# Patient Record
Sex: MALE | Race: OTHER | HISPANIC OR LATINO
[De-identification: names, ages, dates, MRNs, and addresses within clinical notes are randomized per-mention and may not be internally consistent; named-entity substitution may affect disease eponyms.]

---

## 2017-01-26 ENCOUNTER — APPOINTMENT (OUTPATIENT)
Dept: PODIATRY | Facility: CLINIC | Age: 42
End: 2017-01-26

## 2017-01-26 ENCOUNTER — APPOINTMENT (OUTPATIENT)
Dept: ENDOCRINOLOGY | Facility: CLINIC | Age: 42
End: 2017-01-26

## 2017-02-07 ENCOUNTER — APPOINTMENT (OUTPATIENT)
Dept: INTERNAL MEDICINE | Facility: CLINIC | Age: 42
End: 2017-02-07

## 2017-02-07 VITALS
BODY MASS INDEX: 25.76 KG/M2 | SYSTOLIC BLOOD PRESSURE: 143 MMHG | DIASTOLIC BLOOD PRESSURE: 85 MMHG | WEIGHT: 170 LBS | HEART RATE: 90 BPM | HEIGHT: 68 IN

## 2017-02-13 ENCOUNTER — CLINICAL ADVICE (OUTPATIENT)
Age: 42
End: 2017-02-13

## 2017-02-13 LAB
ALBUMIN SERPL-MCNC: 4.2 G/DL
ALBUMIN/GLOB SERPL: 1.35
ALP SERPL-CCNC: 120 IU/L
ALT SERPL-CCNC: 29 IU/L
ANION GAP SERPL CALC-SCNC: 10 MEQ/L
AST SERPL-CCNC: 19 IU/L
BASOPHILS # BLD: 0.02 TH/MM3
BASOPHILS NFR BLD: 0.3 %
BILIRUB SERPL-MCNC: 0.6 MG/DL
BUN SERPL-MCNC: 11 MG/DL
BUN/CREAT SERPL: 13.6 %
CALCIUM SERPL-MCNC: 9.2 MG/DL
CHLORIDE SERPL-SCNC: 98 MEQ/L
CHOLEST SERPL-MCNC: 177 MG/DL
CO2 SERPL-SCNC: 30 MEQ/L
CREAT SERPL-MCNC: 0.81 MG/DL
CREAT UR-MCNC: 110 MG/DL
EOSINOPHIL # BLD: 0.28 TH/MM3
EOSINOPHIL NFR BLD: 4.3 %
ERYTHROCYTE [DISTWIDTH] IN BLOOD BY AUTOMATED COUNT: 12.5 %
ESTIMATED AVERGAGE GLUCOSE (NORTH): 255 MG/DL
GFR SERPL CREATININE-BSD FRML MDRD: 104
GLUCOSE SERPL-MCNC: 239 MG/DL
GRANULOCYTES # BLD: 3.82 TH/MM3
GRANULOCYTES NFR BLD: 58.1 %
HBA1C MFR BLD: 10.5 %
HCT VFR BLD AUTO: 47.8 %
HDLC SERPL-MCNC: 39 MG/DL
HDLC SERPL: 4.54
HGB BLD-MCNC: 15.7 G/DL
IMM GRANULOCYTES # BLD: 0.01 TH/MM3
IMM GRANULOCYTES NFR BLD: 0.2 %
LDLC SERPL DIRECT ASSAY-MCNC: 73 MG/DL
LYMPHOCYTES # BLD: 1.94 TH/MM3
LYMPHOCYTES NFR BLD: 29.5 %
MCH RBC QN AUTO: 31.1 PG
MCHC RBC AUTO-ENTMCNC: 32.8 G/DL
MCV RBC AUTO: 94.7 FL
MICROALBUMIN UR-MCNC: 33.5 MG/DL
MICROALBUMIN/CREAT UR-RTO: 304 UG/MG
MONOCYTES # BLD: 0.5 TH/MM3
MONOCYTES NFR BLD: 7.6 %
PLATELET # BLD: 203 TH/MM3
PMV BLD AUTO: 11.5 FL
POTASSIUM SERPL-SCNC: 3.9 MMOL/L
PROT SERPL-MCNC: 7.3 G/DL
RBC # BLD AUTO: 5.05 MIL/MM3
SODIUM SERPL-SCNC: 138 MEQ/L
TRIGL SERPL-MCNC: 314 MG/DL
VLDLC SERPL-MCNC: 62 MG/DL
WBC # BLD: 6.57 TH/MM3

## 2017-02-24 ENCOUNTER — APPOINTMENT (OUTPATIENT)
Dept: ENDOCRINOLOGY | Facility: CLINIC | Age: 42
End: 2017-02-24

## 2017-02-27 ENCOUNTER — APPOINTMENT (OUTPATIENT)
Dept: CARDIOLOGY | Facility: CLINIC | Age: 42
End: 2017-02-27

## 2017-03-17 ENCOUNTER — RX RENEWAL (OUTPATIENT)
Age: 42
End: 2017-03-17

## 2017-03-24 ENCOUNTER — APPOINTMENT (OUTPATIENT)
Dept: NUTRITION | Facility: CLINIC | Age: 42
End: 2017-03-24

## 2017-03-24 ENCOUNTER — APPOINTMENT (OUTPATIENT)
Dept: ENDOCRINOLOGY | Facility: CLINIC | Age: 42
End: 2017-03-24

## 2017-06-06 ENCOUNTER — APPOINTMENT (OUTPATIENT)
Dept: INTERNAL MEDICINE | Facility: CLINIC | Age: 42
End: 2017-06-06

## 2017-06-06 ENCOUNTER — OUTPATIENT (OUTPATIENT)
Dept: OUTPATIENT SERVICES | Facility: HOSPITAL | Age: 42
LOS: 1 days | Discharge: HOME | End: 2017-06-06

## 2017-06-06 VITALS
WEIGHT: 172 LBS | HEIGHT: 68 IN | SYSTOLIC BLOOD PRESSURE: 130 MMHG | HEART RATE: 94 BPM | BODY MASS INDEX: 26.07 KG/M2 | DIASTOLIC BLOOD PRESSURE: 73 MMHG

## 2017-06-06 DIAGNOSIS — R06.02 SHORTNESS OF BREATH: ICD-10-CM

## 2017-06-06 DIAGNOSIS — E11.621 TYPE 2 DIABETES MELLITUS WITH FOOT ULCER: ICD-10-CM

## 2017-06-06 DIAGNOSIS — R06.09 OTHER FORMS OF DYSPNEA: ICD-10-CM

## 2017-06-12 ENCOUNTER — APPOINTMENT (OUTPATIENT)
Dept: PODIATRY | Facility: CLINIC | Age: 42
End: 2017-06-12

## 2017-06-19 ENCOUNTER — APPOINTMENT (OUTPATIENT)
Dept: PODIATRY | Facility: CLINIC | Age: 42
End: 2017-06-19

## 2017-06-19 ENCOUNTER — RESULT REVIEW (OUTPATIENT)
Age: 42
End: 2017-06-19

## 2017-06-19 ENCOUNTER — OUTPATIENT (OUTPATIENT)
Dept: OUTPATIENT SERVICES | Facility: HOSPITAL | Age: 42
LOS: 1 days | Discharge: HOME | End: 2017-06-19

## 2017-06-19 VITALS
DIASTOLIC BLOOD PRESSURE: 80 MMHG | WEIGHT: 170 LBS | HEIGHT: 67 IN | SYSTOLIC BLOOD PRESSURE: 127 MMHG | HEART RATE: 80 BPM | BODY MASS INDEX: 26.68 KG/M2

## 2017-06-19 DIAGNOSIS — E11.621 TYPE 2 DIABETES MELLITUS WITH FOOT ULCER: ICD-10-CM

## 2017-06-20 LAB
CHOLEST SERPL-MCNC: 198 MG/DL
ESTIMATED AVERGAGE GLUCOSE (NORTH): 278 MG/DL
HBA1C MFR BLD: 11.3 %
HDLC SERPL-MCNC: 41 MG/DL
HDLC SERPL: 4.83
LDLC SERPL DIRECT ASSAY-MCNC: 73 MG/DL
PROLACTIN SERPL-MCNC: 14.6 NG/ML
TRIGL SERPL-MCNC: 484 MG/DL
TSH SERPL DL<=0.005 MIU/L-ACNC: 1.15 UIU/ML
VLDLC SERPL-MCNC: 96 MG/DL

## 2017-06-21 LAB — HIV1+2 AB SPEC QL IA.RAPID: NONREACTIVE

## 2017-06-22 LAB — N GONORRHOEA RRNA SPEC QL NAA+PROBE: NOT DETECTED

## 2017-06-23 ENCOUNTER — OTHER (OUTPATIENT)
Age: 42
End: 2017-06-23

## 2017-06-26 LAB
TESTOSTERONE SERUM FREE (NORTH): 79.6 PG/ML
TESTOSTERONE SERUM TOTAL (NORTH): 744 NG/DL

## 2017-06-28 DIAGNOSIS — M79.671 PAIN IN RIGHT FOOT: ICD-10-CM

## 2017-06-28 DIAGNOSIS — E11.40 TYPE 2 DIABETES MELLITUS WITH DIABETIC NEUROPATHY, UNSPECIFIED: ICD-10-CM

## 2017-06-28 DIAGNOSIS — L97.519 NON-PRESSURE CHRONIC ULCER OF OTHER PART OF RIGHT FOOT WITH UNSPECIFIED SEVERITY: ICD-10-CM

## 2017-07-03 ENCOUNTER — OUTPATIENT (OUTPATIENT)
Dept: OUTPATIENT SERVICES | Facility: HOSPITAL | Age: 42
LOS: 1 days | Discharge: HOME | End: 2017-07-03

## 2017-07-03 ENCOUNTER — APPOINTMENT (OUTPATIENT)
Dept: PODIATRY | Facility: CLINIC | Age: 42
End: 2017-07-03

## 2017-07-03 VITALS
HEIGHT: 67 IN | WEIGHT: 165 LBS | BODY MASS INDEX: 25.9 KG/M2 | DIASTOLIC BLOOD PRESSURE: 75 MMHG | SYSTOLIC BLOOD PRESSURE: 125 MMHG | HEART RATE: 83 BPM

## 2017-07-03 DIAGNOSIS — E11.621 TYPE 2 DIABETES MELLITUS WITH FOOT ULCER: ICD-10-CM

## 2017-07-14 DIAGNOSIS — E11.65 TYPE 2 DIABETES MELLITUS WITH HYPERGLYCEMIA: ICD-10-CM

## 2017-07-14 DIAGNOSIS — E78.1 PURE HYPERGLYCERIDEMIA: ICD-10-CM

## 2017-07-17 ENCOUNTER — OTHER (OUTPATIENT)
Age: 42
End: 2017-07-17

## 2017-07-17 ENCOUNTER — APPOINTMENT (OUTPATIENT)
Dept: PODIATRY | Facility: CLINIC | Age: 42
End: 2017-07-17

## 2017-07-17 ENCOUNTER — OUTPATIENT (OUTPATIENT)
Dept: OUTPATIENT SERVICES | Facility: HOSPITAL | Age: 42
LOS: 1 days | Discharge: HOME | End: 2017-07-17

## 2017-07-17 DIAGNOSIS — E11.621 TYPE 2 DIABETES MELLITUS WITH FOOT ULCER: ICD-10-CM

## 2017-07-18 DIAGNOSIS — M79.671 PAIN IN RIGHT FOOT: ICD-10-CM

## 2017-07-18 DIAGNOSIS — E11.40 TYPE 2 DIABETES MELLITUS WITH DIABETIC NEUROPATHY, UNSPECIFIED: ICD-10-CM

## 2017-07-18 DIAGNOSIS — L97.519 NON-PRESSURE CHRONIC ULCER OF OTHER PART OF RIGHT FOOT WITH UNSPECIFIED SEVERITY: ICD-10-CM

## 2017-07-24 ENCOUNTER — OUTPATIENT (OUTPATIENT)
Dept: OUTPATIENT SERVICES | Facility: HOSPITAL | Age: 42
LOS: 1 days | Discharge: HOME | End: 2017-07-24

## 2017-07-24 ENCOUNTER — OTHER (OUTPATIENT)
Age: 42
End: 2017-07-24

## 2017-07-24 ENCOUNTER — APPOINTMENT (OUTPATIENT)
Dept: PODIATRY | Facility: CLINIC | Age: 42
End: 2017-07-24

## 2017-07-24 VITALS
BODY MASS INDEX: 26.68 KG/M2 | HEIGHT: 67 IN | HEART RATE: 87 BPM | SYSTOLIC BLOOD PRESSURE: 133 MMHG | DIASTOLIC BLOOD PRESSURE: 85 MMHG | WEIGHT: 170 LBS

## 2017-07-24 DIAGNOSIS — L97.519 NON-PRESSURE CHRONIC ULCER OF OTHER PART OF RIGHT FOOT WITH UNSPECIFIED SEVERITY: ICD-10-CM

## 2017-07-24 DIAGNOSIS — E11.621 TYPE 2 DIABETES MELLITUS WITH FOOT ULCER: ICD-10-CM

## 2017-07-24 DIAGNOSIS — E11.40 TYPE 2 DIABETES MELLITUS WITH DIABETIC NEUROPATHY, UNSPECIFIED: ICD-10-CM

## 2017-07-24 DIAGNOSIS — M79.671 PAIN IN RIGHT FOOT: ICD-10-CM

## 2017-07-25 DIAGNOSIS — E11.9 TYPE 2 DIABETES MELLITUS WITHOUT COMPLICATIONS: ICD-10-CM

## 2017-07-27 ENCOUNTER — OUTPATIENT (OUTPATIENT)
Dept: OUTPATIENT SERVICES | Facility: HOSPITAL | Age: 42
LOS: 1 days | Discharge: HOME | End: 2017-07-27

## 2017-07-27 ENCOUNTER — APPOINTMENT (OUTPATIENT)
Dept: UROLOGY | Facility: CLINIC | Age: 42
End: 2017-07-27
Payer: MEDICAID

## 2017-07-27 VITALS — SYSTOLIC BLOOD PRESSURE: 130 MMHG | HEART RATE: 79 BPM | DIASTOLIC BLOOD PRESSURE: 80 MMHG | OXYGEN SATURATION: 99 %

## 2017-07-27 DIAGNOSIS — E11.621 TYPE 2 DIABETES MELLITUS WITH FOOT ULCER: ICD-10-CM

## 2017-07-27 DIAGNOSIS — Z78.9 OTHER SPECIFIED HEALTH STATUS: ICD-10-CM

## 2017-07-27 PROCEDURE — 99214 OFFICE O/P EST MOD 30 MIN: CPT

## 2017-07-28 DIAGNOSIS — N48.1 BALANITIS: ICD-10-CM

## 2017-07-28 DIAGNOSIS — E11.40 TYPE 2 DIABETES MELLITUS WITH DIABETIC NEUROPATHY, UNSPECIFIED: ICD-10-CM

## 2017-07-28 DIAGNOSIS — N47.1 PHIMOSIS: ICD-10-CM

## 2017-07-31 ENCOUNTER — APPOINTMENT (OUTPATIENT)
Dept: PODIATRY | Facility: CLINIC | Age: 42
End: 2017-07-31

## 2017-07-31 ENCOUNTER — OUTPATIENT (OUTPATIENT)
Dept: OUTPATIENT SERVICES | Facility: HOSPITAL | Age: 42
LOS: 1 days | Discharge: HOME | End: 2017-07-31

## 2017-07-31 DIAGNOSIS — E11.40 TYPE 2 DIABETES MELLITUS WITH DIABETIC NEUROPATHY, UNSPECIFIED: ICD-10-CM

## 2017-07-31 DIAGNOSIS — M79.671 PAIN IN RIGHT FOOT: ICD-10-CM

## 2017-07-31 DIAGNOSIS — E11.621 TYPE 2 DIABETES MELLITUS WITH FOOT ULCER: ICD-10-CM

## 2017-07-31 DIAGNOSIS — L97.519 NON-PRESSURE CHRONIC ULCER OF OTHER PART OF RIGHT FOOT WITH UNSPECIFIED SEVERITY: ICD-10-CM

## 2017-07-31 RX ORDER — INSULIN GLARGINE 100 [IU]/ML
100 INJECTION, SOLUTION SUBCUTANEOUS
Qty: 1 | Refills: 0 | Status: DISCONTINUED | COMMUNITY
Start: 2017-03-17 | End: 2017-07-31

## 2017-08-04 DIAGNOSIS — M79.671 PAIN IN RIGHT FOOT: ICD-10-CM

## 2017-08-04 DIAGNOSIS — L97.519 NON-PRESSURE CHRONIC ULCER OF OTHER PART OF RIGHT FOOT WITH UNSPECIFIED SEVERITY: ICD-10-CM

## 2017-08-04 DIAGNOSIS — E11.40 TYPE 2 DIABETES MELLITUS WITH DIABETIC NEUROPATHY, UNSPECIFIED: ICD-10-CM

## 2017-08-07 ENCOUNTER — APPOINTMENT (OUTPATIENT)
Dept: PODIATRY | Facility: CLINIC | Age: 42
End: 2017-08-07

## 2017-08-09 ENCOUNTER — APPOINTMENT (OUTPATIENT)
Dept: UROLOGY | Facility: CLINIC | Age: 42
End: 2017-08-09
Payer: MEDICAID

## 2017-08-09 VITALS — DIASTOLIC BLOOD PRESSURE: 87 MMHG | HEART RATE: 82 BPM | SYSTOLIC BLOOD PRESSURE: 137 MMHG

## 2017-08-09 PROCEDURE — 99213 OFFICE O/P EST LOW 20 MIN: CPT

## 2017-08-09 RX ORDER — AZITHROMYCIN 500 MG/1
500 TABLET, FILM COATED ORAL ONCE
Qty: 1 | Refills: 0 | Status: COMPLETED | COMMUNITY
Start: 2017-06-06 | End: 2017-08-09

## 2017-08-14 ENCOUNTER — APPOINTMENT (OUTPATIENT)
Dept: PODIATRY | Facility: CLINIC | Age: 42
End: 2017-08-14

## 2017-08-14 ENCOUNTER — OUTPATIENT (OUTPATIENT)
Dept: OUTPATIENT SERVICES | Facility: HOSPITAL | Age: 42
LOS: 1 days | Discharge: HOME | End: 2017-08-14

## 2017-08-14 DIAGNOSIS — E11.621 TYPE 2 DIABETES MELLITUS WITH FOOT ULCER: ICD-10-CM

## 2017-08-22 ENCOUNTER — OUTPATIENT (OUTPATIENT)
Dept: OUTPATIENT SERVICES | Facility: HOSPITAL | Age: 42
LOS: 1 days | Discharge: HOME | End: 2017-08-22

## 2017-08-22 ENCOUNTER — APPOINTMENT (OUTPATIENT)
Dept: PODIATRY | Facility: CLINIC | Age: 42
End: 2017-08-22

## 2017-08-22 DIAGNOSIS — E11.621 TYPE 2 DIABETES MELLITUS WITH FOOT ULCER: ICD-10-CM

## 2017-08-22 DIAGNOSIS — M79.671 PAIN IN RIGHT FOOT: ICD-10-CM

## 2017-08-22 DIAGNOSIS — E11.40 TYPE 2 DIABETES MELLITUS WITH DIABETIC NEUROPATHY, UNSPECIFIED: ICD-10-CM

## 2017-08-22 DIAGNOSIS — Z01.818 ENCOUNTER FOR OTHER PREPROCEDURAL EXAMINATION: ICD-10-CM

## 2017-08-22 DIAGNOSIS — N47.1 PHIMOSIS: ICD-10-CM

## 2017-08-22 DIAGNOSIS — L97.519 NON-PRESSURE CHRONIC ULCER OF OTHER PART OF RIGHT FOOT WITH UNSPECIFIED SEVERITY: ICD-10-CM

## 2017-08-28 ENCOUNTER — OUTPATIENT (OUTPATIENT)
Dept: OUTPATIENT SERVICES | Facility: HOSPITAL | Age: 42
LOS: 1 days | Discharge: HOME | End: 2017-08-28

## 2017-08-28 ENCOUNTER — APPOINTMENT (OUTPATIENT)
Dept: PODIATRY | Facility: CLINIC | Age: 42
End: 2017-08-28

## 2017-08-28 ENCOUNTER — APPOINTMENT (OUTPATIENT)
Dept: INTERNAL MEDICINE | Facility: CLINIC | Age: 42
End: 2017-08-28

## 2017-08-28 VITALS
HEART RATE: 71 BPM | HEIGHT: 67 IN | BODY MASS INDEX: 27 KG/M2 | SYSTOLIC BLOOD PRESSURE: 141 MMHG | WEIGHT: 172 LBS | DIASTOLIC BLOOD PRESSURE: 75 MMHG

## 2017-08-28 DIAGNOSIS — E11.621 TYPE 2 DIABETES MELLITUS WITH FOOT ULCER: ICD-10-CM

## 2017-08-28 DIAGNOSIS — N48.5 ULCER OF PENIS: ICD-10-CM

## 2017-08-28 DIAGNOSIS — Z87.19 PERSONAL HISTORY OF OTHER DISEASES OF THE DIGESTIVE SYSTEM: ICD-10-CM

## 2017-08-29 LAB
ALBUMIN SERPL-MCNC: 4.1 G/DL
ALBUMIN/GLOB SERPL: 1.14
ALP SERPL-CCNC: 113 IU/L
ALT SERPL-CCNC: 21 IU/L
ANION GAP SERPL CALC-SCNC: 7 MEQ/L
AST SERPL-CCNC: 21 IU/L
BASOPHILS # BLD: 0.02 TH/MM3
BASOPHILS NFR BLD: 0.3 %
BILIRUB SERPL-MCNC: 0.6 MG/DL
BUN SERPL-MCNC: 10 MG/DL
BUN/CREAT SERPL: 13.3 %
CALCIUM SERPL-MCNC: 9.2 MG/DL
CHLORIDE SERPL-SCNC: 100 MEQ/L
CO2 SERPL-SCNC: 30 MEQ/L
CREAT SERPL-MCNC: 0.75 MG/DL
DIFFERENTIAL METHOD BLD: NORMAL
EOSINOPHIL # BLD: 0.19 TH/MM3
EOSINOPHIL NFR BLD: 2.4 %
ERYTHROCYTE [DISTWIDTH] IN BLOOD BY AUTOMATED COUNT: 12.3 %
GFR SERPL CREATININE-BSD FRML MDRD: 113
GLUCOSE SERPL-MCNC: 155 MG/DL
GRANULOCYTES # BLD: 5.14 TH/MM3
GRANULOCYTES NFR BLD: 64.6 %
HCT VFR BLD AUTO: 46.8 %
HGB BLD-MCNC: 15.3 G/DL
IMM GRANULOCYTES # BLD: 0.03 TH/MM3
IMM GRANULOCYTES NFR BLD: 0.4 %
INR PPP: 0.9
LYMPHOCYTES # BLD: 1.88 TH/MM3
LYMPHOCYTES NFR BLD: 23.6 %
MCH RBC QN AUTO: 31.2 PG
MCHC RBC AUTO-ENTMCNC: 32.7 G/DL
MCV RBC AUTO: 95.5 FL
MONOCYTES # BLD: 0.69 TH/MM3
MONOCYTES NFR BLD: 8.7 %
PLATELET # BLD: 176 TH/MM3
PMV BLD AUTO: 11.7 FL
POTASSIUM SERPL-SCNC: 4.2 MMOL/L
PROT SERPL-MCNC: 7.7 G/DL
PROTHROMBIN TIME: 9.8 SEC
RBC # BLD AUTO: 4.9 MIL/MM3
SODIUM SERPL-SCNC: 137 MEQ/L
WBC # BLD: 7.95 TH/MM3

## 2017-08-30 ENCOUNTER — APPOINTMENT (OUTPATIENT)
Dept: UROLOGY | Facility: CLINIC | Age: 42
End: 2017-08-30

## 2017-09-01 ENCOUNTER — APPOINTMENT (OUTPATIENT)
Dept: GASTROENTEROLOGY | Facility: CLINIC | Age: 42
End: 2017-09-01

## 2017-09-06 ENCOUNTER — APPOINTMENT (OUTPATIENT)
Dept: UROLOGY | Facility: CLINIC | Age: 42
End: 2017-09-06

## 2017-09-06 ENCOUNTER — APPOINTMENT (OUTPATIENT)
Dept: ENDOCRINOLOGY | Facility: CLINIC | Age: 42
End: 2017-09-06

## 2017-09-06 ENCOUNTER — OUTPATIENT (OUTPATIENT)
Dept: OUTPATIENT SERVICES | Facility: HOSPITAL | Age: 42
LOS: 1 days | Discharge: HOME | End: 2017-09-06

## 2017-09-06 ENCOUNTER — APPOINTMENT (OUTPATIENT)
Dept: INTERNAL MEDICINE | Facility: CLINIC | Age: 42
End: 2017-09-06

## 2017-09-06 VITALS
WEIGHT: 169 LBS | DIASTOLIC BLOOD PRESSURE: 82 MMHG | SYSTOLIC BLOOD PRESSURE: 123 MMHG | BODY MASS INDEX: 26.47 KG/M2 | HEART RATE: 76 BPM

## 2017-09-06 VITALS
HEIGHT: 67 IN | BODY MASS INDEX: 26.37 KG/M2 | WEIGHT: 168 LBS | SYSTOLIC BLOOD PRESSURE: 154 MMHG | HEART RATE: 76 BPM | DIASTOLIC BLOOD PRESSURE: 93 MMHG

## 2017-09-06 DIAGNOSIS — R80.9 PROTEINURIA, UNSPECIFIED: ICD-10-CM

## 2017-09-06 DIAGNOSIS — E11.8 TYPE 2 DIABETES MELLITUS WITH UNSPECIFIED COMPLICATIONS: ICD-10-CM

## 2017-09-06 DIAGNOSIS — K05.10 CHRONIC GINGIVITIS, PLAQUE INDUCED: ICD-10-CM

## 2017-09-06 DIAGNOSIS — Z01.818 ENCOUNTER FOR OTHER PREPROCEDURAL EXAMINATION: ICD-10-CM

## 2017-09-06 DIAGNOSIS — F33.3 MAJOR DEPRESSIVE DISORDER, RECURRENT, SEVERE WITH PSYCHOTIC SYMPTOMS: ICD-10-CM

## 2017-09-06 DIAGNOSIS — R22.0 LOCALIZED SWELLING, MASS AND LUMP, HEAD: ICD-10-CM

## 2017-09-06 DIAGNOSIS — Z86.69 PERSONAL HISTORY OF OTHER DISEASES OF THE NERVOUS SYSTEM AND SENSE ORGANS: ICD-10-CM

## 2017-09-06 DIAGNOSIS — E11.621 TYPE 2 DIABETES MELLITUS WITH FOOT ULCER: ICD-10-CM

## 2017-09-06 DIAGNOSIS — F41.0 PANIC DISORDER [EPISODIC PAROXYSMAL ANXIETY]: ICD-10-CM

## 2017-09-06 DIAGNOSIS — F43.12 POST-TRAUMATIC STRESS DISORDER, CHRONIC: ICD-10-CM

## 2017-09-06 RX ORDER — AMOXICILLIN AND CLAVULANATE POTASSIUM 500; 125 MG/1; MG/1
500-125 TABLET, FILM COATED ORAL
Qty: 10 | Refills: 0 | Status: DISCONTINUED | COMMUNITY
Start: 2017-08-28 | End: 2017-09-06

## 2017-09-06 RX ORDER — INSULIN GLARGINE 100 [IU]/ML
100 INJECTION, SOLUTION SUBCUTANEOUS DAILY
Qty: 2 | Refills: 0 | Status: DISCONTINUED | COMMUNITY
Start: 2017-07-31 | End: 2017-09-06

## 2017-09-07 ENCOUNTER — APPOINTMENT (OUTPATIENT)
Dept: PODIATRY | Facility: CLINIC | Age: 42
End: 2017-09-07

## 2017-09-07 DIAGNOSIS — F41.9 ANXIETY DISORDER, UNSPECIFIED: ICD-10-CM

## 2017-09-07 DIAGNOSIS — E11.65 TYPE 2 DIABETES MELLITUS WITH HYPERGLYCEMIA: ICD-10-CM

## 2017-09-07 DIAGNOSIS — Z01.818 ENCOUNTER FOR OTHER PREPROCEDURAL EXAMINATION: ICD-10-CM

## 2017-09-08 DIAGNOSIS — E11.40 TYPE 2 DIABETES MELLITUS WITH DIABETIC NEUROPATHY, UNSPECIFIED: ICD-10-CM

## 2017-09-08 DIAGNOSIS — M79.671 PAIN IN RIGHT FOOT: ICD-10-CM

## 2017-09-11 ENCOUNTER — OUTPATIENT (OUTPATIENT)
Dept: OUTPATIENT SERVICES | Facility: HOSPITAL | Age: 42
LOS: 1 days | Discharge: HOME | End: 2017-09-11

## 2017-09-11 DIAGNOSIS — F33.3 MAJOR DEPRESSIVE DISORDER, RECURRENT, SEVERE WITH PSYCHOTIC SYMPTOMS: ICD-10-CM

## 2017-09-11 DIAGNOSIS — F43.12 POST-TRAUMATIC STRESS DISORDER, CHRONIC: ICD-10-CM

## 2017-09-11 DIAGNOSIS — F41.0 PANIC DISORDER [EPISODIC PAROXYSMAL ANXIETY]: ICD-10-CM

## 2017-09-12 ENCOUNTER — APPOINTMENT (OUTPATIENT)
Dept: UROLOGY | Facility: HOSPITAL | Age: 42
End: 2017-09-12

## 2017-09-13 ENCOUNTER — OTHER (OUTPATIENT)
Age: 42
End: 2017-09-13

## 2017-09-15 RX ORDER — BLOOD SUGAR DIAGNOSTIC
STRIP MISCELLANEOUS
Qty: 100 | Refills: 0 | Status: DISCONTINUED | COMMUNITY
Start: 2017-07-25 | End: 2017-09-15

## 2017-09-19 ENCOUNTER — APPOINTMENT (OUTPATIENT)
Dept: UROLOGY | Facility: HOSPITAL | Age: 42
End: 2017-09-19

## 2017-09-19 ENCOUNTER — APPOINTMENT (OUTPATIENT)
Dept: INTERNAL MEDICINE | Facility: CLINIC | Age: 42
End: 2017-09-19

## 2017-09-25 ENCOUNTER — INPATIENT (INPATIENT)
Facility: HOSPITAL | Age: 42
LOS: 2 days | Discharge: HOME | End: 2017-09-28
Admitting: INTERNAL MEDICINE

## 2017-09-25 DIAGNOSIS — E11.621 TYPE 2 DIABETES MELLITUS WITH FOOT ULCER: ICD-10-CM

## 2017-09-27 ENCOUNTER — OTHER (OUTPATIENT)
Age: 42
End: 2017-09-27

## 2017-09-28 ENCOUNTER — APPOINTMENT (OUTPATIENT)
Dept: PODIATRY | Facility: CLINIC | Age: 42
End: 2017-09-28

## 2017-10-01 ENCOUNTER — OUTPATIENT (OUTPATIENT)
Dept: OUTPATIENT SERVICES | Facility: HOSPITAL | Age: 42
LOS: 1 days | End: 2017-10-01
Payer: MEDICAID

## 2017-10-02 ENCOUNTER — APPOINTMENT (OUTPATIENT)
Dept: PODIATRY | Facility: CLINIC | Age: 42
End: 2017-10-02

## 2017-10-02 ENCOUNTER — OUTPATIENT (OUTPATIENT)
Dept: OUTPATIENT SERVICES | Facility: HOSPITAL | Age: 42
LOS: 1 days | Discharge: HOME | End: 2017-10-02

## 2017-10-02 DIAGNOSIS — E78.5 HYPERLIPIDEMIA, UNSPECIFIED: ICD-10-CM

## 2017-10-02 DIAGNOSIS — E11.65 TYPE 2 DIABETES MELLITUS WITH HYPERGLYCEMIA: ICD-10-CM

## 2017-10-02 DIAGNOSIS — K21.9 GASTRO-ESOPHAGEAL REFLUX DISEASE WITHOUT ESOPHAGITIS: ICD-10-CM

## 2017-10-02 DIAGNOSIS — I10 ESSENTIAL (PRIMARY) HYPERTENSION: ICD-10-CM

## 2017-10-02 DIAGNOSIS — Z79.4 LONG TERM (CURRENT) USE OF INSULIN: ICD-10-CM

## 2017-10-02 DIAGNOSIS — L97.511 NON-PRESSURE CHRONIC ULCER OF OTHER PART OF RIGHT FOOT LIMITED TO BREAKDOWN OF SKIN: ICD-10-CM

## 2017-10-02 DIAGNOSIS — E11.621 TYPE 2 DIABETES MELLITUS WITH FOOT ULCER: ICD-10-CM

## 2017-10-02 DIAGNOSIS — F32.9 MAJOR DEPRESSIVE DISORDER, SINGLE EPISODE, UNSPECIFIED: ICD-10-CM

## 2017-10-02 DIAGNOSIS — F17.210 NICOTINE DEPENDENCE, CIGARETTES, UNCOMPLICATED: ICD-10-CM

## 2017-10-03 ENCOUNTER — OUTPATIENT (OUTPATIENT)
Dept: OUTPATIENT SERVICES | Facility: HOSPITAL | Age: 42
LOS: 1 days | Discharge: HOME | End: 2017-10-03

## 2017-10-03 ENCOUNTER — EMERGENCY (EMERGENCY)
Facility: HOSPITAL | Age: 42
LOS: 0 days | Discharge: HOME | End: 2017-10-03
Admitting: INTERNAL MEDICINE

## 2017-10-03 DIAGNOSIS — E11.621 TYPE 2 DIABETES MELLITUS WITH FOOT ULCER: ICD-10-CM

## 2017-10-03 DIAGNOSIS — E11.9 TYPE 2 DIABETES MELLITUS WITHOUT COMPLICATIONS: ICD-10-CM

## 2017-10-03 DIAGNOSIS — Z79.899 OTHER LONG TERM (CURRENT) DRUG THERAPY: ICD-10-CM

## 2017-10-03 DIAGNOSIS — F43.12 POST-TRAUMATIC STRESS DISORDER, CHRONIC: ICD-10-CM

## 2017-10-03 DIAGNOSIS — F33.3 MAJOR DEPRESSIVE DISORDER, RECURRENT, SEVERE WITH PSYCHOTIC SYMPTOMS: ICD-10-CM

## 2017-10-03 DIAGNOSIS — R11.10 VOMITING, UNSPECIFIED: ICD-10-CM

## 2017-10-03 DIAGNOSIS — Z79.4 LONG TERM (CURRENT) USE OF INSULIN: ICD-10-CM

## 2017-10-03 DIAGNOSIS — K52.9 NONINFECTIVE GASTROENTERITIS AND COLITIS, UNSPECIFIED: ICD-10-CM

## 2017-10-03 DIAGNOSIS — F41.0 PANIC DISORDER [EPISODIC PAROXYSMAL ANXIETY]: ICD-10-CM

## 2017-10-03 DIAGNOSIS — Z92.89 PERSONAL HISTORY OF OTHER MEDICAL TREATMENT: ICD-10-CM

## 2017-10-03 DIAGNOSIS — Z87.891 PERSONAL HISTORY OF NICOTINE DEPENDENCE: ICD-10-CM

## 2017-10-10 ENCOUNTER — OUTPATIENT (OUTPATIENT)
Dept: OUTPATIENT SERVICES | Facility: HOSPITAL | Age: 42
LOS: 1 days | Discharge: HOME | End: 2017-10-10

## 2017-10-10 ENCOUNTER — APPOINTMENT (OUTPATIENT)
Dept: PODIATRY | Facility: CLINIC | Age: 42
End: 2017-10-10

## 2017-10-10 DIAGNOSIS — E11.621 TYPE 2 DIABETES MELLITUS WITH FOOT ULCER: ICD-10-CM

## 2017-10-10 RX ORDER — DULOXETINE HYDROCHLORIDE 30 MG/1
30 CAPSULE, DELAYED RELEASE PELLETS ORAL
Qty: 7 | Refills: 0 | Status: DISCONTINUED | COMMUNITY
Start: 2017-09-06 | End: 2017-10-10

## 2017-10-12 ENCOUNTER — APPOINTMENT (OUTPATIENT)
Dept: PLASTIC SURGERY | Facility: CLINIC | Age: 42
End: 2017-10-12
Payer: MEDICAID

## 2017-10-12 VITALS — WEIGHT: 169 LBS | BODY MASS INDEX: 26.53 KG/M2 | HEIGHT: 67 IN

## 2017-10-12 PROCEDURE — 99203 OFFICE O/P NEW LOW 30 MIN: CPT | Mod: 25

## 2017-10-12 PROCEDURE — 20550 NJX 1 TENDON SHEATH/LIGAMENT: CPT

## 2017-10-19 ENCOUNTER — APPOINTMENT (OUTPATIENT)
Dept: PODIATRY | Facility: CLINIC | Age: 42
End: 2017-10-19

## 2017-10-19 ENCOUNTER — OUTPATIENT (OUTPATIENT)
Dept: OUTPATIENT SERVICES | Facility: HOSPITAL | Age: 42
LOS: 1 days | Discharge: HOME | End: 2017-10-19

## 2017-10-19 DIAGNOSIS — E11.621 TYPE 2 DIABETES MELLITUS WITH FOOT ULCER: ICD-10-CM

## 2017-10-23 ENCOUNTER — APPOINTMENT (OUTPATIENT)
Dept: INTERNAL MEDICINE | Facility: CLINIC | Age: 42
End: 2017-10-23

## 2017-10-26 ENCOUNTER — OUTPATIENT (OUTPATIENT)
Dept: OUTPATIENT SERVICES | Facility: HOSPITAL | Age: 42
LOS: 1 days | Discharge: HOME | End: 2017-10-26

## 2017-10-26 ENCOUNTER — APPOINTMENT (OUTPATIENT)
Dept: PODIATRY | Facility: CLINIC | Age: 42
End: 2017-10-26

## 2017-10-26 DIAGNOSIS — E11.621 TYPE 2 DIABETES MELLITUS WITH FOOT ULCER: ICD-10-CM

## 2017-10-30 DIAGNOSIS — R69 ILLNESS, UNSPECIFIED: ICD-10-CM

## 2017-10-31 DIAGNOSIS — M79.671 PAIN IN RIGHT FOOT: ICD-10-CM

## 2017-10-31 DIAGNOSIS — L97.519 NON-PRESSURE CHRONIC ULCER OF OTHER PART OF RIGHT FOOT WITH UNSPECIFIED SEVERITY: ICD-10-CM

## 2017-10-31 DIAGNOSIS — E11.40 TYPE 2 DIABETES MELLITUS WITH DIABETIC NEUROPATHY, UNSPECIFIED: ICD-10-CM

## 2017-11-08 ENCOUNTER — OUTPATIENT (OUTPATIENT)
Dept: OUTPATIENT SERVICES | Facility: HOSPITAL | Age: 42
LOS: 1 days | Discharge: HOME | End: 2017-11-08

## 2017-11-08 DIAGNOSIS — E11.621 TYPE 2 DIABETES MELLITUS WITH FOOT ULCER: ICD-10-CM

## 2017-11-09 ENCOUNTER — APPOINTMENT (OUTPATIENT)
Dept: PODIATRY | Facility: CLINIC | Age: 42
End: 2017-11-09

## 2017-11-09 ENCOUNTER — OUTPATIENT (OUTPATIENT)
Dept: OUTPATIENT SERVICES | Facility: HOSPITAL | Age: 42
LOS: 1 days | Discharge: HOME | End: 2017-11-09

## 2017-11-09 DIAGNOSIS — F33.3 MAJOR DEPRESSIVE DISORDER, RECURRENT, SEVERE WITH PSYCHOTIC SYMPTOMS: ICD-10-CM

## 2017-11-09 DIAGNOSIS — E11.621 TYPE 2 DIABETES MELLITUS WITH FOOT ULCER: ICD-10-CM

## 2017-11-09 DIAGNOSIS — F43.12 POST-TRAUMATIC STRESS DISORDER, CHRONIC: ICD-10-CM

## 2017-11-09 DIAGNOSIS — F41.0 PANIC DISORDER [EPISODIC PAROXYSMAL ANXIETY]: ICD-10-CM

## 2017-11-14 ENCOUNTER — OUTPATIENT (OUTPATIENT)
Dept: OUTPATIENT SERVICES | Facility: HOSPITAL | Age: 42
LOS: 1 days | Discharge: HOME | End: 2017-11-14

## 2017-11-14 ENCOUNTER — APPOINTMENT (OUTPATIENT)
Dept: PODIATRY | Facility: CLINIC | Age: 42
End: 2017-11-14

## 2017-11-14 DIAGNOSIS — E11.621 TYPE 2 DIABETES MELLITUS WITH FOOT ULCER: ICD-10-CM

## 2017-11-15 ENCOUNTER — APPOINTMENT (OUTPATIENT)
Dept: PLASTIC SURGERY | Facility: CLINIC | Age: 42
End: 2017-11-15

## 2017-11-21 ENCOUNTER — APPOINTMENT (OUTPATIENT)
Dept: PODIATRY | Facility: CLINIC | Age: 42
End: 2017-11-21

## 2017-11-21 ENCOUNTER — OUTPATIENT (OUTPATIENT)
Dept: OUTPATIENT SERVICES | Facility: HOSPITAL | Age: 42
LOS: 1 days | Discharge: HOME | End: 2017-11-21

## 2017-11-21 DIAGNOSIS — E11.621 TYPE 2 DIABETES MELLITUS WITH FOOT ULCER: ICD-10-CM

## 2017-11-28 ENCOUNTER — OUTPATIENT (OUTPATIENT)
Dept: OUTPATIENT SERVICES | Facility: HOSPITAL | Age: 42
LOS: 1 days | Discharge: HOME | End: 2017-11-28

## 2017-11-28 ENCOUNTER — APPOINTMENT (OUTPATIENT)
Dept: PODIATRY | Facility: CLINIC | Age: 42
End: 2017-11-28

## 2017-11-28 DIAGNOSIS — L97.519 NON-PRESSURE CHRONIC ULCER OF OTHER PART OF RIGHT FOOT WITH UNSPECIFIED SEVERITY: ICD-10-CM

## 2017-11-28 DIAGNOSIS — E11.621 TYPE 2 DIABETES MELLITUS WITH FOOT ULCER: ICD-10-CM

## 2017-11-28 DIAGNOSIS — F43.12 POST-TRAUMATIC STRESS DISORDER, CHRONIC: ICD-10-CM

## 2017-11-28 DIAGNOSIS — E10.65 TYPE 1 DIABETES MELLITUS WITH HYPERGLYCEMIA: ICD-10-CM

## 2017-11-28 DIAGNOSIS — F41.0 PANIC DISORDER [EPISODIC PAROXYSMAL ANXIETY]: ICD-10-CM

## 2017-11-28 DIAGNOSIS — E11.40 TYPE 2 DIABETES MELLITUS WITH DIABETIC NEUROPATHY, UNSPECIFIED: ICD-10-CM

## 2017-11-28 DIAGNOSIS — F33.3 MAJOR DEPRESSIVE DISORDER, RECURRENT, SEVERE WITH PSYCHOTIC SYMPTOMS: ICD-10-CM

## 2017-11-30 ENCOUNTER — OUTPATIENT (OUTPATIENT)
Dept: OUTPATIENT SERVICES | Facility: HOSPITAL | Age: 42
LOS: 1 days | Discharge: HOME | End: 2017-11-30

## 2017-11-30 ENCOUNTER — APPOINTMENT (OUTPATIENT)
Dept: ENDOCRINOLOGY | Facility: CLINIC | Age: 42
End: 2017-11-30

## 2017-11-30 VITALS
SYSTOLIC BLOOD PRESSURE: 141 MMHG | HEART RATE: 82 BPM | HEIGHT: 67 IN | DIASTOLIC BLOOD PRESSURE: 85 MMHG | BODY MASS INDEX: 26.37 KG/M2 | WEIGHT: 168 LBS

## 2017-11-30 DIAGNOSIS — K59.04 CHRONIC IDIOPATHIC CONSTIPATION: ICD-10-CM

## 2017-11-30 DIAGNOSIS — F17.200 NICOTINE DEPENDENCE, UNSPECIFIED, UNCOMPLICATED: ICD-10-CM

## 2017-11-30 DIAGNOSIS — E11.621 TYPE 2 DIABETES MELLITUS WITH FOOT ULCER: ICD-10-CM

## 2017-12-01 PROBLEM — K59.04 CHRONIC IDIOPATHIC CONSTIPATION: Status: ACTIVE | Noted: 2017-12-01

## 2017-12-01 RX ORDER — BLOOD SUGAR DIAGNOSTIC
STRIP MISCELLANEOUS
Qty: 100 | Refills: 5 | Status: DISCONTINUED | COMMUNITY
Start: 2017-07-25 | End: 2017-12-01

## 2017-12-05 ENCOUNTER — OUTPATIENT (OUTPATIENT)
Dept: OUTPATIENT SERVICES | Facility: HOSPITAL | Age: 42
LOS: 1 days | Discharge: HOME | End: 2017-12-05

## 2017-12-05 ENCOUNTER — APPOINTMENT (OUTPATIENT)
Dept: PODIATRY | Facility: CLINIC | Age: 42
End: 2017-12-05

## 2017-12-05 DIAGNOSIS — E78.00 PURE HYPERCHOLESTEROLEMIA, UNSPECIFIED: ICD-10-CM

## 2017-12-05 DIAGNOSIS — E10.65 TYPE 1 DIABETES MELLITUS WITH HYPERGLYCEMIA: ICD-10-CM

## 2017-12-05 DIAGNOSIS — E06.3 AUTOIMMUNE THYROIDITIS: ICD-10-CM

## 2017-12-05 DIAGNOSIS — E11.621 TYPE 2 DIABETES MELLITUS WITH FOOT ULCER: ICD-10-CM

## 2017-12-12 ENCOUNTER — APPOINTMENT (OUTPATIENT)
Dept: INTERNAL MEDICINE | Facility: CLINIC | Age: 42
End: 2017-12-12

## 2017-12-14 ENCOUNTER — APPOINTMENT (OUTPATIENT)
Dept: PODIATRY | Facility: CLINIC | Age: 42
End: 2017-12-14

## 2017-12-21 ENCOUNTER — OUTPATIENT (OUTPATIENT)
Dept: OUTPATIENT SERVICES | Facility: HOSPITAL | Age: 42
LOS: 1 days | Discharge: HOME | End: 2017-12-21

## 2017-12-21 ENCOUNTER — APPOINTMENT (OUTPATIENT)
Dept: PODIATRY | Facility: CLINIC | Age: 42
End: 2017-12-21

## 2017-12-21 DIAGNOSIS — F41.0 PANIC DISORDER [EPISODIC PAROXYSMAL ANXIETY]: ICD-10-CM

## 2017-12-21 DIAGNOSIS — L97.519 NON-PRESSURE CHRONIC ULCER OF OTHER PART OF RIGHT FOOT WITH UNSPECIFIED SEVERITY: ICD-10-CM

## 2017-12-21 DIAGNOSIS — F33.3 MAJOR DEPRESSIVE DISORDER, RECURRENT, SEVERE WITH PSYCHOTIC SYMPTOMS: ICD-10-CM

## 2017-12-21 DIAGNOSIS — E11.621 TYPE 2 DIABETES MELLITUS WITH FOOT ULCER: ICD-10-CM

## 2017-12-21 DIAGNOSIS — M79.671 PAIN IN RIGHT FOOT: ICD-10-CM

## 2017-12-21 DIAGNOSIS — F43.12 POST-TRAUMATIC STRESS DISORDER, CHRONIC: ICD-10-CM

## 2017-12-21 RX ORDER — DULOXETINE HYDROCHLORIDE 30 MG/1
30 CAPSULE, DELAYED RELEASE PELLETS ORAL
Qty: 30 | Refills: 0 | Status: DISCONTINUED | COMMUNITY
Start: 2017-11-09 | End: 2017-12-21

## 2017-12-22 ENCOUNTER — EMERGENCY (EMERGENCY)
Facility: HOSPITAL | Age: 42
LOS: 0 days | Discharge: HOME | End: 2017-12-22
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
## 2017-12-22 DIAGNOSIS — S01.512A LACERATION WITHOUT FOREIGN BODY OF ORAL CAVITY, INITIAL ENCOUNTER: ICD-10-CM

## 2017-12-22 DIAGNOSIS — I10 ESSENTIAL (PRIMARY) HYPERTENSION: ICD-10-CM

## 2017-12-22 DIAGNOSIS — E11.9 TYPE 2 DIABETES MELLITUS WITHOUT COMPLICATIONS: ICD-10-CM

## 2017-12-22 DIAGNOSIS — Z79.4 LONG TERM (CURRENT) USE OF INSULIN: ICD-10-CM

## 2017-12-22 DIAGNOSIS — S16.9XXA UNSPECIFIED INJURY OF MUSCLE, FASCIA AND TENDON AT NECK LEVEL, INITIAL ENCOUNTER: ICD-10-CM

## 2017-12-22 DIAGNOSIS — E11.621 TYPE 2 DIABETES MELLITUS WITH FOOT ULCER: ICD-10-CM
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
## 2017-12-22 DIAGNOSIS — Z79.899 OTHER LONG TERM (CURRENT) DRUG THERAPY: ICD-10-CM

## 2017-12-22 DIAGNOSIS — Z87.891 PERSONAL HISTORY OF NICOTINE DEPENDENCE: ICD-10-CM

## 2017-12-27 ENCOUNTER — APPOINTMENT (OUTPATIENT)
Dept: PODIATRY | Facility: CLINIC | Age: 42
End: 2017-12-27

## 2018-01-02 ENCOUNTER — APPOINTMENT (OUTPATIENT)
Dept: PODIATRY | Facility: CLINIC | Age: 43
End: 2018-01-02

## 2018-01-02 RX ORDER — INSULIN GLARGINE 300 U/ML
300 INJECTION, SOLUTION SUBCUTANEOUS
Qty: 1 | Refills: 5 | Status: DISCONTINUED | COMMUNITY
Start: 2017-09-06 | End: 2018-01-02

## 2018-01-03 ENCOUNTER — OUTPATIENT (OUTPATIENT)
Dept: OUTPATIENT SERVICES | Facility: HOSPITAL | Age: 43
LOS: 1 days | Discharge: HOME | End: 2018-01-03

## 2018-01-03 ENCOUNTER — APPOINTMENT (OUTPATIENT)
Dept: ENDOCRINOLOGY | Facility: CLINIC | Age: 43
End: 2018-01-03

## 2018-01-03 VITALS
HEART RATE: 80 BPM | RESPIRATION RATE: 18 BRPM | BODY MASS INDEX: 26.37 KG/M2 | SYSTOLIC BLOOD PRESSURE: 147 MMHG | HEIGHT: 67 IN | WEIGHT: 168 LBS | DIASTOLIC BLOOD PRESSURE: 95 MMHG

## 2018-01-03 VITALS — SYSTOLIC BLOOD PRESSURE: 150 MMHG | DIASTOLIC BLOOD PRESSURE: 80 MMHG

## 2018-01-03 DIAGNOSIS — E11.42 TYPE 2 DIABETES MELLITUS WITH DIABETIC POLYNEUROPATHY: ICD-10-CM

## 2018-01-03 DIAGNOSIS — Z79.4 PERSONAL HISTORY OF OTHER ENDOCRINE, NUTRITIONAL AND METABOLIC DISEASE: ICD-10-CM

## 2018-01-03 DIAGNOSIS — Z86.39 PERSONAL HISTORY OF OTHER ENDOCRINE, NUTRITIONAL AND METABOLIC DISEASE: ICD-10-CM

## 2018-01-03 DIAGNOSIS — E11.621 TYPE 2 DIABETES MELLITUS WITH FOOT ULCER: ICD-10-CM

## 2018-01-03 DIAGNOSIS — E55.9 VITAMIN D DEFICIENCY, UNSPECIFIED: ICD-10-CM

## 2018-01-03 DIAGNOSIS — E10.65 TYPE 1 DIABETES MELLITUS WITH HYPERGLYCEMIA: ICD-10-CM

## 2018-01-03 RX ORDER — LOSARTAN POTASSIUM 100 MG/1
100 TABLET, FILM COATED ORAL DAILY
Qty: 30 | Refills: 5 | Status: DISCONTINUED | COMMUNITY
Start: 2017-09-06 | End: 2018-01-03

## 2018-01-06 ENCOUNTER — OUTPATIENT (OUTPATIENT)
Dept: OUTPATIENT SERVICES | Facility: HOSPITAL | Age: 43
LOS: 1 days | Discharge: HOME | End: 2018-01-06

## 2018-01-06 DIAGNOSIS — E11.621 TYPE 2 DIABETES MELLITUS WITH FOOT ULCER: ICD-10-CM

## 2018-01-06 DIAGNOSIS — M79.671 PAIN IN RIGHT FOOT: ICD-10-CM

## 2018-01-18 ENCOUNTER — APPOINTMENT (OUTPATIENT)
Dept: PODIATRY | Facility: CLINIC | Age: 43
End: 2018-01-18

## 2018-01-19 ENCOUNTER — APPOINTMENT (OUTPATIENT)
Dept: INTERNAL MEDICINE | Facility: CLINIC | Age: 43
End: 2018-01-19

## 2018-01-25 ENCOUNTER — OUTPATIENT (OUTPATIENT)
Dept: OUTPATIENT SERVICES | Facility: HOSPITAL | Age: 43
LOS: 1 days | Discharge: HOME | End: 2018-01-25

## 2018-01-25 DIAGNOSIS — F41.0 PANIC DISORDER [EPISODIC PAROXYSMAL ANXIETY]: ICD-10-CM

## 2018-01-25 DIAGNOSIS — F43.12 POST-TRAUMATIC STRESS DISORDER, CHRONIC: ICD-10-CM

## 2018-01-25 DIAGNOSIS — F33.3 MAJOR DEPRESSIVE DISORDER, RECURRENT, SEVERE WITH PSYCHOTIC SYMPTOMS: ICD-10-CM

## 2018-03-15 ENCOUNTER — APPOINTMENT (OUTPATIENT)
Dept: PODIATRY | Facility: CLINIC | Age: 43
End: 2018-03-15

## 2018-03-26 ENCOUNTER — EMERGENCY (EMERGENCY)
Facility: HOSPITAL | Age: 43
LOS: 0 days | Discharge: HOME | End: 2018-03-27
Attending: EMERGENCY MEDICINE

## 2018-03-26 VITALS
TEMPERATURE: 97 F | DIASTOLIC BLOOD PRESSURE: 70 MMHG | HEART RATE: 89 BPM | RESPIRATION RATE: 18 BRPM | SYSTOLIC BLOOD PRESSURE: 140 MMHG | OXYGEN SATURATION: 100 %

## 2018-03-26 DIAGNOSIS — Z98.890 OTHER SPECIFIED POSTPROCEDURAL STATES: ICD-10-CM

## 2018-03-26 DIAGNOSIS — Z87.891 PERSONAL HISTORY OF NICOTINE DEPENDENCE: ICD-10-CM

## 2018-03-26 DIAGNOSIS — R11.2 NAUSEA WITH VOMITING, UNSPECIFIED: ICD-10-CM

## 2018-03-26 DIAGNOSIS — E11.9 TYPE 2 DIABETES MELLITUS WITHOUT COMPLICATIONS: ICD-10-CM

## 2018-03-26 DIAGNOSIS — R19.7 DIARRHEA, UNSPECIFIED: ICD-10-CM

## 2018-03-26 DIAGNOSIS — R10.32 LEFT LOWER QUADRANT PAIN: ICD-10-CM

## 2018-03-26 LAB
ALBUMIN SERPL ELPH-MCNC: 3.9 G/DL — SIGNIFICANT CHANGE UP (ref 3.5–5.2)
ALP SERPL-CCNC: 143 U/L — HIGH (ref 30–115)
ALT FLD-CCNC: 17 U/L — SIGNIFICANT CHANGE UP (ref 0–41)
ANION GAP SERPL CALC-SCNC: 10 MMOL/L — SIGNIFICANT CHANGE UP (ref 7–14)
APPEARANCE UR: CLEAR — SIGNIFICANT CHANGE UP
AST SERPL-CCNC: 12 U/L — SIGNIFICANT CHANGE UP (ref 0–41)
BASE EXCESS BLDV CALC-SCNC: 2.9 MMOL/L — HIGH (ref -2–2)
BASOPHILS # BLD AUTO: 0.03 K/UL — SIGNIFICANT CHANGE UP (ref 0–0.2)
BASOPHILS NFR BLD AUTO: 0.4 % — SIGNIFICANT CHANGE UP (ref 0–1)
BILIRUB DIRECT SERPL-MCNC: <0.2 MG/DL — SIGNIFICANT CHANGE UP (ref 0–0.2)
BILIRUB INDIRECT FLD-MCNC: >0 MG/DL — LOW (ref 0.2–1.2)
BILIRUB SERPL-MCNC: 0.2 MG/DL — SIGNIFICANT CHANGE UP (ref 0.2–1.2)
BILIRUB UR-MCNC: NEGATIVE — SIGNIFICANT CHANGE UP
BUN SERPL-MCNC: 14 MG/DL — SIGNIFICANT CHANGE UP (ref 10–20)
CALCIUM SERPL-MCNC: 8.6 MG/DL — SIGNIFICANT CHANGE UP (ref 8.5–10.1)
CHLORIDE SERPL-SCNC: 99 MMOL/L — SIGNIFICANT CHANGE UP (ref 98–110)
CO2 SERPL-SCNC: 28 MMOL/L — SIGNIFICANT CHANGE UP (ref 17–32)
COLOR SPEC: YELLOW — SIGNIFICANT CHANGE UP
CREAT SERPL-MCNC: 0.9 MG/DL — SIGNIFICANT CHANGE UP (ref 0.7–1.5)
DIFF PNL FLD: (no result)
EOSINOPHIL # BLD AUTO: 0.21 K/UL — SIGNIFICANT CHANGE UP (ref 0–0.7)
EOSINOPHIL NFR BLD AUTO: 3 % — SIGNIFICANT CHANGE UP (ref 0–8)
GLUCOSE SERPL-MCNC: 246 MG/DL — HIGH (ref 70–99)
GLUCOSE UR QL: >=1000
HCO3 BLDV-SCNC: 30 MMOL/L — HIGH (ref 22–29)
HCT VFR BLD CALC: 40.6 % — LOW (ref 42–52)
HGB BLD-MCNC: 14.1 G/DL — SIGNIFICANT CHANGE UP (ref 14–18)
IMM GRANULOCYTES NFR BLD AUTO: 0.1 % — SIGNIFICANT CHANGE UP (ref 0.1–0.3)
KETONES UR-MCNC: NEGATIVE — SIGNIFICANT CHANGE UP
LACTATE BLDV-MCNC: 1.2 MMOL/L — SIGNIFICANT CHANGE UP (ref 0.5–1.6)
LACTATE SERPL-SCNC: 1 MMOL/L — SIGNIFICANT CHANGE UP (ref 0.5–2.2)
LEUKOCYTE ESTERASE UR-ACNC: NEGATIVE — SIGNIFICANT CHANGE UP
LIDOCAIN IGE QN: 45 U/L — SIGNIFICANT CHANGE UP (ref 7–60)
LYMPHOCYTES # BLD AUTO: 1.98 K/UL — SIGNIFICANT CHANGE UP (ref 1.2–3.4)
LYMPHOCYTES # BLD AUTO: 28.5 % — SIGNIFICANT CHANGE UP (ref 20.5–51.1)
MCHC RBC-ENTMCNC: 31.5 PG — HIGH (ref 27–31)
MCHC RBC-ENTMCNC: 34.7 G/DL — SIGNIFICANT CHANGE UP (ref 32–37)
MCV RBC AUTO: 90.6 FL — SIGNIFICANT CHANGE UP (ref 80–94)
MONOCYTES # BLD AUTO: 0.6 K/UL — SIGNIFICANT CHANGE UP (ref 0.1–0.6)
MONOCYTES NFR BLD AUTO: 8.6 % — SIGNIFICANT CHANGE UP (ref 1.7–9.3)
NEUTROPHILS # BLD AUTO: 4.12 K/UL — SIGNIFICANT CHANGE UP (ref 1.4–6.5)
NEUTROPHILS NFR BLD AUTO: 59.4 % — SIGNIFICANT CHANGE UP (ref 42.2–75.2)
NITRITE UR-MCNC: NEGATIVE — SIGNIFICANT CHANGE UP
NRBC # BLD: 0 /100 WBCS — SIGNIFICANT CHANGE UP (ref 0–0)
PCO2 BLDV: 50 MMHG — SIGNIFICANT CHANGE UP (ref 41–51)
PH BLDV: 7.38 — SIGNIFICANT CHANGE UP (ref 7.26–7.43)
PH UR: 6 — SIGNIFICANT CHANGE UP (ref 5–8)
PLATELET # BLD AUTO: 186 K/UL — SIGNIFICANT CHANGE UP (ref 130–400)
PO2 BLDV: 33 MMHG — SIGNIFICANT CHANGE UP (ref 20–40)
POTASSIUM SERPL-MCNC: 4.1 MMOL/L — SIGNIFICANT CHANGE UP (ref 3.5–5)
POTASSIUM SERPL-SCNC: 4.1 MMOL/L — SIGNIFICANT CHANGE UP (ref 3.5–5)
PROT SERPL-MCNC: 6.9 G/DL — SIGNIFICANT CHANGE UP (ref 6–8)
PROT UR-MCNC: 100
RBC # BLD: 4.48 M/UL — LOW (ref 4.7–6.1)
RBC # FLD: 11.9 % — SIGNIFICANT CHANGE UP (ref 11.5–14.5)
RBC CASTS # UR COMP ASSIST: (no result) /HPF
SAO2 % BLDV: 65 % — SIGNIFICANT CHANGE UP
SODIUM SERPL-SCNC: 137 MMOL/L — SIGNIFICANT CHANGE UP (ref 135–146)
SP GR SPEC: >=1.03 — SIGNIFICANT CHANGE UP (ref 1.01–1.03)
UROBILINOGEN FLD QL: 0.2 — SIGNIFICANT CHANGE UP (ref 0.2–0.2)
WBC # BLD: 6.95 K/UL — SIGNIFICANT CHANGE UP (ref 4.8–10.8)
WBC # FLD AUTO: 6.95 K/UL — SIGNIFICANT CHANGE UP (ref 4.8–10.8)
WBC UR QL: SIGNIFICANT CHANGE UP /HPF

## 2018-03-26 RX ORDER — SODIUM CHLORIDE 9 MG/ML
3 INJECTION INTRAMUSCULAR; INTRAVENOUS; SUBCUTANEOUS ONCE
Qty: 0 | Refills: 0 | Status: COMPLETED | OUTPATIENT
Start: 2018-03-26 | End: 2018-03-26

## 2018-03-26 RX ORDER — DIATRIZOATE MEGLUMINE 180 MG/ML
30 INJECTION, SOLUTION INTRAVESICAL ONCE
Qty: 0 | Refills: 0 | Status: COMPLETED | OUTPATIENT
Start: 2018-03-26 | End: 2018-03-26

## 2018-03-26 RX ORDER — DIATRIZOATE MEGLUMINE 180 MG/ML
20 INJECTION, SOLUTION INTRAVESICAL ONCE
Qty: 0 | Refills: 0 | Status: COMPLETED | OUTPATIENT
Start: 2018-03-26 | End: 2018-03-26

## 2018-03-26 RX ORDER — SODIUM CHLORIDE 9 MG/ML
1000 INJECTION INTRAMUSCULAR; INTRAVENOUS; SUBCUTANEOUS ONCE
Qty: 0 | Refills: 0 | Status: COMPLETED | OUTPATIENT
Start: 2018-03-26 | End: 2018-03-26

## 2018-03-26 RX ADMIN — SODIUM CHLORIDE 3 MILLILITER(S): 9 INJECTION INTRAMUSCULAR; INTRAVENOUS; SUBCUTANEOUS at 18:12

## 2018-03-26 RX ADMIN — DIATRIZOATE MEGLUMINE 30 MILLILITER(S): 180 INJECTION, SOLUTION INTRAVESICAL at 18:45

## 2018-03-26 RX ADMIN — SODIUM CHLORIDE 1000 MILLILITER(S): 9 INJECTION INTRAMUSCULAR; INTRAVENOUS; SUBCUTANEOUS at 18:12

## 2018-03-26 NOTE — ED PROVIDER NOTE - NS ED ROS FT
Cardiac:  No chest pain, SOB or edema. No chest pain with exertion.  Respiratory:  No cough or respiratory distress. No hemoptysis. No history of asthma or RAD.  GI:  + nausea, + vomiting, + abd pain + diarrhea   MS:  No myalgia, muscle weakness, joint pain or back pain.  Neuro:  No headache or weakness.  No LOC.  Skin:  No skin rash.   Endocrine: + diabetes.  Except as documented in the HPI,  all other systems are negative.

## 2018-03-26 NOTE — ED ADULT NURSE NOTE - OBJECTIVE STATEMENT
patient reports to the ED with a complaint of LLQ pain and nausea,vomting, diarrhea x2 days. pain is non radiating. denies any fever, chest pain, urinary symptoms.

## 2018-03-26 NOTE — ED PROVIDER NOTE - PROGRESS NOTE DETAILS
Pt s/o to Dr. Ureña to follow up CT A/P, reassess and dispo. Case endorsed to me by Dr. Bowers -- patient with hx of DM, colon resection 2/2 diverticular disease here for non bloody vomiting and diarrhea (D>V). Has soft but diffusely tendern adbomen, well hydrated, unremarkable labs.     Was pending CT at time of sign out. CT is unremarkable, no obstruction, no colitis. Patient is PO tolerant, continuing to have diarrhea. Sx likely viral, no dehydration, no signs of colitis. Will dc home with PO hydration and BRAT diet instructions. Advised on return precuations.

## 2018-03-26 NOTE — ED PROVIDER NOTE - OBJECTIVE STATEMENT
Pt is a 43y/o male with a pmhx of diverticulitis s/p partial colectomy 10+ years ago, DM presents today c/o n/v/d (D > V) and LLQ abd pain x 2 days. Pt sts both vomit and diarrhea have been non-bloody. Pt denies fever, chills, CP, SOB, Dysuria, hematuria, testicular pain.

## 2018-03-26 NOTE — ED PROVIDER NOTE - ATTENDING CONTRIBUTION TO CARE
41 yo male with PMH IDDM, diverticulitis with hx colon resection presents c/o N/V/D and abdominal pain x 2 days. Pt reports multiple episodes watery stools and NBNB vomiting. Denies any fevers or chills.  No urinary complaints or flank pain.  No trauma. VS noted, CV: RRR, no murmurs, LUNG: CTAB/L, abd soft, ND, + diffusely tender, no rebound or guarding, + increased BS, no CVA tenderness, EXT: distal pulses intact, NEURO: no focal deficits noted, A/P: Abdominal pain -labs, UA, IVF, pain control PRN, CT A/P, reassess

## 2018-03-27 VITALS
DIASTOLIC BLOOD PRESSURE: 76 MMHG | OXYGEN SATURATION: 100 % | SYSTOLIC BLOOD PRESSURE: 132 MMHG | TEMPERATURE: 97 F | HEART RATE: 75 BPM | RESPIRATION RATE: 16 BRPM

## 2018-03-27 LAB — B-OH-BUTYR SERPL-SCNC: 0.1 MMOL/L — SIGNIFICANT CHANGE UP

## 2018-03-29 ENCOUNTER — OUTPATIENT (OUTPATIENT)
Dept: OUTPATIENT SERVICES | Facility: HOSPITAL | Age: 43
LOS: 1 days | Discharge: HOME | End: 2018-03-29

## 2018-03-29 ENCOUNTER — APPOINTMENT (OUTPATIENT)
Dept: INTERNAL MEDICINE | Facility: CLINIC | Age: 43
End: 2018-03-29

## 2018-03-29 DIAGNOSIS — F33.3 MAJOR DEPRESSIVE DISORDER, RECURRENT, SEVERE WITH PSYCHOTIC SYMPTOMS: ICD-10-CM

## 2018-03-29 DIAGNOSIS — F41.0 PANIC DISORDER [EPISODIC PAROXYSMAL ANXIETY]: ICD-10-CM

## 2018-03-29 DIAGNOSIS — F43.12 POST-TRAUMATIC STRESS DISORDER, CHRONIC: ICD-10-CM

## 2018-04-11 ENCOUNTER — APPOINTMENT (OUTPATIENT)
Dept: INTERNAL MEDICINE | Facility: CLINIC | Age: 43
End: 2018-04-11

## 2018-04-11 ENCOUNTER — OUTPATIENT (OUTPATIENT)
Dept: OUTPATIENT SERVICES | Facility: HOSPITAL | Age: 43
LOS: 1 days | Discharge: HOME | End: 2018-04-11

## 2018-04-11 VITALS
DIASTOLIC BLOOD PRESSURE: 83 MMHG | BODY MASS INDEX: 26.21 KG/M2 | SYSTOLIC BLOOD PRESSURE: 128 MMHG | HEART RATE: 92 BPM | WEIGHT: 167 LBS | HEIGHT: 67 IN

## 2018-04-11 DIAGNOSIS — F32.9 MAJOR DEPRESSIVE DISORDER, SINGLE EPISODE, UNSPECIFIED: ICD-10-CM

## 2018-04-11 DIAGNOSIS — I10 ESSENTIAL (PRIMARY) HYPERTENSION: ICD-10-CM

## 2018-04-11 DIAGNOSIS — N48.89 OTHER SPECIFIED DISORDERS OF PENIS: ICD-10-CM

## 2018-04-11 DIAGNOSIS — M79.1 MYALGIA: ICD-10-CM

## 2018-04-11 DIAGNOSIS — K21.9 GASTRO-ESOPHAGEAL REFLUX DISEASE W/OUT ESOPHAGITIS: ICD-10-CM

## 2018-04-11 RX ORDER — AMOXICILLIN AND CLAVULANATE POTASSIUM 875; 125 MG/1; MG/1
875-125 TABLET, COATED ORAL
Qty: 20 | Refills: 0 | Status: DISCONTINUED | COMMUNITY
Start: 2017-12-22

## 2018-04-11 RX ORDER — OXYCODONE AND ACETAMINOPHEN 5; 325 MG/1; MG/1
5-325 TABLET ORAL
Qty: 5 | Refills: 0 | Status: DISCONTINUED | COMMUNITY
Start: 2017-12-22

## 2018-04-12 DIAGNOSIS — Z23 ENCOUNTER FOR IMMUNIZATION: ICD-10-CM

## 2018-04-12 DIAGNOSIS — E10.65 TYPE 1 DIABETES MELLITUS WITH HYPERGLYCEMIA: ICD-10-CM

## 2018-04-12 DIAGNOSIS — M79.1 MYALGIA: ICD-10-CM

## 2018-04-12 DIAGNOSIS — F41.9 ANXIETY DISORDER, UNSPECIFIED: ICD-10-CM

## 2018-04-12 DIAGNOSIS — R07.89 OTHER CHEST PAIN: ICD-10-CM

## 2018-04-12 DIAGNOSIS — E13.621 OTHER SPECIFIED DIABETES MELLITUS WITH FOOT ULCER: ICD-10-CM

## 2018-04-18 ENCOUNTER — OUTPATIENT (OUTPATIENT)
Dept: OUTPATIENT SERVICES | Facility: HOSPITAL | Age: 43
LOS: 1 days | Discharge: HOME | End: 2018-04-18

## 2018-04-18 ENCOUNTER — APPOINTMENT (OUTPATIENT)
Dept: ENDOCRINOLOGY | Facility: CLINIC | Age: 43
End: 2018-04-18

## 2018-04-18 ENCOUNTER — APPOINTMENT (OUTPATIENT)
Dept: PODIATRY | Facility: CLINIC | Age: 43
End: 2018-04-18
Payer: MEDICAID

## 2018-04-18 VITALS
DIASTOLIC BLOOD PRESSURE: 80 MMHG | HEIGHT: 67 IN | WEIGHT: 167 LBS | HEART RATE: 90 BPM | BODY MASS INDEX: 26.21 KG/M2 | SYSTOLIC BLOOD PRESSURE: 130 MMHG

## 2018-04-18 DIAGNOSIS — E10.42 TYPE 1 DIABETES MELLITUS WITH DIABETIC POLYNEUROPATHY: ICD-10-CM

## 2018-04-18 DIAGNOSIS — E10.65 TYPE 1 DIABETES MELLITUS WITH HYPERGLYCEMIA: ICD-10-CM

## 2018-04-18 DIAGNOSIS — S92.512A DISPLACED FRACTURE OF PROXIMAL PHALANX OF LEFT LESSER TOE(S), INITIAL ENCOUNTER FOR CLOSED FRACTURE: ICD-10-CM

## 2018-04-18 DIAGNOSIS — R60.9 EDEMA, UNSPECIFIED: ICD-10-CM

## 2018-04-18 DIAGNOSIS — S92.515A NONDISPLACED FRACTURE OF PROXIMAL PHALANX OF LEFT LESSER TOE(S), INITIAL ENCOUNTER FOR CLOSED FRACTURE: ICD-10-CM

## 2018-04-18 PROCEDURE — 99213 OFFICE O/P EST LOW 20 MIN: CPT

## 2018-04-18 RX ORDER — BLOOD-GLUCOSE METER
W/DEVICE KIT MISCELLANEOUS
Qty: 1 | Refills: 0 | Status: DISCONTINUED | COMMUNITY
Start: 2017-07-05 | End: 2018-04-18

## 2018-04-19 PROBLEM — S92.515A CLOSED NONDISPLACED FRACTURE OF PROXIMAL PHALANX OF LESSER TOE OF LEFT FOOT, INITIAL ENCOUNTER: Status: ACTIVE | Noted: 2018-04-19

## 2018-04-19 PROBLEM — S92.512A CLOSED DISPLACED FRACTURE OF PROXIMAL PHALANX OF LESSER TOE OF LEFT FOOT, INITIAL ENCOUNTER: Status: ACTIVE | Noted: 2018-04-19

## 2018-04-26 DIAGNOSIS — S92.515A NONDISPLACED FRACTURE OF PROXIMAL PHALANX OF LEFT LESSER TOE(S), INITIAL ENCOUNTER FOR CLOSED FRACTURE: ICD-10-CM

## 2018-04-26 DIAGNOSIS — R80.9 PROTEINURIA, UNSPECIFIED: ICD-10-CM

## 2018-04-26 DIAGNOSIS — R60.9 EDEMA, UNSPECIFIED: ICD-10-CM

## 2018-04-26 DIAGNOSIS — M87.00 IDIOPATHIC ASEPTIC NECROSIS OF UNSPECIFIED BONE: ICD-10-CM

## 2018-05-01 PROCEDURE — G9001: CPT

## 2018-05-01 PROCEDURE — G9005: CPT

## 2018-05-14 ENCOUNTER — OUTPATIENT (OUTPATIENT)
Dept: OUTPATIENT SERVICES | Facility: HOSPITAL | Age: 43
LOS: 1 days | Discharge: HOME | End: 2018-05-14

## 2018-05-14 ENCOUNTER — OTHER (OUTPATIENT)
Age: 43
End: 2018-05-14

## 2018-05-14 DIAGNOSIS — F41.0 PANIC DISORDER [EPISODIC PAROXYSMAL ANXIETY]: ICD-10-CM

## 2018-05-14 DIAGNOSIS — F43.12 POST-TRAUMATIC STRESS DISORDER, CHRONIC: ICD-10-CM

## 2018-05-14 DIAGNOSIS — F33.3 MAJOR DEPRESSIVE DISORDER, RECURRENT, SEVERE WITH PSYCHOTIC SYMPTOMS: ICD-10-CM

## 2018-05-15 DIAGNOSIS — F33.3 MAJOR DEPRESSIVE DISORDER, RECURRENT, SEVERE WITH PSYCHOTIC SYMPTOMS: ICD-10-CM

## 2018-05-15 DIAGNOSIS — F41.0 PANIC DISORDER [EPISODIC PAROXYSMAL ANXIETY]: ICD-10-CM

## 2018-05-15 DIAGNOSIS — F43.12 POST-TRAUMATIC STRESS DISORDER, CHRONIC: ICD-10-CM

## 2018-05-15 LAB
24R-OH-CALCIDIOL SERPL-MCNC: 33.8 PG/ML
25(OH)D3 SERPL-MCNC: 14 NG/ML
ALBUMIN SERPL ELPH-MCNC: 4.7 G/DL
ALP BLD-CCNC: 155 U/L
ALT SERPL-CCNC: 19 U/L
ANION GAP SERPL CALC-SCNC: 13 MMOL/L
AST SERPL-CCNC: 14 U/L
BASOPHILS # BLD AUTO: 0.03 K/UL
BASOPHILS NFR BLD AUTO: 0.5 %
BILIRUB SERPL-MCNC: 0.6 MG/DL
BUN SERPL-MCNC: 18 MG/DL
CALCIUM SERPL-MCNC: 9.9 MG/DL
CHLORIDE SERPL-SCNC: 95 MMOL/L
CHOLEST SERPL-MCNC: 228 MG/DL
CHOLEST/HDLC SERPL: 4.6 RATIO
CO2 SERPL-SCNC: 27 MMOL/L
CREAT SERPL-MCNC: 1.1 MG/DL
EOSINOPHIL # BLD AUTO: 0.24 K/UL
EOSINOPHIL NFR BLD AUTO: 4.1 %
GLUCOSE SERPL-MCNC: 395 MG/DL
HBA1C MFR BLD HPLC: 11.6 %
HCT VFR BLD CALC: 44.6 %
HDLC SERPL-MCNC: 50 MG/DL
HGB BLD-MCNC: 14.8 G/DL
IMM GRANULOCYTES NFR BLD AUTO: 0.2 %
LDLC SERPL CALC-MCNC: 129 MG/DL
LYMPHOCYTES # BLD AUTO: 1.63 K/UL
LYMPHOCYTES NFR BLD AUTO: 28 %
MAN DIFF?: NORMAL
MCHC RBC-ENTMCNC: 30.9 PG
MCHC RBC-ENTMCNC: 33.2 G/DL
MCV RBC AUTO: 93.1 FL
MONOCYTES # BLD AUTO: 0.49 K/UL
MONOCYTES NFR BLD AUTO: 8.4 %
NEUTROPHILS # BLD AUTO: 3.42 K/UL
NEUTROPHILS NFR BLD AUTO: 58.8 %
PLATELET # BLD AUTO: 188 K/UL
POTASSIUM SERPL-SCNC: 5.3 MMOL/L
PROT SERPL-MCNC: 7.5 G/DL
RBC # BLD: 4.79 M/UL
RBC # FLD: 12.1 %
SODIUM SERPL-SCNC: 135 MMOL/L
TRIGL SERPL-MCNC: 368 MG/DL
WBC # FLD AUTO: 5.82 K/UL

## 2018-05-18 ENCOUNTER — CHART COPY (OUTPATIENT)
Age: 43
End: 2018-05-18

## 2018-07-01 ENCOUNTER — OUTPATIENT (OUTPATIENT)
Dept: OUTPATIENT SERVICES | Facility: HOSPITAL | Age: 43
LOS: 1 days | End: 2018-07-01
Payer: MEDICAID

## 2018-07-13 ENCOUNTER — APPOINTMENT (OUTPATIENT)
Dept: PULMONOLOGY | Facility: CLINIC | Age: 43
End: 2018-07-13

## 2018-07-18 ENCOUNTER — APPOINTMENT (OUTPATIENT)
Dept: ENDOCRINOLOGY | Facility: CLINIC | Age: 43
End: 2018-07-18

## 2018-07-19 DIAGNOSIS — Z71.89 OTHER SPECIFIED COUNSELING: ICD-10-CM

## 2018-07-24 ENCOUNTER — APPOINTMENT (OUTPATIENT)
Dept: INTERNAL MEDICINE | Facility: CLINIC | Age: 43
End: 2018-07-24

## 2018-08-23 ENCOUNTER — APPOINTMENT (OUTPATIENT)
Dept: PODIATRY | Facility: CLINIC | Age: 43
End: 2018-08-23
Payer: MEDICAID

## 2018-08-23 ENCOUNTER — OUTPATIENT (OUTPATIENT)
Dept: OUTPATIENT SERVICES | Facility: HOSPITAL | Age: 43
LOS: 1 days | Discharge: HOME | End: 2018-08-23

## 2018-08-23 ENCOUNTER — APPOINTMENT (OUTPATIENT)
Dept: ENDOCRINOLOGY | Facility: CLINIC | Age: 43
End: 2018-08-23
Payer: MEDICAID

## 2018-08-23 VITALS
WEIGHT: 166 LBS | DIASTOLIC BLOOD PRESSURE: 85 MMHG | HEART RATE: 88 BPM | BODY MASS INDEX: 26.06 KG/M2 | HEIGHT: 67 IN | SYSTOLIC BLOOD PRESSURE: 127 MMHG

## 2018-08-23 PROCEDURE — 99213 OFFICE O/P EST LOW 20 MIN: CPT

## 2018-08-27 ENCOUNTER — APPOINTMENT (OUTPATIENT)
Dept: UROLOGY | Facility: CLINIC | Age: 43
End: 2018-08-27
Payer: MEDICAID

## 2018-08-27 VITALS
HEIGHT: 68 IN | DIASTOLIC BLOOD PRESSURE: 80 MMHG | BODY MASS INDEX: 25.01 KG/M2 | SYSTOLIC BLOOD PRESSURE: 129 MMHG | WEIGHT: 165 LBS | HEART RATE: 83 BPM

## 2018-08-27 DIAGNOSIS — M79.671 PAIN IN RIGHT FOOT: ICD-10-CM

## 2018-08-27 DIAGNOSIS — R37 SEXUAL DYSFUNCTION, UNSPECIFIED: ICD-10-CM

## 2018-08-27 PROCEDURE — 99214 OFFICE O/P EST MOD 30 MIN: CPT

## 2018-08-27 NOTE — PHYSICAL EXAM
[General Appearance - Well Developed] : well developed [General Appearance - Well Nourished] : well nourished [Normal Appearance] : normal appearance [Well Groomed] : well groomed [General Appearance - In No Acute Distress] : no acute distress [Abdomen Soft] : soft [Abdomen Tenderness] : non-tender [] : no respiratory distress [Respiration, Rhythm And Depth] : normal respiratory rhythm and effort [Exaggerated Use Of Accessory Muscles For Inspiration] : no accessory muscle use [Oriented To Time, Place, And Person] : oriented to person, place, and time [Affect] : the affect was normal [Mood] : the mood was normal [Not Anxious] : not anxious [FreeTextEntry1] : slow gait [No Focal Deficits] : no focal deficits

## 2018-08-27 NOTE — LETTER HEADER
[FreeTextEntry3] : Janet Garcia M.D.\par Director of Urology\par [facility]\par [address] [place] [address], [address]\[place] [address]

## 2018-08-27 NOTE — LETTER BODY
[Dear  ___] : Dear  [unfilled], [Courtesy Letter:] : I had the pleasure of seeing your patient, [unfilled], in my office today. [Please see my note below.] : Please see my note below. [Sincerely,] : Sincerely, [FreeTextEntry2] : Anatoliy Arias MD\par [address] [address]\par

## 2018-08-27 NOTE — REVIEW OF SYSTEMS
[see HPI] : see HPI [Arthralgias] : arthralgias [Joint Pain] : joint pain [Anxiety] : anxiety [Depression] : depression [Erectile Dysfunction] : erectile dysfunction [Negative] : Heme/Lymph [FreeTextEntry1] : Clint has diffuse multisystem issues including side effects of his diabetes, the slowly healing foot ulcer, depression, as well as his urologic issues.

## 2018-08-27 NOTE — ADDENDUM
[FreeTextEntry1] : JANET GARCIA M.D.\par [address] [place] [address], [address]\par [place], [place] [address]\par [phone]\par FAX#: [phone]\par \par \par 2018\par \par \par Dear BRINDA Yuan, 	: 1975;\par \par You have been diagnosed as having an inflammation of the head of the penis, which is called a balanitis. This is more likely to happen on diabetics especially when their sugars are not well controlled. Depending on the severity of your condition we will need to get rid of the inflammation and then consider a circumcision or a dorsal slit. I order to get rid of the inflammation I would like t you to perform the following regimen.\par One) Carefully pull back the foreskin and wash the head with a special soap called Betadine. That is an over the counter item and is available in pharmacies.\par Two) let the Betadine sit on the penis a minute or two and then wash it off with tap water.\par Three) carefully dry the penis and then \par Four) apply a thin layer of Clotrimazole.\par \par Follow this regimen three times per day until you next see me.  Remember; do not pull the foreskin so far back that you cannot pull it forward. If that happens then you must contact me and or get to an emergency room as soon as possible. If the foreskin is to tight to pull back down over the head, it could cause a swelling of the head of the penis that could be sever enough to cause the blood supply to the head of the penis to stop. That could potentially lead to the severe complications up to and including the loss of the head of the penis.\par \par \par Sincerely,\par \par Janet Garcia M.D. \par \par Janet Garcia MD\par Director of the Division of Urology, in the Department of Surgery\par [facility], [place], [place]\par

## 2018-08-27 NOTE — ASSESSMENT
[FreeTextEntry1] : He still has severe fissuring and balanitis. I can get the foreskin back but with difficulty and I had a make sure it was pulled forward as it does not go down by itself. I reviewed with him the need to keep this clean and to end up with a circumcision he will have to have it heal and even then because of his chronic ulceration it may be a slow process to get it healed. In the past his insurance allowed the Betadine but only on a one-time basis. He will have to pay for it himself or possibly go by the clinics and see if they can supply it we do not carry in here. I will reuse you the balanitis care instructions, he will use Lotrimin as I don’t think they will cover econazole, he will come back in two weeks. If it looks clean enough we will schedule a circumcision.\par \par As far as his erectile dysfunction there is nothing we can do on that until and if we get his penis to a healthier state and then it will depend on his medical clearance.\par

## 2018-08-27 NOTE — HISTORY OF PRESENT ILLNESS
[FreeTextEntry1] : Clint had been seen in August 2017 is a follow-up from the clinics, that point I found he had severe phimosis with fissuring and balanitis. We had put him on balanitis care with topical antifungals and he was supposed to come back. He did not. He tells me is because he had a foot ulcer and as I explained to him that makes no sense as he can handle a foot ulcer one day and a penis ulcer the next. Regardless his ulcer is better he’s decided he’d like to take care of his penis so we came in. [Erectile Dysfunction] : Erectile Dysfunction [None] : None [de-identified] : penis balanitis

## 2018-09-05 ENCOUNTER — APPOINTMENT (OUTPATIENT)
Dept: ENDOCRINOLOGY | Facility: CLINIC | Age: 43
End: 2018-09-05

## 2018-09-24 ENCOUNTER — APPOINTMENT (OUTPATIENT)
Dept: VASCULAR SURGERY | Facility: CLINIC | Age: 43
End: 2018-09-24

## 2018-10-25 ENCOUNTER — APPOINTMENT (OUTPATIENT)
Dept: PODIATRY | Facility: CLINIC | Age: 43
End: 2018-10-25

## 2018-11-01 ENCOUNTER — APPOINTMENT (OUTPATIENT)
Dept: UROLOGY | Facility: CLINIC | Age: 43
End: 2018-11-01
Payer: MEDICAID

## 2018-11-01 VITALS
BODY MASS INDEX: 25.01 KG/M2 | DIASTOLIC BLOOD PRESSURE: 78 MMHG | HEART RATE: 84 BPM | WEIGHT: 165 LBS | HEIGHT: 68 IN | SYSTOLIC BLOOD PRESSURE: 126 MMHG

## 2018-11-01 DIAGNOSIS — R23.4 CHANGES IN SKIN TEXTURE: ICD-10-CM

## 2018-11-01 PROCEDURE — G9005: CPT

## 2018-11-01 PROCEDURE — 99214 OFFICE O/P EST MOD 30 MIN: CPT

## 2018-11-01 NOTE — HISTORY OF PRESENT ILLNESS
[FreeTextEntry1] : Clint was last seen in August. He’s been rather meticulous with his catheter care and says it feels a lot better though it’s still slightly tight he can get the foreskin back it is no longer having the severe pain. He like to know what we can do about the foreskin now and once that’s done what we can do about his erections is currently he has desire but he has Apsley no function [Currently Experiencing ___] :  [unfilled] [Erectile Dysfunction] : no Erectile Dysfunction [None] : None

## 2018-11-01 NOTE — PHYSICAL EXAM
[General Appearance - Well Developed] : well developed [General Appearance - Well Nourished] : well nourished [Normal Appearance] : normal appearance [Well Groomed] : well groomed [General Appearance - In No Acute Distress] : no acute distress [Abdomen Soft] : soft [Abdomen Tenderness] : non-tender [Costovertebral Angle Tenderness] : no ~M costovertebral angle tenderness [Urethral Meatus] : meatus normal [Penis Abnormality] : normal uncircumcised penis [Testes Tenderness] : no tenderness of the testes [FreeTextEntry1] : Phimosis, the balanitis for the most part is resolved except for a small area in the distal dorsal surface of the glans [] : no respiratory distress [Respiration, Rhythm And Depth] : normal respiratory rhythm and effort [Exaggerated Use Of Accessory Muscles For Inspiration] : no accessory muscle use [Oriented To Time, Place, And Person] : oriented to person, place, and time [Affect] : the affect was normal [Mood] : the mood was normal [Not Anxious] : not anxious [Normal Station and Gait] : the gait and station were normal for the patient's age

## 2018-11-01 NOTE — LETTER BODY
[Dear  ___] : Dear  [unfilled], [Courtesy Letter:] : I had the pleasure of seeing your patient, [unfilled], in my office today. [Please see my note below.] : Please see my note below. [Sincerely,] : Sincerely, [FreeTextEntry2] : Anatoliy Arias MD\par [address] [address]\par

## 2018-11-01 NOTE — ADDENDUM
[FreeTextEntry1] : \par JANET GARCIA M.D.\par [address] [place] [address], [address]\par [place], [place] [address]\par [phone]\par FAX#: [phone]\par  \par 2018\par \par Dear MOYERBRINDA SMITH 		ROLAN  1975\par \par You have been scheduled for a circumcision.\par \par A pre operative instruction sheet is enclosed.  After the operation you can expect mild to moderate discomfort.  Most patients can return to work within a few days.  There will be a dressing on the penis. TAKE IT OFF if you have trouble voiding or in the AM whichever comes first.  Side effects that can occur commonly include:\par 	BLEEDING:  There may be some oozing from the incision.  If so please compress the site firmly yet gently with a gauze.  This will stop the bleeding.\par \par 	SWELLING:  A small amount of swelling is expected this will be self-limited.  \par \par 	SKIN COLOR CHANGES: The area around the incision and or the base of the penis or scrotum may turn black and blue.  This will go away with time.\par \par 	PAIN:  Usually mild to moderate for first 24 hours.  Initially ice, and then after 24 hours, warmth such as a heating pad, applied to the incision site, may decrease the discomfort.\par \par Please make an appointment to see me about 2 - 3weeks after the surgery.  At that time I will examine you for or any signs or symptoms of infection, bleeding, etc.\par \par If there are any questions you have please feel free to call me.  If they are urgent you can reach me after business hours via my answering service by calling the [place] office number [phone].  Otherwise, please call the office during regular business hours.   If I am not available, I will get back to you as soon as possible.  \par \par Remember the only silly questions are the ones that are not asked.  Thank you again for the confidence of allowing me to take care of you.\par \par Sincerely yours,\par \par Janet Garcia M.D.\par \par Janet Garcia M.D.\par [facility]\par

## 2018-11-01 NOTE — LETTER HEADER
[FreeTextEntry3] : Janet Garcia M.D.\par Director of Urology\par [facility]\par [address] [place] [address], [address]\[place] [address]

## 2018-11-01 NOTE — ASSESSMENT
[FreeTextEntry1] : Physical exam shows the balanitis to be essentially gone. As a small area of slight residual erythema on the glans dorsally towards the meatus and there’s still a fine monarch ring at the tip of the foreskin but I think it’s healed enough that we can do a circumcision. He is aware that because of his chronic inflammation underlying diabetes healing will be slow and there may be some issues but I’ve never had one that didn’t finally healed though it can take time. I’m going to order hormone levels so that by the time the penis heels we can start looking into why he’s not having good erections and when he goes for pre op medical clearance to last them to let us know his [place] criteria classification i.e. he may be healthy enough to have a necessary surgical procedure but is his heart healthy enough that he can start having sexual activity.

## 2018-11-05 ENCOUNTER — OTHER (OUTPATIENT)
Age: 43
End: 2018-11-05

## 2018-12-26 ENCOUNTER — EMERGENCY (EMERGENCY)
Facility: HOSPITAL | Age: 43
LOS: 0 days | Discharge: HOME | End: 2018-12-26
Admitting: PHYSICIAN ASSISTANT

## 2018-12-26 VITALS
RESPIRATION RATE: 18 BRPM | HEART RATE: 77 BPM | OXYGEN SATURATION: 98 % | DIASTOLIC BLOOD PRESSURE: 79 MMHG | SYSTOLIC BLOOD PRESSURE: 161 MMHG | TEMPERATURE: 98 F

## 2018-12-26 VITALS
RESPIRATION RATE: 18 BRPM | OXYGEN SATURATION: 98 % | DIASTOLIC BLOOD PRESSURE: 88 MMHG | SYSTOLIC BLOOD PRESSURE: 178 MMHG | HEART RATE: 89 BPM | TEMPERATURE: 98 F
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
## 2018-12-26 DIAGNOSIS — Z79.899 OTHER LONG TERM (CURRENT) DRUG THERAPY: ICD-10-CM
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
## 2018-12-26 DIAGNOSIS — M54.6 PAIN IN THORACIC SPINE: ICD-10-CM

## 2018-12-26 DIAGNOSIS — M54.5 LOW BACK PAIN: ICD-10-CM

## 2018-12-26 DIAGNOSIS — M54.9 DORSALGIA, UNSPECIFIED: ICD-10-CM
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
## 2018-12-26 DIAGNOSIS — E11.9 TYPE 2 DIABETES MELLITUS WITHOUT COMPLICATIONS: ICD-10-CM

## 2018-12-26 RX ORDER — METHOCARBAMOL 500 MG/1
1000 TABLET, FILM COATED ORAL ONCE
Qty: 0 | Refills: 0 | Status: COMPLETED | OUTPATIENT
Start: 2018-12-26 | End: 2018-12-26

## 2018-12-26 RX ORDER — METHOCARBAMOL 500 MG/1
2 TABLET, FILM COATED ORAL
Qty: 80 | Refills: 0 | OUTPATIENT
Start: 2018-12-26 | End: 2019-01-04

## 2018-12-26 RX ADMIN — METHOCARBAMOL 1000 MILLIGRAM(S): 500 TABLET, FILM COATED ORAL at 17:32

## 2018-12-26 NOTE — ED PROVIDER NOTE - PHYSICAL EXAMINATION
CONST: Well appearing in NAD  NECK: Non-tender, no meningeal signs. normal ROM. supple   CARD: Normal S1 S2; Normal rate and rhythm  RESP: Equal BS B/L, No wheezes, rhonchi or rales. No distress  GI: Soft, non-tender, non-distended. no cva tenderness. normal BS  MS: + right lumbar and thoracic paraspinal tenderness, Normal ROM in all extremities. No midline spinal tenderness. pulses 2 +. no calf tenderness or swelling  SKIN: Warm, dry, no acute rashes. Good turgor  NEURO: A&Ox3, No focal deficits. Strength 5/5 with no sensory deficits. Steady gait.

## 2018-12-26 NOTE — ED PROVIDER NOTE - NS ED ROS FT
Review of Systems:  	•	CONSTITUTIONAL - no fever, no diaphoresis, no chills  	•	SKIN - no rash  	•	EYES - no eye pain, no blurry vision  	•	ENT - no change in hearing, no sore throat, no ear pain or tinnitus  	•	RESPIRATORY - no shortness of breath, no cough  	•	CARDIAC - no chest pain, no palpitations  	•	GI - no abd pain, no nausea, no vomiting, no diarrhea, no constipation  	•	GENITO-URINARY - no discharge, no dysuria; no hematuria, no increased urinary frequency  	•	MUSCULOSKELETAL -+ right lower back pain

## 2018-12-26 NOTE — ED ADULT NURSE NOTE - OBJECTIVE STATEMENT
patient reports to the ED with a complaint of lower back pain s/p slip and fall. denies hitting head or difficulty ambulating

## 2018-12-26 NOTE — ED ADULT NURSE NOTE - CHIEF COMPLAINT QUOTE
s/p fall. no LOC. c/o back pain Initiate Treatment: Targadox 50 mg 1 po bid with food and a tall glass of water / epiduo forte Plan: Take Targadox with tall glass of water / apply Moisturizer after every topical use Detail Level: Zone

## 2018-12-26 NOTE — ED PROVIDER NOTE - OBJECTIVE STATEMENT
43 year old male with no pmhx presents s/p fall. Pt admits slipped and fell on water, landed on back, complaining of right lower back pain. no head trauma, LOC, paresthesias or weakness.

## 2018-12-31 ENCOUNTER — OUTPATIENT (OUTPATIENT)
Dept: OUTPATIENT SERVICES | Facility: HOSPITAL | Age: 43
LOS: 1 days | Discharge: HOME | End: 2018-12-31

## 2018-12-31 VITALS
WEIGHT: 164.91 LBS | HEART RATE: 85 BPM | HEIGHT: 68 IN | TEMPERATURE: 97 F | RESPIRATION RATE: 18 BRPM | DIASTOLIC BLOOD PRESSURE: 89 MMHG | OXYGEN SATURATION: 98 % | SYSTOLIC BLOOD PRESSURE: 160 MMHG

## 2018-12-31 DIAGNOSIS — N47.1 PHIMOSIS: ICD-10-CM

## 2018-12-31 DIAGNOSIS — Z01.818 ENCOUNTER FOR OTHER PREPROCEDURAL EXAMINATION: ICD-10-CM

## 2018-12-31 DIAGNOSIS — Z98.890 OTHER SPECIFIED POSTPROCEDURAL STATES: Chronic | ICD-10-CM

## 2018-12-31 LAB
ALBUMIN SERPL ELPH-MCNC: 4.1 G/DL — SIGNIFICANT CHANGE UP (ref 3.5–5.2)
ALP SERPL-CCNC: 164 U/L — HIGH (ref 30–115)
ALT FLD-CCNC: 16 U/L — SIGNIFICANT CHANGE UP (ref 0–41)
ANION GAP SERPL CALC-SCNC: 13 MMOL/L — SIGNIFICANT CHANGE UP (ref 7–14)
APPEARANCE UR: CLEAR — SIGNIFICANT CHANGE UP
APTT BLD: 35.1 SEC — SIGNIFICANT CHANGE UP (ref 27–39.2)
AST SERPL-CCNC: 13 U/L — SIGNIFICANT CHANGE UP (ref 0–41)
BASOPHILS # BLD AUTO: 0.02 K/UL — SIGNIFICANT CHANGE UP (ref 0–0.2)
BASOPHILS NFR BLD AUTO: 0.3 % — SIGNIFICANT CHANGE UP (ref 0–1)
BILIRUB SERPL-MCNC: 0.3 MG/DL — SIGNIFICANT CHANGE UP (ref 0.2–1.2)
BILIRUB UR-MCNC: NEGATIVE — SIGNIFICANT CHANGE UP
BUN SERPL-MCNC: 15 MG/DL — SIGNIFICANT CHANGE UP (ref 10–20)
CALCIUM SERPL-MCNC: 9.2 MG/DL — SIGNIFICANT CHANGE UP (ref 8.5–10.1)
CHLORIDE SERPL-SCNC: 92 MMOL/L — LOW (ref 98–110)
CO2 SERPL-SCNC: 29 MMOL/L — SIGNIFICANT CHANGE UP (ref 17–32)
COLOR SPEC: YELLOW — SIGNIFICANT CHANGE UP
CREAT SERPL-MCNC: 0.9 MG/DL — SIGNIFICANT CHANGE UP (ref 0.7–1.5)
DIFF PNL FLD: ABNORMAL
EOSINOPHIL # BLD AUTO: 0.31 K/UL — SIGNIFICANT CHANGE UP (ref 0–0.7)
EOSINOPHIL NFR BLD AUTO: 5.2 % — SIGNIFICANT CHANGE UP (ref 0–8)
EPI CELLS # UR: ABNORMAL /HPF
ESTIMATED AVERAGE GLUCOSE: 335 MG/DL — HIGH (ref 68–114)
GLUCOSE SERPL-MCNC: 430 MG/DL — HIGH (ref 70–99)
GLUCOSE UR QL: >=1000
HBA1C BLD-MCNC: 13.3 % — HIGH (ref 4–5.6)
HCT VFR BLD CALC: 41.8 % — LOW (ref 42–52)
HGB BLD-MCNC: 14.7 G/DL — SIGNIFICANT CHANGE UP (ref 14–18)
IMM GRANULOCYTES NFR BLD AUTO: 0.3 % — SIGNIFICANT CHANGE UP (ref 0.1–0.3)
INR BLD: 0.82 RATIO — SIGNIFICANT CHANGE UP (ref 0.65–1.3)
KETONES UR-MCNC: NEGATIVE — SIGNIFICANT CHANGE UP
LEUKOCYTE ESTERASE UR-ACNC: NEGATIVE — SIGNIFICANT CHANGE UP
LYMPHOCYTES # BLD AUTO: 2.14 K/UL — SIGNIFICANT CHANGE UP (ref 1.2–3.4)
LYMPHOCYTES # BLD AUTO: 36.2 % — SIGNIFICANT CHANGE UP (ref 20.5–51.1)
MCHC RBC-ENTMCNC: 31.5 PG — HIGH (ref 27–31)
MCHC RBC-ENTMCNC: 35.2 G/DL — SIGNIFICANT CHANGE UP (ref 32–37)
MCV RBC AUTO: 89.5 FL — SIGNIFICANT CHANGE UP (ref 80–94)
MONOCYTES # BLD AUTO: 0.52 K/UL — SIGNIFICANT CHANGE UP (ref 0.1–0.6)
MONOCYTES NFR BLD AUTO: 8.8 % — SIGNIFICANT CHANGE UP (ref 1.7–9.3)
NEUTROPHILS # BLD AUTO: 2.9 K/UL — SIGNIFICANT CHANGE UP (ref 1.4–6.5)
NEUTROPHILS NFR BLD AUTO: 49.2 % — SIGNIFICANT CHANGE UP (ref 42.2–75.2)
NITRITE UR-MCNC: NEGATIVE — SIGNIFICANT CHANGE UP
NRBC # BLD: 0 /100 WBCS — SIGNIFICANT CHANGE UP (ref 0–0)
PH UR: 6 — SIGNIFICANT CHANGE UP (ref 5–8)
PLATELET # BLD AUTO: 190 K/UL — SIGNIFICANT CHANGE UP (ref 130–400)
POTASSIUM SERPL-MCNC: 4.5 MMOL/L — SIGNIFICANT CHANGE UP (ref 3.5–5)
POTASSIUM SERPL-SCNC: 4.5 MMOL/L — SIGNIFICANT CHANGE UP (ref 3.5–5)
PROT SERPL-MCNC: 7.3 G/DL — SIGNIFICANT CHANGE UP (ref 6–8)
PROT UR-MCNC: 30
PROTHROM AB SERPL-ACNC: 9.4 SEC — LOW (ref 9.95–12.87)
RBC # BLD: 4.67 M/UL — LOW (ref 4.7–6.1)
RBC # FLD: 11.2 % — LOW (ref 11.5–14.5)
RBC CASTS # UR COMP ASSIST: SIGNIFICANT CHANGE UP /HPF
SODIUM SERPL-SCNC: 134 MMOL/L — LOW (ref 135–146)
SP GR SPEC: >=1.03 — SIGNIFICANT CHANGE UP (ref 1.01–1.03)
UROBILINOGEN FLD QL: 0.2 — SIGNIFICANT CHANGE UP (ref 0.2–0.2)
WBC # BLD: 5.91 K/UL — SIGNIFICANT CHANGE UP (ref 4.8–10.8)
WBC # FLD AUTO: 5.91 K/UL — SIGNIFICANT CHANGE UP (ref 4.8–10.8)
WBC UR QL: SIGNIFICANT CHANGE UP /HPF

## 2019-01-01 LAB
CULTURE RESULTS: NO GROWTH — SIGNIFICANT CHANGE UP
SPECIMEN SOURCE: SIGNIFICANT CHANGE UP

## 2019-01-04 ENCOUNTER — APPOINTMENT (OUTPATIENT)
Dept: INTERNAL MEDICINE | Facility: CLINIC | Age: 44
End: 2019-01-04

## 2019-01-07 NOTE — PROGRESS NOTE ADULT - SUBJECTIVE AND OBJECTIVE BOX
PAST documents reviewed - MED. DIR. PAST - ANESTHESIA - as of this review it is noted that the patient required a pre op evaluation / risk assessment / " clearance " from  . however the patient was a " no show ' for his appointment . This case cannot proceed as the patient , by his lab work , is uncontrolled DM -> concerns for post op infection / poor wound healing . ** NB ; Glucose = 430 ; HbA1c = 13.3 w/ average daily glucose calculated @ 335 ** . Charmaine #[id number] called and advised that the surgery needs to be postponed / rescheduled in order his medications be adjusted for adequate Glucose control for the period of time necessary in order to avoid the operative complications as above noted .

## 2019-01-10 PROBLEM — J45.909 UNSPECIFIED ASTHMA, UNCOMPLICATED: Chronic | Status: ACTIVE | Noted: 2018-12-31

## 2019-01-22 ENCOUNTER — APPOINTMENT (OUTPATIENT)
Dept: INTERNAL MEDICINE | Facility: CLINIC | Age: 44
End: 2019-01-22

## 2019-01-24 ENCOUNTER — APPOINTMENT (OUTPATIENT)
Dept: PODIATRY | Facility: CLINIC | Age: 44
End: 2019-01-24

## 2019-01-24 ENCOUNTER — APPOINTMENT (OUTPATIENT)
Dept: ENDOCRINOLOGY | Facility: CLINIC | Age: 44
End: 2019-01-24

## 2019-01-28 ENCOUNTER — APPOINTMENT (OUTPATIENT)
Dept: UROLOGY | Facility: CLINIC | Age: 44
End: 2019-01-28

## 2019-01-31 ENCOUNTER — APPOINTMENT (OUTPATIENT)
Dept: PODIATRY | Facility: CLINIC | Age: 44
End: 2019-01-31

## 2019-02-06 ENCOUNTER — OUTPATIENT (OUTPATIENT)
Dept: OUTPATIENT SERVICES | Facility: HOSPITAL | Age: 44
LOS: 1 days | Discharge: HOME | End: 2019-02-06

## 2019-02-06 ENCOUNTER — APPOINTMENT (OUTPATIENT)
Dept: PODIATRY | Facility: CLINIC | Age: 44
End: 2019-02-06
Payer: MEDICAID

## 2019-02-06 VITALS
WEIGHT: 164 LBS | HEIGHT: 68 IN | SYSTOLIC BLOOD PRESSURE: 127 MMHG | HEART RATE: 90 BPM | BODY MASS INDEX: 24.86 KG/M2 | DIASTOLIC BLOOD PRESSURE: 75 MMHG

## 2019-02-06 DIAGNOSIS — Z98.890 OTHER SPECIFIED POSTPROCEDURAL STATES: Chronic | ICD-10-CM

## 2019-02-06 PROCEDURE — 97763 ORTHC/PROSTC MGMT SBSQ ENC: CPT

## 2019-02-06 PROCEDURE — 99213 OFFICE O/P EST LOW 20 MIN: CPT | Mod: 25

## 2019-02-07 NOTE — ASSESSMENT
[FreeTextEntry1] : -Diabetic neurovascular foot assessment  performed. \par -Discussed with patient diabetic foot hygiene.Patient instructed to regularly check the bottom of the feet\par -Patient given a diabetic foot education sheet\par -Ulcer  debridement of fibrotic/devitalized tissue down to dermal layer. Performed under sterile conditions with curette and scalpel.\par -Dressed with Silvadene and DSD\par -Rx silvadene \par -Return 2 weeks to see Luke for modification of shoe wear. \par

## 2019-02-07 NOTE — PHYSICAL EXAM
[General Appearance - Alert] : alert [General Appearance - In No Acute Distress] : in no acute distress [Vibration Dec.] : diminished vibratory sensation at the level of the toes [Diminished Throughout Right Foot] : diminished tactile sensation with monofilament testing throughout right foot [Diminished Throughout Left Foot] : diminished tactile sensation with monofilament testing throughout left foot [Oriented To Time, Place, And Person] : oriented to person, place, and time [FreeTextEntry1] : superficial ulceration on the lateral aspect of the right 5th toe  measuring ~0.5cm x .5 cm and stage 1, pre-ulcerative lesion submetatarsal head 1. Non-infected. No erythema. no drainage.

## 2019-02-07 NOTE — HISTORY OF PRESENT ILLNESS
[FreeTextEntry1] : 42 y/o with PMH of DM, HTN, DLD, anxiety,  male returns for a diabetic foot evaluation. .Pt also has a h/o of right submetatarsal 1 ulceration . Patient relates cramping sensation in b/l LE and tinging/burning in his feet. Patients last A1c was 11.6%. Patient relates two week history of ulcerative lesions.

## 2019-02-07 NOTE — REVIEW OF SYSTEMS
[Skin Lesions] : skin lesion [Negative] : Constitutional [FreeTextEntry9] : cramping sensation in b/l LE that’s worse at nighttime [de-identified] : tingling/burning sensation in his feet

## 2019-02-14 ENCOUNTER — APPOINTMENT (OUTPATIENT)
Dept: ENDOCRINOLOGY | Facility: CLINIC | Age: 44
End: 2019-02-14

## 2019-02-26 ENCOUNTER — APPOINTMENT (OUTPATIENT)
Dept: UROLOGY | Facility: HOSPITAL | Age: 44
End: 2019-02-26

## 2019-02-28 ENCOUNTER — APPOINTMENT (OUTPATIENT)
Dept: PODIATRY | Facility: CLINIC | Age: 44
End: 2019-02-28
Payer: MEDICAID

## 2019-02-28 ENCOUNTER — OUTPATIENT (OUTPATIENT)
Dept: OUTPATIENT SERVICES | Facility: HOSPITAL | Age: 44
LOS: 1 days | Discharge: HOME | End: 2019-02-28

## 2019-02-28 DIAGNOSIS — Z98.890 OTHER SPECIFIED POSTPROCEDURAL STATES: Chronic | ICD-10-CM

## 2019-02-28 PROCEDURE — 29581 APPL MULTLAYER CMPRN SYS LEG: CPT

## 2019-02-28 PROCEDURE — 29580 STRAPPING UNNA BOOT: CPT | Mod: 59

## 2019-02-28 NOTE — ASSESSMENT
[FreeTextEntry1] : pt states had fever earlier in week. right foot ulcerations. 1st met wound debrided of callous. wound at widest is 2 cm dm,forms a 'cone; leading to 1st interdigital space. wound clean,granular,no sign of active infection. right 5th digit, with 1 cm dm wound, not probing to bone but still only covered with thin tissue. wounds cleaned and dressed with silvadene,sterile 2x2,omnifix,betadine soaked 2x2s between toes.felt reliefs applied to off load 1st met,held in place with unna boot, coban, tubigrip. given darco shoe to wear. went over signs of precautions, need to see medical help immediately if occurs. pt to return here in 1 week, sooner if has a problem

## 2019-03-09 ENCOUNTER — APPOINTMENT (OUTPATIENT)
Dept: INTERNAL MEDICINE | Facility: CLINIC | Age: 44
End: 2019-03-09

## 2019-03-14 ENCOUNTER — APPOINTMENT (OUTPATIENT)
Dept: ENDOCRINOLOGY | Facility: CLINIC | Age: 44
End: 2019-03-14
Payer: MEDICAID

## 2019-03-14 ENCOUNTER — APPOINTMENT (OUTPATIENT)
Dept: PODIATRY | Facility: CLINIC | Age: 44
End: 2019-03-14
Payer: MEDICAID

## 2019-03-14 ENCOUNTER — OUTPATIENT (OUTPATIENT)
Dept: OUTPATIENT SERVICES | Facility: HOSPITAL | Age: 44
LOS: 1 days | Discharge: HOME | End: 2019-03-14

## 2019-03-14 VITALS
DIASTOLIC BLOOD PRESSURE: 86 MMHG | HEART RATE: 74 BPM | BODY MASS INDEX: 24.1 KG/M2 | HEIGHT: 68 IN | WEIGHT: 159 LBS | SYSTOLIC BLOOD PRESSURE: 125 MMHG

## 2019-03-14 DIAGNOSIS — Z98.890 OTHER SPECIFIED POSTPROCEDURAL STATES: Chronic | ICD-10-CM

## 2019-03-14 LAB
ALBUMIN SERPL ELPH-MCNC: 4.2 G/DL
ALP BLD-CCNC: 177 U/L
ALT SERPL-CCNC: 24 U/L
ANION GAP SERPL CALC-SCNC: 12 MMOL/L
AST SERPL-CCNC: 19 U/L
BASOPHILS # BLD AUTO: 0.03 K/UL
BASOPHILS NFR BLD AUTO: 0.5 %
BILIRUB SERPL-MCNC: 0.3 MG/DL
BUN SERPL-MCNC: 17 MG/DL
CALCIUM SERPL-MCNC: 9 MG/DL
CHLORIDE SERPL-SCNC: 97 MMOL/L
CHOLEST SERPL-MCNC: 188 MG/DL
CHOLEST/HDLC SERPL: 3.8 RATIO
CO2 SERPL-SCNC: 26 MMOL/L
CREAT SERPL-MCNC: 1 MG/DL
EOSINOPHIL # BLD AUTO: 0.12 K/UL
EOSINOPHIL NFR BLD AUTO: 2.1 %
ESTIMATED AVERAGE GLUCOSE: 289 MG/DL
GLUCOSE SERPL-MCNC: 377 MG/DL
HBA1C MFR BLD HPLC: 11.7 %
HCT VFR BLD CALC: 40.3 %
HDLC SERPL-MCNC: 49 MG/DL
HGB BLD-MCNC: 13.7 G/DL
IMM GRANULOCYTES NFR BLD AUTO: 0.2 %
LDLC SERPL CALC-MCNC: 127 MG/DL
LYMPHOCYTES # BLD AUTO: 1.98 K/UL
LYMPHOCYTES NFR BLD AUTO: 34.6 %
MAN DIFF?: NORMAL
MCHC RBC-ENTMCNC: 30.5 PG
MCHC RBC-ENTMCNC: 34 G/DL
MCV RBC AUTO: 89.8 FL
MONOCYTES # BLD AUTO: 0.41 K/UL
MONOCYTES NFR BLD AUTO: 7.2 %
NEUTROPHILS # BLD AUTO: 3.17 K/UL
NEUTROPHILS NFR BLD AUTO: 55.4 %
PLATELET # BLD AUTO: 226 K/UL
POTASSIUM SERPL-SCNC: 4.3 MMOL/L
PROT SERPL-MCNC: 7.1 G/DL
RBC # BLD: 4.49 M/UL
RBC # FLD: 11.5 %
SODIUM SERPL-SCNC: 135 MMOL/L
TRIGL SERPL-MCNC: 139 MG/DL
WBC # FLD AUTO: 5.72 K/UL

## 2019-03-14 PROCEDURE — 29540 STRAPPING ANKLE &/FOOT: CPT

## 2019-03-14 PROCEDURE — 99212 OFFICE O/P EST SF 10 MIN: CPT | Mod: 25

## 2019-03-14 RX ORDER — INSULIN LISPRO 100 [IU]/ML
100 INJECTION, SOLUTION INTRAVENOUS; SUBCUTANEOUS 3 TIMES DAILY
Qty: 1 | Refills: 5 | Status: DISCONTINUED | COMMUNITY
Start: 2017-02-07 | End: 2019-03-14

## 2019-03-14 NOTE — PHYSICAL EXAM
[Alert] : alert [No Acute Distress] : no acute distress [Well Nourished] : well nourished [Well Developed] : well developed [Healthy Appearance] : healthy appearance [No Respiratory Distress] : no respiratory distress [Normal Rate and Effort] : normal respiratory rhythm and effort [No Accessory Muscle Use] : no accessory muscle use [Clear to Auscultation] : lungs were clear to auscultation bilaterally [Normal Rate] : heart rate was normal  [Normal S1, S2] : normal S1 and S2 [Regular Rhythm] : with a regular rhythm [No Rubs] : no pericardial rub [Oriented x3] : oriented to person, place, and time [Normal Insight/Judgement] : insight and judgment were intact

## 2019-03-15 LAB
CREAT SPEC-SCNC: 49 MG/DL
MICROALBUMIN 24H UR DL<=1MG/L-MCNC: 55.5 MG/DL
MICROALBUMIN/CREAT 24H UR-RTO: 1142 MG/G
TSH SERPL-ACNC: 0.96 UIU/ML

## 2019-03-20 DIAGNOSIS — E10.3599 TYPE 1 DIABETES MELLITUS WITH PROLIFERATIVE DIABETIC RETINOPATHY WITHOUT MACULAR EDEMA, UNSPECIFIED EYE: ICD-10-CM

## 2019-03-20 DIAGNOSIS — E10.65 TYPE 1 DIABETES MELLITUS WITH HYPERGLYCEMIA: ICD-10-CM

## 2019-03-20 DIAGNOSIS — E10.42 TYPE 1 DIABETES MELLITUS WITH DIABETIC POLYNEUROPATHY: ICD-10-CM

## 2019-03-20 NOTE — PHYSICAL EXAM
[Vibration Dec.] : diminished vibratory sensation at the level of the toes [Diminished Throughout Right Foot] : diminished tactile sensation with monofilament testing throughout right foot [Diminished Throughout Left Foot] : diminished tactile sensation with monofilament testing throughout left foot [FreeTextEntry1] : right 5th digit, 1st met ulcer [Oriented To Time, Place, And Person] : oriented to person, place, and time

## 2019-03-20 NOTE — ASSESSMENT
[FreeTextEntry1] : sharp debridement of callous to dimitri wounds. 5th digit granular no signs of infection. 1st met plantar wound much improved. wound narrowing, granular base. wounds cleaned and dressed. 5th digit with silvadene, 2x2,omnifix. 1st met with amadeo, 2x2,omnifix. felt reliefs to off load 1st met, unna boot, coban tubigrip to hold in place. pt given script by podiatry for brace to further off load, protect right foot. pt to return next week.

## 2019-03-20 NOTE — END OF VISIT
[FreeTextEntry3] : Pt evaluated. No signs of infection. Requires off-loading. Patient given a prescription for an aircast cam walker. Off-loading. New A1c 11.7% 3/14. padding/strapping placed.

## 2019-03-21 ENCOUNTER — APPOINTMENT (OUTPATIENT)
Dept: PODIATRY | Facility: CLINIC | Age: 44
End: 2019-03-21

## 2019-04-01 ENCOUNTER — OUTPATIENT (OUTPATIENT)
Dept: OUTPATIENT SERVICES | Facility: HOSPITAL | Age: 44
LOS: 1 days | Discharge: HOME | End: 2019-04-01

## 2019-04-01 ENCOUNTER — APPOINTMENT (OUTPATIENT)
Dept: PODIATRY | Facility: CLINIC | Age: 44
End: 2019-04-01
Payer: MEDICAID

## 2019-04-01 ENCOUNTER — OTHER (OUTPATIENT)
Age: 44
End: 2019-04-01

## 2019-04-01 ENCOUNTER — OUTPATIENT (OUTPATIENT)
Dept: OUTPATIENT SERVICES | Facility: HOSPITAL | Age: 44
LOS: 1 days | End: 2019-04-01
Payer: MEDICAID

## 2019-04-01 DIAGNOSIS — R80.9 TYPE 1 DIABETES MELLITUS WITH OTHER DIABETIC KIDNEY COMPLICATION: ICD-10-CM

## 2019-04-01 DIAGNOSIS — E10.29 TYPE 1 DIABETES MELLITUS WITH OTHER DIABETIC KIDNEY COMPLICATION: ICD-10-CM

## 2019-04-01 DIAGNOSIS — Z98.890 OTHER SPECIFIED POSTPROCEDURAL STATES: Chronic | ICD-10-CM

## 2019-04-01 PROCEDURE — G9001: CPT

## 2019-04-01 PROCEDURE — 11042 DBRDMT SUBQ TIS 1ST 20SQCM/<: CPT | Mod: 59

## 2019-04-01 PROCEDURE — 29581 APPL MULTLAYER CMPRN SYS LEG: CPT | Mod: 59

## 2019-04-02 ENCOUNTER — APPOINTMENT (OUTPATIENT)
Dept: INTERNAL MEDICINE | Facility: CLINIC | Age: 44
End: 2019-04-02

## 2019-04-04 NOTE — ASSESSMENT
[FreeTextEntry1] : debridement of callous/eschar to wound site.  Wound debrided to level of subcutaneous tissue,\par wound granular, new skin present,keya. cleaned and dressed with medi honey, 2x2,omnifix, felt reliefs to off load, unna boot multilayered dressing placed. pt progressing. return next week.

## 2019-04-11 ENCOUNTER — APPOINTMENT (OUTPATIENT)
Dept: PODIATRY | Facility: CLINIC | Age: 44
End: 2019-04-11

## 2019-04-18 ENCOUNTER — EMERGENCY (EMERGENCY)
Facility: HOSPITAL | Age: 44
LOS: 0 days | Discharge: HOME | End: 2019-04-18
Attending: EMERGENCY MEDICINE | Admitting: EMERGENCY MEDICINE
Payer: MEDICAID

## 2019-04-18 ENCOUNTER — EMERGENCY (EMERGENCY)
Facility: HOSPITAL | Age: 44
LOS: 0 days | Discharge: HOME | End: 2019-04-19
Attending: EMERGENCY MEDICINE | Admitting: EMERGENCY MEDICINE
Payer: MEDICAID

## 2019-04-18 VITALS
SYSTOLIC BLOOD PRESSURE: 157 MMHG | OXYGEN SATURATION: 99 % | WEIGHT: 167.99 LBS | HEART RATE: 90 BPM | HEIGHT: 68 IN | DIASTOLIC BLOOD PRESSURE: 80 MMHG | TEMPERATURE: 97 F | RESPIRATION RATE: 18 BRPM

## 2019-04-18 VITALS
DIASTOLIC BLOOD PRESSURE: 79 MMHG | TEMPERATURE: 97 F | RESPIRATION RATE: 18 BRPM | OXYGEN SATURATION: 99 % | HEART RATE: 81 BPM | SYSTOLIC BLOOD PRESSURE: 132 MMHG

## 2019-04-18 DIAGNOSIS — Z98.890 OTHER SPECIFIED POSTPROCEDURAL STATES: ICD-10-CM

## 2019-04-18 DIAGNOSIS — K52.9 NONINFECTIVE GASTROENTERITIS AND COLITIS, UNSPECIFIED: ICD-10-CM

## 2019-04-18 DIAGNOSIS — Z79.51 LONG TERM (CURRENT) USE OF INHALED STEROIDS: ICD-10-CM

## 2019-04-18 DIAGNOSIS — Z90.49 ACQUIRED ABSENCE OF OTHER SPECIFIED PARTS OF DIGESTIVE TRACT: ICD-10-CM

## 2019-04-18 DIAGNOSIS — R10.9 UNSPECIFIED ABDOMINAL PAIN: ICD-10-CM

## 2019-04-18 DIAGNOSIS — Z79.2 LONG TERM (CURRENT) USE OF ANTIBIOTICS: ICD-10-CM

## 2019-04-18 DIAGNOSIS — Z98.890 OTHER SPECIFIED POSTPROCEDURAL STATES: Chronic | ICD-10-CM

## 2019-04-18 DIAGNOSIS — Z87.19 PERSONAL HISTORY OF OTHER DISEASES OF THE DIGESTIVE SYSTEM: ICD-10-CM

## 2019-04-18 DIAGNOSIS — I10 ESSENTIAL (PRIMARY) HYPERTENSION: ICD-10-CM

## 2019-04-18 DIAGNOSIS — Z79.4 LONG TERM (CURRENT) USE OF INSULIN: ICD-10-CM

## 2019-04-18 DIAGNOSIS — E11.9 TYPE 2 DIABETES MELLITUS WITHOUT COMPLICATIONS: ICD-10-CM

## 2019-04-18 DIAGNOSIS — F17.200 NICOTINE DEPENDENCE, UNSPECIFIED, UNCOMPLICATED: ICD-10-CM

## 2019-04-18 DIAGNOSIS — Z79.899 OTHER LONG TERM (CURRENT) DRUG THERAPY: ICD-10-CM

## 2019-04-18 DIAGNOSIS — J45.909 UNSPECIFIED ASTHMA, UNCOMPLICATED: ICD-10-CM

## 2019-04-18 LAB
ALBUMIN SERPL ELPH-MCNC: 4.3 G/DL — SIGNIFICANT CHANGE UP (ref 3.5–5.2)
ALP SERPL-CCNC: 152 U/L — HIGH (ref 30–115)
ALT FLD-CCNC: 16 U/L — SIGNIFICANT CHANGE UP (ref 0–41)
ANION GAP SERPL CALC-SCNC: 11 MMOL/L — SIGNIFICANT CHANGE UP (ref 7–14)
APPEARANCE UR: CLEAR — SIGNIFICANT CHANGE UP
AST SERPL-CCNC: 14 U/L — SIGNIFICANT CHANGE UP (ref 0–41)
BASOPHILS # BLD AUTO: 0.02 K/UL — SIGNIFICANT CHANGE UP (ref 0–0.2)
BASOPHILS NFR BLD AUTO: 0.3 % — SIGNIFICANT CHANGE UP (ref 0–1)
BILIRUB SERPL-MCNC: 0.4 MG/DL — SIGNIFICANT CHANGE UP (ref 0.2–1.2)
BILIRUB UR-MCNC: NEGATIVE — SIGNIFICANT CHANGE UP
BUN SERPL-MCNC: 15 MG/DL — SIGNIFICANT CHANGE UP (ref 10–20)
CALCIUM SERPL-MCNC: 9 MG/DL — SIGNIFICANT CHANGE UP (ref 8.5–10.1)
CHLORIDE SERPL-SCNC: 97 MMOL/L — LOW (ref 98–110)
CO2 SERPL-SCNC: 26 MMOL/L — SIGNIFICANT CHANGE UP (ref 17–32)
COLOR SPEC: YELLOW — SIGNIFICANT CHANGE UP
CREAT SERPL-MCNC: 0.9 MG/DL — SIGNIFICANT CHANGE UP (ref 0.7–1.5)
DIFF PNL FLD: ABNORMAL
EOSINOPHIL # BLD AUTO: 0.2 K/UL — SIGNIFICANT CHANGE UP (ref 0–0.7)
EOSINOPHIL NFR BLD AUTO: 3.1 % — SIGNIFICANT CHANGE UP (ref 0–8)
GLUCOSE SERPL-MCNC: 380 MG/DL — HIGH (ref 70–99)
GLUCOSE UR QL: >=1000 MG/DL
HCT VFR BLD CALC: 41.3 % — LOW (ref 42–52)
HGB BLD-MCNC: 14 G/DL — SIGNIFICANT CHANGE UP (ref 14–18)
IMM GRANULOCYTES NFR BLD AUTO: 0.3 % — SIGNIFICANT CHANGE UP (ref 0.1–0.3)
KETONES UR-MCNC: NEGATIVE — SIGNIFICANT CHANGE UP
LACTATE SERPL-SCNC: 1 MMOL/L — SIGNIFICANT CHANGE UP (ref 0.5–2.2)
LEUKOCYTE ESTERASE UR-ACNC: NEGATIVE — SIGNIFICANT CHANGE UP
LIDOCAIN IGE QN: 17 U/L — SIGNIFICANT CHANGE UP (ref 7–60)
LYMPHOCYTES # BLD AUTO: 1.71 K/UL — SIGNIFICANT CHANGE UP (ref 1.2–3.4)
LYMPHOCYTES # BLD AUTO: 26.1 % — SIGNIFICANT CHANGE UP (ref 20.5–51.1)
MCHC RBC-ENTMCNC: 30.4 PG — SIGNIFICANT CHANGE UP (ref 27–31)
MCHC RBC-ENTMCNC: 33.9 G/DL — SIGNIFICANT CHANGE UP (ref 32–37)
MCV RBC AUTO: 89.8 FL — SIGNIFICANT CHANGE UP (ref 80–94)
MONOCYTES # BLD AUTO: 0.5 K/UL — SIGNIFICANT CHANGE UP (ref 0.1–0.6)
MONOCYTES NFR BLD AUTO: 7.6 % — SIGNIFICANT CHANGE UP (ref 1.7–9.3)
NEUTROPHILS # BLD AUTO: 4.09 K/UL — SIGNIFICANT CHANGE UP (ref 1.4–6.5)
NEUTROPHILS NFR BLD AUTO: 62.6 % — SIGNIFICANT CHANGE UP (ref 42.2–75.2)
NITRITE UR-MCNC: NEGATIVE — SIGNIFICANT CHANGE UP
NRBC # BLD: 0 /100 WBCS — SIGNIFICANT CHANGE UP (ref 0–0)
PH UR: 6 — SIGNIFICANT CHANGE UP (ref 5–8)
PLATELET # BLD AUTO: 184 K/UL — SIGNIFICANT CHANGE UP (ref 130–400)
POTASSIUM SERPL-MCNC: 4.4 MMOL/L — SIGNIFICANT CHANGE UP (ref 3.5–5)
POTASSIUM SERPL-SCNC: 4.4 MMOL/L — SIGNIFICANT CHANGE UP (ref 3.5–5)
PROT SERPL-MCNC: 7.2 G/DL — SIGNIFICANT CHANGE UP (ref 6–8)
PROT UR-MCNC: 100 MG/DL
RBC # BLD: 4.6 M/UL — LOW (ref 4.7–6.1)
RBC # FLD: 11.9 % — SIGNIFICANT CHANGE UP (ref 11.5–14.5)
RBC CASTS # UR COMP ASSIST: ABNORMAL /HPF
SODIUM SERPL-SCNC: 134 MMOL/L — LOW (ref 135–146)
SP GR SPEC: 1.02 — SIGNIFICANT CHANGE UP (ref 1.01–1.03)
UROBILINOGEN FLD QL: 0.2 MG/DL — SIGNIFICANT CHANGE UP (ref 0.2–0.2)
WBC # BLD: 6.54 K/UL — SIGNIFICANT CHANGE UP (ref 4.8–10.8)
WBC # FLD AUTO: 6.54 K/UL — SIGNIFICANT CHANGE UP (ref 4.8–10.8)

## 2019-04-18 PROCEDURE — 99283 EMERGENCY DEPT VISIT LOW MDM: CPT | Mod: 25

## 2019-04-18 PROCEDURE — 74177 CT ABD & PELVIS W/CONTRAST: CPT | Mod: 26

## 2019-04-18 PROCEDURE — 93010 ELECTROCARDIOGRAM REPORT: CPT

## 2019-04-18 PROCEDURE — 99285 EMERGENCY DEPT VISIT HI MDM: CPT

## 2019-04-18 RX ORDER — IOHEXOL 300 MG/ML
30 INJECTION, SOLUTION INTRAVENOUS ONCE
Qty: 0 | Refills: 0 | Status: COMPLETED | OUTPATIENT
Start: 2019-04-18 | End: 2019-04-18

## 2019-04-18 RX ORDER — MORPHINE SULFATE 50 MG/1
4 CAPSULE, EXTENDED RELEASE ORAL ONCE
Qty: 0 | Refills: 0 | Status: DISCONTINUED | OUTPATIENT
Start: 2019-04-18 | End: 2019-04-18

## 2019-04-18 RX ORDER — METRONIDAZOLE 500 MG
1 TABLET ORAL
Qty: 21 | Refills: 0
Start: 2019-04-18 | End: 2019-04-24

## 2019-04-18 RX ORDER — MOXIFLOXACIN HYDROCHLORIDE TABLETS, 400 MG 400 MG/1
1 TABLET, FILM COATED ORAL
Qty: 14 | Refills: 0
Start: 2019-04-18 | End: 2019-04-24

## 2019-04-18 RX ORDER — CIPROFLOXACIN LACTATE 400MG/40ML
400 VIAL (ML) INTRAVENOUS ONCE
Qty: 0 | Refills: 0 | Status: COMPLETED | OUTPATIENT
Start: 2019-04-18 | End: 2019-04-18

## 2019-04-18 RX ORDER — METRONIDAZOLE 500 MG
500 TABLET ORAL ONCE
Qty: 0 | Refills: 0 | Status: COMPLETED | OUTPATIENT
Start: 2019-04-18 | End: 2019-04-18

## 2019-04-18 RX ADMIN — Medication 100 MILLIGRAM(S): at 20:31

## 2019-04-18 RX ADMIN — Medication 200 MILLIGRAM(S): at 20:31

## 2019-04-18 RX ADMIN — MORPHINE SULFATE 4 MILLIGRAM(S): 50 CAPSULE, EXTENDED RELEASE ORAL at 16:50

## 2019-04-18 RX ADMIN — IOHEXOL 30 MILLILITER(S): 300 INJECTION, SOLUTION INTRAVENOUS at 16:51

## 2019-04-18 NOTE — ED PROVIDER NOTE - CLINICAL SUMMARY MEDICAL DECISION MAKING FREE TEXT BOX
I personally evaluated the patient. I reviewed the Resident’s or Physician Assistant’s note (as assigned above), and agree with the findings and plan except as documented in my note. Patient feels better, repeat abdominal exam much improved, patient would like to try OP antibiotics, given detailed return precautions to return if worsens. I have fully discussed the medical management and delivery of care with the patient. I have discussed any available labs, imaging and treatment options with the patient. Patient confirms understanding and has been given detailed return precautions. Patient instructed to return to the ED should symptoms persist or worsen. Patient has demonstrated capacity and has verbalized understanding. Patient is well appearing upon discharge.

## 2019-04-18 NOTE — ED PROVIDER NOTE - PROGRESS NOTE DETAILS
Attending Note: I personally evaluated the patient. I reviewed the Physician Assistant’s note (as assigned above), and agree with the findings and plan except as documented in my note.   44 y/o M PMHx diabetes and HTN with colon resection in past to treat diverticulitis presents with abdominal pain starting x3 days ago and multiple episodes of diarrhea with mild BRBPR yesterday and today. Pt also c/o (+) nausea. No vomiting. No fevers. PE: Pt is well-appearing, NAD, WDWN, patient sitting up in chair, providing appropriate history. NCAT. PERRL 3mm b/l, no nystagmus, EOMI. HEENT normal, no pooling of secretions. +Full passive ROM in neck. S1S2, no MRG. CTABL, no WRC. Abdomen mild tenderness to mid epigastrium. No leg edema, +symmetric pulses b/l. CNII-XII grossly intact. No rash. Plan: CT, labs, and reassess.

## 2019-04-18 NOTE — ED PROVIDER NOTE - NS ED ROS FT
Review of Systems:  	•	CONSTITUTIONAL - no fever, no diaphoresis, no chills  	•	SKIN - no rash  	•	HEMATOLOGIC - no bleeding, no bruising  	•	EYES - no eye pain, no blurry vision  	•	ENT - no congestion  	•	RESPIRATORY - no shortness of breath, no cough  	•	CARDIAC - no chest pain, no palpitations  	•	GI - +abd pain, +nausea, no vomiting, +diarrhea, no constipation  	•	GENITO-URINARY - no dysuria; no hematuria, no increased urinary frequency  	•	MUSCULOSKELETAL - no joint paint, no swelling, no redness  	•	NEUROLOGIC - no weakness, no headache, no paresthesias, no LOC  	•	PSYCH - no anxiety, no depression  	All other ROS are negative except as documented in HPI.

## 2019-04-18 NOTE — ED PROVIDER NOTE - CARE PROVIDER_API CALL
Brandon Ulrich)  Gastroenterology; Internal Medicine  [address] [place] [address]  Phone: [phone]  Fax: [phone]  Follow Up Time: 1-3 Days

## 2019-04-18 NOTE — ED PROVIDER NOTE - PHYSICAL EXAMINATION
VITAL SIGNS: I have reviewed nursing notes and confirm.  CONSTITUTIONAL: Well-developed; well-nourished; in no acute distress.  SKIN: Skin exam is warm and dry, no acute rash.  HEAD: Normocephalic; atraumatic.  EYES: PERRL, EOM intact; conjunctiva and sclera clear.  ENT: No nasal discharge; airway clear.   NECK: Supple; non tender.  CARD: S1, S2 normal; no murmurs, gallops, or rubs. Regular rate and rhythm.  RESP: Clear to auscultation bilaterally. No wheezes, rales or rhonchi.  ABD: Normal bowel sounds; soft; non-distended; +LLQ tenderness. No rebound tenderness or guarding.   EXT: Normal ROM. No edema.  LYMPH: No acute cervical adenopathy.  NEURO: Alert, oriented. Grossly unremarkable. No focal deficits.  PSYCH: Cooperative, appropriate.

## 2019-04-18 NOTE — ED ADULT NURSE REASSESSMENT NOTE - NS ED NURSE REASSESS COMMENT FT1
pt unable to be found; numbers in chart called 2 numbers no longer in service; pt sister called phone number in chart and left message for pt. MD and charge RN aware

## 2019-04-18 NOTE — ED PROVIDER NOTE - OBJECTIVE STATEMENT
44 yo M with pmhx of HTN, diverticulitis s/p colectomy presenting with constant, crampy, non-radiating, lower abdominal pain associated with nausea and multiple episodes of diarrhea with mild BRBPR both yesterday and today. Symptoms are moderate. No alleviating or aggravating factors. No cp, sob, fever, chills, vomiting, back pain, urinary symptoms, headache, dizziness,  paresthesias, or weakness.

## 2019-04-19 DIAGNOSIS — Z71.89 OTHER SPECIFIED COUNSELING: ICD-10-CM

## 2019-04-19 LAB
CULTURE RESULTS: NO GROWTH — SIGNIFICANT CHANGE UP
SPECIMEN SOURCE: SIGNIFICANT CHANGE UP

## 2019-04-19 RX ORDER — OXYCODONE AND ACETAMINOPHEN 5; 325 MG/1; MG/1
1 TABLET ORAL ONCE
Qty: 0 | Refills: 0 | Status: DISCONTINUED | OUTPATIENT
Start: 2019-04-19 | End: 2019-04-19

## 2019-04-19 RX ORDER — METRONIDAZOLE 500 MG
500 TABLET ORAL ONCE
Qty: 0 | Refills: 0 | Status: COMPLETED | OUTPATIENT
Start: 2019-04-19 | End: 2019-04-19

## 2019-04-19 RX ORDER — MELOXICAM 15 MG/1
1 TABLET ORAL
Qty: 10 | Refills: 0
Start: 2019-04-19 | End: 2019-04-28

## 2019-04-19 RX ORDER — KETOROLAC TROMETHAMINE 30 MG/ML
30 SYRINGE (ML) INJECTION ONCE
Qty: 0 | Refills: 0 | Status: DISCONTINUED | OUTPATIENT
Start: 2019-04-19 | End: 2019-04-19

## 2019-04-19 RX ADMIN — Medication 30 MILLIGRAM(S): at 02:27

## 2019-04-19 RX ADMIN — Medication 500 MILLIGRAM(S): at 04:18

## 2019-04-19 RX ADMIN — OXYCODONE AND ACETAMINOPHEN 1 TABLET(S): 5; 325 TABLET ORAL at 02:27

## 2019-04-19 NOTE — ED PROVIDER NOTE - NS ED ROS FT
Constitutional:  No fever, chills, lethargy, or abnormal weight loss  Eyes:  No eye pain or visual changes  ENMT: No nasal discharge, no toothache, no sore throat. No neck pain or stiffness  Cardiac:  No chest pain or palpitations  Respiratory:  No cough or respiratory distress.   GI:  + nausea, +diarrhea. + abdominal pain.  :  No dysuria, frequency or burning.  MS:  No back or joint pain.  Neuro:  No headache. No numbness, weakness, or tingling.   Skin:  No skin rash  Except as documented in the HPI,  all other systems are negative

## 2019-04-19 NOTE — ED PROVIDER NOTE - ATTENDING CONTRIBUTION TO CARE
42 y/o M PMH HTN DM, Complicated Diverticulitis with prior colon resection who presents with abdominal pain starting x3 days ago and multiple episodes of diarrhea with mild BRBPR yesterday and today. Patient was seen and evaluated in the ED hours ago and had blood work and CT scanning done.  The results of this were reviewed and CT scan shows possible mild Colitis.  Patient started on antibiotics.  Just after signing out, he wanted something a little stronger for pain.    VS reviewed, patient well appearing. Abdomen has mild tenderness to mid epigastrium with no guarding or rebound. Patient given Percocet with improvement. My assessment is similar to previous providers as patient is a good candidate for outpatient management. He is aware he may have to return for admission and IV antibiotics for worsening condition or the return precautions we discussed.    Full DC instructions discussed and patient knows when to seek immediate medical attention.  Patient has proper follow up.  All results discussed and patient aware they may require further work up.  Proper follow up ensured. Limitations of ED work up discussed.  Medications administered and prescribed/OTC home meds discussed.  All questions and concerns from patient or family addressed. Understanding of instructions verbalized.

## 2019-04-19 NOTE — ED PROVIDER NOTE - NSFOLLOWUPINSTRUCTIONS_ED_ALL_ED_FT
Please follow up with your primary care doctor in 1-2 days.     Colitis  ImageColitis is inflammation of the colon. Colitis may last a short time (acute) or it may last a long time (chronic).    What are the causes?  This condition may be caused by:    Viruses.  Bacteria.  Reactions to medicine.  Certain autoimmune diseases, such as Crohn disease or ulcerative colitis.    What are the signs or symptoms?  Symptoms of this condition include:    Diarrhea.  Passing bloody or tarry stool.  Pain.  Fever.  Vomiting.  Tiredness (fatigue).  Weight loss.  Bloating.  Sudden increase in abdominal pain.  Having fewer bowel movements than usual.    How is this diagnosed?  This condition is diagnosed with a stool test or a blood test. You may also have other tests, including X-rays, a CT scan, or a colonoscopy.    How is this treated?  Treatment may include:    Resting the bowel. This involves not eating or drinking for a period of time.  Fluids that are given through an IV tube.  Medicine for pain and diarrhea.  Antibiotic medicines.  Cortisone medicines.  Surgery.    Follow these instructions at home:  Eating and drinking     Follow instructions from your health care provider about eating or drinking restrictions.  Drink enough fluid to keep your urine clear or pale yellow.  Work with a dietitian to determine which foods cause your condition to flare up.  Avoid foods that cause flare-ups.  Eat a well-balanced diet.  Medicines     Take over-the-counter and prescription medicines only as told by your health care provider.  If you were prescribed an antibiotic medicine, take it as told by your health care provider. Do not stop taking the antibiotic even if you start to feel better.  General instructions     Keep all follow-up visits as told by your health care provider. This is important.  Contact a health care provider if:  Your symptoms do not go away.  You develop new symptoms.  Get help right away if:  You have a fever that does not go away with treatment.  You develop chills.  You have extreme weakness, fainting, or dehydration.  You have repeated vomiting.  You develop severe pain in your abdomen.  You pass bloody or tarry stool.  This information is not intended to replace advice given to you by your health care provider. Make sure you discuss any questions you have with your health care provider.

## 2019-04-19 NOTE — ED PROVIDER NOTE - CLINICAL SUMMARY MEDICAL DECISION MAKING FREE TEXT BOX
Patient was seen and evaluated in the ED hours ago and had blood work and CT scanning done.  The results of this were reviewed and CT scan shows possible mild Colitis.  Patient started on antibiotics.  Just after signing out, he wanted something a little stronger for pain.    VS reviewed, patient well appearing. Abdomen has mild tenderness to mid epigastrium with no guarding or rebound. Patient given Percocet with improvement. My assessment is similar to previous providers as patient is a good candidate for outpatient management. He is aware he may have to return for admission and IV antibiotics for worsening condition or the return precautions we discussed.    Full DC instructions discussed and patient knows when to seek immediate medical attention.  Patient has proper follow up.  All results discussed and patient aware they may require further work up.  Proper follow up ensured. Limitations of ED work up discussed.  Medications administered and prescribed/OTC home meds discussed.  All questions and concerns from patient or family addressed. Understanding of instructions verbalized.

## 2019-04-19 NOTE — ED PROVIDER NOTE - OBJECTIVE STATEMENT
42 yo M PMH HTN, diverticulitis s/p colectomy presenting with constant, crampy, non-radiating, lower abdominal pain associated with nausea and multiple episodes of diarrhea with mild BRBPR both yesterday and today. Symptoms are moderate. No alleviating or aggravating factors.  Patient came to the ER today and had labs and a CT scan done that were concerning for Colitis.  He was given antibiotics.  When patient was going to leave, he realized his pain was not as controlled as desired and re-registered. No cp, sob, fever, chills, vomiting, back pain, urinary symptoms, headache, dizziness,  paresthesias, or weakness.

## 2019-04-19 NOTE — ED PROVIDER NOTE - PHYSICAL EXAMINATION
VITAL SIGNS: I have reviewed nursing notes and confirm.  CONSTITUTIONAL: well-appearing, non-toxic, NAD  SKIN: Warm dry, normal skin turgor  HEAD: NCAT  EYES: EOMI, PERRLA, no scleral icterus  ENT: Moist mucous membranes, normal pharynx with no erythema or exudates  NECK: Supple; non tender. Full ROM. No cervical LAD  CARD: RRR, no murmurs, rubs or gallops  RESP: clear to ausculation b/l.  No rales, rhonchi, or wheezing.  ABD: soft, + BS, mild tenderness in LLQ, non-distended, no rebound or guarding. No CVA tenderness  EXT: Full ROM, no bony tenderness, no pedal edema, no calf tenderness  NEURO: normal motor. normal sensory. CN II-XII intact. Cerebellar testing normal. Normal gait.  PSYCH: Cooperative, appropriate.

## 2019-04-19 NOTE — ED ADULT NURSE NOTE - CHPI ED NUR SYMPTOMS NEG
no abdominal distension/no fever/no dysuria/no hematuria/no nausea/no vomiting/no blood in stool/no burning urination/no chills/no diarrhea

## 2019-05-02 ENCOUNTER — APPOINTMENT (OUTPATIENT)
Dept: PODIATRY | Facility: CLINIC | Age: 44
End: 2019-05-02
Payer: MEDICAID

## 2019-05-02 ENCOUNTER — OUTPATIENT (OUTPATIENT)
Dept: OUTPATIENT SERVICES | Facility: HOSPITAL | Age: 44
LOS: 1 days | Discharge: HOME | End: 2019-05-02

## 2019-05-02 VITALS
HEIGHT: 68 IN | HEART RATE: 77 BPM | BODY MASS INDEX: 23.95 KG/M2 | SYSTOLIC BLOOD PRESSURE: 136 MMHG | WEIGHT: 158 LBS | DIASTOLIC BLOOD PRESSURE: 88 MMHG

## 2019-05-02 DIAGNOSIS — Z98.890 OTHER SPECIFIED POSTPROCEDURAL STATES: Chronic | ICD-10-CM

## 2019-05-02 PROCEDURE — 99212 OFFICE O/P EST SF 10 MIN: CPT

## 2019-05-03 NOTE — PHYSICAL EXAM
[Full Pulse] : the pedal pulses are present [Vibration Dec.] : diminished vibratory sensation at the level of the toes [Diminished Throughout Right Foot] : diminished tactile sensation with monofilament testing throughout right foot [Diminished Throughout Left Foot] : diminished tactile sensation with monofilament testing throughout left foot [Oriented To Time, Place, And Person] : oriented to person, place, and time [FreeTextEntry1] : right 5th digit, 1st met ulcer has healed

## 2019-05-03 NOTE — END OF VISIT
[] : Resident [FreeTextEntry3] : Pt has missed his last appointments and has had the same off loading dressing for 4 weeks. The submet 1 ulcer has healed. Pt can return to regular shoes. Pt given a presciption for diabetic shoes with off loading inserts. pt was also to see Luke for fabrication of off-loading insert

## 2019-05-20 ENCOUNTER — OUTPATIENT (OUTPATIENT)
Dept: OUTPATIENT SERVICES | Facility: HOSPITAL | Age: 44
LOS: 1 days | Discharge: HOME | End: 2019-05-20

## 2019-05-20 ENCOUNTER — APPOINTMENT (OUTPATIENT)
Dept: PODIATRY | Facility: CLINIC | Age: 44
End: 2019-05-20
Payer: MEDICAID

## 2019-05-20 DIAGNOSIS — Z98.890 OTHER SPECIFIED POSTPROCEDURAL STATES: Chronic | ICD-10-CM

## 2019-05-20 PROCEDURE — 29580 STRAPPING UNNA BOOT: CPT | Mod: RT

## 2019-05-30 ENCOUNTER — APPOINTMENT (OUTPATIENT)
Dept: PODIATRY | Facility: CLINIC | Age: 44
End: 2019-05-30
Payer: MEDICAID

## 2019-05-30 ENCOUNTER — OUTPATIENT (OUTPATIENT)
Dept: OUTPATIENT SERVICES | Facility: HOSPITAL | Age: 44
LOS: 1 days | Discharge: HOME | End: 2019-05-30

## 2019-05-30 DIAGNOSIS — Z98.890 OTHER SPECIFIED POSTPROCEDURAL STATES: Chronic | ICD-10-CM

## 2019-05-30 PROCEDURE — 29540 STRAPPING ANKLE &/FOOT: CPT | Mod: RT

## 2019-05-31 NOTE — ASSESSMENT
[FreeTextEntry1] : pt returns with new breakdown to right 1st met. wound granular, approximately 3 x 2 cm,granular. no signs of active infection. wound cleaned and dressed with silvadene, 2x2,omnifix.felt reliefs to off load. held in place with coban and unna boot. given darco shoe. pt to return next week.

## 2019-06-01 ENCOUNTER — OUTPATIENT (OUTPATIENT)
Dept: OUTPATIENT SERVICES | Facility: HOSPITAL | Age: 44
LOS: 1 days | End: 2019-06-01

## 2019-06-01 DIAGNOSIS — Z98.890 OTHER SPECIFIED POSTPROCEDURAL STATES: Chronic | ICD-10-CM

## 2019-06-04 ENCOUNTER — APPOINTMENT (OUTPATIENT)
Dept: INTERNAL MEDICINE | Facility: CLINIC | Age: 44
End: 2019-06-04
Payer: MEDICAID

## 2019-06-04 ENCOUNTER — OUTPATIENT (OUTPATIENT)
Dept: OUTPATIENT SERVICES | Facility: HOSPITAL | Age: 44
LOS: 1 days | Discharge: HOME | End: 2019-06-04

## 2019-06-04 VITALS
BODY MASS INDEX: 25.16 KG/M2 | WEIGHT: 166 LBS | HEIGHT: 68 IN | HEART RATE: 88 BPM | SYSTOLIC BLOOD PRESSURE: 171 MMHG | DIASTOLIC BLOOD PRESSURE: 102 MMHG

## 2019-06-04 DIAGNOSIS — M87.00 IDIOPATHIC ASEPTIC NECROSIS OF UNSPECIFIED BONE: ICD-10-CM

## 2019-06-04 DIAGNOSIS — Z98.890 OTHER SPECIFIED POSTPROCEDURAL STATES: Chronic | ICD-10-CM

## 2019-06-04 DIAGNOSIS — R10.13 EPIGASTRIC PAIN: ICD-10-CM

## 2019-06-04 DIAGNOSIS — F41.9 ANXIETY DISORDER, UNSPECIFIED: ICD-10-CM

## 2019-06-04 PROCEDURE — 99213 OFFICE O/P EST LOW 20 MIN: CPT | Mod: GC

## 2019-06-04 RX ORDER — CLOTRIMAZOLE 10 MG/G
1 CREAM TOPICAL
Qty: 1 | Refills: 2 | Status: DISCONTINUED | COMMUNITY
Start: 2018-08-27 | End: 2019-06-04

## 2019-06-04 RX ORDER — INSULIN LISPRO 100 U/ML
100 INJECTION, SOLUTION INTRAVENOUS; SUBCUTANEOUS DAILY
Qty: 2 | Refills: 5 | Status: DISCONTINUED | COMMUNITY
Start: 2019-05-02 | End: 2019-06-04

## 2019-06-04 RX ORDER — LACTULOSE 10 G/15ML
10 SOLUTION ORAL TWICE DAILY
Qty: 1000 | Refills: 0 | Status: DISCONTINUED | COMMUNITY
Start: 2017-12-01 | End: 2019-06-04

## 2019-06-06 NOTE — PHYSICAL EXAM
[Full Pulse] : the pedal pulses are present [Vibration Dec.] : diminished vibratory sensation at the level of the toes [Diminished Throughout Right Foot] : diminished tactile sensation with monofilament testing throughout right foot [Oriented To Time, Place, And Person] : oriented to person, place, and time [Diminished Throughout Left Foot] : diminished tactile sensation with monofilament testing throughout left foot [FreeTextEntry1] : right 5th digit, 1st met ulcer has healed

## 2019-06-06 NOTE — END OF VISIT
[FreeTextEntry3] : I agree with the above H&P,  assessment and plan. I have reviewed patients history\par

## 2019-06-06 NOTE — ASSESSMENT
[FreeTextEntry1] : mild debridement of callous to wound sites. wounds improved, increased granulation narrowing. no signs of infection. wound to lateral 5th digit also keya, doing well. wounds dressed with amadeo, 2x2,omnifix. of loading with felt, ppt. held in place with coban, unna boot, tubigrip. pt will return next week.

## 2019-06-06 NOTE — HISTORY OF PRESENT ILLNESS
[FreeTextEntry1] : 43 y/o with PMH of DM, HTN, DLD, anxiety, male returns for wound care of right submetatarsal 1 ulceration. Patients last A1c was 11.6%. \par

## 2019-06-07 ENCOUNTER — OTHER (OUTPATIENT)
Age: 44
End: 2019-06-07

## 2019-06-10 ENCOUNTER — OUTPATIENT (OUTPATIENT)
Dept: OUTPATIENT SERVICES | Facility: HOSPITAL | Age: 44
LOS: 1 days | Discharge: HOME | End: 2019-06-10

## 2019-06-10 ENCOUNTER — APPOINTMENT (OUTPATIENT)
Dept: PODIATRY | Facility: CLINIC | Age: 44
End: 2019-06-10
Payer: MEDICAID

## 2019-06-10 DIAGNOSIS — Z98.890 OTHER SPECIFIED POSTPROCEDURAL STATES: Chronic | ICD-10-CM

## 2019-06-10 DIAGNOSIS — Z87.2 PERSONAL HISTORY OF DISEASES OF THE SKIN AND SUBCUTANEOUS TISSUE: ICD-10-CM

## 2019-06-10 PROCEDURE — 97760 ORTHOTIC MGMT&TRAING 1ST ENC: CPT

## 2019-06-11 ENCOUNTER — APPOINTMENT (OUTPATIENT)
Dept: PODIATRY | Facility: CLINIC | Age: 44
End: 2019-06-11
Payer: MEDICAID

## 2019-06-11 ENCOUNTER — OUTPATIENT (OUTPATIENT)
Dept: OUTPATIENT SERVICES | Facility: HOSPITAL | Age: 44
LOS: 1 days | Discharge: HOME | End: 2019-06-11

## 2019-06-11 ENCOUNTER — EMERGENCY (EMERGENCY)
Facility: HOSPITAL | Age: 44
LOS: 0 days | Discharge: HOME | End: 2019-06-11
Attending: EMERGENCY MEDICINE | Admitting: EMERGENCY MEDICINE
Payer: MEDICAID

## 2019-06-11 VITALS
SYSTOLIC BLOOD PRESSURE: 166 MMHG | WEIGHT: 164.91 LBS | HEIGHT: 68 IN | OXYGEN SATURATION: 100 % | RESPIRATION RATE: 18 BRPM | TEMPERATURE: 98 F | HEART RATE: 85 BPM | DIASTOLIC BLOOD PRESSURE: 62 MMHG

## 2019-06-11 DIAGNOSIS — J45.909 UNSPECIFIED ASTHMA, UNCOMPLICATED: ICD-10-CM

## 2019-06-11 DIAGNOSIS — Z98.890 OTHER SPECIFIED POSTPROCEDURAL STATES: Chronic | ICD-10-CM

## 2019-06-11 DIAGNOSIS — E11.621 TYPE 2 DIABETES MELLITUS WITH FOOT ULCER: ICD-10-CM

## 2019-06-11 DIAGNOSIS — Z79.899 OTHER LONG TERM (CURRENT) DRUG THERAPY: ICD-10-CM

## 2019-06-11 DIAGNOSIS — Z79.51 LONG TERM (CURRENT) USE OF INHALED STEROIDS: ICD-10-CM

## 2019-06-11 DIAGNOSIS — L97.519 NON-PRESSURE CHRONIC ULCER OF OTHER PART OF RIGHT FOOT WITH UNSPECIFIED SEVERITY: ICD-10-CM

## 2019-06-11 DIAGNOSIS — R07.9 CHEST PAIN, UNSPECIFIED: ICD-10-CM

## 2019-06-11 DIAGNOSIS — R07.89 OTHER CHEST PAIN: ICD-10-CM

## 2019-06-11 PROBLEM — Z87.2 HISTORY OF FOOT ULCER: Status: ACTIVE | Noted: 2019-06-11

## 2019-06-11 PROBLEM — F41.9 ANXIETY: Status: ACTIVE | Noted: 2017-02-07

## 2019-06-11 PROBLEM — M87.00 AVN (AVASCULAR NECROSIS OF BONE): Status: ACTIVE | Noted: 2018-04-19

## 2019-06-11 LAB
ALBUMIN SERPL ELPH-MCNC: 4 G/DL — SIGNIFICANT CHANGE UP (ref 3.5–5.2)
ALP SERPL-CCNC: 181 U/L — HIGH (ref 30–115)
ALT FLD-CCNC: 23 U/L — SIGNIFICANT CHANGE UP (ref 0–41)
ANION GAP SERPL CALC-SCNC: 12 MMOL/L — SIGNIFICANT CHANGE UP (ref 7–14)
APPEARANCE UR: CLEAR — SIGNIFICANT CHANGE UP
AST SERPL-CCNC: 15 U/L — SIGNIFICANT CHANGE UP (ref 0–41)
BILIRUB SERPL-MCNC: 0.3 MG/DL — SIGNIFICANT CHANGE UP (ref 0.2–1.2)
BILIRUB UR-MCNC: NEGATIVE — SIGNIFICANT CHANGE UP
BUN SERPL-MCNC: 18 MG/DL — SIGNIFICANT CHANGE UP (ref 10–20)
CALCIUM SERPL-MCNC: 9.1 MG/DL — SIGNIFICANT CHANGE UP (ref 8.5–10.1)
CHLORIDE SERPL-SCNC: 93 MMOL/L — LOW (ref 98–110)
CO2 SERPL-SCNC: 28 MMOL/L — SIGNIFICANT CHANGE UP (ref 17–32)
COLOR SPEC: YELLOW — SIGNIFICANT CHANGE UP
CREAT SERPL-MCNC: 1 MG/DL — SIGNIFICANT CHANGE UP (ref 0.7–1.5)
DIFF PNL FLD: ABNORMAL
GLUCOSE SERPL-MCNC: 568 MG/DL — CRITICAL HIGH (ref 70–99)
GLUCOSE UR QL: >=1000
HCT VFR BLD CALC: 41.2 % — LOW (ref 42–52)
HGB BLD-MCNC: 14 G/DL — SIGNIFICANT CHANGE UP (ref 14–18)
KETONES UR-MCNC: NEGATIVE — SIGNIFICANT CHANGE UP
LEUKOCYTE ESTERASE UR-ACNC: NEGATIVE — SIGNIFICANT CHANGE UP
MCHC RBC-ENTMCNC: 30.8 PG — SIGNIFICANT CHANGE UP (ref 27–31)
MCHC RBC-ENTMCNC: 34 G/DL — SIGNIFICANT CHANGE UP (ref 32–37)
MCV RBC AUTO: 90.5 FL — SIGNIFICANT CHANGE UP (ref 80–94)
NITRITE UR-MCNC: NEGATIVE — SIGNIFICANT CHANGE UP
NRBC # BLD: 0 /100 WBCS — SIGNIFICANT CHANGE UP (ref 0–0)
PH UR: 7 — SIGNIFICANT CHANGE UP (ref 5–8)
PLATELET # BLD AUTO: 167 K/UL — SIGNIFICANT CHANGE UP (ref 130–400)
POTASSIUM SERPL-MCNC: 4.8 MMOL/L — SIGNIFICANT CHANGE UP (ref 3.5–5)
POTASSIUM SERPL-SCNC: 4.8 MMOL/L — SIGNIFICANT CHANGE UP (ref 3.5–5)
PROT SERPL-MCNC: 7.1 G/DL — SIGNIFICANT CHANGE UP (ref 6–8)
PROT UR-MCNC: 100
RBC # BLD: 4.55 M/UL — LOW (ref 4.7–6.1)
RBC # FLD: 12 % — SIGNIFICANT CHANGE UP (ref 11.5–14.5)
RBC CASTS # UR COMP ASSIST: ABNORMAL /HPF
SODIUM SERPL-SCNC: 133 MMOL/L — LOW (ref 135–146)
SP GR SPEC: >=1.03 — SIGNIFICANT CHANGE UP (ref 1.01–1.03)
TROPONIN T SERPL-MCNC: <0.01 NG/ML — SIGNIFICANT CHANGE UP
UROBILINOGEN FLD QL: 0.2 — SIGNIFICANT CHANGE UP (ref 0.2–0.2)
WBC # BLD: 5.69 K/UL — SIGNIFICANT CHANGE UP (ref 4.8–10.8)
WBC # FLD AUTO: 5.69 K/UL — SIGNIFICANT CHANGE UP (ref 4.8–10.8)
WBC UR QL: SIGNIFICANT CHANGE UP /HPF

## 2019-06-11 PROCEDURE — 99285 EMERGENCY DEPT VISIT HI MDM: CPT | Mod: 25

## 2019-06-11 PROCEDURE — 71046 X-RAY EXAM CHEST 2 VIEWS: CPT | Mod: 26

## 2019-06-11 PROCEDURE — 93010 ELECTROCARDIOGRAM REPORT: CPT

## 2019-06-11 PROCEDURE — 29580 STRAPPING UNNA BOOT: CPT | Mod: 50

## 2019-06-11 RX ORDER — ASPIRIN/CALCIUM CARB/MAGNESIUM 324 MG
325 TABLET ORAL ONCE
Refills: 0 | Status: COMPLETED | OUTPATIENT
Start: 2019-06-11 | End: 2019-06-11

## 2019-06-11 RX ORDER — KETOROLAC TROMETHAMINE 30 MG/ML
15 SYRINGE (ML) INJECTION ONCE
Refills: 0 | Status: DISCONTINUED | OUTPATIENT
Start: 2019-06-11 | End: 2019-06-11

## 2019-06-11 RX ADMIN — Medication 325 MILLIGRAM(S): at 08:23

## 2019-06-11 RX ADMIN — Medication 15 MILLIGRAM(S): at 09:19

## 2019-06-11 NOTE — ED PROVIDER NOTE - PHYSICAL EXAMINATION
Well appearing NAD non toxic. NCAT EOMI conjunctiva nml. No nasal discharge. MMM. Neck supple, non tender, full ROM. RRR no MRG +S1S2. CTA b/l. Abd s NT ND +BS. Ext WWP x4, moving all extremities, no edema. 2+ equal pulses throughout. Cooperative, appropriate. ttp to right medial scapula border.

## 2019-06-11 NOTE — ED PROVIDER NOTE - NS ED ROS FT
Constitutional: See HPI.  Eyes: No visual changes, eye pain or discharge. No Photophobia  ENMT: No hearing changes, pain, discharge or infections. No neck pain or stiffness. No limited ROM  Cardiac: + Chest pain. No SOB or edema.   Respiratory: No cough or respiratory distress. No hemoptysis. No history of asthma or RAD.  GI: No nausea, vomiting, diarrhea or abdominal pain.  : No dysuria, frequency or burning. No Discharge  MS: No myalgia, muscle weakness, joint pain or back pain.  Neuro: No headache or weakness. No LOC.  Skin: No skin rash.  Except as documented in the HPI, all other systems are negative.

## 2019-06-11 NOTE — ED PROVIDER NOTE - ATTENDING CONTRIBUTION TO CARE
45 y/o male with hx of DM, diverticulitis S/P partial colectomy, c/o moderate chest pain, abrupt onset 2 days ago while walking. Described as tightness to chest and back. No other radiation. Chest tightness lasted 15 seconds then subsided. Back pain continues. Worse with changes in position. No SOB, no dizziness, no diaphoresis, no vomiting. No recent travel/immobilization/surgery. No calf pain or swelling. No tearing sensation.   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 Nuclear stress test 3/2017 --> no ischemia, EF 59%.  O/E: O/E: Constitutional: Non-toxic in appearance. Eyes: No pallor, no jaundice. Neck: Neck supple, no meningeal signs. Respiratory: Lungs CTA and equal b/l. Paraspinal tenderness to palpation. Cardio: S1 S2 regular, no murmur. Equal pulses in all extremities. ABD: ABD soft, no tenderness. Extremities: No pedal edema, no calf tenderness. Skin: DFU right foot. Neuro: No neurologic deficits.   EKG: sinus @ 75, LVH, unchanged from 12/2018.  Imp: Ddx includes cardiac ischemia, PE. No sign of aortic dissection or PTX.   Low clinical suspicion for PE. PERC negative.  Will R/O MI. No obstructive CAD but had myocardial bridging.  Likely musculoskeletal in nature.  A/P: Will give ASA, NSAIDs. Check CXR, labs, cardiac enzymes. Will re-assess. 43 y/o male with hx of DM, diverticulitis S/P partial colectomy, c/o moderate chest pain, abrupt onset 2 days ago while walking. Described as tightness to chest and back. No other radiation. Chest tightness lasted 15 seconds then subsided. Back pain continues. Worse with changes in position. No SOB, no dizziness, no diaphoresis, no vomiting. No recent travel/immobilization/surgery. No calf pain or swelling. No tearing sensation.   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 Nuclear stress test 3/2017 --> no ischemia, EF 59%.  O/E: O/E: Constitutional: Non-toxic in appearance. Eyes: No pallor, no jaundice. Neck: Neck supple, no meningeal signs. Respiratory: Lungs CTA and equal b/l. Paraspinal tenderness to palpation. Cardio: S1 S2 regular, no murmur. Equal pulses in all extremities. ABD: ABD soft, no tenderness. Extremities: No pedal edema, no calf tenderness. Skin: DFU right foot. Neuro: No neurologic deficits.   EKG: sinus @ 75, LVH, unchanged from 12/2018.  Imp: Ddx includes cardiac ischemia, PE. No sign of aortic dissection or PTX.   Low clinical suspicion for PE. PERC negative.  Will R/O MI. No obstructive CAD but had myocardial bridging. Heart score = 2.  Likely musculoskeletal in nature.  A/P: Will give ASA, NSAIDs. Check CXR, labs, cardiac enzymes. Will re-assess.

## 2019-06-11 NOTE — ASSESSMENT
[FreeTextEntry1] : 42 y.o male patient with PMH of DM, HTN, DLD, anxiety, asthma, h/o diverticulitis s/p colectomy came here for follow up.\par \par #)DM uncontrolled\par -HBA1c is 11.7 in march 2019\par -will repeat HbA1c in month followup with podiatry and opthal\par -c/w insulin and gabapentin follow up with endo Dr. Vargas\par \par #)Epigastric pain likely PUD vs gastritis vs GERD\par -will check H. pylori not on omeprazole for longer time explained to give the stool test before starting omeprazole\par -No alarm symptoms except weight loss\par \par #)Left lower quadrant abd pain likely due to colitis/h/o diverticulitis s/p colectomy\par -needs colonoscopy 4-6 weeks from april\par -GI referral\par \par # HTN uncontrolled manual BP is 150/100\par - c/w irbesartan gave him BP machine advised to check BP at home\par \par #)DLD\par - Continue Atorvastatin\par -Last LDL was 127 in 3/2019\par \par #)HCM\par Offered pneumonia vaccination he deferred for next visit\par Follow up in 3 month with HbA1c, lipid profile

## 2019-06-11 NOTE — ED ADULT NURSE NOTE - NSIMPLEMENTINTERV_GEN_ALL_ED
Implemented All Universal Safety Interventions:  [place] to call system. Call bell, personal items and telephone within reach. Instruct patient to call for assistance. Room bathroom lighting operational. Non-slip footwear when patient is off stretcher. Physically safe environment: no spills, clutter or unnecessary equipment. Stretcher in lowest position, wheels locked, appropriate side rails in place.

## 2019-06-11 NOTE — PHYSICAL EXAM
[Full Pulse] : the pedal pulses are present [Vibration Dec.] : diminished vibratory sensation at the level of the toes [Diminished Throughout Right Foot] : diminished tactile sensation with monofilament testing throughout right foot [Diminished Throughout Left Foot] : diminished tactile sensation with monofilament testing throughout left foot [Oriented To Time, Place, And Person] : oriented to person, place, and time [FreeTextEntry1] : right 5th digit, 1st met ulcer pre-ulcerative lesions

## 2019-06-11 NOTE — PHYSICAL EXAM
[Normal Sclera/Conjunctiva] : normal sclera/conjunctiva [No Acute Distress] : no acute distress [Normal Outer Ear/Nose] : the outer ears and nose were normal in appearance [Supple] : supple [No Respiratory Distress] : no respiratory distress  [No Accessory Muscle Use] : no accessory muscle use [Clear to Auscultation] : lungs were clear to auscultation bilaterally [Regular Rhythm] : with a regular rhythm [Normal S1, S2] : normal S1 and S2 [Soft] : abdomen soft [No Edema] : there was no peripheral edema [Normal Bowel Sounds] : normal bowel sounds [No CVA Tenderness] : no CVA  tenderness [No Focal Deficits] : no focal deficits [Normal Affect] : the affect was normal [FreeTextEntry1] : tendernes sin left lower quadrant and epigastrium

## 2019-06-11 NOTE — ED PROVIDER NOTE - CLINICAL SUMMARY MEDICAL DECISION MAKING FREE TEXT BOX
Tn negative 2 days after chest pain. CXR with no acute abnormality. Pain relieved with Toradol in ED. Will refer to medicine/cardiology clinic for further evaluation. Can be discharged. Will prescribe ibuprofen.

## 2019-06-11 NOTE — ASSESSMENT
[FreeTextEntry1] : debridement of callosities to right foot. besides pinpoint bleeds which were treated with silver nitrate pt is essentially healed. 1st met dressed with xeroform  as was lateral arch. pt put into unna boot to control edema. orthotic fabricated to off load pre ulcerative site. pt will return in 2 weeks, sooner if problem arises.

## 2019-06-11 NOTE — HISTORY OF PRESENT ILLNESS
[FreeTextEntry1] : 44 year old male came here for follow up. [de-identified] : 42 y.o male patient with PMH of DM, HTN, DLD, anxiety, asthma, h/o diverticulitis s/p colectomy came here for follow up. He was recently went to ED for left lower quadrant pain Ct scan showed mild colitis discharged from ED with antibiotics and pain improved at that time. But currently he still c/o intermittent left lower quadrant pain, moderate 4-5/10 in intensity no radiation associated with loose stools and also epigastric and mid umbilical pain burning in nature sometimes more after food some times not, not radiating, waking him from sleep at night denies any hematemesis, melena, hematochezia, family h/o malignancy but weight loss of 20 pounds in a year.

## 2019-06-11 NOTE — ED PROVIDER NOTE - OBJECTIVE STATEMENT
42 yo M PMH HTN, diverticulitis s/p colectomy, DM HgbA1c 11% as per patient, smoker, HLD non-compliant with medications with right diabetic foot ulcer presenting to ED for chest pain. Patient states right sided chest pain that started when he was grocery shopping, sharp, radiates to back, comes on intermittently for seconds at a time and remits for past 2 days, worse laying down. Patient admits to intermittent sob, no orthopnea or PND, no leg swelling, no nausea/vomiting/diarrhea. no abdominal pain. Hx of nuclear stress test 3/17 EF 59%, previous CCTA with myocardial bridging of LAD.

## 2019-06-11 NOTE — ED PROVIDER NOTE - NSFOLLOWUPCLINICS_GEN_ALL_ED_FT
[facility]  Cardiology  [address] [place] [address]  Phone: [phone]  Fax:   Follow Up Time: 1-3 Days    [facility]  Medicine  [address], [place]   Phone: [phone]  Fax:   Follow Up Time: 1-3 Days

## 2019-06-11 NOTE — ASSESSMENT
[FreeTextEntry1] : pt returns today. unna boot got wet, slipped down. pt put back into new unna boot. will return here in 1.5 weeks.

## 2019-06-11 NOTE — HISTORY OF PRESENT ILLNESS
[FreeTextEntry1] : 43 y/o with PMH of DM, HTN, DLD, anxiety, male returns for wound care of right submetatarsal 1 ulceration. Patients last A1c was 11.6%

## 2019-06-12 DIAGNOSIS — Z71.89 OTHER SPECIFIED COUNSELING: ICD-10-CM

## 2019-06-24 ENCOUNTER — INPATIENT (INPATIENT)
Facility: HOSPITAL | Age: 44
LOS: 0 days | Discharge: HOME | End: 2019-06-25
Attending: INTERNAL MEDICINE | Admitting: INTERNAL MEDICINE
Payer: MEDICAID

## 2019-06-24 ENCOUNTER — APPOINTMENT (OUTPATIENT)
Dept: PODIATRY | Facility: CLINIC | Age: 44
End: 2019-06-24

## 2019-06-24 VITALS
HEART RATE: 101 BPM | DIASTOLIC BLOOD PRESSURE: 74 MMHG | OXYGEN SATURATION: 99 % | SYSTOLIC BLOOD PRESSURE: 154 MMHG | TEMPERATURE: 98 F | RESPIRATION RATE: 18 BRPM

## 2019-06-24 DIAGNOSIS — Z98.890 OTHER SPECIFIED POSTPROCEDURAL STATES: Chronic | ICD-10-CM

## 2019-06-24 LAB
ANION GAP SERPL CALC-SCNC: 13 MMOL/L — SIGNIFICANT CHANGE UP (ref 7–14)
BASOPHILS # BLD AUTO: 0.04 K/UL — SIGNIFICANT CHANGE UP (ref 0–0.2)
BASOPHILS NFR BLD AUTO: 0.4 % — SIGNIFICANT CHANGE UP (ref 0–1)
BUN SERPL-MCNC: 18 MG/DL — SIGNIFICANT CHANGE UP (ref 10–20)
CALCIUM SERPL-MCNC: 9.3 MG/DL — SIGNIFICANT CHANGE UP (ref 8.5–10.1)
CHLORIDE SERPL-SCNC: 93 MMOL/L — LOW (ref 98–110)
CO2 SERPL-SCNC: 27 MMOL/L — SIGNIFICANT CHANGE UP (ref 17–32)
CREAT SERPL-MCNC: 1.1 MG/DL — SIGNIFICANT CHANGE UP (ref 0.7–1.5)
EOSINOPHIL # BLD AUTO: 0.09 K/UL — SIGNIFICANT CHANGE UP (ref 0–0.7)
EOSINOPHIL NFR BLD AUTO: 1 % — SIGNIFICANT CHANGE UP (ref 0–8)
ERYTHROCYTE [SEDIMENTATION RATE] IN BLOOD: 47 MM/HR — HIGH (ref 0–10)
GLUCOSE BLDC GLUCOMTR-MCNC: 263 MG/DL — HIGH (ref 70–99)
GLUCOSE SERPL-MCNC: 415 MG/DL — HIGH (ref 70–99)
HCT VFR BLD CALC: 42.4 % — SIGNIFICANT CHANGE UP (ref 42–52)
HGB BLD-MCNC: 14.4 G/DL — SIGNIFICANT CHANGE UP (ref 14–18)
IMM GRANULOCYTES NFR BLD AUTO: 0.4 % — HIGH (ref 0.1–0.3)
LYMPHOCYTES # BLD AUTO: 1.84 K/UL — SIGNIFICANT CHANGE UP (ref 1.2–3.4)
LYMPHOCYTES # BLD AUTO: 20.1 % — LOW (ref 20.5–51.1)
MCHC RBC-ENTMCNC: 30.8 PG — SIGNIFICANT CHANGE UP (ref 27–31)
MCHC RBC-ENTMCNC: 34 G/DL — SIGNIFICANT CHANGE UP (ref 32–37)
MCV RBC AUTO: 90.8 FL — SIGNIFICANT CHANGE UP (ref 80–94)
MONOCYTES # BLD AUTO: 0.84 K/UL — HIGH (ref 0.1–0.6)
MONOCYTES NFR BLD AUTO: 9.2 % — SIGNIFICANT CHANGE UP (ref 1.7–9.3)
NEUTROPHILS # BLD AUTO: 6.29 K/UL — SIGNIFICANT CHANGE UP (ref 1.4–6.5)
NEUTROPHILS NFR BLD AUTO: 68.9 % — SIGNIFICANT CHANGE UP (ref 42.2–75.2)
NRBC # BLD: 0 /100 WBCS — SIGNIFICANT CHANGE UP (ref 0–0)
PLATELET # BLD AUTO: 184 K/UL — SIGNIFICANT CHANGE UP (ref 130–400)
POTASSIUM SERPL-MCNC: 4.4 MMOL/L — SIGNIFICANT CHANGE UP (ref 3.5–5)
POTASSIUM SERPL-SCNC: 4.4 MMOL/L — SIGNIFICANT CHANGE UP (ref 3.5–5)
RBC # BLD: 4.67 M/UL — LOW (ref 4.7–6.1)
RBC # FLD: 12.2 % — SIGNIFICANT CHANGE UP (ref 11.5–14.5)
SODIUM SERPL-SCNC: 133 MMOL/L — LOW (ref 135–146)
WBC # BLD: 9.14 K/UL — SIGNIFICANT CHANGE UP (ref 4.8–10.8)
WBC # FLD AUTO: 9.14 K/UL — SIGNIFICANT CHANGE UP (ref 4.8–10.8)

## 2019-06-24 PROCEDURE — 99285 EMERGENCY DEPT VISIT HI MDM: CPT

## 2019-06-24 PROCEDURE — 73620 X-RAY EXAM OF FOOT: CPT | Mod: 26,RT

## 2019-06-24 RX ORDER — METHOCARBAMOL 500 MG/1
500 TABLET, FILM COATED ORAL DAILY
Refills: 0 | Status: DISCONTINUED | OUTPATIENT
Start: 2019-06-24 | End: 2019-06-25

## 2019-06-24 RX ORDER — PANTOPRAZOLE SODIUM 20 MG/1
40 TABLET, DELAYED RELEASE ORAL
Refills: 0 | Status: DISCONTINUED | OUTPATIENT
Start: 2019-06-24 | End: 2019-06-25

## 2019-06-24 RX ORDER — AMPICILLIN SODIUM AND SULBACTAM SODIUM 250; 125 MG/ML; MG/ML
3 INJECTION, POWDER, FOR SUSPENSION INTRAMUSCULAR; INTRAVENOUS EVERY 6 HOURS
Refills: 0 | Status: DISCONTINUED | OUTPATIENT
Start: 2019-06-24 | End: 2019-06-25

## 2019-06-24 RX ORDER — AMPICILLIN SODIUM AND SULBACTAM SODIUM 250; 125 MG/ML; MG/ML
3 INJECTION, POWDER, FOR SUSPENSION INTRAMUSCULAR; INTRAVENOUS ONCE
Refills: 0 | Status: COMPLETED | OUTPATIENT
Start: 2019-06-24 | End: 2019-06-24

## 2019-06-24 RX ORDER — DEXTROSE 50 % IN WATER 50 %
25 SYRINGE (ML) INTRAVENOUS ONCE
Refills: 0 | Status: DISCONTINUED | OUTPATIENT
Start: 2019-06-24 | End: 2019-06-25

## 2019-06-24 RX ORDER — INSULIN LISPRO 100/ML
8 VIAL (ML) SUBCUTANEOUS ONCE
Refills: 0 | Status: COMPLETED | OUTPATIENT
Start: 2019-06-24 | End: 2019-06-24

## 2019-06-24 RX ORDER — CHLORHEXIDINE GLUCONATE 213 G/1000ML
1 SOLUTION TOPICAL
Refills: 0 | Status: DISCONTINUED | OUTPATIENT
Start: 2019-06-24 | End: 2019-06-25

## 2019-06-24 RX ORDER — GLUCAGON INJECTION, SOLUTION 0.5 MG/.1ML
1 INJECTION, SOLUTION SUBCUTANEOUS ONCE
Refills: 0 | Status: DISCONTINUED | OUTPATIENT
Start: 2019-06-24 | End: 2019-06-25

## 2019-06-24 RX ORDER — GABAPENTIN 400 MG/1
300 CAPSULE ORAL THREE TIMES A DAY
Refills: 0 | Status: DISCONTINUED | OUTPATIENT
Start: 2019-06-24 | End: 2019-06-25

## 2019-06-24 RX ORDER — INSULIN LISPRO 100/ML
VIAL (ML) SUBCUTANEOUS
Refills: 0 | Status: DISCONTINUED | OUTPATIENT
Start: 2019-06-24 | End: 2019-06-25

## 2019-06-24 RX ORDER — SODIUM CHLORIDE 9 MG/ML
1000 INJECTION INTRAMUSCULAR; INTRAVENOUS; SUBCUTANEOUS ONCE
Refills: 0 | Status: COMPLETED | OUTPATIENT
Start: 2019-06-24 | End: 2019-06-24

## 2019-06-24 RX ORDER — INSULIN LISPRO 100/ML
15 VIAL (ML) SUBCUTANEOUS
Refills: 0 | Status: DISCONTINUED | OUTPATIENT
Start: 2019-06-24 | End: 2019-06-25

## 2019-06-24 RX ORDER — LOSARTAN POTASSIUM 100 MG/1
100 TABLET, FILM COATED ORAL DAILY
Refills: 0 | Status: DISCONTINUED | OUTPATIENT
Start: 2019-06-24 | End: 2019-06-25

## 2019-06-24 RX ORDER — SODIUM CHLORIDE 9 MG/ML
1000 INJECTION, SOLUTION INTRAVENOUS
Refills: 0 | Status: DISCONTINUED | OUTPATIENT
Start: 2019-06-24 | End: 2019-06-25

## 2019-06-24 RX ORDER — ALBUTEROL 90 UG/1
2 AEROSOL, METERED ORAL EVERY 8 HOURS
Refills: 0 | Status: DISCONTINUED | OUTPATIENT
Start: 2019-06-24 | End: 2019-06-25

## 2019-06-24 RX ORDER — HEPARIN SODIUM 5000 [USP'U]/ML
5000 INJECTION INTRAVENOUS; SUBCUTANEOUS EVERY 8 HOURS
Refills: 0 | Status: DISCONTINUED | OUTPATIENT
Start: 2019-06-24 | End: 2019-06-25

## 2019-06-24 RX ORDER — DEXTROSE 50 % IN WATER 50 %
15 SYRINGE (ML) INTRAVENOUS ONCE
Refills: 0 | Status: DISCONTINUED | OUTPATIENT
Start: 2019-06-24 | End: 2019-06-25

## 2019-06-24 RX ORDER — INSULIN GLARGINE 100 [IU]/ML
45 INJECTION, SOLUTION SUBCUTANEOUS AT BEDTIME
Refills: 0 | Status: DISCONTINUED | OUTPATIENT
Start: 2019-06-24 | End: 2019-06-25

## 2019-06-24 RX ORDER — DEXTROSE 50 % IN WATER 50 %
12.5 SYRINGE (ML) INTRAVENOUS ONCE
Refills: 0 | Status: DISCONTINUED | OUTPATIENT
Start: 2019-06-24 | End: 2019-06-25

## 2019-06-24 RX ADMIN — Medication 8 UNIT(S): at 23:40

## 2019-06-24 RX ADMIN — SODIUM CHLORIDE 1000 MILLILITER(S): 9 INJECTION INTRAMUSCULAR; INTRAVENOUS; SUBCUTANEOUS at 18:43

## 2019-06-24 RX ADMIN — AMPICILLIN SODIUM AND SULBACTAM SODIUM 200 GRAM(S): 250; 125 INJECTION, POWDER, FOR SUSPENSION INTRAMUSCULAR; INTRAVENOUS at 18:03

## 2019-06-24 RX ADMIN — AMPICILLIN SODIUM AND SULBACTAM SODIUM 3 GRAM(S): 250; 125 INJECTION, POWDER, FOR SUSPENSION INTRAMUSCULAR; INTRAVENOUS at 18:30

## 2019-06-24 RX ADMIN — INSULIN GLARGINE 45 UNIT(S): 100 INJECTION, SOLUTION SUBCUTANEOUS at 23:40

## 2019-06-24 NOTE — H&P ADULT - HISTORY OF PRESENT ILLNESS
45y/o M w/ PMhx of type 1 diabetes on insulin, htn, dyslipidemia presented with right foot ulcer associated with fever and chills for the last 2 days. Pt had trauma to his right foot about 2 years ago and developed right foot ulcer. Initially he was seen by podiatry and was given wound care with armando ibanez, per pt his original wound healed but he developed pressure ulcer from fiction on lateral aspect or right foot. He kept following with podiatry clinic for this ulcer and it never healed completely. He went to [address] [place] [address] and underwent offloading with orthotic placement last week. His orthotic got dislodged and he developed pressure/friction ulcer on lateral aspect of right foot. He had worsening pain associated with redness, swelling and warmth of his right foot. He also felt subjective fevers with chills. He is also complaining of burning pain in his b/l LE associated with pins and needle sensations.  He also has sharp shooting lower back pain radiating to his left LE.   He is also complaining of sob with exertion but denied any hx of chest pain. He also has difficulty sleeping and wakes up during the night to catch his breath. He snores at night and has daytime somnolence, he was referred for sleep studies but he never scheduled appointment for that. He does not remember his exact insulin dosage but remembers that his sugars levels are high. He gets thirsty mostly at the night time and has increased urinary frequency. 43y/o M w/ PMhx of type 1 diabetes on insulin, htn, Asthma, anxiety, dyslipidemia presented with right foot ulcer associated with fever and chills for the last 2 days. Pt had trauma to his right foot about 2 years ago and developed right foot ulcer. Initially he was seen by podiatry and was given wound care with armando ibanez, per pt his original wound healed but he developed pressure ulcer from fiction on lateral aspect or right foot. He kept following with podiatry clinic for this ulcer and it never healed completely. He went to [address] [place] [address] and underwent offloading with orthotic placement last week. His orthotic got dislodged and he developed pressure/friction ulcer on lateral aspect of right foot. He had worsening pain associated with redness, swelling and warmth of his right foot. He also felt subjective fevers with chills. He is also complaining of burning pain in his b/l LE associated with pins and needle sensations.  He also has sharp shooting lower back pain radiating to his left LE.   He is also complaining of sob with exertion but denied any hx of chest pain. He also has difficulty sleeping and wakes up during the night to catch his breath. He snores at night and has daytime somnolence, he was referred for sleep studies but he never scheduled appointment for that. He does not remember his exact insulin dosage but remembers that his sugars levels are high. He gets thirsty mostly at the night time and has increased urinary frequency.     He was evaluated by podiatry in the ED who recommended ID consult and he was started on I/V Unasyn. Blood cultures were sent from the ED.

## 2019-06-24 NOTE — H&P ADULT - ATTENDING COMMENTS
45y/o M w/ PMhx of type 1 diabetes on insulin, htn, Asthma, anxiety, dyslipidemia presented with right foot ulcer associated with fever and chills for the last 2 days.     Rt DFU improving on Unasyn since admission.   Pt is now s/p Podiatry and ID evaluation as well.    At this time it's safe to d/c pt home with Podiatry and ID recommendations.  DFU mild and can be treated as an outpt with PMD and Podiatry follow up in the office.  Augmentin 875mg po BID x 5days    D/C Home today  Med rec and d/c summary reviewed as well

## 2019-06-24 NOTE — H&P ADULT - NSICDXPASTMEDICALHX_GEN_ALL_CORE_FT
PAST MEDICAL HISTORY:  Asthma     Diabetes     Diverticulitis surgery 12-13 yrs ago    Dyslipidemia     High cholesterol     Hypertension

## 2019-06-24 NOTE — ED PROVIDER NOTE - NS ED ROS FT
Constitutional: (-) fever  Eyes/ENT: (-) blurry vision, (-) epistaxis  Cardiovascular: (-) chest pain, (-) syncope  Respiratory: (-) cough, (-) shortness of breath  Gastrointestinal: (-) vomiting, (-) diarrhea  Musculoskeletal: (-) neck pain, (-) back pain, (-) joint pain  Integumentary: +dfu, (-) edema  Neurological: (-) headache, (-) altered mental status  Psychiatric: (-) hallucinations  Allergic/Immunologic: (-) pruritus

## 2019-06-24 NOTE — ED ADULT NURSE NOTE - OBJECTIVE STATEMENT
Patient c/o right DFU opened and increased pain x 2 days. Denies chills/f/n/v/d. +CMS in right foot + cap refill.

## 2019-06-24 NOTE — ED PROVIDER NOTE - OBJECTIVE STATEMENT
45y/o M w/ hx of diabetes, htn, cholesterol has had r. foot ulcer since feb that has been worsening following up with podiatry here.  pt complaining of chills and feeling feverish.  no cough, congestion, runny nose, no urinary symptoms. no vomiting or diarreha.

## 2019-06-24 NOTE — ED PROVIDER NOTE - PHYSICAL EXAMINATION
VITAL SIGNS: I have reviewed nursing notes and confirm.  CONSTITUTIONAL: Well-developed; well-nourished; in no acute distress. pt comfortable.  SKIN: erythematous warm r. foot. pulses intact.  4x4 superficial ulcer to dorsum of r. foot 1x1 smaller 2x ulcers to base lateral base of foot.   HEAD: Normocephalic; atraumatic.  EYES:  EOM intact; conjunctiva and sclera clear.  ENT: No nasal discharge; airway clear. moist oral mucosa; uvula at midline. no pharyngeal erythema, edema exudate or vesicles.  TMs with good light reflex b/l.    NECK: Supple; non tender.  CARD: S1, S2 normal; no murmurs, gallops, or rubs. Regular rate and rhythm. posterior tibial and radial pulses 2+  RESP: No wheezes, rales or rhonchi. cta b/l. no use of accessory muscles. no retractions  ABD: Normal bowel sounds; soft; non-distended; non-tender;   EXT: Normal ROM. No  cyanosis or edema.  BACK: No cva tenderness  LYMPH: No acute cervical adenopathy.  NEURO: Alert, oriented, grossly unremarkable.    PSYCH: Cooperative, appropriate.

## 2019-06-24 NOTE — ED ADULT NURSE NOTE - NSIMPLEMENTINTERV_GEN_ALL_ED
Implemented All Fall with Harm Risk Interventions:  [place] to call system. Call bell, personal items and telephone within reach. Instruct patient to call for assistance. Room bathroom lighting operational. Non-slip footwear when patient is off stretcher. Physically safe environment: no spills, clutter or unnecessary equipment. Stretcher in lowest position, wheels locked, appropriate side rails in place. Provide visual cue, wrist band, yellow gown, etc. Monitor gait and stability. Monitor for mental status changes and reorient to person, place, and time. Review medications for side effects contributing to fall risk. Reinforce activity limits and safety measures with patient and family. Provide visual clues: red socks.

## 2019-06-24 NOTE — H&P ADULT - ASSESSMENT
43y/o M w/ PMhx of type 1 diabetes on insulin, htn, Asthma, anxiety, dyslipidemia presented with right foot ulcer associated with fever and chills for the last 2 days.    # superficial ulcer on lateral aspect of right foot most likely due to fiction/pressure from dislodged orthotic  - admit to medicine, r/o cellulitis  - no elevated WBCs or documented fever in ED, pt was tachycardic with ESR of 47 mm/1st hour  - c/w Unasyn I/V 3gm q 6 for now, f/u ID recs, f/u blood cultures  - xray right foot looks negative, f/u official read, podiatry following  - wound care as per podiatry  - strict control of blood sugar levels    # Poorly controlled T1DM with neuropathy  - pt on Insulin and tresiba at home, element of non compliance  - fingerstick of 415 in ED with no anion gap, no change in mental status  - start Lantus 45 units and Lispro 15 units pre-meal and c/w correctional sliding scale  - c/w gabapentin 300 mg TID    # HTN  - pt was on irbesartan 300mg at home, start losartan 100 mg q24    # Asthma - stable  - c/w Ventolin prn    # DLD  - c/w lipitor 20 mg qhs    # DVT PPx: lovenox 40mg s/c  # Diet: carb consistent/DASH  # GI ppx: protonx 40mg q 24  # Dispo: from home 43y/o M w/ PMhx of type 1 diabetes on insulin, htn, Asthma, anxiety, dyslipidemia presented with right foot ulcer associated with fever and chills for the last 2 days.    # superficial ulcer on lateral aspect of right foot most likely due to fiction/pressure from dislodged orthotic  - admit to medicine, r/o cellulitis  - no elevated WBCs or documented fever in ED, pt was tachycardic with ESR of 47 mm/1st hour  - c/w Unasyn I/V 3gm q 6 for now, f/u ID recs, f/u blood cultures  - xray right foot looks negative, f/u official read, podiatry following  - wound care as per podiatry  - strict control of blood sugar levels    # Poorly controlled T1DM with neuropathy  - last A1c 11.3  - pt on Insulin and tresiba at home, element of non compliance  - fingerstick of 415 in ED with no anion gap, no change in mental status  - start Lantus 45 units and Lispro 15 units pre-meal and c/w correctional sliding scale  - c/w gabapentin 300 mg TID    # HTN  - pt was on irbesartan 300mg at home, start losartan 100 mg q24    # Asthma - stable  - c/w Ventolin prn    # DLD  - c/w lipitor 20 mg qhs    # DVT PPx: Hep s/c  # Diet: carb consistent/DASH  # GI ppx: protonx 40mg q 24  # Dispo: from home

## 2019-06-24 NOTE — H&P ADULT - NSHPLABSRESULTS_GEN_ALL_CORE
Complete Blood Count + Automated Diff (06.24.19 @ 17:40)    WBC Count: 9.14 K/uL    RBC Count: 4.67 M/uL    Hemoglobin: 14.4 g/dL    Hematocrit: 42.4 %    Mean Cell Volume: 90.8 fL    Mean Cell Hemoglobin: 30.8 pg    Mean Cell Hemoglobin Conc: 34.0 g/dL    Red Cell Distrib Width: 12.2 %    Platelet Count - Automated: 184 K/uL    Auto Neutrophil #: 6.29 K/uL    Auto Lymphocyte #: 1.84 K/uL    Auto Monocyte #: 0.84 K/uL    Auto Eosinophil #: 0.09 K/uL    Auto Basophil #: 0.04 K/uL    Auto Neutrophil %: 68.9: Differential percentages must be correlated with absolute numbers for  clinical significance. %    Auto Lymphocyte %: 20.1 %    Auto Monocyte %: 9.2 %    Auto Eosinophil %: 1.0 %    Auto Basophil %: 0.4 %    Auto Immature Granulocyte %: 0.4 %    Nucleated RBC: 0 /100 WBCs      Comprehensive Metabolic Panel (06.11.19 @ 07:35)    Sodium, Serum: 133 mmol/L    Potassium, Serum: 4.8 mmol/L    Chloride, Serum: 93 mmol/L    Carbon Dioxide, Serum: 28 mmol/L    Anion Gap, Serum: 12 mmol/L    Blood Urea Nitrogen, Serum: 18 mg/dL    Creatinine, Serum: 1.0 mg/dL    Glucose, Serum: 568: Critical value:  TYPE:(C=Critical, N=Notification, A=Abnormal) C  TESTS: _GLUC  DATE/TIME CALLED: _06/11/19 08:42  CALLED TO: _DR BATISTA  READ BACK (2 Patient Identifiers)(Y/N): _Y  READ BACK VALUES (Y/N): _Y  CALLED BY: _CPE mg/dL    Calcium, Total Serum: 9.1 mg/dL    Protein Total, Serum: 7.1 g/dL    Albumin, Serum: 4.0 g/dL    Bilirubin Total, Serum: 0.3 mg/dL    Alkaline Phosphatase, Serum: 181 U/L    Aspartate Aminotransferase (AST/SGOT): 15 U/L    Alanine Aminotransferase (ALT/SGPT): 23 U/L    eGFR if Non : 90: Interpretative comment        Sedimentation Rate, Erythrocyte (06.24.19 @ 17:40)    Sedimentation Rate, Erythrocyte: 47 mm/Hr      < from: Xray Chest 2 Views PA/Lat (06.11.19 @ 07:37) >    IMPRESSION:    No radiographic evidence of acute cardiopulmonary disease.    < end of copied text >    < from: CT Abdomen and Pelvis w/ Oral Cont and w/ IV Cont (04.18.19 @ 18:48) >      IMPRESSION:        Mild left colonic wall thickening versus underdistention, correlate for   mild colitis.    No further evidence of acute abdominal pathology.    Borderline hepatic steatosis.    < end of copied text >

## 2019-06-24 NOTE — ED PROVIDER NOTE - ATTENDING CONTRIBUTION TO CARE
44 y.o. M, PMH of poorly controlled type 1 diabetes on insulin, HTN, Asthma, anxiety, dyslipidemia comes in c/o right foot ulcer associated with fever and chills for the last 2 days. No recent trauma. On exam, pt in NAD, AAOx3, head NC/AT, CN II-XII intact, lungs CTA B/L, CV S1S2 regular, abdomen soft/NT/ND/(+)BS, ext (+) erythematous, warm right foot with ulcers to dorsum and lateral aspect, pulses intact. Will do labs, podiatry consult and reevaluate.

## 2019-06-24 NOTE — H&P ADULT - NSHPPHYSICALEXAM_GEN_ALL_CORE
PHYSICAL EXAM:  GENERAL: NAD, lying in bed   HEAD:  Atraumatic, Normocephalic  EYES: EOMI, PERRLA, conjunctiva and sclera clear  ENT: Moist mucous membranes  NECK: Supple, No JVD  CHEST/LUNG: Clear to auscultation bilaterally; No rales, rhonchi, wheezing, or rubs. Unlabored respirations  HEART: Regular rate and rhythm; No murmurs, rubs, or gallops  ABDOMEN: Bowel sounds present; Soft, Nontender, Nondistended. No hepatomegaly. previous surgery scar present, epigastric tenderness +.  EXTREMITIES:  2+ Peripheral Pulses, brisk capillary refill. Right foot ulcer, with granulation tissue at base, irregular/macerated borders,  no erythema or tenderness. Loss of proprioception b/l LE and decreased sensations to touch.  NERVOUS SYSTEM:  Alert & Oriented X3, speech clear. No deficits   MSK: FROM all 4 extremities, full and equal strength  SKIN: No rashe

## 2019-06-25 ENCOUNTER — TRANSCRIPTION ENCOUNTER (OUTPATIENT)
Age: 44
End: 2019-06-25

## 2019-06-25 VITALS
HEART RATE: 72 BPM | TEMPERATURE: 96 F | SYSTOLIC BLOOD PRESSURE: 140 MMHG | DIASTOLIC BLOOD PRESSURE: 79 MMHG | RESPIRATION RATE: 18 BRPM

## 2019-06-25 PROBLEM — E78.00 PURE HYPERCHOLESTEROLEMIA, UNSPECIFIED: Chronic | Status: ACTIVE | Noted: 2019-06-11

## 2019-06-25 LAB
ALBUMIN SERPL ELPH-MCNC: 3.8 G/DL — SIGNIFICANT CHANGE UP (ref 3.5–5.2)
ALP SERPL-CCNC: 138 U/L — HIGH (ref 30–115)
ALT FLD-CCNC: 20 U/L — SIGNIFICANT CHANGE UP (ref 0–41)
ANION GAP SERPL CALC-SCNC: 13 MMOL/L — SIGNIFICANT CHANGE UP (ref 7–14)
AST SERPL-CCNC: 14 U/L — SIGNIFICANT CHANGE UP (ref 0–41)
BASOPHILS # BLD AUTO: 0.04 K/UL — SIGNIFICANT CHANGE UP (ref 0–0.2)
BASOPHILS NFR BLD AUTO: 0.4 % — SIGNIFICANT CHANGE UP (ref 0–1)
BILIRUB SERPL-MCNC: 0.3 MG/DL — SIGNIFICANT CHANGE UP (ref 0.2–1.2)
BUN SERPL-MCNC: 12 MG/DL — SIGNIFICANT CHANGE UP (ref 10–20)
CALCIUM SERPL-MCNC: 9 MG/DL — SIGNIFICANT CHANGE UP (ref 8.5–10.1)
CHLORIDE SERPL-SCNC: 104 MMOL/L — SIGNIFICANT CHANGE UP (ref 98–110)
CO2 SERPL-SCNC: 27 MMOL/L — SIGNIFICANT CHANGE UP (ref 17–32)
CREAT SERPL-MCNC: 0.8 MG/DL — SIGNIFICANT CHANGE UP (ref 0.7–1.5)
CRP SERPL-MCNC: 1.57 MG/DL — HIGH (ref 0–0.4)
EOSINOPHIL # BLD AUTO: 0.26 K/UL — SIGNIFICANT CHANGE UP (ref 0–0.7)
EOSINOPHIL NFR BLD AUTO: 2.7 % — SIGNIFICANT CHANGE UP (ref 0–8)
ESTIMATED AVERAGE GLUCOSE: 367 MG/DL — HIGH (ref 68–114)
GLUCOSE BLDC GLUCOMTR-MCNC: 104 MG/DL — HIGH (ref 70–99)
GLUCOSE BLDC GLUCOMTR-MCNC: 119 MG/DL — HIGH (ref 70–99)
GLUCOSE BLDC GLUCOMTR-MCNC: 145 MG/DL — HIGH (ref 70–99)
GLUCOSE SERPL-MCNC: 71 MG/DL — SIGNIFICANT CHANGE UP (ref 70–99)
HBA1C BLD-MCNC: 14.4 % — HIGH (ref 4–5.6)
HCT VFR BLD CALC: 40.7 % — LOW (ref 42–52)
HGB BLD-MCNC: 13.7 G/DL — LOW (ref 14–18)
IMM GRANULOCYTES NFR BLD AUTO: 0.2 % — SIGNIFICANT CHANGE UP (ref 0.1–0.3)
LACTATE SERPL-SCNC: 3.4 MMOL/L — HIGH (ref 0.5–2.2)
LYMPHOCYTES # BLD AUTO: 3.99 K/UL — HIGH (ref 1.2–3.4)
LYMPHOCYTES # BLD AUTO: 41.7 % — SIGNIFICANT CHANGE UP (ref 20.5–51.1)
MAGNESIUM SERPL-MCNC: 2.2 MG/DL — SIGNIFICANT CHANGE UP (ref 1.8–2.4)
MCHC RBC-ENTMCNC: 30.8 PG — SIGNIFICANT CHANGE UP (ref 27–31)
MCHC RBC-ENTMCNC: 33.7 G/DL — SIGNIFICANT CHANGE UP (ref 32–37)
MCV RBC AUTO: 91.5 FL — SIGNIFICANT CHANGE UP (ref 80–94)
MONOCYTES # BLD AUTO: 1.06 K/UL — HIGH (ref 0.1–0.6)
MONOCYTES NFR BLD AUTO: 11.1 % — HIGH (ref 1.7–9.3)
NEUTROPHILS # BLD AUTO: 4.2 K/UL — SIGNIFICANT CHANGE UP (ref 1.4–6.5)
NEUTROPHILS NFR BLD AUTO: 43.9 % — SIGNIFICANT CHANGE UP (ref 42.2–75.2)
NRBC # BLD: 0 /100 WBCS — SIGNIFICANT CHANGE UP (ref 0–0)
PHOSPHATE SERPL-MCNC: 3 MG/DL — SIGNIFICANT CHANGE UP (ref 2.1–4.9)
PLATELET # BLD AUTO: 188 K/UL — SIGNIFICANT CHANGE UP (ref 130–400)
POTASSIUM SERPL-MCNC: 3.1 MMOL/L — LOW (ref 3.5–5)
POTASSIUM SERPL-SCNC: 3.1 MMOL/L — LOW (ref 3.5–5)
PROT SERPL-MCNC: 6.9 G/DL — SIGNIFICANT CHANGE UP (ref 6–8)
RBC # BLD: 4.45 M/UL — LOW (ref 4.7–6.1)
RBC # FLD: 12 % — SIGNIFICANT CHANGE UP (ref 11.5–14.5)
SODIUM SERPL-SCNC: 144 MMOL/L — SIGNIFICANT CHANGE UP (ref 135–146)
WBC # BLD: 9.57 K/UL — SIGNIFICANT CHANGE UP (ref 4.8–10.8)
WBC # FLD AUTO: 9.57 K/UL — SIGNIFICANT CHANGE UP (ref 4.8–10.8)

## 2019-06-25 PROCEDURE — 93010 ELECTROCARDIOGRAM REPORT: CPT

## 2019-06-25 PROCEDURE — 99236 HOSP IP/OBS SAME DATE HI 85: CPT

## 2019-06-25 RX ORDER — PIPERACILLIN AND TAZOBACTAM 4; .5 G/20ML; G/20ML
3.38 INJECTION, POWDER, LYOPHILIZED, FOR SOLUTION INTRAVENOUS EVERY 8 HOURS
Refills: 0 | Status: DISCONTINUED | OUTPATIENT
Start: 2019-06-25 | End: 2019-06-25

## 2019-06-25 RX ORDER — POTASSIUM CHLORIDE 20 MEQ
40 PACKET (EA) ORAL EVERY 4 HOURS
Refills: 0 | Status: COMPLETED | OUTPATIENT
Start: 2019-06-25 | End: 2019-06-25

## 2019-06-25 RX ADMIN — Medication 40 MILLIEQUIVALENT(S): at 10:34

## 2019-06-25 RX ADMIN — Medication 15 UNIT(S): at 08:36

## 2019-06-25 RX ADMIN — LOSARTAN POTASSIUM 100 MILLIGRAM(S): 100 TABLET, FILM COATED ORAL at 06:00

## 2019-06-25 RX ADMIN — AMPICILLIN SODIUM AND SULBACTAM SODIUM 200 GRAM(S): 250; 125 INJECTION, POWDER, FOR SUSPENSION INTRAMUSCULAR; INTRAVENOUS at 06:00

## 2019-06-25 RX ADMIN — GABAPENTIN 300 MILLIGRAM(S): 400 CAPSULE ORAL at 05:59

## 2019-06-25 RX ADMIN — PANTOPRAZOLE SODIUM 40 MILLIGRAM(S): 20 TABLET, DELAYED RELEASE ORAL at 08:37

## 2019-06-25 RX ADMIN — HEPARIN SODIUM 5000 UNIT(S): 5000 INJECTION INTRAVENOUS; SUBCUTANEOUS at 13:09

## 2019-06-25 RX ADMIN — PIPERACILLIN AND TAZOBACTAM 33.33 GRAM(S): 4; .5 INJECTION, POWDER, LYOPHILIZED, FOR SOLUTION INTRAVENOUS at 13:08

## 2019-06-25 RX ADMIN — Medication 40 MILLIEQUIVALENT(S): at 08:36

## 2019-06-25 RX ADMIN — GABAPENTIN 300 MILLIGRAM(S): 400 CAPSULE ORAL at 13:09

## 2019-06-25 RX ADMIN — METHOCARBAMOL 500 MILLIGRAM(S): 500 TABLET, FILM COATED ORAL at 12:29

## 2019-06-25 RX ADMIN — HEPARIN SODIUM 5000 UNIT(S): 5000 INJECTION INTRAVENOUS; SUBCUTANEOUS at 06:00

## 2019-06-25 NOTE — DISCHARGE NOTE PROVIDER - CARE PROVIDER_API CALL
Estiven Castillo (DPLOUIE)  Podiatric Medicine  [address] [facility] [place] [address]  Phone: [phone]  Fax: [phone]  Follow Up Time: Estiven Castillo (DPM)  Podiatric Medicine  [address] [facility] [place] [address]  Phone: [phone]  Fax: [phone]  Follow Up Time:     Wenceslao Mazariegos (DO)  Medicine  Physicians  [address] [place] [address]  Phone: [phone]  Fax: [phone]  Follow Up Time:

## 2019-06-25 NOTE — PROGRESS NOTE ADULT - SUBJECTIVE AND OBJECTIVE BOX
Hospital Day:  1d    Subjective:    Patient is a 44y old male who presents with a chief complaint of right foot ulcer associated with fever and chills for the last 2 days (25 Jun 2019 12:32), admitted for a diagnosis of diabetic foot ulcer. Patient had no acute events overnight. This morning he had one episode of dizziness, shaking and sweating. He checked his fingerstick which was 70. He was given juices by the nurse and his glucose improved to 145. He also complains of bilateral lower extremity pain. He denies fever, headache, chills, chest pain, SOB, N/V, diarrhea or constipation.    Past Medical Hx:   Hypertension  Dyslipidemia  High cholesterol  Diverticulitis  Asthma  Diabetes    Past Sx:  History of surgery    Allergies:  No Known Allergies    Current Meds:   Standng Meds:  chlorhexidine 4% Liquid 1 Application(s) Topical <User Schedule>  dextrose 5%. 1000 milliLiter(s) (50 mL/Hr) IV Continuous <Continuous>  dextrose 50% Injectable 12.5 Gram(s) IV Push once  dextrose 50% Injectable 25 Gram(s) IV Push once  gabapentin 300 milliGRAM(s) Oral three times a day  heparin  Injectable 5000 Unit(s) SubCutaneous every 8 hours  insulin glargine Injectable (LANTUS) 45 Unit(s) SubCutaneous at bedtime  insulin lispro (HumaLOG) corrective regimen sliding scale   SubCutaneous three times a day before meals  insulin lispro Injectable (HumaLOG) 15 Unit(s) SubCutaneous three times a day before meals  losartan 100 milliGRAM(s) Oral daily  methocarbamol 500 milliGRAM(s) Oral daily  pantoprazole    Tablet 40 milliGRAM(s) Oral before breakfast  piperacillin/tazobactam IVPB.. 3.375 Gram(s) IV Intermittent every 8 hours    PRN Meds:  ALBUTerol    90 MICROgram(s) HFA Inhaler 2 Puff(s) Inhalation every 8 hours PRN Shortness of Breath and/or Wheezing  dextrose 40% Gel 15 Gram(s) Oral once PRN Blood Glucose LESS THAN 70 milliGRAM(s)/deciliter  glucagon  Injectable 1 milliGRAM(s) IntraMuscular once PRN Glucose LESS THAN 70 milligrams/deciliter    HOME MEDICATIONS:  albuterol 90 mcg/inh inhalation aerosol with adapter:   atorvastatin 20 mg oral tablet: 1 tab(s) orally once a day  gabapentin 300 mg oral tablet: 1 tab(s) orally 3 times a day  irbesartan 300 mg oral tablet: 1 tab(s) orally once a day  Lantus Solostar Pen 100 units/mL subcutaneous solution: 45  subcutaneous once a day (at bedtime)  methocarbamol 500 mg oral tablet: 1 tab(s) orally 3 times a day  Omega-3 oral capsule: 1 cap(s) orally once a day  Protonix 40 mg oral delayed release tablet: 1 tab(s) orally once a day  silver sulfADIAZINE 1% topical cream: Apply topically to affected area 2 times a day  tiZANidine 2 mg oral tablet: 2 tab(s) orally every 8 hours  Vitamin D3 2000 intl units oral capsule: 1 cap(s) orally once a day      Vital Signs:   T(F): 95.9 (06-25-19 @ 08:00), Max: 98.5 (06-24-19 @ 16:11)  HR: 72 (06-25-19 @ 08:00) (72 - 101)  BP: 140/79 (06-25-19 @ 08:00) (140/79 - 172/88)  RR: 18 (06-25-19 @ 08:00) (16 - 18)  SpO2: 99% (06-24-19 @ 22:05) (99% - 99%)      06-25-19 @ 07:01  -  06-25-19 @ 16:03  --------------------------------------------------------  IN: 0 mL / OUT: 400 mL / NET: -400 mL    Physical Exam:   GENERAL: NAD  HEENT: NCAT  CHEST/LUNG: CTAB  HEART: Regular rate and rhythm; s1 s2 appreciated, No murmurs, rubs, or gallops  ABDOMEN: Soft, Nontender, Nondistended; Bowel sounds present  EXTREMITIES: No LE edema b/l  NERVOUS SYSTEM:  Alert & Oriented X3      Labs:                         13.7   9.57  )-----------( 188      ( 25 Jun 2019 06:08 )             40.7     Neutophil% 43.9, Lymphocyte% 41.7, Monocyte% 11.1, Bands% 0.2 06-25-19 @ 06:08    25 Jun 2019 06:08    144    |  104    |  12     ----------------------------<  71     3.1     |  27     |  0.8      Ca    9.0        25 Jun 2019 06:08  Phos  3.0       25 Jun 2019 06:08  Mg     2.2       25 Jun 2019 06:08    TPro  6.9    /  Alb  3.8    /  TBili  0.3    /  DBili  x      /  AST  14     /  ALT  20     /  AlkPhos  138    25 Jun 2019 06:08    Hemoglobin A1C, Whole Blood: 14.4 % (06-25-19 @ 06:08)    Radiology: Hospital Day:  1d    Subjective:    Patient is a 44y old male who presents with a chief complaint of right foot ulcer associated with fever and chills for the last 2 days (25 Jun 2019 12:32), admitted for a diagnosis of diabetic foot ulcer. Patient had no acute events overnight. This morning he had one episode of dizziness, shaking and sweating. He checked his fingerstick which was 70. He was given juices by the nurse and his glucose improved to 145. He also complains of bilateral lower extremity pain. He denies fever, headache, chills, chest pain, SOB, N/V, diarrhea or constipation.    Past Medical Hx:   Hypertension  Dyslipidemia  High cholesterol  Diverticulitis  Asthma  Diabetes    Past Sx:  History of surgery    Allergies:  No Known Allergies    Current Meds:   Standng Meds:  chlorhexidine 4% Liquid 1 Application(s) Topical <User Schedule>  dextrose 5%. 1000 milliLiter(s) (50 mL/Hr) IV Continuous <Continuous>  dextrose 50% Injectable 12.5 Gram(s) IV Push once  dextrose 50% Injectable 25 Gram(s) IV Push once  gabapentin 300 milliGRAM(s) Oral three times a day  heparin  Injectable 5000 Unit(s) SubCutaneous every 8 hours  insulin glargine Injectable (LANTUS) 45 Unit(s) SubCutaneous at bedtime  insulin lispro (HumaLOG) corrective regimen sliding scale   SubCutaneous three times a day before meals  insulin lispro Injectable (HumaLOG) 15 Unit(s) SubCutaneous three times a day before meals  losartan 100 milliGRAM(s) Oral daily  methocarbamol 500 milliGRAM(s) Oral daily  pantoprazole    Tablet 40 milliGRAM(s) Oral before breakfast  piperacillin/tazobactam IVPB.. 3.375 Gram(s) IV Intermittent every 8 hours    PRN Meds:  ALBUTerol    90 MICROgram(s) HFA Inhaler 2 Puff(s) Inhalation every 8 hours PRN Shortness of Breath and/or Wheezing  dextrose 40% Gel 15 Gram(s) Oral once PRN Blood Glucose LESS THAN 70 milliGRAM(s)/deciliter  glucagon  Injectable 1 milliGRAM(s) IntraMuscular once PRN Glucose LESS THAN 70 milligrams/deciliter    HOME MEDICATIONS:  albuterol 90 mcg/inh inhalation aerosol with adapter:   atorvastatin 20 mg oral tablet: 1 tab(s) orally once a day  gabapentin 300 mg oral tablet: 1 tab(s) orally 3 times a day  irbesartan 300 mg oral tablet: 1 tab(s) orally once a day  Lantus Solostar Pen 100 units/mL subcutaneous solution: 45  subcutaneous once a day (at bedtime)  methocarbamol 500 mg oral tablet: 1 tab(s) orally 3 times a day  Omega-3 oral capsule: 1 cap(s) orally once a day  Protonix 40 mg oral delayed release tablet: 1 tab(s) orally once a day  silver sulfADIAZINE 1% topical cream: Apply topically to affected area 2 times a day  tiZANidine 2 mg oral tablet: 2 tab(s) orally every 8 hours  Vitamin D3 2000 intl units oral capsule: 1 cap(s) orally once a day      Vital Signs:   T(F): 95.9 (06-25-19 @ 08:00), Max: 98.5 (06-24-19 @ 16:11)  HR: 72 (06-25-19 @ 08:00) (72 - 101)  BP: 140/79 (06-25-19 @ 08:00) (140/79 - 172/88)  RR: 18 (06-25-19 @ 08:00) (16 - 18)  SpO2: 99% (06-24-19 @ 22:05) (99% - 99%)      06-25-19 @ 07:01  -  06-25-19 @ 16:03  --------------------------------------------------------  IN: 0 mL / OUT: 400 mL / NET: -400 mL    Physical Exam:   GENERAL: NAD  HEENT: NCAT  CHEST/LUNG: CTAB  HEART: Regular rate and rhythm; s1 s2 appreciated  ABDOMEN: Soft, Nontender, Nondistended; Bowel sounds present  EXTREMITIES: No LE edema b/l. DP pulses 2++ b/l. Ulcer located on lateral aspect of right foot, non erythematous, no discharge.   NERVOUS SYSTEM:  Alert & Oriented X3      Labs:                         13.7   9.57  )-----------( 188      ( 25 Jun 2019 06:08 )             40.7     Neutophil% 43.9, Lymphocyte% 41.7, Monocyte% 11.1, Bands% 0.2 06-25-19 @ 06:08    25 Jun 2019 06:08    144    |  104    |  12     ----------------------------<  71     3.1     |  27     |  0.8      Ca    9.0        25 Jun 2019 06:08  Phos  3.0       25 Jun 2019 06:08  Mg     2.2       25 Jun 2019 06:08    TPro  6.9    /  Alb  3.8    /  TBili  0.3    /  DBili  x      /  AST  14     /  ALT  20     /  AlkPhos  138    25 Jun 2019 06:08    Hemoglobin A1C, Whole Blood: 14.4 % (06-25-19 @ 06:08)    Radiology:

## 2019-06-25 NOTE — DISCHARGE NOTE PROVIDER - PROVIDER TOKENS
PROVIDER:[TOKEN:[[id number]]] PROVIDER:[TOKEN:[[id number]]],PROVIDER:[TOKEN:[[id number]]]

## 2019-06-25 NOTE — PROGRESS NOTE ADULT - SUBJECTIVE AND OBJECTIVE BOX
PROGRESS NOTE   Pt seen @ bedside during PM rounds. Dressing +Clean, +Dry, + Intact      Vital Signs Last 24 Hrs  T(C): 35.5 (25 Jun 2019 08:00), Max: 36.9 (24 Jun 2019 16:11)  T(F): 95.9 (25 Jun 2019 08:00), Max: 98.5 (24 Jun 2019 16:11)  HR: 72 (25 Jun 2019 08:00) (72 - 101)  BP: 140/79 (25 Jun 2019 08:00) (140/79 - 172/88)  BP(mean): --  RR: 18 (25 Jun 2019 08:00) (16 - 18)  SpO2: 99% (24 Jun 2019 22:05) (99% - 99%)                          13.7   9.57  )-----------( 188      ( 25 Jun 2019 06:08 )             40.7               06-25    144  |  104  |  12  ----------------------------<  71  3.1<L>   |  27  |  0.8    Ca    9.0      25 Jun 2019 06:08  Phos  3.0     06-25  Mg     2.2     06-25    TPro  6.9  /  Alb  3.8  /  TBili  0.3  /  DBili  x   /  AST  14  /  ALT  20  /  AlkPhos  138<H>  06-25      PHYSICAL EXAM  Physical Exam - Lower Extremity Focused:   Derm:   medial dorsal midfoot limited ot the level of sub cutaneous tissue R foot   - Wound Edges + Irregular + Macerated,  - Wound base Granular/Fibrotic, wound size (5.6 cm X 4.7 cm X 0.1cm), - fluctuance, - probe to bone/deep tissue, - Tunneling,  - Malodor  - Isatu-wound skin + Erythema, + Warmth  + Edema,   - No Drainage  lateral 5th met base/midfoot limited ot the level of sub cutaneous tissue with partial skin still intact and covering the wound base R foot   - Wound Edges + Irregular + Macerated,   - Wound base Granular/Fibrotic\ , wound size (7.2 cm X 5.6 cm X 0.1cm), - fluctuance, - probe to bone/deep tissue, - Tunneling,  - Malodor  - Isatu-wound skin + Erythema, + Warmth  + Edema  - no Drainage  Ulceration Right Sub met 1  - Wound Edges  + hyperkeratotic border +  - Wound base Granular , wound size (0.3 cm X 0.3 cm X 0.1cm), - fluctuance, - probe to bone/deep tissue, - Tunneling,  - Malodor  - Isatu-wound skin + Erythema, + Warmth  + Edema,  - No Drainage   Vascular:   DP and PT pulses 2/4 R. Cap re-fill time < then 3sec to the digits R.  R foot warm to touch.   Neuro:  - Protective sensation diminished.  MSK:   Manual Muscle strength 5/5 in all muscle compartments R     < from: Xray Foot AP + Lateral, Right (06.24.19 @ 17:55) >  EXAM:  XR FOOT 2 VIEWS RT            PROCEDURE DATE:  06/24/2019            INTERPRETATION:  Clinical History / Reason for exam: Diabetic foot ulcer.    Comparison: 9/26/2017.  Procedure: 2 views of the right foot.    Findings:  Soft tissue ulceration on the plantar left foot without subcutaneous gas.   Chronic deformities of the second, third and fourth metatarsal heads. No   ankle joint effusion. No acute fracture or dislocation.  Impression:  Soft tissue ulceration without subcutaneous gas.  No acute fractures or dislocations.    ELLIOT LANDAU M.D., ATTENDING RADIOLOGIST  This document has been electronically signed. Jun 25 2019 10:07AM        < end of copied text >    Assessment:   Superficial Ulcers secondary to friction/Blisters R foot, Cellulites   DFU Sub met 1, Stable, Healing  Plan:  Patient evaluated @ bedside  Wounds flush with Saline  Applied Betadine with dry sterile dressing  Continue Wound Care Orders  X-rays Reviewed as above  F/u with ID recommendations  No surgical intervention per Podiatry  Local wound care and F/u with Podiatry as o/p Dr. Castillo   Discussed care plan  with  Attending  Please Re-call as needed

## 2019-06-25 NOTE — DISCHARGE NOTE PROVIDER - HOSPITAL COURSE
43y/o M w/ PMhx of poorly controlled type 1 diabetes on insulin, htn, Asthma, anxiety, dyslipidemia presented with right foot ulcer associated with fever and chills for the last 2 days and admitted for diabetic foot ulcer. He was given one dose IV Unasyn which improved his symptoms.    He was seen by podiatry who recommends outpatient follow up with  Dr. Castillo. ID recommended switching to a 5 day course of oral Augmentin 875 BID. At the time of discharge patient is in stable medical condition. 45y/o M w/ PMhx of poorly controlled type 1 diabetes on insulin, htn, Asthma, anxiety, dyslipidemia presented with right foot ulcer associated with fever and chills for the last 2 days and admitted for diabetic foot ulcer. He was given one dose IV Unasyn which improved his symptoms. For control of Diabetes patient was given 45 units Lantus with 15 units of Lispro TID before meals. He was seen by podiatry who recommends outpatient follow up with  Dr. Castillo. ID recommended switching to a 5 day course of oral Augmentin 875 BID. At the time of discharge patient is in stable medical condition. 43y/o M w/ PMhx of poorly controlled type 1 diabetes on insulin, htn, Asthma, anxiety, dyslipidemia presented with right foot ulcer associated with fever and chills for the last 2 days and admitted for diabetic foot ulcer. He was given one dose IV Unasyn which improved his symptoms. For control of Diabetes patient was given 45 units Lantus with 15 units of Lispro TID before meals. He was seen by podiatry who recommends outpatient follow up with  Dr. Castillo. ID recommended switching to a 5 day course of oral Augmentin 875 BID. repleted low potassium. At the time of discharge patient is in stable medical condition.

## 2019-06-25 NOTE — DISCHARGE NOTE NURSING/CASE MANAGEMENT/SOCIAL WORK - NSDCDPATPORTLINK_GEN_ALL_CORE
You can access the [facility] Patient Portal, offered by [facility], by registering with the following website: [email or web address]

## 2019-06-25 NOTE — DISCHARGE NOTE PROVIDER - NSDCCPCAREPLAN_GEN_ALL_CORE_FT
PRINCIPAL DISCHARGE DIAGNOSIS  Diagnosis: Cellulitis  Assessment and Plan of Treatment:       SECONDARY DISCHARGE DIAGNOSES  Diagnosis: Diabetic foot ulcer  Assessment and Plan of Treatment: PRINCIPAL DISCHARGE DIAGNOSIS  Diagnosis: Cellulitis  Assessment and Plan of Treatment:       SECONDARY DISCHARGE DIAGNOSES  Diagnosis: Diabetic foot ulcer  Assessment and Plan of Treatment: - Follow up with Dr. Castillo in 1 to 2 weeks.   - Continue with Betadine dry dressing for 1 week. PRINCIPAL DISCHARGE DIAGNOSIS  Diagnosis: DM foot ulcer  Assessment and Plan of Treatment: - Follow up with Podiatrist Dr. Hayes in 1 week   - Continue with Betadine dry dressing for 1 week  -Please take carb consistent diet and compliant with medications and follow up with your endocrinologist in 1-2weeks      SECONDARY DISCHARGE DIAGNOSES  Diagnosis: Hypokalemia  Assessment and Plan of Treatment: Follow with PCP in 3 to 5 days to repeat labwork

## 2019-06-25 NOTE — DISCHARGE NOTE PROVIDER - CARE PROVIDERS DIRECT ADDRESSES
,[email or web address]

## 2019-06-25 NOTE — CONSULT NOTE ADULT - ASSESSMENT
ASSESSMENT  44y M DM1, HTN, HLD, asthma admitted with R foot CELLULITIS    PROBLEMS  #R foot ulcer with cellulitis, improved    Sepsis ruled out on admission     Lactic acidosis  #Hyponatremia, resolved    RECOMMENDATIONS  - Unasyn IV, no current e/o of cellulitis on my exam, ulcers appear clean. Per patient, erythema has resolved. As improved on unasyn, would plan to D/C on po augmentin 875mg BID to complete total 5 days    Spectra [id number]

## 2019-06-25 NOTE — DISCHARGE NOTE NURSING/CASE MANAGEMENT/SOCIAL WORK - NSDCPEWEB_GEN_ALL_CORE
[place] website --- [email or web address] website --- [email or web address]

## 2019-06-25 NOTE — CONSULT NOTE ADULT - SUBJECTIVE AND OBJECTIVE BOX
BRINDA MOYER  44y, Male  Allergy: No Known Allergies      CHIEF COMPLAINT: right foot DFU (24 Jun 2019 20:59)      HPI:  43y/o M w/ PMhx of type 1 diabetes on insulin, htn, Asthma, anxiety, dyslipidemia presented with right foot ulcer associated with fever and chills for the last 2 days. Pt had trauma to his right foot about 2 years ago and developed right foot ulcer. Initially he was seen by podiatry and was given wound care with armando ibanez, per pt his original wound healed but he developed pressure ulcer from fiction on lateral aspect or right foot. He kept following with podiatry clinic for this ulcer and it never healed completely. He went to [address] [place] [address] and underwent offloading with orthotic placement last week. His orthotic got dislodged and he developed pressure/friction ulcer on lateral aspect of right foot. He had worsening pain associated with redness, swelling and warmth of his right foot. He also felt subjective fevers with chills. He is also complaining of burning pain in his b/l LE associated with pins and needle sensations.  He also has sharp shooting lower back pain radiating to his left LE.   He is also complaining of sob with exertion but denied any hx of chest pain. He also has difficulty sleeping and wakes up during the night to catch his breath. He snores at night and has daytime somnolence, he was referred for sleep studies but he never scheduled appointment for that. He does not remember his exact insulin dosage but remembers that his sugars levels are high. He gets thirsty mostly at the night time and has increased urinary frequency.     He was evaluated by podiatry in the ED who recommended ID consult and he was started on I/V Unasyn. Blood cultures were sent from the ED. (24 Jun 2019 20:59)    FAMILY HISTORY:    PAST MEDICAL & SURGICAL HISTORY:  Hypertension  Dyslipidemia  High cholesterol  Diverticulitis: surgery 12-13 yrs ago  Asthma  Diabetes  History of surgery: colon resection      SOCIAL HISTORY    Social History (marital status, living situation, occupation, tobacco use, alcohol and drug use, and sexual history): active smoker, drinks alcohols mostly on weekends, denied hx of recreational drug use.    ROS  General: as noted above   HEENT: Denies headache, rhinorrhea, sore throat, eye pain  CV: Denies CP, palpitations  PULM: Denies SOB, cough  GI: Denies abdominal pain, diarrhea  : Denies dysuria, hematuria  MSK: Denies arthralgias  SKIN: as noted above    NEURO: Denies paresthesias, weakness  PSYCH: Denies depression    VITALS:  T(F): 95.9, Max: 98.5 (06-24-19 @ 16:11)  HR: 72  BP: 140/79  RR: 18Vital Signs Last 24 Hrs  T(C): 35.5 (25 Jun 2019 08:00), Max: 36.9 (24 Jun 2019 16:11)  T(F): 95.9 (25 Jun 2019 08:00), Max: 98.5 (24 Jun 2019 16:11)  HR: 72 (25 Jun 2019 08:00) (72 - 101)  BP: 140/79 (25 Jun 2019 08:00) (140/79 - 172/88)  BP(mean): --  RR: 18 (25 Jun 2019 08:00) (16 - 18)  SpO2: 99% (24 Jun 2019 22:05) (99% - 99%)    PHYSICAL EXAM:  Gen: NAD, resting in bed  HEENT: Normocephalic, atraumatic  Neck: supple, no lymphadenopathy  CV: Regular rate & regular rhythm  Lungs: decreased BS at bases, no fremitus  Abdomen: Soft, BS present  Ext: Warm, well perfused, R foot with lateral superficial ulcer, no purulence, plantar lateral old ulcer, dorsal medial ulcer no purulence   Neuro: non focal, awake  Skin: no rash, no erythema  Lines: no phlebitis    TESTS & MEASUREMENTS:                        13.7   9.57  )-----------( 188      ( 25 Jun 2019 06:08 )             40.7     06-25    144  |  104  |  12  ----------------------------<  71  3.1<L>   |  27  |  0.8    Ca    9.0      25 Jun 2019 06:08  Phos  3.0     06-25  Mg     2.2     06-25    TPro  6.9  /  Alb  3.8  /  TBili  0.3  /  DBili  x   /  AST  14  /  ALT  20  /  AlkPhos  138<H>  06-25    eGFR if Non : 107 mL/min/1.73M2 (06-25-19 @ 06:08)  eGFR if : 124 mL/min/1.73M2 (06-25-19 @ 06:08)  eGFR if Non African American: 80 mL/min/1.73M2 (06-24-19 @ 17:40)  eGFR if : 93 mL/min/1.73M2 (06-24-19 @ 17:40)    LIVER FUNCTIONS - ( 25 Jun 2019 06:08 )  Alb: 3.8 g/dL / Pro: 6.9 g/dL / ALK PHOS: 138 U/L / ALT: 20 U/L / AST: 14 U/L / GGT: x                 Lactate, Blood: 3.4 mmol/L (06-25-19 @ 06:08)      INFECTIOUS DISEASES TESTING      RADIOLOGY & ADDITIONAL TESTS:  I have personally reviewed the last Chest xray  CXR      CT      CARDIOLOGY TESTING  12 Lead ECG:   Ventricular Rate 76 BPM    Atrial Rate 76 BPM    P-R Interval 150 ms    QRS Duration 88 ms    Q-T Interval 406 ms    QTC Calculation(Bezet) 456 ms    P Axis 22 degrees    R Axis 80 degrees    T Axis 70 degrees    Diagnosis Line Normal sinus rhythm  Normal ECG    Confirmed by Nasima Seay MD ([id number]) on 6/25/2019 5:52:20 AM (06-25-19 @ 00:16)      MEDICATIONS  chlorhexidine 4% Liquid 1  dextrose 5%. 1000  dextrose 50% Injectable 12.5  dextrose 50% Injectable 25  gabapentin 300  heparin  Injectable 5000  insulin glargine Injectable (LANTUS) 45  insulin lispro (HumaLOG) corrective regimen sliding scale   insulin lispro Injectable (HumaLOG) 15  losartan 100  methocarbamol 500  pantoprazole    Tablet 40  piperacillin/tazobactam IVPB.. 3.375      ANTIBIOTICS:  piperacillin/tazobactam IVPB.. 3.375 Gram(s) IV Intermittent every 8 hours      No Known Allergies
PODIATRY CONSULT   BRINDA MOYER is a 44y  Male who presents with a chief complaint of pain, swelling, redness, ulcer of the R foot  HPI:  45 y/o M w PMHx of DM present to ED with pain, swelling, redness, ulcer of the R foot. Pt follows up at [facility] care center at [address] [place] [address] for DFU care. Last week received off loading orthotics. Pt noticed that his orthotic has shifted underneath his foot and had created blisters 2/2 friction which ruptured and created superficial ulcers. Pt states that since last Thursday his foot has been swollen, warm, red, and painful. Today pt came to ED because he could not tolerated hte symptoms. pt states that he was having chills over the weekend.  Pt takes Motrin for pain.    High cholesterol  Diverticulitis  Asthma  Diabetes      PMH: High cholesterol  Diverticulitis  Asthma  Diabetes    PSH: History of surgery    Medication ampicillin/sulbactam  IVPB 3 Gram(s) IV Intermittent Once    Allergy: No Known Allergies        Labs:                    ROS:  [X]A ten point review of system was otherwise negative except as noted    Physical Exam - Lower Extremity Focused:   Derm:   medial dorsal midfoot limited ot the level of sub cutaneous tissue R foot   - Wound Edges + Irregular + Macerated,  - Wound base Granular/Fibrotic, wound size (5.6 cm X 4.7 cm X 0.1cm), - fluctuance, - probe to bone/deep tissue, - Tunneling,  - Malodor  - Isatu-wound skin + Erythema, + Warmth  + Edema,   - No Drainage  lateral 5th met base/midfoot limited ot the level of sub cutaneous tissue with partial skin still intact and covering the wound base R foot   - Wound Edges + Irregular + Macerated,   - Wound base Granular/Fibrotic\ , wound size (7.2 cm X 5.6 cm X 0.1cm), - fluctuance, - probe to bone/deep tissue, - Tunneling,  - Malodor  - Isatu-wound skin + Erythema, + Warmth  + Edema  - no Drainage  Ulceration Right Sub met 1  - Wound Edges  + hyperkeratotic border +  - Wound base Granular , wound size (0.3 cm X 0.3 cm X 0.1cm), - fluctuance, - probe to bone/deep tissue, - Tunneling,  - Malodor  - Isatu-wound skin + Erythema, + Warmth  + Edema,  - No Drainage   Vascular:   DP and PT pulses 2/4 R. Cap re-fill time < then 3sec to the digits R.  R foot warm to touch.   Neuro:  - Protective sensation diminished.  MSK:   Manual Muscle strength 5/5 in all muscle compartments R         Assessment:   Superficial Ulcers secondary to friction/Blisters R foot, Cellulites   DFU Sub met 1, Stable, Healing  Plan:  Chart reviewed and Patient evaluated  Discussed diagnosis and treatment with patient  Wounds flush with peroxide   Applied Betadine with dry sterile dressing  Wound Care Orders: Wash with water and soap and apply Silvadene/DSD/Klerix BID   X-rays ordered  Will follow labs  Recommend ID consult  Continue Abx as per ID  Will discuss care plan  with  Attending

## 2019-06-25 NOTE — PROGRESS NOTE ADULT - ASSESSMENT
Assessment and Plan:     DVT ppx:    GI ppx:     Code Status:     DISPO: Assessment and Plan:     43y/o M w/ PMhx of type 1 diabetes on insulin, htn, Asthma, anxiety, dyslipidemia presented with right foot ulcer associated with fever and chills for the last 2 days admitted for treatement to right foot diabetic ulcer.    #Diabetic foot ulcer  - ID recs appreciated. Will d/c IV Unasyn and switch to PO Augmentin 875 BID for 5 day course  - per podiatry, patient can follow up OP with Dr. Castillo   - xray right foot shows subcortical sclerosis in 1st distal metatarsal, possible soft tissue ulceration at 5th metatarsal head  - wound care as per podiatry  - strict control of blood sugar levels  - patient to be discharged today    # Poorly controlled T1DM with neuropathy  - last A1c 11.3  - pt on Insulin and tresiba at home, element of non compliance  - fingerstick of 415 in ED with no anion gap, no change in mental status  - c/w Lantus 45 units and Lispro 15 units pre-meal and c/w correctional sliding scale  - c/w gabapentin 300 mg TID    # HTN  - pt was on irbesartan 300mg at home    # Asthma - stable  - c/w Ventolin prn    # DLD  - c/w lipitor 20 mg qhs    # DVT PPx: Hep s/c  # Diet: carb consistent/DASH  # GI ppx: protonx 40mg q 24  # Dispo: dischare today to home Assessment and Plan:     45y/o M w/ PMhx of type 1 diabetes on insulin, htn, Asthma, anxiety, dyslipidemia presented with right foot ulcer associated with fever and chills for the last 2 days admitted for treatement to right foot diabetic ulcer.    #Diabetic foot ulcer  - ID recs appreciated. Will d/c IV Unasyn and switch to PO Augmentin 875 BID for 5 day course  - per podiatry, patient can follow up OP with Dr. Castillo   - wound care as per podiatry  - strict control of blood sugar levels  - patient to be discharged today    # Poorly controlled T1DM with neuropathy  - last A1c 11.3  - pt on Insulin and tresiba at home, element of non compliance  - fingerstick of 415 in ED with no anion gap, no change in mental status  - c/w Lantus 45 units and Lispro 15 units pre-meal and c/w correctional sliding scale  - c/w gabapentin 300 mg TID    # HTN  - pt was on irbesartan 300mg at home    # Asthma - stable  - c/w Ventolin prn    # DLD  - c/w lipitor 20 mg qhs    # DVT PPx: Hep s/c  # Diet: carb consistent/DASH  # GI ppx: protonx 40mg q 24  # Dispo: dischare today to home Assessment and Plan:     43y/o M w/ PMhx of type 1 diabetes on insulin, htn, Asthma, anxiety, dyslipidemia presented with right foot ulcer associated with fever and chills for the last 2 days admitted for treatement to right foot diabetic ulcer.    #Diabetic foot ulcer  - ID recs appreciated. Will d/c IV Unasyn and switch to PO Augmentin 875 BID for 5 day course  - per podiatry, patient can follow up OP with Dr. Castillo   - wound care as per podiatry  - strict control of blood sugar levels  - patient to be discharged today    # Poorly controlled T1DM with neuropathy  - last A1c 11.3  - pt on Insulin and tresiba at home, element of non compliance  - fingerstick of 415 in ED with no anion gap, no change in mental status  - c/w Lantus 45 units and Lispro 15 units pre-meal and c/w correctional sliding scale  - c/w gabapentin 300 mg TID    Hypokalemia  - AM K: 3.1 however patient is clinically stable and asymptomatic  - will have patient follow up with PCP for repeat labs in 3-5 days    # Asthma - stable  - c/w Ventolin prn    # DLD  - c/w lipitor 20 mg qhs    # DVT PPx: Hep s/c  # Diet: carb consistent/DASH  # GI ppx: protonx 40mg q 24  # Dispo: dischare today to home

## 2019-06-25 NOTE — DISCHARGE NOTE NURSING/CASE MANAGEMENT/SOCIAL WORK - NSDCPEEMAIL_GEN_ALL_CORE
[facility] for [facility] email [email or web address]

## 2019-06-26 ENCOUNTER — OUTPATIENT (OUTPATIENT)
Dept: OUTPATIENT SERVICES | Facility: HOSPITAL | Age: 44
LOS: 1 days | Discharge: HOME | End: 2019-06-26

## 2019-06-26 ENCOUNTER — APPOINTMENT (OUTPATIENT)
Dept: PODIATRY | Facility: CLINIC | Age: 44
End: 2019-06-26
Payer: MEDICAID

## 2019-06-26 VITALS
WEIGHT: 160 LBS | BODY MASS INDEX: 31.41 KG/M2 | HEART RATE: 97 BPM | DIASTOLIC BLOOD PRESSURE: 92 MMHG | HEIGHT: 60 IN | SYSTOLIC BLOOD PRESSURE: 161 MMHG

## 2019-06-26 DIAGNOSIS — L97.519 NON-PRESSURE CHRONIC ULCER OF OTHER PART OF RIGHT FOOT WITH UNSPECIFIED SEVERITY: ICD-10-CM

## 2019-06-26 DIAGNOSIS — Z98.890 OTHER SPECIFIED POSTPROCEDURAL STATES: Chronic | ICD-10-CM

## 2019-06-26 PROBLEM — I10 ESSENTIAL (PRIMARY) HYPERTENSION: Chronic | Status: ACTIVE | Noted: 2019-06-24

## 2019-06-26 PROBLEM — E78.5 HYPERLIPIDEMIA, UNSPECIFIED: Chronic | Status: ACTIVE | Noted: 2019-06-24

## 2019-06-26 PROCEDURE — 99213 OFFICE O/P EST LOW 20 MIN: CPT

## 2019-06-26 NOTE — END OF VISIT
[FreeTextEntry3] : pt recently admitted for diabetic foot ulcer. Seen at [facility]. d/c w/ p.o augmentin

## 2019-06-26 NOTE — PHYSICAL EXAM
[Full Pulse] : the pedal pulses are present [Vibration Dec.] : diminished vibratory sensation at the level of the toes [Diminished Throughout Right Foot] : diminished tactile sensation with monofilament testing throughout right foot [Diminished Throughout Left Foot] : diminished tactile sensation with monofilament testing throughout left foot [Oriented To Time, Place, And Person] : oriented to person, place, and time [FreeTextEntry1] : right 5th digit, 1st met ulcer pre-ulcerative lesions, superficial ulcer granular wound base dorsal medial measures about 4 x 4 x 0.1 cm and dorsal medial 4 x 3 x 0.1 cm, mild edema noted. no drainge.

## 2019-06-26 NOTE — HISTORY OF PRESENT ILLNESS
[FreeTextEntry1] : 45 y/o with PMH of DM, HTN, DLD, anxiety, male returns for wound care of right submetatarsal 1 ulceration. pt was seen at the [facility] for DFU right foot 6/24/19 and d/c'd yesterday. pt denies any n/v/f/c/sob at this time.\par Patients last A1c was 11.7% (3/14/19)

## 2019-06-26 NOTE — ASSESSMENT
[FreeTextEntry1] : pt evaluted and treated\par pre ulcerative lesion right sumbet 1 debrided using #15 blade level of dermis and pinpoint bleed cauterized using nitrate stick. \par thickend elongated toenail trimmed right 1-5 digits using nail nipper\par superfiical ulcers right dorsal medial and lateral washed with NS and applied SSD/dsd/kerlix/ace\par cont DARCO\par cont LWC\par dispensed dsd/kerlix/tape to patient\par rx SSD\par rx dressing supplies\par pt to make an appt with mabel for offloading inserts\par f/u in 1 week

## 2019-06-30 LAB
CULTURE RESULTS: SIGNIFICANT CHANGE UP
SPECIMEN SOURCE: SIGNIFICANT CHANGE UP

## 2019-07-01 DIAGNOSIS — F17.200 NICOTINE DEPENDENCE, UNSPECIFIED, UNCOMPLICATED: ICD-10-CM

## 2019-07-01 DIAGNOSIS — E78.5 HYPERLIPIDEMIA, UNSPECIFIED: ICD-10-CM

## 2019-07-01 DIAGNOSIS — Z79.4 LONG TERM (CURRENT) USE OF INSULIN: ICD-10-CM

## 2019-07-01 DIAGNOSIS — I10 ESSENTIAL (PRIMARY) HYPERTENSION: ICD-10-CM

## 2019-07-01 DIAGNOSIS — E87.1 HYPO-OSMOLALITY AND HYPONATREMIA: ICD-10-CM

## 2019-07-01 DIAGNOSIS — E11.621 TYPE 2 DIABETES MELLITUS WITH FOOT ULCER: ICD-10-CM

## 2019-07-01 DIAGNOSIS — E10.40 TYPE 1 DIABETES MELLITUS WITH DIABETIC NEUROPATHY, UNSPECIFIED: ICD-10-CM

## 2019-07-01 DIAGNOSIS — L03.115 CELLULITIS OF RIGHT LOWER LIMB: ICD-10-CM
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
## 2019-07-01 DIAGNOSIS — E10.621 TYPE 1 DIABETES MELLITUS WITH FOOT ULCER: ICD-10-CM

## 2019-07-01 DIAGNOSIS — S90.821A BLISTER (NONTHERMAL), RIGHT FOOT, INITIAL ENCOUNTER: ICD-10-CM

## 2019-07-01 DIAGNOSIS — Z90.49 ACQUIRED ABSENCE OF OTHER SPECIFIED PARTS OF DIGESTIVE TRACT: ICD-10-CM

## 2019-07-01 DIAGNOSIS — E87.2 ACIDOSIS: ICD-10-CM

## 2019-07-01 DIAGNOSIS — J45.909 UNSPECIFIED ASTHMA, UNCOMPLICATED: ICD-10-CM

## 2019-07-01 DIAGNOSIS — L97.519 NON-PRESSURE CHRONIC ULCER OF OTHER PART OF RIGHT FOOT WITH UNSPECIFIED SEVERITY: ICD-10-CM

## 2019-07-01 DIAGNOSIS — E87.6 HYPOKALEMIA: ICD-10-CM
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
## 2019-07-01 DIAGNOSIS — Z91.14 PATIENT'S OTHER NONCOMPLIANCE WITH MEDICATION REGIMEN: ICD-10-CM
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
## 2019-07-03 ENCOUNTER — APPOINTMENT (OUTPATIENT)
Dept: ENDOCRINOLOGY | Facility: CLINIC | Age: 44
End: 2019-07-03

## 2019-07-16 ENCOUNTER — APPOINTMENT (OUTPATIENT)
Dept: PODIATRY | Facility: CLINIC | Age: 44
End: 2019-07-16

## 2019-07-18 ENCOUNTER — RX RENEWAL (OUTPATIENT)
Age: 44
End: 2019-07-18

## 2019-07-22 ENCOUNTER — APPOINTMENT (OUTPATIENT)
Dept: CARDIOLOGY | Facility: CLINIC | Age: 44
End: 2019-07-22

## 2019-08-09 ENCOUNTER — APPOINTMENT (OUTPATIENT)
Dept: GASTROENTEROLOGY | Facility: CLINIC | Age: 44
End: 2019-08-09

## 2019-08-12 ENCOUNTER — APPOINTMENT (OUTPATIENT)
Dept: PODIATRY | Facility: CLINIC | Age: 44
End: 2019-08-12

## 2019-08-20 ENCOUNTER — APPOINTMENT (OUTPATIENT)
Dept: INTERNAL MEDICINE | Facility: CLINIC | Age: 44
End: 2019-08-20

## 2019-09-01 NOTE — REASON FOR VISIT
[Follow-Up: _____] : a [unfilled] follow-up visit [FreeTextEntry1] : type 1 diabetes complicated by severe diabetes complications.

## 2019-09-01 NOTE — ASSESSMENT
[Carbohydrate Consistent Diet] : carbohydrate consistent diet [Hypoglycemia Management] : hypoglycemia management [Glucagon Use] : glucagon use [Diabetes Foot Care] : diabetes foot care [Long Term Vascular Complications] : long term vascular complications of diabetes [Importance of Diet and Exercise] : importance of diet and exercise to improve glycemic control, achieve weight loss and improve cardiovascular health [Action and use of Insulin] : action and use of short and long-acting insulin [Self Monitoring of Blood Glucose] : self monitoring of blood glucose [FreeTextEntry1] : The patient is a 44 yo M w/ a PMH of DM, retinopathy, peripheral neuropathy, HTN, hyperlipidemia presenting for routine f/u.\par \par # DM\par HbA1c 11.6 <-- 11.3%\par C/w Tresiba.\par Start Chantix.\par Monitor FS qAC, HS.\par F/u HbA1c, lipid panel, TSH, CBC, CMP, microalb/Cr.\par \par # HTN\par C/w irbesartan.\par Monitor vitals.\par patient has terrible diabetes control, he does not unsderstand that he has to take rapid acting insulin beforre all 3 meals, and that he needs a minimum of 3 glucose measurements a day. he badly needs diabetes education.

## 2019-09-01 NOTE — HISTORY OF PRESENT ILLNESS
[FreeTextEntry1] : The patient is a 42 yo M w/ a PMH of DM, DLD, retinopathy, peripheral neuropathy, presenting for routine f/u. He reports lack of exercise but eats more steamed and baked foods lately. He is compliant w/ his medications and checks his glucose about 4 times /day. He follows up with an ophthalmologist and podiatrist.he is on 4 insulin injections daily for type 1 diabetes.

## 2019-09-01 NOTE — REVIEW OF SYSTEMS
[Negative] : Gastrointestinal [Pain/Numbness of Digits] : pain/numbness of digits [Poor Balance] : poor balance [Difficulty Walking] : difficulty walking [de-identified] : severe peripheral neuropathic symptoms.

## 2019-09-03 ENCOUNTER — APPOINTMENT (OUTPATIENT)
Dept: INTERNAL MEDICINE | Facility: CLINIC | Age: 44
End: 2019-09-03

## 2019-12-01 ENCOUNTER — OUTPATIENT (OUTPATIENT)
Dept: OUTPATIENT SERVICES | Facility: HOSPITAL | Age: 44
LOS: 1 days | End: 2019-12-01
Payer: MEDICAID

## 2019-12-01 DIAGNOSIS — Z98.890 OTHER SPECIFIED POSTPROCEDURAL STATES: Chronic | ICD-10-CM

## 2019-12-02 ENCOUNTER — EMERGENCY (EMERGENCY)
Facility: HOSPITAL | Age: 44
LOS: 0 days | Discharge: HOME | End: 2019-12-03
Attending: EMERGENCY MEDICINE | Admitting: EMERGENCY MEDICINE
Payer: MEDICAID

## 2019-12-02 VITALS
DIASTOLIC BLOOD PRESSURE: 72 MMHG | HEART RATE: 90 BPM | OXYGEN SATURATION: 100 % | WEIGHT: 164.91 LBS | SYSTOLIC BLOOD PRESSURE: 151 MMHG | RESPIRATION RATE: 18 BRPM | TEMPERATURE: 99 F

## 2019-12-02 DIAGNOSIS — F17.210 NICOTINE DEPENDENCE, CIGARETTES, UNCOMPLICATED: ICD-10-CM

## 2019-12-02 DIAGNOSIS — R07.9 CHEST PAIN, UNSPECIFIED: ICD-10-CM

## 2019-12-02 DIAGNOSIS — I10 ESSENTIAL (PRIMARY) HYPERTENSION: ICD-10-CM

## 2019-12-02 DIAGNOSIS — Z98.890 OTHER SPECIFIED POSTPROCEDURAL STATES: Chronic | ICD-10-CM

## 2019-12-02 DIAGNOSIS — R42 DIZZINESS AND GIDDINESS: ICD-10-CM

## 2019-12-02 DIAGNOSIS — J45.909 UNSPECIFIED ASTHMA, UNCOMPLICATED: ICD-10-CM

## 2019-12-02 DIAGNOSIS — R07.89 OTHER CHEST PAIN: ICD-10-CM

## 2019-12-02 DIAGNOSIS — R55 SYNCOPE AND COLLAPSE: ICD-10-CM

## 2019-12-02 DIAGNOSIS — F41.9 ANXIETY DISORDER, UNSPECIFIED: ICD-10-CM

## 2019-12-02 DIAGNOSIS — E78.5 HYPERLIPIDEMIA, UNSPECIFIED: ICD-10-CM

## 2019-12-02 DIAGNOSIS — E87.6 HYPOKALEMIA: ICD-10-CM

## 2019-12-02 DIAGNOSIS — E11.9 TYPE 2 DIABETES MELLITUS WITHOUT COMPLICATIONS: ICD-10-CM

## 2019-12-02 LAB
ALBUMIN SERPL ELPH-MCNC: 4 G/DL — SIGNIFICANT CHANGE UP (ref 3.5–5.2)
ALP SERPL-CCNC: 130 U/L — HIGH (ref 30–115)
ALT FLD-CCNC: 18 U/L — SIGNIFICANT CHANGE UP (ref 0–41)
ANION GAP SERPL CALC-SCNC: 12 MMOL/L — SIGNIFICANT CHANGE UP (ref 7–14)
APTT BLD: 31.2 SEC — SIGNIFICANT CHANGE UP (ref 27–39.2)
AST SERPL-CCNC: 13 U/L — SIGNIFICANT CHANGE UP (ref 0–41)
BASOPHILS # BLD AUTO: 0.04 K/UL — SIGNIFICANT CHANGE UP (ref 0–0.2)
BASOPHILS NFR BLD AUTO: 0.5 % — SIGNIFICANT CHANGE UP (ref 0–1)
BILIRUB SERPL-MCNC: 0.2 MG/DL — SIGNIFICANT CHANGE UP (ref 0.2–1.2)
BUN SERPL-MCNC: 25 MG/DL — HIGH (ref 10–20)
CALCIUM SERPL-MCNC: 9.2 MG/DL — SIGNIFICANT CHANGE UP (ref 8.5–10.1)
CHLORIDE SERPL-SCNC: 95 MMOL/L — LOW (ref 98–110)
CHOLEST SERPL-MCNC: 210 MG/DL — HIGH (ref 100–200)
CO2 SERPL-SCNC: 26 MMOL/L — SIGNIFICANT CHANGE UP (ref 17–32)
CREAT SERPL-MCNC: 1.2 MG/DL — SIGNIFICANT CHANGE UP (ref 0.7–1.5)
EOSINOPHIL # BLD AUTO: 0.14 K/UL — SIGNIFICANT CHANGE UP (ref 0–0.7)
EOSINOPHIL NFR BLD AUTO: 1.9 % — SIGNIFICANT CHANGE UP (ref 0–8)
GLUCOSE SERPL-MCNC: 365 MG/DL — HIGH (ref 70–99)
HCT VFR BLD CALC: 39.1 % — LOW (ref 42–52)
HDLC SERPL-MCNC: 40 MG/DL — SIGNIFICANT CHANGE UP
HGB BLD-MCNC: 13.5 G/DL — LOW (ref 14–18)
IMM GRANULOCYTES NFR BLD AUTO: 0.4 % — HIGH (ref 0.1–0.3)
INR BLD: 0.9 RATIO — SIGNIFICANT CHANGE UP (ref 0.65–1.3)
LIPID PNL WITH DIRECT LDL SERPL: 113 MG/DL — SIGNIFICANT CHANGE UP (ref 4–129)
LYMPHOCYTES # BLD AUTO: 2.29 K/UL — SIGNIFICANT CHANGE UP (ref 1.2–3.4)
LYMPHOCYTES # BLD AUTO: 31.5 % — SIGNIFICANT CHANGE UP (ref 20.5–51.1)
MAGNESIUM SERPL-MCNC: 2 MG/DL — SIGNIFICANT CHANGE UP (ref 1.8–2.4)
MCHC RBC-ENTMCNC: 31.5 PG — HIGH (ref 27–31)
MCHC RBC-ENTMCNC: 34.5 G/DL — SIGNIFICANT CHANGE UP (ref 32–37)
MCV RBC AUTO: 91.4 FL — SIGNIFICANT CHANGE UP (ref 80–94)
MONOCYTES # BLD AUTO: 0.54 K/UL — SIGNIFICANT CHANGE UP (ref 0.1–0.6)
MONOCYTES NFR BLD AUTO: 7.4 % — SIGNIFICANT CHANGE UP (ref 1.7–9.3)
NEUTROPHILS # BLD AUTO: 4.24 K/UL — SIGNIFICANT CHANGE UP (ref 1.4–6.5)
NEUTROPHILS NFR BLD AUTO: 58.3 % — SIGNIFICANT CHANGE UP (ref 42.2–75.2)
NRBC # BLD: 0 /100 WBCS — SIGNIFICANT CHANGE UP (ref 0–0)
PLATELET # BLD AUTO: 173 K/UL — SIGNIFICANT CHANGE UP (ref 130–400)
POTASSIUM SERPL-MCNC: 4.3 MMOL/L — SIGNIFICANT CHANGE UP (ref 3.5–5)
POTASSIUM SERPL-SCNC: 4.3 MMOL/L — SIGNIFICANT CHANGE UP (ref 3.5–5)
PROT SERPL-MCNC: 6.8 G/DL — SIGNIFICANT CHANGE UP (ref 6–8)
PROTHROM AB SERPL-ACNC: 10.4 SEC — SIGNIFICANT CHANGE UP (ref 9.95–12.87)
RBC # BLD: 4.28 M/UL — LOW (ref 4.7–6.1)
RBC # FLD: 11.9 % — SIGNIFICANT CHANGE UP (ref 11.5–14.5)
SODIUM SERPL-SCNC: 133 MMOL/L — LOW (ref 135–146)
TOTAL CHOLESTEROL/HDL RATIO MEASUREMENT: 5.2 RATIO — SIGNIFICANT CHANGE UP (ref 4–5.5)
TRIGL SERPL-MCNC: 399 MG/DL — HIGH (ref 10–149)
TROPONIN T SERPL-MCNC: <0.01 NG/ML — SIGNIFICANT CHANGE UP
TROPONIN T SERPL-MCNC: <0.01 NG/ML — SIGNIFICANT CHANGE UP
WBC # BLD: 7.28 K/UL — SIGNIFICANT CHANGE UP (ref 4.8–10.8)
WBC # FLD AUTO: 7.28 K/UL — SIGNIFICANT CHANGE UP (ref 4.8–10.8)

## 2019-12-02 PROCEDURE — 99220: CPT

## 2019-12-02 PROCEDURE — 71046 X-RAY EXAM CHEST 2 VIEWS: CPT | Mod: 26

## 2019-12-02 PROCEDURE — 93010 ELECTROCARDIOGRAM REPORT: CPT | Mod: 76,77

## 2019-12-02 PROCEDURE — 93010 ELECTROCARDIOGRAM REPORT: CPT | Mod: 76

## 2019-12-02 RX ORDER — ASPIRIN/CALCIUM CARB/MAGNESIUM 324 MG
325 TABLET ORAL ONCE
Refills: 0 | Status: COMPLETED | OUTPATIENT
Start: 2019-12-02 | End: 2019-12-02

## 2019-12-02 RX ORDER — INSULIN HUMAN 100 [IU]/ML
6 INJECTION, SOLUTION SUBCUTANEOUS ONCE
Refills: 0 | Status: COMPLETED | OUTPATIENT
Start: 2019-12-02 | End: 2019-12-02

## 2019-12-02 RX ORDER — SODIUM CHLORIDE 9 MG/ML
1000 INJECTION INTRAMUSCULAR; INTRAVENOUS; SUBCUTANEOUS ONCE
Refills: 0 | Status: COMPLETED | OUTPATIENT
Start: 2019-12-02 | End: 2019-12-02

## 2019-12-02 RX ORDER — ADENOSINE 3 MG/ML
60 INJECTION INTRAVENOUS ONCE
Refills: 0 | Status: DISCONTINUED | OUTPATIENT
Start: 2019-12-02 | End: 2019-12-03

## 2019-12-02 RX ADMIN — INSULIN HUMAN 6 UNIT(S): 100 INJECTION, SOLUTION SUBCUTANEOUS at 16:03

## 2019-12-02 RX ADMIN — SODIUM CHLORIDE 1000 MILLILITER(S): 9 INJECTION INTRAMUSCULAR; INTRAVENOUS; SUBCUTANEOUS at 14:07

## 2019-12-02 RX ADMIN — SODIUM CHLORIDE 1000 MILLILITER(S): 9 INJECTION INTRAMUSCULAR; INTRAVENOUS; SUBCUTANEOUS at 17:04

## 2019-12-02 RX ADMIN — Medication 325 MILLIGRAM(S): at 15:52

## 2019-12-02 NOTE — ED CDU PROVIDER INITIAL DAY NOTE - MEDICAL DECISION MAKING DETAILS
Patient presents to the ED with chest pain. labs, ekg, cxr done. Placed in obs for cardiac evaluation.

## 2019-12-02 NOTE — ED PROVIDER NOTE - OBJECTIVE STATEMENT
44 y.o male w/ hx of Dm, hypokalemia, HTN, HLD, asthma, anxiety presents to the ED for evaluation of chest pain.  This morning while walking developed midsternal chest pressure, intermittent, worse w/ exertion, mild severity, no radiation of pain, nonpleuritic.  Also states he felt near syncopal when going from sitting to standing position. Previous smoker.  NO fhx of cardiac disease.  8/31/16 CT coronaries showed luminal narrowing mid LCx and pharm NM 3/2/17 was WNL.  Per negative.  Denies shortness of breath, headache, neck pain, fever, chills, abd pain, back pain, calf pain, edema of lower extremities, orthopnea.

## 2019-12-02 NOTE — ED ADULT NURSE NOTE - OBJECTIVE STATEMENT
Pt presents to ED with chest pain. As per pt pain is midsternal, has felt it before but today he reports "it felt different" pt also reports some dizziness associated with the chest pain.

## 2019-12-02 NOTE — ED ADULT NURSE REASSESSMENT NOTE - NS ED NURSE REASSESS COMMENT FT1
Patient sleeping in bed, no acute distress observed, heart monitor maintained, will continue to monitor.

## 2019-12-02 NOTE — ED CDU PROVIDER INITIAL DAY NOTE - NS ED ROS FT
Review of Systems:  	•	CONSTITUTIONAL - no fever, no diaphoresis, no chills  	•	SKIN - no rash  	•	HEMATOLOGIC - no bleeding, no bruising  	•	EYES - no eye pain, no blurry vision  	•	ENT - no congestion  	•	RESPIRATORY - no shortness of breath, no cough  	•	CARDIAC - + chest pain, no palpitations  	•	GI - no abd pain, no nausea, no vomiting, no diarrhea, no constipation  	•	GENITO-URINARY - no dysuria; no hematuria, no increased urinary frequency  	•	MUSCULOSKELETAL - no joint paint, no swelling, no redness  	•	NEUROLOGIC - +dizziness, no weakness, no headache, no paresthesias, no LOC  	•	PSYCH - no anxiety, no depression  	All other ROS are negative except as documented in HPI.

## 2019-12-02 NOTE — ED ADULT NURSE REASSESSMENT NOTE - NS ED NURSE REASSESS COMMENT FT1
Patient received from previous RN, patient alert and oriented x4, no acute distress observed, vital signs stable, heart monitor maintained, will continue to monitor

## 2019-12-02 NOTE — ED PROVIDER NOTE - PMH
Asthma    Diabetes    Diverticulitis  surgery 12-13 yrs ago  Dyslipidemia    High cholesterol    Hypertension

## 2019-12-02 NOTE — ED PROVIDER NOTE - NS ED ROS FT
Constitutional: See HPI.  Eyes: No visual changes, eye pain or discharge.  ENMT: No hearing changes, pain, discharge or infections. No neck pain or stiffness. No limited ROM  Cardiac: + chest pain, + near syncope. No SOB or edema  Respiratory: No cough or respiratory distress.   GI: No nausea, vomiting, diarrhea or abdominal pain.  : No dysuria, frequency or burning. No Discharge  MS: No myalgia, muscle weakness, joint pain or back pain.  Neuro: No headache or weakness. No LOC.  Skin: No skin rash.  Except as documented in the HPI, all other systems are negative.

## 2019-12-02 NOTE — ED CDU PROVIDER INITIAL DAY NOTE - ATTENDING CONTRIBUTION TO CARE
43 yo M pmh of DM, HTN, HLD, asthma presents with chest pain. States that he has been having midsternal chest pressure, radiating up, 7/10, intermittent. Worse with exertional. no sob. no palpitations. no fevers or recent illness. no leg swelling or recent travel. + smoker. no n/v/d, no abdominal pain. Had a CCTA 2016 that showed narrowing of med LCx. Also had pharm NM 2017 that was normal. Does not follow with a cardiologist.     CONSTITUTIONAL: Well-developed; well-nourished; in no acute distress.   SKIN: warm, dry  HEAD: Normocephalic; atraumatic.  EYES: PERRL, EOMI, no conjunctival erythema  ENT: No nasal discharge; airway clear.  NECK: Supple; non tender.  CARD: S1, S2 normal;  Regular rate and rhythm.   RESP: No wheezes, rales or rhonchi.  ABD: soft non tender, non distended, no rebound or guarding  EXT: Normal ROM.  no pedal edema, no calf tenderness.   LYMPH: No acute cervical adenopathy.  NEURO: Alert, oriented, grossly unremarkable.   PSYCH: Cooperative, appropriate.

## 2019-12-02 NOTE — ED PROVIDER NOTE - ATTENDING CONTRIBUTION TO CARE
I personally evaluated the patient. I reviewed the Resident’s or Physician Assistant’s note (as assigned above), and agree with the findings and plan except as documented in my note.    44 y.o male w/ hx of Dm, hypokalemia, HTN, HLD, asthma, anxiety presents to the ED for evaluation of midsternal chest pain since this am.  revious smoker.    8/31/16 CT coronaries showed luminal narrowing mid LCx . Denies shortness of breath, headache, neck pain, fever, chills, abd pain, back pain, calf pain, edema of lower extremities.    CONSTITUTIONAL: Well-developed; well-nourished; in no acute distress. Sitting up and providing appropriate history and physical examination  SKIN: skin exam is warm and dry, no acute rash.  HEAD: Normocephalic; atraumatic.  EYES: PERRL, 3 mm bilateral, no nystagmus, EOM intact; conjunctiva and sclera clear.  ENT: No nasal discharge; airway clear.  NECK: Supple; non tender.+ full passive ROM in all directions. No JVD  CARD: S1, S2 normal; no murmurs, gallops, or rubs. Regular rate and rhythm. + Symmetric Strong Pulses  RESP: No wheezes, rales or rhonchi. Good air movement bilaterally  ABD: soft; non-distended; non-tender. No Rebound, No Gaurding, No signs of peritnitis, No CVA tenderness  EXT: Normal ROM. No clubbing, cyanosis or edema. Dp and Pt Pulses intact. Cap refill less than 3 seconds      Plan- ECG, cardiac enzymes, CXR, reassess

## 2019-12-02 NOTE — ED CDU PROVIDER INITIAL DAY NOTE - OBJECTIVE STATEMENT
45 yo M with pmhx of DM, hypokalemia, HTN, HLD, asthma, anxiety presents to the ED for evaluation of intermittent, non-radiating mid-sternal chest pressure chest pain that started last night but worsened this morning. Pain is worse w/ exertion, mild severity, nonpleuritic. Reports associated dizziness. Reports being a some day smoker. No family history of cardiac disease.  8/31/16 CT coronaries showed luminal narrowing mid LCx and pharm NM 3/2/17 was WNL. Denies shortness of breath, headache, neck pain, fever, chills, abd pain, back pain, calf pain, edema of lower extremities, orthopnea.

## 2019-12-02 NOTE — ED PROVIDER NOTE - PROGRESS NOTE DETAILS
reproducible lightheaded sensation going from laying to standing position, no nystagmus, no room spinning sensation.

## 2019-12-02 NOTE — ED PROVIDER NOTE - PHYSICAL EXAMINATION
CONST: Well appearing in NAD  EYES: PERRL, EOMI, Sclera and conjunctiva clear.  CARD: Normal S1 S2; Normal rate and rhythm  RESP: Equal BS B/L, No wheezes, rhonchi or rales. No distress  GI: Soft, non-tender, non-distended.  MS: Normal ROM in all extremities. No edema of lower extremities, no calf pain, radial pulses 2+ bilaterally  SKIN: Warm, dry, no acute rashes. Good turgor  NEURO: A&Ox3, CN II-XII intact, no focal deficits, no facial asymmetry, no pronator drift, normal finger to nose, no nystagmus, gross sensation intact, UE/LE strength 5/5, stable gait

## 2019-12-03 VITALS
RESPIRATION RATE: 16 BRPM | TEMPERATURE: 97 F | DIASTOLIC BLOOD PRESSURE: 88 MMHG | OXYGEN SATURATION: 100 % | HEART RATE: 87 BPM | SYSTOLIC BLOOD PRESSURE: 152 MMHG

## 2019-12-03 DIAGNOSIS — Z71.89 OTHER SPECIFIED COUNSELING: ICD-10-CM

## 2019-12-03 PROCEDURE — 78452 HT MUSCLE IMAGE SPECT MULT: CPT | Mod: 26

## 2019-12-03 PROCEDURE — 93016 CV STRESS TEST SUPVJ ONLY: CPT

## 2019-12-03 PROCEDURE — 93018 CV STRESS TEST I&R ONLY: CPT

## 2019-12-03 PROCEDURE — 99217: CPT

## 2019-12-03 NOTE — ED CDU PROVIDER DISPOSITION NOTE - CARE PROVIDER_API CALL
Miguel Ángel Araiza)  Cardiovascular Disease; Interventional Cardiology  [address] [place] [address]  Phone: [phone]  Fax: [phone]  Follow Up Time:

## 2019-12-03 NOTE — ED CDU PROVIDER DISPOSITION NOTE - NSFOLLOWUPINSTRUCTIONS_ED_ALL_ED_FT
Follow up cardiologist/ pmd.      Chest Pain    Chest pain can be caused by many different conditions which may or may not be dangerous. Causes include heartburn, lung infections, heart attack, blood clot in lungs, skin infections, strain or damage to muscle, cartilage, or bones, etc. In addition to a history and physical examination, an electrocardiogram (ECG) or other lab tests may have been performed to determine the cause of your chest pain. Follow up with your primary care provider or with a cardiologist as instructed.     SEEK IMMEDIATE MEDICAL CARE IF YOU HAVE ANY OF THE FOLLOWING SYMPTOMS: worsening chest pain, coughing up blood, unexplained back/neck/jaw pain, severe abdominal pain, dizziness or lightheadedness, fainting, shortness of breath, sweaty or clammy skin, vomiting, or racing heart beat. These symptoms may represent a serious problem that is an emergency. Do not wait to see if the symptoms will go away. Get medical help right away. Call 911 and do not drive yourself to the hospital.

## 2019-12-03 NOTE — ED CDU PROVIDER SUBSEQUENT DAY NOTE - PROGRESS NOTE DETAILS
pt seen bedside, NAD, no complaints overnight, asymptomatic. Negative cardiac enzymes x2 and nl ekg. pt scheduled to go for Pharm Nuc. Will continue to monitor patient.

## 2019-12-03 NOTE — ED CDU PROVIDER SUBSEQUENT DAY NOTE - HISTORY
45 y/o M with PM HTN, HLD, asthma, DM, smoker, presents with substernal chest pressure, worse on exertion. no Fhx. 8/31/16 CT coronaries showed luminal narrowing mid LCx and pharm NM 3/2/17 was WNL. resting comfortably.

## 2019-12-03 NOTE — ED CDU PROVIDER DISPOSITION NOTE - CLINICAL COURSE
Patient presents with chest pain. labs, ekg, cxr done. Placed in obs for cardiac work up. Patient had nuclear stress test which was normal. Discharged with pmd and cardiology follow up. Return precautions discussed.

## 2019-12-03 NOTE — ED CDU PROVIDER DISPOSITION NOTE - PATIENT PORTAL LINK FT
You can access the FollowMyHealth Patient Portal offered by [facility] by registering at the following website: [email or web address]. By joining [employer]’s FollowMyHealth portal, you will also be able to view your health information using other applications (apps) compatible with our system.

## 2019-12-03 NOTE — SBIRT NOTE ADULT - NSSBIRTALCPOSREINDET_GEN_A_CORE
Provided SBIRT services: Full Screen Negative. Positive reinforcement provided given patient currently within healthy guidelines.

## 2019-12-03 NOTE — ED CDU PROVIDER DISPOSITION NOTE - ATTENDING CONTRIBUTION TO CARE
43 yo M pmh of DM, HTN, HLD, asthma presents with chest pain. States that he has been having midsternal chest pressure, radiating up, 7/10, intermittent. Worse with exertional. no sob. no palpitations. no fevers or recent illness. no leg swelling or recent travel. + smoker. no n/v/d, no abdominal pain. Had a CCTA 2016 that showed narrowing of med LCx. Also had pharm NM 2017 that was normal. Does not follow with a cardiologist.     CONSTITUTIONAL: Well-developed; well-nourished; in no acute distress.   SKIN: warm, dry  HEAD: Normocephalic; atraumatic.  EYES: PERRL, EOMI, no conjunctival erythema  ENT: No nasal discharge; airway clear.  NECK: Supple; non tender.  CARD: S1, S2 normal;  Regular rate and rhythm.   RESP: No wheezes, rales or rhonchi.  ABD: soft non tender, non distended, no rebound or guarding  EXT: Normal ROM.  no pedal edema, no calf tenderness.   LYMPH: No acute cervical adenopathy.  NEURO: Alert, oriented, grossly unremarkable.

## 2019-12-03 NOTE — ED CDU PROVIDER DISPOSITION NOTE - PRINCIPAL DIAGNOSIS
1. EKG today looks good with good intervals on the flecainide and metoprolol.    2. BP high initially but improved when I rechecked.    3. No changes today.     4. See Dr. Aguayo in ~ 2-3 months with EKG but CALL if any issues prior! [phone] ([facility])  
Chest pain

## 2019-12-03 NOTE — ED CDU PROVIDER SUBSEQUENT DAY NOTE - ATTENDING CONTRIBUTION TO CARE
43 yo M pmh of DM, HTN, HLD, asthma presents with chest pain. States that he has been having midsternal chest pressure, radiating up, 7/10, intermittent. Worse with exertional. no sob. no palpitations. no fevers or recent illness. no leg swelling or recent travel. + smoker. no n/v/d, no abdominal pain. Had a CCTA 2016 that showed narrowing of med LCx. Also had pharm NM 2017 that was normal. Does not follow with a cardiologist. No events overnight, patient feeling better, chest pain improved.     CONSTITUTIONAL: Well-developed; well-nourished; in no acute distress.   SKIN: warm, dry  HEAD: Normocephalic; atraumatic.  EYES: PERRL, EOMI, no conjunctival erythema  ENT: No nasal discharge; airway clear.  NECK: Supple; non tender.  CARD: S1, S2 normal;  Regular rate and rhythm.   RESP: No wheezes, rales or rhonchi.  ABD: soft non tender, non distended, no rebound or guarding  EXT: Normal ROM.  no pedal edema, no calf tenderness.   LYMPH: No acute cervical adenopathy.  NEURO: Alert, oriented, grossly unremarkable.

## 2019-12-03 NOTE — ED CDU PROVIDER SUBSEQUENT DAY NOTE - MEDICAL DECISION MAKING DETAILS
Patient presents with chest pain. labs, ekg, cxr done. Placed in obs for cardiac evaluation. Pending nuclear stress test.

## 2019-12-09 DIAGNOSIS — Z76.89 PERSONS ENCOUNTERING HEALTH SERVICES IN OTHER SPECIFIED CIRCUMSTANCES: ICD-10-CM

## 2019-12-11 ENCOUNTER — APPOINTMENT (OUTPATIENT)
Dept: ENDOCRINOLOGY | Facility: CLINIC | Age: 44
End: 2019-12-11

## 2020-01-07 ENCOUNTER — APPOINTMENT (OUTPATIENT)
Dept: INTERNAL MEDICINE | Facility: CLINIC | Age: 45
End: 2020-01-07

## 2020-02-19 ENCOUNTER — APPOINTMENT (OUTPATIENT)
Dept: ENDOCRINOLOGY | Facility: CLINIC | Age: 45
End: 2020-02-19

## 2020-02-20 ENCOUNTER — EMERGENCY (EMERGENCY)
Facility: HOSPITAL | Age: 45
LOS: 0 days | Discharge: HOME | End: 2020-02-20
Attending: EMERGENCY MEDICINE | Admitting: EMERGENCY MEDICINE
Payer: MEDICAID

## 2020-02-20 VITALS
DIASTOLIC BLOOD PRESSURE: 80 MMHG | HEART RATE: 80 BPM | TEMPERATURE: 98 F | SYSTOLIC BLOOD PRESSURE: 145 MMHG | RESPIRATION RATE: 16 BRPM | OXYGEN SATURATION: 100 %

## 2020-02-20 VITALS
RESPIRATION RATE: 17 BRPM | TEMPERATURE: 98 F | OXYGEN SATURATION: 100 % | WEIGHT: 169.98 LBS | HEART RATE: 86 BPM | DIASTOLIC BLOOD PRESSURE: 84 MMHG | HEIGHT: 68 IN | SYSTOLIC BLOOD PRESSURE: 162 MMHG

## 2020-02-20 DIAGNOSIS — R11.2 NAUSEA WITH VOMITING, UNSPECIFIED: ICD-10-CM

## 2020-02-20 DIAGNOSIS — E11.9 TYPE 2 DIABETES MELLITUS WITHOUT COMPLICATIONS: ICD-10-CM

## 2020-02-20 DIAGNOSIS — R19.7 DIARRHEA, UNSPECIFIED: ICD-10-CM

## 2020-02-20 DIAGNOSIS — F17.200 NICOTINE DEPENDENCE, UNSPECIFIED, UNCOMPLICATED: ICD-10-CM

## 2020-02-20 DIAGNOSIS — Z98.890 OTHER SPECIFIED POSTPROCEDURAL STATES: Chronic | ICD-10-CM

## 2020-02-20 DIAGNOSIS — E78.5 HYPERLIPIDEMIA, UNSPECIFIED: ICD-10-CM

## 2020-02-20 DIAGNOSIS — Z98.890 OTHER SPECIFIED POSTPROCEDURAL STATES: ICD-10-CM

## 2020-02-20 DIAGNOSIS — I10 ESSENTIAL (PRIMARY) HYPERTENSION: ICD-10-CM

## 2020-02-20 LAB
ALBUMIN SERPL ELPH-MCNC: 4 G/DL — SIGNIFICANT CHANGE UP (ref 3.5–5.2)
ALP SERPL-CCNC: 149 U/L — HIGH (ref 30–115)
ALT FLD-CCNC: 21 U/L — SIGNIFICANT CHANGE UP (ref 0–41)
ANION GAP SERPL CALC-SCNC: 10 MMOL/L — SIGNIFICANT CHANGE UP (ref 7–14)
APPEARANCE UR: CLEAR — SIGNIFICANT CHANGE UP
AST SERPL-CCNC: 17 U/L — SIGNIFICANT CHANGE UP (ref 0–41)
BACTERIA # UR AUTO: NEGATIVE — SIGNIFICANT CHANGE UP
BASOPHILS # BLD AUTO: 0.04 K/UL — SIGNIFICANT CHANGE UP (ref 0–0.2)
BASOPHILS NFR BLD AUTO: 0.4 % — SIGNIFICANT CHANGE UP (ref 0–1)
BILIRUB SERPL-MCNC: 0.3 MG/DL — SIGNIFICANT CHANGE UP (ref 0.2–1.2)
BILIRUB UR-MCNC: NEGATIVE — SIGNIFICANT CHANGE UP
BUN SERPL-MCNC: 18 MG/DL — SIGNIFICANT CHANGE UP (ref 10–20)
CALCIUM SERPL-MCNC: 9 MG/DL — SIGNIFICANT CHANGE UP (ref 8.5–10.1)
CHLORIDE SERPL-SCNC: 100 MMOL/L — SIGNIFICANT CHANGE UP (ref 98–110)
CO2 SERPL-SCNC: 28 MMOL/L — SIGNIFICANT CHANGE UP (ref 17–32)
COLOR SPEC: SIGNIFICANT CHANGE UP
CREAT SERPL-MCNC: 1 MG/DL — SIGNIFICANT CHANGE UP (ref 0.7–1.5)
DIFF PNL FLD: ABNORMAL
EOSINOPHIL # BLD AUTO: 0.2 K/UL — SIGNIFICANT CHANGE UP (ref 0–0.7)
EOSINOPHIL NFR BLD AUTO: 2.1 % — SIGNIFICANT CHANGE UP (ref 0–8)
EPI CELLS # UR: 0 /HPF — SIGNIFICANT CHANGE UP (ref 0–5)
GLUCOSE SERPL-MCNC: 330 MG/DL — HIGH (ref 70–99)
GLUCOSE UR QL: ABNORMAL
HCT VFR BLD CALC: 38.6 % — LOW (ref 42–52)
HGB BLD-MCNC: 13.6 G/DL — LOW (ref 14–18)
HYALINE CASTS # UR AUTO: 0 /LPF — SIGNIFICANT CHANGE UP (ref 0–7)
IMM GRANULOCYTES NFR BLD AUTO: 0.3 % — SIGNIFICANT CHANGE UP (ref 0.1–0.3)
KETONES UR-MCNC: NEGATIVE — SIGNIFICANT CHANGE UP
LEUKOCYTE ESTERASE UR-ACNC: NEGATIVE — SIGNIFICANT CHANGE UP
LIDOCAIN IGE QN: 20 U/L — SIGNIFICANT CHANGE UP (ref 7–60)
LYMPHOCYTES # BLD AUTO: 1.93 K/UL — SIGNIFICANT CHANGE UP (ref 1.2–3.4)
LYMPHOCYTES # BLD AUTO: 20.7 % — SIGNIFICANT CHANGE UP (ref 20.5–51.1)
MAGNESIUM SERPL-MCNC: 1.8 MG/DL — SIGNIFICANT CHANGE UP (ref 1.8–2.4)
MCHC RBC-ENTMCNC: 32 PG — HIGH (ref 27–31)
MCHC RBC-ENTMCNC: 35.2 G/DL — SIGNIFICANT CHANGE UP (ref 32–37)
MCV RBC AUTO: 90.8 FL — SIGNIFICANT CHANGE UP (ref 80–94)
MONOCYTES # BLD AUTO: 0.67 K/UL — HIGH (ref 0.1–0.6)
MONOCYTES NFR BLD AUTO: 7.2 % — SIGNIFICANT CHANGE UP (ref 1.7–9.3)
NEUTROPHILS # BLD AUTO: 6.44 K/UL — SIGNIFICANT CHANGE UP (ref 1.4–6.5)
NEUTROPHILS NFR BLD AUTO: 69.3 % — SIGNIFICANT CHANGE UP (ref 42.2–75.2)
NITRITE UR-MCNC: NEGATIVE — SIGNIFICANT CHANGE UP
NRBC # BLD: 0 /100 WBCS — SIGNIFICANT CHANGE UP (ref 0–0)
PH UR: 6.5 — SIGNIFICANT CHANGE UP (ref 5–8)
PHOSPHATE SERPL-MCNC: 3.2 MG/DL — SIGNIFICANT CHANGE UP (ref 2.1–4.9)
PLATELET # BLD AUTO: 182 K/UL — SIGNIFICANT CHANGE UP (ref 130–400)
POTASSIUM SERPL-MCNC: 4.1 MMOL/L — SIGNIFICANT CHANGE UP (ref 3.5–5)
POTASSIUM SERPL-SCNC: 4.1 MMOL/L — SIGNIFICANT CHANGE UP (ref 3.5–5)
PROT SERPL-MCNC: 6.9 G/DL — SIGNIFICANT CHANGE UP (ref 6–8)
PROT UR-MCNC: ABNORMAL
RBC # BLD: 4.25 M/UL — LOW (ref 4.7–6.1)
RBC # FLD: 11.6 % — SIGNIFICANT CHANGE UP (ref 11.5–14.5)
RBC CASTS # UR COMP ASSIST: 14 /HPF — HIGH (ref 0–4)
SODIUM SERPL-SCNC: 138 MMOL/L — SIGNIFICANT CHANGE UP (ref 135–146)
SP GR SPEC: 1.02 — SIGNIFICANT CHANGE UP (ref 1.01–1.02)
UROBILINOGEN FLD QL: SIGNIFICANT CHANGE UP
WBC # BLD: 9.31 K/UL — SIGNIFICANT CHANGE UP (ref 4.8–10.8)
WBC # FLD AUTO: 9.31 K/UL — SIGNIFICANT CHANGE UP (ref 4.8–10.8)
WBC UR QL: 1 /HPF — SIGNIFICANT CHANGE UP (ref 0–5)

## 2020-02-20 PROCEDURE — 99285 EMERGENCY DEPT VISIT HI MDM: CPT

## 2020-02-20 PROCEDURE — 71046 X-RAY EXAM CHEST 2 VIEWS: CPT | Mod: 26

## 2020-02-20 PROCEDURE — 74176 CT ABD & PELVIS W/O CONTRAST: CPT | Mod: 26

## 2020-02-20 RX ORDER — ACETAMINOPHEN 500 MG
975 TABLET ORAL ONCE
Refills: 0 | Status: COMPLETED | OUTPATIENT
Start: 2020-02-20 | End: 2020-02-20

## 2020-02-20 RX ORDER — FAMOTIDINE 10 MG/ML
20 INJECTION INTRAVENOUS ONCE
Refills: 0 | Status: COMPLETED | OUTPATIENT
Start: 2020-02-20 | End: 2020-02-20

## 2020-02-20 RX ORDER — SODIUM CHLORIDE 9 MG/ML
1000 INJECTION INTRAMUSCULAR; INTRAVENOUS; SUBCUTANEOUS ONCE
Refills: 0 | Status: DISCONTINUED | OUTPATIENT
Start: 2020-02-20 | End: 2020-02-20

## 2020-02-20 RX ORDER — SODIUM CHLORIDE 9 MG/ML
1000 INJECTION INTRAMUSCULAR; INTRAVENOUS; SUBCUTANEOUS ONCE
Refills: 0 | Status: COMPLETED | OUTPATIENT
Start: 2020-02-20 | End: 2020-02-20

## 2020-02-20 RX ORDER — ONDANSETRON 8 MG/1
4 TABLET, FILM COATED ORAL ONCE
Refills: 0 | Status: DISCONTINUED | OUTPATIENT
Start: 2020-02-20 | End: 2020-02-20

## 2020-02-20 RX ORDER — ONDANSETRON 8 MG/1
4 TABLET, FILM COATED ORAL ONCE
Refills: 0 | Status: COMPLETED | OUTPATIENT
Start: 2020-02-20 | End: 2020-02-20

## 2020-02-20 RX ADMIN — FAMOTIDINE 104 MILLIGRAM(S): 10 INJECTION INTRAVENOUS at 10:27

## 2020-02-20 RX ADMIN — SODIUM CHLORIDE 2000 MILLILITER(S): 9 INJECTION INTRAMUSCULAR; INTRAVENOUS; SUBCUTANEOUS at 10:27

## 2020-02-20 RX ADMIN — ONDANSETRON 4 MILLIGRAM(S): 8 TABLET, FILM COATED ORAL at 10:27

## 2020-02-20 RX ADMIN — Medication 975 MILLIGRAM(S): at 14:05

## 2020-02-20 NOTE — ED PROVIDER NOTE - CLINICAL SUMMARY MEDICAL DECISION MAKING FREE TEXT BOX
45 yo M presents to ED for diarrhea. Patient has cramping abdominal pain. Labs were unremarkable. Patient had blood in his urine but no signs of infection. CT was normal. Patient's abdomen remains SNTND making acute abdominal pathology such as appendicitis unlikely.   DC home with GI gu.

## 2020-02-20 NOTE — ED PROVIDER NOTE - NSFOLLOWUPINSTRUCTIONS_ED_ALL_ED_FT
Acute Diarrhea    WHAT YOU NEED TO KNOW:    Acute diarrhea starts quickly and lasts a short time, usually 1 to 3 days. It can last up to 2 weeks. You may not be able to control your diarrhea. Acute diarrhea usually stops on its own.     DISCHARGE INSTRUCTIONS:    Return to the emergency department if:     You feel confused.       Your heartbeat is faster than usual.       Your eyes look deeply sunken, or you have no tears when you cry.       You urinate less than usual, or your urine is dark yellow.       You have blood or mucus in your bowel movements.      You have severe abdominal pain.       You are unable to drink any liquids.     Contact your healthcare provider if:     Your symptoms do not get better with treatment.       You have a fever higher than 101.3°F (38.5°C).       You have trouble eating and drinking because you are vomiting.       Your diarrhea does not get better in 7 days.       You have questions or concerns about your condition or care.     Follow up with your healthcare provider as directed: Write down your questions so you remember to ask them during your visits.     Medicines:    Diarrhea medicine is an over-the-counter medicine that helps slow or stop your diarrhea. Do not take this medicine unless your healthcare provider says it is okay.       Antibiotics may be given to help treat an infection caused by bacteria.       Antiparasitics may be given to treat an infection caused by parasites.       Take your medicine as directed. Contact your healthcare provider if you think your medicine is not helping or if you have side effects. Tell him of her if you are allergic to any medicine. Keep a list of the medicines, vitamins, and herbs you take. Include the amounts, and when and why you take them. Bring the list or the pill bottles to follow-up visits. Carry your medicine list with you in case of an emergency.    Self-care:     Drink liquids as directed. Liquids will help prevent dehydration caused by diarrhea. Ask your healthcare provider how much liquid to drink each day and which liquids are best for you. You may need to drink an oral rehydration solution (ORS). An ORS has the right amounts of water, salts, and sugar you need to replace body fluids. You can buy an ORS at most grocery stores and pharmacies.       Eat foods that are easy to digest. Examples include rice, lentils, cereal, bananas, potatoes, and bread. It also includes some fruits (bananas, melon), well-cooked vegetables, and lean meats. Do not eat foods high in fiber, fat, and sugar. Do not drink alcohol until your diarrhea is gone.     Prevent acute diarrhea:     Wash your hands often. Use soap and water. Wash your hands before you eat or prepare food. Also wash your hands after you use the bathroom. Use an alcohol-based hand gel when soap and water are not available. Handwashing           Keep bathroom surfaces clean. This helps prevent the spread of germs that cause acute diarrhea.       Wash fruits and vegetables well before you eat them. This can help remove germs that cause diarrhea. If possible, remove the skin from fruits and vegetables, or cook them well before you eat them.       Cook meat and poultry as directed. Meat includes beef and pork. Poultry includes chicken, turkey, and duck.  Cook ground meat to 160°F.       Cook ground poultry, whole poultry, or cuts of poultry to at least 165°F. Remove the poultry from heat. Let it stand for 3 minutes before you eat it.       Cook whole cuts of meat other than poultry to at least 145°F. Remove the meat from heat. Let it stand for 3 minutes before you eat it.       Wash dishes that have touched raw meat or poultry with hot water and soap. This includes cutting boards, utensils, dishes, and serving containers.       Place raw or cooked meat or poultry in the refrigerator as soon as possible. Bacteria can grow in meat or poultry that is left at room temperature too long.       Do not eat raw or undercooked oysters, clams, or mussels. These foods may be contaminated and cause infection.       Drink only filtered or treated water when you travel. Do not put ice in your drinks. Drink bottled water whenever possible.          © Copyright "[employer]" 2019 All illustrations and images included in CareNotes are the copyrighted property of [employer] Inc[employer] or [employer].

## 2020-02-20 NOTE — ED PROVIDER NOTE - PATIENT PORTAL LINK FT
You can access the FollowMyHealth Patient Portal offered by [facility] by registering at the following website: [email or web address]. By joining [employer]’s FollowMyHealth portal, you will also be able to view your health information using other applications (apps) compatible with our system.

## 2020-02-20 NOTE — ED PROVIDER NOTE - NSFOLLOWUPCLINICS_GEN_ALL_ED_FT
[facility]  Urology  .  [place]   Phone: [phone]  Fax:   Follow Up Time: 7-10 Days

## 2020-02-20 NOTE — ED PROVIDER NOTE - NS ED ROS FT
Review of Systems   Constitutional:  No Weight Change, No Fever, No Chills, No weakness    ENT/Mouth:  No Nasal Congestion, No Hoarseness, No sore throat, No Rhinorrhea, No Swallowing Difficulty  Eyes:  No Eye Pain, No Swelling, No Redness, No Discharge, No Vision Changes  Cardiovascular:  No Chest Pain, No palpitations, No Dyspnea on Exertion, No Orthopnea,  Respiratory:  No SOB, No Cough, No Wheezing  Gastrointestinal:  No Nausea, No Vomiting, + Diarrhea, No Constipation, + abdominal pain, No melena or bright red blood per rectum  Genitourinary:  No Dysuria, No Urinary Frequency, No Hematuria, No Urinary Incontinence,  Musculoskeletal:  No Arthralgias, No Myalgias, No Joint Swelling, No Joint Stiffness,  Skin:  No rashes

## 2020-02-20 NOTE — ED PROVIDER NOTE - CARE PROVIDER_API CALL
Rohit Ortega)  Gastroenterology; Internal Medicine  [address] [place] [address]  Phone: [phone]  Fax: [phone]  Follow Up Time: Routine

## 2020-02-20 NOTE — ED ADULT NURSE NOTE - NSIMPLEMENTINTERV_GEN_ALL_ED
Implemented All Universal Safety Interventions:  [place] to call system. Call bell, personal items and telephone within reach. Instruct patient to call for assistance. Room bathroom lighting operational. Non-slip footwear when patient is off stretcher. Physically safe environment: no spills, clutter or unnecessary equipment. Stretcher in lowest position, wheels locked, appropriate side rails in place.

## 2020-02-20 NOTE — ED PROVIDER NOTE - OBJECTIVE STATEMENT
43 yo M presents to ED for diarrhea and cramping abdominal pain since yesterday. Patient has had non-bloody diarrhea. Associated with nausea but no vomiting. No surgery or recent travel. No fever or chills. No history of abdominal surgery.

## 2020-02-20 NOTE — ED PROVIDER NOTE - PHYSICAL EXAMINATION
Const: Well nourished, well developed, appears stated age  Eyes: PERRL, no conjunctival injection  HENT:  Neck supple without meningismus   CV: RRR, Warm, well-perfused extremities  RESP: CTA B/L, no tachypnea   GI: soft, non-tender, non-distended, No RLQ or RUQ tenderness. negative Rovsing's, negative ponce   MSK: No gross deformities appreciated  Skin: Warm, dry. No rashes  Neuro: Alert, CNs II-XII grossly intact. Sensation and motor function of extremities grossly intact.  Psych: Appropriate mood and affect.

## 2020-02-20 NOTE — ED ADULT NURSE NOTE - CHIEF COMPLAINT QUOTE
"dereck been nauseous and all night I've just been going to the bathroom (diarrhea)" associated with abdominal pain. Pt also c/o pressure and burning on urination.

## 2020-02-20 NOTE — ED ADULT NURSE NOTE - OBJECTIVE STATEMENT
pt c/o generalized abdominal pain/nausea x a few days. pt states he has had multiple episodes of nausea NBNB diarrhea. pt also states he had dysuria. denies fever/chills.

## 2020-02-21 ENCOUNTER — OUTPATIENT (OUTPATIENT)
Dept: OUTPATIENT SERVICES | Facility: HOSPITAL | Age: 45
LOS: 1 days | Discharge: HOME | End: 2020-02-21

## 2020-02-21 ENCOUNTER — APPOINTMENT (OUTPATIENT)
Dept: GASTROENTEROLOGY | Facility: CLINIC | Age: 45
End: 2020-02-21
Payer: MEDICAID

## 2020-02-21 VITALS
DIASTOLIC BLOOD PRESSURE: 89 MMHG | SYSTOLIC BLOOD PRESSURE: 147 MMHG | BODY MASS INDEX: 31.22 KG/M2 | HEIGHT: 60 IN | WEIGHT: 159 LBS

## 2020-02-21 DIAGNOSIS — Z98.890 OTHER SPECIFIED POSTPROCEDURAL STATES: Chronic | ICD-10-CM

## 2020-02-21 PROCEDURE — 99203 OFFICE O/P NEW LOW 30 MIN: CPT | Mod: GC

## 2020-02-21 NOTE — PHYSICAL EXAM
[General Appearance - Well Nourished] : well nourished [General Appearance - Alert] : alert [General Appearance - In No Acute Distress] : in no acute distress [Extraocular Movements] : extraocular movements were intact [Sclera] : the sclera and conjunctiva were normal [Outer Ear] : the ears and nose were normal in appearance [Hearing Threshold Finger Rub Not [place]] : hearing was normal [Neck Cervical Mass (___cm)] : no neck mass was observed [Neck Appearance] : the appearance of the neck was normal [Bowel Sounds] : normal bowel sounds [] : no respiratory distress [Exaggerated Use Of Accessory Muscles For Inspiration] : no accessory muscle use [Abdomen Soft] : soft [No CVA Tenderness] : no ~M costovertebral angle tenderness [Abdomen Tenderness] : non-tender [Skin Color & Pigmentation] : normal skin color and pigmentation [Oriented To Time, Place, And Person] : oriented to person, place, and time [Abnormal Walk] : normal gait

## 2020-02-24 NOTE — HISTORY OF PRESENT ILLNESS
[de-identified] : A 44 y old man with pmhx of uncontrolled DM, HTN, DLD, Hx of diverticulitis SP surgery with colon resection 13 y ago presented for abd pain and diarrhea. Pt has intermittent LLQ pain, crampy, non radiating , wakes him up at night, severe, associated with multiple episodes of diarrhea , non bloody. Pt had a similar episode in nov 2019 and last tuesday. Pt denies any vomiting, rectal bleed, family hx of colon cancer, IBD. Pt never had EGD or colonoscopy \par

## 2020-02-24 NOTE — ASSESSMENT
[FreeTextEntry1] : A 44 y old man with pmhx of uncontrolled DM, HTN, DLD, Hx of diverticulitis SP surgery with colon resection 13 y ago   presented for abd pain. Pt has intermittent LLQ pain, crampy, non radiating , wakes him up at night, severe, associated with multiple episodes of diarrhea , non bloody. Pt had a similar episode in nov 2019 and last tuesday. Pt denies any vomiting, rectal bleed, family hx of colon cancer, IBD. Pt never had EGD or colonoscopy \par Pt also mentions constipation sometimes and improvement after defecation \par \par # LLQ pain - most likely 2/2 constipation, however given hx of colon surgery cannot rule out mild stricturing/inflammation/ulceration at anastomosis; no current e/o diverticulitis\par Normal Wbc \par Had normal CT in ED on 2/20/2020 \par \par Rec: \par EGD and colonoscopy \par Lactose free diet \par Miralax for constipation \par

## 2020-02-24 NOTE — END OF VISIT
[] : Fellow [FreeTextEntry3] : 43 y/o M who was recently in the ER for LLQ pain and diarrhea. Diarrhea currently improved, but pt reports LLQ for years. Had diverticulitis in the past, had some surgery done, unclear what. Reports LLQ worsens with constipation. Will give laxatives for constipation. CF to r/o anastomotic causes of LLQ pain.

## 2020-03-04 ENCOUNTER — APPOINTMENT (OUTPATIENT)
Dept: ENDOCRINOLOGY | Facility: CLINIC | Age: 45
End: 2020-03-04

## 2020-03-04 ENCOUNTER — OUTPATIENT (OUTPATIENT)
Dept: OUTPATIENT SERVICES | Facility: HOSPITAL | Age: 45
LOS: 1 days | Discharge: HOME | End: 2020-03-04

## 2020-03-04 VITALS — BODY MASS INDEX: 24.33 KG/M2 | HEIGHT: 68 IN

## 2020-03-04 VITALS
BODY MASS INDEX: 31.41 KG/M2 | HEART RATE: 80 BPM | SYSTOLIC BLOOD PRESSURE: 144 MMHG | DIASTOLIC BLOOD PRESSURE: 93 MMHG | WEIGHT: 160 LBS | HEIGHT: 60 IN

## 2020-03-04 DIAGNOSIS — Z98.890 OTHER SPECIFIED POSTPROCEDURAL STATES: Chronic | ICD-10-CM

## 2020-03-04 NOTE — HISTORY OF PRESENT ILLNESS
[FreeTextEntry1] : Patient is a 43 yo M with DMI, poorly controlled, here for f/u. \par Last A1C 11.7. Doesn't check his FSG at home, doesn't use insulin regularly.

## 2020-03-11 DIAGNOSIS — E11.9 TYPE 2 DIABETES MELLITUS WITHOUT COMPLICATIONS: ICD-10-CM

## 2020-03-12 ENCOUNTER — APPOINTMENT (OUTPATIENT)
Dept: PODIATRY | Facility: CLINIC | Age: 45
End: 2020-03-12

## 2020-05-21 ENCOUNTER — OUTPATIENT (OUTPATIENT)
Dept: OUTPATIENT SERVICES | Facility: HOSPITAL | Age: 45
LOS: 1 days | Discharge: HOME | End: 2020-05-21

## 2020-05-21 ENCOUNTER — APPOINTMENT (OUTPATIENT)
Dept: UROLOGY | Facility: CLINIC | Age: 45
End: 2020-05-21
Payer: MEDICAID

## 2020-05-21 DIAGNOSIS — R31.29 OTHER MICROSCOPIC HEMATURIA: ICD-10-CM

## 2020-05-21 DIAGNOSIS — Z98.890 OTHER SPECIFIED POSTPROCEDURAL STATES: Chronic | ICD-10-CM

## 2020-05-21 PROCEDURE — 99214 OFFICE O/P EST MOD 30 MIN: CPT

## 2020-05-21 NOTE — HISTORY OF PRESENT ILLNESS
[FreeTextEntry1] : TELEMEDICINE - NEW PATIENT APPOINTMENT \par \par The patient-doctor relationship has been established in a face to face fashion via real time video/audio HIPAA compliant communication using telemedicine software-- patient opted for telephone visit. The patient's identity has been confirmed. The patient was previously emailed a copy of the telemedicine consent. They have had a chance to review and has now given verbal consent and has requested care to be assessed and treated through telemedicine and understands there maybe limitations in this process and they may need further follow up care in the office and or hospital settings.\par \par The patient denies fevers, chills, nausea and or vomiting and no unexplained weight loss.\par \par All pertinent parts of the patient PFSH (past medical, family and social histories), laboratory, radiological studies and physician notes were reviewed prior to starting the face to face portion of the telemedicine visit. Questionnaire results were discussed with patient.\par \par ================================================================================= \par \par This is a 45 year male who was seen in the ER for abdominal pain in FEb\par was told that he had microhematuria\par asymptomatic - no gross blood in the urine\par no pain with urination\par on review of previous labs -- had + microhematuria twice last year\par \par CT done -- images visualized by me - non-contrast -- IMPRESSION:\par No acute intra-abdominal or pelvic pathology \par

## 2020-05-21 NOTE — ASSESSMENT
[FreeTextEntry1] : \par This is a 45 year male who was seen in the ER for abdominal pain in FEb\par was told that he had microhematuria\par asymptomatic - no gross blood in the urine\par no pain with urination\par on review of previous labs -- had + microhematuria twice last year\par \par CT done -- images visualized by me - non-contrast -- IMPRESSION:\par No acute intra-abdominal or pelvic pathology \par

## 2020-05-21 NOTE — ASSESSMENT
[FreeTextEntry1] : \par This is a 45 year male who was seen in the ER for abdominal pain in FEb\par was told that he had microhematuria\par asymptomatic - no gross blood in the urine\par no pain with urination\par on review of previous labs -- had + microhematuria twice last year\par \par CT done -- images visualized by me - non-contrast -- IMPRESSION:\par No acute intra-abdominal or pelvic pathology \par  Statement Selected

## 2020-05-21 NOTE — PHYSICAL EXAM
[] : no respiratory distress [Affect] : the affect was normal [Oriented To Time, Place, And Person] : oriented to person, place, and time [Respiration, Rhythm And Depth] : normal respiratory rhythm and effort [Not Anxious] : not anxious

## 2020-06-28 ENCOUNTER — EMERGENCY (EMERGENCY)
Facility: HOSPITAL | Age: 45
LOS: 0 days | Discharge: HOME | End: 2020-06-28
Attending: EMERGENCY MEDICINE | Admitting: EMERGENCY MEDICINE
Payer: MEDICAID

## 2020-06-28 VITALS
RESPIRATION RATE: 16 BRPM | TEMPERATURE: 98 F | DIASTOLIC BLOOD PRESSURE: 97 MMHG | HEART RATE: 89 BPM | SYSTOLIC BLOOD PRESSURE: 201 MMHG | OXYGEN SATURATION: 100 %

## 2020-06-28 VITALS
OXYGEN SATURATION: 100 % | TEMPERATURE: 96 F | SYSTOLIC BLOOD PRESSURE: 158 MMHG | DIASTOLIC BLOOD PRESSURE: 88 MMHG | HEART RATE: 83 BPM

## 2020-06-28 DIAGNOSIS — H66.91 OTITIS MEDIA, UNSPECIFIED, RIGHT EAR: ICD-10-CM

## 2020-06-28 DIAGNOSIS — R10.30 LOWER ABDOMINAL PAIN, UNSPECIFIED: ICD-10-CM

## 2020-06-28 DIAGNOSIS — E78.00 PURE HYPERCHOLESTEROLEMIA, UNSPECIFIED: ICD-10-CM

## 2020-06-28 DIAGNOSIS — Z98.890 OTHER SPECIFIED POSTPROCEDURAL STATES: Chronic | ICD-10-CM

## 2020-06-28 DIAGNOSIS — R42 DIZZINESS AND GIDDINESS: ICD-10-CM

## 2020-06-28 DIAGNOSIS — E11.9 TYPE 2 DIABETES MELLITUS WITHOUT COMPLICATIONS: ICD-10-CM

## 2020-06-28 DIAGNOSIS — R26.0 ATAXIC GAIT: ICD-10-CM

## 2020-06-28 DIAGNOSIS — E78.5 HYPERLIPIDEMIA, UNSPECIFIED: ICD-10-CM

## 2020-06-28 DIAGNOSIS — I10 ESSENTIAL (PRIMARY) HYPERTENSION: ICD-10-CM

## 2020-06-28 DIAGNOSIS — Z98.890 OTHER SPECIFIED POSTPROCEDURAL STATES: ICD-10-CM

## 2020-06-28 DIAGNOSIS — J45.909 UNSPECIFIED ASTHMA, UNCOMPLICATED: ICD-10-CM

## 2020-06-28 DIAGNOSIS — F17.200 NICOTINE DEPENDENCE, UNSPECIFIED, UNCOMPLICATED: ICD-10-CM

## 2020-06-28 LAB
ALBUMIN SERPL ELPH-MCNC: 4.1 G/DL — SIGNIFICANT CHANGE UP (ref 3.5–5.2)
ALP SERPL-CCNC: 131 U/L — HIGH (ref 30–115)
ALT FLD-CCNC: 15 U/L — SIGNIFICANT CHANGE UP (ref 0–41)
ANION GAP SERPL CALC-SCNC: 9 MMOL/L — SIGNIFICANT CHANGE UP (ref 7–14)
APPEARANCE UR: CLEAR — SIGNIFICANT CHANGE UP
AST SERPL-CCNC: 16 U/L — SIGNIFICANT CHANGE UP (ref 0–41)
BACTERIA # UR AUTO: NEGATIVE — SIGNIFICANT CHANGE UP
BASE EXCESS BLDV CALC-SCNC: 2.4 MMOL/L — HIGH (ref -2–2)
BASOPHILS # BLD AUTO: 0.03 K/UL — SIGNIFICANT CHANGE UP (ref 0–0.2)
BASOPHILS NFR BLD AUTO: 0.4 % — SIGNIFICANT CHANGE UP (ref 0–1)
BILIRUB SERPL-MCNC: 0.3 MG/DL — SIGNIFICANT CHANGE UP (ref 0.2–1.2)
BILIRUB UR-MCNC: NEGATIVE — SIGNIFICANT CHANGE UP
BUN SERPL-MCNC: 25 MG/DL — HIGH (ref 10–20)
CA-I SERPL-SCNC: 1.27 MMOL/L — SIGNIFICANT CHANGE UP (ref 1.12–1.3)
CALCIUM SERPL-MCNC: 9.6 MG/DL — SIGNIFICANT CHANGE UP (ref 8.5–10.1)
CHLORIDE SERPL-SCNC: 96 MMOL/L — LOW (ref 98–110)
CO2 SERPL-SCNC: 28 MMOL/L — SIGNIFICANT CHANGE UP (ref 17–32)
COLOR SPEC: SIGNIFICANT CHANGE UP
CREAT SERPL-MCNC: 1.2 MG/DL — SIGNIFICANT CHANGE UP (ref 0.7–1.5)
DIFF PNL FLD: ABNORMAL
EOSINOPHIL # BLD AUTO: 0.15 K/UL — SIGNIFICANT CHANGE UP (ref 0–0.7)
EOSINOPHIL NFR BLD AUTO: 2.1 % — SIGNIFICANT CHANGE UP (ref 0–8)
EPI CELLS # UR: 0 /HPF — SIGNIFICANT CHANGE UP (ref 0–5)
GAS PNL BLDV: 137 MMOL/L — SIGNIFICANT CHANGE UP (ref 136–145)
GAS PNL BLDV: SIGNIFICANT CHANGE UP
GLUCOSE SERPL-MCNC: 402 MG/DL — HIGH (ref 70–99)
GLUCOSE UR QL: ABNORMAL
HCO3 BLDV-SCNC: 30 MMOL/L — HIGH (ref 22–29)
HCT VFR BLD CALC: 37.4 % — LOW (ref 42–52)
HCT VFR BLDA CALC: 41.6 % — SIGNIFICANT CHANGE UP (ref 34–44)
HGB BLD CALC-MCNC: 13.6 G/DL — LOW (ref 14–18)
HGB BLD-MCNC: 12.8 G/DL — LOW (ref 14–18)
HYALINE CASTS # UR AUTO: 0 /LPF — SIGNIFICANT CHANGE UP (ref 0–7)
IMM GRANULOCYTES NFR BLD AUTO: 0.3 % — SIGNIFICANT CHANGE UP (ref 0.1–0.3)
KETONES UR-MCNC: NEGATIVE — SIGNIFICANT CHANGE UP
LACTATE BLDV-MCNC: 0.8 MMOL/L — SIGNIFICANT CHANGE UP (ref 0.5–1.6)
LACTATE SERPL-SCNC: 0.9 MMOL/L — SIGNIFICANT CHANGE UP (ref 0.7–2)
LEUKOCYTE ESTERASE UR-ACNC: NEGATIVE — SIGNIFICANT CHANGE UP
LIDOCAIN IGE QN: 19 U/L — SIGNIFICANT CHANGE UP (ref 7–60)
LYMPHOCYTES # BLD AUTO: 1.7 K/UL — SIGNIFICANT CHANGE UP (ref 1.2–3.4)
LYMPHOCYTES # BLD AUTO: 24.1 % — SIGNIFICANT CHANGE UP (ref 20.5–51.1)
MAGNESIUM SERPL-MCNC: 2.1 MG/DL — SIGNIFICANT CHANGE UP (ref 1.8–2.4)
MCHC RBC-ENTMCNC: 30.9 PG — SIGNIFICANT CHANGE UP (ref 27–31)
MCHC RBC-ENTMCNC: 34.2 G/DL — SIGNIFICANT CHANGE UP (ref 32–37)
MCV RBC AUTO: 90.3 FL — SIGNIFICANT CHANGE UP (ref 80–94)
MONOCYTES # BLD AUTO: 0.44 K/UL — SIGNIFICANT CHANGE UP (ref 0.1–0.6)
MONOCYTES NFR BLD AUTO: 6.3 % — SIGNIFICANT CHANGE UP (ref 1.7–9.3)
NEUTROPHILS # BLD AUTO: 4.7 K/UL — SIGNIFICANT CHANGE UP (ref 1.4–6.5)
NEUTROPHILS NFR BLD AUTO: 66.8 % — SIGNIFICANT CHANGE UP (ref 42.2–75.2)
NITRITE UR-MCNC: NEGATIVE — SIGNIFICANT CHANGE UP
NRBC # BLD: 0 /100 WBCS — SIGNIFICANT CHANGE UP (ref 0–0)
PCO2 BLDV: 55 MMHG — HIGH (ref 41–51)
PH BLDV: 7.34 — SIGNIFICANT CHANGE UP (ref 7.26–7.43)
PH UR: 6 — SIGNIFICANT CHANGE UP (ref 5–8)
PLATELET # BLD AUTO: 196 K/UL — SIGNIFICANT CHANGE UP (ref 130–400)
PO2 BLDV: 34 MMHG — SIGNIFICANT CHANGE UP (ref 20–40)
POTASSIUM BLDV-SCNC: 4.6 MMOL/L — SIGNIFICANT CHANGE UP (ref 3.3–5.6)
POTASSIUM SERPL-MCNC: 4.7 MMOL/L — SIGNIFICANT CHANGE UP (ref 3.5–5)
POTASSIUM SERPL-SCNC: 4.7 MMOL/L — SIGNIFICANT CHANGE UP (ref 3.5–5)
PROT SERPL-MCNC: 7.2 G/DL — SIGNIFICANT CHANGE UP (ref 6–8)
PROT UR-MCNC: ABNORMAL
RBC # BLD: 4.14 M/UL — LOW (ref 4.7–6.1)
RBC # FLD: 11.8 % — SIGNIFICANT CHANGE UP (ref 11.5–14.5)
RBC CASTS # UR COMP ASSIST: 5 /HPF — HIGH (ref 0–4)
SAO2 % BLDV: 62 % — SIGNIFICANT CHANGE UP
SODIUM SERPL-SCNC: 133 MMOL/L — LOW (ref 135–146)
SP GR SPEC: 1.02 — SIGNIFICANT CHANGE UP (ref 1.01–1.02)
TROPONIN T SERPL-MCNC: 0.01 NG/ML — SIGNIFICANT CHANGE UP
UROBILINOGEN FLD QL: SIGNIFICANT CHANGE UP
WBC # BLD: 7.04 K/UL — SIGNIFICANT CHANGE UP (ref 4.8–10.8)
WBC # FLD AUTO: 7.04 K/UL — SIGNIFICANT CHANGE UP (ref 4.8–10.8)
WBC UR QL: 1 /HPF — SIGNIFICANT CHANGE UP (ref 0–5)

## 2020-06-28 PROCEDURE — 70450 CT HEAD/BRAIN W/O DYE: CPT | Mod: 26,59

## 2020-06-28 PROCEDURE — 70498 CT ANGIOGRAPHY NECK: CPT | Mod: 26

## 2020-06-28 PROCEDURE — 71046 X-RAY EXAM CHEST 2 VIEWS: CPT | Mod: 26

## 2020-06-28 PROCEDURE — 99285 EMERGENCY DEPT VISIT HI MDM: CPT

## 2020-06-28 PROCEDURE — 70496 CT ANGIOGRAPHY HEAD: CPT | Mod: 26

## 2020-06-28 PROCEDURE — 93010 ELECTROCARDIOGRAM REPORT: CPT

## 2020-06-28 RX ORDER — MECLIZINE HCL 12.5 MG
25 TABLET ORAL ONCE
Refills: 0 | Status: COMPLETED | OUTPATIENT
Start: 2020-06-28 | End: 2020-06-28

## 2020-06-28 RX ORDER — MECLIZINE HCL 12.5 MG
1 TABLET ORAL
Qty: 14 | Refills: 0
Start: 2020-06-28 | End: 2020-07-04

## 2020-06-28 RX ADMIN — Medication 25 MILLIGRAM(S): at 12:26

## 2020-06-28 NOTE — ED PROVIDER NOTE - CLINICAL SUMMARY MEDICAL DECISION MAKING FREE TEXT BOX
Patient presented with vertigo-like symptoms x 4 days. (+) significantly ataxic on exam but otherwise neurovascularly intact. Obtained labs which were grossly unremarkable including no significant leukocytosis, anemia, signs of dehydration/BILLY, transaminitis or significant electrolyte abnormalities. CT head negative for hemorrhage, evidence of CVA or mass. CTA head/neck performed and negative for vertebral artery pathology or any other vascular pathologies. Patient tx in ED with significant improvement of his symptoms. Able to ambulate with ataxia resolved after tx, tolerates PO. Will discharge home with outpatient follow up. Patient agreeable with plan. Agrees to return to ED for any new or worsening symptoms.

## 2020-06-28 NOTE — ED PROVIDER NOTE - SECONDARY DIAGNOSIS.
PATIENT NAME: FRED ART    MEDICAL RECORD NUMBER: [id number]  ACCOUNT NUMBER: [id number]  ADMIT DATE: 05/30/2019  DISCHARGE DATE: 05/30/2019  ATTENDING PHYSICIAN: DAYANNA KHAN MD    ROOM #:    SERVICE: [facility]                                CARDIAC CATHETERIZATION    DATE OF PROCEDURE:  05/30/2019    PERIPHERAL ANGIOGRAM AND INTERVENTION REPORT.    PRIMARY CARE PHYSICIAN:  Timothy Villegas M.D.    INDICATIONS:  Claudication and PAD.    PROCEDURES PERFORMED:    1. Moderate sedation.  2. Selective left common femoral artery angiography via access sheath.  3. Selective right superficial femoral artery angio (distal) with nonselective  of the right pop.   4. Selective right peroneal angio via balloon catheter.  5. Selective right anterior tibial angio via balloon catheter.  6. AngioSculpt PTA of right peroneal.  7. PTA of right anterior tibial.    DESCRIPTION OF PROCEDURE:  On the morning of 05/30/2019, the patient was  brought to the catheterization laboratory at [facility] in  a fasting state.  All risks and benefits were reviewed in detail with the  patient.  Informed consent was placed in the chart.  Utilizing modified  Seldinger technique, a 5-Faroese sheath was inserted into the left common  femoral.  Angiography was done of the common femoral angiography site.  Moderate sedation utilizing Versed, fentanyl, and a small amount of Benadryl  was commenced with continuous cardiac and oxygen saturation monitoring starting  at 9:21 a.m. and ending at 11:01 a.m.  At the conclusion of the case, the  patient was transferred to the holding area without immediate complications.  A  5-Faroese IM catheter was used to engage the right common iliac and then a  Glidewire Advantage was advanced distally.  A 70 cm 6-Faroese Ronan guiding  sheath was  then advanced to the distal SFA.  Angiography was done of the distal  SFA to guide intervention of the peroneal and anterior tibial.  See details of  intervention below.  During intervention, balloon catheters were utilized to  selectively do angio in the peroneal on the right and the anterior tibial on  the right and to guide intervention.  The balloons were advanced over the wires  to selectively engage.  At the conclusion of the case, the Ronan guiding sheath  was exchanged over the wire and the arteriotomy site was closed successfully  using a Perclose closure device.     FINDINGS OF ANGIOGRAM:  Access site angiography:  The access site via the  5-Spanish sheath placed in the left common femoral was confirmed to be well  placed in the common femoral, which was a good size and no significant common  femoral disease was seen.     Right distal SFA:  There was calcific disease in the distal right SFA on the  order of 10% to 20% in the level of the adductor canal, the popliteal was  patent and of large caliber and had no angiographically significant disease.     The right posterior tibial originated from the popliteal very high and then was  occluded midcalf.  The popliteal continued down to the large anterior tibial,  which had a previously placed drug-eluting stent proximally.  The anterior  tibial had a 90% stenosis immediately distal to the drug-eluting stent and then  a more distal 90% stenosis at the level of the distal calf.  The peroneal was  smaller and had good proximal flow and 2 sequential 90% lesions distally.     PERIPHERAL INTERVENTION:  The patient received unfractionated heparin and ACTs  were kept at greater than 250 seconds.  Via the Ronan sheath, a [place]lam wire  was advanced initially distal in the peroneal and a 2.0 x 20 mm AngioSculpt  balloon catheter was then advanced to the lesion zone and a total of 3  inflations were done in the peroneal to 15 atmospheres in the proximal lesion  for 1  minute and then to 12 atmospheres for 2 minutes in the distal lesion and  then 14 atmospheres for 90 seconds again on the more proximal lesion.  Finally,  the final inflation done to 14 atmospheres was done for 2 minutes in the more  proximal lesion.  The balloon was then pulled back from the lesion zone, the  wire was removed from the lumen, and angiography was done via the balloon  catheter both before and after inflations and demonstrated reduction in luminal  stenosis from 90% to less than 10%.     Attention was then turned to the anterior tibial and a 0.018 Quick-Cross  catheter with an initially steel core wire was advanced in the lesion zone,  however, was going underneath the stent strut in the drug-eluting stent in the  proximal AT.  Subsequently, a 4 Grand tip Command wire was then advanced  through the Quick-Cross catheter that had been placed proximally in the  anterior tibial.  The wire was advanced distally and a 4.0 x 60 [place] balloon  catheter was advanced to the 90% proximal lesion immediately distal to the  drug-eluting stent and after inflation was done to 10 atmospheres for 3  minutes, the balloon was deflated and then advanced into the midportion of the  AT.  The wire was removed and angiography was again done to localize the 90%  distal AT lesion.  Once that had been completed, the wire was then placed back  through the hub after the administration of 150 mcg intra-arterial  nitroglycerin and after the wire had been replaced, the balloon was then  advanced to the distal lesion zone and an inflation was done to 8 atmospheres  for 3 minutes.  The balloon was then pulled back.  The wire was removed.  The  patient again received 150 mcg of intra-arterial nitroglycerin and angiography  was done via the balloon catheter showing complete resolution of the distal 90%  stenosis in the anterior tibial with no residual stenosis and NICK grade 3  flow.  The balloon was then pulled further back and 2 more  DSA angiograms were  done in the proximal anterior tibial selectively via the balloon that  demonstrated complete patency with no residual stenosis in either of the distal  90% lesions, no evidence of dissection.  The balloon was then removed over the  wire.  The guiding sheath was then exchanged over the wire and a Perclose  closure device was utilized to close the access site successfully.  Hemostasis  was achieved in the lab.     CONCLUSIONS:    1. High-grade right peroneal lesion with successful PTA using AngioSculpt  balloon catheter.   2. Two high-grade lesions in dominant right anterior tibial, one immediately  distal to previous drug-eluting stent and one further distal at the level of  the distal calf, both with successful percutaneous transluminal angioplasty  with no residual stenosis, NICK grade 3 flow.  No dissection and no  complications.     RECOMMENDATIONS:  Would continue with dual anti-platelet therapy as well as  medical risk factor reduction.  Outpatient followup after discharge.         _________________________________  Jerome De Luna M.D. CARDIOLOGY      CC:          SELVIN Dhillon M.D. Douglas Tomasian, M.D. DT/NILS  DD: 05/30/2019  DT: 05/31/2019  TD: 11:44  TT: 10:40  [id number]         Otitis media

## 2020-06-28 NOTE — ED PROVIDER NOTE - NS ED ROS FT
CONST: No fever, chills or bodyaches  EYES: No pain, redness, drainage or visual changes.  ENT: (+) right ear pain. No ear or nasal discharge or congestion. No sore throat  CARD: No chest pain, palpitations  RESP: No SOB, cough, hemoptysis. No hx of asthma or COPD  GI: (+) lower abdominal tightness. No N/V/D  : No urinary symptoms  MS: No joint pain, back pain or extremity pain/injury  SKIN: No rashes  NEURO: (+) dizziness. No headache, paresthesias or LOC

## 2020-06-28 NOTE — ED PROVIDER NOTE - OBJECTIVE STATEMENT
44 y/o male with a PMH of HTN, DM, hypercholesterolemia, asthma, and current cigarette smoker presents to the ED for evaluation of room spinning dizziness x 4 days, and right ear pain x 2 days. Pt states the dizziness makes him feel that he will fall over and must hold onto his surroundings to prevent him from falling. pt was seen two years ago by cardiologist and stress test was performed. pt admits to mild tightness in the lower abdomen. pt denies nausea, vomiting, visual changes, recent head trauma, chest pain, sob, lower extremity swelling or pain, n/v/d/c, recent surgery, recent hospitalizations, use of alcohol or illicit drugs, numbness, tingling, urinary or bowel retention or incontinence, neck pain, or back pain.

## 2020-06-28 NOTE — ED PROVIDER NOTE - PHYSICAL EXAMINATION
Physical Exam    Vital Signs: I have reviewed the initial vital signs.  Constitutional: well-nourished, appears stated age, no acute distress  Eyes: Conjunctiva pink, Sclera clear, PERRLA, EOMI without pain.   ENT: (+) right ear canal swelling and erythema with erythema of the right tm without bulging. post auricular tenderness without mastoid erythema or tenderness. left tm without erythema or edema. No  Oropharynx is clear with lesions. uvula midline. no tonsillar erythema, edema, or exudates. no stridor. no pta.   Cardiovascular: S1 and S2, regular rate, regular rhythm, well-perfused extremities, radial pulses equal and 2+ b/l.   Respiratory: unlabored respiratory effort, clear to auscultation bilaterally no wheezing, rales and rhonchi. pt is speaking full sentences. no accessory muscle use.   Gastrointestinal: soft, mild lower abdominal tenderness, nondistended abdomen, reducible hernia to the right of the umbilicus. no pulsatile mass, normal bowl sounds, no rebound, no guarding, negative psoas, negative obturator, negative murphys.   Musculoskeletal: supple neck, no lower extremity edema, no calf tenderness, no midline tenderness, no palpable spinal step offs  Integumentary: warm, dry, no rash. (+) vertical scar on abdomen from prior colon resection.   Neurologic: awake, alert, cranial nerves II-XII grossly intact, extremities’ motor and sensory functions grossly intact. finger to nose intact. negative pronator drift. 4/5 strength in the right upper extremity. 5/5 strength in left upper nad b/l lower extremities. (+) romberg. steady gait.

## 2020-06-28 NOTE — ED PROVIDER NOTE - ATTENDING CONTRIBUTION TO CARE
45 year old male, pmhx as documented above, presenting with vertigo x 4 days associated with right ear pain that has been worsening. Patient states the symptoms have been worsening since onset, and are not worsened by head movements and are without palliative factors. Denies having this before. Otherwise denies fevers, chest pain, dyspnea, numbness/weakness, N/V/D or any other complaints.    Vital Signs: I have reviewed the initial vital signs.  Constitutional: NAD, well-nourished, appears stated age, no acute distress.  HEENT: Airway patent, moist MM, no erythema/swelling/deformity of oral structures. EOMI, PERRLA.  CV: regular rate, regular rhythm, well-perfused extremities, 2+ b/l DP and radial pulses equal.  Lungs: BCTA, no increased WOB.  ABD: NTND, no guarding or rebound, no pulsatile mass, no hernias.   MSK: Neck supple, nontender, nl ROM, no stepoff. Chest nontender. Back nontender in TLS spine or to b/l bony structures or flanks. Ext nontender, nl rom, no deformity.   INTEG: Skin warm, dry, no rash.  NEURO: A&Ox3, normal strength, nl sensation throughout, normal speech. (+) ataxic gait  PSYCH: Calm, cooperative, normal affect and interaction.    Will obtain labs, CT head, consider CTA head/neck given significant ataxia with ambulation, symptomatic control, re-eval.

## 2020-06-28 NOTE — ED PROVIDER NOTE - PATIENT PORTAL LINK FT
You can access the FollowMyHealth Patient Portal offered by [facility] by registering at the following website: [email or web address]. By joining [employer]’s FollowMyHealth portal, you will also be able to view your health information using other applications (apps) compatible with our system.

## 2020-06-28 NOTE — ED PROVIDER NOTE - PROGRESS NOTE DETAILS
discussed radiology and lab results with pt. pt admits dizziness has resolved during ed visit. advised pt of return precaution such as worsening dizziness, fall, visual changes, numbness, tingling, inability to walk, urinary or bowel retention of incontinence, nausea, vomiting, chest pain, sob, syncope. advised to f/u with ent, and neurology. rx abx and meclizine. agreeable to dc.

## 2020-06-28 NOTE — ED PROVIDER NOTE - NSFOLLOWUPINSTRUCTIONS_ED_ALL_ED_FT
Dizziness    WHAT YOU NEED TO KNOW:    Dizziness is a feeling of being off balance or unsteady. Common causes of dizziness are an inner ear fluid imbalance or a lack of oxygen in your blood. Dizziness may be acute (lasts 3 days or less) or chronic (lasts longer than 3 days). You may have dizzy spells that last from seconds to a few hours.     DISCHARGE INSTRUCTIONS:    Return to the emergency department if:     You have a headache and a stiff neck.      You have shaking chills and a fever.       You vomit over and over with no relief.       Your vomit or bowel movements are red or black.       You have pain in your chest, back, or abdomen.       You have numbness, especially in your face, arms, or legs.       You have trouble moving your arms or legs.       You are confused.     Contact your healthcare provider if:     You have a fever.       Your symptoms do not get better with treatment.       You have questions or concerns about your condition or care.     Manage your symptoms:     Do not drive or operate heavy machinery when you are dizzy.       Get up slowly from sitting or lying down.       Drink plenty of liquids. Liquids help prevent dehydration. Ask how much liquid to drink each day and which liquids are best for you.    Follow up with your healthcare provider as directed: Write down your questions so you remember to ask them during your visits.        © Copyright [employer] 2019 All illustrations and images included in CareNotes are the copyrighted property of [employer].D.A.M., Inc. or [employer].      Otitis Media, Adult     Otitis media occurs when there is inflammation and fluid in the middle ear. Your middle ear is a part of the ear that contains bones for hearing as well as air that helps send sounds to your brain.  What are the causes?  This condition is caused by a blockage in the eustachian tube. This tube drains fluid from the ear to the back of the nose (nasopharynx). A blockage in this tube can be caused by an object or by swelling (edema) in the tube. Problems that can cause a blockage include:  A cold or other upper respiratory infection.Allergies.An irritant, such as tobacco smoke.Enlarged adenoids. The adenoids are areas of soft tissue located high in the back of the throat, behind the nose and the roof of the mouth.A mass in the nasopharynx.Damage to the ear caused by pressure changes (barotrauma).What are the signs or symptoms?  Symptoms of this condition include:  Ear pain.A fever.Decreased hearing.A headache.Tiredness (lethargy).Fluid leaking from the ear.Ringing in the ear.How is this diagnosed?  This condition is diagnosed with a physical exam. During the exam your health care provider will use an instrument called an otoscope to look into your ear and check for redness, swelling, and fluid. He or she will also ask about your symptoms.  Your health care provider may also order tests, such as:  A test to check the movement of the eardrum (pneumatic otoscopy). This test is done by squeezing a small amount of air into the ear.A test that changes air pressure in the middle ear to check how well the eardrum moves and whether the eustachian tube is working (tympanogram).How is this treated?  This condition usually goes away on its own within 3–5 days. But if the condition is caused by a bacteria infection and does not go away own its own, or keeps coming back, your health care provider may:  Prescribe antibiotic medicines to treat the infection.Prescribe or recommend medicines to control pain.Follow these instructions at home:  Take over-the-counter and prescription medicines only as told by your health care provider.If you were prescribed an antibiotic medicine, take it as told by your health care provider. Do not stop taking the antibiotic even if you start to feel better.Keep all follow-up visits as told by your health care provider. This is important.Contact a health care provider if:  You have bleeding from your nose.There is a lump on your neck.You are not getting better in 5 days.You feel worse instead of better.Get help right away if:  You have severe pain that is not controlled with medicine.You have swelling, redness, or pain around your ear.You have stiffness in your neck.A part of your face is paralyzed.The bone behind your ear (mastoid) is tender when you touch it.You develop a severe headache.Summary  Otitis media is redness, soreness, and swelling of the middle ear.This condition usually goes away on its own within 3–5 days.If the problem does not go away in 3–5 days, your health care provider may prescribe or recommend medicines to treat your symptoms.If you were prescribed an antibiotic medicine, take it as told by your health care provider.This information is not intended to replace advice given to you by your health care provider. Make sure you discuss any questions you have with your health care provider.    Document Released: 09/22/2005 Document Revised: 12/08/2017 Document Reviewed: 12/08/2017  Elsevier Interactive Patient Education © 2019 [employer]

## 2020-06-28 NOTE — ED PROVIDER NOTE - NSFOLLOWUPCLINICS_GEN_ALL_ED_FT
[facility]  Neurology  [address], [address]  [place] [address]  Phone: [phone]  Fax:   Follow Up Time: 1-3 Days    [facility]  ENT  [address], [address]nd [place] [address]  Phone: [phone]  Fax:   Follow Up Time: 1-3 Days

## 2020-07-07 ENCOUNTER — APPOINTMENT (OUTPATIENT)
Dept: INTERNAL MEDICINE | Facility: CLINIC | Age: 45
End: 2020-07-07

## 2020-09-02 ENCOUNTER — APPOINTMENT (OUTPATIENT)
Dept: ENDOCRINOLOGY | Facility: CLINIC | Age: 45
End: 2020-09-02

## 2020-09-15 ENCOUNTER — OUTPATIENT (OUTPATIENT)
Dept: OUTPATIENT SERVICES | Facility: HOSPITAL | Age: 45
LOS: 1 days | Discharge: HOME | End: 2020-09-15

## 2020-09-15 ENCOUNTER — LABORATORY RESULT (OUTPATIENT)
Age: 45
End: 2020-09-15

## 2020-09-15 DIAGNOSIS — Z98.890 OTHER SPECIFIED POSTPROCEDURAL STATES: Chronic | ICD-10-CM

## 2020-09-16 DIAGNOSIS — Z11.59 ENCOUNTER FOR SCREENING FOR OTHER VIRAL DISEASES: ICD-10-CM

## 2020-09-17 ENCOUNTER — TRANSCRIPTION ENCOUNTER (OUTPATIENT)
Age: 45
End: 2020-09-17

## 2020-09-17 ENCOUNTER — OUTPATIENT (OUTPATIENT)
Dept: OUTPATIENT SERVICES | Facility: HOSPITAL | Age: 45
LOS: 1 days | Discharge: HOME | End: 2020-09-17
Payer: MEDICAID

## 2020-09-17 ENCOUNTER — RESULT REVIEW (OUTPATIENT)
Age: 45
End: 2020-09-17

## 2020-09-17 VITALS
SYSTOLIC BLOOD PRESSURE: 173 MMHG | TEMPERATURE: 99 F | DIASTOLIC BLOOD PRESSURE: 106 MMHG | RESPIRATION RATE: 18 BRPM | HEIGHT: 68 IN | WEIGHT: 169.98 LBS | HEART RATE: 80 BPM

## 2020-09-17 VITALS
SYSTOLIC BLOOD PRESSURE: 144 MMHG | DIASTOLIC BLOOD PRESSURE: 80 MMHG | HEART RATE: 86 BPM | TEMPERATURE: 98 F | OXYGEN SATURATION: 100 % | RESPIRATION RATE: 19 BRPM

## 2020-09-17 DIAGNOSIS — R10.32 LEFT LOWER QUADRANT PAIN: ICD-10-CM

## 2020-09-17 DIAGNOSIS — K20.9 ESOPHAGITIS, UNSPECIFIED: ICD-10-CM

## 2020-09-17 DIAGNOSIS — Z98.890 OTHER SPECIFIED POSTPROCEDURAL STATES: Chronic | ICD-10-CM

## 2020-09-17 LAB — GLUCOSE BLDC GLUCOMTR-MCNC: 219 MG/DL — HIGH (ref 70–99)

## 2020-09-17 PROCEDURE — 45378 DIAGNOSTIC COLONOSCOPY: CPT | Mod: XS

## 2020-09-17 PROCEDURE — 88312 SPECIAL STAINS GROUP 1: CPT | Mod: 26

## 2020-09-17 PROCEDURE — 43239 EGD BIOPSY SINGLE/MULTIPLE: CPT

## 2020-09-17 PROCEDURE — 88305 TISSUE EXAM BY PATHOLOGIST: CPT | Mod: 26

## 2020-09-17 RX ORDER — ENOXAPARIN SODIUM 100 MG/ML
45 INJECTION SUBCUTANEOUS
Qty: 0 | Refills: 0 | DISCHARGE

## 2020-09-17 RX ORDER — TIZANIDINE 4 MG/1
2 TABLET ORAL
Qty: 0 | Refills: 0 | DISCHARGE

## 2020-09-17 RX ORDER — METHOCARBAMOL 500 MG/1
1 TABLET, FILM COATED ORAL
Qty: 0 | Refills: 0 | DISCHARGE

## 2020-09-17 RX ORDER — OMEGA-3 ACID ETHYL ESTERS 1 G
1 CAPSULE ORAL
Qty: 0 | Refills: 0 | DISCHARGE

## 2020-09-17 RX ORDER — IRBESARTAN 75 MG/1
1 TABLET ORAL
Qty: 0 | Refills: 0 | DISCHARGE

## 2020-09-17 RX ORDER — PANTOPRAZOLE SODIUM 20 MG/1
1 TABLET, DELAYED RELEASE ORAL
Qty: 0 | Refills: 0 | DISCHARGE

## 2020-09-17 NOTE — H&P PST ADULT - ASSESSMENT
46 YO M with PMH listed below comes here for EGd and colonoscopy (abdominal pain, bloating)    Rec:  EGd and colonoscopy

## 2020-09-19 LAB — SURGICAL PATHOLOGY STUDY: SIGNIFICANT CHANGE UP

## 2020-09-21 DIAGNOSIS — E78.5 HYPERLIPIDEMIA, UNSPECIFIED: ICD-10-CM

## 2020-09-21 DIAGNOSIS — K29.80 DUODENITIS WITHOUT BLEEDING: ICD-10-CM

## 2020-09-21 DIAGNOSIS — J45.909 UNSPECIFIED ASTHMA, UNCOMPLICATED: ICD-10-CM

## 2020-09-21 DIAGNOSIS — R10.9 UNSPECIFIED ABDOMINAL PAIN: ICD-10-CM

## 2020-09-21 DIAGNOSIS — F17.210 NICOTINE DEPENDENCE, CIGARETTES, UNCOMPLICATED: ICD-10-CM

## 2020-09-21 DIAGNOSIS — K64.8 OTHER HEMORRHOIDS: ICD-10-CM

## 2020-09-21 DIAGNOSIS — K29.50 UNSPECIFIED CHRONIC GASTRITIS WITHOUT BLEEDING: ICD-10-CM

## 2020-09-21 DIAGNOSIS — E11.9 TYPE 2 DIABETES MELLITUS WITHOUT COMPLICATIONS: ICD-10-CM

## 2020-09-21 DIAGNOSIS — I10 ESSENTIAL (PRIMARY) HYPERTENSION: ICD-10-CM

## 2020-09-21 DIAGNOSIS — Z79.4 LONG TERM (CURRENT) USE OF INSULIN: ICD-10-CM

## 2020-09-21 DIAGNOSIS — Z98.0 INTESTINAL BYPASS AND ANASTOMOSIS STATUS: ICD-10-CM

## 2020-09-21 DIAGNOSIS — K20.9 ESOPHAGITIS, UNSPECIFIED: ICD-10-CM

## 2020-10-12 ENCOUNTER — APPOINTMENT (OUTPATIENT)
Dept: GASTROENTEROLOGY | Facility: CLINIC | Age: 45
End: 2020-10-12
Payer: MEDICAID

## 2020-10-12 DIAGNOSIS — R10.32 LEFT LOWER QUADRANT PAIN: ICD-10-CM

## 2020-10-12 DIAGNOSIS — G89.29 LEFT LOWER QUADRANT PAIN: ICD-10-CM

## 2020-10-12 DIAGNOSIS — K29.70 GASTRITIS, UNSPECIFIED, W/OUT BLEEDING: ICD-10-CM

## 2020-10-12 DIAGNOSIS — B96.81 GASTRITIS, UNSPECIFIED, W/OUT BLEEDING: ICD-10-CM

## 2020-10-12 DIAGNOSIS — Z12.11 ENCOUNTER FOR SCREENING FOR MALIGNANT NEOPLASM OF COLON: ICD-10-CM

## 2020-10-12 PROCEDURE — 99213 OFFICE O/P EST LOW 20 MIN: CPT | Mod: 95

## 2020-10-12 NOTE — ASSESSMENT
[FreeTextEntry1] : H.pylori - given pt was only taking 2/4 meds and noncompliant, will restart treatment for 2 weeks\par - spoke to pharmacy - pt will get PPI BID and bismuth OTC\par - extended his metronidazole and tetracycline by 5 days \par - RTC in 3 weeks (after treatment), will schedule test for eradication then\par - counseled on the importance of medication compliance to decrease resistance\par \par Colon cancer screening\par - last colonoscopy was inadequate to identify small polyps, but was not done for screening\par - CF at age 48 for screening; consider two day prep \par \par Abd pain - may be 2/2 H.pylori\par - finish treatment and reassess

## 2020-10-12 NOTE — HISTORY OF PRESENT ILLNESS
[Home] : at home, [unfilled] , at the time of the visit. [Other Location: e.g. Home (Enter Location, City,State)___] : at [unfilled] [Verbal consent obtained from patient] : the patient, [unfilled] [FreeTextEntry4] : Suresh Gonzales MA [de-identified] : 45 M with hx of uncontrolled DM, HTN, HL, hx of diverticulitis s/p partial colectomy 13 years ago who presents for follow up. \par \par Pt reports for the past year he's been having episodes of severe abd pain, acid reflux, waking him up at night time. He had an EGD and colonoscopy done 9/17. EGD showed esophagitis, stomach erosions, duodenitis. Path showed H.pylori. Had one salmon colored island, c/w reflux related change. Colonoscopy with fair prep, healthy and open surgical anastomosis. \par \par I spoke to him on the phone to start him on quadruple therapy. He reported he only got the flagyl and tetracycline, has been taking them on and off for the past 5 days. I spoke to his pharmacy - the other medicines were not covered and pt was not informed.

## 2020-11-09 ENCOUNTER — APPOINTMENT (OUTPATIENT)
Dept: GASTROENTEROLOGY | Facility: CLINIC | Age: 45
End: 2020-11-09

## 2020-12-10 ENCOUNTER — APPOINTMENT (OUTPATIENT)
Dept: ENDOCRINOLOGY | Facility: CLINIC | Age: 45
End: 2020-12-10

## 2021-03-15 ENCOUNTER — APPOINTMENT (OUTPATIENT)
Dept: UROLOGY | Facility: CLINIC | Age: 46
End: 2021-03-15
Payer: MEDICAID

## 2021-03-15 PROCEDURE — 99214 OFFICE O/P EST MOD 30 MIN: CPT

## 2021-03-15 PROCEDURE — 99072 ADDL SUPL MATRL&STAF TM PHE: CPT

## 2021-03-15 NOTE — ASSESSMENT
[FreeTextEntry1] : This is a 45 year male who has diabetes, HTN, cholesterol who was seen in the ER for abdominal pain in FEb\par was told that he had microhematuria\par asymptomatic - no gross blood in the urine\par no pain with urination\par on review of previous labs -- had + microhematuria twice last year\par \par CT done -- images visualized by me - non-contrast -- IMPRESSION:\par No acute intra-abdominal or pelvic pathology \par \par \par will need CT urogram\par with bmp\par \par now with ED -- started about 5 years ago\par has never tried any medications\par 70% of the time couldn’t penetrate\par although desire remains high

## 2021-03-26 ENCOUNTER — RESULT REVIEW (OUTPATIENT)
Age: 46
End: 2021-03-26

## 2021-03-26 ENCOUNTER — OUTPATIENT (OUTPATIENT)
Dept: OUTPATIENT SERVICES | Facility: HOSPITAL | Age: 46
LOS: 1 days | Discharge: HOME | End: 2021-03-26
Payer: MEDICAID

## 2021-03-26 DIAGNOSIS — R31.29 OTHER MICROSCOPIC HEMATURIA: ICD-10-CM

## 2021-03-26 DIAGNOSIS — Z98.890 OTHER SPECIFIED POSTPROCEDURAL STATES: Chronic | ICD-10-CM

## 2021-03-26 PROCEDURE — 74178 CT ABD&PLV WO CNTR FLWD CNTR: CPT | Mod: 26

## 2021-05-03 LAB
ANION GAP SERPL CALC-SCNC: 11 MMOL/L
BUN SERPL-MCNC: 23 MG/DL
CALCIUM SERPL-MCNC: 9.9 MG/DL
CHLORIDE SERPL-SCNC: 99 MMOL/L
CO2 SERPL-SCNC: 28 MMOL/L
CREAT SERPL-MCNC: 1.5 MG/DL
GLUCOSE SERPL-MCNC: 201 MG/DL
POTASSIUM SERPL-SCNC: 5.6 MMOL/L
SODIUM SERPL-SCNC: 138 MMOL/L

## 2021-05-05 ENCOUNTER — APPOINTMENT (OUTPATIENT)
Dept: UROLOGY | Facility: CLINIC | Age: 46
End: 2021-05-05
Payer: MEDICAID

## 2021-05-05 PROCEDURE — 52000 CYSTOURETHROSCOPY: CPT

## 2021-05-05 PROCEDURE — 99213 OFFICE O/P EST LOW 20 MIN: CPT | Mod: 25

## 2021-05-05 PROCEDURE — 99072 ADDL SUPL MATRL&STAF TM PHE: CPT

## 2021-05-05 NOTE — ASSESSMENT
[FreeTextEntry1] : This is a 45 year male who has diabetes, HTN, cholesterol who was seen in the ER for abdominal pain in FEb\par was told that he had microhematuria\par asymptomatic - no gross blood in the urine\par no pain with urination\par on review of previous labs -- had + microhematuria twice last year\par \par CT done -- images visualized by me - non-contrast -- IMPRESSION:\par No acute intra-abdominal or pelvic pathology \par \par now with ED -- started about 5 years ago -- was started on sildenafl -- 50mg didn’t work very well \par has never tried any medications\par 70% of the time couldn’t penetrate\par although desire remains high. \par \par  March 2021\par Basic Metabolic Panel; - cr 1.5\par CT Urogram -- normal exam.  independent interpretation-- images visualized by me - agree with radiologist interpretation \par \par cysto done today:  normal no lesions stone or signs of bleeding\par

## 2021-05-24 ENCOUNTER — INPATIENT (INPATIENT)
Facility: HOSPITAL | Age: 46
LOS: 1 days | Discharge: HOME | End: 2021-05-26
Attending: INTERNAL MEDICINE | Admitting: INTERNAL MEDICINE
Payer: MEDICAID

## 2021-05-24 VITALS
OXYGEN SATURATION: 98 % | HEART RATE: 100 BPM | HEIGHT: 68 IN | WEIGHT: 169.98 LBS | TEMPERATURE: 98 F | SYSTOLIC BLOOD PRESSURE: 213 MMHG | RESPIRATION RATE: 18 BRPM | DIASTOLIC BLOOD PRESSURE: 108 MMHG

## 2021-05-24 DIAGNOSIS — Z98.890 OTHER SPECIFIED POSTPROCEDURAL STATES: Chronic | ICD-10-CM

## 2021-05-24 LAB
ALBUMIN SERPL ELPH-MCNC: 3.8 G/DL — SIGNIFICANT CHANGE UP (ref 3.5–5.2)
ALP SERPL-CCNC: 120 U/L — HIGH (ref 30–115)
ALT FLD-CCNC: 20 U/L — SIGNIFICANT CHANGE UP (ref 0–41)
ANION GAP SERPL CALC-SCNC: 6 MMOL/L — LOW (ref 7–14)
APTT BLD: 36.9 SEC — SIGNIFICANT CHANGE UP (ref 27–39.2)
AST SERPL-CCNC: 20 U/L — SIGNIFICANT CHANGE UP (ref 0–41)
BASOPHILS # BLD AUTO: 0.04 K/UL — SIGNIFICANT CHANGE UP (ref 0–0.2)
BASOPHILS NFR BLD AUTO: 0.5 % — SIGNIFICANT CHANGE UP (ref 0–1)
BILIRUB SERPL-MCNC: 0.3 MG/DL — SIGNIFICANT CHANGE UP (ref 0.2–1.2)
BUN SERPL-MCNC: 23 MG/DL — HIGH (ref 10–20)
CALCIUM SERPL-MCNC: 8.8 MG/DL — SIGNIFICANT CHANGE UP (ref 8.5–10.1)
CHLORIDE SERPL-SCNC: 101 MMOL/L — SIGNIFICANT CHANGE UP (ref 98–110)
CK MB CFR SERPL CALC: 9.1 NG/ML — HIGH (ref 0.6–6.3)
CK SERPL-CCNC: 325 U/L — HIGH (ref 0–225)
CO2 SERPL-SCNC: 30 MMOL/L — SIGNIFICANT CHANGE UP (ref 17–32)
CREAT SERPL-MCNC: 1.2 MG/DL — SIGNIFICANT CHANGE UP (ref 0.7–1.5)
D DIMER BLD IA.RAPID-MCNC: 87 NG/ML DDU — SIGNIFICANT CHANGE UP (ref 0–230)
EOSINOPHIL # BLD AUTO: 0.31 K/UL — SIGNIFICANT CHANGE UP (ref 0–0.7)
EOSINOPHIL NFR BLD AUTO: 4 % — SIGNIFICANT CHANGE UP (ref 0–8)
ERYTHROCYTE [SEDIMENTATION RATE] IN BLOOD: 49 MM/HR — HIGH (ref 0–10)
GLUCOSE BLDC GLUCOMTR-MCNC: 133 MG/DL — HIGH (ref 70–99)
GLUCOSE SERPL-MCNC: 191 MG/DL — HIGH (ref 70–99)
HCT VFR BLD CALC: 39.8 % — LOW (ref 42–52)
HGB BLD-MCNC: 13.3 G/DL — LOW (ref 14–18)
IMM GRANULOCYTES NFR BLD AUTO: 0.3 % — SIGNIFICANT CHANGE UP (ref 0.1–0.3)
INR BLD: 0.93 RATIO — SIGNIFICANT CHANGE UP (ref 0.65–1.3)
LYMPHOCYTES # BLD AUTO: 2.09 K/UL — SIGNIFICANT CHANGE UP (ref 1.2–3.4)
LYMPHOCYTES # BLD AUTO: 27 % — SIGNIFICANT CHANGE UP (ref 20.5–51.1)
MCHC RBC-ENTMCNC: 30.5 PG — SIGNIFICANT CHANGE UP (ref 27–31)
MCHC RBC-ENTMCNC: 33.4 G/DL — SIGNIFICANT CHANGE UP (ref 32–37)
MCV RBC AUTO: 91.3 FL — SIGNIFICANT CHANGE UP (ref 80–94)
MONOCYTES # BLD AUTO: 0.6 K/UL — SIGNIFICANT CHANGE UP (ref 0.1–0.6)
MONOCYTES NFR BLD AUTO: 7.8 % — SIGNIFICANT CHANGE UP (ref 1.7–9.3)
NEUTROPHILS # BLD AUTO: 4.67 K/UL — SIGNIFICANT CHANGE UP (ref 1.4–6.5)
NEUTROPHILS NFR BLD AUTO: 60.4 % — SIGNIFICANT CHANGE UP (ref 42.2–75.2)
NRBC # BLD: 0 /100 WBCS — SIGNIFICANT CHANGE UP (ref 0–0)
NT-PROBNP SERPL-SCNC: 195 PG/ML — SIGNIFICANT CHANGE UP (ref 0–300)
PLATELET # BLD AUTO: 186 K/UL — SIGNIFICANT CHANGE UP (ref 130–400)
POTASSIUM SERPL-MCNC: 4.3 MMOL/L — SIGNIFICANT CHANGE UP (ref 3.5–5)
POTASSIUM SERPL-SCNC: 4.3 MMOL/L — SIGNIFICANT CHANGE UP (ref 3.5–5)
PROT SERPL-MCNC: 6.3 G/DL — SIGNIFICANT CHANGE UP (ref 6–8)
PROTHROM AB SERPL-ACNC: 10.7 SEC — SIGNIFICANT CHANGE UP (ref 9.95–12.87)
RBC # BLD: 4.36 M/UL — LOW (ref 4.7–6.1)
RBC # FLD: 12 % — SIGNIFICANT CHANGE UP (ref 11.5–14.5)
SARS-COV-2 RNA SPEC QL NAA+PROBE: SIGNIFICANT CHANGE UP
SODIUM SERPL-SCNC: 137 MMOL/L — SIGNIFICANT CHANGE UP (ref 135–146)
TROPONIN T SERPL-MCNC: 0.04 NG/ML — CRITICAL HIGH
TROPONIN T SERPL-MCNC: 0.04 NG/ML — CRITICAL HIGH
WBC # BLD: 7.73 K/UL — SIGNIFICANT CHANGE UP (ref 4.8–10.8)
WBC # FLD AUTO: 7.73 K/UL — SIGNIFICANT CHANGE UP (ref 4.8–10.8)

## 2021-05-24 PROCEDURE — 93010 ELECTROCARDIOGRAM REPORT: CPT | Mod: 77

## 2021-05-24 PROCEDURE — 93010 ELECTROCARDIOGRAM REPORT: CPT

## 2021-05-24 PROCEDURE — 93970 EXTREMITY STUDY: CPT | Mod: 26

## 2021-05-24 PROCEDURE — 71046 X-RAY EXAM CHEST 2 VIEWS: CPT | Mod: 26

## 2021-05-24 PROCEDURE — 99285 EMERGENCY DEPT VISIT HI MDM: CPT

## 2021-05-24 RX ORDER — DEXTROSE 50 % IN WATER 50 %
12.5 SYRINGE (ML) INTRAVENOUS ONCE
Refills: 0 | Status: DISCONTINUED | OUTPATIENT
Start: 2021-05-24 | End: 2021-05-26

## 2021-05-24 RX ORDER — DEXTROSE 50 % IN WATER 50 %
15 SYRINGE (ML) INTRAVENOUS ONCE
Refills: 0 | Status: DISCONTINUED | OUTPATIENT
Start: 2021-05-24 | End: 2021-05-26

## 2021-05-24 RX ORDER — PANTOPRAZOLE SODIUM 20 MG/1
40 TABLET, DELAYED RELEASE ORAL ONCE
Refills: 0 | Status: COMPLETED | OUTPATIENT
Start: 2021-05-24 | End: 2021-05-24

## 2021-05-24 RX ORDER — GLUCAGON INJECTION, SOLUTION 0.5 MG/.1ML
1 INJECTION, SOLUTION SUBCUTANEOUS ONCE
Refills: 0 | Status: DISCONTINUED | OUTPATIENT
Start: 2021-05-24 | End: 2021-05-26

## 2021-05-24 RX ORDER — NICOTINE POLACRILEX 2 MG
1 GUM BUCCAL DAILY
Refills: 0 | Status: DISCONTINUED | OUTPATIENT
Start: 2021-05-24 | End: 2021-05-26

## 2021-05-24 RX ORDER — ALBUTEROL 90 UG/1
2 AEROSOL, METERED ORAL EVERY 6 HOURS
Refills: 0 | Status: DISCONTINUED | OUTPATIENT
Start: 2021-05-24 | End: 2021-05-26

## 2021-05-24 RX ORDER — PANTOPRAZOLE SODIUM 20 MG/1
40 TABLET, DELAYED RELEASE ORAL
Refills: 0 | Status: DISCONTINUED | OUTPATIENT
Start: 2021-05-24 | End: 2021-05-26

## 2021-05-24 RX ORDER — SODIUM CHLORIDE 9 MG/ML
1000 INJECTION, SOLUTION INTRAVENOUS
Refills: 0 | Status: DISCONTINUED | OUTPATIENT
Start: 2021-05-24 | End: 2021-05-26

## 2021-05-24 RX ORDER — CHOLECALCIFEROL (VITAMIN D3) 125 MCG
1 CAPSULE ORAL
Qty: 0 | Refills: 0 | DISCHARGE

## 2021-05-24 RX ORDER — INSULIN LISPRO 100/ML
15 VIAL (ML) SUBCUTANEOUS
Qty: 0 | Refills: 0 | DISCHARGE

## 2021-05-24 RX ORDER — INSULIN LISPRO 100/ML
VIAL (ML) SUBCUTANEOUS
Refills: 0 | Status: DISCONTINUED | OUTPATIENT
Start: 2021-05-24 | End: 2021-05-26

## 2021-05-24 RX ORDER — CHLORHEXIDINE GLUCONATE 213 G/1000ML
1 SOLUTION TOPICAL
Refills: 0 | Status: DISCONTINUED | OUTPATIENT
Start: 2021-05-24 | End: 2021-05-26

## 2021-05-24 RX ORDER — INSULIN GLARGINE 100 [IU]/ML
45 INJECTION, SOLUTION SUBCUTANEOUS AT BEDTIME
Refills: 0 | Status: DISCONTINUED | OUTPATIENT
Start: 2021-05-24 | End: 2021-05-26

## 2021-05-24 RX ORDER — DEXTROSE 50 % IN WATER 50 %
25 SYRINGE (ML) INTRAVENOUS ONCE
Refills: 0 | Status: DISCONTINUED | OUTPATIENT
Start: 2021-05-24 | End: 2021-05-26

## 2021-05-24 RX ORDER — INSULIN LISPRO 100/ML
10 VIAL (ML) SUBCUTANEOUS
Refills: 0 | Status: DISCONTINUED | OUTPATIENT
Start: 2021-05-24 | End: 2021-05-26

## 2021-05-24 RX ORDER — ASPIRIN/CALCIUM CARB/MAGNESIUM 324 MG
325 TABLET ORAL ONCE
Refills: 0 | Status: COMPLETED | OUTPATIENT
Start: 2021-05-24 | End: 2021-05-24

## 2021-05-24 RX ORDER — AMLODIPINE BESYLATE 2.5 MG/1
0 TABLET ORAL
Qty: 0 | Refills: 0 | DISCHARGE

## 2021-05-24 RX ORDER — ENOXAPARIN SODIUM 100 MG/ML
40 INJECTION SUBCUTANEOUS DAILY
Refills: 0 | Status: DISCONTINUED | OUTPATIENT
Start: 2021-05-24 | End: 2021-05-26

## 2021-05-24 RX ORDER — ATORVASTATIN CALCIUM 80 MG/1
20 TABLET, FILM COATED ORAL AT BEDTIME
Refills: 0 | Status: DISCONTINUED | OUTPATIENT
Start: 2021-05-24 | End: 2021-05-26

## 2021-05-24 RX ORDER — DULAGLUTIDE 4.5 MG/.5ML
0 INJECTION, SOLUTION SUBCUTANEOUS
Qty: 0 | Refills: 0 | DISCHARGE

## 2021-05-24 RX ORDER — MECLIZINE HCL 12.5 MG
25 TABLET ORAL
Refills: 0 | Status: DISCONTINUED | OUTPATIENT
Start: 2021-05-24 | End: 2021-05-26

## 2021-05-24 RX ORDER — GABAPENTIN 400 MG/1
300 CAPSULE ORAL THREE TIMES A DAY
Refills: 0 | Status: DISCONTINUED | OUTPATIENT
Start: 2021-05-24 | End: 2021-05-26

## 2021-05-24 RX ORDER — LOSARTAN POTASSIUM 100 MG/1
100 TABLET, FILM COATED ORAL DAILY
Refills: 0 | Status: DISCONTINUED | OUTPATIENT
Start: 2021-05-24 | End: 2021-05-26

## 2021-05-24 RX ADMIN — GABAPENTIN 300 MILLIGRAM(S): 400 CAPSULE ORAL at 21:50

## 2021-05-24 RX ADMIN — PANTOPRAZOLE SODIUM 40 MILLIGRAM(S): 20 TABLET, DELAYED RELEASE ORAL at 21:50

## 2021-05-24 RX ADMIN — ATORVASTATIN CALCIUM 20 MILLIGRAM(S): 80 TABLET, FILM COATED ORAL at 21:50

## 2021-05-24 RX ADMIN — Medication 325 MILLIGRAM(S): at 17:00

## 2021-05-24 NOTE — CONSULT NOTE ADULT - SUBJECTIVE AND OBJECTIVE BOX
Outpt cardiologist:    HPI:  46 year old M with PMH of type 1 diabetes on insulin, htn, Asthma, anxiety, HTN, DLD, H pylori gastritis and duodenitis,  (24 May 2021 17:16)      ---  cardio fellow additional notes:    ·	Bl le swelling x 3d  -> now resolved  ·	Orthopnea / worsening smith   ·	Intermittent cp: >1 year, usually on exertion.  However over past few days,  noticed at rest as well. Lasting ~30s-1-2min, pressure like.   Worse witih deep inspiration,  worse with laying down, midly reproducible on palpation.   ·	Last covid vax shot few weeks ago.     PAST MEDICAL & SURGICAL HISTORY  Diabetes    Asthma    Diverticulitis  surgery 12-13 yrs ago    High cholesterol    Dyslipidemia    Hypertension    History of surgery  colon resection        FAMILY HISTORY:  FAMILY HISTORY:  Family history of hypertension (Father)        SOCIAL HISTORY:  Social History:      ALLERGIES:  No Known Allergies      MEDICATIONS:  aspirin 325 milliGRAM(s) Oral Once    PRN:      HOME MEDICATIONS:  Home Medications:  albuterol 90 mcg/inh inhalation aerosol with adapter:  (17 Sep 2020 11:)  amLODIPine:  (17 Sep 2020 11:)  atorvastatin 20 mg oral tablet: 1 tab(s) orally once a day (17 Sep 2020 11:)  gabapentin 300 mg oral tablet: 1 tab(s) orally 3 times a day (17 Sep 2020 11:)  HumaLOG:  (17 Sep 2020 11:)  insulin lispro 100 units/mL injectable solution: 15 unit(s) injectable 3 times a day (17 Sep 2020 11:)  omeprazole:  (17 Sep 2020 11:)  Trulicity Pen 1.5 mg/0.5 mL subcutaneous solution:  (17 Sep 2020 11:)  Vitamin D3 2000 intl units oral capsule: 1 cap(s) orally once a day (17 Sep 2020 11:02)      VITALS:   T(F): 97.6 (-24 @ 15:51), Max: 97.6 (05-24 @ 15:51)  HR: 82 (-24 @ 15:51) (82 - 100)  BP: 173/89 (05-24 @ 15:51) (173/89 - 213/108)  BP(mean): --  RR: 18 ( @ 15:51) (18 - 18)  SpO2: 100% (- @ 15:51) (98% - 100%)    I&O's Summary      REVIEW OF SYSTEMS:  CONSTITUTIONAL: No weakness, fevers or chills  HEENT: No visual changes, neck/ear pain  RESPIRATORY: +smith / sob  CARDIOVASCULAR: See HPI + chest pressure, intermittent.    GASTROINTESTINAL: No abdominal pain. No nausea, vomiting, diarrhea   GENITOURINARY: No dysuria, frequency or hematuria  NEUROLOGICAL: No new focal deficits  SKIN: No new rashes    PHYSICAL EXAM:  General: Not in distress.  Non-toxic appearing.   HEENT: EOMI  Cardio: regular, S1, S2, no murmur  Pulm: B/L BS.  No wheezing / crackles / rales  Abdomen: Soft, non-tender, non-distended. Normoactive bowel sounds  Extremities: No significant edema b/l le (however pt has pictures of more swollen b/l le on phone from few days ago).   Neuro: A&O x3. No focal deficits    LABS:                        13.3   7.73  )-----------( 186      ( 24 May 2021 15:06 )             39.8         137  |  101  |  23<H>  ----------------------------<  191<H>  4.3   |  30  |  1.2    Ca    8.8      24 May 2021 15:06    TPro  6.3  /  Alb  3.8  /  TBili  0.3  /  DBili  x   /  AST  20  /  ALT  20  /  AlkPhos  120<H>      PT/INR - ( 24 May 2021 15:06 )   PT: 10.70 sec;   INR: 0.93 ratio         PTT - ( 24 May 2021 15:06 )  PTT:36.9 sec  Troponin T, Serum: 0.04 ng/mL *HH* (21 @ 15:06)    CARDIAC MARKERS ( 24 May 2021 15:06 )  x     / 0.04 ng/mL / x     / x     / x            Troponin trend:    Serum Pro-Brain Natriuretic Peptide: 195 pg/mL (21 @ 15:06)          RADIOLOGY:  -CXR:  -TTE:  -CCTA:  16:     LM normal  LAD: mid LAD bridge.  LCX:  mild narrowing mid Lcx (noncalcified plaque).   RCA nml    -STRESS TEST:< from: NM Nuclear Stress Pharmacologic Multiple (19 @ 10:57) >  1. NORMAL ADENOSINE / REST MYOCARDIAL PERFUSION SPECT TOMOGRAPHY, WITH NO   EVIDENCE FOR ISCHEMIA DURING ADENOSINE INFUSION.   2. NORMAL RESTING LEFT VENTRICULAR WALL MOTION AND WALL THICKENING.  3. LEFT VENTRICULAR EJECTION FRACTION OF  65 % WHICH IS WITHIN RANGE OF   NORMAL.     < end of copied text >    -CATHETERIZATION:    -OTHER:  EC Lead ECG:   Ventricular Rate 80 BPM    Atrial Rate 80 BPM    P-R Interval 156 ms    QRS Duration 84 ms    Q-T Interval 382 ms    QTC Calculation(Bazett) 440 ms    P Axis 7 degrees    R Axis 71 degrees    T Axis 86 degrees    Diagnosis Line Normal sinus rhythm  Possible Left atrial enlargement  Nonspecific ST abnormality  Abnormal ECG    Confirmed by Nasima Seay MD ([id number]) on 2021 2:45:06 PM ( @ 14:11)      TELEMETRY EVENTS:   HPI:  46 year old M with PMH of type 1 diabetes on insulin, htn, Asthma, anxiety, HTN, DLD, H pylori gastritis and duodenitis,  (24 May 2021 17:16)      ---  cardio fellow additional notes:    ·	Bl le swelling x 3d  -> now resolved  ·	Orthopnea / worsening smith   ·	Intermittent cp: >1 year, usually on exertion.  However over past few days,  noticed at rest as well. Lasting ~30s-1-2min, pressure like.   Worse witih deep inspiration,  worse with laying down, midly reproducible on palpation.   ·	Last covid vax shot few weeks ago.       PAST MEDICAL & SURGICAL HISTORY  Diabetes    Asthma    Diverticulitis  surgery 12-13 yrs ago    High cholesterol    Dyslipidemia    Hypertension    History of surgery  colon resection      FAMILY HISTORY:  Family history of hypertension (Father)    No Known Allergies      MEDICATIONS  (STANDING):  atorvastatin 20 milliGRAM(s) Oral at bedtime  chlorhexidine 4% Liquid 1 Application(s) Topical <User Schedule>  dextrose 40% Gel 15 Gram(s) Oral once  dextrose 5%. 1000 milliLiter(s) (50 mL/Hr) IV Continuous <Continuous>  dextrose 5%. 1000 milliLiter(s) (100 mL/Hr) IV Continuous <Continuous>  dextrose 50% Injectable 25 Gram(s) IV Push once  dextrose 50% Injectable 12.5 Gram(s) IV Push once  dextrose 50% Injectable 25 Gram(s) IV Push once  enoxaparin Injectable 40 milliGRAM(s) SubCutaneous daily  gabapentin 300 milliGRAM(s) Oral three times a day  glucagon  Injectable 1 milliGRAM(s) IntraMuscular once  insulin glargine Injectable (LANTUS) 45 Unit(s) SubCutaneous at bedtime  insulin lispro (ADMELOG) corrective regimen sliding scale   SubCutaneous three times a day before meals  insulin lispro Injectable (ADMELOG) 10 Unit(s) SubCutaneous three times a day before meals  losartan 100 milliGRAM(s) Oral daily  nicotine -   7 mG/24Hr(s) Patch 1 Patch Transdermal daily  pantoprazole    Tablet 40 milliGRAM(s) Oral before breakfast      Home Medications:  albuterol 90 mcg/inh inhalation aerosol with adapter:  (17 Sep 2020 11:02)  amLODIPine:  (17 Sep 2020 11:02)  atorvastatin 20 mg oral tablet: 1 tab(s) orally once a day (17 Sep 2020 11:02)  gabapentin 300 mg oral tablet: 1 tab(s) orally 3 times a day (17 Sep 2020 11:02)  HumaLOG:  (17 Sep 2020 11:02)  insulin lispro 100 units/mL injectable solution: 15 unit(s) injectable 3 times a day (17 Sep 2020 11:02)  omeprazole:  (17 Sep 2020 11:02)  Trulicity Pen 1.5 mg/0.5 mL subcutaneous solution:  (17 Sep 2020 11:02)  Vitamin D3 2000 intl units oral capsule: 1 cap(s) orally once a day (17 Sep 2020 11:02)      REVIEW OF SYSTEMS:  CONSTITUTIONAL: No fever, weight loss, fatigue  NECK: No pain or stiffness  RESPIRATORY: See HPI  CARDIOVASCULAR: See HPI  GASTROINTESTINAL: No abdominal/epigastric pain, nausea, vomiting, hematemesis, diarrhea, constipation, melena or hematochezia  GENITOURINARY: No dysuria, frequency, hematuria, incontinence  NEUROLOGICAL: No headaches, memory loss, loss of strength, numbness, tremors  SKIN: No itching, burning, rashes, lesions   ENDOCRINE: No heat/cold intolerance or hair loss  MUSCULOSKELETAL: No joint pain or swelling  HEME/LYMPH: No easy bruising or bleeding gums    VITALS:   T(F): 97.6 (05-24 @ 15:51), Max: 97.6 (05-24 @ 15:51)  HR: 82 (05-24 @ 15:51) (82 - 100)  BP: 173/89 (05-24 @ 15:51) (173/89 - 213/108)  BP(mean): --  RR: 18 (05-24 @ 15:51) (18 - 18)  SpO2: 100% (05-24 @ 15:51) (98% - 100%)    PHYSICAL EXAM:  General: NAD, AAOx3  HEENT: NCAT, EOMI  Neck: supple, no JVD  CV: RRR, S1S2 nl, no murmurs, no edema  Respiratory: CTA bilaterally, no wheeze, rales or rhonchi	  Abdomen: soft, NT/ND, +BS  Extremities: warm, ROM nl, 2+ PP bilaterally  Neuro: Nonfocal      LABS:                        13.3   7.73  )-----------( 186      ( 24 May 2021 15:06 )             39.8     05-24    137  |  101  |  23<H>  ----------------------------<  191<H>  4.3   |  30  |  1.2    Ca    8.8      24 May 2021 15:06    TPro  6.3  /  Alb  3.8  /  TBili  0.3  /  DBili  x   /  AST  20  /  ALT  20  /  AlkPhos  120<H>  05-24    PT/INR - ( 24 May 2021 15:06 )   PT: 10.70 sec;   INR: 0.93 ratio    PTT - ( 24 May 2021 15:06 )  PTT:36.9 sec    Troponin T, Serum: 0.04 ng/mL *HH* (05-24-21 @ 15:06)    Serum Pro-Brain Natriuretic Peptide: 195 pg/mL (05-24-21 @ 15:06)        -CCTA:  8/'16:     LM normal  LAD: mid LAD bridge.  LCX:  mild narrowing mid Lcx (noncalcified plaque).   RCA nml    -STRESS TEST:< from: NM Nuclear Stress Pharmacologic Multiple (12.03.19 @ 10:57) >  1. NORMAL ADENOSINE / REST MYOCARDIAL PERFUSION SPECT TOMOGRAPHY, WITH NO   EVIDENCE FOR ISCHEMIA DURING ADENOSINE INFUSION.   2. NORMAL RESTING LEFT VENTRICULAR WALL MOTION AND WALL THICKENING.  3. LEFT VENTRICULAR EJECTION FRACTION OF  65 % WHICH IS WITHIN RANGE OF   NORMAL.     < end of copied text >

## 2021-05-24 NOTE — ED ADULT NURSE NOTE - OBJECTIVE STATEMENT
Patient presents to ED with complaints of bilateral lower extremity swelling and SOB for last 3 days.  Patient reports orthopnea and dypsnea on exertion as well as cough. Current smoker. No fever/chills, abd pain, nausea/vomiting.

## 2021-05-24 NOTE — H&P ADULT - HISTORY OF PRESENT ILLNESS
46 year old M with PMH of type 1 diabetes on insulin, htn, Asthma, anxiety, HTN, DLD, H pylori gastritis and duodenitis,  46 year old M with PMH of type 1 diabetes on insulin, htn, Asthma, anxiety, HTN, DLD, H pylori gastritis and duodenitis (never treated), active smoker presented with dyspnea worsening on exertion for 1 week. HPI goes back 5 days ago where he noticed B/L LE edema worsening to the point where he couldnt walk because he was feeling heavy and tight. He then noticed shortness of breath worsening with exertion , sometimes at rest along with orthopnea, chest discomfort / epigastric burning pain. He also mentions he has epigastric pain for few months which worsens with food intake.  He denies any palpitations, lightheadedness nausea, vomiting, has regular BMs.    Of note, he mentions he usually has some chest discomfort since years for which he had pharmacological stress test 4-5 years ago and it was normal. He reports this SUNSHINE was something new which prompted him to ER.    ER Course:  Vital Signs Last 24 Hrs  T(F): 97.6 (24 May 2021 15:51), Max: 97.6 (24 May 2021 15:51)  HR: 82 (24 May 2021 15:51) (82 - 100)  BP: 173/89 (24 May 2021 15:51) (173/89 - 213/108)  RR: 18 (24 May 2021 15:51) (18 - 18)  SpO2: 100% (24 May 2021 15:51) (98% - 100%)    Labs:  - trop: 0.04, Hb: 13    EKG: T wave inversion in AVL    He was given 325mg of ASA in ER, cardio was consulted to r/o ACS   46 year old M with PMH of type 1 diabetes on insulin, htn, Asthma, anxiety, HTN, DLD, H pylori gastritis and duodenitis (never treated), active smoker presented with dyspnea worsening on exertion for 1 week. HPI goes back 5 days ago where he noticed B/L LE edema worsening to the point where he couldnt walk because he was feeling heavy and tight. He then noticed shortness of breath worsening with exertion , sometimes at rest along with orthopnea, chest discomfort / epigastric burning pain. He tried to use inhalor , it didnt help. He also mentions he has epigastric pain for few months which worsens with food intake.  He denies any palpitations, lightheadedness nausea, vomiting, has regular BMs.    Of note, he mentions he usually has some chest discomfort since years for which he had pharmacological stress test 4-5 years ago and it was normal. He reports this SUNSHINE was something new which prompted him to ER.    ER Course:  Vital Signs Last 24 Hrs  T(F): 97.6 (24 May 2021 15:51), Max: 97.6 (24 May 2021 15:51)  HR: 82 (24 May 2021 15:51) (82 - 100)  BP: 173/89 (24 May 2021 15:51) (173/89 - 213/108)  RR: 18 (24 May 2021 15:51) (18 - 18)  SpO2: 100% (24 May 2021 15:51) (98% - 100%)    Labs:  - trop: 0.04, Hb: 13    EKG: T wave inversion in AVL    He was given 325mg of ASA in ER, cardio was consulted to r/o ACS

## 2021-05-24 NOTE — H&P ADULT - ASSESSMENT
46 year old M with PMH of type 1 diabetes on insulin, htn, Asthma, anxiety, HTN, DLD, H pylori gastritis and duodenitis, presented with SUNSHINE admitted for ACS workup.    #SUNSHINE r/o ACS  - ddx: unstable angina / r/o acs  - EKG: T wave inversions with AVL leads  - trop on admission : 0.04  - serial ekg / ce x 3, if trop trending up , needs therauptic AC and cardio eval STAT  - check tte  - cardiology eval    # Poorly controlled T1DM with neuropathy  - last A1c 11.3  - pt on Insulin and tresiba at home, element of non compliance  - fingerstick of 415 in ED with no anion gap, no change in mental status  - start Lantus 45 units and Lispro 15 units pre-meal and c/w correctional sliding scale  - c/w gabapentin 300 mg TID    # HTN  - pt was on irbesartan 300mg at home, start losartan 100 mg q24    # Asthma - stable  - c/w Ventolin prn    # DLD  - c/w lipitor 20 mg qhs    # DVT PPx: Hep s/c  # Diet: carb consistent/DASH  # GI ppx: protonx 40mg q 24  # Dispo: from home   46 year old M with PMH of type 1 diabetes on insulin, htn, Asthma, anxiety, HTN, DLD, H pylori gastritis and duodenitis (never treated), active smoker presented with dyspnea worsening on exertion for 1 week,  admitted for ACS workup.    #SUNSHINE with epigastric pain r/o ACS  - ddx: unstable angina / r/o acs vs underlying H pylori + gastritis and duodenitis  - EKG: T wave inversions with AVL leads  - trop on admission : 0.04  - serial ekg / ce x 3, if trop trending up , needs therauptic AC and cardio eval STAT, if trop normal/downtrending may need exercise stress test  - check tte  - cardiology eval  - start protonix, patient needs H pylori treatment   -  check A1c, lipid profile    #Active smoker  - smoking cessation advised  - patient currently quitting but smokes 1 pack /day  - nicotine patch ordered (pt agreeable)    # Poorly controlled T1DM with neuropathy  - last A1c 14.4 (2019)  - pt on Insulin and tresiba at home  - start Lantus 45 units and Lispro 10 units pre-meal and c/w correctional sliding scale  - c/w gabapentin 300 mg TID  - monitor FS,  check A1c, lipid profile    # HTN  - pt was on irbesartan 300mg at home, start losartan 100 mg q24    # Asthma - stable  - c/w Ventolin prn    # DLD  - c/w lipitor 20 mg qhs    # DVT PPx: lovenox  # Diet: carb consistent/DASH  # GI ppx: protonx 40mg q 24  # Dispo: from home   46 year old M with PMH of type 1 diabetes on insulin, CAD, htn, Asthma, anxiety, HTN, DLD, H pylori gastritis and duodenitis (never treated), active smoker presented with dyspnea worsening on exertion for 1 week,  admitted for ACS workup.    #SUNSHINE with epigastric pain r/o ACS  #CAD (mild Lx disease on CCTA 2016)  - ddx: unstable angina / r/o acs vs underlying H pylori + gastritis and duodenitis  - EKG: T wave inversions with AVL leads  - trop on admission : 0.04  - serial ekg / ce x 3, if trop trending up , needs therauptic AC and cardio eval STAT, if trop normal/downtrending may need exercise stress test  - check tte  - cardiology eval  - fu ddimer, VA doppler LE  - fu  esr / crp  - start protonix, patient needs H pylori treatment   -  check A1c, lipid profile    #Active smoker  - smoking cessation advised  - patient currently quitting but smokes 1 pack /day  - nicotine patch ordered (pt agreeable)    # Poorly controlled T1DM with neuropathy  - last A1c 14.4 (2019)  - pt on Insulin and tresiba at home  - start Lantus 45 units and Lispro 10 units pre-meal and c/w correctional sliding scale  - c/w gabapentin 300 mg TID  - monitor FS,  check A1c, lipid profile    # HTN  - pt was on irbesartan 300mg at home, start losartan 100 mg q24    # Asthma - stable  - c/w Ventolin prn    # DLD  - c/w lipitor 20 mg qhs    # DVT PPx: lovenox  # Diet: carb consistent/DASH  # GI ppx: protonx 40mg q 24  # Dispo: from home

## 2021-05-24 NOTE — H&P ADULT - NSHPPHYSICALEXAM_GEN_ALL_CORE
PHYSICAL EXAM:  GENERAL: NAD, AAO x 4, 46y M  HEAD:  Atraumatic, Normocephalic  EYES: EOMI, conjunctiva and sclera white  NECK: Supple, No JVD  CHEST/LUNG: Clear to auscultation bilaterally; No wheeze; No crackles; No accessory muscles used  HEART: Regular rate and rhythm; No murmurs;   ABDOMEN: Soft, Nontender, Nondistended; Bowel sounds present; No guarding, No organomegaly  EXTREMITIES:  2+ Peripheral Pulses, No cyanosis or edema  NEUROLOGY: non-focal PHYSICAL EXAM:  GENERAL: NAD, AAO x 4, 46y M  HEAD:  Atraumatic, Normocephalic  EYES: EOMI, conjunctiva and sclera white  NECK: Supple, No JVD  CHEST/LUNG: Clear to auscultation bilaterally; No wheeze; No crackles; No accessory muscles used  HEART: Regular rate and rhythm; No murmurs;   ABDOMEN: epigastric tenderness + Soft, Nondistended; Bowel sounds present; No guarding, No organomegaly  EXTREMITIES:  2+ Peripheral Pulses, No cyanosis or edema  NEUROLOGY: non-focal

## 2021-05-24 NOTE — H&P ADULT - NSHPREVIEWOFSYSTEMS_GEN_ALL_CORE
CONSTITUTIONAL: No weakness, fevers or chills  EYES/ENT: No visual changes;  No vertigo or throat pain   NECK: No pain or stiffness  RESPIRATORY: No cough, wheezing, hemoptysis; No shortness of breath  CARDIOVASCULAR: No chest pain or palpitations  GASTROINTESTINAL: No abdominal or epigastric pain. No nausea, vomiting, or hematemesis; No diarrhea or constipation. No melena or hematochezia.  GENITOURINARY: No dysuria, frequency or hematuria  NEUROLOGICAL: No numbness or weakness  SKIN: No itching, rashes Anxious/Appropriate

## 2021-05-24 NOTE — ED PROVIDER NOTE - PHYSICAL EXAMINATION
CONSTITUTIONAL: Well-appearing; well-nourished; in no apparent distress.   EYES: PERRL; EOM intact.   ENT: normal nose; no rhinorrhea; normal pharynx with no tonsillar hypertrophy.   NECK: Supple; non-tender; no cervical lymphadenopathy.  CARDIOVASCULAR: Normal S1, S2; no murmurs, rubs, or gallops. Equal radial pulses  RESPIRATORY: Normal chest excursion with respiration; breath sounds clear and equal bilaterally; no wheezes, rhonchi, or rales.  GI/: Normal bowel sounds; non-distended; non-tender; no palpable organomegaly.   MS: No evidence of trauma or deformity. Normal ROM in all four extremities; non-tender to palpation; distal pulses are normal.   Extrem: no peripheral edema. NO calf ttp  SKIN: Normal for age and race; warm; dry; good turgor; no apparent lesions or exudate.   NEURO/PSYCH: A & O x 4; grossly unremarkable. mood and manner are appropriate. Grooming and personal hygiene are appropriate.

## 2021-05-24 NOTE — H&P ADULT - NSHPLABSRESULTS_GEN_ALL_CORE
13.3   7.73  )-----------( 186      ( 24 May 2021 15:06 )             39.8       05-24    137  |  101  |  23<H>  ----------------------------<  191<H>  4.3   |  30  |  1.2    Ca    8.8      24 May 2021 15:06    TPro  6.3  /  Alb  3.8  /  TBili  0.3  /  DBili  x   /  AST  20  /  ALT  20  /  AlkPhos  120<H>  05-24          PT/INR - ( 24 May 2021 15:06 )   PT: 10.70 sec;   INR: 0.93 ratio         PTT - ( 24 May 2021 15:06 )  PTT:36.9 sec  CARDIAC MARKERS ( 24 May 2021 15:06 )  x     / 0.04 ng/mL / x     / x     / x          POCT Blood Glucose.: 175 mg/dL (24 May 2021 15:15)    Colonoscopy (09.17.20 @ 10:30)     Internal hemorrhoids.    Surgical anastomosis noted to be healthy at 35cm from anal verge.     EGD (09.17.20 @ 10:30)     Grade 1 esophagitis in the gastroesophageal junction compatible with  nonspecific erosive esophagitis.    GE junction noted at 37 cm and salmon colored island patch was noted at 32 cm  s/p cold forcep biopsy>.showed reflux related changes   Erosions in the stomach compatible with erosive gastritis. (Biopsy: H pylori positive).    Erythema in the duodenum compatible with duodenitis. (Biopsy: H pylori positive).

## 2021-05-24 NOTE — H&P ADULT - NSHPSOCIALHISTORY_GEN_ALL_CORE
Marital Status:  (   )    ( x  ) Single    (   )    (  )   Lives with: ( x ) alone  (  ) children   (  ) spouse   (  ) parents  (  ) other  Recent Travel: No recent travel    Substance Use (street drugs): ( x ) never used  (  ) other:  Tobacco Usage:  (   ) never smoked   (   ) former smoker   ( x  ) current smoker  (   25  ) pack year  Alcohol Usage: occasional  Baseline mobility: fully functional

## 2021-05-24 NOTE — H&P ADULT - ATTENDING COMMENTS
45 YO M with a PMH of DM1, HTN, asthma, anxiety, DLD, and H. pylori gastritis/duodenitis who presents to the hospital with a c/o progressively worsening SUNSHINE for the past x 1 week. Associated with B/L LE swelling (resolved prior to arrival in the ED) and epigastric CP. + orthopnea, - non-productive cough. Sleeps on 4 pillows. Takes medications daily. Denies any CP, palpitations, N/V/D, ABD pain, dysuria, or rashes.     In the ED, Chest X-ray shows no acute process (pending official read). The cardiac enzymes were elevated (.04) and EKG showed early repol with std I and AVL similar to 6/’20 and 9/’10. ASA given in the ED.     Physical exam shows pt in NAD. VSS, afebrile, not hypoxic on RA. A&Ox3. Non-focal neuro exam. Muscle strength/sensation intact. CTA B/L. RRR, no M/G/R. ABD is soft and non-tender, normoactive BSs. B/L LEs with no swelling No rashes. Labs and radiology as above.    Chest pain, atypical, rule out ACS. Telem admit. Serial cardiac enzymes and EKGs. PRN pain management. ASA given in ED. Echo. Cardio is following. A1c, TSH, and Lipid panel. Restart home meds.     Diabetes mellitus with hyperglycemia. A1c. FSs. Insulin PRN.     Tobacco abuse with counseling. Pt extensively counseled on the benefits of smoking cessation and alternative therapies. Counseled for 15-20 minutes. Pt would like to think about their options prior to making a decision.    HX of HTN, asthma, anxiety, DLD, and H. pylori gastritis/duodenitis. Restart home meds, except as stated above. DVT PPX. Inform PCP of pt's admission to hospital. My note supersedes the residents note.

## 2021-05-24 NOTE — CONSULT NOTE ADULT - ASSESSMENT
IMPRESSION  Atypical chest pain (pleuritic, positional)  Elevated troponin level x 1 (.04)  HTN, DL, DM, asthma, current smoker  ----  Ekg:	early repol with std I and AVL similar to 6/’20 and 9/’10  Echo:	1/’11:  ef 55-65. G1dd.    Stress:	12/’19:  adenosine stress: nL  	8/’16:  CCTA with mid LAD bridge + mid LCX mild narrowing dt noncalcified plaque.   ----  Labs:	trop .04 ?   	Bnp 195      PLAN  sp asa load in ed  trend ce  (repeat again 8pm,  11:30pm) >> if uptrending >> would also load plavix / hep gtt acs protocol  check tte  check a1c, lipid panel  check ddimer  check v duplex ble.   check esr / crp  continue statin, losartan   if ce stable / unchanged >> exercise stress for atypical pains with mult cardiac risk factors.   if ce uptrending >> cardiac cath for nstemi IMPRESSION  Atypical chest pain (pleuritic, positional)  Elevated troponin level x 1 (.04)  CAD (based on CCTA 2016 with mild lcx disease)  HTN, DL, DM, asthma, current smoker  ----  Ekg:	early repol with std I and AVL similar to 6/’20 and 9/’10  Echo:	1/’11:  ef 55-65. G1dd.    Stress:	12/’19:  adenosine stress: nL  	8/’16:  CCTA with mid LAD bridge + mid LCX mild narrowing dt noncalcified plaque.   ----  Labs:	trop .04 ?   	Bnp 195      PLAN  sp asa load in ed  trend ce  (repeat again 8pm,  11:30pm) >> if uptrending >> would also load plavix / hep gtt acs protocol  check tte  check a1c, lipid panel  check ddimer  check v duplex ble.   check esr / crp  continue statin, losartan   if ce stable / unchanged >> exercise stress for atypical pains with mult cardiac risk factors.   if ce uptrending >> cardiac cath for nstemi IMPRESSION  Atypical chest pain (pleuritic, positional)  Elevated troponin level x 1 (.04)  CAD (based on CCTA 2016 with mild lcx disease)  HTN, DL, DM, asthma, current smoker  ----  Ekg:	early repol with std I and AVL similar to 6/’20 and 9/’10  Echo:	1/’11:  ef 55-65. G1dd.    Stress:	12/’19:  adenosine stress: nL  	8/’16:  CCTA with mid LAD bridge + mid LCX mild narrowing dt noncalcified plaque.   ----  Labs:	trop .04 ?   	Bnp 195      PLAN  sp asa load in ed  trend ce  (repeat again 8pm,  11:30pm) >> if uptrending >> would also load plavix / hep gtt acs protocol  check tte  check a1c, lipid panel  check ddimer  check v duplex ble.   check esr / crp  continue statin, losartan

## 2021-05-24 NOTE — ED PROVIDER NOTE - OBJECTIVE STATEMENT
46 year old M with hx of HTN, DM, DLD, asthma c/o b/l LE swelling and sob.  Sts 3 days ago noticed b/l LE swelling. Pt has been elevating legs and swelling has since resolved. Also notes orthopnea, sts has been having to sleeping sitting up on couch and has had SUNSHINE. + cough. Pt also notes intermittent cp x past 6 months. + current smoker. No fever/chills, abd pain, nausea/vomiting, hx of DVT/PE, recent travel/sx/immobilizations, cardiac hx, significant family hx, sick contacts.

## 2021-05-24 NOTE — ED PROVIDER NOTE - ATTENDING CONTRIBUTION TO CARE
46 year old M with hx of HTN, DM, DLD, asthma c/o b/l LE swelling and sob.  sx have been going on for ~3 days. leg swelling has improved w/ elevation.  + orthopnea and decrease in exertional tolerance.  pt endorsess 6 months of intermittent chest pain. no ap, n,v,fevers, chills. no hx vte. + smoker. no known cardiac hx    vss  gen- NAD, aaox3  card-rrr  lungs-ctab, no wheezing or rhonchi  abd-sntnd, no guarding or rebound  neuro- full str/sensation, cn ii-xii grossly intact, normal coordination   LE- trace ankle edema b/l, no calf ttp or erythema    c/f acs, heart failure, less likely anemia/lyte abn  ekg borderline w/ <1mm SCOTTIE III  basic labs, ekg, trop, bnp, cxr  cards prn

## 2021-05-24 NOTE — ED PROVIDER NOTE - NS ED ROS FT
Constitutional: no fever, chills, no recent weight loss, change in appetite or malaise  Eyes: no redness/discharge/pain/vision changes  ENT: no rhinorrhea/ear pain/sore throat  Cardiac: + cp. No palpitations  Respiratory: + cough and smith. No respiratory distress  GI: No nausea, vomiting, diarrhea or abdominal pain.  : No dysuria, frequency, urgency or hematuria  MS: no pain to back or extremities, no loss of ROM, no weakness  Neuro: No headache or weakness. No LOC.  Skin: No skin rash.  Endocrine: No history of thyroid disease or diabetes.  Except as documented in the HPI, all other systems are negative.

## 2021-05-24 NOTE — CONSULT NOTE ADULT - ATTENDING COMMENTS
Chest pain with mixed features  Multiple cardiac risk factors    Adenosine NST (12/2019):  No evidence of stress induced ischemia  CCTA (8/2016):  LCx <50% (NCP), CACS 0    COVID negative on 5/24    Troponin  0.04 (flat)  Cr  1.2    Exercise NST to r/o ischemia  Echo Progressive exertional dyspnea for one year likely anginal equivalent  Chest pain with mixed features  No CHF    Risk Factors:  HTN, DM, HLD, current smoker    CXR:  Clear lungs  Adenosine NST (12/2019):  No evidence of stress induced ischemia  CCTA (8/2016):  LCx <50% (NCP), CACS 0    COVID negative on 5/24    Troponin  0.04 (flat)  CK MB  9.1  Cr  1.2  ESR  49    Cardiac cath today to r/o obstructive CAD - Dr. Seay  Keep NPO after breakfast  Aspirin / statin    Plan discussed with patient and housestaff.

## 2021-05-25 LAB
A1C WITH ESTIMATED AVERAGE GLUCOSE RESULT: 8.4 % — HIGH (ref 4–5.6)
ALBUMIN SERPL ELPH-MCNC: 3.7 G/DL — SIGNIFICANT CHANGE UP (ref 3.5–5.2)
ALP SERPL-CCNC: 120 U/L — HIGH (ref 30–115)
ALT FLD-CCNC: 21 U/L — SIGNIFICANT CHANGE UP (ref 0–41)
ANION GAP SERPL CALC-SCNC: 6 MMOL/L — LOW (ref 7–14)
AST SERPL-CCNC: 21 U/L — SIGNIFICANT CHANGE UP (ref 0–41)
BASOPHILS # BLD AUTO: 0.04 K/UL — SIGNIFICANT CHANGE UP (ref 0–0.2)
BASOPHILS NFR BLD AUTO: 0.7 % — SIGNIFICANT CHANGE UP (ref 0–1)
BILIRUB SERPL-MCNC: 0.3 MG/DL — SIGNIFICANT CHANGE UP (ref 0.2–1.2)
BUN SERPL-MCNC: 19 MG/DL — SIGNIFICANT CHANGE UP (ref 10–20)
CALCIUM SERPL-MCNC: 8.9 MG/DL — SIGNIFICANT CHANGE UP (ref 8.5–10.1)
CHLORIDE SERPL-SCNC: 102 MMOL/L — SIGNIFICANT CHANGE UP (ref 98–110)
CHOLEST SERPL-MCNC: 257 MG/DL — HIGH
CK MB CFR SERPL CALC: 8.5 NG/ML — HIGH (ref 0.6–6.3)
CK SERPL-CCNC: 305 U/L — HIGH (ref 0–225)
CO2 SERPL-SCNC: 31 MMOL/L — SIGNIFICANT CHANGE UP (ref 17–32)
COVID-19 SPIKE DOMAIN AB INTERP: POSITIVE
COVID-19 SPIKE DOMAIN ANTIBODY RESULT: >250 U/ML — HIGH
CREAT SERPL-MCNC: 1.2 MG/DL — SIGNIFICANT CHANGE UP (ref 0.7–1.5)
CRP SERPL-MCNC: <3 MG/L — SIGNIFICANT CHANGE UP
EOSINOPHIL # BLD AUTO: 0.34 K/UL — SIGNIFICANT CHANGE UP (ref 0–0.7)
EOSINOPHIL NFR BLD AUTO: 5.7 % — SIGNIFICANT CHANGE UP (ref 0–8)
ESTIMATED AVERAGE GLUCOSE: 194 MG/DL — HIGH (ref 68–114)
GLUCOSE BLDC GLUCOMTR-MCNC: 100 MG/DL — HIGH (ref 70–99)
GLUCOSE BLDC GLUCOMTR-MCNC: 172 MG/DL — HIGH (ref 70–99)
GLUCOSE BLDC GLUCOMTR-MCNC: 268 MG/DL — HIGH (ref 70–99)
GLUCOSE BLDC GLUCOMTR-MCNC: 79 MG/DL — SIGNIFICANT CHANGE UP (ref 70–99)
GLUCOSE BLDC GLUCOMTR-MCNC: 93 MG/DL — SIGNIFICANT CHANGE UP (ref 70–99)
GLUCOSE SERPL-MCNC: 167 MG/DL — HIGH (ref 70–99)
HCT VFR BLD CALC: 40.5 % — LOW (ref 42–52)
HDLC SERPL-MCNC: 43 MG/DL — SIGNIFICANT CHANGE UP
HGB BLD-MCNC: 13.3 G/DL — LOW (ref 14–18)
IMM GRANULOCYTES NFR BLD AUTO: 0.2 % — SIGNIFICANT CHANGE UP (ref 0.1–0.3)
LIPID PNL WITH DIRECT LDL SERPL: 144 MG/DL — HIGH
LYMPHOCYTES # BLD AUTO: 1.67 K/UL — SIGNIFICANT CHANGE UP (ref 1.2–3.4)
LYMPHOCYTES # BLD AUTO: 28.2 % — SIGNIFICANT CHANGE UP (ref 20.5–51.1)
MCHC RBC-ENTMCNC: 30.4 PG — SIGNIFICANT CHANGE UP (ref 27–31)
MCHC RBC-ENTMCNC: 32.8 G/DL — SIGNIFICANT CHANGE UP (ref 32–37)
MCV RBC AUTO: 92.7 FL — SIGNIFICANT CHANGE UP (ref 80–94)
MONOCYTES # BLD AUTO: 0.45 K/UL — SIGNIFICANT CHANGE UP (ref 0.1–0.6)
MONOCYTES NFR BLD AUTO: 7.6 % — SIGNIFICANT CHANGE UP (ref 1.7–9.3)
NEUTROPHILS # BLD AUTO: 3.41 K/UL — SIGNIFICANT CHANGE UP (ref 1.4–6.5)
NEUTROPHILS NFR BLD AUTO: 57.6 % — SIGNIFICANT CHANGE UP (ref 42.2–75.2)
NON HDL CHOLESTEROL: 214 MG/DL — HIGH
NRBC # BLD: 0 /100 WBCS — SIGNIFICANT CHANGE UP (ref 0–0)
PLATELET # BLD AUTO: 190 K/UL — SIGNIFICANT CHANGE UP (ref 130–400)
POTASSIUM SERPL-MCNC: 4.5 MMOL/L — SIGNIFICANT CHANGE UP (ref 3.5–5)
POTASSIUM SERPL-SCNC: 4.5 MMOL/L — SIGNIFICANT CHANGE UP (ref 3.5–5)
PROT SERPL-MCNC: 6.3 G/DL — SIGNIFICANT CHANGE UP (ref 6–8)
RBC # BLD: 4.37 M/UL — LOW (ref 4.7–6.1)
RBC # FLD: 11.9 % — SIGNIFICANT CHANGE UP (ref 11.5–14.5)
SARS-COV-2 IGG+IGM SERPL QL IA: >250 U/ML — HIGH
SARS-COV-2 IGG+IGM SERPL QL IA: POSITIVE
SODIUM SERPL-SCNC: 139 MMOL/L — SIGNIFICANT CHANGE UP (ref 135–146)
TRIGL SERPL-MCNC: 378 MG/DL — HIGH
TROPONIN T SERPL-MCNC: 0.04 NG/ML — CRITICAL HIGH
WBC # BLD: 5.92 K/UL — SIGNIFICANT CHANGE UP (ref 4.8–10.8)
WBC # FLD AUTO: 5.92 K/UL — SIGNIFICANT CHANGE UP (ref 4.8–10.8)

## 2021-05-25 PROCEDURE — 99406 BEHAV CHNG SMOKING 3-10 MIN: CPT

## 2021-05-25 PROCEDURE — 93458 L HRT ARTERY/VENTRICLE ANGIO: CPT | Mod: 26

## 2021-05-25 PROCEDURE — 99223 1ST HOSP IP/OBS HIGH 75: CPT

## 2021-05-25 PROCEDURE — 99223 1ST HOSP IP/OBS HIGH 75: CPT | Mod: 25

## 2021-05-25 RX ORDER — METOPROLOL TARTRATE 50 MG
12.5 TABLET ORAL
Refills: 0 | Status: DISCONTINUED | OUTPATIENT
Start: 2021-05-25 | End: 2021-05-26

## 2021-05-25 RX ORDER — ASPIRIN/CALCIUM CARB/MAGNESIUM 324 MG
81 TABLET ORAL DAILY
Refills: 0 | Status: DISCONTINUED | OUTPATIENT
Start: 2021-05-25 | End: 2021-05-26

## 2021-05-25 RX ORDER — SODIUM CHLORIDE 9 MG/ML
1000 INJECTION, SOLUTION INTRAVENOUS
Refills: 0 | Status: DISCONTINUED | OUTPATIENT
Start: 2021-05-25 | End: 2021-05-26

## 2021-05-25 RX ADMIN — Medication 2: at 08:28

## 2021-05-25 RX ADMIN — Medication 10 UNIT(S): at 08:28

## 2021-05-25 RX ADMIN — ATORVASTATIN CALCIUM 20 MILLIGRAM(S): 80 TABLET, FILM COATED ORAL at 21:57

## 2021-05-25 RX ADMIN — GABAPENTIN 300 MILLIGRAM(S): 400 CAPSULE ORAL at 14:52

## 2021-05-25 RX ADMIN — LOSARTAN POTASSIUM 100 MILLIGRAM(S): 100 TABLET, FILM COATED ORAL at 05:35

## 2021-05-25 RX ADMIN — SODIUM CHLORIDE 75 MILLILITER(S): 9 INJECTION, SOLUTION INTRAVENOUS at 12:12

## 2021-05-25 RX ADMIN — GABAPENTIN 300 MILLIGRAM(S): 400 CAPSULE ORAL at 05:35

## 2021-05-25 RX ADMIN — INSULIN GLARGINE 45 UNIT(S): 100 INJECTION, SOLUTION SUBCUTANEOUS at 21:57

## 2021-05-25 RX ADMIN — GABAPENTIN 300 MILLIGRAM(S): 400 CAPSULE ORAL at 21:57

## 2021-05-25 RX ADMIN — PANTOPRAZOLE SODIUM 40 MILLIGRAM(S): 20 TABLET, DELAYED RELEASE ORAL at 05:39

## 2021-05-25 NOTE — MEDICAL STUDENT PROGRESS NOTE(EDUCATION) - NS MD HP STUD ASPLAN PLAN FT
46 year old M with PMH of type 1 diabetes on insulin, CAD, htn, Asthma, anxiety, HTN, DLD, H pylori gastritis and duodenitis (never treated), active smoker presented with dyspnea worsening on exertion for 1 week,  admitted for ACS workup.    #SUNSHINE with epigastric pain r/o ACS  #CAD (mild Lx disease on CCTA 2016)  - ddx: unstable angina / r/o acs vs underlying H pylori + gastritis and duodenitis  - EKG: T wave inversions with AVL leads  - trop 0.04 x3  - CKMB 9.1 -> 8.5  -  -> 305  - will discuss with Cardiology need for cath vs. exercise stress test  - check tte  - Ddimer 87  - ESR 49  - VA doppler LE negative for DVT or superficial thrombophlebitis  - start protonix, patient needs H pylori treatment   - check A1c, lipid profile    #Active smoker  - smoking cessation advised  - hx of heavy smoking but currently quitting (smokes 1 pack every 3 weeks)  - nicotine patch ordered    # Poorly controlled T1DM with neuropathy  - last A1c 14.4 (2019)  - pt on Insulin and tresiba at home  - start Lantus 45 units and Lispro 10 units pre-meal and c/w correctional sliding scale  - c/w gabapentin 300 mg TID  - monitor FS, check A1c, lipid profile    # HTN  - pt was on irbesartan 300mg at home, start losartan 100 mg q24    # Asthma - stable  - c/w Ventolin prn    # DLD  - c/w lipitor 20 mg qhs    # DVT PPx: lovenox  # Diet: carb consistent/DASH  # GI ppx: protonx 40mg q 24  # Dispo: from home   46 year old M with PMH of type 1 diabetes on insulin, CAD, htn, Asthma, anxiety, HTN, DLD, H pylori gastritis and duodenitis (never treated), active smoker presented with dyspnea worsening on exertion for 1 week,  admitted for ACS workup.    #SUNSHINE with epigastric pain r/o ACS  #CAD (mild Lx disease on CCTA 2016)  - ddx: unstable angina / r/o acs vs underlying H pylori + gastritis and duodenitis  - EKG: T wave inversions with AVL leads (present on EKG from 3/2021)  - trop 0.04 x3  - CKMB 9.1 -> 8.5  -  -> 305  - A1c 8.4  - lipid profile , , , cholesterol 257  - Cardiology following, plan for cath today, NPO and start IVF  - check tte  - Ddimer 87  - ESR 49  - VA doppler LE negative for DVT or superficial thrombophlebitis  - start protonix, patient needs H pylori treatment     #Active smoker  - smoking cessation advised  - hx of heavy smoking but currently quitting (smokes 1 pack every 3 weeks)  - nicotine patch ordered    # Poorly controlled T1DM with neuropathy  - last A1c 14.4 (2019)  - pt on Insulin and tresiba at home  - start Lantus 45 units, Lispro 10 units and correctional sliding scale  - c/w gabapentin 300 mg TID  - monitor FS    # HTN  - pt was on irbesartan 300mg at home, start losartan 100 mg q24    # Asthma - stable  - c/w Ventolin prn    # DLD  - c/w lipitor 20 mg qhs    # DVT PPx: lovenox  # Diet: carb consistent/DASH  # GI ppx: protonx 40mg q 24  # Dispo: from home

## 2021-05-25 NOTE — MEDICAL STUDENT PROGRESS NOTE(EDUCATION) - SUBJECTIVE AND OBJECTIVE BOX
SUBJECTIVE:    Patient is a 46y old Male who presents with a chief complaint of SUNSHINE (24 May 2021 17:56)    Currently admitted to medicine with the primary diagnosis of NSTEMI (non-ST elevated myocardial infarction)    Today is hospital day 1d. This morning he is resting comfortably in bed and reports no new issues or overnight events. Reports his LE edema has resolved but still has SOB and orthopnea. Denies chest pain, palpitation, nausea or vomiting.    PAST MEDICAL & SURGICAL HISTORY  Diabetes    Asthma    Diverticulitis  surgery 12-13 yrs ago    High cholesterol    Dyslipidemia    Hypertension    History of surgery  colon resection      SOCIAL HISTORY:  Negative for smoking/alcohol/drug use.     ALLERGIES:  No Known Allergies    MEDICATIONS:  STANDING MEDICATIONS  atorvastatin 20 milliGRAM(s) Oral at bedtime  chlorhexidine 4% Liquid 1 Application(s) Topical <User Schedule>  dextrose 40% Gel 15 Gram(s) Oral once  dextrose 5%. 1000 milliLiter(s) IV Continuous <Continuous>  dextrose 5%. 1000 milliLiter(s) IV Continuous <Continuous>  dextrose 50% Injectable 25 Gram(s) IV Push once  dextrose 50% Injectable 12.5 Gram(s) IV Push once  dextrose 50% Injectable 25 Gram(s) IV Push once  enoxaparin Injectable 40 milliGRAM(s) SubCutaneous daily  gabapentin 300 milliGRAM(s) Oral three times a day  glucagon  Injectable 1 milliGRAM(s) IntraMuscular once  insulin glargine Injectable (LANTUS) 45 Unit(s) SubCutaneous at bedtime  insulin lispro (ADMELOG) corrective regimen sliding scale   SubCutaneous three times a day before meals  insulin lispro Injectable (ADMELOG) 10 Unit(s) SubCutaneous three times a day before meals  losartan 100 milliGRAM(s) Oral daily  nicotine -   7 mG/24Hr(s) Patch 1 Patch Transdermal daily  pantoprazole    Tablet 40 milliGRAM(s) Oral before breakfast    PRN MEDICATIONS  ALBUTerol    90 MICROgram(s) HFA Inhaler 2 Puff(s) Inhalation every 6 hours PRN  meclizine 25 milliGRAM(s) Oral two times a day PRN    VITALS:   T(F): 96.2  HR: 72  BP: 152/78  RR: 17  SpO2: 99%    LABS:                        13.3   5.92  )-----------( 190      ( 25 May 2021 07:37 )             40.5     05-25    139  |  102  |  19  ----------------------------<  167<H>  4.5   |  31  |  1.2    Ca    8.9      25 May 2021 07:37    TPro  6.3  /  Alb  3.7  /  TBili  0.3  /  DBili  x   /  AST  21  /  ALT  21  /  AlkPhos  120<H>  05-25    PT/INR - ( 24 May 2021 15:06 )   PT: 10.70 sec;   INR: 0.93 ratio         PTT - ( 24 May 2021 15:06 )  PTT:36.9 sec      Creatine Kinase, Serum: 305 U/L *H* (05-25-21 @ 00:10)  Troponin T, Serum: 0.04 ng/mL *HH* (05-25-21 @ 00:10)  Creatine Kinase, Serum: 325 U/L *H* (05-24-21 @ 21:33)  Troponin T, Serum: 0.04 ng/mL *HH* (05-24-21 @ 21:33)  Sedimentation Rate, Erythrocyte: 49 mm/Hr *H* (05-24-21 @ 21:33)  Troponin T, Serum: 0.04 ng/mL *HH* (05-24-21 @ 15:06)      CARDIAC MARKERS ( 25 May 2021 00:10 )  x     / 0.04 ng/mL / 305 U/L / x     / 8.5 ng/mL  CARDIAC MARKERS ( 24 May 2021 21:33 )  x     / 0.04 ng/mL / 325 U/L / x     / 9.1 ng/mL  CARDIAC MARKERS ( 24 May 2021 15:06 )  x     / 0.04 ng/mL / x     / x     / x          RADIOLOGY:    EXAM:  DUPLEX SCAN EXT VEINS LOWER BI     (05/24/2021)  ******PRELIMINARY REPORT******    Impression:  No evidence of deep venous thrombosis or superficial thrombophlebitis in the bilateral lower extremities.      STRESS TEST:< from: NM Nuclear Stress Pharmacologic Multiple (12.03.19 @ 10:57) >  1. NORMAL ADENOSINE / REST MYOCARDIAL PERFUSION SPECT TOMOGRAPHY, WITH NO   EVIDENCE FOR ISCHEMIA DURING ADENOSINE INFUSION.   2. NORMAL RESTING LEFT VENTRICULAR WALL MOTION AND WALL THICKENING.  3. LEFT VENTRICULAR EJECTION FRACTION OF  65 % WHICH IS WITHIN RANGE OF   NORMAL.     < end of copied text >      PHYSICAL EXAM:  GEN: No acute distress  LUNGS: Clear to auscultation bilaterally   HEART: regular, S1, S2, no murmur  ABD: Soft, non-tender, non-distended.  EXT: No significant edema b/l le  NEURO: AAOX3

## 2021-05-25 NOTE — CHART NOTE - NSCHARTNOTEFT_GEN_A_CORE
Patient seen by Nocturnist today, will discuss with Cardiology need for cath vs. exercise stress test, continue telemetry, discussed with Resident Physician Patient seen with Cardiologist, Dr. PEREZ Seay, present by bedside, will proceed with cardiac cath today, keep NPO. Discussed case with Resident Physician.

## 2021-05-25 NOTE — CHART NOTE - NSCHARTNOTEFT_GEN_A_CORE
PRE-OP DIAGNOSIS: suspected CAD     PROCEDURE: [facility] with coronary angiography    Physician: Dr Seay   Assistant: Lino    ANESTHESIA TYPE:  [  ]General Anesthesia  [  ] Sedation  [  x] Local/Regional    ESTIMATED BLOOD LOSS:    10   mL    CONDITION  [  ] Critical  [  ] Serious  [  ]Fair  [  x]Good      SPECIMENS REMOVED (IF APPLICABLE): N/A      IV CONTRAST:     50        mL      IMPLANTS (IF APPLICABLE)      FINDINGS    Left Heart Catheterization:  LVEF%: 60  LVEDP: mild elevation         LEFT HEART CATHETERIZATION                                    Left main normal     LAD:     Ostial 30-40%,    Prox: mild disease, mid moderate disease distal mild disease                Diag: patent     Left Circumflex: Prox minor irregularities Distal 50% lesion   OM:  normal small     Right Coronary Artery: medium size minor irregularities  RPDA small patent     Ramus Intermed: severe disease small    DOMINANCE: Right    ACCESS: right radial   CLOSURE: D stat     INTERVENTION  none       POST-OP DIAGNOSIS  nonobstructive CAD         PLAN OF CARE    [x ] Return to In-patient bed  ASA B-blocker & Statin therapy  IV hydration       Results of procedure/ plan of care discussed with patient/  in detail.

## 2021-05-26 ENCOUNTER — TRANSCRIPTION ENCOUNTER (OUTPATIENT)
Age: 46
End: 2021-05-26

## 2021-05-26 VITALS
HEART RATE: 82 BPM | RESPIRATION RATE: 18 BRPM | DIASTOLIC BLOOD PRESSURE: 83 MMHG | SYSTOLIC BLOOD PRESSURE: 138 MMHG | TEMPERATURE: 97 F

## 2021-05-26 LAB
ANION GAP SERPL CALC-SCNC: 10 MMOL/L — SIGNIFICANT CHANGE UP (ref 7–14)
BASOPHILS # BLD AUTO: 0.04 K/UL — SIGNIFICANT CHANGE UP (ref 0–0.2)
BASOPHILS NFR BLD AUTO: 0.5 % — SIGNIFICANT CHANGE UP (ref 0–1)
BUN SERPL-MCNC: 21 MG/DL — HIGH (ref 10–20)
CALCIUM SERPL-MCNC: 8.8 MG/DL — SIGNIFICANT CHANGE UP (ref 8.5–10.1)
CHLORIDE SERPL-SCNC: 103 MMOL/L — SIGNIFICANT CHANGE UP (ref 98–110)
CO2 SERPL-SCNC: 27 MMOL/L — SIGNIFICANT CHANGE UP (ref 17–32)
CREAT SERPL-MCNC: 1.3 MG/DL — SIGNIFICANT CHANGE UP (ref 0.7–1.5)
EOSINOPHIL # BLD AUTO: 0.34 K/UL — SIGNIFICANT CHANGE UP (ref 0–0.7)
EOSINOPHIL NFR BLD AUTO: 4.3 % — SIGNIFICANT CHANGE UP (ref 0–8)
GLUCOSE BLDC GLUCOMTR-MCNC: 152 MG/DL — HIGH (ref 70–99)
GLUCOSE BLDC GLUCOMTR-MCNC: 192 MG/DL — HIGH (ref 70–99)
GLUCOSE BLDC GLUCOMTR-MCNC: 215 MG/DL — HIGH (ref 70–99)
GLUCOSE BLDC GLUCOMTR-MCNC: 232 MG/DL — HIGH (ref 70–99)
GLUCOSE BLDC GLUCOMTR-MCNC: 54 MG/DL — CRITICAL LOW (ref 70–99)
GLUCOSE BLDC GLUCOMTR-MCNC: 91 MG/DL — SIGNIFICANT CHANGE UP (ref 70–99)
GLUCOSE SERPL-MCNC: 68 MG/DL — LOW (ref 70–99)
HCT VFR BLD CALC: 40.3 % — LOW (ref 42–52)
HGB BLD-MCNC: 13.5 G/DL — LOW (ref 14–18)
IMM GRANULOCYTES NFR BLD AUTO: 0.3 % — SIGNIFICANT CHANGE UP (ref 0.1–0.3)
LYMPHOCYTES # BLD AUTO: 2.44 K/UL — SIGNIFICANT CHANGE UP (ref 1.2–3.4)
LYMPHOCYTES # BLD AUTO: 30.5 % — SIGNIFICANT CHANGE UP (ref 20.5–51.1)
MAGNESIUM SERPL-MCNC: 2 MG/DL — SIGNIFICANT CHANGE UP (ref 1.8–2.4)
MCHC RBC-ENTMCNC: 30.9 PG — SIGNIFICANT CHANGE UP (ref 27–31)
MCHC RBC-ENTMCNC: 33.5 G/DL — SIGNIFICANT CHANGE UP (ref 32–37)
MCV RBC AUTO: 92.2 FL — SIGNIFICANT CHANGE UP (ref 80–94)
MONOCYTES # BLD AUTO: 0.61 K/UL — HIGH (ref 0.1–0.6)
MONOCYTES NFR BLD AUTO: 7.6 % — SIGNIFICANT CHANGE UP (ref 1.7–9.3)
NEUTROPHILS # BLD AUTO: 4.54 K/UL — SIGNIFICANT CHANGE UP (ref 1.4–6.5)
NEUTROPHILS NFR BLD AUTO: 56.8 % — SIGNIFICANT CHANGE UP (ref 42.2–75.2)
NRBC # BLD: 0 /100 WBCS — SIGNIFICANT CHANGE UP (ref 0–0)
PLATELET # BLD AUTO: 188 K/UL — SIGNIFICANT CHANGE UP (ref 130–400)
POTASSIUM SERPL-MCNC: 4.1 MMOL/L — SIGNIFICANT CHANGE UP (ref 3.5–5)
POTASSIUM SERPL-SCNC: 4.1 MMOL/L — SIGNIFICANT CHANGE UP (ref 3.5–5)
RBC # BLD: 4.37 M/UL — LOW (ref 4.7–6.1)
RBC # FLD: 12.1 % — SIGNIFICANT CHANGE UP (ref 11.5–14.5)
SODIUM SERPL-SCNC: 140 MMOL/L — SIGNIFICANT CHANGE UP (ref 135–146)
WBC # BLD: 7.99 K/UL — SIGNIFICANT CHANGE UP (ref 4.8–10.8)
WBC # FLD AUTO: 7.99 K/UL — SIGNIFICANT CHANGE UP (ref 4.8–10.8)

## 2021-05-26 PROCEDURE — 99233 SBSQ HOSP IP/OBS HIGH 50: CPT

## 2021-05-26 PROCEDURE — 93306 TTE W/DOPPLER COMPLETE: CPT | Mod: 26

## 2021-05-26 RX ORDER — DEXTROSE 50 % IN WATER 50 %
50 SYRINGE (ML) INTRAVENOUS ONCE
Refills: 0 | Status: COMPLETED | OUTPATIENT
Start: 2021-05-26 | End: 2021-05-26

## 2021-05-26 RX ORDER — ASPIRIN/CALCIUM CARB/MAGNESIUM 324 MG
1 TABLET ORAL
Qty: 0 | Refills: 0 | DISCHARGE
Start: 2021-05-26

## 2021-05-26 RX ORDER — MECLIZINE HCL 12.5 MG
1 TABLET ORAL
Qty: 60 | Refills: 3
Start: 2021-05-26 | End: 2021-09-22

## 2021-05-26 RX ORDER — NIFEDIPINE 30 MG
1 TABLET, EXTENDED RELEASE 24 HR ORAL
Qty: 30 | Refills: 3
Start: 2021-05-26 | End: 2021-09-22

## 2021-05-26 RX ORDER — MECLIZINE HCL 12.5 MG
25 TABLET ORAL ONCE
Refills: 0 | Status: DISCONTINUED | OUTPATIENT
Start: 2021-05-26 | End: 2021-05-26

## 2021-05-26 RX ORDER — METOPROLOL TARTRATE 50 MG
12.5 TABLET ORAL
Qty: 0 | Refills: 0 | DISCHARGE
Start: 2021-05-26

## 2021-05-26 RX ORDER — MECLIZINE HCL 12.5 MG
12.5 TABLET ORAL ONCE
Refills: 0 | Status: DISCONTINUED | OUTPATIENT
Start: 2021-05-26 | End: 2021-05-26

## 2021-05-26 RX ORDER — MECLIZINE HCL 12.5 MG
25 TABLET ORAL ONCE
Refills: 0 | Status: COMPLETED | OUTPATIENT
Start: 2021-05-26 | End: 2021-05-26

## 2021-05-26 RX ORDER — NICOTINE POLACRILEX 2 MG
1 GUM BUCCAL
Qty: 30 | Refills: 3
Start: 2021-05-26 | End: 2021-09-22

## 2021-05-26 RX ORDER — INSULIN LISPRO 100/ML
6 VIAL (ML) SUBCUTANEOUS
Refills: 0 | Status: DISCONTINUED | OUTPATIENT
Start: 2021-05-26 | End: 2021-05-26

## 2021-05-26 RX ADMIN — Medication 25 MILLIGRAM(S): at 06:07

## 2021-05-26 RX ADMIN — Medication 6 UNIT(S): at 17:33

## 2021-05-26 RX ADMIN — ENOXAPARIN SODIUM 40 MILLIGRAM(S): 100 INJECTION SUBCUTANEOUS at 11:24

## 2021-05-26 RX ADMIN — GABAPENTIN 300 MILLIGRAM(S): 400 CAPSULE ORAL at 13:09

## 2021-05-26 RX ADMIN — PANTOPRAZOLE SODIUM 40 MILLIGRAM(S): 20 TABLET, DELAYED RELEASE ORAL at 05:31

## 2021-05-26 RX ADMIN — Medication 12.5 MILLIGRAM(S): at 17:33

## 2021-05-26 RX ADMIN — LOSARTAN POTASSIUM 100 MILLIGRAM(S): 100 TABLET, FILM COATED ORAL at 05:31

## 2021-05-26 RX ADMIN — Medication 12.5 MILLIGRAM(S): at 05:31

## 2021-05-26 RX ADMIN — GABAPENTIN 300 MILLIGRAM(S): 400 CAPSULE ORAL at 05:31

## 2021-05-26 RX ADMIN — Medication 81 MILLIGRAM(S): at 11:24

## 2021-05-26 RX ADMIN — Medication 25 MILLIGRAM(S): at 19:11

## 2021-05-26 RX ADMIN — Medication 1 PATCH: at 18:47

## 2021-05-26 RX ADMIN — Medication 1 PATCH: at 11:23

## 2021-05-26 NOTE — DISCHARGE NOTE NURSING/CASE MANAGEMENT/SOCIAL WORK - PATIENT PORTAL LINK FT
You can access the FollowMyHealth Patient Portal offered by [facility] by registering at the following website: [email or web address]. By joining [facility]’s FollowMyHealth portal, you will also be able to view your health information using other applications (apps) compatible with our system.

## 2021-05-26 NOTE — CHART NOTE - NSCHARTNOTEFT_GEN_A_CORE
<<<RESIDENT DISCHARGE NOTE>>>     BRINDA MOYER  MRN-[id number]    VITAL SIGNS:  T(F): 97.2 (05-26-21 @ 13:06), Max: 97.2 (05-26-21 @ 13:06)  HR: 82 (05-26-21 @ 13:06)  BP: 138/83 (05-26-21 @ 13:06)  SpO2: 98% (05-26-21 @ 06:40)      PHYSICAL EXAMINATION:  GEN: NAD, Resting comfortably in bed  PULM: Clear to auscultation bilaterally, No wheezes  CVS: Regular rate and rhythm, S1-S2, no murmurs  ABD: Soft, non-tender, non-distended, no guarding  EXT: No edema  NEURO: AAOx3, no focal deficits    TEST RESULTS:                        13.5   7.99  )-----------( 188      ( 26 May 2021 06:10 )             40.3       05-26    140  |  103  |  21<H>  ----------------------------<  68<L>  4.1   |  27  |  1.3    Ca    8.8      26 May 2021 06:10  Mg     2.0     05-26    TPro  6.3  /  Alb  3.7  /  TBili  0.3  /  DBili  x   /  AST  21  /  ALT  21  /  AlkPhos  120<H>  05-25      Patient seen and evaluated at bedside. No overnight acute events. Patient is ready for discharge.    FINAL DISCHARGE INTERVIEW:  Resident(s) Present: Prem Meyer MD    DISPOSITION:   [ x ] Home,    [  ] Home with Visiting Nursing Services,   [    ]  SNF/ NH,    [   ] Acute Rehab (4A),   [   ] Other (Specify:_________)

## 2021-05-26 NOTE — DISCHARGE NOTE PROVIDER - PROVIDER TOKENS
PROVIDER:[TOKEN:[[id number]],FOLLOWUP:[2 weeks]],PROVIDER:[TOKEN:[[id number]],FOLLOWUP:[1 week]]

## 2021-05-26 NOTE — PROGRESS NOTE ADULT - ASSESSMENT
46 year old M with PMH of type diabetes on insulin, htn, Asthma, anxiety, HTN, DLD, H pylori gastritis and duodenitis (never treated), active smoker presented with dyspnea worsening on exertion for 1 week.     NSTEMI   Non obstructive CAD  DM-2 / HTN / DL  Anxiety  Asthma           PLAN:    ·	 46 year old M with PMH of type diabetes on insulin, htn, Asthma, anxiety, HTN, DLD, H pylori gastritis and duodenitis (never treated), active smoker presented with dyspnea worsening on exertion for 1 week.     NSTEMI type 2  Hypertensive urgency on admission  Non obstructive CAD  DM-2 / HTN / DL  Anxiety  Asthma           PLAN:    ·	Pt had BP of 213/108 on admission. Still running high BP. Add Amlodipine 60 mg po daily  ·	S/P cath. Non obst CAD  ·	Cont ASA 81 mg po daily. Increase Lipitor to 40 mg po qhs. Cont Metoprolol  ·	Cont Irbesartan on D/C  ·	Will cont his other home meds on D/C  ·	Check ECHO before discharge.   ·	D/C planning.     * Plan of care D/W the pt. Will review med rec with the intern. Time spent 38 minutes.

## 2021-05-26 NOTE — PROGRESS NOTE ADULT - SUBJECTIVE AND OBJECTIVE BOX
MOYERBRINDA SMITH  46y Male    CHIEF COMPLAINT:    Patient is a 46y old  Male who presents with a chief complaint of SUNSHINE (24 May 2021 17:56)      INTERVAL HPI/OVERNIGHT EVENTS:    Patient seen and examined.    ROS: All other systems are negative.    Vital Signs:    T(F): 96.1 (21 @ 05:41), Max: 97 (21 @ 14:30)  HR: 73 (21 @ 05:41) (70 - 88)  BP: 153/79 (21 @ 05:41) (134/67 - 157/93)  RR: 18 (21 @ 05:41) (17 - 18)  SpO2: 98% (21 @ 06:40) (98% - 98%)  I&O's Summary    25 May 2021 07:01  -  26 May 2021 07:00  --------------------------------------------------------  IN: 450 mL / OUT: 600 mL / NET: -150 mL      Daily     Daily Weight in k.6 (26 May 2021 05:41)  CAPILLARY BLOOD GLUCOSE      POCT Blood Glucose.: 152 mg/dL (26 May 2021 06:30)  POCT Blood Glucose.: 54 mg/dL (26 May 2021 05:40)  POCT Blood Glucose.: 91 mg/dL (26 May 2021 02:45)  POCT Blood Glucose.: 268 mg/dL (25 May 2021 21:50)  POCT Blood Glucose.: 100 mg/dL (25 May 2021 17:10)  POCT Blood Glucose.: 93 mg/dL (25 May 2021 11:51)  POCT Blood Glucose.: 79 mg/dL (25 May 2021 11:23)  POCT Blood Glucose.: 172 mg/dL (25 May 2021 08:13)      PHYSICAL EXAM:    GENERAL:  NAD  SKIN: No rashes or lesions  HENT: Atraumatic. Normocephalic. PERRL. Moist membranes.  NECK: Supple, No JVD. No lymphadenopathy.  PULMONARY: CTA B/L. No wheezing. No rales  CVS: Normal S1, S2. Rate and Rhythm are regular. No murmurs.  ABDOMEN/GI: Soft, Nontender, Nondistended; BS present  EXTREMITIES: Peripheral pulses intact. No edema B/L LE.  NEUROLOGIC:  No motor or sensory deficit.  PSYCH: Alert & oriented x 3    Consultant(s) Notes Reviewed:  [x ] YES  [ ] NO  Care Discussed with Consultants/Other Providers [ x] YES  [ ] NO    EKG reviewed  Telemetry reviewed    LABS:                        13.3   5.92  )-----------( 190      ( 25 May 2021 07:37 )             40.5         139  |  102  |  19  ----------------------------<  167<H>  4.5   |  31  |  1.2    Ca    8.9      25 May 2021 07:37    TPro  6.3  /  Alb  3.7  /  TBili  0.3  /  DBili  x   /  AST  21  /  ALT  21  /  AlkPhos  120<H>      PT/INR - ( 24 May 2021 15:06 )   PT: 10.70 sec;   INR: 0.93 ratio         PTT - ( 24 May 2021 15:06 )  PTT:36.9 sec  Serum Pro-Brain Natriuretic Peptide: 195 pg/mL (21 @ 15:06)    Trop 0.04, CKMB 8.5, , 21 @ 00:10  Trop 0.04, CKMB 9.1, , 21 @ 21:33  Trop 0.04, CKMB --, CK --, 21 @ 15:06        RADIOLOGY & ADDITIONAL TESTS:      Imaging or report Personally Reviewed:  [ ] YES  [ ] NO    Medications:  Standing  aspirin enteric coated 81 milliGRAM(s) Oral daily  atorvastatin 20 milliGRAM(s) Oral at bedtime  chlorhexidine 4% Liquid 1 Application(s) Topical <User Schedule>  dextrose 40% Gel 15 Gram(s) Oral once  dextrose 5% + sodium chloride 0.45%. 1000 milliLiter(s) IV Continuous <Continuous>  dextrose 5%. 1000 milliLiter(s) IV Continuous <Continuous>  dextrose 5%. 1000 milliLiter(s) IV Continuous <Continuous>  dextrose 50% Injectable 25 Gram(s) IV Push once  dextrose 50% Injectable 12.5 Gram(s) IV Push once  dextrose 50% Injectable 25 Gram(s) IV Push once  enoxaparin Injectable 40 milliGRAM(s) SubCutaneous daily  gabapentin 300 milliGRAM(s) Oral three times a day  glucagon  Injectable 1 milliGRAM(s) IntraMuscular once  losartan 100 milliGRAM(s) Oral daily  metoprolol tartrate 12.5 milliGRAM(s) Oral two times a day  nicotine -   7 mG/24Hr(s) Patch 1 Patch Transdermal daily  pantoprazole    Tablet 40 milliGRAM(s) Oral before breakfast    PRN Meds  ALBUTerol    90 MICROgram(s) HFA Inhaler 2 Puff(s) Inhalation every 6 hours PRN  meclizine 25 milliGRAM(s) Oral two times a day PRN      Case discussed with resident    Care discussed with pt/family           COMFORT BRINDA  46y Male    CHIEF COMPLAINT:    Patient is a 46y old  Male who presents with a chief complaint of SUNSHINE (24 May 2021 17:56)      INTERVAL HPI/OVERNIGHT EVENTS:    Patient seen and examined. Complains that he is feeling on & off dizziness. No cp. No sob. No palpitations. BP runs high    ROS: All other systems are negative.    Vital Signs:    T(F): 96.1 (21 @ 05:41), Max: 97 (21 @ 14:30)  HR: 73 (21 @ 05:41) (70 - 88)  BP: 153/79 (21 @ 05:41) (134/67 - 157/93)  RR: 18 (21 @ 05:41) (17 - 18)  SpO2: 98% (21 @ 06:40) (98% - 98%)  I&O's Summary    25 May 2021 07:01  -  26 May 2021 07:00  --------------------------------------------------------  IN: 450 mL / OUT: 600 mL / NET: -150 mL      Daily     Daily Weight in k.6 (26 May 2021 05:41)  CAPILLARY BLOOD GLUCOSE      POCT Blood Glucose.: 152 mg/dL (26 May 2021 06:30)  POCT Blood Glucose.: 54 mg/dL (26 May 2021 05:40)  POCT Blood Glucose.: 91 mg/dL (26 May 2021 02:45)  POCT Blood Glucose.: 268 mg/dL (25 May 2021 21:50)  POCT Blood Glucose.: 100 mg/dL (25 May 2021 17:10)  POCT Blood Glucose.: 93 mg/dL (25 May 2021 11:51)  POCT Blood Glucose.: 79 mg/dL (25 May 2021 11:23)  POCT Blood Glucose.: 172 mg/dL (25 May 2021 08:13)      PHYSICAL EXAM:    GENERAL:  NAD  SKIN: No rashes or lesions  HENT: Atraumatic. Normocephalic. PERRL. Moist membranes.  NECK: Supple, No JVD. No lymphadenopathy.  PULMONARY: CTA B/L. No wheezing. No rales  CVS: Normal S1, S2. Rate and Rhythm are regular. No murmurs.  ABDOMEN/GI: Soft, Nontender, Nondistended; BS present  EXTREMITIES: Peripheral pulses intact. No edema B/L LE.  NEUROLOGIC:  No motor or sensory deficit.  PSYCH: Alert & oriented x 3    Consultant(s) Notes Reviewed:  [x ] YES  [ ] NO  Care Discussed with Consultants/Other Providers [ x] YES  [ ] NO    EKG reviewed  Telemetry reviewed    LABS:                        13.3   5.92  )-----------( 190      ( 25 May 2021 07:37 )             40.5         139  |  102  |  19  ----------------------------<  167<H>  4.5   |  31  |  1.2    Ca    8.9      25 May 2021 07:37    TPro  6.3  /  Alb  3.7  /  TBili  0.3  /  DBili  x   /  AST  21  /  ALT  21  /  AlkPhos  120<H>      PT/INR - ( 24 May 2021 15:06 )   PT: 10.70 sec;   INR: 0.93 ratio         PTT - ( 24 May 2021 15:06 )  PTT:36.9 sec  Serum Pro-Brain Natriuretic Peptide: 195 pg/mL (21 @ 15:06)    Trop 0.04, CKMB 8.5, , 21 @ 00:10  Trop 0.04, CKMB 9.1, , 21 @ 21:33  Trop 0.04, CKMB --, CK --, 21 @ 15:06        RADIOLOGY & ADDITIONAL TESTS:      Imaging or report Personally Reviewed:  [ ] YES  [ ] NO    Medications:  Standing  aspirin enteric coated 81 milliGRAM(s) Oral daily  atorvastatin 20 milliGRAM(s) Oral at bedtime  chlorhexidine 4% Liquid 1 Application(s) Topical <User Schedule>  dextrose 40% Gel 15 Gram(s) Oral once  dextrose 5% + sodium chloride 0.45%. 1000 milliLiter(s) IV Continuous <Continuous>  dextrose 5%. 1000 milliLiter(s) IV Continuous <Continuous>  dextrose 5%. 1000 milliLiter(s) IV Continuous <Continuous>  dextrose 50% Injectable 25 Gram(s) IV Push once  dextrose 50% Injectable 12.5 Gram(s) IV Push once  dextrose 50% Injectable 25 Gram(s) IV Push once  enoxaparin Injectable 40 milliGRAM(s) SubCutaneous daily  gabapentin 300 milliGRAM(s) Oral three times a day  glucagon  Injectable 1 milliGRAM(s) IntraMuscular once  losartan 100 milliGRAM(s) Oral daily  metoprolol tartrate 12.5 milliGRAM(s) Oral two times a day  nicotine -   7 mG/24Hr(s) Patch 1 Patch Transdermal daily  pantoprazole    Tablet 40 milliGRAM(s) Oral before breakfast    PRN Meds  ALBUTerol    90 MICROgram(s) HFA Inhaler 2 Puff(s) Inhalation every 6 hours PRN  meclizine 25 milliGRAM(s) Oral two times a day PRN      Case discussed with resident    Care discussed with pt/family

## 2021-05-26 NOTE — DISCHARGE NOTE PROVIDER - CARE PROVIDER_API CALL
Nasima Seay)  Cardiovascular Disease; Internal Medicine  [address] [place] [address]. [address]  [place] [address]  Phone: [phone]  Fax: [phone]  Follow Up Time: 2 weeks    MICHAEL LÓPEZ  [id number]  [address] [facility] [address]  [place] [address]  Phone: ([phone]  Fax: ([phone]  Follow Up Time: 1 week

## 2021-05-26 NOTE — DISCHARGE NOTE PROVIDER - HOSPITAL COURSE
46 year old M with PMH of type 1 diabetes on insulin, htn, Asthma, anxiety, HTN, DLD, H pylori gastritis and duodenitis (never treated), active smoker presented with dyspnea worsening on exertion for 1 week. HPI goes back 5 days ago where he noticed B/L LE edema worsening to the point where he couldnt walk because he was feeling heavy and tight. He then noticed shortness of breath worsening with exertion , sometimes at rest along with orthopnea, chest discomfort / epigastric burning pain. He tried to use inhalor , it didnt help. He also mentions he has epigastric pain for few months which worsens with food intake.  He denies any palpitations, lightheadedness nausea, vomiting, has regular BMs.    Of note, he mentions he usually has some chest discomfort since years for which he had pharmacological stress test 4-5 years ago and it was normal. He reports this SUNSHINE was something new which prompted him to ER.    Patient had elevated trops and underwent cath that showed non-obstructive CAD. ECHO showed. Patient stable for discharge. 46 year old M with PMH of type 1 diabetes on insulin, htn, Asthma, anxiety, HTN, DLD, H pylori gastritis and duodenitis (never treated), active smoker presented with dyspnea worsening on exertion for 1 week. HPI goes back 5 days ago where he noticed B/L LE edema worsening to the point where he couldnt walk because he was feeling heavy and tight. He then noticed shortness of breath worsening with exertion , sometimes at rest along with orthopnea, chest discomfort / epigastric burning pain. He tried to use inhalor , it didnt help. He also mentions he has epigastric pain for few months which worsens with food intake.  He denies any palpitations, lightheadedness nausea, vomiting, has regular BMs.    Of note, he mentions he usually has some chest discomfort since years for which he had pharmacological stress test 4-5 years ago and it was normal. He reports this SUNSHINE was something new which prompted him to ER.    Patient had elevated trops and underwent cath that showed non-obstructive CAD. ECHO showed EF >60% and G1DD. Patient stable for discharge.

## 2021-05-26 NOTE — DISCHARGE NOTE PROVIDER - NSDCMRMEDTOKEN_GEN_ALL_CORE_FT
albuterol 90 mcg/inh inhalation aerosol with adapter:   atorvastatin 20 mg oral tablet: 1 tab(s) orally once a day  gabapentin 300 mg oral tablet: 1 tab(s) orally 3 times a day  HumaLOG:   irbesartan 300 mg oral tablet: 1 tab(s) orally once a day  meclizine 25 mg oral tablet: 1 tab(s) orally 2 times a day   omeprazole:   Tresiba 100 units/mL subcutaneous solution: 45 unit(s) subcutaneous once a day   albuterol 90 mcg/inh inhalation aerosol with adapter:   aspirin 81 mg oral delayed release tablet: 1 tab(s) orally once a day  atorvastatin 20 mg oral tablet: 1 tab(s) orally once a day  gabapentin 300 mg oral tablet: 1 tab(s) orally 3 times a day  HumaLOG:   irbesartan 300 mg oral tablet: 1 tab(s) orally once a day  metoprolol: 12.5 milligram(s) orally 2 times a day  nicotine 21 mg-14 mg-7 mg transdermal film, extended release: 1 each transdermal once a day  NIFEdipine (Eqv-Procardia XL) 60 mg oral tablet, extended release: 1 tab(s) orally once a day   omeprazole:   Tresiba 100 units/mL subcutaneous solution: 45 unit(s) subcutaneous once a day

## 2021-05-26 NOTE — DISCHARGE NOTE PROVIDER - NSDCCPCAREPLAN_GEN_ALL_CORE_FT
PRINCIPAL DISCHARGE DIAGNOSIS  Diagnosis: CAD (coronary artery disease)  Assessment and Plan of Treatment:        PRINCIPAL DISCHARGE DIAGNOSIS  Diagnosis: CAD (coronary artery disease)  Assessment and Plan of Treatment: Your symptoms were likely due to an elevated blood pressure. Our workup revealed no signs of a heart attack, your cath showed no blockages. Your ECHO showed that the pumping function of your heart is normal. Continue to take all your heart medications regularly and try to cut down on the smoking. Follow up with your cardiologist and primary care doctor to see how you are doing.

## 2021-05-26 NOTE — DISCHARGE NOTE PROVIDER - CARE PROVIDERS DIRECT ADDRESSES
[email or web address]

## 2021-05-26 NOTE — CHART NOTE - NSCHARTNOTEFT_GEN_A_CORE
1. Room Spinning Sensation: Vertigo VS Hypoglycemia VS Orthostatic Hypotension    - Mr Ojeda is a 46 year old M with PMH of type 1 diabetes on insulin, CAD, htn, Asthma, anxiety, HTN, DLD, H pylori gastritis and duodenitis (never treated), active smoker who presented with dyspnea worsening on exertion for 1 week,  admitted for ACS workup.  - Patient is status post cardiac cath on 05/25 revealing non obstructive CAD    - At around 05:40 AM, patient was complaining of some light headedness  - On bedside evaluation, patient reported history of vertigo but denied being on medications for that  - He reported room spinning sensation for few minutes on awakening in absence of chest pain, palpitations, nausea, vomiting, or ear symptoms  - Vital Signs /79 mmHg and HR 53 bpm  - STAT POCT checked: 54 at 05:40 AM       - Plan  --> For hypoglycemia: will administer D50 50mL x1 bolus now and continue D5 1/2 NS at 75mL/hour. Will also provide patient with juice. Will discontinue Lantus 45 units, Lispro 10u TID, and sliding Scale B. Will monitor POCT closely  --> For history of vertigo: will administer a dose of meclizine 25mg (on 25mg twice daily as needed)  --> To rule out orthostatic hypotension: will check orthostatic vital signs  --> Will monitor closely

## 2021-06-03 DIAGNOSIS — I16.0 HYPERTENSIVE URGENCY: ICD-10-CM

## 2021-06-03 DIAGNOSIS — F17.210 NICOTINE DEPENDENCE, CIGARETTES, UNCOMPLICATED: ICD-10-CM

## 2021-06-03 DIAGNOSIS — J45.909 UNSPECIFIED ASTHMA, UNCOMPLICATED: ICD-10-CM

## 2021-06-03 DIAGNOSIS — Z79.899 OTHER LONG TERM (CURRENT) DRUG THERAPY: ICD-10-CM

## 2021-06-03 DIAGNOSIS — E78.5 HYPERLIPIDEMIA, UNSPECIFIED: ICD-10-CM

## 2021-06-03 DIAGNOSIS — I10 ESSENTIAL (PRIMARY) HYPERTENSION: ICD-10-CM

## 2021-06-03 DIAGNOSIS — E10.649 TYPE 1 DIABETES MELLITUS WITH HYPOGLYCEMIA WITHOUT COMA: ICD-10-CM

## 2021-06-03 DIAGNOSIS — F41.9 ANXIETY DISORDER, UNSPECIFIED: ICD-10-CM

## 2021-06-03 DIAGNOSIS — R06.02 SHORTNESS OF BREATH: ICD-10-CM

## 2021-06-03 DIAGNOSIS — E10.42 TYPE 1 DIABETES MELLITUS WITH DIABETIC POLYNEUROPATHY: ICD-10-CM

## 2021-06-03 DIAGNOSIS — Z79.4 LONG TERM (CURRENT) USE OF INSULIN: ICD-10-CM

## 2021-06-03 DIAGNOSIS — I25.10 ATHEROSCLEROTIC HEART DISEASE OF NATIVE CORONARY ARTERY WITHOUT ANGINA PECTORIS: ICD-10-CM

## 2021-06-03 DIAGNOSIS — I21.A1 MYOCARDIAL INFARCTION TYPE 2: ICD-10-CM

## 2021-07-09 ENCOUNTER — APPOINTMENT (OUTPATIENT)
Dept: UROLOGY | Facility: CLINIC | Age: 46
End: 2021-07-09
Payer: MEDICAID

## 2021-07-09 DIAGNOSIS — R31.29 OTHER MICROSCOPIC HEMATURIA: ICD-10-CM

## 2021-07-09 PROCEDURE — 99214 OFFICE O/P EST MOD 30 MIN: CPT

## 2021-07-09 NOTE — PHYSICAL EXAM
[Well Groomed] : well groomed [General Appearance - In No Acute Distress] : no acute distress [] : no respiratory distress [Oriented To Time, Place, And Person] : oriented to person, place, and time [Normal Station and Gait] : the gait and station were normal for the patient's age [No Focal Deficits] : no focal deficits

## 2021-07-25 PROBLEM — R31.29 MICROHEMATURIA: Status: ACTIVE | Noted: 2020-05-21

## 2021-07-25 NOTE — REVIEW OF SYSTEMS
[see HPI] : see HPI [Fever] : no fever [Chest Pain] : no chest pain [Chills] : no chills [Shortness Of Breath] : no shortness of breath [Abdominal Pain] : no abdominal pain [Vomiting] : no vomiting

## 2021-07-25 NOTE — HISTORY OF PRESENT ILLNESS
[FreeTextEntry1] : This is a 46 year old male with history of Diabetes, Hypertension, and Hyperlipidemia. Patient has severe ED started on Sildenafil 100 mg last visit. Patient has had no response to medication. \par \par Previous history include microscopic hematuria:\par Cysto 05/2021- Normal no lesions, stones, or signs of bleeding. \par CT done- No acute intra abdominal or pelvic pathology \par CT Urogram -- normal exam. independent interpretation-- images visualized by me - agree with radiologist interpretation \par Basic Metabolic Panel; - cr 1.5\par \par Patient presents to office today to discuss IPP placement. Patient does not want to try Injections due to discomfort with needles. \par \par Prolong discussion with patient regarding IPP. Patient made aware of what to expect with surgery, and patient was able to visualize implant via model. \par All questions and concerns answered. \par Patient will continue to do research in order to make a fully informed decision. \par \par \par Patient follows Endocrinologist for Diabetes and follows PCP. \par

## 2021-07-25 NOTE — ASSESSMENT
[FreeTextEntry1] : This is a 46 year old male with history of Diabetes, Hypertension, and Hyperlipidemia. Patient has severe ED started on Sildenafil 100 mg last visit. Patient has had no response to medication. \par \par Previous history include microscopic hematuria:\par Cysto 05/2021- Normal no lesions, stones, or signs of bleeding. \par CT done- No acute intra abdominal or pelvic pathology \par CT Urogram -- normal exam. independent interpretation-- images visualized by me - agree with radiologist interpretation \par Basic Metabolic Panel; - cr 1.5\par \par Patient presents to office today to discuss IPP placement. Patient does not want to try Injections due to discomfort with needles. \par \par Prolong discussion with patient regarding IPP. Patient made aware of what to expect with surgery, and patient was able to visualize implant via model. \par All questions and concerns answered. \par Patient will continue to do research in order to make a fully informed decision. \par \par \par Patient follows Endocrinologist for Diabetes and follows PCP.

## 2021-10-01 ENCOUNTER — APPOINTMENT (OUTPATIENT)
Dept: UROLOGY | Facility: CLINIC | Age: 46
End: 2021-10-01
Payer: MEDICAID

## 2021-10-01 PROCEDURE — 99214 OFFICE O/P EST MOD 30 MIN: CPT

## 2021-10-01 PROCEDURE — G9005: CPT

## 2021-10-01 NOTE — HISTORY OF PRESENT ILLNESS
[FreeTextEntry1] : This is a 46 year old male with history of Diabetes, Hypertension, and Hyperlipidemia. Patient has severe ED started on Sildenafil 100 mg last visit. Patient has had no response to medication. \par \par most recent A1c = 7 per patient \par \par Previous history include microscopic hematuria:\par Cysto 05/2021- Normal no lesions, stones, or signs of bleeding. \par CT done- No acute intra abdominal or pelvic pathology \par CT Urogram -- normal exam. independent interpretation-- images visualized by me - agree with radiologist interpretation \par Basic Metabolic Panel; - cr 1.5\par \par Patient presents to office today to discuss IPP placement. Patient does not want to try Injections due to discomfort with needles. \par \par Prolong discussion with patient regarding IPP. Patient made aware of what to expect with surgery, and patient was able to visualize implant via model. \par All questions and concerns answered. \par Patient will continue to do research in order to make a fully informed decision. \par \par no penile curvature\par \par \par \par UA in May of 2022 to reassess hematuria. \par \par

## 2021-10-01 NOTE — ASSESSMENT
[FreeTextEntry1] : This is a 46 year old male with history of Diabetes, Hypertension, and Hyperlipidemia. Patient has severe ED started on Sildenafil 100 mg last visit. Patient has had no response to medication. \par \par most recent A1c = 7 per patient \par \par Previous history include microscopic hematuria:\par Cysto 05/2021- Normal no lesions, stones, or signs of bleeding. \par CT done- No acute intra abdominal or pelvic pathology \par CT Urogram -- normal exam. independent interpretation-- images visualized by me - agree with radiologist interpretation \par Basic Metabolic Panel; - cr 1.5\par \par Patient presents to office today to discuss IPP placement. Patient does not want to try Injections due to discomfort with needles. \par \par Prolong discussion with patient regarding IPP. Patient made aware of what to expect with surgery, and patient was able to visualize implant via model. \par All questions and concerns answered. \par Patient will continue to do research in order to make a fully informed decision. \par \par no penile curvature\par \par \par \par UA in May of 2022 to reassess hematuria. \par

## 2022-02-02 ENCOUNTER — APPOINTMENT (OUTPATIENT)
Dept: UROLOGY | Facility: CLINIC | Age: 47
End: 2022-02-02
Payer: MEDICAID

## 2022-02-02 PROCEDURE — 99214 OFFICE O/P EST MOD 30 MIN: CPT

## 2022-02-02 NOTE — HISTORY OF PRESENT ILLNESS
[FreeTextEntry1] : This is a 46 year old male with history of Diabetes, Hypertension, and Hyperlipidemia. Patient has severe ED started on Sildenafil 100 mg last visit. Patient has had no response to medication. \par \par most recent A1c = 7 per patient \par \par Previous history include microscopic hematuria:\par Cysto 05/2021- Normal no lesions, stones, or signs of bleeding. \par CT done- No acute intra abdominal or pelvic pathology \par CT Urogram -- normal exam. independent interpretation-- images visualized by me - agree with radiologist interpretation \par Basic Metabolic Panel; - cr 1.5\par \par Patient presents to office today to discuss IPP placement. Patient does not want to try Injections due to discomfort with needles. \par \par Prolong discussion with patient regarding IPP. Patient made aware of what to expect with surgery, and patient was able to visualize implant via model. \par All questions and concerns answered. \par Patient will continue to do research in order to make a fully informed decision. \par \par no penile curvature\par mild penile rash \par \par UA in May of 2022 to reassess hematuria. \par  \par \par Plan\par UA in may 2022

## 2022-02-16 ENCOUNTER — RX RENEWAL (OUTPATIENT)
Age: 47
End: 2022-02-16

## 2022-03-22 ENCOUNTER — OUTPATIENT (OUTPATIENT)
Dept: OUTPATIENT SERVICES | Facility: HOSPITAL | Age: 47
LOS: 1 days | Discharge: HOME | End: 2022-03-22
Payer: MEDICARE

## 2022-03-22 VITALS
SYSTOLIC BLOOD PRESSURE: 184 MMHG | OXYGEN SATURATION: 100 % | RESPIRATION RATE: 14 BRPM | WEIGHT: 186.07 LBS | DIASTOLIC BLOOD PRESSURE: 83 MMHG | TEMPERATURE: 99 F | HEIGHT: 68 IN | HEART RATE: 86 BPM

## 2022-03-22 DIAGNOSIS — N48.1 BALANITIS: ICD-10-CM

## 2022-03-22 DIAGNOSIS — N52.01 ERECTILE DYSFUNCTION DUE TO ARTERIAL INSUFFICIENCY: ICD-10-CM

## 2022-03-22 DIAGNOSIS — Z01.818 ENCOUNTER FOR OTHER PREPROCEDURAL EXAMINATION: ICD-10-CM

## 2022-03-22 DIAGNOSIS — Z98.890 OTHER SPECIFIED POSTPROCEDURAL STATES: Chronic | ICD-10-CM

## 2022-03-22 LAB
A1C WITH ESTIMATED AVERAGE GLUCOSE RESULT: 11.2 % — HIGH (ref 4–5.6)
ALBUMIN SERPL ELPH-MCNC: 2.9 G/DL — LOW (ref 3.5–5.2)
ALP SERPL-CCNC: 163 U/L — HIGH (ref 30–115)
ALT FLD-CCNC: 19 U/L — SIGNIFICANT CHANGE UP (ref 0–41)
ANION GAP SERPL CALC-SCNC: 9 MMOL/L — SIGNIFICANT CHANGE UP (ref 7–14)
APPEARANCE UR: CLEAR — SIGNIFICANT CHANGE UP
APTT BLD: 34.4 SEC — SIGNIFICANT CHANGE UP (ref 27–39.2)
AST SERPL-CCNC: 28 U/L — SIGNIFICANT CHANGE UP (ref 0–41)
BACTERIA # UR AUTO: NEGATIVE — SIGNIFICANT CHANGE UP
BASOPHILS # BLD AUTO: 0.04 K/UL — SIGNIFICANT CHANGE UP (ref 0–0.2)
BASOPHILS NFR BLD AUTO: 0.5 % — SIGNIFICANT CHANGE UP (ref 0–1)
BILIRUB SERPL-MCNC: <0.2 MG/DL — SIGNIFICANT CHANGE UP (ref 0.2–1.2)
BILIRUB UR-MCNC: NEGATIVE — SIGNIFICANT CHANGE UP
BUN SERPL-MCNC: 27 MG/DL — HIGH (ref 10–20)
CALCIUM SERPL-MCNC: 8 MG/DL — LOW (ref 8.5–10.1)
CHLORIDE SERPL-SCNC: 101 MMOL/L — SIGNIFICANT CHANGE UP (ref 98–110)
CO2 SERPL-SCNC: 28 MMOL/L — SIGNIFICANT CHANGE UP (ref 17–32)
COLOR SPEC: SIGNIFICANT CHANGE UP
CREAT SERPL-MCNC: 1.7 MG/DL — HIGH (ref 0.7–1.5)
DIFF PNL FLD: ABNORMAL
EGFR: 49 ML/MIN/1.73M2 — LOW
EOSINOPHIL # BLD AUTO: 0.23 K/UL — SIGNIFICANT CHANGE UP (ref 0–0.7)
EOSINOPHIL NFR BLD AUTO: 2.8 % — SIGNIFICANT CHANGE UP (ref 0–8)
EPI CELLS # UR: 2 /HPF — SIGNIFICANT CHANGE UP (ref 0–5)
ESTIMATED AVERAGE GLUCOSE: 275 MG/DL — HIGH (ref 68–114)
GLUCOSE SERPL-MCNC: 280 MG/DL — HIGH (ref 70–99)
GLUCOSE UR QL: ABNORMAL
HCT VFR BLD CALC: 33.5 % — LOW (ref 42–52)
HGB BLD-MCNC: 11.2 G/DL — LOW (ref 14–18)
HYALINE CASTS # UR AUTO: 1 /LPF — SIGNIFICANT CHANGE UP (ref 0–7)
IMM GRANULOCYTES NFR BLD AUTO: 0.4 % — HIGH (ref 0.1–0.3)
INR BLD: 0.85 RATIO — SIGNIFICANT CHANGE UP (ref 0.65–1.3)
KETONES UR-MCNC: NEGATIVE — SIGNIFICANT CHANGE UP
LEUKOCYTE ESTERASE UR-ACNC: NEGATIVE — SIGNIFICANT CHANGE UP
LYMPHOCYTES # BLD AUTO: 2.29 K/UL — SIGNIFICANT CHANGE UP (ref 1.2–3.4)
LYMPHOCYTES # BLD AUTO: 27.9 % — SIGNIFICANT CHANGE UP (ref 20.5–51.1)
MCHC RBC-ENTMCNC: 30.4 PG — SIGNIFICANT CHANGE UP (ref 27–31)
MCHC RBC-ENTMCNC: 33.4 G/DL — SIGNIFICANT CHANGE UP (ref 32–37)
MCV RBC AUTO: 91 FL — SIGNIFICANT CHANGE UP (ref 80–94)
MONOCYTES # BLD AUTO: 0.64 K/UL — HIGH (ref 0.1–0.6)
MONOCYTES NFR BLD AUTO: 7.8 % — SIGNIFICANT CHANGE UP (ref 1.7–9.3)
NEUTROPHILS # BLD AUTO: 4.98 K/UL — SIGNIFICANT CHANGE UP (ref 1.4–6.5)
NEUTROPHILS NFR BLD AUTO: 60.6 % — SIGNIFICANT CHANGE UP (ref 42.2–75.2)
NITRITE UR-MCNC: NEGATIVE — SIGNIFICANT CHANGE UP
NRBC # BLD: 0 /100 WBCS — SIGNIFICANT CHANGE UP (ref 0–0)
PH UR: 6.5 — SIGNIFICANT CHANGE UP (ref 5–8)
PLATELET # BLD AUTO: 195 K/UL — SIGNIFICANT CHANGE UP (ref 130–400)
POTASSIUM SERPL-MCNC: 3.8 MMOL/L — SIGNIFICANT CHANGE UP (ref 3.5–5)
POTASSIUM SERPL-SCNC: 3.8 MMOL/L — SIGNIFICANT CHANGE UP (ref 3.5–5)
PROT SERPL-MCNC: 5.4 G/DL — LOW (ref 6–8)
PROT UR-MCNC: ABNORMAL
PROTHROM AB SERPL-ACNC: 9.8 SEC — LOW (ref 9.95–12.87)
RBC # BLD: 3.68 M/UL — LOW (ref 4.7–6.1)
RBC # FLD: 12.2 % — SIGNIFICANT CHANGE UP (ref 11.5–14.5)
RBC CASTS # UR COMP ASSIST: 14 /HPF — HIGH (ref 0–4)
SODIUM SERPL-SCNC: 138 MMOL/L — SIGNIFICANT CHANGE UP (ref 135–146)
SP GR SPEC: 1.01 — SIGNIFICANT CHANGE UP (ref 1.01–1.03)
UROBILINOGEN FLD QL: SIGNIFICANT CHANGE UP
WBC # BLD: 8.21 K/UL — SIGNIFICANT CHANGE UP (ref 4.8–10.8)
WBC # FLD AUTO: 8.21 K/UL — SIGNIFICANT CHANGE UP (ref 4.8–10.8)
WBC UR QL: 2 /HPF — SIGNIFICANT CHANGE UP (ref 0–5)

## 2022-03-22 PROCEDURE — 93010 ELECTROCARDIOGRAM REPORT: CPT

## 2022-03-22 RX ORDER — OMEPRAZOLE 10 MG/1
0 CAPSULE, DELAYED RELEASE ORAL
Qty: 0 | Refills: 0 | DISCHARGE

## 2022-03-22 RX ORDER — INSULIN DEGLUDEC 100 U/ML
45 INJECTION, SOLUTION SUBCUTANEOUS
Qty: 0 | Refills: 0 | DISCHARGE

## 2022-03-22 RX ORDER — INSULIN LISPRO 100/ML
0 VIAL (ML) SUBCUTANEOUS
Qty: 0 | Refills: 0 | DISCHARGE

## 2022-03-22 RX ORDER — DULOXETINE HYDROCHLORIDE 30 MG/1
1 CAPSULE, DELAYED RELEASE ORAL
Qty: 0 | Refills: 0 | DISCHARGE

## 2022-03-22 NOTE — H&P PST ADULT - NSICDXPASTMEDICALHX_GEN_ALL_CORE_FT
PAST MEDICAL HISTORY:  Asthma     Broken toe left pinky toe    Diabetes     Diabetic ulcer of right foot     Diverticulitis surgery 16yrs ago    Dyslipidemia     H/O vertigo     High cholesterol     Hypertension

## 2022-03-22 NOTE — H&P PST ADULT - REASON FOR ADMISSION
Case Type: OP Block TimeSuite: OR NorthProceduralist: Maurizio Ewing  Confirmed Surgery DateTime: 03- - 0:00PAST DateTime: 03- - 18:00Procedure: IMPLANTATION OF INFLATABLE PENILE PROSTHESIS, CIRCUMCISION (AMS)  Laterality: N/ALength of Procedure: 2 MinutesAnesthesia Type: General

## 2022-03-22 NOTE — H&P PST ADULT - NEUROLOGICAL
Alert & oriented; no sensory, motor or coordination deficits, normal reflexes Drysol Counseling:  I discussed with the patient the risks of drysol/aluminum chloride including but not limited to skin rash, itching, irritation, burning.

## 2022-03-22 NOTE — H&P PST ADULT - HISTORY OF PRESENT ILLNESS
46y Male presents today for presurgical testing for IMPLANTATION OF INFLATABLE PENILE PROSTHESIS, CIRCUMCISION (AMS). Patient reports erectile dysfunction, along with inability to ejaculate with msturbation for well over 6 years. He states that during that time he has been unable to maintain a relationship with a woman which he states is emotionally damaging. He also reports noting irritation of the foreskin x2 episodes occurring over the course of the past 3-4 years at which time he is unable to pull the foreskin back for proper hygiene. He states that this is a another contributing factor to him not having sexual relations with a woman. Patient endorses history of diabetes with blood sugars ranging in the  range for which he follows up with his PCP. He offers no other complaints at this time.   Patient denies any CP, palpitations, SOB, cough, or dysuria. No recent URI or UTI.  Stated exercise tolerance is FOS .5  AMBER screen reviewed    Patient denies any recent personal exposure to COVID19. Denies any sick contacts. Patient denies travel within the past 30 days. Patient was instructed to quarantine until after procedure.    Anesthesia Alert  NO--Difficult Airway  NO--History of neck surgery or radiation  NO--Limited ROM of neck  NO--History of Malignant hyperthermia  NO--Personal or family history of Pseudocholinesterase deficiency.  NO--Prior Anesthesia Complication  NO--Latex Allergy  NO--Loose teeth  NO--History of Rheumatoid Arthritis  NO--AMBER  NO--Bleeding risk  NO--Other_____    Patient states that this is their complete medical history and list of medications.  Patient verbalized understanding of instructions given during this visit and was given the opportunity to ask questions and have them answered. They were instructed to follow up with their surgeon/surgeon's office with any questions regarding their procedure.

## 2022-03-24 LAB
CULTURE RESULTS: SIGNIFICANT CHANGE UP
SPECIMEN SOURCE: SIGNIFICANT CHANGE UP

## 2022-03-29 ENCOUNTER — APPOINTMENT (OUTPATIENT)
Dept: UROLOGY | Facility: HOSPITAL | Age: 47
End: 2022-03-29

## 2022-03-31 ENCOUNTER — EMERGENCY (EMERGENCY)
Facility: HOSPITAL | Age: 47
LOS: 1 days | Discharge: HOME | End: 2022-03-31
Attending: STUDENT IN AN ORGANIZED HEALTH CARE EDUCATION/TRAINING PROGRAM
Payer: MEDICARE

## 2022-03-31 VITALS
OXYGEN SATURATION: 97 % | HEART RATE: 70 BPM | SYSTOLIC BLOOD PRESSURE: 205 MMHG | DIASTOLIC BLOOD PRESSURE: 110 MMHG | TEMPERATURE: 98 F | RESPIRATION RATE: 17 BRPM | WEIGHT: 188.5 LBS | HEIGHT: 70 IN

## 2022-03-31 VITALS
TEMPERATURE: 98 F | OXYGEN SATURATION: 98 % | HEART RATE: 67 BPM | SYSTOLIC BLOOD PRESSURE: 133 MMHG | DIASTOLIC BLOOD PRESSURE: 83 MMHG | RESPIRATION RATE: 17 BRPM

## 2022-03-31 DIAGNOSIS — Z98.890 OTHER SPECIFIED POSTPROCEDURAL STATES: Chronic | ICD-10-CM

## 2022-03-31 LAB
ALBUMIN SERPL ELPH-MCNC: 2.3 G/DL — LOW (ref 3.5–5)
ALP SERPL-CCNC: 140 U/L — HIGH (ref 40–120)
ALT FLD-CCNC: 30 U/L DA — SIGNIFICANT CHANGE UP (ref 10–60)
ANION GAP SERPL CALC-SCNC: 6 MMOL/L — SIGNIFICANT CHANGE UP (ref 5–17)
APTT BLD: 31.3 SEC — SIGNIFICANT CHANGE UP (ref 27.5–35.5)
AST SERPL-CCNC: 22 U/L — SIGNIFICANT CHANGE UP (ref 10–40)
BASOPHILS # BLD AUTO: 0.02 K/UL — SIGNIFICANT CHANGE UP (ref 0–0.2)
BASOPHILS NFR BLD AUTO: 0.2 % — SIGNIFICANT CHANGE UP (ref 0–2)
BILIRUB SERPL-MCNC: 0.2 MG/DL — SIGNIFICANT CHANGE UP (ref 0.2–1.2)
BUN SERPL-MCNC: 34 MG/DL — HIGH (ref 7–18)
CALCIUM SERPL-MCNC: 8.3 MG/DL — LOW (ref 8.4–10.5)
CHLORIDE SERPL-SCNC: 106 MMOL/L — SIGNIFICANT CHANGE UP (ref 96–108)
CO2 SERPL-SCNC: 27 MMOL/L — SIGNIFICANT CHANGE UP (ref 22–31)
CREAT SERPL-MCNC: 2.03 MG/DL — HIGH (ref 0.5–1.3)
EGFR: 40 ML/MIN/1.73M2 — LOW
EOSINOPHIL # BLD AUTO: 0.28 K/UL — SIGNIFICANT CHANGE UP (ref 0–0.5)
EOSINOPHIL NFR BLD AUTO: 2.3 % — SIGNIFICANT CHANGE UP (ref 0–6)
GLUCOSE SERPL-MCNC: 237 MG/DL — HIGH (ref 70–99)
HCT VFR BLD CALC: 35.5 % — LOW (ref 39–50)
HGB BLD-MCNC: 12.2 G/DL — LOW (ref 13–17)
IMM GRANULOCYTES NFR BLD AUTO: 0.6 % — SIGNIFICANT CHANGE UP (ref 0–1.5)
INR BLD: 0.88 RATIO — SIGNIFICANT CHANGE UP (ref 0.88–1.16)
LYMPHOCYTES # BLD AUTO: 17.6 % — SIGNIFICANT CHANGE UP (ref 13–44)
LYMPHOCYTES # BLD AUTO: 2.17 K/UL — SIGNIFICANT CHANGE UP (ref 1–3.3)
MCHC RBC-ENTMCNC: 31 PG — SIGNIFICANT CHANGE UP (ref 27–34)
MCHC RBC-ENTMCNC: 34.4 GM/DL — SIGNIFICANT CHANGE UP (ref 32–36)
MCV RBC AUTO: 90.1 FL — SIGNIFICANT CHANGE UP (ref 80–100)
MONOCYTES # BLD AUTO: 0.94 K/UL — HIGH (ref 0–0.9)
MONOCYTES NFR BLD AUTO: 7.6 % — SIGNIFICANT CHANGE UP (ref 2–14)
NEUTROPHILS # BLD AUTO: 8.87 K/UL — HIGH (ref 1.8–7.4)
NEUTROPHILS NFR BLD AUTO: 71.7 % — SIGNIFICANT CHANGE UP (ref 43–77)
NRBC # BLD: 0 /100 WBCS — SIGNIFICANT CHANGE UP (ref 0–0)
PLATELET # BLD AUTO: 203 K/UL — SIGNIFICANT CHANGE UP (ref 150–400)
POTASSIUM SERPL-MCNC: 3.7 MMOL/L — SIGNIFICANT CHANGE UP (ref 3.5–5.3)
POTASSIUM SERPL-SCNC: 3.7 MMOL/L — SIGNIFICANT CHANGE UP (ref 3.5–5.3)
PROT SERPL-MCNC: 6.3 G/DL — SIGNIFICANT CHANGE UP (ref 6–8.3)
PROTHROM AB SERPL-ACNC: 10.5 SEC — SIGNIFICANT CHANGE UP (ref 10.5–13.4)
RBC # BLD: 3.94 M/UL — LOW (ref 4.2–5.8)
RBC # FLD: 12 % — SIGNIFICANT CHANGE UP (ref 10.3–14.5)
SODIUM SERPL-SCNC: 139 MMOL/L — SIGNIFICANT CHANGE UP (ref 135–145)
TROPONIN I, HIGH SENSITIVITY RESULT: 18.2 NG/L — SIGNIFICANT CHANGE UP
WBC # BLD: 12.35 K/UL — HIGH (ref 3.8–10.5)
WBC # FLD AUTO: 12.35 K/UL — HIGH (ref 3.8–10.5)

## 2022-03-31 PROCEDURE — 93005 ELECTROCARDIOGRAM TRACING: CPT

## 2022-03-31 PROCEDURE — 70496 CT ANGIOGRAPHY HEAD: CPT | Mod: MA

## 2022-03-31 PROCEDURE — 96374 THER/PROPH/DIAG INJ IV PUSH: CPT | Mod: XU

## 2022-03-31 PROCEDURE — 99284 EMERGENCY DEPT VISIT MOD MDM: CPT

## 2022-03-31 PROCEDURE — 70450 CT HEAD/BRAIN W/O DYE: CPT | Mod: MA

## 2022-03-31 PROCEDURE — 93010 ELECTROCARDIOGRAM REPORT: CPT

## 2022-03-31 PROCEDURE — 85730 THROMBOPLASTIN TIME PARTIAL: CPT

## 2022-03-31 PROCEDURE — 99285 EMERGENCY DEPT VISIT HI MDM: CPT | Mod: 25

## 2022-03-31 PROCEDURE — 96375 TX/PRO/DX INJ NEW DRUG ADDON: CPT | Mod: XU

## 2022-03-31 PROCEDURE — 70496 CT ANGIOGRAPHY HEAD: CPT | Mod: 26,MA

## 2022-03-31 PROCEDURE — 0042T: CPT

## 2022-03-31 PROCEDURE — 36415 COLL VENOUS BLD VENIPUNCTURE: CPT

## 2022-03-31 PROCEDURE — 70498 CT ANGIOGRAPHY NECK: CPT | Mod: MA

## 2022-03-31 PROCEDURE — 85025 COMPLETE CBC W/AUTO DIFF WBC: CPT

## 2022-03-31 PROCEDURE — 82962 GLUCOSE BLOOD TEST: CPT

## 2022-03-31 PROCEDURE — 80053 COMPREHEN METABOLIC PANEL: CPT

## 2022-03-31 PROCEDURE — 70498 CT ANGIOGRAPHY NECK: CPT | Mod: 26,MA

## 2022-03-31 PROCEDURE — 85610 PROTHROMBIN TIME: CPT

## 2022-03-31 PROCEDURE — 84484 ASSAY OF TROPONIN QUANT: CPT

## 2022-03-31 PROCEDURE — 96365 THER/PROPH/DIAG IV INF INIT: CPT | Mod: XU

## 2022-03-31 RX ORDER — MECLIZINE HCL 12.5 MG
1 TABLET ORAL
Qty: 20 | Refills: 0
Start: 2022-03-31

## 2022-03-31 RX ORDER — METOCLOPRAMIDE HCL 10 MG
10 TABLET ORAL ONCE
Refills: 0 | Status: COMPLETED | OUTPATIENT
Start: 2022-03-31 | End: 2022-03-31

## 2022-03-31 RX ORDER — DIPHENHYDRAMINE HCL 50 MG
25 CAPSULE ORAL ONCE
Refills: 0 | Status: DISCONTINUED | OUTPATIENT
Start: 2022-03-31 | End: 2022-03-31

## 2022-03-31 RX ORDER — DIPHENHYDRAMINE HCL 50 MG
25 CAPSULE ORAL ONCE
Refills: 0 | Status: COMPLETED | OUTPATIENT
Start: 2022-03-31 | End: 2022-03-31

## 2022-03-31 RX ADMIN — Medication 104 MILLIGRAM(S): at 12:31

## 2022-03-31 RX ADMIN — Medication 25 MILLIGRAM(S): at 12:32

## 2022-03-31 RX ADMIN — Medication 10 MILLIGRAM(S): at 13:01

## 2022-03-31 NOTE — ED ADULT TRIAGE NOTE - CHIEF COMPLAINT QUOTE
Sudden onset dizziness and vomiting while at work 30 min ago  ,reports h/o vertigo however (this feels more stronger ".

## 2022-03-31 NOTE — ED PROVIDER NOTE - CRANIAL NERVE AND PUPILLARY EXAM
cranial nerves 2-12 intact/cough reflex intact/corneal reflex intact/central and peripheral vision intact/extra-ocular movements intact/gag reflex intact/tongue is midline

## 2022-03-31 NOTE — ED PROVIDER NOTE - PROGRESS NOTE DETAILS
Patient ct head negative, symptoms resolved. ambulaatory. neuro remains intact. patient state he will f.u pmd ocncerning kidney function and instructed to f.u neuro, patient clinically stable. low suspicion for posterior stroke, stroke code cancelled given no indication for tpa, did not speak with telestroke as I am able to clinically r.o need for tpa

## 2022-03-31 NOTE — ED PROVIDER NOTE - OBJECTIVE STATEMENT
45 y/o male, history of vertigo, hypertension, diabetes, presents w/ dizziness approximately 30 prior to arrival w/ room spinning sensation. Denies any focal weakness or numbness. Patient denies any other symptoms. No known drug allergies.

## 2022-03-31 NOTE — ED ADULT NURSE NOTE - OBJECTIVE STATEMENT
Patient presents to ED with c/o acute dizziness. Patient reports hx vertigo, today had episode "he blacked out but held onto wall" denies fall. Patient is a&ox4, denies pain

## 2022-03-31 NOTE — ED PROVIDER NOTE - PATIENT PORTAL LINK FT
You can access the FollowMyHealth Patient Portal offered by [facility] by registering at the following website: [email or web address]. By joining [employer]’s FollowMyHealth portal, you will also be able to view your health information using other applications (apps) compatible with our system.

## 2022-03-31 NOTE — ED ADULT NURSE NOTE - EXTENSIONS OF SELF_ADULT
None hx of depression, impulsivity, prior psychiatric hospitalizations, suicidal attempts, limited support network, not engaged in tx/therapy, substance misuse. Chronic suicidal thoughts and self harming behaviors. Acute risk is moderate, but with chronic elevated risk due to hx of significant mental health issues.

## 2022-03-31 NOTE — ED ADULT NURSE NOTE - NSIMPLEMENTINTERV_GEN_ALL_ED
Implemented All Fall Risk Interventions:  [place] to call system. Call bell, personal items and telephone within reach. Instruct patient to call for assistance. Room bathroom lighting operational. Non-slip footwear when patient is off stretcher. Physically safe environment: no spills, clutter or unnecessary equipment. Stretcher in lowest position, wheels locked, appropriate side rails in place. Provide visual cue, wrist band, yellow gown, etc. Monitor gait and stability. Monitor for mental status changes and reorient to person, place, and time. Review medications for side effects contributing to fall risk. Reinforce activity limits and safety measures with patient and family.

## 2022-03-31 NOTE — ED PROVIDER NOTE - CLINICAL SUMMARY MEDICAL DECISION MAKING FREE TEXT BOX
Patient presenting with vertigo, onset 30 minute pta. neuro intact, history of vertigo but given time of onset, will activate stroke code and reassess

## 2022-04-01 PROBLEM — E11.621 TYPE 2 DIABETES MELLITUS WITH FOOT ULCER: Chronic | Status: ACTIVE | Noted: 2022-03-22

## 2022-04-01 PROBLEM — S92.919A UNSPECIFIED FRACTURE OF UNSPECIFIED TOE(S), INITIAL ENCOUNTER FOR CLOSED FRACTURE: Chronic | Status: ACTIVE | Noted: 2022-03-22

## 2022-04-01 PROBLEM — Z87.898 PERSONAL HISTORY OF OTHER SPECIFIED CONDITIONS: Chronic | Status: ACTIVE | Noted: 2022-03-22

## 2022-04-01 PROBLEM — K57.92 DIVERTICULITIS OF INTESTINE, PART UNSPECIFIED, WITHOUT PERFORATION OR ABSCESS WITHOUT BLEEDING: Chronic | Status: ACTIVE | Noted: 2018-12-31

## 2022-04-11 PROBLEM — R37 SEXUAL DYSFUNCTION: Status: ACTIVE | Noted: 2017-06-06

## 2022-04-26 NOTE — ED ADULT NURSE NOTE - CAS EDP DISCH TYPE
04/26/2022  Aki Cherry is a 47 y.o., male.      Pre-op Assessment    I have reviewed the NPO Status.   I have reviewed the Medications.     Review of Systems  Anesthesia Hx:  No problems with previous Anesthesia    Cardiovascular:   Exercise tolerance: good    Pulmonary:  Pulmonary Normal    Hepatic/GI:   Bowel Prep. Liver Disease,    Neurological:  Neurology Normal    Endocrine:  Endocrine Normal        Physical Exam  General: Well nourished        Anesthesia Plan  Type of Anesthesia, risks & benefits discussed:    Anesthesia Type: Gen Natural Airway  Intra-op Monitoring Plan: Standard ASA Monitors  Induction:  IV  Informed Consent: Informed consent signed with the Patient and all parties understand the risks and agree with anesthesia plan.  All questions answered.   ASA Score: 2    Ready For Surgery From Anesthesia Perspective.     .       Home

## 2022-05-15 ENCOUNTER — INPATIENT (INPATIENT)
Facility: HOSPITAL | Age: 47
LOS: 15 days | Discharge: HOME | End: 2022-05-31
Attending: INTERNAL MEDICINE | Admitting: INTERNAL MEDICINE
Payer: MEDICARE

## 2022-05-15 VITALS
RESPIRATION RATE: 18 BRPM | DIASTOLIC BLOOD PRESSURE: 68 MMHG | HEART RATE: 103 BPM | OXYGEN SATURATION: 100 % | HEIGHT: 68 IN | SYSTOLIC BLOOD PRESSURE: 117 MMHG | TEMPERATURE: 98 F | WEIGHT: 179.9 LBS

## 2022-05-15 DIAGNOSIS — Z98.890 OTHER SPECIFIED POSTPROCEDURAL STATES: Chronic | ICD-10-CM

## 2022-05-15 PROBLEM — I10 ESSENTIAL (PRIMARY) HYPERTENSION: Chronic | Status: ACTIVE | Noted: 2022-03-31

## 2022-05-15 PROBLEM — R42 DIZZINESS AND GIDDINESS: Chronic | Status: ACTIVE | Noted: 2022-03-31

## 2022-05-15 PROBLEM — E11.9 TYPE 2 DIABETES MELLITUS WITHOUT COMPLICATIONS: Chronic | Status: ACTIVE | Noted: 2022-03-31

## 2022-05-15 LAB
ALBUMIN SERPL ELPH-MCNC: 3.2 G/DL — LOW (ref 3.5–5.2)
ALP SERPL-CCNC: 171 U/L — HIGH (ref 30–115)
ALT FLD-CCNC: 31 U/L — SIGNIFICANT CHANGE UP (ref 0–41)
AMYLASE P1 CFR SERPL: 39 U/L — SIGNIFICANT CHANGE UP (ref 25–115)
ANION GAP SERPL CALC-SCNC: 12 MMOL/L — SIGNIFICANT CHANGE UP (ref 7–14)
AST SERPL-CCNC: 25 U/L — SIGNIFICANT CHANGE UP (ref 0–41)
B-OH-BUTYR SERPL-SCNC: 0.4 MMOL/L — SIGNIFICANT CHANGE UP
BASOPHILS # BLD AUTO: 0.04 K/UL — SIGNIFICANT CHANGE UP (ref 0–0.2)
BASOPHILS NFR BLD AUTO: 0.6 % — SIGNIFICANT CHANGE UP (ref 0–1)
BILIRUB SERPL-MCNC: <0.2 MG/DL — SIGNIFICANT CHANGE UP (ref 0.2–1.2)
BLD GP AB SCN SERPL QL: SIGNIFICANT CHANGE UP
BUN SERPL-MCNC: 42 MG/DL — HIGH (ref 10–20)
CALCIUM SERPL-MCNC: 8.5 MG/DL — SIGNIFICANT CHANGE UP (ref 8.5–10.1)
CHLORIDE SERPL-SCNC: 96 MMOL/L — LOW (ref 98–110)
CHOLEST SERPL-MCNC: 276 MG/DL — HIGH
CHOLEST SERPL-MCNC: 276 MG/DL — HIGH
CO2 SERPL-SCNC: 23 MMOL/L — SIGNIFICANT CHANGE UP (ref 17–32)
CREAT SERPL-MCNC: 2.4 MG/DL — HIGH (ref 0.7–1.5)
EGFR: 32 ML/MIN/1.73M2 — LOW
EOSINOPHIL # BLD AUTO: 0.17 K/UL — SIGNIFICANT CHANGE UP (ref 0–0.7)
EOSINOPHIL NFR BLD AUTO: 2.5 % — SIGNIFICANT CHANGE UP (ref 0–8)
GLUCOSE BLDC GLUCOMTR-MCNC: 277 MG/DL — HIGH (ref 70–99)
GLUCOSE BLDC GLUCOMTR-MCNC: 282 MG/DL — HIGH (ref 70–99)
GLUCOSE BLDC GLUCOMTR-MCNC: 286 MG/DL — HIGH (ref 70–99)
GLUCOSE BLDC GLUCOMTR-MCNC: 291 MG/DL — HIGH (ref 70–99)
GLUCOSE BLDC GLUCOMTR-MCNC: 303 MG/DL — HIGH (ref 70–99)
GLUCOSE BLDC GLUCOMTR-MCNC: 305 MG/DL — HIGH (ref 70–99)
GLUCOSE SERPL-MCNC: 445 MG/DL — HIGH (ref 70–99)
HCT VFR BLD CALC: 39.1 % — LOW (ref 42–52)
HDLC SERPL-MCNC: 44 MG/DL — SIGNIFICANT CHANGE UP
HDLC SERPL-MCNC: 47 MG/DL — SIGNIFICANT CHANGE UP
HGB BLD-MCNC: 13.6 G/DL — LOW (ref 14–18)
IMM GRANULOCYTES NFR BLD AUTO: 0.7 % — HIGH (ref 0.1–0.3)
INR BLD: 0.89 RATIO — SIGNIFICANT CHANGE UP (ref 0.65–1.3)
LACTATE SERPL-SCNC: 0.7 MMOL/L — SIGNIFICANT CHANGE UP (ref 0.7–2)
LDLC SERPL DIRECT ASSAY-MCNC: 158 MG/DL — SIGNIFICANT CHANGE UP
LDLC SERPL DIRECT ASSAY-MCNC: 159 MG/DL — SIGNIFICANT CHANGE UP
LIDOCAIN IGE QN: 18 U/L — SIGNIFICANT CHANGE UP (ref 7–60)
LIPID PNL WITH DIRECT LDL SERPL: ABNORMAL
LIPID PNL WITH DIRECT LDL SERPL: ABNORMAL
LYMPHOCYTES # BLD AUTO: 1.56 K/UL — SIGNIFICANT CHANGE UP (ref 1.2–3.4)
LYMPHOCYTES # BLD AUTO: 23.1 % — SIGNIFICANT CHANGE UP (ref 20.5–51.1)
MCHC RBC-ENTMCNC: 30.8 PG — SIGNIFICANT CHANGE UP (ref 27–31)
MCHC RBC-ENTMCNC: 34.8 G/DL — SIGNIFICANT CHANGE UP (ref 32–37)
MCV RBC AUTO: 88.5 FL — SIGNIFICANT CHANGE UP (ref 80–94)
MONOCYTES # BLD AUTO: 0.62 K/UL — HIGH (ref 0.1–0.6)
MONOCYTES NFR BLD AUTO: 9.2 % — SIGNIFICANT CHANGE UP (ref 1.7–9.3)
NEUTROPHILS # BLD AUTO: 4.32 K/UL — SIGNIFICANT CHANGE UP (ref 1.4–6.5)
NEUTROPHILS NFR BLD AUTO: 63.9 % — SIGNIFICANT CHANGE UP (ref 42.2–75.2)
NON HDL CHOLESTEROL: 229 MG/DL — HIGH
NON HDL CHOLESTEROL: 232 MG/DL — HIGH
NRBC # BLD: 0 /100 WBCS — SIGNIFICANT CHANGE UP (ref 0–0)
PLATELET # BLD AUTO: 224 K/UL — SIGNIFICANT CHANGE UP (ref 130–400)
POTASSIUM SERPL-MCNC: 4.5 MMOL/L — SIGNIFICANT CHANGE UP (ref 3.5–5)
POTASSIUM SERPL-SCNC: 4.5 MMOL/L — SIGNIFICANT CHANGE UP (ref 3.5–5)
PROT SERPL-MCNC: 5.7 G/DL — LOW (ref 6–8)
PROTHROM AB SERPL-ACNC: 10.2 SEC — SIGNIFICANT CHANGE UP (ref 9.95–12.87)
RBC # BLD: 4.42 M/UL — LOW (ref 4.7–6.1)
RBC # FLD: 11.9 % — SIGNIFICANT CHANGE UP (ref 11.5–14.5)
SARS-COV-2 RNA SPEC QL NAA+PROBE: SIGNIFICANT CHANGE UP
SODIUM SERPL-SCNC: 131 MMOL/L — LOW (ref 135–146)
TRIGL SERPL-MCNC: 420 MG/DL — HIGH
TRIGL SERPL-MCNC: 432 MG/DL — HIGH
TROPONIN T SERPL-MCNC: 0.06 NG/ML — CRITICAL HIGH
TROPONIN T SERPL-MCNC: 0.08 NG/ML — CRITICAL HIGH
TROPONIN T SERPL-MCNC: 0.11 NG/ML — CRITICAL HIGH
WBC # BLD: 6.76 K/UL — SIGNIFICANT CHANGE UP (ref 4.8–10.8)
WBC # FLD AUTO: 6.76 K/UL — SIGNIFICANT CHANGE UP (ref 4.8–10.8)

## 2022-05-15 PROCEDURE — 74176 CT ABD & PELVIS W/O CONTRAST: CPT | Mod: 26

## 2022-05-15 PROCEDURE — 71045 X-RAY EXAM CHEST 1 VIEW: CPT | Mod: 26

## 2022-05-15 PROCEDURE — 99285 EMERGENCY DEPT VISIT HI MDM: CPT

## 2022-05-15 PROCEDURE — 93010 ELECTROCARDIOGRAM REPORT: CPT

## 2022-05-15 PROCEDURE — 76705 ECHO EXAM OF ABDOMEN: CPT | Mod: 26

## 2022-05-15 PROCEDURE — 99223 1ST HOSP IP/OBS HIGH 75: CPT

## 2022-05-15 RX ORDER — SODIUM CHLORIDE 9 MG/ML
1000 INJECTION, SOLUTION INTRAVENOUS
Refills: 0 | Status: DISCONTINUED | OUTPATIENT
Start: 2022-05-15 | End: 2022-05-31

## 2022-05-15 RX ORDER — DEXTROSE 50 % IN WATER 50 %
15 SYRINGE (ML) INTRAVENOUS ONCE
Refills: 0 | Status: DISCONTINUED | OUTPATIENT
Start: 2022-05-15 | End: 2022-05-31

## 2022-05-15 RX ORDER — SODIUM CHLORIDE 9 MG/ML
1000 INJECTION, SOLUTION INTRAVENOUS
Refills: 0 | Status: DISCONTINUED | OUTPATIENT
Start: 2022-05-15 | End: 2022-05-20

## 2022-05-15 RX ORDER — FAMOTIDINE 10 MG/ML
20 INJECTION INTRAVENOUS ONCE
Refills: 0 | Status: COMPLETED | OUTPATIENT
Start: 2022-05-15 | End: 2022-05-15

## 2022-05-15 RX ORDER — ONDANSETRON 8 MG/1
4 TABLET, FILM COATED ORAL ONCE
Refills: 0 | Status: COMPLETED | OUTPATIENT
Start: 2022-05-15 | End: 2022-05-15

## 2022-05-15 RX ORDER — INSULIN HUMAN 100 [IU]/ML
10 INJECTION, SOLUTION SUBCUTANEOUS ONCE
Refills: 0 | Status: COMPLETED | OUTPATIENT
Start: 2022-05-15 | End: 2022-05-15

## 2022-05-15 RX ORDER — ALBUTEROL 90 UG/1
2 AEROSOL, METERED ORAL EVERY 6 HOURS
Refills: 0 | Status: DISCONTINUED | OUTPATIENT
Start: 2022-05-15 | End: 2022-05-31

## 2022-05-15 RX ORDER — METOPROLOL TARTRATE 50 MG
1 TABLET ORAL
Qty: 0 | Refills: 0 | DISCHARGE

## 2022-05-15 RX ORDER — INSULIN LISPRO 100/ML
3 VIAL (ML) SUBCUTANEOUS
Refills: 0 | Status: DISCONTINUED | OUTPATIENT
Start: 2022-05-15 | End: 2022-05-24

## 2022-05-15 RX ORDER — NIFEDIPINE 30 MG
30 TABLET, EXTENDED RELEASE 24 HR ORAL EVERY 24 HOURS
Refills: 0 | Status: DISCONTINUED | OUTPATIENT
Start: 2022-05-15 | End: 2022-05-16

## 2022-05-15 RX ORDER — IRBESARTAN 75 MG/1
1 TABLET ORAL
Qty: 0 | Refills: 0 | DISCHARGE

## 2022-05-15 RX ORDER — GABAPENTIN 400 MG/1
1 CAPSULE ORAL
Qty: 0 | Refills: 0 | DISCHARGE

## 2022-05-15 RX ORDER — SODIUM CHLORIDE 9 MG/ML
1500 INJECTION, SOLUTION INTRAVENOUS ONCE
Refills: 0 | Status: COMPLETED | OUTPATIENT
Start: 2022-05-15 | End: 2022-05-15

## 2022-05-15 RX ORDER — ERGOCALCIFEROL 1.25 MG/1
1 CAPSULE ORAL
Qty: 0 | Refills: 0 | DISCHARGE

## 2022-05-15 RX ORDER — MORPHINE SULFATE 50 MG/1
6 CAPSULE, EXTENDED RELEASE ORAL ONCE
Refills: 0 | Status: DISCONTINUED | OUTPATIENT
Start: 2022-05-15 | End: 2022-05-15

## 2022-05-15 RX ORDER — MECLIZINE HCL 12.5 MG
25 TABLET ORAL THREE TIMES A DAY
Refills: 0 | Status: DISCONTINUED | OUTPATIENT
Start: 2022-05-15 | End: 2022-05-31

## 2022-05-15 RX ORDER — DEXTROSE 50 % IN WATER 50 %
12.5 SYRINGE (ML) INTRAVENOUS ONCE
Refills: 0 | Status: DISCONTINUED | OUTPATIENT
Start: 2022-05-15 | End: 2022-05-31

## 2022-05-15 RX ORDER — INSULIN LISPRO 100/ML
VIAL (ML) SUBCUTANEOUS
Refills: 0 | Status: DISCONTINUED | OUTPATIENT
Start: 2022-05-15 | End: 2022-05-31

## 2022-05-15 RX ORDER — ATORVASTATIN CALCIUM 80 MG/1
1 TABLET, FILM COATED ORAL
Qty: 0 | Refills: 0 | DISCHARGE

## 2022-05-15 RX ORDER — ALBUTEROL 90 UG/1
0 AEROSOL, METERED ORAL
Qty: 0 | Refills: 0 | DISCHARGE

## 2022-05-15 RX ORDER — DEXTROSE 50 % IN WATER 50 %
25 SYRINGE (ML) INTRAVENOUS ONCE
Refills: 0 | Status: DISCONTINUED | OUTPATIENT
Start: 2022-05-15 | End: 2022-05-31

## 2022-05-15 RX ORDER — GLUCAGON INJECTION, SOLUTION 0.5 MG/.1ML
1 INJECTION, SOLUTION SUBCUTANEOUS ONCE
Refills: 0 | Status: DISCONTINUED | OUTPATIENT
Start: 2022-05-15 | End: 2022-05-31

## 2022-05-15 RX ORDER — PANTOPRAZOLE SODIUM 20 MG/1
40 TABLET, DELAYED RELEASE ORAL
Refills: 0 | Status: DISCONTINUED | OUTPATIENT
Start: 2022-05-15 | End: 2022-05-31

## 2022-05-15 RX ORDER — METOPROLOL TARTRATE 50 MG
100 TABLET ORAL DAILY
Refills: 0 | Status: DISCONTINUED | OUTPATIENT
Start: 2022-05-15 | End: 2022-05-31

## 2022-05-15 RX ORDER — INSULIN GLARGINE 100 [IU]/ML
30 INJECTION, SOLUTION SUBCUTANEOUS AT BEDTIME
Refills: 0 | Status: DISCONTINUED | OUTPATIENT
Start: 2022-05-15 | End: 2022-05-16

## 2022-05-15 RX ORDER — FENOFIBRATE,MICRONIZED 130 MG
145 CAPSULE ORAL DAILY
Refills: 0 | Status: DISCONTINUED | OUTPATIENT
Start: 2022-05-15 | End: 2022-05-31

## 2022-05-15 RX ORDER — MORPHINE SULFATE 50 MG/1
2 CAPSULE, EXTENDED RELEASE ORAL EVERY 6 HOURS
Refills: 0 | Status: DISCONTINUED | OUTPATIENT
Start: 2022-05-15 | End: 2022-05-22

## 2022-05-15 RX ORDER — DIPHENHYDRAMINE HYDROCHLORIDE AND LIDOCAINE HYDROCHLORIDE AND ALUMINUM HYDROXIDE AND MAGNESIUM HYDRO
30 KIT ONCE
Refills: 0 | Status: COMPLETED | OUTPATIENT
Start: 2022-05-15 | End: 2022-05-15

## 2022-05-15 RX ADMIN — Medication 30 MILLILITER(S): at 23:09

## 2022-05-15 RX ADMIN — Medication 3: at 17:14

## 2022-05-15 RX ADMIN — Medication 145 MILLIGRAM(S): at 12:06

## 2022-05-15 RX ADMIN — Medication 3 UNIT(S): at 12:05

## 2022-05-15 RX ADMIN — SODIUM CHLORIDE 1500 MILLILITER(S): 9 INJECTION, SOLUTION INTRAVENOUS at 04:51

## 2022-05-15 RX ADMIN — Medication 1 TABLET(S): at 21:36

## 2022-05-15 RX ADMIN — Medication 4: at 12:05

## 2022-05-15 RX ADMIN — Medication 1 TABLET(S): at 13:01

## 2022-05-15 RX ADMIN — Medication 30 MILLIGRAM(S): at 09:36

## 2022-05-15 RX ADMIN — SODIUM CHLORIDE 250 MILLILITER(S): 9 INJECTION, SOLUTION INTRAVENOUS at 21:36

## 2022-05-15 RX ADMIN — Medication 3 UNIT(S): at 17:13

## 2022-05-15 RX ADMIN — DIPHENHYDRAMINE HYDROCHLORIDE AND LIDOCAINE HYDROCHLORIDE AND ALUMINUM HYDROXIDE AND MAGNESIUM HYDRO 30 MILLILITER(S): KIT at 04:50

## 2022-05-15 RX ADMIN — FAMOTIDINE 20 MILLIGRAM(S): 10 INJECTION INTRAVENOUS at 04:50

## 2022-05-15 RX ADMIN — INSULIN GLARGINE 30 UNIT(S): 100 INJECTION, SOLUTION SUBCUTANEOUS at 21:37

## 2022-05-15 RX ADMIN — INSULIN HUMAN 10 UNIT(S): 100 INJECTION, SOLUTION SUBCUTANEOUS at 06:06

## 2022-05-15 RX ADMIN — MORPHINE SULFATE 2 MILLIGRAM(S): 50 CAPSULE, EXTENDED RELEASE ORAL at 20:10

## 2022-05-15 RX ADMIN — MORPHINE SULFATE 2 MILLIGRAM(S): 50 CAPSULE, EXTENDED RELEASE ORAL at 14:01

## 2022-05-15 RX ADMIN — ONDANSETRON 4 MILLIGRAM(S): 8 TABLET, FILM COATED ORAL at 04:51

## 2022-05-15 RX ADMIN — SODIUM CHLORIDE 250 MILLILITER(S): 9 INJECTION, SOLUTION INTRAVENOUS at 14:01

## 2022-05-15 RX ADMIN — Medication 100 MILLIGRAM(S): at 09:36

## 2022-05-15 RX ADMIN — PANTOPRAZOLE SODIUM 40 MILLIGRAM(S): 20 TABLET, DELAYED RELEASE ORAL at 09:37

## 2022-05-15 RX ADMIN — PANTOPRAZOLE SODIUM 40 MILLIGRAM(S): 20 TABLET, DELAYED RELEASE ORAL at 17:12

## 2022-05-15 RX ADMIN — MORPHINE SULFATE 6 MILLIGRAM(S): 50 CAPSULE, EXTENDED RELEASE ORAL at 05:58

## 2022-05-15 NOTE — CONSULT NOTE ADULT - SUBJECTIVE AND OBJECTIVE BOX
Patient is a 47y old  Male who presents with a chief complaint of epigastric pain (15 May 2022 11:16)  Patient presents with dental chief complaint '' I was supposed to get teeth extracted on left side but did not get a chance to get the extractions.''    HPI:  47 year old male with hx of HTN, AMBER, DM, vertigo, diverticulosis s/p colon resection presents from home with 4 days of epigastric pain.  Patient states it's a burning pain that has recently been going to his right upper back.  It's worse with food and he has not been able to tolerate anything by mouth for the last 2 days because of nausea.  Pain is not worse with exertion.  He endorses mild shortness of breath when climbing stairs which has been present for a while.  He denies any bloody stool, no hematemesis or vomiting.  Of note he has been on ibuprofen for the last week because for a dental infection.  He's supposed to get some teeth taken out but had to come in to the hospital.  Also he was told by his PMD to see a nephrologist and he hasn't yet.  He had a negative stress 3 years ago.    Patient stated he saw outpatient dentist 2 weeks ago. Dentist recommended extraction of teeth # 16 and 17 and prescribed antibiotics and pain medication. Patient did not follow up with the recommended extractions. Patient did not take prescribed antibiotic medication on time and stated he still has 4 tablets left.     In the ED he received 1.5L LR.  Lipase was negative    Triage vitals: /68    RR 18  Temp 98  SpO2 100% room air (15 May 2022 09:04)      PAST MEDICAL & SURGICAL HISTORY:  Diabetes      Asthma      Diverticulitis  surgery 16yrs ago      High cholesterol      Dyslipidemia      Hypertension      H/O vertigo      Diabetic ulcer of right foot      Broken toe  left pinky toe      Vertigo      HTN (hypertension)      Diabetes      History of surgery  colon resection      No significant past surgical history        ( -  ) heart valve replacement  ( -  ) joint replacement    MEDICATIONS  (STANDING):  amoxicillin  500 milliGRAM(s)/clavulanate 1 Tablet(s) Oral three times a day  dextrose 5%. 1000 milliLiter(s) (50 mL/Hr) IV Continuous <Continuous>  dextrose 5%. 1000 milliLiter(s) (100 mL/Hr) IV Continuous <Continuous>  dextrose 50% Injectable 25 Gram(s) IV Push once  dextrose 50% Injectable 12.5 Gram(s) IV Push once  dextrose 50% Injectable 25 Gram(s) IV Push once  fenofibrate Tablet 145 milliGRAM(s) Oral daily  glucagon  Injectable 1 milliGRAM(s) IntraMuscular once  insulin glargine Injectable (LANTUS) 30 Unit(s) SubCutaneous at bedtime  insulin lispro (ADMELOG) corrective regimen sliding scale   SubCutaneous three times a day before meals  insulin lispro Injectable (ADMELOG) 3 Unit(s) SubCutaneous three times a day before meals  metoprolol succinate  milliGRAM(s) Oral daily  NIFEdipine XL 30 milliGRAM(s) Oral every 24 hours  pantoprazole    Tablet 40 milliGRAM(s) Oral two times a day    MEDICATIONS  (PRN):  ALBUTerol    90 MICROgram(s) HFA Inhaler 2 Puff(s) Inhalation every 6 hours PRN Shortness of Breath and/or Wheezing  aluminum hydroxide/magnesium hydroxide/simethicone Suspension 30 milliLiter(s) Oral every 4 hours PRN Dyspepsia  dextrose Oral Gel 15 Gram(s) Oral once PRN Blood Glucose LESS THAN 70 milliGRAM(s)/deciliter  meclizine 25 milliGRAM(s) Oral three times a day PRN vertigo      Allergies    No Known Allergies    Intolerances        FAMILY HISTORY:  Family history of hypertension (Father)        Vital Signs Last 24 Hrs  T(C): 36.2 (15 May 2022 08:30), Max: 36.7 (15 May 2022 03:34)  T(F): 97.2 (15 May 2022 08:30), Max: 98.1 (15 May 2022 07:26)  HR: 97 (15 May 2022 08:30) (97 - 103)  BP: 176/96 (15 May 2022 08:30) (117/68 - 192/110)  BP(mean): --  RR: 18 (15 May 2022 08:30) (17 - 18)  SpO2: 98% (15 May 2022 08:30) (98% - 100%)    LABS:                        13.6   6.76  )-----------( 224      ( 15 May 2022 04:38 )             39.1     05-15    131<L>  |  96<L>  |  42<H>  ----------------------------<  445<H>  4.5   |  23  |  2.4<H>    Ca    8.5      15 May 2022 04:38    TPro  5.7<L>  /  Alb  3.2<L>  /  TBili  <0.2  /  DBili  x   /  AST  25  /  ALT  31  /  AlkPhos  171<H>  05-15    WBC Count: 6.76 K/uL [4.80 - 10.80] (05-15 @ 04:38)  Platelet Count - Automated: 224 K/uL [130 - 400] (05-15 @ 04:38)          Last Dental Visit: <<2 weeks ago at outpatient dentist  >>    EOE:  TMJ ( -  ) clicks                     ( -  ) pops                     ( -  ) crepitus             Mandible <<FROM>>             Facial bones and MOM <<grossly intact>>             ( -  ) trismus             ( -  ) lymphadenopathy             ( -  ) swelling             ( -  ) asymmetry             ( -  ) palpation             ( -  ) dyspnea             ( -  ) dysphagia             (  - ) loss of consciousness    IOE:  <<permanent>> dentition                        hard/soft palate:  ( -  ) palatal torus, <<No pathology noted>>            tongue/FOM <<No pathology noted>>            labial/buccal mucosa <<No pathology noted>>           ( +  ) percussion: # 14           (  + ) palpation: #14,15,16           ( -  ) swelling            ( -  ) abscess           ( -  ) sinus tract    *DENTAL RADIOGRAPHS: none available    *ASSESSMENT: No sensitivity to percussion or palpation on mandibular left dentition. Sensitivity to percussion noted on tooth #14, sensitivity to palpation noted on teeth #14,15,16. No sensitivity to percussion on teeth #15 and 16. No intraoral or extraoral swelling noted. Potential  cheek biting on left side due to wisdom teeth. Sensitivity to palpation on left buccal mucosa.     *PLAN: Patient is admitted overnight due to medical status. Patient to be seen tomorrow in dental clinic for xrays and definitive treatment.     RECOMMENDATIONS:  1) <<Patient to be seen tomorrow in [facility] dental clinic for radiographs and definitive treatment >>  2) Antibiotics and pain medications as per medical team  3) If any difficulty swallowing/breathing, fever occur, return to ER.     Magy Phipps DMD, Pager #[phone]

## 2022-05-15 NOTE — ED PROVIDER NOTE - ATTENDING APP SHARED VISIT CONTRIBUTION OF CARE
48 yo M pmh htn, dm, vertigo, diverticulitis s/p colon resection pw abd pain. RUQ abd pain x3 days. Associated with nausea and nbnb vomiting. Burning sensation, relieved with tums, radiating up towards chest. No f/c, no urinary sx, no palpitations, no sob, no back pain, no extremity pain, no dizziness, no LH.     CONSTITUTIONAL: Well-developed; well-nourished; in no acute distress. Sitting up and providing appropriate history and physical examination  SKIN: skin exam is warm and dry, no acute rash.  HEAD: Normocephalic; atraumatic.  EYES: PERRL, 3 mm bilateral, no nystagmus, EOM intact; conjunctiva and sclera clear.  ENT: No nasal discharge; airway clear.  NECK: Supple; non tender. + full passive ROM in all directions. No JVD  CARD: S1, S2 normal; no murmurs, gallops, or rubs. Regular rate and rhythm. + Symmetric Strong Pulses  RESP: No wheezes, rales or rhonchi. Good air movement bilaterally  ABD: +ttp over RUQ and epigastrium. soft; non-distended. No Rebound, No Guarding, No signs of peritonitis, No CVA tenderness. No pulsatile abdominal mass. + Strong and Symmetric Pulses  EXT: Normal ROM. No clubbing, cyanosis or edema. Dp and Pt Pulses intact. Cap refill less than 3 seconds  NEURO: CN 2-12 intact, normal finger to nose, normal romberg, stable gait, no sensory or motor deficits, Alert, oriented, grossly unremarkable. No Focal deficits. GCS 15. NIH 0  PSYCH: Cooperative, appropriate.

## 2022-05-15 NOTE — ED PROVIDER NOTE - NSICDXPASTMEDICALHX_GEN_ALL_CORE_FT
PAST MEDICAL HISTORY:  Asthma     Broken toe left pinky toe    Diabetes     Diabetes     Diabetic ulcer of right foot     Diverticulitis surgery 16yrs ago    Dyslipidemia     H/O vertigo     High cholesterol     HTN (hypertension)     Hypertension     Vertigo

## 2022-05-15 NOTE — H&P ADULT - NSHPPHYSICALEXAM_GEN_ALL_CORE
General: WN/WD NAD  Neurology: A&Ox3, nonfocal, VERDIN x 4  Head:  Normocephalic, atraumatic  ENT:  Mucosa moist, no ulcerations  Neck:  Supple, no sinuses or palpable masses  Lymphatic:  No palpable cervical, supraclavicular, axillary or inguinal adenopathy  Respiratory: CTA B/L  CV: RRR, S1S2, no murmur  Abdominal: Soft, epigastric and RUQ tenderness, ND no palpable mass  MSK: No edema  Incisions: intact, no erythema or drainage

## 2022-05-15 NOTE — CONSULT NOTE ADULT - ASSESSMENT
Epigastric pain 2/2 suspected pancreatitis  HTN  CAD  CKD III    -IV hydration  -EKG nonichemic  -TTE  -repeat EKG if recurrent chest pain  -GI eval  -c/w metoprolol, nifedipine Epigastric pain 2/2 suspected pancreatitis  HTN  CAD  CKD III    -IV hydration  -EKG nonichemic  -TTE  -repeat EKG if recurrent chest pain  -GI eval  -c/w metoprolol, nifedipine  -ASA Epigastric pain 2/2 suspected pancreatitis  HTN  CAD  CKD III    -IV hydration  -repeat EKG if recurrent chest pain  -GI eval  -c/w metoprolol, nifedipine  -ASA, statin.

## 2022-05-15 NOTE — ED PROVIDER NOTE - OBJECTIVE STATEMENT
47 yold male to ED Pmhx Htn, Hld, Dm, vertigo, diverticulosis s/p colon resection c/o mid epigastric and RUQ abdominal pain started 3 days ago with nausea, vomiting; pain described as burning relieved mild with tums; pt admits to excessive cofee/caffiene - pt with hx of gerd on pantoprazole; pain exacerbated by food; denies alcohol use; pt last cardiac workup 3 yrs ago stress test 12/19 neg;

## 2022-05-15 NOTE — H&P ADULT - HISTORY OF PRESENT ILLNESS
47 year old male with hx of HTN, AMBER, DM, vertigo, diverticulosis s/p colon resection presents from home with 4 days of epigastric pain.  Patient states it's a burning pain that has recently been going to his right upper back.  It's worse with food and he has not been able to tolerate anything by mouth for the last 2 days because of nausea.  Pain is not worse with exertion.  He endorses mild shortness of breath when climbing stairs which has been present for a while.  He denies any bloody stool, no hematemesis or vomiting.  Of note he has been on ibuprofen for the last week because of a dental infection.  He's supposed to get some teeth taken out but had to come in to the hospital.  Also he was told by his PMD to see a nephrologist and he hasn't yet.  He had a negative stress 3 years ago.    In the ED he received 1.5L LR.  Lipase was negative    Triage vitals: /68    RR 18  Temp 98  SpO2 100% room air

## 2022-05-15 NOTE — CONSULT NOTE ADULT - ASSESSMENT
47 year old male with hx of HTN, AMBER, DM, vertigo, diverticulosis s/p colon resection presents from home with 4 days of epigastric pain.    #)Epigastric pain DD: mild pancreatitis (Given pain and CT positive for mild inflammation) vs r/o PUD vs gatsritis  -Lipase 18, calcium 8.5  -Hemodynamically stable  -Hb stable  -US CBD 5mm no stones  -CT abdomen Mild ill-defined appearance of the pancreas. Questionable mild interstitial pancreatitis.    Recs:   Treat like pancreatitis for now given pain and CT positive   c/w IVF   Pain control   Check lipid profile  PPI BID   Cardiology eval for chest pain bryan elevated trops  If epigastric pain persist after fluid resuscitation for pancreatitis will consider for EGD after cardiology work up and clearance

## 2022-05-15 NOTE — H&P ADULT - ATTENDING COMMENTS
48 YO M with a PMH of HTN, AMBER, DM2, vertigo, CKD3, and diverticulosis s/p colon resection who presents to the hospital with a c/o CP for the past x 4 days. Described as burning, epigastric, radiating to back, and waxes/wanes. + worse with food. Associated with - SOB, + nausea, - palpitations, and - diaphoresis. Did not take SLN and/or ASA prior to arrival. Denies any fevers/chills, cough, ABD pain, LE swelling, dysuria, headaches, or rashes.     Of note, pt recently started using Ibuprofen for the past x 1 week.     In the ED, cardiac enzymes were negative and an EKG showed no ischemic changes. Famotidine/Maalox given in the ED.     Family hx of early heart disease (-)     Physical exam shows pt in NAD, resting comfortably. VSS, afebrile, not hypoxic on RA. A&Ox3. Neuro exam intact. CTA B/L with no W/C/R. RRR, no M/G/R. ABD is soft and non-tender to palpation, normoactive BSs. LEs without swelling, pulses palpated bilaterally. No rashes. Labs and imaging as above resident note.     Epigastric pain + Nausea, atypical, suspect NSAID-induced gastritis vs pancreatitis, rule out ACS. Admit to tele. Cardio consult. Serial cardiac enzymes and EKGs. A1c, Lipids, and TSH. Echo. PRN pain meds. PPI. Restart home meds.   -Hold NSAIDs    BILLY on CKD3, likely mild AIN from recent NSAID use. Send UA, urine lytes, urine pr:cr ratio, urine eosinophils, and trend BMP. IVFs (LR). Monitor urinary out-put. Hold nephrotoxic agents.     Hypoalbuminemia, from poor oral intake. Nutrition eval.     Troponin elevation, suspected cause is CKD3. Doubt ACS. No CP or EKG changes. Serial cardiac enzymes and EKGs.     HX of HTN, AMBER, DM2, vertigo, and diverticulosis s/p colon resection. Restart home meds, except as stated above. DVT PPX. Inform PCP of pt's admission to hospital. My note supersedes the residents note.     Date seen by Attendin/15/22

## 2022-05-15 NOTE — ED ADULT NURSE NOTE - NS PRO AD NO ADVANCE DIRECTIVE
Since her symptoms are longstanding, does she want to do something about it now.    May consider diuretic therapy if she wants to try but have baseline NT proBNP & BMP before starting.   No

## 2022-05-15 NOTE — ED PROVIDER NOTE - NS ED ROS FT
No pertinent past medical history Constitutional: No fever, chills.  Eyes: No visual changes.  ENT: No hearing changes.  Neck: No neck pain or stiffness.  Cardiovascular: No chest pain, palpitations, edema.  Pulmonary: No SOB, cough. No hemoptysis.  Abdominal:  see hpi  : No dysuria, frequency.  Neuro: No headache, syncope, dizziness.  MS: No back pain. No calf pain/swelling.  Psych: No suicidal ideations.

## 2022-05-15 NOTE — CONSULT NOTE ADULT - SUBJECTIVE AND OBJECTIVE BOX
Cardiologist: none    HPI:  47 year old male with hx of HTN, AMBER, DM, vertigo, diverticulosis s/p colon resection presents from home with 4 days of epigastric pain.  Patient states it's a burning pain that has recently been going to his right upper back.  It's worse with food and he has not been able to tolerate anything by mouth for the last 2 days because of nausea.  Pain is not worse with exertion.  He endorses mild shortness of breath when climbing stairs which has been present for a while.  He denies any bloody stool, no hematemesis or vomiting.  Of note he has been on ibuprofen for the last week because of a dental infection.  He's supposed to get some teeth taken out but had to come in to the hospital.  Also he was told by his PMD to see a nephrologist and he hasn't yet.  He had a negative stress 3 years ago.    In the ED he received 1.5L LR.  Lipase was negative    Triage vitals: /68    RR 18  Temp 98  SpO2 100% room air (15 May 2022 09:04)      PAST MEDICAL & SURGICAL HISTORY  Diabetes    Asthma    Diverticulitis  surgery 16yrs ago    High cholesterol    Dyslipidemia    Hypertension    H/O vertigo    Diabetic ulcer of right foot    Broken toe  left pinky toe    Vertigo    HTN (hypertension)    Diabetes    History of surgery  colon resection    No significant past surgical history        FAMILY HISTORY:  FAMILY HISTORY:  Family history of hypertension (Father)      [ ] no pertinent family history of premature cardiovascular disease in first degree relatives.  Mother:   Father:   Siblings:     SOCIAL HISTORY:  []smoker  []Alcohol  []Drug    ALLERGIES:  No Known Allergies      MEDICATIONS:  MEDICATIONS  (STANDING):  amoxicillin  500 milliGRAM(s)/clavulanate 1 Tablet(s) Oral three times a day  dextrose 5%. 1000 milliLiter(s) (50 mL/Hr) IV Continuous <Continuous>  dextrose 5%. 1000 milliLiter(s) (100 mL/Hr) IV Continuous <Continuous>  dextrose 50% Injectable 25 Gram(s) IV Push once  dextrose 50% Injectable 12.5 Gram(s) IV Push once  dextrose 50% Injectable 25 Gram(s) IV Push once  fenofibrate Tablet 145 milliGRAM(s) Oral daily  glucagon  Injectable 1 milliGRAM(s) IntraMuscular once  insulin glargine Injectable (LANTUS) 30 Unit(s) SubCutaneous at bedtime  insulin lispro (ADMELOG) corrective regimen sliding scale   SubCutaneous three times a day before meals  insulin lispro Injectable (ADMELOG) 3 Unit(s) SubCutaneous three times a day before meals  lactated ringers. 1000 milliLiter(s) (250 mL/Hr) IV Continuous <Continuous>  metoprolol succinate  milliGRAM(s) Oral daily  NIFEdipine XL 30 milliGRAM(s) Oral every 24 hours  pantoprazole    Tablet 40 milliGRAM(s) Oral two times a day    MEDICATIONS  (PRN):  ALBUTerol    90 MICROgram(s) HFA Inhaler 2 Puff(s) Inhalation every 6 hours PRN Shortness of Breath and/or Wheezing  aluminum hydroxide/magnesium hydroxide/simethicone Suspension 30 milliLiter(s) Oral every 4 hours PRN Dyspepsia  dextrose Oral Gel 15 Gram(s) Oral once PRN Blood Glucose LESS THAN 70 milliGRAM(s)/deciliter  meclizine 25 milliGRAM(s) Oral three times a day PRN vertigo  morphine  - Injectable 2 milliGRAM(s) IV Push every 6 hours PRN Severe Pain (7 - 10)      HOME MEDICATIONS:  Home Medications:  Albuterol (Eqv-ProAir HFA) 90 mcg/inh inhalation aerosol: 2 puff(s) inhaled every 6 hours (15 May 2022 08:54)  amoxicillin-clavulanate 500 mg-125 mg oral tablet: 1 tab(s) orally every 8 hours (15 May 2022 08:54)  fenofibrate 145 mg oral tablet: 1 tab(s) orally once a day (15 May 2022 08:54)  HumaLOG 100 units/mL subcutaneous solution: 5 unit(s) subcutaneous 3 times a day (before meals)  sliding scale (15 May 2022 08:54)  hydroCHLOROthiazide: 37.5 milligram(s) orally once a day (15 May 2022 08:54)  ibuprofen 600 mg oral tablet: 1 tab(s) orally every 6 hours (15 May 2022 08:54)  Protonix 40 mg oral delayed release tablet: 1 tab(s) orally once a day (15 May 2022 08:54)  spironolactone 25 mg oral tablet: 1 tab(s) orally once a day (15 May 2022 08:54)  Toprol- mg oral tablet, extended release: 1 tab(s) orally once a day (15 May 2022 08:54)  Tresiba 100 units/mL subcutaneous solution: 40 unit(s) subcutaneous once a day (15 May 2022 08:54)      VITALS:   T(F): 98 (05-15 @ 15:20), Max: 98.1 (05-15 @ 07:26)  HR: 73 (05-15 @ 15:20) (73 - 103)  BP: 156/83 (05-15 @ 15:20) (117/68 - 192/110)  BP(mean): --  RR: 18 (05-15 @ 15:20) (17 - 18)  SpO2: 97% (05-15 @ 15:20) (97% - 100%)      REVIEW OF SYSTEMS:    Negative except as mentioned in HPI    PHYSICAL EXAM:  NEURO: patient is awake , alert and oriented  GEN: Not in acute distress  NECK: no thyroid enlargement, no JVD  LUNGS: Clear to auscultation bilaterally   CARDIOVASCULAR: S1/S2 present, RRR , no murmurs or rubs, no carotid bruits,  + PP bilaterally  ABD: Soft, non-tender, non-distended, +BS  EXT: No PATI  SKIN: Intact    LABS:                        13.6   6.76  )-----------( 224      ( 15 May 2022 04:38 )             39.1     05-15    131<L>  |  96<L>  |  42<H>  ----------------------------<  445<H>  4.5   |  23  |  2.4<H>    Ca    8.5      15 May 2022 04:38    TPro  5.7<L>  /  Alb  3.2<L>  /  TBili  <0.2  /  DBili  x   /  AST  25  /  ALT  31  /  AlkPhos  171<H>  05-15    PT/INR - ( 15 May 2022 11:00 )   PT: 10.20 sec;   INR: 0.89 ratio           Troponin T, Serum: 0.06 ng/mL *HH* (05-15-22 @ 16:49)  Troponin T, Serum: 0.08 ng/mL *HH* (05-15-22 @ 11:00)  Lactate, Blood: 0.7 mmol/L (05-15-22 @ 04:38)  Troponin T, Serum: 0.11 ng/mL *HH* (05-15-22 @ 04:38)    CARDIAC MARKERS ( 15 May 2022 16:49 )  x     / 0.06 ng/mL / x     / x     / x      CARDIAC MARKERS ( 15 May 2022 11:00 )  x     / 0.08 ng/mL / x     / x     / x      CARDIAC MARKERS ( 15 May 2022 04:38 )  x     / 0.11 ng/mL / x     / x     / x        05-15 Chol 276<H> LDL -- HDL 44 Trig 432<H>, 05-15 Chol 276<H> LDL -- HDL 47 Trig 420<H>    RADIOLOGY:  -CXR:  -TTE:  < from: TTE Echo Complete w/o Contrast w/ Doppler (05.26.21 @ 07:28) >  Summary:   1. LV Ejection Fraction by Lakhani's Method with a biplane EF of 64 %.   2. Moderate concentric left ventricular hypertrophy.   3. Spectral Doppler shows impaired relaxation pattern of left ventricular myocardial filling (Grade I diastolic dysfunction).   4. Mildly enlarged left atrium.   5. Normal right atrial size.   6. Mild mitral valve regurgitation.   7. Mild tricuspid regurgitation.    < end of copied text >    -CCTA:  -STRESS TEST:  < from: NM Nuclear Stress Pharmacologic Multiple (12.03.19 @ 10:57) >  Impression:   1. NORMAL ADENOSINE / REST MYOCARDIAL PERFUSION SPECT TOMOGRAPHY, WITH NO   EVIDENCE FOR ISCHEMIA DURING ADENOSINE INFUSION.   2. NORMAL RESTING LEFT VENTRICULAR WALL MOTION AND WALL THICKENING.  3. LEFT VENTRICULAR EJECTION FRACTION OF  65 % WHICH IS WITHIN RANGE OF   NORMAL.     < end of copied text >    -CATHETERIZATION:  < from: Cardiac Cath Lab - Adult (05.25.21 @ 17:21) >  CORONARY CIRCULATION: The coronary circulation is right dominant. Left    main: Normal. Ostial LAD: There was a 30- 40 % stenosis. Proximal LAD:    Angiography showed mild atherosclerosis Mid LAD: Angiography showed    moderate atherosclerosis Distal LAD: Angiography showed moderate    atherosclerosis Proximal circumflex: Angiography showed minor luminal    irregularities with no flow limiting lesions. Distal circumflex: There was    a 50 % stenosis at a site with no prior intervention. There was TIGIST grade    3 flow through the vessel (brisk flow). 1st obtuse marginal: Angiography    showed minor luminal irregularities with no flow limiting lesions. RCA:    Angiography showed minor luminal irregularities with no flow limiting    lesions. Right PDA: Angiography showed mild atherosclerosis with no flow    limiting lesions. 1st posterolateral segment: Angiography showed mild    atherosclerosis with no flow limiting lesions.    < end of copied text >      ECG:    TELEMETRY EVENTS:

## 2022-05-15 NOTE — ED ADULT NURSE NOTE - NSIMPLEMENTINTERV_GEN_ALL_ED
Implemented All Fall with Harm Risk Interventions:  [place] to call system. Call bell, personal items and telephone within reach. Instruct patient to call for assistance. Room bathroom lighting operational. Non-slip footwear when patient is off stretcher. Physically safe environment: no spills, clutter or unnecessary equipment. Stretcher in lowest position, wheels locked, appropriate side rails in place. Provide visual cue, wrist band, yellow gown, etc. Monitor gait and stability. Monitor for mental status changes and reorient to person, place, and time. Review medications for side effects contributing to fall risk. Reinforce activity limits and safety measures with patient and family. Provide visual clues: red socks.

## 2022-05-15 NOTE — CONSULT NOTE ADULT - SUBJECTIVE AND OBJECTIVE BOX
Gastroenterology Consultation:    Patient is a 47y old  Male who presents with a chief complaint of epigastric pain (15 May 2022 09:04)      Admitted on: 05-15-22  HPI:  47 year old male with hx of HTN, AMBER, DM, vertigo, diverticulosis s/p colon resection presents from home with 4 days of epigastric pain.  Patient states it's a burning pain that has recently been going to his right upper back.  It's worse with food and he has not been able to tolerate anything by mouth for the last 2 days because of nausea.  Pain is not worse with exertion.  He endorses mild shortness of breath when climbing stairs which has been present for a while.  He denies any bloody stool, no hematemesis or vomiting.  Of note he has been on ibuprofen for the last week because of a dental infection.  He's supposed to get some teeth taken out but had to come in to the hospital.  Also he was told by his PMD to see a nephrologist and he hasn't yet.  He had a negative stress 3 years ago.    In the ED he received 1.5L LR.  Lipase was negative    Triage vitals: /68    RR 18  Temp 98  SpO2 100% room air (15 May 2022 09:04)      Prior EGD:  Prior Colonoscopy:      PAST MEDICAL & SURGICAL HISTORY:  Diabetes      Asthma      Diverticulitis  surgery 16yrs ago      High cholesterol      Dyslipidemia      Hypertension      H/O vertigo      Diabetic ulcer of right foot      Broken toe  left pinky toe      Vertigo      HTN (hypertension)      Diabetes      History of surgery  colon resection      No significant past surgical history          FAMILY HISTORY:  Family history of hypertension (Father)        Social History:  Tobacco:  Alcohol:  Drugs:    Home Medications:  Albuterol (Eqv-ProAir HFA) 90 mcg/inh inhalation aerosol: 2 puff(s) inhaled every 6 hours (15 May 2022 08:54)  amoxicillin-clavulanate 500 mg-125 mg oral tablet: 1 tab(s) orally every 8 hours (15 May 2022 08:54)  fenofibrate 145 mg oral tablet: 1 tab(s) orally once a day (15 May 2022 08:54)  HumaLOG 100 units/mL subcutaneous solution: 5 unit(s) subcutaneous 3 times a day (before meals)  sliding scale (15 May 2022 08:54)  hydroCHLOROthiazide: 37.5 milligram(s) orally once a day (15 May 2022 08:54)  ibuprofen 600 mg oral tablet: 1 tab(s) orally every 6 hours (15 May 2022 08:54)  Protonix 40 mg oral delayed release tablet: 1 tab(s) orally once a day (15 May 2022 08:54)  spironolactone 25 mg oral tablet: 1 tab(s) orally once a day (15 May 2022 08:54)  Toprol- mg oral tablet, extended release: 1 tab(s) orally once a day (15 May 2022 08:54)  Tresiba 100 units/mL subcutaneous solution: 40 unit(s) subcutaneous once a day (15 May 2022 08:54)    MEDICATIONS  (STANDING):  amoxicillin  500 milliGRAM(s)/clavulanate 1 Tablet(s) Oral three times a day  dextrose 5%. 1000 milliLiter(s) (50 mL/Hr) IV Continuous <Continuous>  dextrose 5%. 1000 milliLiter(s) (100 mL/Hr) IV Continuous <Continuous>  dextrose 50% Injectable 25 Gram(s) IV Push once  dextrose 50% Injectable 12.5 Gram(s) IV Push once  dextrose 50% Injectable 25 Gram(s) IV Push once  fenofibrate Tablet 145 milliGRAM(s) Oral daily  glucagon  Injectable 1 milliGRAM(s) IntraMuscular once  insulin glargine Injectable (LANTUS) 30 Unit(s) SubCutaneous at bedtime  insulin lispro (ADMELOG) corrective regimen sliding scale   SubCutaneous three times a day before meals  insulin lispro Injectable (ADMELOG) 3 Unit(s) SubCutaneous three times a day before meals  metoprolol succinate  milliGRAM(s) Oral daily  NIFEdipine XL 30 milliGRAM(s) Oral every 24 hours  pantoprazole    Tablet 40 milliGRAM(s) Oral two times a day    MEDICATIONS  (PRN):  ALBUTerol    90 MICROgram(s) HFA Inhaler 2 Puff(s) Inhalation every 6 hours PRN Shortness of Breath and/or Wheezing  aluminum hydroxide/magnesium hydroxide/simethicone Suspension 30 milliLiter(s) Oral every 4 hours PRN Dyspepsia  dextrose Oral Gel 15 Gram(s) Oral once PRN Blood Glucose LESS THAN 70 milliGRAM(s)/deciliter  meclizine 25 milliGRAM(s) Oral three times a day PRN vertigo      Allergies  No Known Allergies      Review of Systems:   Constitutional:  No Fever, No Chills  ENT/Mouth:  No Hearing Changes,  No Difficulty Swallowing  Eyes:  No Eye Pain, No Vision Changes  Cardiovascular:  No Chest Pain, No Palpitations  Respiratory:  No Cough, No Dyspnea  Gastrointestinal:  As described in HPI  Musculoskeletal:  No Joint Swelling, No Back Pain  Skin:  No Skin Lesions, No Jaundice  Neuro:  No Syncope, No Dizziness  Heme/Lymph:  No Bruising, No Bleeding.          Physical Examination:  T(C): 36.2 (05-15-22 @ 08:30), Max: 36.7 (05-15-22 @ 03:34)  HR: 97 (05-15-22 @ 08:30) (97 - 103)  BP: 176/96 (05-15-22 @ 08:30) (117/68 - 192/110)  RR: 18 (05-15-22 @ 08:30) (17 - 18)  SpO2: 98% (05-15-22 @ 08:30) (98% - 100%)  Height (cm): 172.7 (05-15-22 @ 03:34)  Weight (kg): 81.6 (05-15-22 @ 03:34)      Constitutional: No acute distress.  Eyes:. Conjunctivae are clear, Sclera is non-icteric.  Ears Nose and Throat: The external ears are normal appearing,  Oral mucosa is pink and moist.  Respiratory:  No signs of respiratory distress. Lung sounds are clear bilaterally.  Cardiovascular:  S1 S2, Regular rate and rhythm.  GI: Abdomen is soft, symmetric, and non-tender without distention. There are no visible lesions or scars. Bowel sounds are present and normoactive in all four quadrants. No masses, hepatomegaly, or splenomegaly are noted.   Neuro: No Tremor, No involuntary movements  Skin: No rashes, No Jaundice.          Data:                        13.6   6.76  )-----------( 224      ( 15 May 2022 04:38 )             39.1     Hgb Trend:  13.6  05-15-22 @ 04:38      05-15    131<L>  |  96<L>  |  42<H>  ----------------------------<  445<H>  4.5   |  23  |  2.4<H>    Ca    8.5      15 May 2022 04:38    TPro  5.7<L>  /  Alb  3.2<L>  /  TBili  <0.2  /  DBili  x   /  AST  25  /  ALT  31  /  AlkPhos  171<H>  05-15    Liver panel trend:  TBili <0.2   /   AST 25   /   ALT 31   /   AlkP 171   /   Tptn 5.7   /   Alb 3.2    /   DBili --      05-15              Radiology:    US Abdomen Upper Quadrant Right:   ACC: [id number] EXAM:  US ABDOMEN RT UPR QUADRANT                          PROCEDURE DATE:  05/15/2022          INTERPRETATION:  CLINICAL INFORMATION: Right upper quadrant pain    COMPARISON: CT abdomen pelvis 3/26/2021.    TECHNIQUE: Sonography of the right upper quadrant.    FINDINGS:  Liver: Within normal limits.  Bile ducts: Normal caliber. Common bile duct measures 5 mm.  Gallbladder: Adenomyomatosis. No cholelithiasis, gallbladder wall   thickening or pericholecystic fluid. Negative sonographic Mccracken's sign.  Pancreas: Visualized portions are within normal limits.  Right kidney: 11.8 cm. No hydronephrosis.  Ascites: None.  IVC: Visualized portions are within normal limits.    IMPRESSION:    No sonographic evidence of acute cholecystitis.    Adenomyomatosis.    --- End of Report ---          MAIKOL SOLORIO MD; Resident Radiologist  This document has been electronically signed.  HITESH GOTTI MD; Attending Radiologist  This document has been electronically signed. May 15 2022  6:51AM (05-15-22 @ 06:28)     Gastroenterology Consultation:    Patient is a 47y old  Male who presents with a chief complaint of epigastric pain (15 May 2022 09:04)      Admitted on: 05-15-22  HPI:  47 year old male with hx of HTN, AMBER, DM, vertigo, diverticulosis s/p colon resection presents from home with 4 days of epigastric pain.  Patient states it's a burning pain that has recently been going to his right upper back.  It's worse with food and he has not been able to tolerate anything by mouth for the last 2 days because of nausea.  Pain is not worse with exertion.  He endorses mild shortness of breath when climbing stairs which has been present for a while.  He denies any bloody stool, no hematemesis or vomiting.  Of note he has been on ibuprofen for the last week because of a dental infection.  He's supposed to get some teeth taken out but had to come in to the hospital.  Also he was told by his PMD to see a nephrologist and he hasn't yet.  He had a negative stress 3 years ago.    In the ED he received 1.5L LR.  Lipase was negative    Triage vitals: /68    RR 18  Temp 98  SpO2 100% room air (15 May 2022 09:04)      Prior EGD: < from: EGD-Colonoscopy (09.17.20 @ 10:30) >  < from: EGD-Colonoscopy (09.17.20 @ 10:30) >  EGD Impressions:    Grade 1 esophagitis in the gastroesophageal junction compatible with  nonspecific erosive esophagitis.    GE junction noted at 37 cm and salmon colored island patch was noted at 32 cm  s/p cold forcep biopsy.    Erosions in the stomach compatible with erosive gastritis. (Biopsy).    Erythema in the duodenum compatible with duodenitis. (Biopsy).     < end of copied text >    Prior Colonoscopy:  < from: EGD-Colonoscopy (09.17.20 @ 10:30) >  Colonoscopy Impressions:    Internal hemorrhoids.    Surgical anastomosis noted to be healthy at 35cm from anal verge    < end of copied text >      PAST MEDICAL & SURGICAL HISTORY:  Diabetes      Asthma      Diverticulitis  surgery 16yrs ago      High cholesterol      Dyslipidemia      Hypertension      H/O vertigo      Diabetic ulcer of right foot      Broken toe  left pinky toe      Vertigo      HTN (hypertension)      Diabetes      History of surgery  colon resection      No significant past surgical history          FAMILY HISTORY:  Family history of hypertension (Father)        Social History:  Tobacco: Y  Alcohol; N  Drugs: N    Home Medications:  Albuterol (Eqv-ProAir HFA) 90 mcg/inh inhalation aerosol: 2 puff(s) inhaled every 6 hours (15 May 2022 08:54)  amoxicillin-clavulanate 500 mg-125 mg oral tablet: 1 tab(s) orally every 8 hours (15 May 2022 08:54)  fenofibrate 145 mg oral tablet: 1 tab(s) orally once a day (15 May 2022 08:54)  HumaLOG 100 units/mL subcutaneous solution: 5 unit(s) subcutaneous 3 times a day (before meals)  sliding scale (15 May 2022 08:54)  hydroCHLOROthiazide: 37.5 milligram(s) orally once a day (15 May 2022 08:54)  ibuprofen 600 mg oral tablet: 1 tab(s) orally every 6 hours (15 May 2022 08:54)  Protonix 40 mg oral delayed release tablet: 1 tab(s) orally once a day (15 May 2022 08:54)  spironolactone 25 mg oral tablet: 1 tab(s) orally once a day (15 May 2022 08:54)  Toprol- mg oral tablet, extended release: 1 tab(s) orally once a day (15 May 2022 08:54)  Tresiba 100 units/mL subcutaneous solution: 40 unit(s) subcutaneous once a day (15 May 2022 08:54)    MEDICATIONS  (STANDING):  amoxicillin  500 milliGRAM(s)/clavulanate 1 Tablet(s) Oral three times a day  dextrose 5%. 1000 milliLiter(s) (50 mL/Hr) IV Continuous <Continuous>  dextrose 5%. 1000 milliLiter(s) (100 mL/Hr) IV Continuous <Continuous>  dextrose 50% Injectable 25 Gram(s) IV Push once  dextrose 50% Injectable 12.5 Gram(s) IV Push once  dextrose 50% Injectable 25 Gram(s) IV Push once  fenofibrate Tablet 145 milliGRAM(s) Oral daily  glucagon  Injectable 1 milliGRAM(s) IntraMuscular once  insulin glargine Injectable (LANTUS) 30 Unit(s) SubCutaneous at bedtime  insulin lispro (ADMELOG) corrective regimen sliding scale   SubCutaneous three times a day before meals  insulin lispro Injectable (ADMELOG) 3 Unit(s) SubCutaneous three times a day before meals  metoprolol succinate  milliGRAM(s) Oral daily  NIFEdipine XL 30 milliGRAM(s) Oral every 24 hours  pantoprazole    Tablet 40 milliGRAM(s) Oral two times a day    MEDICATIONS  (PRN):  ALBUTerol    90 MICROgram(s) HFA Inhaler 2 Puff(s) Inhalation every 6 hours PRN Shortness of Breath and/or Wheezing  aluminum hydroxide/magnesium hydroxide/simethicone Suspension 30 milliLiter(s) Oral every 4 hours PRN Dyspepsia  dextrose Oral Gel 15 Gram(s) Oral once PRN Blood Glucose LESS THAN 70 milliGRAM(s)/deciliter  meclizine 25 milliGRAM(s) Oral three times a day PRN vertigo      Allergies  No Known Allergies      Review of Systems:   Constitutional:  No Fever, No Chills  ENT/Mouth:  No Hearing Changes,  No Difficulty Swallowing  Eyes:  No Eye Pain, No Vision Changes  Cardiovascular:  No Chest Pain, No Palpitations  Respiratory:  No Cough, No Dyspnea  Gastrointestinal:  As described in HPI  Musculoskeletal:  No Joint Swelling, No Back Pain  Skin:  No Skin Lesions, No Jaundice  Neuro:  No Syncope, No Dizziness  Heme/Lymph:  No Bruising, No Bleeding.          Physical Examination:  T(C): 36.2 (05-15-22 @ 08:30), Max: 36.7 (05-15-22 @ 03:34)  HR: 97 (05-15-22 @ 08:30) (97 - 103)  BP: 176/96 (05-15-22 @ 08:30) (117/68 - 192/110)  RR: 18 (05-15-22 @ 08:30) (17 - 18)  SpO2: 98% (05-15-22 @ 08:30) (98% - 100%)  Height (cm): 172.7 (05-15-22 @ 03:34)  Weight (kg): 81.6 (05-15-22 @ 03:34)      Constitutional: No acute distress.  Eyes:. Conjunctivae are clear, Sclera is non-icteric.  Ears Nose and Throat: The external ears are normal appearing,  Oral mucosa is pink and moist.  Respiratory:  No signs of respiratory distress. Lung sounds are clear bilaterally.  Cardiovascular:  S1 S2, Regular rate and rhythm.  GI: Abdomen is soft, symmetric, and non-tender without distention.   Neuro: No Tremor, No involuntary movements  Skin: No rashes, No Jaundice.          Data:                        13.6   6.76  )-----------( 224      ( 15 May 2022 04:38 )             39.1     Hgb Trend:  13.6  05-15-22 @ 04:38      05-15    131<L>  |  96<L>  |  42<H>  ----------------------------<  445<H>  4.5   |  23  |  2.4<H>    Ca    8.5      15 May 2022 04:38    TPro  5.7<L>  /  Alb  3.2<L>  /  TBili  <0.2  /  DBili  x   /  AST  25  /  ALT  31  /  AlkPhos  171<H>  05-15    Liver panel trend:  TBili <0.2   /   AST 25   /   ALT 31   /   AlkP 171   /   Tptn 5.7   /   Alb 3.2    /   DBili --      05-15              Radiology:    US Abdomen Upper Quadrant Right:   ACC: [id number] EXAM:  US ABDOMEN RT UPR QUADRANT                          PROCEDURE DATE:  05/15/2022          INTERPRETATION:  CLINICAL INFORMATION: Right upper quadrant pain    COMPARISON: CT abdomen pelvis 3/26/2021.    TECHNIQUE: Sonography of the right upper quadrant.    FINDINGS:  Liver: Within normal limits.  Bile ducts: Normal caliber. Common bile duct measures 5 mm.  Gallbladder: Adenomyomatosis. No cholelithiasis, gallbladder wall   thickening or pericholecystic fluid. Negative sonographic Mccracken's sign.  Pancreas: Visualized portions are within normal limits.  Right kidney: 11.8 cm. No hydronephrosis.  Ascites: None.  IVC: Visualized portions are within normal limits.    IMPRESSION:    No sonographic evidence of acute cholecystitis.    Adenomyomatosis.    --- End of Report ---          MAIKOL SOLORIO MD; Resident Radiologist  This document has been electronically signed.  HITESH GOTTI MD; Attending Radiologist  This document has been electronically signed. May 15 2022  6:51AM (05-15-22 @ 06:28)    < from: CT Abdomen and Pelvis No Cont (05.15.22 @ 11:27) >  FINDINGS:    LOWER CHEST: Unremarkable.    HEPATOBILIARY: Mor stone seen within the gallbladder. The liver   appears to be enlarged..    SPLEEN: Unremarkable.    PANCREAS: There is a mild ill-defined edema involving the pancreatic   parenchyma. Questionable interstitial pancreatitis. Correlate with   amylase and lipase levels. No discrete collection identified..    ADRENAL GLANDS: Unremarkable.    KIDNEYS: No hydronephrosis or renal stone..    ABDOMINOPELVIC NODES: Unremarkable.    PELVIC ORGANS: Calcifications are seenof the prostate gland..    PERITONEUM/MESENTERY/BOWEL: Moderate fecal retention. No bowel   obstruction..    BONES/SOFT TISSUES: Mild degenerative changes..    OTHER:      IMPRESSION: Mild ill-defined appearance of the pancreas. Questionable   mild interstitial pancreatitis. Correlate with amylase and lipase levels.   No discrete fluid collection.    Cholelithiasis.    Fecal retention.    < end of copied text >

## 2022-05-15 NOTE — H&P ADULT - NSHPSOCIALHISTORY_GEN_ALL_CORE
current smoker 6 cigarettes a week, no alcohol use, no illicit drug abuse Advancement-Rotation Flap Text: The defect edges were debeveled with a #15c scalpel blade.  Given the location of the defect, shape of the defect and the proximity to free margins an advancement-rotation flap was deemed most appropriate.  Using a sterile surgical marker, an appropriate flap was drawn incorporating the defect and placing the expected incisions within the relaxed skin tension lines where possible. The area thus outlined was incised deep to adipose tissue with a #15c scalpel blade.  The skin margins were undermined to an appropriate distance in all directions utilizing iris scissors.

## 2022-05-15 NOTE — H&P ADULT - NSHPLABSRESULTS_GEN_ALL_CORE
LABS/RADIOLOGY RESULTS:                          13.6   6.76  )-----------( 224      ( 15 May 2022 04:38 )             39.1   05-15    131<L>  |  96<L>  |  42<H>  ----------------------------<  445<H>  4.5   |  23  |  2.4<H>    Ca    8.5      15 May 2022 04:38    TPro  5.7<L>  /  Alb  3.2<L>  /  TBili  <0.2  /  DBili  x   /  AST  25  /  ALT  31  /  AlkPhos  171<H>  05-15  Blood Cultures    No sonographic evidence of acute cholecystitis.    Adenomyomatosis.

## 2022-05-15 NOTE — ED PROVIDER NOTE - PHYSICAL EXAMINATION
Constitutional: Well developed, well nourished. NAD  Head: Normocephalic, atraumatic.  Eyes: PERRL, EOMI.  ENT: No nasal discharge. Mucous membranes dry.  Neck: Supple. Painless ROM.  Cardiovascular: Regular rate and rhythm   Pulmonary:   Lungs clear to auscultation bilaterally.    Abdominal: Soft moderate tenderness noted epigastric and RUQ area; no rebound,guarding or flank pain  Extremities. Pelvis stable. No lower extremity edema, symmetric calves.  Skin: No rashes, cyanosis.  Neuro: AAOx3. No focal neurological deficits.  Psych: Normal mood. Normal affect.

## 2022-05-15 NOTE — ED PROVIDER NOTE - CARE PLAN
Principal Discharge DX:	Chest pain  Secondary Diagnosis:	Elevated troponin  Secondary Diagnosis:	Chronic kidney disease, unspecified CKD stage  Secondary Diagnosis:	Hyperglycemia   1

## 2022-05-15 NOTE — H&P ADULT - ASSESSMENT
47 year old male with hx of HTN, AMBER, DM, vertigo, diverticulosis s/p colon resection presents from home with 4 days of epigastric pain 47 year old male with hx of HTN, AMBER, DM, vertigo, diverticulosis s/p colon resection presents from home with 4 days of epigastric pain    # Epigastric pain radiating to the back likely secondary to PUD/GERD r/o ACS and pancreatitis  - patient started on ibuprofen about a week ago for dental infection, pain started about 3-4 days ago  - pain is burning with occasional radiation to the back, worse with food also accompanied with nausea, denies hematemesis or bloody stools  - lipase 18, however with pain profile will get CT abdomen/pelvis  - doubt ACS however patient has risk factors, uncontrolled DM and smoker, has troponinemia but more likely from kidney disease, trop 0.11 f/u repeat, EKG is NSR with no T wave inversions  - f/u GI, f/u cardio  - patient amenable to clear liquids, will monitor  - starting protonix 40 bid and maalox    # HTN  - was on HCTZ 37.5 / spironolactone 25 / toprol 100  - will hold HCTZ and spironolactone, creatinine is 2.4, patient was told to see a nephrologist by PMD however hasn't yet  - started nifedipine 30 XL qd    # Worsening CKD  - patient hasn't followed up with nephro yet  - f/u UA, urine prot/creat ratio, kidney bladder US, spep, upep, niranjan  - f/u nephro    # Dental infection  - willing to have teeth pulled out here, f/u dental  - c/w last 3 doses of amoxicillin    # AMBER  - continue with home cpap    # DM2  - 30/3/3/3 for now, on clear liquid diet    # Vertigo  - c/w meclizine    PLAN: f/u CT abdomen, f/u GI on possible EGD, f/u trop at 11 am    # DVT PPX: ambulatory, will avoid chemical ppx for now in case of unmasking bleed  # GI PPX: protonix 40 bid  # Diet: clear liquid  # Med rec completed at bedside with patient  FULL CODE

## 2022-05-15 NOTE — H&P ADULT - NSHPSOURCEINFORD_GEN_ALL_CORE
Addressed at office visit this morning.   Jacqueline Monaco RN     
To provider:  This message is generated from a lab result you had sent to the patient.  Please see below.Thank you. Molly Dorantes R.N.    Per lab note dated 5/15/2020 from  Dr. Kellee Downing is as follows:   Viewed by Mavis Krishnamurthy on 5/15/2020  1:08 PM   Written by Kellee Downing MD on 5/15/2020 12:57 PM   Mavis,   Your urine culture was negative for bacteria.  If you are feeling better, please complete the antibiotics; if you have had no change please let me know and we will try the over-active bladder medication if you would like.   Kellee Downing MD     
Chart(s)/Patient

## 2022-05-15 NOTE — ED PROVIDER NOTE - NSICDXPASTSURGICALHX_GEN_ALL_CORE_FT
PAST SURGICAL HISTORY:  History of surgery colon resection    No significant past surgical history

## 2022-05-15 NOTE — H&P ADULT - NSHPREVIEWOFSYSTEMS_GEN_ALL_CORE
REVIEW OF SYSTEMS:    CONSTITUTIONAL: No weakness, fevers or chills  EYES/ENT: No visual changes;  No vertigo or throat pain, mild facial pain  NECK: No pain or stiffness  RESPIRATORY: No cough, wheezing, hemoptysis; No shortness of breath  CARDIOVASCULAR: No chest pain or palpitations  GASTROINTESTINAL: Epigastric pain and tenderness. Mild nausea, no vomiting or hematemesis; No diarrhea or constipation. No melena or hematochezia.  GENITOURINARY: No dysuria, frequency or hematuria  NEUROLOGICAL: No numbness or weakness  SKIN: No itching, rashes

## 2022-05-15 NOTE — ED ADULT NURSE REASSESSMENT NOTE - NS ED NURSE REASSESS COMMENT FT1
Pt reassessed, A/Ox3, ambulating independently, no signs of distress, denies pain at this time, cardiac monitoring applied, pending MAR.

## 2022-05-15 NOTE — ED PROVIDER NOTE - CLINICAL SUMMARY MEDICAL DECISION MAKING FREE TEXT BOX
I personally evaluated the patient. I reviewed the Resident’s or Physician Assistant’s note (as assigned above), and agree with the findings and plan except as documented in my note. Patient evaluated for epigastric pain. Labs, ekg, cxr performed in the ED. Pt noted to have elevated troponin of 0.11, no prior troponin noted in the medical record. Patient admitted to telemetry for further evaluation and treatment.

## 2022-05-16 LAB
A1C WITH ESTIMATED AVERAGE GLUCOSE RESULT: 10.9 % — HIGH (ref 4–5.6)
ALBUMIN SERPL ELPH-MCNC: 3.3 G/DL — LOW (ref 3.5–5.2)
ALP SERPL-CCNC: 147 U/L — HIGH (ref 30–115)
ALT FLD-CCNC: 31 U/L — SIGNIFICANT CHANGE UP (ref 0–41)
ANION GAP SERPL CALC-SCNC: 9 MMOL/L — SIGNIFICANT CHANGE UP (ref 7–14)
AST SERPL-CCNC: 23 U/L — SIGNIFICANT CHANGE UP (ref 0–41)
BASOPHILS # BLD AUTO: 0.02 K/UL — SIGNIFICANT CHANGE UP (ref 0–0.2)
BASOPHILS NFR BLD AUTO: 0.3 % — SIGNIFICANT CHANGE UP (ref 0–1)
BILIRUB SERPL-MCNC: 0.3 MG/DL — SIGNIFICANT CHANGE UP (ref 0.2–1.2)
BUN SERPL-MCNC: 27 MG/DL — HIGH (ref 10–20)
CALCIUM SERPL-MCNC: 9.1 MG/DL — SIGNIFICANT CHANGE UP (ref 8.5–10.1)
CHLORIDE SERPL-SCNC: 99 MMOL/L — SIGNIFICANT CHANGE UP (ref 98–110)
CO2 SERPL-SCNC: 26 MMOL/L — SIGNIFICANT CHANGE UP (ref 17–32)
CREAT SERPL-MCNC: 1.7 MG/DL — HIGH (ref 0.7–1.5)
EGFR: 49 ML/MIN/1.73M2 — LOW
EOSINOPHIL # BLD AUTO: 0.12 K/UL — SIGNIFICANT CHANGE UP (ref 0–0.7)
EOSINOPHIL NFR BLD AUTO: 1.7 % — SIGNIFICANT CHANGE UP (ref 0–8)
ESTIMATED AVERAGE GLUCOSE: 266 MG/DL — HIGH (ref 68–114)
GLUCOSE BLDC GLUCOMTR-MCNC: 210 MG/DL — HIGH (ref 70–99)
GLUCOSE BLDC GLUCOMTR-MCNC: 235 MG/DL — HIGH (ref 70–99)
GLUCOSE BLDC GLUCOMTR-MCNC: 248 MG/DL — HIGH (ref 70–99)
GLUCOSE BLDC GLUCOMTR-MCNC: 249 MG/DL — HIGH (ref 70–99)
GLUCOSE BLDC GLUCOMTR-MCNC: 254 MG/DL — HIGH (ref 70–99)
GLUCOSE SERPL-MCNC: 306 MG/DL — HIGH (ref 70–99)
HCT VFR BLD CALC: 38.5 % — LOW (ref 42–52)
HGB BLD-MCNC: 13.1 G/DL — LOW (ref 14–18)
IMM GRANULOCYTES NFR BLD AUTO: 0.3 % — SIGNIFICANT CHANGE UP (ref 0.1–0.3)
INR BLD: 0.88 RATIO — SIGNIFICANT CHANGE UP (ref 0.65–1.3)
LYMPHOCYTES # BLD AUTO: 1.35 K/UL — SIGNIFICANT CHANGE UP (ref 1.2–3.4)
LYMPHOCYTES # BLD AUTO: 19.5 % — LOW (ref 20.5–51.1)
MAGNESIUM SERPL-MCNC: 1.9 MG/DL — SIGNIFICANT CHANGE UP (ref 1.8–2.4)
MCHC RBC-ENTMCNC: 30.3 PG — SIGNIFICANT CHANGE UP (ref 27–31)
MCHC RBC-ENTMCNC: 34 G/DL — SIGNIFICANT CHANGE UP (ref 32–37)
MCV RBC AUTO: 89.1 FL — SIGNIFICANT CHANGE UP (ref 80–94)
MONOCYTES # BLD AUTO: 0.6 K/UL — SIGNIFICANT CHANGE UP (ref 0.1–0.6)
MONOCYTES NFR BLD AUTO: 8.7 % — SIGNIFICANT CHANGE UP (ref 1.7–9.3)
NEUTROPHILS # BLD AUTO: 4.8 K/UL — SIGNIFICANT CHANGE UP (ref 1.4–6.5)
NEUTROPHILS NFR BLD AUTO: 69.5 % — SIGNIFICANT CHANGE UP (ref 42.2–75.2)
NRBC # BLD: 0 /100 WBCS — SIGNIFICANT CHANGE UP (ref 0–0)
PHOSPHATE SERPL-MCNC: 3.3 MG/DL — SIGNIFICANT CHANGE UP (ref 2.1–4.9)
PLATELET # BLD AUTO: 220 K/UL — SIGNIFICANT CHANGE UP (ref 130–400)
POTASSIUM SERPL-MCNC: 4.8 MMOL/L — SIGNIFICANT CHANGE UP (ref 3.5–5)
POTASSIUM SERPL-SCNC: 4.8 MMOL/L — SIGNIFICANT CHANGE UP (ref 3.5–5)
PROT SERPL-MCNC: 5.7 G/DL — LOW (ref 6–8)
PROTHROM AB SERPL-ACNC: 10.1 SEC — SIGNIFICANT CHANGE UP (ref 9.95–12.87)
RBC # BLD: 4.32 M/UL — LOW (ref 4.7–6.1)
RBC # FLD: 11.9 % — SIGNIFICANT CHANGE UP (ref 11.5–14.5)
SODIUM SERPL-SCNC: 134 MMOL/L — LOW (ref 135–146)
WBC # BLD: 6.91 K/UL — SIGNIFICANT CHANGE UP (ref 4.8–10.8)
WBC # FLD AUTO: 6.91 K/UL — SIGNIFICANT CHANGE UP (ref 4.8–10.8)

## 2022-05-16 PROCEDURE — 99223 1ST HOSP IP/OBS HIGH 75: CPT

## 2022-05-16 PROCEDURE — 74018 RADEX ABDOMEN 1 VIEW: CPT | Mod: 26

## 2022-05-16 PROCEDURE — 99233 SBSQ HOSP IP/OBS HIGH 50: CPT

## 2022-05-16 PROCEDURE — 99221 1ST HOSP IP/OBS SF/LOW 40: CPT

## 2022-05-16 PROCEDURE — 93010 ELECTROCARDIOGRAM REPORT: CPT

## 2022-05-16 RX ORDER — HEPARIN SODIUM 5000 [USP'U]/ML
5000 INJECTION INTRAVENOUS; SUBCUTANEOUS EVERY 8 HOURS
Refills: 0 | Status: DISCONTINUED | OUTPATIENT
Start: 2022-05-16 | End: 2022-05-31

## 2022-05-16 RX ORDER — NIFEDIPINE 30 MG
30 TABLET, EXTENDED RELEASE 24 HR ORAL ONCE
Refills: 0 | Status: COMPLETED | OUTPATIENT
Start: 2022-05-16 | End: 2022-05-16

## 2022-05-16 RX ORDER — NIFEDIPINE 30 MG
60 TABLET, EXTENDED RELEASE 24 HR ORAL DAILY
Refills: 0 | Status: DISCONTINUED | OUTPATIENT
Start: 2022-05-17 | End: 2022-05-31

## 2022-05-16 RX ORDER — MORPHINE SULFATE 50 MG/1
2 CAPSULE, EXTENDED RELEASE ORAL ONCE
Refills: 0 | Status: DISCONTINUED | OUTPATIENT
Start: 2022-05-16 | End: 2022-05-16

## 2022-05-16 RX ORDER — INSULIN GLARGINE 100 [IU]/ML
34 INJECTION, SOLUTION SUBCUTANEOUS AT BEDTIME
Refills: 0 | Status: DISCONTINUED | OUTPATIENT
Start: 2022-05-16 | End: 2022-05-24

## 2022-05-16 RX ORDER — ONDANSETRON 8 MG/1
4 TABLET, FILM COATED ORAL EVERY 4 HOURS
Refills: 0 | Status: DISCONTINUED | OUTPATIENT
Start: 2022-05-16 | End: 2022-05-31

## 2022-05-16 RX ADMIN — Medication 2: at 17:04

## 2022-05-16 RX ADMIN — SODIUM CHLORIDE 250 MILLILITER(S): 9 INJECTION, SOLUTION INTRAVENOUS at 17:04

## 2022-05-16 RX ADMIN — HEPARIN SODIUM 5000 UNIT(S): 5000 INJECTION INTRAVENOUS; SUBCUTANEOUS at 15:48

## 2022-05-16 RX ADMIN — MORPHINE SULFATE 2 MILLIGRAM(S): 50 CAPSULE, EXTENDED RELEASE ORAL at 06:37

## 2022-05-16 RX ADMIN — ONDANSETRON 4 MILLIGRAM(S): 8 TABLET, FILM COATED ORAL at 13:33

## 2022-05-16 RX ADMIN — Medication 30 MILLIGRAM(S): at 12:33

## 2022-05-16 RX ADMIN — MORPHINE SULFATE 2 MILLIGRAM(S): 50 CAPSULE, EXTENDED RELEASE ORAL at 20:35

## 2022-05-16 RX ADMIN — Medication 1 TABLET(S): at 05:34

## 2022-05-16 RX ADMIN — PANTOPRAZOLE SODIUM 40 MILLIGRAM(S): 20 TABLET, DELAYED RELEASE ORAL at 17:03

## 2022-05-16 RX ADMIN — MORPHINE SULFATE 2 MILLIGRAM(S): 50 CAPSULE, EXTENDED RELEASE ORAL at 08:43

## 2022-05-16 RX ADMIN — PANTOPRAZOLE SODIUM 40 MILLIGRAM(S): 20 TABLET, DELAYED RELEASE ORAL at 05:35

## 2022-05-16 RX ADMIN — Medication 100 MILLIGRAM(S): at 05:34

## 2022-05-16 RX ADMIN — ONDANSETRON 4 MILLIGRAM(S): 8 TABLET, FILM COATED ORAL at 08:44

## 2022-05-16 RX ADMIN — INSULIN GLARGINE 34 UNIT(S): 100 INJECTION, SOLUTION SUBCUTANEOUS at 21:55

## 2022-05-16 RX ADMIN — MORPHINE SULFATE 2 MILLIGRAM(S): 50 CAPSULE, EXTENDED RELEASE ORAL at 02:59

## 2022-05-16 RX ADMIN — MORPHINE SULFATE 2 MILLIGRAM(S): 50 CAPSULE, EXTENDED RELEASE ORAL at 11:35

## 2022-05-16 RX ADMIN — Medication 30 MILLIGRAM(S): at 17:03

## 2022-05-16 RX ADMIN — HEPARIN SODIUM 5000 UNIT(S): 5000 INJECTION INTRAVENOUS; SUBCUTANEOUS at 21:55

## 2022-05-16 RX ADMIN — Medication 145 MILLIGRAM(S): at 12:34

## 2022-05-16 RX ADMIN — MORPHINE SULFATE 2 MILLIGRAM(S): 50 CAPSULE, EXTENDED RELEASE ORAL at 20:16

## 2022-05-16 RX ADMIN — MORPHINE SULFATE 2 MILLIGRAM(S): 50 CAPSULE, EXTENDED RELEASE ORAL at 06:53

## 2022-05-16 NOTE — PROGRESS NOTE ADULT - ASSESSMENT
47 year old male with hx of HTN, AMBER, DM, vertigo, diverticulosis s/p colon resection presents from home with 4 days of epigastric pain.    #)Epigastric pain DD: mild pancreatitis (Given pain and CT positive for mild inflammation) vs r/o PUD vs gatsritis  Etiology for pancreatitis: Denies any alcohol since last 3 months, no GS in US,  less likely to cause given levels less than 1000  -Lipase 18, calcium 8.5  -Hemodynamically stable  -Hb stable  -US CBD 5mm no stones  -CT abdomen Mild ill-defined appearance of the pancreas. Questionable mild interstitial pancreatitis.    Recs:   Treat like pancreatitis for now given pain and CT positive   c/w IVF LR @250/hour  Pain control   PPI BID   get Cardiology risk stratification if EGD is needed if still has pain after treating pancreatitis       47 year old male with hx of HTN, AMBER, DM, vertigo, diverticulosis s/p colon resection presents from home with 4 days of epigastric pain.    #)Epigastric pain DD: mild pancreatitis (Given pain and CT positive for mild inflammation) vs r/o PUD vs gatsritis  Etiology for pancreatitis: Denies any alcohol since last 3 months, no GS in US,  less likely to cause given levels less than 1000  -Lipase 18, calcium 8.5  -Hemodynamically stable  -Hb stable  -US CBD 5mm no stones  -CT abdomen Mild ill-defined appearance of the pancreas. Questionable mild interstitial pancreatitis. gall stones  -Case discussed with radiology no evidence of gall stones     Recs:   Treat like pancreatitis for now given pain and CT positive   c/w IVF LR @250/hour  Monitor BUN/Creatinine   Monitor hematocrit  Pain control   PPI BID   r/o secondary gain

## 2022-05-16 NOTE — PROGRESS NOTE ADULT - ASSESSMENT
47 year old male with hx of HTN, AMBER, DM, vertigo, diverticulosis s/p colon resection presents from home with 4 days of epigastric pain    # Epigastric pain radiating to the back likely secondary to PUD/GERD r/o ACS and pancreatitis  - patient started on ibuprofen about a week ago for dental infection, pain started about 3-4 days ago  - pain is burning with occasional radiation to the back, worse with food also accompanied with nausea, denies hematemesis or bloody stools  - lipase 18,   -US CBD 5mm no stones  -CT abdomen Mild ill-defined appearance of the pancreas. Questionable mild interstitial pancreatitis.  - doubt ACS however patient has risk factors, uncontrolled DM and smoker, has troponinemia but more likely from kidney disease, trop 0.11 f/u repeat, EKG is NSR with no T wave inversions  - starting protonix 40 bid and maalox  - GI consulted: c/w with pain control,  protonix bid  - given persistent epigastric pain persist after fluid resuscitation for pancreatitis consider for EGD  - cardiology consulted: reports pain most likely 2/2 to underlying pancreatitis:  f/u TTE  -repeat EKG if recurrent chest pain  - f/u cardiology and GI recommendations  - c/w with aggressive hydration, pain control, protonix, zofran, NPO        # HTN  - was on HCTZ 37.5 / spironolactone 25 / toprol 100  - will hold HCTZ and spironolactone, creatinine is 2.4, patient was told to see a nephrologist by PMD however hasn't yet  - started nifedipine 30 XL qd    # Worsening CKD  - patient hasn't followed up with nephro yet  - f/u UA, urine prot/creat ratio, spep, upep, niranjan  - f/u nephro    # Dental infection  - willing to have teeth pulled out here,  - c/w last 3 doses of amoxicillin  - Dental consulted pending eval today for radiographs and definitive treatment      # AMBER  - continue with home cpap    # DM2  - monitor BS    # Vertigo  - c/w meclizine        # DVT PPX: ambulatory, will avoid chemical ppx for now in case of unmasking bleed  # GI PPX: protonix 40 bid  # Diet: NPO  FULL CODE

## 2022-05-16 NOTE — CONSULT NOTE ADULT - SUBJECTIVE AND OBJECTIVE BOX
HPI:  47 year old male with hx of HTN, AMBER, DM, vertigo, diverticulosis s/p colon resection presents from home with 4 days of epigastric pain.  Patient states it's a burning pain that has recently been going to his right upper back.  It's worse with food and he has not been able to tolerate anything by mouth for the last 2 days because of nausea.  Pain is not worse with exertion.  He endorses mild shortness of breath when climbing stairs which has been present for a while.  He denies any bloody stool, no hematemesis or vomiting.  Of note he has been on ibuprofen for the last week because of a dental infection.  He's supposed to get some teeth taken out but had to come in to the hospital.  Also he was told by his PMD to see a nephrologist and he hasn't yet.  He had a negative stress 3 years ago.    In the ED he received 1.5L LR.  Lipase was negative    Triage vitals: /68    RR 18  Temp 98  SpO2 100% room air (15 May 2022 09:04)      PAST MEDICAL & SURGICAL HISTORY  Diabetes    Asthma    Diverticulitis  surgery 16yrs ago    High cholesterol    Dyslipidemia    Hypertension    H/O vertigo    Diabetic ulcer of right foot    Broken toe  left pinky toe    Vertigo    HTN (hypertension)    Diabetes    History of surgery  colon resection    No significant past surgical history        FAMILY HISTORY:  FAMILY HISTORY:  Family history of hypertension (Father)        SOCIAL HISTORY:  []smoker  []Alcohol  []Drug    ALLERGIES:  No Known Allergies      MEDICATIONS:  MEDICATIONS  (STANDING):  dextrose 5%. 1000 milliLiter(s) (50 mL/Hr) IV Continuous <Continuous>  dextrose 5%. 1000 milliLiter(s) (100 mL/Hr) IV Continuous <Continuous>  dextrose 50% Injectable 25 Gram(s) IV Push once  dextrose 50% Injectable 12.5 Gram(s) IV Push once  dextrose 50% Injectable 25 Gram(s) IV Push once  fenofibrate Tablet 145 milliGRAM(s) Oral daily  glucagon  Injectable 1 milliGRAM(s) IntraMuscular once  heparin   Injectable 5000 Unit(s) SubCutaneous every 8 hours  insulin glargine Injectable (LANTUS) 34 Unit(s) SubCutaneous at bedtime  insulin lispro (ADMELOG) corrective regimen sliding scale   SubCutaneous three times a day before meals  insulin lispro Injectable (ADMELOG) 3 Unit(s) SubCutaneous three times a day before meals  lactated ringers. 1000 milliLiter(s) (250 mL/Hr) IV Continuous <Continuous>  metoprolol succinate  milliGRAM(s) Oral daily  NIFEdipine XL 30 milliGRAM(s) Oral every 24 hours  pantoprazole    Tablet 40 milliGRAM(s) Oral two times a day    MEDICATIONS  (PRN):  ALBUTerol    90 MICROgram(s) HFA Inhaler 2 Puff(s) Inhalation every 6 hours PRN Shortness of Breath and/or Wheezing  aluminum hydroxide/magnesium hydroxide/simethicone Suspension 30 milliLiter(s) Oral every 4 hours PRN Dyspepsia  dextrose Oral Gel 15 Gram(s) Oral once PRN Blood Glucose LESS THAN 70 milliGRAM(s)/deciliter  meclizine 25 milliGRAM(s) Oral three times a day PRN vertigo  morphine  - Injectable 2 milliGRAM(s) IV Push every 6 hours PRN Severe Pain (7 - 10)  ondansetron Injectable 4 milliGRAM(s) IV Push every 4 hours PRN Nausea and/or Vomiting      HOME MEDICATIONS:  Home Medications:  Albuterol (Eqv-ProAir HFA) 90 mcg/inh inhalation aerosol: 2 puff(s) inhaled every 6 hours (15 May 2022 08:54)  amoxicillin-clavulanate 500 mg-125 mg oral tablet: 1 tab(s) orally every 8 hours (15 May 2022 08:54)  fenofibrate 145 mg oral tablet: 1 tab(s) orally once a day (15 May 2022 08:54)  HumaLOG 100 units/mL subcutaneous solution: 5 unit(s) subcutaneous 3 times a day (before meals)  sliding scale (15 May 2022 08:54)  hydroCHLOROthiazide: 37.5 milligram(s) orally once a day (15 May 2022 08:54)  ibuprofen 600 mg oral tablet: 1 tab(s) orally every 6 hours (15 May 2022 08:54)  Protonix 40 mg oral delayed release tablet: 1 tab(s) orally once a day (15 May 2022 08:54)  spironolactone 25 mg oral tablet: 1 tab(s) orally once a day (15 May 2022 08:54)  Toprol- mg oral tablet, extended release: 1 tab(s) orally once a day (15 May 2022 08:54)  Tresiba 100 units/mL subcutaneous solution: 40 unit(s) subcutaneous once a day (15 May 2022 08:54)      VITALS:   T(F): 98.2 (05-16 @ 07:25), Max: 98.2 (05-15 @ 23:57)  HR: 92 (05-16 @ 07:25) (73 - 103)  BP: 177/85 (05-16 @ 07:25) (117/68 - 192/110)  BP(mean): --  RR: 18 (05-16 @ 07:25) (17 - 18)  SpO2: 100% (05-16 @ 07:25) (97% - 100%)    I&O's Summary      REVIEW OF SYSTEMS:  CONSTITUTIONAL: No weakness, fevers or chills  EYES: No visual changes  ENT: No vertigo or throat pain   NECK: No pain or stiffness  RESPIRATORY: No cough, wheezing, hemoptysis; No shortness of breath  CARDIOVASCULAR: No chest pain or palpitations  GASTROINTESTINAL: complains  abdominal or epigastric pain.   GENITOURINARY: No dysuria, frequency or hematuria  NEUROLOGICAL: No numbness or weakness  SKIN: No itching, no rashes  MSK: no    PHYSICAL EXAM:  NEURO: patient is awake , alert and oriented  GEN: Not in acute distress  NECK: no thyroid enlargement, no JVD  LUNGS: Clear to auscultation bilaterally   CARDIOVASCULAR: S1/S2 present, RRR , no murmurs or rubs, no carotid bruits,  + PP bilaterally  ABD: Soft, Mild tenderness, non-distended, +BS,   EXT: No PATI, Mild  skin  Darkening   SKIN: Intact    LABS:                        13.1   6.91  )-----------( 220      ( 16 May 2022 08:30 )             38.5     05-16    134<L>  |  99  |  27<H>  ----------------------------<  306<H>  4.8   |  26  |  1.7<H>    Ca    9.1      16 May 2022 08:30  Phos  3.3     05-16  Mg     1.9     05-16    TPro  5.7<L>  /  Alb  3.3<L>  /  TBili  0.3  /  DBili  x   /  AST  23  /  ALT  31  /  AlkPhos  147<H>  05-16    PT/INR - ( 16 May 2022 08:30 )   PT: 10.10 sec;   INR: 0.88 ratio           Troponin T, Serum: 0.06 ng/mL *HH* (05-15-22 @ 16:49)    CARDIAC MARKERS ( 15 May 2022 16:49 )  x     / 0.06 ng/mL / x     / x     / x      CARDIAC MARKERS ( 15 May 2022 11:00 )  x     / 0.08 ng/mL / x     / x     / x      CARDIAC MARKERS ( 15 May 2022 04:38 )  x     / 0.11 ng/mL / x     / x     / x            Troponin trend:      05-15 Chol 276<H> LDL -- HDL 44 Trig 432<H>, 05-15 Chol 276<H> LDL -- HDL 47 Trig 420<H>      RADIOLOGY:  < from: CT Abdomen and Pelvis No Cont (05.15.22 @ 11:27) >  HEPATOBILIARY: Mor stone seen within the gallbladder. The liver   appears to be enlarged..    SPLEEN: Unremarkable.    PANCREAS: There is a mild ill-defined edema involving the pancreatic   parenchyma. Questionable interstitial pancreatitis. Correlate with   amylase and lipase levels. No discrete collection identified..    ADRENAL GLANDS: Unremarkable.    KIDNEYS: No hydronephrosis or renal stone..    ABDOMINOPELVIC NODES: Unremarkable.    PELVIC ORGANS: Calcifications are seenof the prostate gland..    PERITONEUM/MESENTERY/BOWEL: Moderate fecal retention. No bowel   obstruction..    BONES/SOFT TISSUES: Mild degenerative changes..    OTHER:      IMPRESSION: Mild ill-defined appearance of the pancreas. Questionable   mild interstitial pancreatitis. Correlate with amylase and lipase levels.   No discrete fluid collection.    Cholelithiasis.    Fecal retention.    < end of copied text >

## 2022-05-16 NOTE — PROGRESS NOTE ADULT - SUBJECTIVE AND OBJECTIVE BOX
Gastroenterology progress note:     Patient is a 47y old  Male who presents with a chief complaint of epigastric pain (16 May 2022 08:48)       Admitted on: 05-15-22    We are following the patient for epigastric pain     Interval History:  Still c/o epigastric abdominal pain and chest pain 9/10 in intesity  Denies any nausea, vomiting   passing gas       PAST MEDICAL & SURGICAL HISTORY:  Diabetes      Asthma      Diverticulitis  surgery 16yrs ago      High cholesterol      Dyslipidemia      Hypertension      H/O vertigo      Diabetic ulcer of right foot      Broken toe  left pinky toe      Vertigo      HTN (hypertension)      Diabetes      History of surgery  colon resection      No significant past surgical history          MEDICATIONS  (STANDING):  dextrose 5%. 1000 milliLiter(s) (50 mL/Hr) IV Continuous <Continuous>  dextrose 5%. 1000 milliLiter(s) (100 mL/Hr) IV Continuous <Continuous>  dextrose 50% Injectable 25 Gram(s) IV Push once  dextrose 50% Injectable 12.5 Gram(s) IV Push once  dextrose 50% Injectable 25 Gram(s) IV Push once  fenofibrate Tablet 145 milliGRAM(s) Oral daily  glucagon  Injectable 1 milliGRAM(s) IntraMuscular once  insulin glargine Injectable (LANTUS) 30 Unit(s) SubCutaneous at bedtime  insulin lispro (ADMELOG) corrective regimen sliding scale   SubCutaneous three times a day before meals  insulin lispro Injectable (ADMELOG) 3 Unit(s) SubCutaneous three times a day before meals  lactated ringers. 1000 milliLiter(s) (250 mL/Hr) IV Continuous <Continuous>  metoprolol succinate  milliGRAM(s) Oral daily  NIFEdipine XL 30 milliGRAM(s) Oral every 24 hours  pantoprazole    Tablet 40 milliGRAM(s) Oral two times a day    MEDICATIONS  (PRN):  ALBUTerol    90 MICROgram(s) HFA Inhaler 2 Puff(s) Inhalation every 6 hours PRN Shortness of Breath and/or Wheezing  aluminum hydroxide/magnesium hydroxide/simethicone Suspension 30 milliLiter(s) Oral every 4 hours PRN Dyspepsia  dextrose Oral Gel 15 Gram(s) Oral once PRN Blood Glucose LESS THAN 70 milliGRAM(s)/deciliter  meclizine 25 milliGRAM(s) Oral three times a day PRN vertigo  morphine  - Injectable 2 milliGRAM(s) IV Push every 6 hours PRN Severe Pain (7 - 10)  ondansetron Injectable 4 milliGRAM(s) IV Push every 4 hours PRN Nausea and/or Vomiting      Allergies  No Known Allergies      Review of Systems:   Cardiovascular:  No Chest Pain, No Palpitations  Respiratory:  No Cough, No Dyspnea  Gastrointestinal:  As described in HPI    Physical Examination:  T(C): 36.8 (05-16-22 @ 07:25), Max: 36.8 (05-15-22 @ 23:57)  HR: 92 (05-16-22 @ 07:25) (73 - 99)  BP: 177/85 (05-16-22 @ 07:25) (127/72 - 185/94)  RR: 18 (05-16-22 @ 07:25) (18 - 18)  SpO2: 100% (05-16-22 @ 07:25) (97% - 100%)      Constitutional: No acute distress.  Respiratory:  No signs of respiratory distress. Lung sounds are clear bilaterally.  Cardiovascular:  S1 S2, Regular rate and rhythm.  Abdominal: Abdomen is soft, symmetric, and non-tender without distention. Skin: No rashes, No Jaundice.        Data:                        13.6   6.76  )-----------( 224      ( 15 May 2022 04:38 )             39.1     Hgb trend:  13.6  05-15-22 @ 04:38      05-15    131<L>  |  96<L>  |  42<H>  ----------------------------<  445<H>  4.5   |  23  |  2.4<H>    Ca    8.5      15 May 2022 04:38    TPro  5.7<L>  /  Alb  3.2<L>  /  TBili  <0.2  /  DBili  x   /  AST  25  /  ALT  31  /  AlkPhos  171<H>  05-15    Liver panel trend:  TBili <0.2   /   AST 25   /   ALT 31   /   AlkP 171   /   Tptn 5.7   /   Alb 3.2    /   DBili --      05-15      PT/INR - ( 15 May 2022 11:00 )   PT: 10.20 sec;   INR: 0.89 ratio                Radiology:  CT Abdomen and Pelvis No Cont:   ACC: [id number] EXAM:  CT ABDOMEN AND PELVIS                          PROCEDURE DATE:  05/15/2022          INTERPRETATION:  CLINICAL STATEMENT: Epigastric pain    TECHNIQUE: Contiguous axial CT images were obtained from the lower chest   to the pubicsymphysis .  Oral contrast none.  Reformatted images in the   coronal and sagittal planes were acquired.    COMPARISON CT: CAT scan of the abdomen and pelvis March 26, 2021    OTHER STUDIES USED FOR CORRELATION: None.      FINDINGS:    LOWER CHEST: Unremarkable.    HEPATOBILIARY: Mor stone seen within the gallbladder. The liver   appears to be enlarged..    SPLEEN: Unremarkable.    PANCREAS: There is a mild ill-defined edema involving the pancreatic   parenchyma. Questionable interstitial pancreatitis. Correlate with   amylase and lipase levels. No discrete collection identified..    ADRENAL GLANDS: Unremarkable.    KIDNEYS: No hydronephrosis or renal stone..    ABDOMINOPELVIC NODES: Unremarkable.    PELVIC ORGANS: Calcifications are seenof the prostate gland..    PERITONEUM/MESENTERY/BOWEL: Moderate fecal retention. No bowel   obstruction..    BONES/SOFT TISSUES: Mild degenerative changes..    OTHER:      IMPRESSION: Mild ill-defined appearance of the pancreas. Questionable   mild interstitial pancreatitis. Correlate with amylase and lipase levels.   No discrete fluid collection.    Cholelithiasis.    Fecal retention.    --- End of Report ---            JETT MAO MD; Attending Radiologist  This document has been electronically signed. May 15 2022 11:48AM (05-15-22 @ 11:27)    US Abdomen Upper Quadrant Right:   ACC: [id number] EXAM:  US ABDOMEN RT UPR QUADRANT                          PROCEDURE DATE:  05/15/2022          INTERPRETATION:  CLINICAL INFORMATION: Right upper quadrant pain    COMPARISON: CT abdomen pelvis 3/26/2021.    TECHNIQUE: Sonography of the right upper quadrant.    FINDINGS:  Liver: Within normal limits.  Bile ducts: Normal caliber. Common bile duct measures 5 mm.  Gallbladder: Adenomyomatosis. No cholelithiasis, gallbladder wall   thickening or pericholecystic fluid. Negative sonographic Mccracken's sign.  Pancreas: Visualized portions are within normal limits.  Right kidney: 11.8 cm. No hydronephrosis.  Ascites: None.  IVC: Visualized portions are within normal limits.    IMPRESSION:    No sonographic evidence of acute cholecystitis.    Adenomyomatosis.    --- End of Report ---          MAIKOL SOLORIO MD; Resident Radiologist  This document has been electronically signed.  HITESH GOTTI MD; Attending Radiologist  This document has been electronically signed. May 15 2022  6:51AM (05-15-22 @ 06:28)     Gastroenterology progress note:     Patient is a 47y old  Male who presents with a chief complaint of epigastric pain (16 May 2022 08:48)       Admitted on: 05-15-22    We are following the patient for epigastric pain     Interval History:  Still c/o epigastric abdominal pain and chest pain 9/10 in intesity  Denies any nausea, vomiting   passing gas       PAST MEDICAL & SURGICAL HISTORY:  Diabetes      Asthma      Diverticulitis  surgery 16yrs ago      High cholesterol      Dyslipidemia      Hypertension      H/O vertigo      Diabetic ulcer of right foot      Broken toe  left pinky toe      Vertigo      HTN (hypertension)      Diabetes      History of surgery  colon resection      No significant past surgical history          MEDICATIONS  (STANDING):  dextrose 5%. 1000 milliLiter(s) (50 mL/Hr) IV Continuous <Continuous>  dextrose 5%. 1000 milliLiter(s) (100 mL/Hr) IV Continuous <Continuous>  dextrose 50% Injectable 25 Gram(s) IV Push once  dextrose 50% Injectable 12.5 Gram(s) IV Push once  dextrose 50% Injectable 25 Gram(s) IV Push once  fenofibrate Tablet 145 milliGRAM(s) Oral daily  glucagon  Injectable 1 milliGRAM(s) IntraMuscular once  insulin glargine Injectable (LANTUS) 30 Unit(s) SubCutaneous at bedtime  insulin lispro (ADMELOG) corrective regimen sliding scale   SubCutaneous three times a day before meals  insulin lispro Injectable (ADMELOG) 3 Unit(s) SubCutaneous three times a day before meals  lactated ringers. 1000 milliLiter(s) (250 mL/Hr) IV Continuous <Continuous>  metoprolol succinate  milliGRAM(s) Oral daily  NIFEdipine XL 30 milliGRAM(s) Oral every 24 hours  pantoprazole    Tablet 40 milliGRAM(s) Oral two times a day    MEDICATIONS  (PRN):  ALBUTerol    90 MICROgram(s) HFA Inhaler 2 Puff(s) Inhalation every 6 hours PRN Shortness of Breath and/or Wheezing  aluminum hydroxide/magnesium hydroxide/simethicone Suspension 30 milliLiter(s) Oral every 4 hours PRN Dyspepsia  dextrose Oral Gel 15 Gram(s) Oral once PRN Blood Glucose LESS THAN 70 milliGRAM(s)/deciliter  meclizine 25 milliGRAM(s) Oral three times a day PRN vertigo  morphine  - Injectable 2 milliGRAM(s) IV Push every 6 hours PRN Severe Pain (7 - 10)  ondansetron Injectable 4 milliGRAM(s) IV Push every 4 hours PRN Nausea and/or Vomiting      Allergies  No Known Allergies      Review of Systems:   REVIEW OF SYSTEMS:    CONSTITUTIONAL: No weakness, fevers or chills  EYES/ENT: No visual changes;  No vertigo or throat pain   NECK: No pain or stiffness  RESPIRATORY: No cough, wheezing, hemoptysis; No shortness of breath  CARDIOVASCULAR: chest pain  GASTROINTESTINAL: As mentioned above   GENITOURINARY: No dysuria, frequency or hematuria  NEUROLOGICAL: No numbness or weakness  SKIN: No itching, rashes    Physical Examination:  T(C): 36.8 (05-16-22 @ 07:25), Max: 36.8 (05-15-22 @ 23:57)  HR: 92 (05-16-22 @ 07:25) (73 - 99)  BP: 177/85 (05-16-22 @ 07:25) (127/72 - 185/94)  RR: 18 (05-16-22 @ 07:25) (18 - 18)  SpO2: 100% (05-16-22 @ 07:25) (97% - 100%)      Constitutional: No acute distress.  Respiratory:  No signs of respiratory distress. Lung sounds are clear bilaterally.  Cardiovascular:  S1 S2, Regular rate and rhythm.  Abdominal: Abdomen is soft, symmetric, and non-tender without distention. Skin: No rashes, No Jaundice.        Data:                        13.6   6.76  )-----------( 224      ( 15 May 2022 04:38 )             39.1     Hgb trend:  13.6  05-15-22 @ 04:38      05-15    131<L>  |  96<L>  |  42<H>  ----------------------------<  445<H>  4.5   |  23  |  2.4<H>    Ca    8.5      15 May 2022 04:38    TPro  5.7<L>  /  Alb  3.2<L>  /  TBili  <0.2  /  DBili  x   /  AST  25  /  ALT  31  /  AlkPhos  171<H>  05-15    Liver panel trend:  TBili <0.2   /   AST 25   /   ALT 31   /   AlkP 171   /   Tptn 5.7   /   Alb 3.2    /   DBili --      05-15      PT/INR - ( 15 May 2022 11:00 )   PT: 10.20 sec;   INR: 0.89 ratio                Radiology:  CT Abdomen and Pelvis No Cont:   ACC: [id number] EXAM:  CT ABDOMEN AND PELVIS                          PROCEDURE DATE:  05/15/2022          INTERPRETATION:  CLINICAL STATEMENT: Epigastric pain    TECHNIQUE: Contiguous axial CT images were obtained from the lower chest   to the pubicsymphysis .  Oral contrast none.  Reformatted images in the   coronal and sagittal planes were acquired.    COMPARISON CT: CAT scan of the abdomen and pelvis March 26, 2021    OTHER STUDIES USED FOR CORRELATION: None.      FINDINGS:    LOWER CHEST: Unremarkable.    HEPATOBILIARY: Mor stone seen within the gallbladder. The liver   appears to be enlarged..    SPLEEN: Unremarkable.    PANCREAS: There is a mild ill-defined edema involving the pancreatic   parenchyma. Questionable interstitial pancreatitis. Correlate with   amylase and lipase levels. No discrete collection identified..    ADRENAL GLANDS: Unremarkable.    KIDNEYS: No hydronephrosis or renal stone..    ABDOMINOPELVIC NODES: Unremarkable.    PELVIC ORGANS: Calcifications are seenof the prostate gland..    PERITONEUM/MESENTERY/BOWEL: Moderate fecal retention. No bowel   obstruction..    BONES/SOFT TISSUES: Mild degenerative changes..    OTHER:      IMPRESSION: Mild ill-defined appearance of the pancreas. Questionable   mild interstitial pancreatitis. Correlate with amylase and lipase levels.   No discrete fluid collection.    Cholelithiasis.    Fecal retention.    --- End of Report ---            JETT MAO MD; Attending Radiologist  This document has been electronically signed. May 15 2022 11:48AM (05-15-22 @ 11:27)    US Abdomen Upper Quadrant Right:   ACC: [id number] EXAM:  US ABDOMEN RT UPR QUADRANT                          PROCEDURE DATE:  05/15/2022          INTERPRETATION:  CLINICAL INFORMATION: Right upper quadrant pain    COMPARISON: CT abdomen pelvis 3/26/2021.    TECHNIQUE: Sonography of the right upper quadrant.    FINDINGS:  Liver: Within normal limits.  Bile ducts: Normal caliber. Common bile duct measures 5 mm.  Gallbladder: Adenomyomatosis. No cholelithiasis, gallbladder wall   thickening or pericholecystic fluid. Negative sonographic Mccracken's sign.  Pancreas: Visualized portions are within normal limits.  Right kidney: 11.8 cm. No hydronephrosis.  Ascites: None.  IVC: Visualized portions are within normal limits.    IMPRESSION:    No sonographic evidence of acute cholecystitis.    Adenomyomatosis.    --- End of Report ---          MAIKOL SOLORIO MD; Resident Radiologist  This document has been electronically signed.  HITESH GOTTI MD; Attending Radiologist  This document has been electronically signed. May 15 2022  6:51AM (05-15-22 @ 06:28)

## 2022-05-16 NOTE — CONSULT NOTE ADULT - SUBJECTIVE AND OBJECTIVE BOX
NEPHROLOGY CONSULTATION NOTE    BRINDA MOYER  47y  Male  MRN-[id number]    CC:   Patient is a 47y old  Male who presents with a chief complaint of epigastric pain (16 May 2022 14:20)      HPI:  47 year old male with hx of HTN, AMBER, DM, vertigo, diverticulosis s/p colon resection presents from home with 4 days of epigastric pain.  Patient states it's a burning pain that has recently been going to his right upper back.  It's worse with food and he has not been able to tolerate anything by mouth for the last 2 days because of nausea.  Pain is not worse with exertion.  He endorses mild shortness of breath when climbing stairs which has been present for a while.  He denies any bloody stool, no hematemesis or vomiting.  Of note he has been on ibuprofen for the last week because of a dental infection.  He's supposed to get some teeth taken out but had to come in to the hospital.  Also he was told by his PMD to see a nephrologist and he hasn't yet.  He had a negative stress 3 years ago.    In the ED he received 1.5L LR.  Lipase was negative    Triage vitals: /68    RR 18  Temp 98  SpO2 100% room air (15 May 2022 09:04)      PAST MEDICAL & SURGICAL HISTORY:  Diabetes      Asthma      Diverticulitis  surgery 16yrs ago      High cholesterol      Dyslipidemia      Hypertension      H/O vertigo      Diabetic ulcer of right foot      Broken toe  left pinky toe      Vertigo      HTN (hypertension)      Diabetes      History of surgery  colon resection      No significant past surgical history        Allergies:  No Known Allergies    Home Medications Reviewed  Hospital Medications:   MEDICATIONS  (STANDING):  dextrose 5%. 1000 milliLiter(s) (50 mL/Hr) IV Continuous <Continuous>  dextrose 5%. 1000 milliLiter(s) (100 mL/Hr) IV Continuous <Continuous>  dextrose 50% Injectable 25 Gram(s) IV Push once  dextrose 50% Injectable 12.5 Gram(s) IV Push once  dextrose 50% Injectable 25 Gram(s) IV Push once  fenofibrate Tablet 145 milliGRAM(s) Oral daily  glucagon  Injectable 1 milliGRAM(s) IntraMuscular once  heparin   Injectable 5000 Unit(s) SubCutaneous every 8 hours  insulin glargine Injectable (LANTUS) 34 Unit(s) SubCutaneous at bedtime  insulin lispro (ADMELOG) corrective regimen sliding scale   SubCutaneous three times a day before meals  insulin lispro Injectable (ADMELOG) 3 Unit(s) SubCutaneous three times a day before meals  lactated ringers. 1000 milliLiter(s) (250 mL/Hr) IV Continuous <Continuous>  metoprolol succinate  milliGRAM(s) Oral daily  NIFEdipine XL 30 milliGRAM(s) Oral every 24 hours  pantoprazole    Tablet 40 milliGRAM(s) Oral two times a day    MEDICATIONS  (PRN):  ALBUTerol    90 MICROgram(s) HFA Inhaler 2 Puff(s) Inhalation every 6 hours PRN Shortness of Breath and/or Wheezing  aluminum hydroxide/magnesium hydroxide/simethicone Suspension 30 milliLiter(s) Oral every 4 hours PRN Dyspepsia  dextrose Oral Gel 15 Gram(s) Oral once PRN Blood Glucose LESS THAN 70 milliGRAM(s)/deciliter  meclizine 25 milliGRAM(s) Oral three times a day PRN vertigo  morphine  - Injectable 2 milliGRAM(s) IV Push every 6 hours PRN Severe Pain (7 - 10)  ondansetron Injectable 4 milliGRAM(s) IV Push every 4 hours PRN Nausea and/or Vomiting    Home Medications:  Albuterol (Eqv-ProAir HFA) 90 mcg/inh inhalation aerosol: 2 puff(s) inhaled every 6 hours (15 May 2022 08:54)  amoxicillin-clavulanate 500 mg-125 mg oral tablet: 1 tab(s) orally every 8 hours (15 May 2022 08:54)  fenofibrate 145 mg oral tablet: 1 tab(s) orally once a day (15 May 2022 08:54)  HumaLOG 100 units/mL subcutaneous solution: 5 unit(s) subcutaneous 3 times a day (before meals)  sliding scale (15 May 2022 08:54)  hydroCHLOROthiazide: 37.5 milligram(s) orally once a day (15 May 2022 08:54)  ibuprofen 600 mg oral tablet: 1 tab(s) orally every 6 hours (15 May 2022 08:54)  Protonix 40 mg oral delayed release tablet: 1 tab(s) orally once a day (15 May 2022 08:54)  spironolactone 25 mg oral tablet: 1 tab(s) orally once a day (15 May 2022 08:54)  Toprol- mg oral tablet, extended release: 1 tab(s) orally once a day (15 May 2022 08:54)  Tresiba 100 units/mL subcutaneous solution: 40 unit(s) subcutaneous once a day (15 May 2022 08:54)      SOCIAL HISTORY:  Social History:  current smoker 6 cigarettes a week, no alcohol use, no illicit drug abuse (15 May 2022 09:04)      FAMILY HISTORY:  Family history of hypertension (Father)        REVIEW OF SYSTEMS:  All other review of systems is negative unless indicated above.    VITALS:  T(F): 98.5 (05-16-22 @ 15:20), Max: 98.5 (05-16-22 @ 15:20)  HR: 71 (05-16-22 @ 15:20)  BP: 192/93 (05-16-22 @ 15:20)  RR: 18 (05-16-22 @ 15:20)  SpO2: 100% (05-16-22 @ 15:20)      I&O's Detail        I&O's Summary      PHYSICAL EXAM:  Gen: NAD  resp: CTAB  card: S1/S2  abd: soft  ext: no edema  vascular access:     LABS:  Daily     Daily     Lactate, Blood: 0.7 mmol/L (05-15-22 @ 04:38)    05-16    134<L>  |  99  |  27<H>  ----------------------------<  306<H>  4.8   |  26  |  1.7<H>    Ca    9.1      16 May 2022 08:30  Phos  3.3     05-16  Mg     1.9     05-16    TPro  5.7<L>  /  Alb  3.3<L>  /  TBili  0.3  /  DBili      /  AST  23  /  ALT  31  /  AlkPhos  147<H>  05-16    Creatinine Trend:   Creatinine, Serum: 1.7 mg/dL (05-16-22 @ 08:30)  Creatinine, Serum: 2.4 mg/dL (05-15-22 @ 04:38)  Creatinine, Serum: 2.03 mg/dL (03-31-22 @ 11:49)  Creatinine, Serum: 1.7 mg/dL (03-22-22 @ 18:56)  Creatinine, Serum: 1.3 mg/dL (05-26-21 @ 06:10)                            13.1   6.91  )-----------( 220      ( 16 May 2022 08:30 )             38.5     Mean Cell Volume: 89.1 fL (05-16-22 @ 08:30)    Urine Studies:              RADIOLOGY & ADDITIONAL STUDIES:        CT Abdomen and Pelvis No Cont:   ACC: [id number] EXAM:  CT ABDOMEN AND PELVIS                          PROCEDURE DATE:  05/15/2022          INTERPRETATION:  CLINICAL STATEMENT: Epigastric pain    TECHNIQUE: Contiguous axial CT images were obtained from the lower chest   to the pubicsymphysis .  Oral contrast none.  Reformatted images in the   coronal and sagittal planes were acquired.    COMPARISON CT: CAT scan of the abdomen and pelvis March 26, 2021    OTHER STUDIES USED FOR CORRELATION: None.      FINDINGS:    LOWER CHEST: Unremarkable.    HEPATOBILIARY: Mor stone seen within the gallbladder. The liver   appears to be enlarged..    SPLEEN: Unremarkable.    PANCREAS: There is a mild ill-defined edema involving the pancreatic   parenchyma. Questionable interstitial pancreatitis. Correlate with   amylase and lipase levels. No discrete collection identified..    ADRENAL GLANDS: Unremarkable.    KIDNEYS: No hydronephrosis or renal stone..    ABDOMINOPELVIC NODES: Unremarkable.    PELVIC ORGANS: Calcifications are seenof the prostate gland..    PERITONEUM/MESENTERY/BOWEL: Moderate fecal retention. No bowel   obstruction..    BONES/SOFT TISSUES: Mild degenerative changes..    OTHER:      IMPRESSION: Mild ill-defined appearance of the pancreas. Questionable   mild interstitial pancreatitis. Correlate with amylase and lipase levels.   No discrete fluid collection.    Cholelithiasis.    Fecal retention.    --- End of Report ---            JETT MAO MD; Attending Radiologist  This document has been electronically signed. May 15 2022 11:48AM (05-15-22 @ 11:27)                     NEPHROLOGY CONSULTATION NOTE    BRINDA MOYER  47y  Male  MRN-[id number]    CC:   Patient is a 47y old  Male who presents with a chief complaint of epigastric pain (16 May 2022 14:20)      HPI:  47 year old male with hx of HTN, AMBER, DM, vertigo, diverticulosis s/p colon resection presents from home with 4 days of epigastric pain.  Patient states it's a burning pain that has recently been going to his right upper back.  It's worse with food and he has not been able to tolerate anything by mouth for the last 2 days because of nausea.  Pain is not worse with exertion.  He endorses mild shortness of breath when climbing stairs which has been present for a while.  He denies any bloody stool, no hematemesis or vomiting.  Of note he has been on ibuprofen for the last week because of a dental infection.  He's supposed to get some teeth taken out but had to come in to the hospital.  Also he was told by his PMD to see a nephrologist and he hasn't yet.  He had a negative stress 3 years ago.    In the ED he received 1.5L LR.  Lipase was negative    Triage vitals: /68    RR 18  Temp 98  SpO2 100% room air (15 May 2022 09:04)      PAST MEDICAL & SURGICAL HISTORY:  Diabetes      Asthma      Diverticulitis  surgery 16yrs ago      High cholesterol      Dyslipidemia      Hypertension      H/O vertigo      Diabetic ulcer of right foot      Broken toe  left pinky toe      Vertigo      HTN (hypertension)      Diabetes      History of surgery  colon resection      No significant past surgical history        Allergies:  No Known Allergies    Home Medications Reviewed  Hospital Medications:   MEDICATIONS  (STANDING):  dextrose 5%. 1000 milliLiter(s) (50 mL/Hr) IV Continuous <Continuous>  dextrose 5%. 1000 milliLiter(s) (100 mL/Hr) IV Continuous <Continuous>  dextrose 50% Injectable 25 Gram(s) IV Push once  dextrose 50% Injectable 12.5 Gram(s) IV Push once  dextrose 50% Injectable 25 Gram(s) IV Push once  fenofibrate Tablet 145 milliGRAM(s) Oral daily  glucagon  Injectable 1 milliGRAM(s) IntraMuscular once  heparin   Injectable 5000 Unit(s) SubCutaneous every 8 hours  insulin glargine Injectable (LANTUS) 34 Unit(s) SubCutaneous at bedtime  insulin lispro (ADMELOG) corrective regimen sliding scale   SubCutaneous three times a day before meals  insulin lispro Injectable (ADMELOG) 3 Unit(s) SubCutaneous three times a day before meals  lactated ringers. 1000 milliLiter(s) (250 mL/Hr) IV Continuous <Continuous>  metoprolol succinate  milliGRAM(s) Oral daily  NIFEdipine XL 30 milliGRAM(s) Oral every 24 hours  pantoprazole    Tablet 40 milliGRAM(s) Oral two times a day    MEDICATIONS  (PRN):  ALBUTerol    90 MICROgram(s) HFA Inhaler 2 Puff(s) Inhalation every 6 hours PRN Shortness of Breath and/or Wheezing  aluminum hydroxide/magnesium hydroxide/simethicone Suspension 30 milliLiter(s) Oral every 4 hours PRN Dyspepsia  dextrose Oral Gel 15 Gram(s) Oral once PRN Blood Glucose LESS THAN 70 milliGRAM(s)/deciliter  meclizine 25 milliGRAM(s) Oral three times a day PRN vertigo  morphine  - Injectable 2 milliGRAM(s) IV Push every 6 hours PRN Severe Pain (7 - 10)  ondansetron Injectable 4 milliGRAM(s) IV Push every 4 hours PRN Nausea and/or Vomiting    Home Medications:  Albuterol (Eqv-ProAir HFA) 90 mcg/inh inhalation aerosol: 2 puff(s) inhaled every 6 hours (15 May 2022 08:54)  amoxicillin-clavulanate 500 mg-125 mg oral tablet: 1 tab(s) orally every 8 hours (15 May 2022 08:54)  fenofibrate 145 mg oral tablet: 1 tab(s) orally once a day (15 May 2022 08:54)  HumaLOG 100 units/mL subcutaneous solution: 5 unit(s) subcutaneous 3 times a day (before meals)  sliding scale (15 May 2022 08:54)  hydroCHLOROthiazide: 37.5 milligram(s) orally once a day (15 May 2022 08:54)  ibuprofen 600 mg oral tablet: 1 tab(s) orally every 6 hours (15 May 2022 08:54)  Protonix 40 mg oral delayed release tablet: 1 tab(s) orally once a day (15 May 2022 08:54)  spironolactone 25 mg oral tablet: 1 tab(s) orally once a day (15 May 2022 08:54)  Toprol- mg oral tablet, extended release: 1 tab(s) orally once a day (15 May 2022 08:54)  Tresiba 100 units/mL subcutaneous solution: 40 unit(s) subcutaneous once a day (15 May 2022 08:54)      SOCIAL HISTORY:  Social History:  current smoker 6 cigarettes a week, no alcohol use, no illicit drug abuse (15 May 2022 09:04)      FAMILY HISTORY:  Family history of hypertension (Father)        REVIEW OF SYSTEMS:  +epigastric pain  All other review of systems is negative unless indicated above.    VITALS:  T(F): 98.5 (05-16-22 @ 15:20), Max: 98.5 (05-16-22 @ 15:20)  HR: 71 (05-16-22 @ 15:20)  BP: 192/93 (05-16-22 @ 15:20)  RR: 18 (05-16-22 @ 15:20)  SpO2: 100% (05-16-22 @ 15:20)      I&O's Detail        I&O's Summary      PHYSICAL EXAM:  Gen: NAD  resp: CTAB  card: S1/S2  abd: soft  ext: no edema    LABS:    Lactate, Blood: 0.7 mmol/L (05-15-22 @ 04:38)    05-16    134<L>  |  99  |  27<H>  ----------------------------<  306<H>  4.8   |  26  |  1.7<H>    Ca    9.1      16 May 2022 08:30  Phos  3.3     05-16  Mg     1.9     05-16    TPro  5.7<L>  /  Alb  3.3<L>  /  TBili  0.3  /  DBili      /  AST  23  /  ALT  31  /  AlkPhos  147<H>  05-16    Creatinine Trend:   Creatinine, Serum: 1.7 mg/dL (05-16-22 @ 08:30)  Creatinine, Serum: 2.4 mg/dL (05-15-22 @ 04:38)  Creatinine, Serum: 2.03 mg/dL (03-31-22 @ 11:49)  Creatinine, Serum: 1.7 mg/dL (03-22-22 @ 18:56)  Creatinine, Serum: 1.3 mg/dL (05-26-21 @ 06:10)                            13.1   6.91  )-----------( 220      ( 16 May 2022 08:30 )             38.5     Mean Cell Volume: 89.1 fL (05-16-22 @ 08:30)    Urine Studies:              RADIOLOGY & ADDITIONAL STUDIES:        CT Abdomen and Pelvis No Cont:   ACC: [id number] EXAM:  CT ABDOMEN AND PELVIS                          PROCEDURE DATE:  05/15/2022          INTERPRETATION:  CLINICAL STATEMENT: Epigastric pain    TECHNIQUE: Contiguous axial CT images were obtained from the lower chest   to the pubicsymphysis .  Oral contrast none.  Reformatted images in the   coronal and sagittal planes were acquired.    COMPARISON CT: CAT scan of the abdomen and pelvis March 26, 2021    OTHER STUDIES USED FOR CORRELATION: None.      FINDINGS:    LOWER CHEST: Unremarkable.    HEPATOBILIARY: Mor stone seen within the gallbladder. The liver   appears to be enlarged..    SPLEEN: Unremarkable.    PANCREAS: There is a mild ill-defined edema involving the pancreatic   parenchyma. Questionable interstitial pancreatitis. Correlate with   amylase and lipase levels. No discrete collection identified..    ADRENAL GLANDS: Unremarkable.    KIDNEYS: No hydronephrosis or renal stone..    ABDOMINOPELVIC NODES: Unremarkable.    PELVIC ORGANS: Calcifications are seenof the prostate gland..    PERITONEUM/MESENTERY/BOWEL: Moderate fecal retention. No bowel   obstruction..    BONES/SOFT TISSUES: Mild degenerative changes..    OTHER:      IMPRESSION: Mild ill-defined appearance of the pancreas. Questionable   mild interstitial pancreatitis. Correlate with amylase and lipase levels.   No discrete fluid collection.    Cholelithiasis.    Fecal retention.    --- End of Report ---            JETT MAO MD; Attending Radiologist  This document has been electronically signed. May 15 2022 11:48AM (05-15-22 @ 11:27)

## 2022-05-16 NOTE — CONSULT NOTE ADULT - ASSESSMENT
BILLY on CKD 3 / pre-cecy/NSAIDs  Mild hyponatremia improving (d/t thiazide, poor po intake)  HTN  DM2    - non oliguric  - creat down-trending    Recommendations:   - hypertensive;  would hold further iv hydration  - document strict i/o  - obtain UA, urine prot/creat ratio  - CTAP reviewed, no hydronephrosis   - avoid nephrotoxins/ NSAIDs  - cont nifedipine / will probably need ACE-i or ARB d/t proteinuria  - check serum flc ratio   - further serologic w/u can be done outpatient as creatinine is down-trending  - establish care with outpatient nephrologist;  if none closer, pt can schedule appointment in [facility]

## 2022-05-16 NOTE — CONSULT NOTE ADULT - ATTENDING COMMENTS
EGD, cannot r/o mild resolving pancreatitis, need Triglyceride level and f/u in the future to r/o hypertriglyceridemia induced pancreatitis
Patient seen, case d/w cardiology fellow.  Patient with h/o HTN, CKD, non-obstructive CAD 1 year ago.   Epigastric pain with evidence of pancreatitis on CT.  No additional cardiac evaluation at this time.
patient claims he has recently stopped abusing alcohol, not only does this cause hypertrigliceridemia, but also uncontrolled diabetes, and insulin resistance. prognosis is poor, has not been seen in clinic for 2 years, but lives in [place], and really needs doctors close to where he lives, as he does not drive. add atorvastatin 40 mg. daily, add pioglitazone 30 mg. daily, continue tresiba as basal insulin, and admelog solostar as prandial insulin, continue fenofibrate 145 mg. daily. 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

## 2022-05-16 NOTE — CONSULT NOTE ADULT - ASSESSMENT
47 year old male with hx of HTN, AMBER, DM, vertigo, diverticulosis s/p colon resection presents from home with 4 days of epigastric pain. Endocrinology  Consulted for  Hyperiodemia >400.     Home Med:   Fenofibrate 145 mg oral tablet: 1 tab(s) orally once a day  HumaLOG 100 units/mL subcutaneous solution: 5 unit(s) subcutaneous 3 times a day (before meals)  Tresiba 100 units/mL subcutaneous solution: 40 unit(s) subcutaneous once a day (15 May 2022 08:54)    Patient  Admits to medication Non Compliance     IMPRESSION   HO Hyperlipidemia      Calculated   LDL Direct 159 <- 158   Trig 432<- 420<- 378  HO DM   A1c 10.9 <- 11.2   47 year old male with hx of HTN, AMBER, DM, vertigo, diverticulosis s/p colon resection presents from home with 4 days of epigastric pain. Endocrinology  Consulted for  Hyperlipidemia >400.     Home Med:   Fenofibrate 145 mg oral tablet: 1 tab(s) orally once a day  HumaLOG 100 units/mL subcutaneous solution: 5 unit(s) subcutaneous 3 times a day (before meals)  Tresiba 100 units/mL subcutaneous solution: 40 unit(s) subcutaneous once a day (15 May 2022 08:54)    Patient  Admits to medication Non Compliance     IMPRESSION   HO Hyperlipidemia      Calculated <- 144  LDL Direct 159 <- 158   Trig 432<- 420<- 378  HO DM   A1c 10.9 <- 11.2   47 year old male with hx of HTN, AMBER, DM, vertigo, diverticulosis s/p colon resection presents from home with 4 days of epigastric pain. Endocrinology  Consulted for  Hyperlipidemia >400.     Home Med:   Fenofibrate 145 mg oral tablet: 1 tab(s) orally once a day  HumaLOG 100 units/mL subcutaneous solution: 5 unit(s) subcutaneous 3 times a day (before meals)  Tresiba 100 units/mL subcutaneous solution: 40 unit(s) subcutaneous once a day (15 May 2022 08:54)    Patient  Admits to medication Non Compliance     IMPRESSION   HO Hyperlipidemia      Calculated <- 144  LDL Direct 159 <- 158   Trig 432<- 420<- 378  HO DM   A1c 10.9 <- 11.2      Plan  Continue Fenofibrate 145 mg oral tablet: 1 tab(s) orally once a day  Add  atorvastatin  40mg  Daily

## 2022-05-16 NOTE — PROGRESS NOTE ADULT - SUBJECTIVE AND OBJECTIVE BOX
----------Daily Progress Note----------    HISTORY OF PRESENT ILLNESS:  Patient is a 47y old Male who presents with a chief complaint of epigastric pain (15 May 2022 17:50)    Currently admitted to medicine with the primary diagnosis of Chest pain       Today is hospital day 1d.     INTERVAL HOSPITAL COURSE / OVERNIGHT EVENTS:    Patient was examined and seen at bedside. This morning patient still endoring 8/10 episgastric pain and nausea; reports was told he was going for EGD today so is NPO    Review of Systems: Otherwise unremarkable     <<<<<PAST MEDICAL & SURGICAL HISTORY>>>>>  Diabetes    Asthma    Diverticulitis  surgery 16yrs ago    High cholesterol    Dyslipidemia    Hypertension    H/O vertigo    Diabetic ulcer of right foot    Broken toe  left pinky toe    Vertigo    HTN (hypertension)    Diabetes    History of surgery  colon resection    No significant past surgical history      ALLERGIES  No Known Allergies      Home Medications:  Albuterol (Eqv-ProAir HFA) 90 mcg/inh inhalation aerosol: 2 puff(s) inhaled every 6 hours (15 May 2022 08:54)  amoxicillin-clavulanate 500 mg-125 mg oral tablet: 1 tab(s) orally every 8 hours (15 May 2022 08:54)  fenofibrate 145 mg oral tablet: 1 tab(s) orally once a day (15 May 2022 08:54)  HumaLOG 100 units/mL subcutaneous solution: 5 unit(s) subcutaneous 3 times a day (before meals)  sliding scale (15 May 2022 08:54)  hydroCHLOROthiazide: 37.5 milligram(s) orally once a day (15 May 2022 08:54)  ibuprofen 600 mg oral tablet: 1 tab(s) orally every 6 hours (15 May 2022 08:54)  Protonix 40 mg oral delayed release tablet: 1 tab(s) orally once a day (15 May 2022 08:54)  spironolactone 25 mg oral tablet: 1 tab(s) orally once a day (15 May 2022 08:54)  Toprol- mg oral tablet, extended release: 1 tab(s) orally once a day (15 May 2022 08:54)  Tresiba 100 units/mL subcutaneous solution: 40 unit(s) subcutaneous once a day (15 May 2022 08:54)        MEDICATIONS  STANDING MEDICATIONS  dextrose 5%. 1000 milliLiter(s) IV Continuous <Continuous>  dextrose 5%. 1000 milliLiter(s) IV Continuous <Continuous>  dextrose 50% Injectable 25 Gram(s) IV Push once  dextrose 50% Injectable 12.5 Gram(s) IV Push once  dextrose 50% Injectable 25 Gram(s) IV Push once  fenofibrate Tablet 145 milliGRAM(s) Oral daily  glucagon  Injectable 1 milliGRAM(s) IntraMuscular once  insulin glargine Injectable (LANTUS) 30 Unit(s) SubCutaneous at bedtime  insulin lispro (ADMELOG) corrective regimen sliding scale   SubCutaneous three times a day before meals  insulin lispro Injectable (ADMELOG) 3 Unit(s) SubCutaneous three times a day before meals  lactated ringers. 1000 milliLiter(s) IV Continuous <Continuous>  metoprolol succinate  milliGRAM(s) Oral daily  NIFEdipine XL 30 milliGRAM(s) Oral every 24 hours  pantoprazole    Tablet 40 milliGRAM(s) Oral two times a day    PRN MEDICATIONS  ALBUTerol    90 MICROgram(s) HFA Inhaler 2 Puff(s) Inhalation every 6 hours PRN  aluminum hydroxide/magnesium hydroxide/simethicone Suspension 30 milliLiter(s) Oral every 4 hours PRN  dextrose Oral Gel 15 Gram(s) Oral once PRN  meclizine 25 milliGRAM(s) Oral three times a day PRN  morphine  - Injectable 2 milliGRAM(s) IV Push every 6 hours PRN  ondansetron Injectable 4 milliGRAM(s) IV Push every 4 hours PRN    VITALS:  T(F): 98.2  HR: 92  BP: 177/85  RR: 18  SpO2: 100%    <<<<<LABS>>>>>                        13.6   6.76  )-----------( 224      ( 15 May 2022 04:38 )             39.1     05-15    131<L>  |  96<L>  |  42<H>  ----------------------------<  445<H>  4.5   |  23  |  2.4<H>    Ca    8.5      15 May 2022 04:38    TPro  5.7<L>  /  Alb  3.2<L>  /  TBili  <0.2  /  DBili  x   /  AST  25  /  ALT  31  /  AlkPhos  171<H>  05-15    PT/INR - ( 15 May 2022 11:00 )   PT: 10.20 sec;   INR: 0.89 ratio               Troponin T, Serum: 0.06 ng/mL *HH* (05-15-22 @ 16:49)  Troponin T, Serum: 0.08 ng/mL *HH* (05-15-22 @ 11:00)    [id number]  CARDIAC MARKERS ( 15 May 2022 16:49 )  x     / 0.06 ng/mL / x     / x     / x      CARDIAC MARKERS ( 15 May 2022 11:00 )  x     / 0.08 ng/mL / x     / x     / x      CARDIAC MARKERS ( 15 May 2022 04:38 )  x     / 0.11 ng/mL / x     / x     / x            <<<<<RADIOLOGY>>>>>    <<<<<PHYSICAL EXAM>>>>>  GENERAL: Well developed, well nourished and in no acute distress  HEENT: Normocephalic, atraumatic  PULMONARY: Clear to auscultation bilaterally. No rales, rhonchi, or wheezing.  CARDIOVASCULAR: Regular rate and rhythm, S1-S2, no murmurs  GASTROINTESTINAL: Soft, +epigastric tenderness, non-distended, no guarding.  SKIN/EXTREMITIES: No clubbing or edema  NEUROLOGIC/MUSCULOSKELETAL: AOx4, grossly moving all extremities, no focal deficits.      -----------------------------------------------------------------------------------------------------------------------------------------------------------------------------------------------

## 2022-05-17 LAB
ALBUMIN SERPL ELPH-MCNC: 3 G/DL — LOW (ref 3.5–5.2)
ALP SERPL-CCNC: 131 U/L — HIGH (ref 30–115)
ALT FLD-CCNC: 24 U/L — SIGNIFICANT CHANGE UP (ref 0–41)
ANION GAP SERPL CALC-SCNC: 9 MMOL/L — SIGNIFICANT CHANGE UP (ref 7–14)
APPEARANCE UR: CLEAR — SIGNIFICANT CHANGE UP
AST SERPL-CCNC: 19 U/L — SIGNIFICANT CHANGE UP (ref 0–41)
BACTERIA # UR AUTO: NEGATIVE — SIGNIFICANT CHANGE UP
BASOPHILS # BLD AUTO: 0.04 K/UL — SIGNIFICANT CHANGE UP (ref 0–0.2)
BASOPHILS NFR BLD AUTO: 0.6 % — SIGNIFICANT CHANGE UP (ref 0–1)
BILIRUB SERPL-MCNC: 0.2 MG/DL — SIGNIFICANT CHANGE UP (ref 0.2–1.2)
BILIRUB UR-MCNC: NEGATIVE — SIGNIFICANT CHANGE UP
BUN SERPL-MCNC: 21 MG/DL — HIGH (ref 10–20)
CALCIUM SERPL-MCNC: 9 MG/DL — SIGNIFICANT CHANGE UP (ref 8.5–10.1)
CHLORIDE SERPL-SCNC: 100 MMOL/L — SIGNIFICANT CHANGE UP (ref 98–110)
CO2 SERPL-SCNC: 28 MMOL/L — SIGNIFICANT CHANGE UP (ref 17–32)
COLOR SPEC: SIGNIFICANT CHANGE UP
CREAT ?TM UR-MCNC: 50 MG/DL — SIGNIFICANT CHANGE UP
CREAT SERPL-MCNC: 1.6 MG/DL — HIGH (ref 0.7–1.5)
DIFF PNL FLD: ABNORMAL
EGFR: 52 ML/MIN/1.73M2 — LOW
EOSINOPHIL # BLD AUTO: 0.09 K/UL — SIGNIFICANT CHANGE UP (ref 0–0.7)
EOSINOPHIL NFR BLD AUTO: 1.4 % — SIGNIFICANT CHANGE UP (ref 0–8)
EPI CELLS # UR: 1 /HPF — SIGNIFICANT CHANGE UP (ref 0–5)
GLUCOSE BLDC GLUCOMTR-MCNC: 133 MG/DL — HIGH (ref 70–99)
GLUCOSE BLDC GLUCOMTR-MCNC: 201 MG/DL — HIGH (ref 70–99)
GLUCOSE BLDC GLUCOMTR-MCNC: 231 MG/DL — HIGH (ref 70–99)
GLUCOSE BLDC GLUCOMTR-MCNC: 93 MG/DL — SIGNIFICANT CHANGE UP (ref 70–99)
GLUCOSE SERPL-MCNC: 141 MG/DL — HIGH (ref 70–99)
GLUCOSE UR QL: ABNORMAL
HCT VFR BLD CALC: 38 % — LOW (ref 42–52)
HGB BLD-MCNC: 13.2 G/DL — LOW (ref 14–18)
HYALINE CASTS # UR AUTO: 2 /LPF — SIGNIFICANT CHANGE UP (ref 0–7)
IMM GRANULOCYTES NFR BLD AUTO: 0.3 % — SIGNIFICANT CHANGE UP (ref 0.1–0.3)
KETONES UR-MCNC: SIGNIFICANT CHANGE UP
LEUKOCYTE ESTERASE UR-ACNC: NEGATIVE — SIGNIFICANT CHANGE UP
LYMPHOCYTES # BLD AUTO: 1.58 K/UL — SIGNIFICANT CHANGE UP (ref 1.2–3.4)
LYMPHOCYTES # BLD AUTO: 24.6 % — SIGNIFICANT CHANGE UP (ref 20.5–51.1)
MAGNESIUM SERPL-MCNC: 1.8 MG/DL — SIGNIFICANT CHANGE UP (ref 1.8–2.4)
MCHC RBC-ENTMCNC: 31.1 PG — HIGH (ref 27–31)
MCHC RBC-ENTMCNC: 34.7 G/DL — SIGNIFICANT CHANGE UP (ref 32–37)
MCV RBC AUTO: 89.6 FL — SIGNIFICANT CHANGE UP (ref 80–94)
MONOCYTES # BLD AUTO: 0.53 K/UL — SIGNIFICANT CHANGE UP (ref 0.1–0.6)
MONOCYTES NFR BLD AUTO: 8.2 % — SIGNIFICANT CHANGE UP (ref 1.7–9.3)
NEUTROPHILS # BLD AUTO: 4.17 K/UL — SIGNIFICANT CHANGE UP (ref 1.4–6.5)
NEUTROPHILS NFR BLD AUTO: 64.9 % — SIGNIFICANT CHANGE UP (ref 42.2–75.2)
NITRITE UR-MCNC: NEGATIVE — SIGNIFICANT CHANGE UP
NRBC # BLD: 0 /100 WBCS — SIGNIFICANT CHANGE UP (ref 0–0)
PH UR: 7 — SIGNIFICANT CHANGE UP (ref 5–8)
PLATELET # BLD AUTO: 225 K/UL — SIGNIFICANT CHANGE UP (ref 130–400)
POTASSIUM SERPL-MCNC: 4.6 MMOL/L — SIGNIFICANT CHANGE UP (ref 3.5–5)
POTASSIUM SERPL-SCNC: 4.6 MMOL/L — SIGNIFICANT CHANGE UP (ref 3.5–5)
PROT ?TM UR-MCNC: >564 MG/DLG/24H — SIGNIFICANT CHANGE UP
PROT SERPL-MCNC: 5.6 G/DL — LOW (ref 6–8)
PROT UR-MCNC: ABNORMAL
PROT/CREAT UR-RTO: >11.3 RATIO — HIGH (ref 0–0.2)
RBC # BLD: 4.24 M/UL — LOW (ref 4.7–6.1)
RBC # FLD: 12.2 % — SIGNIFICANT CHANGE UP (ref 11.5–14.5)
RBC CASTS # UR COMP ASSIST: 13 /HPF — HIGH (ref 0–4)
SODIUM SERPL-SCNC: 137 MMOL/L — SIGNIFICANT CHANGE UP (ref 135–146)
SP GR SPEC: 1.01 — SIGNIFICANT CHANGE UP (ref 1.01–1.03)
UROBILINOGEN FLD QL: SIGNIFICANT CHANGE UP
WBC # BLD: 6.43 K/UL — SIGNIFICANT CHANGE UP (ref 4.8–10.8)
WBC # FLD AUTO: 6.43 K/UL — SIGNIFICANT CHANGE UP (ref 4.8–10.8)
WBC UR QL: 1 /HPF — SIGNIFICANT CHANGE UP (ref 0–5)

## 2022-05-17 PROCEDURE — 99233 SBSQ HOSP IP/OBS HIGH 50: CPT

## 2022-05-17 PROCEDURE — 93306 TTE W/DOPPLER COMPLETE: CPT | Mod: 26

## 2022-05-17 PROCEDURE — 93010 ELECTROCARDIOGRAM REPORT: CPT

## 2022-05-17 RX ORDER — MORPHINE SULFATE 50 MG/1
2 CAPSULE, EXTENDED RELEASE ORAL ONCE
Refills: 0 | Status: DISCONTINUED | OUTPATIENT
Start: 2022-05-17 | End: 2022-05-17

## 2022-05-17 RX ORDER — ATORVASTATIN CALCIUM 80 MG/1
40 TABLET, FILM COATED ORAL AT BEDTIME
Refills: 0 | Status: DISCONTINUED | OUTPATIENT
Start: 2022-05-17 | End: 2022-05-31

## 2022-05-17 RX ADMIN — MORPHINE SULFATE 2 MILLIGRAM(S): 50 CAPSULE, EXTENDED RELEASE ORAL at 21:59

## 2022-05-17 RX ADMIN — Medication 2: at 13:29

## 2022-05-17 RX ADMIN — PANTOPRAZOLE SODIUM 40 MILLIGRAM(S): 20 TABLET, DELAYED RELEASE ORAL at 06:28

## 2022-05-17 RX ADMIN — PANTOPRAZOLE SODIUM 40 MILLIGRAM(S): 20 TABLET, DELAYED RELEASE ORAL at 17:38

## 2022-05-17 RX ADMIN — MORPHINE SULFATE 2 MILLIGRAM(S): 50 CAPSULE, EXTENDED RELEASE ORAL at 11:24

## 2022-05-17 RX ADMIN — MORPHINE SULFATE 2 MILLIGRAM(S): 50 CAPSULE, EXTENDED RELEASE ORAL at 21:14

## 2022-05-17 RX ADMIN — Medication 3 UNIT(S): at 13:28

## 2022-05-17 RX ADMIN — HEPARIN SODIUM 5000 UNIT(S): 5000 INJECTION INTRAVENOUS; SUBCUTANEOUS at 13:30

## 2022-05-17 RX ADMIN — HEPARIN SODIUM 5000 UNIT(S): 5000 INJECTION INTRAVENOUS; SUBCUTANEOUS at 06:34

## 2022-05-17 RX ADMIN — ATORVASTATIN CALCIUM 40 MILLIGRAM(S): 80 TABLET, FILM COATED ORAL at 21:44

## 2022-05-17 RX ADMIN — MORPHINE SULFATE 2 MILLIGRAM(S): 50 CAPSULE, EXTENDED RELEASE ORAL at 04:20

## 2022-05-17 RX ADMIN — INSULIN GLARGINE 34 UNIT(S): 100 INJECTION, SOLUTION SUBCUTANEOUS at 21:44

## 2022-05-17 RX ADMIN — MORPHINE SULFATE 2 MILLIGRAM(S): 50 CAPSULE, EXTENDED RELEASE ORAL at 00:20

## 2022-05-17 RX ADMIN — HEPARIN SODIUM 5000 UNIT(S): 5000 INJECTION INTRAVENOUS; SUBCUTANEOUS at 21:44

## 2022-05-17 RX ADMIN — Medication 100 MILLIGRAM(S): at 06:28

## 2022-05-17 RX ADMIN — Medication 60 MILLIGRAM(S): at 06:29

## 2022-05-17 RX ADMIN — MORPHINE SULFATE 2 MILLIGRAM(S): 50 CAPSULE, EXTENDED RELEASE ORAL at 00:40

## 2022-05-17 RX ADMIN — Medication 145 MILLIGRAM(S): at 13:30

## 2022-05-17 NOTE — PROGRESS NOTE ADULT - ASSESSMENT
BILLY on CKD 3 / pre-cecy/NSAIDs  Mild hyponatremia improving (d/t thiazide, poor po intake)  HTN  DM2    - non oliguric  - creat down-trending    Recommendations:   - hypertensive;  better on nifedipine metoprolol may benefit from ACE inh or ARB  in the future   - document strict i/o  - obtain  urine prot/creat ratio  - CTAP reviewed, no hydronephrosis   - avoid nephrotoxins/ NSAIDs  - check serum flc ratio   - further serologic w/u can be done outpatient as creatinine is down-trending  - establish care with outpatient nephrologist;  if none closer, pt can schedule appointment in [facility]    will sign off recall PRN / call using TEAMS or on [phone]

## 2022-05-17 NOTE — PROGRESS NOTE ADULT - SUBJECTIVE AND OBJECTIVE BOX
Nephrology progress note    THIS IS AN INCOMPLETE NOTE . FULL NOTE TO FOLLOW SHORTLY    Patient is seen and examined, events over the last 24 h noted .    Allergies:  No Known Allergies    Hospital Medications:   MEDICATIONS  (STANDING):  atorvastatin 40 milliGRAM(s) Oral at bedtime  dextrose 5%. 1000 milliLiter(s) (50 mL/Hr) IV Continuous <Continuous>  dextrose 5%. 1000 milliLiter(s) (100 mL/Hr) IV Continuous <Continuous>  dextrose 50% Injectable 25 Gram(s) IV Push once  dextrose 50% Injectable 12.5 Gram(s) IV Push once  dextrose 50% Injectable 25 Gram(s) IV Push once  fenofibrate Tablet 145 milliGRAM(s) Oral daily  glucagon  Injectable 1 milliGRAM(s) IntraMuscular once  heparin   Injectable 5000 Unit(s) SubCutaneous every 8 hours  insulin glargine Injectable (LANTUS) 34 Unit(s) SubCutaneous at bedtime  insulin lispro (ADMELOG) corrective regimen sliding scale   SubCutaneous three times a day before meals  insulin lispro Injectable (ADMELOG) 3 Unit(s) SubCutaneous three times a day before meals  lactated ringers. 1000 milliLiter(s) (250 mL/Hr) IV Continuous <Continuous>  metoprolol succinate  milliGRAM(s) Oral daily  NIFEdipine XL 60 milliGRAM(s) Oral daily  pantoprazole    Tablet 40 milliGRAM(s) Oral two times a day        VITALS:  T(F): 98.4 (22 @ 07:43), Max: 98.5 (22 @ 15:20)  HR: 82 (22 @ 07:43)  BP: 140/67 (22 @ 07:43)  RR: 18 (22 @ 07:43)  SpO2: 100% (22 @ 07:43)  Wt(kg): --        PHYSICAL EXAM:  Constitutional: NAD  HEENT: anicteric sclera, oropharynx clear, MMM  Neck: No JVD  Respiratory: CTAB, no wheezes, rales or rhonchi  Cardiovascular: S1, S2, RRR  Gastrointestinal: BS+, soft, NT/ND  Extremities: No cyanosis or clubbing. No peripheral edema  :  No faith.   Skin: No rashes    LABS:      137  |  100  |  21<H>  ----------------------------<  141<H>  4.6   |  28  |  1.6<H>  Creatinine Trend: 1.6<--, 1.7<--, 2.4<--  Ca    9.0      17 May 2022 08:25  Phos  3.3     05-16  Mg     1.8         TPro  5.6<L>  /  Alb  3.0<L>  /  TBili  0.2  /  DBili      /  AST  19  /  ALT  24  /  AlkPhos  131<H>                            13.2   6.43  )-----------( 225      ( 17 May 2022 08:25 )             38.0       Urine Studies:  Urinalysis Basic - ( 17 May 2022 00:15 )    Color: Light Yellow / Appearance: Clear / S.011 / pH:   Gluc:  / Ketone: Trace  / Bili: Negative / Urobili: <2 mg/dL   Blood:  / Protein: >600 mg/dL / Nitrite: Negative   Leuk Esterase: Negative / RBC: 13 /HPF / WBC 1 /HPF   Sq Epi:  / Non Sq Epi: 1 /HPF / Bacteria: Negative          HbA1c 14.4      [19 @ 06:08]  Lipid: chol 276, , HDL 44, LDL --      [05-15-22 @ 16:49]          RADIOLOGY & ADDITIONAL STUDIES:   Nephrology progress note  Patient is seen and examined, events over the last 24 h noted .  Lying in bed comfortable     Allergies:  No Known Allergies    Hospital Medications:   MEDICATIONS  (STANDING):  atorvastatin 40 milliGRAM(s) Oral at bedtime  fenofibrate Tablet 145 milliGRAM(s) Oral daily  glucagon  Injectable 1 milliGRAM(s) IntraMuscular once  heparin   Injectable 5000 Unit(s) SubCutaneous every 8 hours  insulin glargine Injectable (LANTUS) 34 Unit(s) SubCutaneous at bedtime  insulin lispro (ADMELOG) corrective regimen sliding scale   SubCutaneous three times a day before meals  insulin lispro Injectable (ADMELOG) 3 Unit(s) SubCutaneous three times a day before meals  lactated ringers. 1000 milliLiter(s) (250 mL/Hr) IV Continuous <Continuous>  metoprolol succinate  milliGRAM(s) Oral daily  NIFEdipine XL 60 milliGRAM(s) Oral daily  pantoprazole    Tablet 40 milliGRAM(s) Oral two times a day        VITALS:  T(F): 98.4 (22 @ 07:43), Max: 98.5 (22 @ 15:20)  HR: 82 (22 @ 07:43)  BP: 140/67 (22 @ 07:43)  RR: 18 (22 @ 07:43)  SpO2: 100% (22 @ 07:43)          PHYSICAL EXAM:  Constitutional: NAD  Neck: No JVD  Respiratory: CTAB,  Cardiovascular: S1, S2, RRR  Gastrointestinal: BS+, soft, NT/ND  Extremities: No cyanosis or clubbing. No peripheral edema  :  No faith.   Skin: No rashes    LABS:      137  |  100  |  21<H>  ----------------------------<  141<H>  4.6   |  28  |  1.6<H>    Creatinine Trend: 1.6<--, 1.7<--, 2.4<--    Ca    9.0      17 May 2022 08:25  Phos  3.3       Mg     1.8         TPro  5.6<L>  /  Alb  3.0<L>  /  TBili  0.2  /  DBili      /  AST  19  /  ALT  24  /  AlkPhos  131<H>                            13.2   6.43  )-----------( 225      ( 17 May 2022 08:25 )             38.0       Urine Studies:  Urinalysis Basic - ( 17 May 2022 00:15 )    Color: Light Yellow / Appearance: Clear / S.011 / pH:   Gluc:  / Ketone: Trace  / Bili: Negative / Urobili: <2 mg/dL   Blood:  / Protein: >600 mg/dL / Nitrite: Negative   Leuk Esterase: Negative / RBC: 13 /HPF / WBC 1 /HPF   Sq Epi:  / Non Sq Epi: 1 /HPF / Bacteria: Negative          HbA1c 14.4      [19 @ 06:08]  Lipid: chol 276, , HDL 44, LDL --      [05-15-22 @ 16:49]          RADIOLOGY & ADDITIONAL STUDIES:

## 2022-05-17 NOTE — PROGRESS NOTE ADULT - ASSESSMENT
47 year old male with hx of HTN, AMBER, DM, vertigo, diverticulosis s/p colon resection presents from home with 4 days of epigastric pain    # Epigastric pain radiating to the back likely secondary to PUD/GERD r/o ACS and pancreatitis  - patient started on ibuprofen about a week ago for dental infection, pain started about 3-4 days ago  - pain is burning with occasional radiation to the back, worse with food also accompanied with nausea, denies hematemesis or bloody stools  - lipase 18,   -US CBD 5mm no stones  -CT abdomen Mild ill-defined appearance of the pancreas. Questionable mild interstitial pancreatitis.  - doubt ACS however patient has risk factors, uncontrolled DM and smoker, has troponinemia but more likely from kidney disease, trop 0.11 f/u repeat, EKG is NSR with no T wave inversions  - starting protonix 40 bid and maalox  - GI consulted: c/w with pain control,  protonix bid  - given persistent epigastric pain persist after fluid resuscitation for pancreatitis consider for EGD  - cardiology consulted: reports pain most likely 2/2 to underlying pancreatitis:  f/u TTE  -repeat EKG if recurrent chest pain  - f/u cardiology and GI recommendations  - c/w with aggressive hydration, pain control, protonix, zofran,  clear liquid diet       # HTN  - was on HCTZ 37.5 / spironolactone 25 / toprol 100  - will hold HCTZ and spironolactone, creatinine is 2.4, patient was told to see a nephrologist by PMD however hasn't yet  - started nifedipine 30 XL qd    # BILLY on CKD (improving)  - current creat 1.6 improving from 2.4  - patient hasn't followed up with nephro yet  - f/u UA, urine prot/creat ratio, spep, upep, niranjan  - f/u nephro recommendations     # Dental infection  - willing to have teeth pulled out here; s/p amoxicilling  - Dental consulted pending eval for radiographs and definitive treatment    # AMBER  - continue with home cpap    # DM2  - monitor BS    # Vertigo  - c/w meclizine    # DVT PPX: ambulatory, will avoid chemical ppx for now in case of unmasking bleed  # GI PPX: protonix 40 bid  # Diet: clear liquid  FULL CODE   47 year old male with hx of HTN, AMBER, DM, vertigo, diverticulosis s/p colon resection presents from home with 4 days of epigastric pain    # Epigastric pain radiating to the back likely secondary to PUD/GERD r/o ACS and pancreatitis  #lipid profile not extremely elevated, no gallstone, hx of reduced etoh intake since past 3 month  - patient started on ibuprofen about a week ago for dental infection, pain started about 3-4 days ago  - pain is burning with occasional radiation to the back, worse with food also accompanied with nausea, denies hematemesis or bloody stools  - lipase 18  -US CBD 5mm no stones  -CT abdomen Mild ill-defined appearance of the pancreas. Questionable mild interstitial pancreatitis.  - doubt ACS however patient has risk factors, uncontrolled DM and smoker, has troponinemia but more likely from kidney disease, trop 0.11 f/u repeat, EKG is NSR with no T wave inversions  - starting protonix 40 bid and maalox  - GI consulted: c/w with pain control,  protonix bid  - given persistent epigastric pain persist after fluid resuscitation for pancreatitis consider for EGD  - cardiology consulted: reports pain most likely 2/2 to underlying pancreatitis:  f/u TTE  -repeat EKG if recurrent chest pain  - f/u cardiology and GI recommendations  - c/w with aggressive hydration, pain control, protonix, zofran,  clear liquid diet       # HTN  - was on HCTZ 37.5 / spironolactone 25 / toprol 100  - will hold HCTZ and spironolactone, creatinine is 2.4, patient was told to see a nephrologist by PMD however hasn't yet  - started nifedipine 30 XL qd    # BILLY on CKD (improving)  - current creat 1.6 improving from 2.4  - patient hasn't followed up with nephro yet  - f/u UA, urine prot/creat ratio, spep, upep, niranjan  - f/u nephro recommendations     # Dental infection  - willing to have teeth pulled out here; s/p amoxicilling  - Dental consulted pending eval for radiographs and definitive treatment    # AMBER  - continue with home cpap    # DM2  - monitor BS    # Vertigo  - c/w meclizine    # DVT PPX: ambulatory, will avoid chemical ppx for now in case of unmasking bleed  # GI PPX: protonix 40 bid  # Diet: clear liquid  FULL CODE

## 2022-05-17 NOTE — PROGRESS NOTE ADULT - SUBJECTIVE AND OBJECTIVE BOX
----------Daily Progress Note----------    HISTORY OF PRESENT ILLNESS:  Patient is a 47y old Male who presents with a chief complaint of epigastric pain (17 May 2022 11:54)    Currently admitted to medicine with the primary diagnosis of Chest pain       Today is hospital day 2d.     INTERVAL HOSPITAL COURSE / OVERNIGHT EVENTS:    Patient was examined and seen at bedside. This morning he is resting comfortably in bed, reports was only able to tolerated clear diet. Still having diffuse epigastric pain radiating to back worse on movement. Reports was a heavy drinker but reduced etoh intake about 3 months ago.     Review of Systems: Otherwise unremarkable     <<<<<PAST MEDICAL & SURGICAL HISTORY>>>>>  Diabetes    Asthma    Diverticulitis  surgery 16yrs ago    High cholesterol    Dyslipidemia    Hypertension    H/O vertigo    Diabetic ulcer of right foot    Broken toe  left pinky toe    Vertigo    HTN (hypertension)    Diabetes    History of surgery  colon resection    No significant past surgical history      ALLERGIES  No Known Allergies      Home Medications:  Albuterol (Eqv-ProAir HFA) 90 mcg/inh inhalation aerosol: 2 puff(s) inhaled every 6 hours (15 May 2022 08:54)  amoxicillin-clavulanate 500 mg-125 mg oral tablet: 1 tab(s) orally every 8 hours (15 May 2022 08:54)  fenofibrate 145 mg oral tablet: 1 tab(s) orally once a day (15 May 2022 08:54)  HumaLOG 100 units/mL subcutaneous solution: 5 unit(s) subcutaneous 3 times a day (before meals)  sliding scale (15 May 2022 08:54)  hydroCHLOROthiazide: 37.5 milligram(s) orally once a day (15 May 2022 08:54)  ibuprofen 600 mg oral tablet: 1 tab(s) orally every 6 hours (15 May 2022 08:54)  Protonix 40 mg oral delayed release tablet: 1 tab(s) orally once a day (15 May 2022 08:54)  spironolactone 25 mg oral tablet: 1 tab(s) orally once a day (15 May 2022 08:54)  Toprol- mg oral tablet, extended release: 1 tab(s) orally once a day (15 May 2022 08:54)  Tresiba 100 units/mL subcutaneous solution: 40 unit(s) subcutaneous once a day (15 May 2022 08:54)        MEDICATIONS  STANDING MEDICATIONS  atorvastatin 40 milliGRAM(s) Oral at bedtime  dextrose 5%. 1000 milliLiter(s) IV Continuous <Continuous>  dextrose 5%. 1000 milliLiter(s) IV Continuous <Continuous>  dextrose 50% Injectable 25 Gram(s) IV Push once  dextrose 50% Injectable 12.5 Gram(s) IV Push once  dextrose 50% Injectable 25 Gram(s) IV Push once  fenofibrate Tablet 145 milliGRAM(s) Oral daily  glucagon  Injectable 1 milliGRAM(s) IntraMuscular once  heparin   Injectable 5000 Unit(s) SubCutaneous every 8 hours  insulin glargine Injectable (LANTUS) 34 Unit(s) SubCutaneous at bedtime  insulin lispro (ADMELOG) corrective regimen sliding scale   SubCutaneous three times a day before meals  insulin lispro Injectable (ADMELOG) 3 Unit(s) SubCutaneous three times a day before meals  lactated ringers. 1000 milliLiter(s) IV Continuous <Continuous>  metoprolol succinate  milliGRAM(s) Oral daily  NIFEdipine XL 60 milliGRAM(s) Oral daily  pantoprazole    Tablet 40 milliGRAM(s) Oral two times a day    PRN MEDICATIONS  ALBUTerol    90 MICROgram(s) HFA Inhaler 2 Puff(s) Inhalation every 6 hours PRN  aluminum hydroxide/magnesium hydroxide/simethicone Suspension 30 milliLiter(s) Oral every 4 hours PRN  dextrose Oral Gel 15 Gram(s) Oral once PRN  meclizine 25 milliGRAM(s) Oral three times a day PRN  morphine  - Injectable 2 milliGRAM(s) IV Push every 6 hours PRN  ondansetron Injectable 4 milliGRAM(s) IV Push every 4 hours PRN    VITALS:  T(F): 98.4  HR: 82  BP: 140/67  RR: 18  SpO2: 100%    <<<<<LABS>>>>>                        13.2   6.43  )-----------( 225      ( 17 May 2022 08:25 )             38.0     05-17    137  |  100  |  21<H>  ----------------------------<  141<H>  4.6   |  28  |  1.6<H>    Ca    9.0      17 May 2022 08:25  Phos  3.3     05-16  Mg     1.8     05-17    TPro  5.6<L>  /  Alb  3.0<L>  /  TBili  0.2  /  DBili  x   /  AST  19  /  ALT  24  /  AlkPhos  131<H>      PT/INR - ( 16 May 2022 08:30 )   PT: 10.10 sec;   INR: 0.88 ratio           Urinalysis Basic - ( 17 May 2022 00:15 )    Color: Light Yellow / Appearance: Clear / S.011 / pH: x  Gluc: x / Ketone: Trace  / Bili: Negative / Urobili: <2 mg/dL   Blood: x / Protein: >600 mg/dL / Nitrite: Negative   Leuk Esterase: Negative / RBC: 13 /HPF / WBC 1 /HPF   Sq Epi: x / Non Sq Epi: 1 /HPF / Bacteria: Negative          [id number]  CARDIAC MARKERS ( 15 May 2022 16:49 )  x     / 0.06 ng/mL / x     / x     / x            <<<<<RADIOLOGY>>>>>    <<<<<PHYSICAL EXAM>>>>>  GENERAL: Well developed, well nourished and in no acute distress. Resting comfortably in bed.  HEENT: Normocephalic, atraumatic,  PULMONARY: Clear to auscultation bilaterally. No rales, rhonchi, or wheezing.  CARDIOVASCULAR: Regular rate and rhythm, S1-S2, no murmurs  GASTROINTESTINAL: Soft, + epigastric tenderness, non-distended, no guarding.  SKIN/EXTREMITIES: No clubbing or edema  NEUROLOGIC/MUSCULOSKELETAL: AOx4, grossly moving all extremities      -----------------------------------------------------------------------------------------------------------------------------------------------------------------------------------------------

## 2022-05-17 NOTE — PROGRESS NOTE ADULT - SUBJECTIVE AND OBJECTIVE BOX
Gastroenterology progress note:     Patient is a 47y old  Male who presents with a chief complaint of epigastric pain (17 May 2022 12:49)       Admitted on: 05-15-22    We are following the patient for abdominal pain     Interval History:  abdominal pain is improving 7/10 in intensity intermittent pain today compared to constant pain yesterday   Passing gas        PAST MEDICAL & SURGICAL HISTORY:  Diabetes      Asthma      Diverticulitis  surgery 16yrs ago      High cholesterol      Dyslipidemia      Hypertension      H/O vertigo      Diabetic ulcer of right foot      Broken toe  left pinky toe      Vertigo      HTN (hypertension)      Diabetes      History of surgery  colon resection      No significant past surgical history          MEDICATIONS  (STANDING):  atorvastatin 40 milliGRAM(s) Oral at bedtime  dextrose 5%. 1000 milliLiter(s) (50 mL/Hr) IV Continuous <Continuous>  dextrose 5%. 1000 milliLiter(s) (100 mL/Hr) IV Continuous <Continuous>  dextrose 50% Injectable 25 Gram(s) IV Push once  dextrose 50% Injectable 12.5 Gram(s) IV Push once  dextrose 50% Injectable 25 Gram(s) IV Push once  fenofibrate Tablet 145 milliGRAM(s) Oral daily  glucagon  Injectable 1 milliGRAM(s) IntraMuscular once  heparin   Injectable 5000 Unit(s) SubCutaneous every 8 hours  insulin glargine Injectable (LANTUS) 34 Unit(s) SubCutaneous at bedtime  insulin lispro (ADMELOG) corrective regimen sliding scale   SubCutaneous three times a day before meals  insulin lispro Injectable (ADMELOG) 3 Unit(s) SubCutaneous three times a day before meals  lactated ringers. 1000 milliLiter(s) (150 mL/Hr) IV Continuous <Continuous>  metoprolol succinate  milliGRAM(s) Oral daily  NIFEdipine XL 60 milliGRAM(s) Oral daily  pantoprazole    Tablet 40 milliGRAM(s) Oral two times a day    MEDICATIONS  (PRN):  ALBUTerol    90 MICROgram(s) HFA Inhaler 2 Puff(s) Inhalation every 6 hours PRN Shortness of Breath and/or Wheezing  aluminum hydroxide/magnesium hydroxide/simethicone Suspension 30 milliLiter(s) Oral every 4 hours PRN Dyspepsia  dextrose Oral Gel 15 Gram(s) Oral once PRN Blood Glucose LESS THAN 70 milliGRAM(s)/deciliter  meclizine 25 milliGRAM(s) Oral three times a day PRN vertigo  morphine  - Injectable 2 milliGRAM(s) IV Push every 6 hours PRN Severe Pain (7 - 10)  ondansetron Injectable 4 milliGRAM(s) IV Push every 4 hours PRN Nausea and/or Vomiting      Allergies  No Known Allergies      Review of Systems:   REVIEW OF SYSTEMS:    CONSTITUTIONAL: No weakness, fevers or chills  EYES/ENT: No visual changes;  No vertigo or throat pain   NECK: No pain or stiffness  RESPIRATORY: No cough, wheezing, hemoptysis; No shortness of breath  CARDIOVASCULAR: No chest pain or palpitations  GASTROINTESTINAL: As mentioned above   NEUROLOGICAL: No numbness or weakness  SKIN: No itching, rashes      Physical Examination:  T(C): 36.8 (05-17-22 @ 15:22), Max: 36.9 (05-17-22 @ 07:43)  HR: 71 (05-17-22 @ 15:22) (70 - 91)  BP: 137/65 (05-17-22 @ 15:22) (137/65 - 169/89)  RR: 18 (05-17-22 @ 15:22) (18 - 18)  SpO2: 98% (05-17-22 @ 15:22) (98% - 100%)      Constitutional: No acute distress.  Respiratory:  No signs of respiratory distress. Lung sounds are clear bilaterally.  Cardiovascular:  S1 S2, Regular rate and rhythm.  Abdominal: Abdomen is soft, symmetric, and non-tender without distention.   Skin: No rashes, No Jaundice.        Data:                        13.2   6.43  )-----------( 225      ( 17 May 2022 08:25 )             38.0     Hgb trend:  13.2  05-17-22 @ 08:25  13.1  05-16-22 @ 08:30  13.6  05-15-22 @ 04:38      05-17    137  |  100  |  21<H>  ----------------------------<  141<H>  4.6   |  28  |  1.6<H>    Ca    9.0      17 May 2022 08:25  Phos  3.3     05-16  Mg     1.8     05-17    TPro  5.6<L>  /  Alb  3.0<L>  /  TBili  0.2  /  DBili  x   /  AST  19  /  ALT  24  /  AlkPhos  131<H>  05-17    Liver panel trend:  TBili 0.2   /   AST 19   /   ALT 24   /   AlkP 131   /   Tptn 5.6   /   Alb 3.0    /   DBili --      05-17  TBili 0.3   /   AST 23   /   ALT 31   /   AlkP 147   /   Tptn 5.7   /   Alb 3.3    /   DBili --      05-16  TBili <0.2   /   AST 25   /   ALT 31   /   AlkP 171   /   Tptn 5.7   /   Alb 3.2    /   DBili --      05-15      PT/INR - ( 16 May 2022 08:30 )   PT: 10.10 sec;   INR: 0.88 ratio                Radiology:

## 2022-05-17 NOTE — PROGRESS NOTE ADULT - ASSESSMENT
47 year old male with hx of HTN, AMBER, DM, vertigo, diverticulosis s/p colon resection presents from home with 4 days of epigastric pain.    #)Epigastric pain DD: mild pancreatitis (Given pain and CT positive for mild inflammation) vs r/o PUD vs gatsritis- improving   Etiology for pancreatitis: Denies any alcohol since last 3 months, no GS in [place],  less likely to cause given levels less than 1000  -Lipase 18, calcium 8.5  -Hemodynamically stable  -Hb stable  -US CBD 5mm no stones  -CT abdomen Mild ill-defined appearance of the pancreas. Questionable mild interstitial pancreatitis. gall stones  -Case discussed with radiology no evidence of gall stones   -Improving abdominal pain     Recs:    liquid diet today   c/w IVF LR @250/hour  Monitor BUN/Creatinine   Monitor hematocrit  Pain control   PPI BID   r/o secondary gain

## 2022-05-17 NOTE — PATIENT PROFILE ADULT - FALL HARM RISK - UNIVERSAL INTERVENTIONS
Bed in lowest position, wheels locked, appropriate side rails in place/Call bell, personal items and telephone in reach/Instruct patient to call for assistance before getting out of bed or chair/Non-slip footwear when patient is out of bed/[place] to call system/Physically safe environment - no spills, clutter or unnecessary equipment/Purposeful Proactive Rounding/Room/bathroom lighting operational, light cord in reach

## 2022-05-18 LAB
ALBUMIN SERPL ELPH-MCNC: 2.9 G/DL — LOW (ref 3.5–5.2)
ALP SERPL-CCNC: 123 U/L — HIGH (ref 30–115)
ALT FLD-CCNC: 21 U/L — SIGNIFICANT CHANGE UP (ref 0–41)
ANION GAP SERPL CALC-SCNC: 11 MMOL/L — SIGNIFICANT CHANGE UP (ref 7–14)
AST SERPL-CCNC: 20 U/L — SIGNIFICANT CHANGE UP (ref 0–41)
BASOPHILS # BLD AUTO: 0.04 K/UL — SIGNIFICANT CHANGE UP (ref 0–0.2)
BASOPHILS NFR BLD AUTO: 0.7 % — SIGNIFICANT CHANGE UP (ref 0–1)
BILIRUB SERPL-MCNC: 0.3 MG/DL — SIGNIFICANT CHANGE UP (ref 0.2–1.2)
BUN SERPL-MCNC: 14 MG/DL — SIGNIFICANT CHANGE UP (ref 10–20)
CALCIUM SERPL-MCNC: 8.6 MG/DL — SIGNIFICANT CHANGE UP (ref 8.5–10.1)
CHLORIDE SERPL-SCNC: 98 MMOL/L — SIGNIFICANT CHANGE UP (ref 98–110)
CO2 SERPL-SCNC: 27 MMOL/L — SIGNIFICANT CHANGE UP (ref 17–32)
CREAT SERPL-MCNC: 1.6 MG/DL — HIGH (ref 0.7–1.5)
EGFR: 52 ML/MIN/1.73M2 — LOW
EOSINOPHIL # BLD AUTO: 0.14 K/UL — SIGNIFICANT CHANGE UP (ref 0–0.7)
EOSINOPHIL NFR BLD AUTO: 2.3 % — SIGNIFICANT CHANGE UP (ref 0–8)
GLUCOSE BLDC GLUCOMTR-MCNC: 107 MG/DL — HIGH (ref 70–99)
GLUCOSE BLDC GLUCOMTR-MCNC: 150 MG/DL — HIGH (ref 70–99)
GLUCOSE BLDC GLUCOMTR-MCNC: 153 MG/DL — HIGH (ref 70–99)
GLUCOSE BLDC GLUCOMTR-MCNC: 162 MG/DL — HIGH (ref 70–99)
GLUCOSE SERPL-MCNC: 170 MG/DL — HIGH (ref 70–99)
HCT VFR BLD CALC: 38.1 % — LOW (ref 42–52)
HGB BLD-MCNC: 12.7 G/DL — LOW (ref 14–18)
IMM GRANULOCYTES NFR BLD AUTO: 0.2 % — SIGNIFICANT CHANGE UP (ref 0.1–0.3)
LYMPHOCYTES # BLD AUTO: 2.01 K/UL — SIGNIFICANT CHANGE UP (ref 1.2–3.4)
LYMPHOCYTES # BLD AUTO: 33.1 % — SIGNIFICANT CHANGE UP (ref 20.5–51.1)
MAGNESIUM SERPL-MCNC: 1.7 MG/DL — LOW (ref 1.8–2.4)
MCHC RBC-ENTMCNC: 30.2 PG — SIGNIFICANT CHANGE UP (ref 27–31)
MCHC RBC-ENTMCNC: 33.3 G/DL — SIGNIFICANT CHANGE UP (ref 32–37)
MCV RBC AUTO: 90.5 FL — SIGNIFICANT CHANGE UP (ref 80–94)
MONOCYTES # BLD AUTO: 0.54 K/UL — SIGNIFICANT CHANGE UP (ref 0.1–0.6)
MONOCYTES NFR BLD AUTO: 8.9 % — SIGNIFICANT CHANGE UP (ref 1.7–9.3)
NEUTROPHILS # BLD AUTO: 3.34 K/UL — SIGNIFICANT CHANGE UP (ref 1.4–6.5)
NEUTROPHILS NFR BLD AUTO: 54.8 % — SIGNIFICANT CHANGE UP (ref 42.2–75.2)
NRBC # BLD: 0 /100 WBCS — SIGNIFICANT CHANGE UP (ref 0–0)
PLATELET # BLD AUTO: 232 K/UL — SIGNIFICANT CHANGE UP (ref 130–400)
POTASSIUM SERPL-MCNC: 3.9 MMOL/L — SIGNIFICANT CHANGE UP (ref 3.5–5)
POTASSIUM SERPL-SCNC: 3.9 MMOL/L — SIGNIFICANT CHANGE UP (ref 3.5–5)
PROT SERPL-MCNC: 5.3 G/DL — LOW (ref 6–8)
RBC # BLD: 4.21 M/UL — LOW (ref 4.7–6.1)
RBC # FLD: 12 % — SIGNIFICANT CHANGE UP (ref 11.5–14.5)
SODIUM SERPL-SCNC: 136 MMOL/L — SIGNIFICANT CHANGE UP (ref 135–146)
WBC # BLD: 6.08 K/UL — SIGNIFICANT CHANGE UP (ref 4.8–10.8)
WBC # FLD AUTO: 6.08 K/UL — SIGNIFICANT CHANGE UP (ref 4.8–10.8)

## 2022-05-18 PROCEDURE — 93010 ELECTROCARDIOGRAM REPORT: CPT | Mod: 76,77

## 2022-05-18 PROCEDURE — 99233 SBSQ HOSP IP/OBS HIGH 50: CPT

## 2022-05-18 PROCEDURE — 93010 ELECTROCARDIOGRAM REPORT: CPT

## 2022-05-18 RX ORDER — POLYETHYLENE GLYCOL 3350 17 G/17G
17 POWDER, FOR SOLUTION ORAL
Refills: 0 | Status: DISCONTINUED | OUTPATIENT
Start: 2022-05-18 | End: 2022-05-31

## 2022-05-18 RX ORDER — MORPHINE SULFATE 50 MG/1
2 CAPSULE, EXTENDED RELEASE ORAL ONCE
Refills: 0 | Status: DISCONTINUED | OUTPATIENT
Start: 2022-05-18 | End: 2022-05-18

## 2022-05-18 RX ORDER — SENNA PLUS 8.6 MG/1
2 TABLET ORAL AT BEDTIME
Refills: 0 | Status: DISCONTINUED | OUTPATIENT
Start: 2022-05-18 | End: 2022-05-31

## 2022-05-18 RX ADMIN — MORPHINE SULFATE 2 MILLIGRAM(S): 50 CAPSULE, EXTENDED RELEASE ORAL at 08:03

## 2022-05-18 RX ADMIN — HEPARIN SODIUM 5000 UNIT(S): 5000 INJECTION INTRAVENOUS; SUBCUTANEOUS at 05:46

## 2022-05-18 RX ADMIN — HEPARIN SODIUM 5000 UNIT(S): 5000 INJECTION INTRAVENOUS; SUBCUTANEOUS at 13:37

## 2022-05-18 RX ADMIN — PANTOPRAZOLE SODIUM 40 MILLIGRAM(S): 20 TABLET, DELAYED RELEASE ORAL at 17:48

## 2022-05-18 RX ADMIN — Medication 3 UNIT(S): at 08:35

## 2022-05-18 RX ADMIN — SENNA PLUS 2 TABLET(S): 8.6 TABLET ORAL at 12:19

## 2022-05-18 RX ADMIN — MORPHINE SULFATE 2 MILLIGRAM(S): 50 CAPSULE, EXTENDED RELEASE ORAL at 02:00

## 2022-05-18 RX ADMIN — Medication 3 UNIT(S): at 12:16

## 2022-05-18 RX ADMIN — ATORVASTATIN CALCIUM 40 MILLIGRAM(S): 80 TABLET, FILM COATED ORAL at 21:55

## 2022-05-18 RX ADMIN — MORPHINE SULFATE 2 MILLIGRAM(S): 50 CAPSULE, EXTENDED RELEASE ORAL at 06:01

## 2022-05-18 RX ADMIN — HEPARIN SODIUM 5000 UNIT(S): 5000 INJECTION INTRAVENOUS; SUBCUTANEOUS at 21:56

## 2022-05-18 RX ADMIN — Medication 100 MILLIGRAM(S): at 05:45

## 2022-05-18 RX ADMIN — Medication 60 MILLIGRAM(S): at 05:45

## 2022-05-18 RX ADMIN — SODIUM CHLORIDE 150 MILLILITER(S): 9 INJECTION, SOLUTION INTRAVENOUS at 07:10

## 2022-05-18 RX ADMIN — MORPHINE SULFATE 2 MILLIGRAM(S): 50 CAPSULE, EXTENDED RELEASE ORAL at 17:48

## 2022-05-18 RX ADMIN — Medication 145 MILLIGRAM(S): at 13:36

## 2022-05-18 RX ADMIN — SENNA PLUS 2 TABLET(S): 8.6 TABLET ORAL at 21:56

## 2022-05-18 RX ADMIN — Medication 1: at 08:36

## 2022-05-18 RX ADMIN — INSULIN GLARGINE 34 UNIT(S): 100 INJECTION, SOLUTION SUBCUTANEOUS at 21:55

## 2022-05-18 NOTE — PROGRESS NOTE ADULT - SUBJECTIVE AND OBJECTIVE BOX
----------Daily Progress Note----------    HISTORY OF PRESENT ILLNESS:  Patient is a 47y old Male who presents with a chief complaint of epigastric pain (17 May 2022 17:00)    Currently admitted to medicine with the primary diagnosis of Chest pain       Today is hospital day 3d.     INTERVAL HOSPITAL COURSE / OVERNIGHT EVENTS:    Patient was examined and seen at bedside. Patient at bedside complaining of chest pain/epigastric pain radiating to back 8/10. EKG NSR, patient received morphine at bedside; report was able to to tolerate soft diet but having severe pain today AM. GI saw patient at bedside plan for EGD tomorrow.    Review of Systems: Otherwise unremarkable     <<<<<PAST MEDICAL & SURGICAL HISTORY>>>>>  Diabetes    Asthma    Diverticulitis  surgery 16yrs ago    High cholesterol    Dyslipidemia    Hypertension    H/O vertigo    Diabetic ulcer of right foot    Broken toe  left pinky toe    Vertigo    HTN (hypertension)    Diabetes    History of surgery  colon resection    No significant past surgical history      ALLERGIES  No Known Allergies      Home Medications:  Albuterol (Eqv-ProAir HFA) 90 mcg/inh inhalation aerosol: 2 puff(s) inhaled every 6 hours (15 May 2022 08:54)  amoxicillin-clavulanate 500 mg-125 mg oral tablet: 1 tab(s) orally every 8 hours (15 May 2022 08:54)  fenofibrate 145 mg oral tablet: 1 tab(s) orally once a day (15 May 2022 08:54)  HumaLOG 100 units/mL subcutaneous solution: 5 unit(s) subcutaneous 3 times a day (before meals)  sliding scale (15 May 2022 08:54)  hydroCHLOROthiazide: 37.5 milligram(s) orally once a day (15 May 2022 08:54)  ibuprofen 600 mg oral tablet: 1 tab(s) orally every 6 hours (15 May 2022 08:54)  Protonix 40 mg oral delayed release tablet: 1 tab(s) orally once a day (15 May 2022 08:54)  spironolactone 25 mg oral tablet: 1 tab(s) orally once a day (15 May 2022 08:54)  Toprol- mg oral tablet, extended release: 1 tab(s) orally once a day (15 May 2022 08:54)  Tresiba 100 units/mL subcutaneous solution: 40 unit(s) subcutaneous once a day (15 May 2022 08:54)        MEDICATIONS  STANDING MEDICATIONS  atorvastatin 40 milliGRAM(s) Oral at bedtime  dextrose 5%. 1000 milliLiter(s) IV Continuous <Continuous>  dextrose 5%. 1000 milliLiter(s) IV Continuous <Continuous>  dextrose 50% Injectable 25 Gram(s) IV Push once  dextrose 50% Injectable 12.5 Gram(s) IV Push once  dextrose 50% Injectable 25 Gram(s) IV Push once  fenofibrate Tablet 145 milliGRAM(s) Oral daily  glucagon  Injectable 1 milliGRAM(s) IntraMuscular once  heparin   Injectable 5000 Unit(s) SubCutaneous every 8 hours  insulin glargine Injectable (LANTUS) 34 Unit(s) SubCutaneous at bedtime  insulin lispro (ADMELOG) corrective regimen sliding scale   SubCutaneous three times a day before meals  insulin lispro Injectable (ADMELOG) 3 Unit(s) SubCutaneous three times a day before meals  lactated ringers. 1000 milliLiter(s) IV Continuous <Continuous>  metoprolol succinate  milliGRAM(s) Oral daily  NIFEdipine XL 60 milliGRAM(s) Oral daily  pantoprazole    Tablet 40 milliGRAM(s) Oral two times a day  senna 2 Tablet(s) Oral at bedtime    PRN MEDICATIONS  ALBUTerol    90 MICROgram(s) HFA Inhaler 2 Puff(s) Inhalation every 6 hours PRN  aluminum hydroxide/magnesium hydroxide/simethicone Suspension 30 milliLiter(s) Oral every 4 hours PRN  dextrose Oral Gel 15 Gram(s) Oral once PRN  meclizine 25 milliGRAM(s) Oral three times a day PRN  morphine  - Injectable 2 milliGRAM(s) IV Push every 6 hours PRN  ondansetron Injectable 4 milliGRAM(s) IV Push every 4 hours PRN  polyethylene glycol 3350 17 Gram(s) Oral two times a day PRN    VITALS:  T(F): 98.8  HR: 78  BP: 196/96  RR: 18  SpO2: 100%    <<<<<LABS>>>>>                        13.2   6.43  )-----------( 225      ( 17 May 2022 08:25 )             38.0     05-17    137  |  100  |  21<H>  ----------------------------<  141<H>  4.6   |  28  |  1.6<H>    Ca    9.0      17 May 2022 08:25  Phos  3.3     05-16  Mg     1.8     05-17    TPro  5.6<L>  /  Alb  3.0<L>  /  TBili  0.2  /  DBili  x   /  AST  19  /  ALT  24  /  AlkPhos  131<H>  05-17    PT/INR - ( 16 May 2022 08:30 )   PT: 10.10 sec;   INR: 0.88 ratio           Urinalysis Basic - ( 17 May 2022 00:15 )    Color: Light Yellow / Appearance: Clear / S.011 / pH: x  Gluc: x / Ketone: Trace  / Bili: Negative / Urobili: <2 mg/dL   Blood: x / Protein: >600 mg/dL / Nitrite: Negative   Leuk Esterase: Negative / RBC: 13 /HPF / WBC 1 /HPF   Sq Epi: x / Non Sq Epi: 1 /HPF / Bacteria: Negative          [id number]        <<<<<RADIOLOGY>>>>>    <<<<<PHYSICAL EXAM>>>>>  GENERAL: Well developed, well nourished  HEENT: Normocephalic, atraumati  PULMONARY: Clear to auscultation bilaterally. No rales, rhonchi, or wheezing.  CARDIOVASCULAR: Regular rate and rhythm, S1-S2, no murmurs  GASTROINTESTINAL: Soft, +epigastric tenderness, non-distended, no guarding.  NEUROLOGIC/MUSCULOSKELETAL: AOx4, grossly moving all extremities, no focal deficits.      -----------------------------------------------------------------------------------------------------------------------------------------------------------------------------------------------

## 2022-05-18 NOTE — PROGRESS NOTE ADULT - ASSESSMENT
47 year old male with hx of HTN, AMBER, DM, vertigo, diverticulosis s/p colon resection presents from home with 4 days of epigastric pain.    #)Epigastric pain DD: mild pancreatitis (Given pain and CT positive for mild inflammation) vs r/o PUD vs gatsritis- improving   Etiology for pancreatitis: Denies any alcohol since last 3 months, no GS in US,  less likely to cause given levels less than 1000  -Lipase 18, calcium 8.5  -Hemodynamically stable  -Hb stable  -US CBD 5mm no stones  -CT abdomen Mild ill-defined appearance of the pancreas. Questionable mild interstitial pancreatitis. gall stones  -Case discussed with radiology no evidence of gall stones   -Still c/p severe epigastric pain radiating to the back     Recs:    liquid diet today   c/w IVF LR @250/hour  Monitor BUN/Creatinine   Monitor hematocrit  Pain control   PPI BID   will discuss with attending about possible EGD tomorrow  47 year old male with hx of HTN, AMBER, DM, vertigo, diverticulosis s/p colon resection presents from home with 4 days of epigastric pain.    #)Epigastric pain DD: mild pancreatitis (Given pain and CT positive for mild inflammation) vs r/o PUD vs gatsritis- improving   Etiology for pancreatitis: Denies any alcohol since last 3 months, no GS in US,  less likely to cause given levels less than 1000  -Lipase 18, calcium 8.5  -Hemodynamically stable  -Hb stable  -US CBD 5mm no stones  -CT abdomen Mild ill-defined appearance of the pancreas. Questionable mild interstitial pancreatitis. gall stones  -Case discussed with radiology no evidence of gall stones   -Still c/p severe epigastric pain radiating to the back     Recs:    liquid diet today   c/w IVF LR @250/hour  Monitor BUN/Creatinine   Monitor hematocrit  Pain control   PPI BID   NPO after midnight for EGD tomorrow

## 2022-05-18 NOTE — PROGRESS NOTE ADULT - ASSESSMENT
47 year old male with hx of HTN, AMBER, DM, vertigo, diverticulosis s/p colon resection presents from home with 4 days of epigastric pain    # Epigastric pain radiating to the back likely secondary to PUD/GERD r/o ACS and pancreatitis  #lipid profile not extremely elevated, no gallstone, hx of reduced etoh intake since past 3 month  - patient started on ibuprofen about a week ago for dental infection, pain started about 3-4 days ago  - pain is burning with occasional radiation to the back, worse with food also accompanied with nausea, denies hematemesis or bloody stools  - lipase 18  -US CBD 5mm no stones  -CT abdomen Mild ill-defined appearance of the pancreas. Questionable mild interstitial pancreatitis.  - doubt ACS however patient has risk factors, uncontrolled DM and smoker, has troponinemia but more likely from kidney disease, trop 0.11 f/u repeat, EKG is NSR with no T wave inversions  - starting protonix 40 bid and maalox  - GI consulted: c/w with pain control,  protonix bid  - cardiology consulted: reports pain most likely 2/2 to underlying pancreatitis:  - TTE EF 62%, G1DD  -repeat EKG if recurrent chest pain  - f/u cardiology and GI recommendations  - c/w with hydration, pain control, protonix, zofran  - NPO after midnight for EGD tomorrow 5/19 given persistent epigastric pain persist after fluid resuscitation for pancreatitis consider for EGD      # HTN  - was on HCTZ 37.5 / spironolactone 25 / toprol 100  - will hold HCTZ and spironolactone, creatinine is 2.4, patient was told to see a nephrologist by PMD however hasn't yet  - c/w nifedipine 60 XL qd and metoprolol 100 mg daily    # BILLY on CKD (improving)  - current creat 1.6 improving from 2.4  - patient hasn't followed up with nephro yet  - f/u UA, urine prot/creat ratio, spep, upep, niranjan  - f/u nephro recommendations     # Dental infection  - willing to have teeth pulled out here; s/p amoxicilling  - Dental consulted pending eval for radiographs and definitive treatment    # AMBER  - continue with home cpap    # DM2  - monitor BS    # Vertigo  - c/w meclizine    # DVT PPX: ambulatory, will avoid chemical ppx for now in case of unmasking bleed  # GI PPX: protonix 40 bid  # Diet: NPO after midnight   FULL CODE

## 2022-05-18 NOTE — PROGRESS NOTE ADULT - SUBJECTIVE AND OBJECTIVE BOX
Gastroenterology progress note:     Patient is a 47y old  Male who presents with a chief complaint of epigastric pain (18 May 2022 08:15)       Admitted on: 05-15-22    We are following the patient for abdominal pain      Interval History:  Still c/o epigastric pain radiating to the chest   tolerating clear liquids        PAST MEDICAL & SURGICAL HISTORY:  Diabetes      Asthma      Diverticulitis  surgery 16yrs ago      High cholesterol      Dyslipidemia      Hypertension      H/O vertigo      Diabetic ulcer of right foot      Broken toe  left pinky toe      Vertigo      HTN (hypertension)      Diabetes      History of surgery  colon resection      No significant past surgical history          MEDICATIONS  (STANDING):  atorvastatin 40 milliGRAM(s) Oral at bedtime  dextrose 5%. 1000 milliLiter(s) (50 mL/Hr) IV Continuous <Continuous>  dextrose 5%. 1000 milliLiter(s) (100 mL/Hr) IV Continuous <Continuous>  dextrose 50% Injectable 25 Gram(s) IV Push once  dextrose 50% Injectable 12.5 Gram(s) IV Push once  dextrose 50% Injectable 25 Gram(s) IV Push once  fenofibrate Tablet 145 milliGRAM(s) Oral daily  glucagon  Injectable 1 milliGRAM(s) IntraMuscular once  heparin   Injectable 5000 Unit(s) SubCutaneous every 8 hours  insulin glargine Injectable (LANTUS) 34 Unit(s) SubCutaneous at bedtime  insulin lispro (ADMELOG) corrective regimen sliding scale   SubCutaneous three times a day before meals  insulin lispro Injectable (ADMELOG) 3 Unit(s) SubCutaneous three times a day before meals  lactated ringers. 1000 milliLiter(s) (150 mL/Hr) IV Continuous <Continuous>  metoprolol succinate  milliGRAM(s) Oral daily  NIFEdipine XL 60 milliGRAM(s) Oral daily  pantoprazole    Tablet 40 milliGRAM(s) Oral two times a day  senna 2 Tablet(s) Oral at bedtime    MEDICATIONS  (PRN):  ALBUTerol    90 MICROgram(s) HFA Inhaler 2 Puff(s) Inhalation every 6 hours PRN Shortness of Breath and/or Wheezing  aluminum hydroxide/magnesium hydroxide/simethicone Suspension 30 milliLiter(s) Oral every 4 hours PRN Dyspepsia  dextrose Oral Gel 15 Gram(s) Oral once PRN Blood Glucose LESS THAN 70 milliGRAM(s)/deciliter  meclizine 25 milliGRAM(s) Oral three times a day PRN vertigo  morphine  - Injectable 2 milliGRAM(s) IV Push every 6 hours PRN Severe Pain (7 - 10)  ondansetron Injectable 4 milliGRAM(s) IV Push every 4 hours PRN Nausea and/or Vomiting  polyethylene glycol 3350 17 Gram(s) Oral two times a day PRN Constipation      Allergies  No Known Allergies      Review of Systems:   Cardiovascular:  No Chest Pain, No Palpitations  Respiratory:  No Cough, No Dyspnea  Gastrointestinal:  As described in HPI    Physical Examination:  T(C): 36.8 (05-18-22 @ 07:45), Max: 37.1 (05-18-22 @ 05:35)  HR: 86 (05-18-22 @ 07:45) (71 - 86)  BP: 160/94 (05-18-22 @ 07:45) (137/65 - 196/96)  RR: 18 (05-18-22 @ 07:45) (18 - 18)  SpO2: 100% (05-18-22 @ 07:45) (98% - 100%)      Constitutional: No acute distress.  Respiratory:  No signs of respiratory distress. Lung sounds are clear bilaterally.  Cardiovascular:  S1 S2, Regular rate and rhythm.  Abdominal: Abdomen is soft, symmetric, and non-tender without distention. There are no visible lesions or scars. Bowel sounds are present and normoactive in all four quadrants. No masses, hepatomegaly, or splenomegaly are noted.   Skin: No rashes, No Jaundice.        Data:                        12.7   6.08  )-----------( 232      ( 18 May 2022 08:16 )             38.1     Hgb trend:  12.7  05-18-22 @ 08:16  13.2  05-17-22 @ 08:25  13.1  05-16-22 @ 08:30      05-17    137  |  100  |  21<H>  ----------------------------<  141<H>  4.6   |  28  |  1.6<H>    Ca    9.0      17 May 2022 08:25  Mg     1.8     05-17    TPro  5.6<L>  /  Alb  3.0<L>  /  TBili  0.2  /  DBili  x   /  AST  19  /  ALT  24  /  AlkPhos  131<H>  05-17    Liver panel trend:  TBili 0.2   /   AST 19   /   ALT 24   /   AlkP 131   /   Tptn 5.6   /   Alb 3.0    /   DBili --      05-17  TBili 0.3   /   AST 23   /   ALT 31   /   AlkP 147   /   Tptn 5.7   /   Alb 3.3    /   DBili --      05-16  TBili <0.2   /   AST 25   /   ALT 31   /   AlkP 171   /   Tptn 5.7   /   Alb 3.2    /   DBili --      05-15             Radiology:

## 2022-05-19 ENCOUNTER — TRANSCRIPTION ENCOUNTER (OUTPATIENT)
Age: 47
End: 2022-05-19

## 2022-05-19 ENCOUNTER — RESULT REVIEW (OUTPATIENT)
Age: 47
End: 2022-05-19

## 2022-05-19 LAB
ALBUMIN SERPL ELPH-MCNC: 2.7 G/DL — LOW (ref 3.5–5.2)
ALP SERPL-CCNC: 128 U/L — HIGH (ref 30–115)
ALT FLD-CCNC: 17 U/L — SIGNIFICANT CHANGE UP (ref 0–41)
ANION GAP SERPL CALC-SCNC: 8 MMOL/L — SIGNIFICANT CHANGE UP (ref 7–14)
AST SERPL-CCNC: 17 U/L — SIGNIFICANT CHANGE UP (ref 0–41)
BASOPHILS # BLD AUTO: 0.04 K/UL — SIGNIFICANT CHANGE UP (ref 0–0.2)
BASOPHILS NFR BLD AUTO: 0.5 % — SIGNIFICANT CHANGE UP (ref 0–1)
BILIRUB SERPL-MCNC: 0.3 MG/DL — SIGNIFICANT CHANGE UP (ref 0.2–1.2)
BLD GP AB SCN SERPL QL: SIGNIFICANT CHANGE UP
BUN SERPL-MCNC: 12 MG/DL — SIGNIFICANT CHANGE UP (ref 10–20)
CALCIUM SERPL-MCNC: 8.6 MG/DL — SIGNIFICANT CHANGE UP (ref 8.5–10.1)
CHLORIDE SERPL-SCNC: 100 MMOL/L — SIGNIFICANT CHANGE UP (ref 98–110)
CO2 SERPL-SCNC: 29 MMOL/L — SIGNIFICANT CHANGE UP (ref 17–32)
CREAT SERPL-MCNC: 1.6 MG/DL — HIGH (ref 0.7–1.5)
EGFR: 52 ML/MIN/1.73M2 — LOW
EOSINOPHIL # BLD AUTO: 0.16 K/UL — SIGNIFICANT CHANGE UP (ref 0–0.7)
EOSINOPHIL NFR BLD AUTO: 2.2 % — SIGNIFICANT CHANGE UP (ref 0–8)
GLUCOSE BLDC GLUCOMTR-MCNC: 140 MG/DL — HIGH (ref 70–99)
GLUCOSE BLDC GLUCOMTR-MCNC: 150 MG/DL — HIGH (ref 70–99)
GLUCOSE BLDC GLUCOMTR-MCNC: 160 MG/DL — HIGH (ref 70–99)
GLUCOSE BLDC GLUCOMTR-MCNC: 193 MG/DL — HIGH (ref 70–99)
GLUCOSE BLDC GLUCOMTR-MCNC: 238 MG/DL — HIGH (ref 70–99)
GLUCOSE SERPL-MCNC: 163 MG/DL — HIGH (ref 70–99)
HCT VFR BLD CALC: 38.9 % — LOW (ref 42–52)
HGB BLD-MCNC: 13.7 G/DL — LOW (ref 14–18)
IMM GRANULOCYTES NFR BLD AUTO: 0.4 % — HIGH (ref 0.1–0.3)
KAPPA LC SER QL IFE: 3.98 MG/DL — HIGH (ref 0.33–1.94)
KAPPA/LAMBDA FREE LIGHT CHAIN RATIO, SERUM: 1.45 RATIO — SIGNIFICANT CHANGE UP (ref 0.26–1.65)
LAMBDA LC SER QL IFE: 2.74 MG/DL — HIGH (ref 0.57–2.63)
LYMPHOCYTES # BLD AUTO: 1.73 K/UL — SIGNIFICANT CHANGE UP (ref 1.2–3.4)
LYMPHOCYTES # BLD AUTO: 23.6 % — SIGNIFICANT CHANGE UP (ref 20.5–51.1)
MAGNESIUM SERPL-MCNC: 1.7 MG/DL — LOW (ref 1.8–2.4)
MCHC RBC-ENTMCNC: 31.3 PG — HIGH (ref 27–31)
MCHC RBC-ENTMCNC: 35.2 G/DL — SIGNIFICANT CHANGE UP (ref 32–37)
MCV RBC AUTO: 88.8 FL — SIGNIFICANT CHANGE UP (ref 80–94)
MONOCYTES # BLD AUTO: 0.8 K/UL — HIGH (ref 0.1–0.6)
MONOCYTES NFR BLD AUTO: 10.9 % — HIGH (ref 1.7–9.3)
NEUTROPHILS # BLD AUTO: 4.56 K/UL — SIGNIFICANT CHANGE UP (ref 1.4–6.5)
NEUTROPHILS NFR BLD AUTO: 62.4 % — SIGNIFICANT CHANGE UP (ref 42.2–75.2)
NRBC # BLD: 0 /100 WBCS — SIGNIFICANT CHANGE UP (ref 0–0)
PLATELET # BLD AUTO: 225 K/UL — SIGNIFICANT CHANGE UP (ref 130–400)
POTASSIUM SERPL-MCNC: 4.4 MMOL/L — SIGNIFICANT CHANGE UP (ref 3.5–5)
POTASSIUM SERPL-SCNC: 4.4 MMOL/L — SIGNIFICANT CHANGE UP (ref 3.5–5)
PROT SERPL-MCNC: 5.2 G/DL — LOW (ref 6–8)
RBC # BLD: 4.38 M/UL — LOW (ref 4.7–6.1)
RBC # FLD: 11.9 % — SIGNIFICANT CHANGE UP (ref 11.5–14.5)
SODIUM SERPL-SCNC: 137 MMOL/L — SIGNIFICANT CHANGE UP (ref 135–146)
WBC # BLD: 7.32 K/UL — SIGNIFICANT CHANGE UP (ref 4.8–10.8)
WBC # FLD AUTO: 7.32 K/UL — SIGNIFICANT CHANGE UP (ref 4.8–10.8)

## 2022-05-19 PROCEDURE — 88312 SPECIAL STAINS GROUP 1: CPT | Mod: 26

## 2022-05-19 PROCEDURE — 99233 SBSQ HOSP IP/OBS HIGH 50: CPT

## 2022-05-19 PROCEDURE — 88305 TISSUE EXAM BY PATHOLOGIST: CPT | Mod: 26

## 2022-05-19 PROCEDURE — 43239 EGD BIOPSY SINGLE/MULTIPLE: CPT

## 2022-05-19 RX ORDER — MAGNESIUM SULFATE 500 MG/ML
2 VIAL (ML) INJECTION ONCE
Refills: 0 | Status: COMPLETED | OUTPATIENT
Start: 2022-05-19 | End: 2022-05-19

## 2022-05-19 RX ORDER — NIFEDIPINE 30 MG
30 TABLET, EXTENDED RELEASE 24 HR ORAL ONCE
Refills: 0 | Status: COMPLETED | OUTPATIENT
Start: 2022-05-19 | End: 2022-05-19

## 2022-05-19 RX ORDER — HYDRALAZINE HCL 50 MG
10 TABLET ORAL ONCE
Refills: 0 | Status: DISCONTINUED | OUTPATIENT
Start: 2022-05-19 | End: 2022-05-22

## 2022-05-19 RX ADMIN — HEPARIN SODIUM 5000 UNIT(S): 5000 INJECTION INTRAVENOUS; SUBCUTANEOUS at 14:06

## 2022-05-19 RX ADMIN — MORPHINE SULFATE 2 MILLIGRAM(S): 50 CAPSULE, EXTENDED RELEASE ORAL at 01:57

## 2022-05-19 RX ADMIN — HEPARIN SODIUM 5000 UNIT(S): 5000 INJECTION INTRAVENOUS; SUBCUTANEOUS at 21:51

## 2022-05-19 RX ADMIN — Medication 100 MILLIGRAM(S): at 05:57

## 2022-05-19 RX ADMIN — SODIUM CHLORIDE 150 MILLILITER(S): 9 INJECTION, SOLUTION INTRAVENOUS at 01:29

## 2022-05-19 RX ADMIN — INSULIN GLARGINE 34 UNIT(S): 100 INJECTION, SOLUTION SUBCUTANEOUS at 22:03

## 2022-05-19 RX ADMIN — Medication 25 GRAM(S): at 14:17

## 2022-05-19 RX ADMIN — SENNA PLUS 2 TABLET(S): 8.6 TABLET ORAL at 21:51

## 2022-05-19 RX ADMIN — MORPHINE SULFATE 2 MILLIGRAM(S): 50 CAPSULE, EXTENDED RELEASE ORAL at 15:24

## 2022-05-19 RX ADMIN — ATORVASTATIN CALCIUM 40 MILLIGRAM(S): 80 TABLET, FILM COATED ORAL at 21:51

## 2022-05-19 RX ADMIN — MORPHINE SULFATE 2 MILLIGRAM(S): 50 CAPSULE, EXTENDED RELEASE ORAL at 08:21

## 2022-05-19 RX ADMIN — PANTOPRAZOLE SODIUM 40 MILLIGRAM(S): 20 TABLET, DELAYED RELEASE ORAL at 17:10

## 2022-05-19 RX ADMIN — MORPHINE SULFATE 2 MILLIGRAM(S): 50 CAPSULE, EXTENDED RELEASE ORAL at 07:51

## 2022-05-19 RX ADMIN — PANTOPRAZOLE SODIUM 40 MILLIGRAM(S): 20 TABLET, DELAYED RELEASE ORAL at 05:56

## 2022-05-19 RX ADMIN — Medication 1: at 17:10

## 2022-05-19 RX ADMIN — Medication 60 MILLIGRAM(S): at 05:56

## 2022-05-19 RX ADMIN — Medication 30 MILLIGRAM(S): at 09:20

## 2022-05-19 RX ADMIN — MORPHINE SULFATE 2 MILLIGRAM(S): 50 CAPSULE, EXTENDED RELEASE ORAL at 21:52

## 2022-05-19 RX ADMIN — MORPHINE SULFATE 2 MILLIGRAM(S): 50 CAPSULE, EXTENDED RELEASE ORAL at 14:54

## 2022-05-19 RX ADMIN — Medication 145 MILLIGRAM(S): at 14:06

## 2022-05-19 RX ADMIN — MORPHINE SULFATE 2 MILLIGRAM(S): 50 CAPSULE, EXTENDED RELEASE ORAL at 01:27

## 2022-05-19 RX ADMIN — Medication 3 UNIT(S): at 17:10

## 2022-05-19 RX ADMIN — SODIUM CHLORIDE 150 MILLILITER(S): 9 INJECTION, SOLUTION INTRAVENOUS at 09:20

## 2022-05-19 NOTE — PROGRESS NOTE ADULT - ASSESSMENT
46 yo M PMHx HTN, AMBER, DM II, vertigo, diverticulosis s/p colon resection presented for evaluation of epigastric pain    Epigastric pain  - unclear etiology  - treated for pancreatitis (mild interstitial edema on CT scan, epigastric pain but lipase 18)  - has cholelithiasis but no dilated CBD  - EGD today showed:              Irregularity in the Z-line and gastroesophageal junction.  	Erythema in the stomach compatible with non-erosive gastritis.  	Normal mucosa in the whole examined duodenum.  - case d/w cardiology--unlikely cardiac in nature  - Gi recommending no furhter work up inpatient but recommends gastric empyting study as outpt  - pt still unsure if he can tolerate diet. Advance diet and if pt can tolerate diet can likely be discharged in AM  - pt reports he was hospitalized at [facility] in [place]. Medical team attempted to obtain records but have not received call back yet.  - pt started on Advil for dental infection, could be GI upset from NSAIDs    Troponemia  T wave inversions in AvL and I  - discussed with cardiology--not concerned for ACS  - dc telemetry  - elevated troponin could be realted to Kasey, pt has chronically elevated troponin to 0.04    HTN  - HCTZ, spironolactone held due to KASEY  - BP uncontrolled  - nifedipine increased today  -   - was on HCTZ 37.5 / spironolactone 25 / toprol 100  - will hold HCTZ and spironolactone, creatinine is 2.4, patient was told to see a nephrologist by PMD however hasn't yet  - c/w nifedipine 60 XL qd and metoprolol 100 mg daily    KASEY vs CKD   - pt appears to be at baseline  - can restart HCTZ, spironolactone    Dental infection  - dental follow up pending    AMBER  - continue with home cpap    DM II  - FS controlled    Vertigo  - c/w meclizine    # DVT PPX: ambulatory, will avoid chemical ppx for now in case of unmasking bleed  # GI PPX: protonix 40 bid  # Diet: NPO after midnight   FULL CODE    Progress Note Handoff:  Pending (specify):  tolerating diet, dental follow up  Family discussion: discussed pending EGD  Disposition: Home_x__/SNF___/Other________/Unknown at this time________

## 2022-05-19 NOTE — PROGRESS NOTE ADULT - SUBJECTIVE AND OBJECTIVE BOX
CHIEF COMPLAINT:    Patient is a 47y old  Male who presents with a chief complaint of epigastric pain     INTERVAL HPI/OVERNIGHT EVENTS:    Patient seen and examined at bedside. No acute overnight events occurred.    ROS: Reports continued epigastric pain. All other systems are negative.    Medications:  Standing  atorvastatin 40 milliGRAM(s) Oral at bedtime  dextrose 5%. 1000 milliLiter(s) IV Continuous <Continuous>  dextrose 5%. 1000 milliLiter(s) IV Continuous <Continuous>  dextrose 50% Injectable 25 Gram(s) IV Push once  dextrose 50% Injectable 12.5 Gram(s) IV Push once  dextrose 50% Injectable 25 Gram(s) IV Push once  fenofibrate Tablet 145 milliGRAM(s) Oral daily  glucagon  Injectable 1 milliGRAM(s) IntraMuscular once  heparin   Injectable 5000 Unit(s) SubCutaneous every 8 hours  insulin glargine Injectable (LANTUS) 34 Unit(s) SubCutaneous at bedtime  insulin lispro (ADMELOG) corrective regimen sliding scale   SubCutaneous three times a day before meals  insulin lispro Injectable (ADMELOG) 3 Unit(s) SubCutaneous three times a day before meals  lactated ringers. 1000 milliLiter(s) IV Continuous <Continuous>  metoprolol succinate  milliGRAM(s) Oral daily  NIFEdipine XL 60 milliGRAM(s) Oral daily  pantoprazole    Tablet 40 milliGRAM(s) Oral two times a day  senna 2 Tablet(s) Oral at bedtime    PRN Meds  ALBUTerol    90 MICROgram(s) HFA Inhaler 2 Puff(s) Inhalation every 6 hours PRN  aluminum hydroxide/magnesium hydroxide/simethicone Suspension 30 milliLiter(s) Oral every 4 hours PRN  dextrose Oral Gel 15 Gram(s) Oral once PRN  meclizine 25 milliGRAM(s) Oral three times a day PRN  morphine  - Injectable 2 milliGRAM(s) IV Push every 6 hours PRN  ondansetron Injectable 4 milliGRAM(s) IV Push every 4 hours PRN  polyethylene glycol 3350 17 Gram(s) Oral two times a day PRN        Vital Signs:    T(F): 97.4 (22 @ 14:07), Max: 98.6 (22 @ 20:32)  HR: 69 (22 @ 14:07) (62 - 75)  BP: 176/91 (22 @ 14:07) (87/55 - 191/92)  RR: 18 (22 @ 14:07) ( - )  SpO2: 99% (22 @ 13:23) (96% - 100%)  I&O's Summary    18 May 2022 07:01  -  19 May 2022 07:00  --------------------------------------------------------  IN: 2040 mL / OUT: 500 mL / NET: 1540 mL    19 May 2022 07:01  -  19 May 2022 15:12  --------------------------------------------------------  IN: 0 mL / OUT: 375 mL / NET: -375 mL      Daily Height in cm: 172.72 (19 May 2022 11:58)    Daily Weight in k.3 (19 May 2022 05:14)  CAPILLARY BLOOD GLUCOSE      POCT Blood Glucose.: 150 mg/dL (19 May 2022 14:02)  POCT Blood Glucose.: 140 mg/dL (19 May 2022 11:26)  POCT Blood Glucose.: 160 mg/dL (19 May 2022 07:55)  POCT Blood Glucose.: 162 mg/dL (18 May 2022 21:12)  POCT Blood Glucose.: 150 mg/dL (18 May 2022 16:38)      PHYSICAL EXAM:  GENERAL:  NAD  SKIN: No rashes or lesions  HEENT: Atraumatic. Normocephalic. Anicteric  NECK:  No JVD.   PULMONARY: Clear to ausculation bilaterally. No wheezing. No rales  CVS: Normal S1, S2. Regular rate and rhythm. No murmurs.  ABDOMEN/GI: Soft, + epigastric tender, Nondistended; Bowel sounds are present  EXTREMITIES:  No edema B/L LE.  NEUROLOGIC:  No motor deficit.  PSYCH: Alert & oriented x 3, normal affect      LABS:                        13.7   7.32  )-----------( 225      ( 19 May 2022 07:39 )             38.9         137  |  100  |  12  ----------------------------<  163<H>  4.4   |  29  |  1.6<H>    Ca    8.6      19 May 2022 07:39  Mg     1.7         TPro  5.2<L>  /  Alb  2.7<L>  /  TBili  0.3  /  DBili  x   /  AST  17  /  ALT  17  /  AlkPhos  128<H>        RADIOLOGY & ADDITIONAL TESTS:  Imaging or report Personally Reviewed:  [ ] YES  [ ] NO -->no new images    Telemetry reviewed independently - NSR, no acute events  EKG reviewed independently -->no new EKGs    Consultant(s) Notes Reviewed:  [ ] YES  [ ] NO  Care Discussed with Consultants/Other Providers [ ] YES  [ ] NO    Case discussed with resident  Care discussed with pt

## 2022-05-19 NOTE — PROGRESS NOTE ADULT - SUBJECTIVE AND OBJECTIVE BOX
BRINDA MOYER 47y Male  MRN#: [id number]   CODE STATUS: Full code    Hospital Day: 4d    Pt is currently admitted with the primary diagnosis of epigastric pain    SUBJECTIVE    Subjective complaints   Patient still feels burning radiating to the back  Present Today:   - Scott:  No [x  ], Yes [   ] : Indication:     - Type of IV Access:       .. CVC/Piccline:  No [ x ], Yes [   ] : Indication:       .. Midline: No [  x], Yes [   ] : Indication:                                             ----------------------------------------------------------  OBJECTIVE  PAST MEDICAL & SURGICAL HISTORY  Diabetes    Asthma    Diverticulitis  surgery 16yrs ago    High cholesterol    Dyslipidemia    Hypertension    H/O vertigo    Diabetic ulcer of right foot    Broken toe  left pinky toe    Vertigo    HTN (hypertension)    Diabetes    History of surgery  colon resection    No significant past surgical history                                              -----------------------------------------------------------  ALLERGIES:  No Known Allergies                                            ------------------------------------------------------------    HOME MEDICATIONS  Home Medications:  Albuterol (Eqv-ProAir HFA) 90 mcg/inh inhalation aerosol: 2 puff(s) inhaled every 6 hours (15 May 2022 08:54)  amoxicillin-clavulanate 500 mg-125 mg oral tablet: 1 tab(s) orally every 8 hours (15 May 2022 08:54)  fenofibrate 145 mg oral tablet: 1 tab(s) orally once a day (15 May 2022 08:54)  HumaLOG 100 units/mL subcutaneous solution: 5 unit(s) subcutaneous 3 times a day (before meals)  sliding scale (15 May 2022 08:54)  hydroCHLOROthiazide: 37.5 milligram(s) orally once a day (15 May 2022 08:54)  ibuprofen 600 mg oral tablet: 1 tab(s) orally every 6 hours (15 May 2022 08:54)  Protonix 40 mg oral delayed release tablet: 1 tab(s) orally once a day (15 May 2022 08:54)  spironolactone 25 mg oral tablet: 1 tab(s) orally once a day (15 May 2022 08:54)  Toprol- mg oral tablet, extended release: 1 tab(s) orally once a day (15 May 2022 08:54)  Tresiba 100 units/mL subcutaneous solution: 40 unit(s) subcutaneous once a day (15 May 2022 08:54)                           MEDICATIONS:  STANDING MEDICATIONS  atorvastatin 40 milliGRAM(s) Oral at bedtime  dextrose 5%. 1000 milliLiter(s) IV Continuous <Continuous>  dextrose 5%. 1000 milliLiter(s) IV Continuous <Continuous>  dextrose 50% Injectable 25 Gram(s) IV Push once  dextrose 50% Injectable 12.5 Gram(s) IV Push once  dextrose 50% Injectable 25 Gram(s) IV Push once  fenofibrate Tablet 145 milliGRAM(s) Oral daily  glucagon  Injectable 1 milliGRAM(s) IntraMuscular once  heparin   Injectable 5000 Unit(s) SubCutaneous every 8 hours  insulin glargine Injectable (LANTUS) 34 Unit(s) SubCutaneous at bedtime  insulin lispro (ADMELOG) corrective regimen sliding scale   SubCutaneous three times a day before meals  insulin lispro Injectable (ADMELOG) 3 Unit(s) SubCutaneous three times a day before meals  lactated ringers. 1000 milliLiter(s) IV Continuous <Continuous>  metoprolol succinate  milliGRAM(s) Oral daily  NIFEdipine XL 60 milliGRAM(s) Oral daily  pantoprazole    Tablet 40 milliGRAM(s) Oral two times a day  senna 2 Tablet(s) Oral at bedtime    PRN MEDICATIONS  ALBUTerol    90 MICROgram(s) HFA Inhaler 2 Puff(s) Inhalation every 6 hours PRN  aluminum hydroxide/magnesium hydroxide/simethicone Suspension 30 milliLiter(s) Oral every 4 hours PRN  dextrose Oral Gel 15 Gram(s) Oral once PRN  meclizine 25 milliGRAM(s) Oral three times a day PRN  morphine  - Injectable 2 milliGRAM(s) IV Push every 6 hours PRN  ondansetron Injectable 4 milliGRAM(s) IV Push every 4 hours PRN  polyethylene glycol 3350 17 Gram(s) Oral two times a day PRN                                            ------------------------------------------------------------  VITAL SIGNS: Last 24 Hours  T(C): 36.3 (19 May 2022 14:07), Max: 37 (18 May 2022 20:32)  T(F): 97.4 (19 May 2022 14:07), Max: 98.6 (18 May 2022 20:32)  HR: 69 (19 May 2022 14:07) (62 - 75)  BP: 176/91 (19 May 2022 14:07) (87/55 - 191/92)  BP(mean): --  RR: 18 (19 May 2022 14:07) (14 - 22)  SpO2: 99% (19 May 2022 13:23) (96% - 100%)      05-18-22 @ 07:01  -  05-19-22 @ 07:00  --------------------------------------------------------  IN: 2040 mL / OUT: 500 mL / NET: 1540 mL    05-19-22 @ 07:01  -  05-19-22 @ 15:40  --------------------------------------------------------  IN: 0 mL / OUT: 375 mL / NET: -375 mL                                             --------------------------------------------------------------  LABS:                        13.7   7.32  )-----------( 225      ( 19 May 2022 07:39 )             38.9     05-19    137  |  100  |  12  ----------------------------<  163<H>  4.4   |  29  |  1.6<H>    Ca    8.6      19 May 2022 07:39  Mg     1.7     05-19    TPro  5.2<L>  /  Alb  2.7<L>  /  TBili  0.3  /  DBili  x   /  AST  17  /  ALT  17  /  AlkPhos  128<H>  05-19                                                              -------------------------------------------------------------  RADIOLOGY:                                            --------------------------------------------------------------    PHYSICAL EXAM:  General: alert oriented  HEENT: non remarkable  LUNGS: clear air sounds  HEART: normal s1 s2  ABDOMEN: soft  EXT: no edema                                           --------------------------------------------------------------    ASSESSMENT & PLAN    47 year old male with hx of HTN, AMBER, DM, vertigo, diverticulosis s/p colon resection presents from home with 4 days of epigastric pain    # Epigastric pain radiating to the back likely secondary to PUD/GERD r/o ACS and pancreatitis  #lipid profile not extremely elevated, no gallstone, hx of reduced etoh intake since past 3 month  - patient started on ibuprofen about a week ago for dental infection, pain started about 3-4 days ago  - pain is burning with occasional radiation to the back, worse with food also accompanied with nausea, denies hematemesis or bloody stools  - lipase 18  -US CBD 5mm no stones  -CT abdomen Mild ill-defined appearance of the pancreas. Questionable mild interstitial pancreatitis.  - doubt ACS however patient has risk factors, uncontrolled DM and smoker, has troponinemia but more likely from kidney disease, trop 0.11, 0.06   - started protonix 40 bid and maalox  - GI consulted: c/w with pain control,  protonix bid  - EGD done on 5/19: non erosive gastritis  - cardiology consulted: reports pain most likely 2/2 to underlying pancreatitis:  - TTE EF 62%, G1DD  - c/w with hydration, pain control, protonix, zofran    # HTN  - was on HCTZ 37.5 / spironolactone 25 / toprol 100  - will hold HCTZ and spironolactone, creatinine is 2.4, patient was told to see a nephrologist by PMD however hasn't yet  - c/w nifedipine 60 XL qd and metoprolol 100 mg daily    # BILLY on CKD (improving)  - current creat 1.6 improving from 2.4  - patient hasn't followed up with nephro yet  - f/u UA, urine prot/creat ratio, spep, upep, niranjan    # Dental infection  - willing to have teeth pulled out here; s/p amoxicilling  - Dental consulted pending eval for radiographs and definitive treatment    # AMBER  - continue with home cpap    # DM2  - monitor BS    # Vertigo  - c/w meclizine    # DVT PPX: ambulatory, will avoid chemical ppx for now in case of unmasking bleed  # GI PPX: protonix 40 bid  # Diet: clears and advance as tolerated  FULL CODE

## 2022-05-20 LAB
ALBUMIN SERPL ELPH-MCNC: 2.8 G/DL — LOW (ref 3.5–5.2)
ALP SERPL-CCNC: 120 U/L — HIGH (ref 30–115)
ALT FLD-CCNC: 15 U/L — SIGNIFICANT CHANGE UP (ref 0–41)
ANION GAP SERPL CALC-SCNC: 10 MMOL/L — SIGNIFICANT CHANGE UP (ref 7–14)
AST SERPL-CCNC: 16 U/L — SIGNIFICANT CHANGE UP (ref 0–41)
BILIRUB SERPL-MCNC: 0.2 MG/DL — SIGNIFICANT CHANGE UP (ref 0.2–1.2)
BUN SERPL-MCNC: 14 MG/DL — SIGNIFICANT CHANGE UP (ref 10–20)
CALCIUM SERPL-MCNC: 8.7 MG/DL — SIGNIFICANT CHANGE UP (ref 8.5–10.1)
CHLORIDE SERPL-SCNC: 97 MMOL/L — LOW (ref 98–110)
CO2 SERPL-SCNC: 28 MMOL/L — SIGNIFICANT CHANGE UP (ref 17–32)
CREAT SERPL-MCNC: 1.8 MG/DL — HIGH (ref 0.7–1.5)
EGFR: 46 ML/MIN/1.73M2 — LOW
GLUCOSE BLDC GLUCOMTR-MCNC: 133 MG/DL — HIGH (ref 70–99)
GLUCOSE BLDC GLUCOMTR-MCNC: 210 MG/DL — HIGH (ref 70–99)
GLUCOSE BLDC GLUCOMTR-MCNC: 300 MG/DL — HIGH (ref 70–99)
GLUCOSE BLDC GLUCOMTR-MCNC: 66 MG/DL — LOW (ref 70–99)
GLUCOSE SERPL-MCNC: 74 MG/DL — SIGNIFICANT CHANGE UP (ref 70–99)
HCT VFR BLD CALC: 39.1 % — LOW (ref 42–52)
HGB BLD-MCNC: 13.3 G/DL — LOW (ref 14–18)
MCHC RBC-ENTMCNC: 30.3 PG — SIGNIFICANT CHANGE UP (ref 27–31)
MCHC RBC-ENTMCNC: 34 G/DL — SIGNIFICANT CHANGE UP (ref 32–37)
MCV RBC AUTO: 89.1 FL — SIGNIFICANT CHANGE UP (ref 80–94)
NRBC # BLD: 0 /100 WBCS — SIGNIFICANT CHANGE UP (ref 0–0)
PLATELET # BLD AUTO: 227 K/UL — SIGNIFICANT CHANGE UP (ref 130–400)
POTASSIUM SERPL-MCNC: 4.4 MMOL/L — SIGNIFICANT CHANGE UP (ref 3.5–5)
POTASSIUM SERPL-SCNC: 4.4 MMOL/L — SIGNIFICANT CHANGE UP (ref 3.5–5)
PROT SERPL-MCNC: 5.1 G/DL — LOW (ref 6–8)
RBC # BLD: 4.39 M/UL — LOW (ref 4.7–6.1)
RBC # FLD: 11.8 % — SIGNIFICANT CHANGE UP (ref 11.5–14.5)
SODIUM SERPL-SCNC: 135 MMOL/L — SIGNIFICANT CHANGE UP (ref 135–146)
WBC # BLD: 6.41 K/UL — SIGNIFICANT CHANGE UP (ref 4.8–10.8)
WBC # FLD AUTO: 6.41 K/UL — SIGNIFICANT CHANGE UP (ref 4.8–10.8)

## 2022-05-20 PROCEDURE — 99233 SBSQ HOSP IP/OBS HIGH 50: CPT

## 2022-05-20 RX ORDER — INSULIN GLARGINE 100 [IU]/ML
20 INJECTION, SOLUTION SUBCUTANEOUS ONCE
Refills: 0 | Status: COMPLETED | OUTPATIENT
Start: 2022-05-20 | End: 2022-05-20

## 2022-05-20 RX ORDER — ACETAMINOPHEN 500 MG
650 TABLET ORAL ONCE
Refills: 0 | Status: COMPLETED | OUTPATIENT
Start: 2022-05-20 | End: 2022-05-20

## 2022-05-20 RX ORDER — GLUCAGON INJECTION, SOLUTION 0.5 MG/.1ML
1 INJECTION, SOLUTION SUBCUTANEOUS ONCE
Refills: 0 | Status: DISCONTINUED | OUTPATIENT
Start: 2022-05-20 | End: 2022-05-31

## 2022-05-20 RX ORDER — OXYCODONE HYDROCHLORIDE 5 MG/1
5 TABLET ORAL ONCE
Refills: 0 | Status: DISCONTINUED | OUTPATIENT
Start: 2022-05-20 | End: 2022-05-22

## 2022-05-20 RX ORDER — SUCRALFATE 1 G
1 TABLET ORAL
Refills: 0 | Status: DISCONTINUED | OUTPATIENT
Start: 2022-05-20 | End: 2022-05-31

## 2022-05-20 RX ORDER — SODIUM CHLORIDE 9 MG/ML
1000 INJECTION, SOLUTION INTRAVENOUS
Refills: 0 | Status: DISCONTINUED | OUTPATIENT
Start: 2022-05-20 | End: 2022-05-31

## 2022-05-20 RX ADMIN — Medication 145 MILLIGRAM(S): at 15:19

## 2022-05-20 RX ADMIN — Medication 650 MILLIGRAM(S): at 06:44

## 2022-05-20 RX ADMIN — MORPHINE SULFATE 2 MILLIGRAM(S): 50 CAPSULE, EXTENDED RELEASE ORAL at 15:18

## 2022-05-20 RX ADMIN — MORPHINE SULFATE 2 MILLIGRAM(S): 50 CAPSULE, EXTENDED RELEASE ORAL at 21:22

## 2022-05-20 RX ADMIN — Medication 1 GRAM(S): at 23:43

## 2022-05-20 RX ADMIN — PANTOPRAZOLE SODIUM 40 MILLIGRAM(S): 20 TABLET, DELAYED RELEASE ORAL at 18:37

## 2022-05-20 RX ADMIN — Medication 650 MILLIGRAM(S): at 05:42

## 2022-05-20 RX ADMIN — HEPARIN SODIUM 5000 UNIT(S): 5000 INJECTION INTRAVENOUS; SUBCUTANEOUS at 15:22

## 2022-05-20 RX ADMIN — HEPARIN SODIUM 5000 UNIT(S): 5000 INJECTION INTRAVENOUS; SUBCUTANEOUS at 21:27

## 2022-05-20 RX ADMIN — MORPHINE SULFATE 2 MILLIGRAM(S): 50 CAPSULE, EXTENDED RELEASE ORAL at 15:30

## 2022-05-20 RX ADMIN — SENNA PLUS 2 TABLET(S): 8.6 TABLET ORAL at 21:22

## 2022-05-20 RX ADMIN — HEPARIN SODIUM 5000 UNIT(S): 5000 INJECTION INTRAVENOUS; SUBCUTANEOUS at 05:43

## 2022-05-20 RX ADMIN — ATORVASTATIN CALCIUM 40 MILLIGRAM(S): 80 TABLET, FILM COATED ORAL at 21:22

## 2022-05-20 RX ADMIN — Medication 60 MILLIGRAM(S): at 05:42

## 2022-05-20 RX ADMIN — Medication 100 MILLIGRAM(S): at 05:42

## 2022-05-20 RX ADMIN — INSULIN GLARGINE 20 UNIT(S): 100 INJECTION, SOLUTION SUBCUTANEOUS at 23:02

## 2022-05-20 RX ADMIN — PANTOPRAZOLE SODIUM 40 MILLIGRAM(S): 20 TABLET, DELAYED RELEASE ORAL at 05:42

## 2022-05-20 RX ADMIN — MORPHINE SULFATE 2 MILLIGRAM(S): 50 CAPSULE, EXTENDED RELEASE ORAL at 22:16

## 2022-05-20 NOTE — DIETITIAN INITIAL EVALUATION ADULT - OTHER INFO
Pt has a good appetite; consuming >75% of meals provided. Pt recently upgraded from a clear liquid diet to DASH/TLC this morning 5/20. Pt was tolerating a clear liquid diet well; no nausea or vomiting reported.    Weight hx: UBW 84 KG (185#) about a month ago. Current dosing weight is 81.6 KG; 2.85% unintentional wt loss in a month. No weight loss reported. Pt has a good appetite; consuming >75% of meals provided. Pt recently upgraded from a clear liquid diet to DASH/TLC this morning 5/20. Pt was tolerating a clear liquid diet well; no nausea or vomiting reported. Will carefully monitor tolerance to upgraded diet order.     Weight hx: UBW 84 KG (185#) about a month ago. Current dosing weight is 81.6 KG; 2.85% unintentional wt loss in a month. No weight loss reported.

## 2022-05-20 NOTE — DIETITIAN INITIAL EVALUATION ADULT - FLUID ACCUMULATION
NFPE: no signs or symptoms of muscle mass or body fat loss at this time.   EDEMA: no edema noted per flowsheet.

## 2022-05-20 NOTE — DIETITIAN INITIAL EVALUATION ADULT - ORAL NUTRITION SUPPLEMENTS
Jeffrey Shake 1X/DAILY to optimize kcal/protein intake -- will provide 220 kcal/day total, 10g pro/day total.

## 2022-05-20 NOTE — DIETITIAN INITIAL EVALUATION ADULT - PERTINENT LABORATORY DATA
05-20    135  |  97<L>  |  14  ----------------------------<  74  4.4   |  28  |  1.8<H>    Ca    8.7      20 May 2022 07:15  Mg     1.7     05-19    TPro  5.1<L>  /  Alb  2.8<L>  /  TBili  0.2  /  DBili  x   /  AST  16  /  ALT  15  /  AlkPhos  120<H>  05-20  POCT Blood Glucose.: 66 mg/dL (05-20-22 @ 11:08)  A1C with Estimated Average Glucose Result: 10.9 % (05-15-22 @ 11:00)  A1C with Estimated Average Glucose Result: 11.2 % (03-22-22 @ 18:56)  A1C with Estimated Average Glucose Result: 8.4 % (05-25-21 @ 07:37)

## 2022-05-20 NOTE — DIETITIAN INITIAL EVALUATION ADULT - NAME AND PHONE
Preethi [id number]    Nutrition Intervention: meals, snacks, medical food supplement; Nutrition Monitoring: Diet order, PO intake, weights, labs, NFPF, body composition, BM and tolerance to medical food supplements.

## 2022-05-20 NOTE — DIETITIAN INITIAL EVALUATION ADULT - ORAL INTAKE PTA/DIET HISTORY
Pt had a good appetite at home; consumed 4 frequent meals daily. Reports taking VIT D once per week. Pt is allergic to watermelon and peaches; experiences an itchy throat after consuming. Pt does not have any restrictive cultural/Rastafari food preferences.  Pt had a good appetite at home; consumed 4 small frequent meals daily. Reports taking VIT D once per week. Pt is allergic to watermelon and peaches; experiences an itchy throat after consuming. Pt does not have any restrictive cultural/Evangelical food preferences.

## 2022-05-20 NOTE — PROGRESS NOTE ADULT - SUBJECTIVE AND OBJECTIVE BOX
BRINDA MOYER 47y Male  MRN#: [id number]   CODE STATUS:Full    Hospital Day: 5d    Pt is currently admitted with the primary diagnosis of abdominal pain    SUBJECTIVE  Hospital Course  47 year old male with hx of HTN, AMBER, DM, vertigo, diverticulosis s/p colon resection presents from home with 4 days of epigastric pain.  Patient states it's a burning pain that has recently been going to his right upper back.  It's worse with food and he has not been able to tolerate anything by mouth for the last 2 days because of nausea.  Pain is not worse with exertion.  He endorses mild shortness of breath when climbing stairs which has been present for a while.  He denies any bloody stool, no hematemesis or vomiting.  Of note he has been on ibuprofen for the last week because of a dental infection.  He's supposed to get some teeth taken out but had to come in to the hospital.  Also he was told by his PMD to see a nephrologist and he hasn't yet.  He had a negative stress 3 years ago.  In the ED he received 1.5L LR.  Lipase was negative  Triage vitals: /68    RR 18  Temp 98  SpO2 100% room air    Overnight events   None    Subjective complaints   Patient endorses epigastric pain, bowel movement yesterday constipated. Tolerating clears, willing to advance diet    Present Today:   - Chavez:  No [  ], Yes [   ] : Indication:     - Type of IV Access:       .. CVC/Piccline:  No [  ], Yes [   ] : Indication:       .. Midline: No [  ], Yes [   ] : Indication:                               OBJECTIVE  PAST MEDICAL & SURGICAL HISTORY  Diabetes    Asthma    Diverticulitis  surgery 16yrs ago    High cholesterol    Dyslipidemia    Hypertension    H/O vertigo    Diabetic ulcer of right foot    Broken toe  left pinky toe    Vertigo    HTN (hypertension)    Diabetes    History of surgery  colon resection    No significant past surgical history                                             ALLERGIES:  No Known Allergies                                        HOME MEDICATIONS  Home Medications:  Albuterol (Eqv-ProAir HFA) 90 mcg/inh inhalation aerosol: 2 puff(s) inhaled every 6 hours (15 May 2022 08:54)  amoxicillin-clavulanate 500 mg-125 mg oral tablet: 1 tab(s) orally every 8 hours (15 May 2022 08:54)  fenofibrate 145 mg oral tablet: 1 tab(s) orally once a day (15 May 2022 08:54)  HumaLOG 100 units/mL subcutaneous solution: 5 unit(s) subcutaneous 3 times a day (before meals)  sliding scale (15 May 2022 08:54)  hydroCHLOROthiazide: 37.5 milligram(s) orally once a day (15 May 2022 08:54)  ibuprofen 600 mg oral tablet: 1 tab(s) orally every 6 hours (15 May 2022 08:54)  Protonix 40 mg oral delayed release tablet: 1 tab(s) orally once a day (15 May 2022 08:54)  spironolactone 25 mg oral tablet: 1 tab(s) orally once a day (15 May 2022 08:54)  Toprol- mg oral tablet, extended release: 1 tab(s) orally once a day (15 May 2022 08:54)  Tresiba 100 units/mL subcutaneous solution: 40 unit(s) subcutaneous once a day (15 May 2022 08:54)                           MEDICATIONS:  STANDING MEDICATIONS  atorvastatin 40 milliGRAM(s) Oral at bedtime  dextrose 5%. 1000 milliLiter(s) IV Continuous <Continuous>  dextrose 5%. 1000 milliLiter(s) IV Continuous <Continuous>  dextrose 50% Injectable 25 Gram(s) IV Push once  dextrose 50% Injectable 12.5 Gram(s) IV Push once  dextrose 50% Injectable 25 Gram(s) IV Push once  fenofibrate Tablet 145 milliGRAM(s) Oral daily  glucagon  Injectable 1 milliGRAM(s) IntraMuscular once  heparin   Injectable 5000 Unit(s) SubCutaneous every 8 hours  hydrALAZINE Injectable 10 milliGRAM(s) IV Push once  insulin glargine Injectable (LANTUS) 34 Unit(s) SubCutaneous at bedtime  insulin lispro (ADMELOG) corrective regimen sliding scale   SubCutaneous three times a day before meals  insulin lispro Injectable (ADMELOG) 3 Unit(s) SubCutaneous three times a day before meals  metoprolol succinate  milliGRAM(s) Oral daily  NIFEdipine XL 60 milliGRAM(s) Oral daily  pantoprazole    Tablet 40 milliGRAM(s) Oral two times a day  senna 2 Tablet(s) Oral at bedtime  sucralfate 1 Gram(s) Oral four times a day    PRN MEDICATIONS  ALBUTerol    90 MICROgram(s) HFA Inhaler 2 Puff(s) Inhalation every 6 hours PRN  aluminum hydroxide/magnesium hydroxide/simethicone Suspension 30 milliLiter(s) Oral every 4 hours PRN  dextrose Oral Gel 15 Gram(s) Oral once PRN  meclizine 25 milliGRAM(s) Oral three times a day PRN  morphine  - Injectable 2 milliGRAM(s) IV Push every 6 hours PRN  ondansetron Injectable 4 milliGRAM(s) IV Push every 4 hours PRN  polyethylene glycol 3350 17 Gram(s) Oral two times a day PRN                                            VITAL SIGNS: Last 24 Hours  T(C): 36.4 (20 May 2022 05:43), Max: 36.4 (20 May 2022 05:43)  T(F): 97.6 (20 May 2022 05:43), Max: 97.6 (20 May 2022 05:43)  HR: 75 (20 May 2022 05:43) (69 - 75)  BP: 147/73 (20 May 2022 05:43) (119/66 - 176/91)  BP(mean): 85 (19 May 2022 22:28) (85 - 85)  RR: 18 (20 May 2022 05:43) (18 - 18)  SpO2: --      05-19-22 @ 07:01  -  05-20-22 @ 07:00  --------------------------------------------------------  IN: 1618 mL / OUT: 625 mL / NET: 993 mL                                               LABS:                        13.3   6.41  )-----------( 227      ( 20 May 2022 07:15 )             39.1     05-20    135  |  97<L>  |  14  ----------------------------<  74  4.4   |  28  |  1.8<H>    Ca    8.7      20 May 2022 07:15  Mg     1.7     05-19    TPro  5.1<L>  /  Alb  2.8<L>  /  TBili  0.2  /  DBili  x   /  AST  16  /  ALT  15  /  AlkPhos  120<H>  05-20                                                  -------------------------------------------------------------  RADIOLOGY:                                            --------------------------------------------------------------    PHYSICAL EXAM:  General: Laying in bed, uncomfortable when he moves  HEENT: Atraumatic, normocephalic  LUNGS: CTAB, unlabored respirations  HEART: S1S2+, RRR  ABDOMEN: BS+, soft, tenderness in epigastric region, nondistended. Scar down midline  EXT: no LE edema  SKIN: no rashes

## 2022-05-20 NOTE — DIETITIAN INITIAL EVALUATION ADULT - PERTINENT MEDS FT
MEDICATIONS  (STANDING):  atorvastatin 40 milliGRAM(s) Oral at bedtime  dextrose 5%. 1000 milliLiter(s) (50 mL/Hr) IV Continuous <Continuous>  dextrose 5%. 1000 milliLiter(s) (100 mL/Hr) IV Continuous <Continuous>  dextrose 50% Injectable 25 Gram(s) IV Push once  dextrose 50% Injectable 12.5 Gram(s) IV Push once  dextrose 50% Injectable 25 Gram(s) IV Push once  fenofibrate Tablet 145 milliGRAM(s) Oral daily  glucagon  Injectable 1 milliGRAM(s) IntraMuscular once  heparin   Injectable 5000 Unit(s) SubCutaneous every 8 hours  hydrALAZINE Injectable 10 milliGRAM(s) IV Push once  insulin glargine Injectable (LANTUS) 34 Unit(s) SubCutaneous at bedtime  insulin lispro (ADMELOG) corrective regimen sliding scale   SubCutaneous three times a day before meals  insulin lispro Injectable (ADMELOG) 3 Unit(s) SubCutaneous three times a day before meals  metoprolol succinate  milliGRAM(s) Oral daily  NIFEdipine XL 60 milliGRAM(s) Oral daily  pantoprazole    Tablet 40 milliGRAM(s) Oral two times a day  senna 2 Tablet(s) Oral at bedtime  sucralfate 1 Gram(s) Oral four times a day    MEDICATIONS  (PRN):  ALBUTerol    90 MICROgram(s) HFA Inhaler 2 Puff(s) Inhalation every 6 hours PRN Shortness of Breath and/or Wheezing  aluminum hydroxide/magnesium hydroxide/simethicone Suspension 30 milliLiter(s) Oral every 4 hours PRN Dyspepsia  dextrose Oral Gel 15 Gram(s) Oral once PRN Blood Glucose LESS THAN 70 milliGRAM(s)/deciliter  meclizine 25 milliGRAM(s) Oral three times a day PRN vertigo  morphine  - Injectable 2 milliGRAM(s) IV Push every 6 hours PRN Severe Pain (7 - 10)  ondansetron Injectable 4 milliGRAM(s) IV Push every 4 hours PRN Nausea and/or Vomiting  polyethylene glycol 3350 17 Gram(s) Oral two times a day PRN Constipation

## 2022-05-20 NOTE — PROGRESS NOTE ADULT - ASSESSMENT
47 year old male with hx of HTN, AMBER, DM, vertigo, diverticulosis s/p colon resection presents from home with 4 days of epigastric pain    # Epigastric pain radiating to the back likely secondary to PUD/GERD r/o ACS and pancreatitis  #lipid profile not extremely elevated, no gallstone, hx of reduced etoh intake since past 3 month  - patient started on ibuprofen about a week ago for dental infection, pain started about 3-4 days ago  - pain is burning with occasional radiation to the back, worse with food also accompanied with nausea, denies hematemesis or bloody stools  - lipase 18  -US CBD 5mm no stones  -CT abdomen Mild ill-defined appearance of the pancreas. Questionable mild interstitial pancreatitis.  - doubt ACS however patient has risk factors, uncontrolled DM and smoker, has troponinemia but more likely from kidney disease, trop 0.11, 0.06   - started protonix 40 bid and maalox  - GI consulted: c/w with pain control,  protonix bid  - EGD done on 5/19: non erosive gastritis, will need outpt gastric emptying study  - cardiology consulted: reports pain most likely 2/2 to underlying pancreatitis:  - TTE EF 62%, G1DD  - c/w with pain control, protonix, zofran  - advancing diet to regular, see if tolerates  - carafate 1g qid started    # HTN  - was on HCTZ 37.5 / spironolactone 25 / toprol 100  - will hold HCTZ and spironolactone, creatinine is 2.4, patient was told to see a nephrologist by PMD however hasn't yet  - c/w nifedipine 60 XL qd and metoprolol 100 mg daily    # BILLY on CKD (improving)  - current creat 1.6 improving from 2.4  - patient hasn't followed up with nephro yet  - f/u UA, urine prot/creat ratio, spep, upep, niranjan    # Dental infection  - willing to have teeth pulled out here; s/p amoxicillin  - Dental consulted pending eval for radiographs and definitive treatment    # AMBER  - continue with home cpap    # DM2  - monitor BS    # Vertigo  - c/w meclizine    # DVT PPX: ambulatory, will avoid chemical ppx for now in case of unmasking bleed  # GI PPX: protonix 40 bid  # Diet: advance as tolerated  FULL CODE    Pending: Clinical improvement, tolerating diet, pain control

## 2022-05-20 NOTE — PROGRESS NOTE ADULT - ATTENDING COMMENTS
47 year old male with hx of HTN, AMBER, DM, vertigo, diverticulosis s/p colon resection presents from home with 4 days of epigastric pain    # Epigastric pain radiating to the back likely secondary to PUD/GERD r/o ACS and pancreatitis  #lipid profile not extremely elevated, no gallstone, hx of reduced etoh intake since past 3 month  - patient started on ibuprofen about a week ago for dental infection, pain started about 3-4 days ago  - pain is burning with occasional radiation to the back, worse with food also accompanied with nausea, denies hematemesis or bloody stools  - lipase 18  -US CBD 5mm no stones  -CT abdomen Mild ill-defined appearance of the pancreas. Questionable mild interstitial pancreatitis.  - doubt ACS however patient has risk factors, uncontrolled DM and smoker, has troponinemia but more likely from kidney disease, trop 0.11, 0.06   - started protonix 40 bid and maalox  - GI consulted: c/w with pain control,  protonix bid  - EGD done on 5/19: non erosive gastritis, will need outpt gastric emptying study  - cardiology consulted: reports pain most likely 2/2 to underlying pancreatitis:  - TTE EF 62%, G1DD  - c/w with pain control, protonix, zofran  - advancing diet to regular, see if tolerates  - carafate 1g qid started    # HTN  - was on HCTZ 37.5 / spironolactone 25 / toprol 100  - will hold HCTZ and spironolactone, creatinine is 2.4, patient was told to see a nephrologist by PMD however hasn't yet  - c/w nifedipine 60 XL qd and metoprolol 100 mg daily    # BILLY on CKD (improving)  - current creat 1.6 improving from 2.4  - patient hasn't followed up with nephro yet  - f/u UA, urine prot/creat ratio, spep, upep, niranjan    # Dental infection  - willing to have teeth pulled out here; s/p amoxicillin  - Dental consulted pending eval for radiographs and definitive treatment    # AMBER  - continue with home cpap    # DM2  - monitor BS    # Vertigo  - c/w meclizine    # DVT PPX: ambulatory, will avoid chemical ppx for now in case of unmasking bleed  # GI PPX: protonix 40 bid  # Diet: advance as tolerated  FULL CODE    Pending: Clinical improvement, tolerating diet, pain control 47 year old male with hx of HTN, AMBER, DM, vertigo, diverticulosis s/p colon resection presents from home with 4 days of epigastric pain    # Epigastric pain radiating to the back likely secondary to PUD/GERD r/o ACS and pancreatitis  #lipid profile not extremely elevated, no gallstone, hx of reduced etoh intake since past 3 month  - patient started on ibuprofen about a week ago for dental infection, pain started about 3-4 days ago  - pain is burning with occasional radiation to the back, worse with food also accompanied with nausea, denies hematemesis or bloody stools  -US CBD 5mm no stones  -CT abdomen Mild ill-defined appearance of the pancreas. Questionable mild interstitial pancreatitis. but lipase negative(5/15)  - doubt ACS however patient has risk factors, uncontrolled DM and smoker, has troponinemia but more likely from kidney disease, trop 0.11, 0.06   - started protonix 40 bid and maalox  - GI consulted: c/w with pain control,  protonix bid  - EGD done on 5/19: non erosive gastritis, will need outpt gastric emptying study  - cardiology consulted: reports pain most likely 2/2 to underlying pancreatitis:  - TTE EF 62%, G1DD  - c/w with pain control, protonix, zofran  - advancing diet to regular, see if tolerates  - carafate 1g qid started 5/20 with good affect on epigastric pain, however patient notes the pain radiating to flank is much worse  - will attempt a one time dose of oxycodone 5mg tonight with a repeat lipase level again tomorrow, given the CT scan findings  - patient had empty tray of dinner infront of him but complained he "couldn't eat" but had tears in his eyes. unsure how to gauge legitmacy of complaints    # HTN  - was on HCTZ 37.5 / spironolactone 25 / toprol 100  - will hold HCTZ and spironolactone, creatinine is 2.4, patient was told to see a nephrologist by PMD however hasn't yet  - c/w nifedipine 60 XL qd and metoprolol 100 mg daily    # BILLY on CKD (improving)  - current creat 1.6 improving from 2.4  - patient hasn't followed up with nephro yet  - f/u UA, urine prot/creat ratio, spep, upep, niranjan    # Dental infection  - willing to have teeth pulled out here; s/p amoxicillin  - Dental consulted pending eval for radiographs and definitive treatment    # AMBER  - continue with home cpap    # DM2  - monitor BS    # Vertigo  - c/w meclizine    # DVT PPX: ambulatory, will avoid chemical ppx for now in case of unmasking bleed  # GI PPX: protonix 40 bid  # Diet: advance as tolerated  FULL CODE    Pending: Clinical improvement, tolerating diet, pain control  #Progress Note Handoff  Pending (specify):  monitoring pain tonight  Family discussion: updated at bedside  Disposition: anticipate for home tomorrow with outpatient follow up

## 2022-05-20 NOTE — DIETITIAN INITIAL EVALUATION ADULT - ADD RECOMMEND
1. Recommend to add Glucerna Shake 1X/DAILY -- will provide 220 kcal/day total, 10g pro/day total.  2. Continue with current diet order.  3. Encourage PO intake and assist during meals as needed.  4. Pt is at high risk; will f/u in 4 days.

## 2022-05-20 NOTE — PROGRESS NOTE ADULT - TIME BILLING
#Epigastric pain, radiating to back  ct abd with possible interstitial pancreatitis; lipase low  denies alcohol, triglycerides in 400s  lr 250 cc/hr  npo  pain control  f/u gi for egd  #BILLY vs. CKD III  scr trending up since 3/2022  suspect component pre-renal  ct abd without hydro  lr as above  trend  #Elevated cardiac enzymes  ekg unrevealing  no cp  trend  f/u cards  tte    #Progress Note Handoff:  Pending (specify):  Consults_________, Tests________, Test Results_______, Other____f/u gi for egd_____  Family discussion: d/w pt at bedside re: treatment plan, primary dx  Disposition: Home___/SNF___/Other________/Unknown at this time____x____
Coordination of care
I edited the note
#Epigastric pain, radiating to back  ct abd with possible interstitial pancreatitis; lipase low  h/o alcohol abuse; sober for one year, triglycerides in 400s  lr decrease to 150 cc/hr  npo  pain control  f/u gi for egd  #BILLY vs. CKD III  scr trending up since 3/2022  suspect component pre-renal  ct abd without hydro  lr as above  trend  appreciate renal  #Elevated cardiac enzymes  ekg unrevealing  no cp  trend  f/u cards  tte with preserved ef, grade I diastolic dysfunction    #Progress Note Handoff:  Pending (specify):  Consults_________, Tests________, Test Results_______, Other____f/u gi for egd_____  Family discussion: d/w pt at bedside re: treatment plan, primary dx  Disposition: Home___/SNF___/Other________/Unknown at this time____x____
charting, resident teaching rounds, and interdisciplinary planning
Coordination of care

## 2022-05-20 NOTE — DIETITIAN INITIAL EVALUATION ADULT - PERSON TAUGHT/METHOD
Discussed importance of PO intake, medical food supplements, and DASH/TLC diet./verbal instruction/patient instructed

## 2022-05-21 LAB
ALBUMIN SERPL ELPH-MCNC: 2.7 G/DL — LOW (ref 3.5–5.2)
ALP SERPL-CCNC: 130 U/L — HIGH (ref 30–115)
ALT FLD-CCNC: 15 U/L — SIGNIFICANT CHANGE UP (ref 0–41)
ANION GAP SERPL CALC-SCNC: 6 MMOL/L — LOW (ref 7–14)
AST SERPL-CCNC: 17 U/L — SIGNIFICANT CHANGE UP (ref 0–41)
BASOPHILS # BLD AUTO: 0.03 K/UL — SIGNIFICANT CHANGE UP (ref 0–0.2)
BASOPHILS NFR BLD AUTO: 0.4 % — SIGNIFICANT CHANGE UP (ref 0–1)
BILIRUB SERPL-MCNC: <0.2 MG/DL — SIGNIFICANT CHANGE UP (ref 0.2–1.2)
BUN SERPL-MCNC: 11 MG/DL — SIGNIFICANT CHANGE UP (ref 10–20)
CALCIUM SERPL-MCNC: 8.2 MG/DL — LOW (ref 8.5–10.1)
CHLORIDE SERPL-SCNC: 99 MMOL/L — SIGNIFICANT CHANGE UP (ref 98–110)
CO2 SERPL-SCNC: 28 MMOL/L — SIGNIFICANT CHANGE UP (ref 17–32)
CREAT SERPL-MCNC: 1.7 MG/DL — HIGH (ref 0.7–1.5)
EGFR: 49 ML/MIN/1.73M2 — LOW
EOSINOPHIL # BLD AUTO: 0.18 K/UL — SIGNIFICANT CHANGE UP (ref 0–0.7)
EOSINOPHIL NFR BLD AUTO: 2.2 % — SIGNIFICANT CHANGE UP (ref 0–8)
GLUCOSE BLDC GLUCOMTR-MCNC: 127 MG/DL — HIGH (ref 70–99)
GLUCOSE BLDC GLUCOMTR-MCNC: 131 MG/DL — HIGH (ref 70–99)
GLUCOSE BLDC GLUCOMTR-MCNC: 203 MG/DL — HIGH (ref 70–99)
GLUCOSE BLDC GLUCOMTR-MCNC: 203 MG/DL — HIGH (ref 70–99)
GLUCOSE BLDC GLUCOMTR-MCNC: 212 MG/DL — HIGH (ref 70–99)
GLUCOSE BLDC GLUCOMTR-MCNC: 227 MG/DL — HIGH (ref 70–99)
GLUCOSE BLDC GLUCOMTR-MCNC: 231 MG/DL — HIGH (ref 70–99)
GLUCOSE SERPL-MCNC: 119 MG/DL — HIGH (ref 70–99)
HCT VFR BLD CALC: 37.9 % — LOW (ref 42–52)
HGB BLD-MCNC: 13 G/DL — LOW (ref 14–18)
IMM GRANULOCYTES NFR BLD AUTO: 0.4 % — HIGH (ref 0.1–0.3)
LIDOCAIN IGE QN: 16 U/L — SIGNIFICANT CHANGE UP (ref 7–60)
LYMPHOCYTES # BLD AUTO: 1.71 K/UL — SIGNIFICANT CHANGE UP (ref 1.2–3.4)
LYMPHOCYTES # BLD AUTO: 20.5 % — SIGNIFICANT CHANGE UP (ref 20.5–51.1)
MAGNESIUM SERPL-MCNC: 1.8 MG/DL — SIGNIFICANT CHANGE UP (ref 1.8–2.4)
MCHC RBC-ENTMCNC: 30.8 PG — SIGNIFICANT CHANGE UP (ref 27–31)
MCHC RBC-ENTMCNC: 34.3 G/DL — SIGNIFICANT CHANGE UP (ref 32–37)
MCV RBC AUTO: 89.8 FL — SIGNIFICANT CHANGE UP (ref 80–94)
MONOCYTES # BLD AUTO: 0.89 K/UL — HIGH (ref 0.1–0.6)
MONOCYTES NFR BLD AUTO: 10.7 % — HIGH (ref 1.7–9.3)
NEUTROPHILS # BLD AUTO: 5.51 K/UL — SIGNIFICANT CHANGE UP (ref 1.4–6.5)
NEUTROPHILS NFR BLD AUTO: 65.8 % — SIGNIFICANT CHANGE UP (ref 42.2–75.2)
NRBC # BLD: 0 /100 WBCS — SIGNIFICANT CHANGE UP (ref 0–0)
PLATELET # BLD AUTO: 215 K/UL — SIGNIFICANT CHANGE UP (ref 130–400)
POTASSIUM SERPL-MCNC: 4.2 MMOL/L — SIGNIFICANT CHANGE UP (ref 3.5–5)
POTASSIUM SERPL-SCNC: 4.2 MMOL/L — SIGNIFICANT CHANGE UP (ref 3.5–5)
PROT SERPL-MCNC: 5.2 G/DL — LOW (ref 6–8)
RBC # BLD: 4.22 M/UL — LOW (ref 4.7–6.1)
RBC # FLD: 11.9 % — SIGNIFICANT CHANGE UP (ref 11.5–14.5)
SODIUM SERPL-SCNC: 133 MMOL/L — LOW (ref 135–146)
WBC # BLD: 8.35 K/UL — SIGNIFICANT CHANGE UP (ref 4.8–10.8)
WBC # FLD AUTO: 8.35 K/UL — SIGNIFICANT CHANGE UP (ref 4.8–10.8)

## 2022-05-21 PROCEDURE — 99233 SBSQ HOSP IP/OBS HIGH 50: CPT

## 2022-05-21 RX ORDER — HYDROMORPHONE HYDROCHLORIDE 2 MG/ML
1 INJECTION INTRAMUSCULAR; INTRAVENOUS; SUBCUTANEOUS ONCE
Refills: 0 | Status: DISCONTINUED | OUTPATIENT
Start: 2022-05-21 | End: 2022-05-21

## 2022-05-21 RX ORDER — HYDROCHLOROTHIAZIDE 25 MG
25 TABLET ORAL DAILY
Refills: 0 | Status: DISCONTINUED | OUTPATIENT
Start: 2022-05-21 | End: 2022-05-22

## 2022-05-21 RX ORDER — VALACYCLOVIR 500 MG/1
1000 TABLET, FILM COATED ORAL THREE TIMES A DAY
Refills: 0 | Status: COMPLETED | OUTPATIENT
Start: 2022-05-21 | End: 2022-05-28

## 2022-05-21 RX ORDER — GABAPENTIN 400 MG/1
300 CAPSULE ORAL
Refills: 0 | Status: DISCONTINUED | OUTPATIENT
Start: 2022-05-21 | End: 2022-05-30

## 2022-05-21 RX ORDER — SPIRONOLACTONE 25 MG/1
25 TABLET, FILM COATED ORAL DAILY
Refills: 0 | Status: DISCONTINUED | OUTPATIENT
Start: 2022-05-21 | End: 2022-05-22

## 2022-05-21 RX ADMIN — Medication 145 MILLIGRAM(S): at 14:08

## 2022-05-21 RX ADMIN — HEPARIN SODIUM 5000 UNIT(S): 5000 INJECTION INTRAVENOUS; SUBCUTANEOUS at 06:19

## 2022-05-21 RX ADMIN — Medication 1 GRAM(S): at 06:20

## 2022-05-21 RX ADMIN — HEPARIN SODIUM 5000 UNIT(S): 5000 INJECTION INTRAVENOUS; SUBCUTANEOUS at 21:49

## 2022-05-21 RX ADMIN — VALACYCLOVIR 1000 MILLIGRAM(S): 500 TABLET, FILM COATED ORAL at 14:07

## 2022-05-21 RX ADMIN — HYDROMORPHONE HYDROCHLORIDE 1 MILLIGRAM(S): 2 INJECTION INTRAMUSCULAR; INTRAVENOUS; SUBCUTANEOUS at 04:19

## 2022-05-21 RX ADMIN — Medication 1 GRAM(S): at 12:00

## 2022-05-21 RX ADMIN — HYDROMORPHONE HYDROCHLORIDE 1 MILLIGRAM(S): 2 INJECTION INTRAMUSCULAR; INTRAVENOUS; SUBCUTANEOUS at 04:45

## 2022-05-21 RX ADMIN — MORPHINE SULFATE 2 MILLIGRAM(S): 50 CAPSULE, EXTENDED RELEASE ORAL at 11:59

## 2022-05-21 RX ADMIN — PANTOPRAZOLE SODIUM 40 MILLIGRAM(S): 20 TABLET, DELAYED RELEASE ORAL at 17:39

## 2022-05-21 RX ADMIN — MORPHINE SULFATE 2 MILLIGRAM(S): 50 CAPSULE, EXTENDED RELEASE ORAL at 13:00

## 2022-05-21 RX ADMIN — MORPHINE SULFATE 2 MILLIGRAM(S): 50 CAPSULE, EXTENDED RELEASE ORAL at 23:35

## 2022-05-21 RX ADMIN — INSULIN GLARGINE 34 UNIT(S): 100 INJECTION, SOLUTION SUBCUTANEOUS at 21:51

## 2022-05-21 RX ADMIN — Medication 3 UNIT(S): at 17:36

## 2022-05-21 RX ADMIN — Medication 2: at 08:21

## 2022-05-21 RX ADMIN — SENNA PLUS 2 TABLET(S): 8.6 TABLET ORAL at 21:49

## 2022-05-21 RX ADMIN — Medication 1 GRAM(S): at 17:37

## 2022-05-21 RX ADMIN — ATORVASTATIN CALCIUM 40 MILLIGRAM(S): 80 TABLET, FILM COATED ORAL at 21:48

## 2022-05-21 RX ADMIN — Medication 100 MILLIGRAM(S): at 06:19

## 2022-05-21 RX ADMIN — Medication 3 UNIT(S): at 11:59

## 2022-05-21 RX ADMIN — Medication 2: at 17:35

## 2022-05-21 RX ADMIN — VALACYCLOVIR 1000 MILLIGRAM(S): 500 TABLET, FILM COATED ORAL at 22:30

## 2022-05-21 RX ADMIN — MORPHINE SULFATE 2 MILLIGRAM(S): 50 CAPSULE, EXTENDED RELEASE ORAL at 17:38

## 2022-05-21 RX ADMIN — PANTOPRAZOLE SODIUM 40 MILLIGRAM(S): 20 TABLET, DELAYED RELEASE ORAL at 06:18

## 2022-05-21 RX ADMIN — Medication 60 MILLIGRAM(S): at 06:19

## 2022-05-21 RX ADMIN — Medication 3 UNIT(S): at 08:21

## 2022-05-21 RX ADMIN — GABAPENTIN 300 MILLIGRAM(S): 400 CAPSULE ORAL at 21:15

## 2022-05-21 RX ADMIN — MORPHINE SULFATE 2 MILLIGRAM(S): 50 CAPSULE, EXTENDED RELEASE ORAL at 18:00

## 2022-05-21 RX ADMIN — HEPARIN SODIUM 5000 UNIT(S): 5000 INJECTION INTRAVENOUS; SUBCUTANEOUS at 12:00

## 2022-05-21 NOTE — PROGRESS NOTE ADULT - ASSESSMENT
47 year old male with hx of HTN, AMBER, DM, vertigo, diverticulosis s/p colon resection presents from home with 4 days of epigastric pain    # Epigastric pain radiating to the back likely secondary to PUD/GERD r/o ACS and pancreatitis  #lipid profile not extremely elevated, no gallstone, hx of reduced etoh intake since past 3 month  - patient started on ibuprofen about a week ago for dental infection, pain started about 3-4 days ago  - pain is burning with occasional radiation to the back, worse with food also accompanied with nausea, denies hematemesis or bloody stools  - lipase 18  -US CBD 5mm no stones  -CT abdomen Mild ill-defined appearance of the pancreas. Questionable mild interstitial pancreatitis.  - doubt ACS however patient has risk factors, uncontrolled DM and smoker, has troponinemia but more likely from kidney disease, trop 0.11, 0.06   - started protonix 40 bid and maalox  - GI consulted: c/w with pain control,  protonix bid  - EGD done on 5/19: non erosive gastritis, will need outpt gastric emptying study  - cardiology consulted: reports pain most likely 2/2 to underlying pancreatitis:  - TTE EF 62%, G1DD  - c/w with pain control, protonix, zofran  - advancing diet to regular, see if tolerates  - carafate 1g qid started    #Shingles  - rash developing on R upper abdomen  - Valacyclovir 100mg TID for 7 days, started 5/21  - gabapentin 300mg BID    # HTN  - was on HCTZ 37.5 / spironolactone 25 / toprol 100  - will hold HCTZ and spironolactone, creatinine is 2.4, patient was told to see a nephrologist by PMD however hasn't yet  - c/w nifedipine 60 XL qd and metoprolol 100 mg daily    # BILLY on CKD (improving)  - current creat 1.6 improving from 2.4  - patient hasn't followed up with nephro yet  - f/u UA, urine prot/creat ratio, spep, upep, niranjan    # Dental infection  - willing to have teeth pulled out here; s/p amoxicillin  - Dental consulted pending eval for radiographs and definitive treatment    # AMBER  - continue with home cpap    # DM2  - monitor BS    # Vertigo  - c/w meclizine    # DVT PPX: ambulatory, will avoid chemical ppx for now in case of unmasking bleed  # GI PPX: protonix 40 bid  # Diet: advance as tolerated  FULL CODE    Pending: Clinical improvement, tolerating diet, pain control

## 2022-05-21 NOTE — PROGRESS NOTE ADULT - SUBJECTIVE AND OBJECTIVE BOX
BRINDA MOYER 47y Male  MRN#: [id number]   CODE STATUS:FULL    Hospital Day: 6d    Pt is currently admitted with the primary diagnosis of epigastric pain    SUBJECTIVE  Hospital Course  47 year old male with hx of HTN, AMBER, DM, vertigo, diverticulosis s/p colon resection presents from home with 4 days of epigastric pain.  Patient states it's a burning pain that has recently been going to his right upper back.  It's worse with food and he has not been able to tolerate anything by mouth for the last 2 days because of nausea.  Pain is not worse with exertion.  He endorses mild shortness of breath when climbing stairs which has been present for a while.  He denies any bloody stool, no hematemesis or vomiting.  Of note he has been on ibuprofen for the last week because of a dental infection.  He's supposed to get some teeth taken out but had to come in to the hospital.  Also he was told by his PMD to see a nephrologist and he hasn't yet.  He had a negative stress 3 years ago.  In the ED he received 1.5L LR.  Lipase was negative  Triage vitals: /68    RR 18  Temp 98  SpO2 100% room air    Overnight events   None    Subjective complaints   Patient endorses epigastric pain, eating about 50% of food.     Present Today:   - Chavez:  No [  ], Yes [   ] : Indication:     - Type of IV Access:       .. CVC/Piccline:  No [  ], Yes [   ] : Indication:       .. Midline: No [  ], Yes [   ] : Indication:                               OBJECTIVE  PAST MEDICAL & SURGICAL HISTORY  Diabetes    Asthma    Diverticulitis  surgery 16yrs ago    High cholesterol    Dyslipidemia    Hypertension    H/O vertigo    Diabetic ulcer of right foot    Broken toe  left pinky toe    Vertigo    HTN (hypertension)    Diabetes    History of surgery  colon resection    No significant past surgical history                                             ALLERGIES:  No Known Allergies                                        HOME MEDICATIONS  Home Medications:  Albuterol (Eqv-ProAir HFA) 90 mcg/inh inhalation aerosol: 2 puff(s) inhaled every 6 hours (15 May 2022 08:54)  amoxicillin-clavulanate 500 mg-125 mg oral tablet: 1 tab(s) orally every 8 hours (15 May 2022 08:54)  fenofibrate 145 mg oral tablet: 1 tab(s) orally once a day (15 May 2022 08:54)  HumaLOG 100 units/mL subcutaneous solution: 5 unit(s) subcutaneous 3 times a day (before meals)  sliding scale (15 May 2022 08:54)  hydroCHLOROthiazide: 37.5 milligram(s) orally once a day (15 May 2022 08:54)  ibuprofen 600 mg oral tablet: 1 tab(s) orally every 6 hours (15 May 2022 08:54)  Protonix 40 mg oral delayed release tablet: 1 tab(s) orally once a day (15 May 2022 08:54)  spironolactone 25 mg oral tablet: 1 tab(s) orally once a day (15 May 2022 08:54)  Toprol- mg oral tablet, extended release: 1 tab(s) orally once a day (15 May 2022 08:54)  Tresiba 100 units/mL subcutaneous solution: 40 unit(s) subcutaneous once a day (15 May 2022 08:54)                           MEDICATIONS:  STANDING MEDICATIONS  atorvastatin 40 milliGRAM(s) Oral at bedtime  dextrose 5%. 1000 milliLiter(s) IV Continuous <Continuous>  dextrose 5%. 1000 milliLiter(s) IV Continuous <Continuous>  dextrose 5%. 1000 milliLiter(s) IV Continuous <Continuous>  dextrose 50% Injectable 25 Gram(s) IV Push once  dextrose 50% Injectable 12.5 Gram(s) IV Push once  dextrose 50% Injectable 25 Gram(s) IV Push once  fenofibrate Tablet 145 milliGRAM(s) Oral daily  glucagon  Injectable 1 milliGRAM(s) IntraMuscular once  glucagon  Injectable 1 milliGRAM(s) IntraMuscular once  heparin   Injectable 5000 Unit(s) SubCutaneous every 8 hours  hydrALAZINE Injectable 10 milliGRAM(s) IV Push once  insulin glargine Injectable (LANTUS) 34 Unit(s) SubCutaneous at bedtime  insulin lispro (ADMELOG) corrective regimen sliding scale   SubCutaneous three times a day before meals  insulin lispro Injectable (ADMELOG) 3 Unit(s) SubCutaneous three times a day before meals  metoprolol succinate  milliGRAM(s) Oral daily  NIFEdipine XL 60 milliGRAM(s) Oral daily  oxyCODONE    IR 5 milliGRAM(s) Oral once  pantoprazole    Tablet 40 milliGRAM(s) Oral two times a day  senna 2 Tablet(s) Oral at bedtime  sucralfate 1 Gram(s) Oral four times a day    PRN MEDICATIONS  ALBUTerol    90 MICROgram(s) HFA Inhaler 2 Puff(s) Inhalation every 6 hours PRN  aluminum hydroxide/magnesium hydroxide/simethicone Suspension 30 milliLiter(s) Oral every 4 hours PRN  dextrose Oral Gel 15 Gram(s) Oral once PRN  meclizine 25 milliGRAM(s) Oral three times a day PRN  morphine  - Injectable 2 milliGRAM(s) IV Push every 6 hours PRN  ondansetron Injectable 4 milliGRAM(s) IV Push every 4 hours PRN  polyethylene glycol 3350 17 Gram(s) Oral two times a day PRN                                            VITAL SIGNS: Last 24 Hours  T(C): 36.1 (21 May 2022 06:04), Max: 37.1 (20 May 2022 22:51)  T(F): 97 (21 May 2022 06:04), Max: 98.7 (20 May 2022 22:51)  HR: 73 (21 May 2022 06:04) (66 - 74)  BP: 176/82 (21 May 2022 06:04) (127/37 - 176/82)  BP(mean): --  RR: 20 (21 May 2022 06:04) (18 - 20)  SpO2: --                                               LABS:                        13.3   6.41  )-----------( 227      ( 20 May 2022 07:15 )             39.1     05-20    135  |  97<L>  |  14  ----------------------------<  74  4.4   |  28  |  1.8<H>    Ca    8.7      20 May 2022 07:15    TPro  5.1<L>  /  Alb  2.8<L>  /  TBili  0.2  /  DBili  x   /  AST  16  /  ALT  15  /  AlkPhos  120<H>  05-20                                                              -------------------------------------------------------------  RADIOLOGY:                                            --------------------------------------------------------------    PHYSICAL EXAM:  General: Laying in bed, mild distress  HEENT: atraumatic, normocephalic  LUNGS: CTAB, unlabored respirations  HEART: S1S2+, RRR  ABDOMEN: rash on R upper abd, epigastric TTP, BS+  EXT: no LE edema  NEURO: AAOx3  SKIN: rash on R upper abdomen

## 2022-05-21 NOTE — PROGRESS NOTE ADULT - ASSESSMENT
46 yo M PMHx HTN, AMBER, DM II, vertigo, diverticulosis s/p colon resection presented for evaluation of epigastric pain. Today pt developed herpes zoster.    Herpes zoster  - contact precautions  - valacytlovir  - gabapentin  - could explain pt's pain    Epigastric pain  - unclear etiology  - treated for pancreatitis (mild interstitial edema on CT scan, epigastric pain but lipase 18)  - has cholelithiasis but no dilated CBD  - EGD today showed:              Irregularity in the Z-line and gastroesophageal junction.  	Erythema in the stomach compatible with non-erosive gastritis.  	Normal mucosa in the whole examined duodenum.  - case d/w cardiology--unlikely cardiac in nature  - Gi recommending no further work up inpatient but recommends gastric emptying study as outpt  - pt still unsure if he can tolerate diet. Advance diet and if pt can tolerate diet can likely be discharged in AM  - pt reports he was hospitalized at [facility] in [place]. Medical team attempted to obtain records but have not received call back yet.  - pt started on Advil for dental infection, could be GI upset from NSAIDs    Troponemia  T wave inversions in AvL and I  - discussed with cardiology--not concerned for ACS  - dc telemetry  - elevated troponin could be realted to Kasey, pt has chronically elevated troponin to 0.04    HTN  - HCTZ, spironolactone held due to KASEY  - BP uncontrolled  - c/w nifedipine, lipitor, hydralzaine  - was on HCTZ 37.5 / spironolactone 25 / toprol 100  - c/w nifedipine 60 XL qd and metoprolol 100 mg daily    KASEY vs CKD   - pt appears to be at baseline  - can restart HCTZ, spironolactone    Dental infection  - dental follow up pending    AMBER  - continue with home cpap    DM II  - FS controlled    Vertigo  - c/w meclizine    # DVT PPX: ambulatory, will avoid chemical ppx for now in case of unmasking bleed  # GI PPX: protonix 40 bid  # Diet: NPO after midnight   FULL CODE    Progress Note Handoff:  Pending (specify): monitor zoster, dc in AM  Family discussion: discussed zoster  Disposition: Home_x__/SNF___/Other________/Unknown at this time________

## 2022-05-21 NOTE — PROGRESS NOTE ADULT - SUBJECTIVE AND OBJECTIVE BOX
CHIEF COMPLAINT:    Patient is a 47y old  Male who presents with a chief complaint of epigastric pain     INTERVAL HPI/OVERNIGHT EVENTS:    Patient seen and examined at bedside. No acute overnight events occurred.    ROS: Denies SOB, chest pain. All other systems are negative.    Medications:  Standing  atorvastatin 40 milliGRAM(s) Oral at bedtime  dextrose 5%. 1000 milliLiter(s) IV Continuous <Continuous>  dextrose 5%. 1000 milliLiter(s) IV Continuous <Continuous>  dextrose 5%. 1000 milliLiter(s) IV Continuous <Continuous>  dextrose 50% Injectable 25 Gram(s) IV Push once  dextrose 50% Injectable 12.5 Gram(s) IV Push once  dextrose 50% Injectable 25 Gram(s) IV Push once  fenofibrate Tablet 145 milliGRAM(s) Oral daily  gabapentin 300 milliGRAM(s) Oral two times a day  glucagon  Injectable 1 milliGRAM(s) IntraMuscular once  glucagon  Injectable 1 milliGRAM(s) IntraMuscular once  heparin   Injectable 5000 Unit(s) SubCutaneous every 8 hours  hydrALAZINE Injectable 10 milliGRAM(s) IV Push once  insulin glargine Injectable (LANTUS) 34 Unit(s) SubCutaneous at bedtime  insulin lispro (ADMELOG) corrective regimen sliding scale   SubCutaneous three times a day before meals  insulin lispro Injectable (ADMELOG) 3 Unit(s) SubCutaneous three times a day before meals  metoprolol succinate  milliGRAM(s) Oral daily  NIFEdipine XL 60 milliGRAM(s) Oral daily  oxyCODONE    IR 5 milliGRAM(s) Oral once  pantoprazole    Tablet 40 milliGRAM(s) Oral two times a day  senna 2 Tablet(s) Oral at bedtime  sucralfate 1 Gram(s) Oral four times a day  valACYclovir 1000 milliGRAM(s) Oral three times a day    PRN Meds  ALBUTerol    90 MICROgram(s) HFA Inhaler 2 Puff(s) Inhalation every 6 hours PRN  aluminum hydroxide/magnesium hydroxide/simethicone Suspension 30 milliLiter(s) Oral every 4 hours PRN  dextrose Oral Gel 15 Gram(s) Oral once PRN  meclizine 25 milliGRAM(s) Oral three times a day PRN  morphine  - Injectable 2 milliGRAM(s) IV Push every 6 hours PRN  ondansetron Injectable 4 milliGRAM(s) IV Push every 4 hours PRN  polyethylene glycol 3350 17 Gram(s) Oral two times a day PRN        Vital Signs:    T(F): 98.1 (05-21-22 @ 13:48), Max: 98.7 (05-20-22 @ 22:51)  HR: 74 (05-21-22 @ 13:48) (73 - 86)  BP: 168/69 (05-21-22 @ 13:48) (127/37 - 176/82)  RR: 18 (05-21-22 @ 13:48) (18 - 20)  SpO2: 100% (05-21-22 @ 12:30) (100% - 100%)  I&O's Summary    Daily     Daily   CAPILLARY BLOOD GLUCOSE      POCT Blood Glucose.: 131 mg/dL (21 May 2022 12:39)  POCT Blood Glucose.: 127 mg/dL (21 May 2022 11:16)  POCT Blood Glucose.: 212 mg/dL (21 May 2022 07:44)  POCT Blood Glucose.: 231 mg/dL (21 May 2022 05:11)  POCT Blood Glucose.: 227 mg/dL (21 May 2022 02:35)  POCT Blood Glucose.: 300 mg/dL (20 May 2022 21:34)  POCT Blood Glucose.: 210 mg/dL (20 May 2022 16:14)      PHYSICAL EXAM:  GENERAL:  NAD  SKIN: blistering rash consistent with shigles right lower chest  HEENT: Atraumatic. Normocephalic. Anicteric  NECK:  No JVD.   PULMONARY: Clear to ausculation bilaterally. No wheezing. No rales  CVS: Normal S1, S2. Regular rate and rhythm. No murmurs.  ABDOMEN/GI: Soft, Nontender, Nondistended; Bowel sounds are present  EXTREMITIES:  No edema B/L LE.  NEUROLOGIC:  No motor deficit.  PSYCH: Alert & oriented x 3, normal affect      LABS:                        13.0   8.35  )-----------( 215      ( 21 May 2022 12:05 )             37.9     05-21    133<L>  |  99  |  11  ----------------------------<  119<H>  4.2   |  28  |  1.7<H>    Ca    8.2<L>      21 May 2022 12:05  Mg     1.8     05-21    TPro  5.2<L>  /  Alb  2.7<L>  /  TBili  <0.2  /  DBili  x   /  AST  17  /  ALT  15  /  AlkPhos  130<H>  05-21              RADIOLOGY & ADDITIONAL TESTS:  Imaging or report Personally Reviewed:  [ ] YES  [ ] NO -->no new images    Consultant(s) Notes Reviewed:  [ ] YES  [ ] NO  Care Discussed with Consultants/Other Providers [ ] YES  [ ] NO    Case discussed with resident  Care discussed with pt

## 2022-05-22 ENCOUNTER — TRANSCRIPTION ENCOUNTER (OUTPATIENT)
Age: 47
End: 2022-05-22

## 2022-05-22 LAB
ALBUMIN SERPL ELPH-MCNC: 2.7 G/DL — LOW (ref 3.5–5.2)
ALP SERPL-CCNC: 122 U/L — HIGH (ref 30–115)
ALT FLD-CCNC: 15 U/L — SIGNIFICANT CHANGE UP (ref 0–41)
AMYLASE P1 CFR SERPL: 28 U/L — SIGNIFICANT CHANGE UP (ref 25–115)
ANION GAP SERPL CALC-SCNC: 11 MMOL/L — SIGNIFICANT CHANGE UP (ref 7–14)
AST SERPL-CCNC: 20 U/L — SIGNIFICANT CHANGE UP (ref 0–41)
BASOPHILS # BLD AUTO: 0.03 K/UL — SIGNIFICANT CHANGE UP (ref 0–0.2)
BASOPHILS NFR BLD AUTO: 0.4 % — SIGNIFICANT CHANGE UP (ref 0–1)
BILIRUB SERPL-MCNC: <0.2 MG/DL — SIGNIFICANT CHANGE UP (ref 0.2–1.2)
BUN SERPL-MCNC: 11 MG/DL — SIGNIFICANT CHANGE UP (ref 10–20)
CALCIUM SERPL-MCNC: 8.3 MG/DL — LOW (ref 8.5–10.1)
CHLORIDE SERPL-SCNC: 98 MMOL/L — SIGNIFICANT CHANGE UP (ref 98–110)
CO2 SERPL-SCNC: 25 MMOL/L — SIGNIFICANT CHANGE UP (ref 17–32)
CREAT SERPL-MCNC: 1.8 MG/DL — HIGH (ref 0.7–1.5)
EGFR: 46 ML/MIN/1.73M2 — LOW
EOSINOPHIL # BLD AUTO: 0.15 K/UL — SIGNIFICANT CHANGE UP (ref 0–0.7)
EOSINOPHIL NFR BLD AUTO: 2.1 % — SIGNIFICANT CHANGE UP (ref 0–8)
GLUCOSE BLDC GLUCOMTR-MCNC: 118 MG/DL — HIGH (ref 70–99)
GLUCOSE BLDC GLUCOMTR-MCNC: 186 MG/DL — HIGH (ref 70–99)
GLUCOSE BLDC GLUCOMTR-MCNC: 262 MG/DL — HIGH (ref 70–99)
GLUCOSE BLDC GLUCOMTR-MCNC: 307 MG/DL — HIGH (ref 70–99)
GLUCOSE SERPL-MCNC: 211 MG/DL — HIGH (ref 70–99)
HCT VFR BLD CALC: 35.7 % — LOW (ref 42–52)
HGB BLD-MCNC: 12.3 G/DL — LOW (ref 14–18)
IMM GRANULOCYTES NFR BLD AUTO: 0.4 % — HIGH (ref 0.1–0.3)
LYMPHOCYTES # BLD AUTO: 1.74 K/UL — SIGNIFICANT CHANGE UP (ref 1.2–3.4)
LYMPHOCYTES # BLD AUTO: 24 % — SIGNIFICANT CHANGE UP (ref 20.5–51.1)
MAGNESIUM SERPL-MCNC: 1.7 MG/DL — LOW (ref 1.8–2.4)
MCHC RBC-ENTMCNC: 31.1 PG — HIGH (ref 27–31)
MCHC RBC-ENTMCNC: 34.5 G/DL — SIGNIFICANT CHANGE UP (ref 32–37)
MCV RBC AUTO: 90.4 FL — SIGNIFICANT CHANGE UP (ref 80–94)
MONOCYTES # BLD AUTO: 0.89 K/UL — HIGH (ref 0.1–0.6)
MONOCYTES NFR BLD AUTO: 12.3 % — HIGH (ref 1.7–9.3)
NEUTROPHILS # BLD AUTO: 4.41 K/UL — SIGNIFICANT CHANGE UP (ref 1.4–6.5)
NEUTROPHILS NFR BLD AUTO: 60.8 % — SIGNIFICANT CHANGE UP (ref 42.2–75.2)
NRBC # BLD: 0 /100 WBCS — SIGNIFICANT CHANGE UP (ref 0–0)
PLATELET # BLD AUTO: 192 K/UL — SIGNIFICANT CHANGE UP (ref 130–400)
POTASSIUM SERPL-MCNC: 4.2 MMOL/L — SIGNIFICANT CHANGE UP (ref 3.5–5)
POTASSIUM SERPL-SCNC: 4.2 MMOL/L — SIGNIFICANT CHANGE UP (ref 3.5–5)
PROT SERPL-MCNC: 5 G/DL — LOW (ref 6–8)
RBC # BLD: 3.95 M/UL — LOW (ref 4.7–6.1)
RBC # FLD: 12.1 % — SIGNIFICANT CHANGE UP (ref 11.5–14.5)
SODIUM SERPL-SCNC: 134 MMOL/L — LOW (ref 135–146)
SURGICAL PATHOLOGY STUDY: SIGNIFICANT CHANGE UP
WBC # BLD: 7.25 K/UL — SIGNIFICANT CHANGE UP (ref 4.8–10.8)
WBC # FLD AUTO: 7.25 K/UL — SIGNIFICANT CHANGE UP (ref 4.8–10.8)

## 2022-05-22 PROCEDURE — 99233 SBSQ HOSP IP/OBS HIGH 50: CPT

## 2022-05-22 RX ORDER — VALACYCLOVIR 500 MG/1
1 TABLET, FILM COATED ORAL
Qty: 18 | Refills: 0
Start: 2022-05-22 | End: 2022-05-27

## 2022-05-22 RX ORDER — IBUPROFEN 200 MG
1 TABLET ORAL
Qty: 0 | Refills: 0 | DISCHARGE

## 2022-05-22 RX ORDER — SODIUM CHLORIDE 9 MG/ML
1000 INJECTION, SOLUTION INTRAVENOUS
Refills: 0 | Status: DISCONTINUED | OUTPATIENT
Start: 2022-05-22 | End: 2022-05-24

## 2022-05-22 RX ORDER — AMITRIPTYLINE HCL 25 MG
1 TABLET ORAL
Qty: 30 | Refills: 0
Start: 2022-05-22 | End: 2022-06-20

## 2022-05-22 RX ADMIN — Medication 3 UNIT(S): at 08:05

## 2022-05-22 RX ADMIN — Medication 1: at 08:05

## 2022-05-22 RX ADMIN — Medication 1 GRAM(S): at 05:57

## 2022-05-22 RX ADMIN — Medication 3 UNIT(S): at 17:08

## 2022-05-22 RX ADMIN — Medication 1 GRAM(S): at 00:43

## 2022-05-22 RX ADMIN — HEPARIN SODIUM 5000 UNIT(S): 5000 INJECTION INTRAVENOUS; SUBCUTANEOUS at 13:44

## 2022-05-22 RX ADMIN — GABAPENTIN 300 MILLIGRAM(S): 400 CAPSULE ORAL at 17:10

## 2022-05-22 RX ADMIN — Medication 3: at 17:09

## 2022-05-22 RX ADMIN — SENNA PLUS 2 TABLET(S): 8.6 TABLET ORAL at 21:46

## 2022-05-22 RX ADMIN — Medication 145 MILLIGRAM(S): at 11:59

## 2022-05-22 RX ADMIN — SODIUM CHLORIDE 100 MILLILITER(S): 9 INJECTION, SOLUTION INTRAVENOUS at 19:09

## 2022-05-22 RX ADMIN — Medication 60 MILLIGRAM(S): at 05:29

## 2022-05-22 RX ADMIN — Medication 3 UNIT(S): at 11:58

## 2022-05-22 RX ADMIN — Medication 1 GRAM(S): at 11:59

## 2022-05-22 RX ADMIN — MORPHINE SULFATE 2 MILLIGRAM(S): 50 CAPSULE, EXTENDED RELEASE ORAL at 00:46

## 2022-05-22 RX ADMIN — HEPARIN SODIUM 5000 UNIT(S): 5000 INJECTION INTRAVENOUS; SUBCUTANEOUS at 21:56

## 2022-05-22 RX ADMIN — Medication 25 MILLIGRAM(S): at 05:31

## 2022-05-22 RX ADMIN — Medication 100 MILLIGRAM(S): at 05:30

## 2022-05-22 RX ADMIN — ATORVASTATIN CALCIUM 40 MILLIGRAM(S): 80 TABLET, FILM COATED ORAL at 21:46

## 2022-05-22 RX ADMIN — VALACYCLOVIR 1000 MILLIGRAM(S): 500 TABLET, FILM COATED ORAL at 13:41

## 2022-05-22 RX ADMIN — PANTOPRAZOLE SODIUM 40 MILLIGRAM(S): 20 TABLET, DELAYED RELEASE ORAL at 17:11

## 2022-05-22 RX ADMIN — VALACYCLOVIR 1000 MILLIGRAM(S): 500 TABLET, FILM COATED ORAL at 05:29

## 2022-05-22 RX ADMIN — GABAPENTIN 300 MILLIGRAM(S): 400 CAPSULE ORAL at 05:33

## 2022-05-22 RX ADMIN — VALACYCLOVIR 1000 MILLIGRAM(S): 500 TABLET, FILM COATED ORAL at 21:50

## 2022-05-22 RX ADMIN — Medication 1 GRAM(S): at 17:10

## 2022-05-22 RX ADMIN — PANTOPRAZOLE SODIUM 40 MILLIGRAM(S): 20 TABLET, DELAYED RELEASE ORAL at 05:30

## 2022-05-22 RX ADMIN — INSULIN GLARGINE 34 UNIT(S): 100 INJECTION, SOLUTION SUBCUTANEOUS at 21:49

## 2022-05-22 RX ADMIN — SPIRONOLACTONE 25 MILLIGRAM(S): 25 TABLET, FILM COATED ORAL at 05:30

## 2022-05-22 RX ADMIN — HEPARIN SODIUM 5000 UNIT(S): 5000 INJECTION INTRAVENOUS; SUBCUTANEOUS at 05:31

## 2022-05-22 NOTE — DISCHARGE NOTE PROVIDER - CARE PROVIDER_API CALL
Ilya Last)  Internal Medicine; Nephrology  [address] [place] [address]  Phone: [phone]  Fax: [phone]  Follow Up Time:     PMD,   Primary care doctor  Phone: (   )    -  Fax: (   )    -  Follow Up Time: 1 week   Ilya Last)  Internal Medicine; Nephrology  [address] [place] [address]  Phone: [phone]  Fax: [phone]  Follow Up Time:     Presley Bowens)  Internal Medicine  [address] [facility], [address] [place] [address]  Phone: [phone]  Fax: [phone]  Follow Up Time:     Emmy Cesar  INTERNAL MEDICINE  [address] [place] [address]  Phone: [phone]  Fax: [phone]  Follow Up Time:    Ilya Last)  Internal Medicine; Nephrology  [address] [place] [address]  Phone: [phone]  Fax: [phone]  Follow Up Time: 1 week    Presley Bowens)  Internal Medicine  [address] [facility], [address] [place] [address]  Phone: [phone]  Fax: [phone]  Follow Up Time: 1 month    Emmy Cesar  INTERNAL MEDICINE  [address] [place] [address]  Phone: [phone]  Fax: [phone]  Follow Up Time: 1 month

## 2022-05-22 NOTE — DISCHARGE NOTE PROVIDER - NSDCMRMEDTOKEN_GEN_ALL_CORE_FT
Albuterol (Eqv-ProAir HFA) 90 mcg/inh inhalation aerosol: 2 puff(s) inhaled every 6 hours  amitriptyline 10 mg oral tablet: 1 tab(s) orally once a day (at bedtime)   fenofibrate 145 mg oral tablet: 1 tab(s) orally once a day  HumaLOG 100 units/mL subcutaneous solution: 5 unit(s) subcutaneous 3 times a day (before meals)  sliding scale  hydroCHLOROthiazide: 37.5 milligram(s) orally once a day  meclizine 25 mg oral tablet: 1 tab(s) orally every 8 hours, As Needed   Protonix 40 mg oral delayed release tablet: 1 tab(s) orally once a day  spironolactone 25 mg oral tablet: 1 tab(s) orally once a day  Toprol- mg oral tablet, extended release: 1 tab(s) orally once a day  Tresiba 100 units/mL subcutaneous solution: 40 unit(s) subcutaneous once a day  valACYclovir 1 g oral tablet: 1 tab(s) orally 3 times a day    Albuterol (Eqv-ProAir HFA) 90 mcg/inh inhalation aerosol: 2 puff(s) inhaled every 6 hours  amitriptyline 10 mg oral tablet: 1 tab(s) orally once a day (at bedtime)   fenofibrate 145 mg oral tablet: 1 tab(s) orally once a day  HumaLOG 100 units/mL subcutaneous solution: 5 unit(s) subcutaneous 3 times a day (before meals)  sliding scale  meclizine 25 mg oral tablet: 1 tab(s) orally every 8 hours, As Needed   Protonix 40 mg oral delayed release tablet: 1 tab(s) orally once a day  Toprol- mg oral tablet, extended release: 1 tab(s) orally once a day  traMADol 50 mg oral tablet: 0.5 tab(s) orally every 4 hours, As needed, Severe Pain (7 - 10)  Tresiba 100 units/mL subcutaneous solution: 40 unit(s) subcutaneous once a day  valACYclovir 1 g oral tablet: 1 tab(s) orally 3 times a day    Albuterol (Eqv-ProAir HFA) 90 mcg/inh inhalation aerosol: 2 puff(s) inhaled every 6 hours  amitriptyline 10 mg oral tablet: 1 tab(s) orally once a day (at bedtime)   fenofibrate 145 mg oral tablet: 1 tab(s) orally once a day  HumaLOG 100 units/mL subcutaneous solution: 5 unit(s) subcutaneous 3 times a day (before meals)  sliding scale  meclizine 25 mg oral tablet: 1 tab(s) orally every 8 hours, As Needed   midodrine 2.5 mg oral tablet: 1 tab(s) orally 3 times a day. Hold if Systolic BP &gt;140 or Diastolic BP &gt; 100   Protonix 40 mg oral delayed release tablet: 1 tab(s) orally once a day  Toprol- mg oral tablet, extended release: 1 tab(s) orally once a day  traMADol 50 mg oral tablet: 0.5 tab(s) orally every 4 hours, As needed, Severe Pain (7 - 10)  Tresiba 100 units/mL subcutaneous solution: 40 unit(s) subcutaneous once a day   Albuterol (Eqv-ProAir HFA) 90 mcg/inh inhalation aerosol: 2 puff(s) inhaled every 6 hours  fenofibrate 145 mg oral tablet: 1 tab(s) orally once a day  gabapentin 600 mg oral tablet: 1 tab(s) orally 2 times a day  HumaLOG 100 units/mL subcutaneous solution: 5 unit(s) subcutaneous 3 times a day (before meals)  sliding scale  meclizine 25 mg oral tablet: 1 tab(s) orally every 8 hours, As Needed   midodrine 5 mg oral tablet: 1 tab(s) orally 3 times a day  NIFEdipine 60 mg oral tablet, extended release: 1 tab(s) orally once a day  Protonix 40 mg oral delayed release tablet: 1 tab(s) orally once a day  Toprol- mg oral tablet, extended release: 1 tab(s) orally once a day  traMADol 50 mg oral tablet: 0.5 tab(s) orally every 4 hours, As needed, Severe Pain (7 - 10)  Tresiba 100 units/mL subcutaneous solution: 40 unit(s) subcutaneous once a day   Albuterol (Eqv-ProAir HFA) 90 mcg/inh inhalation aerosol: 2 puff(s) inhaled every 6 hours  fenofibrate 145 mg oral tablet: 1 tab(s) orally once a day  gabapentin 600 mg oral tablet: 1 tab(s) orally 2 times a day  HumaLOG 100 units/mL subcutaneous solution: 5 unit(s) subcutaneous 3 times a day (before meals)  sliding scale  meclizine 25 mg oral tablet: 1 tab(s) orally every 8 hours, As Needed   midodrine 5 mg oral tablet: 1 tab(s) orally 3 times a day  Hold if SBP &gt;140, DBP &gt;100  NIFEdipine 60 mg oral tablet, extended release: 1 tab(s) orally once a day  Protonix 40 mg oral delayed release tablet: 1 tab(s) orally once a day  Toprol- mg oral tablet, extended release: 1 tab(s) orally once a day  traMADol 50 mg oral tablet: 0.5 tab(s) orally every 4 hours, As needed, Severe Pain (7 - 10)  Tresiba 100 units/mL subcutaneous solution: 40 unit(s) subcutaneous once a day   Albuterol (Eqv-ProAir HFA) 90 mcg/inh inhalation aerosol: 2 puff(s) inhaled every 6 hours  atorvastatin 40 mg oral tablet: 1 tab(s) orally once a day (at bedtime)  fenofibrate 145 mg oral tablet: 1 tab(s) orally once a day  gabapentin 600 mg oral tablet: 1 tab(s) orally 2 times a day  HumaLOG 100 units/mL subcutaneous solution: 5 unit(s) subcutaneous 3 times a day (before meals)  sliding scale  meclizine 25 mg oral tablet: 1 tab(s) orally every 8 hours, As Needed   midodrine 5 mg oral tablet: 1 tab(s) orally 3 times a day  Hold if SBP &gt;140, DBP &gt;100  NIFEdipine 60 mg oral tablet, extended release: 1 tab(s) orally once a day  Protonix 40 mg oral delayed release tablet: 1 tab(s) orally once a day  Toprol- mg oral tablet, extended release: 1 tab(s) orally once a day  traMADol 50 mg oral tablet: 0.5 tab(s) orally every 4 hours, As needed, Severe Pain (7 - 10)  Tresiba 100 units/mL subcutaneous solution: 40 unit(s) subcutaneous once a day

## 2022-05-22 NOTE — DISCHARGE NOTE NURSING/CASE MANAGEMENT/SOCIAL WORK - NSDCPEFALRISK_GEN_ALL_CORE
For information on Fall & Injury Prevention, visit: [email or web address] OR  [email or web address] OR  https://www.cdc.gov/steadi/patient.html

## 2022-05-22 NOTE — DISCHARGE NOTE PROVIDER - NSDCCPCAREPLAN_GEN_ALL_CORE_FT
PRINCIPAL DISCHARGE DIAGNOSIS  Diagnosis: Chest pain  Assessment and Plan of Treatment: This is likely due to herpes zoster also known as shingles. Please take Valacyclovir (Valtrex) 3 times daily for 6 more days. You were also prescribed amytriptiline which has been shown to reduce pain and prevent pain after rash resolves. This medication can be increased up to 150 mg at bedtime. If the initial dose of 10 mg nightly doesn't help with pain please call your PMD to increase dose. This rash is contagious until the blisters have scabbed over so please isolate.   You additionally underwent EGD which showed gastritis. Avoid taking NSAIDs (Advil, Aleve, Motrin etc) as this can worsen it.      SECONDARY DISCHARGE DIAGNOSES  Diagnosis: Chronic kidney disease, unspecified CKD stage  Assessment and Plan of Treatment: Please follow with a nephrologist to prevent worsening of your kidneys.    Diagnosis: Elevated troponin  Assessment and Plan of Treatment:     Diagnosis: Hyperglycemia  Assessment and Plan of Treatment:      PRINCIPAL DISCHARGE DIAGNOSIS  Diagnosis: Chest pain  Assessment and Plan of Treatment: Your chest pain is likely multifactorial - shingles +/- diabetes; you were seen by cardiology and it was deemed unlikely cardiac in nature. During hospitalization, your pain was treated and you were monitored closely.      SECONDARY DISCHARGE DIAGNOSES  Diagnosis: Diabetes mellitus  Assessment and Plan of Treatment: You were found to have a hemoglobin A1c (blood test measures your average blood sugar levels over the past 3 months) of 10.9, which is very high, meaning your diabetes is uncontrolled. Uncontrolled diabetes can lead to complications, one of which is gastroparesis, which is is a motility disorder characterized by delayed gastric emptying, which can be contributing to your presenting pain. Please follow up with your primary care doctor as well as an endocrinologist after discharge further further evaluation and better control of your diabetes to prevent complications going forward.    Diagnosis: Orthostatic hypotension  Assessment and Plan of Treatment: Orthostatic hypotension is a drop in blood pressure that occurs when moving from laying to sitting and standing. This drop in blood pressure is causing you to have symptoms such as lightheadedness, unsteadiness, dizziness, blurry vision, weakness etc. You were evaluated by Physical Therapy and after close discussion you will be going to a rehab facility to continue therapy in conjuction w blood pressure monitoring.    Diagnosis: Shingles  Assessment and Plan of Treatment: You were found to have shingles which is a viral infection of nerves associated with rash and pains (could be another contributing factor to your presenting pains). You were seen by infectious diease team and completed a 7 day course of antiviral, valacyclovir.    Diagnosis: CKD (chronic kidney disease), stage II  Assessment and Plan of Treatment: Please follow up with kidney doctor (nephrologist) for further evaluation and monitoring of your kidney function.     PRINCIPAL DISCHARGE DIAGNOSIS  Diagnosis: Chest pain  Assessment and Plan of Treatment: Your chest pain is likely multifactorial - shingles +/- diabetes; you were seen by cardiology and it was deemed unlikely cardiac in nature. During hospitalization, your pain was treated and you were monitored closely.      SECONDARY DISCHARGE DIAGNOSES  Diagnosis: Diabetes mellitus  Assessment and Plan of Treatment: You were found to have a hemoglobin A1c (blood test measures your average blood sugar levels over the past 3 months) of 10.9, which is very high, meaning your diabetes is uncontrolled. Uncontrolled diabetes can lead to complications, one of which is gastroparesis, which is is a motility disorder characterized by delayed gastric emptying, which can be contributing to your presenting pain. Please follow up with your primary care doctor as well as an endocrinologist after discharge further further evaluation and better control of your diabetes to prevent complications going forward.    Diagnosis: Orthostatic hypotension  Assessment and Plan of Treatment: Orthostatic hypotension is a drop in blood pressure that occurs when moving from laying to sitting and standing. This drop in blood pressure is causing you to have symptoms such as lightheadedness, unsteadiness, dizziness, blurry vision, weakness etc. You were evaluated by Physical Therapy and after close discussion you will be going to a rehab facility to continue therapy in conjuction w blood pressure monitoring.    Diagnosis: Shingles  Assessment and Plan of Treatment: You were found to have shingles which is a viral infection of nerves associated with rash and pains (could be another contributing factor to your presenting pains). You were seen by infectious diease team and completed a 7 day course of antiviral, valacyclovir.    Diagnosis: CKD (chronic kidney disease), stage II  Assessment and Plan of Treatment: Please follow up with kidney doctor (nephrologist) for further evaluation and monitoring of your kidney function.    Diagnosis: Hypertension  Assessment and Plan of Treatment: Your bp medication regimen was adjusted during hospitalization - diuretics (hydrochlorothiazide and spironolactone) were stopped due to their affects on kidney function and you were started on Nifedipine instead. Please follow up with your primary care doctor for further evaluation of bp and kidney function, along with medication reconcilliation after discharge.

## 2022-05-22 NOTE — DISCHARGE NOTE PROVIDER - CARE PROVIDERS DIRECT ADDRESSES
[email or web address]

## 2022-05-22 NOTE — DISCHARGE NOTE NURSING/CASE MANAGEMENT/SOCIAL WORK - PATIENT PORTAL LINK FT
You can access the FollowMyHealth Patient Portal offered by [facility] by registering at the following website: [email or web address]. By joining [employer]’s FollowMyHealth portal, you will also be able to view your health information using other applications (apps) compatible with our system.

## 2022-05-22 NOTE — DISCHARGE NOTE PROVIDER - EXTENDED VTE YES NO FOR MLM ASPIRIN
Plan  AzaTHIOprine 50 MG Oral Tablet (Imuran); TAKE 1 TABLET DAILY  CBC WITH AUTOMATED DIFFERENTIAL; Status:Active; Requested for:30Oct2017;   COMP METABOLIC PNL; Status:Active; Requested for:30Oct2017;     Message   Recorded as Task   Date: 10/23/2017 11:33 AM, Created By: Brianna Cisneros   Task Name: ,Provider Clinical Communication   Assigned To: Shannon Becker   Regarding Patient: CONNOR WICK, Status: In Progress   Comment:    Brianna Cisneros - 23 Oct 2017 11:33 AM     TASK CREATED  Pt phoned stating that all of her symptoms have returned.   1. Double Vision  2. Aphasia  3. Limb weakness  4. Slurred speech  5. Dysphagia  She is taking the Mestinon and Prednisone as prescribed, we sent a referral to [facility] on 10/9/2017, she has not yet heard form them to schedule.     Recommendations?   SORAYA ALEGRE - 23 Oct 2017 3:38 PM     TASK REPLIED TO: Previously Assigned To SORAYA ALEGRE  I spoke to patient and I reviewed her symptoms including double vision, dysarthria, some dysphagia, and limb weakness.  No expressive or receptive aphasia reported.  #1.  I want to start Imuran 50 mg daily  #2.  Lab monitoring needed including baseline CBC and CMP weekly X 4, then every 2 weeks for 4 weeks, and then every month.   #3.  Side effects of Imuran were reviewed including leukopenia, thrombocytopenia, myelosuppression, infection, PML, vomiting, nausea, elevation of liver enzymes, and malignancy.  #4.  Increase prednisone to 40 mg daily.  #5.  She needs to return for sooner follow-up.   #6.  Continue with Mestinon as it is.   Brianna Cisneros - 23 Oct 2017 3:52 PM     TASK EDITED  Phoned patient, she will have the labs drawn at [facility] tomorrow am.  Side effects of the medication were discussed again in detail.   Patient was given the number for [facility] so that she may inquire about her referral.   Prednisone script for 20 mg tablets (2 daily) was faxed to her pharmacy.   Brianna Cisneros - 23 Oct 2017 3:52 PM      TASK IN PROGRESS   Brianna Cisneros - 23 Oct 2017 3:52 PM     TASK EDITED   Brianna Cisneros - 25 Oct 2017 3:16 PM     TASK EDITED  Can you please check [facility] for results of CBC and CMP? Thank you!   Britney Jones - 25 Oct 2017 4:02 PM     TASK EDITED  Patient states she will be going in the morning to [facility] to have the labs drawn   Brianna Cisneros - 25 Oct 2017 4:13 PM     TASK EDITED   Brianna Cisneros - 27 Oct 2017 11:03 AM     TASK IN PROGRESS   Brianna Cisneros - 27 Oct 2017 4:24 PM     TASK REASSIGNED: Previously Assigned To Brianna Cisneros  can you check for her labs on Monday?   Shannon Becker - 27 Oct 2017 4:37 PM     TASK EDITED  LM for pt to call back. Hodan reviewed lab results in chart.   Shannon Becker - 30 Oct 2017 2:30 PM     TASK EDITED  Spoke w/ pt to notify of n/o's. New script sent to pharmacy. SE discussed. Lab orders faxed to [facility]. Pt stated she is schd with neuromuscular specialist at [facility] on 11/08/17.     Signatures   Electronically signed by : Shannon Becekr CMA; Oct 30 2017  2:34PM CST     ,

## 2022-05-22 NOTE — PROGRESS NOTE ADULT - SUBJECTIVE AND OBJECTIVE BOX
CHIEF COMPLAINT:    Patient is a 47y old  Male who presents with a chief complaint of epigastric pain (22 May 2022 10:56)      INTERVAL HPI/OVERNIGHT EVENTS:    Patient seen and examined at bedside. No acute overnight events occurred.    ROS: Reports dizziness espsecially when standingAll other systems are negative.    Medications:  Standing  atorvastatin 40 milliGRAM(s) Oral at bedtime  dextrose 5%. 1000 milliLiter(s) IV Continuous <Continuous>  dextrose 5%. 1000 milliLiter(s) IV Continuous <Continuous>  dextrose 5%. 1000 milliLiter(s) IV Continuous <Continuous>  dextrose 50% Injectable 25 Gram(s) IV Push once  dextrose 50% Injectable 12.5 Gram(s) IV Push once  dextrose 50% Injectable 25 Gram(s) IV Push once  fenofibrate Tablet 145 milliGRAM(s) Oral daily  gabapentin 300 milliGRAM(s) Oral two times a day  glucagon  Injectable 1 milliGRAM(s) IntraMuscular once  glucagon  Injectable 1 milliGRAM(s) IntraMuscular once  heparin   Injectable 5000 Unit(s) SubCutaneous every 8 hours  hydrALAZINE Injectable 10 milliGRAM(s) IV Push once  hydrochlorothiazide 25 milliGRAM(s) Oral daily  insulin glargine Injectable (LANTUS) 34 Unit(s) SubCutaneous at bedtime  insulin lispro (ADMELOG) corrective regimen sliding scale   SubCutaneous three times a day before meals  insulin lispro Injectable (ADMELOG) 3 Unit(s) SubCutaneous three times a day before meals  lactated ringers. 1000 milliLiter(s) IV Continuous <Continuous>  metoprolol succinate  milliGRAM(s) Oral daily  NIFEdipine XL 60 milliGRAM(s) Oral daily  oxyCODONE    IR 5 milliGRAM(s) Oral once  pantoprazole    Tablet 40 milliGRAM(s) Oral two times a day  senna 2 Tablet(s) Oral at bedtime  spironolactone 25 milliGRAM(s) Oral daily  sucralfate 1 Gram(s) Oral four times a day  valACYclovir 1000 milliGRAM(s) Oral three times a day    PRN Meds  ALBUTerol    90 MICROgram(s) HFA Inhaler 2 Puff(s) Inhalation every 6 hours PRN  aluminum hydroxide/magnesium hydroxide/simethicone Suspension 30 milliLiter(s) Oral every 4 hours PRN  dextrose Oral Gel 15 Gram(s) Oral once PRN  meclizine 25 milliGRAM(s) Oral three times a day PRN  morphine  - Injectable 2 milliGRAM(s) IV Push every 6 hours PRN  ondansetron Injectable 4 milliGRAM(s) IV Push every 4 hours PRN  polyethylene glycol 3350 17 Gram(s) Oral two times a day PRN        Vital Signs:    T(F): 98.2 (05-22-22 @ 05:24), Max: 98.2 (05-22-22 @ 05:24)  HR: 80 (05-22-22 @ 05:24) (74 - 80)  BP: 142/66 (05-22-22 @ 07:18) (126/59 - 174/91)  RR: 20 (05-22-22 @ 05:24) (18 - 20)    POCT Blood Glucose.: 118 mg/dL (22 May 2022 11:21)  POCT Blood Glucose.: 186 mg/dL (22 May 2022 07:38)  POCT Blood Glucose.: 203 mg/dL (21 May 2022 21:43)  POCT Blood Glucose.: 203 mg/dL (21 May 2022 16:39)      PHYSICAL EXAM:  GENERAL:  NAD  SKIN: + herpes zoster right lower pectoral area, possible on back too  HEENT: Atraumatic. Normocephalic. Anicteric  NECK:  No JVD.   PULMONARY: Clear to ausculation bilaterally. No wheezing. No rales  CVS: Normal S1, S2. Regular rate and rhythm. No murmurs.  ABDOMEN/GI: Soft, Nontender, Nondistended; Bowel sounds are present  EXTREMITIES:  No edema B/L LE.  NEUROLOGIC:  No motor deficit.  PSYCH: Alert & oriented x 3, normal affect      LABS:                        12.3   7.25  )-----------( 192      ( 22 May 2022 09:03 )             35.7     05-22    134<L>  |  98  |  11  ----------------------------<  211<H>  4.2   |  25  |  1.8<H>    Ca    8.3<L>      22 May 2022 09:03  Mg     1.7     05-22    TPro  5.0<L>  /  Alb  2.7<L>  /  TBili  <0.2  /  DBili  x   /  AST  20  /  ALT  15  /  AlkPhos  122<H>  05-22              RADIOLOGY & ADDITIONAL TESTS:  Imaging or report Personally Reviewed:  [ ] YES  [ ] NO -->no new images      Consultant(s) Notes Reviewed:  [ ] YES  [ ] NO  Care Discussed with Consultants/Other Providers [ ] YES  [ ] NO    Case discussed with resident  Care discussed with pt

## 2022-05-22 NOTE — DISCHARGE NOTE PROVIDER - PROVIDER TOKENS
PROVIDER:[TOKEN:[[id number]]],FREE:[LAST:[PMD],PHONE:[(   )    -],FAX:[(   )    -],ADDRESS:[Primary care doctor],FOLLOWUP:[1 week]] PROVIDER:[TOKEN:[[id number]]],PROVIDER:[TOKEN:[[id number]]],PROVIDER:[TOKEN:[[id number]]] PROVIDER:[TOKEN:[[id number]],FOLLOWUP:[1 week]],PROVIDER:[TOKEN:[[id number]],FOLLOWUP:[1 month]],PROVIDER:[TOKEN:[[id number]],FOLLOWUP:[1 month]]

## 2022-05-22 NOTE — DISCHARGE NOTE PROVIDER - NSDCPNSUBOBJ_GEN_ALL_CORE
Pt seen and examined at bedside. Reports pain is improving. Pt feels ready to go home. Councilled and follow up with his PMD for HIV testing and for work up for immunocomprised status.

## 2022-05-22 NOTE — PROGRESS NOTE ADULT - ASSESSMENT
46 yo M PMHx HTN, AMBER, DM II, vertigo, diverticulosis s/p colon resection presented for evaluation of epigastric pain. Course complicated by herpes zoster and now symptomatic orthostatics.    Herpes zoster  - contact precautions  - valacyclovir  - Amitriptyline for herpetic neuralgia     Dizziness  Orthostatic hypotension  - pt was discharged today but felt very dizzy when standing. Orthostatics positive  - pt to stay overnight for IVF and reassess in AM  - hold hctz, spironolactone in setting of orthostatics      Epigastric pain  - unclear etiology--could be herpetic neuralgia  - treated for pancreatitis (mild interstitial edema on CT scan, epigastric pain but lipase 18)  - has cholelithiasis but no dilated CBD  - EGD today showed:              Irregularity in the Z-line and gastroesophageal junction.  	Erythema in the stomach compatible with non-erosive gastritis.  	Normal mucosa in the whole examined duodenum.  - case d/w cardiology--unlikely cardiac in nature  - Gi recommending no further work up inpatient but recommends gastric emptying study as outpt  - pt still unsure if he can tolerate diet. Advance diet and if pt can tolerate diet can likely be discharged in AM  - pt reports he was hospitalized at [facility] in [place]. Medical team attempted to obtain records but have not received call back yet.  - pt started on Advil for dental infection, could be GI upset from NSAIDs    Troponemia  T wave inversions in AvL and I  - discussed with cardiology--not concerned for ACS  - elevated troponin could be realted to Kasey, pt has chronically elevated troponin to 0.04    HTN  - HCTZ, spironolactone held due to KASEY  - BP uncontrolled  - c/w nifedipine, lipitor, hydralzaine  - was on HCTZ 37.5 / spironolactone 25 / toprol 100  - hold hctz, spironolactone in setting of orthostatics  - c/w nifedipine 60 XL qd and metoprolol 100 mg daily    KASEY vs CKD   - pt appears to be at baseline  - can restart HCTZ, spironolactone    Dental infection  - dental follow up pending    AMBER  - continue with home cpap    DM II  - FS controlled    Vertigo  - c/w meclizine    # DVT PPX: ambulatory, will avoid chemical ppx for now in case of unmasking bleed  # GI PPX: protonix 40 bid  # Diet: NPO after midnight   FULL CODE    Progress Note Handoff:  Pending (specify): monitor raffi nye in AM  Family discussion: discussed zoster  Disposition: Home_x__/SNF___/Other________/Unknown at this time________

## 2022-05-22 NOTE — DISCHARGE NOTE PROVIDER - ATTENDING DISCHARGE PHYSICAL EXAMINATION:
PHYSICAL EXAM:  GENERAL: NAD, speaks in full sentences, no signs of respiratory distress  HEAD:  Atraumatic, Normocephalic  EYES: EOMI, PERRLA, conjunctiva and sclera clear  NECK: Supple, No JVD  CHEST/LUNG: Clear to auscultation bilaterally; No wheeze; No crackles; No accessory muscles used  HEART: Regular rate and rhythm; No murmurs;   ABDOMEN: Soft, Nontender, Nondistended; Bowel sounds present; No guarding  EXTREMITIES:  2+ Peripheral Pulses, No cyanosis or edema  PSYCH: AAOx3  NEUROLOGY: non-focal  SKIN: No rashes or lesions   GENERAL: NAD, speaks in full sentences, no signs of respiratory distress  HEAD:  Atraumatic, Normocephalic  EYES: EOMI, PERRLA, conjunctiva and sclera clear  NECK: Supple, No JVD  CHEST/LUNG: Clear to auscultation bilaterally; No wheeze; No crackles; No accessory muscles used  HEART: Regular rate and rhythm; No murmurs;   ABDOMEN: Soft, Nontender, Nondistended; Bowel sounds present; No guarding  EXTREMITIES:  2+ Peripheral Pulses, No cyanosis or edema  PSYCH: AAOx3  NEUROLOGY: non-focal  SKIN: crusted over lesions on R upper abdomen and R flank

## 2022-05-23 LAB
ALBUMIN SERPL ELPH-MCNC: 2.7 G/DL — LOW (ref 3.5–5.2)
ALP SERPL-CCNC: 149 U/L — HIGH (ref 30–115)
ALT FLD-CCNC: 18 U/L — SIGNIFICANT CHANGE UP (ref 0–41)
ANION GAP SERPL CALC-SCNC: 9 MMOL/L — SIGNIFICANT CHANGE UP (ref 7–14)
AST SERPL-CCNC: 26 U/L — SIGNIFICANT CHANGE UP (ref 0–41)
BASOPHILS # BLD AUTO: 0.03 K/UL — SIGNIFICANT CHANGE UP (ref 0–0.2)
BASOPHILS NFR BLD AUTO: 0.5 % — SIGNIFICANT CHANGE UP (ref 0–1)
BILIRUB SERPL-MCNC: <0.2 MG/DL — SIGNIFICANT CHANGE UP (ref 0.2–1.2)
BUN SERPL-MCNC: 16 MG/DL — SIGNIFICANT CHANGE UP (ref 10–20)
CALCIUM SERPL-MCNC: 8.5 MG/DL — SIGNIFICANT CHANGE UP (ref 8.5–10.1)
CHLORIDE SERPL-SCNC: 99 MMOL/L — SIGNIFICANT CHANGE UP (ref 98–110)
CO2 SERPL-SCNC: 26 MMOL/L — SIGNIFICANT CHANGE UP (ref 17–32)
CREAT SERPL-MCNC: 1.9 MG/DL — HIGH (ref 0.7–1.5)
EGFR: 43 ML/MIN/1.73M2 — LOW
EOSINOPHIL # BLD AUTO: 0.14 K/UL — SIGNIFICANT CHANGE UP (ref 0–0.7)
EOSINOPHIL NFR BLD AUTO: 2.2 % — SIGNIFICANT CHANGE UP (ref 0–8)
GLUCOSE BLDC GLUCOMTR-MCNC: 221 MG/DL — HIGH (ref 70–99)
GLUCOSE BLDC GLUCOMTR-MCNC: 250 MG/DL — HIGH (ref 70–99)
GLUCOSE BLDC GLUCOMTR-MCNC: 344 MG/DL — HIGH (ref 70–99)
GLUCOSE BLDC GLUCOMTR-MCNC: 346 MG/DL — HIGH (ref 70–99)
GLUCOSE SERPL-MCNC: 339 MG/DL — HIGH (ref 70–99)
HCT VFR BLD CALC: 35.9 % — LOW (ref 42–52)
HGB BLD-MCNC: 12.5 G/DL — LOW (ref 14–18)
IMM GRANULOCYTES NFR BLD AUTO: 0.5 % — HIGH (ref 0.1–0.3)
LYMPHOCYTES # BLD AUTO: 1.79 K/UL — SIGNIFICANT CHANGE UP (ref 1.2–3.4)
LYMPHOCYTES # BLD AUTO: 27.9 % — SIGNIFICANT CHANGE UP (ref 20.5–51.1)
MAGNESIUM SERPL-MCNC: 1.6 MG/DL — LOW (ref 1.8–2.4)
MCHC RBC-ENTMCNC: 31.1 PG — HIGH (ref 27–31)
MCHC RBC-ENTMCNC: 34.8 G/DL — SIGNIFICANT CHANGE UP (ref 32–37)
MCV RBC AUTO: 89.3 FL — SIGNIFICANT CHANGE UP (ref 80–94)
MONOCYTES # BLD AUTO: 0.7 K/UL — HIGH (ref 0.1–0.6)
MONOCYTES NFR BLD AUTO: 10.9 % — HIGH (ref 1.7–9.3)
NEUTROPHILS # BLD AUTO: 3.73 K/UL — SIGNIFICANT CHANGE UP (ref 1.4–6.5)
NEUTROPHILS NFR BLD AUTO: 58 % — SIGNIFICANT CHANGE UP (ref 42.2–75.2)
NRBC # BLD: 0 /100 WBCS — SIGNIFICANT CHANGE UP (ref 0–0)
PLATELET # BLD AUTO: 198 K/UL — SIGNIFICANT CHANGE UP (ref 130–400)
POTASSIUM SERPL-MCNC: 4.1 MMOL/L — SIGNIFICANT CHANGE UP (ref 3.5–5)
POTASSIUM SERPL-SCNC: 4.1 MMOL/L — SIGNIFICANT CHANGE UP (ref 3.5–5)
PROT SERPL-MCNC: 5 G/DL — LOW (ref 6–8)
RBC # BLD: 4.02 M/UL — LOW (ref 4.7–6.1)
RBC # FLD: 12 % — SIGNIFICANT CHANGE UP (ref 11.5–14.5)
SODIUM SERPL-SCNC: 134 MMOL/L — LOW (ref 135–146)
WBC # BLD: 6.42 K/UL — SIGNIFICANT CHANGE UP (ref 4.8–10.8)
WBC # FLD AUTO: 6.42 K/UL — SIGNIFICANT CHANGE UP (ref 4.8–10.8)

## 2022-05-23 PROCEDURE — 99233 SBSQ HOSP IP/OBS HIGH 50: CPT

## 2022-05-23 RX ORDER — MAGNESIUM SULFATE 500 MG/ML
2 VIAL (ML) INJECTION ONCE
Refills: 0 | Status: COMPLETED | OUTPATIENT
Start: 2022-05-23 | End: 2022-05-23

## 2022-05-23 RX ORDER — ACETAMINOPHEN 500 MG
650 TABLET ORAL EVERY 6 HOURS
Refills: 0 | Status: DISCONTINUED | OUTPATIENT
Start: 2022-05-23 | End: 2022-05-31

## 2022-05-23 RX ORDER — INSULIN LISPRO 100/ML
5 VIAL (ML) SUBCUTANEOUS ONCE
Refills: 0 | Status: COMPLETED | OUTPATIENT
Start: 2022-05-23 | End: 2022-05-23

## 2022-05-23 RX ORDER — TRAMADOL HYDROCHLORIDE 50 MG/1
25 TABLET ORAL EVERY 6 HOURS
Refills: 0 | Status: DISCONTINUED | OUTPATIENT
Start: 2022-05-23 | End: 2022-05-24

## 2022-05-23 RX ADMIN — GABAPENTIN 300 MILLIGRAM(S): 400 CAPSULE ORAL at 17:38

## 2022-05-23 RX ADMIN — Medication 3 UNIT(S): at 17:12

## 2022-05-23 RX ADMIN — Medication 2: at 17:11

## 2022-05-23 RX ADMIN — INSULIN GLARGINE 34 UNIT(S): 100 INJECTION, SOLUTION SUBCUTANEOUS at 22:15

## 2022-05-23 RX ADMIN — VALACYCLOVIR 1000 MILLIGRAM(S): 500 TABLET, FILM COATED ORAL at 22:18

## 2022-05-23 RX ADMIN — TRAMADOL HYDROCHLORIDE 25 MILLIGRAM(S): 50 TABLET ORAL at 17:38

## 2022-05-23 RX ADMIN — Medication 3 UNIT(S): at 12:10

## 2022-05-23 RX ADMIN — Medication 5 UNIT(S): at 22:54

## 2022-05-23 RX ADMIN — Medication 1 GRAM(S): at 12:20

## 2022-05-23 RX ADMIN — SENNA PLUS 2 TABLET(S): 8.6 TABLET ORAL at 22:14

## 2022-05-23 RX ADMIN — Medication 25 GRAM(S): at 22:16

## 2022-05-23 RX ADMIN — VALACYCLOVIR 1000 MILLIGRAM(S): 500 TABLET, FILM COATED ORAL at 05:39

## 2022-05-23 RX ADMIN — Medication 145 MILLIGRAM(S): at 12:20

## 2022-05-23 RX ADMIN — VALACYCLOVIR 1000 MILLIGRAM(S): 500 TABLET, FILM COATED ORAL at 17:26

## 2022-05-23 RX ADMIN — Medication 1 GRAM(S): at 00:06

## 2022-05-23 RX ADMIN — PANTOPRAZOLE SODIUM 40 MILLIGRAM(S): 20 TABLET, DELAYED RELEASE ORAL at 17:37

## 2022-05-23 RX ADMIN — Medication 60 MILLIGRAM(S): at 05:36

## 2022-05-23 RX ADMIN — Medication 1 GRAM(S): at 17:38

## 2022-05-23 RX ADMIN — ATORVASTATIN CALCIUM 40 MILLIGRAM(S): 80 TABLET, FILM COATED ORAL at 22:14

## 2022-05-23 RX ADMIN — SODIUM CHLORIDE 100 MILLILITER(S): 9 INJECTION, SOLUTION INTRAVENOUS at 03:17

## 2022-05-23 RX ADMIN — HEPARIN SODIUM 5000 UNIT(S): 5000 INJECTION INTRAVENOUS; SUBCUTANEOUS at 14:00

## 2022-05-23 RX ADMIN — Medication 3 UNIT(S): at 08:26

## 2022-05-23 RX ADMIN — HEPARIN SODIUM 5000 UNIT(S): 5000 INJECTION INTRAVENOUS; SUBCUTANEOUS at 05:23

## 2022-05-23 RX ADMIN — GABAPENTIN 300 MILLIGRAM(S): 400 CAPSULE ORAL at 05:21

## 2022-05-23 RX ADMIN — HEPARIN SODIUM 5000 UNIT(S): 5000 INJECTION INTRAVENOUS; SUBCUTANEOUS at 22:14

## 2022-05-23 RX ADMIN — Medication 2: at 12:10

## 2022-05-23 RX ADMIN — PANTOPRAZOLE SODIUM 40 MILLIGRAM(S): 20 TABLET, DELAYED RELEASE ORAL at 05:22

## 2022-05-23 RX ADMIN — Medication 1 GRAM(S): at 05:23

## 2022-05-23 RX ADMIN — Medication 4: at 08:29

## 2022-05-23 RX ADMIN — Medication 100 MILLIGRAM(S): at 05:22

## 2022-05-23 NOTE — PROGRESS NOTE ADULT - SUBJECTIVE AND OBJECTIVE BOX
BRINDA MOYER 47y Male  MRN#: [id number]   CODE STATUS:FULL    Hospital Day: 8d    Pt is currently admitted with the primary diagnosis of epigastric pain    SUBJECTIVE  Hospital Course  47 year old male with hx of HTN, AMBER, DM, vertigo, diverticulosis s/p colon resection presents from home with 4 days of epigastric pain.  Patient states it's a burning pain that has recently been going to his right upper back.  It's worse with food and he has not been able to tolerate anything by mouth for the last 2 days because of nausea.  Pain is not worse with exertion.  He endorses mild shortness of breath when climbing stairs which has been present for a while.  He denies any bloody stool, no hematemesis or vomiting.  Of note he has been on ibuprofen for the last week because of a dental infection.  He's supposed to get some teeth taken out but had to come in to the hospital.  Also he was told by his PMD to see a nephrologist and he hasn't yet.  He had a negative stress 3 years ago.  In the ED he received 1.5L LR.  Lipase was negative  Triage vitals: /68    RR 18  Temp 98  SpO2 100% room air    Subjective complaints   Patient with no new issues or overnight events. Patient complaining of pain today.                             OBJECTIVE  PAST MEDICAL & SURGICAL HISTORY  Diabetes    Asthma    Diverticulitis  surgery 16yrs ago    High cholesterol    Dyslipidemia    Hypertension    H/O vertigo    Diabetic ulcer of right foot    Broken toe  left pinky toe    Vertigo    HTN (hypertension)    Diabetes    History of surgery  colon resection    No significant past surgical history                                             ALLERGIES:  No Known Allergies                                        HOME MEDICATIONS  Home Medications:  Albuterol (Eqv-ProAir HFA) 90 mcg/inh inhalation aerosol: 2 puff(s) inhaled every 6 hours (15 May 2022 08:54)  amoxicillin-clavulanate 500 mg-125 mg oral tablet: 1 tab(s) orally every 8 hours (15 May 2022 08:54)  fenofibrate 145 mg oral tablet: 1 tab(s) orally once a day (15 May 2022 08:54)  HumaLOG 100 units/mL subcutaneous solution: 5 unit(s) subcutaneous 3 times a day (before meals)  sliding scale (15 May 2022 08:54)  hydroCHLOROthiazide: 37.5 milligram(s) orally once a day (15 May 2022 08:54)  ibuprofen 600 mg oral tablet: 1 tab(s) orally every 6 hours (15 May 2022 08:54)  Protonix 40 mg oral delayed release tablet: 1 tab(s) orally once a day (15 May 2022 08:54)  spironolactone 25 mg oral tablet: 1 tab(s) orally once a day (15 May 2022 08:54)  Toprol- mg oral tablet, extended release: 1 tab(s) orally once a day (15 May 2022 08:54)  Tresiba 100 units/mL subcutaneous solution: 40 unit(s) subcutaneous once a day (15 May 2022 08:54)                           MEDICATIONS:  STANDING MEDICATIONS  atorvastatin 40 milliGRAM(s) Oral at bedtime  dextrose 5%. 1000 milliLiter(s) IV Continuous <Continuous>  dextrose 5%. 1000 milliLiter(s) IV Continuous <Continuous>  dextrose 5%. 1000 milliLiter(s) IV Continuous <Continuous>  dextrose 50% Injectable 25 Gram(s) IV Push once  dextrose 50% Injectable 12.5 Gram(s) IV Push once  dextrose 50% Injectable 25 Gram(s) IV Push once  fenofibrate Tablet 145 milliGRAM(s) Oral daily  glucagon  Injectable 1 milliGRAM(s) IntraMuscular once  glucagon  Injectable 1 milliGRAM(s) IntraMuscular once  heparin   Injectable 5000 Unit(s) SubCutaneous every 8 hours  hydrALAZINE Injectable 10 milliGRAM(s) IV Push once  insulin glargine Injectable (LANTUS) 34 Unit(s) SubCutaneous at bedtime  insulin lispro (ADMELOG) corrective regimen sliding scale   SubCutaneous three times a day before meals  insulin lispro Injectable (ADMELOG) 3 Unit(s) SubCutaneous three times a day before meals  metoprolol succinate  milliGRAM(s) Oral daily  NIFEdipine XL 60 milliGRAM(s) Oral daily  oxyCODONE    IR 5 milliGRAM(s) Oral once  pantoprazole    Tablet 40 milliGRAM(s) Oral two times a day  senna 2 Tablet(s) Oral at bedtime  sucralfate 1 Gram(s) Oral four times a day    PRN MEDICATIONS  ALBUTerol    90 MICROgram(s) HFA Inhaler 2 Puff(s) Inhalation every 6 hours PRN  aluminum hydroxide/magnesium hydroxide/simethicone Suspension 30 milliLiter(s) Oral every 4 hours PRN  dextrose Oral Gel 15 Gram(s) Oral once PRN  meclizine 25 milliGRAM(s) Oral three times a day PRN  morphine  - Injectable 2 milliGRAM(s) IV Push every 6 hours PRN  ondansetron Injectable 4 milliGRAM(s) IV Push every 4 hours PRN  polyethylene glycol 3350 17 Gram(s) Oral two times a day PRN                                            VITAL SIGNS: Last 24 Hours  T(C): 36.8 (23 May 2022 05:17), Max: 36.8 (23 May 2022 05:17)  T(F): 98.3 (23 May 2022 05:17), Max: 98.3 (23 May 2022 05:17)  HR: 101 (23 May 2022 05:17) (83 - 101)  BP: 184/88 (23 May 2022 05:17) (127/76 - 184/88)  RR: 18 (22 May 2022 21:19) (18 - 18)                                           LABS:               12.5   6.42  )-----------( 198      ( 23 May 2022 07:14 )             35.9     134<L>  |  99  |  16  ----------------------------<  339<H>  4.1   |  26  |  1.9<H>    Ca    8.5      23 May 2022 07:14  Mg     1.6     05-23    TPro  5.0<L>  /  Alb  2.7<L>  /  TBili  <0.2  /  DBili  x   /  AST  26  /  ALT  18  /  AlkPhos  149<H>  05-23                                            -------------------------------------------------------------  RADIOLOGY:                                            --------------------------------------------------------------    PHYSICAL EXAM:  General: Laying in bed, mild distress  HEENT: atraumatic, normocephalic  LUNGS: CTAB, unlabored respirations  HEART: S1S2+, RRR  ABDOMEN: rash on R upper abd, epigastric TTP, BS+  EXT: no LE edema  NEURO: AAOx3  SKIN: rash on R upper abdomen   BRINDA MOYER 47y Male  MRN#: [id number]   CODE STATUS:FULL    Hospital Day: 8d    Pt is currently admitted with the primary diagnosis of epigastric pain    SUBJECTIVE  Hospital Course  47 year old male with hx of HTN, AMBER, DM, vertigo, diverticulosis s/p colon resection presents from home with 4 days of epigastric pain.  Patient states it's a burning pain that has recently been going to his right upper back.  It's worse with food and he has not been able to tolerate anything by mouth for the last 2 days because of nausea.  Pain is not worse with exertion.  He endorses mild shortness of breath when climbing stairs which has been present for a while.  He denies any bloody stool, no hematemesis or vomiting.  Of note he has been on ibuprofen for the last week because of a dental infection.  He's supposed to get some teeth taken out but had to come in to the hospital.  Also he was told by his PMD to see a nephrologist and he hasn't yet.  He had a negative stress 3 years ago.  In the ED he received 1.5L LR.  Lipase was negative  Triage vitals: /68    RR 18  Temp 98  SpO2 100% room air    Subjective complaints   Patient with no new issues or overnight events. Patient complaining of pain today.                             OBJECTIVE  PAST MEDICAL & SURGICAL HISTORY  Diabetes    Asthma    Diverticulitis  surgery 16yrs ago    High cholesterol    Dyslipidemia    Hypertension    H/O vertigo    Diabetic ulcer of right foot    Broken toe  left pinky toe    Vertigo    HTN (hypertension)    Diabetes    History of surgery  colon resection    No significant past surgical history                                             ALLERGIES:  No Known Allergies                                        HOME MEDICATIONS  Home Medications:  Albuterol (Eqv-ProAir HFA) 90 mcg/inh inhalation aerosol: 2 puff(s) inhaled every 6 hours (15 May 2022 08:54)  amoxicillin-clavulanate 500 mg-125 mg oral tablet: 1 tab(s) orally every 8 hours (15 May 2022 08:54)  fenofibrate 145 mg oral tablet: 1 tab(s) orally once a day (15 May 2022 08:54)  HumaLOG 100 units/mL subcutaneous solution: 5 unit(s) subcutaneous 3 times a day (before meals)  sliding scale (15 May 2022 08:54)  hydroCHLOROthiazide: 37.5 milligram(s) orally once a day (15 May 2022 08:54)  ibuprofen 600 mg oral tablet: 1 tab(s) orally every 6 hours (15 May 2022 08:54)  Protonix 40 mg oral delayed release tablet: 1 tab(s) orally once a day (15 May 2022 08:54)  spironolactone 25 mg oral tablet: 1 tab(s) orally once a day (15 May 2022 08:54)  Toprol- mg oral tablet, extended release: 1 tab(s) orally once a day (15 May 2022 08:54)  Tresiba 100 units/mL subcutaneous solution: 40 unit(s) subcutaneous once a day (15 May 2022 08:54)                           MEDICATIONS:  STANDING MEDICATIONS  atorvastatin 40 milliGRAM(s) Oral at bedtime  dextrose 5%. 1000 milliLiter(s) IV Continuous <Continuous>  dextrose 5%. 1000 milliLiter(s) IV Continuous <Continuous>  dextrose 5%. 1000 milliLiter(s) IV Continuous <Continuous>  dextrose 50% Injectable 25 Gram(s) IV Push once  dextrose 50% Injectable 12.5 Gram(s) IV Push once  dextrose 50% Injectable 25 Gram(s) IV Push once  fenofibrate Tablet 145 milliGRAM(s) Oral daily  glucagon  Injectable 1 milliGRAM(s) IntraMuscular once  glucagon  Injectable 1 milliGRAM(s) IntraMuscular once  heparin   Injectable 5000 Unit(s) SubCutaneous every 8 hours  hydrALAZINE Injectable 10 milliGRAM(s) IV Push once  insulin glargine Injectable (LANTUS) 34 Unit(s) SubCutaneous at bedtime  insulin lispro (ADMELOG) corrective regimen sliding scale   SubCutaneous three times a day before meals  insulin lispro Injectable (ADMELOG) 3 Unit(s) SubCutaneous three times a day before meals  metoprolol succinate  milliGRAM(s) Oral daily  NIFEdipine XL 60 milliGRAM(s) Oral daily  oxyCODONE    IR 5 milliGRAM(s) Oral once  pantoprazole    Tablet 40 milliGRAM(s) Oral two times a day  senna 2 Tablet(s) Oral at bedtime  sucralfate 1 Gram(s) Oral four times a day    PRN MEDICATIONS  ALBUTerol    90 MICROgram(s) HFA Inhaler 2 Puff(s) Inhalation every 6 hours PRN  aluminum hydroxide/magnesium hydroxide/simethicone Suspension 30 milliLiter(s) Oral every 4 hours PRN  dextrose Oral Gel 15 Gram(s) Oral once PRN  meclizine 25 milliGRAM(s) Oral three times a day PRN  morphine  - Injectable 2 milliGRAM(s) IV Push every 6 hours PRN  ondansetron Injectable 4 milliGRAM(s) IV Push every 4 hours PRN  polyethylene glycol 3350 17 Gram(s) Oral two times a day PRN                                            VITAL SIGNS: Last 24 Hours  T(C): 36.8 (23 May 2022 05:17), Max: 36.8 (23 May 2022 05:17)  T(F): 98.3 (23 May 2022 05:17), Max: 98.3 (23 May 2022 05:17)  HR: 101 (23 May 2022 05:17) (83 - 101)  BP: 184/88 (23 May 2022 05:17) (127/76 - 184/88)  RR: 18 (22 May 2022 21:19) (18 - 18)                                           LABS:               12.5   6.42  )-----------( 198      ( 23 May 2022 07:14 )             35.9     134<L>  |  99  |  16  ----------------------------<  339<H>  4.1   |  26  |  1.9<H>    Ca    8.5      23 May 2022 07:14  Mg     1.6     05-23    TPro  5.0<L>  /  Alb  2.7<L>  /  TBili  <0.2  /  DBili  x   /  AST  26  /  ALT  18  /  AlkPhos  149<H>  05-23                                            -------------------------------------------------------------  RADIOLOGY:                                            --------------------------------------------------------------    PHYSICAL EXAM:  General: Laying in bed, mild distress  HEENT: atraumatic, normocephalic  LUNGS: CTAB, unlabored respirations  HEART: S1S2+, RRR  ABDOMEN: rash on R upper abd and R flank, healed and crusted,  epigastric TTP, BS+  EXT: no LE edema  NEURO: AAOx3

## 2022-05-23 NOTE — PROGRESS NOTE ADULT - ASSESSMENT
47 year old male with hx of HTN, AMBER, DM, vertigo, diverticulosis s/p colon resection presents from home with 4 days of epigastric pain    #Epigastric pain likely 2/2 gerd vs herpetic neuralgia  - patient started on ibuprofen about a week ago for dental infection, pain started about 3-4 prior to admission  - pain is burning with occasional radiation to the back, worse with food also accompanied with nausea, denies hematemesis or bloody stools  - lipase 18; US CBD 5mm no stones; CT abdomen mild ill-defined appearance of the pancreas w questionable mild interstitial pancreatitis   - GI consulted: EGD done on 5/19: non erosive gastritis > will need outpt gastric emptying study  - case d/w cardiology- unlikely cardiac in nature  - cont ppi bid, sucralfate, maalox; pain control prn    #Shingles  - cont Valacyclovir 100mg TID for 7 day (day 1 5/21)  - cont gabapentin 300mg BID    #HTN  - was on HCTZ 37.5 / spironolactone 25 / toprol 100  - will hold HCTZ and spironolactone given BILLY; started nifedipine long w bb    #BILLY  - Cr 2.4 on admission - today 1.9 (b/l unknown though appears to be ~1.5)  - patient was told to see a nephrologist by PMD however hasn't yet  - f/u nephro; avoid nephrotoxic agents; monitor Cr    #Dental infection   - outpatient f/u    #AMBER on home cpap  - continue with home cpap    #DM2 (A1c 10.9)  - cont basal bolus insulin - monitor FS  - outpatient endocrine f/u    #HLD   - cont statin and fibrate    #Asthma - no wheeze on exam  - cont inhalers and nebs prn    #Orthostatic Hypotension in setting of Vertigo history  - cont IVF; hold diuretics; repeat orthostatics  - c/w meclizine    DVT ppx: heparin subq  GI ppx: ppi  Activity: iat  Diet: advance as tolerated  FULL CODE

## 2022-05-23 NOTE — PROGRESS NOTE ADULT - ATTENDING COMMENTS
47 year old male with hx of HTN, AMBER, DM, vertigo, diverticulosis s/p colon resection presents from home with 4 days of epigastric pain    #Epigastric pain likely 2/2 gerd vs herpetic neuralgia  - patient started on ibuprofen about a week ago for dental infection, pain started about 3-4 prior to admission  - pain is burning with occasional radiation to the back, worse with food also accompanied with nausea, denies hematemesis or bloody stools  - lipase 18; US CBD 5mm no stones; CT abdomen mild ill-defined appearance of the pancreas w questionable mild interstitial pancreatitis   - GI consulted: EGD done on 5/19: non erosive gastritis > will need outpt gastric emptying study  - cont ppi bid, sucralfate, maalox; pain control prn    #Shingles  - cont Valacyclovir 100mg TID for 7 day (day 1 5/21)  - cont gabapentin 300mg BID  - start acetaminophen and tramadol     #HTN  - was on HCTZ 37.5 / spironolactone 25 / toprol 100  - will hold HCTZ and spironolactone given BILLY; started nifedipine along w bb    #BILLY  - Cr 2.4 on admission - today 1.9 (b/l unknown though appears to be ~1.5)  - patient was told to see a nephrologist by PMD however hasn't yet  - f/u nephro; avoid nephrotoxic agents; monitor Cr    #Dental infection   - outpatient f/u    #AMBER on home cpap  - continue with home cpap    #DM2 (A1c 10.9)  - cont basal bolus insulin - monitor FS  - outpatient endocrine f/u    #HLD   - cont statin and fibrate    #Asthma - no wheeze on exam  - cont inhalers and nebs prn    #Orthostatic Hypotension   - cont IVF; hold diuretics; repeat orthostatics  - c/w meclizine    DVT ppx: heparin subq    #Progress Note Handoff:  Pending (specify):  improvement in BILLY, pain   Family discussion: d/w pt   Disposition: Home___/SNF___/Other________/Unknown at this time____x____    Total time spent to complete patient's bedside assessment, review medical chart, discuss medical plan of care with covering medical team was more than 35 minutes  with >50% of time spent face to face with patient, discussion with patient/family and/or coordination of care      Maritza Emmanuel, DO

## 2022-05-24 LAB
ALBUMIN SERPL ELPH-MCNC: 2.7 G/DL — LOW (ref 3.5–5.2)
ALP SERPL-CCNC: 124 U/L — HIGH (ref 30–115)
ALT FLD-CCNC: 19 U/L — SIGNIFICANT CHANGE UP (ref 0–41)
ANION GAP SERPL CALC-SCNC: 10 MMOL/L — SIGNIFICANT CHANGE UP (ref 7–14)
AST SERPL-CCNC: 25 U/L — SIGNIFICANT CHANGE UP (ref 0–41)
BASOPHILS # BLD AUTO: 0.04 K/UL — SIGNIFICANT CHANGE UP (ref 0–0.2)
BASOPHILS NFR BLD AUTO: 0.5 % — SIGNIFICANT CHANGE UP (ref 0–1)
BILIRUB SERPL-MCNC: <0.2 MG/DL — SIGNIFICANT CHANGE UP (ref 0.2–1.2)
BUN SERPL-MCNC: 17 MG/DL — SIGNIFICANT CHANGE UP (ref 10–20)
CALCIUM SERPL-MCNC: 8.5 MG/DL — SIGNIFICANT CHANGE UP (ref 8.5–10.1)
CHLORIDE SERPL-SCNC: 99 MMOL/L — SIGNIFICANT CHANGE UP (ref 98–110)
CO2 SERPL-SCNC: 27 MMOL/L — SIGNIFICANT CHANGE UP (ref 17–32)
CREAT SERPL-MCNC: 1.8 MG/DL — HIGH (ref 0.7–1.5)
EGFR: 46 ML/MIN/1.73M2 — LOW
EOSINOPHIL # BLD AUTO: 0.25 K/UL — SIGNIFICANT CHANGE UP (ref 0–0.7)
EOSINOPHIL NFR BLD AUTO: 3.1 % — SIGNIFICANT CHANGE UP (ref 0–8)
GLUCOSE BLDC GLUCOMTR-MCNC: 139 MG/DL — HIGH (ref 70–99)
GLUCOSE BLDC GLUCOMTR-MCNC: 178 MG/DL — HIGH (ref 70–99)
GLUCOSE BLDC GLUCOMTR-MCNC: 243 MG/DL — HIGH (ref 70–99)
GLUCOSE BLDC GLUCOMTR-MCNC: 253 MG/DL — HIGH (ref 70–99)
GLUCOSE SERPL-MCNC: 218 MG/DL — HIGH (ref 70–99)
HCT VFR BLD CALC: 37 % — LOW (ref 42–52)
HGB BLD-MCNC: 12.6 G/DL — LOW (ref 14–18)
IMM GRANULOCYTES NFR BLD AUTO: 0.5 % — HIGH (ref 0.1–0.3)
LYMPHOCYTES # BLD AUTO: 2.14 K/UL — SIGNIFICANT CHANGE UP (ref 1.2–3.4)
LYMPHOCYTES # BLD AUTO: 26.8 % — SIGNIFICANT CHANGE UP (ref 20.5–51.1)
MAGNESIUM SERPL-MCNC: 1.8 MG/DL — SIGNIFICANT CHANGE UP (ref 1.8–2.4)
MCHC RBC-ENTMCNC: 30.6 PG — SIGNIFICANT CHANGE UP (ref 27–31)
MCHC RBC-ENTMCNC: 34.1 G/DL — SIGNIFICANT CHANGE UP (ref 32–37)
MCV RBC AUTO: 89.8 FL — SIGNIFICANT CHANGE UP (ref 80–94)
MONOCYTES # BLD AUTO: 0.73 K/UL — HIGH (ref 0.1–0.6)
MONOCYTES NFR BLD AUTO: 9.1 % — SIGNIFICANT CHANGE UP (ref 1.7–9.3)
NEUTROPHILS # BLD AUTO: 4.79 K/UL — SIGNIFICANT CHANGE UP (ref 1.4–6.5)
NEUTROPHILS NFR BLD AUTO: 60 % — SIGNIFICANT CHANGE UP (ref 42.2–75.2)
NRBC # BLD: 0 /100 WBCS — SIGNIFICANT CHANGE UP (ref 0–0)
PHOSPHATE SERPL-MCNC: 3.6 MG/DL — SIGNIFICANT CHANGE UP (ref 2.1–4.9)
PLATELET # BLD AUTO: 197 K/UL — SIGNIFICANT CHANGE UP (ref 130–400)
POTASSIUM SERPL-MCNC: 4.1 MMOL/L — SIGNIFICANT CHANGE UP (ref 3.5–5)
POTASSIUM SERPL-SCNC: 4.1 MMOL/L — SIGNIFICANT CHANGE UP (ref 3.5–5)
PROT SERPL-MCNC: 5.2 G/DL — LOW (ref 6–8)
RBC # BLD: 4.12 M/UL — LOW (ref 4.7–6.1)
RBC # FLD: 11.9 % — SIGNIFICANT CHANGE UP (ref 11.5–14.5)
SODIUM SERPL-SCNC: 136 MMOL/L — SIGNIFICANT CHANGE UP (ref 135–146)
WBC # BLD: 7.99 K/UL — SIGNIFICANT CHANGE UP (ref 4.8–10.8)
WBC # FLD AUTO: 7.99 K/UL — SIGNIFICANT CHANGE UP (ref 4.8–10.8)

## 2022-05-24 PROCEDURE — 99233 SBSQ HOSP IP/OBS HIGH 50: CPT

## 2022-05-24 RX ORDER — TRAMADOL HYDROCHLORIDE 50 MG/1
25 TABLET ORAL EVERY 4 HOURS
Refills: 0 | Status: DISCONTINUED | OUTPATIENT
Start: 2022-05-24 | End: 2022-05-31

## 2022-05-24 RX ORDER — INSULIN GLARGINE 100 [IU]/ML
45 INJECTION, SOLUTION SUBCUTANEOUS AT BEDTIME
Refills: 0 | Status: DISCONTINUED | OUTPATIENT
Start: 2022-05-24 | End: 2022-05-31

## 2022-05-24 RX ORDER — SODIUM CHLORIDE 9 MG/ML
1000 INJECTION, SOLUTION INTRAVENOUS
Refills: 0 | Status: DISCONTINUED | OUTPATIENT
Start: 2022-05-24 | End: 2022-05-25

## 2022-05-24 RX ORDER — INSULIN LISPRO 100/ML
6 VIAL (ML) SUBCUTANEOUS
Refills: 0 | Status: DISCONTINUED | OUTPATIENT
Start: 2022-05-24 | End: 2022-05-31

## 2022-05-24 RX ADMIN — GABAPENTIN 300 MILLIGRAM(S): 400 CAPSULE ORAL at 21:57

## 2022-05-24 RX ADMIN — VALACYCLOVIR 1000 MILLIGRAM(S): 500 TABLET, FILM COATED ORAL at 07:10

## 2022-05-24 RX ADMIN — Medication 3 UNIT(S): at 07:45

## 2022-05-24 RX ADMIN — Medication 6 UNIT(S): at 16:34

## 2022-05-24 RX ADMIN — Medication 1 GRAM(S): at 00:14

## 2022-05-24 RX ADMIN — INSULIN GLARGINE 45 UNIT(S): 100 INJECTION, SOLUTION SUBCUTANEOUS at 21:56

## 2022-05-24 RX ADMIN — HEPARIN SODIUM 5000 UNIT(S): 5000 INJECTION INTRAVENOUS; SUBCUTANEOUS at 21:55

## 2022-05-24 RX ADMIN — Medication 60 MILLIGRAM(S): at 05:27

## 2022-05-24 RX ADMIN — Medication 145 MILLIGRAM(S): at 12:15

## 2022-05-24 RX ADMIN — ATORVASTATIN CALCIUM 40 MILLIGRAM(S): 80 TABLET, FILM COATED ORAL at 21:56

## 2022-05-24 RX ADMIN — Medication 1 GRAM(S): at 12:15

## 2022-05-24 RX ADMIN — GABAPENTIN 300 MILLIGRAM(S): 400 CAPSULE ORAL at 05:27

## 2022-05-24 RX ADMIN — VALACYCLOVIR 1000 MILLIGRAM(S): 500 TABLET, FILM COATED ORAL at 13:23

## 2022-05-24 RX ADMIN — Medication 1 GRAM(S): at 23:14

## 2022-05-24 RX ADMIN — Medication 1: at 12:14

## 2022-05-24 RX ADMIN — TRAMADOL HYDROCHLORIDE 25 MILLIGRAM(S): 50 TABLET ORAL at 22:02

## 2022-05-24 RX ADMIN — Medication 1 GRAM(S): at 17:34

## 2022-05-24 RX ADMIN — HEPARIN SODIUM 5000 UNIT(S): 5000 INJECTION INTRAVENOUS; SUBCUTANEOUS at 13:23

## 2022-05-24 RX ADMIN — Medication 3: at 07:45

## 2022-05-24 RX ADMIN — HEPARIN SODIUM 5000 UNIT(S): 5000 INJECTION INTRAVENOUS; SUBCUTANEOUS at 05:28

## 2022-05-24 RX ADMIN — TRAMADOL HYDROCHLORIDE 25 MILLIGRAM(S): 50 TABLET ORAL at 08:14

## 2022-05-24 RX ADMIN — SODIUM CHLORIDE 100 MILLILITER(S): 9 INJECTION, SOLUTION INTRAVENOUS at 22:09

## 2022-05-24 RX ADMIN — SODIUM CHLORIDE 100 MILLILITER(S): 9 INJECTION, SOLUTION INTRAVENOUS at 03:01

## 2022-05-24 RX ADMIN — Medication 1 GRAM(S): at 05:28

## 2022-05-24 RX ADMIN — Medication 650 MILLIGRAM(S): at 12:16

## 2022-05-24 RX ADMIN — PANTOPRAZOLE SODIUM 40 MILLIGRAM(S): 20 TABLET, DELAYED RELEASE ORAL at 05:27

## 2022-05-24 RX ADMIN — TRAMADOL HYDROCHLORIDE 25 MILLIGRAM(S): 50 TABLET ORAL at 22:32

## 2022-05-24 RX ADMIN — Medication 6 UNIT(S): at 12:14

## 2022-05-24 RX ADMIN — Medication 100 MILLIGRAM(S): at 05:27

## 2022-05-24 RX ADMIN — VALACYCLOVIR 1000 MILLIGRAM(S): 500 TABLET, FILM COATED ORAL at 21:59

## 2022-05-24 RX ADMIN — PANTOPRAZOLE SODIUM 40 MILLIGRAM(S): 20 TABLET, DELAYED RELEASE ORAL at 17:34

## 2022-05-24 RX ADMIN — SODIUM CHLORIDE 100 MILLILITER(S): 9 INJECTION, SOLUTION INTRAVENOUS at 15:27

## 2022-05-24 NOTE — PROGRESS NOTE ADULT - SUBJECTIVE AND OBJECTIVE BOX
BRINDA MOYER 47y Male  MRN#: [id number]   CODE STATUS:FULL    Hospital Day: 9d    Pt is currently admitted with the primary diagnosis of epigastric pain    SUBJECTIVE  Hospital Course  47 year old male with hx of HTN, AMBER, DM, vertigo, diverticulosis s/p colon resection presents from home with 4 days of epigastric pain.  Patient states it's a burning pain that has recently been going to his right upper back.  It's worse with food and he has not been able to tolerate anything by mouth for the last 2 days because of nausea.  Pain is not worse with exertion.  He endorses mild shortness of breath when climbing stairs which has been present for a while.  He denies any bloody stool, no hematemesis or vomiting.  Of note he has been on ibuprofen for the last week because of a dental infection.  He's supposed to get some teeth taken out but had to come in to the hospital.  Also he was told by his PMD to see a nephrologist and he hasn't yet.  He had a negative stress 3 years ago.  In the ED he received 1.5L LR.  Lipase was negative  Triage vitals: /68    RR 18  Temp 98  SpO2 100% room air    Subjective complaints   Patient with no new issues or overnight events. Patient still complaining of pain.                            OBJECTIVE  PAST MEDICAL & SURGICAL HISTORY  Diabetes    Asthma    Diverticulitis  surgery 16yrs ago    High cholesterol    Dyslipidemia    Hypertension    H/O vertigo    Diabetic ulcer of right foot    Broken toe  left pinky toe    Vertigo    HTN (hypertension)    Diabetes    History of surgery  colon resection    No significant past surgical history                                             ALLERGIES:  No Known Allergies                                        HOME MEDICATIONS  Home Medications:  Albuterol (Eqv-ProAir HFA) 90 mcg/inh inhalation aerosol: 2 puff(s) inhaled every 6 hours (15 May 2022 08:54)  amoxicillin-clavulanate 500 mg-125 mg oral tablet: 1 tab(s) orally every 8 hours (15 May 2022 08:54)  fenofibrate 145 mg oral tablet: 1 tab(s) orally once a day (15 May 2022 08:54)  HumaLOG 100 units/mL subcutaneous solution: 5 unit(s) subcutaneous 3 times a day (before meals)  sliding scale (15 May 2022 08:54)  hydroCHLOROthiazide: 37.5 milligram(s) orally once a day (15 May 2022 08:54)  ibuprofen 600 mg oral tablet: 1 tab(s) orally every 6 hours (15 May 2022 08:54)  Protonix 40 mg oral delayed release tablet: 1 tab(s) orally once a day (15 May 2022 08:54)  spironolactone 25 mg oral tablet: 1 tab(s) orally once a day (15 May 2022 08:54)  Toprol- mg oral tablet, extended release: 1 tab(s) orally once a day (15 May 2022 08:54)  Tresiba 100 units/mL subcutaneous solution: 40 unit(s) subcutaneous once a day (15 May 2022 08:54)                           MEDICATIONS:  STANDING MEDICATIONS  atorvastatin 40 milliGRAM(s) Oral at bedtime  dextrose 5%. 1000 milliLiter(s) IV Continuous <Continuous>  dextrose 5%. 1000 milliLiter(s) IV Continuous <Continuous>  dextrose 5%. 1000 milliLiter(s) IV Continuous <Continuous>  dextrose 50% Injectable 25 Gram(s) IV Push once  dextrose 50% Injectable 12.5 Gram(s) IV Push once  dextrose 50% Injectable 25 Gram(s) IV Push once  fenofibrate Tablet 145 milliGRAM(s) Oral daily  glucagon  Injectable 1 milliGRAM(s) IntraMuscular once  glucagon  Injectable 1 milliGRAM(s) IntraMuscular once  heparin   Injectable 5000 Unit(s) SubCutaneous every 8 hours  hydrALAZINE Injectable 10 milliGRAM(s) IV Push once  insulin glargine Injectable (LANTUS) 34 Unit(s) SubCutaneous at bedtime  insulin lispro (ADMELOG) corrective regimen sliding scale   SubCutaneous three times a day before meals  insulin lispro Injectable (ADMELOG) 3 Unit(s) SubCutaneous three times a day before meals  metoprolol succinate  milliGRAM(s) Oral daily  NIFEdipine XL 60 milliGRAM(s) Oral daily  oxyCODONE    IR 5 milliGRAM(s) Oral once  pantoprazole    Tablet 40 milliGRAM(s) Oral two times a day  senna 2 Tablet(s) Oral at bedtime  sucralfate 1 Gram(s) Oral four times a day    PRN MEDICATIONS  ALBUTerol    90 MICROgram(s) HFA Inhaler 2 Puff(s) Inhalation every 6 hours PRN  aluminum hydroxide/magnesium hydroxide/simethicone Suspension 30 milliLiter(s) Oral every 4 hours PRN  dextrose Oral Gel 15 Gram(s) Oral once PRN  meclizine 25 milliGRAM(s) Oral three times a day PRN  morphine  - Injectable 2 milliGRAM(s) IV Push every 6 hours PRN  ondansetron Injectable 4 milliGRAM(s) IV Push every 4 hours PRN  polyethylene glycol 3350 17 Gram(s) Oral two times a day PRN                                            VITAL SIGNS: Last 24 Hours  T(C): 36.3 (24 May 2022 13:59), Max: 36.3 (24 May 2022 13:59)  T(F): 97.4 (24 May 2022 13:59), Max: 97.4 (24 May 2022 13:59)  HR: 74 (24 May 2022 13:59) (74 - 84)  BP: 127/61 (24 May 2022 13:59) (127/61 - 167/90)  BP(mean): 122 (24 May 2022 05:15) (122 - 122)  RR: 18 (24 May 2022 13:59) (18 - 18)                                           LABS:               12.6   7.99  )-----------( 197      ( 24 May 2022 06:38 )             37.0     136  |  99  |  17  ----------------------------<  218<H>  4.1   |  27  |  1.8<H>    Ca    8.5      24 May 2022 06:38  Phos  3.6     05-24  Mg     1.8     05-24    TPro  5.2<L>  /  Alb  2.7<L>  /  TBili  <0.2  /  DBili  x   /  AST  25  /  ALT  19  /  AlkPhos  124<H>  05-24                                            -------------------------------------------------------------  RADIOLOGY:                                            --------------------------------------------------------------    PHYSICAL EXAM:  General: Laying in bed, uncomfortable appearing in mild distress  HEENT: atraumatic, normocephalic  LUNGS: CTAB, unlabored respirations  HEART: S1S2+, RRR  ABDOMEN: rash on R upper abd and R flank, healed and crusted,  epigastric TTP, BS+  EXT: no LE edema  NEURO: AAOx3     BRINDA MOYER 47y Male  MRN#: [id number]   CODE STATUS:FULL    Hospital Day: 9d    Pt is currently admitted with the primary diagnosis of epigastric pain    SUBJECTIVE  Hospital Course  47 year old male with hx of HTN, AMBER, DM, vertigo, diverticulosis s/p colon resection presents from home with 4 days of epigastric pain.  Patient states it's a burning pain that has recently been going to his right upper back.  It's worse with food and he has not been able to tolerate anything by mouth for the last 2 days because of nausea.  Pain is not worse with exertion.  He endorses mild shortness of breath when climbing stairs which has been present for a while.  He denies any bloody stool, no hematemesis or vomiting.  Of note he has been on ibuprofen for the last week because of a dental infection.  He's supposed to get some teeth taken out but had to come in to the hospital.  Also he was told by his PMD to see a nephrologist and he hasn't yet.  He had a negative stress 3 years ago.  In the ED he received 1.5L LR.  Lipase was negative  Triage vitals: /68    RR 18  Temp 98  SpO2 100% room air    Subjective complaints   Patient with no new issues or overnight events. Patient still complaining of pain.                            OBJECTIVE  PAST MEDICAL & SURGICAL HISTORY  Diabetes    Asthma    Diverticulitis  surgery 16yrs ago    High cholesterol    Dyslipidemia    Hypertension    H/O vertigo    Diabetic ulcer of right foot    Broken toe  left pinky toe    Vertigo    HTN (hypertension)    Diabetes    History of surgery  colon resection    No significant past surgical history                                             ALLERGIES:  No Known Allergies                                        HOME MEDICATIONS  Home Medications:  Albuterol (Eqv-ProAir HFA) 90 mcg/inh inhalation aerosol: 2 puff(s) inhaled every 6 hours (15 May 2022 08:54)  amoxicillin-clavulanate 500 mg-125 mg oral tablet: 1 tab(s) orally every 8 hours (15 May 2022 08:54)  fenofibrate 145 mg oral tablet: 1 tab(s) orally once a day (15 May 2022 08:54)  HumaLOG 100 units/mL subcutaneous solution: 5 unit(s) subcutaneous 3 times a day (before meals)  sliding scale (15 May 2022 08:54)  hydroCHLOROthiazide: 37.5 milligram(s) orally once a day (15 May 2022 08:54)  ibuprofen 600 mg oral tablet: 1 tab(s) orally every 6 hours (15 May 2022 08:54)  Protonix 40 mg oral delayed release tablet: 1 tab(s) orally once a day (15 May 2022 08:54)  spironolactone 25 mg oral tablet: 1 tab(s) orally once a day (15 May 2022 08:54)  Toprol- mg oral tablet, extended release: 1 tab(s) orally once a day (15 May 2022 08:54)  Tresiba 100 units/mL subcutaneous solution: 40 unit(s) subcutaneous once a day (15 May 2022 08:54)                           MEDICATIONS:  STANDING MEDICATIONS  atorvastatin 40 milliGRAM(s) Oral at bedtime  dextrose 5%. 1000 milliLiter(s) IV Continuous <Continuous>  dextrose 5%. 1000 milliLiter(s) IV Continuous <Continuous>  dextrose 5%. 1000 milliLiter(s) IV Continuous <Continuous>  dextrose 50% Injectable 25 Gram(s) IV Push once  dextrose 50% Injectable 12.5 Gram(s) IV Push once  dextrose 50% Injectable 25 Gram(s) IV Push once  fenofibrate Tablet 145 milliGRAM(s) Oral daily  glucagon  Injectable 1 milliGRAM(s) IntraMuscular once  glucagon  Injectable 1 milliGRAM(s) IntraMuscular once  heparin   Injectable 5000 Unit(s) SubCutaneous every 8 hours  hydrALAZINE Injectable 10 milliGRAM(s) IV Push once  insulin glargine Injectable (LANTUS) 34 Unit(s) SubCutaneous at bedtime  insulin lispro (ADMELOG) corrective regimen sliding scale   SubCutaneous three times a day before meals  insulin lispro Injectable (ADMELOG) 3 Unit(s) SubCutaneous three times a day before meals  metoprolol succinate  milliGRAM(s) Oral daily  NIFEdipine XL 60 milliGRAM(s) Oral daily  oxyCODONE    IR 5 milliGRAM(s) Oral once  pantoprazole    Tablet 40 milliGRAM(s) Oral two times a day  senna 2 Tablet(s) Oral at bedtime  sucralfate 1 Gram(s) Oral four times a day    PRN MEDICATIONS  ALBUTerol    90 MICROgram(s) HFA Inhaler 2 Puff(s) Inhalation every 6 hours PRN  aluminum hydroxide/magnesium hydroxide/simethicone Suspension 30 milliLiter(s) Oral every 4 hours PRN  dextrose Oral Gel 15 Gram(s) Oral once PRN  meclizine 25 milliGRAM(s) Oral three times a day PRN  morphine  - Injectable 2 milliGRAM(s) IV Push every 6 hours PRN  ondansetron Injectable 4 milliGRAM(s) IV Push every 4 hours PRN  polyethylene glycol 3350 17 Gram(s) Oral two times a day PRN                                            VITAL SIGNS: Last 24 Hours  T(C): 36.3 (24 May 2022 13:59), Max: 36.3 (24 May 2022 13:59)  T(F): 97.4 (24 May 2022 13:59), Max: 97.4 (24 May 2022 13:59)  HR: 74 (24 May 2022 13:59) (74 - 84)  BP: 127/61 (24 May 2022 13:59) (127/61 - 167/90)  BP(mean): 122 (24 May 2022 05:15) (122 - 122)  RR: 18 (24 May 2022 13:59) (18 - 18)                                           LABS:               12.6   7.99  )-----------( 197      ( 24 May 2022 06:38 )             37.0     136  |  99  |  17  ----------------------------<  218<H>  4.1   |  27  |  1.8<H>    Ca    8.5      24 May 2022 06:38  Phos  3.6     05-24  Mg     1.8     05-24    TPro  5.2<L>  /  Alb  2.7<L>  /  TBili  <0.2  /  DBili  x   /  AST  25  /  ALT  19  /  AlkPhos  124<H>  05-24                                            -------------------------------------------------------------  RADIOLOGY:                                            --------------------------------------------------------------    PHYSICAL EXAM:  General: Laying in bed, uncomfortable appearing in mild distress  HEENT: atraumatic, normocephalic  LUNGS: CTAB, unlabored respirations  HEART: S1S2+, RRR no MRG   ABDOMEN: rash on R upper abd and R flank, healed and crusted,  epigastric TTP, BS+  EXT: no LE edema  NEURO: AAOx3

## 2022-05-24 NOTE — PROGRESS NOTE ADULT - ATTENDING COMMENTS
47 year old male with hx of HTN, AMBER, DM, vertigo, diverticulosis s/p colon resection presents from home with 4 days of epigastric pain    #Epigastric pain likely 2/2 gerd vs herpetic neuralgia vs gastroparesis   - patient started on ibuprofen about a week ago for dental infection, pain started about 3-4 prior to admission  - pain is burning with occasional radiation to the back, worse with food also accompanied with nausea, denies hematemesis or bloody stools  - lipase 18; US CBD 5mm no stones; CT abdomen mild ill-defined appearance of the pancreas w questionable mild interstitial pancreatitis   - GI consulted: EGD done on 5/19: non erosive gastritis > will need outpt gastric emptying study  - cont ppi bid, sucralfate, maalox; pain control prn    #Shingles  - cont Valacyclovir 100mg TID for 7 day (day 1 5/21)  - cont gabapentin 300mg BID  - start acetaminophen and tramadol     #Orthostatic Hypotension   - still persistently orthostatic, etiologies include dehydration 2/2 persistent hyperglycemia vs autonomic dysfunction   - will cont IVF, control BG, trial compression stockings   - avoid abdominal binder for now given herpetic lesions, may cause discomfort   - orthostatics daily      #HTN  - was on HCTZ 37.5 / spironolactone 25 / toprol 100  - will hold HCTZ and spironolactone given BILLY/ orthostasis  - started nifedipine     #BILLY  - Cr 2.4 on admission - today 1.8 (b/l unknown )  - patient was told to see a nephrologist by PMD however hasn't yet  - f/u nephro; avoid nephrotoxic agents; monitor Cr    #Dental infection   - outpatient f/u    #AMBER on home cpap  - continue with home cpap    #DM2 (A1c 10.9)  - increase basal bolus to home dose   - outpatient endocrine f/u and nutrition follow up     #HLD   - cont statin and fibrate    #Asthma - no wheeze on exam  - cont inhalers and nebs prn      DVT ppx: heparin subq    #Progress Note Handoff:  Pending (specify): improvement in orthostasis, BG control   Family discussion: d/w pt   Disposition: Home___/SNF___/Other________/Unknown at this time____x____    Total time spent to complete patient's bedside assessment, review medical chart, discuss medical plan of care with covering medical team was more than 35 minutes  with >50% of time spent face to face with patient, discussion with patient/family and/or coordination of care      Maritza Emmanuel DO .

## 2022-05-24 NOTE — PROGRESS NOTE ADULT - ASSESSMENT
47 year old male with hx of HTN, AMBER, DM, vertigo, diverticulosis s/p colon resection presents from home with 4 days of epigastric pain    #Epigastric pain likely 2/2 diabetic gastroparesis +/- gerd +/- herpetic neuralgia   - patient started on ibuprofen about a week ago for dental infection, pain started about 3-4 prior to admission  - pain is burning with occasional radiation to the back, worse with food also accompanied with nausea, denies hematemesis or bloody stools  - patient w uncontrolled DM (A1c ~11)   - lipase 18; US CBD 5mm no stones; CT abdomen mild ill-defined appearance of the pancreas w questionable mild interstitial pancreatitis   - GI consulted: EGD done on 5/19: non erosive gastritis > f/u outpatient for pathology results and for gastric emptying study  - case d/w cardiology - unlikely cardiac in nature  - cont ppi bid, sucralfate, maalox; pain control prn  - avoid nsaids and smoking    #Herpes zoster  - cont Valacyclovir 100mg TID for 7 day (day 1 5/21)  - cont gabapentin 300mg BID    #H/o HTN w orthostatic hypotension during hospitalization  - on HCTZ 37.5, spironolactone 25,  toprol 100 at home  - cont nifedipine, toprol; holding HCTZ and spironolactone given BILLY    #BILLY  - Cr 2.4 on admission - today 1.8 (b/l unknown though appears to be ~1.5)  - patient was told to see a nephrologist by PMD however hasn't yet  - cont IVF; monitor Cr; avoid nephrotoxic agents  - Nephro following    #Uncontrolled DM2 (A1c 10.9) - w possible gastroparesis  - cont basal bolus insulin (takes lantus 45, lispro 6 at home, per patient)  - monitor FS  - outpatient endocrine f/u    #HLD   - cont statin and fibrate    #Dental infection   - outpatient f/u    #AMBER on home cpap  - continue with home cpap    #Asthma - no wheeze on exam  - cont inhalers and nebs prn    #H/o Vertigo  - c/w meclizine  DVT ppx: heparin subq  GI ppx: ppi  Activity: iat  Diet: advance as tolerated  FULL CODE   47 year old male with hx of HTN, AMBER, DM, vertigo, diverticulosis s/p colon resection presents from home with 4 days of epigastric pain    #Epigastric pain likely 2/2 diabetic gastroparesis +/- gerd +/- herpetic neuralgia   - patient started on ibuprofen about a week ago for dental infection, pain started about 3-4 prior to admission  - pain is burning with occasional radiation to the back, worse with food also accompanied with nausea, denies hematemesis or bloody stools  - patient w uncontrolled DM (A1c ~11)   - lipase 18; US CBD 5mm no stones; CT abdomen mild ill-defined appearance of the pancreas w questionable mild interstitial pancreatitis   - GI consulted: EGD done on 5/19: non erosive gastritis > f/u outpatient for pathology results and for gastric emptying study  - case d/w cardiology - unlikely cardiac in nature  - cont ppi bid, sucralfate, maalox; pain control prn  - avoid nsaids and smoking    #Herpes zoster  - cont Valacyclovir 100mg TID for 7 day (day 1 5/21)  - cont gabapentin 300mg BID    #H/o HTN w orthostatic hypotension during hospitalization  - on HCTZ 37.5, spironolactone 25, toprol 100 at home  - cont nifedipine, toprol; holding diuretics given BILLY  - cont compression stocking; no abd binder for now given shingles distribution)    #BILLY  - Cr 2.4 on admission - today 1.8 (b/l unknown though appears to be ~1.5)  - patient was told to see a nephrologist by PMD however hasn't yet  - cont IVF; monitor Cr; avoid nephrotoxic agents  - Nephro following    #Uncontrolled DM2 (A1c 10.9) - w possible gastroparesis  - cont basal bolus insulin (takes lantus 45, lispro 6 at home, per patient)  - monitor FS  - outpatient endocrine f/u    #HLD   - cont statin and fibrate    #Dental infection   - outpatient f/u    #AMBER on home cpap  - continue with home cpap    #Asthma - no wheeze on exam  - cont inhalers and nebs prn    #H/o Vertigo  - c/w meclizine  DVT ppx: heparin subq  GI ppx: ppi  Activity: iat  Diet: advance as tolerated  FULL CODE

## 2022-05-25 LAB
% ALBUMIN: 51.1 % — SIGNIFICANT CHANGE UP
% ALPHA 1: 4.8 % — SIGNIFICANT CHANGE UP
% ALPHA 2: 15.4 % — SIGNIFICANT CHANGE UP
% BETA: 14 % — SIGNIFICANT CHANGE UP
% GAMMA: 14.7 % — SIGNIFICANT CHANGE UP
ALBUMIN SERPL ELPH-MCNC: 2.6 G/DL — LOW (ref 3.5–5.2)
ALBUMIN SERPL ELPH-MCNC: 5.2 G/DL — SIGNIFICANT CHANGE UP (ref 3.6–5.5)
ALBUMIN/GLOB SERPL ELPH: 1.1 RATIO — SIGNIFICANT CHANGE UP
ALP SERPL-CCNC: 114 U/L — SIGNIFICANT CHANGE UP (ref 30–115)
ALPHA1 GLOB SERPL ELPH-MCNC: 0.5 G/DL — HIGH (ref 0.1–0.4)
ALPHA2 GLOB SERPL ELPH-MCNC: 1.6 G/DL — HIGH (ref 0.5–1)
ALT FLD-CCNC: 22 U/L — SIGNIFICANT CHANGE UP (ref 0–41)
ANION GAP SERPL CALC-SCNC: 9 MMOL/L — SIGNIFICANT CHANGE UP (ref 7–14)
AST SERPL-CCNC: 28 U/L — SIGNIFICANT CHANGE UP (ref 0–41)
B-GLOBULIN SERPL ELPH-MCNC: 1.4 G/DL — HIGH (ref 0.5–1)
BASOPHILS # BLD AUTO: 0.03 K/UL — SIGNIFICANT CHANGE UP (ref 0–0.2)
BASOPHILS NFR BLD AUTO: 0.4 % — SIGNIFICANT CHANGE UP (ref 0–1)
BILIRUB SERPL-MCNC: <0.2 MG/DL — SIGNIFICANT CHANGE UP (ref 0.2–1.2)
BUN SERPL-MCNC: 15 MG/DL — SIGNIFICANT CHANGE UP (ref 10–20)
CALCIUM SERPL-MCNC: 8.5 MG/DL — SIGNIFICANT CHANGE UP (ref 8.5–10.1)
CHLORIDE SERPL-SCNC: 102 MMOL/L — SIGNIFICANT CHANGE UP (ref 98–110)
CO2 SERPL-SCNC: 26 MMOL/L — SIGNIFICANT CHANGE UP (ref 17–32)
CREAT SERPL-MCNC: 1.7 MG/DL — HIGH (ref 0.7–1.5)
EGFR: 49 ML/MIN/1.73M2 — LOW
EOSINOPHIL # BLD AUTO: 0.27 K/UL — SIGNIFICANT CHANGE UP (ref 0–0.7)
EOSINOPHIL NFR BLD AUTO: 3.5 % — SIGNIFICANT CHANGE UP (ref 0–8)
GAMMA GLOBULIN: 1.5 G/DL — SIGNIFICANT CHANGE UP (ref 0.6–1.6)
GLUCOSE BLDC GLUCOMTR-MCNC: 111 MG/DL — HIGH (ref 70–99)
GLUCOSE BLDC GLUCOMTR-MCNC: 160 MG/DL — HIGH (ref 70–99)
GLUCOSE BLDC GLUCOMTR-MCNC: 222 MG/DL — HIGH (ref 70–99)
GLUCOSE BLDC GLUCOMTR-MCNC: 235 MG/DL — HIGH (ref 70–99)
GLUCOSE BLDC GLUCOMTR-MCNC: 71 MG/DL — SIGNIFICANT CHANGE UP (ref 70–99)
GLUCOSE SERPL-MCNC: 83 MG/DL — SIGNIFICANT CHANGE UP (ref 70–99)
HCT VFR BLD CALC: 36.3 % — LOW (ref 42–52)
HGB BLD-MCNC: 12.2 G/DL — LOW (ref 14–18)
IMM GRANULOCYTES NFR BLD AUTO: 0.4 % — HIGH (ref 0.1–0.3)
INTERPRETATION SERPL IFE-IMP: SIGNIFICANT CHANGE UP
LYMPHOCYTES # BLD AUTO: 2.59 K/UL — SIGNIFICANT CHANGE UP (ref 1.2–3.4)
LYMPHOCYTES # BLD AUTO: 33.5 % — SIGNIFICANT CHANGE UP (ref 20.5–51.1)
MAGNESIUM SERPL-MCNC: 1.6 MG/DL — LOW (ref 1.8–2.4)
MCHC RBC-ENTMCNC: 30.3 PG — SIGNIFICANT CHANGE UP (ref 27–31)
MCHC RBC-ENTMCNC: 33.6 G/DL — SIGNIFICANT CHANGE UP (ref 32–37)
MCV RBC AUTO: 90.3 FL — SIGNIFICANT CHANGE UP (ref 80–94)
MONOCYTES # BLD AUTO: 0.64 K/UL — HIGH (ref 0.1–0.6)
MONOCYTES NFR BLD AUTO: 8.3 % — SIGNIFICANT CHANGE UP (ref 1.7–9.3)
NEUTROPHILS # BLD AUTO: 4.16 K/UL — SIGNIFICANT CHANGE UP (ref 1.4–6.5)
NEUTROPHILS NFR BLD AUTO: 53.9 % — SIGNIFICANT CHANGE UP (ref 42.2–75.2)
NRBC # BLD: 0 /100 WBCS — SIGNIFICANT CHANGE UP (ref 0–0)
PLATELET # BLD AUTO: 220 K/UL — SIGNIFICANT CHANGE UP (ref 130–400)
POTASSIUM SERPL-MCNC: 3.9 MMOL/L — SIGNIFICANT CHANGE UP (ref 3.5–5)
POTASSIUM SERPL-SCNC: 3.9 MMOL/L — SIGNIFICANT CHANGE UP (ref 3.5–5)
PROT PATTERN SERPL ELPH-IMP: SIGNIFICANT CHANGE UP
PROT SERPL-MCNC: 10.1 G/DL — HIGH (ref 6–8.3)
PROT SERPL-MCNC: 10.1 G/DL — HIGH (ref 6–8.3)
PROT SERPL-MCNC: 5.1 G/DL — LOW (ref 6–8)
RBC # BLD: 4.02 M/UL — LOW (ref 4.7–6.1)
RBC # FLD: 11.9 % — SIGNIFICANT CHANGE UP (ref 11.5–14.5)
SODIUM SERPL-SCNC: 137 MMOL/L — SIGNIFICANT CHANGE UP (ref 135–146)
WBC # BLD: 7.72 K/UL — SIGNIFICANT CHANGE UP (ref 4.8–10.8)
WBC # FLD AUTO: 7.72 K/UL — SIGNIFICANT CHANGE UP (ref 4.8–10.8)

## 2022-05-25 PROCEDURE — 99232 SBSQ HOSP IP/OBS MODERATE 35: CPT

## 2022-05-25 RX ORDER — MAGNESIUM SULFATE 500 MG/ML
2 VIAL (ML) INJECTION ONCE
Refills: 0 | Status: COMPLETED | OUTPATIENT
Start: 2022-05-25 | End: 2022-05-25

## 2022-05-25 RX ORDER — TRAMADOL HYDROCHLORIDE 50 MG/1
50 TABLET ORAL AT BEDTIME
Refills: 0 | Status: DISCONTINUED | OUTPATIENT
Start: 2022-05-25 | End: 2022-05-25

## 2022-05-25 RX ADMIN — VALACYCLOVIR 1000 MILLIGRAM(S): 500 TABLET, FILM COATED ORAL at 14:55

## 2022-05-25 RX ADMIN — Medication 1 GRAM(S): at 05:12

## 2022-05-25 RX ADMIN — SENNA PLUS 2 TABLET(S): 8.6 TABLET ORAL at 21:27

## 2022-05-25 RX ADMIN — Medication 25 GRAM(S): at 12:36

## 2022-05-25 RX ADMIN — Medication 100 MILLIGRAM(S): at 05:13

## 2022-05-25 RX ADMIN — ATORVASTATIN CALCIUM 40 MILLIGRAM(S): 80 TABLET, FILM COATED ORAL at 21:27

## 2022-05-25 RX ADMIN — HEPARIN SODIUM 5000 UNIT(S): 5000 INJECTION INTRAVENOUS; SUBCUTANEOUS at 21:27

## 2022-05-25 RX ADMIN — TRAMADOL HYDROCHLORIDE 50 MILLIGRAM(S): 50 TABLET ORAL at 21:20

## 2022-05-25 RX ADMIN — Medication 2: at 17:01

## 2022-05-25 RX ADMIN — TRAMADOL HYDROCHLORIDE 25 MILLIGRAM(S): 50 TABLET ORAL at 10:16

## 2022-05-25 RX ADMIN — Medication 6 UNIT(S): at 17:01

## 2022-05-25 RX ADMIN — VALACYCLOVIR 1000 MILLIGRAM(S): 500 TABLET, FILM COATED ORAL at 05:14

## 2022-05-25 RX ADMIN — TRAMADOL HYDROCHLORIDE 25 MILLIGRAM(S): 50 TABLET ORAL at 02:02

## 2022-05-25 RX ADMIN — INSULIN GLARGINE 45 UNIT(S): 100 INJECTION, SOLUTION SUBCUTANEOUS at 21:21

## 2022-05-25 RX ADMIN — GABAPENTIN 300 MILLIGRAM(S): 400 CAPSULE ORAL at 05:12

## 2022-05-25 RX ADMIN — HEPARIN SODIUM 5000 UNIT(S): 5000 INJECTION INTRAVENOUS; SUBCUTANEOUS at 14:47

## 2022-05-25 RX ADMIN — Medication 1 GRAM(S): at 17:03

## 2022-05-25 RX ADMIN — Medication 145 MILLIGRAM(S): at 12:07

## 2022-05-25 RX ADMIN — HEPARIN SODIUM 5000 UNIT(S): 5000 INJECTION INTRAVENOUS; SUBCUTANEOUS at 05:12

## 2022-05-25 RX ADMIN — TRAMADOL HYDROCHLORIDE 25 MILLIGRAM(S): 50 TABLET ORAL at 11:40

## 2022-05-25 RX ADMIN — Medication 6 UNIT(S): at 12:08

## 2022-05-25 RX ADMIN — TRAMADOL HYDROCHLORIDE 25 MILLIGRAM(S): 50 TABLET ORAL at 02:32

## 2022-05-25 RX ADMIN — Medication 2: at 12:07

## 2022-05-25 RX ADMIN — Medication 60 MILLIGRAM(S): at 05:13

## 2022-05-25 RX ADMIN — PANTOPRAZOLE SODIUM 40 MILLIGRAM(S): 20 TABLET, DELAYED RELEASE ORAL at 05:13

## 2022-05-25 RX ADMIN — GABAPENTIN 300 MILLIGRAM(S): 400 CAPSULE ORAL at 17:02

## 2022-05-25 RX ADMIN — TRAMADOL HYDROCHLORIDE 50 MILLIGRAM(S): 50 TABLET ORAL at 22:00

## 2022-05-25 RX ADMIN — Medication 1 GRAM(S): at 12:07

## 2022-05-25 RX ADMIN — VALACYCLOVIR 1000 MILLIGRAM(S): 500 TABLET, FILM COATED ORAL at 21:27

## 2022-05-25 RX ADMIN — PANTOPRAZOLE SODIUM 40 MILLIGRAM(S): 20 TABLET, DELAYED RELEASE ORAL at 17:03

## 2022-05-25 NOTE — PHYSICAL THERAPY INITIAL EVALUATION ADULT - GENERAL OBSERVATIONS, REHAB EVAL
Complained of dizziness throughout session. Orthostatics taken. See flowsheet - CPOE: Vital Signs Adult. PT IE 10-10:30am. Patient left in supine in NAD. +call bell, +bed alarm.

## 2022-05-25 NOTE — PROGRESS NOTE ADULT - ASSESSMENT
47 year old male with hx of HTN, AMBER, DM, vertigo, diverticulosis s/p colon resection presents from home with 4 days of epigastric pain    #Epigastric pain likely 2/2 diabetic gastroparesis +/- gerd +/- herpetic neuralgia   - patient started on ibuprofen about a week ago for dental infection, pain started about 3-4 prior to admission  - pain is burning with occasional radiation to the back, worse with food also accompanied with nausea, denies hematemesis or bloody stools  - patient w uncontrolled DM (A1c ~11)   - lipase 18; US CBD 5mm no stones; CT abdomen mild ill-defined appearance of the pancreas w questionable mild interstitial pancreatitis   - GI consulted: EGD done on 5/19: non erosive gastritis > f/u outpatient for pathology results and for gastric emptying study  - case d/w cardiology - unlikely cardiac in nature  - cont ppi bid, sucralfate, maalox; pain control prn  - avoid nsaids and smoking    #Herpes zoster w herpetic neuralgia  - cont Valacyclovir 100mg TID for 7 day (day 1 5/21)  - cont gabapentin and tramadol    #H/o HTN w orthostatic hypotension during hospitalization  - on HCTZ 37.5, spironolactone 25, toprol 100 at home  - cont nifedipine, toprol; holding diuretics given BILLY and orthostasis  - cont compression stocking; no abd binder for now given shingles distribution)    #BILLY on CKD2  - Cr 2.4 on admission - today 1.7 (b/l unknown though appears to be ~1.5)  - patient was told to see a nephrologist by PMD however hasn't yet  - monitor Cr; avoid nephrotoxic agents  - Nephro following    #Uncontrolled DM2 (A1c 10.9) - w possible gastroparesis  - cont basal bolus insulin (takes lantus 45, lispro 6 at home, per patient)  - monitor FS  - outpatient endocrine f/u    #HLD   - cont statin and fibrate    #Dental infection   - outpatient f/u    #AMBER on home cpap  - continue with home cpap    #Asthma - no wheeze on exam  - cont inhalers and nebs prn    #H/o Vertigo  - c/w meclizine    DVT ppx: heparin subq  GI ppx: ppi  Activity: iat  Diet: advance as tolerated  FULL CODE

## 2022-05-25 NOTE — PROGRESS NOTE ADULT - SUBJECTIVE AND OBJECTIVE BOX
BRINDA MOYER 47y Male  MRN#: [id number]   CODE STATUS:FULL    Hospital Day: 10d    Pt is currently admitted with the primary diagnosis of epigastric pain    SUBJECTIVE  Hospital Course  47 year old male with hx of HTN, AMBER, DM, vertigo, diverticulosis s/p colon resection presents from home with 4 days of epigastric pain.  Patient states it's a burning pain that has recently been going to his right upper back.  It's worse with food and he has not been able to tolerate anything by mouth for the last 2 days because of nausea.  Pain is not worse with exertion.  He endorses mild shortness of breath when climbing stairs which has been present for a while.  He denies any bloody stool, no hematemesis or vomiting.  Of note he has been on ibuprofen for the last week because of a dental infection.  He's supposed to get some teeth taken out but had to come in to the hospital.  Also he was told by his PMD to see a nephrologist and he hasn't yet.  He had a negative stress 3 years ago.  In the ED he received 1.5L LR.  Lipase was negative  Triage vitals: /68    RR 18  Temp 98  SpO2 100% room air    Subjective complaints   Patient still complaining of pain overnight. Feeling better overall.                            OBJECTIVE  PAST MEDICAL & SURGICAL HISTORY  Diabetes    Asthma    Diverticulitis  surgery 16yrs ago    High cholesterol    Dyslipidemia    Hypertension    H/O vertigo    Diabetic ulcer of right foot    Broken toe  left pinky toe    Vertigo    HTN (hypertension)    Diabetes    History of surgery  colon resection    No significant past surgical history                                             ALLERGIES:  No Known Allergies                                        HOME MEDICATIONS  Home Medications:  Albuterol (Eqv-ProAir HFA) 90 mcg/inh inhalation aerosol: 2 puff(s) inhaled every 6 hours (15 May 2022 08:54)  amoxicillin-clavulanate 500 mg-125 mg oral tablet: 1 tab(s) orally every 8 hours (15 May 2022 08:54)  fenofibrate 145 mg oral tablet: 1 tab(s) orally once a day (15 May 2022 08:54)  HumaLOG 100 units/mL subcutaneous solution: 5 unit(s) subcutaneous 3 times a day (before meals)  sliding scale (15 May 2022 08:54)  hydroCHLOROthiazide: 37.5 milligram(s) orally once a day (15 May 2022 08:54)  ibuprofen 600 mg oral tablet: 1 tab(s) orally every 6 hours (15 May 2022 08:54)  Protonix 40 mg oral delayed release tablet: 1 tab(s) orally once a day (15 May 2022 08:54)  spironolactone 25 mg oral tablet: 1 tab(s) orally once a day (15 May 2022 08:54)  Toprol- mg oral tablet, extended release: 1 tab(s) orally once a day (15 May 2022 08:54)  Tresiba 100 units/mL subcutaneous solution: 40 unit(s) subcutaneous once a day (15 May 2022 08:54)                           MEDICATIONS:  STANDING MEDICATIONS  atorvastatin 40 milliGRAM(s) Oral at bedtime  dextrose 5%. 1000 milliLiter(s) IV Continuous <Continuous>  dextrose 5%. 1000 milliLiter(s) IV Continuous <Continuous>  dextrose 5%. 1000 milliLiter(s) IV Continuous <Continuous>  dextrose 50% Injectable 25 Gram(s) IV Push once  dextrose 50% Injectable 12.5 Gram(s) IV Push once  dextrose 50% Injectable 25 Gram(s) IV Push once  fenofibrate Tablet 145 milliGRAM(s) Oral daily  glucagon  Injectable 1 milliGRAM(s) IntraMuscular once  glucagon  Injectable 1 milliGRAM(s) IntraMuscular once  heparin   Injectable 5000 Unit(s) SubCutaneous every 8 hours  hydrALAZINE Injectable 10 milliGRAM(s) IV Push once  insulin glargine Injectable (LANTUS) 34 Unit(s) SubCutaneous at bedtime  insulin lispro (ADMELOG) corrective regimen sliding scale   SubCutaneous three times a day before meals  insulin lispro Injectable (ADMELOG) 3 Unit(s) SubCutaneous three times a day before meals  metoprolol succinate  milliGRAM(s) Oral daily  NIFEdipine XL 60 milliGRAM(s) Oral daily  oxyCODONE    IR 5 milliGRAM(s) Oral once  pantoprazole    Tablet 40 milliGRAM(s) Oral two times a day  senna 2 Tablet(s) Oral at bedtime  sucralfate 1 Gram(s) Oral four times a day    PRN MEDICATIONS  ALBUTerol    90 MICROgram(s) HFA Inhaler 2 Puff(s) Inhalation every 6 hours PRN  aluminum hydroxide/magnesium hydroxide/simethicone Suspension 30 milliLiter(s) Oral every 4 hours PRN  dextrose Oral Gel 15 Gram(s) Oral once PRN  meclizine 25 milliGRAM(s) Oral three times a day PRN  morphine  - Injectable 2 milliGRAM(s) IV Push every 6 hours PRN  ondansetron Injectable 4 milliGRAM(s) IV Push every 4 hours PRN  polyethylene glycol 3350 17 Gram(s) Oral two times a day PRN                                            VITAL SIGNS: Last 24 Hours  T(C): 36.3 (24 May 2022 13:59), Max: 36.3 (24 May 2022 13:59)  T(F): 97.4 (24 May 2022 13:59), Max: 97.4 (24 May 2022 13:59)  HR: 74 (24 May 2022 13:59) (74 - 84)  BP: 127/61 (24 May 2022 13:59) (127/61 - 167/90)  BP(mean): 122 (24 May 2022 05:15) (122 - 122)  RR: 18 (24 May 2022 13:59) (18 - 18)                                           LABS:               12.6   7.99  )-----------( 197      ( 24 May 2022 06:38 )             37.0     136  |  99  |  17  ----------------------------<  218<H>  4.1   |  27  |  1.8<H>    Ca    8.5      24 May 2022 06:38  Phos  3.6     05-24  Mg     1.8     05-24    TPro  5.2<L>  /  Alb  2.7<L>  /  TBili  <0.2  /  DBili  x   /  AST  25  /  ALT  19  /  AlkPhos  124<H>  05-24                                            -------------------------------------------------------------  RADIOLOGY:                                            --------------------------------------------------------------    PHYSICAL EXAM:  General: Laying in bed, uncomfortable appearing in mild distress  HEENT: atraumatic, normocephalic  LUNGS: CTAB, unlabored respirations  HEART: S1S2+, RRR no MRG   ABDOMEN: rash on R upper abd and R flank, healed and crusted,  epigastric TTP, BS+  EXT: no LE edema  NEURO: AAOx3

## 2022-05-25 NOTE — PROGRESS NOTE ADULT - ATTENDING COMMENTS
47 year old male with hx of HTN, AMBER, DM, vertigo, diverticulosis s/p colon resection presents from home with 4 days of epigastric pain    #Epigastric pain likely 2/2 gerd vs herpetic neuralgia vs gastroparesis   - patient started on ibuprofen about a week ago for dental infection, pain started about 3-4 prior to admission  - pain is burning with occasional radiation to the back, worse with food also accompanied with nausea, denies hematemesis or bloody stools  - lipase 18; US CBD 5mm no stones; CT abdomen mild ill-defined appearance of the pancreas w questionable mild interstitial pancreatitis   - GI consulted: EGD done on 5/19: non erosive gastritis > will need outpt gastric emptying study  - cont ppi bid, sucralfate, maalox; pain control prn    #Shingles  - cont Valacyclovir 100mg TID for 7 day (day 1 5/21)  - cont gabapentin 300mg BID  - start acetaminophen and tramadol     #Orthostatic Hypotension   - still persistently orthostatic, etiologies include dehydration 2/2 persistent hyperglycemia vs autonomic dysfunction   - will cont IVF, control BG, trial compression stockings   - avoid abdominal binder for now given herpetic lesions, may cause discomfort   - orthostatics daily    - if still no improvement tomorrow, advise medication initiation for orthostasis     #HTN  - was on HCTZ 37.5 / spironolactone 25 / toprol 100  - will hold HCTZ and spironolactone given BILLY/ orthostasis  - started nifedipine     #BILLY  - Cr 2.4 on admission - today 1.7 (b/l unknown )  - patient was told to see a nephrologist by PMD however hasn't yet  - f/u nephro; avoid nephrotoxic agents; monitor Cr    #Dental infection   - outpatient f/u    #AMBER on home cpap  - continue with home cpap    #DM2 (A1c 10.9)  - increase basal bolus to home dose   - outpatient endocrine f/u and nutrition follow up     #HLD   - cont statin and fibrate    #Asthma -  - cont inhalers and nebs prn      DVT ppx: heparin subq    #Progress Note Handoff:  Pending (specify): improvement in orthostasis, anticipate dc tomorrow , pt is considering STR placement- CM to follow up   Family discussion: d/w pt   Disposition: Home___/SNF___/Other________/Unknown at this time____x____    Total time spent to complete patient's bedside assessment, review medical chart, discuss medical plan of care with covering medical team was more than 25 minutes  with >50% of time spent face to face with patient, discussion with patient/family and/or coordination of care      Maritza Emmanuel DO .

## 2022-05-26 LAB
ALBUMIN SERPL ELPH-MCNC: 2.9 G/DL — LOW (ref 3.5–5.2)
ALP SERPL-CCNC: 122 U/L — HIGH (ref 30–115)
ALT FLD-CCNC: 28 U/L — SIGNIFICANT CHANGE UP (ref 0–41)
ANION GAP SERPL CALC-SCNC: 10 MMOL/L — SIGNIFICANT CHANGE UP (ref 7–14)
AST SERPL-CCNC: 30 U/L — SIGNIFICANT CHANGE UP (ref 0–41)
BASOPHILS # BLD AUTO: 0.04 K/UL — SIGNIFICANT CHANGE UP (ref 0–0.2)
BASOPHILS NFR BLD AUTO: 0.6 % — SIGNIFICANT CHANGE UP (ref 0–1)
BILIRUB SERPL-MCNC: <0.2 MG/DL — SIGNIFICANT CHANGE UP (ref 0.2–1.2)
BUN SERPL-MCNC: 17 MG/DL — SIGNIFICANT CHANGE UP (ref 10–20)
CALCIUM SERPL-MCNC: 8.8 MG/DL — SIGNIFICANT CHANGE UP (ref 8.5–10.1)
CHLORIDE SERPL-SCNC: 102 MMOL/L — SIGNIFICANT CHANGE UP (ref 98–110)
CO2 SERPL-SCNC: 27 MMOL/L — SIGNIFICANT CHANGE UP (ref 17–32)
CREAT SERPL-MCNC: 1.9 MG/DL — HIGH (ref 0.7–1.5)
EGFR: 43 ML/MIN/1.73M2 — LOW
EOSINOPHIL # BLD AUTO: 0.26 K/UL — SIGNIFICANT CHANGE UP (ref 0–0.7)
EOSINOPHIL NFR BLD AUTO: 3.9 % — SIGNIFICANT CHANGE UP (ref 0–8)
GLUCOSE BLDC GLUCOMTR-MCNC: 108 MG/DL — HIGH (ref 70–99)
GLUCOSE BLDC GLUCOMTR-MCNC: 128 MG/DL — HIGH (ref 70–99)
GLUCOSE BLDC GLUCOMTR-MCNC: 166 MG/DL — HIGH (ref 70–99)
GLUCOSE BLDC GLUCOMTR-MCNC: 208 MG/DL — HIGH (ref 70–99)
GLUCOSE SERPL-MCNC: 115 MG/DL — HIGH (ref 70–99)
HCT VFR BLD CALC: 38.5 % — LOW (ref 42–52)
HGB BLD-MCNC: 13.1 G/DL — LOW (ref 14–18)
IMM GRANULOCYTES NFR BLD AUTO: 0.5 % — HIGH (ref 0.1–0.3)
LYMPHOCYTES # BLD AUTO: 2.26 K/UL — SIGNIFICANT CHANGE UP (ref 1.2–3.4)
LYMPHOCYTES # BLD AUTO: 34 % — SIGNIFICANT CHANGE UP (ref 20.5–51.1)
MAGNESIUM SERPL-MCNC: 1.9 MG/DL — SIGNIFICANT CHANGE UP (ref 1.8–2.4)
MCHC RBC-ENTMCNC: 30.4 PG — SIGNIFICANT CHANGE UP (ref 27–31)
MCHC RBC-ENTMCNC: 34 G/DL — SIGNIFICANT CHANGE UP (ref 32–37)
MCV RBC AUTO: 89.3 FL — SIGNIFICANT CHANGE UP (ref 80–94)
MONOCYTES # BLD AUTO: 0.5 K/UL — SIGNIFICANT CHANGE UP (ref 0.1–0.6)
MONOCYTES NFR BLD AUTO: 7.5 % — SIGNIFICANT CHANGE UP (ref 1.7–9.3)
NEUTROPHILS # BLD AUTO: 3.55 K/UL — SIGNIFICANT CHANGE UP (ref 1.4–6.5)
NEUTROPHILS NFR BLD AUTO: 53.5 % — SIGNIFICANT CHANGE UP (ref 42.2–75.2)
NRBC # BLD: 0 /100 WBCS — SIGNIFICANT CHANGE UP (ref 0–0)
PLATELET # BLD AUTO: 226 K/UL — SIGNIFICANT CHANGE UP (ref 130–400)
POTASSIUM SERPL-MCNC: 3.9 MMOL/L — SIGNIFICANT CHANGE UP (ref 3.5–5)
POTASSIUM SERPL-SCNC: 3.9 MMOL/L — SIGNIFICANT CHANGE UP (ref 3.5–5)
PROT SERPL-MCNC: 5.5 G/DL — LOW (ref 6–8)
RBC # BLD: 4.31 M/UL — LOW (ref 4.7–6.1)
RBC # FLD: 12 % — SIGNIFICANT CHANGE UP (ref 11.5–14.5)
SODIUM SERPL-SCNC: 139 MMOL/L — SIGNIFICANT CHANGE UP (ref 135–146)
WBC # BLD: 6.64 K/UL — SIGNIFICANT CHANGE UP (ref 4.8–10.8)
WBC # FLD AUTO: 6.64 K/UL — SIGNIFICANT CHANGE UP (ref 4.8–10.8)

## 2022-05-26 PROCEDURE — 99232 SBSQ HOSP IP/OBS MODERATE 35: CPT

## 2022-05-26 RX ORDER — GABAPENTIN 400 MG/1
1 CAPSULE ORAL
Qty: 60 | Refills: 0
Start: 2022-05-26 | End: 2022-06-24

## 2022-05-26 RX ORDER — TRAMADOL HYDROCHLORIDE 50 MG/1
0.5 TABLET ORAL
Qty: 0 | Refills: 0 | DISCHARGE
Start: 2022-05-26

## 2022-05-26 RX ORDER — SPIRONOLACTONE 25 MG/1
1 TABLET, FILM COATED ORAL
Qty: 0 | Refills: 0 | DISCHARGE

## 2022-05-26 RX ORDER — HYDROCHLOROTHIAZIDE 25 MG
37.5 TABLET ORAL
Qty: 0 | Refills: 0 | DISCHARGE

## 2022-05-26 RX ORDER — NIFEDIPINE 30 MG
1 TABLET, EXTENDED RELEASE 24 HR ORAL
Qty: 30 | Refills: 0
Start: 2022-05-26 | End: 2022-06-24

## 2022-05-26 RX ORDER — VALACYCLOVIR 500 MG/1
1 TABLET, FILM COATED ORAL
Qty: 3 | Refills: 0
Start: 2022-05-26 | End: 2022-05-26

## 2022-05-26 RX ADMIN — ATORVASTATIN CALCIUM 40 MILLIGRAM(S): 80 TABLET, FILM COATED ORAL at 21:29

## 2022-05-26 RX ADMIN — Medication 1 GRAM(S): at 17:03

## 2022-05-26 RX ADMIN — GABAPENTIN 300 MILLIGRAM(S): 400 CAPSULE ORAL at 17:03

## 2022-05-26 RX ADMIN — Medication 1: at 17:01

## 2022-05-26 RX ADMIN — TRAMADOL HYDROCHLORIDE 25 MILLIGRAM(S): 50 TABLET ORAL at 09:27

## 2022-05-26 RX ADMIN — GABAPENTIN 300 MILLIGRAM(S): 400 CAPSULE ORAL at 06:30

## 2022-05-26 RX ADMIN — Medication 6 UNIT(S): at 08:17

## 2022-05-26 RX ADMIN — VALACYCLOVIR 1000 MILLIGRAM(S): 500 TABLET, FILM COATED ORAL at 06:31

## 2022-05-26 RX ADMIN — INSULIN GLARGINE 45 UNIT(S): 100 INJECTION, SOLUTION SUBCUTANEOUS at 21:30

## 2022-05-26 RX ADMIN — Medication 1 GRAM(S): at 06:31

## 2022-05-26 RX ADMIN — Medication 1 GRAM(S): at 12:28

## 2022-05-26 RX ADMIN — Medication 145 MILLIGRAM(S): at 12:28

## 2022-05-26 RX ADMIN — Medication 100 MILLIGRAM(S): at 06:30

## 2022-05-26 RX ADMIN — HEPARIN SODIUM 5000 UNIT(S): 5000 INJECTION INTRAVENOUS; SUBCUTANEOUS at 21:29

## 2022-05-26 RX ADMIN — VALACYCLOVIR 1000 MILLIGRAM(S): 500 TABLET, FILM COATED ORAL at 21:30

## 2022-05-26 RX ADMIN — Medication 6 UNIT(S): at 12:15

## 2022-05-26 RX ADMIN — PANTOPRAZOLE SODIUM 40 MILLIGRAM(S): 20 TABLET, DELAYED RELEASE ORAL at 06:31

## 2022-05-26 RX ADMIN — SENNA PLUS 2 TABLET(S): 8.6 TABLET ORAL at 21:30

## 2022-05-26 RX ADMIN — Medication 6 UNIT(S): at 17:02

## 2022-05-26 RX ADMIN — HEPARIN SODIUM 5000 UNIT(S): 5000 INJECTION INTRAVENOUS; SUBCUTANEOUS at 06:30

## 2022-05-26 RX ADMIN — PANTOPRAZOLE SODIUM 40 MILLIGRAM(S): 20 TABLET, DELAYED RELEASE ORAL at 17:03

## 2022-05-26 RX ADMIN — Medication 1 GRAM(S): at 01:00

## 2022-05-26 RX ADMIN — TRAMADOL HYDROCHLORIDE 25 MILLIGRAM(S): 50 TABLET ORAL at 10:53

## 2022-05-26 RX ADMIN — Medication 60 MILLIGRAM(S): at 06:30

## 2022-05-26 NOTE — PROGRESS NOTE ADULT - ASSESSMENT
47 year old male with hx of HTN, AMBER, DM, vertigo, diverticulosis s/p colon resection presents from home with 4 days of epigastric pain    #H/o HTN w symptomatic orthostatic hypotension during hospitalization  - on HCTZ 37.5, spironolactone 25, toprol 100 at home  - cont nifedipine, toprol; holding diuretics given BILLY and orthostasis  - cont compression stocking; no abd binder for now given shingles distribution)    #Epigastric pain likely 2/2 diabetic gastroparesis +/- gerd +/- herpetic neuralgia   - patient started on ibuprofen about a week ago for dental infection, pain started about 3-4 prior to admission  - pain is burning with occasional radiation to the back, worse with food also accompanied with nausea, denies hematemesis or bloody stools  - patient w uncontrolled DM (A1c ~11)   - lipase 18; US CBD 5mm no stones; CT abdomen mild ill-defined appearance of the pancreas w questionable mild interstitial pancreatitis   - GI consulted: EGD done on 5/19: non erosive gastritis > f/u outpatient for pathology results and for gastric emptying study  - case d/w cardiology - unlikely cardiac in nature  - cont ppi bid, sucralfate, maalox; pain control prn  - avoid nsaids and smoking    #Herpes zoster w herpetic neuralgia  - cont Valacyclovir 100mg TID for 7 day (day 1 5/21)  - cont gabapentin and tramadol    #BILLY on CKD2  - Cr 2.4 on admission - today 1.7 (b/l unknown though appears to be ~1.5)  - patient was told to see a nephrologist by PMD however hasn't yet  - monitor Cr; avoid nephrotoxic agents  - Nephro following    #Uncontrolled DM2 (A1c 10.9) - w possible gastroparesis  - cont basal bolus insulin (takes lantus 45, lispro 6 at home, per patient)  - monitor FS  - outpatient endocrine f/u    #HLD   - cont statin and fibrate    #Dental infection   - outpatient f/u    #AMBER on home cpap  - continue with home cpap    #Asthma - no wheeze on exam  - cont inhalers and nebs prn    #H/o Vertigo  - c/w meclizine    DVT ppx: heparin subq  GI ppx: ppi  Activity: iat  Diet: advance as tolerated  FULL CODE    pending: orthostasis control, pain control 47 year old male with hx of HTN, AMBER, DM, vertigo, diverticulosis s/p colon resection presents from home with 4 days of epigastric pain    #H/o HTN w symptomatic orthostatic hypotension during hospitalization  - on HCTZ 37.5, spironolactone 25, toprol 100 at home  - cont nifedipine, toprol; holding diuretics given BILLY and orthostasis  - cont compression stocking; no abd binder for now given shingles distribution)    #Epigastric pain likely 2/2 diabetic gastroparesis +/- gerd +/- herpetic neuralgia   - patient started on ibuprofen about a week ago for dental infection, pain started about 3-4 prior to admission  - pain is burning with occasional radiation to the back, worse with food also accompanied with nausea, denies hematemesis or bloody stools  - patient w uncontrolled DM (A1c ~11)   - lipase 18; US CBD 5mm no stones; CT abdomen mild ill-defined appearance of the pancreas w questionable mild interstitial pancreatitis   - GI consulted: EGD done on 5/19: non erosive gastritis > f/u outpatient for pathology results and for gastric emptying study  - case d/w cardiology - unlikely cardiac in nature  - cont ppi bid, sucralfate, maalox; pain control prn  - avoid nsaids and smoking    #Herpes zoster w herpetic neuralgia  - cont Valacyclovir 100mg TID for 7 day (day 1 5/21)  - cont gabapentin and tramadol    #BILLY on CKD2 - Cr at new baseline?  - Cr 2.4 on admission - today 1.9 (Cr was 1.2 in 5/2021, 1.7 in 03/2022, per HIE)  - patient was told to see a nephrologist by PMD however hasn't yet  - monitor Cr; avoid nephrotoxic agents  - Nephro following    #Uncontrolled DM2 (A1c 10.9) - w possible gastroparesis  - cont basal bolus insulin (takes lantus 45, lispro 6 at home, per patient)  - monitor FS  - outpatient endocrine f/u    #HLD   - cont statin and fibrate    #Dental infection   - outpatient f/u    #AMBER on home cpap  - continue with home cpap    #Asthma - no wheeze on exam  - cont inhalers and nebs prn    #H/o Vertigo  - c/w meclizine    DVT ppx: heparin subq  GI ppx: ppi  Activity: iat  Diet: advance as tolerated  FULL CODE    pending: orthostasis control, pain control

## 2022-05-26 NOTE — PROGRESS NOTE ADULT - SUBJECTIVE AND OBJECTIVE BOX
BRINDA MOYER 47y Male  MRN#: [id number]   CODE STATUS:FULL    Hospital Day: 11d    Pt is currently admitted with the primary diagnosis of epigastric pain    SUBJECTIVE  Hospital Course  47 year old male with hx of HTN, AMBER, DM, vertigo, diverticulosis s/p colon resection presents from home with 4 days of epigastric pain.  Patient states it's a burning pain that has recently been going to his right upper back.  It's worse with food and he has not been able to tolerate anything by mouth for the last 2 days because of nausea.  Pain is not worse with exertion.  He endorses mild shortness of breath when climbing stairs which has been present for a while.  He denies any bloody stool, no hematemesis or vomiting.  Of note he has been on ibuprofen for the last week because of a dental infection.  He's supposed to get some teeth taken out but had to come in to the hospital.  Also he was told by his PMD to see a nephrologist and he hasn't yet.  He had a negative stress 3 years ago.  In the ED he received 1.5L LR.  Lipase was negative  Triage vitals: /68    RR 18  Temp 98  SpO2 100% room air    Subjective complaints   Patient no new issues or overnight events.                            OBJECTIVE  PAST MEDICAL & SURGICAL HISTORY  Diabetes    Asthma    Diverticulitis  surgery 16yrs ago    High cholesterol    Dyslipidemia    Hypertension    H/O vertigo    Diabetic ulcer of right foot    Broken toe  left pinky toe    Vertigo    HTN (hypertension)    Diabetes    History of surgery  colon resection    No significant past surgical history                                             ALLERGIES:  No Known Allergies                                        HOME MEDICATIONS  Home Medications:  Albuterol (Eqv-ProAir HFA) 90 mcg/inh inhalation aerosol: 2 puff(s) inhaled every 6 hours (15 May 2022 08:54)  amoxicillin-clavulanate 500 mg-125 mg oral tablet: 1 tab(s) orally every 8 hours (15 May 2022 08:54)  fenofibrate 145 mg oral tablet: 1 tab(s) orally once a day (15 May 2022 08:54)  HumaLOG 100 units/mL subcutaneous solution: 5 unit(s) subcutaneous 3 times a day (before meals)  sliding scale (15 May 2022 08:54)  hydroCHLOROthiazide: 37.5 milligram(s) orally once a day (15 May 2022 08:54)  ibuprofen 600 mg oral tablet: 1 tab(s) orally every 6 hours (15 May 2022 08:54)  Protonix 40 mg oral delayed release tablet: 1 tab(s) orally once a day (15 May 2022 08:54)  spironolactone 25 mg oral tablet: 1 tab(s) orally once a day (15 May 2022 08:54)  Toprol- mg oral tablet, extended release: 1 tab(s) orally once a day (15 May 2022 08:54)  Tresiba 100 units/mL subcutaneous solution: 40 unit(s) subcutaneous once a day (15 May 2022 08:54)                           MEDICATIONS:  STANDING MEDICATIONS  atorvastatin 40 milliGRAM(s) Oral at bedtime  dextrose 5%. 1000 milliLiter(s) IV Continuous <Continuous>  dextrose 5%. 1000 milliLiter(s) IV Continuous <Continuous>  dextrose 5%. 1000 milliLiter(s) IV Continuous <Continuous>  dextrose 50% Injectable 25 Gram(s) IV Push once  dextrose 50% Injectable 12.5 Gram(s) IV Push once  dextrose 50% Injectable 25 Gram(s) IV Push once  fenofibrate Tablet 145 milliGRAM(s) Oral daily  glucagon  Injectable 1 milliGRAM(s) IntraMuscular once  glucagon  Injectable 1 milliGRAM(s) IntraMuscular once  heparin   Injectable 5000 Unit(s) SubCutaneous every 8 hours  hydrALAZINE Injectable 10 milliGRAM(s) IV Push once  insulin glargine Injectable (LANTUS) 34 Unit(s) SubCutaneous at bedtime  insulin lispro (ADMELOG) corrective regimen sliding scale   SubCutaneous three times a day before meals  insulin lispro Injectable (ADMELOG) 3 Unit(s) SubCutaneous three times a day before meals  metoprolol succinate  milliGRAM(s) Oral daily  NIFEdipine XL 60 milliGRAM(s) Oral daily  oxyCODONE    IR 5 milliGRAM(s) Oral once  pantoprazole    Tablet 40 milliGRAM(s) Oral two times a day  senna 2 Tablet(s) Oral at bedtime  sucralfate 1 Gram(s) Oral four times a day    PRN MEDICATIONS  ALBUTerol    90 MICROgram(s) HFA Inhaler 2 Puff(s) Inhalation every 6 hours PRN  aluminum hydroxide/magnesium hydroxide/simethicone Suspension 30 milliLiter(s) Oral every 4 hours PRN  dextrose Oral Gel 15 Gram(s) Oral once PRN  meclizine 25 milliGRAM(s) Oral three times a day PRN  morphine  - Injectable 2 milliGRAM(s) IV Push every 6 hours PRN  ondansetron Injectable 4 milliGRAM(s) IV Push every 4 hours PRN  polyethylene glycol 3350 17 Gram(s) Oral two times a day PRN                                            VITAL SIGNS: Last 24 Hours  T(C): 36.8 (26 May 2022 05:40), Max: 36.8 (26 May 2022 05:40)  T(F): 98.2 (26 May 2022 05:40), Max: 98.2 (26 May 2022 05:40)  HR: 72 (26 May 2022 05:40) (69 - 72)  BP: 141/73 (26 May 2022 05:40) (141/73 - 148/92)  RR: 18 (26 May 2022 05:40) (18 - 18)                                           LABS:               13.1   6.64  )-----------( 226      ( 26 May 2022 07:45 )             38.5     139  |  102  |  17  ----------------------------<  115<H>  3.9   |  27  |  1.9<H>    Ca    8.8      26 May 2022 07:45  Mg     1.9     05-26    TPro  5.5<L>  /  Alb  2.9<L>  /  TBili  <0.2  /  DBili  x   /  AST  30  /  ALT  28  /  AlkPhos  122<H>  05-26                                            -------------------------------------------------------------  RADIOLOGY:                                          --------------------------------------------------------------    PHYSICAL EXAM:  General: Laying in bed, uncomfortable appearing in mild distress  HEENT: atraumatic, normocephalic  LUNGS: CTAB, unlabored respirations  HEART: S1S2+, RRR no MRG   ABDOMEN: rash on R upper abd and R flank, healed and crusted,  epigastric TTP, BS+  EXT: no LE edema  NEURO: AAOx3

## 2022-05-26 NOTE — PROGRESS NOTE ADULT - ATTENDING COMMENTS
***My note supersedes any discrepancies that may be above in the resident's note***    47 year old male with hx of HTN, AMBER, DM, vertigo, diverticulosis s/p colon resection presents from home with 4 days of epigastric pain    #H/o HTN w symptomatic orthostatic hypotension during hospitalization  - on HCTZ 37.5, spironolactone 25, toprol 100 at home  - cont nifedipine, toprol; holding diuretics given BILLY and orthostasis  - cont compression stocking; no abd binder for now given shingles distribution)    #Epigastric pain likely 2/2 diabetic gastroparesis +/- gerd +/- herpetic neuralgia   - patient started on ibuprofen about a week ago for dental infection, pain started about 3-4 prior to admission  - pain is burning with occasional radiation to the back, worse with food also accompanied with nausea, denies hematemesis or bloody stools  - patient w uncontrolled DM (A1c ~11)   - lipase 18; US CBD 5mm no stones; CT abdomen mild ill-defined appearance of the pancreas w questionable mild interstitial pancreatitis   - GI consulted: EGD done on 5/19: non erosive gastritis > f/u outpatient for pathology results and for gastric emptying study  - case d/w cardiology - unlikely cardiac in nature  - cont ppi bid, sucralfate, maalox; pain control prn  - avoid nsaids and smoking    #Herpes zoster w herpetic neuralgia  - cont Valacyclovir 100mg TID for 7 day (day 1 5/21)  - cont gabapentin and tramadol    #BILLY on CKD2 - Cr at new baseline?  - Cr 2.4 on admission - today 1.9 (Cr was 1.2 in 5/2021, 1.7 in 03/2022, per HIE)  - patient was told to see a nephrologist by PMD however hasn't yet  - monitor Cr; avoid nephrotoxic agents  - Nephro following    #Uncontrolled DM2 (A1c 10.9) - w possible gastroparesis  - cont basal bolus insulin (takes lantus 45, lispro 6 at home, per patient)  - monitor FS  - outpatient endocrine f/u    #HLD   - cont statin and fibrate    #Dental infection   - outpatient f/u    #AMBER on home cpap  - continue with home cpap    #Asthma - no wheeze on exam  - cont inhalers and nebs prn    #H/o Vertigo  - c/w meclizine    DVT ppx: heparin subq  GI ppx: ppi  Activity: iat  Diet: advance as tolerated  FULL CODE    #Progress Note Handoff  Pending (specify):  placement  Family discussion: updated and in agreement of plan  Disposition: pt and he is agreeable with [facility].   sent request for auth.

## 2022-05-26 NOTE — CHART NOTE - NSCHARTNOTEFT_GEN_A_CORE
PACU ANESTHESIA ADMISSION NOTE      Procedure: EGD  Post op diagnosis:  epigastric pain    __x__  Patent Airway    ____  Full return of protective reflexes    ____  Full recovery from anesthesia / back to baseline     Vitals:   see anesthesia record      Mental Status:  __x__ Awake   _____ Alert   _____ Drowsy   _____ Sedated    Nausea/Vomiting:  __x__ NO  ______Yes,   See Post - Op Orders          Pain Scale (0-10):  _____    Treatment: ____ None    ___x_ See Post - Op/PCA Orders    Post - Operative Fluids:   ____ Oral   __x__ See Post - Op Orders    Plan: Discharge:   _x___Home       _____Floor     _____Critical Care    _____  Other:_________________    Comments:  Uneventful intraoperative course. No anesthesia issues or complications noted. Patient stable upon arrival to PACU. Report given to RN. Discharge when criteria met.
Registered Dietitian Follow-Up     Patient Profile Reviewed                           Yes [x]   No []     Nutrition History Previously Obtained        Yes [x]  No []       Pertinent Subjective Information:   Pt has a good appetite; consuming 100% of meals provided. Denies nausea or vomiting.      Pertinent Medical Information:  # Epigastric pain likely 2/2 diabetic gastroparesis +/- gerd +/- herpetic neuralgia   # Herpes zoster  # H/o HTN w orthostatic hypotension during hospitalization  # BILLY  # Uncontrolled DM2 (A1c 10.9) - w possible gastroparesis  - on basal bolus insulin  # HLD     Diet order:  Diet, DASH/TLC:   Sodium & Cholesterol Restricted  Supplement Feeding Modality:  Oral  Glucerna Shake Cans or Servings Per Day:  1       Frequency:  Daily (05-20-22 @ 13:38) [Active]    Anthropometrics:  - Ht: 172.7 cm  - Wt: 81.6 KG  - BMI: 27.4  - IBW: 67.27 KG     Pertinent Lab Data:  05/24 @ 06:38 - RBC 4.12; H/H 12.6/37.0; Cr 1.8; ; Alk Phos 124; eGFR 46    CAPILLARY BLOOD GLUCOSE:  POCT Blood Glucose.: 139 mg/dL (24 May 2022 16:23)  POCT Blood Glucose.: 178 mg/dL (24 May 2022 11:50)  POCT Blood Glucose.: 253 mg/dL (24 May 2022 07:40)  POCT Blood Glucose.: 346 mg/dL (23 May 2022 21:23)     Pertinent Meds:  MEDICATIONS  (STANDING):  atorvastatin 40 milliGRAM(s) Oral at bedtime  dextrose 5%. 1000 milliLiter(s) (100 mL/Hr) IV Continuous <Continuous>  dextrose 5%. 1000 milliLiter(s) (100 mL/Hr) IV Continuous <Continuous>  dextrose 5%. 1000 milliLiter(s) (50 mL/Hr) IV Continuous <Continuous>  dextrose 50% Injectable 25 Gram(s) IV Push once  dextrose 50% Injectable 12.5 Gram(s) IV Push once  dextrose 50% Injectable 25 Gram(s) IV Push once  fenofibrate Tablet 145 milliGRAM(s) Oral daily  gabapentin 300 milliGRAM(s) Oral two times a day  glucagon  Injectable 1 milliGRAM(s) IntraMuscular once  glucagon  Injectable 1 milliGRAM(s) IntraMuscular once  heparin   Injectable 5000 Unit(s) SubCutaneous every 8 hours  insulin glargine Injectable (LANTUS) 45 Unit(s) SubCutaneous at bedtime  insulin lispro (ADMELOG) corrective regimen sliding scale   SubCutaneous three times a day before meals  insulin lispro Injectable (ADMELOG) 6 Unit(s) SubCutaneous three times a day before meals  lactated ringers. 1000 milliLiter(s) (100 mL/Hr) IV Continuous <Continuous>  metoprolol succinate  milliGRAM(s) Oral daily  NIFEdipine XL 60 milliGRAM(s) Oral daily  pantoprazole    Tablet 40 milliGRAM(s) Oral two times a day  senna 2 Tablet(s) Oral at bedtime  sucralfate 1 Gram(s) Oral four times a day  valACYclovir 1000 milliGRAM(s) Oral three times a day    MEDICATIONS  (PRN):  acetaminophen     Tablet .. 650 milliGRAM(s) Oral every 6 hours PRN Temp greater or equal to 38C (100.4F), Mild Pain (1 - 3), Moderate Pain (4 - 6)  ALBUTerol    90 MICROgram(s) HFA Inhaler 2 Puff(s) Inhalation every 6 hours PRN Shortness of Breath and/or Wheezing  aluminum hydroxide/magnesium hydroxide/simethicone Suspension 30 milliLiter(s) Oral every 4 hours PRN Dyspepsia  dextrose Oral Gel 15 Gram(s) Oral once PRN Blood Glucose LESS THAN 70 milliGRAM(s)/deciliter  meclizine 25 milliGRAM(s) Oral three times a day PRN vertigo  ondansetron Injectable 4 milliGRAM(s) IV Push every 4 hours PRN Nausea and/or Vomiting  polyethylene glycol 3350 17 Gram(s) Oral two times a day PRN Constipation  traMADol 25 milliGRAM(s) Oral every 4 hours PRN Severe Pain (7 - 10)     Physical Findings:  - Appearance: No edema documented per flowsheet  - GI function: WDL; last BM not documented per flowsheet  - Tubes: No feeding tubes  - Oral/Mouth cavity: No chewing or swallowing difficulties  - Skin: WDL; Intact, no pressure injuries  - Cognitive: Alert     Nutrition Requirements  Weight Used: Using ABW 81.6 KG -- with consideration for age, overweight BMI    Estimated Energy Needs    Continue [x]  Adjust []  ENERGY: 8236-9946 kcal/day (MSJ 1-1.2 AF)     Estimated Protein Needs    Continue [x]  Adjust []  PROTEIN: 82-90 g/day (1-1.1g/kg)     Estimated Fluid Needs        Continue [x]  Adjust []  FLUID: 1021-9039 mL/day (30-35 mL/day)     Nutrient Intake: Consuming 100% of meals; meeting 100% of estimated energy needs.     [x] Previous Nutrition Diagnosis: Increased Nutrient Needs            [x] Ongoing - improving          [] Resolved    [] No active nutrition diagnosis identified at this time     Nutrition Intervention: meals and medical food supplements     Goal/Expected Outcome: Continue to meet >75% of estimated energy needs.     Indicator/Monitoring: Diet order, PO intake, weights, labs, NFPF, body composition, BM and tolerance to medical food supplements.    Recommendations:  1. Recommend to add consistent carbohydrate (no snacks) to current diet order to maintain BG levels.  2. Continue with Glucerna Shake 1X/DAILY.  3. Encourage PO intake and assist during meals as needed.  4. Pt is at low nutrition risk; will f/u in 7-10 days.
Right side flank and back rash completely crusted;   Isolation precautions dc'd;
EGD findings:   Irregularity in the Z-line and gastroesophageal junction.  	Erythema in the stomach compatible with non-erosive gastritis.  	Normal mucosa in the whole examined duodenum.  Plan:	  Regular diet  Await pathology results  Avoid NSAID, smoking  Gastric emptying study as an OP  Follow up with GI as an OP

## 2022-05-27 LAB
ALBUMIN SERPL ELPH-MCNC: 3.1 G/DL — LOW (ref 3.5–5.2)
ALP SERPL-CCNC: 116 U/L — HIGH (ref 30–115)
ALT FLD-CCNC: 29 U/L — SIGNIFICANT CHANGE UP (ref 0–41)
ANION GAP SERPL CALC-SCNC: 10 MMOL/L — SIGNIFICANT CHANGE UP (ref 7–14)
AST SERPL-CCNC: 29 U/L — SIGNIFICANT CHANGE UP (ref 0–41)
BASOPHILS # BLD AUTO: 0.03 K/UL — SIGNIFICANT CHANGE UP (ref 0–0.2)
BASOPHILS NFR BLD AUTO: 0.4 % — SIGNIFICANT CHANGE UP (ref 0–1)
BILIRUB SERPL-MCNC: <0.2 MG/DL — SIGNIFICANT CHANGE UP (ref 0.2–1.2)
BUN SERPL-MCNC: 21 MG/DL — HIGH (ref 10–20)
CALCIUM SERPL-MCNC: 8.8 MG/DL — SIGNIFICANT CHANGE UP (ref 8.5–10.1)
CHLORIDE SERPL-SCNC: 103 MMOL/L — SIGNIFICANT CHANGE UP (ref 98–110)
CO2 SERPL-SCNC: 27 MMOL/L — SIGNIFICANT CHANGE UP (ref 17–32)
CREAT SERPL-MCNC: 2.2 MG/DL — HIGH (ref 0.7–1.5)
EGFR: 36 ML/MIN/1.73M2 — LOW
EOSINOPHIL # BLD AUTO: 0.28 K/UL — SIGNIFICANT CHANGE UP (ref 0–0.7)
EOSINOPHIL NFR BLD AUTO: 3.5 % — SIGNIFICANT CHANGE UP (ref 0–8)
GLUCOSE BLDC GLUCOMTR-MCNC: 118 MG/DL — HIGH (ref 70–99)
GLUCOSE BLDC GLUCOMTR-MCNC: 288 MG/DL — HIGH (ref 70–99)
GLUCOSE BLDC GLUCOMTR-MCNC: 301 MG/DL — HIGH (ref 70–99)
GLUCOSE BLDC GLUCOMTR-MCNC: 303 MG/DL — HIGH (ref 70–99)
GLUCOSE BLDC GLUCOMTR-MCNC: 76 MG/DL — SIGNIFICANT CHANGE UP (ref 70–99)
GLUCOSE SERPL-MCNC: 61 MG/DL — LOW (ref 70–99)
HCT VFR BLD CALC: 39.4 % — LOW (ref 42–52)
HGB BLD-MCNC: 13.3 G/DL — LOW (ref 14–18)
IMM GRANULOCYTES NFR BLD AUTO: 0.2 % — SIGNIFICANT CHANGE UP (ref 0.1–0.3)
LYMPHOCYTES # BLD AUTO: 3.05 K/UL — SIGNIFICANT CHANGE UP (ref 1.2–3.4)
LYMPHOCYTES # BLD AUTO: 37.7 % — SIGNIFICANT CHANGE UP (ref 20.5–51.1)
MAGNESIUM SERPL-MCNC: 1.9 MG/DL — SIGNIFICANT CHANGE UP (ref 1.8–2.4)
MCHC RBC-ENTMCNC: 30.2 PG — SIGNIFICANT CHANGE UP (ref 27–31)
MCHC RBC-ENTMCNC: 33.8 G/DL — SIGNIFICANT CHANGE UP (ref 32–37)
MCV RBC AUTO: 89.5 FL — SIGNIFICANT CHANGE UP (ref 80–94)
MONOCYTES # BLD AUTO: 0.71 K/UL — HIGH (ref 0.1–0.6)
MONOCYTES NFR BLD AUTO: 8.8 % — SIGNIFICANT CHANGE UP (ref 1.7–9.3)
NEUTROPHILS # BLD AUTO: 4 K/UL — SIGNIFICANT CHANGE UP (ref 1.4–6.5)
NEUTROPHILS NFR BLD AUTO: 49.4 % — SIGNIFICANT CHANGE UP (ref 42.2–75.2)
NRBC # BLD: 0 /100 WBCS — SIGNIFICANT CHANGE UP (ref 0–0)
PLATELET # BLD AUTO: 257 K/UL — SIGNIFICANT CHANGE UP (ref 130–400)
POTASSIUM SERPL-MCNC: 3.8 MMOL/L — SIGNIFICANT CHANGE UP (ref 3.5–5)
POTASSIUM SERPL-SCNC: 3.8 MMOL/L — SIGNIFICANT CHANGE UP (ref 3.5–5)
PROT SERPL-MCNC: 5.4 G/DL — LOW (ref 6–8)
RBC # BLD: 4.4 M/UL — LOW (ref 4.7–6.1)
RBC # FLD: 12 % — SIGNIFICANT CHANGE UP (ref 11.5–14.5)
SARS-COV-2 RNA SPEC QL NAA+PROBE: SIGNIFICANT CHANGE UP
SODIUM SERPL-SCNC: 140 MMOL/L — SIGNIFICANT CHANGE UP (ref 135–146)
WBC # BLD: 8.09 K/UL — SIGNIFICANT CHANGE UP (ref 4.8–10.8)
WBC # FLD AUTO: 8.09 K/UL — SIGNIFICANT CHANGE UP (ref 4.8–10.8)

## 2022-05-27 PROCEDURE — 99232 SBSQ HOSP IP/OBS MODERATE 35: CPT

## 2022-05-27 RX ORDER — AMLODIPINE BESYLATE 2.5 MG/1
5 TABLET ORAL ONCE
Refills: 0 | Status: COMPLETED | OUTPATIENT
Start: 2022-05-27 | End: 2022-05-27

## 2022-05-27 RX ORDER — INSULIN LISPRO 100/ML
3 VIAL (ML) SUBCUTANEOUS ONCE
Refills: 0 | Status: COMPLETED | OUTPATIENT
Start: 2022-05-27 | End: 2022-05-27

## 2022-05-27 RX ORDER — MIDODRINE HYDROCHLORIDE 2.5 MG/1
2.5 TABLET ORAL THREE TIMES A DAY
Refills: 0 | Status: DISCONTINUED | OUTPATIENT
Start: 2022-05-27 | End: 2022-05-28

## 2022-05-27 RX ADMIN — VALACYCLOVIR 1000 MILLIGRAM(S): 500 TABLET, FILM COATED ORAL at 06:13

## 2022-05-27 RX ADMIN — Medication 60 MILLIGRAM(S): at 06:13

## 2022-05-27 RX ADMIN — ATORVASTATIN CALCIUM 40 MILLIGRAM(S): 80 TABLET, FILM COATED ORAL at 21:41

## 2022-05-27 RX ADMIN — Medication 145 MILLIGRAM(S): at 11:28

## 2022-05-27 RX ADMIN — GABAPENTIN 300 MILLIGRAM(S): 400 CAPSULE ORAL at 17:19

## 2022-05-27 RX ADMIN — SENNA PLUS 2 TABLET(S): 8.6 TABLET ORAL at 21:41

## 2022-05-27 RX ADMIN — Medication 1 GRAM(S): at 06:13

## 2022-05-27 RX ADMIN — Medication 3 UNIT(S): at 21:42

## 2022-05-27 RX ADMIN — VALACYCLOVIR 1000 MILLIGRAM(S): 500 TABLET, FILM COATED ORAL at 22:32

## 2022-05-27 RX ADMIN — TRAMADOL HYDROCHLORIDE 25 MILLIGRAM(S): 50 TABLET ORAL at 10:45

## 2022-05-27 RX ADMIN — INSULIN GLARGINE 45 UNIT(S): 100 INJECTION, SOLUTION SUBCUTANEOUS at 21:42

## 2022-05-27 RX ADMIN — MIDODRINE HYDROCHLORIDE 2.5 MILLIGRAM(S): 2.5 TABLET ORAL at 17:23

## 2022-05-27 RX ADMIN — VALACYCLOVIR 1000 MILLIGRAM(S): 500 TABLET, FILM COATED ORAL at 14:00

## 2022-05-27 RX ADMIN — Medication 6 UNIT(S): at 11:27

## 2022-05-27 RX ADMIN — GABAPENTIN 300 MILLIGRAM(S): 400 CAPSULE ORAL at 06:12

## 2022-05-27 RX ADMIN — Medication 6 UNIT(S): at 17:18

## 2022-05-27 RX ADMIN — TRAMADOL HYDROCHLORIDE 25 MILLIGRAM(S): 50 TABLET ORAL at 10:05

## 2022-05-27 RX ADMIN — HEPARIN SODIUM 5000 UNIT(S): 5000 INJECTION INTRAVENOUS; SUBCUTANEOUS at 21:41

## 2022-05-27 RX ADMIN — Medication 4: at 11:27

## 2022-05-27 RX ADMIN — Medication 1 GRAM(S): at 17:17

## 2022-05-27 RX ADMIN — HEPARIN SODIUM 5000 UNIT(S): 5000 INJECTION INTRAVENOUS; SUBCUTANEOUS at 14:00

## 2022-05-27 RX ADMIN — PANTOPRAZOLE SODIUM 40 MILLIGRAM(S): 20 TABLET, DELAYED RELEASE ORAL at 17:18

## 2022-05-27 RX ADMIN — AMLODIPINE BESYLATE 5 MILLIGRAM(S): 2.5 TABLET ORAL at 22:30

## 2022-05-27 RX ADMIN — HEPARIN SODIUM 5000 UNIT(S): 5000 INJECTION INTRAVENOUS; SUBCUTANEOUS at 06:13

## 2022-05-27 RX ADMIN — Medication 1 GRAM(S): at 11:28

## 2022-05-27 RX ADMIN — PANTOPRAZOLE SODIUM 40 MILLIGRAM(S): 20 TABLET, DELAYED RELEASE ORAL at 06:13

## 2022-05-27 RX ADMIN — Medication 100 MILLIGRAM(S): at 06:13

## 2022-05-27 NOTE — PROGRESS NOTE ADULT - SUBJECTIVE AND OBJECTIVE BOX
BRINDA MOYER 47y Male  MRN#: [id number]   CODE STATUS:FULL    Hospital Day: 12d    Pt is currently admitted with the primary diagnosis of epigastric pain    SUBJECTIVE  Hospital Course  47 year old male with hx of HTN, AMBER, DM, vertigo, diverticulosis s/p colon resection presents from home with 4 days of epigastric pain.  Patient states it's a burning pain that has recently been going to his right upper back.  It's worse with food and he has not been able to tolerate anything by mouth for the last 2 days because of nausea.  Pain is not worse with exertion.  He endorses mild shortness of breath when climbing stairs which has been present for a while.  He denies any bloody stool, no hematemesis or vomiting.  Of note he has been on ibuprofen for the last week because of a dental infection.  He's supposed to get some teeth taken out but had to come in to the hospital.  Also he was told by his PMD to see a nephrologist and he hasn't yet.  He had a negative stress 3 years ago.  In the ED he received 1.5L LR.  Lipase was negative  Triage vitals: /68    RR 18  Temp 98  SpO2 100% room air    Subjective complaints   Patient no new issues or overnight events.                            OBJECTIVE  PAST MEDICAL & SURGICAL HISTORY  Diabetes    Asthma    Diverticulitis  surgery 16yrs ago    High cholesterol    Dyslipidemia    Hypertension    H/O vertigo    Diabetic ulcer of right foot    Broken toe  left pinky toe    Vertigo    HTN (hypertension)    Diabetes    History of surgery  colon resection    No significant past surgical history                                             ALLERGIES:  No Known Allergies                                        HOME MEDICATIONS  Home Medications:  Albuterol (Eqv-ProAir HFA) 90 mcg/inh inhalation aerosol: 2 puff(s) inhaled every 6 hours (15 May 2022 08:54)  amoxicillin-clavulanate 500 mg-125 mg oral tablet: 1 tab(s) orally every 8 hours (15 May 2022 08:54)  fenofibrate 145 mg oral tablet: 1 tab(s) orally once a day (15 May 2022 08:54)  HumaLOG 100 units/mL subcutaneous solution: 5 unit(s) subcutaneous 3 times a day (before meals)  sliding scale (15 May 2022 08:54)  hydroCHLOROthiazide: 37.5 milligram(s) orally once a day (15 May 2022 08:54)  ibuprofen 600 mg oral tablet: 1 tab(s) orally every 6 hours (15 May 2022 08:54)  Protonix 40 mg oral delayed release tablet: 1 tab(s) orally once a day (15 May 2022 08:54)  spironolactone 25 mg oral tablet: 1 tab(s) orally once a day (15 May 2022 08:54)  Toprol- mg oral tablet, extended release: 1 tab(s) orally once a day (15 May 2022 08:54)  Tresiba 100 units/mL subcutaneous solution: 40 unit(s) subcutaneous once a day (15 May 2022 08:54)                           MEDICATIONS:  STANDING MEDICATIONS  atorvastatin 40 milliGRAM(s) Oral at bedtime  dextrose 5%. 1000 milliLiter(s) IV Continuous <Continuous>  dextrose 5%. 1000 milliLiter(s) IV Continuous <Continuous>  dextrose 5%. 1000 milliLiter(s) IV Continuous <Continuous>  dextrose 50% Injectable 25 Gram(s) IV Push once  dextrose 50% Injectable 12.5 Gram(s) IV Push once  dextrose 50% Injectable 25 Gram(s) IV Push once  fenofibrate Tablet 145 milliGRAM(s) Oral daily  glucagon  Injectable 1 milliGRAM(s) IntraMuscular once  glucagon  Injectable 1 milliGRAM(s) IntraMuscular once  heparin   Injectable 5000 Unit(s) SubCutaneous every 8 hours  hydrALAZINE Injectable 10 milliGRAM(s) IV Push once  insulin glargine Injectable (LANTUS) 34 Unit(s) SubCutaneous at bedtime  insulin lispro (ADMELOG) corrective regimen sliding scale   SubCutaneous three times a day before meals  insulin lispro Injectable (ADMELOG) 3 Unit(s) SubCutaneous three times a day before meals  metoprolol succinate  milliGRAM(s) Oral daily  NIFEdipine XL 60 milliGRAM(s) Oral daily  oxyCODONE    IR 5 milliGRAM(s) Oral once  pantoprazole    Tablet 40 milliGRAM(s) Oral two times a day  senna 2 Tablet(s) Oral at bedtime  sucralfate 1 Gram(s) Oral four times a day    PRN MEDICATIONS  ALBUTerol    90 MICROgram(s) HFA Inhaler 2 Puff(s) Inhalation every 6 hours PRN  aluminum hydroxide/magnesium hydroxide/simethicone Suspension 30 milliLiter(s) Oral every 4 hours PRN  dextrose Oral Gel 15 Gram(s) Oral once PRN  meclizine 25 milliGRAM(s) Oral three times a day PRN  morphine  - Injectable 2 milliGRAM(s) IV Push every 6 hours PRN  ondansetron Injectable 4 milliGRAM(s) IV Push every 4 hours PRN  polyethylene glycol 3350 17 Gram(s) Oral two times a day PRN                                            VITAL SIGNS: Last 24 Hours  T(C): 36 (27 May 2022 05:25), Max: 36.6 (26 May 2022 20:50)  T(F): 96.8 (27 May 2022 05:25), Max: 97.8 (26 May 2022 20:50)  HR: 70 (27 May 2022 05:25) (70 - 78)  BP: 126/63 (27 May 2022 05:25) (121/73 - 155/81)  RR: 18 (27 May 2022 05:25) (18 - 18)                                           LABS:               13.3   8.09  )-----------( 257      ( 27 May 2022 07:14 )             39.4       140  |  103  |  21<H>  ----------------------------<  61<L>  3.8   |  27  |  2.2<H>    Ca    8.8      27 May 2022 07:14  Mg     1.9     05-27    TPro  5.4<L>  /  Alb  3.1<L>  /  TBili  <0.2  /  DBili  x   /  AST  29  /  ALT  29  /  AlkPhos  116<H>  05-27                                              -------------------------------------------------------------  RADIOLOGY:                                          --------------------------------------------------------------    PHYSICAL EXAM:  General: Laying in bed, uncomfortable appearing in mild distress  HEENT: atraumatic, normocephalic  LUNGS: CTAB, unlabored respirations  HEART: S1S2+, RRR no MRG   ABDOMEN: rash on R upper abd and R flank, healed and crusted,  epigastric TTP, BS+  EXT: no LE edema  NEURO: AAOx3

## 2022-05-27 NOTE — PROGRESS NOTE ADULT - ASSESSMENT
47 year old male with hx of HTN, AMBER, DM, vertigo, diverticulosis s/p colon resection presents from home with 4 days of epigastric pain    #H/o HTN w Symptomatic Orthostatic Hypotension during hospitalization - persistent  - on HCTZ 37.5, spironolactone 25, toprol 100 at home  - cont nifedipine, toprol; holding diuretics given BILLY and orthostasis  - cont compression stocking; no abd binder for now given shingles distribution    #Epigastric pain likely 2/2 gastritis +/- herpetic neuralgia +/- diabetic gastroparesis - improving  - patient started on ibuprofen about a week ago for dental infection, pain started about 3-4 prior to admission  - pain is burning with occasional radiation to the back, worse with food also accompanied with nausea, denies hematemesis or bloody stools  - patient w uncontrolled DM (A1c ~11)   - case d/w cardiology - unlikely cardiac in nature  - lipase 18; US CBD 5mm no stones; CT abdomen mild ill-defined appearance of the pancreas w questionable mild interstitial pancreatitis   - GI consulted - s/p EGD (5/19) showing non erosive gastritis > pathology w nonspecific chronic inflammation and some intestinal stomach metaplasia - no dysplasia or h.pylori  f/u outpatient for pathology results and for gastric emptying study  - cont ppi bid, sucralfate, maalox; pain control prn  - avoid nsaids and smoking    #Herpes zoster w herpetic neuralgia  - cont Valacyclovir 100mg TID for 7 day (day 1 5/21) - last day today  - cont gabapentin and tramadol    #BILLY on CKD2 - Cr at new baseline?  - Cr 2.4 on admission - today 1.9 (Cr was 1.2 in 5/2021, 1.7 in 03/2022, per HIE)  - patient was told to see a nephrologist by PMD however hasn't yet  - monitor Cr; avoid nephrotoxic agents  - Nephro following    #Uncontrolled DM2 (A1c 10.9) - w possible gastroparesis  - cont basal bolus insulin (takes lantus 45, lispro 6 at home, per patient)  - monitor FS  - outpatient endocrine f/u    #HLD   - cont statin and fibrate    #Dental infection   - outpatient f/u    #AMBER on home cpap  - continue with home cpap    #Asthma - no wheeze on exam  - cont inhalers and nebs prn    #H/o Vertigo on meclizine  - more likely undiagnosed orthostatic hypotension than vertigo - prob does not need meclizine    DVT ppx: heparin subq  GI ppx: ppi  Activity: iat  Diet: advance as tolerated  FULL CODE    pending: orthostasis control, auth for snf 47 year old male with hx of HTN, AMBER, DM, vertigo, diverticulosis s/p colon resection presents from home with 4 days of epigastric pain    #H/o HTN w Symptomatic Orthostatic Hypotension during hospitalization - persistent  - on HCTZ 37.5, spironolactone 25, toprol 100 at home  - cont nifedipine, toprol; holding diuretics given BILLY and orthostasis  - cont compression stocking; no abd binder for now given shingles distribution    #Epigastric pain likely 2/2 gastritis +/- herpetic neuralgia +/- diabetic gastroparesis - improving  - patient started on ibuprofen about a week ago for dental infection, pain started about 3-4 prior to admission  - pain is burning with occasional radiation to the back, worse with food also accompanied with nausea, denies hematemesis or bloody stools  - patient w uncontrolled DM (A1c ~11)   - case d/w cardiology - unlikely cardiac in nature  - lipase 18; US CBD 5mm no stones; CT abdomen mild ill-defined appearance of the pancreas w questionable mild interstitial pancreatitis   - GI consulted - s/p EGD (5/19) showing non erosive gastritis > pathology w nonspecific chronic inflammation and some intestinal stomach metaplasia - no dysplasia or h.pylori  f/u outpatient for pathology results and for gastric emptying study  - cont ppi bid, sucralfate, maalox; pain control prn  - avoid nsaids and smoking    #Herpes zoster w herpetic neuralgia  - cont Valacyclovir 100mg TID for 7 day (day 1 5/21) - last day today  - cont gabapentin and tramadol    #BILLY on CKD2 - Cr at new baseline?  - Cr 2.4 on admission - today 2.2 (Cr was 1.2 in 5/2021, 1.7 in 03/2022, per HIE)  - patient was told to see a nephrologist by PMD however hasn't yet  - monitor Cr; avoid nephrotoxic agents  - Nephro following    #Uncontrolled DM2 (A1c 10.9) - w possible gastroparesis  - cont basal bolus insulin (takes lantus 45, lispro 6 at home, per patient)  - monitor FS  - outpatient endocrine f/u    #HLD   - cont statin and fibrate    #Dental infection   - outpatient f/u    #AMBER on home cpap  - continue with home cpap    #Asthma - no wheeze on exam  - cont inhalers and nebs prn    #H/o Vertigo on meclizine  - more likely undiagnosed orthostatic hypotension than vertigo - prob does not need meclizine    DVT ppx: heparin subq  GI ppx: ppi  Activity: iat  Diet: advance as tolerated  FULL CODE    pending: orthostasis control, auth for snf

## 2022-05-27 NOTE — PROGRESS NOTE ADULT - ATTENDING COMMENTS
**My note supersedes any discrepancies that may be above in the resident's note***    47 year old male with hx of HTN, AMBER, DM, vertigo, diverticulosis s/p colon resection presents from home with 4 days of epigastric pain    #H/o HTN w symptomatic orthostatic hypotension during hospitalization  - on HCTZ 37.5, spironolactone 25, toprol 100 at home  - cont nifedipine, toprol; holding diuretics given BILLY and orthostasis  - cont compression stocking; no abd binder for now given shingles distribution)  - if persistent and symptomatic can be given a fluid bolus and reassess   will add midodrine 2.5 mg Q8. reasses in AM.     #Epigastric pain likely 2/2 diabetic gastroparesis +/- gerd +/- herpetic neuralgia   - patient started on ibuprofen about a week ago for dental infection, pain started about 3-4 prior to admission  - pain is burning with occasional radiation to the back, worse with food also accompanied with nausea, denies hematemesis or bloody stools  - patient w uncontrolled DM (A1c ~11)   - lipase 18; US CBD 5mm no stones; CT abdomen mild ill-defined appearance of the pancreas w questionable mild interstitial pancreatitis   - GI consulted: EGD done on 5/19: non erosive gastritis > f/u outpatient for pathology results and for gastric emptying study  - case d/w cardiology - unlikely cardiac in nature  - cont ppi bid, sucralfate, maalox; pain control prn  - avoid nsaids and smoking    #Herpes zoster w herpetic neuralgia  - cont Valacyclovir 100mg TID for 7 day (day 1 5/21)  - cont gabapentin and tramadol    #BILLY on CKD2 - Cr at new baseline?  - Cr 2.4 on admission - today 1.9 (Cr was 1.2 in 5/2021, 1.7 in 03/2022, per HIE)  - patient was told to see a nephrologist by PMD however hasn't yet  - monitor Cr; avoid nephrotoxic agents  - nephrology signed off but reconsult if serum creatinine is trending upward.     #Uncontrolled DM2 (A1c 10.9) - w possible gastroparesis  - cont basal bolus insulin (takes lantus 45, lispro 6 at home, per patient)  - monitor FS  - outpatient endocrine f/u    #HLD   - cont statin and fibrate    #Dental infection   - outpatient f/u    #AMBER on home cpap  - continue with home cpap    #Asthma - no wheeze on exam  - cont inhalers and nebs prn    #H/o Vertigo  - c/w meclizine    DVT ppx: heparin subq  GI ppx: ppi  Activity: iat  Diet: advance as tolerated  FULL CODE    #Progress Note Handoff  Pending (specify):  placement  Family discussion: updated and in agreement of plan  Disposition: pt and he is agreeable with [facility].   sent request for auth. pending . midodrine started for orthostattic hypotension

## 2022-05-28 LAB
ALBUMIN SERPL ELPH-MCNC: 2.9 G/DL — LOW (ref 3.5–5.2)
ALP SERPL-CCNC: 119 U/L — HIGH (ref 30–115)
ALT FLD-CCNC: 26 U/L — SIGNIFICANT CHANGE UP (ref 0–41)
ANION GAP SERPL CALC-SCNC: 12 MMOL/L — SIGNIFICANT CHANGE UP (ref 7–14)
AST SERPL-CCNC: 25 U/L — SIGNIFICANT CHANGE UP (ref 0–41)
BASOPHILS # BLD AUTO: 0.04 K/UL — SIGNIFICANT CHANGE UP (ref 0–0.2)
BASOPHILS NFR BLD AUTO: 0.5 % — SIGNIFICANT CHANGE UP (ref 0–1)
BILIRUB SERPL-MCNC: 0.2 MG/DL — SIGNIFICANT CHANGE UP (ref 0.2–1.2)
BUN SERPL-MCNC: 24 MG/DL — HIGH (ref 10–20)
CALCIUM SERPL-MCNC: 8.5 MG/DL — SIGNIFICANT CHANGE UP (ref 8.5–10.1)
CHLORIDE SERPL-SCNC: 101 MMOL/L — SIGNIFICANT CHANGE UP (ref 98–110)
CO2 SERPL-SCNC: 25 MMOL/L — SIGNIFICANT CHANGE UP (ref 17–32)
CREAT SERPL-MCNC: 1.9 MG/DL — HIGH (ref 0.7–1.5)
EGFR: 43 ML/MIN/1.73M2 — LOW
EOSINOPHIL # BLD AUTO: 0.24 K/UL — SIGNIFICANT CHANGE UP (ref 0–0.7)
EOSINOPHIL NFR BLD AUTO: 3.3 % — SIGNIFICANT CHANGE UP (ref 0–8)
GLUCOSE BLDC GLUCOMTR-MCNC: 125 MG/DL — HIGH (ref 70–99)
GLUCOSE BLDC GLUCOMTR-MCNC: 146 MG/DL — HIGH (ref 70–99)
GLUCOSE BLDC GLUCOMTR-MCNC: 201 MG/DL — HIGH (ref 70–99)
GLUCOSE BLDC GLUCOMTR-MCNC: 279 MG/DL — HIGH (ref 70–99)
GLUCOSE BLDC GLUCOMTR-MCNC: 309 MG/DL — HIGH (ref 70–99)
GLUCOSE BLDC GLUCOMTR-MCNC: 310 MG/DL — HIGH (ref 70–99)
GLUCOSE SERPL-MCNC: 167 MG/DL — HIGH (ref 70–99)
HCT VFR BLD CALC: 36.9 % — LOW (ref 42–52)
HGB BLD-MCNC: 12.9 G/DL — LOW (ref 14–18)
IMM GRANULOCYTES NFR BLD AUTO: 0.4 % — HIGH (ref 0.1–0.3)
LYMPHOCYTES # BLD AUTO: 2.46 K/UL — SIGNIFICANT CHANGE UP (ref 1.2–3.4)
LYMPHOCYTES # BLD AUTO: 33.6 % — SIGNIFICANT CHANGE UP (ref 20.5–51.1)
MAGNESIUM SERPL-MCNC: 1.8 MG/DL — SIGNIFICANT CHANGE UP (ref 1.8–2.4)
MCHC RBC-ENTMCNC: 31.2 PG — HIGH (ref 27–31)
MCHC RBC-ENTMCNC: 35 G/DL — SIGNIFICANT CHANGE UP (ref 32–37)
MCV RBC AUTO: 89.3 FL — SIGNIFICANT CHANGE UP (ref 80–94)
MONOCYTES # BLD AUTO: 0.76 K/UL — HIGH (ref 0.1–0.6)
MONOCYTES NFR BLD AUTO: 10.4 % — HIGH (ref 1.7–9.3)
NEUTROPHILS # BLD AUTO: 3.79 K/UL — SIGNIFICANT CHANGE UP (ref 1.4–6.5)
NEUTROPHILS NFR BLD AUTO: 51.8 % — SIGNIFICANT CHANGE UP (ref 42.2–75.2)
NRBC # BLD: 0 /100 WBCS — SIGNIFICANT CHANGE UP (ref 0–0)
PLATELET # BLD AUTO: 217 K/UL — SIGNIFICANT CHANGE UP (ref 130–400)
POTASSIUM SERPL-MCNC: 4.2 MMOL/L — SIGNIFICANT CHANGE UP (ref 3.5–5)
POTASSIUM SERPL-SCNC: 4.2 MMOL/L — SIGNIFICANT CHANGE UP (ref 3.5–5)
PROT SERPL-MCNC: 5.4 G/DL — LOW (ref 6–8)
RBC # BLD: 4.13 M/UL — LOW (ref 4.7–6.1)
RBC # FLD: 12.2 % — SIGNIFICANT CHANGE UP (ref 11.5–14.5)
SODIUM SERPL-SCNC: 138 MMOL/L — SIGNIFICANT CHANGE UP (ref 135–146)
WBC # BLD: 7.32 K/UL — SIGNIFICANT CHANGE UP (ref 4.8–10.8)
WBC # FLD AUTO: 7.32 K/UL — SIGNIFICANT CHANGE UP (ref 4.8–10.8)

## 2022-05-28 PROCEDURE — 99233 SBSQ HOSP IP/OBS HIGH 50: CPT

## 2022-05-28 RX ORDER — MIDODRINE HYDROCHLORIDE 2.5 MG/1
5 TABLET ORAL THREE TIMES A DAY
Refills: 0 | Status: DISCONTINUED | OUTPATIENT
Start: 2022-05-28 | End: 2022-05-31

## 2022-05-28 RX ORDER — MIDODRINE HYDROCHLORIDE 2.5 MG/1
1 TABLET ORAL
Qty: 90 | Refills: 0
Start: 2022-05-28 | End: 2022-06-26

## 2022-05-28 RX ORDER — INSULIN DEGLUDEC 100 U/ML
40 INJECTION, SOLUTION SUBCUTANEOUS
Qty: 0 | Refills: 0 | DISCHARGE

## 2022-05-28 RX ADMIN — INSULIN GLARGINE 45 UNIT(S): 100 INJECTION, SOLUTION SUBCUTANEOUS at 21:14

## 2022-05-28 RX ADMIN — HEPARIN SODIUM 5000 UNIT(S): 5000 INJECTION INTRAVENOUS; SUBCUTANEOUS at 05:49

## 2022-05-28 RX ADMIN — PANTOPRAZOLE SODIUM 40 MILLIGRAM(S): 20 TABLET, DELAYED RELEASE ORAL at 17:25

## 2022-05-28 RX ADMIN — Medication 60 MILLIGRAM(S): at 06:06

## 2022-05-28 RX ADMIN — Medication 1 GRAM(S): at 12:30

## 2022-05-28 RX ADMIN — Medication 1 GRAM(S): at 05:48

## 2022-05-28 RX ADMIN — Medication 650 MILLIGRAM(S): at 00:29

## 2022-05-28 RX ADMIN — VALACYCLOVIR 1000 MILLIGRAM(S): 500 TABLET, FILM COATED ORAL at 05:53

## 2022-05-28 RX ADMIN — Medication 145 MILLIGRAM(S): at 12:30

## 2022-05-28 RX ADMIN — Medication 6 UNIT(S): at 12:26

## 2022-05-28 RX ADMIN — Medication 4: at 12:26

## 2022-05-28 RX ADMIN — Medication 650 MILLIGRAM(S): at 01:30

## 2022-05-28 RX ADMIN — MIDODRINE HYDROCHLORIDE 2.5 MILLIGRAM(S): 2.5 TABLET ORAL at 06:07

## 2022-05-28 RX ADMIN — Medication 6 UNIT(S): at 17:25

## 2022-05-28 RX ADMIN — Medication 1 GRAM(S): at 23:06

## 2022-05-28 RX ADMIN — HEPARIN SODIUM 5000 UNIT(S): 5000 INJECTION INTRAVENOUS; SUBCUTANEOUS at 14:32

## 2022-05-28 RX ADMIN — Medication 1 GRAM(S): at 00:15

## 2022-05-28 RX ADMIN — SENNA PLUS 2 TABLET(S): 8.6 TABLET ORAL at 21:15

## 2022-05-28 RX ADMIN — GABAPENTIN 300 MILLIGRAM(S): 400 CAPSULE ORAL at 17:25

## 2022-05-28 RX ADMIN — GABAPENTIN 300 MILLIGRAM(S): 400 CAPSULE ORAL at 05:48

## 2022-05-28 RX ADMIN — PANTOPRAZOLE SODIUM 40 MILLIGRAM(S): 20 TABLET, DELAYED RELEASE ORAL at 05:48

## 2022-05-28 RX ADMIN — MIDODRINE HYDROCHLORIDE 5 MILLIGRAM(S): 2.5 TABLET ORAL at 17:25

## 2022-05-28 RX ADMIN — MIDODRINE HYDROCHLORIDE 2.5 MILLIGRAM(S): 2.5 TABLET ORAL at 12:30

## 2022-05-28 RX ADMIN — TRAMADOL HYDROCHLORIDE 25 MILLIGRAM(S): 50 TABLET ORAL at 22:57

## 2022-05-28 RX ADMIN — Medication 100 MILLIGRAM(S): at 06:06

## 2022-05-28 RX ADMIN — Medication 2: at 17:26

## 2022-05-28 RX ADMIN — Medication 1 GRAM(S): at 17:25

## 2022-05-28 RX ADMIN — HEPARIN SODIUM 5000 UNIT(S): 5000 INJECTION INTRAVENOUS; SUBCUTANEOUS at 21:15

## 2022-05-28 RX ADMIN — ATORVASTATIN CALCIUM 40 MILLIGRAM(S): 80 TABLET, FILM COATED ORAL at 21:14

## 2022-05-28 NOTE — PROGRESS NOTE ADULT - ASSESSMENT
47 year old male with hx of HTN, AMBER, DM, vertigo, diverticulosis s/p colon resection presents from home with 4 days of epigastric pain    #H/o HTN w symptomatic orthostatic hypotension during hospitalization  - on HCTZ 37.5, spironolactone 25, toprol 100 at home  - cont nifedipine, toprol; holding diuretics given BILLY and orthostasis  - cont compression stocking; will start abdominal binder since he completed course for shingles   - added midodrin 2.5 q8 yesterday but still orthostatic, will increase to 5 q8 with hold parameters. Monitor for supine hypertension     #Epigastric pain likely 2/2 diabetic gastroparesis +/- gerd +/- herpetic neuralgia   - patient started on ibuprofen about a week ago for dental infection, pain started about 3-4 prior to admission  - pain is burning with occasional radiation to the back, worse with food also accompanied with nausea, denies hematemesis or bloody stools  - patient w uncontrolled DM (A1c ~11)   - lipase 18; US CBD 5mm no stones; CT abdomen mild ill-defined appearance of the pancreas w questionable mild interstitial pancreatitis   - GI consulted: EGD done on 5/19: non erosive gastritis > f/u outpatient for pathology results and for gastric emptying study  - case d/w cardiology - unlikely cardiac in nature  - cont ppi bid, sucralfate, maalox; pain control prn  - avoid nsaids and smoking    #Herpes zoster w herpetic neuralgia  - s/p 7d course of Valacyclovir   - cont gabapentin and tramadol    #BILLY on CKD2 - Cr at new baseline?  - Cr 2.4 on admission - today 1.9 (Cr was 1.2 in 5/2021, 1.7 in 03/2022, per HIE)  - patient was told to see a nephrologist by PMD however hasn't yet  - monitor Cr; avoid nephrotoxic agents  - nephrology signed off but reconsult if serum creatinine is trending upward.     #Uncontrolled DM2 (A1c 10.9) - w possible gastroparesis  - cont basal bolus insulin (takes lantus 45, lispro 6 at home, per patient)  - monitor FS  - outpatient endocrine f/u    #HLD   - cont statin and fibrate    #Dental infection   - outpatient f/u    #AMBER on home cpap  - continue with home cpap    #Asthma - no wheeze on exam  - cont inhalers and nebs prn    #H/o Vertigo  - c/w meclizine    DVT ppx: heparin subq    FULL CODE    #Progress Note Handoff  Pending (specify):  improvement in orthostasis and then dc to rehab   Family discussion: updated and in agreement of plan  Disposition: pt and he is agreeable with [facility].         Total time spent to complete patient's bedside assessment, review medical chart, discuss medical plan of care with covering medical team was more than 35 minutes  with >50% of time spent face to face with patient, discussion with patient/family and/or coordination of care      Maritza Emmanuel DO

## 2022-05-28 NOTE — PROGRESS NOTE ADULT - SUBJECTIVE AND OBJECTIVE BOX
BRINDA MOYER  47y, Male  Allergy: No Known Allergies    Hospital Day: 13d    Patient seen and examined earlier today. Still persistently orthostatic and symptomatic    PMH/PSH:  PAST MEDICAL & SURGICAL HISTORY:  Diabetes      Asthma      Diverticulitis  surgery 16yrs ago      High cholesterol      Dyslipidemia      Hypertension      H/O vertigo      Diabetic ulcer of right foot      Broken toe  left pinky toe      Vertigo      HTN (hypertension)      Diabetes      History of surgery  colon resection      No significant past surgical history          LAST 24-Hr EVENTS:    VITALS:  T(F): 98.1 (05-28-22 @ 12:43), Max: 98.2 (05-28-22 @ 05:30)  HR: 69 (05-28-22 @ 12:43)  BP: 136/63 (05-28-22 @ 12:43) (136/63 - 181/96)  RR: 19 (05-28-22 @ 12:43)  SpO2: --        TESTS & MEASUREMENTS:  Weight (Kg):       05-26-22 @ 07:01  -  05-27-22 @ 07:00  --------------------------------------------------------  IN: 0 mL / OUT: 1100 mL / NET: -1100 mL                            12.9   7.32  )-----------( 217      ( 28 May 2022 07:28 )             36.9       05-28    138  |  101  |  24<H>  ----------------------------<  167<H>  4.2   |  25  |  1.9<H>    Ca    8.5      28 May 2022 07:28  Mg     1.8     05-28    TPro  5.4<L>  /  Alb  2.9<L>  /  TBili  0.2  /  DBili  x   /  AST  25  /  ALT  26  /  AlkPhos  119<H>  05-28    LIVER FUNCTIONS - ( 28 May 2022 07:28 )  Alb: 2.9 g/dL / Pro: 5.4 g/dL / ALK PHOS: 119 U/L / ALT: 26 U/L / AST: 25 U/L / GGT: x                           COVID-19 PCR: NotDetec (05-26-22 @ 21:31)  COVID-19 PCR: NotDete (05-24-22 @ 06:16)      RADIOLOGY, ECG, & ADDITIONAL TESTS:      RECENT DIAGNOSTIC ORDERS:  Diet, DASH/TLC:   Sodium & Cholesterol Restricted  Consistent Carbohydrate No Snacks  Supplement Feeding Modality:  Oral  Glucerna Shake Cans or Servings Per Day:  1       Frequency:  Daily (05-28-22 @ 13:27)      MEDICATIONS:  MEDICATIONS  (STANDING):  atorvastatin 40 milliGRAM(s) Oral at bedtime  dextrose 5%. 1000 milliLiter(s) (100 mL/Hr) IV Continuous <Continuous>  dextrose 5%. 1000 milliLiter(s) (50 mL/Hr) IV Continuous <Continuous>  dextrose 5%. 1000 milliLiter(s) (100 mL/Hr) IV Continuous <Continuous>  dextrose 50% Injectable 25 Gram(s) IV Push once  dextrose 50% Injectable 12.5 Gram(s) IV Push once  dextrose 50% Injectable 25 Gram(s) IV Push once  fenofibrate Tablet 145 milliGRAM(s) Oral daily  gabapentin 300 milliGRAM(s) Oral two times a day  glucagon  Injectable 1 milliGRAM(s) IntraMuscular once  glucagon  Injectable 1 milliGRAM(s) IntraMuscular once  heparin   Injectable 5000 Unit(s) SubCutaneous every 8 hours  insulin glargine Injectable (LANTUS) 45 Unit(s) SubCutaneous at bedtime  insulin lispro (ADMELOG) corrective regimen sliding scale   SubCutaneous three times a day before meals  insulin lispro Injectable (ADMELOG) 6 Unit(s) SubCutaneous three times a day before meals  metoprolol succinate  milliGRAM(s) Oral daily  midodrine. 2.5 milliGRAM(s) Oral three times a day  NIFEdipine XL 60 milliGRAM(s) Oral daily  pantoprazole    Tablet 40 milliGRAM(s) Oral two times a day  senna 2 Tablet(s) Oral at bedtime  sucralfate 1 Gram(s) Oral four times a day    MEDICATIONS  (PRN):  acetaminophen     Tablet .. 650 milliGRAM(s) Oral every 6 hours PRN Temp greater or equal to 38C (100.4F), Mild Pain (1 - 3), Moderate Pain (4 - 6)  ALBUTerol    90 MICROgram(s) HFA Inhaler 2 Puff(s) Inhalation every 6 hours PRN Shortness of Breath and/or Wheezing  aluminum hydroxide/magnesium hydroxide/simethicone Suspension 30 milliLiter(s) Oral every 4 hours PRN Dyspepsia  dextrose Oral Gel 15 Gram(s) Oral once PRN Blood Glucose LESS THAN 70 milliGRAM(s)/deciliter  meclizine 25 milliGRAM(s) Oral three times a day PRN vertigo  ondansetron Injectable 4 milliGRAM(s) IV Push every 4 hours PRN Nausea and/or Vomiting  polyethylene glycol 3350 17 Gram(s) Oral two times a day PRN Constipation  traMADol 25 milliGRAM(s) Oral every 4 hours PRN Severe Pain (7 - 10)      HOME MEDICATIONS:  Albuterol (Eqv-ProAir HFA) 90 mcg/inh inhalation aerosol (05-15)  fenofibrate 145 mg oral tablet (05-15)  HumaLOG 100 units/mL subcutaneous solution (05-15)  Protonix 40 mg oral delayed release tablet (05-15)  Toprol- mg oral tablet, extended release (05-15)  traMADol 50 mg oral tablet (05-26)  Tresiba 100 units/mL subcutaneous solution (05-28)      PHYSICAL EXAM:  General: Laying in bed, comfortable   HEENT: atraumatic, normocephalic  LUNGS: CTAB, unlabored respirations  HEART: S1S2+, RRR no MRG   ABDOMEN: rash on R upper abd and R flank, healed and crusted,  epigastric TTP, BS+  EXT: no LE edema  NEURO: AAOx3

## 2022-05-29 LAB
ALBUMIN SERPL ELPH-MCNC: 3.3 G/DL — LOW (ref 3.5–5.2)
ALP SERPL-CCNC: 119 U/L — HIGH (ref 30–115)
ALT FLD-CCNC: 27 U/L — SIGNIFICANT CHANGE UP (ref 0–41)
ANION GAP SERPL CALC-SCNC: 10 MMOL/L — SIGNIFICANT CHANGE UP (ref 7–14)
AST SERPL-CCNC: 23 U/L — SIGNIFICANT CHANGE UP (ref 0–41)
BASOPHILS # BLD AUTO: 0.03 K/UL — SIGNIFICANT CHANGE UP (ref 0–0.2)
BASOPHILS NFR BLD AUTO: 0.4 % — SIGNIFICANT CHANGE UP (ref 0–1)
BILIRUB SERPL-MCNC: <0.2 MG/DL — SIGNIFICANT CHANGE UP (ref 0.2–1.2)
BUN SERPL-MCNC: 27 MG/DL — HIGH (ref 10–20)
CALCIUM SERPL-MCNC: 9 MG/DL — SIGNIFICANT CHANGE UP (ref 8.5–10.1)
CHLORIDE SERPL-SCNC: 102 MMOL/L — SIGNIFICANT CHANGE UP (ref 98–110)
CO2 SERPL-SCNC: 24 MMOL/L — SIGNIFICANT CHANGE UP (ref 17–32)
CREAT SERPL-MCNC: 2.4 MG/DL — HIGH (ref 0.7–1.5)
EGFR: 32 ML/MIN/1.73M2 — LOW
EOSINOPHIL # BLD AUTO: 0.23 K/UL — SIGNIFICANT CHANGE UP (ref 0–0.7)
EOSINOPHIL NFR BLD AUTO: 2.8 % — SIGNIFICANT CHANGE UP (ref 0–8)
GLUCOSE BLDC GLUCOMTR-MCNC: 130 MG/DL — HIGH (ref 70–99)
GLUCOSE BLDC GLUCOMTR-MCNC: 135 MG/DL — HIGH (ref 70–99)
GLUCOSE BLDC GLUCOMTR-MCNC: 246 MG/DL — HIGH (ref 70–99)
GLUCOSE BLDC GLUCOMTR-MCNC: 271 MG/DL — HIGH (ref 70–99)
GLUCOSE SERPL-MCNC: 217 MG/DL — HIGH (ref 70–99)
HCT VFR BLD CALC: 38.7 % — LOW (ref 42–52)
HGB BLD-MCNC: 12.9 G/DL — LOW (ref 14–18)
IMM GRANULOCYTES NFR BLD AUTO: 0.5 % — HIGH (ref 0.1–0.3)
LYMPHOCYTES # BLD AUTO: 2.21 K/UL — SIGNIFICANT CHANGE UP (ref 1.2–3.4)
LYMPHOCYTES # BLD AUTO: 26.7 % — SIGNIFICANT CHANGE UP (ref 20.5–51.1)
MAGNESIUM SERPL-MCNC: 2 MG/DL — SIGNIFICANT CHANGE UP (ref 1.8–2.4)
MCHC RBC-ENTMCNC: 30.9 PG — SIGNIFICANT CHANGE UP (ref 27–31)
MCHC RBC-ENTMCNC: 33.3 G/DL — SIGNIFICANT CHANGE UP (ref 32–37)
MCV RBC AUTO: 92.6 FL — SIGNIFICANT CHANGE UP (ref 80–94)
MONOCYTES # BLD AUTO: 0.54 K/UL — SIGNIFICANT CHANGE UP (ref 0.1–0.6)
MONOCYTES NFR BLD AUTO: 6.5 % — SIGNIFICANT CHANGE UP (ref 1.7–9.3)
NEUTROPHILS # BLD AUTO: 5.23 K/UL — SIGNIFICANT CHANGE UP (ref 1.4–6.5)
NEUTROPHILS NFR BLD AUTO: 63.1 % — SIGNIFICANT CHANGE UP (ref 42.2–75.2)
NRBC # BLD: 0 /100 WBCS — SIGNIFICANT CHANGE UP (ref 0–0)
PLATELET # BLD AUTO: 271 K/UL — SIGNIFICANT CHANGE UP (ref 130–400)
POTASSIUM SERPL-MCNC: 4.3 MMOL/L — SIGNIFICANT CHANGE UP (ref 3.5–5)
POTASSIUM SERPL-SCNC: 4.3 MMOL/L — SIGNIFICANT CHANGE UP (ref 3.5–5)
PROT SERPL-MCNC: 6 G/DL — SIGNIFICANT CHANGE UP (ref 6–8)
RBC # BLD: 4.18 M/UL — LOW (ref 4.7–6.1)
RBC # FLD: 12.2 % — SIGNIFICANT CHANGE UP (ref 11.5–14.5)
SODIUM SERPL-SCNC: 136 MMOL/L — SIGNIFICANT CHANGE UP (ref 135–146)
WBC # BLD: 8.28 K/UL — SIGNIFICANT CHANGE UP (ref 4.8–10.8)
WBC # FLD AUTO: 8.28 K/UL — SIGNIFICANT CHANGE UP (ref 4.8–10.8)

## 2022-05-29 PROCEDURE — 99232 SBSQ HOSP IP/OBS MODERATE 35: CPT

## 2022-05-29 RX ADMIN — HEPARIN SODIUM 5000 UNIT(S): 5000 INJECTION INTRAVENOUS; SUBCUTANEOUS at 16:02

## 2022-05-29 RX ADMIN — GABAPENTIN 300 MILLIGRAM(S): 400 CAPSULE ORAL at 05:28

## 2022-05-29 RX ADMIN — Medication 6 UNIT(S): at 11:40

## 2022-05-29 RX ADMIN — Medication 3: at 07:43

## 2022-05-29 RX ADMIN — Medication 1 GRAM(S): at 17:06

## 2022-05-29 RX ADMIN — ATORVASTATIN CALCIUM 40 MILLIGRAM(S): 80 TABLET, FILM COATED ORAL at 21:54

## 2022-05-29 RX ADMIN — TRAMADOL HYDROCHLORIDE 25 MILLIGRAM(S): 50 TABLET ORAL at 05:56

## 2022-05-29 RX ADMIN — HEPARIN SODIUM 5000 UNIT(S): 5000 INJECTION INTRAVENOUS; SUBCUTANEOUS at 05:30

## 2022-05-29 RX ADMIN — Medication 1 GRAM(S): at 12:08

## 2022-05-29 RX ADMIN — MIDODRINE HYDROCHLORIDE 5 MILLIGRAM(S): 2.5 TABLET ORAL at 17:06

## 2022-05-29 RX ADMIN — Medication 100 MILLIGRAM(S): at 05:28

## 2022-05-29 RX ADMIN — Medication 6 UNIT(S): at 16:38

## 2022-05-29 RX ADMIN — PANTOPRAZOLE SODIUM 40 MILLIGRAM(S): 20 TABLET, DELAYED RELEASE ORAL at 17:06

## 2022-05-29 RX ADMIN — INSULIN GLARGINE 45 UNIT(S): 100 INJECTION, SOLUTION SUBCUTANEOUS at 21:55

## 2022-05-29 RX ADMIN — TRAMADOL HYDROCHLORIDE 25 MILLIGRAM(S): 50 TABLET ORAL at 19:32

## 2022-05-29 RX ADMIN — HEPARIN SODIUM 5000 UNIT(S): 5000 INJECTION INTRAVENOUS; SUBCUTANEOUS at 21:54

## 2022-05-29 RX ADMIN — GABAPENTIN 300 MILLIGRAM(S): 400 CAPSULE ORAL at 17:05

## 2022-05-29 RX ADMIN — SENNA PLUS 2 TABLET(S): 8.6 TABLET ORAL at 21:54

## 2022-05-29 RX ADMIN — Medication 1 GRAM(S): at 05:28

## 2022-05-29 RX ADMIN — Medication 145 MILLIGRAM(S): at 12:08

## 2022-05-29 RX ADMIN — Medication 6 UNIT(S): at 07:44

## 2022-05-29 RX ADMIN — PANTOPRAZOLE SODIUM 40 MILLIGRAM(S): 20 TABLET, DELAYED RELEASE ORAL at 05:28

## 2022-05-29 RX ADMIN — Medication 60 MILLIGRAM(S): at 05:28

## 2022-05-29 NOTE — PROGRESS NOTE ADULT - ATTENDING SUPERVISION STATEMENT
Resident
Fellow
Fellow
Resident
Resident
Fellow

## 2022-05-29 NOTE — PROGRESS NOTE ADULT - SUBJECTIVE AND OBJECTIVE BOX
BRINDA MOYER 47y Male  MRN#: [id number]   CODE STATUS:FULL    Hospital Day: 14d    Pt is currently admitted with the primary diagnosis of epigastric pain    SUBJECTIVE  Hospital Course  47 year old male with hx of HTN, AMBER, DM, vertigo, diverticulosis s/p colon resection presents from home with 4 days of epigastric pain.  Patient states it's a burning pain that has recently been going to his right upper back.  It's worse with food and he has not been able to tolerate anything by mouth for the last 2 days because of nausea.  Pain is not worse with exertion.  He endorses mild shortness of breath when climbing stairs which has been present for a while.  He denies any bloody stool, no hematemesis or vomiting.  Of note he has been on ibuprofen for the last week because of a dental infection.  He's supposed to get some teeth taken out but had to come in to the hospital.  Also he was told by his PMD to see a nephrologist and he hasn't yet.  He had a negative stress 3 years ago.  In the ED he received 1.5L LR.  Lipase was negative  Triage vitals: /68    RR 18  Temp 98  SpO2 100% room air    Subjective complaints   Patient no new issues or overnight events.                            OBJECTIVE  PAST MEDICAL & SURGICAL HISTORY  Diabetes    Asthma    Diverticulitis  surgery 16yrs ago    High cholesterol    Dyslipidemia    Hypertension    H/O vertigo    Diabetic ulcer of right foot    Broken toe  left pinky toe    Vertigo    HTN (hypertension)    Diabetes    History of surgery  colon resection    No significant past surgical history                                             ALLERGIES:  No Known Allergies                                        HOME MEDICATIONS  Home Medications:  Albuterol (Eqv-ProAir HFA) 90 mcg/inh inhalation aerosol: 2 puff(s) inhaled every 6 hours (15 May 2022 08:54)  amoxicillin-clavulanate 500 mg-125 mg oral tablet: 1 tab(s) orally every 8 hours (15 May 2022 08:54)  fenofibrate 145 mg oral tablet: 1 tab(s) orally once a day (15 May 2022 08:54)  HumaLOG 100 units/mL subcutaneous solution: 5 unit(s) subcutaneous 3 times a day (before meals)  sliding scale (15 May 2022 08:54)  hydroCHLOROthiazide: 37.5 milligram(s) orally once a day (15 May 2022 08:54)  ibuprofen 600 mg oral tablet: 1 tab(s) orally every 6 hours (15 May 2022 08:54)  Protonix 40 mg oral delayed release tablet: 1 tab(s) orally once a day (15 May 2022 08:54)  spironolactone 25 mg oral tablet: 1 tab(s) orally once a day (15 May 2022 08:54)  Toprol- mg oral tablet, extended release: 1 tab(s) orally once a day (15 May 2022 08:54)  Tresiba 100 units/mL subcutaneous solution: 40 unit(s) subcutaneous once a day (15 May 2022 08:54)                           MEDICATIONS:  STANDING MEDICATIONS  atorvastatin 40 milliGRAM(s) Oral at bedtime  dextrose 5%. 1000 milliLiter(s) IV Continuous <Continuous>  dextrose 5%. 1000 milliLiter(s) IV Continuous <Continuous>  dextrose 5%. 1000 milliLiter(s) IV Continuous <Continuous>  dextrose 50% Injectable 25 Gram(s) IV Push once  dextrose 50% Injectable 12.5 Gram(s) IV Push once  dextrose 50% Injectable 25 Gram(s) IV Push once  fenofibrate Tablet 145 milliGRAM(s) Oral daily  glucagon  Injectable 1 milliGRAM(s) IntraMuscular once  glucagon  Injectable 1 milliGRAM(s) IntraMuscular once  heparin   Injectable 5000 Unit(s) SubCutaneous every 8 hours  hydrALAZINE Injectable 10 milliGRAM(s) IV Push once  insulin glargine Injectable (LANTUS) 34 Unit(s) SubCutaneous at bedtime  insulin lispro (ADMELOG) corrective regimen sliding scale   SubCutaneous three times a day before meals  insulin lispro Injectable (ADMELOG) 3 Unit(s) SubCutaneous three times a day before meals  metoprolol succinate  milliGRAM(s) Oral daily  NIFEdipine XL 60 milliGRAM(s) Oral daily  oxyCODONE    IR 5 milliGRAM(s) Oral once  pantoprazole    Tablet 40 milliGRAM(s) Oral two times a day  senna 2 Tablet(s) Oral at bedtime  sucralfate 1 Gram(s) Oral four times a day    PRN MEDICATIONS  ALBUTerol    90 MICROgram(s) HFA Inhaler 2 Puff(s) Inhalation every 6 hours PRN  aluminum hydroxide/magnesium hydroxide/simethicone Suspension 30 milliLiter(s) Oral every 4 hours PRN  dextrose Oral Gel 15 Gram(s) Oral once PRN  meclizine 25 milliGRAM(s) Oral three times a day PRN  morphine  - Injectable 2 milliGRAM(s) IV Push every 6 hours PRN  ondansetron Injectable 4 milliGRAM(s) IV Push every 4 hours PRN  polyethylene glycol 3350 17 Gram(s) Oral two times a day PRN                                            VITAL SIGNS: Last 24 Hours  T(C): 36.6 (29 May 2022 04:35), Max: 36.7 (28 May 2022 12:43)  T(F): 97.9 (29 May 2022 04:35), Max: 98.1 (28 May 2022 12:43)  HR: 71 (29 May 2022 04:35) (67 - 71)  BP: 147/89 (29 May 2022 04:35) (123/79 - 147/89)  RR: 19 (29 May 2022 04:35) (19 - 19)    LABS:               12.9   7.32  )-----------( 217      ( 28 May 2022 07:28 )             36.9     138  |  101  |  24<H>  ----------------------------<  167<H>  4.2   |  25  |  1.9<H>    Ca    8.5      28 May 2022 07:28  Mg     1.8     05-28    TPro  5.4<L>  /  Alb  2.9<L>  /  TBili  0.2  /  DBili  x   /  AST  25  /  ALT  26  /  AlkPhos  119<H>  05-28                                            -------------------------------------------------------------  RADIOLOGY:                                          --------------------------------------------------------------    PHYSICAL EXAM:  General: Laying in bed, uncomfortable appearing in mild distress  HEENT: atraumatic, normocephalic  LUNGS: CTAB, unlabored respirations  HEART: S1S2+, RRR no MRG   ABDOMEN: rash on R upper abd and R flank, healed and crusted,  epigastric TTP, BS+  EXT: no LE edema  NEURO: AAOx3

## 2022-05-29 NOTE — PROGRESS NOTE ADULT - ATTENDING COMMENTS
47 year old male with hx of HTN, AMBER, DM, vertigo, diverticulosis s/p colon resection presents from home with 4 days of epigastric pain    #H/o HTN w symptomatic orthostatic hypotension during hospitalization  - on HCTZ 37.5, spironolactone 25, toprol 100 at home  - cont nifedipine, toprol; holding diuretics given BILLY and orthostasis  - cont compression stocking; will start abdominal binder since he completed course for shingles   - continue midodrine 5 q8 with hold parameters. Monitor for supine hypertension   - daily orthostatics- pt still orthostatic today but asymptomatic which is an improvement     #Epigastric pain likely 2/2 diabetic gastroparesis +/- gerd +/- herpetic neuralgia   - patient started on ibuprofen about a week ago for dental infection, pain started about 3-4 prior to admission  - pain is burning with occasional radiation to the back, worse with food also accompanied with nausea, denies hematemesis or bloody stools  - patient w uncontrolled DM (A1c ~11)   - lipase 18; US CBD 5mm no stones; CT abdomen mild ill-defined appearance of the pancreas w questionable mild interstitial pancreatitis   - GI consulted: EGD done on 5/19: non erosive gastritis > f/u outpatient for pathology results and for gastric emptying study  - case d/w cardiology - unlikely cardiac in nature  - cont ppi bid, sucralfate, maalox; pain control prn  - avoid nsaids and smoking    #Herpes zoster w herpetic neuralgia  - s/p 7d course of Valacyclovir   - cont gabapentin and tramadol    #BILLY on CKD2 - Cr at new baseline?  - Cr 2.4 on admission - today 1.9 (Cr was 1.2 in 5/2021, 1.7 in 03/2022, per HIE)  - patient was told to see a nephrologist by PMD however hasn't yet  - monitor Cr; avoid nephrotoxic agents  - nephrology signed off but reconsult if serum creatinine is trending upward.     #Uncontrolled DM2 (A1c 10.9) - w possible gastroparesis  - cont basal bolus insulin (takes lantus 45, lispro 6 at home, per patient)  - monitor FS  - outpatient endocrine f/u    #HLD   - cont statin and fibrate    #Dental infection   - outpatient f/u    #AMBER on home cpap  - continue with home cpap    #Asthma - no wheeze on exam  - cont inhalers and nebs prn    #H/o Vertigo  - c/w meclizine    DVT ppx: heparin subq    FULL CODE    #Progress Note Handoff  Pending (specify): pending rehab placement   Family discussion: updated pt and in agreement of plan      Total time spent to complete patient's bedside assessment, review medical chart, discuss medical plan of care with covering medical team was more than 25 minutes  with >50% of time spent face to face with patient, discussion with patient/family and/or coordination of care      Maritza Emmanuel DO

## 2022-05-29 NOTE — PROGRESS NOTE ADULT - ASSESSMENT
47 year old male with hx of HTN, AMBER, DM, vertigo, diverticulosis s/p colon resection presents from home with 4 days of epigastric pain    #H/o HTN w symptomatic orthostatic hypotension during hospitalization  - on HCTZ 37.5, spironolactone 25, toprol 100 at home  - cont nifedipine, toprol; holding diuretics given BILLY and orthostasis  - cont compression stocking; will start abdominal binder since he completed course for shingles   - added midodrine 2.5 q8 but still orthostatic, will increase to 5 q8 with hold parameters (Monitor for supine hypertension)    #Epigastric pain likely 2/2 gastritis +/- herpetic neuralgia +/- diabetic gastroparesis - improving  - patient started on ibuprofen about a week ago for dental infection, pain started about 3-4 prior to admission  - pain is burning with occasional radiation to the back, worse with food also accompanied with nausea, denies hematemesis or bloody stools  - patient w uncontrolled DM (A1c ~11)   - case d/w cardiology - unlikely cardiac in nature  - lipase 18; US CBD 5mm no stones; CT abdomen mild ill-defined appearance of the pancreas w questionable mild interstitial pancreatitis   - GI consulted - s/p EGD (5/19) showing non erosive gastritis > pathology w nonspecific chronic inflammation and some intestinal stomach metaplasia - no dysplasia or h.pylori  f/u outpatient for pathology results and for gastric emptying study  - cont ppi bid, sucralfate, maalox; pain control prn  - avoid nsaids and smoking    #Herpes zoster w herpetic neuralgia  - s/p 7 day course of Valacyclovir  - cont gabapentin and tramadol    #BILLY on CKD2 - Cr at new baseline?  - Cr 2.4 on admission - today 2.2 (Cr was 1.2 in 5/2021, 1.7 in 03/2022, per HIE)  - patient was told to see a nephrologist by PMD however hasn't yet  - monitor Cr; avoid nephrotoxic agents  - Nephro following    #Uncontrolled DM2 (A1c 10.9) - w possible gastroparesis  - cont basal bolus insulin (takes lantus 45, lispro 6 at home, per patient)  - monitor FS  - outpatient endocrine f/u    #HLD   - cont statin and fibrate    #Dental infection   - outpatient f/u    #AMBER on home cpap  - continue with home cpap    #Asthma - no wheeze on exam  - cont inhalers and nebs prn    #H/o Vertigo on meclizine  - more likely undiagnosed orthostatic hypotension than vertigo - prob does not need meclizine    DVT ppx: heparin subq  GI ppx: ppi  Activity: iat  Diet: advance as tolerated  FULL CODE    pending: orthostasis control, auth for snf

## 2022-05-30 LAB
ALBUMIN SERPL ELPH-MCNC: 3.5 G/DL — SIGNIFICANT CHANGE UP (ref 3.5–5.2)
ALP SERPL-CCNC: 126 U/L — HIGH (ref 30–115)
ALT FLD-CCNC: 23 U/L — SIGNIFICANT CHANGE UP (ref 0–41)
ANION GAP SERPL CALC-SCNC: 8 MMOL/L — SIGNIFICANT CHANGE UP (ref 7–14)
AST SERPL-CCNC: 18 U/L — SIGNIFICANT CHANGE UP (ref 0–41)
BASOPHILS # BLD AUTO: 0.04 K/UL — SIGNIFICANT CHANGE UP (ref 0–0.2)
BASOPHILS NFR BLD AUTO: 0.6 % — SIGNIFICANT CHANGE UP (ref 0–1)
BILIRUB SERPL-MCNC: <0.2 MG/DL — SIGNIFICANT CHANGE UP (ref 0.2–1.2)
BUN SERPL-MCNC: 31 MG/DL — HIGH (ref 10–20)
CALCIUM SERPL-MCNC: 9.2 MG/DL — SIGNIFICANT CHANGE UP (ref 8.5–10.1)
CHLORIDE SERPL-SCNC: 105 MMOL/L — SIGNIFICANT CHANGE UP (ref 98–110)
CO2 SERPL-SCNC: 27 MMOL/L — SIGNIFICANT CHANGE UP (ref 17–32)
CREAT SERPL-MCNC: 2.6 MG/DL — HIGH (ref 0.7–1.5)
EGFR: 29 ML/MIN/1.73M2 — LOW
EOSINOPHIL # BLD AUTO: 0.31 K/UL — SIGNIFICANT CHANGE UP (ref 0–0.7)
EOSINOPHIL NFR BLD AUTO: 4.4 % — SIGNIFICANT CHANGE UP (ref 0–8)
GLUCOSE BLDC GLUCOMTR-MCNC: 120 MG/DL — HIGH (ref 70–99)
GLUCOSE BLDC GLUCOMTR-MCNC: 142 MG/DL — HIGH (ref 70–99)
GLUCOSE BLDC GLUCOMTR-MCNC: 148 MG/DL — HIGH (ref 70–99)
GLUCOSE BLDC GLUCOMTR-MCNC: 155 MG/DL — HIGH (ref 70–99)
GLUCOSE SERPL-MCNC: 128 MG/DL — HIGH (ref 70–99)
HCT VFR BLD CALC: 39.1 % — LOW (ref 42–52)
HGB BLD-MCNC: 13.3 G/DL — LOW (ref 14–18)
IMM GRANULOCYTES NFR BLD AUTO: 0.7 % — HIGH (ref 0.1–0.3)
LYMPHOCYTES # BLD AUTO: 2.16 K/UL — SIGNIFICANT CHANGE UP (ref 1.2–3.4)
LYMPHOCYTES # BLD AUTO: 30.6 % — SIGNIFICANT CHANGE UP (ref 20.5–51.1)
MAGNESIUM SERPL-MCNC: 2.2 MG/DL — SIGNIFICANT CHANGE UP (ref 1.8–2.4)
MCHC RBC-ENTMCNC: 31.3 PG — HIGH (ref 27–31)
MCHC RBC-ENTMCNC: 34 G/DL — SIGNIFICANT CHANGE UP (ref 32–37)
MCV RBC AUTO: 92 FL — SIGNIFICANT CHANGE UP (ref 80–94)
MONOCYTES # BLD AUTO: 0.64 K/UL — HIGH (ref 0.1–0.6)
MONOCYTES NFR BLD AUTO: 9.1 % — SIGNIFICANT CHANGE UP (ref 1.7–9.3)
NEUTROPHILS # BLD AUTO: 3.86 K/UL — SIGNIFICANT CHANGE UP (ref 1.4–6.5)
NEUTROPHILS NFR BLD AUTO: 54.6 % — SIGNIFICANT CHANGE UP (ref 42.2–75.2)
NRBC # BLD: 0 /100 WBCS — SIGNIFICANT CHANGE UP (ref 0–0)
PLATELET # BLD AUTO: 265 K/UL — SIGNIFICANT CHANGE UP (ref 130–400)
POTASSIUM SERPL-MCNC: 4.7 MMOL/L — SIGNIFICANT CHANGE UP (ref 3.5–5)
POTASSIUM SERPL-SCNC: 4.7 MMOL/L — SIGNIFICANT CHANGE UP (ref 3.5–5)
PROT SERPL-MCNC: 6.1 G/DL — SIGNIFICANT CHANGE UP (ref 6–8)
RBC # BLD: 4.25 M/UL — LOW (ref 4.7–6.1)
RBC # FLD: 12.5 % — SIGNIFICANT CHANGE UP (ref 11.5–14.5)
SODIUM SERPL-SCNC: 140 MMOL/L — SIGNIFICANT CHANGE UP (ref 135–146)
WBC # BLD: 7.06 K/UL — SIGNIFICANT CHANGE UP (ref 4.8–10.8)
WBC # FLD AUTO: 7.06 K/UL — SIGNIFICANT CHANGE UP (ref 4.8–10.8)

## 2022-05-30 PROCEDURE — 99232 SBSQ HOSP IP/OBS MODERATE 35: CPT

## 2022-05-30 RX ORDER — GABAPENTIN 400 MG/1
600 CAPSULE ORAL
Refills: 0 | Status: DISCONTINUED | OUTPATIENT
Start: 2022-05-30 | End: 2022-05-31

## 2022-05-30 RX ADMIN — PANTOPRAZOLE SODIUM 40 MILLIGRAM(S): 20 TABLET, DELAYED RELEASE ORAL at 05:32

## 2022-05-30 RX ADMIN — Medication 1: at 08:36

## 2022-05-30 RX ADMIN — HEPARIN SODIUM 5000 UNIT(S): 5000 INJECTION INTRAVENOUS; SUBCUTANEOUS at 21:16

## 2022-05-30 RX ADMIN — Medication 145 MILLIGRAM(S): at 12:02

## 2022-05-30 RX ADMIN — GABAPENTIN 600 MILLIGRAM(S): 400 CAPSULE ORAL at 17:31

## 2022-05-30 RX ADMIN — Medication 1 GRAM(S): at 23:10

## 2022-05-30 RX ADMIN — Medication 60 MILLIGRAM(S): at 05:32

## 2022-05-30 RX ADMIN — Medication 1 GRAM(S): at 12:03

## 2022-05-30 RX ADMIN — TRAMADOL HYDROCHLORIDE 25 MILLIGRAM(S): 50 TABLET ORAL at 12:02

## 2022-05-30 RX ADMIN — Medication 1 GRAM(S): at 17:06

## 2022-05-30 RX ADMIN — Medication 6 UNIT(S): at 17:01

## 2022-05-30 RX ADMIN — TRAMADOL HYDROCHLORIDE 25 MILLIGRAM(S): 50 TABLET ORAL at 03:30

## 2022-05-30 RX ADMIN — Medication 1 GRAM(S): at 05:32

## 2022-05-30 RX ADMIN — HEPARIN SODIUM 5000 UNIT(S): 5000 INJECTION INTRAVENOUS; SUBCUTANEOUS at 13:27

## 2022-05-30 RX ADMIN — HEPARIN SODIUM 5000 UNIT(S): 5000 INJECTION INTRAVENOUS; SUBCUTANEOUS at 05:38

## 2022-05-30 RX ADMIN — Medication 6 UNIT(S): at 12:01

## 2022-05-30 RX ADMIN — TRAMADOL HYDROCHLORIDE 25 MILLIGRAM(S): 50 TABLET ORAL at 21:15

## 2022-05-30 RX ADMIN — MIDODRINE HYDROCHLORIDE 5 MILLIGRAM(S): 2.5 TABLET ORAL at 12:02

## 2022-05-30 RX ADMIN — TRAMADOL HYDROCHLORIDE 25 MILLIGRAM(S): 50 TABLET ORAL at 12:46

## 2022-05-30 RX ADMIN — Medication 100 MILLIGRAM(S): at 05:37

## 2022-05-30 RX ADMIN — MIDODRINE HYDROCHLORIDE 5 MILLIGRAM(S): 2.5 TABLET ORAL at 17:02

## 2022-05-30 RX ADMIN — ATORVASTATIN CALCIUM 40 MILLIGRAM(S): 80 TABLET, FILM COATED ORAL at 21:17

## 2022-05-30 RX ADMIN — MIDODRINE HYDROCHLORIDE 5 MILLIGRAM(S): 2.5 TABLET ORAL at 05:33

## 2022-05-30 RX ADMIN — TRAMADOL HYDROCHLORIDE 25 MILLIGRAM(S): 50 TABLET ORAL at 22:45

## 2022-05-30 RX ADMIN — PANTOPRAZOLE SODIUM 40 MILLIGRAM(S): 20 TABLET, DELAYED RELEASE ORAL at 17:02

## 2022-05-30 RX ADMIN — Medication 6 UNIT(S): at 08:38

## 2022-05-30 RX ADMIN — SENNA PLUS 2 TABLET(S): 8.6 TABLET ORAL at 21:16

## 2022-05-30 NOTE — PROGRESS NOTE ADULT - ASSESSMENT
47 year old male with hx of HTN, AMBER, DM, vertigo, diverticulosis s/p colon resection presents from home with 4 days of epigastric pain    #H/o HTN w symptomatic orthostatic hypotension during hospitalization  - on HCTZ 37.5, spironolactone 25, toprol 100 at home  - cont nifedipine, toprol; holding diuretics given BILLY and orthostasis  - cont compression stocking; will start abdominal binder since he completed course for shingles   - continue midodrine 5 q8 with hold parameters. Monitor for supine hypertension   - daily orthostatics- pt still orthostatic but asymptomatic and able to ambulate around the room while holding onto the wall which is an improvement     #Epigastric pain likely 2/2 diabetic gastroparesis +/- gerd +/- herpetic neuralgia   - patient started on ibuprofen about a week ago for dental infection, pain started about 3-4 prior to admission  - pain is burning with occasional radiation to the back, worse with food also accompanied with nausea, denies hematemesis or bloody stools  - patient w uncontrolled DM (A1c ~11)   - lipase 18; US CBD 5mm no stones; CT abdomen mild ill-defined appearance of the pancreas w questionable mild interstitial pancreatitis   - GI consulted: EGD done on 5/19: non erosive gastritis > f/u outpatient for pathology results and for gastric emptying study  - case d/w cardiology - unlikely cardiac in nature  - cont ppi bid, sucralfate, maalox; pain control prn  - avoid nsaids and smoking    #Herpes zoster w herpetic neuralgia  - s/p 7d course of Valacyclovir   - increase gabapentin, continue tramadol    #BILLY on CKD2   - Cr 2.4 on admission  (Cr was 1.2 in 5/2021, 1.7 in 03/2022, per HIE)  - patient was told to see a nephrologist by PMD however hasn't yet  - monitor Cr; avoid nephrotoxic agents  - nephrology signed off but advise reconsult as creatinine is now uptrending     #Uncontrolled DM2 (A1c 10.9) - w possible gastroparesis  - cont basal bolus insulin (takes lantus 45, lispro 6 at home, per patient)  - monitor FS  - outpatient endocrine f/u    #HLD   - cont statin and fibrate    #Dental infection   - outpatient f/u    #AMBER on home cpap  - continue with home cpap    #Asthma - no wheeze on exam  - cont inhalers and nebs prn    #H/o Vertigo  - c/w meclizine    DVT ppx: heparin subq    FULL CODE    #Progress Note Handoff  Pending (specify): pending rehab placement , appreciate nephro re-eval   Family discussion: updated pt and in agreement of plan      Total time spent to complete patient's bedside assessment, review medical chart, discuss medical plan of care with covering medical team was more than 25 minutes  with >50% of time spent face to face with patient, discussion with patient/family and/or coordination of care      Maritza Emmanuel DO .

## 2022-05-30 NOTE — PROGRESS NOTE ADULT - SUBJECTIVE AND OBJECTIVE BOX
MOYERBRINDA SMITH  47y, Male  Allergy: No Known Allergies    Hospital Day: 15d    Patient seen and examined earlier today. Continues to endorse some epigastric pain, but advised that between the gastroparesis and post herpetic neuralgia this is not unexpected.     PMH/PSH:  PAST MEDICAL & SURGICAL HISTORY:  Diabetes      Asthma      Diverticulitis  surgery 16yrs ago      High cholesterol      Dyslipidemia      Hypertension      H/O vertigo      Diabetic ulcer of right foot      Broken toe  left pinky toe      Vertigo      HTN (hypertension)      Diabetes      History of surgery  colon resection      No significant past surgical history          LAST 24-Hr EVENTS:    VITALS:  T(F): 98.3 (05-30-22 @ 05:22), Max: 98.3 (05-30-22 @ 05:22)  HR: 69 (05-30-22 @ 11:47)  BP: 135/81 (05-30-22 @ 11:47) (127/67 - 135/81)  RR: 18 (05-30-22 @ 05:22)  SpO2: 98% (05-29-22 @ 20:28)        TESTS & MEASUREMENTS:  Weight (Kg):                             13.3   7.06  )-----------( 265      ( 30 May 2022 07:44 )             39.1       05-30    140  |  105  |  31<H>  ----------------------------<  128<H>  4.7   |  27  |  2.6<H>    Ca    9.2      30 May 2022 07:44  Mg     2.2     05-30    TPro  6.1  /  Alb  3.5  /  TBili  <0.2  /  DBili  x   /  AST  18  /  ALT  23  /  AlkPhos  126<H>  05-30    LIVER FUNCTIONS - ( 30 May 2022 07:44 )  Alb: 3.5 g/dL / Pro: 6.1 g/dL / ALK PHOS: 126 U/L / ALT: 23 U/L / AST: 18 U/L / GGT: x                           COVID-19 PCR: NotDetec (05-26-22 @ 21:31)  COVID-19 PCR: NotDetec (05-24-22 @ 06:16)      RADIOLOGY, ECG, & ADDITIONAL TESTS:      RECENT DIAGNOSTIC ORDERS:      MEDICATIONS:  MEDICATIONS  (STANDING):  atorvastatin 40 milliGRAM(s) Oral at bedtime  dextrose 5%. 1000 milliLiter(s) (100 mL/Hr) IV Continuous <Continuous>  dextrose 5%. 1000 milliLiter(s) (100 mL/Hr) IV Continuous <Continuous>  dextrose 5%. 1000 milliLiter(s) (50 mL/Hr) IV Continuous <Continuous>  dextrose 50% Injectable 25 Gram(s) IV Push once  dextrose 50% Injectable 12.5 Gram(s) IV Push once  dextrose 50% Injectable 25 Gram(s) IV Push once  fenofibrate Tablet 145 milliGRAM(s) Oral daily  gabapentin 300 milliGRAM(s) Oral two times a day  glucagon  Injectable 1 milliGRAM(s) IntraMuscular once  glucagon  Injectable 1 milliGRAM(s) IntraMuscular once  heparin   Injectable 5000 Unit(s) SubCutaneous every 8 hours  insulin glargine Injectable (LANTUS) 45 Unit(s) SubCutaneous at bedtime  insulin lispro (ADMELOG) corrective regimen sliding scale   SubCutaneous three times a day before meals  insulin lispro Injectable (ADMELOG) 6 Unit(s) SubCutaneous three times a day before meals  metoprolol succinate  milliGRAM(s) Oral daily  midodrine. 5 milliGRAM(s) Oral three times a day  NIFEdipine XL 60 milliGRAM(s) Oral daily  pantoprazole    Tablet 40 milliGRAM(s) Oral two times a day  senna 2 Tablet(s) Oral at bedtime  sucralfate 1 Gram(s) Oral four times a day    MEDICATIONS  (PRN):  acetaminophen     Tablet .. 650 milliGRAM(s) Oral every 6 hours PRN Temp greater or equal to 38C (100.4F), Mild Pain (1 - 3), Moderate Pain (4 - 6)  ALBUTerol    90 MICROgram(s) HFA Inhaler 2 Puff(s) Inhalation every 6 hours PRN Shortness of Breath and/or Wheezing  aluminum hydroxide/magnesium hydroxide/simethicone Suspension 30 milliLiter(s) Oral every 4 hours PRN Dyspepsia  dextrose Oral Gel 15 Gram(s) Oral once PRN Blood Glucose LESS THAN 70 milliGRAM(s)/deciliter  meclizine 25 milliGRAM(s) Oral three times a day PRN vertigo  ondansetron Injectable 4 milliGRAM(s) IV Push every 4 hours PRN Nausea and/or Vomiting  polyethylene glycol 3350 17 Gram(s) Oral two times a day PRN Constipation  traMADol 25 milliGRAM(s) Oral every 4 hours PRN Severe Pain (7 - 10)      HOME MEDICATIONS:  Albuterol (Eqv-ProAir HFA) 90 mcg/inh inhalation aerosol (05-15)  fenofibrate 145 mg oral tablet (05-15)  HumaLOG 100 units/mL subcutaneous solution (05-15)  Protonix 40 mg oral delayed release tablet (05-15)  Toprol- mg oral tablet, extended release (05-15)  traMADol 50 mg oral tablet (05-26)  Tresiba 100 units/mL subcutaneous solution (05-28)      PHYSICAL EXAM:  General: Laying in bed, comfortable appearing   HEENT: atraumatic, normocephalic  LUNGS: CTAB, unlabored respirations  HEART: S1S2+, RRR no MRG   ABDOMEN: R upper abdominal rash almost completely resolved, mild epigastric discomfort   EXT: no LE edema  NEURO: AAOx3

## 2022-05-31 VITALS — SYSTOLIC BLOOD PRESSURE: 150 MMHG | HEART RATE: 72 BPM | DIASTOLIC BLOOD PRESSURE: 77 MMHG

## 2022-05-31 LAB
ALBUMIN SERPL ELPH-MCNC: 2.9 G/DL — LOW (ref 3.5–5.2)
ALP SERPL-CCNC: 101 U/L — SIGNIFICANT CHANGE UP (ref 30–115)
ALT FLD-CCNC: 18 U/L — SIGNIFICANT CHANGE UP (ref 0–41)
ANION GAP SERPL CALC-SCNC: 9 MMOL/L — SIGNIFICANT CHANGE UP (ref 7–14)
AST SERPL-CCNC: 14 U/L — SIGNIFICANT CHANGE UP (ref 0–41)
BASOPHILS # BLD AUTO: 0.05 K/UL — SIGNIFICANT CHANGE UP (ref 0–0.2)
BASOPHILS NFR BLD AUTO: 0.7 % — SIGNIFICANT CHANGE UP (ref 0–1)
BILIRUB SERPL-MCNC: <0.2 MG/DL — SIGNIFICANT CHANGE UP (ref 0.2–1.2)
BUN SERPL-MCNC: 26 MG/DL — HIGH (ref 10–20)
CALCIUM SERPL-MCNC: 8.8 MG/DL — SIGNIFICANT CHANGE UP (ref 8.5–10.1)
CHLORIDE SERPL-SCNC: 106 MMOL/L — SIGNIFICANT CHANGE UP (ref 98–110)
CO2 SERPL-SCNC: 24 MMOL/L — SIGNIFICANT CHANGE UP (ref 17–32)
CREAT SERPL-MCNC: 2 MG/DL — HIGH (ref 0.7–1.5)
EGFR: 40 ML/MIN/1.73M2 — LOW
EOSINOPHIL # BLD AUTO: 0.24 K/UL — SIGNIFICANT CHANGE UP (ref 0–0.7)
EOSINOPHIL NFR BLD AUTO: 3.6 % — SIGNIFICANT CHANGE UP (ref 0–8)
GLUCOSE BLDC GLUCOMTR-MCNC: 190 MG/DL — HIGH (ref 70–99)
GLUCOSE BLDC GLUCOMTR-MCNC: 267 MG/DL — HIGH (ref 70–99)
GLUCOSE SERPL-MCNC: 257 MG/DL — HIGH (ref 70–99)
HCT VFR BLD CALC: 34.6 % — LOW (ref 42–52)
HGB BLD-MCNC: 11.7 G/DL — LOW (ref 14–18)
IMM GRANULOCYTES NFR BLD AUTO: 0.4 % — HIGH (ref 0.1–0.3)
LYMPHOCYTES # BLD AUTO: 2.15 K/UL — SIGNIFICANT CHANGE UP (ref 1.2–3.4)
LYMPHOCYTES # BLD AUTO: 32.2 % — SIGNIFICANT CHANGE UP (ref 20.5–51.1)
MAGNESIUM SERPL-MCNC: 2 MG/DL — SIGNIFICANT CHANGE UP (ref 1.8–2.4)
MCHC RBC-ENTMCNC: 31 PG — SIGNIFICANT CHANGE UP (ref 27–31)
MCHC RBC-ENTMCNC: 33.8 G/DL — SIGNIFICANT CHANGE UP (ref 32–37)
MCV RBC AUTO: 91.8 FL — SIGNIFICANT CHANGE UP (ref 80–94)
MONOCYTES # BLD AUTO: 0.68 K/UL — HIGH (ref 0.1–0.6)
MONOCYTES NFR BLD AUTO: 10.2 % — HIGH (ref 1.7–9.3)
NEUTROPHILS # BLD AUTO: 3.52 K/UL — SIGNIFICANT CHANGE UP (ref 1.4–6.5)
NEUTROPHILS NFR BLD AUTO: 52.9 % — SIGNIFICANT CHANGE UP (ref 42.2–75.2)
NRBC # BLD: 0 /100 WBCS — SIGNIFICANT CHANGE UP (ref 0–0)
PLATELET # BLD AUTO: 228 K/UL — SIGNIFICANT CHANGE UP (ref 130–400)
POTASSIUM SERPL-MCNC: 4.7 MMOL/L — SIGNIFICANT CHANGE UP (ref 3.5–5)
POTASSIUM SERPL-SCNC: 4.7 MMOL/L — SIGNIFICANT CHANGE UP (ref 3.5–5)
PROT SERPL-MCNC: 5.4 G/DL — LOW (ref 6–8)
RBC # BLD: 3.77 M/UL — LOW (ref 4.7–6.1)
RBC # FLD: 12.7 % — SIGNIFICANT CHANGE UP (ref 11.5–14.5)
SODIUM SERPL-SCNC: 139 MMOL/L — SIGNIFICANT CHANGE UP (ref 135–146)
WBC # BLD: 6.67 K/UL — SIGNIFICANT CHANGE UP (ref 4.8–10.8)
WBC # FLD AUTO: 6.67 K/UL — SIGNIFICANT CHANGE UP (ref 4.8–10.8)

## 2022-05-31 PROCEDURE — 99239 HOSP IP/OBS DSCHRG MGMT >30: CPT

## 2022-05-31 RX ORDER — NIFEDIPINE 30 MG
1 TABLET, EXTENDED RELEASE 24 HR ORAL
Qty: 0 | Refills: 0 | DISCHARGE
Start: 2022-05-31

## 2022-05-31 RX ORDER — ATORVASTATIN CALCIUM 80 MG/1
1 TABLET, FILM COATED ORAL
Qty: 0 | Refills: 0 | DISCHARGE
Start: 2022-05-31

## 2022-05-31 RX ORDER — MIDODRINE HYDROCHLORIDE 2.5 MG/1
1 TABLET ORAL
Qty: 0 | Refills: 0 | DISCHARGE
Start: 2022-05-31

## 2022-05-31 RX ORDER — GABAPENTIN 400 MG/1
1 CAPSULE ORAL
Qty: 0 | Refills: 0 | DISCHARGE
Start: 2022-05-31

## 2022-05-31 RX ADMIN — Medication 100 MILLIGRAM(S): at 05:19

## 2022-05-31 RX ADMIN — Medication 6 UNIT(S): at 08:48

## 2022-05-31 RX ADMIN — TRAMADOL HYDROCHLORIDE 25 MILLIGRAM(S): 50 TABLET ORAL at 05:16

## 2022-05-31 RX ADMIN — MIDODRINE HYDROCHLORIDE 5 MILLIGRAM(S): 2.5 TABLET ORAL at 05:19

## 2022-05-31 RX ADMIN — Medication 6 UNIT(S): at 11:08

## 2022-05-31 RX ADMIN — Medication 145 MILLIGRAM(S): at 11:08

## 2022-05-31 RX ADMIN — Medication 60 MILLIGRAM(S): at 05:19

## 2022-05-31 RX ADMIN — Medication 1: at 11:07

## 2022-05-31 RX ADMIN — GABAPENTIN 600 MILLIGRAM(S): 400 CAPSULE ORAL at 05:19

## 2022-05-31 RX ADMIN — PANTOPRAZOLE SODIUM 40 MILLIGRAM(S): 20 TABLET, DELAYED RELEASE ORAL at 05:19

## 2022-05-31 RX ADMIN — Medication 3: at 08:48

## 2022-05-31 RX ADMIN — Medication 1 GRAM(S): at 05:20

## 2022-05-31 RX ADMIN — Medication 1 GRAM(S): at 11:09

## 2022-05-31 NOTE — PROGRESS NOTE ADULT - REASON FOR ADMISSION
epigastric pain

## 2022-05-31 NOTE — PROGRESS NOTE ADULT - PROVIDER SPECIALTY LIST ADULT
Gastroenterology
Internal Medicine
Nephrology
Internal Medicine
Internal Medicine
Gastroenterology
Internal Medicine
Nephrology
Gastroenterology
Internal Medicine

## 2022-05-31 NOTE — PROGRESS NOTE ADULT - ASSESSMENT
BILLY on CKD 3 / pre-cecy/NSAIDs  Mild hyponatremia improving (d/t thiazide, poor po intake)  HTN  DM2    - non oliguric  - creat down-trending    Recommendations:   - hypertensive;  better on nifedipine metoprolol may benefit from ACE inh or ARB  in the future   - document strict i/o  - obtain  urine prot/creat ratio  - CTAP reviewed, no hydronephrosis   - avoid nephrotoxins/ NSAIDs  - check serum flc ratio   - further serologic w/u can be done outpatient as creatinine is down-trending  - establish care with outpatient nephrologist;  if none closer, pt can schedule appointment in [facility]    Cr stable  ok for d/c

## 2022-05-31 NOTE — PROGRESS NOTE ADULT - SUBJECTIVE AND OBJECTIVE BOX
Nephrology progress note    Patient was seen and examined, events over the last 24 h noted .    Recalled to eval for "appreciate re-eval as creatinine is increasing"    Last seen by Nephrology 5/17    Allergies:  No Known Allergies    Hospital Medications:   MEDICATIONS  (STANDING):  atorvastatin 40 milliGRAM(s) Oral at bedtime  dextrose 5%. 1000 milliLiter(s) (100 mL/Hr) IV Continuous <Continuous>  dextrose 5%. 1000 milliLiter(s) (100 mL/Hr) IV Continuous <Continuous>  dextrose 5%. 1000 milliLiter(s) (50 mL/Hr) IV Continuous <Continuous>  dextrose 50% Injectable 25 Gram(s) IV Push once  dextrose 50% Injectable 12.5 Gram(s) IV Push once  dextrose 50% Injectable 25 Gram(s) IV Push once  fenofibrate Tablet 145 milliGRAM(s) Oral daily  gabapentin 600 milliGRAM(s) Oral two times a day  glucagon  Injectable 1 milliGRAM(s) IntraMuscular once  glucagon  Injectable 1 milliGRAM(s) IntraMuscular once  heparin   Injectable 5000 Unit(s) SubCutaneous every 8 hours  insulin glargine Injectable (LANTUS) 45 Unit(s) SubCutaneous at bedtime  insulin lispro (ADMELOG) corrective regimen sliding scale   SubCutaneous three times a day before meals  insulin lispro Injectable (ADMELOG) 6 Unit(s) SubCutaneous three times a day before meals  metoprolol succinate  milliGRAM(s) Oral daily  midodrine. 5 milliGRAM(s) Oral three times a day  NIFEdipine XL 60 milliGRAM(s) Oral daily  pantoprazole    Tablet 40 milliGRAM(s) Oral two times a day  senna 2 Tablet(s) Oral at bedtime  sucralfate 1 Gram(s) Oral four times a day        VITALS:  T(F): 97.6 (05-31-22 @ 05:26), Max: 98.8 (05-31-22 @ 05:06)  HR: 72 (05-31-22 @ 11:17)  BP: 150/77 (05-31-22 @ 11:17)  RR: 20 (05-31-22 @ 05:15)  SpO2: 99% (05-30-22 @ 21:00)  Wt(kg): --    05-30 @ 07:01  -  05-31 @ 07:00  --------------------------------------------------------  IN: 360 mL / OUT: 0 mL / NET: 360 mL          PHYSICAL EXAM:  Constitutional: NAD  HEENT: anicteric sclera, oropharynx clear, MMM  Neck: No JVD  Respiratory: CTAB, no wheezes, rales or rhonchi  Cardiovascular: S1, S2, RRR  Gastrointestinal: BS+, soft, NT/ND  Extremities: No cyanosis or clubbing. No peripheral edema  :  No faith.   Skin: No rashes    LABS:  05-31    139  |  106  |  26<H>  ----------------------------<  257<H>  4.7   |  24  |  2.0<H>    Ca    8.8      31 May 2022 06:46  Mg     2.0     05-31    TPro  5.4<L>  /  Alb  2.9<L>  /  TBili  <0.2  /  DBili      /  AST  14  /  ALT  18  /  AlkPhos  101  05-31                          11.7   6.67  )-----------( 228      ( 31 May 2022 06:46 )             34.6       Urine Studies:      RADIOLOGY & ADDITIONAL STUDIES:

## 2022-06-03 DIAGNOSIS — B02.29 OTHER POSTHERPETIC NERVOUS SYSTEM INVOLVEMENT: ICD-10-CM

## 2022-06-03 DIAGNOSIS — K21.9 GASTRO-ESOPHAGEAL REFLUX DISEASE WITHOUT ESOPHAGITIS: ICD-10-CM

## 2022-06-03 DIAGNOSIS — Z20.822 CONTACT WITH AND (SUSPECTED) EXPOSURE TO COVID-19: ICD-10-CM

## 2022-06-03 DIAGNOSIS — K29.70 GASTRITIS, UNSPECIFIED, WITHOUT BLEEDING: ICD-10-CM

## 2022-06-03 DIAGNOSIS — K04.7 PERIAPICAL ABSCESS WITHOUT SINUS: ICD-10-CM

## 2022-06-03 DIAGNOSIS — Z79.899 OTHER LONG TERM (CURRENT) DRUG THERAPY: ICD-10-CM

## 2022-06-03 DIAGNOSIS — G47.33 OBSTRUCTIVE SLEEP APNEA (ADULT) (PEDIATRIC): ICD-10-CM

## 2022-06-03 DIAGNOSIS — F17.210 NICOTINE DEPENDENCE, CIGARETTES, UNCOMPLICATED: ICD-10-CM

## 2022-06-03 DIAGNOSIS — J45.909 UNSPECIFIED ASTHMA, UNCOMPLICATED: ICD-10-CM

## 2022-06-03 DIAGNOSIS — Z79.4 LONG TERM (CURRENT) USE OF INSULIN: ICD-10-CM

## 2022-06-03 DIAGNOSIS — L97.519 NON-PRESSURE CHRONIC ULCER OF OTHER PART OF RIGHT FOOT WITH UNSPECIFIED SEVERITY: ICD-10-CM

## 2022-06-03 DIAGNOSIS — I95.1 ORTHOSTATIC HYPOTENSION: ICD-10-CM

## 2022-06-03 DIAGNOSIS — E11.621 TYPE 2 DIABETES MELLITUS WITH FOOT ULCER: ICD-10-CM

## 2022-06-03 DIAGNOSIS — K57.90 DIVERTICULOSIS OF INTESTINE, PART UNSPECIFIED, WITHOUT PERFORATION OR ABSCESS WITHOUT BLEEDING: ICD-10-CM

## 2022-06-03 DIAGNOSIS — N18.2 CHRONIC KIDNEY DISEASE, STAGE 2 (MILD): ICD-10-CM

## 2022-06-03 DIAGNOSIS — E78.00 PURE HYPERCHOLESTEROLEMIA, UNSPECIFIED: ICD-10-CM

## 2022-06-03 DIAGNOSIS — E11.22 TYPE 2 DIABETES MELLITUS WITH DIABETIC CHRONIC KIDNEY DISEASE: ICD-10-CM

## 2022-06-03 DIAGNOSIS — E11.43 TYPE 2 DIABETES MELLITUS WITH DIABETIC AUTONOMIC (POLY)NEUROPATHY: ICD-10-CM

## 2022-06-03 DIAGNOSIS — K31.84 GASTROPARESIS: ICD-10-CM

## 2022-06-03 DIAGNOSIS — E11.65 TYPE 2 DIABETES MELLITUS WITH HYPERGLYCEMIA: ICD-10-CM

## 2022-06-03 DIAGNOSIS — N17.9 ACUTE KIDNEY FAILURE, UNSPECIFIED: ICD-10-CM

## 2022-06-03 DIAGNOSIS — E87.1 HYPO-OSMOLALITY AND HYPONATREMIA: ICD-10-CM

## 2022-06-03 DIAGNOSIS — Z90.49 ACQUIRED ABSENCE OF OTHER SPECIFIED PARTS OF DIGESTIVE TRACT: ICD-10-CM

## 2022-06-03 DIAGNOSIS — R42 DIZZINESS AND GIDDINESS: ICD-10-CM

## 2022-06-03 DIAGNOSIS — I12.9 HYPERTENSIVE CHRONIC KIDNEY DISEASE WITH STAGE 1 THROUGH STAGE 4 CHRONIC KIDNEY DISEASE, OR UNSPECIFIED CHRONIC KIDNEY DISEASE: ICD-10-CM

## 2022-06-17 ENCOUNTER — APPOINTMENT (OUTPATIENT)
Dept: UROLOGY | Facility: CLINIC | Age: 47
End: 2022-06-17

## 2022-06-17 DIAGNOSIS — N48.1 BALANITIS: ICD-10-CM

## 2022-06-17 PROCEDURE — 99213 OFFICE O/P EST LOW 20 MIN: CPT

## 2022-06-30 PROBLEM — N48.1 BALANITIS: Status: ACTIVE | Noted: 2017-07-27

## 2022-06-30 NOTE — ASSESSMENT
[FreeTextEntry1] : \par This is a 47 year old male with history of Diabetes, Hypertension, and Hyperlipidemia. Patient has severe ED started on Sildenafil 100 mg last visit. Patient has had no response to medication. \par \par most recent A1c = 7 per patient \par \par Previous history include microscopic hematuria:\par Cysto 05/2021- Normal no lesions, stones, or signs of bleeding. \par CT done- No acute intra abdominal or pelvic pathology \par CT Urogram -- normal exam. independent interpretation-- images visualized by me - agree with radiologist interpretation \par Basic Metabolic Panel; - cr 1.5\par \par Patient presents to office today to discuss IPP placement. Patient does not want to try Injections due to discomfort with needles. \par \par Prolong discussion with patient regarding IPP. Patient made aware of what to expect with surgery, and patient was able to visualize implant via model. \par All questions and concerns answered. \par Patient will continue to do research in order to make a fully informed decision. \par \par no penile curvature\par mild penile rash \par \par UA in May of 2022 to reassess hematuria. \par  \par steroid cream did not resolve phimosis/balanitis

## 2022-07-12 ENCOUNTER — EMERGENCY (EMERGENCY)
Facility: HOSPITAL | Age: 47
LOS: 0 days | Discharge: HOME | End: 2022-07-12
Attending: EMERGENCY MEDICINE | Admitting: EMERGENCY MEDICINE

## 2022-07-12 VITALS
WEIGHT: 175.05 LBS | RESPIRATION RATE: 16 BRPM | SYSTOLIC BLOOD PRESSURE: 163 MMHG | HEIGHT: 68 IN | DIASTOLIC BLOOD PRESSURE: 80 MMHG | HEART RATE: 101 BPM | OXYGEN SATURATION: 100 % | TEMPERATURE: 99 F

## 2022-07-12 DIAGNOSIS — J45.909 UNSPECIFIED ASTHMA, UNCOMPLICATED: ICD-10-CM

## 2022-07-12 DIAGNOSIS — Z79.4 LONG TERM (CURRENT) USE OF INSULIN: ICD-10-CM

## 2022-07-12 DIAGNOSIS — Z98.890 OTHER SPECIFIED POSTPROCEDURAL STATES: Chronic | ICD-10-CM

## 2022-07-12 DIAGNOSIS — R10.13 EPIGASTRIC PAIN: ICD-10-CM

## 2022-07-12 DIAGNOSIS — E11.9 TYPE 2 DIABETES MELLITUS WITHOUT COMPLICATIONS: ICD-10-CM

## 2022-07-12 DIAGNOSIS — Z90.49 ACQUIRED ABSENCE OF OTHER SPECIFIED PARTS OF DIGESTIVE TRACT: ICD-10-CM

## 2022-07-12 DIAGNOSIS — F17.200 NICOTINE DEPENDENCE, UNSPECIFIED, UNCOMPLICATED: ICD-10-CM

## 2022-07-12 DIAGNOSIS — Z87.19 PERSONAL HISTORY OF OTHER DISEASES OF THE DIGESTIVE SYSTEM: ICD-10-CM

## 2022-07-12 DIAGNOSIS — G47.33 OBSTRUCTIVE SLEEP APNEA (ADULT) (PEDIATRIC): ICD-10-CM

## 2022-07-12 DIAGNOSIS — I10 ESSENTIAL (PRIMARY) HYPERTENSION: ICD-10-CM

## 2022-07-12 DIAGNOSIS — E78.5 HYPERLIPIDEMIA, UNSPECIFIED: ICD-10-CM

## 2022-07-12 LAB
ALBUMIN SERPL ELPH-MCNC: 4 G/DL — SIGNIFICANT CHANGE UP (ref 3.5–5.2)
ALP SERPL-CCNC: 131 U/L — HIGH (ref 30–115)
ALT FLD-CCNC: 19 U/L — SIGNIFICANT CHANGE UP (ref 0–41)
ANION GAP SERPL CALC-SCNC: 11 MMOL/L — SIGNIFICANT CHANGE UP (ref 7–14)
AST SERPL-CCNC: 20 U/L — SIGNIFICANT CHANGE UP (ref 0–41)
BASOPHILS # BLD AUTO: 0.03 K/UL — SIGNIFICANT CHANGE UP (ref 0–0.2)
BASOPHILS NFR BLD AUTO: 0.5 % — SIGNIFICANT CHANGE UP (ref 0–1)
BILIRUB SERPL-MCNC: 0.2 MG/DL — SIGNIFICANT CHANGE UP (ref 0.2–1.2)
BUN SERPL-MCNC: 45 MG/DL — HIGH (ref 10–20)
CALCIUM SERPL-MCNC: 9.3 MG/DL — SIGNIFICANT CHANGE UP (ref 8.5–10.1)
CHLORIDE SERPL-SCNC: 99 MMOL/L — SIGNIFICANT CHANGE UP (ref 98–110)
CO2 SERPL-SCNC: 25 MMOL/L — SIGNIFICANT CHANGE UP (ref 17–32)
CREAT SERPL-MCNC: 2.4 MG/DL — HIGH (ref 0.7–1.5)
EGFR: 32 ML/MIN/1.73M2 — LOW
EOSINOPHIL # BLD AUTO: 0.17 K/UL — SIGNIFICANT CHANGE UP (ref 0–0.7)
EOSINOPHIL NFR BLD AUTO: 2.8 % — SIGNIFICANT CHANGE UP (ref 0–8)
GLUCOSE SERPL-MCNC: 278 MG/DL — HIGH (ref 70–99)
HCT VFR BLD CALC: 36.9 % — LOW (ref 42–52)
HGB BLD-MCNC: 12.9 G/DL — LOW (ref 14–18)
IMM GRANULOCYTES NFR BLD AUTO: 0.3 % — SIGNIFICANT CHANGE UP (ref 0.1–0.3)
LIDOCAIN IGE QN: 23 U/L — SIGNIFICANT CHANGE UP (ref 7–60)
LYMPHOCYTES # BLD AUTO: 1.78 K/UL — SIGNIFICANT CHANGE UP (ref 1.2–3.4)
LYMPHOCYTES # BLD AUTO: 29.7 % — SIGNIFICANT CHANGE UP (ref 20.5–51.1)
MCHC RBC-ENTMCNC: 31.5 PG — HIGH (ref 27–31)
MCHC RBC-ENTMCNC: 35 G/DL — SIGNIFICANT CHANGE UP (ref 32–37)
MCV RBC AUTO: 90 FL — SIGNIFICANT CHANGE UP (ref 80–94)
MONOCYTES # BLD AUTO: 0.41 K/UL — SIGNIFICANT CHANGE UP (ref 0.1–0.6)
MONOCYTES NFR BLD AUTO: 6.8 % — SIGNIFICANT CHANGE UP (ref 1.7–9.3)
NEUTROPHILS # BLD AUTO: 3.58 K/UL — SIGNIFICANT CHANGE UP (ref 1.4–6.5)
NEUTROPHILS NFR BLD AUTO: 59.9 % — SIGNIFICANT CHANGE UP (ref 42.2–75.2)
NRBC # BLD: 0 /100 WBCS — SIGNIFICANT CHANGE UP (ref 0–0)
PLATELET # BLD AUTO: 172 K/UL — SIGNIFICANT CHANGE UP (ref 130–400)
POTASSIUM SERPL-MCNC: 5.4 MMOL/L — HIGH (ref 3.5–5)
POTASSIUM SERPL-SCNC: 5.4 MMOL/L — HIGH (ref 3.5–5)
PROT SERPL-MCNC: 6.8 G/DL — SIGNIFICANT CHANGE UP (ref 6–8)
RBC # BLD: 4.1 M/UL — LOW (ref 4.7–6.1)
RBC # FLD: 12.8 % — SIGNIFICANT CHANGE UP (ref 11.5–14.5)
SODIUM SERPL-SCNC: 135 MMOL/L — SIGNIFICANT CHANGE UP (ref 135–146)
WBC # BLD: 5.99 K/UL — SIGNIFICANT CHANGE UP (ref 4.8–10.8)
WBC # FLD AUTO: 5.99 K/UL — SIGNIFICANT CHANGE UP (ref 4.8–10.8)

## 2022-07-12 PROCEDURE — 93010 ELECTROCARDIOGRAM REPORT: CPT

## 2022-07-12 PROCEDURE — 99285 EMERGENCY DEPT VISIT HI MDM: CPT

## 2022-07-12 RX ORDER — SODIUM CHLORIDE 9 MG/ML
1000 INJECTION, SOLUTION INTRAVENOUS ONCE
Refills: 0 | Status: COMPLETED | OUTPATIENT
Start: 2022-07-12 | End: 2022-07-12

## 2022-07-12 RX ORDER — PANTOPRAZOLE SODIUM 20 MG/1
40 TABLET, DELAYED RELEASE ORAL ONCE
Refills: 0 | Status: COMPLETED | OUTPATIENT
Start: 2022-07-12 | End: 2022-07-12

## 2022-07-12 RX ORDER — PANTOPRAZOLE SODIUM 20 MG/1
1 TABLET, DELAYED RELEASE ORAL
Qty: 7 | Refills: 0
Start: 2022-07-12 | End: 2022-07-18

## 2022-07-12 RX ORDER — SUCRALFATE 1 G
1 TABLET ORAL
Qty: 7 | Refills: 0
Start: 2022-07-12 | End: 2022-07-18

## 2022-07-12 RX ADMIN — Medication 30 MILLILITER(S): at 17:48

## 2022-07-12 RX ADMIN — PANTOPRAZOLE SODIUM 40 MILLIGRAM(S): 20 TABLET, DELAYED RELEASE ORAL at 17:48

## 2022-07-12 RX ADMIN — SODIUM CHLORIDE 1000 MILLILITER(S): 9 INJECTION, SOLUTION INTRAVENOUS at 17:48

## 2022-07-12 NOTE — ED PROVIDER NOTE - NS ED ROS FT
Review of Systems    Constitutional: (-) fever  Cardiovascular: (-) chest pain, (-) syncope  Respiratory: (-) cough, (-) shortness of breath  Gastrointestinal: (-) vomiting, (-) diarrhea, (+) epigastric abdominal pain  Musculoskeletal: (-) neck pain, (-) back pain, (-) joint pain  Integumentary: (-) rash, (-) edema  Neurological: (-) headache, (-) altered mental status    Except as documented in the HPI, all other systems are negative.

## 2022-07-12 NOTE — ED PROVIDER NOTE - PROGRESS NOTE DETAILS
MM: Patient feeling better after medications and hydration. Able to tolerate PO. will Dc. Patient is well appearing, NAD, afebrile, hemodynamically stable. Any available tests and studies were discussed with patient_. Discharged with instructions in further symptomatic care, return precautions, and need for PMD f/u.

## 2022-07-12 NOTE — ED PROVIDER NOTE - CARE PROVIDER_API CALL
Rohit Ortega)  Gastroenterology; Internal Medicine  [address] [place] [address]  Phone: [phone]  Fax: [phone]  Follow Up Time:

## 2022-07-12 NOTE — ED PROVIDER NOTE - PATIENT PORTAL LINK FT
You can access the FollowMyHealth Patient Portal offered by [facility] by registering at the following website: [email or web address]. By joining [employer]’s FollowMyHealth portal, you will also be able to view your health information using other applications (apps) compatible with our system.

## 2022-07-12 NOTE — ED PROVIDER NOTE - OBJECTIVE STATEMENT
47 year old male with hx of HTN, AMBER, DM, vertigo, diverticulosis s/p colon resection presents from home with 2 days of epigastric pain. Patient states his symptoms are similar to his previous episode, but has not been taking any medications for it since he didn't remember what they were called. He has not seen a gastroenterologist since his admission 2 months ago since he was unable to get an appointment.

## 2022-07-12 NOTE — ED PROVIDER NOTE - NSFOLLOWUPINSTRUCTIONS_ED_ALL_ED_FT
Be sure to take precautions to limit/avoid symptoms. This includes eating small, frequent meals instead of 3 large meals per day. This also includes limiting consumption of: spicy foods, alcohol, fatty foods, chocolate, coffee, peppermint, tomatoes/tomato products. Avoid laying down for at least 3 hours after a meal. Also, you may benefit from using medications to control the acid in your stomach called proton pump inhibitors or H2 blockers, some examples of these that can be purchased over the counter include Pantoprazole, Omeprazole, Ranitidine.        Overview  Gastroparesis is a chronic disorder which means delayed stomach emptying without a blockage. In healthy people, when the stomach is functioning normally, contractions of the stomach help to crush ingested food and then propel the pulverized food into the small intestine where further digestion and absorption of nutrients occurs.    Symptoms  Symptoms include fullness after meals, pain, nausea vomiting, weight loss, belching and bloating. Certain foods like fatty foods, or carbonation may cause symptoms. The feeling of fullness after starting a meal is very common. Weight loss may also occur.    Causes  Diabetes is one of the more common causes. Gastroparesis may also occur after stomach surgery. Other causes include bacterial and viral infections. Narcotics, antidepressants and other medications which delay stomach emptying may also cause gastroparesis. There are a group of patients with gastroparesis where there is no obvious cause.    Table 1  Medications associated with impaired gastric emptying  Narcotic   Tricyclic antidepressants  Calcium channel blockers  Clonidine  Dopamine agonists  Lithium  Nicotine  Progesterone    Diagnosis      Gastric emptying study is the most commonly used test. It is a test using a tiny amount of radioactive material mixed with food. With imaging equipment, it measures the rate of emptying of the stomach after taking a meal. A delay in gastric emptying indicates gastroparesis.    13 C spirulina Gastric Emptying Breath Test is a non radioactive test to evaluate for gastroparesis. It is a test also using labeled food where the patient provides breath samples for analysis.  Wireless capsule system called (StyleSeek®) Is a capsule that contains a small electronic device. This device records information as it travels to your stomach and intestine. The information is transmitted to a  worn on the waist. The capsule is passed in your stool.    Treatment  Importance of Nutrition as Treatment in Gastroparesis  The initial treatment in patients with gastroparesis is to create a diet that will improve the symptoms. Your physician may recommend eating frequent small meals and to avoid fatty, spicy, acidy and high fiber foods. Your physician may also recommend soups or more liquid containing meals..    In addition, we always want to make sure our patients are well hydrated.    Those patients with diabetes should have good control of their sugars.    Medicines that delay stomach emptying should be avoided if possible.    Metoclopramide  Is an important medicine to treat gastroparesis. There are risks in using this medication that you need to discuss with your physician. She will help you weigh the pros and cons and help you make the best decision for your situation.    Erythromycin You may know erythromycin as an antibiotic. Erythromycin also causes stomach contractions. Your physician may consider this option if you fail to respond to metoclopramide or you wish to try something else.    Erythromycin also has side effects. It does not work after 4 weeks. Your doctor will also help you weigh the pros and cons of using this medication.    Anti Emetics (Medications to control nausea and vomiting) Your physician may also consider the use of medications like prochlorperazine (Compro), diphenhydramine (Several brand names including Benadryl), ondansetron (Zofran). There are risks and benefits to these medications as well.

## 2022-07-12 NOTE — ED PROVIDER NOTE - CLINICAL SUMMARY MEDICAL DECISION MAKING FREE TEXT BOX
Workup reassuring; pt feeling much better after GI cocktail. Referral coordinator to help with arranging GI followup. Return precautions discussed, pt to take PPI and return to the ED for persistent or worsening sx. He is comfortable with discharge.

## 2022-07-12 NOTE — ED PROVIDER NOTE - NSFOLLOWUPCLINICS_GEN_ALL_ED_FT
[facility]  [facility]  [address] [place] [id number]  Phone: [phone]  Fax:

## 2022-07-12 NOTE — ED PROVIDER NOTE - PHYSICAL EXAMINATION
Vital Signs: I have reviewed the initial vital signs.  Constitutional: well-nourished, appears stated age, no acute distress.  HEENT: Airway patent, moist MM, no erythema/swelling/deformity of oral structures. EOMI,   CV: regular rate, regular rhythm, well-perfused extremities,  Lungs: Clear to ascultation bilaterally, no increased work of breathing.  ABD: Non-tender, Non-distended,   MSK: Neck supple, nontender, normal range of motion, able to ambulate without assistance  INTEG: Skin warm, dry, no rash.  NEURO: A&Ox3, moving all extremities, normal speech  PSYCH: Calm, cooperative, normal affect and interaction.

## 2022-07-12 NOTE — ED PROVIDER NOTE - ATTENDING CONTRIBUTION TO CARE
47-year-old man, history of hypertension, diabetes, AMBER, diverticulosis status post colon resection presents with 2 days of epigastric pain, burning in nature.  Has had similar symptoms previously, but is not taking medications currently.  Has not been able to get an appointment with GI.  On exam he is nontoxic-appearing, moist mucous membranes, lungs clear, CV S1-S2, abdomen soft, mild epigastric tenderness but no peritoneal signs, no Mccracken sign.  Suspect GERD/gastritis, doubt SBO as patient is having bowel movements.  Will try symptomatic management, hydration, reassess.

## 2022-07-19 ENCOUNTER — APPOINTMENT (OUTPATIENT)
Dept: UROLOGY | Facility: CLINIC | Age: 47
End: 2022-07-19

## 2022-07-22 NOTE — ED PROVIDER NOTE - CCCP TRG CHIEF CMPLNT
Bed: 34  Expected date:   Expected time:   Means of arrival:   Comments:  [place] FALL    abdominal pain

## 2022-10-04 ENCOUNTER — INPATIENT (INPATIENT)
Facility: HOSPITAL | Age: 47
LOS: 15 days | Discharge: HOME HEALTH | DRG: 281 | End: 2022-10-20
Attending: INTERNAL MEDICINE | Admitting: INTERNAL MEDICINE
Payer: MEDICARE

## 2022-10-04 VITALS — WEIGHT: 177.91 LBS | HEIGHT: 67 IN

## 2022-10-04 DIAGNOSIS — N17.9 ACUTE KIDNEY FAILURE, UNSPECIFIED: ICD-10-CM

## 2022-10-04 DIAGNOSIS — Z98.890 OTHER SPECIFIED POSTPROCEDURAL STATES: Chronic | ICD-10-CM

## 2022-10-04 DIAGNOSIS — I21.4 NON-ST ELEVATION (NSTEMI) MYOCARDIAL INFARCTION: ICD-10-CM

## 2022-10-04 LAB
ALBUMIN SERPL ELPH-MCNC: 3.2 G/DL — LOW (ref 3.3–5)
ALP SERPL-CCNC: 109 U/L — SIGNIFICANT CHANGE UP (ref 40–120)
ALT FLD-CCNC: 13 U/L — SIGNIFICANT CHANGE UP (ref 10–45)
ANION GAP SERPL CALC-SCNC: 12 MMOL/L — SIGNIFICANT CHANGE UP (ref 5–17)
APTT BLD: 33 SEC — SIGNIFICANT CHANGE UP (ref 27.5–35.5)
AST SERPL-CCNC: 17 U/L — SIGNIFICANT CHANGE UP (ref 10–40)
BILIRUB SERPL-MCNC: 0.1 MG/DL — LOW (ref 0.2–1.2)
BUN SERPL-MCNC: 49 MG/DL — HIGH (ref 7–23)
CALCIUM SERPL-MCNC: 8.7 MG/DL — SIGNIFICANT CHANGE UP (ref 8.4–10.5)
CHLORIDE SERPL-SCNC: 101 MMOL/L — SIGNIFICANT CHANGE UP (ref 96–108)
CK MB BLD-MCNC: 3.9 % — HIGH (ref 0–3.5)
CK MB CFR SERPL CALC: 10.9 NG/ML — HIGH (ref 0–6.7)
CK SERPL-CCNC: 278 U/L — HIGH (ref 30–200)
CO2 SERPL-SCNC: 22 MMOL/L — SIGNIFICANT CHANGE UP (ref 22–31)
CREAT SERPL-MCNC: 2.73 MG/DL — HIGH (ref 0.5–1.3)
EGFR: 28 ML/MIN/1.73M2 — LOW
GLUCOSE BLDC GLUCOMTR-MCNC: 150 MG/DL — HIGH (ref 70–99)
GLUCOSE BLDC GLUCOMTR-MCNC: 292 MG/DL — HIGH (ref 70–99)
GLUCOSE BLDC GLUCOMTR-MCNC: 299 MG/DL — HIGH (ref 70–99)
GLUCOSE SERPL-MCNC: 146 MG/DL — HIGH (ref 70–99)
HCT VFR BLD CALC: 33.8 % — LOW (ref 39–50)
HGB BLD-MCNC: 11.3 G/DL — LOW (ref 13–17)
INR BLD: 0.91 RATIO — SIGNIFICANT CHANGE UP (ref 0.88–1.16)
MCHC RBC-ENTMCNC: 30.8 PG — SIGNIFICANT CHANGE UP (ref 27–34)
MCHC RBC-ENTMCNC: 33.4 GM/DL — SIGNIFICANT CHANGE UP (ref 32–36)
MCV RBC AUTO: 92.1 FL — SIGNIFICANT CHANGE UP (ref 80–100)
NRBC # BLD: 0 /100 WBCS — SIGNIFICANT CHANGE UP (ref 0–0)
PLATELET # BLD AUTO: 202 K/UL — SIGNIFICANT CHANGE UP (ref 150–400)
POTASSIUM SERPL-MCNC: 4.5 MMOL/L — SIGNIFICANT CHANGE UP (ref 3.5–5.3)
POTASSIUM SERPL-SCNC: 4.5 MMOL/L — SIGNIFICANT CHANGE UP (ref 3.5–5.3)
PROT SERPL-MCNC: 6.3 G/DL — SIGNIFICANT CHANGE UP (ref 6–8.3)
PROTHROM AB SERPL-ACNC: 10.4 SEC — LOW (ref 10.5–13.4)
RBC # BLD: 3.67 M/UL — LOW (ref 4.2–5.8)
RBC # FLD: 12.2 % — SIGNIFICANT CHANGE UP (ref 10.3–14.5)
SARS-COV-2 RNA SPEC QL NAA+PROBE: SIGNIFICANT CHANGE UP
SODIUM SERPL-SCNC: 135 MMOL/L — SIGNIFICANT CHANGE UP (ref 135–145)
TROPONIN T, HIGH SENSITIVITY RESULT: 150 NG/L — HIGH (ref 0–51)
WBC # BLD: 7.34 K/UL — SIGNIFICANT CHANGE UP (ref 3.8–10.5)
WBC # FLD AUTO: 7.34 K/UL — SIGNIFICANT CHANGE UP (ref 3.8–10.5)

## 2022-10-04 PROCEDURE — 99222 1ST HOSP IP/OBS MODERATE 55: CPT

## 2022-10-04 PROCEDURE — 99223 1ST HOSP IP/OBS HIGH 75: CPT | Mod: GC

## 2022-10-04 PROCEDURE — 93010 ELECTROCARDIOGRAM REPORT: CPT

## 2022-10-04 PROCEDURE — 70496 CT ANGIOGRAPHY HEAD: CPT | Mod: 26

## 2022-10-04 PROCEDURE — 71045 X-RAY EXAM CHEST 1 VIEW: CPT | Mod: 26

## 2022-10-04 PROCEDURE — 99233 SBSQ HOSP IP/OBS HIGH 50: CPT | Mod: GC

## 2022-10-04 PROCEDURE — 70498 CT ANGIOGRAPHY NECK: CPT | Mod: 26

## 2022-10-04 RX ORDER — PANTOPRAZOLE SODIUM 20 MG/1
1 TABLET, DELAYED RELEASE ORAL
Qty: 0 | Refills: 0 | DISCHARGE

## 2022-10-04 RX ORDER — DEXTROSE 50 % IN WATER 50 %
25 SYRINGE (ML) INTRAVENOUS ONCE
Refills: 0 | Status: DISCONTINUED | OUTPATIENT
Start: 2022-10-04 | End: 2022-10-20

## 2022-10-04 RX ORDER — HEPARIN SODIUM 5000 [USP'U]/ML
5000 INJECTION INTRAVENOUS; SUBCUTANEOUS EVERY 6 HOURS
Refills: 0 | Status: DISCONTINUED | OUTPATIENT
Start: 2022-10-04 | End: 2022-10-06

## 2022-10-04 RX ORDER — INFLUENZA VIRUS VACCINE 15; 15; 15; 15 UG/.5ML; UG/.5ML; UG/.5ML; UG/.5ML
0.5 SUSPENSION INTRAMUSCULAR ONCE
Refills: 0 | Status: DISCONTINUED | OUTPATIENT
Start: 2022-10-04 | End: 2022-10-20

## 2022-10-04 RX ORDER — AMITRIPTYLINE HCL 25 MG
10 TABLET ORAL AT BEDTIME
Refills: 0 | Status: DISCONTINUED | OUTPATIENT
Start: 2022-10-04 | End: 2022-10-20

## 2022-10-04 RX ORDER — ASPIRIN/CALCIUM CARB/MAGNESIUM 324 MG
324 TABLET ORAL ONCE
Refills: 0 | Status: COMPLETED | OUTPATIENT
Start: 2022-10-04 | End: 2022-10-04

## 2022-10-04 RX ORDER — ALBUTEROL 90 UG/1
2 AEROSOL, METERED ORAL EVERY 6 HOURS
Refills: 0 | Status: DISCONTINUED | OUTPATIENT
Start: 2022-10-04 | End: 2022-10-20

## 2022-10-04 RX ORDER — AMLODIPINE BESYLATE 2.5 MG/1
10 TABLET ORAL DAILY
Refills: 0 | Status: DISCONTINUED | OUTPATIENT
Start: 2022-10-04 | End: 2022-10-20

## 2022-10-04 RX ORDER — HEPARIN SODIUM 5000 [USP'U]/ML
INJECTION INTRAVENOUS; SUBCUTANEOUS
Qty: 25000 | Refills: 0 | Status: DISCONTINUED | OUTPATIENT
Start: 2022-10-04 | End: 2022-10-06

## 2022-10-04 RX ORDER — INSULIN LISPRO 100/ML
VIAL (ML) SUBCUTANEOUS
Refills: 0 | Status: DISCONTINUED | OUTPATIENT
Start: 2022-10-04 | End: 2022-10-20

## 2022-10-04 RX ORDER — FENOFIBRATE,MICRONIZED 130 MG
145 CAPSULE ORAL DAILY
Refills: 0 | Status: DISCONTINUED | OUTPATIENT
Start: 2022-10-04 | End: 2022-10-20

## 2022-10-04 RX ORDER — DEXTROSE 50 % IN WATER 50 %
15 SYRINGE (ML) INTRAVENOUS ONCE
Refills: 0 | Status: DISCONTINUED | OUTPATIENT
Start: 2022-10-04 | End: 2022-10-20

## 2022-10-04 RX ORDER — HEPARIN SODIUM 5000 [USP'U]/ML
4000 INJECTION INTRAVENOUS; SUBCUTANEOUS ONCE
Refills: 0 | Status: DISCONTINUED | OUTPATIENT
Start: 2022-10-04 | End: 2022-10-04

## 2022-10-04 RX ORDER — INSULIN DEGLUDEC 100 U/ML
40 INJECTION, SOLUTION SUBCUTANEOUS
Qty: 0 | Refills: 0 | DISCHARGE

## 2022-10-04 RX ORDER — INSULIN LISPRO 100/ML
4 VIAL (ML) SUBCUTANEOUS
Refills: 0 | Status: DISCONTINUED | OUTPATIENT
Start: 2022-10-04 | End: 2022-10-14

## 2022-10-04 RX ORDER — SODIUM CHLORIDE 9 MG/ML
1000 INJECTION INTRAMUSCULAR; INTRAVENOUS; SUBCUTANEOUS
Refills: 0 | Status: DISCONTINUED | OUTPATIENT
Start: 2022-10-04 | End: 2022-10-05

## 2022-10-04 RX ORDER — METOPROLOL TARTRATE 50 MG
1 TABLET ORAL
Qty: 0 | Refills: 0 | DISCHARGE

## 2022-10-04 RX ORDER — INSULIN LISPRO 100/ML
VIAL (ML) SUBCUTANEOUS AT BEDTIME
Refills: 0 | Status: DISCONTINUED | OUTPATIENT
Start: 2022-10-04 | End: 2022-10-20

## 2022-10-04 RX ORDER — SODIUM CHLORIDE 9 MG/ML
1000 INJECTION, SOLUTION INTRAVENOUS
Refills: 0 | Status: DISCONTINUED | OUTPATIENT
Start: 2022-10-04 | End: 2022-10-20

## 2022-10-04 RX ORDER — INSULIN GLARGINE 100 [IU]/ML
36 INJECTION, SOLUTION SUBCUTANEOUS AT BEDTIME
Refills: 0 | Status: DISCONTINUED | OUTPATIENT
Start: 2022-10-04 | End: 2022-10-13

## 2022-10-04 RX ORDER — DEXTROSE 50 % IN WATER 50 %
12.5 SYRINGE (ML) INTRAVENOUS ONCE
Refills: 0 | Status: DISCONTINUED | OUTPATIENT
Start: 2022-10-04 | End: 2022-10-20

## 2022-10-04 RX ORDER — ASPIRIN/CALCIUM CARB/MAGNESIUM 324 MG
81 TABLET ORAL DAILY
Refills: 0 | Status: DISCONTINUED | OUTPATIENT
Start: 2022-10-05 | End: 2022-10-18

## 2022-10-04 RX ORDER — TICAGRELOR 90 MG/1
180 TABLET ORAL ONCE
Refills: 0 | Status: COMPLETED | OUTPATIENT
Start: 2022-10-04 | End: 2022-10-04

## 2022-10-04 RX ORDER — HEPARIN SODIUM 5000 [USP'U]/ML
INJECTION INTRAVENOUS; SUBCUTANEOUS
Qty: 25000 | Refills: 0 | Status: DISCONTINUED | OUTPATIENT
Start: 2022-10-04 | End: 2022-10-04

## 2022-10-04 RX ORDER — TICAGRELOR 90 MG/1
90 TABLET ORAL EVERY 12 HOURS
Refills: 0 | Status: DISCONTINUED | OUTPATIENT
Start: 2022-10-05 | End: 2022-10-17

## 2022-10-04 RX ORDER — GLUCAGON INJECTION, SOLUTION 0.5 MG/.1ML
1 INJECTION, SOLUTION SUBCUTANEOUS ONCE
Refills: 0 | Status: DISCONTINUED | OUTPATIENT
Start: 2022-10-04 | End: 2022-10-20

## 2022-10-04 RX ORDER — CARVEDILOL PHOSPHATE 80 MG/1
25 CAPSULE, EXTENDED RELEASE ORAL EVERY 12 HOURS
Refills: 0 | Status: DISCONTINUED | OUTPATIENT
Start: 2022-10-04 | End: 2022-10-20

## 2022-10-04 RX ORDER — HEPARIN SODIUM 5000 [USP'U]/ML
4000 INJECTION INTRAVENOUS; SUBCUTANEOUS EVERY 6 HOURS
Refills: 0 | Status: DISCONTINUED | OUTPATIENT
Start: 2022-10-04 | End: 2022-10-04

## 2022-10-04 RX ORDER — PANTOPRAZOLE SODIUM 20 MG/1
40 TABLET, DELAYED RELEASE ORAL
Refills: 0 | Status: DISCONTINUED | OUTPATIENT
Start: 2022-10-04 | End: 2022-10-20

## 2022-10-04 RX ORDER — BUDESONIDE AND FORMOTEROL FUMARATE DIHYDRATE 160; 4.5 UG/1; UG/1
2 AEROSOL RESPIRATORY (INHALATION)
Refills: 0 | Status: DISCONTINUED | OUTPATIENT
Start: 2022-10-04 | End: 2022-10-20

## 2022-10-04 RX ORDER — ATORVASTATIN CALCIUM 80 MG/1
40 TABLET, FILM COATED ORAL AT BEDTIME
Refills: 0 | Status: DISCONTINUED | OUTPATIENT
Start: 2022-10-04 | End: 2022-10-20

## 2022-10-04 RX ORDER — METOPROLOL TARTRATE 50 MG
50 TABLET ORAL DAILY
Refills: 0 | Status: DISCONTINUED | OUTPATIENT
Start: 2022-10-04 | End: 2022-10-04

## 2022-10-04 RX ADMIN — TICAGRELOR 180 MILLIGRAM(S): 90 TABLET ORAL at 18:50

## 2022-10-04 RX ADMIN — HEPARIN SODIUM 1000 UNIT(S)/HR: 5000 INJECTION INTRAVENOUS; SUBCUTANEOUS at 18:58

## 2022-10-04 RX ADMIN — Medication 10 MILLIGRAM(S): at 22:21

## 2022-10-04 RX ADMIN — SODIUM CHLORIDE 50 MILLILITER(S): 9 INJECTION INTRAMUSCULAR; INTRAVENOUS; SUBCUTANEOUS at 19:07

## 2022-10-04 RX ADMIN — SODIUM CHLORIDE 100 MILLILITER(S): 9 INJECTION INTRAMUSCULAR; INTRAVENOUS; SUBCUTANEOUS at 18:56

## 2022-10-04 RX ADMIN — Medication 324 MILLIGRAM(S): at 18:50

## 2022-10-04 RX ADMIN — SODIUM CHLORIDE 80 MILLILITER(S): 9 INJECTION INTRAMUSCULAR; INTRAVENOUS; SUBCUTANEOUS at 22:37

## 2022-10-04 RX ADMIN — Medication 1: at 22:21

## 2022-10-04 RX ADMIN — ATORVASTATIN CALCIUM 40 MILLIGRAM(S): 80 TABLET, FILM COATED ORAL at 22:21

## 2022-10-04 RX ADMIN — INSULIN GLARGINE 36 UNIT(S): 100 INJECTION, SOLUTION SUBCUTANEOUS at 22:22

## 2022-10-04 NOTE — CHART NOTE - NSCHARTNOTEFT_GEN_A_CORE
HPI:  Pt is a 47y M with hx of tobacco and ETOH use, IDDM, CKD, HTN, AMBER transferred from [facility] for catheterization. Pt initially presented to [facility] for diplopia, headache, and chest tightness slightly alleviated by rest. BPs were 260/133 @ [facility], managed by IV hydralazine. Neurology and ophthalmology consulted for concerns of pt diplopia and headache with elevated BP. Possible lacunar infarct affecting L parasagittal region noted on MRI though unlikely cause of pt symptoms per neuro evaluation at the time; ophtho did not recommend any acute intervention. Echo performed at [facility] with EF of 55-60%. No current SOB, chest pain, palpitations, orthopnea.     PHYSICAL EXAM:   GENERAL: Alert. Not confused. No acute distress. Not thin. Not cachectic. Not obese.  HEAD:  Atraumatic. Normocephalic.  EYES: EOMI. PERRLA. Normal conjunctiva/sclera.  ENT: Neck supple. No JVD. Moist oral mucosa. Not edentulous. No thrush.  LYMPH: Normal supraclavicular/cervical lymph nodes.   CARDIAC: Not tachy, Not vidya. Regular rhythm. Not irregularly irregular. S1. S2. No murmur. No rub. No distant heart sounds.  LUNG/CHEST: CTAB. BS equal bilaterally. No wheezes. No rales. No rhonchi.  ABDOMEN: Soft. No tenderness. No distension. No fluid wave. Normal bowel sounds.  BACK: No midline/vertebral tenderness. No flank tenderness.  VASCULAR: +2 b/l radial or ulnar pulses. Palpable DP pulses.  EXTREMITIES:  No clubbing. No cyanosis. No edema. Moving all 4.  NEUROLOGY: (+) Decreased sensation affecting pedal aspect of lower extremities, mild tremor at rest and with cerebellar testing. A&Ox3. Cranial nerves intact. Normal speech. Sensation intact.  PSYCH: Normal behavior. Normal affect.  SKIN: No jaundice. No erythema. No rash/lesion.  ICU Vital Signs Last 24 Hrs  T(C): 36.4 (04 Oct 2022 15:36), Max: 36.4 (04 Oct 2022 15:36)  T(F): 97.5 (04 Oct 2022 15:36), Max: 97.5 (04 Oct 2022 15:36)  HR: 82 (04 Oct 2022 15:36) (82 - 82)  BP: 153/79 (04 Oct 2022 15:36) (153/79 - 153/79)  BP(mean): 94 (04 Oct 2022 15:36) (94 - 94)  ABP: --  ABP(mean): --  RR: 18 (04 Oct 2022 15:36) (18 - 18)  SpO2: 99% (04 Oct 2022 15:36) (99% - 99%)    O2 Parameters below as of 04 Oct 2022 15:36  Patient On (Oxygen Delivery Method): room air    #NSTEMI  -GDMT w/ coreg, atorvastatin (holding ACE/ARB for BILLY)  -tentatively scheduled for catheterization tomorrow  -aspirin + brilinta load  -heparin gtt  -trend troponin q6h  -EKG and troponins for chest pain    #BILLY  -hold ACE/ARB  -standing IVF, NS @ 100ccs/hr  -nephro consulted    #Parasagittal lacunar infarct  -no lateralizing findings or focal deficits, decreased sensation likely 2/2 diabetic neuropathy  -aspirin load  -neuro consulted    #Diplopia  -cleared by ophtho, will f/u outpatient    #Type 2 diabetes mellitus  -moderate insulin sliding scale HPI:  Pt is a 47y M with hx of tobacco and ETOH use, IDDM, CKD, HTN, AMBER transferred from [facility] for catheterization. Pt initially presented to [facility] for diplopia, headache, and chest tightness slightly alleviated by rest. BPs were 260/133 @ [facility], managed by IV hydralazine. Neurology and ophthalmology consulted for concerns of pt diplopia and headache with elevated BP. Possible lacunar infarct affecting L parasagittal region noted on MRI though unlikely cause of pt symptoms per neuro evaluation at the time; ophtho did not recommend any acute intervention. Echo performed at [facility] with EF of 55-60%. No current SOB, chest pain, palpitations, orthopnea.     PHYSICAL EXAM:   GENERAL: Alert. Not confused. No acute distress. Not thin. Not cachectic. Not obese.  HEAD:  Atraumatic. Normocephalic.  EYES: EOMI. PERRLA. Normal conjunctiva/sclera.  ENT: Neck supple. No JVD. Moist oral mucosa. Not edentulous. No thrush.  LYMPH: Normal supraclavicular/cervical lymph nodes.   CARDIAC: Not tachy, Not vidya. Regular rhythm. Not irregularly irregular. S1. S2. No murmur. No rub. No distant heart sounds.  LUNG/CHEST: CTAB. BS equal bilaterally. No wheezes. No rales. No rhonchi.  ABDOMEN: Soft. No tenderness. No distension. No fluid wave. Normal bowel sounds.  BACK: No midline/vertebral tenderness. No flank tenderness.  VASCULAR: +2 b/l radial or ulnar pulses. Palpable DP pulses.  EXTREMITIES:  No clubbing. No cyanosis. No edema. Moving all 4.  NEUROLOGY: (+) Decreased sensation affecting pedal aspect of lower extremities, mild tremor at rest and with cerebellar testing. A&Ox3. Cranial nerves intact. Normal speech. Sensation intact.  PSYCH: Normal behavior. Normal affect.  SKIN: No jaundice. No erythema. No rash/lesion.  ICU Vital Signs Last 24 Hrs  T(C): 36.4 (04 Oct 2022 15:36), Max: 36.4 (04 Oct 2022 15:36)  T(F): 97.5 (04 Oct 2022 15:36), Max: 97.5 (04 Oct 2022 15:36)  HR: 82 (04 Oct 2022 15:36) (82 - 82)  BP: 153/79 (04 Oct 2022 15:36) (153/79 - 153/79)  BP(mean): 94 (04 Oct 2022 15:36) (94 - 94)  ABP: --  ABP(mean): --  RR: 18 (04 Oct 2022 15:36) (18 - 18)  SpO2: 99% (04 Oct 2022 15:36) (99% - 99%)    O2 Parameters below as of 04 Oct 2022 15:36  Patient On (Oxygen Delivery Method): room air    #NSTEMI  -GDMT w/ coreg, atorvastatin (holding ACE/ARB for BILLY)  -tentatively scheduled for catheterization tomorrow  -aspirin + brilinta load  -heparin gtt  -trend troponin q6h  -EKG and troponins for chest pain    #BILLY  -hold ACE/ARB  -standing IVF, NS @ 100ccs/hr  -nephro consulted    #Parasagittal lacunar infarct  -no lateralizing findings or focal deficits, decreased sensation likely 2/2 diabetic neuropathy  -aspirin load  -neuro consulted    #Diplopia  -cleared by ophlisa, will f/u outpatient    #Type 2 diabetes mellitus  -on 40u long-acting, 5u short-acting pre-meal; lantus recently decreased for pt hypoglycemia  -36u lantus, 4u pre-meal short acting HPI:  Pt is a 47y M with hx of tobacco and ETOH use, IDDM, CKD, HTN, AMBER transferred from [facility] for catheterization. Pt initially presented to [facility] for diplopia, headache, and chest tightness slightly alleviated by rest. BPs were 260/133 @ [facility], managed by IV hydralazine. Neurology and ophthalmology consulted for concerns of pt diplopia and headache with elevated BP. Possible lacunar infarct affecting L parasagittal region noted on MRI though unlikely cause of pt symptoms per neuro evaluation at the time; ophtho did not recommend any acute intervention. Echo performed at [facility] with EF of 55-60%. No current SOB, chest pain, palpitations, orthopnea.     PHYSICAL EXAM:   GENERAL: Alert. Not confused. No acute distress. Not thin. Not cachectic. Not obese.  HEAD:  Atraumatic. Normocephalic.  EYES: EOMI. PERRLA. Normal conjunctiva/sclera.  ENT: Neck supple. No JVD. Moist oral mucosa. Not edentulous. No thrush.  LYMPH: Normal supraclavicular/cervical lymph nodes.   CARDIAC: Not tachy, Not vidya. Regular rhythm. Not irregularly irregular. S1. S2. No murmur. No rub. No distant heart sounds.  LUNG/CHEST: CTAB. BS equal bilaterally. No wheezes. No rales. No rhonchi.  ABDOMEN: Soft. No tenderness. No distension. No fluid wave. Normal bowel sounds.  BACK: No midline/vertebral tenderness. No flank tenderness.  VASCULAR: +2 b/l radial or ulnar pulses. Palpable DP pulses.  EXTREMITIES:  No clubbing. No cyanosis. No edema. Moving all 4.  NEUROLOGY: (+) Decreased sensation affecting pedal aspect of lower extremities, mild tremor at rest and with cerebellar testing. A&Ox3. Cranial nerves intact. Normal speech. Sensation intact.  PSYCH: Normal behavior. Normal affect.  SKIN: No jaundice. No erythema. No rash/lesion.  ICU Vital Signs Last 24 Hrs  T(C): 36.4 (04 Oct 2022 15:36), Max: 36.4 (04 Oct 2022 15:36)  T(F): 97.5 (04 Oct 2022 15:36), Max: 97.5 (04 Oct 2022 15:36)  HR: 82 (04 Oct 2022 15:36) (82 - 82)  BP: 153/79 (04 Oct 2022 15:36) (153/79 - 153/79)  BP(mean): 94 (04 Oct 2022 15:36) (94 - 94)  ABP: --  ABP(mean): --  RR: 18 (04 Oct 2022 15:36) (18 - 18)  SpO2: 99% (04 Oct 2022 15:36) (99% - 99%)    O2 Parameters below as of 04 Oct 2022 15:36  Patient On (Oxygen Delivery Method): room air    #NSTEMI  -GDMT w/ coreg, atorvastatin (holding ACE/ARB for BILLY)  -tentatively scheduled for catheterization tomorrow pending nephro/neuro clearance  -aspirin + brilinta load  -heparin gtt  -trend troponin q6h  -EKG and troponins for chest pain    #BILLY  -hold ACE/ARB  -standing IVF, NS @ 100ccs/hr  -nephro consulted    #Parasagittal lacunar infarct  -no lateralizing findings or focal deficits, decreased sensation likely 2/2 diabetic neuropathy  -aspirin load  -neuro consulted, recs appreciated  -CTA head/neck    #Diplopia  -cleared by ophtho, will f/u outpatient    #Type 2 diabetes mellitus  -on 40u long-acting, 5u short-acting pre-meal; lantus recently decreased for pt hypoglycemia  -36u lantus, 4u pre-meal short acting HPI:  Pt is a 47y M with hx of tobacco and ETOH use, IDDM, CKD, HTN, AMBER transferred from [facility] for catheterization. Pt initially presented to [facility] for diplopia, headache, and chest tightness slightly alleviated by rest. BPs were 260/133 @ [facility], managed by IV hydralazine. Neurology and ophthalmology consulted for concerns of pt diplopia and headache with elevated BP. Possible lacunar infarct affecting L parasagittal region noted on MRI though unlikely cause of pt symptoms per neuro evaluation at the time; ophtho did not recommend any acute intervention. Echo performed at [facility] with EF of 55-60%. No current SOB, chest pain, palpitations, orthopnea.     PHYSICAL EXAM:   GENERAL: Alert. Not confused. No acute distress. Not thin. Not cachectic. Not obese.  HEAD:  Atraumatic. Normocephalic.  EYES: EOMI. PERRLA. Normal conjunctiva/sclera.  ENT: Neck supple. No JVD. Moist oral mucosa. Not edentulous. No thrush.  LYMPH: Normal supraclavicular/cervical lymph nodes.   CARDIAC: Not tachy, Not vidya. Regular rhythm. Not irregularly irregular. S1. S2. No murmur. No rub. No distant heart sounds.  LUNG/CHEST: CTAB. BS equal bilaterally. No wheezes. No rales. No rhonchi.  ABDOMEN: Soft. No tenderness. No distension. No fluid wave. Normal bowel sounds.  BACK: No midline/vertebral tenderness. No flank tenderness.  VASCULAR: +2 b/l radial or ulnar pulses. Palpable DP pulses.  EXTREMITIES:  No clubbing. No cyanosis. No edema. Moving all 4.  NEUROLOGY: (+) Decreased sensation affecting pedal aspect of lower extremities, mild tremor at rest and with cerebellar testing. A&Ox3. Cranial nerves intact. Normal speech. Sensation intact.  PSYCH: Normal behavior. Normal affect.  SKIN: No jaundice. No erythema. No rash/lesion.  ICU Vital Signs Last 24 Hrs  T(C): 36.4 (04 Oct 2022 15:36), Max: 36.4 (04 Oct 2022 15:36)  T(F): 97.5 (04 Oct 2022 15:36), Max: 97.5 (04 Oct 2022 15:36)  HR: 82 (04 Oct 2022 15:36) (82 - 82)  BP: 153/79 (04 Oct 2022 15:36) (153/79 - 153/79)  BP(mean): 94 (04 Oct 2022 15:36) (94 - 94)  ABP: --  ABP(mean): --  RR: 18 (04 Oct 2022 15:36) (18 - 18)  SpO2: 99% (04 Oct 2022 15:36) (99% - 99%)    O2 Parameters below as of 04 Oct 2022 15:36  Patient On (Oxygen Delivery Method): room air    #NSTEMI  -GDMT w/ coreg, atorvastatin (holding ACE/ARB for BILLY)  -tentatively scheduled for catheterization tomorrow pending nephro/neuro clearance  -aspirin + brilinta load  -heparin gtt  -trend troponin q6h  -EKG and troponins for chest pain    #BILLY  -hold ACE/ARB  -standing IVF, NS @ 100ccs/hr  -nephro consulted    #Parasagittal lacunar infarct  -no lateralizing findings or focal deficits, decreased sensation likely 2/2 diabetic neuropathy  -aspirin load  -neuro consulted, recs appreciated  -CTA head/neck    #Diplopia  -cleared by ophtho, will f/u outpatient    #Type 2 diabetes mellitus  -on 40u long-acting, 5u short-acting pre-meal; lantus recently decreased for pt hypoglycemia  -36u lantus, 4u pre-meal short acting    Attending Attestation:  Briefly, this is a 48 y/o male with DM II, CKDIV, HTN who presented to OSH complaining of diplopia found to be in hypertensive emergency with /133. Hospital course was c/b possible acute parasagittal infarct seen on MRI imaging and NSTEMI for which pt was transferred for cath. Currently chest pain free. Labs reviewed, Cr currently 2.7, troponin 150. Will need DAPT and heparin ggt. Will consult neuro to ensure pt is cleared for DAPT and full dose AC given possible stroke and the risk of hemorrhagic conversion. Nephro consult for optimization prior to cath. Trend troponins

## 2022-10-04 NOTE — CONSULT NOTE ADULT - PROBLEM SELECTOR RECOMMENDATION 9
Patient with baseline creatinine ~1.7 in May, increased to 2-2.4 in July. Now patient presented to [facility] for LHC, creatinine 2.7. Patietn likely has baseline CKD from long standing DM (has been on insulin for ?10 years).  Though normal LVEF of 55%, patient is feeling SOB with laying flat.     Please obtain UA, urine Na, K, Cl, Urea, Creat. Discussed with patient the risks and beneftis of LHC and contrast required, patient understands the risks of CKD progression but knows the cardiac procedure is necessary.  Kaushik Score 10 points with 14% of post- PCI BRENDEN. Would recommend 80 cc of IVF 3 hours pre and post cath. Avoid nephrotoxic agents. Dose all meds appropriate for GFR.      Preliminary note Patient with baseline creatinine ~1.7 in May, increased to 2-2.4 in July. Now patient presented to [facility] for LHC, creatinine 2.7. Patient likely has baseline CKD from long standing DM (has been on insulin for ?10 years).  Though normal LVEF of 55%, patient is feeling SOB with laying flat.     Please obtain UA, urine Na, K, Cl, Urea, Creat. Discussed with patient the risks and beneftis of LHC and contrast required, patient understands the risks of CKD progression but knows the cardiac procedure is necessary.  Kaushik Score 10 points with 14% of post- PCI BRENDEN. Would recommend 80 cc of IVF 3 hours pre and post cath. Will have to assess his creatinine in morning before cath. Avoid nephrotoxic agents. Dose all meds appropriate for GFR.      Preliminary note Patient with creatinine ~1.7 in May, increased to 2-2.4 in July. Now patient presented to [facility] for LHC, creatinine 2.7. Patient likely has baseline CKD from long standing DM (has been on insulin for ?10 years).  Though normal LVEF of 55%, patient is feeling SOB with laying flat.     Please obtain a CXR to f/o fluid overload given patient's inability to lay flat (though clear on exam). Please obtain UA, urine Na, K, Cl, Urea, Creat. Uprot/creat. Discussed with patient the risks and beneftis of LHC and contrast required, patient understands the risks of CKD progression but knows the cardiac procedure is necessary.  Kaushik Score 10 points with 14% of post- PCI BRENDEN. Would recommend 80 cc of IVF 6 hours pre and post cath. Will have to assess his creatinine in morning before cath. Avoid nephrotoxic agents. Dose all meds appropriate for GFR.    If you have any questions, please feel free to contact me  Segundo Forde  Nephrology Fellow  [phone]; Prefer [employer] TEAMS  (After 5pm or on weekends please page the on-call fellow).    Patient was discussed with Dr. Silva who agrees with my A/P. Addendum to follow

## 2022-10-04 NOTE — H&P CARDIOLOGY - HISTORY OF PRESENT ILLNESS
46 y/o male with PMHx of T2DM on insulin,  46 y/o male with PMHx of T2DM on insulin, HTN 46 y/o male, current cigarette smoker, with PMHx of Vertigo, Diverticulitis s/p LAR, ETOH abuse now sober x 5 years, DM (patient states has been on Insulin x 10 years was never on oral agents), CKD IV, HTN, AMBER, who presented to [facility] on 10/2 c/o dizziness, b/l diplopia, headache and chest tightness.  In ED @ OSH /133, managed with hydralazine IV 46 y/o male, current cigarette smoker, with PMHx of Vertigo, Diverticulitis s/p LAR, ETOH abuse now sober x 5 years, DM (patient states has been on Insulin x 10 years was never on oral agents), CKD IV - baseline creatinine 2.4, HTN, AMBER, who presented to [facility] on 10/2 c/o dizziness, b/l diplopia, headache and chest tightness.  In ED @ OSH /133, managed with hydralazine IV 46 y/o male, current cigarette smoker, with PMHx of Vertigo, Diverticulitis s/p LAR, ETOH abuse now sober x 5 years, DM (patient states has been on Insulin x 10 years was never on oral agents), CKD IV - baseline creatinine 2.4, HTN, AMBER, who presented to [facility] on 10/2 c/o dizziness, b/l diplopia, headache and chest tightness.  In ED @ OSH /133, managed with hydralazine IV, Cardiac biomarkers elevated with trop I peak 0.632, EKG with ST-T abnormality no acute ST segment changes, Cardiology consulted.  CT head without acute pathology, MRI with acute left parasagital infarct, Neurology following, impression was symptoms do not correlate with MRI findings.  Opthamology consulted, recommended f/u as o/p.  Patient is now transferred to [facility] in setting of NSTEMI for LHC.   46 y/o male, current cigarette smoker, with PMHx of Vertigo, Diverticulitis s/p LAR, ETOH abuse now sober x 5 years, DM (patient states has been on Insulin x 10 years was never on oral agents), CKD IV - baseline creatinine 2.4, HTN, AMBER, who presented to [facility] on 10/2 c/o dizziness, b/l diplopia, headache and chest tightness.  In ED @ OSH /133, managed with hydralazine IV, Cardiac biomarkers elevated with trop I peak 0.632, EKG with ST-T abnormality no acute ST segment changes, TTE demonstrating normal LV systolic fxn (EF 55-60), no significant valvular disease/WMA Cardiology following.  CT head without acute pathology, MRI with left parasagital infarct, Neurology following, impression was patients symptoms do not correlate with MRI findings.  Opthamology consulted, recommended f/u as o/p.  Patient is now transferred to [facility] in setting of NSTEMI for LHC.  LHC deferred for Neuro consult and medical optimization

## 2022-10-04 NOTE — CHART NOTE - NSCHARTNOTEFT_GEN_A_CORE
Hydrating pt with fluid prior to contrast study to reduce risk of contrast-induced nephropathy. Pt has baseline SCr of 2.4, presented on admission with SCr 2.7. Study necessary prior to clearance for cardiac catheterization.

## 2022-10-04 NOTE — CONSULT NOTE ADULT - ASSESSMENT
47 M w/ PMHx of EtOH abuse now sober X5 years, DM, CKD IV (baseline creatinine 2.0-2.4 in July), HTN who presented to OSH with dizziness, diplopia, CP. Trop elevated with EKG changes. Patient transferred to [facility] for NSTEMI requiring LHC. Creatinine elevated to 2.7 on today.

## 2022-10-04 NOTE — CONSULT NOTE ADULT - SUBJECTIVE AND OBJECTIVE BOX
Patient seen and evaluated at bedside    Reason for consult: NSTEMI    Updates: Neuro evaluated the patient. Clearance for cath pending CTA H/N to rule out vertebrobasilar stenosis. No contraindication for DAPT/Heparin given chronic lunar infarct.     HPI:  46 y/o male, current cigarette smoker, with PMHx of Vertigo, Diverticulitis s/p LAR, ETOH abuse now sober x 5 years, DM (patient states has been on Insulin x 10 years was never on oral agents), CKD IV - baseline creatinine 2.4, HTN, AMBER, who presented to [facility] on 10/2 c/o dizziness, b/l diplopia, headache and chest tightness.  In ED @ OSH /133, managed with hydralazine IV, Cardiac biomarkers elevated with trop I peak 0.632, EKG with ST-T abnormality no acute ST segment changes, TTE demonstrating normal LV systolic fxn (EF 55-60), no significant valvular disease/[facility] following.  CT head without acute pathology, MRI with left parasagital infarct, Neurology following, impression was patients symptoms do not correlate with MRI findings.  Opthamology consulted, recommended f/u as o/p.  Patient is now transferred to [facility] in setting of NSTEMI for LHC.  LHC deferred for Neuro consult and medical optimization   (04 Oct 2022 15:33)      PMHx:   Diabetes    Asthma    Diverticulitis    High cholesterol    Dyslipidemia    Hypertension    H/O vertigo    Diabetic ulcer of right foot    Broken toe    Vertigo    HTN (hypertension)    Diabetes        PSHx:   History of surgery    No significant past surgical history        Allergies:  No Known Allergies      Home Meds:    Current Medications:   ALBUTerol    90 MICROgram(s) HFA Inhaler 2 Puff(s) Inhalation every 6 hours PRN  amitriptyline 10 milliGRAM(s) Oral at bedtime  amLODIPine   Tablet 10 milliGRAM(s) Oral daily  atorvastatin 40 milliGRAM(s) Oral at bedtime  budesonide  80 MICROgram(s)/formoterol 4.5 MICROgram(s) Inhaler 2 Puff(s) Inhalation two times a day  carvedilol 25 milliGRAM(s) Oral every 12 hours  dextrose 5%. 1000 milliLiter(s) IV Continuous <Continuous>  dextrose 5%. 1000 milliLiter(s) IV Continuous <Continuous>  dextrose 50% Injectable 25 Gram(s) IV Push once  dextrose 50% Injectable 12.5 Gram(s) IV Push once  dextrose 50% Injectable 25 Gram(s) IV Push once  dextrose Oral Gel 15 Gram(s) Oral once PRN  fenofibrate Tablet 145 milliGRAM(s) Oral daily  glucagon  Injectable 1 milliGRAM(s) IntraMuscular once  heparin   Injectable 5000 Unit(s) IV Push every 6 hours PRN  heparin  Infusion.  Unit(s)/Hr IV Continuous <Continuous>  influenza   Vaccine 0.5 milliLiter(s) IntraMuscular once  insulin glargine Injectable (LANTUS) 36 Unit(s) SubCutaneous at bedtime  insulin lispro (ADMELOG) corrective regimen sliding scale   SubCutaneous three times a day before meals  insulin lispro (ADMELOG) corrective regimen sliding scale   SubCutaneous at bedtime  insulin lispro Injectable (ADMELOG) 4 Unit(s) SubCutaneous three times a day before meals  pantoprazole    Tablet 40 milliGRAM(s) Oral before breakfast  sodium chloride 0.9%. 1000 milliLiter(s) IV Continuous <Continuous>      FAMILY HISTORY:  Family history of hypertension (Father)        Social History:  Smoking History:  Alcohol Use:  Drug Use:    Review of Systems:  REVIEW OF SYSTEMS:  CONSTITUTIONAL: No weakness, fevers or chills  EYES/ENT: No visual changes;  No dysphagia  NECK: No pain or stiffness  RESPIRATORY: No cough, wheezing, hemoptysis; No shortness of breath  CARDIOVASCULAR: No chest pain or palpitations; No lower extremity edema  GASTROINTESTINAL: No abdominal or epigastric pain. No nausea, vomiting  BACK: No back pain  GENITOURINARY: No dysuria, frequency or hematuria  NEUROLOGICAL: No numbness or weakness  SKIN: No itching, burning, rashes, or lesions   All other review of systems is negative unless indicated above.      Physical Exam:  T(F): 97.5 (10-04), Max: 97.5 (10-04)  HR: 82 (10-04) (82 - 82)  BP: 153/79 (10-04) (153/79 - 153/79)  RR: 18 (10-04)  SpO2: 99% (10-04)  GENERAL: No acute distress, well-developed  HEAD:  Atraumatic, Normocephalic  ENT: EOMI, PERRLA, conjunctiva and sclera clear, Neck supple, No JVD, moist mucosa  CHEST/LUNG: Clear to auscultation bilaterally; No wheeze, equal breath sounds bilaterally   BACK: No spinal tenderness  HEART: Regular rate and rhythm; No murmurs, rubs, or gallops  ABDOMEN: Soft, Nontender, Nondistended; Bowel sounds present  EXTREMITIES:  No clubbing, cyanosis, or edema  PSYCH: Nl behavior, nl affect  NEUROLOGY: AAOx3, non-focal, cranial nerves intact  SKIN: Normal color, No rashes or lesions      CXR: Personally reviewed    Labs: Personally reviewed                        11.3   7.34  )-----------( 202      ( 04 Oct 2022 16:14 )             33.8     10-04    135  |  101  |  49<H>  ----------------------------<  146<H>  4.5   |  22  |  2.73<H>    Ca    8.7      04 Oct 2022 16:14    TPro  6.3  /  Alb  3.2<L>  /  TBili  0.1<L>  /  DBili  x   /  AST  17  /  ALT  13  /  AlkPhos  109  10-04    PT/INR - ( 04 Oct 2022 17:55 )   PT: 10.4 sec;   INR: 0.91 ratio         PTT - ( 04 Oct 2022 17:55 )  PTT:33.0 sec         Reason for consult: N-STEMI, HTN    HPI:  46 y/o M, current cigarette smoker, with PMHx of vertigo, ETOH abuse now sober x 5 years, DM (patient states has been on Insulin x 10 years was never on oral agents), CKD IV - baseline creatinine 2.4, HTN and AMBER.    Presented to [facility] on 10/2 c/o dizziness, b/l diplopia, headache and chest tightness.  In ED there, BP reported to be 260/133, managed with hydralazine IV.  Cardiac biomarkers were elevated with trop I peak 0.632.  EKG reported to show NS ST-T abnormality but no acute ST segment changes.  TTE demonstrated normal LV systolic fxn (EF 55-60%), no significant valvular disease or wall motion abnormality.  CT head without acute pathology but MRI reported to show L parasagittal infarct (?lacunar); neurology was following and impression was that patient's symptoms did  not correlate with MRI findings.  Opthamology consulted, recommended f/u as o/p.      Patient is now transferred to [facility] in setting of NSTEMI for ?LHC.  LHC deferred for further neuro consult and medical optimization.        PMHx:   Diabetes  Asthma  Diverticulitis s/p LAR  Dyslipidemia  Hypertension  H/O vertigo  Diabetic ulcer of right foot  Vertigo      Allergies: No Known Allergies      Current Medications:   ALBUTerol    90 MICROgram(s) HFA Inhaler 2 Puff(s) Inhalation every 6 hours PRN  amitriptyline 10 milliGRAM(s) Oral at bedtime  amLODIPine   Tablet 10 milliGRAM(s) Oral daily  atorvastatin 40 milliGRAM(s) Oral at bedtime  budesonide  80 MICROgram(s)/formoterol 4.5 MICROgram(s) Inhaler 2 Puff(s) Inhalation two times a day  carvedilol 25 milliGRAM(s) Oral every 12 hours  dextrose 5%. 1000 milliLiter(s) IV Continuous <Continuous>  dextrose 5%. 1000 milliLiter(s) IV Continuous <Continuous>  dextrose 50% Injectable 25 Gram(s) IV Push once  dextrose 50% Injectable 12.5 Gram(s) IV Push once  dextrose 50% Injectable 25 Gram(s) IV Push once  dextrose Oral Gel 15 Gram(s) Oral once PRN  fenofibrate Tablet 145 milliGRAM(s) Oral daily  glucagon  Injectable 1 milliGRAM(s) IntraMuscular once  heparin   Injectable 5000 Unit(s) IV Push every 6 hours PRN  heparin  Infusion.  Unit(s)/Hr IV Continuous <Continuous>  influenza   Vaccine 0.5 milliLiter(s) IntraMuscular once  insulin glargine Injectable (LANTUS) 36 Unit(s) SubCutaneous at bedtime  insulin lispro (ADMELOG) corrective regimen sliding scale   SubCutaneous three times a day before meals  insulin lispro (ADMELOG) corrective regimen sliding scale   SubCutaneous at bedtime  insulin lispro Injectable (ADMELOG) 4 Unit(s) SubCutaneous three times a day before meals  pantoprazole    Tablet 40 milliGRAM(s) Oral before breakfast  sodium chloride 0.9%. 1000 milliLiter(s) IV Continuous <Continuous>      FAMILY HISTORY:  Family history of hypertension (Father)    Social History:  Smoking History:  Yes  Alcohol Use:  former  Drug Use:    no      REVIEW OF SYSTEMS:  CONSTITUTIONAL: No weakness, fevers or chills  EYES/ENT: No visual changes;  No dysphagia  NECK: No pain or stiffness  RESPIRATORY: No cough, wheezing, hemoptysis; No shortness of breath  CARDIOVASCULAR: No chest pain or palpitations; No lower extremity edema  GASTROINTESTINAL: No abdominal or epigastric pain. No nausea, vomiting  BACK: No back pain  GENITOURINARY: No dysuria, frequency or hematuria  NEUROLOGICAL: No numbness or weakness  SKIN: No itching, burning, rashes, or lesions   All other review of systems is negative unless indicated above.      Physical Exam:  T(F): 97.5 (10-04), Max: 97.5 (10-04)  HR: 82 (10-04) (82 - 82)  BP: 153/79 (10-04) (153/79 - 153/79)  RR: 18 (10-04)  SpO2: 99% (10-04)    GENERAL: No acute distress, well-developed  HEAD:  Atraumatic, Normocephalic  ENT: EOMI, PERRLA, conjunctiva and sclera clear, Neck supple, No JVD, moist mucosa  CHEST/LUNG: Clear to auscultation bilaterally; No wheeze, equal breath sounds bilaterally   BACK: No spinal tenderness  HEART: Regular rate and rhythm; No murmurs, rubs, or gallops  ABDOMEN: Soft, Nontender, Nondistended; Bowel sounds present  EXTREMITIES:  No clubbing, cyanosis, or edema  PSYCH: Nl behavior, nl affect  NEUROLOGY: AAOx3, non-focal, cranial nerves intact  SKIN: Normal color, No rashes or lesions      12 Lead ECG (10.04.22 @ 15:33)   NSR at 80 bpm   P-R Interval 162 ms, QRS Duration 86 ms, Q-T Interval 394 ms   Axis 81 degrees   NON-SPECIFIC ST ABNORMALITY  ABNORMAL ECG; NO PREVIOUS ECGS AVAILABLE  Confirmed by BIPIN JALLOH MD ([id number]) on 10/5/2022 6:21:08 AM      Labs:                      11.3   7.34  )-----------( 202      ( 04 Oct 2022 16:14 )             33.8     10-04  135  |  101  |  49<H>  ----------------------------<  146<H>  4.5   |  22  |  2.73<H>    Ca    8.7      04 Oct 2022 16:14    TPro  6.3  /  Alb  3.2<L>  /  TBili  0.1<L>  /  DBili  x   /  AST  17  /  ALT  13  /  AlkPhos  109  10-04    PT/INR - ( 04 Oct 2022 17:55 )   PT: 10.4 sec;   INR: 0.91 ratio    PTT - ( 04 Oct 2022 17:55 )  PTT:33.0 sec

## 2022-10-04 NOTE — PATIENT PROFILE ADULT - FALL HARM RISK - HARM RISK INTERVENTIONS

## 2022-10-04 NOTE — CONSULT NOTE ADULT - SUBJECTIVE AND OBJECTIVE BOX
[facility] DIVISION OF KIDNEY DISEASES AND HYPERTENSION -- [phone]  -- INITIAL CONSULT NOTE  --------------------------------------------------------------------------------  HPI:  47 M w/ PMHx of EtOH abuse now sober X5 years, DM, CKD IV (baseline creatinine 2.0-2.4 in July), HTN who presented to OSH with dizziness, diplopia, CP. Trop elevated with EKG changes. Patient transferred to [facility] for NSTEMI requiring LHC. Creatinine elevated to 2.7 on today.      Nephrology consulted for pre-cath hydration. Patient seen at bedside, is sitting up eating dinner comfortably. Says he recently came to know about his kidney disease. Never followed up with outside nephrologist. Was seen by a nephrologist at [facility]. Patient denies N/V, abdominal pain, diarrhea, dysuria hematuria or LE swelling at this time. Does endorse SOB when laying flat.      PAST HISTORY  --------------------------------------------------------------------------------  PAST MEDICAL & SURGICAL HISTORY:  Diabetes      Asthma      Diverticulitis  surgery 16yrs ago      High cholesterol      Dyslipidemia      Hypertension      H/O vertigo      Diabetic ulcer of right foot      Broken toe  left pinky toe      Vertigo      HTN (hypertension)      Diabetes      History of surgery  colon resection        FAMILY HISTORY:  Family history of hypertension (Father)      PAST SOCIAL HISTORY:    ALLERGIES & MEDICATIONS  --------------------------------------------------------------------------------  Allergies    No Known Allergies    Intolerances      Standing Inpatient Medications  amitriptyline 10 milliGRAM(s) Oral at bedtime  amLODIPine   Tablet 10 milliGRAM(s) Oral daily  aspirin  chewable 324 milliGRAM(s) Oral once  atorvastatin 40 milliGRAM(s) Oral at bedtime  budesonide  80 MICROgram(s)/formoterol 4.5 MICROgram(s) Inhaler 2 Puff(s) Inhalation two times a day  carvedilol 25 milliGRAM(s) Oral every 12 hours  dextrose 5%. 1000 milliLiter(s) IV Continuous <Continuous>  dextrose 5%. 1000 milliLiter(s) IV Continuous <Continuous>  dextrose 50% Injectable 25 Gram(s) IV Push once  dextrose 50% Injectable 12.5 Gram(s) IV Push once  dextrose 50% Injectable 25 Gram(s) IV Push once  fenofibrate Tablet 145 milliGRAM(s) Oral daily  glucagon  Injectable 1 milliGRAM(s) IntraMuscular once  heparin  Infusion.  Unit(s)/Hr IV Continuous <Continuous>  influenza   Vaccine 0.5 milliLiter(s) IntraMuscular once  insulin glargine Injectable (LANTUS) 36 Unit(s) SubCutaneous at bedtime  insulin lispro (ADMELOG) corrective regimen sliding scale   SubCutaneous three times a day before meals  insulin lispro (ADMELOG) corrective regimen sliding scale   SubCutaneous at bedtime  insulin lispro Injectable (ADMELOG) 4 Unit(s) SubCutaneous three times a day before meals  pantoprazole    Tablet 40 milliGRAM(s) Oral before breakfast  sodium chloride 0.9%. 1000 milliLiter(s) IV Continuous <Continuous>  ticagrelor 180 milliGRAM(s) Oral once    PRN Inpatient Medications  ALBUTerol    90 MICROgram(s) HFA Inhaler 2 Puff(s) Inhalation every 6 hours PRN  dextrose Oral Gel 15 Gram(s) Oral once PRN  heparin   Injectable 5000 Unit(s) IV Push every 6 hours PRN      REVIEW OF SYSTEMS  --------------------------------------------------------------------------------  Gen: No fevers/chills  Skin: No rashes  Head/Eyes/Ears: Normal hearing,   Respiratory: No dyspnea, cough  CV: No chest pain  GI: No abdominal pain, diarrhea  : No dysuria, hematuria  MSK: No  edema  Heme: No easy bruising or bleeding  Psych: No significant depression    All other systems were reviewed and are negative, except as noted.    VITALS/PHYSICAL EXAM  --------------------------------------------------------------------------------  T(C): 36.4 (10-04-22 @ 15:36), Max: 36.4 (10-04-22 @ 15:36)  HR: 82 (10-04-22 @ 15:36) (82 - 82)  BP: 153/79 (10-04-22 @ 15:36) (153/79 - 153/79)  RR: 18 (10-04-22 @ 15:36) (18 - 18)  SpO2: 99% (10-04-22 @ 15:36) (99% - 99%)  Wt(kg): --  Height (cm): 170.2 (10-04-22 @ 17:10)  Weight (kg): 80.7 (10-04-22 @ 17:10)  BMI (kg/m2): 27.9 (10-04-22 @ 17:10)  BSA (m2): 1.92 (10-04-22 @ 17:10)      Physical Exam:  	Gen: NAD  	HEENT: MMM  	Pulm: CTA B/L  	CV: S1S2  	Abd: Soft, +BS   	Ext: No LE edema B/L  	Neuro: Awake  	Skin: Warm and dry  	Vascular access: None    LABS/STUDIES  --------------------------------------------------------------------------------              11.3   7.34  >-----------<  202      [10-04-22 @ 16:14]              33.8     135  |  101  |  49  ----------------------------<  146      [10-04-22 @ 16:14]  4.5   |  22  |  2.73        Ca     8.7     [10-04-22 @ 16:14]    TPro  6.3  /  Alb  3.2  /  TBili  0.1  /  DBili  x   /  AST  17  /  ALT  13  /  AlkPhos  109  [10-04-22 @ 16:14]    PT/INR: PT 10.4 , INR 0.91       [10-04-22 @ 17:55]  PTT: 33.0       [10-04-22 @ 17:55]          [10-04-22 @ 16:14]    Creatinine Trend:  SCr 2.73 [10-04 @ 16:14]    Urinalysis - [05-17-22 @ 00:15]      Color Light Yellow / Appearance Clear / SG 1.011 / pH 7.0      Gluc >= 1000 mg/dL / Ketone Trace  / Bili Negative / Urobili <2 mg/dL       Blood Moderate / Protein >600 mg/dL / Leuk Est Negative / Nitrite Negative      RBC 13 / WBC 1 / Hyaline 2 / Gran  / Sq Epi  / Non Sq Epi 1 / Bacteria Negative      HbA1c 14.4      [06-25-19 @ 06:08]  Lipid: chol 276, , HDL 44, LDL --      [05-15-22 @ 16:49]      Free Light Chains: kappa 3.98, lambda 2.74, ratio = 1.45      [05-18 @ 08:16]  Immunofixation Serum:   No Monoclonal Band Identified    Reference Range: None Detected      [05-16-22 @ 08:30]  SPEP Interpretation: Pattern Consistent With Acute Inflammation Or Stress      [05-16-22 @ 08:30]  Immunofixation Urine:   Reference Range: None Detected      [05-17-22 @ 00:15]  UPEP Interpretation: Severe Selective (Glomerular) Proteinuria      [05-17-22 @ 00:15]   [facility] DIVISION OF KIDNEY DISEASES AND HYPERTENSION -- [phone]  -- INITIAL CONSULT NOTE  --------------------------------------------------------------------------------  HPI:  47 M w/ PMHx of EtOH abuse now sober X5 years, DM, CKD IV (baseline creatinine 2.0-2.4 in July), HTN who presented to OSH with dizziness, diplopia, CP. Trop elevated with EKG changes. Patient transferred to [facility] for NSTEMI requiring LHC. Creatinine elevated to 2.7 on today.      Nephrology consulted for pre-cath hydration. Patient seen at bedside, is sitting up eating dinner comfortably. Says he recently came to know about his kidney disease. Never followed up with outside nephrologist. Was seen by a nephrologist at [facility] around May. Patient denies N/V, abdominal pain, diarrhea, dysuria hematuria or LE swelling at this time. Does endorse SOB when laying flat.      PAST HISTORY  --------------------------------------------------------------------------------  PAST MEDICAL & SURGICAL HISTORY:  Diabetes      Asthma      Diverticulitis  surgery 16yrs ago      High cholesterol      Dyslipidemia      Hypertension      H/O vertigo      Diabetic ulcer of right foot      Broken toe  left pinky toe      Vertigo      HTN (hypertension)      Diabetes      History of surgery  colon resection        FAMILY HISTORY:  Family history of hypertension (Father)      PAST SOCIAL HISTORY:    ALLERGIES & MEDICATIONS  --------------------------------------------------------------------------------  Allergies    No Known Allergies    Intolerances      Standing Inpatient Medications  amitriptyline 10 milliGRAM(s) Oral at bedtime  amLODIPine   Tablet 10 milliGRAM(s) Oral daily  aspirin  chewable 324 milliGRAM(s) Oral once  atorvastatin 40 milliGRAM(s) Oral at bedtime  budesonide  80 MICROgram(s)/formoterol 4.5 MICROgram(s) Inhaler 2 Puff(s) Inhalation two times a day  carvedilol 25 milliGRAM(s) Oral every 12 hours  dextrose 5%. 1000 milliLiter(s) IV Continuous <Continuous>  dextrose 5%. 1000 milliLiter(s) IV Continuous <Continuous>  dextrose 50% Injectable 25 Gram(s) IV Push once  dextrose 50% Injectable 12.5 Gram(s) IV Push once  dextrose 50% Injectable 25 Gram(s) IV Push once  fenofibrate Tablet 145 milliGRAM(s) Oral daily  glucagon  Injectable 1 milliGRAM(s) IntraMuscular once  heparin  Infusion.  Unit(s)/Hr IV Continuous <Continuous>  influenza   Vaccine 0.5 milliLiter(s) IntraMuscular once  insulin glargine Injectable (LANTUS) 36 Unit(s) SubCutaneous at bedtime  insulin lispro (ADMELOG) corrective regimen sliding scale   SubCutaneous three times a day before meals  insulin lispro (ADMELOG) corrective regimen sliding scale   SubCutaneous at bedtime  insulin lispro Injectable (ADMELOG) 4 Unit(s) SubCutaneous three times a day before meals  pantoprazole    Tablet 40 milliGRAM(s) Oral before breakfast  sodium chloride 0.9%. 1000 milliLiter(s) IV Continuous <Continuous>  ticagrelor 180 milliGRAM(s) Oral once    PRN Inpatient Medications  ALBUTerol    90 MICROgram(s) HFA Inhaler 2 Puff(s) Inhalation every 6 hours PRN  dextrose Oral Gel 15 Gram(s) Oral once PRN  heparin   Injectable 5000 Unit(s) IV Push every 6 hours PRN      REVIEW OF SYSTEMS  --------------------------------------------------------------------------------  Gen: No fevers/chills  Skin: No rashes  Head/Eyes/Ears: Normal hearing,   Respiratory: No dyspnea, cough  CV: No chest pain  GI: No abdominal pain, diarrhea  : No dysuria, hematuria  MSK: No  edema  Heme: No easy bruising or bleeding  Psych: No significant depression    All other systems were reviewed and are negative, except as noted.    VITALS/PHYSICAL EXAM  --------------------------------------------------------------------------------  T(C): 36.4 (10-04-22 @ 15:36), Max: 36.4 (10-04-22 @ 15:36)  HR: 82 (10-04-22 @ 15:36) (82 - 82)  BP: 153/79 (10-04-22 @ 15:36) (153/79 - 153/79)  RR: 18 (10-04-22 @ 15:36) (18 - 18)  SpO2: 99% (10-04-22 @ 15:36) (99% - 99%)  Wt(kg): --  Height (cm): 170.2 (10-04-22 @ 17:10)  Weight (kg): 80.7 (10-04-22 @ 17:10)  BMI (kg/m2): 27.9 (10-04-22 @ 17:10)  BSA (m2): 1.92 (10-04-22 @ 17:10)      Physical Exam:  	Gen: NAD  	HEENT: MMM  	Pulm: CTA B/L  	CV: S1S2  	Abd: Soft, +BS   	Ext: No LE edema B/L  	Neuro: Awake  	Skin: Warm and dry  	Vascular access: None    LABS/STUDIES  --------------------------------------------------------------------------------              11.3   7.34  >-----------<  202      [10-04-22 @ 16:14]              33.8     135  |  101  |  49  ----------------------------<  146      [10-04-22 @ 16:14]  4.5   |  22  |  2.73        Ca     8.7     [10-04-22 @ 16:14]    TPro  6.3  /  Alb  3.2  /  TBili  0.1  /  DBili  x   /  AST  17  /  ALT  13  /  AlkPhos  109  [10-04-22 @ 16:14]    PT/INR: PT 10.4 , INR 0.91       [10-04-22 @ 17:55]  PTT: 33.0       [10-04-22 @ 17:55]          [10-04-22 @ 16:14]    Creatinine Trend:  SCr 2.73 [10-04 @ 16:14]    Urinalysis - [05-17-22 @ 00:15]      Color Light Yellow / Appearance Clear / SG 1.011 / pH 7.0      Gluc >= 1000 mg/dL / Ketone Trace  / Bili Negative / Urobili <2 mg/dL       Blood Moderate / Protein >600 mg/dL / Leuk Est Negative / Nitrite Negative      RBC 13 / WBC 1 / Hyaline 2 / Gran  / Sq Epi  / Non Sq Epi 1 / Bacteria Negative      HbA1c 14.4      [06-25-19 @ 06:08]  Lipid: chol 276, , HDL 44, LDL --      [05-15-22 @ 16:49]      Free Light Chains: kappa 3.98, lambda 2.74, ratio = 1.45      [05-18 @ 08:16]  Immunofixation Serum:   No Monoclonal Band Identified    Reference Range: None Detected      [05-16-22 @ 08:30]  SPEP Interpretation: Pattern Consistent With Acute Inflammation Or Stress      [05-16-22 @ 08:30]  Immunofixation Urine:   Reference Range: None Detected      [05-17-22 @ 00:15]  UPEP Interpretation: Severe Selective (Glomerular) Proteinuria      [05-17-22 @ 00:15]

## 2022-10-04 NOTE — CONSULT NOTE ADULT - ASSESSMENT
Mr. Ojeda is a 47yoM presenting as a transfer for NSTEMI iso hypertensive emergency.     #NSTEMI  Currently CP free without concerning ischemic changes of ECG.   Awaiting final neuro recs prior to LHC,   - ASA load then 81mg daily  - ticagrelor load then 90mg BID daily.   - Heparin gtt.   - Cont Coreg and atorvastatin   - ECG for CP events  - Tele  - Strict lytes  - Active covid test     48 y/o M, current cigarette smoker, with PMHx of vertigo, ETOH abuse now sober x 5 years, DM (patient states has been on Insulin x 10 years was never on oral agents), CKD IV - baseline creatinine 2.4, HTN and AMBER.  Presented to [facility] on 10/2 c/o dizziness, b/l diplopia, headache and chest tightness.  In ED there, BP reported to be 260/133, managed with hydralazine IV.  Cardiac biomarkers were elevated with trop I peak 0.632.  EKG showed  no acute ST segment changes.  TTE demonstrated normal LV systolic fxn (EF 55-60%), no significant valvular disease or wall motion abnormality.  CT head without acute pathology but MRI reported to show L parasagittal infarct (?lacunar); neurology was following and impression was that patient's symptoms did  not correlate with MRI findings.    Patient is now transferred to [facility] in setting of NSTEMI for ?LHC.  LHC deferred for further neuro consult and medical optimization.      REC:  1.  Hypertensive emergency -  - BP improving.  Continue amlodipine and Coreg.      2.  N-STEMI - no acute EKG changes.  No CP at this time.  - await neuro evaluation before proceeding with further cardiac w/u.  - ASA load then 81mg daily  - ticagrelor load then 90mg BID daily.   - Heparin gtt.   - Cont Coreg and atorvastatin   - repeat ECG if further CP  - Tele  - Strict lytes    3.  Tobacco use  - nicotine patch  - discussed need to d/c tobacco use    4.  CKD      Gilbert Hall M.D.  Cardiology fellow    Plan discussed with cardiology fellow.  Patient seen and examined.  Hx., exam and labs as above.  I agree with the assessment and recommendations, which I have reviewed and edited where appropriate.  Wenceslao Rosenberg M.D.  Cardiology Attending, Consult Service    For Cardiology consults and questions, all Cardiology service information can be found 24/7 on [email or web address] - use password: [email or web address] to log in.

## 2022-10-04 NOTE — CHART NOTE - NSCHARTNOTEFT_GEN_A_CORE
Neurology Clearance for Cath lab: PENDING       47M with HTN, DM, HF EF =45% originally presented to [facility] for vertigo and double vision. CT head noncon negative. MRI showed a left parasagittal paretial chronic lacunar infarct. Neurology consulted at [facility] and confirmed lacunar infarct was chronic and does not clinically correlate with vertigo and double vision symptoms. Case discussed with Stroke Fellow, Dr. Jeanne Hughes, for neurology clearance. Neuro recs CTA H/N to rule out vertebrobasilar stenosis that could be the cause of patient's symptoms. If no stenosis on CTA h/n, cleared for Cath lab. No contraindication for heparin or dual anti-platelet therapy due to chronic lacunar infarct regardless of CTA H/N findings.

## 2022-10-05 DIAGNOSIS — Z29.9 ENCOUNTER FOR PROPHYLACTIC MEASURES, UNSPECIFIED: ICD-10-CM

## 2022-10-05 DIAGNOSIS — I21.4 NON-ST ELEVATION (NSTEMI) MYOCARDIAL INFARCTION: ICD-10-CM

## 2022-10-05 DIAGNOSIS — E11.9 TYPE 2 DIABETES MELLITUS WITHOUT COMPLICATIONS: ICD-10-CM

## 2022-10-05 DIAGNOSIS — I63.81 OTHER CEREBRAL INFARCTION DUE TO OCCLUSION OR STENOSIS OF SMALL ARTERY: ICD-10-CM

## 2022-10-05 LAB
A1C WITH ESTIMATED AVERAGE GLUCOSE RESULT: 9.9 % — HIGH (ref 4–5.6)
ANION GAP SERPL CALC-SCNC: 10 MMOL/L — SIGNIFICANT CHANGE UP (ref 5–17)
APPEARANCE UR: CLEAR — SIGNIFICANT CHANGE UP
APTT BLD: 51.4 SEC — HIGH (ref 27.5–35.5)
APTT BLD: 63.4 SEC — HIGH (ref 27.5–35.5)
APTT BLD: 63.9 SEC — HIGH (ref 27.5–35.5)
BACTERIA # UR AUTO: NEGATIVE — SIGNIFICANT CHANGE UP
BILIRUB UR-MCNC: NEGATIVE — SIGNIFICANT CHANGE UP
BUN SERPL-MCNC: 48 MG/DL — HIGH (ref 7–23)
CALCIUM SERPL-MCNC: 8.4 MG/DL — SIGNIFICANT CHANGE UP (ref 8.4–10.5)
CHLORIDE SERPL-SCNC: 101 MMOL/L — SIGNIFICANT CHANGE UP (ref 96–108)
CK MB BLD-MCNC: 3.9 % — HIGH (ref 0–3.5)
CK MB CFR SERPL CALC: 10.2 NG/ML — HIGH (ref 0–6.7)
CK SERPL-CCNC: 261 U/L — HIGH (ref 30–200)
CO2 SERPL-SCNC: 22 MMOL/L — SIGNIFICANT CHANGE UP (ref 22–31)
COLOR SPEC: COLORLESS — SIGNIFICANT CHANGE UP
CREAT ?TM UR-MCNC: 41 MG/DL — SIGNIFICANT CHANGE UP
CREAT SERPL-MCNC: 2.5 MG/DL — HIGH (ref 0.5–1.3)
DIFF PNL FLD: ABNORMAL
EGFR: 31 ML/MIN/1.73M2 — LOW
EPI CELLS # UR: 0 /HPF — SIGNIFICANT CHANGE UP
ESTIMATED AVERAGE GLUCOSE: 237 MG/DL — HIGH (ref 68–114)
GLUCOSE BLDC GLUCOMTR-MCNC: 140 MG/DL — HIGH (ref 70–99)
GLUCOSE BLDC GLUCOMTR-MCNC: 184 MG/DL — HIGH (ref 70–99)
GLUCOSE BLDC GLUCOMTR-MCNC: 245 MG/DL — HIGH (ref 70–99)
GLUCOSE BLDC GLUCOMTR-MCNC: 263 MG/DL — HIGH (ref 70–99)
GLUCOSE SERPL-MCNC: 322 MG/DL — HIGH (ref 70–99)
GLUCOSE UR QL: ABNORMAL
HCT VFR BLD CALC: 34.2 % — LOW (ref 39–50)
HGB BLD-MCNC: 11.4 G/DL — LOW (ref 13–17)
KETONES UR-MCNC: NEGATIVE — SIGNIFICANT CHANGE UP
LEUKOCYTE ESTERASE UR-ACNC: NEGATIVE — SIGNIFICANT CHANGE UP
MAGNESIUM SERPL-MCNC: 2.1 MG/DL — SIGNIFICANT CHANGE UP (ref 1.6–2.6)
MCHC RBC-ENTMCNC: 31.1 PG — SIGNIFICANT CHANGE UP (ref 27–34)
MCHC RBC-ENTMCNC: 33.3 GM/DL — SIGNIFICANT CHANGE UP (ref 32–36)
MCV RBC AUTO: 93.4 FL — SIGNIFICANT CHANGE UP (ref 80–100)
NITRITE UR-MCNC: NEGATIVE — SIGNIFICANT CHANGE UP
NRBC # BLD: 0 /100 WBCS — SIGNIFICANT CHANGE UP (ref 0–0)
PH UR: 6 — SIGNIFICANT CHANGE UP (ref 5–8)
PHOSPHATE SERPL-MCNC: 3.5 MG/DL — SIGNIFICANT CHANGE UP (ref 2.5–4.5)
PLATELET # BLD AUTO: 172 K/UL — SIGNIFICANT CHANGE UP (ref 150–400)
POTASSIUM SERPL-MCNC: 4.5 MMOL/L — SIGNIFICANT CHANGE UP (ref 3.5–5.3)
POTASSIUM SERPL-SCNC: 4.5 MMOL/L — SIGNIFICANT CHANGE UP (ref 3.5–5.3)
POTASSIUM UR-SCNC: 13 MMOL/L — SIGNIFICANT CHANGE UP
PROT ?TM UR-MCNC: 140 MG/DL — HIGH (ref 0–12)
PROT UR-MCNC: ABNORMAL
PROT/CREAT UR-RTO: 3.4 RATIO — HIGH (ref 0–0.2)
RBC # BLD: 3.66 M/UL — LOW (ref 4.2–5.8)
RBC # FLD: 12.1 % — SIGNIFICANT CHANGE UP (ref 10.3–14.5)
RBC CASTS # UR COMP ASSIST: 2 /HPF — SIGNIFICANT CHANGE UP (ref 0–4)
SODIUM SERPL-SCNC: 133 MMOL/L — LOW (ref 135–145)
SODIUM UR-SCNC: 38 MMOL/L — SIGNIFICANT CHANGE UP
SP GR SPEC: 1.01 — LOW (ref 1.01–1.02)
TROPONIN T, HIGH SENSITIVITY RESULT: 148 NG/L — HIGH (ref 0–51)
UROBILINOGEN FLD QL: NEGATIVE — SIGNIFICANT CHANGE UP
UUN UR-MCNC: 346 MG/DL — SIGNIFICANT CHANGE UP
WBC # BLD: 5.46 K/UL — SIGNIFICANT CHANGE UP (ref 3.8–10.5)
WBC # FLD AUTO: 5.46 K/UL — SIGNIFICANT CHANGE UP (ref 3.8–10.5)
WBC UR QL: 1 /HPF — SIGNIFICANT CHANGE UP (ref 0–5)

## 2022-10-05 PROCEDURE — 99233 SBSQ HOSP IP/OBS HIGH 50: CPT | Mod: GC

## 2022-10-05 PROCEDURE — 99223 1ST HOSP IP/OBS HIGH 75: CPT

## 2022-10-05 RX ORDER — ACETAMINOPHEN 500 MG
650 TABLET ORAL ONCE
Refills: 0 | Status: COMPLETED | OUTPATIENT
Start: 2022-10-05 | End: 2022-10-05

## 2022-10-05 RX ORDER — SODIUM CHLORIDE 9 MG/ML
1000 INJECTION INTRAMUSCULAR; INTRAVENOUS; SUBCUTANEOUS
Refills: 0 | Status: DISCONTINUED | OUTPATIENT
Start: 2022-10-05 | End: 2022-10-07

## 2022-10-05 RX ORDER — SALIVA SUBSTITUTE COMB NO.11 351 MG
15 POWDER IN PACKET (EA) MUCOUS MEMBRANE THREE TIMES A DAY
Refills: 0 | Status: DISCONTINUED | OUTPATIENT
Start: 2022-10-05 | End: 2022-10-20

## 2022-10-05 RX ADMIN — Medication 4 UNIT(S): at 07:43

## 2022-10-05 RX ADMIN — HEPARIN SODIUM 1150 UNIT(S)/HR: 5000 INJECTION INTRAVENOUS; SUBCUTANEOUS at 14:41

## 2022-10-05 RX ADMIN — BUDESONIDE AND FORMOTEROL FUMARATE DIHYDRATE 2 PUFF(S): 160; 4.5 AEROSOL RESPIRATORY (INHALATION) at 04:48

## 2022-10-05 RX ADMIN — Medication 4 UNIT(S): at 16:30

## 2022-10-05 RX ADMIN — SODIUM CHLORIDE 50 MILLILITER(S): 9 INJECTION INTRAMUSCULAR; INTRAVENOUS; SUBCUTANEOUS at 15:44

## 2022-10-05 RX ADMIN — AMLODIPINE BESYLATE 10 MILLIGRAM(S): 2.5 TABLET ORAL at 04:48

## 2022-10-05 RX ADMIN — Medication 2: at 12:12

## 2022-10-05 RX ADMIN — Medication 81 MILLIGRAM(S): at 04:48

## 2022-10-05 RX ADMIN — CARVEDILOL PHOSPHATE 25 MILLIGRAM(S): 80 CAPSULE, EXTENDED RELEASE ORAL at 04:48

## 2022-10-05 RX ADMIN — Medication 145 MILLIGRAM(S): at 12:12

## 2022-10-05 RX ADMIN — BUDESONIDE AND FORMOTEROL FUMARATE DIHYDRATE 2 PUFF(S): 160; 4.5 AEROSOL RESPIRATORY (INHALATION) at 16:35

## 2022-10-05 RX ADMIN — TICAGRELOR 90 MILLIGRAM(S): 90 TABLET ORAL at 16:33

## 2022-10-05 RX ADMIN — CARVEDILOL PHOSPHATE 25 MILLIGRAM(S): 80 CAPSULE, EXTENDED RELEASE ORAL at 16:33

## 2022-10-05 RX ADMIN — TICAGRELOR 90 MILLIGRAM(S): 90 TABLET ORAL at 04:48

## 2022-10-05 RX ADMIN — INSULIN GLARGINE 36 UNIT(S): 100 INJECTION, SOLUTION SUBCUTANEOUS at 21:33

## 2022-10-05 RX ADMIN — Medication 10 MILLIGRAM(S): at 21:33

## 2022-10-05 RX ADMIN — HEPARIN SODIUM 1150 UNIT(S)/HR: 5000 INJECTION INTRAVENOUS; SUBCUTANEOUS at 01:39

## 2022-10-05 RX ADMIN — Medication 650 MILLIGRAM(S): at 07:50

## 2022-10-05 RX ADMIN — HEPARIN SODIUM 1150 UNIT(S)/HR: 5000 INJECTION INTRAVENOUS; SUBCUTANEOUS at 08:10

## 2022-10-05 RX ADMIN — Medication 650 MILLIGRAM(S): at 08:30

## 2022-10-05 RX ADMIN — Medication 4 UNIT(S): at 12:12

## 2022-10-05 RX ADMIN — PANTOPRAZOLE SODIUM 40 MILLIGRAM(S): 20 TABLET, DELAYED RELEASE ORAL at 04:48

## 2022-10-05 RX ADMIN — Medication 1: at 21:33

## 2022-10-05 RX ADMIN — ATORVASTATIN CALCIUM 40 MILLIGRAM(S): 80 TABLET, FILM COATED ORAL at 21:33

## 2022-10-05 RX ADMIN — Medication 1: at 07:43

## 2022-10-05 NOTE — PROGRESS NOTE ADULT - ATTENDING COMMENTS
48 y/o male with DM II, CKDIV, HTN who presented to OSH complaining of diplopia found to be in hypertensive emergency with /133. Hospital course was c/b possible acute parasagittal infarct seen on MRI imaging and NSTEMI for which pt was transferred for cath. Neuro reviewed imaging, cleared for full dose AC and DAPT. Pt currently on Asa/Brilinta as well as Heparin ggt. Full consult pending but want CTA head and neck prior to cath. Troponins peaked. Changed Metoprolol to Corg for better BP control. Nephro consulted for optimization prior to contrast loads. Will need  hydration pre and post.

## 2022-10-05 NOTE — PROGRESS NOTE ADULT - PROBLEM SELECTOR PLAN 2
-hold ACE/ARB  -standing IVF, NS @ 100ccs/hr  -nephro consulted Improving after hydration with IVF  -hold ACE/ARB  -nephro consulted

## 2022-10-05 NOTE — PROGRESS NOTE ADULT - ASSESSMENT
48 y/o male, current cigarette smoker, with PMHx of Vertigo, Diverticulitis s/p LAR, ETOH abuse now sober x 5 years, DM (patient states has been on Insulin x 10 years was never on oral agents), CKD IV - baseline creatinine 2.4, HTN, AMBER, who presented to [facility] on 10/2 c/o dizziness, b/l diplopia, headache and chest tightness, admitted for c/f NSTEMI +/- possible stroke.

## 2022-10-05 NOTE — PROGRESS NOTE ADULT - PROBLEM SELECTOR PLAN 3
Parasagittal infarct noted on outside facility records, no focal findings on exam  -no lateralizing findings or focal deficits, decreased sensation likely 2/2 diabetic neuropathy  -aspirin load  -neuro consulted, recs appreciated  -CTA head/neck Parasagittal infarct noted on outside facility records, no focal findings on exam  -no lateralizing findings or focal deficits, decreased sensation likely 2/2 diabetic neuropathy  -aspirin load  -neuro consulted, recs appreciated      -CTA head/neck negative for acute stroke

## 2022-10-05 NOTE — PROGRESS NOTE ADULT - PROBLEM SELECTOR PLAN 4
-on 40u long-acting, 5u short-acting pre-meal; lantus recently decreased for pt hypoglycemia  -36u lantus, 4u pre-meal short acting.

## 2022-10-05 NOTE — PROGRESS NOTE ADULT - SUBJECTIVE AND OBJECTIVE BOX
Medications:  ALBUTerol    90 MICROgram(s) HFA Inhaler 2 Puff(s) Inhalation every 6 hours PRN  amitriptyline 10 milliGRAM(s) Oral at bedtime  amLODIPine   Tablet 10 milliGRAM(s) Oral daily  aspirin enteric coated 81 milliGRAM(s) Oral daily  atorvastatin 40 milliGRAM(s) Oral at bedtime  Biotene Dry Mouth Oral Rinse 15 milliLiter(s) Swish and Spit three times a day PRN  budesonide  80 MICROgram(s)/formoterol 4.5 MICROgram(s) Inhaler 2 Puff(s) Inhalation two times a day  carvedilol 25 milliGRAM(s) Oral every 12 hours  dextrose 5%. 1000 milliLiter(s) IV Continuous <Continuous>  dextrose 5%. 1000 milliLiter(s) IV Continuous <Continuous>  dextrose 50% Injectable 25 Gram(s) IV Push once  dextrose 50% Injectable 12.5 Gram(s) IV Push once  dextrose 50% Injectable 25 Gram(s) IV Push once  dextrose Oral Gel 15 Gram(s) Oral once PRN  fenofibrate Tablet 145 milliGRAM(s) Oral daily  glucagon  Injectable 1 milliGRAM(s) IntraMuscular once  heparin   Injectable 5000 Unit(s) IV Push every 6 hours PRN  heparin  Infusion.  Unit(s)/Hr IV Continuous <Continuous>  influenza   Vaccine 0.5 milliLiter(s) IntraMuscular once  insulin glargine Injectable (LANTUS) 36 Unit(s) SubCutaneous at bedtime  insulin lispro (ADMELOG) corrective regimen sliding scale   SubCutaneous three times a day before meals  insulin lispro (ADMELOG) corrective regimen sliding scale   SubCutaneous at bedtime  insulin lispro Injectable (ADMELOG) 4 Unit(s) SubCutaneous three times a day before meals  pantoprazole    Tablet 40 milliGRAM(s) Oral before breakfast  ticagrelor 90 milliGRAM(s) Oral every 12 hours    PMH/PSH/FH/SH:  Unchanged    ROS:  General: no fatigue/malaise, weight loss/gain.  Skin: no rashes.  Eyes: no blurred vision, no loss of vision. 	  ENT: no sore throat, rhinorrhea, sinus congestion.  Respiratory: no SOB, cough or wheeze.  Cardiovascular: no chest pain, dyspnea, palpitations, orthopnea or paroxysmal nocturnal dyspnea.   Gastrointestinal:  no N/V/D, no melena/hematemesis/hematochezia.  Genitourinary: no dysuria/hesitancy or hematuria.  Musculoskeletal: no myalgias or arthralgias.  Neurological: no changes in vision or hearing, no lightheadedness/dizziness, no syncope/near syncope	  Psychiatric: no unusual stress/anxiety.   Hematology/Lymphatics: no unusual bleeding, bruising and no lymphadenopathy.  All others negative except as stated above and in HPI.         Vitals:  T(C): 36.7 (10-05-22 @ 09:55), Max: 36.7 (10-05-22 @ 09:55)  HR: 67 (10-05-22 @ 09:55) (67 - 82)  BP: 102/63 (10-05-22 @ 09:55) (102/63 - 169/84)  BP(mean): 73 (10-05-22 @ 09:55) (73 - 105)  RR: 16 (10-05-22 @ 09:55) (16 - 18)  SpO2: 100% (10-05-22 @ 09:55) (99% - 100%)  Daily Height in cm: 170.18 (04 Oct 2022 15:33)    Daily Weight in k.7 (04 Oct 2022 15:33)      GENERAL: No acute distress, well-developed  HEAD:  Atraumatic, Normocephalic  ENT: EOMI, PERRLA, conjunctiva and sclera clear, Neck supple, No JVD, moist mucosa  CHEST/LUNG: Clear to auscultation bilaterally; No wheeze, equal breath sounds bilaterally   BACK: No spinal tenderness  HEART: Regular rate and rhythm; No murmurs, rubs, or gallops  ABDOMEN: Soft, Nontender, Nondistended; Bowel sounds present  EXTREMITIES:  No clubbing, cyanosis, or edema  PSYCH: Nl behavior, nl affect  NEUROLOGY: AAOx3, non-focal, cranial nerves intact  SKIN: Normal color, No rashes or lesions      TELE:   NSR at 71 bpm.      I&O's Summary  04 Oct 2022 07:01  -  05 Oct 2022 07:00  --------------------------------------------------------  IN: 0 mL / OUT: 1700 mL / NET: -1700 mL    05 Oct 2022 07:01  -  05 Oct 2022 12:15  --------------------------------------------------------  IN: 360 mL / OUT: 300 mL / NET: 60 mL      LABS:                      11.4   5.46  )-----------( 172      ( 05 Oct 2022 00:49 )             34.2     10-05  133<L>  |  101  |  48<H>  ----------------------------<  322<H>  4.5   |  22  |  2.50<H>    Ca    8.4      05 Oct 2022 00:49  Phos  3.5     10-05  Mg     2.1     10-05    TPro  6.3  /  Alb  3.2<L>  /  TBili  0.1<L>  /  DBili  x   /  AST  17  /  ALT  13  /  AlkPhos  109  10-04    PT/INR - ( 04 Oct 2022 17:55 )   PT: 10.4 sec;   INR: 0.91 ratio    PTT - ( 05 Oct 2022 07:35 )  PTT:63.4 sec    CARDIAC MARKERS ( 05 Oct 2022 00:49 )  x     / x     / 261 U/L / x     / 10.2 ng/mL  CARDIAC MARKERS ( 04 Oct 2022 16:14 )  x     / x     / 278 U/L / x     / 10.9 ng/mL    Trop T  150 -> 148 Alert, no CP or dyspnea at present; no palps.  Still with diplopia, but not dizziness/vertigo at this time.       Medications:  ALBUTerol    90 MICROgram(s) HFA Inhaler 2 Puff(s) Inhalation every 6 hours PRN  amitriptyline 10 milliGRAM(s) Oral at bedtime  amLODIPine   Tablet 10 milliGRAM(s) Oral daily  aspirin enteric coated 81 milliGRAM(s) Oral daily  atorvastatin 40 milliGRAM(s) Oral at bedtime  Biotene Dry Mouth Oral Rinse 15 milliLiter(s) Swish and Spit three times a day PRN  budesonide  80 MICROgram(s)/formoterol 4.5 MICROgram(s) Inhaler 2 Puff(s) Inhalation two times a day  carvedilol 25 milliGRAM(s) Oral every 12 hours  dextrose 5%. 1000 milliLiter(s) IV Continuous <Continuous>  dextrose 5%. 1000 milliLiter(s) IV Continuous <Continuous>  dextrose 50% Injectable 25 Gram(s) IV Push once  dextrose 50% Injectable 12.5 Gram(s) IV Push once  dextrose 50% Injectable 25 Gram(s) IV Push once  dextrose Oral Gel 15 Gram(s) Oral once PRN  fenofibrate Tablet 145 milliGRAM(s) Oral daily  glucagon  Injectable 1 milliGRAM(s) IntraMuscular once  heparin   Injectable 5000 Unit(s) IV Push every 6 hours PRN  heparin  Infusion.  Unit(s)/Hr IV Continuous <Continuous>  influenza   Vaccine 0.5 milliLiter(s) IntraMuscular once  insulin glargine Injectable (LANTUS) 36 Unit(s) SubCutaneous at bedtime  insulin lispro (ADMELOG) corrective regimen sliding scale   SubCutaneous three times a day before meals  insulin lispro (ADMELOG) corrective regimen sliding scale   SubCutaneous at bedtime  insulin lispro Injectable (ADMELOG) 4 Unit(s) SubCutaneous three times a day before meals  pantoprazole    Tablet 40 milliGRAM(s) Oral before breakfast  ticagrelor 90 milliGRAM(s) Oral every 12 hours    PMH/PSH/FH/SH:  Unchanged    ROS:  General: no fatigue/malaise, weight loss/gain.  Skin: no rashes.  Eyes: no blurred vision, no loss of vision. 	  ENT: no sore throat, rhinorrhea, sinus congestion.  Respiratory: no SOB, cough or wheeze.  Cardiovascular: no chest pain, dyspnea, palpitations, orthopnea or paroxysmal nocturnal dyspnea.   Gastrointestinal:  no N/V/D, no melena/hematemesis/hematochezia.  Genitourinary: no dysuria/hesitancy or hematuria.  Musculoskeletal: no myalgias or arthralgias.  Neurological: no changes in vision or hearing, no lightheadedness/dizziness, no syncope/near syncope	  Psychiatric: no unusual stress/anxiety.   Hematology/Lymphatics: no unusual bleeding, bruising and no lymphadenopathy.  All others negative except as stated above and in HPI.         Vitals:  T(C): 36.7 (10-05-22 @ 09:55), Max: 36.7 (10-05-22 @ 09:55)  HR: 67 (10-05-22 @ 09:55) (67 - 82)  BP: 102/63 (10-05-22 @ 09:55) (102/63 - 169/84)  BP(mean): 73 (10-05-22 @ 09:55) (73 - 105)  RR: 16 (10-05-22 @ 09:55) (16 - 18)  SpO2: 100% (10-05-22 @ 09:55) (99% - 100%)  Daily Height in cm: 170.18 (04 Oct 2022 15:33)    Daily Weight in k.7 (04 Oct 2022 15:33)      GENERAL: No acute distress, well-developed  HEAD:  Atraumatic, Normocephalic  ENT: EOMI, PERRLA, conjunctiva and sclera clear, Neck supple, No JVD, moist mucosa  CHEST/LUNG: Clear to auscultation bilaterally; No wheeze, equal breath sounds bilaterally   BACK: No spinal tenderness  HEART: Regular rate and rhythm; No murmurs, rubs, or gallops  ABDOMEN: Soft, Nontender, Nondistended; Bowel sounds present  EXTREMITIES:  No clubbing, cyanosis, or edema  PSYCH: Nl behavior, nl affect  NEUROLOGY: AAOx3, non-focal, cranial nerves intact  SKIN: Normal color, No rashes or lesions      TELE:   NSR at 71 bpm.      I&O's Summary  04 Oct 2022 07:01  -  05 Oct 2022 07:00  --------------------------------------------------------  IN: 0 mL / OUT: 1700 mL / NET: -1700 mL    05 Oct 2022 07:01  -  05 Oct 2022 12:15  --------------------------------------------------------  IN: 360 mL / OUT: 300 mL / NET: 60 mL      LABS:                      11.4   5.46  )-----------( 172      ( 05 Oct 2022 00:49 )             34.2     10-05  133<L>  |  101  |  48<H>  ----------------------------<  322<H>  4.5   |  22  |  2.50<H>    Ca    8.4      05 Oct 2022 00:49  Phos  3.5     10-05  Mg     2.1     10-05    TPro  6.3  /  Alb  3.2<L>  /  TBili  0.1<L>  /  DBili  x   /  AST  17  /  ALT  13  /  AlkPhos  109  10-04    PT/INR - ( 04 Oct 2022 17:55 )   PT: 10.4 sec;   INR: 0.91 ratio    PTT - ( 05 Oct 2022 07:35 )  PTT:63.4 sec    CARDIAC MARKERS ( 05 Oct 2022 00:49 )  x     / x     / 261 U/L / x     / 10.2 ng/mL  CARDIAC MARKERS ( 04 Oct 2022 16:14 )  x     / x     / 278 U/L / x     / 10.9 ng/mL    Trop T  150 -> 148

## 2022-10-05 NOTE — PROGRESS NOTE ADULT - ASSESSMENT
46 y/o M, current cigarette smoker, with PMHx of vertigo, ETOH abuse now sober x 5 years, DM (patient states has been on Insulin x 10 years was never on oral agents), CKD IV - baseline creatinine 2.4, HTN and AMBER.  Presented to [facility] on 10/2 c/o dizziness, b/l diplopia, headache and chest tightness.  In ED there, BP reported to be 260/133, managed with hydralazine IV.  Cardiac biomarkers were elevated with trop I peak 0.632.  EKG showed  no acute ST segment changes.  TTE demonstrated normal LV systolic fxn (EF 55-60%), no significant valvular disease or wall motion abnormality.  CT head without acute pathology but MRI reported to show L parasagittal infarct (?lacunar); neurology was following and impression was that patient's symptoms did  not correlate with MRI findings.    Patient is now transferred to [facility] in setting of NSTEMI for ?LHC.  LHC deferred for further neuro consult and medical optimization.      REC:  1.  Hypertensive emergency -  - BP improving.  Continue amlodipine and Coreg.      2.  N-STEMI - no acute EKG changes.  No CP at this time.  - await neuro evaluation before proceeding with further cardiac w/u.  - ASA load then 81mg daily  - ticagrelor load then 90mg BID daily.   - Heparin gtt.   - Cont Coreg and atorvastatin   - repeat ECG if further CP  - Tele  - Strict lytes    3.  Tobacco use  - nicotine patch  - discussed need to d/c tobacco use    4.  CKD   46 y/o M, current cigarette smoker, with PMHx of vertigo, ETOH abuse now sober x 5 years, DM (patient states has been on Insulin x 10 years was never on oral agents), CKD IV - baseline creatinine 2.4, HTN and AMBER.  Presented to [facility] on 10/2 c/o dizziness, b/l diplopia, headache and chest tightness.  In ED there, BP reported to be 260/133, managed with hydralazine IV.  Cardiac biomarkers were elevated with trop I peak 0.632.  EKG showed  no acute ST segment changes.  TTE demonstrated normal LV systolic fxn (EF 55-60%), no significant valvular disease or wall motion abnormality.  CT head without acute pathology but MRI reported to show L parasagittal infarct (?lacunar); neurology was following and impression was that patient's symptoms did  not correlate with MRI findings.    Patient is now transferred to [facility] in setting of NSTEMI for ?LHC.  LHC deferred for further neuro consult and medical optimization.      REC:  1.  Presenting sxs. may have predominantly been due to hypertensive emergency - BP improved.    - Continue amlodipine and Coreg.      2.  ?N-STEMI - presenting sxs. of CP and dyspnea were impressive but are now improved with BP control.  No acute EKG changes noted and troponin curve appears flat; elevated trop in part due to CKD.  No CP at this time.  - cardiac cath is not urgent  - await further neuro evaluation before proceeding with further cardiac w/u; CTA of head and neck is planned.  - ASA 81mg daily  - ticagrelor 90mg BID daily.   - continue IV heparin  - Cont atorvastatin  - repeat ECG if further episodes of CP  - continue tele  - monitor  lytes and renal function    3.  CKD  - appreciate input  - do not plan on [facility] today.    Discussed with Dr. Marrufo.      Plan discussed with cardiology fellow.  Patient seen and examined.  Hx., exam and labs as above.  I agree with the assessment and recommendations, which I have reviewed and edited where appropriate.  Wenceslao Rosenberg M.D.  Cardiology Attending, Consult Service    For Cardiology consults and questions, all Cardiology service information can be found 24/7 on [email or web address] - use password: [email or web address] to log in.

## 2022-10-05 NOTE — PROGRESS NOTE ADULT - PROBLEM SELECTOR PLAN 1
Transfer from [facility] for cardiac cath  -GDMT w/ coreg, atorvastatin (holding ACE/ARB for BILLY)  -tentatively scheduled for catheterization today pending nephro/neuro clearance  -aspirin + brilinta  -heparin gtt  -trend troponin q6h  -EKG and troponins for chest pain Transfer from [facility] for cardiac cath  -GDMT w/ coreg, atorvastatin (holding ACE/ARB for BILLY)  -tentatively scheduled for catheterization today pending nephro/neuro clearance  -aspirin + brilinta  -heparin gtt  -trend troponin q6h  -EKG and troponins for chest pain  -per cards, pt may benefit from coronary CT, will clear with nephro

## 2022-10-05 NOTE — PROGRESS NOTE ADULT - SUBJECTIVE AND OBJECTIVE BOX
Kayley Woods MD  PGY 1 Department of Internal Medicine        Patient is a 47y old  Male who presents with a chief complaint of NSTEMI (04 Oct 2022 21:26)      SUBJECTIVE / OVERNIGHT EVENTS: Pt seen and examined. No acute overnight events. Denies fevers, chills, CP, SOB, Abdominal pain, N/V, Constipation, Diarrhea        MEDICATIONS  (STANDING):  acetaminophen     Tablet .. 650 milliGRAM(s) Oral once  amitriptyline 10 milliGRAM(s) Oral at bedtime  amLODIPine   Tablet 10 milliGRAM(s) Oral daily  aspirin enteric coated 81 milliGRAM(s) Oral daily  atorvastatin 40 milliGRAM(s) Oral at bedtime  budesonide  80 MICROgram(s)/formoterol 4.5 MICROgram(s) Inhaler 2 Puff(s) Inhalation two times a day  carvedilol 25 milliGRAM(s) Oral every 12 hours  dextrose 5%. 1000 milliLiter(s) (100 mL/Hr) IV Continuous <Continuous>  dextrose 5%. 1000 milliLiter(s) (50 mL/Hr) IV Continuous <Continuous>  dextrose 50% Injectable 25 Gram(s) IV Push once  dextrose 50% Injectable 12.5 Gram(s) IV Push once  dextrose 50% Injectable 25 Gram(s) IV Push once  fenofibrate Tablet 145 milliGRAM(s) Oral daily  glucagon  Injectable 1 milliGRAM(s) IntraMuscular once  heparin  Infusion.  Unit(s)/Hr (10 mL/Hr) IV Continuous <Continuous>  influenza   Vaccine 0.5 milliLiter(s) IntraMuscular once  insulin glargine Injectable (LANTUS) 36 Unit(s) SubCutaneous at bedtime  insulin lispro (ADMELOG) corrective regimen sliding scale   SubCutaneous three times a day before meals  insulin lispro (ADMELOG) corrective regimen sliding scale   SubCutaneous at bedtime  insulin lispro Injectable (ADMELOG) 4 Unit(s) SubCutaneous three times a day before meals  pantoprazole    Tablet 40 milliGRAM(s) Oral before breakfast  sodium chloride 0.9%. 1000 milliLiter(s) (80 mL/Hr) IV Continuous <Continuous>  ticagrelor 90 milliGRAM(s) Oral every 12 hours    MEDICATIONS  (PRN):  ALBUTerol    90 MICROgram(s) HFA Inhaler 2 Puff(s) Inhalation every 6 hours PRN Shortness of Breath and/or Wheezing  dextrose Oral Gel 15 Gram(s) Oral once PRN Blood Glucose LESS THAN 70 milliGRAM(s)/deciliter  heparin   Injectable 5000 Unit(s) IV Push every 6 hours PRN For aPTT less than 40      I&O's Summary    04 Oct 2022 07:01  -  05 Oct 2022 07:00  --------------------------------------------------------  IN: 0 mL / OUT: 1700 mL / NET: -1700 mL        Vital Signs Last 24 Hrs  T(C): 36.4 (05 Oct 2022 04:24), Max: 36.4 (04 Oct 2022 15:36)  T(F): 97.5 (05 Oct 2022 04:24), Max: 97.5 (04 Oct 2022 15:36)  HR: 72 (05 Oct 2022 04:24) (72 - 82)  BP: 169/84 (05 Oct 2022 04:24) (153/79 - 169/84)  BP(mean): 105 (05 Oct 2022 04:24) (94 - 105)  RR: 18 (05 Oct 2022 04:24) (18 - 18)  SpO2: 100% (05 Oct 2022 04:24) (99% - 100%)    Parameters below as of 05 Oct 2022 04:24  Patient On (Oxygen Delivery Method): room air        CAPILLARY BLOOD GLUCOSE      POCT Blood Glucose.: 184 mg/dL (05 Oct 2022 07:16)  POCT Blood Glucose.: 299 mg/dL (04 Oct 2022 22:18)  POCT Blood Glucose.: 292 mg/dL (04 Oct 2022 21:10)  POCT Blood Glucose.: 150 mg/dL (04 Oct 2022 15:35)      PHYSICAL EXAM:  GENERAL: NAD,   HEAD:  Atraumatic, Normocephalic  EYES: EOMI, PERRL, conjunctiva and sclera clear  NECK: No JVD  CHEST/LUNG: Clear to auscultation bilaterally; No wheeze  HEART: Regular rate and rhythm; No murmurs, rubs, or gallops  ABDOMEN: Soft, Nontender, Nondistended; Bowel sounds present  EXTREMITIES:  2+ Peripheral Pulses, No clubbing, cyanosis, or edema  PSYCH: AAOx3  NEUROLOGY: non-focal  SKIN: No rashes or lesions       LABS:                        11.4   5.46  )-----------( 172      ( 05 Oct 2022 00:49 )             34.2     Auto Eosinophil # x     / Auto Eosinophil % x     / Auto Neutrophil # x     / Auto Neutrophil % x     / BANDS % x                            11.3   7.34  )-----------( 202      ( 04 Oct 2022 16:14 )             33.8     Auto Eosinophil # x     / Auto Eosinophil % x     / Auto Neutrophil # x     / Auto Neutrophil % x     / BANDS % x        10-05    133<L>  |  101  |  48<H>  ----------------------------<  322<H>  4.5   |  22  |  2.50<H>  10-04    135  |  101  |  49<H>  ----------------------------<  146<H>  4.5   |  22  |  2.73<H>    Ca    8.4      05 Oct 2022 00:49  Mg     2.1     10-05  Phos  3.5     10-05  TPro  6.3  /  Alb  3.2<L>  /  TBili  0.1<L>  /  DBili  x   /  AST  17  /  ALT  13  /  AlkPhos  109  10-04    PT/INR - ( 04 Oct 2022 17:55 )   PT: 10.4 sec;   INR: 0.91 ratio         PTT - ( 05 Oct 2022 00:49 )  PTT:51.4 sec  CARDIAC MARKERS ( 05 Oct 2022 00:49 )  x     / x     / 261 U/L / x     / 10.2 ng/mL  CARDIAC MARKERS ( 04 Oct 2022 16:14 )  x     / x     / 278 U/L / x     / 10.9 ng/mL      Urinalysis Basic - ( 05 Oct 2022 00:49 )    Color: Colorless / Appearance: Clear / S.008 / pH: x  Gluc: x / Ketone: Negative  / Bili: Negative / Urobili: Negative   Blood: x / Protein: 100 mg/dL / Nitrite: Negative   Leuk Esterase: Negative / RBC: 2 /hpf / WBC 1 /HPF   Sq Epi: x / Non Sq Epi: 0 /hpf / Bacteria: Negative            RADIOLOGY & ADDITIONAL TESTS:    Imaging Personally Reviewed:    Consultant(s) Notes Reviewed:      Care Discussed with Consultants/Other Providers:   Kayley Woods MD  PGY 1 Department of Internal Medicine        Patient is a 47y old  Male who presents with a chief complaint of NSTEMI (04 Oct 2022 21:26)      SUBJECTIVE / OVERNIGHT EVENTS: Pt seen and examined. No acute overnight events. Denies fevers, chills, CP, SOB, Abdominal pain, N/V, Constipation, Diarrhea        MEDICATIONS  (STANDING):  acetaminophen     Tablet .. 650 milliGRAM(s) Oral once  amitriptyline 10 milliGRAM(s) Oral at bedtime  amLODIPine   Tablet 10 milliGRAM(s) Oral daily  aspirin enteric coated 81 milliGRAM(s) Oral daily  atorvastatin 40 milliGRAM(s) Oral at bedtime  budesonide  80 MICROgram(s)/formoterol 4.5 MICROgram(s) Inhaler 2 Puff(s) Inhalation two times a day  carvedilol 25 milliGRAM(s) Oral every 12 hours  dextrose 5%. 1000 milliLiter(s) (100 mL/Hr) IV Continuous <Continuous>  dextrose 5%. 1000 milliLiter(s) (50 mL/Hr) IV Continuous <Continuous>  dextrose 50% Injectable 25 Gram(s) IV Push once  dextrose 50% Injectable 12.5 Gram(s) IV Push once  dextrose 50% Injectable 25 Gram(s) IV Push once  fenofibrate Tablet 145 milliGRAM(s) Oral daily  glucagon  Injectable 1 milliGRAM(s) IntraMuscular once  heparin  Infusion.  Unit(s)/Hr (10 mL/Hr) IV Continuous <Continuous>  influenza   Vaccine 0.5 milliLiter(s) IntraMuscular once  insulin glargine Injectable (LANTUS) 36 Unit(s) SubCutaneous at bedtime  insulin lispro (ADMELOG) corrective regimen sliding scale   SubCutaneous three times a day before meals  insulin lispro (ADMELOG) corrective regimen sliding scale   SubCutaneous at bedtime  insulin lispro Injectable (ADMELOG) 4 Unit(s) SubCutaneous three times a day before meals  pantoprazole    Tablet 40 milliGRAM(s) Oral before breakfast  sodium chloride 0.9%. 1000 milliLiter(s) (80 mL/Hr) IV Continuous <Continuous>  ticagrelor 90 milliGRAM(s) Oral every 12 hours    MEDICATIONS  (PRN):  ALBUTerol    90 MICROgram(s) HFA Inhaler 2 Puff(s) Inhalation every 6 hours PRN Shortness of Breath and/or Wheezing  dextrose Oral Gel 15 Gram(s) Oral once PRN Blood Glucose LESS THAN 70 milliGRAM(s)/deciliter  heparin   Injectable 5000 Unit(s) IV Push every 6 hours PRN For aPTT less than 40      I&O's Summary    04 Oct 2022 07:01  -  05 Oct 2022 07:00  --------------------------------------------------------  IN: 0 mL / OUT: 1700 mL / NET: -1700 mL        Vital Signs Last 24 Hrs  T(C): 36.4 (05 Oct 2022 04:24), Max: 36.4 (04 Oct 2022 15:36)  T(F): 97.5 (05 Oct 2022 04:24), Max: 97.5 (04 Oct 2022 15:36)  HR: 72 (05 Oct 2022 04:24) (72 - 82)  BP: 169/84 (05 Oct 2022 04:24) (153/79 - 169/84)  BP(mean): 105 (05 Oct 2022 04:24) (94 - 105)  RR: 18 (05 Oct 2022 04:24) (18 - 18)  SpO2: 100% (05 Oct 2022 04:24) (99% - 100%)    Parameters below as of 05 Oct 2022 04:24  Patient On (Oxygen Delivery Method): room air        CAPILLARY BLOOD GLUCOSE      POCT Blood Glucose.: 184 mg/dL (05 Oct 2022 07:16)  POCT Blood Glucose.: 299 mg/dL (04 Oct 2022 22:18)  POCT Blood Glucose.: 292 mg/dL (04 Oct 2022 21:10)  POCT Blood Glucose.: 150 mg/dL (04 Oct 2022 15:35)      PHYSICAL EXAM:  GENERAL: NAD,   HEAD:  Atraumatic, Normocephalic  EYES: EOMI, PERRL, conjunctiva and sclera clear  NECK: No JVD  CHEST/LUNG: Clear to auscultation bilaterally; No wheeze  HEART: Regular rate and rhythm; No murmurs, rubs, or gallops  ABDOMEN: Soft, Nontender, Nondistended; Bowel sounds present  EXTREMITIES:  2+ Peripheral Pulses, No clubbing, cyanosis, or edema  PSYCH: AAOx3  NEUROLOGY: (+) Decreased sensation affecting pedal aspect of lower extremities, mild tremor at rest and with cerebellar testing. A&Ox3. Cranial nerves intact. Normal speech. Sensation intact.  SKIN: No rashes or lesions       LABS:                        11.4   5.46  )-----------( 172      ( 05 Oct 2022 00:49 )             34.2     Auto Eosinophil # x     / Auto Eosinophil % x     / Auto Neutrophil # x     / Auto Neutrophil % x     / BANDS % x                            11.3   7.34  )-----------( 202      ( 04 Oct 2022 16:14 )             33.8     Auto Eosinophil # x     / Auto Eosinophil % x     / Auto Neutrophil # x     / Auto Neutrophil % x     / BANDS % x        10-05    133<L>  |  101  |  48<H>  ----------------------------<  322<H>  4.5   |  22  |  2.50<H>  10-    135  |  101  |  49<H>  ----------------------------<  146<H>  4.5   |  22  |  2.73<H>    Ca    8.4      05 Oct 2022 00:49  Mg     2.1     10-  Phos  3.5     10-  TPro  6.3  /  Alb  3.2<L>  /  TBili  0.1<L>  /  DBili  x   /  AST  17  /  ALT  13  /  AlkPhos  109  10-04    PT/INR - ( 04 Oct 2022 17:55 )   PT: 10.4 sec;   INR: 0.91 ratio         PTT - ( 05 Oct 2022 00:49 )  PTT:51.4 sec  CARDIAC MARKERS ( 05 Oct 2022 00:49 )  x     / x     / 261 U/L / x     / 10.2 ng/mL  CARDIAC MARKERS ( 04 Oct 2022 16:14 )  x     / x     / 278 U/L / x     / 10.9 ng/mL      Urinalysis Basic - ( 05 Oct 2022 00:49 )    Color: Colorless / Appearance: Clear / S.008 / pH: x  Gluc: x / Ketone: Negative  / Bili: Negative / Urobili: Negative   Blood: x / Protein: 100 mg/dL / Nitrite: Negative   Leuk Esterase: Negative / RBC: 2 /hpf / WBC 1 /HPF   Sq Epi: x / Non Sq Epi: 0 /hpf / Bacteria: Negative

## 2022-10-06 LAB
ANION GAP SERPL CALC-SCNC: 10 MMOL/L — SIGNIFICANT CHANGE UP (ref 5–17)
APTT BLD: 56.7 SEC — HIGH (ref 27.5–35.5)
BUN SERPL-MCNC: 41 MG/DL — HIGH (ref 7–23)
CALCIUM SERPL-MCNC: 8.5 MG/DL — SIGNIFICANT CHANGE UP (ref 8.4–10.5)
CHLORIDE SERPL-SCNC: 106 MMOL/L — SIGNIFICANT CHANGE UP (ref 96–108)
CO2 SERPL-SCNC: 21 MMOL/L — LOW (ref 22–31)
CREAT SERPL-MCNC: 2.2 MG/DL — HIGH (ref 0.5–1.3)
EGFR: 36 ML/MIN/1.73M2 — LOW
GLUCOSE BLDC GLUCOMTR-MCNC: 117 MG/DL — HIGH (ref 70–99)
GLUCOSE BLDC GLUCOMTR-MCNC: 125 MG/DL — HIGH (ref 70–99)
GLUCOSE BLDC GLUCOMTR-MCNC: 126 MG/DL — HIGH (ref 70–99)
GLUCOSE BLDC GLUCOMTR-MCNC: 136 MG/DL — HIGH (ref 70–99)
GLUCOSE SERPL-MCNC: 212 MG/DL — HIGH (ref 70–99)
HCT VFR BLD CALC: 32.8 % — LOW (ref 39–50)
HGB BLD-MCNC: 11.1 G/DL — LOW (ref 13–17)
MAGNESIUM SERPL-MCNC: 2 MG/DL — SIGNIFICANT CHANGE UP (ref 1.6–2.6)
MCHC RBC-ENTMCNC: 30.2 PG — SIGNIFICANT CHANGE UP (ref 27–34)
MCHC RBC-ENTMCNC: 33.8 GM/DL — SIGNIFICANT CHANGE UP (ref 32–36)
MCV RBC AUTO: 89.4 FL — SIGNIFICANT CHANGE UP (ref 80–100)
NRBC # BLD: 0 /100 WBCS — SIGNIFICANT CHANGE UP (ref 0–0)
PHOSPHATE SERPL-MCNC: 3.1 MG/DL — SIGNIFICANT CHANGE UP (ref 2.5–4.5)
PLATELET # BLD AUTO: 184 K/UL — SIGNIFICANT CHANGE UP (ref 150–400)
POTASSIUM SERPL-MCNC: 4.2 MMOL/L — SIGNIFICANT CHANGE UP (ref 3.5–5.3)
POTASSIUM SERPL-SCNC: 4.2 MMOL/L — SIGNIFICANT CHANGE UP (ref 3.5–5.3)
RBC # BLD: 3.67 M/UL — LOW (ref 4.2–5.8)
RBC # FLD: 11.9 % — SIGNIFICANT CHANGE UP (ref 10.3–14.5)
SODIUM SERPL-SCNC: 137 MMOL/L — SIGNIFICANT CHANGE UP (ref 135–145)
WBC # BLD: 6.36 K/UL — SIGNIFICANT CHANGE UP (ref 3.8–10.5)
WBC # FLD AUTO: 6.36 K/UL — SIGNIFICANT CHANGE UP (ref 3.8–10.5)

## 2022-10-06 PROCEDURE — 75574 CT ANGIO HRT W/3D IMAGE: CPT | Mod: 26

## 2022-10-06 PROCEDURE — 99233 SBSQ HOSP IP/OBS HIGH 50: CPT | Mod: GC

## 2022-10-06 RX ORDER — METOPROLOL TARTRATE 50 MG
5 TABLET ORAL EVERY 6 HOURS
Refills: 0 | Status: DISCONTINUED | OUTPATIENT
Start: 2022-10-06 | End: 2022-10-06

## 2022-10-06 RX ADMIN — HEPARIN SODIUM 1150 UNIT(S)/HR: 5000 INJECTION INTRAVENOUS; SUBCUTANEOUS at 05:50

## 2022-10-06 RX ADMIN — Medication 145 MILLIGRAM(S): at 11:44

## 2022-10-06 RX ADMIN — CARVEDILOL PHOSPHATE 25 MILLIGRAM(S): 80 CAPSULE, EXTENDED RELEASE ORAL at 05:17

## 2022-10-06 RX ADMIN — INSULIN GLARGINE 36 UNIT(S): 100 INJECTION, SOLUTION SUBCUTANEOUS at 21:21

## 2022-10-06 RX ADMIN — Medication 4 UNIT(S): at 12:37

## 2022-10-06 RX ADMIN — CARVEDILOL PHOSPHATE 25 MILLIGRAM(S): 80 CAPSULE, EXTENDED RELEASE ORAL at 17:45

## 2022-10-06 RX ADMIN — BUDESONIDE AND FORMOTEROL FUMARATE DIHYDRATE 2 PUFF(S): 160; 4.5 AEROSOL RESPIRATORY (INHALATION) at 05:17

## 2022-10-06 RX ADMIN — AMLODIPINE BESYLATE 10 MILLIGRAM(S): 2.5 TABLET ORAL at 05:17

## 2022-10-06 RX ADMIN — PANTOPRAZOLE SODIUM 40 MILLIGRAM(S): 20 TABLET, DELAYED RELEASE ORAL at 05:17

## 2022-10-06 RX ADMIN — BUDESONIDE AND FORMOTEROL FUMARATE DIHYDRATE 2 PUFF(S): 160; 4.5 AEROSOL RESPIRATORY (INHALATION) at 17:46

## 2022-10-06 RX ADMIN — TICAGRELOR 90 MILLIGRAM(S): 90 TABLET ORAL at 05:17

## 2022-10-06 RX ADMIN — Medication 81 MILLIGRAM(S): at 05:17

## 2022-10-06 RX ADMIN — ATORVASTATIN CALCIUM 40 MILLIGRAM(S): 80 TABLET, FILM COATED ORAL at 21:21

## 2022-10-06 RX ADMIN — TICAGRELOR 90 MILLIGRAM(S): 90 TABLET ORAL at 17:46

## 2022-10-06 RX ADMIN — Medication 4 UNIT(S): at 17:04

## 2022-10-06 RX ADMIN — Medication 10 MILLIGRAM(S): at 21:21

## 2022-10-06 RX ADMIN — Medication 4 UNIT(S): at 07:30

## 2022-10-06 NOTE — PROGRESS NOTE ADULT - ATTENDING COMMENTS
46 y/o male with DM II, CKDIV, HTN who presented to OSH complaining of diplopia found to be in hypertensive emergency with /133. Hospital course was c/b possible acute parasagittal infarct seen on MRI imaging and NSTEMI for which pt was transferred for cath. Neuro reviewed imaging, cleared for full dose AC and DAPT. Pt currently on Asa/Brilinta as well as Heparin ggt. CTA head and neck negative for stenosis. Troponins peaked. Changed Metoprolol to Coreg for better BP control. Plan for CT coronaries. Will add additional Metoprolol to slow heart rate for CT coronaries. Nephro consulted for optimization prior to contrast loads. Will need hydration pre and post.

## 2022-10-06 NOTE — PROGRESS NOTE ADULT - PROBLEM SELECTOR PLAN 3
Parasagittal infarct noted on outside facility records, no focal findings on exam  -no lateralizing findings or focal deficits, decreased sensation likely 2/2 diabetic neuropathy  -aspirin load  -neuro consulted, recs appreciated      -CTA head/neck negative for acute stroke Parasagittal infarct noted on outside facility records, no focal findings on exam  -no lateralizing findings or focal deficits, decreased sensation likely 2/2 diabetic neuropathy  -aspirin load  -neuro consulted, recs appreciated      -CTA head/neck negative for acute stroke      -cleared for full AC

## 2022-10-06 NOTE — PROGRESS NOTE ADULT - PROBLEM SELECTOR PLAN 1
Transfer from [facility] for cardiac cath  -GDMT w/ coreg, atorvastatin (holding ACE/ARB for BILLY)  -tentatively scheduled for catheterization today pending nephro/neuro clearance  -aspirin + brilinta  -heparin gtt  -trend troponin q6h  -EKG and troponins for chest pain  -per cards, pt may benefit from coronary CT, will clear with nephro Transfer from [facility] for cardiac cath  -GDMT w/ coreg, atorvastatin (holding ACE/ARB for BILLY)  -tentatively scheduled for catheterization today pending nephro/neuro clearance  -aspirin + brilinta  -heparin gtt  -trend troponin q6h  -EKG and troponins for chest pain  -coronary CT pending, will add lopressor to meet target HR <60

## 2022-10-06 NOTE — PROGRESS NOTE ADULT - ASSESSMENT
48 y/o male, current cigarette smoker, with PMHx of Vertigo, Diverticulitis s/p LAR, ETOH abuse now sober x 5 years, DM (patient states has been on Insulin x 10 years was never on oral agents), CKD IV - baseline creatinine 2.4, HTN, AMBER, who presented to [facility] on 10/2 c/o dizziness, b/l diplopia, headache and chest tightness, admitted for c/f NSTEMI +/- possible stroke.

## 2022-10-06 NOTE — PROGRESS NOTE ADULT - ASSESSMENT
46 y/o M, current cigarette smoker, with PMHx of vertigo, ETOH abuse now sober x 5 years, DM (patient states has been on Insulin x 10 years was never on oral agents), CKD IV - baseline creatinine 2.4, HTN and AMBER.  Presented to [facility] on 10/2 c/o dizziness, b/l diplopia, headache and chest tightness.  In ED there, BP reported to be 260/133, managed with hydralazine IV.  Cardiac biomarkers were elevated with trop I peak 0.632.  EKG showed  no acute ST segment changes.  TTE demonstrated normal LV systolic fxn (EF 55-60%), no significant valvular disease or wall motion abnormality.  CT head without acute pathology but MRI reported to show L parasagittal infarct (?lacunar); neurology was following and impression was that patient's symptoms did  not correlate with MRI findings.    Patient is now transferred to [facility] in setting of NSTEMI for ?LHC.  LHC deferred for further neuro consult and medical optimization.      REC:  1.  Presenting sxs. may have predominantly been due to hypertensive emergency - BP improved.    - Continue amlodipine and Coreg.      2.  ?N-STEMI - presenting sxs. of CP and dyspnea were impressive but are now improved with BP control.  No acute EKG changes noted and troponin curve appears flat; elevated trop in part due to CKD.  No CP at this time.  - cardiac cath is not urgent  - await further neuro evaluation before proceeding with further cardiac w/u; CTA of head and neck is planned.  - ASA 81mg daily  - ticagrelor 90mg BID daily.   - continue IV heparin  - Cont atorvastatin  - repeat ECG if further episodes of CP  - continue tele  - monitor  lytes and renal function    3.  CKD  - appreciate input

## 2022-10-06 NOTE — PROGRESS NOTE ADULT - PROBLEM SELECTOR PLAN 2
Improving after hydration with IVF  -hold ACE/ARB  -nephro consulted Improving after hydration with IVF  -hold ACE/ARB  -nephro consulted, recs appreciated     -hydration pre and post contrast studies

## 2022-10-06 NOTE — PROGRESS NOTE ADULT - SUBJECTIVE AND OBJECTIVE BOX
Patient seen and examined at bedside.    Overnight Events: No acute events overnight. Denies chest pain or SOB      REVIEW OF SYSTEMS:  Constitutional:     [ x] negative [ ] fevers [ ] chills [ ] weight loss [ ] weight gain  HEENT:                  [ x] negative [ ] dry eyes [ ] eye irritation [ ] postnasal drip [ ] nasal congestion  CV:                         [x ] negative  [ ] chest pain [ ] orthopnea [ ] palpitations [ ] murmur  Resp:                     [ x] negative [ ] cough [ ] shortness of breath [ ] dyspnea [ ] wheezing [ ] sputum [ ]hemoptysis  GI:                          [ x] negative [ ] nausea [ ] vomiting [ ] diarrhea [ ] constipation [ ] abd pain [ ] dysphagia   :                        [ x] negative [ ] dysuria [ ] nocturia [ ] hematuria [ ] increased urinary frequency  Musculoskeletal: [ x] negative [ ] back pain [ ] myalgias [ ] arthralgias [ ] fracture  Skin:                       [ x] negative [ ] rash [ ] itch  Neurological:        [ x] negative [ ] headache [ ] dizziness [ ] syncope [ ] weakness [ ] numbness  Psychiatric:           [ x] negative [ ] anxiety [ ] depression  Endocrine:            [ x] negative [ ] diabetes [ ] thyroid problem  Heme/Lymph:      [ x] negative [ ] anemia [ ] bleeding problem  Allergic/Immune: [x ] negative [ ] itchy eyes [ ] nasal discharge [ ] hives [ ] angioedema    [x ] All other systems negative  [ ] Unable to assess ROS due to    Current Meds:  ALBUTerol    90 MICROgram(s) HFA Inhaler 2 Puff(s) Inhalation every 6 hours PRN  amitriptyline 10 milliGRAM(s) Oral at bedtime  amLODIPine   Tablet 10 milliGRAM(s) Oral daily  aspirin enteric coated 81 milliGRAM(s) Oral daily  atorvastatin 40 milliGRAM(s) Oral at bedtime  Biotene Dry Mouth Oral Rinse 15 milliLiter(s) Swish and Spit three times a day PRN  budesonide  80 MICROgram(s)/formoterol 4.5 MICROgram(s) Inhaler 2 Puff(s) Inhalation two times a day  carvedilol 25 milliGRAM(s) Oral every 12 hours  dextrose 5%. 1000 milliLiter(s) IV Continuous <Continuous>  dextrose 5%. 1000 milliLiter(s) IV Continuous <Continuous>  dextrose 50% Injectable 25 Gram(s) IV Push once  dextrose 50% Injectable 12.5 Gram(s) IV Push once  dextrose 50% Injectable 25 Gram(s) IV Push once  dextrose Oral Gel 15 Gram(s) Oral once PRN  fenofibrate Tablet 145 milliGRAM(s) Oral daily  glucagon  Injectable 1 milliGRAM(s) IntraMuscular once  heparin   Injectable 5000 Unit(s) IV Push every 6 hours PRN  heparin  Infusion.  Unit(s)/Hr IV Continuous <Continuous>  influenza   Vaccine 0.5 milliLiter(s) IntraMuscular once  insulin glargine Injectable (LANTUS) 36 Unit(s) SubCutaneous at bedtime  insulin lispro (ADMELOG) corrective regimen sliding scale   SubCutaneous three times a day before meals  insulin lispro (ADMELOG) corrective regimen sliding scale   SubCutaneous at bedtime  insulin lispro Injectable (ADMELOG) 4 Unit(s) SubCutaneous three times a day before meals  pantoprazole    Tablet 40 milliGRAM(s) Oral before breakfast  sodium chloride 0.9%. 1000 milliLiter(s) IV Continuous <Continuous>  ticagrelor 90 milliGRAM(s) Oral every 12 hours      PAST MEDICAL & SURGICAL HISTORY:  Diabetes      Asthma      Diverticulitis  surgery 16yrs ago      High cholesterol      Dyslipidemia      Hypertension      H/O vertigo      Diabetic ulcer of right foot      Broken toe  left pinky toe      Vertigo      HTN (hypertension)      Diabetes      History of surgery  colon resection          Vitals:  T(F): 97.9 (10-06), Max: 98 (10-05)  HR: 71 (10-06) (67 - 81)  BP: 155/77 (10-06) (102/63 - 155/77)  RR: 18 (10-06)  SpO2: 98% (10-06)  I&O's Summary    04 Oct 2022 07:01  -  05 Oct 2022 07:00  --------------------------------------------------------  IN: 0 mL / OUT: 1700 mL / NET: -1700 mL    05 Oct 2022 07:01  -  06 Oct 2022 05:39  --------------------------------------------------------  IN: 913.5 mL / OUT: 2575 mL / NET: -1661.5 mL        Physical Exam:  Appearance: No acute distress; well appearing  Eyes: PERRL, EOMI, pink conjunctiva  HENT: Normal oral mucosa  Cardiovascular: RRR, S1, S2, no murmurs, rubs, or gallops; no edema; no JVD  Respiratory: Clear to auscultation bilaterally  Gastrointestinal: soft, non-tender, non-distended with normal bowel sounds  Musculoskeletal: No clubbing; no joint deformity   Neurologic: Non-focal  Lymphatic: No lymphadenopathy  Psychiatry: AAOx3, mood & affect appropriate  Skin: No rashes, ecchymoses, or cyanosis                          11.1   6.36  )-----------( 184      ( 06 Oct 2022 04:38 )             32.8     10-06    137  |  106  |  41<H>  ----------------------------<  212<H>  4.2   |  21<L>  |  2.20<H>    Ca    8.5      06 Oct 2022 04:38  Phos  3.1     10-06  Mg     2.0     10-06    TPro  6.3  /  Alb  3.2<L>  /  TBili  0.1<L>  /  DBili  x   /  AST  17  /  ALT  13  /  AlkPhos  109  10-04    PT/INR - ( 04 Oct 2022 17:55 )   PT: 10.4 sec;   INR: 0.91 ratio         PTT - ( 06 Oct 2022 04:42 )  PTT:56.7 sec  CARDIAC MARKERS ( 05 Oct 2022 00:49 )  x     / x     / 261 U/L / x     / 10.2 ng/mL  CARDIAC MARKERS ( 04 Oct 2022 16:14 )  x     / x     / 278 U/L / x     / 10.9 ng/mL

## 2022-10-06 NOTE — PROGRESS NOTE ADULT - SUBJECTIVE AND OBJECTIVE BOX
Kayley Woods MD  PGY 1 Department of Internal Medicine        Patient is a 47y old  Male who presents with a chief complaint of NSTEMI (05 Oct 2022 07:20)      SUBJECTIVE / OVERNIGHT EVENTS: Pt seen and examined. No acute overnight events. Denies fevers, chills, CP, SOB, Abdominal pain, N/V, Constipation, Diarrhea        MEDICATIONS  (STANDING):  amitriptyline 10 milliGRAM(s) Oral at bedtime  amLODIPine   Tablet 10 milliGRAM(s) Oral daily  aspirin enteric coated 81 milliGRAM(s) Oral daily  atorvastatin 40 milliGRAM(s) Oral at bedtime  budesonide  80 MICROgram(s)/formoterol 4.5 MICROgram(s) Inhaler 2 Puff(s) Inhalation two times a day  carvedilol 25 milliGRAM(s) Oral every 12 hours  dextrose 5%. 1000 milliLiter(s) (100 mL/Hr) IV Continuous <Continuous>  dextrose 5%. 1000 milliLiter(s) (50 mL/Hr) IV Continuous <Continuous>  dextrose 50% Injectable 25 Gram(s) IV Push once  dextrose 50% Injectable 12.5 Gram(s) IV Push once  dextrose 50% Injectable 25 Gram(s) IV Push once  fenofibrate Tablet 145 milliGRAM(s) Oral daily  glucagon  Injectable 1 milliGRAM(s) IntraMuscular once  heparin  Infusion.  Unit(s)/Hr (10 mL/Hr) IV Continuous <Continuous>  influenza   Vaccine 0.5 milliLiter(s) IntraMuscular once  insulin glargine Injectable (LANTUS) 36 Unit(s) SubCutaneous at bedtime  insulin lispro (ADMELOG) corrective regimen sliding scale   SubCutaneous three times a day before meals  insulin lispro (ADMELOG) corrective regimen sliding scale   SubCutaneous at bedtime  insulin lispro Injectable (ADMELOG) 4 Unit(s) SubCutaneous three times a day before meals  pantoprazole    Tablet 40 milliGRAM(s) Oral before breakfast  sodium chloride 0.9%. 1000 milliLiter(s) (50 mL/Hr) IV Continuous <Continuous>  ticagrelor 90 milliGRAM(s) Oral every 12 hours    MEDICATIONS  (PRN):  ALBUTerol    90 MICROgram(s) HFA Inhaler 2 Puff(s) Inhalation every 6 hours PRN Shortness of Breath and/or Wheezing  Biotene Dry Mouth Oral Rinse 15 milliLiter(s) Swish and Spit three times a day PRN Mouth Care  dextrose Oral Gel 15 Gram(s) Oral once PRN Blood Glucose LESS THAN 70 milliGRAM(s)/deciliter  heparin   Injectable 5000 Unit(s) IV Push every 6 hours PRN For aPTT less than 40      I&O's Summary    05 Oct 2022 07:01  -  06 Oct 2022 07:00  --------------------------------------------------------  IN: 948 mL / OUT: 2575 mL / NET: -1627 mL        Vital Signs Last 24 Hrs  T(C): 36.6 (06 Oct 2022 04:00), Max: 36.7 (05 Oct 2022 09:55)  T(F): 97.9 (06 Oct 2022 04:00), Max: 98 (05 Oct 2022 09:55)  HR: 71 (06 Oct 2022 04:00) (67 - 81)  BP: 155/77 (06 Oct 2022 04:00) (102/63 - 155/77)  BP(mean): 94 (06 Oct 2022 04:00) (73 - 95)  RR: 18 (06 Oct 2022 04:00) (16 - 18)  SpO2: 98% (06 Oct 2022 04:00) (98% - 100%)    Parameters below as of 06 Oct 2022 04:00  Patient On (Oxygen Delivery Method): room air        CAPILLARY BLOOD GLUCOSE      POCT Blood Glucose.: 125 mg/dL (06 Oct 2022 07:18)  POCT Blood Glucose.: 263 mg/dL (05 Oct 2022 21:07)  POCT Blood Glucose.: 140 mg/dL (05 Oct 2022 16:14)  POCT Blood Glucose.: 245 mg/dL (05 Oct 2022 11:22)      PHYSICAL EXAM:  GENERAL: NAD,   HEAD:  Atraumatic, Normocephalic  EYES: EOMI, PERRL, conjunctiva and sclera clear  NECK: No JVD  CHEST/LUNG: Clear to auscultation bilaterally; No wheeze  HEART: Regular rate and rhythm; No murmurs, rubs, or gallops  ABDOMEN: Soft, Nontender, Nondistended; Bowel sounds present  EXTREMITIES:  2+ Peripheral Pulses, No clubbing, cyanosis, or edema  PSYCH: AAOx3  NEUROLOGY: non-focal  SKIN: No rashes or lesions       LABS:                        11.1   6.36  )-----------( 184      ( 06 Oct 2022 04:38 )             32.8     Auto Eosinophil # x     / Auto Eosinophil % x     / Auto Neutrophil # x     / Auto Neutrophil % x     / BANDS % x                            11.4   5.46  )-----------( 172      ( 05 Oct 2022 00:49 )             34.2     Auto Eosinophil # x     / Auto Eosinophil % x     / Auto Neutrophil # x     / Auto Neutrophil % x     / BANDS % x                            11.3   7.34  )-----------( 202      ( 04 Oct 2022 16:14 )             33.8     Auto Eosinophil # x     / Auto Eosinophil % x     / Auto Neutrophil # x     / Auto Neutrophil % x     / BANDS % x        10    137  |  106  |  41<H>  ----------------------------<  212<H>  4.2   |  21<L>  |  2.20<H>  10    133<L>  |  101  |  48<H>  ----------------------------<  322<H>  4.5   |  22  |  2.50<H>  1004    135  |  101  |  49<H>  ----------------------------<  146<H>  4.5   |  22  |  2.73<H>    Ca    8.5      06 Oct 2022 04:38  Mg     2.0     10  Phos  3.1     10-06  TPro  6.3  /  Alb  3.2<L>  /  TBili  0.1<L>  /  DBili  x   /  AST  17  /  ALT  13  /  AlkPhos  109  10    PT/INR - ( 04 Oct 2022 17:55 )   PT: 10.4 sec;   INR: 0.91 ratio         PTT - ( 06 Oct 2022 04:42 )  PTT:56.7 sec  CARDIAC MARKERS ( 05 Oct 2022 00:49 )  x     / x     / 261 U/L / x     / 10.2 ng/mL  CARDIAC MARKERS ( 04 Oct 2022 16:14 )  x     / x     / 278 U/L / x     / 10.9 ng/mL      Urinalysis Basic - ( 05 Oct 2022 00:49 )    Color: Colorless / Appearance: Clear / S.008 / pH: x  Gluc: x / Ketone: Negative  / Bili: Negative / Urobili: Negative   Blood: x / Protein: 100 mg/dL / Nitrite: Negative   Leuk Esterase: Negative / RBC: 2 /hpf / WBC 1 /HPF   Sq Epi: x / Non Sq Epi: 0 /hpf / Bacteria: Negative            RADIOLOGY & ADDITIONAL TESTS:    Imaging Personally Reviewed:    Consultant(s) Notes Reviewed:      Care Discussed with Consultants/Other Providers:   Kayley Woods MD  PGY 1 Department of Internal Medicine        Patient is a 47y old  Male who presents with a chief complaint of NSTEMI (05 Oct 2022 07:20)      SUBJECTIVE / OVERNIGHT EVENTS: Pt seen and examined. No acute overnight events. Pt continues with intermittent dizziness, diplopia, unsteady balance. Denies fevers, chills, CP, SOB, Abdominal pain, N/V, Constipation, Diarrhea        MEDICATIONS  (STANDING):  amitriptyline 10 milliGRAM(s) Oral at bedtime  amLODIPine   Tablet 10 milliGRAM(s) Oral daily  aspirin enteric coated 81 milliGRAM(s) Oral daily  atorvastatin 40 milliGRAM(s) Oral at bedtime  budesonide  80 MICROgram(s)/formoterol 4.5 MICROgram(s) Inhaler 2 Puff(s) Inhalation two times a day  carvedilol 25 milliGRAM(s) Oral every 12 hours  dextrose 5%. 1000 milliLiter(s) (100 mL/Hr) IV Continuous <Continuous>  dextrose 5%. 1000 milliLiter(s) (50 mL/Hr) IV Continuous <Continuous>  dextrose 50% Injectable 25 Gram(s) IV Push once  dextrose 50% Injectable 12.5 Gram(s) IV Push once  dextrose 50% Injectable 25 Gram(s) IV Push once  fenofibrate Tablet 145 milliGRAM(s) Oral daily  glucagon  Injectable 1 milliGRAM(s) IntraMuscular once  heparin  Infusion.  Unit(s)/Hr (10 mL/Hr) IV Continuous <Continuous>  influenza   Vaccine 0.5 milliLiter(s) IntraMuscular once  insulin glargine Injectable (LANTUS) 36 Unit(s) SubCutaneous at bedtime  insulin lispro (ADMELOG) corrective regimen sliding scale   SubCutaneous three times a day before meals  insulin lispro (ADMELOG) corrective regimen sliding scale   SubCutaneous at bedtime  insulin lispro Injectable (ADMELOG) 4 Unit(s) SubCutaneous three times a day before meals  pantoprazole    Tablet 40 milliGRAM(s) Oral before breakfast  sodium chloride 0.9%. 1000 milliLiter(s) (50 mL/Hr) IV Continuous <Continuous>  ticagrelor 90 milliGRAM(s) Oral every 12 hours    MEDICATIONS  (PRN):  ALBUTerol    90 MICROgram(s) HFA Inhaler 2 Puff(s) Inhalation every 6 hours PRN Shortness of Breath and/or Wheezing  Biotene Dry Mouth Oral Rinse 15 milliLiter(s) Swish and Spit three times a day PRN Mouth Care  dextrose Oral Gel 15 Gram(s) Oral once PRN Blood Glucose LESS THAN 70 milliGRAM(s)/deciliter  heparin   Injectable 5000 Unit(s) IV Push every 6 hours PRN For aPTT less than 40      I&O's Summary    05 Oct 2022 07:01  -  06 Oct 2022 07:00  --------------------------------------------------------  IN: 948 mL / OUT: 2575 mL / NET: -1627 mL        Vital Signs Last 24 Hrs  T(C): 36.6 (06 Oct 2022 04:00), Max: 36.7 (05 Oct 2022 09:55)  T(F): 97.9 (06 Oct 2022 04:00), Max: 98 (05 Oct 2022 09:55)  HR: 71 (06 Oct 2022 04:00) (67 - 81)  BP: 155/77 (06 Oct 2022 04:00) (102/63 - 155/77)  BP(mean): 94 (06 Oct 2022 04:00) (73 - 95)  RR: 18 (06 Oct 2022 04:00) (16 - 18)  SpO2: 98% (06 Oct 2022 04:00) (98% - 100%)    Parameters below as of 06 Oct 2022 04:00  Patient On (Oxygen Delivery Method): room air        CAPILLARY BLOOD GLUCOSE      POCT Blood Glucose.: 125 mg/dL (06 Oct 2022 07:18)  POCT Blood Glucose.: 263 mg/dL (05 Oct 2022 21:07)  POCT Blood Glucose.: 140 mg/dL (05 Oct 2022 16:14)  POCT Blood Glucose.: 245 mg/dL (05 Oct 2022 11:22)      PHYSICAL EXAM:  GENERAL: NAD,   HEAD:  Atraumatic, Normocephalic  EYES: EOMI, PERRL, conjunctiva and sclera clear  NECK: No JVD  CHEST/LUNG: Clear to auscultation bilaterally; No wheeze  HEART: Regular rate and rhythm; No murmurs, rubs, or gallops  ABDOMEN: Soft, Nontender, Nondistended; Bowel sounds present  EXTREMITIES:  2+ Peripheral Pulses, No clubbing, cyanosis, or edema  PSYCH: AAOx3  NEUROLOGY: (+) Decreased sensation affecting pedal aspect of lower extremities, mild tremor at rest and with cerebellar testing. A&Ox3. Cranial nerves intact. Normal speech. Sensation intact.  SKIN: No rashes or lesions       LABS:                        11.1   6.36  )-----------( 184      ( 06 Oct 2022 04:38 )             32.8     Auto Eosinophil # x     / Auto Eosinophil % x     / Auto Neutrophil # x     / Auto Neutrophil % x     / BANDS % x                            11.4   5.46  )-----------( 172      ( 05 Oct 2022 00:49 )             34.2     Auto Eosinophil # x     / Auto Eosinophil % x     / Auto Neutrophil # x     / Auto Neutrophil % x     / BANDS % x                            11.3   7.34  )-----------( 202      ( 04 Oct 2022 16:14 )             33.8     Auto Eosinophil # x     / Auto Eosinophil % x     / Auto Neutrophil # x     / Auto Neutrophil % x     / BANDS % x        10-06    137  |  106  |  41<H>  ----------------------------<  212<H>  4.2   |  21<L>  |  2.20<H>  10-05    133<L>  |  101  |  48<H>  ----------------------------<  322<H>  4.5   |  22  |  2.50<H>  10-04    135  |  101  |  49<H>  ----------------------------<  146<H>  4.5   |  22  |  2.73<H>    Ca    8.5      06 Oct 2022 04:38  Mg     2.0     10-06  Phos  3.1     10-06  TPro  6.3  /  Alb  3.2<L>  /  TBili  0.1<L>  /  DBili  x   /  AST  17  /  ALT  13  /  AlkPhos  109  10-04    PT/INR - ( 04 Oct 2022 17:55 )   PT: 10.4 sec;   INR: 0.91 ratio         PTT - ( 06 Oct 2022 04:42 )  PTT:56.7 sec  CARDIAC MARKERS ( 05 Oct 2022 00:49 )  x     / x     / 261 U/L / x     / 10.2 ng/mL  CARDIAC MARKERS ( 04 Oct 2022 16:14 )  x     / x     / 278 U/L / x     / 10.9 ng/mL      Urinalysis Basic - ( 05 Oct 2022 00:49 )    Color: Colorless / Appearance: Clear / S.008 / pH: x  Gluc: x / Ketone: Negative  / Bili: Negative / Urobili: Negative   Blood: x / Protein: 100 mg/dL / Nitrite: Negative   Leuk Esterase: Negative / RBC: 2 /hpf / WBC 1 /HPF   Sq Epi: x / Non Sq Epi: 0 /hpf / Bacteria: Negative            RADIOLOGY & ADDITIONAL TESTS:    Imaging Personally Reviewed:    Consultant(s) Notes Reviewed:      Care Discussed with Consultants/Other Providers:

## 2022-10-07 LAB
ANION GAP SERPL CALC-SCNC: 10 MMOL/L — SIGNIFICANT CHANGE UP (ref 5–17)
APTT BLD: 33 SEC — SIGNIFICANT CHANGE UP (ref 27.5–35.5)
BUN SERPL-MCNC: 43 MG/DL — HIGH (ref 7–23)
CALCIUM SERPL-MCNC: 8.9 MG/DL — SIGNIFICANT CHANGE UP (ref 8.4–10.5)
CHLORIDE SERPL-SCNC: 107 MMOL/L — SIGNIFICANT CHANGE UP (ref 96–108)
CO2 SERPL-SCNC: 23 MMOL/L — SIGNIFICANT CHANGE UP (ref 22–31)
CREAT SERPL-MCNC: 2.94 MG/DL — HIGH (ref 0.5–1.3)
EGFR: 25 ML/MIN/1.73M2 — LOW
GLUCOSE BLDC GLUCOMTR-MCNC: 154 MG/DL — HIGH (ref 70–99)
GLUCOSE BLDC GLUCOMTR-MCNC: 155 MG/DL — HIGH (ref 70–99)
GLUCOSE BLDC GLUCOMTR-MCNC: 170 MG/DL — HIGH (ref 70–99)
GLUCOSE BLDC GLUCOMTR-MCNC: 190 MG/DL — HIGH (ref 70–99)
GLUCOSE SERPL-MCNC: 102 MG/DL — HIGH (ref 70–99)
HCT VFR BLD CALC: 34.4 % — LOW (ref 39–50)
HGB BLD-MCNC: 11.2 G/DL — LOW (ref 13–17)
MAGNESIUM SERPL-MCNC: 2.1 MG/DL — SIGNIFICANT CHANGE UP (ref 1.6–2.6)
MCHC RBC-ENTMCNC: 30 PG — SIGNIFICANT CHANGE UP (ref 27–34)
MCHC RBC-ENTMCNC: 32.6 GM/DL — SIGNIFICANT CHANGE UP (ref 32–36)
MCV RBC AUTO: 92.2 FL — SIGNIFICANT CHANGE UP (ref 80–100)
NRBC # BLD: 0 /100 WBCS — SIGNIFICANT CHANGE UP (ref 0–0)
PHOSPHATE SERPL-MCNC: 4.2 MG/DL — SIGNIFICANT CHANGE UP (ref 2.5–4.5)
PLATELET # BLD AUTO: 186 K/UL — SIGNIFICANT CHANGE UP (ref 150–400)
POTASSIUM SERPL-MCNC: 4.5 MMOL/L — SIGNIFICANT CHANGE UP (ref 3.5–5.3)
POTASSIUM SERPL-SCNC: 4.5 MMOL/L — SIGNIFICANT CHANGE UP (ref 3.5–5.3)
RBC # BLD: 3.73 M/UL — LOW (ref 4.2–5.8)
RBC # FLD: 12.4 % — SIGNIFICANT CHANGE UP (ref 10.3–14.5)
SODIUM SERPL-SCNC: 140 MMOL/L — SIGNIFICANT CHANGE UP (ref 135–145)
WBC # BLD: 6.79 K/UL — SIGNIFICANT CHANGE UP (ref 3.8–10.5)
WBC # FLD AUTO: 6.79 K/UL — SIGNIFICANT CHANGE UP (ref 3.8–10.5)

## 2022-10-07 PROCEDURE — 99233 SBSQ HOSP IP/OBS HIGH 50: CPT

## 2022-10-07 PROCEDURE — 99232 SBSQ HOSP IP/OBS MODERATE 35: CPT | Mod: GC

## 2022-10-07 PROCEDURE — ZZZZZ: CPT

## 2022-10-07 PROCEDURE — 99233 SBSQ HOSP IP/OBS HIGH 50: CPT | Mod: GC

## 2022-10-07 RX ORDER — HEPARIN SODIUM 5000 [USP'U]/ML
5000 INJECTION INTRAVENOUS; SUBCUTANEOUS EVERY 8 HOURS
Refills: 0 | Status: DISCONTINUED | OUTPATIENT
Start: 2022-10-07 | End: 2022-10-13

## 2022-10-07 RX ORDER — CHLORHEXIDINE GLUCONATE 213 G/1000ML
1 SOLUTION TOPICAL
Refills: 0 | Status: DISCONTINUED | OUTPATIENT
Start: 2022-10-07 | End: 2022-10-20

## 2022-10-07 RX ORDER — SODIUM CHLORIDE 9 MG/ML
1000 INJECTION INTRAMUSCULAR; INTRAVENOUS; SUBCUTANEOUS
Refills: 0 | Status: DISCONTINUED | OUTPATIENT
Start: 2022-10-07 | End: 2022-10-08

## 2022-10-07 RX ORDER — SODIUM CHLORIDE 9 MG/ML
1000 INJECTION INTRAMUSCULAR; INTRAVENOUS; SUBCUTANEOUS
Refills: 0 | Status: DISCONTINUED | OUTPATIENT
Start: 2022-10-07 | End: 2022-10-07

## 2022-10-07 RX ADMIN — Medication 145 MILLIGRAM(S): at 15:50

## 2022-10-07 RX ADMIN — Medication 4 UNIT(S): at 17:01

## 2022-10-07 RX ADMIN — SODIUM CHLORIDE 80 MILLILITER(S): 9 INJECTION INTRAMUSCULAR; INTRAVENOUS; SUBCUTANEOUS at 12:34

## 2022-10-07 RX ADMIN — CHLORHEXIDINE GLUCONATE 1 APPLICATION(S): 213 SOLUTION TOPICAL at 15:52

## 2022-10-07 RX ADMIN — INSULIN GLARGINE 36 UNIT(S): 100 INJECTION, SOLUTION SUBCUTANEOUS at 21:16

## 2022-10-07 RX ADMIN — Medication 1: at 17:01

## 2022-10-07 RX ADMIN — HEPARIN SODIUM 5000 UNIT(S): 5000 INJECTION INTRAVENOUS; SUBCUTANEOUS at 15:50

## 2022-10-07 RX ADMIN — Medication 4 UNIT(S): at 07:59

## 2022-10-07 RX ADMIN — HEPARIN SODIUM 5000 UNIT(S): 5000 INJECTION INTRAVENOUS; SUBCUTANEOUS at 21:16

## 2022-10-07 RX ADMIN — SODIUM CHLORIDE 80 MILLILITER(S): 9 INJECTION INTRAMUSCULAR; INTRAVENOUS; SUBCUTANEOUS at 10:35

## 2022-10-07 RX ADMIN — Medication 10 MILLIGRAM(S): at 21:17

## 2022-10-07 RX ADMIN — TICAGRELOR 90 MILLIGRAM(S): 90 TABLET ORAL at 05:13

## 2022-10-07 RX ADMIN — CARVEDILOL PHOSPHATE 25 MILLIGRAM(S): 80 CAPSULE, EXTENDED RELEASE ORAL at 05:13

## 2022-10-07 RX ADMIN — AMLODIPINE BESYLATE 10 MILLIGRAM(S): 2.5 TABLET ORAL at 05:13

## 2022-10-07 RX ADMIN — PANTOPRAZOLE SODIUM 40 MILLIGRAM(S): 20 TABLET, DELAYED RELEASE ORAL at 05:13

## 2022-10-07 RX ADMIN — TICAGRELOR 90 MILLIGRAM(S): 90 TABLET ORAL at 17:02

## 2022-10-07 RX ADMIN — Medication 4 UNIT(S): at 12:08

## 2022-10-07 RX ADMIN — CARVEDILOL PHOSPHATE 25 MILLIGRAM(S): 80 CAPSULE, EXTENDED RELEASE ORAL at 17:02

## 2022-10-07 RX ADMIN — ATORVASTATIN CALCIUM 40 MILLIGRAM(S): 80 TABLET, FILM COATED ORAL at 21:17

## 2022-10-07 RX ADMIN — Medication 1: at 07:59

## 2022-10-07 RX ADMIN — Medication 1: at 12:08

## 2022-10-07 RX ADMIN — Medication 81 MILLIGRAM(S): at 12:09

## 2022-10-07 NOTE — PROGRESS NOTE ADULT - PROBLEM SELECTOR PLAN 1
Transfer from [facility] for cardiac cath  -GDMT w/ coreg, atorvastatin (holding ACE/ARB for BILLY)  -tentatively scheduled for catheterization today pending nephro/neuro clearance  -aspirin + brilinta  -heparin gtt  -trend troponin q6h  -EKG and troponins for chest pain  -coronary CT pending, will add lopressor to meet target HR <60 Transfer from [facility] for cardiac cath  -GDMT w/ coreg, atorvastatin (holding ACE/ARB for BILLY)  -aspirin + brilinta  -heparin gtt  -trend troponin q6h  -EKG and troponins for chest pain  -coronary CT completed, moderate stenosis of prox RCA and LAD  -cards following, will f/u recs

## 2022-10-07 NOTE — PROGRESS NOTE ADULT - ATTENDING COMMENTS
46 y/o male with DM II, CKDIV, HTN who presented to OSH complaining of diplopia found to be in hypertensive emergency with /133. Hospital course was c/b possible acute parasagittal infarct seen on MRI imaging and NSTEMI for which pt was transferred for cath. Neuro reviewed imaging, cleared for full dose AC and DAPT. Pt currently on Asa/Brilinta, Heparin ggt discontinued yesterday. CTA head and neck negative for stenosis. Troponins peaked. Changed Metoprolol to Coreg for better BP control. CT coronaries reviewed, pLAD and RCA with moderate luminal narrowing lesions. f/u Cards rec's. Nephro consulted for optimization prior to contrast loads. Cr increased, start IVF NS at 80cc/hr today

## 2022-10-07 NOTE — PROGRESS NOTE ADULT - PROBLEM SELECTOR PLAN 3
Parasagittal infarct noted on outside facility records, no focal findings on exam  -no lateralizing findings or focal deficits, decreased sensation likely 2/2 diabetic neuropathy  -aspirin load  -neuro consulted, recs appreciated      -CTA head/neck negative for acute stroke      -cleared for full AC

## 2022-10-07 NOTE — PROGRESS NOTE ADULT - PROBLEM SELECTOR PLAN 2
Improving after hydration with IVF  -hold ACE/ARB  -nephro consulted, recs appreciated     -hydration pre and post contrast studies Worsening following coronary CT  -hold ACE/ARB  -80cc/hr LR for 12 hrs  -nephro consulted, recs appreciated     -hydration pre and post contrast studies

## 2022-10-07 NOTE — PROGRESS NOTE ADULT - ASSESSMENT
48 y/o man with multiple cardiovascular risk factors, current cigarette smoker, PMHx of vertigo, ETOH abuse now sober x 5 years, DM, CKD IV - baseline creatinine 2.4, HTN presented with dizziness, diplopia, chest tightness with elevated cardiac enzymes.    --In ED at OSH BP reportedly about 260/133, managed with hydralazine IV.  --Cardardiac biomarkers were elevated trop 150--> 148 with elevated CK and MB as well.  --Please check Transthoracic echocardiogram here.  --CT heart revealed moderate proximal LAD, proximal RCA disease; prior angiography at OSH in 2021 revealed moderate disease per report as well.    --Given elevated biomarkers and chest pain (and patient endorsing progressive dyspnea on exertion) would likely opt for diagnostic coronary angiography (with possible FFR/IFR) to evaluate the hemodynamic significance of the observed CAD.  --However, Cr at this time appears prohibitive--optimize Cr if to consider angiography.  Patient is currently chest pain free and feels well.  --Nephrology optimization of Renal status recommended.  --In addition, recommend Neurology evaluation; patient still complains of diplopia and appears to have lateral nystagmus on physical exam--Neuro input recommended.  --Head CT here revealed no acute pathology.  --For now, continue medical management of CAD and HTN.  --Check TTE.  --Monitor closely on telemetry.  --Will follow.

## 2022-10-07 NOTE — PROGRESS NOTE ADULT - ASSESSMENT
47 M w/ PMHx of EtOH abuse now sober X5 years, DM, CKD IV (baseline creatinine 2.0-2.4 in July), HTN who presented to OSH with dizziness, diplopia, CP. Trop elevated with EKG changes. Patient transferred to [facility] for NSTEMI requiring LHC. Creatinine elevated to 2.7  which improved with IVF

## 2022-10-07 NOTE — PROGRESS NOTE ADULT - ATTENDING COMMENTS
Pt. with BILLY on CKD in the setting of use of IV contrast during CT coronaries performed yesterday. Scr elevated today, pt. non oliguric. Consider fluid trial. Would recommend to hold off on LHC at this time, if not emergent.   FOllow up on CKD work up.   Monitor BMP. Strict I/Os. Avoid nephrotoxins.

## 2022-10-07 NOTE — PROGRESS NOTE ADULT - SUBJECTIVE AND OBJECTIVE BOX
CC:    Interval History:     MEDICATIONS:  ALBUTerol    90 MICROgram(s) HFA Inhaler 2 Puff(s) Inhalation every 6 hours PRN  amitriptyline 10 milliGRAM(s) Oral at bedtime  amLODIPine   Tablet 10 milliGRAM(s) Oral daily  aspirin enteric coated 81 milliGRAM(s) Oral daily  atorvastatin 40 milliGRAM(s) Oral at bedtime  Biotene Dry Mouth Oral Rinse 15 milliLiter(s) Swish and Spit three times a day PRN  budesonide  80 MICROgram(s)/formoterol 4.5 MICROgram(s) Inhaler 2 Puff(s) Inhalation two times a day  carvedilol 25 milliGRAM(s) Oral every 12 hours  chlorhexidine 2% Cloths 1 Application(s) Topical <User Schedule>  dextrose 5%. 1000 milliLiter(s) IV Continuous <Continuous>  dextrose 5%. 1000 milliLiter(s) IV Continuous <Continuous>  dextrose 50% Injectable 25 Gram(s) IV Push once  dextrose 50% Injectable 12.5 Gram(s) IV Push once  dextrose 50% Injectable 25 Gram(s) IV Push once  dextrose Oral Gel 15 Gram(s) Oral once PRN  fenofibrate Tablet 145 milliGRAM(s) Oral daily  glucagon  Injectable 1 milliGRAM(s) IntraMuscular once  heparin   Injectable 5000 Unit(s) SubCutaneous every 8 hours  influenza   Vaccine 0.5 milliLiter(s) IntraMuscular once  insulin glargine Injectable (LANTUS) 36 Unit(s) SubCutaneous at bedtime  insulin lispro (ADMELOG) corrective regimen sliding scale   SubCutaneous three times a day before meals  insulin lispro (ADMELOG) corrective regimen sliding scale   SubCutaneous at bedtime  insulin lispro Injectable (ADMELOG) 4 Unit(s) SubCutaneous three times a day before meals  pantoprazole    Tablet 40 milliGRAM(s) Oral before breakfast  sodium chloride 0.9%. 1000 milliLiter(s) IV Continuous <Continuous>  ticagrelor 90 milliGRAM(s) Oral every 12 hours      LABS:  10-07    140  |  107  |  43<H>  ----------------------------<  102<H>  4.5   |  23  |  2.94<H>    Ca    8.9      07 Oct 2022 06:00  Phos  4.2     10-07  Mg     2.1     10-07                            11.2   6.79  )-----------( 186      ( 07 Oct 2022 05:58 )             34.4     PTT - ( 07 Oct 2022 05:58 )  PTT:33.0 sec          VITAL SIGNS:   T(C): 36.9 (10-07-22 @ 11:53), Max: 36.9 (10-07-22 @ 11:53)  HR: 70 (10-07-22 @ 11:53) (65 - 75)  BP: 150/80 (10-07-22 @ 11:53) (131/76 - 154/78)  RR: 18 (10-07-22 @ 11:53) (18 - 18)  SpO2: 99% (10-07-22 @ 11:53) (97% - 100%)  Daily     Daily   I&O's Summary    06 Oct 2022 07:01  -  07 Oct 2022 07:00  --------------------------------------------------------  IN: 297.5 mL / OUT: 1850 mL / NET: -1552.5 mL    TELE: Sinus rhythm no significant ectopy.    Physical Exam  Gen: Awake, NAD  HEENT: NC/AT; lateral nystagmus noted right eye   Chest: Clear to auscultation  CV: Regular, S1S2  Abd: Soft, NT, ND  Ext: No LE edema.      CT Heart with Coronaries (10/6/2022):    IMPRESSION:  1.  Predominantly noncalcified plaque in the proximal RCA causes moderate   luminal narrowing. Calcified and noncalcified plaque in the proximal LAD   causes moderate luminal narrowing. Calcified plaque in the distal LAD   causes mild luminal narrowing. Noncalcified plaque in the mid/distal   circumflex causes minimal luminal narrowing.    2.  The calculated Agatston score is 136.8.    TTE (5/2021):  Left Ventricle: Left ventricular wall thickness is mildly increased.   Global LV systolic function was normal. Spectral Doppler shows impaired   relaxation pattern of left ventricular myocardial filling (Grade I   diastolic dysfunction). Normal LV filling pressures.  Right Ventricle: Normal right ventricular size and function.  Left Atrium: Normal left atrial size. LA volume Index is 25.7 ml/m² ml/m2.  Right Atrium: Normal right atrial size.  Pericardium: There is no evidence of pericardial effusion.  Mitral Valve: The mitral valve is normal in structure. No evidence of   mitral valve stenosis. No evidence of mitral valve regurgitation is seen.  Tricuspid Valve: The tricuspid valve is normal in structure. No tricuspid   regurgitation is visualized. Adequate TR velocity was not obtained to   accurately assess RVSP.  Aortic Valve: The aortic valve is trileaflet. No evidence of aortic   stenosis. Peak transaortic gradient equals 6.8 mmHg, mean transaortic   gradient equals 3.5 mmHg, the calculated aortic valve area equals 3.73   cm² by the continuity equation consistent with normally opening aortic   valve. No evidence of aortic valve regurgitation is seen.  Pulmonic Valve: The pulmonic valve is normal. No indication of pulmonic   valve regurgitation.  Aorta: There is dilatation of the aortic root. Aortic root measured at   3.8 cm.  Venous: The inferior vena cava was normal sized, with respiratory size   variation greater than 50%.    Cath 5/2021 ([facility]):  CORONARY CIRCULATION: The coronary circulation is right dominant. Left  main: Normal. Ostial LAD: There was a 30- 40 % stenosis. Proximal LAD:  Angiography showed mild atherosclerosis Mid LAD: Angiography showed  moderate atherosclerosis Distal LAD: Angiography showed moderate  atherosclerosis Proximal circumflex: Angiography showed minor luminal  irregularities with no flow limiting lesions. Distal circumflex: There was  a 50 % stenosis at a site with no prior intervention. There was TIGIST grade  3 flow through the vessel (brisk flow). 1st obtuse marginal: Angiography  showed minor luminal irregularities with no flow limiting lesions. RCA:  Angiography showed minor luminal irregularities with no flow limiting  lesions. Right PDA: Angiography showed mild atherosclerosis with no flow  limiting lesions. 1st posterolateral segment: Angiography showed mild  atherosclerosis with no flow limiting lesions.    CT Brain (10/5/2022):  IMPRESSION:  CT brain: Stable chronic small infarcts. No acute intracranial   hemorrhage, mass effect, midline shift.  CTA neck and brain: No vessel occlusion or significant stenosis.   CC: Dizziness, diplopia, chest pain.    Interval History: No chest pain but still with diplopia.    MEDICATIONS:  ALBUTerol    90 MICROgram(s) HFA Inhaler 2 Puff(s) Inhalation every 6 hours PRN  amitriptyline 10 milliGRAM(s) Oral at bedtime  amLODIPine   Tablet 10 milliGRAM(s) Oral daily  aspirin enteric coated 81 milliGRAM(s) Oral daily  atorvastatin 40 milliGRAM(s) Oral at bedtime  Biotene Dry Mouth Oral Rinse 15 milliLiter(s) Swish and Spit three times a day PRN  budesonide  80 MICROgram(s)/formoterol 4.5 MICROgram(s) Inhaler 2 Puff(s) Inhalation two times a day  carvedilol 25 milliGRAM(s) Oral every 12 hours  chlorhexidine 2% Cloths 1 Application(s) Topical <User Schedule>  dextrose 5%. 1000 milliLiter(s) IV Continuous <Continuous>  dextrose 5%. 1000 milliLiter(s) IV Continuous <Continuous>  dextrose 50% Injectable 25 Gram(s) IV Push once  dextrose 50% Injectable 12.5 Gram(s) IV Push once  dextrose 50% Injectable 25 Gram(s) IV Push once  dextrose Oral Gel 15 Gram(s) Oral once PRN  fenofibrate Tablet 145 milliGRAM(s) Oral daily  glucagon  Injectable 1 milliGRAM(s) IntraMuscular once  heparin   Injectable 5000 Unit(s) SubCutaneous every 8 hours  influenza   Vaccine 0.5 milliLiter(s) IntraMuscular once  insulin glargine Injectable (LANTUS) 36 Unit(s) SubCutaneous at bedtime  insulin lispro (ADMELOG) corrective regimen sliding scale   SubCutaneous three times a day before meals  insulin lispro (ADMELOG) corrective regimen sliding scale   SubCutaneous at bedtime  insulin lispro Injectable (ADMELOG) 4 Unit(s) SubCutaneous three times a day before meals  pantoprazole    Tablet 40 milliGRAM(s) Oral before breakfast  sodium chloride 0.9%. 1000 milliLiter(s) IV Continuous <Continuous>  ticagrelor 90 milliGRAM(s) Oral every 12 hours      LABS:  10-07    140  |  107  |  43<H>  ----------------------------<  102<H>  4.5   |  23  |  2.94<H>    Ca    8.9      07 Oct 2022 06:00  Phos  4.2     10-07  Mg     2.1     10-07                            11.2   6.79  )-----------( 186      ( 07 Oct 2022 05:58 )             34.4     PTT - ( 07 Oct 2022 05:58 )  PTT:33.0 sec          VITAL SIGNS:   T(C): 36.9 (10-07-22 @ 11:53), Max: 36.9 (10-07-22 @ 11:53)  HR: 70 (10-07-22 @ 11:53) (65 - 75)  BP: 150/80 (10-07-22 @ 11:53) (131/76 - 154/78)  RR: 18 (10-07-22 @ 11:53) (18 - 18)  SpO2: 99% (10-07-22 @ 11:53) (97% - 100%)  Daily     Daily   I&O's Summary    06 Oct 2022 07:01  -  07 Oct 2022 07:00  --------------------------------------------------------  IN: 297.5 mL / OUT: 1850 mL / NET: -1552.5 mL    TELE: Sinus rhythm no significant ectopy.    Physical Exam  Gen: Awake, NAD  HEENT: NC/AT; lateral nystagmus noted right eye   Chest: Clear to auscultation  CV: Regular, S1S2  Abd: Soft, NT, ND  Ext: No LE edema.      CT Heart with Coronaries (10/6/2022):    IMPRESSION:  1.  Predominantly noncalcified plaque in the proximal RCA causes moderate   luminal narrowing. Calcified and noncalcified plaque in the proximal LAD   causes moderate luminal narrowing. Calcified plaque in the distal LAD   causes mild luminal narrowing. Noncalcified plaque in the mid/distal   circumflex causes minimal luminal narrowing.    2.  The calculated Agatston score is 136.8.    TTE (5/2021):  Left Ventricle: Left ventricular wall thickness is mildly increased.   Global LV systolic function was normal. Spectral Doppler shows impaired   relaxation pattern of left ventricular myocardial filling (Grade I   diastolic dysfunction). Normal LV filling pressures.  Right Ventricle: Normal right ventricular size and function.  Left Atrium: Normal left atrial size. LA volume Index is 25.7 ml/m² ml/m2.  Right Atrium: Normal right atrial size.  Pericardium: There is no evidence of pericardial effusion.  Mitral Valve: The mitral valve is normal in structure. No evidence of   mitral valve stenosis. No evidence of mitral valve regurgitation is seen.  Tricuspid Valve: The tricuspid valve is normal in structure. No tricuspid   regurgitation is visualized. Adequate TR velocity was not obtained to   accurately assess RVSP.  Aortic Valve: The aortic valve is trileaflet. No evidence of aortic   stenosis. Peak transaortic gradient equals 6.8 mmHg, mean transaortic   gradient equals 3.5 mmHg, the calculated aortic valve area equals 3.73   cm² by the continuity equation consistent with normally opening aortic   valve. No evidence of aortic valve regurgitation is seen.  Pulmonic Valve: The pulmonic valve is normal. No indication of pulmonic   valve regurgitation.  Aorta: There is dilatation of the aortic root. Aortic root measured at   3.8 cm.  Venous: The inferior vena cava was normal sized, with respiratory size   variation greater than 50%.    Cath 5/2021 ([facility]):  CORONARY CIRCULATION: The coronary circulation is right dominant. Left  main: Normal. Ostial LAD: There was a 30- 40 % stenosis. Proximal LAD:  Angiography showed mild atherosclerosis Mid LAD: Angiography showed  moderate atherosclerosis Distal LAD: Angiography showed moderate  atherosclerosis Proximal circumflex: Angiography showed minor luminal  irregularities with no flow limiting lesions. Distal circumflex: There was  a 50 % stenosis at a site with no prior intervention. There was TIGIST grade  3 flow through the vessel (brisk flow). 1st obtuse marginal: Angiography  showed minor luminal irregularities with no flow limiting lesions. RCA:  Angiography showed minor luminal irregularities with no flow limiting  lesions. Right PDA: Angiography showed mild atherosclerosis with no flow  limiting lesions. 1st posterolateral segment: Angiography showed mild  atherosclerosis with no flow limiting lesions.    CT Brain (10/5/2022):  IMPRESSION:  CT brain: Stable chronic small infarcts. No acute intracranial   hemorrhage, mass effect, midline shift.  CTA neck and brain: No vessel occlusion or significant stenosis.

## 2022-10-07 NOTE — PROGRESS NOTE ADULT - SUBJECTIVE AND OBJECTIVE BOX
Kayley Woods MD  PGY 1 Department of Internal Medicine        Patient is a 47y old  Male who presents with a chief complaint of NSTEMI (06 Oct 2022 07:28)      SUBJECTIVE / OVERNIGHT EVENTS: Pt seen and examined. No acute overnight events. Denies fevers, chills, CP, SOB, Abdominal pain, N/V, Constipation, Diarrhea        MEDICATIONS  (STANDING):  amitriptyline 10 milliGRAM(s) Oral at bedtime  amLODIPine   Tablet 10 milliGRAM(s) Oral daily  aspirin enteric coated 81 milliGRAM(s) Oral daily  atorvastatin 40 milliGRAM(s) Oral at bedtime  budesonide  80 MICROgram(s)/formoterol 4.5 MICROgram(s) Inhaler 2 Puff(s) Inhalation two times a day  carvedilol 25 milliGRAM(s) Oral every 12 hours  dextrose 5%. 1000 milliLiter(s) (100 mL/Hr) IV Continuous <Continuous>  dextrose 5%. 1000 milliLiter(s) (50 mL/Hr) IV Continuous <Continuous>  dextrose 50% Injectable 25 Gram(s) IV Push once  dextrose 50% Injectable 12.5 Gram(s) IV Push once  dextrose 50% Injectable 25 Gram(s) IV Push once  fenofibrate Tablet 145 milliGRAM(s) Oral daily  glucagon  Injectable 1 milliGRAM(s) IntraMuscular once  influenza   Vaccine 0.5 milliLiter(s) IntraMuscular once  insulin glargine Injectable (LANTUS) 36 Unit(s) SubCutaneous at bedtime  insulin lispro (ADMELOG) corrective regimen sliding scale   SubCutaneous three times a day before meals  insulin lispro (ADMELOG) corrective regimen sliding scale   SubCutaneous at bedtime  insulin lispro Injectable (ADMELOG) 4 Unit(s) SubCutaneous three times a day before meals  pantoprazole    Tablet 40 milliGRAM(s) Oral before breakfast  sodium chloride 0.9%. 1000 milliLiter(s) (50 mL/Hr) IV Continuous <Continuous>  ticagrelor 90 milliGRAM(s) Oral every 12 hours    MEDICATIONS  (PRN):  ALBUTerol    90 MICROgram(s) HFA Inhaler 2 Puff(s) Inhalation every 6 hours PRN Shortness of Breath and/or Wheezing  Biotene Dry Mouth Oral Rinse 15 milliLiter(s) Swish and Spit three times a day PRN Mouth Care  dextrose Oral Gel 15 Gram(s) Oral once PRN Blood Glucose LESS THAN 70 milliGRAM(s)/deciliter      I&O's Summary    06 Oct 2022 07:01  -  07 Oct 2022 07:00  --------------------------------------------------------  IN: 297.5 mL / OUT: 1850 mL / NET: -1552.5 mL        Vital Signs Last 24 Hrs  T(C): 36.8 (07 Oct 2022 04:18), Max: 36.8 (07 Oct 2022 04:18)  T(F): 98.2 (07 Oct 2022 04:18), Max: 98.2 (07 Oct 2022 04:18)  HR: 73 (07 Oct 2022 05:15) (65 - 75)  BP: 154/78 (07 Oct 2022 05:15) (104/60 - 154/78)  BP(mean): 70 (06 Oct 2022 09:29) (70 - 70)  RR: 18 (07 Oct 2022 04:18) (18 - 19)  SpO2: 99% (07 Oct 2022 04:18) (97% - 100%)    Parameters below as of 07 Oct 2022 04:18  Patient On (Oxygen Delivery Method): room air        CAPILLARY BLOOD GLUCOSE      POCT Blood Glucose.: 136 mg/dL (06 Oct 2022 20:37)  POCT Blood Glucose.: 117 mg/dL (06 Oct 2022 16:17)  POCT Blood Glucose.: 126 mg/dL (06 Oct 2022 11:45)  POCT Blood Glucose.: 125 mg/dL (06 Oct 2022 07:18)      PHYSICAL EXAM:  GENERAL: NAD,   HEAD:  Atraumatic, Normocephalic  EYES: EOMI, PERRL, conjunctiva and sclera clear  NECK: No JVD  CHEST/LUNG: Clear to auscultation bilaterally; No wheeze  HEART: Regular rate and rhythm; No murmurs, rubs, or gallops  ABDOMEN: Soft, Nontender, Nondistended; Bowel sounds present  EXTREMITIES:  2+ Peripheral Pulses, No clubbing, cyanosis, or edema  PSYCH: AAOx3  NEUROLOGY: non-focal  SKIN: No rashes or lesions       LABS:                        11.2   6.79  )-----------( 186      ( 07 Oct 2022 05:58 )             34.4     Auto Eosinophil # x     / Auto Eosinophil % x     / Auto Neutrophil # x     / Auto Neutrophil % x     / BANDS % x                            11.1   6.36  )-----------( 184      ( 06 Oct 2022 04:38 )             32.8     Auto Eosinophil # x     / Auto Eosinophil % x     / Auto Neutrophil # x     / Auto Neutrophil % x     / BANDS % x        10-07    140  |  107  |  43<H>  ----------------------------<  102<H>  4.5   |  23  |  2.94<H>  10-06    137  |  106  |  41<H>  ----------------------------<  212<H>  4.2   |  21<L>  |  2.20<H>    Ca    8.9      07 Oct 2022 06:00  Mg     2.1     10-07  Phos  4.2     10-07    PTT - ( 07 Oct 2022 05:58 )  PTT:33.0 sec              RADIOLOGY & ADDITIONAL TESTS:    Imaging Personally Reviewed:    Consultant(s) Notes Reviewed:      Care Discussed with Consultants/Other Providers:   Kayley Woods MD  PGY 1 Department of Internal Medicine        Patient is a 47y old  Male who presents with a chief complaint of NSTEMI (06 Oct 2022 07:28)      SUBJECTIVE / OVERNIGHT EVENTS: Pt seen and examined. No acute overnight events. Continues to have diplopia, dizziness. Endorses dyspnea on exertion when going fm bed to bathroom. Denies fevers, chills, HA, CP, Abdominal pain, N/V, Constipation, Diarrhea        MEDICATIONS  (STANDING):  amitriptyline 10 milliGRAM(s) Oral at bedtime  amLODIPine   Tablet 10 milliGRAM(s) Oral daily  aspirin enteric coated 81 milliGRAM(s) Oral daily  atorvastatin 40 milliGRAM(s) Oral at bedtime  budesonide  80 MICROgram(s)/formoterol 4.5 MICROgram(s) Inhaler 2 Puff(s) Inhalation two times a day  carvedilol 25 milliGRAM(s) Oral every 12 hours  dextrose 5%. 1000 milliLiter(s) (100 mL/Hr) IV Continuous <Continuous>  dextrose 5%. 1000 milliLiter(s) (50 mL/Hr) IV Continuous <Continuous>  dextrose 50% Injectable 25 Gram(s) IV Push once  dextrose 50% Injectable 12.5 Gram(s) IV Push once  dextrose 50% Injectable 25 Gram(s) IV Push once  fenofibrate Tablet 145 milliGRAM(s) Oral daily  glucagon  Injectable 1 milliGRAM(s) IntraMuscular once  influenza   Vaccine 0.5 milliLiter(s) IntraMuscular once  insulin glargine Injectable (LANTUS) 36 Unit(s) SubCutaneous at bedtime  insulin lispro (ADMELOG) corrective regimen sliding scale   SubCutaneous three times a day before meals  insulin lispro (ADMELOG) corrective regimen sliding scale   SubCutaneous at bedtime  insulin lispro Injectable (ADMELOG) 4 Unit(s) SubCutaneous three times a day before meals  pantoprazole    Tablet 40 milliGRAM(s) Oral before breakfast  sodium chloride 0.9%. 1000 milliLiter(s) (50 mL/Hr) IV Continuous <Continuous>  ticagrelor 90 milliGRAM(s) Oral every 12 hours    MEDICATIONS  (PRN):  ALBUTerol    90 MICROgram(s) HFA Inhaler 2 Puff(s) Inhalation every 6 hours PRN Shortness of Breath and/or Wheezing  Biotene Dry Mouth Oral Rinse 15 milliLiter(s) Swish and Spit three times a day PRN Mouth Care  dextrose Oral Gel 15 Gram(s) Oral once PRN Blood Glucose LESS THAN 70 milliGRAM(s)/deciliter      I&O's Summary    06 Oct 2022 07:01  -  07 Oct 2022 07:00  --------------------------------------------------------  IN: 297.5 mL / OUT: 1850 mL / NET: -1552.5 mL        Vital Signs Last 24 Hrs  T(C): 36.8 (07 Oct 2022 04:18), Max: 36.8 (07 Oct 2022 04:18)  T(F): 98.2 (07 Oct 2022 04:18), Max: 98.2 (07 Oct 2022 04:18)  HR: 73 (07 Oct 2022 05:15) (65 - 75)  BP: 154/78 (07 Oct 2022 05:15) (104/60 - 154/78)  BP(mean): 70 (06 Oct 2022 09:29) (70 - 70)  RR: 18 (07 Oct 2022 04:18) (18 - 19)  SpO2: 99% (07 Oct 2022 04:18) (97% - 100%)    Parameters below as of 07 Oct 2022 04:18  Patient On (Oxygen Delivery Method): room air        CAPILLARY BLOOD GLUCOSE      POCT Blood Glucose.: 136 mg/dL (06 Oct 2022 20:37)  POCT Blood Glucose.: 117 mg/dL (06 Oct 2022 16:17)  POCT Blood Glucose.: 126 mg/dL (06 Oct 2022 11:45)  POCT Blood Glucose.: 125 mg/dL (06 Oct 2022 07:18)      PHYSICAL EXAM:  GENERAL: NAD,   HEAD:  Atraumatic, Normocephalic  EYES: EOMI, PERRL, conjunctiva and sclera clear  NECK: No JVD  CHEST/LUNG: Clear to auscultation bilaterally; No wheeze  HEART: Regular rate and rhythm; No murmurs, rubs, or gallops  ABDOMEN: Soft, Nontender, Nondistended; Bowel sounds present  EXTREMITIES:  2+ Peripheral Pulses, No clubbing, cyanosis, or edema  PSYCH: AAOx3  NEUROLOGY: (+) Decreased sensation affecting pedal aspect of lower extremities, mild tremor at rest and with cerebellar testing. Unable to abduct R eye upon cranial nerve testing. A&Ox3. Cranial nerves otherwise intact. Normal speech. Sensation intact.  SKIN: No rashes or lesions       LABS:                        11.2   6.79  )-----------( 186      ( 07 Oct 2022 05:58 )             34.4     Auto Eosinophil # x     / Auto Eosinophil % x     / Auto Neutrophil # x     / Auto Neutrophil % x     / BANDS % x                            11.1   6.36  )-----------( 184      ( 06 Oct 2022 04:38 )             32.8     Auto Eosinophil # x     / Auto Eosinophil % x     / Auto Neutrophil # x     / Auto Neutrophil % x     / BANDS % x        10-07    140  |  107  |  43<H>  ----------------------------<  102<H>  4.5   |  23  |  2.94<H>  10-06    137  |  106  |  41<H>  ----------------------------<  212<H>  4.2   |  21<L>  |  2.20<H>    Ca    8.9      07 Oct 2022 06:00  Mg     2.1     10-07  Phos  4.2     10-07    PTT - ( 07 Oct 2022 05:58 )  PTT:33.0 sec

## 2022-10-07 NOTE — PROGRESS NOTE ADULT - ASSESSMENT
48 y/o male, current cigarette smoker, with PMHx of Vertigo, Diverticulitis s/p LAR, ETOH abuse now sober x 5 years, DM (patient states has been on Insulin x 10 years was never on oral agents), CKD IV - baseline creatinine 2.4, HTN, AMBER, who presented to [facility] on 10/2 c/o dizziness, b/l diplopia, headache and chest tightness, admitted for c/f NSTEMI +/- possible stroke.

## 2022-10-08 ENCOUNTER — TRANSCRIPTION ENCOUNTER (OUTPATIENT)
Age: 47
End: 2022-10-08

## 2022-10-08 DIAGNOSIS — H53.2 DIPLOPIA: ICD-10-CM

## 2022-10-08 LAB
ALBUMIN SERPL ELPH-MCNC: 3 G/DL — LOW (ref 3.3–5)
ALP SERPL-CCNC: 116 U/L — SIGNIFICANT CHANGE UP (ref 40–120)
ALT FLD-CCNC: 27 U/L — SIGNIFICANT CHANGE UP (ref 10–45)
ANION GAP SERPL CALC-SCNC: 10 MMOL/L — SIGNIFICANT CHANGE UP (ref 5–17)
AST SERPL-CCNC: 26 U/L — SIGNIFICANT CHANGE UP (ref 10–40)
BILIRUB SERPL-MCNC: 0.1 MG/DL — LOW (ref 0.2–1.2)
BUN SERPL-MCNC: 44 MG/DL — HIGH (ref 7–23)
CALCIUM SERPL-MCNC: 8.5 MG/DL — SIGNIFICANT CHANGE UP (ref 8.4–10.5)
CHLORIDE SERPL-SCNC: 108 MMOL/L — SIGNIFICANT CHANGE UP (ref 96–108)
CK MB CFR SERPL CALC: 10.6 NG/ML — HIGH (ref 0–6.7)
CO2 SERPL-SCNC: 22 MMOL/L — SIGNIFICANT CHANGE UP (ref 22–31)
CREAT SERPL-MCNC: 3.04 MG/DL — HIGH (ref 0.5–1.3)
EGFR: 24 ML/MIN/1.73M2 — LOW
GLUCOSE BLDC GLUCOMTR-MCNC: 146 MG/DL — HIGH (ref 70–99)
GLUCOSE BLDC GLUCOMTR-MCNC: 218 MG/DL — HIGH (ref 70–99)
GLUCOSE BLDC GLUCOMTR-MCNC: 97 MG/DL — SIGNIFICANT CHANGE UP (ref 70–99)
GLUCOSE SERPL-MCNC: 120 MG/DL — HIGH (ref 70–99)
HCT VFR BLD CALC: 33.1 % — LOW (ref 39–50)
HGB BLD-MCNC: 10.8 G/DL — LOW (ref 13–17)
MAGNESIUM SERPL-MCNC: 2.1 MG/DL — SIGNIFICANT CHANGE UP (ref 1.6–2.6)
MCHC RBC-ENTMCNC: 30.7 PG — SIGNIFICANT CHANGE UP (ref 27–34)
MCHC RBC-ENTMCNC: 32.6 GM/DL — SIGNIFICANT CHANGE UP (ref 32–36)
MCV RBC AUTO: 94 FL — SIGNIFICANT CHANGE UP (ref 80–100)
MRSA PCR RESULT.: SIGNIFICANT CHANGE UP
NRBC # BLD: 0 /100 WBCS — SIGNIFICANT CHANGE UP (ref 0–0)
PHOSPHATE SERPL-MCNC: 3.6 MG/DL — SIGNIFICANT CHANGE UP (ref 2.5–4.5)
PLATELET # BLD AUTO: 170 K/UL — SIGNIFICANT CHANGE UP (ref 150–400)
POTASSIUM SERPL-MCNC: 4.5 MMOL/L — SIGNIFICANT CHANGE UP (ref 3.5–5.3)
POTASSIUM SERPL-SCNC: 4.5 MMOL/L — SIGNIFICANT CHANGE UP (ref 3.5–5.3)
PROT SERPL-MCNC: 6.1 G/DL — SIGNIFICANT CHANGE UP (ref 6–8.3)
RBC # BLD: 3.52 M/UL — LOW (ref 4.2–5.8)
RBC # FLD: 12.3 % — SIGNIFICANT CHANGE UP (ref 10.3–14.5)
S AUREUS DNA NOSE QL NAA+PROBE: SIGNIFICANT CHANGE UP
SODIUM SERPL-SCNC: 140 MMOL/L — SIGNIFICANT CHANGE UP (ref 135–145)
TROPONIN T, HIGH SENSITIVITY RESULT: 134 NG/L — HIGH (ref 0–51)
WBC # BLD: 6.79 K/UL — SIGNIFICANT CHANGE UP (ref 3.8–10.5)
WBC # FLD AUTO: 6.79 K/UL — SIGNIFICANT CHANGE UP (ref 3.8–10.5)

## 2022-10-08 PROCEDURE — 99232 SBSQ HOSP IP/OBS MODERATE 35: CPT | Mod: GC

## 2022-10-08 PROCEDURE — 93010 ELECTROCARDIOGRAM REPORT: CPT | Mod: 76

## 2022-10-08 PROCEDURE — 99233 SBSQ HOSP IP/OBS HIGH 50: CPT | Mod: GC

## 2022-10-08 PROCEDURE — 76770 US EXAM ABDO BACK WALL COMP: CPT | Mod: 26

## 2022-10-08 PROCEDURE — 93306 TTE W/DOPPLER COMPLETE: CPT | Mod: 26

## 2022-10-08 RX ORDER — SODIUM CHLORIDE 9 MG/ML
1000 INJECTION INTRAMUSCULAR; INTRAVENOUS; SUBCUTANEOUS
Refills: 0 | Status: DISCONTINUED | OUTPATIENT
Start: 2022-10-08 | End: 2022-10-14

## 2022-10-08 RX ADMIN — Medication 4 UNIT(S): at 16:53

## 2022-10-08 RX ADMIN — PANTOPRAZOLE SODIUM 40 MILLIGRAM(S): 20 TABLET, DELAYED RELEASE ORAL at 05:46

## 2022-10-08 RX ADMIN — CHLORHEXIDINE GLUCONATE 1 APPLICATION(S): 213 SOLUTION TOPICAL at 05:51

## 2022-10-08 RX ADMIN — CARVEDILOL PHOSPHATE 25 MILLIGRAM(S): 80 CAPSULE, EXTENDED RELEASE ORAL at 05:46

## 2022-10-08 RX ADMIN — AMLODIPINE BESYLATE 10 MILLIGRAM(S): 2.5 TABLET ORAL at 05:46

## 2022-10-08 RX ADMIN — TICAGRELOR 90 MILLIGRAM(S): 90 TABLET ORAL at 17:22

## 2022-10-08 RX ADMIN — HEPARIN SODIUM 5000 UNIT(S): 5000 INJECTION INTRAVENOUS; SUBCUTANEOUS at 05:46

## 2022-10-08 RX ADMIN — Medication 2: at 11:46

## 2022-10-08 RX ADMIN — CARVEDILOL PHOSPHATE 25 MILLIGRAM(S): 80 CAPSULE, EXTENDED RELEASE ORAL at 17:22

## 2022-10-08 RX ADMIN — INSULIN GLARGINE 36 UNIT(S): 100 INJECTION, SOLUTION SUBCUTANEOUS at 23:03

## 2022-10-08 RX ADMIN — Medication 81 MILLIGRAM(S): at 11:12

## 2022-10-08 RX ADMIN — Medication 10 MILLIGRAM(S): at 23:02

## 2022-10-08 RX ADMIN — SODIUM CHLORIDE 80 MILLILITER(S): 9 INJECTION INTRAMUSCULAR; INTRAVENOUS; SUBCUTANEOUS at 11:10

## 2022-10-08 RX ADMIN — Medication 145 MILLIGRAM(S): at 11:12

## 2022-10-08 RX ADMIN — Medication 4 UNIT(S): at 11:46

## 2022-10-08 RX ADMIN — BUDESONIDE AND FORMOTEROL FUMARATE DIHYDRATE 2 PUFF(S): 160; 4.5 AEROSOL RESPIRATORY (INHALATION) at 17:22

## 2022-10-08 RX ADMIN — TICAGRELOR 90 MILLIGRAM(S): 90 TABLET ORAL at 05:52

## 2022-10-08 RX ADMIN — HEPARIN SODIUM 5000 UNIT(S): 5000 INJECTION INTRAVENOUS; SUBCUTANEOUS at 16:06

## 2022-10-08 RX ADMIN — HEPARIN SODIUM 5000 UNIT(S): 5000 INJECTION INTRAVENOUS; SUBCUTANEOUS at 23:02

## 2022-10-08 RX ADMIN — ATORVASTATIN CALCIUM 40 MILLIGRAM(S): 80 TABLET, FILM COATED ORAL at 23:02

## 2022-10-08 NOTE — PROGRESS NOTE ADULT - PROBLEM SELECTOR PLAN 5
DVT ppx: heparin  Diet: DASH/TLC  Full code -on 40u long-acting, 5u short-acting pre-meal; lantus recently decreased for pt hypoglycemia  -36u lantus, 4u pre-meal short acting.

## 2022-10-08 NOTE — PHYSICAL THERAPY INITIAL EVALUATION ADULT - PLANNED THERAPY INTERVENTIONS, PT EVAL
Understanding Carbuncles    A carbuncle is a painful boil under the skin. It happens when a group of hair follicles become infected. Follicles are the tiny holes from which hair grows out of your skin.  How to say it  Asmita   What causes carbuncles?  A carbuncle is caused by an infection with the bacteria Staphylococcus aureus. They are common on areas of the body where friction and sweat occur. They usually appear on the back of the neck, back, and thighs. This type of infection can also happen when the skin is injured, such as by a cut or bug bite.  The bacteria that causes carbuncles can spread easily from person to person. People at higher risk for boils are those with diabetes or a weak immune system. Drug users who use needles are also more likely to get them.  Symptoms of carbuncles  A carbuncle starts as a small painful bump. But it can grow quickly. It may become:  · Red  · Swollen  · Tender  Carbuncles may ooze pus. They may also cause fever and a general feeling of illness.  Treatment for carbuncles  A carbuncle may heal on its own in a few weeks. But the pus within it needs to come out first. Treatment options include:  · Warm compress. Putting a warm, wet washcloth on the boil will help the pus drain out. You should never try to pop a boil. That can cause the infection to spread.  · Surgical drainage. If the boil doesnt drain on its own, your healthcare provider may need to cut into it.  · Antibiotics. In some cases, oral antibiotics may be prescribed to fight the infection, especially if the carbuncle returns. You will likely have to take the medicine for 5 to 7 days. You may need to take it longer for a severe case.  · Good hygiene. Proper handwashing can prevent boils from spreading and coming back. Also wash things that have been in contact with the carbuncle in hot water. This includes items such as clothing and towels.  Complications of carbuncles  The main complication of a carbuncle  is the spreading of the infection. The bacteria can infect the heart and bone. It can also lead to septic shock, an infection of the entire body.  When to call your healthcare provider  Call your healthcare provider right away if you have any of these:  · Fever of 100.4°F (38°C) or higher, or as directed  · Redness, swelling, or fluid leaking from a carbuncle that gets worse  · Pain that gets worse  · Symptoms that dont get better, or get worse  · New symptoms   Date Last Reviewed: 5/1/2016 © 2000-2017 [employer]. [address] [place] [address]. All rights reserved. This information is not intended as a substitute for professional medical care. Always follow your healthcare professional's instructions.         balance training/bed mobility training/gait training/strengthening/transfer training

## 2022-10-08 NOTE — OCCUPATIONAL THERAPY INITIAL EVALUATION ADULT - PERTINENT HX OF CURRENT PROBLEM, REHAB EVAL
46 y/o male who presented to [facility] on 10/2 c/o dizziness, b/l diplopia, headache and chest tightness.  In ED @ OSH /133, managed with hydralazine IV, Cardiac biomarkers elevated with trop I peak 0.632, EKG with ST-T abnormality no acute ST segment changes, TTE demonstrating normal LV systolic fxn (EF 55-60), no significant valvular disease/[facility] Cardiology following.  CT head without acute pathology, MRI with left parasagital infarct, Neurology following, impression was patients symptoms do not correlate with MRI findings.  Opthamology consulted, recommended f/u as o/p.  Patient is now transferred to [facility] in setting of NSTEMI for LHC. LHC deferred for Neuro consult and medical optimization. CT Angio Heart 10/5- Predominantly noncalcified plaque in the proximal RCA causes moderate luminal narrowing. Calcified and noncalcified plaque in the proximal LAD causes moderate luminal narrowing. Calcified plaque in the distal LAD causes mild luminal narrowing. Noncalcified plaque in the mid/distal circumflex causes minimal luminal narrowing.CT brain 10/4: Stable chronic small infarcts. No acute intracranial hemorrhage, mass effect, midline shift. CTA neck and brain 10/4: No vessel occlusion or significant stenosis.

## 2022-10-08 NOTE — DISCHARGE NOTE PROVIDER - NSDCFUADDAPPT_GEN_ALL_CORE_FT
APPTS ARE READY TO BE MADE: [ ] YES    Best Family or Patient Contact (if needed):    Additional Information about above appointments (if needed):    1: ophthalmology appointment   2:   3:     Other comments or requests:    APPTS ARE READY TO BE MADE: [ ] YES    Best Family or Patient Contact (if needed):    Additional Information about above appointments (if needed):    1: ophthalmology appointment   2:   3:     Other comments or requests:           Interventional radiology Follow up:    - Will perform renal Biopsy on Monday 10/24/22 @ 0800 as outpatient in Interventional Radiology    - JAKE byrne @ [facility] on Saturday 10/22/22 @ 4392   APPTS ARE READY TO BE MADE: [ x] YES    Best Family or Patient Contact (if needed):    Additional Information about above appointments (if needed):    1: ophthalmology appointment   2: nephrology appointment  3:     Other comments or requests:           Interventional radiology Follow up:    - Will perform renal Biopsy on Monday 10/24/22 @ 0800 as outpatient in Interventional Radiology    - JAKE byrne @ [facility] on Saturday 10/22/22 @ 2078

## 2022-10-08 NOTE — OCCUPATIONAL THERAPY INITIAL EVALUATION ADULT - NS ASR FOLLOW COMMAND OT EVAL
100% of the time/able to follow multistep instructions mild stuttering of words, speech production deficits?/100% of the time/able to follow multistep instructions

## 2022-10-08 NOTE — PROGRESS NOTE ADULT - PROBLEM SELECTOR PLAN 1
Patient with creatinine ~1.7 in May, increased to 2-2.4 in July. Now patient presented to [facility] for LHC, creatinine 2.7. Patient likely has baseline CKD from long standing DM (has been on insulin for ?10 years). Patient received neuro imaging with contrast on 10/4 and CT angio cardiac with pre/post hydration. Creat did improve but now rising again.    UA showed 100 protein, trace blood, Uprot/creat 3.4. Pending HBSAg, HCV Ab, SIFE, and PLA2R. Obtain Renal US . Discussed with patient the risks and benefits of LHC and contrast required, patient understands the risks of CKD progression but knows the cardiac procedure is necessary.  Kaushik Score 10 points with 14% of post- PCI BRENDEN. Would recommend IVF 80/hour for 12 hours. Would not recommend doing a LHC today. Avoid nephrotoxic agents. Dose all meds appropriate for GFR.     Monitor strict UOP.     If you have any questions, please feel free to contact me  Russ Croft  Nephrology Fellow  [phone]; Prefer [employer] TEAMS  (After 5pm or on weekends please page the on-call fellow)

## 2022-10-08 NOTE — DISCHARGE NOTE PROVIDER - NSDCFUSCHEDAPPT_GEN_ALL_CORE_FT
Maurizio Ewing  Health system Physician [facility]  UROLOGY [address] [place] [address]  Scheduled Appointment: 10/19/2022     [facility]  [facility]  Communit  Scheduled Appointment: 10/24/2022     [facility]  [facility]  Communit  Scheduled Appointment: 10/24/2022    Doctor, Unknown  [facility]  Scheduled Appointment: 10/24/2022     [facility]  ULTR[facility]  Communit  Scheduled Appointment: 10/24/2022    Doctor, Unknown  [facility]  [facility] PRA-Chucky  Scheduled Appointment: 10/24/2022    Debbie Fofana  [facility]  INTMED [id number] [address]  Scheduled Appointment: 11/07/2022

## 2022-10-08 NOTE — DISCHARGE NOTE PROVIDER - PROVIDER TOKENS
FREE:[LAST:[Ct],FIRST:[Debbie MACIAS],PHONE:[phone]],FAX:[phone]],ADDRESS:[[address] [place] [address]],SCHEDULEDAPPT:[11/07/2022],SCHEDULEDAPPTTIME:[11:45 AM]]

## 2022-10-08 NOTE — PROGRESS NOTE ADULT - ASSESSMENT
46 y/o male, current cigarette smoker, with PMHx of Vertigo, Diverticulitis s/p LAR, ETOH abuse now sober x 5 years, DM (patient states has been on Insulin x 10 years was never on oral agents), CKD IV - baseline creatinine 2.4, HTN, AMBER, who presented to [facility] on 10/2 c/o dizziness, b/l diplopia, headache and chest tightness, admitted for c/f NSTEMI +/- possible stroke.

## 2022-10-08 NOTE — PROGRESS NOTE ADULT - PROBLEM SELECTOR PLAN 2
Worsening following coronary CT  -hold ACE/ARB  -80cc/hr LR for 12 hrs  -nephro consulted, recs appreciated     -hydration pre and post contrast studies Pt had diplopia since 09/30, initially started as decreased visual acuity. Evaluated by opthalmology and neurology at [facility], considered 6th nerve palsy. Also found to have perisagittal infarcts on MRI w/o con @ [facility]  -MRI w/ IV contrast pending, neuro to evaluate

## 2022-10-08 NOTE — PROGRESS NOTE ADULT - ASSESSMENT
47 M w/ PMHx of EtOH abuse now sober X5 years, DM, CKD IV (baseline creatinine 2.0-2.4 in July), HTN who presented to OSH with dizziness, diplopia, CP. Trop elevated with EKG changes. Patient transferred to [facility] for NSTEMI requiring LHC. Creatinine elevated to 2.7  which improved with IVF

## 2022-10-08 NOTE — CHART NOTE - NSCHARTNOTEFT_GEN_A_CORE
Around 10pm patient was complaining of new onset chest tightness and shortness of breath. At that time, patient's vital signs were stable. Patient's BP was 140's/ 80's, HR was 73, and saturating 98%.     Physical Exam:    General: in mild distress  Lungs: clear lung fields bilaterally, no wheezes, rales or rhonchi   Heart: normal s1,s2, no murmurs, rubs or gallops   Extremities: warm, well perfused, no lower extremity edema     At that time, an EKG was performed and reviewed with cardiology fellow and showed no evidence of any acute ST elevation or T wave changes. Trops were 134, with previous one on 8/5 being 148. Given EKG findings and a continued downward trend in troponin, will continue to monitor for now. Around 10pm patient was complaining of new onset chest tightness and shortness of breath. At that time, patient's vital signs were stable. Patient's BP was 140's/ 80's, HR was 73, and saturating 98%.     Physical Exam:    General: in mild distress  Neck: no JVD  Lungs: clear lung fields bilaterally, no wheezes, rales or rhonchi   Heart: normal s1,s2, no murmurs, rubs or gallops   Extremities: warm, well perfused, no lower extremity edema     At that time, an EKG was performed and reviewed with cardiology fellow and showed no evidence of any acute ST elevation or T wave changes. Trops were 134, with previous one on 8/5 being 148. Given EKG findings and a continuing downward trend in troponin, will continue to monitor for now.

## 2022-10-08 NOTE — DISCHARGE NOTE PROVIDER - NSFOLLOWUPCLINICSTOKEN_GEN_ALL_ED_FT
[id number]: || ||00\01||False; 171148: || ||00\01||False;157949: || ||00\01||False; 541750: || ||00\01||False;643645: || ||00\01||False;600279: || ||00\01||False; 213749: || ||00\01||False;792774: || ||00\01||False;636365: || ||00\01||False;490375: || ||00\01||False; 086073: || ||00\01||False;464658: || ||00\01||False;959286: || ||00\01||False;046609: || ||00\01||False; 940079:2 weeks|| ||00\01||False;613110:2 weeks|| ||00\01||False;887744:1 month|| ||00\01||False;387684:1 month|| ||00\01||False;203225:2 weeks|| ||00\01||False;

## 2022-10-08 NOTE — PHYSICAL THERAPY INITIAL EVALUATION ADULT - ADDITIONAL COMMENTS
Pt lives in an apartment alone there are 10 steps to enter. Pt performed ADL/IADLs independently. Ambulates with no assistive device. Owns DME: none

## 2022-10-08 NOTE — PROGRESS NOTE ADULT - PROBLEM SELECTOR PLAN 4
-on 40u long-acting, 5u short-acting pre-meal; lantus recently decreased for pt hypoglycemia  -36u lantus, 4u pre-meal short acting. Parasagittal infarct noted on outside facility records, no focal findings on exam  -no lateralizing findings or focal deficits, decreased sensation likely 2/2 diabetic neuropathy  -aspirin + brilinta  -neuro consulted, recs appreciated      -CTA head/neck negative for acute stroke      -cleared for full AC

## 2022-10-08 NOTE — DISCHARGE NOTE PROVIDER - NSFOLLOWUPCLINICS_GEN_ALL_ED_FT
[facility] Ophthalmology  Ophthalmology  [address] [place] [address], [facility] [address]  [place] [address]  Phone: [phone]  Fax:      [facility] Ophthalmology  Ophthalmology  [address] [place] [address], [facility] [address]  [place] [address]  Phone: [phone]  Fax:     [facility] Specialty Clin[facility]  Neurology  [address] [place] [address]  Phone: [phone]  Fax:      [facility] Ophthalmology  Ophthalmology  [address] [facility], [facility] [address]  [place] [address]  Phone: [phone]  Fax:     [facility] Specialty Clin[facility]  Neurology  [address], [facility] - 3rd [place] [address]  Phone: [phone]  Fax:     [facility]  Nephrology  [address]nd [place] [address]  Phone: [phone]  Fax:      [facility]  Nephrology  [address], [address]nd [place] [address]  Phone: [phone]  Fax:     [facility] Ophthalmology  Ophthalmology  [address] [facility] [address]  [place] [address]  Phone: [phone]  Fax:     [facility]  Neurology  [address], [facility] - 3rd [place] [address]  Phone: [phone]  Fax:     [facility] Pulmonolgy and Sleep Medicine  Pulmonology  [address] [facility], [address]  [place] [address]  Phone: [phone]  Fax:      [facility]  Nephrology  [address], [address]nd [place] [address]  Phone: [phone]  Fax:     [facility] Ophthalmology  Ophthalmology  [address] [facility], [address]  [place] [address]  Phone: [phone]  Fax:     [facility]monolgy [facility] Sleep [facility]  Pulmonology  [address] [facility], [address]  [place] [address]  Phone: [phone]  Fax:     [facility]  [facility]rology  [address] [facility] [address] [place] [address]  Phone: [phone]  Fax:      [facility]  Nephrology  [address], [address]nd [place] [address]  Phone: [phone]  Fax:   Follow Up Time: 2 weeks    [facility] Ophthalmology  Ophthalmology  [address] [facility] [facility] [address]  [place] [address]  Phone: [phone]  Fax:   Follow Up Time: 2 weeks    [facility] Pulmonolgy and Sleep [facility]  Pulmonology  [address] [facility]  [place] [address]  Phone: [phone]  Fax:   Follow Up Time: 1 month    [facility]  Neurology  [address] [facility] - 3rd [place] [address]  Phone: [phone]  Fax:   Follow Up Time: 1 month    [facility]  Cardiology  [address] [place] [address]  Phone: [phone]  Fax:   Follow Up Time: 2 weeks

## 2022-10-08 NOTE — PHYSICAL THERAPY INITIAL EVALUATION ADULT - NSPTDISCHREC_GEN_A_CORE
Detail Level: Zone Detail Level: Generalized Detail Level: Detailed Subacute Rehab to improve functional mobility and independence. If pt goes home, recommend Home PT, caregiver assist with all OOB mobility/ADLs, RW, 3:1 commode, and transport w/c for safe house entry to encourage energy conservation and reduce fall risk./Sub-acute Rehab

## 2022-10-08 NOTE — PROGRESS NOTE ADULT - PROBLEM SELECTOR PLAN 3
Parasagittal infarct noted on outside facility records, no focal findings on exam  -no lateralizing findings or focal deficits, decreased sensation likely 2/2 diabetic neuropathy  -aspirin load  -neuro consulted, recs appreciated      -CTA head/neck negative for acute stroke      -cleared for full AC Worsening following coronary CT  -hold ACE/ARB  -80cc/hr LR for 12 hrs  -nephro consulted, recs appreciated     -hydration pre and post contrast studies

## 2022-10-08 NOTE — PROGRESS NOTE ADULT - SUBJECTIVE AND OBJECTIVE BOX
Kayley Woods MD  PGY 1 Department of Internal Medicine        Patient is a 47y old  Male who presents with a chief complaint of Chest pain, elevated cardiac biomarkers (07 Oct 2022 13:24)      SUBJECTIVE / OVERNIGHT EVENTS: Pt seen and examined. No acute overnight events. Denies fevers, chills, CP, SOB, Abdominal pain, N/V, Constipation, Diarrhea        MEDICATIONS  (STANDING):  amitriptyline 10 milliGRAM(s) Oral at bedtime  amLODIPine   Tablet 10 milliGRAM(s) Oral daily  aspirin enteric coated 81 milliGRAM(s) Oral daily  atorvastatin 40 milliGRAM(s) Oral at bedtime  budesonide  80 MICROgram(s)/formoterol 4.5 MICROgram(s) Inhaler 2 Puff(s) Inhalation two times a day  carvedilol 25 milliGRAM(s) Oral every 12 hours  chlorhexidine 2% Cloths 1 Application(s) Topical <User Schedule>  dextrose 5%. 1000 milliLiter(s) (50 mL/Hr) IV Continuous <Continuous>  dextrose 5%. 1000 milliLiter(s) (100 mL/Hr) IV Continuous <Continuous>  dextrose 50% Injectable 25 Gram(s) IV Push once  dextrose 50% Injectable 12.5 Gram(s) IV Push once  dextrose 50% Injectable 25 Gram(s) IV Push once  fenofibrate Tablet 145 milliGRAM(s) Oral daily  glucagon  Injectable 1 milliGRAM(s) IntraMuscular once  heparin   Injectable 5000 Unit(s) SubCutaneous every 8 hours  influenza   Vaccine 0.5 milliLiter(s) IntraMuscular once  insulin glargine Injectable (LANTUS) 36 Unit(s) SubCutaneous at bedtime  insulin lispro (ADMELOG) corrective regimen sliding scale   SubCutaneous three times a day before meals  insulin lispro (ADMELOG) corrective regimen sliding scale   SubCutaneous at bedtime  insulin lispro Injectable (ADMELOG) 4 Unit(s) SubCutaneous three times a day before meals  pantoprazole    Tablet 40 milliGRAM(s) Oral before breakfast  sodium chloride 0.9%. 1000 milliLiter(s) (80 mL/Hr) IV Continuous <Continuous>  ticagrelor 90 milliGRAM(s) Oral every 12 hours    MEDICATIONS  (PRN):  ALBUTerol    90 MICROgram(s) HFA Inhaler 2 Puff(s) Inhalation every 6 hours PRN Shortness of Breath and/or Wheezing  Biotene Dry Mouth Oral Rinse 15 milliLiter(s) Swish and Spit three times a day PRN Mouth Care  dextrose Oral Gel 15 Gram(s) Oral once PRN Blood Glucose LESS THAN 70 milliGRAM(s)/deciliter      I&O's Summary    07 Oct 2022 07:01  -  08 Oct 2022 07:00  --------------------------------------------------------  IN: 720 mL / OUT: 0 mL / NET: 720 mL        Vital Signs Last 24 Hrs  T(C): 36.6 (08 Oct 2022 04:28), Max: 36.9 (07 Oct 2022 11:53)  T(F): 97.9 (08 Oct 2022 04:28), Max: 98.4 (07 Oct 2022 11:53)  HR: 73 (08 Oct 2022 05:44) (69 - 74)  BP: 149/82 (08 Oct 2022 05:44) (145/83 - 152/84)  BP(mean): --  RR: 18 (08 Oct 2022 04:28) (18 - 18)  SpO2: 99% (08 Oct 2022 04:28) (98% - 99%)    Parameters below as of 08 Oct 2022 04:28  Patient On (Oxygen Delivery Method): room air        CAPILLARY BLOOD GLUCOSE      POCT Blood Glucose.: 170 mg/dL (07 Oct 2022 21:09)  POCT Blood Glucose.: 155 mg/dL (07 Oct 2022 16:19)  POCT Blood Glucose.: 190 mg/dL (07 Oct 2022 11:32)  POCT Blood Glucose.: 154 mg/dL (07 Oct 2022 07:46)      PHYSICAL EXAM:  GENERAL: NAD,   HEAD:  Atraumatic, Normocephalic  EYES: EOMI, PERRL, conjunctiva and sclera clear  NECK: No JVD  CHEST/LUNG: Clear to auscultation bilaterally; No wheeze  HEART: Regular rate and rhythm; No murmurs, rubs, or gallops  ABDOMEN: Soft, Nontender, Nondistended; Bowel sounds present  EXTREMITIES:  2+ Peripheral Pulses, No clubbing, cyanosis, or edema  PSYCH: AAOx3  NEUROLOGY: (+) Decreased sensation affecting pedal aspect of lower extremities, mild tremor at rest and with cerebellar testing. Unable to abduct R eye upon cranial nerve testing. A&Ox3. Cranial nerves otherwise intact. Normal speech. Sensation intact.  SKIN: No rashes or lesions       LABS:                        10.8   6.79  )-----------( 170      ( 08 Oct 2022 06:28 )             33.1     Auto Eosinophil # x     / Auto Eosinophil % x     / Auto Neutrophil # x     / Auto Neutrophil % x     / BANDS % x                            11.2   6.79  )-----------( 186      ( 07 Oct 2022 05:58 )             34.4     Auto Eosinophil # x     / Auto Eosinophil % x     / Auto Neutrophil # x     / Auto Neutrophil % x     / BANDS % x        10-08    140  |  108  |  44<H>  ----------------------------<  120<H>  4.5   |  22  |  3.04<H>  10-07    140  |  107  |  43<H>  ----------------------------<  102<H>  4.5   |  23  |  2.94<H>    Ca    8.5      08 Oct 2022 06:30  Mg     2.1     10-08  Phos  3.6     10-08  TPro  6.1  /  Alb  3.0<L>  /  TBili  0.1<L>  /  DBili  x   /  AST  26  /  ALT  27  /  AlkPhos  116  10-08    PTT - ( 07 Oct 2022 05:58 )  PTT:33.0 sec              RADIOLOGY & ADDITIONAL TESTS:    Imaging Personally Reviewed:    Consultant(s) Notes Reviewed:      Care Discussed with Consultants/Other Providers:   Kayley Woods MD  PGY 1 Department of Internal Medicine        Patient is a 47y old  Male who presents with a chief complaint of Chest pain, elevated cardiac biomarkers (07 Oct 2022 13:24)      SUBJECTIVE / OVERNIGHT EVENTS: Pt seen and examined. Continues to have diplopia, dizziness. SOB improving. Denies fevers, chills, CP, SOB, Abdominal pain, N/V, Constipation, Diarrhea        MEDICATIONS  (STANDING):  amitriptyline 10 milliGRAM(s) Oral at bedtime  amLODIPine   Tablet 10 milliGRAM(s) Oral daily  aspirin enteric coated 81 milliGRAM(s) Oral daily  atorvastatin 40 milliGRAM(s) Oral at bedtime  budesonide  80 MICROgram(s)/formoterol 4.5 MICROgram(s) Inhaler 2 Puff(s) Inhalation two times a day  carvedilol 25 milliGRAM(s) Oral every 12 hours  chlorhexidine 2% Cloths 1 Application(s) Topical <User Schedule>  dextrose 5%. 1000 milliLiter(s) (50 mL/Hr) IV Continuous <Continuous>  dextrose 5%. 1000 milliLiter(s) (100 mL/Hr) IV Continuous <Continuous>  dextrose 50% Injectable 25 Gram(s) IV Push once  dextrose 50% Injectable 12.5 Gram(s) IV Push once  dextrose 50% Injectable 25 Gram(s) IV Push once  fenofibrate Tablet 145 milliGRAM(s) Oral daily  glucagon  Injectable 1 milliGRAM(s) IntraMuscular once  heparin   Injectable 5000 Unit(s) SubCutaneous every 8 hours  influenza   Vaccine 0.5 milliLiter(s) IntraMuscular once  insulin glargine Injectable (LANTUS) 36 Unit(s) SubCutaneous at bedtime  insulin lispro (ADMELOG) corrective regimen sliding scale   SubCutaneous three times a day before meals  insulin lispro (ADMELOG) corrective regimen sliding scale   SubCutaneous at bedtime  insulin lispro Injectable (ADMELOG) 4 Unit(s) SubCutaneous three times a day before meals  pantoprazole    Tablet 40 milliGRAM(s) Oral before breakfast  sodium chloride 0.9%. 1000 milliLiter(s) (80 mL/Hr) IV Continuous <Continuous>  ticagrelor 90 milliGRAM(s) Oral every 12 hours    MEDICATIONS  (PRN):  ALBUTerol    90 MICROgram(s) HFA Inhaler 2 Puff(s) Inhalation every 6 hours PRN Shortness of Breath and/or Wheezing  Biotene Dry Mouth Oral Rinse 15 milliLiter(s) Swish and Spit three times a day PRN Mouth Care  dextrose Oral Gel 15 Gram(s) Oral once PRN Blood Glucose LESS THAN 70 milliGRAM(s)/deciliter      I&O's Summary    07 Oct 2022 07:01  -  08 Oct 2022 07:00  --------------------------------------------------------  IN: 720 mL / OUT: 0 mL / NET: 720 mL        Vital Signs Last 24 Hrs  T(C): 36.6 (08 Oct 2022 04:28), Max: 36.9 (07 Oct 2022 11:53)  T(F): 97.9 (08 Oct 2022 04:28), Max: 98.4 (07 Oct 2022 11:53)  HR: 73 (08 Oct 2022 05:44) (69 - 74)  BP: 149/82 (08 Oct 2022 05:44) (145/83 - 152/84)  BP(mean): --  RR: 18 (08 Oct 2022 04:28) (18 - 18)  SpO2: 99% (08 Oct 2022 04:28) (98% - 99%)    Parameters below as of 08 Oct 2022 04:28  Patient On (Oxygen Delivery Method): room air        CAPILLARY BLOOD GLUCOSE      POCT Blood Glucose.: 170 mg/dL (07 Oct 2022 21:09)  POCT Blood Glucose.: 155 mg/dL (07 Oct 2022 16:19)  POCT Blood Glucose.: 190 mg/dL (07 Oct 2022 11:32)  POCT Blood Glucose.: 154 mg/dL (07 Oct 2022 07:46)      PHYSICAL EXAM:  GENERAL: NAD,   HEAD:  Atraumatic, Normocephalic  EYES: EOMI, PERRL, conjunctiva and sclera clear  NECK: No JVD  CHEST/LUNG: Clear to auscultation bilaterally; No wheeze  HEART: Regular rate and rhythm; No murmurs, rubs, or gallops  ABDOMEN: Soft, Nontender, Nondistended; Bowel sounds present  EXTREMITIES:  2+ Peripheral Pulses, No clubbing, cyanosis, or edema  PSYCH: AAOx3  NEUROLOGY: (+) Decreased sensation affecting pedal aspect of lower extremities, mild tremor at rest and with cerebellar testing. Unable to abduct R eye upon cranial nerve testing. A&Ox3. Cranial nerves otherwise intact. Normal speech. Sensation intact.  SKIN: No rashes or lesions       LABS:                        10.8   6.79  )-----------( 170      ( 08 Oct 2022 06:28 )             33.1     Auto Eosinophil # x     / Auto Eosinophil % x     / Auto Neutrophil # x     / Auto Neutrophil % x     / BANDS % x                            11.2   6.79  )-----------( 186      ( 07 Oct 2022 05:58 )             34.4     Auto Eosinophil # x     / Auto Eosinophil % x     / Auto Neutrophil # x     / Auto Neutrophil % x     / BANDS % x        10-08    140  |  108  |  44<H>  ----------------------------<  120<H>  4.5   |  22  |  3.04<H>  10-07    140  |  107  |  43<H>  ----------------------------<  102<H>  4.5   |  23  |  2.94<H>    Ca    8.5      08 Oct 2022 06:30  Mg     2.1     10-08  Phos  3.6     10-08  TPro  6.1  /  Alb  3.0<L>  /  TBili  0.1<L>  /  DBili  x   /  AST  26  /  ALT  27  /  AlkPhos  116  10-08    PTT - ( 07 Oct 2022 05:58 )  PTT:33.0 sec

## 2022-10-08 NOTE — PROGRESS NOTE ADULT - SUBJECTIVE AND OBJECTIVE BOX
[facility] DIVISION OF KIDNEY DISEASES AND HYPERTENSION -- FOLLOW UP NOTE  --------------------------------------------------------------------------------  Chief Complaint:Non-ST elevation myocardial infarction (NSTEMI)    47 M w/ PMHx of EtOH abuse now sober X5 years, DM, CKD IV (baseline creatinine 2.0-2.4 in July), HTN who presented to OSH with dizziness, diplopia, CP. Trop elevated with EKG changes. Patient transferred to [facility] for NSTEMI requiring LHC. Creatinine elevated to 2.7 on presentation.    Pt. seen this AM. denies any shortness of breath. Endorses some dizziness and light-headeness. UOP unmeasured in last 24 hours. SCr. uptrending.       PAST HISTORY  --------------------------------------------------------------------------------  No significant changes to PMH, PSH, FHx, SHx, unless otherwise noted    ALLERGIES & MEDICATIONS  --------------------------------------------------------------------------------  Allergies    No Known Allergies    Intolerances      Standing Inpatient Medications  amitriptyline 10 milliGRAM(s) Oral at bedtime  amLODIPine   Tablet 10 milliGRAM(s) Oral daily  aspirin enteric coated 81 milliGRAM(s) Oral daily  atorvastatin 40 milliGRAM(s) Oral at bedtime  budesonide  80 MICROgram(s)/formoterol 4.5 MICROgram(s) Inhaler 2 Puff(s) Inhalation two times a day  carvedilol 25 milliGRAM(s) Oral every 12 hours  chlorhexidine 2% Cloths 1 Application(s) Topical <User Schedule>  dextrose 5%. 1000 milliLiter(s) IV Continuous <Continuous>  dextrose 5%. 1000 milliLiter(s) IV Continuous <Continuous>  dextrose 50% Injectable 25 Gram(s) IV Push once  dextrose 50% Injectable 12.5 Gram(s) IV Push once  dextrose 50% Injectable 25 Gram(s) IV Push once  fenofibrate Tablet 145 milliGRAM(s) Oral daily  glucagon  Injectable 1 milliGRAM(s) IntraMuscular once  heparin   Injectable 5000 Unit(s) SubCutaneous every 8 hours  influenza   Vaccine 0.5 milliLiter(s) IntraMuscular once  insulin glargine Injectable (LANTUS) 36 Unit(s) SubCutaneous at bedtime  insulin lispro (ADMELOG) corrective regimen sliding scale   SubCutaneous three times a day before meals  insulin lispro (ADMELOG) corrective regimen sliding scale   SubCutaneous at bedtime  insulin lispro Injectable (ADMELOG) 4 Unit(s) SubCutaneous three times a day before meals  pantoprazole    Tablet 40 milliGRAM(s) Oral before breakfast  sodium chloride 0.9%. 1000 milliLiter(s) IV Continuous <Continuous>  ticagrelor 90 milliGRAM(s) Oral every 12 hours    PRN Inpatient Medications  ALBUTerol    90 MICROgram(s) HFA Inhaler 2 Puff(s) Inhalation every 6 hours PRN  Biotene Dry Mouth Oral Rinse 15 milliLiter(s) Swish and Spit three times a day PRN  dextrose Oral Gel 15 Gram(s) Oral once PRN      REVIEW OF SYSTEMS  --------------------------------------------------------------------------------  As per HPI    VITALS/PHYSICAL EXAM  --------------------------------------------------------------------------------  TPALMER): 36.6 (10-08-22 @ 04:28), Max: 36.9 (10-07-22 @ 11:53)  HR: 73 (10-08-22 @ 05:44) (69 - 74)  BP: 149/82 (10-08-22 @ 05:44) (145/83 - 152/84)  RR: 18 (10-08-22 @ 04:28) (18 - 18)  SpO2: 99% (10-08-22 @ 04:28) (98% - 99%)  Wt(kg): --        10-07-22 @ 07:01  -  10-08-22 @ 07:00  --------------------------------------------------------  IN: 720 mL / OUT: 0 mL / NET: 720 mL        Physical Exam:  	Gen: NAD  	HEENT: MMM  	Pulm: CTA B/L  	CV: S1S2  	Abd: Soft, +BS   	Ext: No LE edema B/L  	Neuro: Awake  	Skin: Warm and dry  	Vascular access: None    LABS/STUDIES  --------------------------------------------------------------------------------              10.8   6.79  >-----------<  170      [10-08-22 @ 06:28]              33.1     140  |  108  |  44  ----------------------------<  120      [10-08-22 @ 06:30]  4.5   |  22  |  3.04        Ca     8.5     [10-08-22 @ 06:30]      Mg     2.1     [10-08-22 @ 06:30]      Phos  3.6     [10-08-22 @ 06:30]    TPro  6.1  /  Alb  3.0  /  TBili  0.1  /  DBili  x   /  AST  26  /  ALT  27  /  AlkPhos  116  [10-08-22 @ 06:30]      PTT: 33.0       [10-07-22 @ 05:58]      Creatinine Trend:  SCr 3.04 [10-08 @ 06:30]  SCr 2.94 [10-07 @ 06:00]  SCr 2.20 [10-06 @ 04:38]  SCr 2.50 [10-05 @ 00:49]  SCr 2.73 [10-04 @ 16:14]    Urinalysis - [10-05-22 @ 00:49]      Color Colorless / Appearance Clear / SG 1.008 / pH 6.0      Gluc 500 mg/dL / Ketone Negative  / Bili Negative / Urobili Negative       Blood Trace / Protein 100 mg/dL / Leuk Est Negative / Nitrite Negative      RBC 2 / WBC 1 / Hyaline  / Gran  / Sq Epi  / Non Sq Epi 0 / Bacteria Negative    Urine Creatinine 41      [10-05-22 @ 00:51]  Urine Protein 140      [10-05-22 @ 00:51]  Urine Sodium 38      [10-05-22 @ 00:51]  Urine Urea Nitrogen 346      [10-05-22 @ 01:12]  Urine Potassium 13      [10-05-22 @ 00:51]    HbA1c 14.4      [06-25-19 @ 06:08]  Lipid: chol 276, , HDL 44, LDL --      [05-15-22 @ 16:49]

## 2022-10-08 NOTE — OCCUPATIONAL THERAPY INITIAL EVALUATION ADULT - TRANSFER TRAINING, PT EVAL
pt will perform toilet transfer with I in 4 weeks using LRAD / pt will perform shower transfer to shower chair with I in 4 weeks

## 2022-10-08 NOTE — PROGRESS NOTE ADULT - PROBLEM SELECTOR PLAN 1
Transfer from [facility] for cardiac cath  -GDMT w/ coreg, atorvastatin (holding ACE/ARB for BILLY)  -aspirin + brilinta  -heparin gtt  -trend troponin q6h  -EKG and troponins for chest pain  -coronary CT completed, moderate stenosis of prox RCA and LAD  -cards following, will f/u recs Transfer from [facility] for cardiac cath  -GDMT w/ coreg, atorvastatin (holding ACE/ARB for BILLY)  -aspirin + brilinta  -heparin gtt d/c  -EKG and troponins for chest pain  -coronary CT completed, moderate stenosis of prox RCA and LAD  -cards following, potential cath following resolution of BILLY

## 2022-10-08 NOTE — DISCHARGE NOTE PROVIDER - NSDCCPCAREPLAN_GEN_ALL_CORE_FT
PRINCIPAL DISCHARGE DIAGNOSIS  Diagnosis: NSTEMI (non-ST elevation myocardial infarction)  Assessment and Plan of Treatment:        PRINCIPAL DISCHARGE DIAGNOSIS  Diagnosis: NSTEMI (non-ST elevation myocardial infarction)  Assessment and Plan of Treatment:       SECONDARY DISCHARGE DIAGNOSES  Diagnosis: Diplopia  Assessment and Plan of Treatment:     Diagnosis: Lacunar stroke  Assessment and Plan of Treatment:      PRINCIPAL DISCHARGE DIAGNOSIS  Diagnosis: NSTEMI (non-ST elevation myocardial infarction)  Assessment and Plan of Treatment: You were diagnosed with an NSTEMI, a type of heart attack, and you were transferred to [facility] for further treatment. A special CT scan of your heart suggested that two key vessels supplying blood to your heart were narrowed, and thus a cardiac catheterization was recommended to address this finding. Our cardiology team performed the catheterization on 10/13/2022 and found coronary artery disease (CAD) that was non-obstructive and ultimately did not require further treatment. Please continue to take your heart medications as prescribed and see your doctors regularly. You should return to the hospital and/or seek immediate medical attention if you develop chest pain, irregular heart beats, severe shortness of breath, or sudden loss of consciousness.      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 In other words, one specific nerve and muscle group is abnormally unable to pull your right eye outwards, causing double vision. Additionally, you were evaluated by our opthalmologists (eye specialists) who diagnosed you with hypertensive and diabetic retinopathy. Based on their assessment, your vision/eye symptoms are most likely due to a combination of your history of diabetes and the extremely high blood pressures you had when you arrived at [facility]. Please follow up with an ophthalmologist/retinal specialist within a few weeks so that further evaluation of your eyes can be completed. Please return to the hospital immediately if you notice severe eye pain, sudden complete loss of vision in any eye, or a sensation of a curtain coming down over your vision.    Diagnosis: Lacunar stroke  Assessment and Plan of Treatment: Because of the symptoms you were having, we obtained an MRI of your brain to evaluate for possible neurological causes. We did not find any evidence of an acute concern (i.e. a stroke or brain bleed that happened within the past few days), however we did find multiple small, chronic infarctions (i.e. small strokes that happened a while ago). These may be contributing to your recent difficulty with speech and memory, though they don't quite explain your changes in vision. Please follow up with a neurologist (brain specialist) within a few weeks for further evaluation of this findings. Please return to the hospital immediately if you or someone else notices signs of an acute stroke, such as sudden loss of consciousness, facial drooping, limb weakness, or slurred speech.    Diagnosis: Severe hypertension  Assessment and Plan of Treatment: At [facility], your blood pressure was measured to be 260/133 an alarmingly high number. We do not know why your blood pressures were so high at the time, but they have been well controlled since you were admitted to the hospital. We recommend that you routinely check your blood pressure at home, at least once every few days, to ensure that it does not worsen. If you notice consistent/repeated measurements that are above 140, please schedule an appointment with your primary care doctor so that adjustments to your medications can be made. If you notice consistent/repeated measurements that are above 180, please seek medical attention immediately as a blood pressure this high may cause damage to your organs, such as your eyes.     PRINCIPAL DISCHARGE DIAGNOSIS  Diagnosis: NSTEMI (non-ST elevation myocardial infarction)  Assessment and Plan of Treatment: You were diagnosed with an NSTEMI, a type of heart attack, and you were transferred to [facility] for further treatment. A special CT scan of your heart suggested that two key vessels supplying blood to your heart were narrowed, and thus a cardiac catheterization was recommended to address this finding. Our cardiology team performed the catheterization on 10/13/2022 and found coronary artery disease (CAD) that was non-obstructive and ultimately did not require further treatment. Please continue to take your heart medications as prescribed and see your doctors regularly. You should return to the hospital and/or seek immediate medical attention if you develop chest pain, irregular heart beats, severe shortness of breath, or sudden loss of consciousness.      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 In other words, one specific nerve and muscle group is unable to pull your right eye outwards, causing double vision. You were evaluated by our opthalmologists (eye specialists) who diagnosed you with hypertensive and diabetic retinopathy. Based on their assessment, your vision/eye symptoms are most likely due to a combination of your history of diabetes and the extremely high blood pressures you had when you arrived at [facility]. Please follow up with an ophthalmologist/retinal specialist within a few weeks so that further evaluation of your eyes can be completed. Please return to the hospital immediately if you notice severe eye pain, sudden complete loss of vision in any eye, or a sensation of a curtain coming down over your vision.    Diagnosis: At risk for headache  Assessment and Plan of Treatment: Your headaches are suspicious for cluster headaches, which can recur seasonally and in multiple days at a time causing pain around one eye. We recommend following up with your primary care doctor for monitoring of these symptoms, as medications can be considered for prevention of these headaches if they occur too frequently.    Diagnosis: Lacunar stroke  Assessment and Plan of Treatment: Because of the symptoms you were having, we obtained an MRI of your brain to evaluate for possible neurological causes. We did not find any evidence of an acute concern (i.e. a stroke or brain bleed that happened within the past few days), however we did find multiple small, chronic infarctions (i.e. small strokes that happened a while ago). These may be contributing to your recent difficulty with speech and memory, though they don't quite explain your changes in vision. Please follow up with a neurologist (brain specialist) within a few weeks for further evaluation. Please return to the hospital immediately if you or someone else notices signs of an acute stroke, such as sudden loss of consciousness, facial drooping, limb weakness, or slurred speech.    Diagnosis: Severe hypertension  Assessment and Plan of Treatment: At [facility], your blood pressure was measured to be 260/133 an alarmingly high number. We do not know why your blood pressures were so high at the time, but they have been well controlled since you were admitted to the hospital. You were evaluated by our nephrologists, the kidney doctors, who are planning to perform a biopsy of your kidneys for further workup. We recommend that you routinely check your blood pressure at home, at least once every few days, to ensure that it does not worsen. If you notice consistent/repeated measurements that are above 140, please schedule an appointment with your primary care doctor so that adjustments to your medications can be made. If you notice consistent/repeated measurements that are above 180, please seek medical attention immediately as a blood pressure this high may cause damage to your organs, such as your eyes.     PRINCIPAL DISCHARGE DIAGNOSIS  Diagnosis: NSTEMI (non-ST elevation myocardial infarction)  Assessment and Plan of Treatment: You were diagnosed with an NSTEMI, a type of heart attack, and you were transferred to [facility] for further treatment. A special CT scan of your heart suggested that two key vessels supplying blood to your heart were narrowed, and thus a cardiac catheterization was recommended to address this finding. Our cardiology team performed the catheterization on 10/13/2022 and found coronary artery disease (CAD) that was non-obstructive and ultimately did not require further treatment. Please continue to take your heart medications as prescribed and set up a follow-up appointment with a cardiologist within a few weeks. You should return to the hospital and/or seek immediate medical attention if you develop chest pain, irregular heart beats, severe shortness of breath, or sudden loss of consciousness.      SECONDARY DISCHARGE DIAGNOSES  Diagnosis: Severe hypertension  Assessment and Plan of Treatment: At [facility], your blood pressure was measured to be 260/133 an alarmingly high number. We do not know why your blood pressures were so high at the time, but they have been well controlled since you were admitted to the hospital. You were evaluated by our nephrologists, the kidney doctors, who are planning to perform a biopsy of your kidneys for further workup. You should follow-up with them after the biopsy for recommendations on next steps. We recommend that you routinely check your blood pressure at home, at least once every few days, to ensure that it does not worsen. If you notice consistent/repeated measurements that are above 140, please schedule an appointment with your primary care doctor so that adjustments to your medications can be made. If you notice consistent/repeated measurements that are above 180, please seek medical attention immediately as a blood pressure this high may cause damage to your organs, such as your eyes.    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 In other words, one specific nerve and muscle group is unable to pull your right eye outwards, causing double vision. You were evaluated by our opthalmologists (eye specialists) who diagnosed you with hypertensive and diabetic retinopathy. Based on their assessment, your vision/eye symptoms are most likely due to a combination of your history of diabetes and the extremely high blood pressures you had when you arrived at [facility]. Please follow up with an ophthalmologist/retinal specialist within a few weeks so that further evaluation of your eyes can be completed. Please return to the hospital immediately if you notice severe eye pain, sudden complete loss of vision in any eye, or a sensation of a curtain coming down over your vision.    Diagnosis: Lacunar stroke  Assessment and Plan of Treatment: Because of the symptoms you were having, we obtained an MRI of your brain to evaluate for possible neurological causes. We did not find any evidence of an acute concern (i.e. a stroke or brain bleed that happened within the past few days), however we did find multiple small, chronic infarctions (i.e. small strokes that happened a while ago). These may be contributing to your recent difficulty with speech and memory, though they don't quite explain your changes in vision. Please follow up with a neurologist (brain specialist) within a few weeks for further evaluation. Please return to the hospital immediately if you or someone else notices signs of an acute stroke, such as sudden loss of consciousness, facial drooping, limb weakness, or slurred speech.    Diagnosis: At risk for headache  Assessment and Plan of Treatment: Your headaches are suspicious for cluster headaches, which can recur seasonally and in multiple days at a time causing pain around one eye. We recommend following up with your primary care doctor for monitoring of these symptoms, as medications can be considered for prevention of these headaches if they occur too frequently. You also noticed symptoms during the night that were suggestive of sleep apnea, which would require further workup outpatient. For this we recommend, following up with a sleep medicine doctor, which you can schedule at your convenience.     PRINCIPAL DISCHARGE DIAGNOSIS  Diagnosis: NSTEMI (non-ST elevation myocardial infarction)  Assessment and Plan of Treatment: You were diagnosed with an NSTEMI, a type of heart attack, and you were transferred to [facility] for further treatment. A special CT scan of your heart suggested that two key vessels supplying blood to your heart were narrowed, and thus a cardiac catheterization was recommended to address this finding. Our cardiology team performed the catheterization on 10/13/2022 and found coronary artery disease (CAD) that was non-obstructive and ultimately did not require further treatment. Please continue to take your heart medications as prescribed and set up a follow-up appointment with a cardiologist within a few weeks. You should return to the hospital and/or seek immediate medical attention if you develop chest pain, irregular heart beats, severe shortness of breath, or sudden loss of consciousness.      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 In other words, one specific nerve and muscle group is unable to pull your right eye outwards, causing double vision. You were evaluated by our opthalmologists (eye specialists) who diagnosed you with hypertensive and diabetic retinopathy. Based on their assessment, your vision/eye symptoms are most likely due to a combination of your history of diabetes and the extremely high blood pressures you had when you arrived at [facility]. Please follow up with an ophthalmologist/retinal specialist within a few weeks so that further evaluation of your eyes can be completed. Please return to the hospital immediately if you notice severe eye pain, sudden complete loss of vision in any eye, or a sensation of a curtain coming down over your vision.    Diagnosis: Lacunar stroke  Assessment and Plan of Treatment: Because of the symptoms you were having, we obtained an MRI of your brain to evaluate for possible neurological causes. We did not find any evidence of an acute concern (i.e. a stroke or brain bleed that happened within the past few days), however we did find multiple small, chronic infarctions (i.e. small strokes that happened a while ago). These may be contributing to your recent difficulty with speech and memory, though they don't quite explain your changes in vision. Please follow up with a neurologist (brain specialist) within a few weeks for further evaluation. Please return to the hospital immediately if you or someone else notices signs of an acute stroke, such as sudden loss of consciousness, facial drooping, limb weakness, or slurred speech.    Diagnosis: Severe hypertension  Assessment and Plan of Treatment: At [facility], your blood pressure was measured to be 260/133 an alarmingly high number. We do not know why your blood pressures were so high at the time, but they have been well controlled since you were admitted to the hospital. You were evaluated by our nephrologists, the kidney doctors, who are planning to perform a biopsy of your kidneys for further workup. You should follow-up with them after the biopsy for recommendations on next steps. We recommend that you routinely check your blood pressure at home, at least once every few days, to ensure that it does not worsen. If you notice consistent/repeated measurements that are above 140, please schedule an appointment with your primary care doctor so that adjustments to your medications can be made. If you notice consistent/repeated measurements that are above 180, please seek medical attention immediately as a blood pressure this high may cause damage to your organs, such as your eyes.    Diagnosis: At risk for headache  Assessment and Plan of Treatment: Your headaches are suspicious for cluster headaches, which can recur seasonally and in multiple days at a time causing pain around one eye. We recommend following up with your primary care doctor for monitoring of these symptoms, as medications can be considered for prevention of these headaches if they occur too frequently. You also noticed symptoms during the night that were suggestive of sleep apnea, which would require further workup outpatient. For this we recommend, following up with a sleep medicine doctor, which you can schedule at your convenience.    Diagnosis: Chronic kidney disease, unspecified CKD stage  Assessment and Plan of Treatment: Your have nephrotic range proteinuria, elevated kidney numbers with high blood pressure. You're are pending renal biopsy on Monday. Please follow up with nephrology and take your BP medications. Please take your lisinopril medication     PRINCIPAL DISCHARGE DIAGNOSIS  Diagnosis: NSTEMI (non-ST elevation myocardial infarction)  Assessment and Plan of Treatment: You were diagnosed with an NSTEMI, a type of heart attack, and you were transferred to [facility] for further treatment. A special CT scan of your heart suggested that two key vessels supplying blood to your heart were narrowed, and thus a cardiac catheterization was recommended to address this finding. Our cardiology team performed the catheterization on 10/13/2022 and found coronary artery disease (CAD) that was non-obstructive and ultimately did not require further treatment. Please continue to take your heart medications as prescribed and set up a follow-up appointment with a cardiologist within a few weeks. You should return to the hospital and/or seek immediate medical attention if you develop chest pain, irregular heart beats, severe shortness of breath, or sudden loss of consciousness.      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 In other words, one specific nerve and muscle group is unable to pull your right eye outwards, causing double vision. You were evaluated by our opthalmologists (eye specialists) who diagnosed you with hypertensive and diabetic retinopathy. Based on their assessment, your vision/eye symptoms are most likely due to a combination of your history of diabetes and the extremely high blood pressures you had when you arrived at [facility]. Please follow up with an ophthalmologist/retinal specialist within a few weeks so that further evaluation of your eyes can be completed. Please return to the hospital immediately if you notice severe eye pain, sudden complete loss of vision in any eye, or a sensation of a curtain coming down over your vision.    Diagnosis: Lacunar stroke  Assessment and Plan of Treatment: Because of the symptoms you were having, we obtained an MRI of your brain to evaluate for possible neurological causes. We did not find any evidence of an acute concern (i.e. a stroke or brain bleed that happened within the past few days), however we did find multiple small, chronic infarctions (i.e. small strokes that happened a while ago). These may be contributing to your recent difficulty with speech and memory, though they don't quite explain your changes in vision. Please follow up with a neurologist (brain specialist) within a few weeks for further evaluation. Please return to the hospital immediately if you or someone else notices signs of an acute stroke, such as sudden loss of consciousness, facial drooping, limb weakness, or slurred speech.    Diagnosis: Severe hypertension  Assessment and Plan of Treatment: At [facility], your blood pressure was measured to be 260/133 an alarmingly high number. We do not know why your blood pressures were so high at the time, but they have been well controlled since you were admitted to the hospital. You were evaluated by our nephrologists, the kidney doctors, who are planning to perform a biopsy of your kidneys for further workup. You should follow-up with them after the biopsy for recommendations on next steps. We recommend that you routinely check your blood pressure at home, at least once every few days, to ensure that it does not worsen. If you notice consistent/repeated measurements that are above 140, please schedule an appointment with your primary care doctor so that adjustments to your medications can be made. If you notice consistent/repeated measurements that are above 180, please seek medical attention immediately as a blood pressure this high may cause damage to your organs, such as your eyes.    Diagnosis: At risk for headache  Assessment and Plan of Treatment: Your headaches are suspicious for cluster headaches, which can recur seasonally and in multiple days at a time causing pain around one eye. We recommend following up with your primary care doctor for monitoring of these symptoms, as medications can be considered for prevention of these headaches if they occur too frequently. You also noticed symptoms during the night that were suggestive of sleep apnea, which would require further workup outpatient. For this we recommend, following up with a sleep medicine doctor, which you can schedule at your convenience.    Diagnosis: Chronic kidney disease, unspecified CKD stage  Assessment and Plan of Treatment: Your have nephrotic range proteinuria, elevated kidney numbers with high blood pressure. You're are pending renal biopsy on Monday. Please follow up with nephrology and take your BP medications. Please take your lisinopril medication and torsemide 20mg daily  Please get a repeat BMP, basic metabolic panel in one week

## 2022-10-08 NOTE — DISCHARGE NOTE PROVIDER - HOSPITAL COURSE
46 y/o male, current cigarette smoker, with PMHx of Vertigo, Diverticulitis s/p LAR, ETOH abuse now sober x 5 years, IDDM, CKD IV, HTN, AMBER, who presented to [facility] on 10/2 c/o dizziness, b/l diplopia, headache and chest tightness.  In ED @ OSH /133, managed with hydralazine IV, Cardiac biomarkers elevated with trop I peak 0.632, EKG with ST-T abnormality no acute ST segment changes, Cardiology consulted.  CT head without acute pathology, MRI with acute left parasagital infarct, Neurology was consulted - impression was symptoms do not correlate with MRI findings. Ophthamology consulted, recommended f/u as o/p. Patient was transferred to [facility] in setting of NSTEMI for [facility].  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 Coronary CT performed @ [facility] showing moderate stenosis of proximal LAD and RCA.    Pt is stable and medically cleared for discharge.    47M w/ PMHx of CKD IV, vertigo, diverticulitis s/p LAR, cigarette use, ETOH abuse now sober x5 years, IDDM, HTN, AMBER, who presented to [facility] on 10/2 c/o dizziness, b/l diplopia, headache and chest tightness found to have /133 and elevated troponins concerning for NSTEMI. EKG w/ ST-T abnormalities without acute ST segment changes. At OSH BPs were stabilized w/ IV hydralazine, CT head showed no acute pathology, MRI brain showed chronic L parasagittal infarcts. Patient was evaluated by neurology and ophthalmology teams and patient's symptoms were clinically consistent with which recommended outpatient followup and patient was transferred to [facility] for a possiblepossible LHC.    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 Coronary CT performed @ [facility] showing moderate stenosis of proximal LAD and RCA.    Pt is stable and medically cleared for discharge.    47M w/ PMHx of CKD IV, vertigo, diverticulitis s/p LAR, cigarette use, ETOH abuse now sober x5 years, IDDM, HTN, AMBER, who presented to [facility] on 10/2 c/o dizziness, b/l diplopia, headache and chest tightness found to have /133 and elevated troponins concerning for NSTEMI. EKG w/ ST-T abnormalities without acute ST segment changes. At OSH BPs stabilized w/ IV hydralazine, CT head without acute pathology, MRI brain showed chronic L parasagittal infarcts. 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 Patient was recommended outpatient followup for neurological/ophthalmological concerns (specifically for OCT nerve/RNFL, Fresnel prisms) and was transferred to [facility] for a possible LHC.    On admission to [facility] coronary CT showed moderate stenosis of the proximal LAD and RCA. Cath initially deferred as patient was pending nephrology clearance for BILLY. TTE largely wnl, bubble study negative. MRI brain w/ contrast showed chronic multifocal lacunar infarcts and chronic microvascular ischemic changes, corroborating MRI read from [facility]. Patient was evaluated by ophthalmology team for new onset R frontal headache and popping sensation, determined to have components hypertensive and diabetic retinopathy for which outpatient followup and possible trial of Fresnel prisms was recommended. Patient was also evaluated by PT/OT who recommended CANDY. Speech pathology eval was notable for cognitive linguistic deficits w/ concern for early onset dementia given a family hx (mother diagnosed @46yo). After improvement of BILLY, cath was completed showing nonobstructive CAD without elevated filling pressures. Patient was monitored for resolution of BILLY and is now hemodynamically stable and medically cleared for discharge to rehab. 47M w/ PMHx of CKD IV, vertigo, diverticulitis s/p LAR, cigarette use, ETOH abuse now sober x5 years, IDDM, HTN, AMBER, who [facility] c/o dizziness, b/l diplopia, headache and chest tightness found to have /133 and elevated troponins concerning for NSTEMI. EKG w/ ST-T abnormalities without acute ST segment changes. At OSH BPs stabilized w/ IV hydralazine, CT head without acute pathology, MRI brain showed chronic L parasagittal infarcts. 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 Patient was recommended outpatient followup for neurological/ophthalmological concerns (specifically for OCT nerve/RNFL, Fresnel prisms) and was transferred to [facility] for a possible LHC.    On admission to [facility] coronary CT showed moderate stenosis of the proximal LAD and RCA. Cath initially deferred as patient was pending nephrology clearance for BILLY. TTE largely wnl, bubble study negative. MRI brain w/ contrast showed chronic multifocal lacunar infarcts and chronic microvascular ischemic changes, corroborating MRI read from [facility]. Patient was evaluated by ophthalmology team for new onset R frontal headache and popping sensation, determined to have components hypertensive and diabetic retinopathy for which outpatient followup and possible trial of Fresnel prisms was recommended. Cluster headaches suspected given pattern of headache (R frontal, several days at a time, lacrimation). Patient was also evaluated by PT/OT who recommended CANDY. Speech pathology eval was notable for cognitive linguistic deficits w/ concern for early onset dementia given a family hx (mother diagnosed @44yo). After improvement of BILLY, cath was completed showing nonobstructive CAD without elevated filling pressures. Patient was monitored for resolution of BILLY and is now hemodynamically stable and medically cleared for discharge to rehab. 47M w/ PMHx of CKD IV, vertigo, diverticulitis s/p LAR, cigarette use, ETOH abuse now sober x5 years, IDDM, HTN, AMBER, who presented to [facility] on 10/2 c/o dizziness, b/l diplopia, headache and chest tightness found to have /133 and elevated troponins concerning for NSTEMI. EKG w/ ST-T abnormalities without acute ST segment changes. At OSH BPs stabilized w/ IV hydralazine, CT head without acute pathology, MRI brain showed chronic L parasagittal infarcts. 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 Patient was recommended outpatient followup for neurological/ophthalmological concerns (specifically for OCT nerve/RNFL, Fresnel prisms) and was transferred to [facility] for a possible LHC.    Hospital Course:  On admission to [facility] coronary CT showed moderate stenosis of the proximal LAD and RCA. Cath initially deferred as patient was pending nephrology clearance for BILLY. TTE largely wnl, bubble study negative. MRI brain w/ contrast showed chronic multifocal lacunar infarcts and chronic microvascular ischemic changes, corroborating MRI read from [facility]. Patient was evaluated by ophthalmology team for new onset R frontal headache and popping sensation, determined to have components of hypertensive and diabetic retinopathy for which outpatient followup and possible trial of Fresnel prisms was recommended. Cluster headaches suspected given pattern of headache (R frontal, several days at a time, lacrimation), prophylactic medication was not started as the diagnosis was not confirmed. Patient was also evaluated by PT/OT who recommended CANDY. Speech pathology eval was notable for cognitive linguistic deficits w/ concern for early onset dementia given a family hx (mother diagnosed @46yo). Patient also endorsed during this admission long-standing apneic symptoms (awakening w/ gasping for sleep) which was not worked up this admission. Lateral rectus palsy was noted to improve slightly towards end of admission w/ ability to abduct to ~10-15 degrees. Patient was monitored for stability of his BILLY and is now hemodynamically stable and medically cleared for discharge to rehab, with plan for outpatient renal biopsy 10/24 and followup w/ nephrology, cardiology, neurology, opthalmology, and sleep medicine for further workup. 47M w/ PMHx of CKD IV, vertigo, diverticulitis s/p LAR, cigarette use, ETOH abuse now sober x5 years, IDDM, HTN, AMBER, who presented to [facility] on 10/2 c/o dizziness, b/l diplopia, headache and chest tightness found to have /133 and elevated troponins concerning for NSTEMI. EKG w/ ST-T abnormalities without acute ST segment changes. At OSH BPs stabilized w/ IV hydralazine, CT head without acute pathology, MRI brain showed chronic L parasagittal infarcts. 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 Patient was recommended outpatient followup for neurological/ophthalmological concerns (specifically for OCT nerve/RNFL, Fresnel prisms) and was transferred to [facility] for a possible LHC.    Hospital Course:  On admission to [facility] coronary CT showed moderate stenosis of the proximal LAD and RCA. Cath initially deferred as patient was pending nephrology clearance for BILLY. TTE largely wnl, bubble study negative. MRI brain w/ contrast showed chronic multifocal lacunar infarcts and chronic microvascular ischemic changes, corroborating MRI read from [facility]. Patient was evaluated by ophthalmology team for new onset R frontal headache and popping sensation, determined to have components of hypertensive and diabetic retinopathy for which outpatient followup and possible trial of Fresnel prisms was recommended. Cluster headaches suspected given pattern of headache (R frontal, several days at a time, lacrimation), prophylactic medication was not started as the diagnosis was not confirmed. Patient was also evaluated by PT/OT who recommended CANDY. Speech pathology eval was notable for cognitive linguistic deficits w/ concern for early onset dementia given a family hx (mother diagnosed @44yo). Patient also endorsed during this admission long-standing apneic symptoms (awakening w/ gasping for sleep) which was not worked up this admission. Lateral rectus palsy was noted to improve slightly towards end of admission w/ ability to abduct to ~10-15 degrees. Patient was monitored for stability of his BILLY and is now hemodynamically stable and medically cleared for discharge to rehab, with plan for outpatient renal biopsy 10/24 and followup w/ nephrology, cardiology, neurology, opthalmology, and sleep medicine for further workup.  Patient was started on low dose Lisinopril for nephrotic range proteinuria 47M w/ PMHx of CKD IV, vertigo, diverticulitis s/p LAR, cigarette use, ETOH abuse now sober x5 years, IDDM, HTN, AMBER, who presented to [facility] on 10/2 c/o dizziness, b/l diplopia, headache and chest tightness found to have /133 and elevated troponins concerning for NSTEMI. EKG w/ ST-T abnormalities without acute ST segment changes. At OSH BPs stabilized w/ IV hydralazine, CT head without acute pathology, MRI brain showed chronic L parasagittal infarcts. 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 Patient was recommended outpatient followup for neurological/ophthalmological concerns (specifically for OCT nerve/RNFL, Fresnel prisms) and was transferred to [facility] for a possible LHC.    Hospital Course:  On admission to [facility] coronary CT showed moderate stenosis of the proximal LAD and RCA. Cath initially deferred as patient was pending nephrology clearance for BILLY. TTE largely wnl, bubble study negative. MRI brain w/ contrast showed chronic multifocal lacunar infarcts and chronic microvascular ischemic changes, corroborating MRI read from [facility]. Patient was monitored for stability of his BILLY and cath was completed showing nonobstructive CAD without elevated filling pressures.    Patient was evaluated by ophthalmology team for new onset R frontal headache and popping sensation, determined to have components of hypertensive and diabetic retinopathy for which outpatient followup and possible trial of Fresnel prisms was recommended. Cluster headaches suspected given pattern of headache (R frontal, several days at a time, lacrimation, yearly recurrence), prophylactic medication was not started as the diagnosis was not confirmed.    Patient was additionally evaluated by PT/OT who recommended CANDY. Speech pathology eval was notable for cognitive linguistic deficits w/ concern for early onset dementia given a family hx (mother diagnosed @46yo). Patient also endorsed during this admission long-standing apneic symptoms (awakening w/ gasping for sleep) which was not worked up this admission.    Lateral rectus palsy was noted to improve slightly towards end of admission w/ ability to abduct to ~10-15 degrees. Low-dose lisinopril was initiated for nephrotic-range proteinuria. Patient is now hemodynamically stable and medically cleared for discharge to rehab, with plan for outpatient renal biopsy 10/24 and recommended follow-up w/ nephrology, cardiology, neurology, opthalmology, and sleep medicine for further workup.

## 2022-10-08 NOTE — DISCHARGE NOTE PROVIDER - DISCHARGE SERVICE FOR PATIENT
on the discharge service for the patient. I have reviewed and made amendments to the documentation where necessary.
Pulses equal bilaterally, no edema present.

## 2022-10-08 NOTE — OCCUPATIONAL THERAPY INITIAL EVALUATION ADULT - RANGE OF MOTION EXAMINATION, UPPER EXTREMITY
bilateral UE Active ROM was WFL  (within functional limits) Cephalexin Pregnancy And Lactation Text: This medication is Pregnancy Category B and considered safe during pregnancy.  It is also excreted in breast milk but can be used safely for shorter doses.

## 2022-10-08 NOTE — OCCUPATIONAL THERAPY INITIAL EVALUATION ADULT - NSOTDISCHREC_GEN_A_CORE
however if pt goes home, recommend HOT, assist for all ADLs/functional mobility, DME-RW, shower chair, GBs/Sub-acute Rehab

## 2022-10-08 NOTE — PROGRESS NOTE ADULT - ATTENDING COMMENTS
48 y/o male with DM II, CKDIV, HTN who presented to OSH complaining of diplopia found to be in hypertensive emergency with /133. Hospital course was c/b possible acute parasagittal infarct seen on MRI imaging and NSTEMI for which pt was transferred for cath. Neuro reviewed imaging, cleared for full dose AC and DAPT. Pt currently on Asa/Brilinta, Heparin ggt discontinued yesterday. CTA head and neck negative for stenosis. Troponins peaked. Changed Metoprolol to Coreg for better BP control. CT coronaries reviewed, pLAD and RCA with moderate luminal narrowing lesions. Cards favoring cath when renal function improves. TTE pending . Nephro consulted for optimization prior to contrast loads. Cr increased, will give IVF NS at 80cc/hr today. Neuro called regarding vertigo and absent right eye abduction, recommend MRI head,

## 2022-10-08 NOTE — PHYSICAL THERAPY INITIAL EVALUATION ADULT - PERTINENT HX OF CURRENT PROBLEM, REHAB EVAL
47 M w/ PMHx of EtOH abuse now sober X5 years, DM, CKD IV (baseline creatinine 2.0-2.4 in July), HTN who presented to OSH with dizziness, diplopia, CP. Trop elevated with EKG changes. Patient transferred to [facility] for NSTEMI requiring LHC. Creatinine elevated to 2.7 on today.  Hosp course: 10/4 CXR clear lungs. CT brain: Stable chronic small infarcts. No acute intracranial hemorrhage, mass effect, midline shift. CTA neck and brain: No vessel occlusion or significant stenosis. 10/6 CTA Predominantly noncalcified plaque in the proximal RCA causes moderate luminal narrowing. Calcified and noncalcified plaque in the proximal LAD causes moderate luminal narrowing. Calcified plaque in the distal LAD causes mild luminal narrowing. Noncalcified plaque in the mid/distal circumflex causes minimal luminal narrowing.

## 2022-10-08 NOTE — PROGRESS NOTE ADULT - ATTENDING COMMENTS
47 M w/ PMHx of EtOH abuse now sober X5 years, DM, CKD IV (baseline creatinine 2.0-2.4 in July), HTN who presented to OSH with dizziness, diplopia, CP. Trop elevated with EKG changes. Patient transferred to [facility] for NSTEMI requiring LHC. Creatinine elevated to 2.7 on presentation  1.  ARF on CKD--w/u in progress.  Concur with volume optimization in preparation for LHC  2.  Hypertension--CCB titration systolic goal <140    discussed with med team  Nilesh Scott MD  contact me on TEAMS

## 2022-10-08 NOTE — OCCUPATIONAL THERAPY INITIAL EVALUATION ADULT - ADDITIONAL COMMENTS
pt reports lives alone in apt, 10 steps to enter, walk-in shower with GBs. prior to admission pt reports friends assisting with IADLs/groceries.

## 2022-10-08 NOTE — DISCHARGE NOTE PROVIDER - NSDCMRMEDTOKEN_GEN_ALL_CORE_FT
Albuterol (Eqv-ProAir HFA) 90 mcg/inh inhalation aerosol: 2 puff(s) inhaled every 6 hours  amitriptyline 10 mg oral tablet: 1 tab(s) orally once a day (at bedtime)  home  atorvastatin 40 mg oral tablet: 1 tab(s) orally once a day (at bedtime)  home  fenofibrate 145 mg oral tablet: 1 tab(s) orally once a day  home  HumaLOG 100 units/mL subcutaneous solution: 5 unit(s) subcutaneous 3 times a day (before meals)  home  hydroCHLOROthiazide 25 mg oral tablet: 1 tab(s) orally once a day  home  meclizine 25 mg oral tablet: 1 tab(s) orally 3 times a day  [facility]   Norvasc 10 mg oral tablet: 1 tab(s) orally once a day  [facility]   Pepcid 20 mg oral tablet: 1 tab(s) orally once a day  home  Symbicort 80 mcg-4.5 mcg/inh inhalation aerosol: 2 puff(s) inhaled 2 times a day  home  Toprol-XL 50 mg oral tablet, extended release: 1 tab(s) orally once a day  home  Tresiba 100 units/mL subcutaneous solution: 45 unit(s) subcutaneous once a day @ [facility]   Albuterol (Eqv-ProAir HFA) 90 mcg/inh inhalation aerosol: 2 puff(s) inhaled every 6 hours  amitriptyline 10 mg oral tablet: 1 tab(s) orally once a day (at bedtime)  home  atorvastatin 40 mg oral tablet: 1 tab(s) orally once a day (at bedtime)  home  carvedilol 25 mg oral tablet: 1 tab(s) orally every 12 hours  fenofibrate 145 mg oral tablet: 1 tab(s) orally once a day  home  hydroCHLOROthiazide 25 mg oral tablet: 1 tab(s) orally once a day  home  lisinopril 2.5 mg oral tablet: 1 tab(s) orally once a day  meclizine 25 mg oral tablet: 1 tab(s) orally 3 times a day  Hospital   Norvasc 10 mg oral tablet: 1 tab(s) orally once a day  Hospital   Pepcid 20 mg oral tablet: 1 tab(s) orally once a day  home  Symbicort 80 mcg-4.5 mcg/inh inhalation aerosol: 2 puff(s) inhaled 2 times a day  home  Tresiba 100 units/mL subcutaneous solution: 45 unit(s) subcutaneous once a day @ [facility]   Albuterol (Eqv-ProAir HFA) 90 mcg/inh inhalation aerosol: 2 puff(s) inhaled every 6 hours  amitriptyline 10 mg oral tablet: 1 tab(s) orally once a day (at bedtime)  home  atorvastatin 40 mg oral tablet: 1 tab(s) orally once a day (at bedtime)  home  carvedilol 25 mg oral tablet: 1 tab(s) orally every 12 hours  fenofibrate 145 mg oral tablet: 1 tab(s) orally once a day  home  insulin glargine 100 units/mL subcutaneous solution: 20 unit(s) subcutaneous once a day (at bedtime)  lisinopril 2.5 mg oral tablet: 1 tab(s) orally once a day  Norvasc 10 mg oral tablet: 1 tab(s) orally once a day  [facility]   Symbicort 80 mcg-4.5 mcg/inh inhalation aerosol: 2 puff(s) inhaled 2 times a day  home   Admelog 100 units/mL injectable solution: 2 unit(s) injectable 3 times a day (before meals)  Albuterol (Eqv-ProAir HFA) 90 mcg/inh inhalation aerosol: 2 puff(s) inhaled every 6 hours  amitriptyline 10 mg oral tablet: 1 tab(s) orally once a day (at bedtime)  home  atorvastatin 40 mg oral tablet: 1 tab(s) orally once a day (at bedtime)  home  carvedilol 25 mg oral tablet: 1 tab(s) orally every 12 hours  fenofibrate 145 mg oral tablet: 1 tab(s) orally once a day  home  insulin glargine 100 units/mL subcutaneous solution: 20 unit(s) subcutaneous once a day (at bedtime)  lisinopril 2.5 mg oral tablet: 1 tab(s) orally once a day  Norvasc 10 mg oral tablet: 1 tab(s) orally once a day  [facility]   Soaanz 20 mg oral tablet: 1 tab(s) orally once a day  Symbicort 80 mcg-4.5 mcg/inh inhalation aerosol: 2 puff(s) inhaled 2 times a day  home

## 2022-10-09 LAB
ALBUMIN SERPL ELPH-MCNC: 3.3 G/DL — SIGNIFICANT CHANGE UP (ref 3.3–5)
ALP SERPL-CCNC: 109 U/L — SIGNIFICANT CHANGE UP (ref 40–120)
ALT FLD-CCNC: 24 U/L — SIGNIFICANT CHANGE UP (ref 10–45)
ANION GAP SERPL CALC-SCNC: 10 MMOL/L — SIGNIFICANT CHANGE UP (ref 5–17)
AST SERPL-CCNC: 20 U/L — SIGNIFICANT CHANGE UP (ref 10–40)
BILIRUB SERPL-MCNC: 0.2 MG/DL — SIGNIFICANT CHANGE UP (ref 0.2–1.2)
BUN SERPL-MCNC: 39 MG/DL — HIGH (ref 7–23)
CALCIUM SERPL-MCNC: 8.7 MG/DL — SIGNIFICANT CHANGE UP (ref 8.4–10.5)
CHLORIDE SERPL-SCNC: 109 MMOL/L — HIGH (ref 96–108)
CO2 SERPL-SCNC: 22 MMOL/L — SIGNIFICANT CHANGE UP (ref 22–31)
CREAT SERPL-MCNC: 2.76 MG/DL — HIGH (ref 0.5–1.3)
EGFR: 27 ML/MIN/1.73M2 — LOW
GLUCOSE BLDC GLUCOMTR-MCNC: 106 MG/DL — HIGH (ref 70–99)
GLUCOSE BLDC GLUCOMTR-MCNC: 210 MG/DL — HIGH (ref 70–99)
GLUCOSE BLDC GLUCOMTR-MCNC: 285 MG/DL — HIGH (ref 70–99)
GLUCOSE BLDC GLUCOMTR-MCNC: 71 MG/DL — SIGNIFICANT CHANGE UP (ref 70–99)
GLUCOSE SERPL-MCNC: 68 MG/DL — LOW (ref 70–99)
HBV SURFACE AG SER-ACNC: SIGNIFICANT CHANGE UP
HCT VFR BLD CALC: 32.6 % — LOW (ref 39–50)
HCV AB S/CO SERPL IA: 0.09 S/CO — SIGNIFICANT CHANGE UP (ref 0–0.99)
HCV AB SERPL-IMP: SIGNIFICANT CHANGE UP
HGB BLD-MCNC: 10.8 G/DL — LOW (ref 13–17)
MAGNESIUM SERPL-MCNC: 2.1 MG/DL — SIGNIFICANT CHANGE UP (ref 1.6–2.6)
MCHC RBC-ENTMCNC: 31 PG — SIGNIFICANT CHANGE UP (ref 27–34)
MCHC RBC-ENTMCNC: 33.1 GM/DL — SIGNIFICANT CHANGE UP (ref 32–36)
MCV RBC AUTO: 93.7 FL — SIGNIFICANT CHANGE UP (ref 80–100)
NRBC # BLD: 0 /100 WBCS — SIGNIFICANT CHANGE UP (ref 0–0)
PHOSPHATE SERPL-MCNC: 3.4 MG/DL — SIGNIFICANT CHANGE UP (ref 2.5–4.5)
PLATELET # BLD AUTO: 174 K/UL — SIGNIFICANT CHANGE UP (ref 150–400)
POTASSIUM SERPL-MCNC: 4.4 MMOL/L — SIGNIFICANT CHANGE UP (ref 3.5–5.3)
POTASSIUM SERPL-SCNC: 4.4 MMOL/L — SIGNIFICANT CHANGE UP (ref 3.5–5.3)
PROT SERPL-MCNC: 6.1 G/DL — SIGNIFICANT CHANGE UP (ref 6–8.3)
RBC # BLD: 3.48 M/UL — LOW (ref 4.2–5.8)
RBC # FLD: 12.3 % — SIGNIFICANT CHANGE UP (ref 10.3–14.5)
SODIUM SERPL-SCNC: 141 MMOL/L — SIGNIFICANT CHANGE UP (ref 135–145)
WBC # BLD: 5.5 K/UL — SIGNIFICANT CHANGE UP (ref 3.8–10.5)
WBC # FLD AUTO: 5.5 K/UL — SIGNIFICANT CHANGE UP (ref 3.8–10.5)

## 2022-10-09 PROCEDURE — 70552 MRI BRAIN STEM W/DYE: CPT | Mod: 26

## 2022-10-09 PROCEDURE — 99232 SBSQ HOSP IP/OBS MODERATE 35: CPT | Mod: GC

## 2022-10-09 PROCEDURE — 99233 SBSQ HOSP IP/OBS HIGH 50: CPT | Mod: GC

## 2022-10-09 RX ORDER — SODIUM CHLORIDE 9 MG/ML
1000 INJECTION INTRAMUSCULAR; INTRAVENOUS; SUBCUTANEOUS
Refills: 0 | Status: DISCONTINUED | OUTPATIENT
Start: 2022-10-09 | End: 2022-10-14

## 2022-10-09 RX ADMIN — TICAGRELOR 90 MILLIGRAM(S): 90 TABLET ORAL at 05:26

## 2022-10-09 RX ADMIN — SODIUM CHLORIDE 80 MILLILITER(S): 9 INJECTION INTRAMUSCULAR; INTRAVENOUS; SUBCUTANEOUS at 12:14

## 2022-10-09 RX ADMIN — AMLODIPINE BESYLATE 10 MILLIGRAM(S): 2.5 TABLET ORAL at 05:26

## 2022-10-09 RX ADMIN — Medication 145 MILLIGRAM(S): at 12:15

## 2022-10-09 RX ADMIN — INSULIN GLARGINE 36 UNIT(S): 100 INJECTION, SOLUTION SUBCUTANEOUS at 21:39

## 2022-10-09 RX ADMIN — Medication 4 UNIT(S): at 12:15

## 2022-10-09 RX ADMIN — Medication 81 MILLIGRAM(S): at 12:15

## 2022-10-09 RX ADMIN — HEPARIN SODIUM 5000 UNIT(S): 5000 INJECTION INTRAVENOUS; SUBCUTANEOUS at 14:54

## 2022-10-09 RX ADMIN — CARVEDILOL PHOSPHATE 25 MILLIGRAM(S): 80 CAPSULE, EXTENDED RELEASE ORAL at 17:12

## 2022-10-09 RX ADMIN — ATORVASTATIN CALCIUM 40 MILLIGRAM(S): 80 TABLET, FILM COATED ORAL at 21:21

## 2022-10-09 RX ADMIN — TICAGRELOR 90 MILLIGRAM(S): 90 TABLET ORAL at 17:12

## 2022-10-09 RX ADMIN — Medication 10 MILLIGRAM(S): at 21:21

## 2022-10-09 RX ADMIN — CARVEDILOL PHOSPHATE 25 MILLIGRAM(S): 80 CAPSULE, EXTENDED RELEASE ORAL at 05:26

## 2022-10-09 RX ADMIN — PANTOPRAZOLE SODIUM 40 MILLIGRAM(S): 20 TABLET, DELAYED RELEASE ORAL at 05:26

## 2022-10-09 RX ADMIN — HEPARIN SODIUM 5000 UNIT(S): 5000 INJECTION INTRAVENOUS; SUBCUTANEOUS at 21:21

## 2022-10-09 RX ADMIN — HEPARIN SODIUM 5000 UNIT(S): 5000 INJECTION INTRAVENOUS; SUBCUTANEOUS at 05:27

## 2022-10-09 RX ADMIN — BUDESONIDE AND FORMOTEROL FUMARATE DIHYDRATE 2 PUFF(S): 160; 4.5 AEROSOL RESPIRATORY (INHALATION) at 05:27

## 2022-10-09 RX ADMIN — Medication 3: at 12:16

## 2022-10-09 NOTE — PROGRESS NOTE ADULT - PROBLEM SELECTOR PLAN 1
Patient with creatinine ~1.7 in May, increased to 2-2.4 in July. Now patient presented to [facility] for LHC, creatinine 2.7. Patient likely has baseline CKD from long standing DM (has been on insulin for ?10 years). Patient received neuro imaging with contrast on 10/4 and CT angio cardiac with pre/post hydration. Creat did improve but peaked at 3.0 on 10.8 now 2.7 today.    UA showed 100 protein, trace blood, Uprot/creat 3.4. Pending HBSAg, HCV Ab, SIFE, and PLA2R. Obtain Renal US . Discussed with patient the risks and benefits of LHC and contrast required, patient understands the risks of CKD progression but knows the cardiac procedure is necessary. Kaushik Score 10 points with 14% of post- PCI BRENDEN. Would recommend IVF 80/hour for 12 hours prior to catherization. Would not recommend doing a LHC today. Avoid nephrotoxic agents. Dose all meds appropriate for GFR.     Monitor strict UOP.     If you have any questions, please feel free to contact me  Russ Croft  Nephrology Fellow  [phone]; Prefer [employer] TEAMS  (After 5pm or on weekends please page the on-call fellow) Patient with creatinine ~1.7 in May, increased to 2-2.4 in July. Now patient presented to [facility] for LHC, creatinine 2.7. Patient likely has baseline CKD from long standing DM (has been on insulin for ?10 years). Patient received neuro imaging with contrast on 10/4 and CT angio cardiac with pre/post hydration. Creat did improve but peaked at 3.0 on 10.8 now 2.7 today.    UA showed 100 protein, trace blood, Uprot/creat 3.4. Pending HBSAg, HCV Ab, SIFE, and PLA2R. Obtain Renal US . Discussed with patient the risks and benefits of LHC and contrast required, patient understands the risks of CKD progression but knows the cardiac procedure is necessary. Kaushik Score 10 points with 14% of post- PCI BRENDEN. Would recommend IVF 80/hour for 12 hours prior to catherization. Renal US showing bilateral echogenicity suggestive of CKD, no hydro, no stones, no cysts, appropriate size.. Would not recommend doing a LHC today. Avoid nephrotoxic agents. Dose all meds appropriate for GFR.     Monitor strict UOP.     If you have any questions, please feel free to contact me  Russ Croft  Nephrology Fellow  [phone]; Prefer [employer] TEAMS  (After 5pm or on weekends please page the on-call fellow)

## 2022-10-09 NOTE — PROGRESS NOTE ADULT - PROBLEM SELECTOR PLAN 2
Pt had diplopia since 09/30, initially started as decreased visual acuity. Evaluated by opthalmology and neurology at [facility], considered 6th nerve palsy. Also found to have perisagittal infarcts on MRI w/o con @ [facility]  -MRI w/ IV contrast pending, neuro to evaluate

## 2022-10-09 NOTE — PROGRESS NOTE ADULT - ATTENDING COMMENTS
46 y/o male with DM II, CKDIV, HTN who presented to OSH complaining of diplopia found to be in hypertensive emergency with /133. Hospital course was c/b possible acute parasagittal infarct seen on MRI imaging and NSTEMI for which pt was transferred for cath. Neuro reviewed imaging, cleared for full dose AC and DAPT. Pt currently on Asa/Brilinta, Heparin ggt discontinued yesterday. CTA head and neck negative for stenosis. Troponins peaked. Changed Metoprolol to Coreg for better BP control. CT coronaries reviewed, pLAD and RCA with moderate luminal narrowing lesions. Cards favoring cath when renal function improves. TTE pending . Nephro consulted for optimization prior to contrast loads - Cr iumrpoving s/p gentle IVF 10/8-10/9. Neuro called regarding vertigo and absent right eye abduction, recommend MRI head - pending read

## 2022-10-09 NOTE — PROGRESS NOTE ADULT - ASSESSMENT
48 y/o male, current cigarette smoker, with PMHx of Vertigo, Diverticulitis s/p LAR, ETOH abuse now sober x 5 years, DM (patient states has been on Insulin x 10 years was never on oral agents), CKD IV - baseline creatinine 2.4, HTN, AMBER, who presented to [facility] on 10/2 c/o dizziness, b/l diplopia, headache and chest tightness, admitted for c/f NSTEMI +/- possible stroke.

## 2022-10-09 NOTE — PROGRESS NOTE ADULT - ATTENDING COMMENTS
47 M w/ PMHx of EtOH abuse now sober X5 years, DM, CKD IV (baseline creatinine 2.0-2.4 in July), HTN who presented to OSH with dizziness, diplopia, CP. Trop elevated with EKG changes. Patient transferred to [facility] for NSTEMI requiring LHC. Creatinine elevated to 2.7 on presentation.  Chest pain overnight resolved.    1.  ARF on CKD--w/u in progress.  Concur with volume optimization in preparation for LHC.  Cr now back in 2s, trend.  Given chest pain symptoms risk benefit contrast favors study in near future  2.  Hypertension--CCB titration systolic goal <140  3.  Nephrotic range proteinuria--guardado in progress and thus far unrevealing.  Check renal venous dopplers.  Sono reveals chronicity.      discussed with med team  Nilesh Scott MD  contact me on TEAMS

## 2022-10-09 NOTE — PROGRESS NOTE ADULT - PROBLEM SELECTOR PLAN 4
Parasagittal infarct noted on outside facility records, no focal findings on exam  -no lateralizing findings or focal deficits, decreased sensation likely 2/2 diabetic neuropathy  -aspirin + brilinta  -neuro consulted, recs appreciated      -CTA head/neck negative for acute stroke      -cleared for full AC

## 2022-10-09 NOTE — PROGRESS NOTE ADULT - ASSESSMENT
47 M w/ PMHx of EtOH abuse now sober X5 years, DM, CKD IV (baseline creatinine 2.0-2.4 in July), HTN who presented to OSH with dizziness, diplopia, CP. Trop elevated with EKG changes. Patient transferred to [facility] for NSTEMI requiring LHC. Creatinine elevated to 2.7  which improved with IVF

## 2022-10-09 NOTE — PROVIDER CONTACT NOTE (OTHER) - ACTION/TREATMENT ORDERED:
Team MD Fermin aware. 12 Lead ECG and labs obtained. Will continue to monitor and maintain pt safety.

## 2022-10-09 NOTE — PROGRESS NOTE ADULT - PROBLEM SELECTOR PLAN 1
Transfer from [facility] for cardiac cath  -GDMT w/ coreg, atorvastatin (holding ACE/ARB for BILLY)  -aspirin + brilinta  -heparin gtt d/c  -EKG and troponins for chest pain  -coronary CT completed, moderate stenosis of prox RCA and LAD  -cards following, potential cath following resolution of BILLY

## 2022-10-09 NOTE — PROGRESS NOTE ADULT - SUBJECTIVE AND OBJECTIVE BOX
Kayley Woods MD  PGY 1 Department of Internal Medicine        Patient is a 47y old  Male who presents with a chief complaint of Transfer for NSTEMI (08 Oct 2022 16:18)      SUBJECTIVE / OVERNIGHT EVENTS: Pt seen and examined. No acute overnight events. Denies fevers, chills, CP, SOB, Abdominal pain, N/V, Constipation, Diarrhea        MEDICATIONS  (STANDING):  amitriptyline 10 milliGRAM(s) Oral at bedtime  amLODIPine   Tablet 10 milliGRAM(s) Oral daily  aspirin enteric coated 81 milliGRAM(s) Oral daily  atorvastatin 40 milliGRAM(s) Oral at bedtime  budesonide  80 MICROgram(s)/formoterol 4.5 MICROgram(s) Inhaler 2 Puff(s) Inhalation two times a day  carvedilol 25 milliGRAM(s) Oral every 12 hours  chlorhexidine 2% Cloths 1 Application(s) Topical <User Schedule>  dextrose 5%. 1000 milliLiter(s) (50 mL/Hr) IV Continuous <Continuous>  dextrose 5%. 1000 milliLiter(s) (100 mL/Hr) IV Continuous <Continuous>  dextrose 50% Injectable 25 Gram(s) IV Push once  dextrose 50% Injectable 12.5 Gram(s) IV Push once  dextrose 50% Injectable 25 Gram(s) IV Push once  fenofibrate Tablet 145 milliGRAM(s) Oral daily  glucagon  Injectable 1 milliGRAM(s) IntraMuscular once  heparin   Injectable 5000 Unit(s) SubCutaneous every 8 hours  influenza   Vaccine 0.5 milliLiter(s) IntraMuscular once  insulin glargine Injectable (LANTUS) 36 Unit(s) SubCutaneous at bedtime  insulin lispro (ADMELOG) corrective regimen sliding scale   SubCutaneous three times a day before meals  insulin lispro (ADMELOG) corrective regimen sliding scale   SubCutaneous at bedtime  insulin lispro Injectable (ADMELOG) 4 Unit(s) SubCutaneous three times a day before meals  pantoprazole    Tablet 40 milliGRAM(s) Oral before breakfast  sodium chloride 0.9%. 1000 milliLiter(s) (80 mL/Hr) IV Continuous <Continuous>  ticagrelor 90 milliGRAM(s) Oral every 12 hours    MEDICATIONS  (PRN):  ALBUTerol    90 MICROgram(s) HFA Inhaler 2 Puff(s) Inhalation every 6 hours PRN Shortness of Breath and/or Wheezing  Biotene Dry Mouth Oral Rinse 15 milliLiter(s) Swish and Spit three times a day PRN Mouth Care  dextrose Oral Gel 15 Gram(s) Oral once PRN Blood Glucose LESS THAN 70 milliGRAM(s)/deciliter      I&O's Summary    08 Oct 2022 07:01  -  09 Oct 2022 07:00  --------------------------------------------------------  IN: 240 mL / OUT: 700 mL / NET: -460 mL        Vital Signs Last 24 Hrs  T(C): 36.7 (09 Oct 2022 04:17), Max: 36.7 (09 Oct 2022 04:17)  T(F): 98.1 (09 Oct 2022 04:17), Max: 98.1 (09 Oct 2022 04:17)  HR: 72 (09 Oct 2022 04:17) (69 - 77)  BP: 161/81 (09 Oct 2022 04:17) (133/76 - 161/81)  BP(mean): --  RR: 18 (09 Oct 2022 04:17) (18 - 18)  SpO2: 100% (09 Oct 2022 04:17) (99% - 100%)    Parameters below as of 09 Oct 2022 04:17  Patient On (Oxygen Delivery Method): room air        CAPILLARY BLOOD GLUCOSE      POCT Blood Glucose.: 146 mg/dL (08 Oct 2022 16:11)  POCT Blood Glucose.: 218 mg/dL (08 Oct 2022 11:40)  POCT Blood Glucose.: 97 mg/dL (08 Oct 2022 08:02)      PHYSICAL EXAM:  GENERAL: NAD,   HEAD:  Atraumatic, Normocephalic  EYES: EOMI, PERRL, conjunctiva and sclera clear  NECK: No JVD  CHEST/LUNG: Clear to auscultation bilaterally; No wheeze  HEART: Regular rate and rhythm; No murmurs, rubs, or gallops  ABDOMEN: Soft, Nontender, Nondistended; Bowel sounds present  EXTREMITIES:  2+ Peripheral Pulses, No clubbing, cyanosis, or edema  PSYCH: AAOx3  NEUROLOGY: non-focal  SKIN: No rashes or lesions       LABS:                        10.8   5.50  )-----------( 174      ( 09 Oct 2022 06:26 )             32.6     Auto Eosinophil # x     / Auto Eosinophil % x     / Auto Neutrophil # x     / Auto Neutrophil % x     / BANDS % x                            10.8   6.79  )-----------( 170      ( 08 Oct 2022 06:28 )             33.1     Auto Eosinophil # x     / Auto Eosinophil % x     / Auto Neutrophil # x     / Auto Neutrophil % x     / BANDS % x        10-09    141  |  109<H>  |  39<H>  ----------------------------<  68<L>  4.4   |  22  |  2.76<H>  10-08    140  |  108  |  44<H>  ----------------------------<  120<H>  4.5   |  22  |  3.04<H>    Ca    8.7      09 Oct 2022 06:26  Mg     2.1     10-09  Phos  3.4     10-09  TPro  6.1  /  Alb  3.3  /  TBili  0.2  /  DBili  x   /  AST  20  /  ALT  24  /  AlkPhos  109  10-09  TPro  6.1  /  Alb  3.0<L>  /  TBili  0.1<L>  /  DBili  x   /  AST  26  /  ALT  27  /  AlkPhos  116  10-08      CARDIAC MARKERS ( 08 Oct 2022 22:34 )  x     / x     / x     / x     / 10.6 ng/mL              RADIOLOGY & ADDITIONAL TESTS:    Imaging Personally Reviewed:    Consultant(s) Notes Reviewed:      Care Discussed with Consultants/Other Providers:   Kayley Woods MD  PGY 1 Department of Internal Medicine        Patient is a 47y old  Male who presents with a chief complaint of Transfer for NSTEMI (08 Oct 2022 16:18)      SUBJECTIVE / OVERNIGHT EVENTS: Pt seen and examined. Had SOB and chest pain o/n for which EKG and cardiac enzymes ordered, no changes consistent with ACS. Denies fevers, chills, CP, SOB, Abdominal pain, N/V, Constipation, Diarrhea at this time.         MEDICATIONS  (STANDING):  amitriptyline 10 milliGRAM(s) Oral at bedtime  amLODIPine   Tablet 10 milliGRAM(s) Oral daily  aspirin enteric coated 81 milliGRAM(s) Oral daily  atorvastatin 40 milliGRAM(s) Oral at bedtime  budesonide  80 MICROgram(s)/formoterol 4.5 MICROgram(s) Inhaler 2 Puff(s) Inhalation two times a day  carvedilol 25 milliGRAM(s) Oral every 12 hours  chlorhexidine 2% Cloths 1 Application(s) Topical <User Schedule>  dextrose 5%. 1000 milliLiter(s) (50 mL/Hr) IV Continuous <Continuous>  dextrose 5%. 1000 milliLiter(s) (100 mL/Hr) IV Continuous <Continuous>  dextrose 50% Injectable 25 Gram(s) IV Push once  dextrose 50% Injectable 12.5 Gram(s) IV Push once  dextrose 50% Injectable 25 Gram(s) IV Push once  fenofibrate Tablet 145 milliGRAM(s) Oral daily  glucagon  Injectable 1 milliGRAM(s) IntraMuscular once  heparin   Injectable 5000 Unit(s) SubCutaneous every 8 hours  influenza   Vaccine 0.5 milliLiter(s) IntraMuscular once  insulin glargine Injectable (LANTUS) 36 Unit(s) SubCutaneous at bedtime  insulin lispro (ADMELOG) corrective regimen sliding scale   SubCutaneous three times a day before meals  insulin lispro (ADMELOG) corrective regimen sliding scale   SubCutaneous at bedtime  insulin lispro Injectable (ADMELOG) 4 Unit(s) SubCutaneous three times a day before meals  pantoprazole    Tablet 40 milliGRAM(s) Oral before breakfast  sodium chloride 0.9%. 1000 milliLiter(s) (80 mL/Hr) IV Continuous <Continuous>  ticagrelor 90 milliGRAM(s) Oral every 12 hours    MEDICATIONS  (PRN):  ALBUTerol    90 MICROgram(s) HFA Inhaler 2 Puff(s) Inhalation every 6 hours PRN Shortness of Breath and/or Wheezing  Biotene Dry Mouth Oral Rinse 15 milliLiter(s) Swish and Spit three times a day PRN Mouth Care  dextrose Oral Gel 15 Gram(s) Oral once PRN Blood Glucose LESS THAN 70 milliGRAM(s)/deciliter      I&O's Summary    08 Oct 2022 07:01  -  09 Oct 2022 07:00  --------------------------------------------------------  IN: 240 mL / OUT: 700 mL / NET: -460 mL        Vital Signs Last 24 Hrs  T(C): 36.7 (09 Oct 2022 04:17), Max: 36.7 (09 Oct 2022 04:17)  T(F): 98.1 (09 Oct 2022 04:17), Max: 98.1 (09 Oct 2022 04:17)  HR: 72 (09 Oct 2022 04:17) (69 - 77)  BP: 161/81 (09 Oct 2022 04:17) (133/76 - 161/81)  BP(mean): --  RR: 18 (09 Oct 2022 04:17) (18 - 18)  SpO2: 100% (09 Oct 2022 04:17) (99% - 100%)    Parameters below as of 09 Oct 2022 04:17  Patient On (Oxygen Delivery Method): room air        CAPILLARY BLOOD GLUCOSE      POCT Blood Glucose.: 146 mg/dL (08 Oct 2022 16:11)  POCT Blood Glucose.: 218 mg/dL (08 Oct 2022 11:40)  POCT Blood Glucose.: 97 mg/dL (08 Oct 2022 08:02)      PHYSICAL EXAM:  GENERAL: NAD,   HEAD:  Atraumatic, Normocephalic  EYES: EOMI, PERRL, conjunctiva and sclera clear  NECK: No JVD  CHEST/LUNG: Clear to auscultation bilaterally; No wheeze  HEART: Regular rate and rhythm; No murmurs, rubs, or gallops  ABDOMEN: Soft, Nontender, Nondistended; Bowel sounds present  EXTREMITIES:  2+ Peripheral Pulses, No clubbing, cyanosis, or edema  PSYCH: AAOx3  NEUROLOGY: non-focal  SKIN: No rashes or lesions       LABS:                        10.8   5.50  )-----------( 174      ( 09 Oct 2022 06:26 )             32.6     Auto Eosinophil # x     / Auto Eosinophil % x     / Auto Neutrophil # x     / Auto Neutrophil % x     / BANDS % x                            10.8   6.79  )-----------( 170      ( 08 Oct 2022 06:28 )             33.1     Auto Eosinophil # x     / Auto Eosinophil % x     / Auto Neutrophil # x     / Auto Neutrophil % x     / BANDS % x        10-09    141  |  109<H>  |  39<H>  ----------------------------<  68<L>  4.4   |  22  |  2.76<H>  10-08    140  |  108  |  44<H>  ----------------------------<  120<H>  4.5   |  22  |  3.04<H>    Ca    8.7      09 Oct 2022 06:26  Mg     2.1     10-09  Phos  3.4     10-09  TPro  6.1  /  Alb  3.3  /  TBili  0.2  /  DBili  x   /  AST  20  /  ALT  24  /  AlkPhos  109  10-09  TPro  6.1  /  Alb  3.0<L>  /  TBili  0.1<L>  /  DBili  x   /  AST  26  /  ALT  27  /  AlkPhos  116  10-08      CARDIAC MARKERS ( 08 Oct 2022 22:34 )  x     / x     / x     / x     / 10.6 ng/mL

## 2022-10-09 NOTE — PROGRESS NOTE ADULT - SUBJECTIVE AND OBJECTIVE BOX
[facility] DIVISION OF KIDNEY DISEASES AND HYPERTENSION -- FOLLOW UP NOTE  --------------------------------------------------------------------------------  Chief Complaint: Non-ST elevation myocardial infarction (NSTEMI)    47 M w/ PMHx of EtOH abuse now sober X5 years, DM, CKD IV (baseline creatinine 2.0-2.4 in July), HTN who presented to OSH with dizziness, diplopia, CP. Trop elevated with EKG changes. Patient transferred to [facility] for NSTEMI requiring LHC. Creatinine elevated to 2.7 on presentation.    Pt. seen this AM. Had chest pain overnight, given tylenol felt better. Pt. state he was feeling anxious and once he calmed down the chest pain went away. No shortness of breath, Good UOP. SCr. elevated but now downtrending.       PAST HISTORY  --------------------------------------------------------------------------------  No significant changes to PMH, PSH, FHx, SHx, unless otherwise noted    ALLERGIES & MEDICATIONS  --------------------------------------------------------------------------------  Allergies    No Known Allergies    Intolerances      Standing Inpatient Medications  amitriptyline 10 milliGRAM(s) Oral at bedtime  amLODIPine   Tablet 10 milliGRAM(s) Oral daily  aspirin enteric coated 81 milliGRAM(s) Oral daily  atorvastatin 40 milliGRAM(s) Oral at bedtime  budesonide  80 MICROgram(s)/formoterol 4.5 MICROgram(s) Inhaler 2 Puff(s) Inhalation two times a day  carvedilol 25 milliGRAM(s) Oral every 12 hours  chlorhexidine 2% Cloths 1 Application(s) Topical <User Schedule>  dextrose 5%. 1000 milliLiter(s) IV Continuous <Continuous>  dextrose 5%. 1000 milliLiter(s) IV Continuous <Continuous>  dextrose 50% Injectable 25 Gram(s) IV Push once  dextrose 50% Injectable 12.5 Gram(s) IV Push once  dextrose 50% Injectable 25 Gram(s) IV Push once  fenofibrate Tablet 145 milliGRAM(s) Oral daily  glucagon  Injectable 1 milliGRAM(s) IntraMuscular once  heparin   Injectable 5000 Unit(s) SubCutaneous every 8 hours  influenza   Vaccine 0.5 milliLiter(s) IntraMuscular once  insulin glargine Injectable (LANTUS) 36 Unit(s) SubCutaneous at bedtime  insulin lispro (ADMELOG) corrective regimen sliding scale   SubCutaneous three times a day before meals  insulin lispro (ADMELOG) corrective regimen sliding scale   SubCutaneous at bedtime  insulin lispro Injectable (ADMELOG) 4 Unit(s) SubCutaneous three times a day before meals  pantoprazole    Tablet 40 milliGRAM(s) Oral before breakfast  sodium chloride 0.9%. 1000 milliLiter(s) IV Continuous <Continuous>  ticagrelor 90 milliGRAM(s) Oral every 12 hours    PRN Inpatient Medications  ALBUTerol    90 MICROgram(s) HFA Inhaler 2 Puff(s) Inhalation every 6 hours PRN  Biotene Dry Mouth Oral Rinse 15 milliLiter(s) Swish and Spit three times a day PRN  dextrose Oral Gel 15 Gram(s) Oral once PRN      REVIEW OF SYSTEMS  --------------------------------------------------------------------------------  As per HPI    VITALS/PHYSICAL EXAM  --------------------------------------------------------------------------------  T(C): 36.7 (10-09-22 @ 04:17), Max: 36.7 (10-09-22 @ 04:17)  HR: 72 (10-09-22 @ 04:17) (69 - 77)  BP: 161/81 (10-09-22 @ 04:17) (133/76 - 161/81)  RR: 18 (10-09-22 @ 04:17) (18 - 18)  SpO2: 100% (10-09-22 @ 04:17) (99% - 100%)  Wt(kg): --        10-08-22 @ 07:01  -  10-09-22 @ 07:00  --------------------------------------------------------  IN: 240 mL / OUT: 700 mL / NET: -460 mL    Physical Exam:  	Gen: NAD  	HEENT: MMM  	Pulm: CTA B/L  	CV: S1S2  	Abd: Soft, +BS   	Ext: No LE edema B/L  	Neuro: Awake  	Skin: Warm and dry  	Vascular access: None      LABS/STUDIES  --------------------------------------------------------------------------------              10.8   5.50  >-----------<  174      [10-09-22 @ 06:26]              32.6     141  |  109  |  39  ----------------------------<  68      [10-09-22 @ 06:26]  4.4   |  22  |  2.76        Ca     8.7     [10-09-22 @ 06:26]      Mg     2.1     [10-09-22 @ 06:26]      Phos  3.4     [10-09-22 @ 06:26]    TPro  6.1  /  Alb  3.3  /  TBili  0.2  /  DBili  x   /  AST  20  /  ALT  24  /  AlkPhos  109  [10-09-22 @ 06:26]      Creatinine Trend:  SCr 2.76 [10-09 @ 06:26]  SCr 3.04 [10-08 @ 06:30]  SCr 2.94 [10-07 @ 06:00]  SCr 2.20 [10-06 @ 04:38]  SCr 2.50 [10-05 @ 00:49]    Urinalysis - [10-05-22 @ 00:49]      Color Colorless / Appearance Clear / SG 1.008 / pH 6.0      Gluc 500 mg/dL / Ketone Negative  / Bili Negative / Urobili Negative       Blood Trace / Protein 100 mg/dL / Leuk Est Negative / Nitrite Negative      RBC 2 / WBC 1 / Hyaline  / Gran  / Sq Epi  / Non Sq Epi 0 / Bacteria Negative    Urine Creatinine 41      [10-05-22 @ 00:51]  Urine Protein 140      [10-05-22 @ 00:51]  Urine Sodium 38      [10-05-22 @ 00:51]  Urine Urea Nitrogen 346      [10-05-22 @ 01:12]  Urine Potassium 13      [10-05-22 @ 00:51]    HbA1c 14.4      [06-25-19 @ 06:08]  Lipid: chol 276, , HDL 44, LDL --      [05-15-22 @ 16:49]

## 2022-10-09 NOTE — PROGRESS NOTE ADULT - PROBLEM SELECTOR PLAN 3
Worsening following coronary CT  -hold ACE/ARB  -80cc/hr LR for 12 hrs  -nephro consulted, recs appreciated     -hydration pre and post contrast studies

## 2022-10-10 LAB
ALBUMIN SERPL ELPH-MCNC: 3.5 G/DL — SIGNIFICANT CHANGE UP (ref 3.3–5)
ALP SERPL-CCNC: 98 U/L — SIGNIFICANT CHANGE UP (ref 40–120)
ALT FLD-CCNC: 19 U/L — SIGNIFICANT CHANGE UP (ref 10–45)
ANION GAP SERPL CALC-SCNC: 9 MMOL/L — SIGNIFICANT CHANGE UP (ref 5–17)
AST SERPL-CCNC: 15 U/L — SIGNIFICANT CHANGE UP (ref 10–40)
BILIRUB SERPL-MCNC: 0.2 MG/DL — SIGNIFICANT CHANGE UP (ref 0.2–1.2)
BUN SERPL-MCNC: 34 MG/DL — HIGH (ref 7–23)
CALCIUM SERPL-MCNC: 8.9 MG/DL — SIGNIFICANT CHANGE UP (ref 8.4–10.5)
CHLORIDE SERPL-SCNC: 109 MMOL/L — HIGH (ref 96–108)
CO2 SERPL-SCNC: 22 MMOL/L — SIGNIFICANT CHANGE UP (ref 22–31)
CREAT SERPL-MCNC: 3.13 MG/DL — HIGH (ref 0.5–1.3)
EGFR: 23 ML/MIN/1.73M2 — LOW
GLUCOSE BLDC GLUCOMTR-MCNC: 102 MG/DL — HIGH (ref 70–99)
GLUCOSE BLDC GLUCOMTR-MCNC: 145 MG/DL — HIGH (ref 70–99)
GLUCOSE BLDC GLUCOMTR-MCNC: 186 MG/DL — HIGH (ref 70–99)
GLUCOSE BLDC GLUCOMTR-MCNC: 203 MG/DL — HIGH (ref 70–99)
GLUCOSE SERPL-MCNC: 180 MG/DL — HIGH (ref 70–99)
HCT VFR BLD CALC: 32.6 % — LOW (ref 39–50)
HGB BLD-MCNC: 10.7 G/DL — LOW (ref 13–17)
MAGNESIUM SERPL-MCNC: 1.9 MG/DL — SIGNIFICANT CHANGE UP (ref 1.6–2.6)
MCHC RBC-ENTMCNC: 30.8 PG — SIGNIFICANT CHANGE UP (ref 27–34)
MCHC RBC-ENTMCNC: 32.8 GM/DL — SIGNIFICANT CHANGE UP (ref 32–36)
MCV RBC AUTO: 93.9 FL — SIGNIFICANT CHANGE UP (ref 80–100)
NRBC # BLD: 0 /100 WBCS — SIGNIFICANT CHANGE UP (ref 0–0)
PHOSPHATE SERPL-MCNC: 3.8 MG/DL — SIGNIFICANT CHANGE UP (ref 2.5–4.5)
PLATELET # BLD AUTO: 180 K/UL — SIGNIFICANT CHANGE UP (ref 150–400)
POTASSIUM SERPL-MCNC: 4.7 MMOL/L — SIGNIFICANT CHANGE UP (ref 3.5–5.3)
POTASSIUM SERPL-SCNC: 4.7 MMOL/L — SIGNIFICANT CHANGE UP (ref 3.5–5.3)
PROT SERPL-MCNC: 6.7 G/DL — SIGNIFICANT CHANGE UP (ref 6–8.3)
RBC # BLD: 3.47 M/UL — LOW (ref 4.2–5.8)
RBC # FLD: 12.3 % — SIGNIFICANT CHANGE UP (ref 10.3–14.5)
SODIUM SERPL-SCNC: 140 MMOL/L — SIGNIFICANT CHANGE UP (ref 135–145)
WBC # BLD: 6.97 K/UL — SIGNIFICANT CHANGE UP (ref 3.8–10.5)
WBC # FLD AUTO: 6.97 K/UL — SIGNIFICANT CHANGE UP (ref 3.8–10.5)

## 2022-10-10 PROCEDURE — 99233 SBSQ HOSP IP/OBS HIGH 50: CPT

## 2022-10-10 PROCEDURE — 99233 SBSQ HOSP IP/OBS HIGH 50: CPT | Mod: GC

## 2022-10-10 RX ADMIN — Medication 2: at 07:53

## 2022-10-10 RX ADMIN — Medication 4 UNIT(S): at 12:07

## 2022-10-10 RX ADMIN — HEPARIN SODIUM 5000 UNIT(S): 5000 INJECTION INTRAVENOUS; SUBCUTANEOUS at 05:26

## 2022-10-10 RX ADMIN — Medication 4 UNIT(S): at 16:31

## 2022-10-10 RX ADMIN — TICAGRELOR 90 MILLIGRAM(S): 90 TABLET ORAL at 17:22

## 2022-10-10 RX ADMIN — BUDESONIDE AND FORMOTEROL FUMARATE DIHYDRATE 2 PUFF(S): 160; 4.5 AEROSOL RESPIRATORY (INHALATION) at 17:21

## 2022-10-10 RX ADMIN — Medication 145 MILLIGRAM(S): at 12:07

## 2022-10-10 RX ADMIN — CARVEDILOL PHOSPHATE 25 MILLIGRAM(S): 80 CAPSULE, EXTENDED RELEASE ORAL at 05:25

## 2022-10-10 RX ADMIN — Medication 10 MILLIGRAM(S): at 21:25

## 2022-10-10 RX ADMIN — HEPARIN SODIUM 5000 UNIT(S): 5000 INJECTION INTRAVENOUS; SUBCUTANEOUS at 21:26

## 2022-10-10 RX ADMIN — INSULIN GLARGINE 36 UNIT(S): 100 INJECTION, SOLUTION SUBCUTANEOUS at 21:25

## 2022-10-10 RX ADMIN — Medication 1: at 12:08

## 2022-10-10 RX ADMIN — Medication 81 MILLIGRAM(S): at 12:06

## 2022-10-10 RX ADMIN — CARVEDILOL PHOSPHATE 25 MILLIGRAM(S): 80 CAPSULE, EXTENDED RELEASE ORAL at 17:22

## 2022-10-10 RX ADMIN — ATORVASTATIN CALCIUM 40 MILLIGRAM(S): 80 TABLET, FILM COATED ORAL at 21:26

## 2022-10-10 RX ADMIN — BUDESONIDE AND FORMOTEROL FUMARATE DIHYDRATE 2 PUFF(S): 160; 4.5 AEROSOL RESPIRATORY (INHALATION) at 05:25

## 2022-10-10 RX ADMIN — PANTOPRAZOLE SODIUM 40 MILLIGRAM(S): 20 TABLET, DELAYED RELEASE ORAL at 05:25

## 2022-10-10 RX ADMIN — TICAGRELOR 90 MILLIGRAM(S): 90 TABLET ORAL at 05:25

## 2022-10-10 RX ADMIN — HEPARIN SODIUM 5000 UNIT(S): 5000 INJECTION INTRAVENOUS; SUBCUTANEOUS at 14:00

## 2022-10-10 RX ADMIN — Medication 4 UNIT(S): at 07:54

## 2022-10-10 RX ADMIN — AMLODIPINE BESYLATE 10 MILLIGRAM(S): 2.5 TABLET ORAL at 05:25

## 2022-10-10 NOTE — PROGRESS NOTE ADULT - ATTENDING COMMENTS
47 M w/ PMHx of EtOH abuse now sober X5 years, DM, CKD IV (baseline creatinine 2.0-2.4 in July), HTN who presented to OSH with dizziness, diplopia, CP. Trop elevated with EKG changes. Patient transferred to [facility] for NSTEMI requiring LHC. Creatinine elevated to 2.7 on presentation.      BILLY on CKD, proteinuria: pt. likely with CKD 2/2 DM, now with hemodynamically mediated BILLY in the setting of NSTEMI and use of IV contrast. Pt. non oliguric, with increase in Scr today despite receiving IVF yesterday. Recommend to hold off on cardiac cath at this time, if no emergent indication. Check urine electrolytes. Monitor BMP. Strict I/Os. Avoid nephrotoxins.

## 2022-10-10 NOTE — PROGRESS NOTE ADULT - ASSESSMENT
46 y/o male, current cigarette smoker, with PMHx of Vertigo, Diverticulitis s/p LAR, ETOH abuse now sober x 5 years, DM (patient states has been on Insulin x 10 years was never on oral agents), CKD IV - baseline creatinine 2.4, HTN, AMBER, who presented to [facility] on 10/2 c/o dizziness, b/l diplopia, headache and chest tightness, admitted for c/f NSTEMI +/- possible stroke.  47M smoker w/ hx of vertigo, diverticulitis s/p LAR, ETOH abuse (sober x5 years), DM (patient states has been on Insulin x10 years was never on oral agents), CKD IV - baseline creatinine 2.4, HTN, AMBER, who presented to [facility] on 10/2 w/ dizziness, b/l diplopia, headache and chest tightness, admitted for c/f NSTEMI +/- possible stroke.

## 2022-10-10 NOTE — PROGRESS NOTE ADULT - SUBJECTIVE AND OBJECTIVE BOX
********CARDIOLOGY PROGRESS NOTE********    HISTORY OF PRESENT ILLNESS:  HPI:  46 y/o male, current cigarette smoker, with PMHx of Vertigo, Diverticulitis s/p LAR, ETOH abuse now sober x 5 years, DM (patient states has been on Insulin x 10 years was never on oral agents), CKD IV - baseline creatinine 2.4, HTN, AMBER, who presented to [facility] on 10/2 c/o dizziness, b/l diplopia, headache and chest tightness.  In ED @ OSH /133, managed with hydralazine IV, Cardiac biomarkers elevated with trop I peak 0.632, EKG with ST-T abnormality no acute ST segment changes, TTE demonstrating normal LV systolic fxn (EF 55-60), no significant valvular disease/[facility] Cardiology following.  CT head without acute pathology, MRI with left parasagital infarct, Neurology following, impression was patients symptoms do not correlate with MRI findings.  Opthamology consulted, recommended f/u as o/p.  Patient is now transferred to [facility] in setting of NSTEMI for LHC.  LHC deferred for Neuro consult and medical optimization   (04 Oct 2022 15:33)          Allergies    No Known Allergies    Intolerances    	    MEDICATIONS:  amLODIPine   Tablet 10 milliGRAM(s) Oral daily  aspirin enteric coated 81 milliGRAM(s) Oral daily  carvedilol 25 milliGRAM(s) Oral every 12 hours  heparin   Injectable 5000 Unit(s) SubCutaneous every 8 hours  ticagrelor 90 milliGRAM(s) Oral every 12 hours      ALBUTerol    90 MICROgram(s) HFA Inhaler 2 Puff(s) Inhalation every 6 hours PRN  budesonide  80 MICROgram(s)/formoterol 4.5 MICROgram(s) Inhaler 2 Puff(s) Inhalation two times a day    amitriptyline 10 milliGRAM(s) Oral at bedtime    pantoprazole    Tablet 40 milliGRAM(s) Oral before breakfast    atorvastatin 40 milliGRAM(s) Oral at bedtime  dextrose 50% Injectable 25 Gram(s) IV Push once  dextrose 50% Injectable 12.5 Gram(s) IV Push once  dextrose 50% Injectable 25 Gram(s) IV Push once  dextrose Oral Gel 15 Gram(s) Oral once PRN  fenofibrate Tablet 145 milliGRAM(s) Oral daily  glucagon  Injectable 1 milliGRAM(s) IntraMuscular once  insulin glargine Injectable (LANTUS) 36 Unit(s) SubCutaneous at bedtime  insulin lispro (ADMELOG) corrective regimen sliding scale   SubCutaneous three times a day before meals  insulin lispro (ADMELOG) corrective regimen sliding scale   SubCutaneous at bedtime  insulin lispro Injectable (ADMELOG) 4 Unit(s) SubCutaneous three times a day before meals    Biotene Dry Mouth Oral Rinse 15 milliLiter(s) Swish and Spit three times a day PRN  chlorhexidine 2% Cloths 1 Application(s) Topical <User Schedule>  dextrose 5%. 1000 milliLiter(s) IV Continuous <Continuous>  dextrose 5%. 1000 milliLiter(s) IV Continuous <Continuous>  influenza   Vaccine 0.5 milliLiter(s) IntraMuscular once  sodium chloride 0.9%. 1000 milliLiter(s) IV Continuous <Continuous>  sodium chloride 0.9%. 1000 milliLiter(s) IV Continuous <Continuous>      PAST MEDICAL & SURGICAL HISTORY:  Diabetes      Asthma      Diverticulitis  surgery 16yrs ago      High cholesterol      Dyslipidemia      Hypertension      H/O vertigo      Diabetic ulcer of right foot      Broken toe  left pinky toe      Vertigo      HTN (hypertension)      Diabetes      History of surgery  colon resection          FAMILY HISTORY:  Family history of hypertension (Father)        SOCIAL HISTORY:  unchanged    REVIEW OF SYSTEMS:  CONSTITUTIONAL: No fever, weight loss, or fatigue  EYES: No eye pain, visual disturbances, or discharge  ENMT:  No difficulty hearing, tinnitus, vertigo; No sinus or throat pain  NECK: No pain or stiffness  RESPIRATORY: No cough, wheezing, chills or hemoptysis; No Shortness of Breath  CARDIOVASCULAR: No chest pain, palpitations, passing out, dizziness, or leg swelling  GASTROINTESTINAL: No abdominal or epigastric pain. No nausea, vomiting, or hematemesis; No diarrhea or constipation. No melena or hematochezia.  GENITOURINARY: No dysuria, frequency, hematuria, or incontinence  NEUROLOGICAL: No headaches, memory loss, loss of strength, numbness, or tremors  SKIN: No itching, burning, rashes, or lesions   LYMPH Nodes: No enlarged glands  ENDOCRINE: No heat or cold intolerance; No hair loss  MUSCULOSKELETAL: No joint pain or swelling; No muscle, back, or extremity pain  PSYCHIATRIC: No depression, anxiety, mood swings, or difficulty sleeping  HEME/LYMPH: No easy bruising, or bleeding gums  ALLERY AND IMMUNOLOGIC: No hives or eczema	    [ ] All others negative	  [ ] Unable to obtain    PHYSICAL EXAM:  T(C): 36.7 (10-10-22 @ 11:58), Max: 36.8 (10-10-22 @ 04:11)  HR: 71 (10-10-22 @ 11:58) (70 - 74)  BP: 136/80 (10-10-22 @ 11:58) (111/65 - 157/88)  RR: 18 (10-10-22 @ 11:58) (18 - 18)  SpO2: 100% (10-10-22 @ 11:58) (99% - 100%)  Wt(kg): --  I&O's Summary    09 Oct 2022 07:01  -  10 Oct 2022 07:00  --------------------------------------------------------  IN: 480 mL / OUT: 0 mL / NET: 480 mL    10 Oct 2022 07:01  -  10 Oct 2022 14:25  --------------------------------------------------------  IN: 480 mL / OUT: 0 mL / NET: 480 mL        Appearance: Normal	  HEENT:   Normal oral mucosa, PERRL, EOMI	  Lymphatic: No lymphadenopathy  Cardiovascular: Normal S1 S2, No JVD, No murmurs, No edema  Respiratory: Lungs clear to auscultation	  Psychiatry: A & O x 3, Mood & affect appropriate  Gastrointestinal:  Soft, Non-tender, + BS	  Skin: No rashes, No ecchymoses, No cyanosis	  Neurologic: Non-focal  Extremities: Normal range of motion, No clubbing, cyanosis or edema  Vascular: Peripheral pulses palpable 2+ bilaterally      LABS:	 	    CBC Full  -  ( 10 Oct 2022 06:29 )  WBC Count : 6.97 K/uL  Hemoglobin : 10.7 g/dL  Hematocrit : 32.6 %  Platelet Count - Automated : 180 K/uL  Mean Cell Volume : 93.9 fl  Mean Cell Hemoglobin : 30.8 pg  Mean Cell Hemoglobin Concentration : 32.8 gm/dL  Auto Neutrophil # : x  Auto Lymphocyte # : x  Auto Monocyte # : x  Auto Eosinophil # : x  Auto Basophil # : x  Auto Neutrophil % : x  Auto Lymphocyte % : x  Auto Monocyte % : x  Auto Eosinophil % : x  Auto Basophil % : x    10-10    140  |  109<H>  |  34<H>  ----------------------------<  180<H>  4.7   |  22  |  3.13<H>  10-09    141  |  109<H>  |  39<H>  ----------------------------<  68<L>  4.4   |  22  |  2.76<H>    Ca    8.9      10 Oct 2022 06:29  Ca    8.7      09 Oct 2022 06:26  Phos  3.8     10-10  Phos  3.4     10-09  Mg     1.9     10-10  Mg     2.1     10-09    TPro  6.7  /  Alb  3.5  /  TBili  0.2  /  DBili  x   /  AST  15  /  ALT  19  /  AlkPhos  98  10-10  TPro  6.1  /  Alb  3.3  /  TBili  0.2  /  DBili  x   /  AST  20  /  ALT  24  /  AlkPhos  109  10-09

## 2022-10-10 NOTE — PROGRESS NOTE ADULT - ASSESSMENT
48 y/o male, current cigarette smoker, with PMHx of Vertigo, Diverticulitis s/p LAR, ETOH abuse now sober x 5 years, DM (patient states has been on Insulin x 10 years was never on oral agents), CKD IV - baseline creatinine 2.4, HTN, AMBER, who presented to [facility] on 10/2 c/o dizziness, b/l diplopia, headache and chest tightness.  -NSTEMI with moderate cad on cardiac CT scan. Cardiac catheterization on hold secondary to TARA on CRI. Appreciate renal follow-up. Hold on catheterization pending renal improvement. Continue medical management for cad.  -Lacunar infarcts on brain MRI. Would ask neurology to evaluate. Check TTE with bubble study to evaluate for PFO.    iMri High MD  Cardiology Attending  [facility]/ [facility]    For day time coverage Mon-Fri see Non-Service Consult Attending on [email or web address], password: [email or web address]; daytime weekends covered by general cardiology consult service attending.)

## 2022-10-10 NOTE — PROGRESS NOTE ADULT - ASSESSMENT
47 M w/ PMHx of EtOH abuse now sober X5 years, DM, CKD IV (baseline creatinine 2.0-2.4 in July), HTN who presented to OSH with dizziness, diplopia, CP. Trop elevated with EKG changes. Patient transferred to [facility] for NSTEMI requiring LHC. Creatinine elevated to 2.7  which improved with IVF

## 2022-10-10 NOTE — PROGRESS NOTE ADULT - PROBLEM SELECTOR PLAN 1
Transfer from [facility] for cardiac cath  -GDMT w/ coreg, atorvastatin (holding ACE/ARB for BILLY)  -aspirin + brilinta  -heparin gtt d/c  -EKG and troponins for chest pain  -coronary CT completed, moderate stenosis of prox RCA and LAD  -cards following, potential cath following resolution of BILLY Transfer from [facility] for cardiac cath  -coronary CT completed, moderate stenosis of prox RCA and LAD  -cards following, potential cath following resolution of BILLY  -GDMT w/ coreg, atorvastatin (holding ACE/ARB for BILLY)  -aspirin + brilinta  -EKG and troponins for chest pain Transfer from [facility] for cardiac cath  -coronary CT completed, moderate stenosis of prox RCA and LAD  -cards following, potential cath following resolution of BILLY  -GDMT w/ coreg, atorvastatin (holding ACE/ARB for BILLY)  -aspirin + brilinta  -EKG and troponins if chest pain Pt had diplopia since 09/30, initially started as decreased visual acuity. Evaluated by opthalmology and neurology at [facility], considered 6th nerve palsy. Also found to have perisagittal infarcts on MRI w/o con @ [facility]  - MRI w/ IV contrast completed showing chronic lacunar infarcts and microvascular changes; discussed w/ neuro consult team, no acute intervention or further workup indicated, will follow-up as outpatient  - Cardiology recommending TTE w/ bubble study to evaluate for PFOs given MRI findings

## 2022-10-10 NOTE — PROGRESS NOTE ADULT - SUBJECTIVE AND OBJECTIVE BOX
[facility] Division of Kidney Diseases & Hypertension  FOLLOW UP NOTE  [phone]--------------------------------------------------------------------------------  Chief Complaint:Non-ST elevation myocardial infarction (NSTEMI)  47 M w/ PMHx of EtOH abuse now sober X5 years, DM, CKD IV (baseline creatinine 2.0-2.4 in July), HTN who presented to OSH with dizziness, diplopia, CP. Trop elevated with EKG changes. Patient transferred to [facility] for NSTEMI requiring LHC. Creatinine elevated to 2.7 on presentation.      24 hour events/subjective:  Patient sitting up eating breakfast without acute complaints. No swelling in LE or SOB. No CP noted      PAST HISTORY  --------------------------------------------------------------------------------  No significant changes to PMH, PSH, FHx, SHx, unless otherwise noted    ALLERGIES & MEDICATIONS  --------------------------------------------------------------------------------  Allergies    No Known Allergies    Intolerances      Standing Inpatient Medications  amitriptyline 10 milliGRAM(s) Oral at bedtime  amLODIPine   Tablet 10 milliGRAM(s) Oral daily  aspirin enteric coated 81 milliGRAM(s) Oral daily  atorvastatin 40 milliGRAM(s) Oral at bedtime  budesonide  80 MICROgram(s)/formoterol 4.5 MICROgram(s) Inhaler 2 Puff(s) Inhalation two times a day  carvedilol 25 milliGRAM(s) Oral every 12 hours  chlorhexidine 2% Cloths 1 Application(s) Topical <User Schedule>  dextrose 5%. 1000 milliLiter(s) IV Continuous <Continuous>  dextrose 5%. 1000 milliLiter(s) IV Continuous <Continuous>  dextrose 50% Injectable 25 Gram(s) IV Push once  dextrose 50% Injectable 12.5 Gram(s) IV Push once  dextrose 50% Injectable 25 Gram(s) IV Push once  fenofibrate Tablet 145 milliGRAM(s) Oral daily  glucagon  Injectable 1 milliGRAM(s) IntraMuscular once  heparin   Injectable 5000 Unit(s) SubCutaneous every 8 hours  influenza   Vaccine 0.5 milliLiter(s) IntraMuscular once  insulin glargine Injectable (LANTUS) 36 Unit(s) SubCutaneous at bedtime  insulin lispro (ADMELOG) corrective regimen sliding scale   SubCutaneous three times a day before meals  insulin lispro (ADMELOG) corrective regimen sliding scale   SubCutaneous at bedtime  insulin lispro Injectable (ADMELOG) 4 Unit(s) SubCutaneous three times a day before meals  pantoprazole    Tablet 40 milliGRAM(s) Oral before breakfast  sodium chloride 0.9%. 1000 milliLiter(s) IV Continuous <Continuous>  sodium chloride 0.9%. 1000 milliLiter(s) IV Continuous <Continuous>  ticagrelor 90 milliGRAM(s) Oral every 12 hours    PRN Inpatient Medications  ALBUTerol    90 MICROgram(s) HFA Inhaler 2 Puff(s) Inhalation every 6 hours PRN  Biotene Dry Mouth Oral Rinse 15 milliLiter(s) Swish and Spit three times a day PRN  dextrose Oral Gel 15 Gram(s) Oral once PRN      REVIEW OF SYSTEMS  --------------------------------------------------------------------------------  Gen: No  fevers/chills  Skin: No rashes  Head/Eyes/Ears/Mouth: No headache; Normal hearing; + still complaining of double vision  Respiratory: No dyspnea, cough, wheezing, hemoptysis  CV: No chest pain, PND, orthopnea  GI: No abdominal pain, diarrhea, constipation, nausea, vomiting  : No increased frequency, dysuria, hematuria, nocturia  MSK: No joint pain/swelling; no back pain; no edema  Neuro: No dizziness/lightheadedness, weakness, seizures, numbness, tingling      All other systems were reviewed and are negative, except as noted.    VITALS/PHYSICAL EXAM  --------------------------------------------------------------------------------  T(C): 36.8 (10-10-22 @ 04:11), Max: 36.8 (10-09-22 @ 11:48)  HR: 73 (10-10-22 @ 04:11) (70 - 75)  BP: 157/88 (10-10-22 @ 04:11) (111/65 - 157/88)  RR: 18 (10-10-22 @ 04:11) (18 - 18)  SpO2: 99% (10-10-22 @ 04:11) (99% - 100%)  Wt(kg): --        10-09-22 @ 07:01  -  10-10-22 @ 07:00  --------------------------------------------------------  IN: 480 mL / OUT: 0 mL / NET: 480 mL      Physical Exam:  	Gen: NAD  	HEENT: MMM  	Pulm: CTA B/L  	CV: S1S2  	Abd: Soft, +BS   	Ext: No LE edema B/L  	Neuro: Awake  	Skin: Warm and dry  	Vascular access: None    LABS/STUDIES  --------------------------------------------------------------------------------              10.7   6.97  >-----------<  180      [10-10-22 @ 06:29]              32.6     140  |  109  |  34  ----------------------------<  180      [10-10-22 @ 06:29]  4.7   |  22  |  3.13        Ca     8.9     [10-10-22 @ 06:29]      Mg     1.9     [10-10-22 @ 06:29]      Phos  3.8     [10-10-22 @ 06:29]    TPro  6.7  /  Alb  3.5  /  TBili  0.2  /  DBili  x   /  AST  15  /  ALT  19  /  AlkPhos  98  [10-10-22 @ 06:29]          Creatinine Trend:  SCr 3.13 [10-10 @ 06:29]  SCr 2.76 [10-09 @ 06:26]  SCr 3.04 [10-08 @ 06:30]  SCr 2.94 [10-07 @ 06:00]  SCr 2.20 [10-06 @ 04:38]    Urinalysis - [10-05-22 @ 00:49]      Color Colorless / Appearance Clear / SG 1.008 / pH 6.0      Gluc 500 mg/dL / Ketone Negative  / Bili Negative / Urobili Negative       Blood Trace / Protein 100 mg/dL / Leuk Est Negative / Nitrite Negative      RBC 2 / WBC 1 / Hyaline  / Gran  / Sq Epi  / Non Sq Epi 0 / Bacteria Negative    Urine Creatinine 41      [10-05-22 @ 00:51]  Urine Protein 140      [10-05-22 @ 00:51]  Urine Sodium 38      [10-05-22 @ 00:51]  Urine Urea Nitrogen 346      [10-05-22 @ 01:12]  Urine Potassium 13      [10-05-22 @ 00:51]      HBsAg Nonreact      [10-09-22 @ 06:26]  HCV 0.09, Nonreact      [10-09-22 @ 06:26]

## 2022-10-10 NOTE — PROGRESS NOTE ADULT - PROBLEM SELECTOR PLAN 4
Parasagittal infarct noted on outside facility records, no focal findings on exam  -no lateralizing findings or focal deficits, decreased sensation likely 2/2 diabetic neuropathy  -aspirin + brilinta  -neuro consulted, recs appreciated      -CTA head/neck negative for acute stroke      -cleared for full AC Parasagittal infarct noted on outside facility records, no focal findings on exam. Corroborated on MRI w/ IV contrast completed 10/9.  - no lateralizing findings or focal deficits, decreased sensation likely 2/2 diabetic neuropathy  - aspirin + brilinta  - neuro consulted, recs appreciated      -CTA head/neck negative for acute stroke      -cleared for full AC

## 2022-10-10 NOTE — PROGRESS NOTE ADULT - PROBLEM SELECTOR PLAN 2
Pt had diplopia since 09/30, initially started as decreased visual acuity. Evaluated by opthalmology and neurology at [facility], considered 6th nerve palsy. Also found to have perisagittal infarcts on MRI w/o con @ [facility]  -MRI w/ IV contrast pending, neuro to evaluate Pt had diplopia since 09/30, initially started as decreased visual acuity. Evaluated by opthalmology and neurology at [facility], considered 6th nerve palsy. Also found to have perisagittal infarcts on MRI w/o con @ [facility]  - MRI w/ IV contrast completed showing chronic changes; discussed w/ neuro consult team, no acute intervention or further workup indicated, will follow-up as outpatient  - Cardiology recommending TTE w/ bubble study to evaluate for PFOs Transfer from [facility] for cardiac cath  - coronary CT completed, moderate stenosis of prox RCA and LAD  - cards following, potential cath following resolution of BILLY  - GDMT w/ coreg, atorvastatin (holding ACE/ARB for BILLY)  - aspirin + brilinta  - EKG and troponins if chest pain Transfer from [facility] for possible cardiac cath  - coronary CT completed, moderate stenosis of prox RCA and LAD  - cards following, potential cath following resolution of BILLY  - nephrology following, recommending holding off on cath unless emergent indication given BILLY  - GDMT w/ coreg, atorvastatin (holding ACE/ARB for BILLY)  - aspirin + brilinta  - EKG and troponins if chest pain

## 2022-10-10 NOTE — PROGRESS NOTE ADULT - PROBLEM SELECTOR PLAN 1
Patient with creatinine ~1.7 in May, increased to 2-2.4 in July. Now patient presented to [facility] for LHC, creatinine 2.7. Patient likely has baseline CKD from long standing DM (has been on insulin for ?10 years). Patient received neuro imaging with contrast on 10/4 and CT angio cardiac with pre/post hydration. Renal US showing bilateral echogenicity suggestive of CKD, no hydro, no stones, no cysts, appropriate size. Creat did improve to 2.2 but increased to 3.13 again    UA showed 100 protein, trace blood, Uprot/creat 3.4. Pending HBSAg, HCV Ab, SIFE, and PLA2R. Discussed with patient the risks and benefits of LHC and contrast required, patient understands the risks of CKD progression but knows the cardiac procedure is necessary. Kaushik Score 10 points with 14% of post- PCI BRENDEN. Would recommend IVF 80/hour for 12 hours prior and post to catherization.  Would not recommend doing a LHC today. Avoid nephrotoxic agents. Dose all meds appropriate for GFR.     Monitor strict UOP.     If you have any questions, please feel free to contact me  Segundo Forde  Nephrology Fellow  [phone]; Prefer [employer] TEAMS  (After 5pm or on weekends please page the on-call fellow).    Patient was discussed with Dr. Silva who agrees with my A/P. Addendum to follow Patient with creatinine ~1.7 in May, increased to 2-2.4 in July. Now patient presented to [facility] for LHC, creatinine 2.7. Patient likely has baseline CKD from long standing DM (has been on insulin for ?10 years). Patient received neuro imaging with contrast on 10/4 and CT angio cardiac with pre/post hydration. Renal US showing bilateral echogenicity suggestive of CKD, no hydro, no stones, no cysts, appropriate size. Creat did improve to 2.2 but increased to 3.13 again without identifiable source of kidney injury in past 48 hours    UA showed 100 protein, trace blood, Uprot/creat 3.4. Hep B and C negative. A1C of 9.9. Pending: Free light chain, immunofixation, PLA2R. Discussed with patient the risks and benefits of LHC and contrast required, patient understands the risks of CKD progression but knows the cardiac procedure is necessary. Kaushik Score 10 points with 14% of post- PCI BRENDEN. Would recommend IVF 80/hour for 12 hours prior and post to catherization.  Would not recommend doing a LHC today. Avoid nephrotoxic agents. Dose all meds appropriate for GFR.     Monitor strict UOP. Patient is euvolemic, would not recommend further fluids or IV diuresis.    If you have any questions, please feel free to contact me  Segundo Forde  Nephrology Fellow  [phone]; Prefer [employer] TEAMS  (After 5pm or on weekends please page the on-call fellow).    Patient was discussed with Dr. Silva who agrees with my A/P. Addendum to follow Patient with creatinine ~1.7 in May, increased to 2-2.4 in July. Now patient presented to [facility] for LHC, creatinine 2.7. Patient likely has baseline CKD from long standing DM (has been on insulin for ?10 years). Patient received neuro imaging with contrast on 10/4 and CT angio cardiac with pre/post hydration. Renal US showing bilateral echogenicity suggestive of CKD, no hydro, no stones, no cysts, appropriate size. Creat did improve to 2.2 but increased to 3.13 again without identifiable source of kidney injury in past 48 hours    UA showed 100 protein, trace blood, Uprot/creat 3.4. Hep B and C negative. A1C of 9.9. Pending: Free light chain, immunofixation, PLA2R. Discussed with patient the risks of use of IV contrast, patient understands the risks of CKD progression but knows the cardiac procedure is necessary. Kaushik Score 10 points with 14% of post- PCI BRENDEN. Would recommend IVF 80/hour for 12 hours prior and post to catherization.  Would not recommend doing a LHC today. Avoid nephrotoxic agents. Dose all meds appropriate for GFR.     Monitor strict UOP. Patient is euvolemic, would not recommend further fluids or IV diuresis.    If you have any questions, please feel free to contact me  Segundo Forde  Nephrology Fellow  [phone]; Prefer [employer] TEAMS  (After 5pm or on weekends please page the on-call fellow).    Patient was discussed with Dr. Silva who agrees with my A/P. Addendum to follow

## 2022-10-10 NOTE — PROGRESS NOTE ADULT - PROBLEM SELECTOR PLAN 3
Worsening following coronary CT  -hold ACE/ARB  -80cc/hr LR for 12 hrs  -nephro consulted, recs appreciated     -hydration pre and post contrast studies Worsening following coronary CT. Cr now 3.13.  -hold ACE/ARB  -80cc/hr LR for 12 hrs  -nephro consulted, recs appreciated:     -hydration pre and post contrast studies

## 2022-10-10 NOTE — PROGRESS NOTE ADULT - ATTENDING COMMENTS
Patient seen and evaluated. No current new complaints, chronic diplopia. Exam consistent with lateral rectus palsy    #diplopia 2/2 lateral rectus palsy  -neuro appreciated, no furhter interveniton. ?lacunar infart, no apparent distribution for lateral rectus palsy.   -given diplopia is affecting balance and function, may ask ophthalmology to see for ?patch vs. alternative intervention to assist in ambulation and balance.   -no further neurologic intervention per neurology     #BILLY on CKD IV  -fluctuating CKD, possible contrast induced nephropathy in addition fo hemodynacmially mediated ATN from fluctuations in bp.  -maintain perfusion, avoid hypotension, avoid further nephrotoxic agents      #NSTEMI  -CT coronaries with prox-lad mod narrowing and RCA mod narrowing.  -cath once creatinine stable.  -continue dapt, beta blocker, statin.     rest as above

## 2022-10-10 NOTE — PROGRESS NOTE ADULT - SUBJECTIVE AND OBJECTIVE BOX
Internal Medicine   Jesus Hagen | PGY-1    OVERNIGHT EVENTS: No acute overnight events.    SUBJECTIVE:       MEDICATIONS  (STANDING):  amitriptyline 10 milliGRAM(s) Oral at bedtime  amLODIPine   Tablet 10 milliGRAM(s) Oral daily  aspirin enteric coated 81 milliGRAM(s) Oral daily  atorvastatin 40 milliGRAM(s) Oral at bedtime  budesonide  80 MICROgram(s)/formoterol 4.5 MICROgram(s) Inhaler 2 Puff(s) Inhalation two times a day  carvedilol 25 milliGRAM(s) Oral every 12 hours  chlorhexidine 2% Cloths 1 Application(s) Topical <User Schedule>  dextrose 5%. 1000 milliLiter(s) (100 mL/Hr) IV Continuous <Continuous>  dextrose 5%. 1000 milliLiter(s) (50 mL/Hr) IV Continuous <Continuous>  dextrose 50% Injectable 25 Gram(s) IV Push once  dextrose 50% Injectable 12.5 Gram(s) IV Push once  dextrose 50% Injectable 25 Gram(s) IV Push once  fenofibrate Tablet 145 milliGRAM(s) Oral daily  glucagon  Injectable 1 milliGRAM(s) IntraMuscular once  heparin   Injectable 5000 Unit(s) SubCutaneous every 8 hours  influenza   Vaccine 0.5 milliLiter(s) IntraMuscular once  insulin glargine Injectable (LANTUS) 36 Unit(s) SubCutaneous at bedtime  insulin lispro (ADMELOG) corrective regimen sliding scale   SubCutaneous three times a day before meals  insulin lispro (ADMELOG) corrective regimen sliding scale   SubCutaneous at bedtime  insulin lispro Injectable (ADMELOG) 4 Unit(s) SubCutaneous three times a day before meals  pantoprazole    Tablet 40 milliGRAM(s) Oral before breakfast  sodium chloride 0.9%. 1000 milliLiter(s) (80 mL/Hr) IV Continuous <Continuous>  sodium chloride 0.9%. 1000 milliLiter(s) (80 mL/Hr) IV Continuous <Continuous>  ticagrelor 90 milliGRAM(s) Oral every 12 hours    MEDICATIONS  (PRN):  ALBUTerol    90 MICROgram(s) HFA Inhaler 2 Puff(s) Inhalation every 6 hours PRN Shortness of Breath and/or Wheezing  Biotene Dry Mouth Oral Rinse 15 milliLiter(s) Swish and Spit three times a day PRN Mouth Care  dextrose Oral Gel 15 Gram(s) Oral once PRN Blood Glucose LESS THAN 70 milliGRAM(s)/deciliter    VITALS:  T(F): 98.2 (10-10-22 @ 04:11), Max: 98.2 (10-09-22 @ 11:48)  HR: 73 (10-10-22 @ 04:11) (70 - 75)  BP: 157/88 (10-10-22 @ 04:11) (111/65 - 157/88)  BP(mean): --  RR: 18 (10-10-22 @ 04:11) (18 - 18)  SpO2: 99% (10-10-22 @ 04:11) (99% - 100%)    PHYSICAL EXAM:   GENERAL: NAD, lying in bed comfortably  HEAD: Atraumatic, normocephalic  EYES: EOMI, conjunctiva and sclera clear, no nystagmus noted  ENT: Moist mucous membranes  CHEST/LUNG: Clear to auscultation bilaterally; no rales, rhonchi, wheezing, or rubs; unlabored respirations  HEART: Regular rate and rhythm; no murmurs, rubs, or gallops, normal S1/S2  ABDOMEN: Normal bowel sounds; soft, nontender, nondistended  EXTREMITIES: No clubbing, cyanosis, or edema  MSK: No gross deformities noted   Neurological: No focal deficits   SKIN: No rashes or lesions  PSYCH: Normal mood, affect     LABS:                      10.7   6.97  )-----------( 180      ( 10 Oct 2022 06:29 )             32.6     10-10  140  |  109<H>  |  34<H>  ----------------------------<  180<H>  4.7   |  22  |  3.13<H>    Ca    8.9      10 Oct 2022 06:29  Phos  3.8     10-10  Mg     1.9     10-10    TPro  6.7  /  Alb  3.5  /  TBili  0.2  /  DBili  x   /  AST  15  /  ALT  19  /  AlkPhos  98  10-10    CARDIAC MARKERS ( 08 Oct 2022 22:34 )  x     / x     / x     / x     / 10.6 ng/mL    Creatinine Trend: 3.13<--, 2.76<--, 3.04<--, 2.94<--, 2.20<--, 2.50<--    I&O's Summary  09 Oct 2022 07:01  -  10 Oct 2022 07:00  --------------------------------------------------------  IN: 480 mL / OUT: 0 mL / NET: 480 mL Internal Medicine   Jesus Hagen | PGY-1    OVERNIGHT EVENTS: No acute overnight events.    SUBJECTIVE: Patient tearful as being in hospital has been overwhelming. Reports continued symptoms of diplopia where       MEDICATIONS  (STANDING):  amitriptyline 10 milliGRAM(s) Oral at bedtime  amLODIPine   Tablet 10 milliGRAM(s) Oral daily  aspirin enteric coated 81 milliGRAM(s) Oral daily  atorvastatin 40 milliGRAM(s) Oral at bedtime  budesonide  80 MICROgram(s)/formoterol 4.5 MICROgram(s) Inhaler 2 Puff(s) Inhalation two times a day  carvedilol 25 milliGRAM(s) Oral every 12 hours  chlorhexidine 2% Cloths 1 Application(s) Topical <User Schedule>  dextrose 5%. 1000 milliLiter(s) (100 mL/Hr) IV Continuous <Continuous>  dextrose 5%. 1000 milliLiter(s) (50 mL/Hr) IV Continuous <Continuous>  dextrose 50% Injectable 25 Gram(s) IV Push once  dextrose 50% Injectable 12.5 Gram(s) IV Push once  dextrose 50% Injectable 25 Gram(s) IV Push once  fenofibrate Tablet 145 milliGRAM(s) Oral daily  glucagon  Injectable 1 milliGRAM(s) IntraMuscular once  heparin   Injectable 5000 Unit(s) SubCutaneous every 8 hours  influenza   Vaccine 0.5 milliLiter(s) IntraMuscular once  insulin glargine Injectable (LANTUS) 36 Unit(s) SubCutaneous at bedtime  insulin lispro (ADMELOG) corrective regimen sliding scale   SubCutaneous three times a day before meals  insulin lispro (ADMELOG) corrective regimen sliding scale   SubCutaneous at bedtime  insulin lispro Injectable (ADMELOG) 4 Unit(s) SubCutaneous three times a day before meals  pantoprazole    Tablet 40 milliGRAM(s) Oral before breakfast  sodium chloride 0.9%. 1000 milliLiter(s) (80 mL/Hr) IV Continuous <Continuous>  sodium chloride 0.9%. 1000 milliLiter(s) (80 mL/Hr) IV Continuous <Continuous>  ticagrelor 90 milliGRAM(s) Oral every 12 hours    MEDICATIONS  (PRN):  ALBUTerol    90 MICROgram(s) HFA Inhaler 2 Puff(s) Inhalation every 6 hours PRN Shortness of Breath and/or Wheezing  Biotene Dry Mouth Oral Rinse 15 milliLiter(s) Swish and Spit three times a day PRN Mouth Care  dextrose Oral Gel 15 Gram(s) Oral once PRN Blood Glucose LESS THAN 70 milliGRAM(s)/deciliter    VITALS:  T(F): 98.2 (10-10-22 @ 04:11), Max: 98.2 (10-09-22 @ 11:48)  HR: 73 (10-10-22 @ 04:11) (70 - 75)  BP: 157/88 (10-10-22 @ 04:11) (111/65 - 157/88)  BP(mean): --  RR: 18 (10-10-22 @ 04:11) (18 - 18)  SpO2: 99% (10-10-22 @ 04:11) (99% - 100%)    PHYSICAL EXAM:   GENERAL: NAD, lying in bed comfortably  HEAD: Atraumatic, normocephalic  EYES: EOMI, conjunctiva and sclera clear, no nystagmus noted  ENT: Moist mucous membranes  CHEST/LUNG: Clear to auscultation bilaterally; no rales, rhonchi, wheezing, or rubs; unlabored respirations  HEART: Regular rate and rhythm; no murmurs, rubs, or gallops, normal S1/S2  ABDOMEN: Normal bowel sounds; soft, nontender, nondistended  EXTREMITIES: No clubbing, cyanosis, or edema  MSK: No gross deformities noted   Neurological: No focal deficits   SKIN: No rashes or lesions  PSYCH: Normal mood, affect     LABS:                      10.7   6.97  )-----------( 180      ( 10 Oct 2022 06:29 )             32.6     10-10  140  |  109<H>  |  34<H>  ----------------------------<  180<H>  4.7   |  22  |  3.13<H>    Ca    8.9      10 Oct 2022 06:29  Phos  3.8     10-10  Mg     1.9     10-10    TPro  6.7  /  Alb  3.5  /  TBili  0.2  /  DBili  x   /  AST  15  /  ALT  19  /  AlkPhos  98  10-10    CARDIAC MARKERS ( 08 Oct 2022 22:34 )  x     / x     / x     / x     / 10.6 ng/mL    Creatinine Trend: 3.13<--, 2.76<--, 3.04<--, 2.94<--, 2.20<--, 2.50<--    I&O's Summary  09 Oct 2022 07:01  -  10 Oct 2022 07:00  --------------------------------------------------------  IN: 480 mL / OUT: 0 mL / NET: 480 mL Internal Medicine   Jesus Hagen | PGY-1    OVERNIGHT EVENTS: No acute overnight events.    SUBJECTIVE: Patient tearful as being in hospital has been overwhelming. Reports continued symptoms of diplopia where vision appears to be shaking. L eye is normal when R is covered, R eye appears blurry with words appear as lines. Endorses headaches and dizziness that are worse with eyes open. Denies new neurological changes. Denies fevers, chest pain, shortness of breath.    MEDICATIONS  (STANDING):  amitriptyline 10 milliGRAM(s) Oral at bedtime  amLODIPine   Tablet 10 milliGRAM(s) Oral daily  aspirin enteric coated 81 milliGRAM(s) Oral daily  atorvastatin 40 milliGRAM(s) Oral at bedtime  budesonide  80 MICROgram(s)/formoterol 4.5 MICROgram(s) Inhaler 2 Puff(s) Inhalation two times a day  carvedilol 25 milliGRAM(s) Oral every 12 hours  chlorhexidine 2% Cloths 1 Application(s) Topical <User Schedule>  dextrose 5%. 1000 milliLiter(s) (100 mL/Hr) IV Continuous <Continuous>  dextrose 5%. 1000 milliLiter(s) (50 mL/Hr) IV Continuous <Continuous>  dextrose 50% Injectable 25 Gram(s) IV Push once  dextrose 50% Injectable 12.5 Gram(s) IV Push once  dextrose 50% Injectable 25 Gram(s) IV Push once  fenofibrate Tablet 145 milliGRAM(s) Oral daily  glucagon  Injectable 1 milliGRAM(s) IntraMuscular once  heparin   Injectable 5000 Unit(s) SubCutaneous every 8 hours  influenza   Vaccine 0.5 milliLiter(s) IntraMuscular once  insulin glargine Injectable (LANTUS) 36 Unit(s) SubCutaneous at bedtime  insulin lispro (ADMELOG) corrective regimen sliding scale   SubCutaneous three times a day before meals  insulin lispro (ADMELOG) corrective regimen sliding scale   SubCutaneous at bedtime  insulin lispro Injectable (ADMELOG) 4 Unit(s) SubCutaneous three times a day before meals  pantoprazole    Tablet 40 milliGRAM(s) Oral before breakfast  sodium chloride 0.9%. 1000 milliLiter(s) (80 mL/Hr) IV Continuous <Continuous>  sodium chloride 0.9%. 1000 milliLiter(s) (80 mL/Hr) IV Continuous <Continuous>  ticagrelor 90 milliGRAM(s) Oral every 12 hours    MEDICATIONS  (PRN):  ALBUTerol    90 MICROgram(s) HFA Inhaler 2 Puff(s) Inhalation every 6 hours PRN Shortness of Breath and/or Wheezing  Biotene Dry Mouth Oral Rinse 15 milliLiter(s) Swish and Spit three times a day PRN Mouth Care  dextrose Oral Gel 15 Gram(s) Oral once PRN Blood Glucose LESS THAN 70 milliGRAM(s)/deciliter    VITALS:  T(F): 98.2 (10-10-22 @ 04:11), Max: 98.2 (10-09-22 @ 11:48)  HR: 73 (10-10-22 @ 04:11) (70 - 75)  BP: 157/88 (10-10-22 @ 04:11) (111/65 - 157/88)  RR: 18 (10-10-22 @ 04:11) (18 - 18)  SpO2: 99% (10-10-22 @ 04:11) (99% - 100%)    PHYSICAL EXAM:   GENERAL: NAD, sitting upright on side of bed, apparent diplopia, conversational  HEAD: Atraumatic, normocephalic  EYES: Conjunctiva and sclera clear, no nystagmus noted, R eye unable to move laterally past midline, pupils reactive  ENT: Moist mucous membranes  CHEST/LUNG: Clear to auscultation bilaterally; no rales, rhonchi, wheezing, or rubs; unlabored respirations  HEART: Regular rate and rhythm; no murmurs, rubs, or gallops, normal S1/S2  ABDOMEN: Normal bowel sounds; soft, nontender, nondistended  EXTREMITIES: No clubbing, cyanosis, or edema  MSK: No gross deformities noted   Neurological: Excepting apparent CN VI palsy and blurry R vision CNs appear intact. Extremities grossly nonfocal  SKIN: No rashes or lesions  PSYCH: Tearful; appropriate mood, affect     LABS:                      10.7   6.97  )-----------( 180      ( 10 Oct 2022 06:29 )             32.6     10-10  140  |  109<H>  |  34<H>  ----------------------------<  180<H>  4.7   |  22  |  3.13<H>    Ca    8.9      10 Oct 2022 06:29  Phos  3.8     10-10  Mg     1.9     10-10    TPro  6.7  /  Alb  3.5  /  TBili  0.2  /  DBili  x   /  AST  15  /  ALT  19  /  AlkPhos  98  10-10    CARDIAC MARKERS ( 08 Oct 2022 22:34 )  x     / x     / x     / x     / 10.6 ng/mL    Creatinine Trend: 3.13<--, 2.76<--, 3.04<--, 2.94<--, 2.20<--, 2.50<--    I&O's Summary  09 Oct 2022 07:01  -  10 Oct 2022 07:00  --------------------------------------------------------  IN: 480 mL / OUT: 0 mL / NET: 480 mL Internal Medicine   Jesus Hagen | PGY-1    OVERNIGHT EVENTS: No acute overnight events.    SUBJECTIVE: Patient tearful as being in hospital has been overwhelming. Reports continued symptoms of diplopia where vision appears to be shaking. L eye is normal when R is covered, R eye appears blurry with words appear as lines. Endorses headaches and dizziness that are worse with eyes open. Denies new neurological changes. Denies fevers, chest pain, shortness of breath.    MEDICATIONS  (STANDING):  amitriptyline 10 milliGRAM(s) Oral at bedtime  amLODIPine   Tablet 10 milliGRAM(s) Oral daily  aspirin enteric coated 81 milliGRAM(s) Oral daily  atorvastatin 40 milliGRAM(s) Oral at bedtime  budesonide  80 MICROgram(s)/formoterol 4.5 MICROgram(s) Inhaler 2 Puff(s) Inhalation two times a day  carvedilol 25 milliGRAM(s) Oral every 12 hours  chlorhexidine 2% Cloths 1 Application(s) Topical <User Schedule>  dextrose 5%. 1000 milliLiter(s) (100 mL/Hr) IV Continuous <Continuous>  dextrose 5%. 1000 milliLiter(s) (50 mL/Hr) IV Continuous <Continuous>  dextrose 50% Injectable 25 Gram(s) IV Push once  dextrose 50% Injectable 12.5 Gram(s) IV Push once  dextrose 50% Injectable 25 Gram(s) IV Push once  fenofibrate Tablet 145 milliGRAM(s) Oral daily  glucagon  Injectable 1 milliGRAM(s) IntraMuscular once  heparin   Injectable 5000 Unit(s) SubCutaneous every 8 hours  influenza   Vaccine 0.5 milliLiter(s) IntraMuscular once  insulin glargine Injectable (LANTUS) 36 Unit(s) SubCutaneous at bedtime  insulin lispro (ADMELOG) corrective regimen sliding scale   SubCutaneous three times a day before meals  insulin lispro (ADMELOG) corrective regimen sliding scale   SubCutaneous at bedtime  insulin lispro Injectable (ADMELOG) 4 Unit(s) SubCutaneous three times a day before meals  pantoprazole    Tablet 40 milliGRAM(s) Oral before breakfast  sodium chloride 0.9%. 1000 milliLiter(s) (80 mL/Hr) IV Continuous <Continuous>  sodium chloride 0.9%. 1000 milliLiter(s) (80 mL/Hr) IV Continuous <Continuous>  ticagrelor 90 milliGRAM(s) Oral every 12 hours    MEDICATIONS  (PRN):  ALBUTerol    90 MICROgram(s) HFA Inhaler 2 Puff(s) Inhalation every 6 hours PRN Shortness of Breath and/or Wheezing  Biotene Dry Mouth Oral Rinse 15 milliLiter(s) Swish and Spit three times a day PRN Mouth Care  dextrose Oral Gel 15 Gram(s) Oral once PRN Blood Glucose LESS THAN 70 milliGRAM(s)/deciliter    VITALS:  T(F): 98.2 (10-10-22 @ 04:11), Max: 98.2 (10-09-22 @ 11:48)  HR: 73 (10-10-22 @ 04:11) (70 - 75)  BP: 157/88 (10-10-22 @ 04:11) (111/65 - 157/88)  RR: 18 (10-10-22 @ 04:11) (18 - 18)  SpO2: 99% (10-10-22 @ 04:11) (99% - 100%)    PHYSICAL EXAM:   GENERAL: NAD, sitting upright on side of bed, apparent diplopia, conversational  HEAD: Atraumatic, normocephalic  EYES: Conjunctiva and sclera clear, no nystagmus noted, R eye unable to move laterally past midline, pupils reactive  ENT: Moist mucous membranes  CHEST/LUNG: Clear to auscultation bilaterally; no rales, rhonchi, wheezing, or rubs; unlabored respirations  HEART: Regular rate and rhythm; no murmurs, rubs, or gallops, normal S1/S2  ABDOMEN: Normal bowel sounds; soft, nontender, nondistended  EXTREMITIES: No clubbing, cyanosis, or edema  MSK: No gross deformities noted   Neurological: Excepting apparent CN VI palsy and blurry R vision CNs appear intact. Extremities grossly nonfocal  SKIN: No rashes or lesions  PSYCH: Tearful; appropriate mood, affect     LABS:                      10.7   6.97  )-----------( 180      ( 10 Oct 2022 06:29 )             32.6     10-10  140  |  109<H>  |  34<H>  ----------------------------<  180<H>  4.7   |  22  |  3.13<H>    Ca    8.9      10 Oct 2022 06:29  Phos  3.8     10-10  Mg     1.9     10-10    TPro  6.7  /  Alb  3.5  /  TBili  0.2  /  DBili  x   /  AST  15  /  ALT  19  /  AlkPhos  98  10-10    CARDIAC MARKERS ( 08 Oct 2022 22:34 )  x     / x     / x     / x     / 10.6 ng/mL    Creatinine Trend: 3.13<--, 2.76<--, 3.04<--, 2.94<--, 2.20<--, 2.50<--    I&O's Summary  09 Oct 2022 07:01  -  10 Oct 2022 07:00  --------------------------------------------------------  IN: 480 mL / OUT: 0 mL / NET: 480 mL    RADIOLOGY:  < from: MR Head w/ IV Cont (10.09.22 @ 17:04) >  IMPRESSION:  There is no mass, intracranial hemorrhage, or acute infarction. Chronic   multifocal lacunar infarcts and chronic microvascular ischemic changes as   detailed above.  < end of copied text >

## 2022-10-11 LAB
ALBUMIN SERPL ELPH-MCNC: 3.2 G/DL — LOW (ref 3.3–5)
ALP SERPL-CCNC: 109 U/L — SIGNIFICANT CHANGE UP (ref 40–120)
ALT FLD-CCNC: 17 U/L — SIGNIFICANT CHANGE UP (ref 10–45)
ANION GAP SERPL CALC-SCNC: 10 MMOL/L — SIGNIFICANT CHANGE UP (ref 5–17)
AST SERPL-CCNC: 16 U/L — SIGNIFICANT CHANGE UP (ref 10–40)
BILIRUB SERPL-MCNC: 0.2 MG/DL — SIGNIFICANT CHANGE UP (ref 0.2–1.2)
BUN SERPL-MCNC: 36 MG/DL — HIGH (ref 7–23)
CALCIUM SERPL-MCNC: 8.7 MG/DL — SIGNIFICANT CHANGE UP (ref 8.4–10.5)
CHLORIDE SERPL-SCNC: 105 MMOL/L — SIGNIFICANT CHANGE UP (ref 96–108)
CHLORIDE UR-SCNC: 40 MMOL/L — SIGNIFICANT CHANGE UP
CO2 SERPL-SCNC: 23 MMOL/L — SIGNIFICANT CHANGE UP (ref 22–31)
CREAT ?TM UR-MCNC: 79 MG/DL — SIGNIFICANT CHANGE UP
CREAT SERPL-MCNC: 3.02 MG/DL — HIGH (ref 0.5–1.3)
EGFR: 24 ML/MIN/1.73M2 — LOW
GLUCOSE BLDC GLUCOMTR-MCNC: 157 MG/DL — HIGH (ref 70–99)
GLUCOSE BLDC GLUCOMTR-MCNC: 161 MG/DL — HIGH (ref 70–99)
GLUCOSE BLDC GLUCOMTR-MCNC: 241 MG/DL — HIGH (ref 70–99)
GLUCOSE BLDC GLUCOMTR-MCNC: 80 MG/DL — SIGNIFICANT CHANGE UP (ref 70–99)
GLUCOSE SERPL-MCNC: 194 MG/DL — HIGH (ref 70–99)
HCT VFR BLD CALC: 32.2 % — LOW (ref 39–50)
HGB BLD-MCNC: 10.6 G/DL — LOW (ref 13–17)
KAPPA LC SER QL IFE: 9.26 MG/DL — HIGH (ref 0.33–1.94)
KAPPA/LAMBDA FREE LIGHT CHAIN RATIO, SERUM: 2 RATIO — HIGH (ref 0.26–1.65)
LAMBDA LC SER QL IFE: 4.63 MG/DL — HIGH (ref 0.57–2.63)
MAGNESIUM SERPL-MCNC: 1.9 MG/DL — SIGNIFICANT CHANGE UP (ref 1.6–2.6)
MCHC RBC-ENTMCNC: 31.4 PG — SIGNIFICANT CHANGE UP (ref 27–34)
MCHC RBC-ENTMCNC: 32.9 GM/DL — SIGNIFICANT CHANGE UP (ref 32–36)
MCV RBC AUTO: 95.3 FL — SIGNIFICANT CHANGE UP (ref 80–100)
NRBC # BLD: 0 /100 WBCS — SIGNIFICANT CHANGE UP (ref 0–0)
PHOSPHATE SERPL-MCNC: 3.1 MG/DL — SIGNIFICANT CHANGE UP (ref 2.5–4.5)
PLATELET # BLD AUTO: 170 K/UL — SIGNIFICANT CHANGE UP (ref 150–400)
POTASSIUM SERPL-MCNC: 4.6 MMOL/L — SIGNIFICANT CHANGE UP (ref 3.5–5.3)
POTASSIUM SERPL-SCNC: 4.6 MMOL/L — SIGNIFICANT CHANGE UP (ref 3.5–5.3)
POTASSIUM UR-SCNC: 36 MMOL/L — SIGNIFICANT CHANGE UP
PROT SERPL-MCNC: 6.3 G/DL — SIGNIFICANT CHANGE UP (ref 6–8.3)
RBC # BLD: 3.38 M/UL — LOW (ref 4.2–5.8)
RBC # FLD: 12.2 % — SIGNIFICANT CHANGE UP (ref 10.3–14.5)
SARS-COV-2 RNA SPEC QL NAA+PROBE: SIGNIFICANT CHANGE UP
SODIUM SERPL-SCNC: 138 MMOL/L — SIGNIFICANT CHANGE UP (ref 135–145)
SODIUM UR-SCNC: 52 MMOL/L — SIGNIFICANT CHANGE UP
WBC # BLD: 5.95 K/UL — SIGNIFICANT CHANGE UP (ref 3.8–10.5)
WBC # FLD AUTO: 5.95 K/UL — SIGNIFICANT CHANGE UP (ref 3.8–10.5)

## 2022-10-11 PROCEDURE — 99233 SBSQ HOSP IP/OBS HIGH 50: CPT | Mod: GC

## 2022-10-11 PROCEDURE — 93308 TTE F-UP OR LMTD: CPT | Mod: 26

## 2022-10-11 PROCEDURE — 99233 SBSQ HOSP IP/OBS HIGH 50: CPT

## 2022-10-11 PROCEDURE — 93321 DOPPLER ECHO F-UP/LMTD STD: CPT | Mod: 26

## 2022-10-11 RX ORDER — ACETAMINOPHEN 500 MG
650 TABLET ORAL EVERY 6 HOURS
Refills: 0 | Status: DISCONTINUED | OUTPATIENT
Start: 2022-10-11 | End: 2022-10-20

## 2022-10-11 RX ADMIN — BUDESONIDE AND FORMOTEROL FUMARATE DIHYDRATE 2 PUFF(S): 160; 4.5 AEROSOL RESPIRATORY (INHALATION) at 17:32

## 2022-10-11 RX ADMIN — Medication 81 MILLIGRAM(S): at 12:20

## 2022-10-11 RX ADMIN — INSULIN GLARGINE 36 UNIT(S): 100 INJECTION, SOLUTION SUBCUTANEOUS at 21:13

## 2022-10-11 RX ADMIN — Medication 2: at 17:06

## 2022-10-11 RX ADMIN — AMLODIPINE BESYLATE 10 MILLIGRAM(S): 2.5 TABLET ORAL at 05:03

## 2022-10-11 RX ADMIN — CARVEDILOL PHOSPHATE 25 MILLIGRAM(S): 80 CAPSULE, EXTENDED RELEASE ORAL at 17:07

## 2022-10-11 RX ADMIN — Medication 4 UNIT(S): at 08:02

## 2022-10-11 RX ADMIN — CHLORHEXIDINE GLUCONATE 1 APPLICATION(S): 213 SOLUTION TOPICAL at 05:05

## 2022-10-11 RX ADMIN — HEPARIN SODIUM 5000 UNIT(S): 5000 INJECTION INTRAVENOUS; SUBCUTANEOUS at 21:13

## 2022-10-11 RX ADMIN — HEPARIN SODIUM 5000 UNIT(S): 5000 INJECTION INTRAVENOUS; SUBCUTANEOUS at 05:03

## 2022-10-11 RX ADMIN — BUDESONIDE AND FORMOTEROL FUMARATE DIHYDRATE 2 PUFF(S): 160; 4.5 AEROSOL RESPIRATORY (INHALATION) at 05:05

## 2022-10-11 RX ADMIN — CARVEDILOL PHOSPHATE 25 MILLIGRAM(S): 80 CAPSULE, EXTENDED RELEASE ORAL at 05:03

## 2022-10-11 RX ADMIN — Medication 10 MILLIGRAM(S): at 21:12

## 2022-10-11 RX ADMIN — ATORVASTATIN CALCIUM 40 MILLIGRAM(S): 80 TABLET, FILM COATED ORAL at 21:12

## 2022-10-11 RX ADMIN — PANTOPRAZOLE SODIUM 40 MILLIGRAM(S): 20 TABLET, DELAYED RELEASE ORAL at 05:03

## 2022-10-11 RX ADMIN — Medication 145 MILLIGRAM(S): at 12:19

## 2022-10-11 RX ADMIN — TICAGRELOR 90 MILLIGRAM(S): 90 TABLET ORAL at 05:03

## 2022-10-11 RX ADMIN — Medication 650 MILLIGRAM(S): at 23:39

## 2022-10-11 RX ADMIN — TICAGRELOR 90 MILLIGRAM(S): 90 TABLET ORAL at 17:07

## 2022-10-11 RX ADMIN — Medication 1: at 08:02

## 2022-10-11 RX ADMIN — Medication 4 UNIT(S): at 17:06

## 2022-10-11 RX ADMIN — HEPARIN SODIUM 5000 UNIT(S): 5000 INJECTION INTRAVENOUS; SUBCUTANEOUS at 13:03

## 2022-10-11 RX ADMIN — Medication 650 MILLIGRAM(S): at 22:39

## 2022-10-11 NOTE — PROGRESS NOTE ADULT - ASSESSMENT
47 M w/ PMHx of EtOH abuse now sober X5 years, DM, CKD IV (baseline creatinine 2.0-2.4 in July), HTN who presented to OSH with dizziness, diplopia, CP. Trop elevated with EKG changes. Patient transferred to [facility] for NSTEMI requiring LHC. Creatinine elevated to 2.7  which improved with IVF

## 2022-10-11 NOTE — PROGRESS NOTE ADULT - PROBLEM SELECTOR PLAN 5
-on 40u long-acting, 5u short-acting pre-meal; lantus recently decreased for pt hypoglycemia  -36u lantus, 4u pre-meal short acting. Home regimen 40U long-acting, 5U short-acting pre-meal; lantus recently decreased for hypoglycemia  - 36U lantus, 4U pre-meal short acting.

## 2022-10-11 NOTE — PROGRESS NOTE ADULT - ATTENDING COMMENTS
47 M w/ PMHx of EtOH abuse now sober X5 years, DM, CKD IV (baseline creatinine 2.0-2.4 in July), HTN who presented to OSH with dizziness, diplopia, CP. Trop elevated with EKG changes. Patient transferred to [facility] for NSTEMI requiring LHC. Creatinine elevated to 2.7 on presentation.  NO Chest pain, + double vision.    1.  ARF on CKD--w/u in progress.  Review of serologies largely -.  Concur with volume optimization in preparation for LHC.  Cr now back in 2s, trend.  Given chest pain symptoms risk benefit contrast favors study in near future  2.  Hypertension--CCB titration systolic goal <140  3.  Nephrotic range proteinuria--guardado in progress and thus far unrevealing.  Check renal venous dopplers.  Sono reveals chronicity. No hydronephrosis.       discussed with med team  Nilesh Scott MD  contact me on TEAMS

## 2022-10-11 NOTE — PROGRESS NOTE ADULT - ASSESSMENT
46 y/o male, current cigarette smoker, with PMHx of Vertigo, Diverticulitis s/p LAR, ETOH abuse now sober x 5 years, DM (patient states has been on Insulin x 10 years was never on oral agents), CKD IV - baseline creatinine 2.4, HTN, AMBER, who presented to [facility] on 10/2 c/o dizziness, b/l diplopia, headache and chest tightness.  -Lacunar infarcts seen on brain MRI. Patient for TTE with bubble study. Would ask neurology to follow-up. Patient likely should also have heart rate monitoring after discharge.  -Creatinine slightly improved. Appreciate renal follow-up. Cardiac catheterization on hold pending renal clearance.  -Continue medical management of cad.    Miri High MD  Cardiology Attending  [facility]    For day time coverage Mon-Fri see Non-Service Consult Attending on [email or web address], password: [email or web address]; daytime weekends covered by general cardiology consult service attending.)

## 2022-10-11 NOTE — PROGRESS NOTE ADULT - PROBLEM SELECTOR PLAN 4
Parasagittal infarct noted on outside facility records, no focal findings on exam. Corroborated on MRI w/ IV contrast completed 10/9.  - no lateralizing findings or focal deficits, decreased sensation likely 2/2 diabetic neuropathy  - aspirin + brilinta  - neuro consulted, recs appreciated      -CTA head/neck negative for acute stroke      -cleared for full AC MRI w/ contrast showing multifocal lacunar infarcts and chronic microvascular ischemic changes, consistent with MRI completed @ [facility]; MRI findings do not correlate well with clinical findings. CTA head/neck negative for acute stroke.  - etiology for ischemic changes unclear, TTE bubble study w/ no evidence of PFO, consider hx of smoking, NSTEMI  - discussed MRI findings w/ neuro consult team, no acute intervention or further workup indicated, patient should follow-up outpatient  - patient cleared for full AC per neuro  - aspirin + brilinta

## 2022-10-11 NOTE — PROGRESS NOTE ADULT - ATTENDING COMMENTS
Patient seen and evaluated. No current new complaints, chronic diplopia. 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 ?lacunar infart, no apparent distribution for lateral rectus palsy.   -given diplopia is affecting balance and function, may ask ophthalmology to see for ?patch vs. alternative intervention to assist in ambulation and balance.   -no further neurologic intervention per neurology     #BILLY on CKD IV  -fluctuating CKD, possible contrast induced nephropathy in addition fo hemodynacmially mediated ATN from fluctuations in bp.  -maintain perfusion, avoid hypotension, avoid further nephrotoxic agents      #NSTEMI  -CT coronaries with prox-lad mod narrowing and RCA mod narrowing.  -cath once creatinine stable.  -continue dapt, beta blocker, statin.     rest as above Patient seen and evaluated. No current new complaints, chronic diplopia. 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 ?lacunar infart, no apparent distribution for lateral rectus palsy.   -given diplopia is affecting balance and function, may ask ophthalmology to see for ?patch vs. alternative intervention to assist in ambulation and balance.   -no further neurologic intervention per neurology     #BILLY on CKD IV  -fluctuating CKD, possible contrast induced nephropathy in addition fo hemodynacmially mediated ATN from fluctuations in bp.  -maintain perfusion, avoid hypotension, avoid further nephrotoxic agents    #HTN  -currently well controlled on current regimen.     #NSTEMI  -CT coronaries with prox-lad mod narrowing and RCA mod narrowing.  -cath once creatinine stable.  -continue dapt, beta blocker, statin.     rest as above

## 2022-10-11 NOTE — PROGRESS NOTE ADULT - PROBLEM SELECTOR PLAN 1
Pt had diplopia since 09/30, initially started as decreased visual acuity. Evaluated by opthalmology and neurology at [facility], considered 6th nerve palsy. Also found to have perisagittal infarcts on MRI w/o con @ [facility]  - MRI w/ IV contrast completed showing chronic lacunar infarcts and microvascular changes; discussed w/ neuro consult team, no acute intervention or further workup indicated, will follow-up as outpatient  - Cardiology recommending TTE w/ bubble study to evaluate for PFOs given MRI findings #Headache/Dizziness  Pt had diplopia since 09/30, initially started as decreased visual acuity. Evaluated by opthalmology and neurology at [facility], symptoms consistent w/ lateral rectus palsy, MRI without clear source of symptoms Consider vasculopathic etiology 2/2 severe HTN and/or DM.  - neurology team @ [facility] recommended considering topiramate/Celexa/verapamil for headache ppx  - patient evaluated by ophthalmology at [facility], recommended outpatient f/u w/ retina specialist Dr. Jose Angel Arvizu for OCT nerve/RNFL for evaluation for diabetic and hypertensive retinopathy; if diplopia/palsy persists should continue outpatient followup for Fresnel prisms; curbsided ophtho team who agree with recommendations  - will trial eye patch for R eye to see if symptoms (headache/dizziness, balance) improve

## 2022-10-11 NOTE — PROGRESS NOTE ADULT - PROBLEM SELECTOR PLAN 3
Worsening following coronary CT. Cr now 3.13.  -hold ACE/ARB  -80cc/hr LR for 12 hrs  -nephro consulted, recs appreciated:     -hydration pre and post contrast studies Worsening following coronary CT. Cr now 3.13->3.02. Kappa/Lambda 9.26/4.63.  -hold ACE/ARB  -nephro following, recommend holding off on cath for now, will f/u recs re light chain

## 2022-10-11 NOTE — PROGRESS NOTE ADULT - PROBLEM SELECTOR PLAN 1
Patient with creatinine ~1.7 in May, increased to 2-2.4 in July. Now patient presented to [facility] for LHC, creatinine 2.7. Patient likely has baseline CKD from long standing DM (has been on insulin for ?10 years). Patient received neuro imaging with contrast on 10/4 and CT angio cardiac with pre/post hydration. Renal US showing bilateral echogenicity suggestive of CKD, no hydro, no stones, no cysts, appropriate size. Creat did improve to 2.2 increased to 3.13 but now downtrending again     UA showed 100 protein, trace blood, Uprot/creat 3.4. Hep B and C negative. A1C of 9.9. Pending: Free light chain, immunofixation, PLA2R. Discussed with patient the risks of use of IV contrast, patient understands the risks of CKD progression but knows the cardiac procedure is necessary. Kaushik Score 10 points with 14% of post- PCI BRENDEN. Would recommend IVF 80/hour for 12 hours prior and post to catherization.  Would not recommend doing a LHC today as creatinine is finally decreasing and patient with trace LE edema. Avoid nephrotoxic agents. Dose all meds appropriate for GFR.     Monitor strict UOP. Patient is euvolemic with mild LE edema, would not recommend further fluids or IV diuresis.    If you have any questions, please feel free to contact me  Segundo Forde  Nephrology Fellow  [phone]; Prefer [employer] TEAMS  (After 5pm or on weekends please page the on-call fellow).    Patient was discussed with Dr. Scott who agrees with my A/P. Addendum to follow

## 2022-10-11 NOTE — PROGRESS NOTE ADULT - SUBJECTIVE AND OBJECTIVE BOX
[facility] Division of Kidney Diseases & Hypertension  FOLLOW UP NOTE  [phone]--------------------------------------------------------------------------------  Chief Complaint:Non-ST elevation myocardial infarction (NSTEMI)        24 hour events/subjective:  Patient is walking around today; still complaining of double vision      PAST HISTORY  --------------------------------------------------------------------------------  No significant changes to PMH, PSH, FHx, SHx, unless otherwise noted    ALLERGIES & MEDICATIONS  --------------------------------------------------------------------------------  Allergies    No Known Allergies    Intolerances      Standing Inpatient Medications  amitriptyline 10 milliGRAM(s) Oral at bedtime  amLODIPine   Tablet 10 milliGRAM(s) Oral daily  aspirin enteric coated 81 milliGRAM(s) Oral daily  atorvastatin 40 milliGRAM(s) Oral at bedtime  budesonide  80 MICROgram(s)/formoterol 4.5 MICROgram(s) Inhaler 2 Puff(s) Inhalation two times a day  carvedilol 25 milliGRAM(s) Oral every 12 hours  chlorhexidine 2% Cloths 1 Application(s) Topical <User Schedule>  dextrose 5%. 1000 milliLiter(s) IV Continuous <Continuous>  dextrose 5%. 1000 milliLiter(s) IV Continuous <Continuous>  dextrose 50% Injectable 25 Gram(s) IV Push once  dextrose 50% Injectable 12.5 Gram(s) IV Push once  dextrose 50% Injectable 25 Gram(s) IV Push once  fenofibrate Tablet 145 milliGRAM(s) Oral daily  glucagon  Injectable 1 milliGRAM(s) IntraMuscular once  heparin   Injectable 5000 Unit(s) SubCutaneous every 8 hours  influenza   Vaccine 0.5 milliLiter(s) IntraMuscular once  insulin glargine Injectable (LANTUS) 36 Unit(s) SubCutaneous at bedtime  insulin lispro (ADMELOG) corrective regimen sliding scale   SubCutaneous three times a day before meals  insulin lispro (ADMELOG) corrective regimen sliding scale   SubCutaneous at bedtime  insulin lispro Injectable (ADMELOG) 4 Unit(s) SubCutaneous three times a day before meals  pantoprazole    Tablet 40 milliGRAM(s) Oral before breakfast  sodium chloride 0.9%. 1000 milliLiter(s) IV Continuous <Continuous>  sodium chloride 0.9%. 1000 milliLiter(s) IV Continuous <Continuous>  ticagrelor 90 milliGRAM(s) Oral every 12 hours    PRN Inpatient Medications  ALBUTerol    90 MICROgram(s) HFA Inhaler 2 Puff(s) Inhalation every 6 hours PRN  Biotene Dry Mouth Oral Rinse 15 milliLiter(s) Swish and Spit three times a day PRN  dextrose Oral Gel 15 Gram(s) Oral once PRN      REVIEW OF SYSTEMS  --------------------------------------------------------------------------------  Gen: No  fevers/chills  Skin: No rashes  Head/Eyes/Ears/Mouth: No headache; Normal hearing; + still complaining of double vision  Respiratory: No dyspnea, cough, wheezing, hemoptysis  CV: No chest pain, PND, orthopnea  GI: No abdominal pain, diarrhea, constipation, nausea, vomiting  : No increased frequency, dysuria, hematuria, nocturia  MSK: No joint pain/swelling; no back pain; no edema  Neuro: No dizziness/lightheadedness, weakness, seizures, numbness, tingling      All other systems were reviewed and are negative, except as noted.    VITALS/PHYSICAL EXAM  --------------------------------------------------------------------------------  T(C): 36.4 (10-11-22 @ 04:58), Max: 36.7 (10-10-22 @ 11:58)  HR: 70 (10-11-22 @ 04:58) (68 - 76)  BP: 145/77 (10-11-22 @ 04:58) (116/75 - 145/77)  RR: 18 (10-11-22 @ 04:58) (18 - 18)  SpO2: 98% (10-11-22 @ 04:58) (98% - 100%)  Wt(kg): --        10-10-22 @ 07:01  -  10-11-22 @ 07:00  --------------------------------------------------------  IN: 900 mL / OUT: 1100 mL / NET: -200 mL      Physical Exam:  	Gen: NAD  	HEENT: MMM  	Pulm: CTA B/L  	CV: S1S2  	Abd: Soft, +BS   	Ext: Trace LE edema B/L  	Neuro: Awake  	Skin: Warm and dry  	Vascular access: None      LABS/STUDIES  --------------------------------------------------------------------------------              10.6   5.95  >-----------<  170      [10-11-22 @ 06:16]              32.2     138  |  105  |  36  ----------------------------<  194      [10-11-22 @ 06:13]  4.6   |  23  |  3.02        Ca     8.7     [10-11-22 @ 06:13]      Mg     1.9     [10-11-22 @ 06:13]      Phos  3.1     [10-11-22 @ 06:13]    TPro  6.3  /  Alb  3.2  /  TBili  0.2  /  DBili  x   /  AST  16  /  ALT  17  /  AlkPhos  109  [10-11-22 @ 06:13]          Creatinine Trend:  SCr 3.02 [10-11 @ 06:13]  SCr 3.13 [10-10 @ 06:29]  SCr 2.76 [10-09 @ 06:26]  SCr 3.04 [10-08 @ 06:30]  SCr 2.94 [10-07 @ 06:00]    Urinalysis - [10-05-22 @ 00:49]      Color Colorless / Appearance Clear / SG 1.008 / pH 6.0      Gluc 500 mg/dL / Ketone Negative  / Bili Negative / Urobili Negative       Blood Trace / Protein 100 mg/dL / Leuk Est Negative / Nitrite Negative      RBC 2 / WBC 1 / Hyaline  / Gran  / Sq Epi  / Non Sq Epi 0 / Bacteria Negative    Urine Creatinine 41      [10-05-22 @ 00:51]  Urine Protein 140      [10-05-22 @ 00:51]  Urine Sodium 38      [10-05-22 @ 00:51]  Urine Urea Nitrogen 346      [10-05-22 @ 01:12]  Urine Potassium 13      [10-05-22 @ 00:51]      HBsAg Nonreact      [10-09-22 @ 06:26]  HCV 0.09, Nonreact      [10-09-22 @ 06:26]

## 2022-10-11 NOTE — PROGRESS NOTE ADULT - SUBJECTIVE AND OBJECTIVE BOX
********CARDIOLOGY PROGRESS NOTE********  HISTORY OF PRESENT ILLNESS:  HPI:  46 y/o male, current cigarette smoker, with PMHx of Vertigo, Diverticulitis s/p LAR, ETOH abuse now sober x 5 years, DM (patient states has been on Insulin x 10 years was never on oral agents), CKD IV - baseline creatinine 2.4, HTN, AMBER, who presented to [facility] on 10/2 c/o dizziness, b/l diplopia, headache and chest tightness.  In ED @ OSH /133, managed with hydralazine IV, Cardiac biomarkers elevated with trop I peak 0.632, EKG with ST-T abnormality no acute ST segment changes, TTE demonstrating normal LV systolic fxn (EF 55-60), no significant valvular disease/[facility] following.  CT head without acute pathology, MRI with left parasagital infarct, Neurology following, impression was patients symptoms do not correlate with MRI findings.  Opthamology consulted, recommended f/u as o/p.  Patient is now transferred to [facility] in setting of NSTEMI for LHC.  LHC deferred for Neuro consult and medical optimization   (04 Oct 2022 15:33)          Allergies    No Known Allergies    Intolerances    	    MEDICATIONS:  amLODIPine   Tablet 10 milliGRAM(s) Oral daily  aspirin enteric coated 81 milliGRAM(s) Oral daily  carvedilol 25 milliGRAM(s) Oral every 12 hours  heparin   Injectable 5000 Unit(s) SubCutaneous every 8 hours  ticagrelor 90 milliGRAM(s) Oral every 12 hours      ALBUTerol    90 MICROgram(s) HFA Inhaler 2 Puff(s) Inhalation every 6 hours PRN  budesonide  80 MICROgram(s)/formoterol 4.5 MICROgram(s) Inhaler 2 Puff(s) Inhalation two times a day    amitriptyline 10 milliGRAM(s) Oral at bedtime    pantoprazole    Tablet 40 milliGRAM(s) Oral before breakfast    atorvastatin 40 milliGRAM(s) Oral at bedtime  dextrose 50% Injectable 25 Gram(s) IV Push once  dextrose 50% Injectable 12.5 Gram(s) IV Push once  dextrose 50% Injectable 25 Gram(s) IV Push once  dextrose Oral Gel 15 Gram(s) Oral once PRN  fenofibrate Tablet 145 milliGRAM(s) Oral daily  glucagon  Injectable 1 milliGRAM(s) IntraMuscular once  insulin glargine Injectable (LANTUS) 36 Unit(s) SubCutaneous at bedtime  insulin lispro (ADMELOG) corrective regimen sliding scale   SubCutaneous three times a day before meals  insulin lispro (ADMELOG) corrective regimen sliding scale   SubCutaneous at bedtime  insulin lispro Injectable (ADMELOG) 4 Unit(s) SubCutaneous three times a day before meals    Biotene Dry Mouth Oral Rinse 15 milliLiter(s) Swish and Spit three times a day PRN  chlorhexidine 2% Cloths 1 Application(s) Topical <User Schedule>  dextrose 5%. 1000 milliLiter(s) IV Continuous <Continuous>  dextrose 5%. 1000 milliLiter(s) IV Continuous <Continuous>  influenza   Vaccine 0.5 milliLiter(s) IntraMuscular once  sodium chloride 0.9%. 1000 milliLiter(s) IV Continuous <Continuous>  sodium chloride 0.9%. 1000 milliLiter(s) IV Continuous <Continuous>      PAST MEDICAL & SURGICAL HISTORY:  Diabetes      Asthma      Diverticulitis  surgery 16yrs ago      High cholesterol      Dyslipidemia      Hypertension      H/O vertigo      Diabetic ulcer of right foot      Broken toe  left pinky toe      Vertigo      HTN (hypertension)      Diabetes      History of surgery  colon resection          FAMILY HISTORY:  Family history of hypertension (Father)        SOCIAL HISTORY:  unchanged    REVIEW OF SYSTEMS:  CONSTITUTIONAL: No fever, weight loss, or fatigue  EYES: No eye pain, visual disturbances, or discharge  ENMT:  No difficulty hearing, tinnitus, vertigo; No sinus or throat pain  NECK: No pain or stiffness  RESPIRATORY: No cough, wheezing, chills or hemoptysis; No Shortness of Breath  CARDIOVASCULAR: No chest pain, palpitations, passing out, dizziness, or leg swelling  GASTROINTESTINAL: No abdominal or epigastric pain. No nausea, vomiting, or hematemesis; No diarrhea or constipation. No melena or hematochezia.  GENITOURINARY: No dysuria, frequency, hematuria, or incontinence  NEUROLOGICAL: No headaches, memory loss, loss of strength, numbness, or tremors  SKIN: No itching, burning, rashes, or lesions   LYMPH Nodes: No enlarged glands  ENDOCRINE: No heat or cold intolerance; No hair loss  MUSCULOSKELETAL: No joint pain or swelling; No muscle, back, or extremity pain  PSYCHIATRIC: No depression, anxiety, mood swings, or difficulty sleeping  HEME/LYMPH: No easy bruising, or bleeding gums  ALLERY AND IMMUNOLOGIC: No hives or eczema	    [ ] All others negative	  [ ] Unable to obtain    PHYSICAL EXAM:  T(C): 36.4 (10-11-22 @ 04:58), Max: 36.7 (10-10-22 @ 11:58)  HR: 70 (10-11-22 @ 04:58) (68 - 76)  BP: 145/77 (10-11-22 @ 04:58) (116/75 - 145/77)  RR: 18 (10-11-22 @ 04:58) (18 - 18)  SpO2: 98% (10-11-22 @ 04:58) (98% - 100%)  Wt(kg): --  I&O's Summary    10 Oct 2022 07:01  -  11 Oct 2022 07:00  --------------------------------------------------------  IN: 900 mL / OUT: 1100 mL / NET: -200 mL        Appearance: Normal	  HEENT:   Normal oral mucosa, PERRL, EOMI	  Lymphatic: No lymphadenopathy  Cardiovascular: Normal S1 S2, No JVD, No murmurs, No edema  Respiratory: Lungs clear to auscultation	  Psychiatry: A & O x 3, Mood & affect appropriate  Gastrointestinal:  Soft, Non-tender, + BS	  Skin: No rashes, No ecchymoses, No cyanosis	  Neurologic: Non-focal  Extremities: Normal range of motion, No clubbing, cyanosis or edema  Vascular: Peripheral pulses palpable 2+ bilaterally      LABS:	 	    CBC Full  -  ( 11 Oct 2022 06:16 )  WBC Count : 5.95 K/uL  Hemoglobin : 10.6 g/dL  Hematocrit : 32.2 %  Platelet Count - Automated : 170 K/uL  Mean Cell Volume : 95.3 fl  Mean Cell Hemoglobin : 31.4 pg  Mean Cell Hemoglobin Concentration : 32.9 gm/dL  Auto Neutrophil # : x  Auto Lymphocyte # : x  Auto Monocyte # : x  Auto Eosinophil # : x  Auto Basophil # : x  Auto Neutrophil % : x  Auto Lymphocyte % : x  Auto Monocyte % : x  Auto Eosinophil % : x  Auto Basophil % : x    10-11    138  |  105  |  36<H>  ----------------------------<  194<H>  4.6   |  23  |  3.02<H>  10-10    140  |  109<H>  |  34<H>  ----------------------------<  180<H>  4.7   |  22  |  3.13<H>    Ca    8.7      11 Oct 2022 06:13  Ca    8.9      10 Oct 2022 06:29  Phos  3.1     10-11  Phos  3.8     10-10  Mg     1.9     10-11  Mg     1.9     10-10    TPro  6.3  /  Alb  3.2<L>  /  TBili  0.2  /  DBili  x   /  AST  16  /  ALT  17  /  AlkPhos  109  10-11  TPro  6.7  /  Alb  3.5  /  TBili  0.2  /  DBili  x   /  AST  15  /  ALT  19  /  AlkPhos  98  10-10

## 2022-10-11 NOTE — PROGRESS NOTE ADULT - SUBJECTIVE AND OBJECTIVE BOX
Internal Medicine   Jesus Hagen | PGY-1    OVERNIGHT EVENTS: No acute overnight events.    SUBJECTIVE:       MEDICATIONS  (STANDING):  amitriptyline 10 milliGRAM(s) Oral at bedtime  amLODIPine   Tablet 10 milliGRAM(s) Oral daily  aspirin enteric coated 81 milliGRAM(s) Oral daily  atorvastatin 40 milliGRAM(s) Oral at bedtime  budesonide  80 MICROgram(s)/formoterol 4.5 MICROgram(s) Inhaler 2 Puff(s) Inhalation two times a day  carvedilol 25 milliGRAM(s) Oral every 12 hours  chlorhexidine 2% Cloths 1 Application(s) Topical <User Schedule>  dextrose 5%. 1000 milliLiter(s) (100 mL/Hr) IV Continuous <Continuous>  dextrose 5%. 1000 milliLiter(s) (50 mL/Hr) IV Continuous <Continuous>  dextrose 50% Injectable 25 Gram(s) IV Push once  dextrose 50% Injectable 12.5 Gram(s) IV Push once  dextrose 50% Injectable 25 Gram(s) IV Push once  fenofibrate Tablet 145 milliGRAM(s) Oral daily  glucagon  Injectable 1 milliGRAM(s) IntraMuscular once  heparin   Injectable 5000 Unit(s) SubCutaneous every 8 hours  influenza   Vaccine 0.5 milliLiter(s) IntraMuscular once  insulin glargine Injectable (LANTUS) 36 Unit(s) SubCutaneous at bedtime  insulin lispro (ADMELOG) corrective regimen sliding scale   SubCutaneous three times a day before meals  insulin lispro (ADMELOG) corrective regimen sliding scale   SubCutaneous at bedtime  insulin lispro Injectable (ADMELOG) 4 Unit(s) SubCutaneous three times a day before meals  pantoprazole    Tablet 40 milliGRAM(s) Oral before breakfast  sodium chloride 0.9%. 1000 milliLiter(s) (80 mL/Hr) IV Continuous <Continuous>  sodium chloride 0.9%. 1000 milliLiter(s) (80 mL/Hr) IV Continuous <Continuous>  ticagrelor 90 milliGRAM(s) Oral every 12 hours    MEDICATIONS  (PRN):  ALBUTerol    90 MICROgram(s) HFA Inhaler 2 Puff(s) Inhalation every 6 hours PRN Shortness of Breath and/or Wheezing  Biotene Dry Mouth Oral Rinse 15 milliLiter(s) Swish and Spit three times a day PRN Mouth Care  dextrose Oral Gel 15 Gram(s) Oral once PRN Blood Glucose LESS THAN 70 milliGRAM(s)/deciliter    VITALS:  T(F): 97.5 (10-11-22 @ 04:58), Max: 98 (10-10-22 @ 11:58)  HR: 70 (10-11-22 @ 04:58) (68 - 76)  BP: 145/77 (10-11-22 @ 04:58) (116/75 - 145/77)  BP(mean): --  RR: 18 (10-11-22 @ 04:58) (18 - 18)  SpO2: 98% (10-11-22 @ 04:58) (98% - 100%)    PHYSICAL EXAM:   GENERAL: NAD, lying in bed comfortably  HEAD: Atraumatic, normocephalic  EYES: EOMI, conjunctiva and sclera clear, no nystagmus noted  ENT: Moist mucous membranes  CHEST/LUNG: Clear to auscultation bilaterally; no rales, rhonchi, wheezing, or rubs; unlabored respirations  HEART: Regular rate and rhythm; no murmurs, rubs, or gallops, normal S1/S2  ABDOMEN: Normal bowel sounds; soft, nontender, nondistended  EXTREMITIES: No clubbing, cyanosis, or edema  MSK: No gross deformities noted   Neurological: No focal deficits   SKIN: No rashes or lesions  PSYCH: Normal mood, affect     LABS:                      10.6   5.95  )-----------( 170      ( 11 Oct 2022 06:16 )             32.2     10-11  138  |  105  |  36<H>  ----------------------------<  194<H>  4.6   |  23  |  3.02<H>    Ca    8.7      11 Oct 2022 06:13  Phos  3.1     10-11  Mg     1.9     10-11    TPro  6.3  /  Alb  3.2<L>  /  TBili  0.2  /  DBili  x   /  AST  16  /  ALT  17  /  AlkPhos  109  10-11    Creatinine Trend: 3.02<--, 3.13<--, 2.76<--, 3.04<--, 2.94<--, 2.20<--    I&O's Summary  10 Oct 2022 07:01  -  11 Oct 2022 07:00  --------------------------------------------------------  IN: 900 mL / OUT: 1100 mL / NET: -200 mL   Internal Medicine   Jesus Hagen | PGY-1    OVERNIGHT EVENTS: No acute overnight events.    SUBJECTIVE:       MEDICATIONS  (STANDING):  amitriptyline 10 milliGRAM(s) Oral at bedtime  amLODIPine   Tablet 10 milliGRAM(s) Oral daily  aspirin enteric coated 81 milliGRAM(s) Oral daily  atorvastatin 40 milliGRAM(s) Oral at bedtime  budesonide  80 MICROgram(s)/formoterol 4.5 MICROgram(s) Inhaler 2 Puff(s) Inhalation two times a day  carvedilol 25 milliGRAM(s) Oral every 12 hours  chlorhexidine 2% Cloths 1 Application(s) Topical <User Schedule>  dextrose 5%. 1000 milliLiter(s) (100 mL/Hr) IV Continuous <Continuous>  dextrose 5%. 1000 milliLiter(s) (50 mL/Hr) IV Continuous <Continuous>  dextrose 50% Injectable 25 Gram(s) IV Push once  dextrose 50% Injectable 12.5 Gram(s) IV Push once  dextrose 50% Injectable 25 Gram(s) IV Push once  fenofibrate Tablet 145 milliGRAM(s) Oral daily  glucagon  Injectable 1 milliGRAM(s) IntraMuscular once  heparin   Injectable 5000 Unit(s) SubCutaneous every 8 hours  influenza   Vaccine 0.5 milliLiter(s) IntraMuscular once  insulin glargine Injectable (LANTUS) 36 Unit(s) SubCutaneous at bedtime  insulin lispro (ADMELOG) corrective regimen sliding scale   SubCutaneous three times a day before meals  insulin lispro (ADMELOG) corrective regimen sliding scale   SubCutaneous at bedtime  insulin lispro Injectable (ADMELOG) 4 Unit(s) SubCutaneous three times a day before meals  pantoprazole    Tablet 40 milliGRAM(s) Oral before breakfast  sodium chloride 0.9%. 1000 milliLiter(s) (80 mL/Hr) IV Continuous <Continuous>  sodium chloride 0.9%. 1000 milliLiter(s) (80 mL/Hr) IV Continuous <Continuous>  ticagrelor 90 milliGRAM(s) Oral every 12 hours    MEDICATIONS  (PRN):  ALBUTerol    90 MICROgram(s) HFA Inhaler 2 Puff(s) Inhalation every 6 hours PRN Shortness of Breath and/or Wheezing  Biotene Dry Mouth Oral Rinse 15 milliLiter(s) Swish and Spit three times a day PRN Mouth Care  dextrose Oral Gel 15 Gram(s) Oral once PRN Blood Glucose LESS THAN 70 milliGRAM(s)/deciliter    VITALS:  T(F): 97.5 (10-11-22 @ 04:58), Max: 98 (10-10-22 @ 11:58)  HR: 70 (10-11-22 @ 04:58) (68 - 76)  BP: 145/77 (10-11-22 @ 04:58) (116/75 - 145/77)  BP(mean): --  RR: 18 (10-11-22 @ 04:58) (18 - 18)  SpO2: 98% (10-11-22 @ 04:58) (98% - 100%)    PHYSICAL EXAM:   GENERAL: NAD, lying in bed comfortably  HEAD: Atraumatic, normocephalic  EYES: lateral rectus palsy in right eye.   ENT: Moist mucous membranes  CHEST/LUNG: Clear to auscultation bilaterally; no rales, rhonchi, wheezing, or rubs; unlabored respirations  HEART: Regular rate and rhythm; no murmurs, rubs, or gallops, normal S1/S2  ABDOMEN: Normal bowel sounds; soft, nontender, nondistended  EXTREMITIES: No clubbing, cyanosis, or edema  MSK: No gross deformities noted   Neurological: No focal deficits   SKIN: No rashes or lesions  PSYCH: Normal mood, affect     LABS:                      10.6   5.95  )-----------( 170      ( 11 Oct 2022 06:16 )             32.2     10-11  138  |  105  |  36<H>  ----------------------------<  194<H>  4.6   |  23  |  3.02<H>    Ca    8.7      11 Oct 2022 06:13  Phos  3.1     10-11  Mg     1.9     10-11    TPro  6.3  /  Alb  3.2<L>  /  TBili  0.2  /  DBili  x   /  AST  16  /  ALT  17  /  AlkPhos  109  10-11    Creatinine Trend: 3.02<--, 3.13<--, 2.76<--, 3.04<--, 2.94<--, 2.20<--    I&O's Summary  10 Oct 2022 07:01  -  11 Oct 2022 07:00  --------------------------------------------------------  IN: 900 mL / OUT: 1100 mL / NET: -200 mL   Internal Medicine   Jesus Hagen | PGY-1    OVERNIGHT EVENTS: No acute overnight events.    SUBJECTIVE: Patient endorsing continued symptoms of diplopia, headaches, dizziness. Denies acute worsening, fever, chills, chest pain, shortness of breath. Discussed BP medication adherence w/ patient as he had significant HTN on presentation to OSH, he states he tries to take his medications (HCTZ + metoprolol) as prescribed however on occasion will forget, sometimes missing doses two days in a row.    MEDICATIONS  (STANDING):  amitriptyline 10 milliGRAM(s) Oral at bedtime  amLODIPine   Tablet 10 milliGRAM(s) Oral daily  aspirin enteric coated 81 milliGRAM(s) Oral daily  atorvastatin 40 milliGRAM(s) Oral at bedtime  budesonide  80 MICROgram(s)/formoterol 4.5 MICROgram(s) Inhaler 2 Puff(s) Inhalation two times a day  carvedilol 25 milliGRAM(s) Oral every 12 hours  chlorhexidine 2% Cloths 1 Application(s) Topical <User Schedule>  dextrose 5%. 1000 milliLiter(s) (100 mL/Hr) IV Continuous <Continuous>  dextrose 5%. 1000 milliLiter(s) (50 mL/Hr) IV Continuous <Continuous>  dextrose 50% Injectable 25 Gram(s) IV Push once  dextrose 50% Injectable 12.5 Gram(s) IV Push once  dextrose 50% Injectable 25 Gram(s) IV Push once  fenofibrate Tablet 145 milliGRAM(s) Oral daily  glucagon  Injectable 1 milliGRAM(s) IntraMuscular once  heparin   Injectable 5000 Unit(s) SubCutaneous every 8 hours  influenza   Vaccine 0.5 milliLiter(s) IntraMuscular once  insulin glargine Injectable (LANTUS) 36 Unit(s) SubCutaneous at bedtime  insulin lispro (ADMELOG) corrective regimen sliding scale   SubCutaneous three times a day before meals  insulin lispro (ADMELOG) corrective regimen sliding scale   SubCutaneous at bedtime  insulin lispro Injectable (ADMELOG) 4 Unit(s) SubCutaneous three times a day before meals  pantoprazole    Tablet 40 milliGRAM(s) Oral before breakfast  sodium chloride 0.9%. 1000 milliLiter(s) (80 mL/Hr) IV Continuous <Continuous>  sodium chloride 0.9%. 1000 milliLiter(s) (80 mL/Hr) IV Continuous <Continuous>  ticagrelor 90 milliGRAM(s) Oral every 12 hours    MEDICATIONS  (PRN):  ALBUTerol    90 MICROgram(s) HFA Inhaler 2 Puff(s) Inhalation every 6 hours PRN Shortness of Breath and/or Wheezing  Biotene Dry Mouth Oral Rinse 15 milliLiter(s) Swish and Spit three times a day PRN Mouth Care  dextrose Oral Gel 15 Gram(s) Oral once PRN Blood Glucose LESS THAN 70 milliGRAM(s)/deciliter    VITALS:  T(F): 97.5 (10-11-22 @ 04:58), Max: 98 (10-10-22 @ 11:58)  HR: 70 (10-11-22 @ 04:58) (68 - 76)  BP: 145/77 (10-11-22 @ 04:58) (116/75 - 145/77)  BP(mean): --  RR: 18 (10-11-22 @ 04:58) (18 - 18)  SpO2: 98% (10-11-22 @ 04:58) (98% - 100%)    PHYSICAL EXAM:   GENERAL: NAD, sitting in chair and ambulating independently though with some apparent unsteadiness, alert and talkative  HEAD: Atraumatic, normocephalic  EYES: apparent lateral rectus palsy; slight pupil asymmetry (limited constriction in R) though not well assessed; no nystagmus; conjunctiva clear  ENT: Moist mucous membranes  CHEST/LUNG: Clear to auscultation bilaterally; no rales, rhonchi, wheezing, or rubs; unlabored respirations  HEART: Regular rate and rhythm; no murmurs, rubs, or gallops, normal S1/S2  ABDOMEN: Normal bowel sounds; soft, nontender, nondistended  EXTREMITIES: No clubbing, cyanosis, or edema  MSK: No gross deformities noted   Neurological: +Romberg (significant swaying with both eyes closed, resolves with L eye open)  SKIN: No rashes or lesions  PSYCH: Appropriate mood, affect     LABS:                      10.6   5.95  )-----------( 170      ( 11 Oct 2022 06:16 )             32.2     10-11  138  |  105  |  36<H>  ----------------------------<  194<H>  4.6   |  23  |  3.02<H>    Ca    8.7      11 Oct 2022 06:13  Phos  3.1     10-11  Mg     1.9     10-11    TPro  6.3  /  Alb  3.2<L>  /  TBili  0.2  /  DBili  x   /  AST  16  /  ALT  17  /  AlkPhos  109  10-11    Creatinine Trend: 3.02<--, 3.13<--, 2.76<--, 3.04<--, 2.94<--, 2.20<--    I&O's Summary  10 Oct 2022 07:01  -  11 Oct 2022 07:00  --------------------------------------------------------  IN: 900 mL / OUT: 1100 mL / NET: -200 mL    < from: Limited Transthoracic Echo (10.11.22 @ 09:34) >  Conclusions:  1. Normal left ventricular systolic function. No segmental  wall motion abnormalities.  2. Normal right ventricular size and function.  3. Agitated salineinjection and color flow Doppler  demonstrates no evidence of a patent foramen ovale.  *** Compared with echocardiogram of 10/8/2022, no  significant changes noted.  < end of copied text >

## 2022-10-11 NOTE — PROGRESS NOTE ADULT - PROBLEM SELECTOR PLAN 2
Transfer from [facility] for possible cardiac cath  - coronary CT completed, moderate stenosis of prox RCA and LAD  - cards following, potential cath following resolution of BILLY  - nephrology following, recommending holding off on cath unless emergent indication given BILLY  - GDMT w/ coreg, atorvastatin (holding ACE/ARB for BILLY)  - aspirin + brilinta  - EKG and troponins if chest pain Transfer from [facility] for possible cardiac cath  - coronary CT completed, moderate stenosis of prox RCA and LAD  - cards following, potential cath following resolution of BILLY  - nephrology following, recommending holding off on cath unless emergent indication as Cr improves  - will continue to optimize patient for possible cath:     - GDMT w/ coreg, atorvastatin (holding ACE/ARB for BILLY)     - aspirin + brilinta  - EKG and troponins if chest pain

## 2022-10-12 LAB
ANION GAP SERPL CALC-SCNC: 9 MMOL/L — SIGNIFICANT CHANGE UP (ref 5–17)
BUN SERPL-MCNC: 36 MG/DL — HIGH (ref 7–23)
CALCIUM SERPL-MCNC: 8.9 MG/DL — SIGNIFICANT CHANGE UP (ref 8.4–10.5)
CHLORIDE SERPL-SCNC: 107 MMOL/L — SIGNIFICANT CHANGE UP (ref 96–108)
CO2 SERPL-SCNC: 24 MMOL/L — SIGNIFICANT CHANGE UP (ref 22–31)
CREAT SERPL-MCNC: 2.92 MG/DL — HIGH (ref 0.5–1.3)
EGFR: 26 ML/MIN/1.73M2 — LOW
GLUCOSE BLDC GLUCOMTR-MCNC: 133 MG/DL — HIGH (ref 70–99)
GLUCOSE BLDC GLUCOMTR-MCNC: 155 MG/DL — HIGH (ref 70–99)
GLUCOSE BLDC GLUCOMTR-MCNC: 167 MG/DL — HIGH (ref 70–99)
GLUCOSE BLDC GLUCOMTR-MCNC: 197 MG/DL — HIGH (ref 70–99)
GLUCOSE BLDC GLUCOMTR-MCNC: 59 MG/DL — LOW (ref 70–99)
GLUCOSE SERPL-MCNC: 57 MG/DL — LOW (ref 70–99)
HCT VFR BLD CALC: 32.9 % — LOW (ref 39–50)
HGB BLD-MCNC: 10.7 G/DL — LOW (ref 13–17)
MAGNESIUM SERPL-MCNC: 1.8 MG/DL — SIGNIFICANT CHANGE UP (ref 1.6–2.6)
MCHC RBC-ENTMCNC: 30.8 PG — SIGNIFICANT CHANGE UP (ref 27–34)
MCHC RBC-ENTMCNC: 32.5 GM/DL — SIGNIFICANT CHANGE UP (ref 32–36)
MCV RBC AUTO: 94.8 FL — SIGNIFICANT CHANGE UP (ref 80–100)
NRBC # BLD: 0 /100 WBCS — SIGNIFICANT CHANGE UP (ref 0–0)
OSMOLALITY UR: 346 MOS/KG — SIGNIFICANT CHANGE UP (ref 300–900)
PHOSPHATE SERPL-MCNC: 3.5 MG/DL — SIGNIFICANT CHANGE UP (ref 2.5–4.5)
PHOSPHOLIPASE A2 RECEPTOR ELISA: <1.8 RU/ML — SIGNIFICANT CHANGE UP (ref 0–19.9)
PLATELET # BLD AUTO: 185 K/UL — SIGNIFICANT CHANGE UP (ref 150–400)
POTASSIUM SERPL-MCNC: 4.3 MMOL/L — SIGNIFICANT CHANGE UP (ref 3.5–5.3)
POTASSIUM SERPL-SCNC: 4.3 MMOL/L — SIGNIFICANT CHANGE UP (ref 3.5–5.3)
PROT SERPL-MCNC: 6 G/DL — SIGNIFICANT CHANGE UP (ref 6–8.3)
PROT SERPL-MCNC: 6 G/DL — SIGNIFICANT CHANGE UP (ref 6–8.3)
RBC # BLD: 3.47 M/UL — LOW (ref 4.2–5.8)
RBC # FLD: 12.3 % — SIGNIFICANT CHANGE UP (ref 10.3–14.5)
SODIUM SERPL-SCNC: 140 MMOL/L — SIGNIFICANT CHANGE UP (ref 135–145)
UUN UR-MCNC: 465 MG/DL — SIGNIFICANT CHANGE UP
WBC # BLD: 5.85 K/UL — SIGNIFICANT CHANGE UP (ref 3.8–10.5)
WBC # FLD AUTO: 5.85 K/UL — SIGNIFICANT CHANGE UP (ref 3.8–10.5)

## 2022-10-12 PROCEDURE — 99233 SBSQ HOSP IP/OBS HIGH 50: CPT | Mod: GC

## 2022-10-12 PROCEDURE — 99233 SBSQ HOSP IP/OBS HIGH 50: CPT

## 2022-10-12 PROCEDURE — 99232 SBSQ HOSP IP/OBS MODERATE 35: CPT | Mod: GC

## 2022-10-12 RX ORDER — SODIUM CHLORIDE 9 MG/ML
1000 INJECTION INTRAMUSCULAR; INTRAVENOUS; SUBCUTANEOUS
Refills: 0 | Status: DISCONTINUED | OUTPATIENT
Start: 2022-10-12 | End: 2022-10-14

## 2022-10-12 RX ORDER — MAGNESIUM SULFATE 500 MG/ML
2 VIAL (ML) INJECTION ONCE
Refills: 0 | Status: COMPLETED | OUTPATIENT
Start: 2022-10-12 | End: 2022-10-12

## 2022-10-12 RX ADMIN — PANTOPRAZOLE SODIUM 40 MILLIGRAM(S): 20 TABLET, DELAYED RELEASE ORAL at 07:54

## 2022-10-12 RX ADMIN — BUDESONIDE AND FORMOTEROL FUMARATE DIHYDRATE 2 PUFF(S): 160; 4.5 AEROSOL RESPIRATORY (INHALATION) at 05:40

## 2022-10-12 RX ADMIN — HEPARIN SODIUM 5000 UNIT(S): 5000 INJECTION INTRAVENOUS; SUBCUTANEOUS at 21:41

## 2022-10-12 RX ADMIN — Medication 650 MILLIGRAM(S): at 08:20

## 2022-10-12 RX ADMIN — Medication 4 UNIT(S): at 12:38

## 2022-10-12 RX ADMIN — INSULIN GLARGINE 36 UNIT(S): 100 INJECTION, SOLUTION SUBCUTANEOUS at 21:41

## 2022-10-12 RX ADMIN — Medication 10 MILLIGRAM(S): at 21:41

## 2022-10-12 RX ADMIN — Medication 4 UNIT(S): at 07:54

## 2022-10-12 RX ADMIN — HEPARIN SODIUM 5000 UNIT(S): 5000 INJECTION INTRAVENOUS; SUBCUTANEOUS at 13:19

## 2022-10-12 RX ADMIN — Medication 4 UNIT(S): at 16:32

## 2022-10-12 RX ADMIN — Medication 1: at 07:54

## 2022-10-12 RX ADMIN — Medication 25 GRAM(S): at 12:37

## 2022-10-12 RX ADMIN — ATORVASTATIN CALCIUM 40 MILLIGRAM(S): 80 TABLET, FILM COATED ORAL at 21:41

## 2022-10-12 RX ADMIN — TICAGRELOR 90 MILLIGRAM(S): 90 TABLET ORAL at 05:41

## 2022-10-12 RX ADMIN — HEPARIN SODIUM 5000 UNIT(S): 5000 INJECTION INTRAVENOUS; SUBCUTANEOUS at 05:41

## 2022-10-12 RX ADMIN — CARVEDILOL PHOSPHATE 25 MILLIGRAM(S): 80 CAPSULE, EXTENDED RELEASE ORAL at 05:41

## 2022-10-12 RX ADMIN — Medication 145 MILLIGRAM(S): at 12:39

## 2022-10-12 RX ADMIN — Medication 81 MILLIGRAM(S): at 12:51

## 2022-10-12 RX ADMIN — Medication 1: at 12:38

## 2022-10-12 RX ADMIN — AMLODIPINE BESYLATE 10 MILLIGRAM(S): 2.5 TABLET ORAL at 05:41

## 2022-10-12 RX ADMIN — SODIUM CHLORIDE 80 MILLILITER(S): 9 INJECTION INTRAMUSCULAR; INTRAVENOUS; SUBCUTANEOUS at 22:05

## 2022-10-12 RX ADMIN — CHLORHEXIDINE GLUCONATE 1 APPLICATION(S): 213 SOLUTION TOPICAL at 05:40

## 2022-10-12 RX ADMIN — Medication 650 MILLIGRAM(S): at 07:56

## 2022-10-12 RX ADMIN — Medication 15 MILLILITER(S): at 21:44

## 2022-10-12 RX ADMIN — Medication 15 MILLILITER(S): at 01:22

## 2022-10-12 RX ADMIN — BUDESONIDE AND FORMOTEROL FUMARATE DIHYDRATE 2 PUFF(S): 160; 4.5 AEROSOL RESPIRATORY (INHALATION) at 17:22

## 2022-10-12 RX ADMIN — TICAGRELOR 90 MILLIGRAM(S): 90 TABLET ORAL at 17:23

## 2022-10-12 NOTE — PROGRESS NOTE ADULT - SUBJECTIVE AND OBJECTIVE BOX
[facility] Division of Kidney Diseases & Hypertension  FOLLOW UP NOTE  [phone]--------------------------------------------------------------------------------  Chief Complaint:Non-ST elevation myocardial infarction (NSTEMI)        24 hour events/subjective:  Patient without acute complaints      PAST HISTORY  --------------------------------------------------------------------------------  No significant changes to PMH, PSH, FHx, SHx, unless otherwise noted    ALLERGIES & MEDICATIONS  --------------------------------------------------------------------------------  Allergies    No Known Allergies    Intolerances      Standing Inpatient Medications  amitriptyline 10 milliGRAM(s) Oral at bedtime  amLODIPine   Tablet 10 milliGRAM(s) Oral daily  aspirin enteric coated 81 milliGRAM(s) Oral daily  atorvastatin 40 milliGRAM(s) Oral at bedtime  budesonide  80 MICROgram(s)/formoterol 4.5 MICROgram(s) Inhaler 2 Puff(s) Inhalation two times a day  carvedilol 25 milliGRAM(s) Oral every 12 hours  chlorhexidine 2% Cloths 1 Application(s) Topical <User Schedule>  dextrose 5%. 1000 milliLiter(s) IV Continuous <Continuous>  dextrose 5%. 1000 milliLiter(s) IV Continuous <Continuous>  dextrose 50% Injectable 25 Gram(s) IV Push once  dextrose 50% Injectable 12.5 Gram(s) IV Push once  dextrose 50% Injectable 25 Gram(s) IV Push once  fenofibrate Tablet 145 milliGRAM(s) Oral daily  glucagon  Injectable 1 milliGRAM(s) IntraMuscular once  heparin   Injectable 5000 Unit(s) SubCutaneous every 8 hours  influenza   Vaccine 0.5 milliLiter(s) IntraMuscular once  insulin glargine Injectable (LANTUS) 36 Unit(s) SubCutaneous at bedtime  insulin lispro (ADMELOG) corrective regimen sliding scale   SubCutaneous three times a day before meals  insulin lispro (ADMELOG) corrective regimen sliding scale   SubCutaneous at bedtime  insulin lispro Injectable (ADMELOG) 4 Unit(s) SubCutaneous three times a day before meals  pantoprazole    Tablet 40 milliGRAM(s) Oral before breakfast  sodium chloride 0.9%. 1000 milliLiter(s) IV Continuous <Continuous>  sodium chloride 0.9%. 1000 milliLiter(s) IV Continuous <Continuous>  ticagrelor 90 milliGRAM(s) Oral every 12 hours    PRN Inpatient Medications  acetaminophen     Tablet .. 650 milliGRAM(s) Oral every 6 hours PRN  ALBUTerol    90 MICROgram(s) HFA Inhaler 2 Puff(s) Inhalation every 6 hours PRN  Biotene Dry Mouth Oral Rinse 15 milliLiter(s) Swish and Spit three times a day PRN  dextrose Oral Gel 15 Gram(s) Oral once PRN      REVIEW OF SYSTEMS  --------------------------------------------------------------------------------  Gen: No  fevers/chills  Skin: No rashes  Head/Eyes/Ears/Mouth: No headache; Normal hearing; + still complaining of double vision  Respiratory: No dyspnea, cough, wheezing, hemoptysis  CV: No chest pain, PND, orthopnea  GI: No abdominal pain, diarrhea, constipation, nausea, vomiting  : No increased frequency, dysuria, hematuria, nocturia  MSK: No joint pain/swelling; no back pain; no edema  Neuro: No dizziness/lightheadedness, weakness, seizures, numbness, tingling    All other systems were reviewed and are negative, except as noted.    VITALS/PHYSICAL EXAM  --------------------------------------------------------------------------------  T(C): 36.8 (10-12-22 @ 11:22), Max: 36.9 (10-12-22 @ 05:37)  HR: 69 (10-12-22 @ 11:22) (65 - 80)  BP: 129/76 (10-12-22 @ 11:22) (120/68 - 147/87)  RR: 18 (10-12-22 @ 11:22) (17 - 18)  SpO2: 96% (10-12-22 @ 11:22) (96% - 99%)  Wt(kg): --        10-11-22 @ 07:01  -  10-12-22 @ 07:00  --------------------------------------------------------  IN: 480 mL / OUT: 2380 mL / NET: -1900 mL    10-12-22 @ 07:01  -  10-12-22 @ 13:24  --------------------------------------------------------  IN: 440 mL / OUT: 700 mL / NET: -260 mL      Physical Exam:  	Gen: NAD  	HEENT: MMM  	Pulm: CTA B/L  	CV: S1S2  	Abd: Soft, +BS   	Ext: Trace LE edema B/L  	Neuro: Awake  	Skin: Warm and dry  	Vascular access: None      LABS/STUDIES  --------------------------------------------------------------------------------              10.7   5.85  >-----------<  185      [10-12-22 @ 05:43]              32.9     140  |  107  |  36  ----------------------------<  57      [10-12-22 @ 05:43]  4.3   |  24  |  2.92        Ca     8.9     [10-12-22 @ 05:43]      Mg     1.8     [10-12-22 @ 05:43]      Phos  3.5     [10-12-22 @ 05:43]    TPro  6.0  /  Alb  x   /  TBili  x   /  DBili  x   /  AST  x   /  ALT  x   /  AlkPhos  x   [10-12-22 @ 05:43]          Creatinine Trend:  SCr 2.92 [10-12 @ 05:43]  SCr 3.02 [10-11 @ 06:13]  SCr 3.13 [10-10 @ 06:29]  SCr 2.76 [10-09 @ 06:26]  SCr 3.04 [10-08 @ 06:30]      Urine Creatinine 79      [10-11-22 @ 23:05]  Urine Sodium 52      [10-11-22 @ 23:05]  Urine Urea Nitrogen 465      [10-11-22 @ 23:05]  Urine Potassium 36      [10-11-22 @ 23:05]  Urine Chloride 40      [10-11-22 @ 23:05]  Urine Osmolality 346      [10-11-22 @ 23:05]      HBsAg Nonreact      [10-09-22 @ 06:26]  HCV 0.09, Nonreact      [10-09-22 @ 06:26]    Free Light Chains: kappa 9.26, lambda 4.63, ratio = 2.00      [10-10 @ 11:58]

## 2022-10-12 NOTE — CONSULT NOTE ADULT - ATTENDING COMMENTS
I have seen and examined the patient and agree with the note based on the resident findings above.
Pt. with BILLY vs BILLY on CKD and proteinuria in the setting of DM, awaiting cardiac angiogram. Recommend to give IV NS @80 cc/ hr 6 hours pre, during and 6 hours post procedure.   Check HBSAg, HCV Ab, SIFE and PLA2R.   Check renal US.   Monitor BMP. Strict I/Os. Avoid nephrotoxins. Dose meds per eGFR.

## 2022-10-12 NOTE — PROGRESS NOTE ADULT - PROBLEM SELECTOR PLAN 4
MRI w/ contrast showing multifocal lacunar infarcts and chronic microvascular ischemic changes, consistent with MRI completed @ [facility]; MRI findings do not correlate well with clinical findings. CTA head/neck negative for acute stroke.  - etiology for ischemic changes unclear, TTE bubble study w/ no evidence of PFO, consider hx of smoking, NSTEMI  - discussed MRI findings w/ neuro consult team, no acute intervention or further workup indicated, patient should follow-up outpatient  - patient cleared for full AC per neuro  - aspirin + brilinta

## 2022-10-12 NOTE — CONSULT NOTE ADULT - ASSESSMENT
INCOMPLETE NOTE, FINAL RECS TO FOLLOW    Assessment and Recommendations:  47y male w/ pmhx/ochx of urrent smoker, vertigo, diverticulitis, T2DM on insulin, CKD, diabetic and hypertensive retinopathy OU consulted for blurry vision. On exam, patient's visual acuity was 20/25 in the right eye, 20/20 in the left eye. There was no APD. IOP were measured to be 17 right eye, 17 left eye. Extraocular movements, confrontational visual fields, and color plates were full in both eyes. Anterior segment exam with penlight revealed binocular diplopia. Posterior segment exam with 20D lens after dilation revealed ***.   # CN6 palsy, right side  - Unable to abduct the right eye consistent with lateral rectus palsy  - Patient with binocular double vision: double vision only with both eyes open, resolved when 1 eye is closed  - Tried patching the right eye however patient did not tolerate  - Can try Fresnel prisms if no improvement in eye movement in 6 months  - Findings and plan discussed with patient and primary team.    # Diabetic retinopathy OU  # Hypertensive retinopathy OU  - No vision changes since admission  - Posterior segment exam shows ***  - BS/BP control per primary team    Patient seen and discussed with  ***    Outpatient follow-up: Patient should follow-up with his/her ophthalmologist or with [facility] Department of Ophthalmology at the address below     [address]  [place] [address]  [phone]     Assessment and Recommendations:  47y male w/ pmhx/ochx of urrent smoker, vertigo, diverticulitis, T2DM on insulin, CKD, diabetic and hypertensive retinopathy OU consulted for blurry vision. On exam, patient's visual acuity was 20/25 in the right eye, 20/20 in the left eye. There was no APD. IOP were measured to be 17 right eye, 17 left eye. Extraocular movements, confrontational visual fields, and color plates were full in both eyes. Anterior segment exam with penlight revealed binocular diplopia. Posterior segment exam with 20D lens after dilation revealed CWS and DBH (4 quadrants) OU.   # CN6 palsy, right side  - Unable to abduct the right eye consistent with lateral rectus palsy  - Patient with binocular double vision: double vision only with both eyes open, resolved when 1 eye is closed  - Tried patching the right eye however patient did not tolerate  - Can try Fresnel prisms if no improvement in eye movement in 6 months  - Findings and plan discussed with patient and primary team.    # Moderate non-proliferative diabetic retinopathy OU  # Hypertensive retinopathy OU  - No vision changes since admission  - Posterior segment exam shows CWS and DBH OU  - BS/BP control per primary team    Patient seen and discussed with  ***    Outpatient follow-up: Patient should follow-up with his/her ophthalmologist or with [facility] Department of Ophthalmology at the address below     [address]  [place] [address]  [phone]     Assessment and Recommendations:  47y male w/ pmhx/ochx of urrent smoker, vertigo, diverticulitis, T2DM on insulin, CKD, diabetic and hypertensive retinopathy OU consulted for blurry vision. On exam, patient's visual acuity was 20/25 in the right eye, 20/20 in the left eye. There was no APD. IOP were measured to be 17 right eye, 17 left eye. Extraocular movements, confrontational visual fields, and color plates were full in both eyes. Anterior segment exam with penlight revealed binocular diplopia. Posterior segment exam with 20D lens after dilation revealed CWS and DBH (4 quadrants) OU.   # CN6 palsy, right side  - Unable to abduct the right eye consistent with lateral rectus palsy  - Patient with binocular double vision: double vision only with both eyes open, resolved when 1 eye is closed  - Tried patching the right eye however patient did not tolerate  - Can try Fresnel prisms if no improvement in eye movement in 6 months  - Findings and plan discussed with patient and primary team.    # Moderate non-proliferative diabetic retinopathy OU  # Hypertensive retinopathy OU  - No vision changes since admission  - Posterior segment exam shows CWS and DBH OU  - BS/BP control per primary team    Patient seen and discussed with Dr. Moon    Outpatient follow-up: Patient should follow-up with his/her ophthalmologist or with [facility] Department of Ophthalmology at the address below     [address]  [place] [address]  [phone]

## 2022-10-12 NOTE — SWALLOW BEDSIDE ASSESSMENT ADULT - SLP GENERAL OBSERVATIONS
Pt encountered sitting EOB, awake/alert, Aox4, on room air, vocal quality WFL, blurry vision - pt endorsed eye drops just administer by Ophthalmologist. Patient able to follow multistep directives and answer open ended questions.

## 2022-10-12 NOTE — CONSULT NOTE ADULT - SUBJECTIVE AND OBJECTIVE BOX
[facility] DEPARTMENT OF OPHTHALMOLOGY - INITIAL ADULT CONSULT  -----------------------------------------------------------------------------------------------------------------  Sherwin Reardon MD PGY 3  [phone]  -----------------------------------------------------------------------------------------------------------------    HPI: 47M PMH of current smoker, vertigo, diverticulitis, T2DM on insulin, CKD, diabetic and hypertensive retinopathy OU currently admitted for dizziness, NSTEMI, BILLY. Patient was found to have a lacunar infarct at outside hospital  and chronic microvascular changes. Reports having trouble with double vision. Patient diagnosed with CN6 palsy. Patient denies any changes in vision since he's been in the hospital. Denies any eye pain, flashes, floaters, or changes in vision. The double vision goes away when he covers each eye. When left eye is covered he says it's blurry. Tried a patch over the right eye but made him dizzy.      PAST MEDICAL & SURGICAL HISTORY:  Diabetes      Asthma      Diverticulitis  surgery 16yrs ago      High cholesterol      Dyslipidemia      Hypertension      H/O vertigo      Diabetic ulcer of right foot      Broken toe  left pinky toe      Vertigo      HTN (hypertension)      Diabetes      History of surgery  colon resection      Past Ocular History: diabetic and hypertensive retinopathy OU, laser in 1 eye patient does not recall which  Ophthalmic Medications: none  FAMILY HISTORY:  Family history of hypertension (Father)      MEDICATIONS  (STANDING):  amitriptyline 10 milliGRAM(s) Oral at bedtime  amLODIPine   Tablet 10 milliGRAM(s) Oral daily  aspirin enteric coated 81 milliGRAM(s) Oral daily  atorvastatin 40 milliGRAM(s) Oral at bedtime  budesonide  80 MICROgram(s)/formoterol 4.5 MICROgram(s) Inhaler 2 Puff(s) Inhalation two times a day  carvedilol 25 milliGRAM(s) Oral every 12 hours  chlorhexidine 2% Cloths 1 Application(s) Topical <User Schedule>  dextrose 5%. 1000 milliLiter(s) (50 mL/Hr) IV Continuous <Continuous>  dextrose 5%. 1000 milliLiter(s) (100 mL/Hr) IV Continuous <Continuous>  dextrose 50% Injectable 25 Gram(s) IV Push once  dextrose 50% Injectable 12.5 Gram(s) IV Push once  dextrose 50% Injectable 25 Gram(s) IV Push once  fenofibrate Tablet 145 milliGRAM(s) Oral daily  glucagon  Injectable 1 milliGRAM(s) IntraMuscular once  heparin   Injectable 5000 Unit(s) SubCutaneous every 8 hours  influenza   Vaccine 0.5 milliLiter(s) IntraMuscular once  insulin glargine Injectable (LANTUS) 36 Unit(s) SubCutaneous at bedtime  insulin lispro (ADMELOG) corrective regimen sliding scale   SubCutaneous three times a day before meals  insulin lispro (ADMELOG) corrective regimen sliding scale   SubCutaneous at bedtime  insulin lispro Injectable (ADMELOG) 4 Unit(s) SubCutaneous three times a day before meals  pantoprazole    Tablet 40 milliGRAM(s) Oral before breakfast  sodium chloride 0.9%. 1000 milliLiter(s) (80 mL/Hr) IV Continuous <Continuous>  sodium chloride 0.9%. 1000 milliLiter(s) (80 mL/Hr) IV Continuous <Continuous>  ticagrelor 90 milliGRAM(s) Oral every 12 hours    MEDICATIONS  (PRN):  acetaminophen     Tablet .. 650 milliGRAM(s) Oral every 6 hours PRN Temp greater or equal to 38C (100.4F), Mild Pain (1 - 3)  ALBUTerol    90 MICROgram(s) HFA Inhaler 2 Puff(s) Inhalation every 6 hours PRN Shortness of Breath and/or Wheezing  Biotene Dry Mouth Oral Rinse 15 milliLiter(s) Swish and Spit three times a day PRN Mouth Care  dextrose Oral Gel 15 Gram(s) Oral once PRN Blood Glucose LESS THAN 70 milliGRAM(s)/deciliter    Allergies & Intolerances:     Review of Systems:  Constitutional: No fever, chills  Eyes: No blurry vision, flashes, floaters, FBS, erythema, discharge, OU  Neuro: No tremors  Cardiovascular: No chest pain, palpitations  Respiratory: No SOB, no cough  GI: No nausea, vomiting, abdominal pain  : No dysuria  Skin: no rash  Psych: no depression  Endocrine: no polyuria, polydipsia  Heme/lymph: no swelling    VITALS: T(C): 36.8 (10-12-22 @ 11:22)  T(F): 98.3 (10-12-22 @ 11:22), Max: 98.4 (10-12-22 @ 05:37)  HR: 69 (10-12-22 @ 11:22) (65 - 80)  BP: 129/76 (10-12-22 @ 11:22) (120/68 - 147/87)  RR:  (17 - 18)  SpO2:  (96% - 99%)  Wt(kg): --  General: AAO x 3, appropriate mood and affect    Ophthalmology Exam:  Visual acuity (sc): 20/30 PH 20/25 OD, 20/20 OS  Pupils: PERRL OU, no APD  Ttono: 17 OD, 17 OS  Extraocular movements (EOMs):  100% restriction on abduction OD, full OS  Confrontational Visual Field (CVF): Full OD, full OS  Color Plates: 12/12 OD, 12/12 OS    Pen Light Exam (PLE)  External: Flat OU  Lids/Lashes/Lacrimal Ducts: Flat OU    Sclera/Conjunctiva: W+Q OU  Cornea: Cl OU  Anterior Chamber: D+F OU    Iris: Flat OU  Lens: Cl OU    Fundus Exam: dilated with 1% tropicamide and 2.5% phenylephrine  Approval obtained from primary team for dilation  Patient aware that pupils can remained dilated for at least 4-6 hours  Exam performed with 20D lens    Vitreous: wnl OU  Disc, cup/disc: sharp and pink, 0.4 OU  Macula: wnl OU  Vessels: wnl OU  Periphery: wnl OU   [facility] DEPARTMENT OF OPHTHALMOLOGY - INITIAL ADULT CONSULT  -----------------------------------------------------------------------------------------------------------------  Sherwin Reardon MD PGY 3  [phone]  -----------------------------------------------------------------------------------------------------------------    HPI: 47M PMH of current smoker, vertigo, diverticulitis, T2DM on insulin, CKD, diabetic and hypertensive retinopathy OU currently admitted for dizziness, NSTEMI, BILLY. Patient was found to have a lacunar infarct at outside hospital  and chronic microvascular changes. Reports having trouble with double vision. Patient diagnosed with CN6 palsy. Patient denies any changes in vision since he's been in the hospital. Denies any eye pain, flashes, floaters, or changes in vision. The double vision goes away when he covers each eye. When left eye is covered he says it's blurry. Tried a patch over the right eye but made him dizzy.      PAST MEDICAL & SURGICAL HISTORY:  Diabetes      Asthma      Diverticulitis  surgery 16yrs ago      High cholesterol      Dyslipidemia      Hypertension      H/O vertigo      Diabetic ulcer of right foot      Broken toe  left pinky toe      Vertigo      HTN (hypertension)      Diabetes      History of surgery  colon resection      Past Ocular History: diabetic and hypertensive retinopathy OU, laser in 1 eye patient does not recall which  Ophthalmic Medications: none  FAMILY HISTORY:  Family history of hypertension (Father)      MEDICATIONS  (STANDING):  amitriptyline 10 milliGRAM(s) Oral at bedtime  amLODIPine   Tablet 10 milliGRAM(s) Oral daily  aspirin enteric coated 81 milliGRAM(s) Oral daily  atorvastatin 40 milliGRAM(s) Oral at bedtime  budesonide  80 MICROgram(s)/formoterol 4.5 MICROgram(s) Inhaler 2 Puff(s) Inhalation two times a day  carvedilol 25 milliGRAM(s) Oral every 12 hours  chlorhexidine 2% Cloths 1 Application(s) Topical <User Schedule>  dextrose 5%. 1000 milliLiter(s) (50 mL/Hr) IV Continuous <Continuous>  dextrose 5%. 1000 milliLiter(s) (100 mL/Hr) IV Continuous <Continuous>  dextrose 50% Injectable 25 Gram(s) IV Push once  dextrose 50% Injectable 12.5 Gram(s) IV Push once  dextrose 50% Injectable 25 Gram(s) IV Push once  fenofibrate Tablet 145 milliGRAM(s) Oral daily  glucagon  Injectable 1 milliGRAM(s) IntraMuscular once  heparin   Injectable 5000 Unit(s) SubCutaneous every 8 hours  influenza   Vaccine 0.5 milliLiter(s) IntraMuscular once  insulin glargine Injectable (LANTUS) 36 Unit(s) SubCutaneous at bedtime  insulin lispro (ADMELOG) corrective regimen sliding scale   SubCutaneous three times a day before meals  insulin lispro (ADMELOG) corrective regimen sliding scale   SubCutaneous at bedtime  insulin lispro Injectable (ADMELOG) 4 Unit(s) SubCutaneous three times a day before meals  pantoprazole    Tablet 40 milliGRAM(s) Oral before breakfast  sodium chloride 0.9%. 1000 milliLiter(s) (80 mL/Hr) IV Continuous <Continuous>  sodium chloride 0.9%. 1000 milliLiter(s) (80 mL/Hr) IV Continuous <Continuous>  ticagrelor 90 milliGRAM(s) Oral every 12 hours    MEDICATIONS  (PRN):  acetaminophen     Tablet .. 650 milliGRAM(s) Oral every 6 hours PRN Temp greater or equal to 38C (100.4F), Mild Pain (1 - 3)  ALBUTerol    90 MICROgram(s) HFA Inhaler 2 Puff(s) Inhalation every 6 hours PRN Shortness of Breath and/or Wheezing  Biotene Dry Mouth Oral Rinse 15 milliLiter(s) Swish and Spit three times a day PRN Mouth Care  dextrose Oral Gel 15 Gram(s) Oral once PRN Blood Glucose LESS THAN 70 milliGRAM(s)/deciliter    Allergies & Intolerances:     Review of Systems:  Constitutional: No fever, chills  Eyes: No blurry vision, flashes, floaters, FBS, erythema, discharge, OU  Neuro: No tremors  Cardiovascular: No chest pain, palpitations  Respiratory: No SOB, no cough  GI: No nausea, vomiting, abdominal pain  : No dysuria  Skin: no rash  Psych: no depression  Endocrine: no polyuria, polydipsia  Heme/lymph: no swelling    VITALS: T(C): 36.8 (10-12-22 @ 11:22)  T(F): 98.3 (10-12-22 @ 11:22), Max: 98.4 (10-12-22 @ 05:37)  HR: 69 (10-12-22 @ 11:22) (65 - 80)  BP: 129/76 (10-12-22 @ 11:22) (120/68 - 147/87)  RR:  (17 - 18)  SpO2:  (96% - 99%)  Wt(kg): --  General: AAO x 3, appropriate mood and affect    Ophthalmology Exam:  Visual acuity (sc): 20/30 PH 20/25 OD, 20/20 OS  Pupils: PERRL OU, no APD  Ttono: 17 OD, 17 OS  Extraocular movements (EOMs):  100% restriction on abduction OD, full OS  Confrontational Visual Field (CVF): Full OD, full OS  Color Plates: 12/12 OD, 12/12 OS    Pen Light Exam (PLE)  External: Flat OU  Lids/Lashes/Lacrimal Ducts: Flat OU    Sclera/Conjunctiva: W+Q OU  Cornea: Cl OU  Anterior Chamber: D+F OU    Iris: Flat OU  Lens: Cl OU    Fundus Exam: dilated with 1% tropicamide and 2.5% phenylephrine  Approval obtained from primary team for dilation  Patient aware that pupils can remained dilated for at least 4-6 hours  Exam performed with 20D lens    Vitreous: wnl OU  Disc, cup/disc: sharp and pink, 0.4 OU  Macula: wnl OU; temporal focal scar OD  Vessels: wnl OU  Periphery: CWS and DBH (4 quadrants) OU

## 2022-10-12 NOTE — SWALLOW BEDSIDE ASSESSMENT ADULT - ADDITIONAL RECOMMENDATIONS
Speech-language evaluation requested due to pt complaints of changes in cognition starting in May and reports of stuttering during conversational speech (seen x1 during evaluation).

## 2022-10-12 NOTE — SWALLOW BEDSIDE ASSESSMENT ADULT - SWALLOW EVAL: CRITERIA FOR SKILLED INTERVENTION MET
No intervention met for dysphagia intervention. However Speech-Language evaluation requested due to pt complaints of changes in cognition and speech./no problems identified which require skilled intervention

## 2022-10-12 NOTE — SWALLOW BEDSIDE ASSESSMENT ADULT - SWALLOW EVAL: DIAGNOSIS
Pt is a 48 y/o male admitted for c/f NSTEMI +/- possible stroke. MRI w/ contrast showing multifocal lacunar infarcts and chronic microvascular ischemic changes. Pt presents with an oropharyngeal swallow sequence that appears WFL to tolerate a regular texture diet with no overt s/s of laryngeal penetration or aspiration.

## 2022-10-12 NOTE — PROGRESS NOTE ADULT - ASSESSMENT
47 M w/ PMHx of EtOH abuse now sober X5 years, DM, CKD IV (baseline creatinine 2.0-2.4 in July), HTN who presented to OSH with dizziness, diplopia, CP. Trop elevated with EKG changes. Patient transferred to [facility] for NSTEMI requiring LHC. Creatinine elevated to 2.7  which improved with IVF

## 2022-10-12 NOTE — PROGRESS NOTE ADULT - PROBLEM SELECTOR PLAN 5
Home regimen 40U long-acting, 5U short-acting pre-meal; lantus recently decreased for hypoglycemia  - 36U lantus, 4U pre-meal short acting.

## 2022-10-12 NOTE — PROGRESS NOTE ADULT - ATTENDING COMMENTS
Patient seen and evaluated. No current new complaints, chronic diplopia. Exam consistent with lateral rectus palsy. Did not tolerate eye patch.    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 ?lacunar infart, no apparent distribution for lateral rectus palsy.   -given diplopia is affecting balance and function formal ophthalmology consult to see if any alternative therapy can be performed    #BILLY on CKD IV  -fluctuating CKD, possible contrast induced nephropathy in addition fo hemodynacmially mediated ATN from fluctuations in bp.  -maintain perfusion, avoid hypotension, avoid further nephrotoxic agents  -appreciate renal, will give fluids night of 10/12 in preparation for cardiac catheterization on 10/13    #HTN  -currently well controlled on current regimen.     #NSTEMI  -CT coronaries with prox-lad mod narrowing and RCA mod narrowing.  -cath 10/13  -continue dapt, beta blocker, statin.     rest as above

## 2022-10-12 NOTE — PROGRESS NOTE ADULT - ASSESSMENT
47M smoker w/ hx of vertigo, diverticulitis s/p LAR, ETOH abuse (sober x5 years), DM (patient states has been on Insulin x10 years was never on oral agents), CKD IV - baseline creatinine 2.4, HTN, AMBER, who presented to [facility] on 10/2 w/ /133, dizziness, diplopia, headache and chest tightness, admitted for c/f NSTEMI +/- possible stroke.

## 2022-10-12 NOTE — PROGRESS NOTE ADULT - ATTENDING COMMENTS
47 M w/ PMHx of EtOH abuse now sober X5 years, DM, CKD IV (baseline creatinine 2.0-2.4 in July), HTN who presented to OSH with dizziness, diplopia, CP. Trop elevated with EKG changes. Patient transferred to [facility] for NSTEMI requiring LHC. Creatinine elevated to 2.7 on presentation.  NO Chest pain, + double vision persists.  Acknowledges exertional dyspnea.    1.  ARF on CKD--w/u in progress.  Review of serologies largely -.  Concur with volume optimization in preparation for LHC.  Cr now back in 2s, trend.  Given chest pain symptoms risk benefit contrast favors study in near future using contrast protection protocol.    2.  Hypertension--CCB titration systolic goal <140  3.  Nephrotic range proteinuria--guardado in progress and thus far unrevealing.  LIKELY DM RELATED.  Check renal venous dopplers.  Sono reveals chronicity. No hydronephrosis.       discussed with med team  Nilesh Scott MD  contact me on TEAMS

## 2022-10-12 NOTE — SWALLOW BEDSIDE ASSESSMENT ADULT - COMMENTS
History continued:  1) Lacunar stroke--> MRI w/ contrast showing multifocal lacunar infarcts and chronic microvascular ischemic changes, consistent with MRI completed @ [facility]; MRI findings do not correlate well with clinical findings. CTA head/neck negative for acute stroke.  - etiology for ischemic changes unclear, TTE bubble study w/ no evidence of PFO, consider hx of smoking, NSTEMI  - discussed MRI findings w/ neuro consult team, no acute intervention or further workup indicated, patient should follow-up outpatient  2) BILLY (acute kidney injury)-->Worsening following coronary CT. Cr now 3.13->3.02. Kappa/Lambda 9.26/4.63.  -hold ACE/ARB  -nephro following, recommend holding off on cath for now, will f/u recs re light chain.  3)  Diplopia--> Headache/Dizziness  Pt had diplopia since 09/30, initially started as decreased visual acuity. Evaluated by opthalmology and neurology at [facility], symptoms consistent w/ lateral rectus palsy, MRI without clear source of symptoms Consider vasculopathic etiology 2/2 severe HTN and/or DM.  - neurology team @ [facility] recommended considering topiramate/Celexa/verapamil for headache ppx    10/4 CXR IMPRESSION:  Clear lungs.  10/9 MRI IMPRESSION:  There is no mass, intracranial hemorrhage, or acute infarction. Chronic   multifocal lacunar infarcts and chronic microvascular ischemic changes as   detailed above.    SWALLOW HISTORY: No reports in SCM or in PACS prior to this admission. History continued:  1) Lacunar stroke--> MRI w/ contrast showing multifocal lacunar infarcts and chronic microvascular ischemic changes, consistent with MRI completed @ [facility]; MRI findings do not correlate well with clinical findings. CTA head/neck negative for acute stroke.  - etiology for ischemic changes unclear, TTE bubble study w/ no evidence of PFO, consider hx of smoking, NSTEMI  - discussed MRI findings w/ neuro consult team, no acute intervention or further workup indicated, patient should follow-up outpatient  2) BILLY (acute kidney injury)-->Worsening following coronary CT. Cr now 3.13->3.02. Kappa/Lambda 9.26/4.63.  -hold ACE/ARB  -nephro following, recommend holding off on cath for now, will f/u recs re light chain.  3)  Diplopia--> Headache/Dizziness  Pt had diplopia since 09/30, initially started as decreased visual acuity. Evaluated by opthalmology and neurology at [facility], symptoms consistent w/ lateral rectus palsy, MRI without clear source of symptoms Consider vasculopathic etiology 2/2 severe HTN and/or DM.  - neurology team @ [facility] recommended considering topiramate/Celexa/verapamil for headache ppx  Opthamology consult-->Moderate non-proliferative diabetic retinopathy OU, Hypertensive retinopathy OU, No vision changes since admission, Posterior segment exam shows CWS and DBH OU, BS/BP control per primary team    10/4 CXR IMPRESSION:  Clear lungs.  10/9 MRI IMPRESSION:  There is no mass, intracranial hemorrhage, or acute infarction. Chronic   multifocal lacunar infarcts and chronic microvascular ischemic changes as   detailed above.    SWALLOW HISTORY: No reports in SCM or in PACS prior to this admission.

## 2022-10-12 NOTE — PROGRESS NOTE ADULT - PROBLEM SELECTOR PLAN 1
#Headache/Dizziness  Pt had diplopia since 09/30, initially started as decreased visual acuity. Evaluated by opthalmology and neurology at [facility], symptoms consistent w/ lateral rectus palsy, MRI without clear source of symptoms Consider vasculopathic etiology 2/2 severe HTN and/or DM.  - neurology team @ [facility] recommended considering topiramate/Celexa/verapamil for headache ppx  - patient evaluated by ophthalmology at [facility], recommended outpatient f/u w/ retina specialist Dr. Jose Angel Arvizu for OCT nerve/RNFL for evaluation for diabetic and hypertensive retinopathy; if diplopia/palsy persists should continue outpatient followup for [facility] prisms; curbsided ophtho team who agree with recommendations  - will trial eye patch for R eye to see if symptoms (headache/dizziness, balance) improve #Headache/Dizziness  Pt had diplopia since 09/30, initially started as decreased visual acuity. Evaluated by opthalmology and neurology at [facility], symptoms consistent w/ lateral rectus palsy, MRI without clear source of symptoms. Consider vasculopathic etiology 2/2 severe HTN and/or DM.  - neurology team @ [facility] recommended considering topiramate/Celexa/verapamil for headache ppx  - patient evaluated by ophthalmology at [facility], recommended outpatient f/u w/ retina specialist Dr. Jose Angel Arvizu for OCT nerve/RNFL for evaluation for diabetic and hypertensive retinopathy; if diplopia/palsy persists should continue outpatient followup for [employer] prisms; curbsided ophtho team who agree with recommendations  - will consult ophtho today given new symptoms that appear localized to R eye

## 2022-10-12 NOTE — PROGRESS NOTE ADULT - PROBLEM SELECTOR PLAN 2
Transfer from [facility] for possible cardiac cath  - coronary CT completed, moderate stenosis of prox RCA and LAD  - cards following, potential cath following resolution of BILLY  - nephrology following, recommending holding off on cath unless emergent indication as Cr improves  - will continue to optimize patient for possible cath:     - GDMT w/ coreg, atorvastatin (holding ACE/ARB for BILLY)     - aspirin + brilinta  - EKG and troponins if chest pain Transfer from [facility] for possible cardiac cath  - coronary CT completed, moderate stenosis of prox RCA and LAD  - cards following, anticipate cath tomorrow as nephrology has cleared, recommending IVF 80cc/hr for 12h pre- and post- cath  - will continue to optimize patient for possible cath:     - GDMT w/ coreg, atorvastatin (holding ACE/ARB for BILLY)     - aspirin + brilinta  - EKG and troponins if chest pain

## 2022-10-12 NOTE — PROGRESS NOTE ADULT - SUBJECTIVE AND OBJECTIVE BOX
********CARDIOLOGY PROGRESS NOTE********  HISTORY OF PRESENT ILLNESS:  HPI:  46 y/o male, current cigarette smoker, with PMHx of Vertigo, Diverticulitis s/p LAR, ETOH abuse now sober x 5 years, DM (patient states has been on Insulin x 10 years was never on oral agents), CKD IV - baseline creatinine 2.4, HTN, AMBER, who presented to [facility] on 10/2 c/o dizziness, b/l diplopia, headache and chest tightness.  In ED @ OSH /133, managed with hydralazine IV, Cardiac biomarkers elevated with trop I peak 0.632, EKG with ST-T abnormality no acute ST segment changes, TTE demonstrating normal LV systolic fxn (EF 55-60), no significant valvular disease/[facility] Cardiology following.  CT head without acute pathology, MRI with left parasagital infarct, Neurology following, impression was patients symptoms do not correlate with MRI findings.  Opthamology consulted, recommended f/u as o/p.  Patient is now transferred to [facility] in setting of NSTEMI for LHC.  LHC deferred for Neuro consult and medical optimization   (04 Oct 2022 15:33)          Allergies    No Known Allergies    Intolerances    	    MEDICATIONS:  amLODIPine   Tablet 10 milliGRAM(s) Oral daily  aspirin enteric coated 81 milliGRAM(s) Oral daily  carvedilol 25 milliGRAM(s) Oral every 12 hours  heparin   Injectable 5000 Unit(s) SubCutaneous every 8 hours  ticagrelor 90 milliGRAM(s) Oral every 12 hours      ALBUTerol    90 MICROgram(s) HFA Inhaler 2 Puff(s) Inhalation every 6 hours PRN  budesonide  80 MICROgram(s)/formoterol 4.5 MICROgram(s) Inhaler 2 Puff(s) Inhalation two times a day    acetaminophen     Tablet .. 650 milliGRAM(s) Oral every 6 hours PRN  amitriptyline 10 milliGRAM(s) Oral at bedtime    pantoprazole    Tablet 40 milliGRAM(s) Oral before breakfast    atorvastatin 40 milliGRAM(s) Oral at bedtime  dextrose 50% Injectable 25 Gram(s) IV Push once  dextrose 50% Injectable 12.5 Gram(s) IV Push once  dextrose 50% Injectable 25 Gram(s) IV Push once  dextrose Oral Gel 15 Gram(s) Oral once PRN  fenofibrate Tablet 145 milliGRAM(s) Oral daily  glucagon  Injectable 1 milliGRAM(s) IntraMuscular once  insulin glargine Injectable (LANTUS) 36 Unit(s) SubCutaneous at bedtime  insulin lispro (ADMELOG) corrective regimen sliding scale   SubCutaneous three times a day before meals  insulin lispro (ADMELOG) corrective regimen sliding scale   SubCutaneous at bedtime  insulin lispro Injectable (ADMELOG) 4 Unit(s) SubCutaneous three times a day before meals    Biotene Dry Mouth Oral Rinse 15 milliLiter(s) Swish and Spit three times a day PRN  chlorhexidine 2% Cloths 1 Application(s) Topical <User Schedule>  dextrose 5%. 1000 milliLiter(s) IV Continuous <Continuous>  dextrose 5%. 1000 milliLiter(s) IV Continuous <Continuous>  influenza   Vaccine 0.5 milliLiter(s) IntraMuscular once  sodium chloride 0.9%. 1000 milliLiter(s) IV Continuous <Continuous>  sodium chloride 0.9%. 1000 milliLiter(s) IV Continuous <Continuous>      PAST MEDICAL & SURGICAL HISTORY:  Diabetes      Asthma      Diverticulitis  surgery 16yrs ago      High cholesterol      Dyslipidemia      Hypertension      H/O vertigo      Diabetic ulcer of right foot      Broken toe  left pinky toe      Vertigo      HTN (hypertension)      Diabetes      History of surgery  colon resection          FAMILY HISTORY:  Family history of hypertension (Father)        SOCIAL HISTORY:  unchanged    REVIEW OF SYSTEMS:  CONSTITUTIONAL: No fever, weight loss, or fatigue  EYES: No eye pain, visual disturbances, or discharge  ENMT:  No difficulty hearing, tinnitus, vertigo; No sinus or throat pain  NECK: No pain or stiffness  RESPIRATORY: No cough, wheezing, chills or hemoptysis; No Shortness of Breath  CARDIOVASCULAR: No chest pain, palpitations, passing out, dizziness, or leg swelling  GASTROINTESTINAL: No abdominal or epigastric pain. No nausea, vomiting, or hematemesis; No diarrhea or constipation. No melena or hematochezia.  GENITOURINARY: No dysuria, frequency, hematuria, or incontinence  NEUROLOGICAL: No headaches, memory loss, loss of strength, numbness, or tremors  SKIN: No itching, burning, rashes, or lesions   LYMPH Nodes: No enlarged glands  ENDOCRINE: No heat or cold intolerance; No hair loss  MUSCULOSKELETAL: No joint pain or swelling; No muscle, back, or extremity pain  PSYCHIATRIC: No depression, anxiety, mood swings, or difficulty sleeping  HEME/LYMPH: No easy bruising, or bleeding gums  ALLERY AND IMMUNOLOGIC: No hives or eczema	    [ ] All others negative	  [ ] Unable to obtain    PHYSICAL EXAM:  T(C): 36.8 (10-12-22 @ 11:22), Max: 36.9 (10-12-22 @ 05:37)  HR: 69 (10-12-22 @ 11:22) (65 - 80)  BP: 129/76 (10-12-22 @ 11:22) (120/68 - 147/87)  RR: 18 (10-12-22 @ 11:22) (17 - 18)  SpO2: 96% (10-12-22 @ 11:22) (96% - 99%)  Wt(kg): --  I&O's Summary    11 Oct 2022 07:01  -  12 Oct 2022 07:00  --------------------------------------------------------  IN: 480 mL / OUT: 2380 mL / NET: -1900 mL    12 Oct 2022 07:01  -  12 Oct 2022 13:05  --------------------------------------------------------  IN: 440 mL / OUT: 700 mL / NET: -260 mL        Appearance: Normal	  HEENT:   Normal oral mucosa, PERRL, EOMI	  Lymphatic: No lymphadenopathy  Cardiovascular: Normal S1 S2, No JVD, No murmurs, No edema  Respiratory: Lungs clear to auscultation	  Psychiatry: A & O x 3, Mood & affect appropriate  Gastrointestinal:  Soft, Non-tender, + BS	  Skin: No rashes, No ecchymoses, No cyanosis	  Neurologic: Non-focal  Extremities: Normal range of motion, No clubbing, cyanosis or edema  Vascular: Peripheral pulses palpable 2+ bilaterally      LABS:	 	    CBC Full  -  ( 12 Oct 2022 05:43 )  WBC Count : 5.85 K/uL  Hemoglobin : 10.7 g/dL  Hematocrit : 32.9 %  Platelet Count - Automated : 185 K/uL  Mean Cell Volume : 94.8 fl  Mean Cell Hemoglobin : 30.8 pg  Mean Cell Hemoglobin Concentration : 32.5 gm/dL  Auto Neutrophil # : x  Auto Lymphocyte # : x  Auto Monocyte # : x  Auto Eosinophil # : x  Auto Basophil # : x  Auto Neutrophil % : x  Auto Lymphocyte % : x  Auto Monocyte % : x  Auto Eosinophil % : x  Auto Basophil % : x    10-12    140  |  107  |  36<H>  ----------------------------<  57<L>  4.3   |  24  |  2.92<H>  10-11    138  |  105  |  36<H>  ----------------------------<  194<H>  4.6   |  23  |  3.02<H>    Ca    8.9      12 Oct 2022 05:43  Ca    8.7      11 Oct 2022 06:13  Phos  3.5     10-12  Phos  3.1     10-11  Mg     1.8     10-12  Mg     1.9     10-11    TPro  6.0  /  Alb  x   /  TBili  x   /  DBili  x   /  AST  x   /  ALT  x   /  AlkPhos  x   10-12  TPro  6.3  /  Alb  3.2<L>  /  TBili  0.2  /  DBili  x   /  AST  16  /  ALT  17  /  AlkPhos  109  10-11

## 2022-10-12 NOTE — PROGRESS NOTE ADULT - PROBLEM SELECTOR PLAN 3
Worsening following coronary CT. Cr now 3.13->3.02. Kappa/Lambda 9.26/4.63.  -hold ACE/ARB  -nephro following, recommend holding off on cath for now, will f/u recs re light chain Cr now 2.92. Kappa/Lambda 9.26/4.63.  - hold ACE/ARB  - nephro following, appreciate recs:    - cleared for cardiac cath, will give IVF 80cc/hr 12h pre- and post- as above

## 2022-10-12 NOTE — SWALLOW BEDSIDE ASSESSMENT ADULT - SLP PERTINENT HISTORY OF CURRENT PROBLEM
47M smoker w/ hx of vertigo, diverticulitis s/p LAR, ETOH abuse (sober x5 years), DM (patient states has been on Insulin x10 years was never on oral agents), CKD IV - baseline creatinine 2.4, HTN, AMBER, who presented to [facility] on 10/2 w/ /133, dizziness, diplopia, headache and chest tightness, admitted for c/f NSTEMI +/- possible stroke.

## 2022-10-12 NOTE — SWALLOW BEDSIDE ASSESSMENT ADULT - ASR SWALLOW ASPIRATION MONITOR
Monitor for s/s aspiration/laryngeal penetration. If noted:  D/C p.o. intake, provide non-oral nutrition/hydration/meds, and contact this service @ [phone]/change of breathing pattern/cough/gurgly voice/fever/pneumonia/throat clearing/upper respiratory infection

## 2022-10-12 NOTE — PROGRESS NOTE ADULT - PROBLEM SELECTOR PLAN 1
Patient with creatinine ~1.7 in May, increased to 2-2.4 in July. Now patient presented to [facility] for LHC, creatinine 2.7. Patient likely has baseline CKD from long standing DM (has been on insulin for ?10 years). Patient received neuro imaging with contrast on 10/4 and CT angio cardiac with pre/post hydration. Renal US showing bilateral echogenicity suggestive of CKD, no hydro, no stones, no cysts, appropriate size. Creat did improve to 2.2 increased to 3.13 but now downtrending again. UA showed 100 protein, trace blood, Uprot/creat 3.4. Hep B and C negative. A1C of 9.9. Kappa/lambda elevated at 2.   Pending: immunofixation, PLA2R. Discussed with patient the risks of use of IV contrast, patient understands the risks of CKD progression but knows the cardiac procedure is necessary. Kaushik Score 10 points with 14% of post- PCI BRENDEN. Would recommend IVF 80/hour for 12 hours prior and post to catherization.  Avoid nephrotoxic agents. Dose all meds appropriate for GFR.       If you have any questions, please feel free to contact me  Segundo Forde  Nephrology Fellow  [phone]; Prefer [employer] TEAMS  (After 5pm or on weekends please page the on-call fellow).    Patient was discussed with Dr. Scott who agrees with my A/P. Addendum to follow

## 2022-10-12 NOTE — PROGRESS NOTE ADULT - ASSESSMENT
46 y/o male, current cigarette smoker, with PMHx of Vertigo, Diverticulitis s/p LAR, ETOH abuse now sober x 5 years, DM (patient states has been on Insulin x 10 years was never on oral agents), CKD IV - baseline creatinine 2.4, HTN, AMBER, who presented to [facility] on 10/2 c/o dizziness, b/l diplopia, headache and chest tightness.  -Blood pressures improved on current regimen.  -Slowly downtrending creatinine  -Cardiac cath on hold awaiting renal improvement  -Await ophthamology follow-up  -Patient discussed with medicine team    Miri High MD  Cardiology Attending  [facility]    For day time coverage Mon-Fri see Non-Service Consult Attending on [email or web address], password: [email or web address]; daytime weekends covered by general cardiology consult service attending.)   48 y/o male, current cigarette smoker, with PMHx of Vertigo, Diverticulitis s/p LAR, ETOH abuse now sober x 5 years, DM (patient states has been on Insulin x 10 years was never on oral agents), CKD IV - baseline creatinine 2.4, HTN, AMBER, who presented to [facility] on 10/2 c/o dizziness, b/l diplopia, headache and chest tightness.  -Blood pressures improved on current regimen.  -Slowly downtrending creatinine  -Cardiac cath on hold awaiting renal improvement  -Await ophthamology follow-up  -Patient discussed with medicine team    Addendum:  Appreciate renal evaluation. Patient discussed with medicine team and cardiac cath attending. Patient on for cardiac cath on 10/13 with plan for hydration per renal recommendations.    Miri High MD  Cardiology Attending  [facility]    For day time coverage Mon-Fri see Non-Service Consult Attending on [email or web address], password: [email or web address]; daytime weekends covered by general cardiology consult service attending.)

## 2022-10-12 NOTE — PROGRESS NOTE ADULT - SUBJECTIVE AND OBJECTIVE BOX
Internal Medicine   Jesus Hagen | PGY-1    OVERNIGHT EVENTS: No acute overnight events.    SUBJECTIVE:       MEDICATIONS  (STANDING):  amitriptyline 10 milliGRAM(s) Oral at bedtime  amLODIPine   Tablet 10 milliGRAM(s) Oral daily  aspirin enteric coated 81 milliGRAM(s) Oral daily  atorvastatin 40 milliGRAM(s) Oral at bedtime  budesonide  80 MICROgram(s)/formoterol 4.5 MICROgram(s) Inhaler 2 Puff(s) Inhalation two times a day  carvedilol 25 milliGRAM(s) Oral every 12 hours  chlorhexidine 2% Cloths 1 Application(s) Topical <User Schedule>  dextrose 5%. 1000 milliLiter(s) (50 mL/Hr) IV Continuous <Continuous>  dextrose 5%. 1000 milliLiter(s) (100 mL/Hr) IV Continuous <Continuous>  dextrose 50% Injectable 25 Gram(s) IV Push once  dextrose 50% Injectable 12.5 Gram(s) IV Push once  dextrose 50% Injectable 25 Gram(s) IV Push once  fenofibrate Tablet 145 milliGRAM(s) Oral daily  glucagon  Injectable 1 milliGRAM(s) IntraMuscular once  heparin   Injectable 5000 Unit(s) SubCutaneous every 8 hours  influenza   Vaccine 0.5 milliLiter(s) IntraMuscular once  insulin glargine Injectable (LANTUS) 36 Unit(s) SubCutaneous at bedtime  insulin lispro (ADMELOG) corrective regimen sliding scale   SubCutaneous three times a day before meals  insulin lispro (ADMELOG) corrective regimen sliding scale   SubCutaneous at bedtime  insulin lispro Injectable (ADMELOG) 4 Unit(s) SubCutaneous three times a day before meals  pantoprazole    Tablet 40 milliGRAM(s) Oral before breakfast  sodium chloride 0.9%. 1000 milliLiter(s) (80 mL/Hr) IV Continuous <Continuous>  sodium chloride 0.9%. 1000 milliLiter(s) (80 mL/Hr) IV Continuous <Continuous>  ticagrelor 90 milliGRAM(s) Oral every 12 hours    MEDICATIONS  (PRN):  acetaminophen     Tablet .. 650 milliGRAM(s) Oral every 6 hours PRN Temp greater or equal to 38C (100.4F), Mild Pain (1 - 3)  ALBUTerol    90 MICROgram(s) HFA Inhaler 2 Puff(s) Inhalation every 6 hours PRN Shortness of Breath and/or Wheezing  Biotene Dry Mouth Oral Rinse 15 milliLiter(s) Swish and Spit three times a day PRN Mouth Care  dextrose Oral Gel 15 Gram(s) Oral once PRN Blood Glucose LESS THAN 70 milliGRAM(s)/deciliter    VITALS:  T(F): 98.4 (10-12-22 @ 05:37), Max: 98.4 (10-12-22 @ 05:37)  HR: 65 (10-12-22 @ 05:37) (65 - 80)  BP: 135/78 (10-12-22 @ 05:37) (120/68 - 147/87)  BP(mean): --  RR: 18 (10-12-22 @ 05:37) (17 - 18)  SpO2: 99% (10-12-22 @ 05:37) (99% - 100%)    PHYSICAL EXAM:   GENERAL: NAD, lying in bed comfortably  HEAD: Atraumatic, normocephalic  EYES: EOMI, conjunctiva and sclera clear, no nystagmus noted  ENT: Moist mucous membranes  CHEST/LUNG: Clear to auscultation bilaterally; no rales, rhonchi, wheezing, or rubs; unlabored respirations  HEART: Regular rate and rhythm; no murmurs, rubs, or gallops, normal S1/S2  ABDOMEN: Normal bowel sounds; soft, nontender, nondistended  EXTREMITIES: No clubbing, cyanosis, or edema  MSK: No gross deformities noted   Neurological: No focal deficits   SKIN: No rashes or lesions  PSYCH: Normal mood, affect     LABS:                      10.7   5.85  )-----------( 185      ( 12 Oct 2022 05:43 )             32.9     10-12  140  |  107  |  36<H>  ----------------------------<  57<L>  4.3   |  24  |  2.92<H>    Ca    8.9      12 Oct 2022 05:43  Phos  3.5     10-12  Mg     1.8     10-12    TPro  6.3  /  Alb  3.2<L>  /  TBili  0.2  /  DBili  x   /  AST  16  /  ALT  17  /  AlkPhos  109  10-11    Creatinine Trend: 2.92<--, 3.02<--, 3.13<--, 2.76<--, 3.04<--, 2.94<--    I&O's Summary  11 Oct 2022 07:01  -  12 Oct 2022 07:00  --------------------------------------------------------  IN: 240 mL / OUT: 2380 mL / NET: -2140 mL Internal Medicine   Jesus Hagen | PGY-1    OVERNIGHT EVENTS: No acute overnight events.    SUBJECTIVE: Reports eye patch was counterproductive, removed after ~10mins as he got up to use the restroom and felt more dizzy and unstable than before. Reports a new headache localized to R frontal area and w/ a popping sensation from behind his R eye. Otherwise symptoms persist, denies fevers, chest pain, shortness of breath.    MEDICATIONS  (STANDING):  amitriptyline 10 milliGRAM(s) Oral at bedtime  amLODIPine   Tablet 10 milliGRAM(s) Oral daily  aspirin enteric coated 81 milliGRAM(s) Oral daily  atorvastatin 40 milliGRAM(s) Oral at bedtime  budesonide  80 MICROgram(s)/formoterol 4.5 MICROgram(s) Inhaler 2 Puff(s) Inhalation two times a day  carvedilol 25 milliGRAM(s) Oral every 12 hours  chlorhexidine 2% Cloths 1 Application(s) Topical <User Schedule>  dextrose 5%. 1000 milliLiter(s) (50 mL/Hr) IV Continuous <Continuous>  dextrose 5%. 1000 milliLiter(s) (100 mL/Hr) IV Continuous <Continuous>  dextrose 50% Injectable 25 Gram(s) IV Push once  dextrose 50% Injectable 12.5 Gram(s) IV Push once  dextrose 50% Injectable 25 Gram(s) IV Push once  fenofibrate Tablet 145 milliGRAM(s) Oral daily  glucagon  Injectable 1 milliGRAM(s) IntraMuscular once  heparin   Injectable 5000 Unit(s) SubCutaneous every 8 hours  influenza   Vaccine 0.5 milliLiter(s) IntraMuscular once  insulin glargine Injectable (LANTUS) 36 Unit(s) SubCutaneous at bedtime  insulin lispro (ADMELOG) corrective regimen sliding scale   SubCutaneous three times a day before meals  insulin lispro (ADMELOG) corrective regimen sliding scale   SubCutaneous at bedtime  insulin lispro Injectable (ADMELOG) 4 Unit(s) SubCutaneous three times a day before meals  pantoprazole    Tablet 40 milliGRAM(s) Oral before breakfast  sodium chloride 0.9%. 1000 milliLiter(s) (80 mL/Hr) IV Continuous <Continuous>  sodium chloride 0.9%. 1000 milliLiter(s) (80 mL/Hr) IV Continuous <Continuous>  ticagrelor 90 milliGRAM(s) Oral every 12 hours    MEDICATIONS  (PRN):  acetaminophen     Tablet .. 650 milliGRAM(s) Oral every 6 hours PRN Temp greater or equal to 38C (100.4F), Mild Pain (1 - 3)  ALBUTerol    90 MICROgram(s) HFA Inhaler 2 Puff(s) Inhalation every 6 hours PRN Shortness of Breath and/or Wheezing  Biotene Dry Mouth Oral Rinse 15 milliLiter(s) Swish and Spit three times a day PRN Mouth Care  dextrose Oral Gel 15 Gram(s) Oral once PRN Blood Glucose LESS THAN 70 milliGRAM(s)/deciliter    VITALS:  T(F): 98.4 (10-12-22 @ 05:37), Max: 98.4 (10-12-22 @ 05:37)  HR: 65 (10-12-22 @ 05:37) (65 - 80)  BP: 135/78 (10-12-22 @ 05:37) (120/68 - 147/87)  BP(mean): --  RR: 18 (10-12-22 @ 05:37) (17 - 18)  SpO2: 99% (10-12-22 @ 05:37) (99% - 100%)    PHYSICAL EXAM:   GENERAL: NAD, sitting upright, alert and talkative  HEAD: Atraumatic, normocephalic  EYES: apparent lateral rectus palsy, pupils reactive to light though R appears to have delayed constriction, conjunctiva and sclera clear, no nystagmus noted  ENT: Moist mucous membranes  CHEST/LUNG: Clear to auscultation bilaterally; no rales, rhonchi, wheezing, or rubs; unlabored respirations  HEART: Regular rate and rhythm; no murmurs, rubs, or gallops, normal S1/S2  ABDOMEN: Normal bowel sounds; soft, nontender, nondistended  EXTREMITIES: No clubbing, cyanosis, or edema  MSK: No gross deformities noted   Neurological: +Romberg (significant swaying w/ both eyes closed, stable w/ R eye closed, slight unsteadiness w/ both eyes open). Apparent lateral rectus palsy. Other CNs remain grossly intact.  SKIN: No rashes or lesions  PSYCH: Normal mood, affect     LABS:                      10.7   5.85  )-----------( 185      ( 12 Oct 2022 05:43 )             32.9     10-12  140  |  107  |  36<H>  ----------------------------<  57<L>  4.3   |  24  |  2.92<H>    Ca    8.9      12 Oct 2022 05:43  Phos  3.5     10-12  Mg     1.8     10-12    TPro  6.3  /  Alb  3.2<L>  /  TBili  0.2  /  DBili  x   /  AST  16  /  ALT  17  /  AlkPhos  109  10-11    Creatinine Trend: 2.92<--, 3.02<--, 3.13<--, 2.76<--, 3.04<--, 2.94<--    I&O's Summary  11 Oct 2022 07:01  -  12 Oct 2022 07:00  --------------------------------------------------------  IN: 240 mL / OUT: 2380 mL / NET: -2140 mL

## 2022-10-13 LAB
ANION GAP SERPL CALC-SCNC: 10 MMOL/L — SIGNIFICANT CHANGE UP (ref 5–17)
BUN SERPL-MCNC: 31 MG/DL — HIGH (ref 7–23)
CALCIUM SERPL-MCNC: 8.9 MG/DL — SIGNIFICANT CHANGE UP (ref 8.4–10.5)
CHLORIDE SERPL-SCNC: 108 MMOL/L — SIGNIFICANT CHANGE UP (ref 96–108)
CO2 SERPL-SCNC: 22 MMOL/L — SIGNIFICANT CHANGE UP (ref 22–31)
CREAT SERPL-MCNC: 2.67 MG/DL — HIGH (ref 0.5–1.3)
EGFR: 28 ML/MIN/1.73M2 — LOW
GLUCOSE BLDC GLUCOMTR-MCNC: 106 MG/DL — HIGH (ref 70–99)
GLUCOSE BLDC GLUCOMTR-MCNC: 110 MG/DL — HIGH (ref 70–99)
GLUCOSE BLDC GLUCOMTR-MCNC: 161 MG/DL — HIGH (ref 70–99)
GLUCOSE BLDC GLUCOMTR-MCNC: 199 MG/DL — HIGH (ref 70–99)
GLUCOSE BLDC GLUCOMTR-MCNC: 63 MG/DL — LOW (ref 70–99)
GLUCOSE BLDC GLUCOMTR-MCNC: 70 MG/DL — SIGNIFICANT CHANGE UP (ref 70–99)
GLUCOSE BLDC GLUCOMTR-MCNC: 92 MG/DL — SIGNIFICANT CHANGE UP (ref 70–99)
GLUCOSE SERPL-MCNC: 86 MG/DL — SIGNIFICANT CHANGE UP (ref 70–99)
HCT VFR BLD CALC: 33.1 % — LOW (ref 39–50)
HGB BLD-MCNC: 10.9 G/DL — LOW (ref 13–17)
HGB BLDA-MCNC: 10.6 G/DL — LOW (ref 12.6–17.4)
HGB FLD-MCNC: 10.4 G/DL — LOW (ref 12.6–17.4)
MAGNESIUM SERPL-MCNC: 2.1 MG/DL — SIGNIFICANT CHANGE UP (ref 1.6–2.6)
MCHC RBC-ENTMCNC: 31.1 PG — SIGNIFICANT CHANGE UP (ref 27–34)
MCHC RBC-ENTMCNC: 32.9 GM/DL — SIGNIFICANT CHANGE UP (ref 32–36)
MCV RBC AUTO: 94.6 FL — SIGNIFICANT CHANGE UP (ref 80–100)
NRBC # BLD: 0 /100 WBCS — SIGNIFICANT CHANGE UP (ref 0–0)
OXYHGB MFR BLDA: 97.4 % — HIGH (ref 90–95)
OXYHGB MFR BLDMV: 73.5 % — LOW (ref 90–95)
PHOSPHATE SERPL-MCNC: 3.5 MG/DL — SIGNIFICANT CHANGE UP (ref 2.5–4.5)
PLATELET # BLD AUTO: 185 K/UL — SIGNIFICANT CHANGE UP (ref 150–400)
POTASSIUM SERPL-MCNC: 4.4 MMOL/L — SIGNIFICANT CHANGE UP (ref 3.5–5.3)
POTASSIUM SERPL-SCNC: 4.4 MMOL/L — SIGNIFICANT CHANGE UP (ref 3.5–5.3)
RBC # BLD: 3.5 M/UL — LOW (ref 4.2–5.8)
RBC # FLD: 12.2 % — SIGNIFICANT CHANGE UP (ref 10.3–14.5)
SAO2 % BLD: 74.5 % — SIGNIFICANT CHANGE UP (ref 60–90)
SAO2 % BLDA: 98.4 % — HIGH (ref 94–98)
SARS-COV-2 RNA SPEC QL NAA+PROBE: SIGNIFICANT CHANGE UP
SODIUM SERPL-SCNC: 140 MMOL/L — SIGNIFICANT CHANGE UP (ref 135–145)
WBC # BLD: 5.55 K/UL — SIGNIFICANT CHANGE UP (ref 3.8–10.5)
WBC # FLD AUTO: 5.55 K/UL — SIGNIFICANT CHANGE UP (ref 3.8–10.5)

## 2022-10-13 PROCEDURE — 99233 SBSQ HOSP IP/OBS HIGH 50: CPT | Mod: GC

## 2022-10-13 PROCEDURE — 93456 R HRT CORONARY ARTERY ANGIO: CPT | Mod: 26

## 2022-10-13 PROCEDURE — 99233 SBSQ HOSP IP/OBS HIGH 50: CPT

## 2022-10-13 PROCEDURE — 99152 MOD SED SAME PHYS/QHP 5/>YRS: CPT

## 2022-10-13 RX ORDER — INSULIN GLARGINE 100 [IU]/ML
28 INJECTION, SOLUTION SUBCUTANEOUS AT BEDTIME
Refills: 0 | Status: DISCONTINUED | OUTPATIENT
Start: 2022-10-13 | End: 2022-10-16

## 2022-10-13 RX ORDER — SODIUM CHLORIDE 9 MG/ML
1000 INJECTION INTRAMUSCULAR; INTRAVENOUS; SUBCUTANEOUS
Refills: 0 | Status: DISCONTINUED | OUTPATIENT
Start: 2022-10-13 | End: 2022-10-14

## 2022-10-13 RX ORDER — DEXTROSE 50 % IN WATER 50 %
12.5 SYRINGE (ML) INTRAVENOUS ONCE
Refills: 0 | Status: DISCONTINUED | OUTPATIENT
Start: 2022-10-13 | End: 2022-10-20

## 2022-10-13 RX ADMIN — TICAGRELOR 90 MILLIGRAM(S): 90 TABLET ORAL at 05:31

## 2022-10-13 RX ADMIN — HEPARIN SODIUM 5000 UNIT(S): 5000 INJECTION INTRAVENOUS; SUBCUTANEOUS at 05:32

## 2022-10-13 RX ADMIN — TICAGRELOR 90 MILLIGRAM(S): 90 TABLET ORAL at 17:18

## 2022-10-13 RX ADMIN — CARVEDILOL PHOSPHATE 25 MILLIGRAM(S): 80 CAPSULE, EXTENDED RELEASE ORAL at 05:31

## 2022-10-13 RX ADMIN — BUDESONIDE AND FORMOTEROL FUMARATE DIHYDRATE 2 PUFF(S): 160; 4.5 AEROSOL RESPIRATORY (INHALATION) at 18:20

## 2022-10-13 RX ADMIN — Medication 650 MILLIGRAM(S): at 15:35

## 2022-10-13 RX ADMIN — AMLODIPINE BESYLATE 10 MILLIGRAM(S): 2.5 TABLET ORAL at 05:31

## 2022-10-13 RX ADMIN — INSULIN GLARGINE 28 UNIT(S): 100 INJECTION, SOLUTION SUBCUTANEOUS at 21:46

## 2022-10-13 RX ADMIN — Medication 650 MILLIGRAM(S): at 14:58

## 2022-10-13 RX ADMIN — Medication 81 MILLIGRAM(S): at 08:23

## 2022-10-13 RX ADMIN — Medication 15 MILLILITER(S): at 21:57

## 2022-10-13 RX ADMIN — CHLORHEXIDINE GLUCONATE 1 APPLICATION(S): 213 SOLUTION TOPICAL at 05:43

## 2022-10-13 RX ADMIN — SODIUM CHLORIDE 80 MILLILITER(S): 9 INJECTION INTRAMUSCULAR; INTRAVENOUS; SUBCUTANEOUS at 14:58

## 2022-10-13 RX ADMIN — Medication 4 UNIT(S): at 07:47

## 2022-10-13 RX ADMIN — Medication 650 MILLIGRAM(S): at 07:48

## 2022-10-13 RX ADMIN — Medication 650 MILLIGRAM(S): at 08:15

## 2022-10-13 RX ADMIN — Medication 10 MILLIGRAM(S): at 21:45

## 2022-10-13 RX ADMIN — BUDESONIDE AND FORMOTEROL FUMARATE DIHYDRATE 2 PUFF(S): 160; 4.5 AEROSOL RESPIRATORY (INHALATION) at 05:32

## 2022-10-13 RX ADMIN — ATORVASTATIN CALCIUM 40 MILLIGRAM(S): 80 TABLET, FILM COATED ORAL at 21:46

## 2022-10-13 RX ADMIN — Medication 145 MILLIGRAM(S): at 12:15

## 2022-10-13 RX ADMIN — CARVEDILOL PHOSPHATE 25 MILLIGRAM(S): 80 CAPSULE, EXTENDED RELEASE ORAL at 17:17

## 2022-10-13 RX ADMIN — PANTOPRAZOLE SODIUM 40 MILLIGRAM(S): 20 TABLET, DELAYED RELEASE ORAL at 05:31

## 2022-10-13 RX ADMIN — Medication 1: at 16:41

## 2022-10-13 RX ADMIN — Medication 4 UNIT(S): at 16:40

## 2022-10-13 NOTE — SPEECH LANGUAGE PATHOLOGY EVALUATION - 3-STEP
with more complex questions, pt requiring repetition from SLP, with notable auditory self cueing strategy ( as pt repeated out loud to himself) - inconsistent accuracy

## 2022-10-13 NOTE — SPEECH LANGUAGE PATHOLOGY EVALUATION - SLP FLUENCY
fluent speech w/ intermittent speech disturbance; semantic and phonemic paraphasic type errors as well as intermittent deletions (part of words) vs repetition ; pt remarks that he is 'stuttering' however appears to be more related to a word finding/anomia issue

## 2022-10-13 NOTE — SPEECH LANGUAGE PATHOLOGY EVALUATION - COMMENTS
-Cognitive linguistic skills p/w deficits as related to above.   -Additional testing is strongly advised for safety History continued:  1) Lacunar stroke--> MRI w/ contrast showing multifocal lacunar infarcts and chronic microvascular ischemic changes, consistent with MRI completed @ [facility]; MRI findings do not correlate well with clinical findings. CTA head/neck negative for acute stroke.  - etiology for ischemic changes unclear, TTE bubble study w/ no evidence of PFO, consider hx of smoking, NSTEMI  - discussed MRI findings w/ neuro consult team, no acute intervention or further workup indicated, patient should follow-up outpatient  2) BILLY (acute kidney injury)-->Worsening following coronary CT. Cr now 3.13->3.02. Kappa/Lambda 9.26/4.63.  -hold ACE/ARB  -nephro following, recommend holding off on cath for now, will f/u recs re light chain.  3)  Diplopia--> Headache/Dizziness  Pt had diplopia since 09/30, initially started as decreased visual acuity. Evaluated by opthalmology and neurology at [facility], symptoms consistent w/ lateral rectus palsy, MRI without clear source of symptoms Consider vasculopathic etiology 2/2 severe HTN and/or DM.  - neurology team @ [facility] recommended considering topiramate/Celexa/verapamil for headache ppx  Opthamology consult-->Moderate non-proliferative diabetic retinopathy OU, Hypertensive retinopathy OU, No vision changes since admission, Posterior segment exam shows CWS and DBH OU, BS/BP control per primary team    10/4 CXR IMPRESSION:  Clear lungs.  10/9 MRI IMPRESSION:  There is no mass, intracranial hemorrhage, or acute infarction. Chronic   multifocal lacunar infarcts and chronic microvascular ischemic changes as   detailed above.    SWALLOW HISTORY: No reports in SCM or in PACS prior to this admission. Patient seen for swallow evaluation on 10/12/22. Please see report for details. Auditory comprehension skills p/w deficits as described above.   Frequent cues from SLP was required for improvement in accuracy; benefitted from cues. GOAL-- to improve working memory   GOAL--to improve cognitive linguistic function Convergent naming: paucity in responses; intermittent repetition w/ rewording required at times  -"Stool" replaced with "stoop" Reading; able to read short phrases in large print 2/2 diplopia- pt with different posturing to reduce 'seeing two'. (head to the left side) -Did not test; pt indicating that he has a hard time writing 2/2 diplopia   -Pt w/ difficulty using his phone for texting 2/2 diplopia

## 2022-10-13 NOTE — PROGRESS NOTE ADULT - SUBJECTIVE AND OBJECTIVE BOX
Internal Medicine   Jesus Hagen | PGY-1    OVERNIGHT EVENTS: No acute overnight events.    SUBJECTIVE: Reports persistent R frontal headache, diplopia, unsteadiness as before. Reports he was unable to tolerate repeat eye patch attempt yesterday. Denies fevers, chills, chest pain, shortness of breath.    MEDICATIONS  (STANDING):  amitriptyline 10 milliGRAM(s) Oral at bedtime  amLODIPine   Tablet 10 milliGRAM(s) Oral daily  aspirin enteric coated 81 milliGRAM(s) Oral daily  atorvastatin 40 milliGRAM(s) Oral at bedtime  budesonide  80 MICROgram(s)/formoterol 4.5 MICROgram(s) Inhaler 2 Puff(s) Inhalation two times a day  carvedilol 25 milliGRAM(s) Oral every 12 hours  chlorhexidine 2% Cloths 1 Application(s) Topical <User Schedule>  dextrose 5%. 1000 milliLiter(s) (100 mL/Hr) IV Continuous <Continuous>  dextrose 5%. 1000 milliLiter(s) (50 mL/Hr) IV Continuous <Continuous>  dextrose 50% Injectable 25 Gram(s) IV Push once  dextrose 50% Injectable 12.5 Gram(s) IV Push once  dextrose 50% Injectable 12.5 Gram(s) IV Push once  dextrose 50% Injectable 25 Gram(s) IV Push once  fenofibrate Tablet 145 milliGRAM(s) Oral daily  glucagon  Injectable 1 milliGRAM(s) IntraMuscular once  influenza   Vaccine 0.5 milliLiter(s) IntraMuscular once  insulin glargine Injectable (LANTUS) 28 Unit(s) SubCutaneous at bedtime  insulin lispro (ADMELOG) corrective regimen sliding scale   SubCutaneous three times a day before meals  insulin lispro (ADMELOG) corrective regimen sliding scale   SubCutaneous at bedtime  insulin lispro Injectable (ADMELOG) 4 Unit(s) SubCutaneous three times a day before meals  pantoprazole    Tablet 40 milliGRAM(s) Oral before breakfast  sodium chloride 0.9%. 1000 milliLiter(s) (80 mL/Hr) IV Continuous <Continuous>  sodium chloride 0.9%. 1000 milliLiter(s) (80 mL/Hr) IV Continuous <Continuous>  sodium chloride 0.9%. 1000 milliLiter(s) (80 mL/Hr) IV Continuous <Continuous>  sodium chloride 0.9%. 1000 milliLiter(s) (80 mL/Hr) IV Continuous <Continuous>  ticagrelor 90 milliGRAM(s) Oral every 12 hours    MEDICATIONS  (PRN):  acetaminophen     Tablet .. 650 milliGRAM(s) Oral every 6 hours PRN Temp greater or equal to 38C (100.4F), Mild Pain (1 - 3)  ALBUTerol    90 MICROgram(s) HFA Inhaler 2 Puff(s) Inhalation every 6 hours PRN Shortness of Breath and/or Wheezing  Biotene Dry Mouth Oral Rinse 15 milliLiter(s) Swish and Spit three times a day PRN Mouth Care  dextrose Oral Gel 15 Gram(s) Oral once PRN Blood Glucose LESS THAN 70 milliGRAM(s)/deciliter    VITALS:  T(F): 97.4 (10-13-22 @ 11:29), Max: 98.3 (10-13-22 @ 08:06)  HR: 83 (10-13-22 @ 12:00) (64 - 83)  BP: 145/73 (10-13-22 @ 12:00) (99/61 - 156/81)  BP(mean): --  RR: 18 (10-13-22 @ 11:29) (16 - 18)  SpO2: 98% (10-13-22 @ 11:29) (98% - 100%)    PHYSICAL EXAM:   GENERAL: NAD, resting in bed, appears comfortable, conversational  HEAD: Atraumatic, normocephalic  EYES: apparent lateral rectus palsy, conjunctiva and sclera clear, increased sensitivity to light in R, delayed pupillary constriction in R  ENT: Moist mucous membranes  CHEST/LUNG: Clear to auscultation bilaterally; no rales, rhonchi, wheezing, or rubs; unlabored respirations  HEART: Regular rate and rhythm; no murmurs, rubs, or gallops, normal S1/S2  ABDOMEN: Normal bowel sounds; soft, nontender, nondistended  EXTREMITIES: No clubbing, cyanosis, or edema  MSK: No gross deformities noted   Neurological: CN finding as above, otherwise grossly nonfocal  SKIN: No rashes or lesions  PSYCH: Appropriate mood, affect     LABS:                      10.9   5.55  )-----------( 185      ( 13 Oct 2022 06:19 )             33.1     10-13  140  |  108  |  31<H>  ----------------------------<  86  4.4   |  22  |  2.67<H>    Ca    8.9      13 Oct 2022 06:19  Phos  3.5     10-13  Mg     2.1     10-13    TPro  6.0  /  Alb  x   /  TBili  x   /  DBili  x   /  AST  x   /  ALT  x   /  AlkPhos  x   10-12    Creatinine Trend: 2.67<--, 2.92<--, 3.02<--, 3.13<--, 2.76<--, 3.04<--    I&O's Summary  12 Oct 2022 07:01  -  13 Oct 2022 07:00  --------------------------------------------------------  IN: 1160 mL / OUT: 3000 mL / NET: -1840 mL    13 Oct 2022 07:01  -  13 Oct 2022 16:32  --------------------------------------------------------  IN: 400 mL / OUT: 0 mL / NET: 400 mL

## 2022-10-13 NOTE — SPEECH LANGUAGE PATHOLOGY EVALUATION - SLP DIAGNOSIS
Pt is a 46 y/o male admitted for c/f NSTEMI +/- possible stroke. MRI w/ contrast showing multifocal lacunar infarcts and chronic microvascular ischemic changes. Patient p/w cognitive linguistic deficits in presence of working memory and executive functional skills; concern addressed to resident w/ regards to early onset dementia given maternal history of early onset dementia (approx age 43 w/ symptoms.)

## 2022-10-13 NOTE — SPEECH LANGUAGE PATHOLOGY EVALUATION - SLP PERTINENT HISTORY OF CURRENT PROBLEM
BRINDA MOYER is a 47M smoker w/ hx of vertigo, diverticulitis s/p LAR, ETOH abuse (sober x5 years), DM (patient states has been on Insulin x10 years was never on oral agents), CKD IV - baseline creatinine 2.4, HTN, AMBER, who presented to [facility] on 10/2 w/ /133, dizziness, diplopia, headache and chest tightness, admitted for c/f NSTEMI +/- possible stroke.

## 2022-10-13 NOTE — PROGRESS NOTE ADULT - ATTENDING COMMENTS
Patient seen and evaluated. No current new complaints, chronic diplopia. Exam consistent with lateral rectus palsy. Did not tolerate eye patch. Groin site examined post cath, no hematoma.    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 \  -optho appreciated, no inpatient intervention,outpatient follow up for possible PRISM device.    #BILLY on CKD IV  improved with fluid today, near baseline.  -trend post cath.    #HTN  -currently well controlled on current regimen.     #CAD  -non-obsructive disease, in setting of hypertensive crisis.    rest as above  d/c planning CANDY once accepted and auth goes through.

## 2022-10-13 NOTE — PROGRESS NOTE ADULT - PROBLEM SELECTOR PLAN 6
DVT ppx: subQ heparin  Diet: DASH/TLC  Full code DVT ppx: holding given recent cath, will resume tomorrow  Diet: DASH/TLC  Dispo: anticipate d/c tomorrow?  Full code

## 2022-10-13 NOTE — PROGRESS NOTE ADULT - PROBLEM SELECTOR PLAN 1
#Headache/Dizziness  Pt had diplopia since 09/30, initially started as decreased visual acuity. Evaluated by opthalmology and neurology at [facility], symptoms consistent w/ lateral rectus palsy, MRI without clear source of symptoms. Consider vasculopathic etiology 2/2 severe HTN and/or DM.  - neurology team @ [facility] recommended considering topiramate/Celexa/verapamil for headache ppx  - patient evaluated by ophthalmology at [facility], recommended outpatient f/u w/ retina specialist Dr. Jose Angel Arvizu for OCT nerve/RNFL for evaluation for diabetic and hypertensive retinopathy; if diplopia/palsy persists should continue outpatient followup for Fresnel prisms; curbsided ophtho team who agree with recommendations  - will consult ophtho today given new symptoms that appear localized to R eye #Headache/Dizziness  Pt had diplopia since 09/30, initially started as decreased visual acuity. Evaluated by opthalmology and neurology at [facility], symptoms consistent w/ lateral rectus palsy, MRI without clear source of symptoms. Consider vasculopathic etiology 2/2 severe HTN and/or DM. Patient additionally has sx of persistent headache and dizziness/unsteadiness which do not improve with eye patch, etiology is unclear and requires further workup outpatient.  - neurology team @ [facility] recommended considering topiramate/Celexa/verapamil for headache ppx  - patient evaluated by ophthalmology at [facility], recommended outpatient f/u w/ retina specialist Dr. Jose Angel Arvizu for OCT nerve/RNFL for evaluation for diabetic and hypertensive retinopathy; if diplopia/palsy persists should continue outpatient followup for Fresnel prisms; curbsided ophtho team who agree with recommendations  - appreciate ophtho eval, noted moderate non-proliferative diabetic and hypertensive retinopathy, recommending outpatient follow-up

## 2022-10-13 NOTE — SPEECH LANGUAGE PATHOLOGY EVALUATION - FLUENCY
typically fluent however memory / word finding deficits appearing to be negatively impacting fluency at times

## 2022-10-13 NOTE — SPEECH LANGUAGE PATHOLOGY EVALUATION - SLP ORIENTATION
oriented to person, place ([place], self corrected to [facility]), rationale for admission - 'im here for my heart, and brain, and my headaches.

## 2022-10-13 NOTE — PROGRESS NOTE ADULT - PROBLEM SELECTOR PLAN 2
Transfer from [facility] for possible cardiac cath  - coronary CT completed, moderate stenosis of prox RCA and LAD  - cards following, anticipate cath tomorrow as nephrology has cleared, recommending IVF 80cc/hr for 12h pre- and post- cath  - will continue to optimize patient for possible cath:     - GDMT w/ coreg, atorvastatin (holding ACE/ARB for BILLY)     - aspirin + brilinta  - EKG and troponins if chest pain Transfer from [facility] for cardiac cath given c/f NSTEMI.   - coronary CT completed, moderate stenosis of prox RCA and LAD  - cards following, anticipate cath tomorrow as nephrology has cleared, recommending IVF 80cc/hr for 12h pre- and post- cath  - will continue to optimize patient for possible cath:     - GDMT w/ coreg, atorvastatin (holding ACE/ARB for BILLY)     - aspirin + brilinta  - EKG and troponins if chest pain Transfer from [facility] for cardiac cath given c/f NSTEMI. Coronary CT showed moderate stenosis of prox RCA and LAD.  - IVF 80cc/hr for 12h post- cath  - will continue to optimize patient for possible cath:     - GDMT w/ coreg, atorvastatin (holding ACE/ARB for BILLY)     - aspirin + brilinta  - EKG and troponins if chest pain Transfer from [facility] for cardiac cath given c/f NSTEMI. Coronary CT showed moderate stenosis of prox RCA and LAD. Appreciate cardiology workup - cath completed 10/13, showed nonobstructive CAD with non-elevated filling pressures.  - IVF 80cc/hr for 12h post- cath  - continue GDMT w/ coreg, atorvastatin (holding ACE/ARB for BILLY)  - continue aspirin + brilinta  - consider home event monitor to monitor for atrial arrhythmia  - EKG and troponins if chest pain Transfer from [facility] for cardiac cath given c/f NSTEMI. Coronary CT showed moderate stenosis of prox RCA and LAD. Appreciate cardiology workup - cath completed 10/13, showed nonobstructive CAD with non-elevated filling pressures.  - IVF 80cc/hr for 12h post- cath  - continue  coreg, atorvastatin (holding ACE/ARB for BILLY)  - continue aspirin + brilinta  - EKG and troponins if chest pain

## 2022-10-13 NOTE — PROGRESS NOTE ADULT - ASSESSMENT
47M smoker w/ hx of vertigo, diverticulitis s/p LAR, ETOH abuse (sober x5 years), DM (patient states has been on Insulin x10 years was never on oral agents), CKD IV - baseline creatinine 2.4, HTN, AMBER, who presented to [facility] on 10/2 w/ /133, dizziness, diplopia, headache and chest tightness, admitted for c/f NSTEMI +/- possible stroke.

## 2022-10-13 NOTE — PROGRESS NOTE ADULT - PROBLEM SELECTOR PLAN 5
Home regimen 40U long-acting, 5U short-acting pre-meal; lantus recently decreased for hypoglycemia  - 36U lantus, 4U pre-meal short acting. Home regimen 40U long-acting, 5U short-acting pre-meal.  - patient moderately hypoglycemia in AMs; wnl @ premeals  - decrease lantus to 28U, continue 4U pre-meal short acting.

## 2022-10-13 NOTE — PROGRESS NOTE ADULT - SUBJECTIVE AND OBJECTIVE BOX
********CARDIOLOGY PROGRESS NOTE********  HISTORY OF PRESENT ILLNESS:  HPI:  48 y/o male, current cigarette smoker, with PMHx of Vertigo, Diverticulitis s/p LAR, ETOH abuse now sober x 5 years, DM (patient states has been on Insulin x 10 years was never on oral agents), CKD IV - baseline creatinine 2.4, HTN, AMBER, who presented to [facility] on 10/2 c/o dizziness, b/l diplopia, headache and chest tightness.  In ED @ OSH /133, managed with hydralazine IV, Cardiac biomarkers elevated with trop I peak 0.632, EKG with ST-T abnormality no acute ST segment changes, TTE demonstrating normal LV systolic fxn (EF 55-60), no significant valvular disease/[facility] Cardiology following.  CT head without acute pathology, MRI with left parasagital infarct, Neurology following, impression was patients symptoms do not correlate with MRI findings.  Opthamology consulted, recommended f/u as o/p.  Patient is now transferred to [facility] in setting of NSTEMI for LHC.  LHC deferred for Neuro consult and medical optimization   (04 Oct 2022 15:33)          Allergies    No Known Allergies    Intolerances    	    MEDICATIONS:  amLODIPine   Tablet 10 milliGRAM(s) Oral daily  aspirin enteric coated 81 milliGRAM(s) Oral daily  carvedilol 25 milliGRAM(s) Oral every 12 hours  ticagrelor 90 milliGRAM(s) Oral every 12 hours      ALBUTerol    90 MICROgram(s) HFA Inhaler 2 Puff(s) Inhalation every 6 hours PRN  budesonide  80 MICROgram(s)/formoterol 4.5 MICROgram(s) Inhaler 2 Puff(s) Inhalation two times a day    acetaminophen     Tablet .. 650 milliGRAM(s) Oral every 6 hours PRN  amitriptyline 10 milliGRAM(s) Oral at bedtime    pantoprazole    Tablet 40 milliGRAM(s) Oral before breakfast    atorvastatin 40 milliGRAM(s) Oral at bedtime  dextrose 50% Injectable 25 Gram(s) IV Push once  dextrose 50% Injectable 12.5 Gram(s) IV Push once  dextrose 50% Injectable 12.5 Gram(s) IV Push once  dextrose 50% Injectable 25 Gram(s) IV Push once  dextrose Oral Gel 15 Gram(s) Oral once PRN  fenofibrate Tablet 145 milliGRAM(s) Oral daily  glucagon  Injectable 1 milliGRAM(s) IntraMuscular once  insulin glargine Injectable (LANTUS) 28 Unit(s) SubCutaneous at bedtime  insulin lispro (ADMELOG) corrective regimen sliding scale   SubCutaneous three times a day before meals  insulin lispro (ADMELOG) corrective regimen sliding scale   SubCutaneous at bedtime  insulin lispro Injectable (ADMELOG) 4 Unit(s) SubCutaneous three times a day before meals    Biotene Dry Mouth Oral Rinse 15 milliLiter(s) Swish and Spit three times a day PRN  chlorhexidine 2% Cloths 1 Application(s) Topical <User Schedule>  dextrose 5%. 1000 milliLiter(s) IV Continuous <Continuous>  dextrose 5%. 1000 milliLiter(s) IV Continuous <Continuous>  influenza   Vaccine 0.5 milliLiter(s) IntraMuscular once  sodium chloride 0.9%. 1000 milliLiter(s) IV Continuous <Continuous>  sodium chloride 0.9%. 1000 milliLiter(s) IV Continuous <Continuous>  sodium chloride 0.9%. 1000 milliLiter(s) IV Continuous <Continuous>      PAST MEDICAL & SURGICAL HISTORY:  Diabetes      Asthma      Diverticulitis  surgery 16yrs ago      High cholesterol      Dyslipidemia      Hypertension      H/O vertigo      Diabetic ulcer of right foot      Broken toe  left pinky toe      Vertigo      HTN (hypertension)      Diabetes      History of surgery  colon resection          FAMILY HISTORY:  Family history of hypertension (Father)        SOCIAL HISTORY:  unchanged    REVIEW OF SYSTEMS:  CONSTITUTIONAL: No fever, weight loss, or fatigue  EYES: No eye pain, visual disturbances, or discharge  ENMT:  No difficulty hearing, tinnitus, vertigo; No sinus or throat pain  NECK: No pain or stiffness  RESPIRATORY: No cough, wheezing, chills or hemoptysis; No Shortness of Breath  CARDIOVASCULAR: No chest pain, palpitations, passing out, dizziness, or leg swelling  GASTROINTESTINAL: No abdominal or epigastric pain. No nausea, vomiting, or hematemesis; No diarrhea or constipation. No melena or hematochezia.  GENITOURINARY: No dysuria, frequency, hematuria, or incontinence  NEUROLOGICAL: No headaches, memory loss, loss of strength, numbness, or tremors  SKIN: No itching, burning, rashes, or lesions   LYMPH Nodes: No enlarged glands  ENDOCRINE: No heat or cold intolerance; No hair loss  MUSCULOSKELETAL: No joint pain or swelling; No muscle, back, or extremity pain  PSYCHIATRIC: No depression, anxiety, mood swings, or difficulty sleeping  HEME/LYMPH: No easy bruising, or bleeding gums  ALLERY AND IMMUNOLOGIC: No hives or eczema	    [ ] All others negative	  [ ] Unable to obtain    PHYSICAL EXAM:  T(C): 36.3 (10-13-22 @ 11:29), Max: 36.8 (10-13-22 @ 08:06)  HR: 83 (10-13-22 @ 12:00) (64 - 83)  BP: 145/73 (10-13-22 @ 12:00) (99/61 - 156/81)  RR: 18 (10-13-22 @ 11:29) (16 - 18)  SpO2: 98% (10-13-22 @ 11:29) (98% - 100%)  Wt(kg): --  I&O's Summary    12 Oct 2022 07:01  -  13 Oct 2022 07:00  --------------------------------------------------------  IN: 1160 mL / OUT: 3000 mL / NET: -1840 mL    13 Oct 2022 07:01  -  13 Oct 2022 12:39  --------------------------------------------------------  IN: 200 mL / OUT: 0 mL / NET: 200 mL        Appearance: Normal	  HEENT:   Normal oral mucosa, PERRL, EOMI	  Lymphatic: No lymphadenopathy  Cardiovascular: Normal S1 S2, No JVD, No murmurs, No edema  Respiratory: Lungs clear to auscultation	  Psychiatry: A & O x 3, Mood & affect appropriate  Gastrointestinal:  Soft, Non-tender, + BS	  Skin: No rashes, No ecchymoses, No cyanosis	  Neurologic: Non-focal  Extremities: Normal range of motion, No clubbing, cyanosis or edema  Vascular: Peripheral pulses palpable 2+ bilaterally      LABS:	 	    CBC Full  -  ( 13 Oct 2022 06:19 )  WBC Count : 5.55 K/uL  Hemoglobin : 10.9 g/dL  Hematocrit : 33.1 %  Platelet Count - Automated : 185 K/uL  Mean Cell Volume : 94.6 fl  Mean Cell Hemoglobin : 31.1 pg  Mean Cell Hemoglobin Concentration : 32.9 gm/dL  Auto Neutrophil # : x  Auto Lymphocyte # : x  Auto Monocyte # : x  Auto Eosinophil # : x  Auto Basophil # : x  Auto Neutrophil % : x  Auto Lymphocyte % : x  Auto Monocyte % : x  Auto Eosinophil % : x  Auto Basophil % : x    10-13    140  |  108  |  31<H>  ----------------------------<  86  4.4   |  22  |  2.67<H>  10-12    140  |  107  |  36<H>  ----------------------------<  57<L>  4.3   |  24  |  2.92<H>    Ca    8.9      13 Oct 2022 06:19  Ca    8.9      12 Oct 2022 05:43  Phos  3.5     10-13  Phos  3.5     10-12  Mg     2.1     10-13  Mg     1.8     10-12    TPro  6.0  /  Alb  x   /  TBili  x   /  DBili  x   /  AST  x   /  ALT  x   /  AlkPhos  x   10-12

## 2022-10-13 NOTE — PROGRESS NOTE ADULT - PROBLEM SELECTOR PLAN 3
Cr now 2.92. Kappa/Lambda 9.26/4.63.  - hold ACE/ARB  - nephro following, appreciate recs:    - cleared for cardiac cath, will give IVF 80cc/hr 12h pre- and post- as above Cr now 2.92. Kappa/Lambda 9.26/4.63.  - hold ACE/ARB  - nephro following, appreciate recs: will give IVF 80cc/hr 12h post-cath as above

## 2022-10-13 NOTE — SPEECH LANGUAGE PATHOLOGY EVALUATION - SLP ORGANIZATION
asked to name 10 animals; paucity in responses, over 2 minutes to answer this task- - 1. dog, 2. cat, 3. bird, 4. tiger, 5. lion, 6. eagle, 7. sheep, 8.cow 9.horse, 10 (cue SLP) ant eater/impaired

## 2022-10-13 NOTE — PROGRESS NOTE ADULT - ASSESSMENT
48 y/o male, current cigarette smoker, with PMHx of Vertigo, Diverticulitis s/p LAR, ETOH abuse now sober x 5 years, DM (patient states has been on Insulin x 10 years was never on oral agents), CKD IV - baseline creatinine 2.4, HTN, AMBER, who presented to [facility] on 10/2 c/o dizziness, b/l diplopia, headache and chest tightness.  -Left and right heart cardiac catheterization performed showing nonobstructive CAD without elevated filling pressures.  -Continue medical management of BP/cad  -Lacunar infarcts seen on brain MRI likely related to hypertension. Would consider out-patient event monitor to evaluate for the presence of atrial arrhythmias  -No further in-patient cardiac work-up planned.    Miri High MD  Cardiology Attending  [facility]/ [facility]    For day time coverage Mon-Fri see Non-Service Consult Attending on [email or web address], password: [email or web address]; daytime weekends covered by general cardiology consult service attending.)

## 2022-10-13 NOTE — PROGRESS NOTE ADULT - PROBLEM SELECTOR PLAN 4
MRI w/ contrast showing multifocal lacunar infarcts and chronic microvascular ischemic changes, consistent with MRI completed @ [facility]; MRI findings do not correlate well with clinical findings. CTA head/neck negative for acute stroke.  - etiology for ischemic changes unclear, TTE bubble study w/ no evidence of PFO, consider hx of smoking, NSTEMI  - discussed MRI findings w/ neuro consult team, no acute intervention or further workup indicated, patient should follow-up outpatient  - patient cleared for full AC per neuro  - aspirin + brilinta #Impaired executive function  MRI w/ contrast showing multifocal lacunar infarcts and chronic microvascular ischemic changes, consistent with MRI completed @ [facility]; MRI findings do not correlate well with clinical findings. CTA head/neck negative for acute stroke.  - etiology for ischemic changes unclear, TTE bubble study w/ no evidence of PFO, consider hx of smoking, NSTEMI  - discussed MRI findings w/ neuro consult team, no acute intervention or further workup indicated, patient should follow-up outpatient  - discussed w/ speech pathologist who noted deficits in memory, word finding, overall executive function w/ multiple compensatory mechanisms, and that patient's family hx is notable for early onset dementia (diagnosed @ ~48 and  @ 63)  - PT eval recommending CANDY  - patient cleared for full AC per neuro  - aspirin + brilinta

## 2022-10-14 LAB
ANION GAP SERPL CALC-SCNC: 10 MMOL/L — SIGNIFICANT CHANGE UP (ref 5–17)
BUN SERPL-MCNC: 32 MG/DL — HIGH (ref 7–23)
CALCIUM SERPL-MCNC: 8.3 MG/DL — LOW (ref 8.4–10.5)
CHLORIDE SERPL-SCNC: 106 MMOL/L — SIGNIFICANT CHANGE UP (ref 96–108)
CO2 SERPL-SCNC: 22 MMOL/L — SIGNIFICANT CHANGE UP (ref 22–31)
CREAT SERPL-MCNC: 2.98 MG/DL — HIGH (ref 0.5–1.3)
CREATININE, URINE RESULT: 57 MG/DL — SIGNIFICANT CHANGE UP
EGFR: 25 ML/MIN/1.73M2 — LOW
GLUCOSE BLDC GLUCOMTR-MCNC: 183 MG/DL — HIGH (ref 70–99)
GLUCOSE BLDC GLUCOMTR-MCNC: 202 MG/DL — HIGH (ref 70–99)
GLUCOSE BLDC GLUCOMTR-MCNC: 223 MG/DL — HIGH (ref 70–99)
GLUCOSE BLDC GLUCOMTR-MCNC: 81 MG/DL — SIGNIFICANT CHANGE UP (ref 70–99)
GLUCOSE SERPL-MCNC: 181 MG/DL — HIGH (ref 70–99)
HCT VFR BLD CALC: 31.2 % — LOW (ref 39–50)
HGB BLD-MCNC: 10 G/DL — LOW (ref 13–17)
MAGNESIUM SERPL-MCNC: 1.8 MG/DL — SIGNIFICANT CHANGE UP (ref 1.6–2.6)
MCHC RBC-ENTMCNC: 30.6 PG — SIGNIFICANT CHANGE UP (ref 27–34)
MCHC RBC-ENTMCNC: 32.1 GM/DL — SIGNIFICANT CHANGE UP (ref 32–36)
MCV RBC AUTO: 95.4 FL — SIGNIFICANT CHANGE UP (ref 80–100)
NRBC # BLD: 0 /100 WBCS — SIGNIFICANT CHANGE UP (ref 0–0)
PHOSPHATE SERPL-MCNC: 3.2 MG/DL — SIGNIFICANT CHANGE UP (ref 2.5–4.5)
PLATELET # BLD AUTO: 179 K/UL — SIGNIFICANT CHANGE UP (ref 150–400)
POTASSIUM SERPL-MCNC: 4.3 MMOL/L — SIGNIFICANT CHANGE UP (ref 3.5–5.3)
POTASSIUM SERPL-SCNC: 4.3 MMOL/L — SIGNIFICANT CHANGE UP (ref 3.5–5.3)
PROT ?TM UR-MCNC: 247 MG/DL — HIGH (ref 0–12)
RBC # BLD: 3.27 M/UL — LOW (ref 4.2–5.8)
RBC # FLD: 12.5 % — SIGNIFICANT CHANGE UP (ref 10.3–14.5)
SODIUM SERPL-SCNC: 138 MMOL/L — SIGNIFICANT CHANGE UP (ref 135–145)
WBC # BLD: 5.89 K/UL — SIGNIFICANT CHANGE UP (ref 3.8–10.5)
WBC # FLD AUTO: 5.89 K/UL — SIGNIFICANT CHANGE UP (ref 3.8–10.5)

## 2022-10-14 PROCEDURE — 99232 SBSQ HOSP IP/OBS MODERATE 35: CPT | Mod: GC

## 2022-10-14 PROCEDURE — 99222 1ST HOSP IP/OBS MODERATE 55: CPT

## 2022-10-14 PROCEDURE — 99232 SBSQ HOSP IP/OBS MODERATE 35: CPT

## 2022-10-14 RX ORDER — HEPARIN SODIUM 5000 [USP'U]/ML
5000 INJECTION INTRAVENOUS; SUBCUTANEOUS EVERY 8 HOURS
Refills: 0 | Status: DISCONTINUED | OUTPATIENT
Start: 2022-10-14 | End: 2022-10-20

## 2022-10-14 RX ORDER — MAGNESIUM SULFATE 500 MG/ML
2 VIAL (ML) INJECTION ONCE
Refills: 0 | Status: COMPLETED | OUTPATIENT
Start: 2022-10-14 | End: 2022-10-14

## 2022-10-14 RX ORDER — SODIUM CHLORIDE 9 MG/ML
1000 INJECTION INTRAMUSCULAR; INTRAVENOUS; SUBCUTANEOUS
Refills: 0 | Status: DISCONTINUED | OUTPATIENT
Start: 2022-10-14 | End: 2022-10-15

## 2022-10-14 RX ORDER — SODIUM CHLORIDE 0.65 %
1 AEROSOL, SPRAY (ML) NASAL
Refills: 0 | Status: DISCONTINUED | OUTPATIENT
Start: 2022-10-14 | End: 2022-10-20

## 2022-10-14 RX ADMIN — TICAGRELOR 90 MILLIGRAM(S): 90 TABLET ORAL at 17:11

## 2022-10-14 RX ADMIN — INSULIN GLARGINE 28 UNIT(S): 100 INJECTION, SOLUTION SUBCUTANEOUS at 21:57

## 2022-10-14 RX ADMIN — BUDESONIDE AND FORMOTEROL FUMARATE DIHYDRATE 2 PUFF(S): 160; 4.5 AEROSOL RESPIRATORY (INHALATION) at 17:12

## 2022-10-14 RX ADMIN — Medication 1: at 07:36

## 2022-10-14 RX ADMIN — CHLORHEXIDINE GLUCONATE 1 APPLICATION(S): 213 SOLUTION TOPICAL at 06:45

## 2022-10-14 RX ADMIN — SODIUM CHLORIDE 80 MILLILITER(S): 9 INJECTION INTRAMUSCULAR; INTRAVENOUS; SUBCUTANEOUS at 17:12

## 2022-10-14 RX ADMIN — CARVEDILOL PHOSPHATE 25 MILLIGRAM(S): 80 CAPSULE, EXTENDED RELEASE ORAL at 17:11

## 2022-10-14 RX ADMIN — Medication 81 MILLIGRAM(S): at 11:04

## 2022-10-14 RX ADMIN — HEPARIN SODIUM 5000 UNIT(S): 5000 INJECTION INTRAVENOUS; SUBCUTANEOUS at 21:57

## 2022-10-14 RX ADMIN — Medication 10 MILLIGRAM(S): at 21:57

## 2022-10-14 RX ADMIN — Medication 145 MILLIGRAM(S): at 11:04

## 2022-10-14 RX ADMIN — ATORVASTATIN CALCIUM 40 MILLIGRAM(S): 80 TABLET, FILM COATED ORAL at 21:56

## 2022-10-14 RX ADMIN — Medication 2: at 16:11

## 2022-10-14 RX ADMIN — TICAGRELOR 90 MILLIGRAM(S): 90 TABLET ORAL at 05:37

## 2022-10-14 RX ADMIN — Medication 25 GRAM(S): at 07:58

## 2022-10-14 RX ADMIN — PANTOPRAZOLE SODIUM 40 MILLIGRAM(S): 20 TABLET, DELAYED RELEASE ORAL at 05:38

## 2022-10-14 RX ADMIN — AMLODIPINE BESYLATE 10 MILLIGRAM(S): 2.5 TABLET ORAL at 05:37

## 2022-10-14 RX ADMIN — Medication 4 UNIT(S): at 07:36

## 2022-10-14 RX ADMIN — Medication 650 MILLIGRAM(S): at 08:15

## 2022-10-14 RX ADMIN — Medication 650 MILLIGRAM(S): at 07:37

## 2022-10-14 RX ADMIN — CARVEDILOL PHOSPHATE 25 MILLIGRAM(S): 80 CAPSULE, EXTENDED RELEASE ORAL at 05:37

## 2022-10-14 RX ADMIN — BUDESONIDE AND FORMOTEROL FUMARATE DIHYDRATE 2 PUFF(S): 160; 4.5 AEROSOL RESPIRATORY (INHALATION) at 05:38

## 2022-10-14 NOTE — PROGRESS NOTE ADULT - PROBLEM SELECTOR PLAN 2
Transfer from [facility] for cardiac cath given c/f NSTEMI. Coronary CT showed moderate stenosis of prox RCA and LAD. Appreciate cardiology workup - cath completed 10/13, showed nonobstructive CAD with non-elevated filling pressures.  - IVF 80cc/hr for 12h post- cath  - continue  coreg, atorvastatin (holding ACE/ARB for BILLY)  - continue aspirin + brilinta  - EKG and troponins if chest pain Transfer from [facility] for cardiac cath given c/f NSTEMI. Coronary CT showed moderate stenosis of prox RCA and LAD. Appreciate cardiology workup - cath completed 10/13, showed nonobstructive CAD with non-elevated filling pressures.  - continue IVF @ 80cc/hr as below  - continue coreg, atorvastatin (holding ACE/ARB for BILLY)  - continue aspirin + brilinta  - EKG and troponins if chest pain

## 2022-10-14 NOTE — PROGRESS NOTE ADULT - ASSESSMENT
47M smoker w/ hx of vertigo, diverticulitis s/p LAR, ETOH abuse (sober x5 years), DM (patient states has been on Insulin x10 years was never on oral agents), CKD IV - baseline creatinine 2.4, HTN, AMBER, who presented to [facility] on 10/2 w/ /133, dizziness, diplopia, headache and chest tightness, admitted for c/f NSTEMI +/- possible stroke. 47M smoker w/ hx of vertigo, diverticulitis s/p LAR, ETOH abuse (sober x5 years), DM (patient states has been on Insulin x10 years was never on oral agents), CKD IV - baseline creatinine 2.4, HTN, AMBER, who presented to [facility] on 10/2 w/ /133, dizziness, diplopia, headache and chest tightness, admitted w/ c/f NSTEMI and chronic lacunar infarcts/microvascular changes.

## 2022-10-14 NOTE — PROGRESS NOTE ADULT - ASSESSMENT
48 y/o male, current cigarette smoker, with PMHx of Vertigo, Diverticulitis s/p LAR, ETOH abuse now sober x 5 years, DM (patient states has been on Insulin x 10 years was never on oral agents), CKD IV - baseline creatinine 2.4, HTN, AMBER, who presented to [facility] on 10/2 c/o dizziness, b/l diplopia, headache and chest tightness.  -Cardiac catheterization showing nonobstructive CAD without elevated filling pressures. Continue medical management for cad. BP has been mildly elevated on coreg bid/amlodipine. Would add low-dose ACE-I/ARB if okay with nephrology. Creatinine increased from 2.7 to 3.0 post-C.  -No events on telemetry. To consider out-patient cardiac monitoring. Patient should follow-up with cardiology after discharge.  -Patient for [facility] placement    Miri High MD  Cardiology Attending  [facility]    For day time coverage Mon-Fri see Non-Service Consult Attending on [email or web address], password: [email or web address]; daytime weekends covered by general cardiology consult service attending.)

## 2022-10-14 NOTE — PROGRESS NOTE ADULT - ASSESSMENT
47 M w/ PMHx of EtOH abuse now sober X5 years, DM, CKD IV (baseline creatinine 2.0-2.4 in July), HTN who presented to OSH with dizziness, diplopia, CP. Trop elevated with EKG changes. Patient transferred to [facility] for NSTEMI requiring LHC. Creatinine elevated to 2.7, nephrology consulted for co-management

## 2022-10-14 NOTE — CONSULT NOTE ADULT - SUBJECTIVE AND OBJECTIVE BOX
Patient is a 47y old  Male who presents with a chief complaint of NSTEMI (14 Oct 2022 10:52)    Admission HPI:  48 y/o male, current cigarette smoker, with PMHx of Vertigo, Diverticulitis s/p LAR, ETOH abuse now sober x 5 years, DM (patient states has been on Insulin x 10 years was never on oral agents), CKD IV - baseline creatinine 2.4, HTN, AMBER, who presented to [facility] on 10/2 c/o dizziness, b/l diplopia, headache and chest tightness.  In ED @ OSH /133, managed with hydralazine IV, Cardiac biomarkers elevated with trop I peak 0.632, EKG with ST-T abnormality no acute ST segment changes, TTE demonstrating normal LV systolic fxn (EF 55-60), no significant valvular disease/[facility] Cardiology following.  CT head without acute pathology, MRI with left parasagital infarct, Neurology following, impression was patients symptoms do not correlate with MRI findings.  Opthamology consulted, recommended f/u as o/p.  Patient is now transferred to [facility] in setting of NSTEMI for LHC.  LHC deferred for Neuro consult and medical optimization   (04 Oct 2022 15:33)    Interval History:  Patient had imaging-  MR Head w/ IV Cont (10.09.22 @ 17:04) >  There is no mass, intracranial hemorrhage, or acute infarction. Chronic   multifocal lacunar infarcts and chronic microvascular ischemic changes as   detailed above.    CT Angio Neck w/ IV Cont (10.04.22 @ 22:15) >  CT brain: Stable chronic small infarcts. No acute intracranial   hemorrhage, mass effect, midline shift.    CTA neck and brain: No vessel occlusion or significant stenosis.    Course has been complicated by BILLY. Evaluated by optho for diabetic retinopathy.  Patient with persistent functional deficits.     REVIEW OF SYSTEMS: No chest pain, shortness of breath, nausea, vomiting or diarhea; other ROS neg     PAST MEDICAL & SURGICAL HISTORY  Diabetes    Asthma    Diverticulitis    High cholesterol    Dyslipidemia    Hypertension    H/O vertigo    Diabetic ulcer of right foot    Broken toe    Vertigo    HTN (hypertension)    Diabetes    FUNCTIONAL HISTORY:   Lives alone in apartment w 10 SCOTTIE.  PTA Independent    CURRENT FUNCTIONAL STATUS:  CG transfers and gait    FAMILY HISTORY   Family history of hypertension (Father)    MEDICATIONS   acetaminophen     Tablet .. 650 milliGRAM(s) Oral every 6 hours PRN  ALBUTerol    90 MICROgram(s) HFA Inhaler 2 Puff(s) Inhalation every 6 hours PRN  amitriptyline 10 milliGRAM(s) Oral at bedtime  amLODIPine   Tablet 10 milliGRAM(s) Oral daily  aspirin enteric coated 81 milliGRAM(s) Oral daily  atorvastatin 40 milliGRAM(s) Oral at bedtime  Biotene Dry Mouth Oral Rinse 15 milliLiter(s) Swish and Spit three times a day PRN  budesonide  80 MICROgram(s)/formoterol 4.5 MICROgram(s) Inhaler 2 Puff(s) Inhalation two times a day  carvedilol 25 milliGRAM(s) Oral every 12 hours  chlorhexidine 2% Cloths 1 Application(s) Topical <User Schedule>  dextrose 5%. 1000 milliLiter(s) IV Continuous <Continuous>  dextrose 5%. 1000 milliLiter(s) IV Continuous <Continuous>  dextrose 50% Injectable 25 Gram(s) IV Push once  dextrose 50% Injectable 12.5 Gram(s) IV Push once  dextrose 50% Injectable 25 Gram(s) IV Push once  dextrose 50% Injectable 12.5 Gram(s) IV Push once  dextrose Oral Gel 15 Gram(s) Oral once PRN  fenofibrate Tablet 145 milliGRAM(s) Oral daily  glucagon  Injectable 1 milliGRAM(s) IntraMuscular once  influenza   Vaccine 0.5 milliLiter(s) IntraMuscular once  insulin glargine Injectable (LANTUS) 28 Unit(s) SubCutaneous at bedtime  insulin lispro (ADMELOG) corrective regimen sliding scale   SubCutaneous three times a day before meals  insulin lispro (ADMELOG) corrective regimen sliding scale   SubCutaneous at bedtime  pantoprazole    Tablet 40 milliGRAM(s) Oral before breakfast  sodium chloride 0.9%. 1000 milliLiter(s) IV Continuous <Continuous>  sodium chloride 0.9%. 1000 milliLiter(s) IV Continuous <Continuous>  sodium chloride 0.9%. 1000 milliLiter(s) IV Continuous <Continuous>  sodium chloride 0.9%. 1000 milliLiter(s) IV Continuous <Continuous>  ticagrelor 90 milliGRAM(s) Oral every 12 hours    ALLERGIES  No Known Allergies    VITALS  T(C): 36.6 (10-14-22 @ 11:14), Max: 36.8 (10-13-22 @ 20:04)  HR: 68 (10-14-22 @ 11:14) (68 - 76)  BP: 130/68 (10-14-22 @ 11:14) (116/66 - 148/80)  RR: 18 (10-14-22 @ 11:14) (17 - 18)  SpO2: 99% (10-14-22 @ 11:14) (99% - 99%)  Wt(kg): --    PHYSICAL EXAM  Constitutional - NAD, Comfortable  HEENT - NCAT, EOMI  Neck - Supple, No limited ROM  Chest - CTA bilaterally, No wheeze, No rhonchi, No crackles  Cardiovascular - RRR, S1S2, No murmurs  Abdomen - BS+, Soft, NTND  Extremities - No C/C/E, No calf tenderness   Neurologic Exam -                    Cognitive - Awake, Alert, AAO to self, place, date, year, situation     Communication - Fluent, No dysarthria, no aphasia     Cranial Nerves - CN 2-12 intact     Motor - No focal deficits      Sensory - Intact to LT     Reflexes - DTR Intact, No primitive reflexive  Psychiatric - Mood stable, Affect WNL    RECENT LABS/IMAGING  CBC Full  -  ( 14 Oct 2022 06:25 )  WBC Count : 5.89 K/uL  RBC Count : 3.27 M/uL  Hemoglobin : 10.0 g/dL  Hematocrit : 31.2 %  Platelet Count - Automated : 179 K/uL  Mean Cell Volume : 95.4 fl  Mean Cell Hemoglobin : 30.6 pg  Mean Cell Hemoglobin Concentration : 32.1 gm/dL  Auto Neutrophil # : x  Auto Lymphocyte # : x  Auto Monocyte # : x  Auto Eosinophil # : x  Auto Basophil # : x  Auto Neutrophil % : x  Auto Lymphocyte % : x  Auto Monocyte % : x  Auto Eosinophil % : x  Auto Basophil % : x    10-14    138  |  106  |  32<H>  ----------------------------<  181<H>  4.3   |  22  |  2.98<H>    Ca    8.3<L>      14 Oct 2022 06:25  Phos  3.2     10-14  Mg     1.8     10-14    Impression:  48 yo with functional deficits secondary to diagnosis of debility, CVA    Plan:  PT- ROM, Bed Mob, Transfers, Amb w AD and bracing as needed  OT- ADLs, bracing  SLP- Dysphagia eval and treat  Prec- Falls, Cardiac  DVT Prophylaxis- SCDs  Monitor renal function  Skin- Turn q2 h  Dispo-    Patient is a 47y old  Male who presents with a chief complaint of NSTEMI (14 Oct 2022 10:52)    Admission HPI:  48 y/o male, current cigarette smoker, with PMHx of Vertigo, Diverticulitis s/p LAR, ETOH abuse now sober x 5 years, DM (patient states has been on Insulin x 10 years was never on oral agents), CKD IV - baseline creatinine 2.4, HTN, AMBER, who presented to [facility] on 10/2 c/o dizziness, b/l diplopia, headache and chest tightness.  In ED @ OSH /133, managed with hydralazine IV, Cardiac biomarkers elevated with trop I peak 0.632, EKG with ST-T abnormality no acute ST segment changes, TTE demonstrating normal LV systolic fxn (EF 55-60), no significant valvular disease/[facility] Cardiology following.  CT head without acute pathology, MRI with left parasagital infarct, Neurology following, impression was patients symptoms do not correlate with MRI findings.  Opthamology consulted, recommended f/u as o/p.  Patient is now transferred to [facility] in setting of NSTEMI for LHC.  LHC deferred for Neuro consult and medical optimization   (04 Oct 2022 15:33)    Interval History:  Patient had imaging-  MR Head w/ IV Cont (10.09.22 @ 17:04) >  There is no mass, intracranial hemorrhage, or acute infarction. Chronic   multifocal lacunar infarcts and chronic microvascular ischemic changes as   detailed above.    CT Angio Neck w/ IV Cont (10.04.22 @ 22:15) >  CT brain: Stable chronic small infarcts. No acute intracranial   hemorrhage, mass effect, midline shift.    CTA neck and brain: No vessel occlusion or significant stenosis.    Course has been complicated by BILLY. Evaluated by optho for diabetic retinopathy.  Patient with persistent functional deficits.     REVIEW OF SYSTEMS: + poor balance, + poor vision, No chest pain, shortness of breath, nausea, vomiting or diarhea; other ROS neg     PAST MEDICAL & SURGICAL HISTORY  Diabetes    Asthma    Diverticulitis    High cholesterol    Dyslipidemia    Hypertension    H/O vertigo    Diabetic ulcer of right foot    Broken toe    Vertigo    HTN (hypertension)    Diabetes    FUNCTIONAL HISTORY:   Lives alone in apartment w [address].  PTA Independent    CURRENT FUNCTIONAL STATUS:  CG transfers and gait    FAMILY HISTORY   Family history of hypertension (Father)    MEDICATIONS   acetaminophen     Tablet .. 650 milliGRAM(s) Oral every 6 hours PRN  ALBUTerol    90 MICROgram(s) HFA Inhaler 2 Puff(s) Inhalation every 6 hours PRN  amitriptyline 10 milliGRAM(s) Oral at bedtime  amLODIPine   Tablet 10 milliGRAM(s) Oral daily  aspirin enteric coated 81 milliGRAM(s) Oral daily  atorvastatin 40 milliGRAM(s) Oral at bedtime  Biotene Dry Mouth Oral Rinse 15 milliLiter(s) Swish and Spit three times a day PRN  budesonide  80 MICROgram(s)/formoterol 4.5 MICROgram(s) Inhaler 2 Puff(s) Inhalation two times a day  carvedilol 25 milliGRAM(s) Oral every 12 hours  chlorhexidine 2% Cloths 1 Application(s) Topical <User Schedule>  dextrose 5%. 1000 milliLiter(s) IV Continuous <Continuous>  dextrose 5%. 1000 milliLiter(s) IV Continuous <Continuous>  dextrose 50% Injectable 25 Gram(s) IV Push once  dextrose 50% Injectable 12.5 Gram(s) IV Push once  dextrose 50% Injectable 25 Gram(s) IV Push once  dextrose 50% Injectable 12.5 Gram(s) IV Push once  dextrose Oral Gel 15 Gram(s) Oral once PRN  fenofibrate Tablet 145 milliGRAM(s) Oral daily  glucagon  Injectable 1 milliGRAM(s) IntraMuscular once  influenza   Vaccine 0.5 milliLiter(s) IntraMuscular once  insulin glargine Injectable (LANTUS) 28 Unit(s) SubCutaneous at bedtime  insulin lispro (ADMELOG) corrective regimen sliding scale   SubCutaneous three times a day before meals  insulin lispro (ADMELOG) corrective regimen sliding scale   SubCutaneous at bedtime  pantoprazole    Tablet 40 milliGRAM(s) Oral before breakfast  sodium chloride 0.9%. 1000 milliLiter(s) IV Continuous <Continuous>  sodium chloride 0.9%. 1000 milliLiter(s) IV Continuous <Continuous>  sodium chloride 0.9%. 1000 milliLiter(s) IV Continuous <Continuous>  sodium chloride 0.9%. 1000 milliLiter(s) IV Continuous <Continuous>  ticagrelor 90 milliGRAM(s) Oral every 12 hours    ALLERGIES  No Known Allergies    VITALS  T(C): 36.6 (10-14-22 @ 11:14), Max: 36.8 (10-13-22 @ 20:04)  HR: 68 (10-14-22 @ 11:14) (68 - 76)  BP: 130/68 (10-14-22 @ 11:14) (116/66 - 148/80)  RR: 18 (10-14-22 @ 11:14) (17 - 18)  SpO2: 99% (10-14-22 @ 11:14) (99% - 99%)  Wt(kg): --    PHYSICAL EXAM  Constitutional - NAD, Comfortable  HEENT - NCAT, EOMI  Neck - Supple, No limited ROM  Chest - CTA bilaterally, No wheeze, No rhonchi, No crackles  Cardiovascular - RRR, S1S2, No murmurs  Abdomen - BS+, Soft, NTND  Extremities - No C/C/E, No calf tenderness   Neurologic Exam -                 Alert  AAO x 3  + visual deficits (periphery)  Motor 4+/5 bl UE and LEs  No clonus  Sensation intact     Psychiatric - Mood stable, Affect WNL    RECENT LABS/IMAGING  CBC Full  -  ( 14 Oct 2022 06:25 )  WBC Count : 5.89 K/uL  RBC Count : 3.27 M/uL  Hemoglobin : 10.0 g/dL  Hematocrit : 31.2 %  Platelet Count - Automated : 179 K/uL  Mean Cell Volume : 95.4 fl  Mean Cell Hemoglobin : 30.6 pg  Mean Cell Hemoglobin Concentration : 32.1 gm/dL  Auto Neutrophil # : x  Auto Lymphocyte # : x  Auto Monocyte # : x  Auto Eosinophil # : x  Auto Basophil # : x  Auto Neutrophil % : x  Auto Lymphocyte % : x  Auto Monocyte % : x  Auto Eosinophil % : x  Auto Basophil % : x    10-14    138  |  106  |  32<H>  ----------------------------<  181<H>  4.3   |  22  |  2.98<H>    Ca    8.3<L>      14 Oct 2022 06:25  Phos  3.2     10-14  Mg     1.8     10-14    Impression:  48 yo with functional deficits secondary to diagnosis of debility, CVA    Plan:  PT- ROM, Bed Mob, Transfers, Amb w AD and bracing as needed  OT- ADLs, bracing  SLP- Dysphagia eval and treat  Prec- Falls, Cardiac, Diminished vision  DVT Prophylaxis- SCDs  Monitor renal function  Skin- Turn q2 h  Dispo- Acute Rehab- can tolerate 3h/d of therapies and requires daily physician visits

## 2022-10-14 NOTE — PROGRESS NOTE ADULT - PROBLEM SELECTOR PLAN 1
#Headache/Dizziness  Pt had diplopia since 09/30, initially started as decreased visual acuity. Evaluated by opthalmology and neurology at [facility], symptoms consistent w/ lateral rectus palsy, MRI without clear source of symptoms. Consider vasculopathic etiology 2/2 severe HTN and/or DM. Patient additionally has sx of persistent headache and dizziness/unsteadiness which do not improve with eye patch, etiology is unclear and requires further workup outpatient.  - neurology team @ [facility] recommended considering topiramate/Celexa/verapamil for headache ppx  - patient evaluated by ophthalmology at [facility], recommended outpatient f/u w/ retina specialist Dr. Jose Angel Arvizu for OCT nerve/RNFL for evaluation for diabetic and hypertensive retinopathy; if diplopia/palsy persists should continue outpatient followup for [place] prisms; curbsided ophtho team who agree with recommendations  - appreciate ophtho eval, noted moderate non-proliferative diabetic and hypertensive retinopathy, recommending outpatient follow-up

## 2022-10-14 NOTE — PROGRESS NOTE ADULT - PROBLEM SELECTOR PLAN 1
Patient with creatinine ~1.7 in May, increased to 2-2.4 in July. Now patient presented to [facility] for LHC, creatinine 2.7. Patient likely has baseline CKD from long standing DM (has been on insulin for ?10 years). Patient received neuro imaging with contrast on 10/4 and CT angio cardiac with pre/post hydration. Renal US showing bilateral echogenicity suggestive of CKD, no hydro, no stones, no cysts, appropriate size. Proteinuria noted at Uprot/creat 3.4. Hep B and C negative. A1C of 9.9. Kappa/lambda elevated at 2.   Pending: immunofixation, PLA2R. S/p LHC on 10/13 now with a slight increase in creatinine likely from cath contrast despite fluids of 80 pre and post cath.  Obtain repeat Urine lytes (Na, K, Cl, Osm, Urea, Creat). Would consider giving another 80 cc/hour of fluids for 12 hours today. If creatinine stable tomorrow, ok to discharge.  Avoid nephrotoxic agents. Dose all meds appropriate for eGFR.     Will have patient be seen by nephrology, will email the office for follow up appointment.     If you have any questions, please feel free to contact me  Segundo Forde  Nephrology Fellow  [phone]; Prefer [employer] TEAMS  (After 5pm or on weekends please page the on-call fellow).    Patient was discussed with Dr. Scott who agrees with my A/P. Addendum to follow

## 2022-10-14 NOTE — PROGRESS NOTE ADULT - ATTENDING COMMENTS
Patient seen and evaluated. No current new complaints, Cath site without hematoma.     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 \  -optho appreciated, no inpatient intervention,outpatient follow up for possible PRISM device.    #BILLY on CKD IV  elevated today in setting of contrast.   -trend to plateau.   -avoid nephrotoxins.   -outpatient nephrology follow up     #HTN  -currently acceptable on current regimen.     #CAD  -non-obsructive disease, in setting of hypertensive crisis.    #?memory issues  -appreiate speech and swallow. Continued issues with cognition. MR without acute infarct. Given family hx of early dementia, would benefit from outpatient neurology eval.    rest as above  d/c planning CANDY once accepted and auth goes through.

## 2022-10-14 NOTE — PROGRESS NOTE ADULT - PROBLEM SELECTOR PLAN 3
Cr now 2.92. Kappa/Lambda 9.26/4.63.  - hold ACE/ARB  - nephro following, appreciate recs: will give IVF 80cc/hr 12h post-cath as above Cr now 2.98. Increase likely 2/2 contrast durin cath. [place]/Lambda 9.26/4.63.  - hold ACE/ARB  - nephro following, appreciate recs: will continue IVF @ 80cc/hr for another 12h  - if Cr stable or improving tomorrow will be stable for discharge

## 2022-10-14 NOTE — PROGRESS NOTE ADULT - PROBLEM SELECTOR PLAN 6
DVT ppx: holding given recent cath, will resume tomorrow  Diet: DASH/TLC  Dispo: anticipate d/c tomorrow?  Full code DVT ppx: subQ heparin  Diet: DASH/TLC  Dispo: potential d/c tomorrow  Full code

## 2022-10-14 NOTE — PROGRESS NOTE ADULT - PROBLEM SELECTOR PLAN 5
Home regimen 40U long-acting, 5U short-acting pre-meal.  - patient moderately hypoglycemia in AMs; wnl @ premeals  - decrease lantus to 28U, continue 4U pre-meal short acting.

## 2022-10-14 NOTE — PROGRESS NOTE ADULT - SUBJECTIVE AND OBJECTIVE BOX
********CARDIOLOGY PROGRESS NOTE********  HISTORY OF PRESENT ILLNESS:  HPI:  48 y/o male, current cigarette smoker, with PMHx of Vertigo, Diverticulitis s/p LAR, ETOH abuse now sober x 5 years, DM (patient states has been on Insulin x 10 years was never on oral agents), CKD IV - baseline creatinine 2.4, HTN, AMBER, who presented to [facility] on 10/2 c/o dizziness, b/l diplopia, headache and chest tightness.  In ED @ OSH /133, managed with hydralazine IV, Cardiac biomarkers elevated with trop I peak 0.632, EKG with ST-T abnormality no acute ST segment changes, TTE demonstrating normal LV systolic fxn (EF 55-60), no significant valvular disease/[facility] following.  CT head without acute pathology, MRI with left parasagital infarct, Neurology following, impression was patients symptoms do not correlate with MRI findings.  Opthamology consulted, recommended f/u as o/p.  Patient is now transferred to [facility] in setting of NSTEMI for LHC.  LHC deferred for Neuro consult and medical optimization   (04 Oct 2022 15:33)          Allergies    No Known Allergies    Intolerances    	    MEDICATIONS:  amLODIPine   Tablet 10 milliGRAM(s) Oral daily  aspirin enteric coated 81 milliGRAM(s) Oral daily  carvedilol 25 milliGRAM(s) Oral every 12 hours  ticagrelor 90 milliGRAM(s) Oral every 12 hours      ALBUTerol    90 MICROgram(s) HFA Inhaler 2 Puff(s) Inhalation every 6 hours PRN  budesonide  80 MICROgram(s)/formoterol 4.5 MICROgram(s) Inhaler 2 Puff(s) Inhalation two times a day    acetaminophen     Tablet .. 650 milliGRAM(s) Oral every 6 hours PRN  amitriptyline 10 milliGRAM(s) Oral at bedtime    pantoprazole    Tablet 40 milliGRAM(s) Oral before breakfast    atorvastatin 40 milliGRAM(s) Oral at bedtime  dextrose 50% Injectable 12.5 Gram(s) IV Push once  dextrose 50% Injectable 25 Gram(s) IV Push once  dextrose 50% Injectable 12.5 Gram(s) IV Push once  dextrose 50% Injectable 25 Gram(s) IV Push once  dextrose Oral Gel 15 Gram(s) Oral once PRN  fenofibrate Tablet 145 milliGRAM(s) Oral daily  glucagon  Injectable 1 milliGRAM(s) IntraMuscular once  insulin glargine Injectable (LANTUS) 28 Unit(s) SubCutaneous at bedtime  insulin lispro (ADMELOG) corrective regimen sliding scale   SubCutaneous three times a day before meals  insulin lispro (ADMELOG) corrective regimen sliding scale   SubCutaneous at bedtime    Biotene Dry Mouth Oral Rinse 15 milliLiter(s) Swish and Spit three times a day PRN  chlorhexidine 2% Cloths 1 Application(s) Topical <User Schedule>  dextrose 5%. 1000 milliLiter(s) IV Continuous <Continuous>  dextrose 5%. 1000 milliLiter(s) IV Continuous <Continuous>  influenza   Vaccine 0.5 milliLiter(s) IntraMuscular once  sodium chloride 0.9%. 1000 milliLiter(s) IV Continuous <Continuous>  sodium chloride 0.9%. 1000 milliLiter(s) IV Continuous <Continuous>  sodium chloride 0.9%. 1000 milliLiter(s) IV Continuous <Continuous>  sodium chloride 0.9%. 1000 milliLiter(s) IV Continuous <Continuous>      PAST MEDICAL & SURGICAL HISTORY:  Diabetes      Asthma      Diverticulitis  surgery 16yrs ago      High cholesterol      Dyslipidemia      Hypertension      H/O vertigo      Diabetic ulcer of right foot      Broken toe  left pinky toe      Vertigo      HTN (hypertension)      Diabetes      History of surgery  colon resection          FAMILY HISTORY:  Family history of hypertension (Father)        SOCIAL HISTORY:  unchanged    REVIEW OF SYSTEMS:  CONSTITUTIONAL: No fever, weight loss, or fatigue  EYES: No eye pain, visual disturbances, or discharge  ENMT:  No difficulty hearing, tinnitus, vertigo; No sinus or throat pain  NECK: No pain or stiffness  RESPIRATORY: No cough, wheezing, chills or hemoptysis; No Shortness of Breath  CARDIOVASCULAR: No chest pain, palpitations, passing out, dizziness, or leg swelling  GASTROINTESTINAL: No abdominal or epigastric pain. No nausea, vomiting, or hematemesis; No diarrhea or constipation. No melena or hematochezia.  GENITOURINARY: No dysuria, frequency, hematuria, or incontinence  NEUROLOGICAL: No headaches, memory loss, loss of strength, numbness, or tremors  SKIN: No itching, burning, rashes, or lesions   LYMPH Nodes: No enlarged glands  ENDOCRINE: No heat or cold intolerance; No hair loss  MUSCULOSKELETAL: No joint pain or swelling; No muscle, back, or extremity pain  PSYCHIATRIC: No depression, anxiety, mood swings, or difficulty sleeping  HEME/LYMPH: No easy bruising, or bleeding gums  ALLERY AND IMMUNOLOGIC: No hives or eczema	    [ ] All others negative	  [ ] Unable to obtain    PHYSICAL EXAM:  T(C): 36.3 (10-14-22 @ 04:00), Max: 36.8 (10-13-22 @ 20:04)  HR: 73 (10-14-22 @ 04:00) (64 - 83)  BP: 148/80 (10-14-22 @ 04:00) (116/66 - 149/79)  RR: 18 (10-14-22 @ 04:00) (16 - 18)  SpO2: 99% (10-14-22 @ 04:00) (98% - 100%)  Wt(kg): --  I&O's Summary    13 Oct 2022 07:01  -  14 Oct 2022 07:00  --------------------------------------------------------  IN: 640 mL / OUT: 200 mL / NET: 440 mL    14 Oct 2022 07:01  -  14 Oct 2022 10:58  --------------------------------------------------------  IN: 250 mL / OUT: 0 mL / NET: 250 mL        Appearance: Normal	  HEENT:   Normal oral mucosa, PERRL, EOMI	  Lymphatic: No lymphadenopathy  Cardiovascular: Normal S1 S2, No JVD, No murmurs, No edema  Respiratory: Lungs clear to auscultation	  Psychiatry: A & O x 3, Mood & affect appropriate  Gastrointestinal:  Soft, Non-tender, + BS	  Skin: No rashes, No ecchymoses, No cyanosis	  Neurologic: Non-focal  Extremities: Normal range of motion, No clubbing, cyanosis or edema  Vascular: Peripheral pulses palpable 2+ bilaterally      LABS:	 	    CBC Full  -  ( 14 Oct 2022 06:25 )  WBC Count : 5.89 K/uL  Hemoglobin : 10.0 g/dL  Hematocrit : 31.2 %  Platelet Count - Automated : 179 K/uL  Mean Cell Volume : 95.4 fl  Mean Cell Hemoglobin : 30.6 pg  Mean Cell Hemoglobin Concentration : 32.1 gm/dL  Auto Neutrophil # : x  Auto Lymphocyte # : x  Auto Monocyte # : x  Auto Eosinophil # : x  Auto Basophil # : x  Auto Neutrophil % : x  Auto Lymphocyte % : x  Auto Monocyte % : x  Auto Eosinophil % : x  Auto Basophil % : x    10-14    138  |  106  |  32<H>  ----------------------------<  181<H>  4.3   |  22  |  2.98<H>  10-13    140  |  108  |  31<H>  ----------------------------<  86  4.4   |  22  |  2.67<H>    Ca    8.3<L>      14 Oct 2022 06:25  Ca    8.9      13 Oct 2022 06:19  Phos  3.2     10-14  Phos  3.5     10-13  Mg     1.8     10-14  Mg     2.1     10-13

## 2022-10-14 NOTE — PROGRESS NOTE ADULT - SUBJECTIVE AND OBJECTIVE BOX
[facility] Division of Kidney Diseases & Hypertension  FOLLOW UP NOTE  [phone]--------------------------------------------------------------------------------  Chief Complaint:Non-ST elevation myocardial infarction (NSTEMI)        24 hour events/subjective:  Patient s/p LHC on 10/13 that found non-obstructive disease    PAST HISTORY  --------------------------------------------------------------------------------  No significant changes to PMH, PSH, FHx, SHx, unless otherwise noted    ALLERGIES & MEDICATIONS  --------------------------------------------------------------------------------  Allergies    No Known Allergies    Intolerances      Standing Inpatient Medications  amitriptyline 10 milliGRAM(s) Oral at bedtime  amLODIPine   Tablet 10 milliGRAM(s) Oral daily  aspirin enteric coated 81 milliGRAM(s) Oral daily  atorvastatin 40 milliGRAM(s) Oral at bedtime  budesonide  80 MICROgram(s)/formoterol 4.5 MICROgram(s) Inhaler 2 Puff(s) Inhalation two times a day  carvedilol 25 milliGRAM(s) Oral every 12 hours  chlorhexidine 2% Cloths 1 Application(s) Topical <User Schedule>  dextrose 5%. 1000 milliLiter(s) IV Continuous <Continuous>  dextrose 5%. 1000 milliLiter(s) IV Continuous <Continuous>  dextrose 50% Injectable 12.5 Gram(s) IV Push once  dextrose 50% Injectable 25 Gram(s) IV Push once  dextrose 50% Injectable 12.5 Gram(s) IV Push once  dextrose 50% Injectable 25 Gram(s) IV Push once  fenofibrate Tablet 145 milliGRAM(s) Oral daily  glucagon  Injectable 1 milliGRAM(s) IntraMuscular once  influenza   Vaccine 0.5 milliLiter(s) IntraMuscular once  insulin glargine Injectable (LANTUS) 28 Unit(s) SubCutaneous at bedtime  insulin lispro (ADMELOG) corrective regimen sliding scale   SubCutaneous three times a day before meals  insulin lispro (ADMELOG) corrective regimen sliding scale   SubCutaneous at bedtime  pantoprazole    Tablet 40 milliGRAM(s) Oral before breakfast  sodium chloride 0.9%. 1000 milliLiter(s) IV Continuous <Continuous>  sodium chloride 0.9%. 1000 milliLiter(s) IV Continuous <Continuous>  sodium chloride 0.9%. 1000 milliLiter(s) IV Continuous <Continuous>  sodium chloride 0.9%. 1000 milliLiter(s) IV Continuous <Continuous>  ticagrelor 90 milliGRAM(s) Oral every 12 hours    PRN Inpatient Medications  acetaminophen     Tablet .. 650 milliGRAM(s) Oral every 6 hours PRN  ALBUTerol    90 MICROgram(s) HFA Inhaler 2 Puff(s) Inhalation every 6 hours PRN  Biotene Dry Mouth Oral Rinse 15 milliLiter(s) Swish and Spit three times a day PRN  dextrose Oral Gel 15 Gram(s) Oral once PRN      REVIEW OF SYSTEMS  --------------------------------------------------------------------------------  Gen: No  fevers/chills  Skin: No rashes  Head/Eyes/Ears/Mouth: No headache; Normal hearing; + still complaining of double vision  Respiratory: No dyspnea, cough, wheezing, hemoptysis  CV: No chest pain, PND, orthopnea  GI: No abdominal pain, diarrhea, constipation, nausea, vomiting  : No increased frequency, dysuria, hematuria, nocturia  MSK: No joint pain/swelling; no back pain; no edema  Neuro: No dizziness/lightheadedness, weakness, seizures, numbness, tingling      All other systems were reviewed and are negative, except as noted.    VITALS/PHYSICAL EXAM  --------------------------------------------------------------------------------  T(C): 36.3 (10-14-22 @ 04:00), Max: 36.8 (10-13-22 @ 20:04)  HR: 73 (10-14-22 @ 04:00) (64 - 83)  BP: 148/80 (10-14-22 @ 04:00) (116/66 - 149/79)  RR: 18 (10-14-22 @ 04:00) (16 - 18)  SpO2: 99% (10-14-22 @ 04:00) (98% - 100%)  Wt(kg): --        10-13-22 @ 07:01  -  10-14-22 @ 07:00  --------------------------------------------------------  IN: 640 mL / OUT: 200 mL / NET: 440 mL    10-14-22 @ 07:01  -  10-14-22 @ 10:52  --------------------------------------------------------  IN: 250 mL / OUT: 0 mL / NET: 250 mL      Physical Exam:  	Gen: NAD  	HEENT: MMM  	Pulm: CTA B/L  	CV: S1S2  	Abd: Soft, +BS   	Ext: No LE edema B/L  	Neuro: Awake  	Skin: Warm and dry  	Vascular access: None    LABS/STUDIES  --------------------------------------------------------------------------------              10.0   5.89  >-----------<  179      [10-14-22 @ 06:25]              31.2     138  |  106  |  32  ----------------------------<  181      [10-14-22 @ 06:25]  4.3   |  22  |  2.98        Ca     8.3     [10-14-22 @ 06:25]      Mg     1.8     [10-14-22 @ 06:25]      Phos  3.2     [10-14-22 @ 06:25]            Creatinine Trend:  SCr 2.98 [10-14 @ 06:25]  SCr 2.67 [10-13 @ 06:19]  SCr 2.92 [10-12 @ 05:43]  SCr 3.02 [10-11 @ 06:13]  SCr 3.13 [10-10 @ 06:29]      Urine Creatinine 79      [10-11-22 @ 23:05]  Urine Protein 247      [10-13-22 @ 00:21]  Urine Sodium 52      [10-11-22 @ 23:05]  Urine Urea Nitrogen 465      [10-11-22 @ 23:05]  Urine Potassium 36      [10-11-22 @ 23:05]  Urine Chloride 40      [10-11-22 @ 23:05]  Urine Osmolality 346      [10-11-22 @ 23:05]      HBsAg Nonreact      [10-09-22 @ 06:26]  HCV 0.09, Nonreact      [10-09-22 @ 06:26]    PLA2R: LEÓN <1.8, IFA --      [10-09-22 @ 07:49]  Free Light Chains: kappa 9.26, lambda 4.63, ratio = 2.00      [10-10 @ 11:58]

## 2022-10-14 NOTE — PROGRESS NOTE ADULT - SUBJECTIVE AND OBJECTIVE BOX
Internal Medicine   Jesus Hagen | PGY-1    OVERNIGHT EVENTS: No acute overnight events.    SUBJECTIVE:       MEDICATIONS  (STANDING):  amitriptyline 10 milliGRAM(s) Oral at bedtime  amLODIPine   Tablet 10 milliGRAM(s) Oral daily  aspirin enteric coated 81 milliGRAM(s) Oral daily  atorvastatin 40 milliGRAM(s) Oral at bedtime  budesonide  80 MICROgram(s)/formoterol 4.5 MICROgram(s) Inhaler 2 Puff(s) Inhalation two times a day  carvedilol 25 milliGRAM(s) Oral every 12 hours  chlorhexidine 2% Cloths 1 Application(s) Topical <User Schedule>  dextrose 5%. 1000 milliLiter(s) (100 mL/Hr) IV Continuous <Continuous>  dextrose 5%. 1000 milliLiter(s) (50 mL/Hr) IV Continuous <Continuous>  dextrose 50% Injectable 25 Gram(s) IV Push once  dextrose 50% Injectable 12.5 Gram(s) IV Push once  dextrose 50% Injectable 25 Gram(s) IV Push once  dextrose 50% Injectable 12.5 Gram(s) IV Push once  fenofibrate Tablet 145 milliGRAM(s) Oral daily  glucagon  Injectable 1 milliGRAM(s) IntraMuscular once  influenza   Vaccine 0.5 milliLiter(s) IntraMuscular once  insulin glargine Injectable (LANTUS) 28 Unit(s) SubCutaneous at bedtime  insulin lispro (ADMELOG) corrective regimen sliding scale   SubCutaneous three times a day before meals  insulin lispro (ADMELOG) corrective regimen sliding scale   SubCutaneous at bedtime  pantoprazole    Tablet 40 milliGRAM(s) Oral before breakfast  sodium chloride 0.9%. 1000 milliLiter(s) (80 mL/Hr) IV Continuous <Continuous>  sodium chloride 0.9%. 1000 milliLiter(s) (80 mL/Hr) IV Continuous <Continuous>  sodium chloride 0.9%. 1000 milliLiter(s) (80 mL/Hr) IV Continuous <Continuous>  sodium chloride 0.9%. 1000 milliLiter(s) (80 mL/Hr) IV Continuous <Continuous>  ticagrelor 90 milliGRAM(s) Oral every 12 hours    MEDICATIONS  (PRN):  acetaminophen     Tablet .. 650 milliGRAM(s) Oral every 6 hours PRN Temp greater or equal to 38C (100.4F), Mild Pain (1 - 3)  ALBUTerol    90 MICROgram(s) HFA Inhaler 2 Puff(s) Inhalation every 6 hours PRN Shortness of Breath and/or Wheezing  Biotene Dry Mouth Oral Rinse 15 milliLiter(s) Swish and Spit three times a day PRN Mouth Care  dextrose Oral Gel 15 Gram(s) Oral once PRN Blood Glucose LESS THAN 70 milliGRAM(s)/deciliter    VITALS:  T(F): 97.9 (10-14-22 @ 11:14), Max: 98.2 (10-13-22 @ 20:04)  HR: 68 (10-14-22 @ 11:14) (68 - 76)  BP: 130/68 (10-14-22 @ 11:14) (116/66 - 148/80)  BP(mean): --  RR: 18 (10-14-22 @ 11:14) (17 - 18)  SpO2: 99% (10-14-22 @ 11:14) (99% - 99%)    PHYSICAL EXAM:   GENERAL: NAD, lying in bed comfortably  HEAD: Atraumatic, normocephalic  EYES: EOMI, conjunctiva and sclera clear, no nystagmus noted  ENT: Moist mucous membranes  CHEST/LUNG: Clear to auscultation bilaterally; no rales, rhonchi, wheezing, or rubs; unlabored respirations  HEART: Regular rate and rhythm; no murmurs, rubs, or gallops, normal S1/S2  ABDOMEN: Normal bowel sounds; soft, nontender, nondistended  EXTREMITIES: No clubbing, cyanosis, or edema  MSK: No gross deformities noted   Neurological: No focal deficits   SKIN: No rashes or lesions  PSYCH: Normal mood, affect     LABS:                      10.0   5.89  )-----------( 179      ( 14 Oct 2022 06:25 )             31.2     10-14    138  |  106  |  32<H>  ----------------------------<  181<H>  4.3   |  22  |  2.98<H>    Ca    8.3<L>      14 Oct 2022 06:25  Phos  3.2     10-14  Mg     1.8     10-14              Creatinine Trend: 2.98<--, 2.67<--, 2.92<--, 3.02<--, 3.13<--, 2.76<--  I&O's Summary    13 Oct 2022 07:01  -  14 Oct 2022 07:00  --------------------------------------------------------  IN: 640 mL / OUT: 200 mL / NET: 440 mL    14 Oct 2022 07:01  -  14 Oct 2022 15:09  --------------------------------------------------------  IN: 250 mL / OUT: 0 mL / NET: 250 mL         Internal Medicine   Jesus Hagen | PGY-1    OVERNIGHT EVENTS: No acute overnight events.    SUBJECTIVE: Endorses continued R frontal headache. Notes new ache in between eyes/nose, has had a mild runny nose as well. Denies fevers, chills, chest pain, cough, shortness of breath.    MEDICATIONS  (STANDING):  amitriptyline 10 milliGRAM(s) Oral at bedtime  amLODIPine   Tablet 10 milliGRAM(s) Oral daily  aspirin enteric coated 81 milliGRAM(s) Oral daily  atorvastatin 40 milliGRAM(s) Oral at bedtime  budesonide  80 MICROgram(s)/formoterol 4.5 MICROgram(s) Inhaler 2 Puff(s) Inhalation two times a day  carvedilol 25 milliGRAM(s) Oral every 12 hours  chlorhexidine 2% Cloths 1 Application(s) Topical <User Schedule>  dextrose 5%. 1000 milliLiter(s) (100 mL/Hr) IV Continuous <Continuous>  dextrose 5%. 1000 milliLiter(s) (50 mL/Hr) IV Continuous <Continuous>  dextrose 50% Injectable 25 Gram(s) IV Push once  dextrose 50% Injectable 12.5 Gram(s) IV Push once  dextrose 50% Injectable 25 Gram(s) IV Push once  dextrose 50% Injectable 12.5 Gram(s) IV Push once  fenofibrate Tablet 145 milliGRAM(s) Oral daily  glucagon  Injectable 1 milliGRAM(s) IntraMuscular once  influenza   Vaccine 0.5 milliLiter(s) IntraMuscular once  insulin glargine Injectable (LANTUS) 28 Unit(s) SubCutaneous at bedtime  insulin lispro (ADMELOG) corrective regimen sliding scale   SubCutaneous three times a day before meals  insulin lispro (ADMELOG) corrective regimen sliding scale   SubCutaneous at bedtime  pantoprazole    Tablet 40 milliGRAM(s) Oral before breakfast  sodium chloride 0.9%. 1000 milliLiter(s) (80 mL/Hr) IV Continuous <Continuous>  sodium chloride 0.9%. 1000 milliLiter(s) (80 mL/Hr) IV Continuous <Continuous>  sodium chloride 0.9%. 1000 milliLiter(s) (80 mL/Hr) IV Continuous <Continuous>  sodium chloride 0.9%. 1000 milliLiter(s) (80 mL/Hr) IV Continuous <Continuous>  ticagrelor 90 milliGRAM(s) Oral every 12 hours    MEDICATIONS  (PRN):  acetaminophen     Tablet .. 650 milliGRAM(s) Oral every 6 hours PRN Temp greater or equal to 38C (100.4F), Mild Pain (1 - 3)  ALBUTerol    90 MICROgram(s) HFA Inhaler 2 Puff(s) Inhalation every 6 hours PRN Shortness of Breath and/or Wheezing  Biotene Dry Mouth Oral Rinse 15 milliLiter(s) Swish and Spit three times a day PRN Mouth Care  dextrose Oral Gel 15 Gram(s) Oral once PRN Blood Glucose LESS THAN 70 milliGRAM(s)/deciliter    VITALS:  T(F): 97.9 (10-14-22 @ 11:14), Max: 98.2 (10-13-22 @ 20:04)  HR: 68 (10-14-22 @ 11:14) (68 - 76)  BP: 130/68 (10-14-22 @ 11:14) (116/66 - 148/80)  BP(mean): --  RR: 18 (10-14-22 @ 11:14) (17 - 18)  SpO2: 99% (10-14-22 @ 11:14) (99% - 99%)    PHYSICAL EXAM:   GENERAL: NAD, sitting in chair comfortably, slight sniffling?  HEAD: Atraumatic, normocephalic  EYES: Apparent lateral rectus palsy; conjunctiva and sclera clear, no nystagmus noted  ENT: Moist mucous membranes  CHEST/LUNG: Clear to auscultation bilaterally; no rales, rhonchi, wheezing, or rubs; unlabored respirations  HEART: Regular rate and rhythm; no murmurs, rubs, or gallops, normal S1/S2  ABDOMEN: Normal bowel sounds; soft, nontender, nondistended  EXTREMITIES: No clubbing, cyanosis, or edema  MSK: No gross deformities noted   Neurological: Grossly nonfocal, gait/coordination not assessed today  SKIN: No rashes or lesions  PSYCH: Appropriate mood, affect    LABS:                      10.0   5.89  )-----------( 179      ( 14 Oct 2022 06:25 )             31.2     10-14  138  |  106  |  32<H>  ----------------------------<  181<H>  4.3   |  22  |  2.98<H>    Ca    8.3<L>      14 Oct 2022 06:25  Phos  3.2     10-14  Mg     1.8     10-14              Creatinine Trend: 2.98<--, 2.67<--, 2.92<--, 3.02<--, 3.13<--, 2.76<--  I&O's Summary    13 Oct 2022 07:01  -  14 Oct 2022 07:00  --------------------------------------------------------  IN: 640 mL / OUT: 200 mL / NET: 440 mL    14 Oct 2022 07:01  -  14 Oct 2022 15:09  --------------------------------------------------------  IN: 250 mL / OUT: 0 mL / NET: 250 mL

## 2022-10-14 NOTE — PROGRESS NOTE ADULT - PROBLEM SELECTOR PLAN 4
#Impaired executive function  MRI w/ contrast showing multifocal lacunar infarcts and chronic microvascular ischemic changes, consistent with MRI completed @ [facility]; MRI findings do not correlate well with clinical findings. CTA head/neck negative for acute stroke.  - etiology for ischemic changes unclear, TTE bubble study w/ no evidence of PFO, consider hx of smoking, NSTEMI  - discussed MRI findings w/ neuro consult team, no acute intervention or further workup indicated, patient should follow-up outpatient  - discussed w/ speech pathologist who noted deficits in memory, word finding, overall executive function w/ multiple compensatory mechanisms, and that patient's family hx is notable for early onset dementia (diagnosed @ ~50 and  @ 63)  - PT eval recommending CANDY  - patient cleared for full AC per neuro  - aspirin + brilinta #Impaired executive function  MRI w/ contrast showing multifocal lacunar infarcts and chronic microvascular ischemic changes, consistent with MRI completed @ [facility]; MRI findings do not correlate well with clinical findings. CTA head/neck negative for acute stroke.  - etiology for ischemic changes unclear, TTE bubble study w/ no evidence of PFO, consider hx of smoking, NSTEMI  - discussed MRI findings w/ neuro consult team, no acute intervention or further workup indicated, patient should follow-up outpatient  - discussed w/ speech pathologist who noted deficits in memory, word finding, overall executive function w/ multiple compensatory mechanisms, and that patient's family hx is notable for early onset dementia (diagnosed @ ~48 and  @ 63)  - PT eval recommending CANDY, rehab consult recommending acute rehab, will discuss w/ case management  - patient cleared for full AC per neuro  - aspirin + brilinta

## 2022-10-15 LAB
ANION GAP SERPL CALC-SCNC: 7 MMOL/L — SIGNIFICANT CHANGE UP (ref 5–17)
BUN SERPL-MCNC: 35 MG/DL — HIGH (ref 7–23)
CALCIUM SERPL-MCNC: 8.4 MG/DL — SIGNIFICANT CHANGE UP (ref 8.4–10.5)
CHLORIDE SERPL-SCNC: 110 MMOL/L — HIGH (ref 96–108)
CO2 SERPL-SCNC: 24 MMOL/L — SIGNIFICANT CHANGE UP (ref 22–31)
CREAT SERPL-MCNC: 2.78 MG/DL — HIGH (ref 0.5–1.3)
EGFR: 27 ML/MIN/1.73M2 — LOW
GLUCOSE BLDC GLUCOMTR-MCNC: 119 MG/DL — HIGH (ref 70–99)
GLUCOSE BLDC GLUCOMTR-MCNC: 165 MG/DL — HIGH (ref 70–99)
GLUCOSE BLDC GLUCOMTR-MCNC: 180 MG/DL — HIGH (ref 70–99)
GLUCOSE BLDC GLUCOMTR-MCNC: 222 MG/DL — HIGH (ref 70–99)
GLUCOSE SERPL-MCNC: 134 MG/DL — HIGH (ref 70–99)
HCT VFR BLD CALC: 32.9 % — LOW (ref 39–50)
HGB BLD-MCNC: 10.7 G/DL — LOW (ref 13–17)
INTERPRETATION SERPL IFE-IMP: SIGNIFICANT CHANGE UP
INTERPRETATION SERPL IFE-IMP: SIGNIFICANT CHANGE UP
MAGNESIUM SERPL-MCNC: 2.2 MG/DL — SIGNIFICANT CHANGE UP (ref 1.6–2.6)
MCHC RBC-ENTMCNC: 31 PG — SIGNIFICANT CHANGE UP (ref 27–34)
MCHC RBC-ENTMCNC: 32.5 GM/DL — SIGNIFICANT CHANGE UP (ref 32–36)
MCV RBC AUTO: 95.4 FL — SIGNIFICANT CHANGE UP (ref 80–100)
NRBC # BLD: 0 /100 WBCS — SIGNIFICANT CHANGE UP (ref 0–0)
PHOSPHATE SERPL-MCNC: 3.2 MG/DL — SIGNIFICANT CHANGE UP (ref 2.5–4.5)
PLATELET # BLD AUTO: 170 K/UL — SIGNIFICANT CHANGE UP (ref 150–400)
POTASSIUM SERPL-MCNC: 4.7 MMOL/L — SIGNIFICANT CHANGE UP (ref 3.5–5.3)
POTASSIUM SERPL-SCNC: 4.7 MMOL/L — SIGNIFICANT CHANGE UP (ref 3.5–5.3)
RBC # BLD: 3.45 M/UL — LOW (ref 4.2–5.8)
RBC # FLD: 12.4 % — SIGNIFICANT CHANGE UP (ref 10.3–14.5)
SODIUM SERPL-SCNC: 141 MMOL/L — SIGNIFICANT CHANGE UP (ref 135–145)
WBC # BLD: 6.46 K/UL — SIGNIFICANT CHANGE UP (ref 3.8–10.5)
WBC # FLD AUTO: 6.46 K/UL — SIGNIFICANT CHANGE UP (ref 3.8–10.5)

## 2022-10-15 PROCEDURE — 99232 SBSQ HOSP IP/OBS MODERATE 35: CPT | Mod: GC

## 2022-10-15 RX ADMIN — AMLODIPINE BESYLATE 10 MILLIGRAM(S): 2.5 TABLET ORAL at 06:21

## 2022-10-15 RX ADMIN — Medication 1 SPRAY(S): at 06:22

## 2022-10-15 RX ADMIN — Medication 1: at 11:47

## 2022-10-15 RX ADMIN — TICAGRELOR 90 MILLIGRAM(S): 90 TABLET ORAL at 06:23

## 2022-10-15 RX ADMIN — Medication 10 MILLIGRAM(S): at 22:11

## 2022-10-15 RX ADMIN — TICAGRELOR 90 MILLIGRAM(S): 90 TABLET ORAL at 17:05

## 2022-10-15 RX ADMIN — Medication 650 MILLIGRAM(S): at 08:30

## 2022-10-15 RX ADMIN — HEPARIN SODIUM 5000 UNIT(S): 5000 INJECTION INTRAVENOUS; SUBCUTANEOUS at 15:23

## 2022-10-15 RX ADMIN — Medication 15 MILLILITER(S): at 00:10

## 2022-10-15 RX ADMIN — CARVEDILOL PHOSPHATE 25 MILLIGRAM(S): 80 CAPSULE, EXTENDED RELEASE ORAL at 06:22

## 2022-10-15 RX ADMIN — PANTOPRAZOLE SODIUM 40 MILLIGRAM(S): 20 TABLET, DELAYED RELEASE ORAL at 06:22

## 2022-10-15 RX ADMIN — CARVEDILOL PHOSPHATE 25 MILLIGRAM(S): 80 CAPSULE, EXTENDED RELEASE ORAL at 17:05

## 2022-10-15 RX ADMIN — INSULIN GLARGINE 28 UNIT(S): 100 INJECTION, SOLUTION SUBCUTANEOUS at 22:11

## 2022-10-15 RX ADMIN — HEPARIN SODIUM 5000 UNIT(S): 5000 INJECTION INTRAVENOUS; SUBCUTANEOUS at 22:12

## 2022-10-15 RX ADMIN — Medication 81 MILLIGRAM(S): at 11:47

## 2022-10-15 RX ADMIN — CHLORHEXIDINE GLUCONATE 1 APPLICATION(S): 213 SOLUTION TOPICAL at 06:47

## 2022-10-15 RX ADMIN — BUDESONIDE AND FORMOTEROL FUMARATE DIHYDRATE 2 PUFF(S): 160; 4.5 AEROSOL RESPIRATORY (INHALATION) at 06:47

## 2022-10-15 RX ADMIN — Medication 145 MILLIGRAM(S): at 11:47

## 2022-10-15 RX ADMIN — Medication 15 MILLILITER(S): at 22:28

## 2022-10-15 RX ADMIN — Medication 1: at 17:04

## 2022-10-15 RX ADMIN — ATORVASTATIN CALCIUM 40 MILLIGRAM(S): 80 TABLET, FILM COATED ORAL at 22:11

## 2022-10-15 RX ADMIN — HEPARIN SODIUM 5000 UNIT(S): 5000 INJECTION INTRAVENOUS; SUBCUTANEOUS at 06:21

## 2022-10-15 RX ADMIN — Medication 650 MILLIGRAM(S): at 07:56

## 2022-10-15 RX ADMIN — Medication 1 SPRAY(S): at 02:24

## 2022-10-15 RX ADMIN — BUDESONIDE AND FORMOTEROL FUMARATE DIHYDRATE 2 PUFF(S): 160; 4.5 AEROSOL RESPIRATORY (INHALATION) at 17:05

## 2022-10-15 NOTE — PROGRESS NOTE ADULT - ATTENDING COMMENTS
Rosey Roche MD  Division of Hospital Medicine  [facility]   Available on [employer] Teams - messages preferred prior to calls.    Patient seen and examined today with Team 2. Agree with above findings, assessment, and plan with the following additions/exceptions:    Overal, 46 yo smoker with PMH of of vertigo, diverticulitis s/p LAR, ETOH abuse (sober x5 years), DM (patient states has been on Insulin x10 years was never on oral agents), CKD IV - baseline creatinine 2.4, HTN, AMBER, who presented to [facility] on 10/2 w/ /133, dizziness, diplopia, headache and chest tightness, admitted with concern for NSTEMI and chronic lacunar infarcts/microvascular changes now s/p LHC without significant disease and MR negative for acute CVA.     Active Issues:  #non-obstructive CAD  #BILLY on CKD  #Diplopia 2/2 lateral rectus palsy  #Cognitive Impairment?    Plan:  - no further episodes of chest pain/tightness  - s/p dx LHC with non-obstructive CAD  - Cr improving  - will need outpatient f/u with neurology, optho, nephrology     Medically clear for discharge to Acute Rehab.  Per CM, likely not to obtain placement over weekend nor Mon.  Will send for COVID-19 PCR for Mon 10/17 AM    Rest as detailed in note above.    Plan discussed with patient and Team 2 resident Dr. Jackson.

## 2022-10-15 NOTE — PROGRESS NOTE ADULT - PROBLEM SELECTOR PLAN 6
DVT ppx: subQ heparin  Diet: DASH/TLC  Dispo: potential d/c tomorrow  Full code DVT ppx: subQ heparin  Diet: DASH/TLC  Dispo: Acute Rehab  Full code

## 2022-10-15 NOTE — PROGRESS NOTE ADULT - SUBJECTIVE AND OBJECTIVE BOX
Internal Medicine   Nataly Jackson | PGY-2    OVERNIGHT EVENTS:      SUBJECTIVE:       MEDICATIONS  (STANDING):  amitriptyline 10 milliGRAM(s) Oral at bedtime  amLODIPine   Tablet 10 milliGRAM(s) Oral daily  aspirin enteric coated 81 milliGRAM(s) Oral daily  atorvastatin 40 milliGRAM(s) Oral at bedtime  budesonide  80 MICROgram(s)/formoterol 4.5 MICROgram(s) Inhaler 2 Puff(s) Inhalation two times a day  carvedilol 25 milliGRAM(s) Oral every 12 hours  chlorhexidine 2% Cloths 1 Application(s) Topical <User Schedule>  dextrose 5%. 1000 milliLiter(s) (100 mL/Hr) IV Continuous <Continuous>  dextrose 5%. 1000 milliLiter(s) (50 mL/Hr) IV Continuous <Continuous>  dextrose 50% Injectable 25 Gram(s) IV Push once  dextrose 50% Injectable 12.5 Gram(s) IV Push once  dextrose 50% Injectable 25 Gram(s) IV Push once  dextrose 50% Injectable 12.5 Gram(s) IV Push once  fenofibrate Tablet 145 milliGRAM(s) Oral daily  glucagon  Injectable 1 milliGRAM(s) IntraMuscular once  heparin   Injectable 5000 Unit(s) SubCutaneous every 8 hours  influenza   Vaccine 0.5 milliLiter(s) IntraMuscular once  insulin glargine Injectable (LANTUS) 28 Unit(s) SubCutaneous at bedtime  insulin lispro (ADMELOG) corrective regimen sliding scale   SubCutaneous three times a day before meals  insulin lispro (ADMELOG) corrective regimen sliding scale   SubCutaneous at bedtime  pantoprazole    Tablet 40 milliGRAM(s) Oral before breakfast  sodium chloride 0.9%. 1000 milliLiter(s) (80 mL/Hr) IV Continuous <Continuous>  ticagrelor 90 milliGRAM(s) Oral every 12 hours    MEDICATIONS  (PRN):  acetaminophen     Tablet .. 650 milliGRAM(s) Oral every 6 hours PRN Temp greater or equal to 38C (100.4F), Mild Pain (1 - 3)  ALBUTerol    90 MICROgram(s) HFA Inhaler 2 Puff(s) Inhalation every 6 hours PRN Shortness of Breath and/or Wheezing  Biotene Dry Mouth Oral Rinse 15 milliLiter(s) Swish and Spit three times a day PRN Mouth Care  dextrose Oral Gel 15 Gram(s) Oral once PRN Blood Glucose LESS THAN 70 milliGRAM(s)/deciliter  sodium chloride 0.65% Nasal 1 Spray(s) Both Nostrils every 1 hour PRN Nasal Congestion        T(F): 98 (10-15-22 @ 04:00), Max: 98.5 (10-14-22 @ 20:30)  HR: 74 (10-15-22 @ 04:00) (68 - 74)  BP: 143/78 (10-15-22 @ 04:00) (130/68 - 143/78)  BP(mean): --  RR: 17 (10-15-22 @ 04:00) (17 - 18)  SpO2: 94% (10-15-22 @ 04:00) (94% - 99%)    PHYSICAL EXAM:     GENERAL: NAD, lying in bed comfortably  HEAD:  Atraumatic, Normocephalic  EYES: EOMI, PERRLA, conjunctiva and sclera clear, no nystagmus noted  ENT: Moist mucous membranes,   NECK: Supple, No JVD, trachea midline  CHEST/LUNG: Clear to auscultation bilaterally; No rales, rhonchi, wheezing, or rubs. Unlabored respirations  HEART: Regular rate and rhythm; No murmurs, rubs, or gallops, normal S1/S2  ABDOMEN: normal bowel sounds; Soft, nontender, nondistended, no organomegaly   EXTREMITIES:  2+ Peripheral Pulses, brisk capillary refill. No clubbing, cyanosis, or edema  MSK: No gross deformities noted   Neurological:  A&Ox3, no focal deficits   SKIN: No rashes or lesions  PSYCH: Normal mood, affect     TELEMETRY:    LABS:                        10.0   5.89  )-----------( 179      ( 14 Oct 2022 06:25 )             31.2     10-14    138  |  106  |  32<H>  ----------------------------<  181<H>  4.3   |  22  |  2.98<H>    Ca    8.3<L>      14 Oct 2022 06:25  Phos  3.2     10-14  Mg     1.8     10-14              Creatinine Trend: 2.98<--, 2.67<--, 2.92<--, 3.02<--, 3.13<--, 2.76<--  I&O's Summary    14 Oct 2022 07:01  -  15 Oct 2022 07:00  --------------------------------------------------------  IN: 1090 mL / OUT: 0 mL / NET: 1090 mL      BNP    RADIOLOGY & ADDITIONAL STUDIES:             Internal Medicine   Linnettemiguel a Jackson | PGY-2    OVERNIGHT EVENTS: No overnight events.      SUBJECTIVE: Endorses mild frontal headache. Denies chest pain, palpitations, dyspnea. orthopnea, fevers       MEDICATIONS  (STANDING):  amitriptyline 10 milliGRAM(s) Oral at bedtime  amLODIPine   Tablet 10 milliGRAM(s) Oral daily  aspirin enteric coated 81 milliGRAM(s) Oral daily  atorvastatin 40 milliGRAM(s) Oral at bedtime  budesonide  80 MICROgram(s)/formoterol 4.5 MICROgram(s) Inhaler 2 Puff(s) Inhalation two times a day  carvedilol 25 milliGRAM(s) Oral every 12 hours  chlorhexidine 2% Cloths 1 Application(s) Topical <User Schedule>  dextrose 5%. 1000 milliLiter(s) (100 mL/Hr) IV Continuous <Continuous>  dextrose 5%. 1000 milliLiter(s) (50 mL/Hr) IV Continuous <Continuous>  dextrose 50% Injectable 25 Gram(s) IV Push once  dextrose 50% Injectable 12.5 Gram(s) IV Push once  dextrose 50% Injectable 25 Gram(s) IV Push once  dextrose 50% Injectable 12.5 Gram(s) IV Push once  fenofibrate Tablet 145 milliGRAM(s) Oral daily  glucagon  Injectable 1 milliGRAM(s) IntraMuscular once  heparin   Injectable 5000 Unit(s) SubCutaneous every 8 hours  influenza   Vaccine 0.5 milliLiter(s) IntraMuscular once  insulin glargine Injectable (LANTUS) 28 Unit(s) SubCutaneous at bedtime  insulin lispro (ADMELOG) corrective regimen sliding scale   SubCutaneous three times a day before meals  insulin lispro (ADMELOG) corrective regimen sliding scale   SubCutaneous at bedtime  pantoprazole    Tablet 40 milliGRAM(s) Oral before breakfast  sodium chloride 0.9%. 1000 milliLiter(s) (80 mL/Hr) IV Continuous <Continuous>  ticagrelor 90 milliGRAM(s) Oral every 12 hours    MEDICATIONS  (PRN):  acetaminophen     Tablet .. 650 milliGRAM(s) Oral every 6 hours PRN Temp greater or equal to 38C (100.4F), Mild Pain (1 - 3)  ALBUTerol    90 MICROgram(s) HFA Inhaler 2 Puff(s) Inhalation every 6 hours PRN Shortness of Breath and/or Wheezing  Biotene Dry Mouth Oral Rinse 15 milliLiter(s) Swish and Spit three times a day PRN Mouth Care  dextrose Oral Gel 15 Gram(s) Oral once PRN Blood Glucose LESS THAN 70 milliGRAM(s)/deciliter  sodium chloride 0.65% Nasal 1 Spray(s) Both Nostrils every 1 hour PRN Nasal Congestion        T(F): 98 (10-15-22 @ 04:00), Max: 98.5 (10-14-22 @ 20:30)  HR: 74 (10-15-22 @ 04:00) (68 - 74)  BP: 143/78 (10-15-22 @ 04:00) (130/68 - 143/78)  BP(mean): --  RR: 17 (10-15-22 @ 04:00) (17 - 18)  SpO2: 94% (10-15-22 @ 04:00) (94% - 99%)    PHYSICAL EXAM:     GENERAL: NAD  HEAD: Atraumatic, normocephalic  EYES: Apparent lateral rectus palsy; conjunctiva and sclera clear, no nystagmus noted  ENT: Moist mucous membranes  CHEST/LUNG: Clear to auscultation bilaterally; no rales, rhonchi, wheezing, or rubs; unlabored respirations  HEART: Regular rate and rhythm; no murmurs, rubs, or gallops, normal S1/S2  ABDOMEN: Normal bowel sounds; soft, nontender, nondistended  EXTREMITIES: No clubbing, cyanosis, or edema  MSK: No gross deformities noted   Neurological: Grossly nonfocal, gait/coordination not assessed today  SKIN: No rashes or lesions  PSYCH: Appropriate mood, affect    TELEMETRY:    LABS:                        10.0   5.89  )-----------( 179      ( 14 Oct 2022 06:25 )             31.2     10-14    138  |  106  |  32<H>  ----------------------------<  181<H>  4.3   |  22  |  2.98<H>    Ca    8.3<L>      14 Oct 2022 06:25  Phos  3.2     10-14  Mg     1.8     10-14              Creatinine Trend: 2.98<--, 2.67<--, 2.92<--, 3.02<--, 3.13<--, 2.76<--  I&O's Summary    14 Oct 2022 07:01  -  15 Oct 2022 07:00  --------------------------------------------------------  IN: 1090 mL / OUT: 0 mL / NET: 1090 mL      BNP    RADIOLOGY & ADDITIONAL STUDIES:             Internal Medicine   Linnettemiguel a Jackson | PGY-2    OVERNIGHT EVENTS: No overnight events.  Tele: NSR 60-80      SUBJECTIVE: Endorses mild frontal headache. Denies chest pain, palpitations, dyspnea. orthopnea, fevers       MEDICATIONS  (STANDING):  amitriptyline 10 milliGRAM(s) Oral at bedtime  amLODIPine   Tablet 10 milliGRAM(s) Oral daily  aspirin enteric coated 81 milliGRAM(s) Oral daily  atorvastatin 40 milliGRAM(s) Oral at bedtime  budesonide  80 MICROgram(s)/formoterol 4.5 MICROgram(s) Inhaler 2 Puff(s) Inhalation two times a day  carvedilol 25 milliGRAM(s) Oral every 12 hours  chlorhexidine 2% Cloths 1 Application(s) Topical <User Schedule>  dextrose 5%. 1000 milliLiter(s) (100 mL/Hr) IV Continuous <Continuous>  dextrose 5%. 1000 milliLiter(s) (50 mL/Hr) IV Continuous <Continuous>  dextrose 50% Injectable 25 Gram(s) IV Push once  dextrose 50% Injectable 12.5 Gram(s) IV Push once  dextrose 50% Injectable 25 Gram(s) IV Push once  dextrose 50% Injectable 12.5 Gram(s) IV Push once  fenofibrate Tablet 145 milliGRAM(s) Oral daily  glucagon  Injectable 1 milliGRAM(s) IntraMuscular once  heparin   Injectable 5000 Unit(s) SubCutaneous every 8 hours  influenza   Vaccine 0.5 milliLiter(s) IntraMuscular once  insulin glargine Injectable (LANTUS) 28 Unit(s) SubCutaneous at bedtime  insulin lispro (ADMELOG) corrective regimen sliding scale   SubCutaneous three times a day before meals  insulin lispro (ADMELOG) corrective regimen sliding scale   SubCutaneous at bedtime  pantoprazole    Tablet 40 milliGRAM(s) Oral before breakfast  sodium chloride 0.9%. 1000 milliLiter(s) (80 mL/Hr) IV Continuous <Continuous>  ticagrelor 90 milliGRAM(s) Oral every 12 hours    MEDICATIONS  (PRN):  acetaminophen     Tablet .. 650 milliGRAM(s) Oral every 6 hours PRN Temp greater or equal to 38C (100.4F), Mild Pain (1 - 3)  ALBUTerol    90 MICROgram(s) HFA Inhaler 2 Puff(s) Inhalation every 6 hours PRN Shortness of Breath and/or Wheezing  Biotene Dry Mouth Oral Rinse 15 milliLiter(s) Swish and Spit three times a day PRN Mouth Care  dextrose Oral Gel 15 Gram(s) Oral once PRN Blood Glucose LESS THAN 70 milliGRAM(s)/deciliter  sodium chloride 0.65% Nasal 1 Spray(s) Both Nostrils every 1 hour PRN Nasal Congestion        T(F): 98 (10-15-22 @ 04:00), Max: 98.5 (10-14-22 @ 20:30)  HR: 74 (10-15-22 @ 04:00) (68 - 74)  BP: 143/78 (10-15-22 @ 04:00) (130/68 - 143/78)  BP(mean): --  RR: 17 (10-15-22 @ 04:00) (17 - 18)  SpO2: 94% (10-15-22 @ 04:00) (94% - 99%)    PHYSICAL EXAM:     GENERAL: NAD  HEAD: Atraumatic, normocephalic  EYES: Apparent lateral rectus palsy; conjunctiva and sclera clear, no nystagmus noted  ENT: Moist mucous membranes  CHEST/LUNG: Clear to auscultation bilaterally; no rales, rhonchi, wheezing, or rubs; unlabored respirations  HEART: Regular rate and rhythm; no murmurs, rubs, or gallops, normal S1/S2  ABDOMEN: Normal bowel sounds; soft, nontender, nondistended  EXTREMITIES: No clubbing, cyanosis, or edema  MSK: No gross deformities noted   Neurological: Grossly nonfocal, gait/coordination not assessed today  SKIN: No rashes or lesions  PSYCH: Appropriate mood, affect    TELEMETRY:    LABS:                        10.0   5.89  )-----------( 179      ( 14 Oct 2022 06:25 )             31.2     10-14    138  |  106  |  32<H>  ----------------------------<  181<H>  4.3   |  22  |  2.98<H>    Ca    8.3<L>      14 Oct 2022 06:25  Phos  3.2     10-14  Mg     1.8     10-14              Creatinine Trend: 2.98<--, 2.67<--, 2.92<--, 3.02<--, 3.13<--, 2.76<--  I&O's Summary    14 Oct 2022 07:01  -  15 Oct 2022 07:00  --------------------------------------------------------  IN: 1090 mL / OUT: 0 mL / NET: 1090 mL      BNP    RADIOLOGY & ADDITIONAL STUDIES:

## 2022-10-15 NOTE — PROGRESS NOTE ADULT - ASSESSMENT
47M smoker w/ hx of vertigo, diverticulitis s/p LAR, ETOH abuse (sober x5 years), DM (patient states has been on Insulin x10 years was never on oral agents), CKD IV - baseline creatinine 2.4, HTN, AMBER, who presented to [facility] on 10/2 w/ /133, dizziness, diplopia, headache and chest tightness, admitted w/ c/f NSTEMI and chronic lacunar infarcts/microvascular changes.

## 2022-10-15 NOTE — PROGRESS NOTE ADULT - PROBLEM SELECTOR PLAN 1
#Headache/Dizziness  Pt had diplopia since 09/30, initially started as decreased visual acuity. Evaluated by opthalmology and neurology at [facility], symptoms consistent w/ lateral rectus palsy, MRI without clear source of symptoms. Consider vasculopathic etiology 2/2 severe HTN and/or DM. Patient additionally has sx of persistent headache and dizziness/unsteadiness which do not improve with eye patch, etiology is unclear and requires further workup outpatient.  - neurology team @ [facility] recommended considering topiramate/Celexa/verapamil for headache ppx  - patient evaluated by ophthalmology at [facility], recommended outpatient f/u w/ retina specialist Dr. Jose Angel Arvizu for OCT nerve/RNFL for evaluation for diabetic and hypertensive retinopathy; if diplopia/palsy persists should continue outpatient followup for [place]nel prisms; curbsided ophtho team who agree with recommendations  - appreciate ophtho eval, noted moderate non-proliferative diabetic and hypertensive retinopathy, recommending outpatient follow-up

## 2022-10-15 NOTE — PROGRESS NOTE ADULT - PROBLEM SELECTOR PLAN 2
Transfer from [facility] for cardiac cath given c/f NSTEMI. Coronary CT showed moderate stenosis of prox RCA and LAD. Appreciate cardiology workup - cath completed 10/13, showed nonobstructive CAD with non-elevated filling pressures.  - continue IVF @ 80cc/hr as below  - continue coreg, atorvastatin (holding ACE/ARB for BILLY)  - continue aspirin + brilinta  - EKG and troponins if chest pain Transfer from [facility] for cardiac cath given c/f NSTEMI. Coronary CT showed moderate stenosis of prox RCA and LAD. Appreciate cardiology workup - cath completed 10/13, showed nonobstructive CAD with non-elevated filling pressures.  - continue coreg, atorvastatin (holding ACE/ARB for BILLY)  - continue aspirin + brilinta  - EKG and troponins if chest pain

## 2022-10-15 NOTE — PROGRESS NOTE ADULT - PROBLEM SELECTOR PLAN 3
Cr now 2.98. Increase likely 2/2 contrast durin cath. [place]/Lambda 9.26/4.63.  - hold ACE/ARB  - nephro following, appreciate recs: will continue IVF @ 80cc/hr for another 12h  - if Cr stable or improving tomorrow will be stable for discharge Cr now 2.98. Increase likely 2/2 contrast durin cath. [place]/Lambda 9.26/4.63.  - hold ACE/ARB  - nephro following, appreciate recs  - if Cr stable or improving tomorrow will be stable for discharge

## 2022-10-15 NOTE — PROGRESS NOTE ADULT - PROBLEM SELECTOR PLAN 4
#Impaired executive function  MRI w/ contrast showing multifocal lacunar infarcts and chronic microvascular ischemic changes, consistent with MRI completed @ [facility]; MRI findings do not correlate well with clinical findings. CTA head/neck negative for acute stroke.  - etiology for ischemic changes unclear, TTE bubble study w/ no evidence of PFO, consider hx of smoking, NSTEMI  - discussed MRI findings w/ neuro consult team, no acute intervention or further workup indicated, patient should follow-up outpatient  - discussed w/ speech pathologist who noted deficits in memory, word finding, overall executive function w/ multiple compensatory mechanisms, and that patient's family hx is notable for early onset dementia (diagnosed @ ~48 and  @ 63)  - PT eval recommending [facility], rehab consult recommending acute rehab, will discuss w/ case management  - patient cleared for full AC per neuro  - aspirin + brilinta

## 2022-10-16 DIAGNOSIS — Z91.89 OTHER SPECIFIED PERSONAL RISK FACTORS, NOT ELSEWHERE CLASSIFIED: ICD-10-CM

## 2022-10-16 LAB
ANION GAP SERPL CALC-SCNC: 12 MMOL/L — SIGNIFICANT CHANGE UP (ref 5–17)
BUN SERPL-MCNC: 41 MG/DL — HIGH (ref 7–23)
CALCIUM SERPL-MCNC: 8.9 MG/DL — SIGNIFICANT CHANGE UP (ref 8.4–10.5)
CHLORIDE SERPL-SCNC: 106 MMOL/L — SIGNIFICANT CHANGE UP (ref 96–108)
CO2 SERPL-SCNC: 22 MMOL/L — SIGNIFICANT CHANGE UP (ref 22–31)
CREAT SERPL-MCNC: 3.04 MG/DL — HIGH (ref 0.5–1.3)
EGFR: 24 ML/MIN/1.73M2 — LOW
GLUCOSE BLDC GLUCOMTR-MCNC: 142 MG/DL — HIGH (ref 70–99)
GLUCOSE BLDC GLUCOMTR-MCNC: 185 MG/DL — HIGH (ref 70–99)
GLUCOSE BLDC GLUCOMTR-MCNC: 214 MG/DL — HIGH (ref 70–99)
GLUCOSE BLDC GLUCOMTR-MCNC: 252 MG/DL — HIGH (ref 70–99)
GLUCOSE BLDC GLUCOMTR-MCNC: 67 MG/DL — LOW (ref 70–99)
GLUCOSE BLDC GLUCOMTR-MCNC: 67 MG/DL — LOW (ref 70–99)
GLUCOSE BLDC GLUCOMTR-MCNC: 72 MG/DL — SIGNIFICANT CHANGE UP (ref 70–99)
GLUCOSE SERPL-MCNC: 91 MG/DL — SIGNIFICANT CHANGE UP (ref 70–99)
HCT VFR BLD CALC: 31.5 % — LOW (ref 39–50)
HGB BLD-MCNC: 10.2 G/DL — LOW (ref 13–17)
MAGNESIUM SERPL-MCNC: 2.1 MG/DL — SIGNIFICANT CHANGE UP (ref 1.6–2.6)
MCHC RBC-ENTMCNC: 30.8 PG — SIGNIFICANT CHANGE UP (ref 27–34)
MCHC RBC-ENTMCNC: 32.4 GM/DL — SIGNIFICANT CHANGE UP (ref 32–36)
MCV RBC AUTO: 95.2 FL — SIGNIFICANT CHANGE UP (ref 80–100)
NRBC # BLD: 0 /100 WBCS — SIGNIFICANT CHANGE UP (ref 0–0)
PHOSPHATE SERPL-MCNC: 3.5 MG/DL — SIGNIFICANT CHANGE UP (ref 2.5–4.5)
PLATELET # BLD AUTO: 179 K/UL — SIGNIFICANT CHANGE UP (ref 150–400)
POTASSIUM SERPL-MCNC: 4.5 MMOL/L — SIGNIFICANT CHANGE UP (ref 3.5–5.3)
POTASSIUM SERPL-SCNC: 4.5 MMOL/L — SIGNIFICANT CHANGE UP (ref 3.5–5.3)
RBC # BLD: 3.31 M/UL — LOW (ref 4.2–5.8)
RBC # FLD: 12.4 % — SIGNIFICANT CHANGE UP (ref 10.3–14.5)
SODIUM SERPL-SCNC: 140 MMOL/L — SIGNIFICANT CHANGE UP (ref 135–145)
WBC # BLD: 5.92 K/UL — SIGNIFICANT CHANGE UP (ref 3.8–10.5)
WBC # FLD AUTO: 5.92 K/UL — SIGNIFICANT CHANGE UP (ref 3.8–10.5)

## 2022-10-16 PROCEDURE — 99233 SBSQ HOSP IP/OBS HIGH 50: CPT | Mod: GC

## 2022-10-16 RX ORDER — LORATADINE 10 MG/1
10 TABLET ORAL ONCE
Refills: 0 | Status: COMPLETED | OUTPATIENT
Start: 2022-10-16 | End: 2022-10-16

## 2022-10-16 RX ORDER — DEXTROSE 50 % IN WATER 50 %
15 SYRINGE (ML) INTRAVENOUS ONCE
Refills: 0 | Status: COMPLETED | OUTPATIENT
Start: 2022-10-16 | End: 2022-10-16

## 2022-10-16 RX ORDER — SODIUM CHLORIDE 9 MG/ML
1000 INJECTION INTRAMUSCULAR; INTRAVENOUS; SUBCUTANEOUS
Refills: 0 | Status: DISCONTINUED | OUTPATIENT
Start: 2022-10-16 | End: 2022-10-17

## 2022-10-16 RX ORDER — INSULIN GLARGINE 100 [IU]/ML
20 INJECTION, SOLUTION SUBCUTANEOUS AT BEDTIME
Refills: 0 | Status: DISCONTINUED | OUTPATIENT
Start: 2022-10-16 | End: 2022-10-20

## 2022-10-16 RX ORDER — INSULIN GLARGINE 100 [IU]/ML
22 INJECTION, SOLUTION SUBCUTANEOUS AT BEDTIME
Refills: 0 | Status: DISCONTINUED | OUTPATIENT
Start: 2022-10-16 | End: 2022-10-16

## 2022-10-16 RX ORDER — INSULIN LISPRO 100/ML
2 VIAL (ML) SUBCUTANEOUS
Refills: 0 | Status: DISCONTINUED | OUTPATIENT
Start: 2022-10-16 | End: 2022-10-20

## 2022-10-16 RX ORDER — ACETAMINOPHEN 500 MG
500 TABLET ORAL ONCE
Refills: 0 | Status: COMPLETED | OUTPATIENT
Start: 2022-10-16 | End: 2022-10-16

## 2022-10-16 RX ADMIN — CARVEDILOL PHOSPHATE 25 MILLIGRAM(S): 80 CAPSULE, EXTENDED RELEASE ORAL at 17:13

## 2022-10-16 RX ADMIN — Medication 15 GRAM(S): at 07:40

## 2022-10-16 RX ADMIN — Medication 15 GRAM(S): at 08:01

## 2022-10-16 RX ADMIN — Medication 650 MILLIGRAM(S): at 15:59

## 2022-10-16 RX ADMIN — Medication 650 MILLIGRAM(S): at 15:29

## 2022-10-16 RX ADMIN — Medication 3: at 11:40

## 2022-10-16 RX ADMIN — Medication 200 MILLIGRAM(S): at 21:11

## 2022-10-16 RX ADMIN — ATORVASTATIN CALCIUM 40 MILLIGRAM(S): 80 TABLET, FILM COATED ORAL at 21:16

## 2022-10-16 RX ADMIN — INSULIN GLARGINE 20 UNIT(S): 100 INJECTION, SOLUTION SUBCUTANEOUS at 21:16

## 2022-10-16 RX ADMIN — Medication 10 MILLIGRAM(S): at 21:16

## 2022-10-16 RX ADMIN — Medication 145 MILLIGRAM(S): at 11:20

## 2022-10-16 RX ADMIN — Medication 15 MILLILITER(S): at 23:17

## 2022-10-16 RX ADMIN — BUDESONIDE AND FORMOTEROL FUMARATE DIHYDRATE 2 PUFF(S): 160; 4.5 AEROSOL RESPIRATORY (INHALATION) at 05:34

## 2022-10-16 RX ADMIN — Medication 2 UNIT(S): at 16:38

## 2022-10-16 RX ADMIN — BUDESONIDE AND FORMOTEROL FUMARATE DIHYDRATE 2 PUFF(S): 160; 4.5 AEROSOL RESPIRATORY (INHALATION) at 18:44

## 2022-10-16 RX ADMIN — Medication 500 MILLIGRAM(S): at 21:41

## 2022-10-16 RX ADMIN — HEPARIN SODIUM 5000 UNIT(S): 5000 INJECTION INTRAVENOUS; SUBCUTANEOUS at 05:33

## 2022-10-16 RX ADMIN — LORATADINE 10 MILLIGRAM(S): 10 TABLET ORAL at 21:57

## 2022-10-16 RX ADMIN — TICAGRELOR 90 MILLIGRAM(S): 90 TABLET ORAL at 05:31

## 2022-10-16 RX ADMIN — CHLORHEXIDINE GLUCONATE 1 APPLICATION(S): 213 SOLUTION TOPICAL at 05:36

## 2022-10-16 RX ADMIN — SODIUM CHLORIDE 80 MILLILITER(S): 9 INJECTION INTRAMUSCULAR; INTRAVENOUS; SUBCUTANEOUS at 11:58

## 2022-10-16 RX ADMIN — CARVEDILOL PHOSPHATE 25 MILLIGRAM(S): 80 CAPSULE, EXTENDED RELEASE ORAL at 05:32

## 2022-10-16 RX ADMIN — Medication 81 MILLIGRAM(S): at 11:19

## 2022-10-16 RX ADMIN — PANTOPRAZOLE SODIUM 40 MILLIGRAM(S): 20 TABLET, DELAYED RELEASE ORAL at 05:31

## 2022-10-16 RX ADMIN — Medication 1: at 16:38

## 2022-10-16 RX ADMIN — HEPARIN SODIUM 5000 UNIT(S): 5000 INJECTION INTRAVENOUS; SUBCUTANEOUS at 13:50

## 2022-10-16 RX ADMIN — HEPARIN SODIUM 5000 UNIT(S): 5000 INJECTION INTRAVENOUS; SUBCUTANEOUS at 21:17

## 2022-10-16 RX ADMIN — TICAGRELOR 90 MILLIGRAM(S): 90 TABLET ORAL at 17:12

## 2022-10-16 RX ADMIN — AMLODIPINE BESYLATE 10 MILLIGRAM(S): 2.5 TABLET ORAL at 05:31

## 2022-10-16 NOTE — PROGRESS NOTE ADULT - ASSESSMENT
47M smoker w/ hx of vertigo, diverticulitis s/p LAR, ETOH abuse (sober x5 years), DM (patient states has been on Insulin x10 years was never on oral agents), CKD IV - baseline creatinine 2.4, HTN, AMBER, who presented to [facility] on 10/2 w/ /133, dizziness, diplopia, headache and chest tightness, admitted w/ c/f NSTEMI and chronic lacunar infarcts/microvascular changes.

## 2022-10-16 NOTE — PROVIDER CONTACT NOTE (HYPOGLYCEMIA EVENT) - NS PROVIDER CONTACT RECOMMEND-HYPO
Will continue to monitor. Followed the hypoglycemia protocol. dextrose gel given x2. Fs after ist dose 72. FS after 2nd dose- 142

## 2022-10-16 NOTE — PROGRESS NOTE ADULT - PROBLEM SELECTOR PLAN 6
DVT ppx: subQ heparin  Diet: DASH/TLC  Dispo: Acute Rehab  Full code Home regimen 40U long-acting, 5U short-acting pre-meal.  - patient moderately hypoglycemia 10/16 AM on 28U lantus; wnl to ~220s @ premeals  - decrease lantus to 22U, continue 4U pre-meal short acting (will monitor premeal FS to assess if need to increase) Home regimen 40U long-acting, 5U short-acting pre-meal.  - patient moderately hypoglycemia 10/16 AM on 28U lantus; wnl to ~220s @ premeals  - decrease lantus to 20U, 4U pre-meal short acting was d/mesfin 10/14, will restart at 2U and titrate as needed

## 2022-10-16 NOTE — PROGRESS NOTE ADULT - PROBLEM SELECTOR PLAN 2
Transfer from [facility] for cardiac cath given c/f NSTEMI. Coronary CT showed moderate stenosis of prox RCA and LAD. Appreciate cardiology workup - cath completed 10/13, showed nonobstructive CAD with non-elevated filling pressures.  - continue coreg, atorvastatin (holding ACE/ARB for BILLY)  - continue aspirin + brilinta  - EKG and troponins if chest pain Cr now 2.98 (10/14)->2.78->3.04. Increase likely 2/2 contrast during cath 10/14. [place]/Lambda 9.26/4.63.  - hold ACE/ARB  - nephro following, appreciate recs  - received mIVF 10/14 s/p cath, has been off fluids for ~24h which explains new increase in Cr  - will restart NS @ 80cc/hr for 12h and reevaluate

## 2022-10-16 NOTE — PROGRESS NOTE ADULT - PROBLEM SELECTOR PLAN 5
Home regimen 40U long-acting, 5U short-acting pre-meal.  - patient moderately hypoglycemia in AMs; wnl @ premeals  - decrease lantus to 28U, continue 4U pre-meal short acting. #Impaired executive function  MRI w/ contrast showing multifocal lacunar infarcts and chronic microvascular ischemic changes, consistent with MRI completed @ [facility]; MRI findings do not correlate well with clinical findings. CTA head/neck negative for acute stroke.  - etiology for ischemic changes unclear, TTE bubble study w/ no evidence of PFO, consider hx of smoking, NSTEMI  - discussed MRI findings w/ neuro consult team, no acute intervention or further workup indicated, patient should follow-up outpatient  - discussed w/ speech pathologist who noted deficits in memory, word finding, overall executive function w/ multiple compensatory mechanisms, and that patient's family hx is notable for early onset dementia (diagnosed @ ~48 and  @ 63)  - PT eval recommending [facility], rehab consult recommending acute rehab, will discuss w/ case management  - patient cleared for full AC per neuro  - aspirin + brilinta

## 2022-10-16 NOTE — PROGRESS NOTE ADULT - PROBLEM SELECTOR PLAN 1
#Headache/Dizziness  Pt had diplopia since 09/30, initially started as decreased visual acuity. Evaluated by opthalmology and neurology at [facility], symptoms consistent w/ lateral rectus palsy, MRI without clear source of symptoms. Consider vasculopathic etiology 2/2 severe HTN and/or DM. Patient additionally has sx of persistent headache and dizziness/unsteadiness which do not improve with eye patch, etiology is unclear and requires further workup outpatient.  - neurology team @ [facility] recommended considering topiramate/Celexa/verapamil for headache ppx  - patient evaluated by ophthalmology at [facility], recommended outpatient f/u w/ retina specialist Dr. Jose Angel Arvizu for OCT nerve/RNFL for evaluation for diabetic and hypertensive retinopathy; if diplopia/palsy persists should continue outpatient followup for [place] prisms; curbsided ophtho team who agree with recommendations  - appreciate ophtho eval, noted moderate non-proliferative diabetic and hypertensive retinopathy, recommending outpatient follow-up

## 2022-10-16 NOTE — PROGRESS NOTE ADULT - PROBLEM SELECTOR PLAN 7
DVT ppx: subQ heparin  Diet: DASH/TLC  Dispo: [facility] vs. Acute Rehab  Full code DVT ppx: subQ heparin  Diet: DASH/TLC  Dispo: acute rehab  Full code

## 2022-10-16 NOTE — PROVIDER CONTACT NOTE (HYPOGLYCEMIA EVENT) - NS PROVIDER CONTACT BACKGROUND-HYPO
Age: 47y    Gender: Male    POCT Blood Glucose:  252 mg/dL (10-16-22 @ 11:25)  142 mg/dL (10-16-22 @ 08:28)  72 mg/dL (10-16-22 @ 07:56)  67 mg/dL (10-16-22 @ 07:31)  67 mg/dL (10-16-22 @ 07:30)  222 mg/dL (10-15-22 @ 21:30)  165 mg/dL (10-15-22 @ 16:05)      eMAR:atorvastatin   40 milliGRAM(s) Oral (10-15-22 @ 22:11)    dextrose Oral Gel   15 Gram(s) Oral (10-16-22 @ 07:40)    dextrose Oral Gel   15 Gram(s) Oral (10-16-22 @ 08:01)    fenofibrate Tablet   145 milliGRAM(s) Oral (10-16-22 @ 11:20)    insulin glargine Injectable (LANTUS)   28 Unit(s) SubCutaneous (10-15-22 @ 22:11)    insulin lispro (ADMELOG) corrective regimen sliding scale   3 Unit(s) SubCutaneous (10-16-22 @ 11:40)   1 Unit(s) SubCutaneous (10-15-22 @ 17:04)

## 2022-10-16 NOTE — PROGRESS NOTE ADULT - PROBLEM SELECTOR PLAN 4
#Impaired executive function  MRI w/ contrast showing multifocal lacunar infarcts and chronic microvascular ischemic changes, consistent with MRI completed @ [facility]; MRI findings do not correlate well with clinical findings. CTA head/neck negative for acute stroke.  - etiology for ischemic changes unclear, TTE bubble study w/ no evidence of PFO, consider hx of smoking, NSTEMI  - discussed MRI findings w/ neuro consult team, no acute intervention or further workup indicated, patient should follow-up outpatient  - discussed w/ speech pathologist who noted deficits in memory, word finding, overall executive function w/ multiple compensatory mechanisms, and that patient's family hx is notable for early onset dementia (diagnosed @ ~50 and  @ 63)  - PT eval recommending [facility], rehab consult recommending acute rehab, will discuss w/ case management  - patient cleared for full AC per neuro  - aspirin + brilinta Transfer from [facility] for cardiac cath given c/f NSTEMI. Coronary CT showed moderate stenosis of prox RCA and LAD. Appreciate cardiology workup - cath completed 10/13, showed nonobstructive CAD with non-elevated filling pressures.  - continue coreg, atorvastatin (holding ACE/ARB for BILLY)  - continue aspirin + brilinta  - EKG and troponins if chest pain

## 2022-10-16 NOTE — PROGRESS NOTE ADULT - SUBJECTIVE AND OBJECTIVE BOX
Internal Medicine   Jesus Hagen | PGY-1    OVERNIGHT EVENTS: Hypoglycemic to 67 this AM.    SUBJECTIVE: Endorses hypoglycemia as measured on FS, continued headache localized to R frontal region. Also notes apnea symptoms persistent since Friday - will lie down flat and experience choking sensations ~30min into rest, has been sleeping slightly upright as a result of this. Notes that he has experienced intermittent episodes of these symptoms over these past few years, never worked up or trialed w/ CPAP/BIPAP. Additionally notes persistent dry mouth.    MEDICATIONS  (STANDING):  amitriptyline 10 milliGRAM(s) Oral at bedtime  amLODIPine   Tablet 10 milliGRAM(s) Oral daily  aspirin enteric coated 81 milliGRAM(s) Oral daily  atorvastatin 40 milliGRAM(s) Oral at bedtime  budesonide  80 MICROgram(s)/formoterol 4.5 MICROgram(s) Inhaler 2 Puff(s) Inhalation two times a day  carvedilol 25 milliGRAM(s) Oral every 12 hours  chlorhexidine 2% Cloths 1 Application(s) Topical <User Schedule>  dextrose 5%. 1000 milliLiter(s) (100 mL/Hr) IV Continuous <Continuous>  dextrose 5%. 1000 milliLiter(s) (50 mL/Hr) IV Continuous <Continuous>  dextrose 50% Injectable 25 Gram(s) IV Push once  dextrose 50% Injectable 12.5 Gram(s) IV Push once  dextrose 50% Injectable 25 Gram(s) IV Push once  dextrose 50% Injectable 12.5 Gram(s) IV Push once  fenofibrate Tablet 145 milliGRAM(s) Oral daily  glucagon  Injectable 1 milliGRAM(s) IntraMuscular once  heparin   Injectable 5000 Unit(s) SubCutaneous every 8 hours  influenza   Vaccine 0.5 milliLiter(s) IntraMuscular once  insulin glargine Injectable (LANTUS) 22 Unit(s) SubCutaneous at bedtime  insulin lispro (ADMELOG) corrective regimen sliding scale   SubCutaneous three times a day before meals  insulin lispro (ADMELOG) corrective regimen sliding scale   SubCutaneous at bedtime  pantoprazole    Tablet 40 milliGRAM(s) Oral before breakfast  ticagrelor 90 milliGRAM(s) Oral every 12 hours    MEDICATIONS  (PRN):  acetaminophen     Tablet .. 650 milliGRAM(s) Oral every 6 hours PRN Temp greater or equal to 38C (100.4F), Mild Pain (1 - 3)  ALBUTerol    90 MICROgram(s) HFA Inhaler 2 Puff(s) Inhalation every 6 hours PRN Shortness of Breath and/or Wheezing  Biotene Dry Mouth Oral Rinse 15 milliLiter(s) Swish and Spit three times a day PRN Mouth Care  dextrose Oral Gel 15 Gram(s) Oral once PRN Blood Glucose LESS THAN 70 milliGRAM(s)/deciliter  sodium chloride 0.65% Nasal 1 Spray(s) Both Nostrils every 1 hour PRN Nasal Congestion    VITALS:  T(F): 97.4 (10-16-22 @ 05:28), Max: 98 (10-15-22 @ 11:16)  HR: 69 (10-16-22 @ 05:28) (69 - 74)  BP: 148/82 (10-16-22 @ 05:28) (126/73 - 148/82)  BP(mean): --  RR: 18 (10-16-22 @ 05:28) (18 - 18)  SpO2: 100% (10-16-22 @ 05:28) (99% - 100%)    PHYSICAL EXAM:   GENERAL: NAD, sitting upright in bed, alert and conversational  HEAD: Atraumatic, normocephalic  EYES: Apparent lateral rectus palsy, pupils equally round and slow to constrict to light, conjunctiva and sclera clear, no nystagmus noted  CHEST/LUNG: Transmitted upper airway rhonchi appreciated on R; no rales, wheezing, or rubs; unlabored respirations  HEART: Regular rate and rhythm; no murmurs, rubs, or gallops, normal S1/S2  ABDOMEN: Normal bowel sounds; soft, nontender, nondistended  EXTREMITIES: No clubbing, cyanosis, or edema  MSK: No gross deformities noted   NEURO: Grossly non-focal. Symmetric facial sensation.  SKIN: No rashes or lesions  PSYCH: Appropriate mood, affect     LABS:                      10.2   5.92  )-----------( 179      ( 16 Oct 2022 06:34 )             31.5     10-16  140  |  106  |  41<H>  ----------------------------<  91  4.5   |  22  |  3.04<H>    Ca    8.9      16 Oct 2022 06:34  Phos  3.5     10-16  Mg     2.1     10-16    Creatinine Trend: 3.04<--, 2.78<--, 2.98<--, 2.67<--, 2.92<--, 3.02<--    I&O's Summary  15 Oct 2022 07:01  -  16 Oct 2022 07:00  --------------------------------------------------------  IN: 240 mL / OUT: 0 mL / NET: 240 mL

## 2022-10-16 NOTE — PROGRESS NOTE ADULT - ATTENDING COMMENTS
Rosey Roche MD  Division of Hospital Medicine  [facility]   Available on Microsoft Teams - messages preferred prior to calls.    Patient seen and examined today with Team 2. Agree with above findings, assessment, and plan with the following additions/exceptions:    Overal, 46 yo smoker with PMH of of vertigo, diverticulitis s/p LAR, ETOH abuse (sober x5 years), DM (patient states has been on Insulin x10 years was never on oral agents), CKD IV - baseline creatinine 2.4, HTN, AMBER, who presented to [facility] on 10/2 w/ /133, dizziness, diplopia, headache and chest tightness, admitted with concern for NSTEMI and chronic lacunar infarcts/microvascular changes now s/p LHC without significant disease and MR negative for acute CVA.     Active Issues:  #non-obstructive CAD  #BILLY on CKD  #Hypoglycemia  #Diplopia 2/2 lateral rectus palsy  #Cognitive Impairment?    Plan:  - no further episodes of chest pain/tightness  - s/p dx C with non-obstructive CAD  - BILLY initially improving yesterday but worsening today. trial of IVF today  - symptomatic hypoglycemia this AM with worsening dizziness/lightheadedness  - decrease lantus to 20 units qHS, add pre-meal given FS do uptrend later in day  - will need outpatient f/u with neurology, optho, nephrology     DC planning to Acute Rehab - awaiting acceptance and will also need auth per discussion with CM today.  Will send updated COVID-19 PCR on 10/17 AM.    Rest as detailed in note above.    Plan discussed with patient and Team 2 resident Dr. Hagen.

## 2022-10-17 DIAGNOSIS — N17.9 ACUTE KIDNEY FAILURE, UNSPECIFIED: ICD-10-CM

## 2022-10-17 LAB
ANION GAP SERPL CALC-SCNC: 9 MMOL/L — SIGNIFICANT CHANGE UP (ref 5–17)
BASE EXCESS BLDV CALC-SCNC: -4.1 MMOL/L — LOW (ref -2–3)
BUN SERPL-MCNC: 36 MG/DL — HIGH (ref 7–23)
CALCIUM SERPL-MCNC: 9.1 MG/DL — SIGNIFICANT CHANGE UP (ref 8.4–10.5)
CHLORIDE SERPL-SCNC: 107 MMOL/L — SIGNIFICANT CHANGE UP (ref 96–108)
CO2 BLDV-SCNC: 24 MMOL/L — SIGNIFICANT CHANGE UP (ref 22–26)
CO2 SERPL-SCNC: 22 MMOL/L — SIGNIFICANT CHANGE UP (ref 22–31)
CREAT ?TM UR-MCNC: 88 MG/DL — SIGNIFICANT CHANGE UP
CREAT SERPL-MCNC: 2.67 MG/DL — HIGH (ref 0.5–1.3)
EGFR: 28 ML/MIN/1.73M2 — LOW
GAS PNL BLDV: SIGNIFICANT CHANGE UP
GLUCOSE BLDC GLUCOMTR-MCNC: 135 MG/DL — HIGH (ref 70–99)
GLUCOSE BLDC GLUCOMTR-MCNC: 155 MG/DL — HIGH (ref 70–99)
GLUCOSE BLDC GLUCOMTR-MCNC: 214 MG/DL — HIGH (ref 70–99)
GLUCOSE BLDC GLUCOMTR-MCNC: 269 MG/DL — HIGH (ref 70–99)
GLUCOSE SERPL-MCNC: 164 MG/DL — HIGH (ref 70–99)
HCO3 BLDV-SCNC: 22 MMOL/L — SIGNIFICANT CHANGE UP (ref 22–29)
HCT VFR BLD CALC: 32.1 % — LOW (ref 39–50)
HGB BLD-MCNC: 10.6 G/DL — LOW (ref 13–17)
MAGNESIUM SERPL-MCNC: 1.9 MG/DL — SIGNIFICANT CHANGE UP (ref 1.6–2.6)
MCHC RBC-ENTMCNC: 31 PG — SIGNIFICANT CHANGE UP (ref 27–34)
MCHC RBC-ENTMCNC: 33 GM/DL — SIGNIFICANT CHANGE UP (ref 32–36)
MCV RBC AUTO: 93.9 FL — SIGNIFICANT CHANGE UP (ref 80–100)
NRBC # BLD: 0 /100 WBCS — SIGNIFICANT CHANGE UP (ref 0–0)
PCO2 BLDV: 44 MMHG — SIGNIFICANT CHANGE UP (ref 42–55)
PH BLDV: 7.31 — LOW (ref 7.32–7.43)
PHOSPHATE SERPL-MCNC: 3.3 MG/DL — SIGNIFICANT CHANGE UP (ref 2.5–4.5)
PLATELET # BLD AUTO: 188 K/UL — SIGNIFICANT CHANGE UP (ref 150–400)
PO2 BLDV: 75 MMHG — HIGH (ref 25–45)
POTASSIUM SERPL-MCNC: 5 MMOL/L — SIGNIFICANT CHANGE UP (ref 3.5–5.3)
POTASSIUM SERPL-SCNC: 5 MMOL/L — SIGNIFICANT CHANGE UP (ref 3.5–5.3)
PROT ?TM UR-MCNC: 488 MG/DL — HIGH (ref 0–12)
PROT/CREAT UR-RTO: 5.5 RATIO — HIGH (ref 0–0.2)
RBC # BLD: 3.42 M/UL — LOW (ref 4.2–5.8)
RBC # FLD: 12.4 % — SIGNIFICANT CHANGE UP (ref 10.3–14.5)
SAO2 % BLDV: 95.3 % — HIGH (ref 67–88)
SARS-COV-2 RNA SPEC QL NAA+PROBE: SIGNIFICANT CHANGE UP
SODIUM SERPL-SCNC: 138 MMOL/L — SIGNIFICANT CHANGE UP (ref 135–145)
WBC # BLD: 5.68 K/UL — SIGNIFICANT CHANGE UP (ref 3.8–10.5)
WBC # FLD AUTO: 5.68 K/UL — SIGNIFICANT CHANGE UP (ref 3.8–10.5)

## 2022-10-17 PROCEDURE — 99232 SBSQ HOSP IP/OBS MODERATE 35: CPT | Mod: GC

## 2022-10-17 PROCEDURE — 99232 SBSQ HOSP IP/OBS MODERATE 35: CPT

## 2022-10-17 RX ORDER — BUMETANIDE 0.25 MG/ML
1 INJECTION INTRAMUSCULAR; INTRAVENOUS ONCE
Refills: 0 | Status: DISCONTINUED | OUTPATIENT
Start: 2022-10-17 | End: 2022-10-17

## 2022-10-17 RX ORDER — SODIUM CHLORIDE 0.65 %
1 AEROSOL, SPRAY (ML) NASAL THREE TIMES A DAY
Refills: 0 | Status: DISCONTINUED | OUTPATIENT
Start: 2022-10-17 | End: 2022-10-17

## 2022-10-17 RX ADMIN — Medication 2 UNIT(S): at 16:32

## 2022-10-17 RX ADMIN — PANTOPRAZOLE SODIUM 40 MILLIGRAM(S): 20 TABLET, DELAYED RELEASE ORAL at 06:01

## 2022-10-17 RX ADMIN — Medication 1: at 21:42

## 2022-10-17 RX ADMIN — TICAGRELOR 90 MILLIGRAM(S): 90 TABLET ORAL at 05:59

## 2022-10-17 RX ADMIN — BUDESONIDE AND FORMOTEROL FUMARATE DIHYDRATE 2 PUFF(S): 160; 4.5 AEROSOL RESPIRATORY (INHALATION) at 17:10

## 2022-10-17 RX ADMIN — HEPARIN SODIUM 5000 UNIT(S): 5000 INJECTION INTRAVENOUS; SUBCUTANEOUS at 21:41

## 2022-10-17 RX ADMIN — Medication 2 UNIT(S): at 07:53

## 2022-10-17 RX ADMIN — CARVEDILOL PHOSPHATE 25 MILLIGRAM(S): 80 CAPSULE, EXTENDED RELEASE ORAL at 05:58

## 2022-10-17 RX ADMIN — Medication 2 UNIT(S): at 11:35

## 2022-10-17 RX ADMIN — HEPARIN SODIUM 5000 UNIT(S): 5000 INJECTION INTRAVENOUS; SUBCUTANEOUS at 13:43

## 2022-10-17 RX ADMIN — ATORVASTATIN CALCIUM 40 MILLIGRAM(S): 80 TABLET, FILM COATED ORAL at 21:41

## 2022-10-17 RX ADMIN — Medication 145 MILLIGRAM(S): at 11:34

## 2022-10-17 RX ADMIN — Medication 10 MILLIGRAM(S): at 21:40

## 2022-10-17 RX ADMIN — CARVEDILOL PHOSPHATE 25 MILLIGRAM(S): 80 CAPSULE, EXTENDED RELEASE ORAL at 17:10

## 2022-10-17 RX ADMIN — Medication 1: at 07:53

## 2022-10-17 RX ADMIN — Medication 2: at 11:35

## 2022-10-17 RX ADMIN — Medication 1 SPRAY(S): at 04:28

## 2022-10-17 RX ADMIN — HEPARIN SODIUM 5000 UNIT(S): 5000 INJECTION INTRAVENOUS; SUBCUTANEOUS at 05:59

## 2022-10-17 RX ADMIN — BUDESONIDE AND FORMOTEROL FUMARATE DIHYDRATE 2 PUFF(S): 160; 4.5 AEROSOL RESPIRATORY (INHALATION) at 06:01

## 2022-10-17 RX ADMIN — INSULIN GLARGINE 20 UNIT(S): 100 INJECTION, SOLUTION SUBCUTANEOUS at 21:42

## 2022-10-17 RX ADMIN — Medication 81 MILLIGRAM(S): at 11:35

## 2022-10-17 RX ADMIN — AMLODIPINE BESYLATE 10 MILLIGRAM(S): 2.5 TABLET ORAL at 05:59

## 2022-10-17 RX ADMIN — CHLORHEXIDINE GLUCONATE 1 APPLICATION(S): 213 SOLUTION TOPICAL at 06:00

## 2022-10-17 NOTE — PROGRESS NOTE ADULT - ASSESSMENT
47M smoker w/ hx of vertigo, diverticulitis s/p LAR, ETOH abuse (sober x5 years), DM (patient states has been on Insulin x10 years was never on oral agents), CKD IV - baseline creatinine 2.4, HTN, AMBER, who presented to [facility] on 10/2 w/ /133, dizziness, diplopia, headache and chest tightness, admitted w/ c/f NSTEMI and chronic lacunar infarcts/microvascular changes.

## 2022-10-17 NOTE — PROGRESS NOTE ADULT - SUBJECTIVE AND OBJECTIVE BOX
Internal Medicine   Jesus Hagen | PGY-1    OVERNIGHT EVENTS: No acute overnight events.    SUBJECTIVE: States he had a "rough night". Headache persisted since last night and has kept him from sleeping comfortably. Starts in sinuses and moves superiorly then posteriorly through his head. Endorses continued popping sensation in his eyes as well as apneic symptoms (awakening w/ gasping).    MEDICATIONS  (STANDING):  amitriptyline 10 milliGRAM(s) Oral at bedtime  amLODIPine   Tablet 10 milliGRAM(s) Oral daily  aspirin enteric coated 81 milliGRAM(s) Oral daily  atorvastatin 40 milliGRAM(s) Oral at bedtime  budesonide  80 MICROgram(s)/formoterol 4.5 MICROgram(s) Inhaler 2 Puff(s) Inhalation two times a day  carvedilol 25 milliGRAM(s) Oral every 12 hours  chlorhexidine 2% Cloths 1 Application(s) Topical <User Schedule>  dextrose 5%. 1000 milliLiter(s) (100 mL/Hr) IV Continuous <Continuous>  dextrose 5%. 1000 milliLiter(s) (50 mL/Hr) IV Continuous <Continuous>  dextrose 50% Injectable 25 Gram(s) IV Push once  dextrose 50% Injectable 12.5 Gram(s) IV Push once  dextrose 50% Injectable 25 Gram(s) IV Push once  dextrose 50% Injectable 12.5 Gram(s) IV Push once  fenofibrate Tablet 145 milliGRAM(s) Oral daily  glucagon  Injectable 1 milliGRAM(s) IntraMuscular once  heparin   Injectable 5000 Unit(s) SubCutaneous every 8 hours  influenza   Vaccine 0.5 milliLiter(s) IntraMuscular once  insulin glargine Injectable (LANTUS) 20 Unit(s) SubCutaneous at bedtime  insulin lispro (ADMELOG) corrective regimen sliding scale   SubCutaneous three times a day before meals  insulin lispro (ADMELOG) corrective regimen sliding scale   SubCutaneous at bedtime  insulin lispro Injectable (ADMELOG) 2 Unit(s) SubCutaneous three times a day before meals  pantoprazole    Tablet 40 milliGRAM(s) Oral before breakfast  sodium chloride 0.9%. 1000 milliLiter(s) (80 mL/Hr) IV Continuous <Continuous>    MEDICATIONS  (PRN):  acetaminophen     Tablet .. 650 milliGRAM(s) Oral every 6 hours PRN Temp greater or equal to 38C (100.4F), Mild Pain (1 - 3)  ALBUTerol    90 MICROgram(s) HFA Inhaler 2 Puff(s) Inhalation every 6 hours PRN Shortness of Breath and/or Wheezing  Biotene Dry Mouth Oral Rinse 15 milliLiter(s) Swish and Spit three times a day PRN Mouth Care  dextrose Oral Gel 15 Gram(s) Oral once PRN Blood Glucose LESS THAN 70 milliGRAM(s)/deciliter  sodium chloride 0.65% Nasal 1 Spray(s) Both Nostrils every 1 hour PRN Nasal Congestion    VITALS:  T(F): 98.4 (10-17-22 @ 11:01), Max: 98.4 (10-17-22 @ 11:01)  HR: 73 (10-17-22 @ 11:01) (72 - 79)  BP: 161/79 (10-17-22 @ 11:01) (139/67 - 161/79)  BP(mean): --  RR: 17 (10-17-22 @ 11:01) (17 - 18)  SpO2: 100% (10-17-22 @ 11:01) (98% - 100%)    PHYSICAL EXAM:   GENERAL: NAD, sitting on side of bed, alert and conversational, sounds somewhat congested  HEAD: Atraumatic, normocephalic  EYES: apparent lateral rectus palsy, photophobia in R eye subjectively improved though persists, conjunctiva and sclera clear, no nystagmus noted  ENT: Moist mucous membranes  CHEST/LUNG: Clear to auscultation bilaterally; no rales, rhonchi, wheezing, or rubs; unlabored respirations  HEART: Regular rate and rhythm; no murmurs, rubs, or gallops, normal S1/S2  ABDOMEN: Normal bowel sounds; soft, nontender, nondistended  EXTREMITIES: No clubbing, cyanosis, or edema  MSK: No gross deformities noted   Neurological: Grossly nonfocal except as noted above  SKIN: No rashes or lesions  PSYCH: Appropriate mood, affect     LABS:                      10.6   5.68  )-----------( 188      ( 17 Oct 2022 07:34 )             32.1     10-17  138  |  107  |  36<H>  ----------------------------<  164<H>  5.0   |  22  |  2.67<H>    Ca    9.1      17 Oct 2022 07:37  Phos  3.3     10-17  Mg     1.9     10-17    Creatinine Trend: 2.67<--, 3.04<--, 2.78<--, 2.98<--, 2.67<--, 2.92<--    I&O's Summary  16 Oct 2022 07:01  -  17 Oct 2022 07:00  --------------------------------------------------------  IN: 1900 mL / OUT: 2200 mL / NET: -300 mL    17 Oct 2022 07:01  -  17 Oct 2022 15:17  --------------------------------------------------------  IN: 480 mL / OUT: 0 mL / NET: 480 mL

## 2022-10-17 NOTE — PROGRESS NOTE ADULT - PROBLEM SELECTOR PLAN 1
Patient with BILLY on CKD stage 4 likely in the setting of CRS. Scr~1.7 in May, 2022. Scr was elevated at  2-2.4 in July. Now patient presented to [facility] for LHC. Scr on admission elevated at 2.7. Patient likely has baseline CKD from long standing DM. Patient received neuro imaging with contrast on 10/4 and CT angio cardiac with pre/post hydration. Renal US showing bilateral echogenicity suggestive of CKD but no hydronephrosis/ no stones. Spot urine TP/CR was elevated at 3.4. Hep B and C negative. serum Kappa/lambda elevated at 2.   Further work for nephrotic range proteinuria was done - Anti PLA2R Ab neg, serum JOSE no bands seen, HBsAb negative and Hep C core Ab negative. Labs reviewed. Pt clinically has LE edema. Pt non oliguric, UOP ~ 2.2 L in last 24 hrs. Recommend IV Bumex 1 gm once today.  Monitor labs and urine output. Avoid any potential nephrotoxins. Dose medications as per eGFR.     If you have any questions, please feel free to contact me  Jay Cates  Nephrology Fellow  [phone]/ [employer] Teams(Preferred)  (After 5pm or on weekends please page the on-call fellow). Patient with BILLY on CKD stage 4 likely in the setting of CRS. Scr~1.7 in May, 2022. Scr was elevated at  2-2.4 in July. Now patient presented to [facility] for LHC. Scr on admission elevated at 2.7. Patient likely has baseline CKD from long standing DM. Patient received neuro imaging with contrast on 10/4 and CT angio cardiac with pre/post hydration.     Renal US showing bilateral echogenicity suggestive of CKD but no hydronephrosis/ no stones. Spot urine TP/CR was elevated at 3.4. Hep B and C negative. serum Kappa/lambda elevated at 2.   Further work for nephrotic range proteinuria was done - Anti PLA2R Ab neg, serum JOSE no bands seen, HBsAb negative and Hep C core Ab negative. Labs reviewed. Pt clinically has LE edema. Pt non oliguric, UOP ~ 2.2 L in last 24 hrs. Recommend IV Bumex 1 gm once today.      Monitor labs and urine output. Avoid any potential nephrotoxins. Dose medications as per eGFR.     If you have any questions, please feel free to contact me  Jay Cates  Nephrology Fellow  [phone]/ [employer] Teams(Preferred)  (After 5pm or on weekends please page the on-call fellow).

## 2022-10-17 NOTE — PROGRESS NOTE ADULT - PROBLEM SELECTOR PLAN 4
Transfer from [facility] for cardiac cath given c/f NSTEMI. Coronary CT showed moderate stenosis of prox RCA and LAD. Appreciate cardiology workup - cath completed 10/13, showed nonobstructive CAD with non-elevated filling pressures.  - continue coreg, atorvastatin (holding ACE/ARB for BILLY)  - continue aspirin + brilinta  - EKG and troponins if chest pain Transfer from [facility] for cardiac cath given c/f NSTEMI. Coronary CT showed moderate stenosis of prox RCA and LAD. Appreciate cardiology workup - cath completed 10/13, showed nonobstructive CAD with non-elevated filling pressures.  - continue coreg, atorvastatin (holding ACE/ARB for BILLY)  - continue aspirin, stop brilinta  - EKG and troponins if chest pain

## 2022-10-17 NOTE — PROGRESS NOTE ADULT - PROBLEM SELECTOR PLAN 5
#Impaired executive function  MRI w/ contrast showing multifocal lacunar infarcts and chronic microvascular ischemic changes, consistent with MRI completed @ [facility]; MRI findings do not correlate well with clinical findings. CTA head/neck negative for acute stroke.  - etiology for ischemic changes unclear, TTE bubble study w/ no evidence of PFO, consider hx of smoking, NSTEMI  - discussed MRI findings w/ neuro consult team, no acute intervention or further workup indicated, patient should follow-up outpatient  - discussed w/ speech pathologist who noted deficits in memory, word finding, overall executive function w/ multiple compensatory mechanisms, and that patient's family hx is notable for early onset dementia (diagnosed @ ~50 and  @ 63)  - PT eval recommending [facility], rehab consult recommending acute rehab, will discuss w/ case management  - patient cleared for full AC per neuro  - aspirin + brilinta

## 2022-10-17 NOTE — PROGRESS NOTE ADULT - SUBJECTIVE AND OBJECTIVE BOX
SUBJ:    seen in chair eating  c/o dizziness with standing   denies CP, dyspnea       hydroCHLOROthiazide 25 mg oral tablet: 1 tab(s) orally once a day  meclizine 25 mg oral tablet: 1 tab(s) orally 3 times a day      MEDICATIONS  (STANDING):  amitriptyline 10 milliGRAM(s) Oral at bedtime  amLODIPine   Tablet 10 milliGRAM(s) Oral daily  aspirin enteric coated 81 milliGRAM(s) Oral daily  atorvastatin 40 milliGRAM(s) Oral at bedtime  budesonide  80 MICROgram(s)/formoterol 4.5 MICROgram(s) Inhaler 2 Puff(s) Inhalation two times a day  carvedilol 25 milliGRAM(s) Oral every 12 hours  chlorhexidine 2% Cloths 1 Application(s) Topical <User Schedule>  dextrose 5%. 1000 milliLiter(s) (100 mL/Hr) IV Continuous <Continuous>  dextrose 5%. 1000 milliLiter(s) (50 mL/Hr) IV Continuous <Continuous>  dextrose 50% Injectable 25 Gram(s) IV Push once  dextrose 50% Injectable 12.5 Gram(s) IV Push once  dextrose 50% Injectable 25 Gram(s) IV Push once  dextrose 50% Injectable 12.5 Gram(s) IV Push once  fenofibrate Tablet 145 milliGRAM(s) Oral daily  glucagon  Injectable 1 milliGRAM(s) IntraMuscular once  heparin   Injectable 5000 Unit(s) SubCutaneous every 8 hours  influenza   Vaccine 0.5 milliLiter(s) IntraMuscular once  insulin glargine Injectable (LANTUS) 20 Unit(s) SubCutaneous at bedtime  insulin lispro (ADMELOG) corrective regimen sliding scale   SubCutaneous three times a day before meals  insulin lispro (ADMELOG) corrective regimen sliding scale   SubCutaneous at bedtime  insulin lispro Injectable (ADMELOG) 2 Unit(s) SubCutaneous three times a day before meals  pantoprazole    Tablet 40 milliGRAM(s) Oral before breakfast  sodium chloride 0.9%. 1000 milliLiter(s) (80 mL/Hr) IV Continuous <Continuous>    MEDICATIONS  (PRN):  acetaminophen     Tablet .. 650 milliGRAM(s) Oral every 6 hours PRN Temp greater or equal to 38C (100.4F), Mild Pain (1 - 3)  ALBUTerol    90 MICROgram(s) HFA Inhaler 2 Puff(s) Inhalation every 6 hours PRN Shortness of Breath and/or Wheezing  Biotene Dry Mouth Oral Rinse 15 milliLiter(s) Swish and Spit three times a day PRN Mouth Care  dextrose Oral Gel 15 Gram(s) Oral once PRN Blood Glucose LESS THAN 70 milliGRAM(s)/deciliter  sodium chloride 0.65% Nasal 1 Spray(s) Both Nostrils every 1 hour PRN Nasal Congestion      Vital Signs Last 24 Hrs  T(C): 36.9 (17 Oct 2022 11:01), Max: 36.9 (17 Oct 2022 11:01)  T(F): 98.4 (17 Oct 2022 11:01), Max: 98.4 (17 Oct 2022 11:01)  HR: 73 (17 Oct 2022 11:01) (72 - 79)  BP: 161/79 (17 Oct 2022 11:01) (139/67 - 161/79)  BP(mean): --  RR: 17 (17 Oct 2022 11:01) (17 - 18)  SpO2: 100% (17 Oct 2022 11:01) (98% - 100%)    Parameters below as of 17 Oct 2022 11:01  Patient On (Oxygen Delivery Method): room air    REVIEW OF SYSTEMS:  As per HPI, otherwise unremarkable.     PHYSICAL EXAM:  Constitutional/Appearance: Normal, Well-developed  HEENT:   Normal oral mucosa, no drainage or redness, supple neck  Lymphatic: No lymphadenopathy  Cardiovascular: Normal S1 S2, No edema, RRR  Respiratory: Lungs clear to auscultation, respirations non-labored  Psychiatry: A & O x 3, appropriate affect.   Gastrointestinal:  Soft, Non-tender, no distention  Skin: No rashes, No ecchymoses, No cyanosis	  Neurologic: Non-focal, Alert and oriented x 3  Extremities: Normal range of motion  Vascular: Peripheral pulses palpable 2+ bilaterally (radial)      TTE: 10/8/022 EF 59% 1. Normal mitral valve. No mitral regurgitation seen. 2. Normal trileaflet aortic valve. No aortic valve regurgitation seen. 3. Mildly dilated left atrium.  LA volume index = 36 cc/m2. 4. Normal left ventricular systolic function. No segmental wall motion abnormalities. 5. Normal diastolic function 6. Normal right ventricular size and function.    LABS:  CBC Full  -  ( 17 Oct 2022 07:34 )  WBC Count : 5.68 K/uL  RBC Count : 3.42 M/uL  Hemoglobin : 10.6 g/dL  Hematocrit : 32.1 %  Platelet Count - Automated : 188 K/uL  Mean Cell Volume : 93.9 fl  Mean Cell Hemoglobin : 31.0 pg  Mean Cell Hemoglobin Concentration : 33.0 gm/dL    10-17    138  |  107  |  36<H>  ----------------------------<  164<H>  5.0   |  22  |  2.67<H>    Ca    9.1      17 Oct 2022 07:37  Phos  3.3     10-17  Mg     1.9     10-17    IMPRESSION AND PLAN:      46 y/o male, current cigarette smoker, with PMHx of Vertigo, Diverticulitis s/p LAR, ETOH abuse now sober x 5 years, DM ( on Insulin x 10 years was never on oral agents), CKD IV - baseline creatinine 2.4, HTN, AMBER, who presented to [facility] on 10/2 c/o dizziness, b/l diplopia, headache and chest tightness. diagnosis: NSTEMI/ BILLY on CKD/ Diplopia/lacumar CVA    - I have reviewed the echo cardiogram and images are NOT typical of Amyloid- renal dfnx likely due to Diabetes    -Cardiac catheterization showing nonobstructive CAD without elevated filling pressures. Continue medical management for non obstructive CAD .   aspirin enteric coated 81 milliGRAM(s) Oral daily  atorvastatin 40 milliGRAM(s) Oral at bedtime  fenofibrate Tablet 145 milliGRAM(s) Oral daily    BP (139/67 - 161/79)  c/w amLODIPine   Tablet 10 milliGRAM(s) Oral daily  c/w carvedilol 25 milliGRAM(s) Oral every 12 hours  can restart HCTZ 25 Mg (home med)     -No events on telemetry.   - Patient should follow-up with cardiology after discharge.  -Patient for acute rehab placement      Marcelina Garcia MD  Cardiology Attending  [facility]/ [facility]    For day time coverage Mon-Fri see Non-Service Consult Attending on [email or web address], password: [email or web address]; daytime weekends covered by general cardiology consult service attending.)

## 2022-10-17 NOTE — PROGRESS NOTE ADULT - PROBLEM SELECTOR PLAN 2
Cr now 2.98 (10/14)->2.78->3.04. Increase likely 2/2 contrast during cath 10/14. [place]/Lambda 9.26/4.63.  - hold ACE/ARB  - nephro following, appreciate recs  - received mIVF 10/14 s/p cath, has been off fluids for ~24h which explains new increase in Cr  - will restart NS @ 80cc/hr for 12h and reevaluate Cr improving, now 2.67. Increase likely 2/2 contrast during cath 10/14. [place]/Lambda 9.26/4.63. Nephrology working up nephrotic range proteinuria, appreciate recs  - hold ACE/ARB  - hold off on further IVF for now

## 2022-10-17 NOTE — PROGRESS NOTE ADULT - ATTENDING COMMENTS
Patient seen and evaluated. headache overnight, currently 3/10. Associated with light sensitivity and lacrimation. On exam, besides known lacteral rectus palsy neurological exam without change.     #headache  -based on symptoms could be migraine vs. cluster headache.   -if recurs, will trial oxygen therapy. If improves, then can trial Ca channel blockade.   -if does not work can attempt triptan (team to review with pharmacy if any relative contra-indications).  -outpatient neuro follow up. Already on amitrypytline so can pursue further prophylaxis.     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 \  -optho appreciated, no inpatient intervention,outpatient follow up for possible PRISM device.    #BILLY on CKD IV  improived with contrast  -non-obstructive cath, not cardiorenal.   -needs secondary HTN workup can be pursued as outpatient. Patient with normal to high K, less likely hyperaldo state.   -As patient likely will not be on ACE-i secondary to Cre, can hold off renal duplex as will not change current medical management.  -eventual need for renal bx given nephrotic range proteinuria and no cause, howver was on DAPT in setting of concern for NSTEMI until 10/17.  -trend to plateau.   -avoid nephrotoxins.   -outpatient nephrology follow up     #HTN  -suspicion is being driven by kidneys.  -currently acceptable on current regimen.     #CAD  -non-obsructive disease, in setting of hypertensive crisis.  -d/c brilinta as no obstructive disease.   -contineu with statin, ASA.    #?memory issues  -appreciate speech and swallow. Continued issues with cognition. MR without acute infarct. Given family hx of early dementia, would benefit from outpatient neurology eval.    rest as above  d/c planning [facility] once accepted and auth goes through.

## 2022-10-17 NOTE — PROGRESS NOTE ADULT - SUBJECTIVE AND OBJECTIVE BOX
[facility], [place] DIVISION OF KIDNEY DISEASES AND HYPERTENSION -- FOLLOW UP NOTE  --------------------------------------------------------------------------------  Chief Complaint: 47-year-old Male w/ PMHx of EtOH abuse( now sober X5 years), DM, CKD IV (baseline creatinine 2.0-2.4 in July), HTN who presented to OSH with dizziness, diplopia, CP. Trop elevated with EKG changes. Patient transferred to [facility] on 10/4/22 for NSTEMI requiring LHC. Underwent LHC on 10/13/22. Nephrology team following for BILLY on CKD.    24 hour events/subjective:  Pt. seen and examined at bedside. Pt. denies any SOB, CP, HA, N/V, abdominal pain , urinary symptoms or dizziness.     PAST HISTORY  --------------------------------------------------------------------------------  No significant changes to PMH, PSH, FHx, SHx, unless otherwise noted    ALLERGIES & MEDICATIONS  --------------------------------------------------------------------------------  Allergies    No Known Allergies    Intolerances    Standing Inpatient Medications  amitriptyline 10 milliGRAM(s) Oral at bedtime  amLODIPine   Tablet 10 milliGRAM(s) Oral daily  aspirin enteric coated 81 milliGRAM(s) Oral daily  atorvastatin 40 milliGRAM(s) Oral at bedtime  budesonide  80 MICROgram(s)/formoterol 4.5 MICROgram(s) Inhaler 2 Puff(s) Inhalation two times a day  carvedilol 25 milliGRAM(s) Oral every 12 hours  chlorhexidine 2% Cloths 1 Application(s) Topical <User Schedule>  dextrose 5%. 1000 milliLiter(s) IV Continuous <Continuous>  dextrose 5%. 1000 milliLiter(s) IV Continuous <Continuous>  dextrose 50% Injectable 25 Gram(s) IV Push once  dextrose 50% Injectable 12.5 Gram(s) IV Push once  dextrose 50% Injectable 25 Gram(s) IV Push once  dextrose 50% Injectable 12.5 Gram(s) IV Push once  fenofibrate Tablet 145 milliGRAM(s) Oral daily  glucagon  Injectable 1 milliGRAM(s) IntraMuscular once  heparin   Injectable 5000 Unit(s) SubCutaneous every 8 hours  influenza   Vaccine 0.5 milliLiter(s) IntraMuscular once  insulin glargine Injectable (LANTUS) 20 Unit(s) SubCutaneous at bedtime  insulin lispro (ADMELOG) corrective regimen sliding scale   SubCutaneous three times a day before meals  insulin lispro (ADMELOG) corrective regimen sliding scale   SubCutaneous at bedtime  insulin lispro Injectable (ADMELOG) 2 Unit(s) SubCutaneous three times a day before meals  pantoprazole    Tablet 40 milliGRAM(s) Oral before breakfast  sodium chloride 0.9%. 1000 milliLiter(s) IV Continuous <Continuous>    PRN Inpatient Medications  acetaminophen     Tablet .. 650 milliGRAM(s) Oral every 6 hours PRN  ALBUTerol    90 MICROgram(s) HFA Inhaler 2 Puff(s) Inhalation every 6 hours PRN  Biotene Dry Mouth Oral Rinse 15 milliLiter(s) Swish and Spit three times a day PRN  dextrose Oral Gel 15 Gram(s) Oral once PRN  sodium chloride 0.65% Nasal 1 Spray(s) Both Nostrils every 1 hour PRN    REVIEW OF SYSTEMS  --------------------------------------------------------------------------------  Gen: No fevers   Respiratory: No dyspnea  CV: No chest pain  GI: No abdominal pain  : No dysuria  MSK: B/L LE edema ++  Neuro: no dizziness   All other systems were reviewed and are negative, except as noted.    VITALS/PHYSICAL EXAM  --------------------------------------------------------------------------------  T(C): 36.9 (10-17-22 @ 11:01), Max: 36.9 (10-17-22 @ 11:01)  HR: 73 (10-17-22 @ 11:01) (72 - 79)  BP: 161/79 (10-17-22 @ 11:01) (139/67 - 161/79)  RR: 17 (10-17-22 @ 11:01) (17 - 18)  SpO2: 100% (10-17-22 @ 11:01) (98% - 100%)  Wt(kg): --    10-16-22 @ 07:01  -  10-17-22 @ 07:00  --------------------------------------------------------  IN: 1900 mL / OUT: 2200 mL / NET: -300 mL    Physical Exam:  	Gen: NAD  	HEENT: MMM  	Pulm: CTA B/L  	CV: S1S2  	Abd: Soft, +BS   	Ext: B/L LE edema +  	Neuro: Awake  	Skin: Warm and dry  	Vascular access: IV peripheral canula    LABS/STUDIES  --------------------------------------------------------------------------------              10.6   5.68  >-----------<  188      [10-17-22 @ 07:34]              32.1     138  |  107  |  36  ----------------------------<  164      [10-17-22 @ 07:37]  5.0   |  22  |  2.67        Ca     9.1     [10-17-22 @ 07:37]      Mg     1.9     [10-17-22 @ 07:37]      Phos  3.3     [10-17-22 @ 07:37]    Creatinine Trend:  SCr 2.67 [10-17 @ 07:37]  SCr 3.04 [10-16 @ 06:34]  SCr 2.78 [10-15 @ 07:15]  SCr 2.98 [10-14 @ 06:25]  SCr 2.67 [10-13 @ 06:19]    Urinalysis - [10-05-22 @ 00:49]      Color Colorless / Appearance Clear / SG 1.008 / pH 6.0      Gluc 500 mg/dL / Ketone Negative  / Bili Negative / Urobili Negative       Blood Trace / Protein 100 mg/dL / Leuk Est Negative / Nitrite Negative      RBC 2 / WBC 1 / Hyaline  / Gran  / Sq Epi  / Non Sq Epi 0 / Bacteria Negative    Urine Creatinine 79      [10-11-22 @ 23:05]  Urine Protein 247      [10-13-22 @ 00:21]  Urine Sodium 52      [10-11-22 @ 23:05]  Urine Urea Nitrogen 465      [10-11-22 @ 23:05]  Urine Potassium 36      [10-11-22 @ 23:05]  Urine Chloride 40      [10-11-22 @ 23:05]  Urine Osmolality 346      [10-11-22 @ 23:05]    HbA1c 14.4      [06-25-19 @ 06:08]  Lipid: chol 276, , HDL 44, LDL --      [05-15-22 @ 16:49]    HBsAg Nonreact      [10-09-22 @ 06:26]  HCV 0.09, Nonreact      [10-09-22 @ 06:26]    PLA2R: LEÓN <1.8, IFA --      [10-09-22 @ 07:49]  Free Light Chains: kappa 9.26, lambda 4.63, ratio = 2.00      [10-10 @ 11:58]  Immunofixation Serum:   No Monoclonal Band Identified    Reference Range: None Detected      [10-10-22 @ 11:58]

## 2022-10-17 NOTE — PROGRESS NOTE ADULT - PROBLEM SELECTOR PLAN 6
Home regimen 40U long-acting, 5U short-acting pre-meal.  - patient moderately hypoglycemia 10/16 AM on 28U lantus; wnl to ~220s @ premeals  - decrease lantus to 20U, 4U pre-meal short acting was d/mesfin 10/14, will restart at 2U and titrate as needed

## 2022-10-17 NOTE — PROGRESS NOTE ADULT - PROBLEM SELECTOR PLAN 3
Patient endorsing symptoms consistent w/ apnea. Has been having these symptoms on and off for years thus low concern for acute worsening. Rhonchi observed on auscultation would suggest AMBER however given neurological concerns consider central sleep apnea. Will recommend outpatient followup with sleep clinic.

## 2022-10-17 NOTE — PROGRESS NOTE ADULT - PROBLEM SELECTOR PLAN 1
#Headache/Dizziness  Pt had diplopia since 09/30, initially started as decreased visual acuity. Evaluated by opthalmology and neurology at [facility], symptoms consistent w/ lateral rectus palsy, MRI without clear source of symptoms. Consider vasculopathic etiology 2/2 severe HTN and/or DM. Patient additionally has sx of persistent headache and dizziness/unsteadiness which do not improve with eye patch, etiology is unclear and requires further workup outpatient.  - neurology team @ [facility] recommended considering topiramate/Celexa/verapamil for headache ppx  - patient evaluated by ophthalmology at [facility], recommended outpatient f/u w/ retina specialist Dr. Jose Angel Arvizu for OCT nerve/RNFL for evaluation for diabetic and hypertensive retinopathy; if diplopia/palsy persists should continue outpatient followup for Fresnel prisms; curbsided ophtho team who agree with recommendations  - appreciate ophtho eval, noted moderate non-proliferative diabetic and hypertensive retinopathy, recommending outpatient follow-up #Headache/Dizziness  Pt had diplopia since 09/30, initially started as decreased visual acuity. Evaluated by opthalmology and neurology at [facility], symptoms consistent w/ lateral rectus palsy, MRI without clear source of symptoms. Consider vasculopathic etiology 2/2 severe HTN and/or DM. Patient additionally has sx of persistent headache and dizziness/unsteadiness which do not improve with eye patch, etiology is unclear and requires further workup outpatient. Patient has recently had SBPs in 160s which would be unlikely to cause end organ sx, likely 2/2 to discomfort or incidental change. Consider migraine etiology, could consider ppx as below.  - neurology team @ [facility] recommended considering topiramate/Celexa/verapamil for headache ppx  - triptans contraindicated as patient w/ cardiac hx (triptans cause vasoconstriction)  - will continue tylenol PRN for headache, avoid NSAIDS given patient's BILLY  - patient evaluated by ophthalmology at [facility], recommended outpatient f/u w/ retina specialist Dr. Jose Angel Arvizu for OCT nerve/RNFL for evaluation for diabetic and hypertensive retinopathy; if diplopia/palsy persists should continue outpatient followup for Fresnel prisms; curbsided ophtho team who agree with recommendations  - appreciate ophtho eval, noted moderate non-proliferative diabetic and hypertensive retinopathy, recommending outpatient follow-up

## 2022-10-17 NOTE — PROGRESS NOTE ADULT - ATTENDING COMMENTS
I have seen this patient with the fellow and agree with their assessment and plan. I was physically present for significant portions of the evaluation and management (E/M) service provided.  I agree with the above history, physical, and plan which I have reviewed and edited where appropriate.    Overall, crt is fluctuating with diuresis and slightly improved today  Pt has good response to diuresis  Would not dc till has good response to oral diuretics as well  Renal US showing 13cm kidneys and spot urine TP/CR was elevated at 3.4. Hep B and C negative. serum Kappa/lambda elevated at 2.   Further work for nephrotic range proteinuria was done - Anti PLA2R Ab neg, serum JOSE no bands seen, HBsAb negative and Hep C core Ab negative. Likely this is diabetic nephropathy given A1c is 9.9m and eye findings of diabetic retinopathy but amyloid in ddx as well if this is also non ischemic CHF?  May require renal bx if this is not cardiac related or diabetic nephropathy   Hold ASA for now incase we need a renal bx    Tan Rebolledo MD  Pager   Office     Contact me directly via [employer] Teams     (After 5 pm or on weekends please page the on-call fellow/attending, can check [email or web address] for schedule. Login is lilian durham, schedule under [facility] medicine, psych, derm) I have seen this patient with the fellow and agree with their assessment and plan. I was physically present for significant portions of the evaluation and management (E/M) service provided.  I agree with the above history, physical, and plan which I have reviewed and edited where appropriate.    Overall, crt is fluctuating with diuresis and slightly improved today.  I don't think this patient has cardiorenal syndrome. I think the predominant issue may be renal and causing some diasytolic dsyfunction in the heart. Pt has good response to diuresis  LHC, Cardiac echo and ct reviewed. Non obstructive disease noted on coronaries.  Would not dc till has good response to oral diuretics as well  Renal US showing 13cm kidneys and spot urine TP/CR was elevated at 3.4. Hep B and C negative. serum Kappa/lambda elevated at 2.   Further work for nephrotic range proteinuria was done - Anti PLA2R Ab neg, serum JOSE no bands seen, HBsAb negative and Hep C core Ab negative. Likely this is diabetic nephropathy given A1c is 9.9m and eye findings of diabetic retinopathy but amyloid in ddx as well if this is also non ischemic heart disease.  May require renal bx if this is not cardiac related or diabetic nephropathy   Hold ASA for now incase we need a renal bx- earliest likely is this friday  Also, HTN is unclear cause- but could be from CKD.  Would check renin, dania and metanephrines and make sure no signs of renal artery stenosis     Tan Rebolledo MD  Pager   Office     Contact me directly via [employer] Teams     (After 5 pm or on weekends please page the on-call fellow/attending, can check [email or web address] for schedule. Login is lilian durham, schedule under [facility] medicine, psych, derm)

## 2022-10-18 DIAGNOSIS — I10 ESSENTIAL (PRIMARY) HYPERTENSION: ICD-10-CM

## 2022-10-18 LAB
% ALBUMIN: 53.2 % — SIGNIFICANT CHANGE UP
% ALPHA 1: 4.7 % — SIGNIFICANT CHANGE UP
% ALPHA 2: 11.3 % — SIGNIFICANT CHANGE UP
% BETA: 14.4 % — SIGNIFICANT CHANGE UP
% GAMMA: 16.4 % — SIGNIFICANT CHANGE UP
ALBUMIN SERPL ELPH-MCNC: 3.2 G/DL — LOW (ref 3.6–5.5)
ALBUMIN SERPL ELPH-MCNC: 3.6 G/DL — SIGNIFICANT CHANGE UP (ref 3.3–5)
ALBUMIN/GLOB SERPL ELPH: 1.1 RATIO — SIGNIFICANT CHANGE UP
ALDOST SERPL-MCNC: 5 NG/DL — SIGNIFICANT CHANGE UP
ALP SERPL-CCNC: 91 U/L — SIGNIFICANT CHANGE UP (ref 40–120)
ALPHA1 GLOB SERPL ELPH-MCNC: 0.3 G/DL — SIGNIFICANT CHANGE UP (ref 0.1–0.4)
ALPHA2 GLOB SERPL ELPH-MCNC: 0.7 G/DL — SIGNIFICANT CHANGE UP (ref 0.5–1)
ALT FLD-CCNC: 13 U/L — SIGNIFICANT CHANGE UP (ref 10–45)
ANION GAP SERPL CALC-SCNC: 10 MMOL/L — SIGNIFICANT CHANGE UP (ref 5–17)
AST SERPL-CCNC: 17 U/L — SIGNIFICANT CHANGE UP (ref 10–40)
B-GLOBULIN SERPL ELPH-MCNC: 0.9 G/DL — SIGNIFICANT CHANGE UP (ref 0.5–1)
BILIRUB SERPL-MCNC: 0.2 MG/DL — SIGNIFICANT CHANGE UP (ref 0.2–1.2)
BUN SERPL-MCNC: 35 MG/DL — HIGH (ref 7–23)
CALCIUM SERPL-MCNC: 8.9 MG/DL — SIGNIFICANT CHANGE UP (ref 8.4–10.5)
CHLORIDE SERPL-SCNC: 109 MMOL/L — HIGH (ref 96–108)
CO2 SERPL-SCNC: 23 MMOL/L — SIGNIFICANT CHANGE UP (ref 22–31)
CREAT SERPL-MCNC: 2.72 MG/DL — HIGH (ref 0.5–1.3)
EGFR: 28 ML/MIN/1.73M2 — LOW
GAMMA GLOBULIN: 1 G/DL — SIGNIFICANT CHANGE UP (ref 0.6–1.6)
GLUCOSE BLDC GLUCOMTR-MCNC: 132 MG/DL — HIGH (ref 70–99)
GLUCOSE BLDC GLUCOMTR-MCNC: 156 MG/DL — HIGH (ref 70–99)
GLUCOSE BLDC GLUCOMTR-MCNC: 239 MG/DL — HIGH (ref 70–99)
GLUCOSE BLDC GLUCOMTR-MCNC: 287 MG/DL — HIGH (ref 70–99)
GLUCOSE SERPL-MCNC: 165 MG/DL — HIGH (ref 70–99)
HCT VFR BLD CALC: 31.7 % — LOW (ref 39–50)
HGB BLD-MCNC: 10.3 G/DL — LOW (ref 13–17)
INTERPRETATION SERPL IFE-IMP: SIGNIFICANT CHANGE UP
MAGNESIUM SERPL-MCNC: 2 MG/DL — SIGNIFICANT CHANGE UP (ref 1.6–2.6)
MCHC RBC-ENTMCNC: 30.6 PG — SIGNIFICANT CHANGE UP (ref 27–34)
MCHC RBC-ENTMCNC: 32.5 GM/DL — SIGNIFICANT CHANGE UP (ref 32–36)
MCV RBC AUTO: 94.1 FL — SIGNIFICANT CHANGE UP (ref 80–100)
NRBC # BLD: 0 /100 WBCS — SIGNIFICANT CHANGE UP (ref 0–0)
PHOSPHATE SERPL-MCNC: 3.7 MG/DL — SIGNIFICANT CHANGE UP (ref 2.5–4.5)
PLATELET # BLD AUTO: 206 K/UL — SIGNIFICANT CHANGE UP (ref 150–400)
POTASSIUM SERPL-MCNC: 4.4 MMOL/L — SIGNIFICANT CHANGE UP (ref 3.5–5.3)
POTASSIUM SERPL-SCNC: 4.4 MMOL/L — SIGNIFICANT CHANGE UP (ref 3.5–5.3)
PROT PATTERN SERPL ELPH-IMP: SIGNIFICANT CHANGE UP
PROT SERPL-MCNC: 6.5 G/DL — SIGNIFICANT CHANGE UP (ref 6–8.3)
RBC # BLD: 3.37 M/UL — LOW (ref 4.2–5.8)
RBC # FLD: 12.3 % — SIGNIFICANT CHANGE UP (ref 10.3–14.5)
RENIN DIRECT, PLASMA: 12.5 PG/ML — SIGNIFICANT CHANGE UP
SODIUM SERPL-SCNC: 142 MMOL/L — SIGNIFICANT CHANGE UP (ref 135–145)
WBC # BLD: 5.84 K/UL — SIGNIFICANT CHANGE UP (ref 3.8–10.5)
WBC # FLD AUTO: 5.84 K/UL — SIGNIFICANT CHANGE UP (ref 3.8–10.5)

## 2022-10-18 PROCEDURE — 99232 SBSQ HOSP IP/OBS MODERATE 35: CPT | Mod: GC

## 2022-10-18 PROCEDURE — 99232 SBSQ HOSP IP/OBS MODERATE 35: CPT

## 2022-10-18 RX ADMIN — Medication 2 UNIT(S): at 17:35

## 2022-10-18 RX ADMIN — CHLORHEXIDINE GLUCONATE 1 APPLICATION(S): 213 SOLUTION TOPICAL at 05:50

## 2022-10-18 RX ADMIN — BUDESONIDE AND FORMOTEROL FUMARATE DIHYDRATE 2 PUFF(S): 160; 4.5 AEROSOL RESPIRATORY (INHALATION) at 05:50

## 2022-10-18 RX ADMIN — PANTOPRAZOLE SODIUM 40 MILLIGRAM(S): 20 TABLET, DELAYED RELEASE ORAL at 08:01

## 2022-10-18 RX ADMIN — INSULIN GLARGINE 20 UNIT(S): 100 INJECTION, SOLUTION SUBCUTANEOUS at 21:45

## 2022-10-18 RX ADMIN — CARVEDILOL PHOSPHATE 25 MILLIGRAM(S): 80 CAPSULE, EXTENDED RELEASE ORAL at 05:49

## 2022-10-18 RX ADMIN — Medication 2 UNIT(S): at 12:02

## 2022-10-18 RX ADMIN — HEPARIN SODIUM 5000 UNIT(S): 5000 INJECTION INTRAVENOUS; SUBCUTANEOUS at 21:34

## 2022-10-18 RX ADMIN — HEPARIN SODIUM 5000 UNIT(S): 5000 INJECTION INTRAVENOUS; SUBCUTANEOUS at 17:34

## 2022-10-18 RX ADMIN — Medication 145 MILLIGRAM(S): at 12:03

## 2022-10-18 RX ADMIN — Medication 2: at 12:02

## 2022-10-18 RX ADMIN — BUDESONIDE AND FORMOTEROL FUMARATE DIHYDRATE 2 PUFF(S): 160; 4.5 AEROSOL RESPIRATORY (INHALATION) at 17:33

## 2022-10-18 RX ADMIN — ATORVASTATIN CALCIUM 40 MILLIGRAM(S): 80 TABLET, FILM COATED ORAL at 21:34

## 2022-10-18 RX ADMIN — CARVEDILOL PHOSPHATE 25 MILLIGRAM(S): 80 CAPSULE, EXTENDED RELEASE ORAL at 17:33

## 2022-10-18 RX ADMIN — AMLODIPINE BESYLATE 10 MILLIGRAM(S): 2.5 TABLET ORAL at 05:49

## 2022-10-18 RX ADMIN — Medication 1: at 17:35

## 2022-10-18 RX ADMIN — HEPARIN SODIUM 5000 UNIT(S): 5000 INJECTION INTRAVENOUS; SUBCUTANEOUS at 05:47

## 2022-10-18 RX ADMIN — Medication 10 MILLIGRAM(S): at 21:34

## 2022-10-18 RX ADMIN — Medication 1: at 21:33

## 2022-10-18 NOTE — PROGRESS NOTE ADULT - PROBLEM SELECTOR PLAN 1
#Headache/Dizziness  Pt had diplopia since 09/30, initially started as decreased visual acuity. Evaluated by opthalmology and neurology at [facility], symptoms consistent w/ lateral rectus palsy, MRI without clear source of symptoms. Consider vasculopathic etiology 2/2 severe HTN and/or DM. Patient additionally has sx of persistent headache and dizziness/unsteadiness which do not improve with eye patch, etiology is unclear and requires further workup outpatient. Patient has recently had SBPs in 160s which would be unlikely to cause end organ sx, likely 2/2 to discomfort or incidental change. Consider migraine etiology, could consider ppx as below.  - neurology team @ [facility] recommended considering topiramate/Celexa/verapamil for headache ppx  - triptans contraindicated as patient w/ cardiac hx (triptans cause vasoconstriction)  - will continue tylenol PRN for headache, avoid NSAIDS given patient's BILLY  - patient evaluated by ophthalmology at [facility], recommended outpatient f/u w/ retina specialist Dr. Jose Angel Arvizu for OCT nerve/RNFL for evaluation for diabetic and hypertensive retinopathy; if diplopia/palsy persists should continue outpatient followup for Fresnel prisms; curbsided ophtho team who agree with recommendations  - appreciate ophtho eval, noted moderate non-proliferative diabetic and hypertensive retinopathy, recommending outpatient follow-up #Headache/Dizziness  Pt had diplopia since 09/30, initially started as decreased visual acuity. Evaluated by opthalmology and neurology at [facility], symptoms consistent w/ lateral rectus palsy, MRI without clear source of symptoms. Consider vasculopathic etiology 2/2 severe HTN and/or DM. Patient additionally has sx of persistent headache and dizziness/unsteadiness which do not improve with eye patch, endorses R frontal headache which persists for several days w/ lacrimation, has occurred in the past lasting several days at a time. Suspect cluster headache. Patient has recently had SBPs in 160s which would be unlikely to cause end organ sx, likely 2/2 to discomfort or incidental change. Also consider migraine etiology, could consider ppx as below.  - sx improved today however still w/ headache, will trial 100% O2 (12L/min for 15min) to see if improvement and consider verapamil as ppx  - neurology team @ [facility] recommended considering topiramate/celexa/verapamil for headache ppx  - triptans contraindicated as patient w/ cardiac hx (triptans cause vasoconstriction)  - will continue tylenol PRN for headache, avoid NSAIDS given patient's BILLY  - patient evaluated by ophthalmology at [facility], recommended outpatient f/u w/ retina specialist Dr. Jose Angel Arvizu for OCT nerve/RNFL for evaluation for diabetic and hypertensive retinopathy; if diplopia/palsy persists should continue outpatient followup for Fresnel prisms; curbsided ophtho team who agree with recommendations  - appreciate ophtho eval, noted moderate non-proliferative diabetic and hypertensive retinopathy, recommending outpatient follow-up #Headache/Dizziness  Pt had diplopia since 09/30, initially started as decreased visual acuity. Evaluated by opthalmology and neurology at [facility], symptoms consistent w/ lateral rectus palsy, MRI without clear source of symptoms. Consider vasculopathic etiology 2/2 severe HTN and/or DM. Patient additionally has sx of persistent headache and dizziness/unsteadiness which do not improve with eye patch, endorses R frontal headache which persists for several days w/ lacrimation, has occurred in the past lasting several days at a time. Suspect cluster headache. Patient has recently had SBPs in 160s which would be unlikely to cause end organ sx, likely 2/2 to discomfort or incidental change. Also consider migraine etiology, could consider ppx as below.  - sx improved today however still w/ headache, will trial 100% O2 (12L/min for 15min) latrice if headache worsens to see if improvement and consider verapamil as ppx  - neurology team @ [facility] recommended considering topiramate/celexa/verapamil for headache ppx  - triptans contraindicated as patient w/ cardiac hx (triptans cause vasoconstriction)  - will continue tylenol PRN for headache, avoid NSAIDS given patient's BILLY  - patient evaluated by ophthalmology at [facility], recommended outpatient f/u w/ retina specialist Dr. Jose Angel Arvizu for OCT nerve/RNFL for evaluation for diabetic and hypertensive retinopathy; if diplopia/palsy persists should continue outpatient followup for Fresnel prisms; curbsided ophtho team who agree with recommendations  - appreciate ophtho eval, noted moderate non-proliferative diabetic and hypertensive retinopathy, recommending outpatient follow-up

## 2022-10-18 NOTE — PROGRESS NOTE ADULT - PROBLEM SELECTOR PLAN 2
Pt. with BP above goal and SBP ranging in the 150-160 range. Perhaps with addition of daily diuretic his BP will improved. We will need to optimize his BP prior to biopsy. Monitor BP.     If any questions, please feel free to contact me     Kirk Ortega  Nephrology Fellow  [facility] Pager: [phone]

## 2022-10-18 NOTE — PROGRESS NOTE ADULT - ATTENDING COMMENTS
Patient seen and evaluated. Headache improving. LAteral rectus palsy improving today, able to now wear eye patch with improved diplopia.    #headache  -based on symptoms could be migraine vs. cluster headache.   -if recurs, will trial oxygen therapy. If improves, then can trial verapamil  -if does not work can attempt triptan (team to review with pharmacy if any relative contra-indications).  -outpatient neuro follow up. Already on amitrypytline so can pursue further prophylaxis.     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   -neuro appreciated, no further intervention. \  -optho appreciated, no inpatient intervention,outpatient follow up for possible PRISM device.    #BILLY on CKD IV  improived with contrast  -non-obstructive cath, not cardiorenal.   -needs secondary HTN workup can be pursued as outpatient. Patient with normal to high K, less likely hyperaldo state.   -As patient likely will not be on ACE-i secondary to Cre, can hold off renal duplex as will not change current medical management.  -eventual need for renal bx given nephrotic range proteinuria and no cause, howver was on DAPT in setting of concern for NSTEMI until 10/17.  -trend to plateau.   -avoid nephrotoxins.   -outpatient nephrology follow up   -ASA on hold since 10/17 in case waiting long enough for rehab that can do renal bx as inpatient.     #HTN  -suspicion is being driven by kidneys.  -currently acceptable on current regimen.   -may add verapamil vs. chlorthalidone pending headaches.    #CAD  -non-obsructive disease, in setting of hypertensive crisis.  -d/c brilinta as no obstructive disease.   -contineu with statin, ASA.    #?memory issues  -appreciate speech and swallow. Continued issues with cognition. MR without acute infarct. Given family hx of early dementia, would benefit from outpatient neurology eval.    rest as above  d/c planning CANDY once accepted and auth goes through.

## 2022-10-18 NOTE — PROGRESS NOTE ADULT - ATTENDING COMMENTS
I have seen this patient with the fellow and agree with their assessment and plan. I was physically present for significant portions of the evaluation and management (E/M) service provided.  I agree with the above history, physical, and plan which I have reviewed and edited where appropriate.    Renal function overall stable although he has had progressive decline in his kidney function since earlier this year,   C, Cardiac echo and ct reviewed. Non obstructive disease noted on coronaries.  Renal US showing 13cm kidneys and spot urine TP/CR was elevated at 3.4 and 5 gms.  . Earlier this year it was up to 11 gms. Hep B and C negative. serum Kappa/lambda elevated at 2.   Further work for nephrotic range proteinuria was done - Anti PLA2R Ab neg, serum JOSE no bands seen, HBsAb negative and Hep C core Ab negative. Likely this is diabetic nephropathy given A1c is 9.9m and eye findings of diabetic retinopathy but amyloid in ddx as well if this is also non ischemic heart disease.  May require renal bx if this is not cardiac related or diabetic nephropathy   Hold ASA for now incase we need a renal bx- earliest likely is this friday  Also, HTN is unclear cause- but could be from CKD.  Would check renin, dania and metanephrines and make sure no signs of renal artery stenosis     Rest per Dr. Jordan Durbin MD  O: [phone]  Contact me on teams

## 2022-10-18 NOTE — PROGRESS NOTE ADULT - PROBLEM SELECTOR PLAN 1
Patient with BILLY on CKD stage 4 likely in the setting of CRS. Scr~1.7 in May, 2022. Scr was elevated at  2-2.4 in July. Now patient presented to [facility] for LHC. Scr on admission elevated at 2.7. Patient likely has baseline CKD from long standing DM. Patient received neuro imaging with contrast on 10/4 and CT angio cardiac with pre/post hydration.     Renal US showing bilateral echogenicity suggestive of CKD but no hydronephrosis/ no stones. Spot urine TP/CR was elevated at 3.4. Hep B and C negative. serum Kappa/lambda elevated at 2.   Further work for nephrotic range proteinuria was done - Anti PLA2R Ab neg, serum JOSE no bands seen, HBsAb negative and Hep C core Ab negative. Labs reviewed. Pt clinically has LE edema. Pt non oliguric, UOP ~ 2.2 L in last 24 hrs. Recommend IV Bumex 1 gm once today.      Monitor labs and urine output. Avoid any potential nephrotoxins. Dose medications as per eGFR.     INCOMPLETE Patient with BILLY on CKD stage 4 likely in the setting of CRS. Scr~1.7 in May, 2022. Scr was elevated at  2-2.4 in July. Now patient presented to [facility] for LHC. Scr on admission elevated at 2.7 and remains elevated this moring at 2.7mg/dL. Patient likely has baseline CKD from long standing DM however with rapid increase of SCr over the past few months. Patient received neuro imaging with contrast on 10/4 and CT angio cardiac with pre/post hydration.     Renal US showing bilateral echogenicity suggestive of CKD but no hydronephrosis/ no stones. Spot urine TP/CR was elevated at 5.5g most recently. Hep B and C negative. serum Kappa/lambda elevated at 2.   Further work for nephrotic range proteinuria was done - Anti PLA2R Ab neg, serum JOSE no bands seen, HBsAb negative and Hep C core Ab negative. Labs reviewed. Pateint with improved volume status now, recommend to continue with IV bumex 1mg daily. Pt non oliguric. Discussed with the patient and given unexplained worsening in renal function pt would benefit from renal biopsy during this admission. Recommend to consult IR for renal biopsy, patient currently not on any anticoagulation, last dose of DAPT therapy was on 10/17 AM. Monitor labs and urine output. Avoid NSAIDs, ACEI/ARBS, RCA and nephrotoxins. Dose medications as per eGFR.

## 2022-10-18 NOTE — PROGRESS NOTE ADULT - PROBLEM SELECTOR PLAN 4
Transfer from [facility] for cardiac cath given c/f NSTEMI. Coronary CT showed moderate stenosis of prox RCA and LAD. Appreciate cardiology workup - cath completed 10/13, showed nonobstructive CAD with non-elevated filling pressures.  - continue coreg, atorvastatin (holding ACE/ARB for BILLY)  - continue aspirin, stop brilinta  - EKG and troponins if chest pain Transfer from [facility] for cardiac cath given c/f NSTEMI. Coronary CT showed moderate stenosis of prox RCA and LAD. Appreciate cardiology workup and recs - cath completed 10/13, showed nonobstructive CAD with non-elevated filling pressures.  - continue amlodipine, coreg, atorvastatin, fenofibrate, (holding ACE/ARB for BILLY)  - continue aspirin, stop brilinta  - consider restart home hctz; will hold off given billy  - EKG and troponins if chest pain

## 2022-10-18 NOTE — PROGRESS NOTE ADULT - SUBJECTIVE AND OBJECTIVE BOX
Internal Medicine   Jesus Hagen | PGY-1    OVERNIGHT EVENTS: No acute overnight events.    SUBJECTIVE:       MEDICATIONS  (STANDING):  amitriptyline 10 milliGRAM(s) Oral at bedtime  amLODIPine   Tablet 10 milliGRAM(s) Oral daily  aspirin enteric coated 81 milliGRAM(s) Oral daily  atorvastatin 40 milliGRAM(s) Oral at bedtime  budesonide  80 MICROgram(s)/formoterol 4.5 MICROgram(s) Inhaler 2 Puff(s) Inhalation two times a day  carvedilol 25 milliGRAM(s) Oral every 12 hours  chlorhexidine 2% Cloths 1 Application(s) Topical <User Schedule>  dextrose 5%. 1000 milliLiter(s) (100 mL/Hr) IV Continuous <Continuous>  dextrose 5%. 1000 milliLiter(s) (50 mL/Hr) IV Continuous <Continuous>  dextrose 50% Injectable 25 Gram(s) IV Push once  dextrose 50% Injectable 12.5 Gram(s) IV Push once  dextrose 50% Injectable 25 Gram(s) IV Push once  dextrose 50% Injectable 12.5 Gram(s) IV Push once  fenofibrate Tablet 145 milliGRAM(s) Oral daily  glucagon  Injectable 1 milliGRAM(s) IntraMuscular once  heparin   Injectable 5000 Unit(s) SubCutaneous every 8 hours  influenza   Vaccine 0.5 milliLiter(s) IntraMuscular once  insulin glargine Injectable (LANTUS) 20 Unit(s) SubCutaneous at bedtime  insulin lispro (ADMELOG) corrective regimen sliding scale   SubCutaneous three times a day before meals  insulin lispro (ADMELOG) corrective regimen sliding scale   SubCutaneous at bedtime  insulin lispro Injectable (ADMELOG) 2 Unit(s) SubCutaneous three times a day before meals  pantoprazole    Tablet 40 milliGRAM(s) Oral before breakfast    MEDICATIONS  (PRN):  acetaminophen     Tablet .. 650 milliGRAM(s) Oral every 6 hours PRN Temp greater or equal to 38C (100.4F), Mild Pain (1 - 3)  ALBUTerol    90 MICROgram(s) HFA Inhaler 2 Puff(s) Inhalation every 6 hours PRN Shortness of Breath and/or Wheezing  Biotene Dry Mouth Oral Rinse 15 milliLiter(s) Swish and Spit three times a day PRN Mouth Care  dextrose Oral Gel 15 Gram(s) Oral once PRN Blood Glucose LESS THAN 70 milliGRAM(s)/deciliter  sodium chloride 0.65% Nasal 1 Spray(s) Both Nostrils every 1 hour PRN Nasal Congestion    VITALS:  T(F): 98 (10-18-22 @ 04:40), Max: 98.4 (10-17-22 @ 11:01)  HR: 76 (10-18-22 @ 04:40) (68 - 76)  BP: 159/75 (10-18-22 @ 04:40) (123/77 - 161/79)  BP(mean): --  RR: 18 (10-18-22 @ 04:40) (17 - 18)  SpO2: 99% (10-18-22 @ 04:40) (99% - 100%)    PHYSICAL EXAM:   GENERAL: NAD, lying in bed comfortably  HEAD: Atraumatic, normocephalic  EYES: EOMI, conjunctiva and sclera clear, no nystagmus noted  ENT: Moist mucous membranes  CHEST/LUNG: Clear to auscultation bilaterally; no rales, rhonchi, wheezing, or rubs; unlabored respirations  HEART: Regular rate and rhythm; no murmurs, rubs, or gallops, normal S1/S2  ABDOMEN: Normal bowel sounds; soft, nontender, nondistended  EXTREMITIES: No clubbing, cyanosis, or edema  MSK: No gross deformities noted   Neurological: No focal deficits   SKIN: No rashes or lesions  PSYCH: Normal mood, affect     LABS:                      10.3   5.84  )-----------( 206      ( 18 Oct 2022 06:24 )             31.7     10-18  142  |  109<H>  |  35<H>  ----------------------------<  165<H>  4.4   |  23  |  2.72<H>    Ca    8.9      18 Oct 2022 06:25  Phos  3.7     10-18  Mg     2.0     10-18    TPro  6.5  /  Alb  3.6  /  TBili  0.2  /  DBili  x   /  AST  17  /  ALT  13  /  AlkPhos  91  10-18    Creatinine Trend: 2.72<--, 2.67<--, 3.04<--, 2.78<--, 2.98<--, 2.67<--    I&O's Summary  17 Oct 2022 07:01  -  18 Oct 2022 07:00  --------------------------------------------------------  IN: 480 mL / OUT: 0 mL / NET: 480 mL       Internal Medicine   Jesus Hagen | PGY-1    OVERNIGHT EVENTS: No acute overnight events.    SUBJECTIVE: Endorses sx improved. Headache and popping sensations continue but milder. Vision less blurry, is able to ambulate well with R eye patched (has gauze over his sunglasses).    MEDICATIONS  (STANDING):  amitriptyline 10 milliGRAM(s) Oral at bedtime  amLODIPine   Tablet 10 milliGRAM(s) Oral daily  aspirin enteric coated 81 milliGRAM(s) Oral daily  atorvastatin 40 milliGRAM(s) Oral at bedtime  budesonide  80 MICROgram(s)/formoterol 4.5 MICROgram(s) Inhaler 2 Puff(s) Inhalation two times a day  carvedilol 25 milliGRAM(s) Oral every 12 hours  chlorhexidine 2% Cloths 1 Application(s) Topical <User Schedule>  dextrose 5%. 1000 milliLiter(s) (100 mL/Hr) IV Continuous <Continuous>  dextrose 5%. 1000 milliLiter(s) (50 mL/Hr) IV Continuous <Continuous>  dextrose 50% Injectable 25 Gram(s) IV Push once  dextrose 50% Injectable 12.5 Gram(s) IV Push once  dextrose 50% Injectable 25 Gram(s) IV Push once  dextrose 50% Injectable 12.5 Gram(s) IV Push once  fenofibrate Tablet 145 milliGRAM(s) Oral daily  glucagon  Injectable 1 milliGRAM(s) IntraMuscular once  heparin   Injectable 5000 Unit(s) SubCutaneous every 8 hours  influenza   Vaccine 0.5 milliLiter(s) IntraMuscular once  insulin glargine Injectable (LANTUS) 20 Unit(s) SubCutaneous at bedtime  insulin lispro (ADMELOG) corrective regimen sliding scale   SubCutaneous three times a day before meals  insulin lispro (ADMELOG) corrective regimen sliding scale   SubCutaneous at bedtime  insulin lispro Injectable (ADMELOG) 2 Unit(s) SubCutaneous three times a day before meals  pantoprazole    Tablet 40 milliGRAM(s) Oral before breakfast    MEDICATIONS  (PRN):  acetaminophen     Tablet .. 650 milliGRAM(s) Oral every 6 hours PRN Temp greater or equal to 38C (100.4F), Mild Pain (1 - 3)  ALBUTerol    90 MICROgram(s) HFA Inhaler 2 Puff(s) Inhalation every 6 hours PRN Shortness of Breath and/or Wheezing  Biotene Dry Mouth Oral Rinse 15 milliLiter(s) Swish and Spit three times a day PRN Mouth Care  dextrose Oral Gel 15 Gram(s) Oral once PRN Blood Glucose LESS THAN 70 milliGRAM(s)/deciliter  sodium chloride 0.65% Nasal 1 Spray(s) Both Nostrils every 1 hour PRN Nasal Congestion    VITALS:  T(F): 98 (10-18-22 @ 04:40), Max: 98.4 (10-17-22 @ 11:01)  HR: 76 (10-18-22 @ 04:40) (68 - 76)  BP: 159/75 (10-18-22 @ 04:40) (123/77 - 161/79)  BP(mean): --  RR: 18 (10-18-22 @ 04:40) (17 - 18)  SpO2: 99% (10-18-22 @ 04:40) (99% - 100%)    PHYSICAL EXAM:   GENERAL: NAD, alert and talkative, able to ambulate well with R eye covered, unsteady without  HEAD: Atraumatic, normocephalic  EYES: lateral rectus palsy moderately improved, able to abduct R eye ~20deg past midline, conjunctiva and sclera clear, pupils equal and reactive, no nystagmus noted  ENT: Moist mucous membranes  CHEST/LUNG: Clear to auscultation bilaterally; no rales, rhonchi, wheezing, or rubs; unlabored respirations  HEART: Regular rate and rhythm; no murmurs, rubs, or gallops, normal S1/S2  ABDOMEN: Normal bowel sounds; soft, nontender, nondistended  EXTREMITIES: No clubbing, cyanosis, or edema  MSK: No gross deformities noted   NEURO: As noted above, no other focal deficits  SKIN: No rashes or lesions  PSYCH: Normal mood, affect     LABS:                      10.3   5.84  )-----------( 206      ( 18 Oct 2022 06:24 )             31.7     10-18  142  |  109<H>  |  35<H>  ----------------------------<  165<H>  4.4   |  23  |  2.72<H>    Ca    8.9      18 Oct 2022 06:25  Phos  3.7     10-18  Mg     2.0     10-18    TPro  6.5  /  Alb  3.6  /  TBili  0.2  /  DBili  x   /  AST  17  /  ALT  13  /  AlkPhos  91  10-18    Creatinine Trend: 2.72<--, 2.67<--, 3.04<--, 2.78<--, 2.98<--, 2.67<--    I&O's Summary  17 Oct 2022 07:01  -  18 Oct 2022 07:00  --------------------------------------------------------  IN: 480 mL / OUT: 0 mL / NET: 480 mL

## 2022-10-18 NOTE — PROGRESS NOTE ADULT - PROBLEM SELECTOR PLAN 5
#Impaired executive function  MRI w/ contrast showing multifocal lacunar infarcts and chronic microvascular ischemic changes, consistent with MRI completed @ [facility]; MRI findings do not correlate well with clinical findings. CTA head/neck negative for acute stroke.  - etiology for ischemic changes unclear, TTE bubble study w/ no evidence of PFO, consider hx of smoking, NSTEMI  - discussed MRI findings w/ neuro consult team, no acute intervention or further workup indicated, patient should follow-up outpatient  - discussed w/ speech pathologist who noted deficits in memory, word finding, overall executive function w/ multiple compensatory mechanisms, and that patient's family hx is notable for early onset dementia (diagnosed @ ~48 and  @ 63)  - PT eval recommending CANDY, rehab consult recommending acute rehab, will discuss w/ case management  - patient cleared for full AC per neuro  - aspirin + brilinta #Impaired executive function  MRI w/ contrast showing multifocal lacunar infarcts and chronic microvascular ischemic changes, consistent with MRI completed @ [facility]; MRI findings do not correlate well with clinical findings. CTA head/neck negative for acute stroke.  - etiology for ischemic changes unclear, TTE bubble study w/ no evidence of PFO, consider hx of smoking, NSTEMI  - discussed MRI findings w/ neuro consult team, no acute intervention or further workup indicated, patient should follow-up outpatient  - discussed w/ speech pathologist who noted deficits in memory, word finding, overall executive function w/ multiple compensatory mechanisms, and that patient's family hx is notable for early onset dementia (diagnosed @ ~48 and  @ 63)  - PT eval recommending CANDY, rehab consult recommending acute rehab, will discuss w/ case management  - patient cleared for full AC per neuro  - aspirin as above

## 2022-10-18 NOTE — PROGRESS NOTE ADULT - ASSESSMENT
47M smoker w/ hx of vertigo, diverticulitis s/p LAR, ETOH abuse (sober x5 years), DM (patient states has been on Insulin x10 years was never on oral agents), CKD IV - baseline creatinine 2.4, HTN, AMBER, who presented to [facility] on 10/2 w/ /133, dizziness, diplopia, headache and chest tightness, admitted w/ c/f NSTEMI and chronic lacunar infarcts/microvascular changes.

## 2022-10-18 NOTE — PROGRESS NOTE ADULT - PROBLEM SELECTOR PLAN 2
Cr improving, now 2.67. Increase likely 2/2 contrast during cath 10/14. [place]/Lambda 9.26/4.63. Nephrology working up nephrotic range proteinuria, appreciate recs  - hold ACE/ARB  - hold off on further IVF for now Cr improving, now 2.67. Increase likely 2/2 contrast during cath 10/14. [place]/Lambda 9.26/4.63. Nephrology working up nephrotic range proteinuria, appreciate recs:  - recommending IR consult for renal biopsy, would need to optimize BPs prior  - hold ACE/ARB  - hold off on further IVF for now

## 2022-10-18 NOTE — PROGRESS NOTE ADULT - SUBJECTIVE AND OBJECTIVE BOX
SUBJ:    sitting in chair  denies cp      MEDICATIONS  (STANDING):  amitriptyline 10 milliGRAM(s) Oral at bedtime  amLODIPine   Tablet 10 milliGRAM(s) Oral daily  atorvastatin 40 milliGRAM(s) Oral at bedtime  budesonide  80 MICROgram(s)/formoterol 4.5 MICROgram(s) Inhaler 2 Puff(s) Inhalation two times a day  carvedilol 25 milliGRAM(s) Oral every 12 hours  chlorhexidine 2% Cloths 1 Application(s) Topical <User Schedule>  dextrose 5%. 1000 milliLiter(s) (50 mL/Hr) IV Continuous <Continuous>  dextrose 5%. 1000 milliLiter(s) (100 mL/Hr) IV Continuous <Continuous>  dextrose 50% Injectable 25 Gram(s) IV Push once  dextrose 50% Injectable 12.5 Gram(s) IV Push once  dextrose 50% Injectable 25 Gram(s) IV Push once  dextrose 50% Injectable 12.5 Gram(s) IV Push once  fenofibrate Tablet 145 milliGRAM(s) Oral daily  glucagon  Injectable 1 milliGRAM(s) IntraMuscular once  heparin   Injectable 5000 Unit(s) SubCutaneous every 8 hours  influenza   Vaccine 0.5 milliLiter(s) IntraMuscular once  insulin glargine Injectable (LANTUS) 20 Unit(s) SubCutaneous at bedtime  insulin lispro (ADMELOG) corrective regimen sliding scale   SubCutaneous three times a day before meals  insulin lispro (ADMELOG) corrective regimen sliding scale   SubCutaneous at bedtime  insulin lispro Injectable (ADMELOG) 2 Unit(s) SubCutaneous three times a day before meals  pantoprazole    Tablet 40 milliGRAM(s) Oral before breakfast    MEDICATIONS  (PRN):  acetaminophen     Tablet .. 650 milliGRAM(s) Oral every 6 hours PRN Temp greater or equal to 38C (100.4F), Mild Pain (1 - 3)  ALBUTerol    90 MICROgram(s) HFA Inhaler 2 Puff(s) Inhalation every 6 hours PRN Shortness of Breath and/or Wheezing  Biotene Dry Mouth Oral Rinse 15 milliLiter(s) Swish and Spit three times a day PRN Mouth Care  dextrose Oral Gel 15 Gram(s) Oral once PRN Blood Glucose LESS THAN 70 milliGRAM(s)/deciliter  sodium chloride 0.65% Nasal 1 Spray(s) Both Nostrils every 1 hour PRN Nasal Congestion      Vital Signs Last 24 Hrs  T(C): 36.8 (18 Oct 2022 12:20), Max: 36.8 (18 Oct 2022 12:20)  T(F): 98.2 (18 Oct 2022 12:20), Max: 98.2 (18 Oct 2022 12:20)  HR: 78 (18 Oct 2022 12:20) (68 - 78)  BP: 160/85 (18 Oct 2022 12:20) (123/77 - 160/85)  BP(mean): --  RR: 18 (18 Oct 2022 12:20) (17 - 18)  SpO2: 99% (18 Oct 2022 12:20) (99% - 100%)    Parameters below as of 18 Oct 2022 12:20  Patient On (Oxygen Delivery Method): room air        REVIEW OF SYSTEMS:  CVS: Denies palpitations Chest pain;   ; Pulm: denies dyspnea, cough    PHYSICAL EXAM:  Constitutional/Appearance: Normal, Well-developed  HEENT:   Normal oral mucosa, no drainage or redness, supple neck  Lymphatic: No lymphadenopathy  Cardiovascular: Normal S1 S2, No edema, RRR  Respiratory: Lungs clear to auscultation, respirations non-labored  Psychiatry: A & O x 3, appropriate affect.   Gastrointestinal:  Soft, Non-tender, no distention  Skin: No rashes, No ecchymoses, No cyanosis	  Neurologic: Non-focal, Alert and oriented x 3  Extremities: Normal range of motion  Vascular: Peripheral pulses palpable 2+ bilaterally         LABS:  CBC Full  -  ( 18 Oct 2022 06:24 )  WBC Count : 5.84 K/uL  RBC Count : 3.37 M/uL  Hemoglobin : 10.3 g/dL  Hematocrit : 31.7 %  Platelet Count - Automated : 206 K/uL  Mean Cell Volume : 94.1 fl  Mean Cell Hemoglobin : 30.6 pg  Mean Cell Hemoglobin Concentration : 32.5 gm/dL    10-18    142  |  109<H>  |  35<H>  ----------------------------<  165<H>  4.4   |  23  |  2.72<H>    Ca    8.9      18 Oct 2022 06:25  Phos  3.7     10-18  Mg     2.0     10-18    TPro  6.5  /  Alb  3.6  /  TBili  0.2  /  DBili  x   /  AST  17  /  ALT  13  /  AlkPhos  91  10-18      Impression and plan:  46 y/o male, current cigarette smoker, with PMHx of Vertigo, Diverticulitis s/p LAR, ETOH abuse now sober x 5 years, DM ( on Insulin x 10 years was never on oral agents), CKD IV - baseline creatinine 2.4, HTN, AMBER, who presented to [facility] on 10/2 c/o dizziness, b/l diplopia, headache and chest tightness. diagnosis: NSTEMI/ BILLY on CKD/ Diplopia/ lacunar CVA    - I have reviewed the echo cardiogram and images are NOT typical of Amyloid- renal dysfunction likely due to Diabetes    -Cardiac catheterization showing nonobstructive CAD without elevated filling pressures. Continue medical management for non obstructive CAD .   aspirin enteric coated 81 milliGRAM(s) Oral daily  atorvastatin 40 milliGRAM(s) Oral at bedtime  fenofibrate Tablet 145 milliGRAM(s) Oral daily    BP (139/67 - 161/79) 10/17  Now--> (123/77 - 160/85)  c/w amLODIPine  Tablet 10 milliGRAM(s) Oral daily  c/w carvedilol 25 milliGRAM(s) Oral every 12 hours  can restart HCTZ 25 Mg (home med) or consider hydralazine     - Patient should follow-up with cardiology after discharge.  -Patient for acute rehab placement      Marcelina Garcia MD  Cardiology Attending  [facility]/ [facility]    For day time coverage Mon-Fri see Non-Service Consult Attending on [email or web address], password: [email or web address]; daytime weekends covered by general cardiology consult service attending.)

## 2022-10-18 NOTE — PROGRESS NOTE ADULT - SUBJECTIVE AND OBJECTIVE BOX
[facility] DIVISION OF KIDNEY DISEASES AND HYPERTENSION -- FOLLOW UP NOTE  --------------------------------------------------------------------------------    47-year-old Male w/ PMHx of EtOH abuse( now sober X5 years), DM, CKD IV (baseline creatinine 2.0-2.4 in July), HTN who presented to OSH with dizziness, diplopia, CP. Trop elevated with EKG changes. Patient transferred to [facility] on 10/4/22 for NSTEMI requiring LHC. Underwent LHC on 10/13/22. Nephrology team following for BILLY on CKD.    Patient was seen and examined this morning. He states he has been feeling better than when he presented to the hospital. He denied any shortness of breath at rest but states when he walks he feels like he gets winded easily. He denied any chest pain. No acute issues noted overnight.       PAST HISTORY  --------------------------------------------------------------------------------  No significant changes to PMH, PSH, FHx, SHx, unless otherwise noted    ALLERGIES & MEDICATIONS  --------------------------------------------------------------------------------  Allergies    No Known Allergies    Intolerances      Standing Inpatient Medications  amitriptyline 10 milliGRAM(s) Oral at bedtime  amLODIPine   Tablet 10 milliGRAM(s) Oral daily  atorvastatin 40 milliGRAM(s) Oral at bedtime  budesonide  80 MICROgram(s)/formoterol 4.5 MICROgram(s) Inhaler 2 Puff(s) Inhalation two times a day  carvedilol 25 milliGRAM(s) Oral every 12 hours  chlorhexidine 2% Cloths 1 Application(s) Topical <User Schedule>  dextrose 5%. 1000 milliLiter(s) IV Continuous <Continuous>  dextrose 5%. 1000 milliLiter(s) IV Continuous <Continuous>  dextrose 50% Injectable 25 Gram(s) IV Push once  dextrose 50% Injectable 12.5 Gram(s) IV Push once  dextrose 50% Injectable 25 Gram(s) IV Push once  dextrose 50% Injectable 12.5 Gram(s) IV Push once  fenofibrate Tablet 145 milliGRAM(s) Oral daily  glucagon  Injectable 1 milliGRAM(s) IntraMuscular once  heparin   Injectable 5000 Unit(s) SubCutaneous every 8 hours  influenza   Vaccine 0.5 milliLiter(s) IntraMuscular once  insulin glargine Injectable (LANTUS) 20 Unit(s) SubCutaneous at bedtime  insulin lispro (ADMELOG) corrective regimen sliding scale   SubCutaneous three times a day before meals  insulin lispro (ADMELOG) corrective regimen sliding scale   SubCutaneous at bedtime  insulin lispro Injectable (ADMELOG) 2 Unit(s) SubCutaneous three times a day before meals  pantoprazole    Tablet 40 milliGRAM(s) Oral before breakfast    PRN Inpatient Medications  acetaminophen     Tablet .. 650 milliGRAM(s) Oral every 6 hours PRN  ALBUTerol    90 MICROgram(s) HFA Inhaler 2 Puff(s) Inhalation every 6 hours PRN  Biotene Dry Mouth Oral Rinse 15 milliLiter(s) Swish and Spit three times a day PRN  dextrose Oral Gel 15 Gram(s) Oral once PRN  sodium chloride 0.65% Nasal 1 Spray(s) Both Nostrils every 1 hour PRN      REVIEW OF SYSTEMS      All other systems were reviewed and are negative, except as noted.    VITALS/PHYSICAL EXAM  --------------------------------------------------------------------------------  T(C): 36.7 (10-18-22 @ 04:40), Max: 36.9 (10-17-22 @ 11:01)  HR: 76 (10-18-22 @ 04:40) (68 - 76)  BP: 159/75 (10-18-22 @ 04:40) (123/77 - 161/79)  RR: 18 (10-18-22 @ 04:40) (17 - 18)  SpO2: 99% (10-18-22 @ 04:40) (99% - 100%)  Wt(kg): --        10-17-22 @ 07:01  -  10-18-22 @ 07:00  --------------------------------------------------------  IN: 480 mL / OUT: 0 mL / NET: 480 mL        Physical Exam:  	Gen: NAD  	HEENT: MMM  	Pulm: CTA B/L  	CV: S1S2  	Abd: Soft, +BS   	Ext: No LE edema B/L  	Neuro: Awake  	Skin: Warm and dry      LABS/STUDIES  --------------------------------------------------------------------------------              10.3   5.84  >-----------<  206      [10-18-22 @ 06:24]              31.7     142  |  109  |  35  ----------------------------<  165      [10-18-22 @ 06:25]  4.4   |  23  |  2.72        Ca     8.9     [10-18-22 @ 06:25]      Mg     2.0     [10-18-22 @ 06:25]      Phos  3.7     [10-18-22 @ 06:25]    TPro  6.5  /  Alb  3.6  /  TBili  0.2  /  DBili  x   /  AST  17  /  ALT  13  /  AlkPhos  91  [10-18-22 @ 06:25]          Creatinine Trend:  SCr 2.72 [10-18 @ 06:25]  SCr 2.67 [10-17 @ 07:37]  SCr 3.04 [10-16 @ 06:34]  SCr 2.78 [10-15 @ 07:15]  SCr 2.98 [10-14 @ 06:25]    Urinalysis - [10-05-22 @ 00:49]      Color Colorless / Appearance Clear / SG 1.008 / pH 6.0      Gluc 500 mg/dL / Ketone Negative  / Bili Negative / Urobili Negative       Blood Trace / Protein 100 mg/dL / Leuk Est Negative / Nitrite Negative      RBC 2 / WBC 1 / Hyaline  / Gran  / Sq Epi  / Non Sq Epi 0 / Bacteria Negative    Urine Creatinine 88      [10-17-22 @ 19:49]  Urine Protein 488      [10-17-22 @ 19:49]  Urine Sodium 52      [10-11-22 @ 23:05]  Urine Urea Nitrogen 465      [10-11-22 @ 23:05]  Urine Potassium 36      [10-11-22 @ 23:05]  Urine Chloride 40      [10-11-22 @ 23:05]  Urine Osmolality 346      [10-11-22 @ 23:05]    HbA1c 14.4      [06-25-19 @ 06:08]  Lipid: chol 276, , HDL 44, LDL --      [05-15-22 @ 16:49]    HBsAg Nonreact      [10-09-22 @ 06:26]  HCV 0.09, Nonreact      [10-09-22 @ 06:26]    PLA2R: LEÓN <1.8, IFA --      [10-09-22 @ 07:49]  Free Light Chains: kappa 9.26, lambda 4.63, ratio = 2.00      [10-10 @ 11:58]  Immunofixation Serum:   No Monoclonal Band Identified    Reference Range: None Detected      [10-10-22 @ 11:58]

## 2022-10-19 ENCOUNTER — APPOINTMENT (OUTPATIENT)
Dept: UROLOGY | Facility: CLINIC | Age: 47
End: 2022-10-19

## 2022-10-19 LAB
ANION GAP SERPL CALC-SCNC: 9 MMOL/L — SIGNIFICANT CHANGE UP (ref 5–17)
BUN SERPL-MCNC: 38 MG/DL — HIGH (ref 7–23)
CALCIUM SERPL-MCNC: 9.1 MG/DL — SIGNIFICANT CHANGE UP (ref 8.4–10.5)
CHLORIDE SERPL-SCNC: 107 MMOL/L — SIGNIFICANT CHANGE UP (ref 96–108)
CO2 SERPL-SCNC: 26 MMOL/L — SIGNIFICANT CHANGE UP (ref 22–31)
CREAT SERPL-MCNC: 3.06 MG/DL — HIGH (ref 0.5–1.3)
EGFR: 24 ML/MIN/1.73M2 — LOW
GLUCOSE BLDC GLUCOMTR-MCNC: 150 MG/DL — HIGH (ref 70–99)
GLUCOSE BLDC GLUCOMTR-MCNC: 157 MG/DL — HIGH (ref 70–99)
GLUCOSE BLDC GLUCOMTR-MCNC: 160 MG/DL — HIGH (ref 70–99)
GLUCOSE BLDC GLUCOMTR-MCNC: 276 MG/DL — HIGH (ref 70–99)
GLUCOSE SERPL-MCNC: 96 MG/DL — SIGNIFICANT CHANGE UP (ref 70–99)
HCT VFR BLD CALC: 33 % — LOW (ref 39–50)
HGB BLD-MCNC: 10.9 G/DL — LOW (ref 13–17)
MAGNESIUM SERPL-MCNC: 2 MG/DL — SIGNIFICANT CHANGE UP (ref 1.6–2.6)
MCHC RBC-ENTMCNC: 31.1 PG — SIGNIFICANT CHANGE UP (ref 27–34)
MCHC RBC-ENTMCNC: 33 GM/DL — SIGNIFICANT CHANGE UP (ref 32–36)
MCV RBC AUTO: 94 FL — SIGNIFICANT CHANGE UP (ref 80–100)
NRBC # BLD: 0 /100 WBCS — SIGNIFICANT CHANGE UP (ref 0–0)
PHOSPHATE SERPL-MCNC: 4.1 MG/DL — SIGNIFICANT CHANGE UP (ref 2.5–4.5)
PLATELET # BLD AUTO: 217 K/UL — SIGNIFICANT CHANGE UP (ref 150–400)
POTASSIUM SERPL-MCNC: 4.6 MMOL/L — SIGNIFICANT CHANGE UP (ref 3.5–5.3)
POTASSIUM SERPL-SCNC: 4.6 MMOL/L — SIGNIFICANT CHANGE UP (ref 3.5–5.3)
RBC # BLD: 3.51 M/UL — LOW (ref 4.2–5.8)
RBC # FLD: 12.4 % — SIGNIFICANT CHANGE UP (ref 10.3–14.5)
SODIUM SERPL-SCNC: 142 MMOL/L — SIGNIFICANT CHANGE UP (ref 135–145)
WBC # BLD: 5.46 K/UL — SIGNIFICANT CHANGE UP (ref 3.8–10.5)
WBC # FLD AUTO: 5.46 K/UL — SIGNIFICANT CHANGE UP (ref 3.8–10.5)

## 2022-10-19 PROCEDURE — 99233 SBSQ HOSP IP/OBS HIGH 50: CPT | Mod: GC

## 2022-10-19 PROCEDURE — 99232 SBSQ HOSP IP/OBS MODERATE 35: CPT | Mod: GC

## 2022-10-19 RX ORDER — LISINOPRIL 2.5 MG/1
1 TABLET ORAL
Qty: 0 | Refills: 0 | DISCHARGE
Start: 2022-10-19

## 2022-10-19 RX ORDER — INSULIN LISPRO 100/ML
5 VIAL (ML) SUBCUTANEOUS
Qty: 0 | Refills: 0 | DISCHARGE

## 2022-10-19 RX ORDER — METOPROLOL TARTRATE 50 MG
1 TABLET ORAL
Qty: 0 | Refills: 0 | DISCHARGE

## 2022-10-19 RX ORDER — CARVEDILOL PHOSPHATE 80 MG/1
1 CAPSULE, EXTENDED RELEASE ORAL
Qty: 0 | Refills: 0 | DISCHARGE
Start: 2022-10-19

## 2022-10-19 RX ORDER — SIMETHICONE 80 MG/1
80 TABLET, CHEWABLE ORAL DAILY
Refills: 0 | Status: DISCONTINUED | OUTPATIENT
Start: 2022-10-19 | End: 2022-10-20

## 2022-10-19 RX ORDER — LISINOPRIL 2.5 MG/1
2.5 TABLET ORAL DAILY
Refills: 0 | Status: DISCONTINUED | OUTPATIENT
Start: 2022-10-19 | End: 2022-10-20

## 2022-10-19 RX ADMIN — BUDESONIDE AND FORMOTEROL FUMARATE DIHYDRATE 2 PUFF(S): 160; 4.5 AEROSOL RESPIRATORY (INHALATION) at 17:05

## 2022-10-19 RX ADMIN — AMLODIPINE BESYLATE 10 MILLIGRAM(S): 2.5 TABLET ORAL at 05:49

## 2022-10-19 RX ADMIN — Medication 10 MILLIGRAM(S): at 21:34

## 2022-10-19 RX ADMIN — PANTOPRAZOLE SODIUM 40 MILLIGRAM(S): 20 TABLET, DELAYED RELEASE ORAL at 05:50

## 2022-10-19 RX ADMIN — CARVEDILOL PHOSPHATE 25 MILLIGRAM(S): 80 CAPSULE, EXTENDED RELEASE ORAL at 17:04

## 2022-10-19 RX ADMIN — HEPARIN SODIUM 5000 UNIT(S): 5000 INJECTION INTRAVENOUS; SUBCUTANEOUS at 14:46

## 2022-10-19 RX ADMIN — Medication 1: at 21:34

## 2022-10-19 RX ADMIN — CHLORHEXIDINE GLUCONATE 1 APPLICATION(S): 213 SOLUTION TOPICAL at 05:50

## 2022-10-19 RX ADMIN — Medication 2 UNIT(S): at 07:26

## 2022-10-19 RX ADMIN — INSULIN GLARGINE 20 UNIT(S): 100 INJECTION, SOLUTION SUBCUTANEOUS at 21:33

## 2022-10-19 RX ADMIN — Medication 1: at 07:26

## 2022-10-19 RX ADMIN — Medication 2 UNIT(S): at 16:47

## 2022-10-19 RX ADMIN — Medication 145 MILLIGRAM(S): at 11:47

## 2022-10-19 RX ADMIN — HEPARIN SODIUM 5000 UNIT(S): 5000 INJECTION INTRAVENOUS; SUBCUTANEOUS at 05:50

## 2022-10-19 RX ADMIN — ATORVASTATIN CALCIUM 40 MILLIGRAM(S): 80 TABLET, FILM COATED ORAL at 21:35

## 2022-10-19 RX ADMIN — Medication 1: at 11:46

## 2022-10-19 RX ADMIN — HEPARIN SODIUM 5000 UNIT(S): 5000 INJECTION INTRAVENOUS; SUBCUTANEOUS at 21:34

## 2022-10-19 RX ADMIN — CARVEDILOL PHOSPHATE 25 MILLIGRAM(S): 80 CAPSULE, EXTENDED RELEASE ORAL at 05:50

## 2022-10-19 RX ADMIN — BUDESONIDE AND FORMOTEROL FUMARATE DIHYDRATE 2 PUFF(S): 160; 4.5 AEROSOL RESPIRATORY (INHALATION) at 05:50

## 2022-10-19 RX ADMIN — Medication 15 MILLILITER(S): at 03:10

## 2022-10-19 RX ADMIN — Medication 2 UNIT(S): at 11:47

## 2022-10-19 NOTE — PROGRESS NOTE ADULT - PROBLEM SELECTOR PLAN 4
Transfer from [facility] for cardiac cath given c/f NSTEMI. Coronary CT showed moderate stenosis of prox RCA and LAD. Appreciate cardiology workup and recs - cath completed 10/13, showed nonobstructive CAD with non-elevated filling pressures.  - continue amlodipine, coreg, atorvastatin, fenofibrate, (holding ACE/ARB for BILLY)  - continue aspirin, stop brilinta  - consider restart home hctz; will hold off given billy  - EKG and troponins if chest pain non-obstructive disease  -continue with statin, ASA on hold for possible bx. Resume once discharged or cleared after bx next wek.

## 2022-10-19 NOTE — PROGRESS NOTE ADULT - PROBLEM SELECTOR PLAN 1
#Headache/Dizziness  Pt had diplopia since 09/30, initially started as decreased visual acuity. Evaluated by opthalmology and neurology at [facility], symptoms consistent w/ lateral rectus palsy, MRI without clear source of symptoms. Consider vasculopathic etiology 2/2 severe HTN and/or DM. Patient additionally has sx of persistent headache and dizziness/unsteadiness which do not improve with eye patch, endorses R frontal headache which persists for several days w/ lacrimation, has occurred in the past lasting several days at a time. Suspect cluster headache. Patient has recently had SBPs in 160s which would be unlikely to cause end organ sx, likely 2/2 to discomfort or incidental change. Also consider migraine etiology, could consider ppx as below.  - sx improved today however still w/ headache, will trial 100% O2 (12L/min for 15min) latrice if headache worsens to see if improvement and consider verapamil as ppx  - neurology team @ [facility] recommended considering topiramate/celexa/verapamil for headache ppx  - triptans contraindicated as patient w/ cardiac hx (triptans cause vasoconstriction)  - will continue tylenol PRN for headache, avoid NSAIDS given patient's BILLY  - patient evaluated by ophthalmology at [facility], recommended outpatient f/u w/ retina specialist Dr. Jose Angel Arvizu for OCT nerve/RNFL for evaluation for diabetic and hypertensive retinopathy; if diplopia/palsy persists should continue outpatient followup for Fresnel prisms; curbsided ophtho team who agree with recommendations  - appreciate ophtho eval, noted moderate non-proliferative diabetic and hypertensive retinopathy, recommending outpatient follow-up in setting of lacteral rectus palsy.  -as palsy improving, continue eye patch.  -outpatient neuro and ophthalomogy eval.

## 2022-10-19 NOTE — PROGRESS NOTE ADULT - PROBLEM SELECTOR PLAN 1
Patient with BILLY on CKD stage 4 likely in the setting of CRS. Scr~1.7 in May, 2022. Scr was elevated at  2-2.4 in July. Now patient presented to [facility] for LHC. Scr on admission elevated at 2.7mg/dL most recently, pending AM labs Patient likely has baseline CKD from long standing DM however with rapid increase of SCr over the past few months. Patient received neuro imaging with contrast on 10/4 and CT angio cardiac 10/13 with pre/post hydration.     Renal US showing bilateral echogenicity suggestive of CKD but no hydronephrosis/ no stones. Spot urine TP/CR was elevated at 5.5g most recently. Hep B and C negative. serum Kappa/lambda elevated at 2.   Further work for nephrotic range proteinuria was done - Anti PLA2R Ab neg, serum JOSE no bands seen, HBsAb negative and Hep C core Ab negative. Labs reviewed. Patient with improved volume status now, recommend to continue with IV bumex 1mg daily. Pt non oliguric. Discussed with the patient and given unexplained worsening in renal function pt would benefit from renal biopsy during this admission. Recommend to consult IR for renal biopsy, patient currently not on any anticoagulation, last dose of DAPT therapy was on 10/17 AM. Monitor labs and urine output. Avoid NSAIDs, ACEI/ARBS, RCA and nephrotoxins. Dose medications as per eGFR. Patient with BILLY on CKD stage 4 likely in the setting of CRS. Scr~1.7 in May, 2022. Scr was elevated at  2-2.4 in July. Now patient presented to [facility] for LHC. Scr on admission elevated at 2.7mg/dL most recently, pending AM labs Patient likely has baseline CKD from long standing DM however with rapid increase of SCr over the past few months. Patient received neuro imaging with contrast on 10/4 and CT angio cardiac 10/13 with pre/post hydration.     Renal US showing bilateral echogenicity suggestive of CKD but no hydronephrosis/ no stones. Spot urine TP/CR was elevated at 5.5g most recently. Hep B and C negative. serum Kappa/lambda elevated at 2.   Further work for nephrotic range proteinuria was done - Anti PLA2R Ab neg, serum JOSE no bands seen, HBsAb negative and Hep C core Ab negative. Labs reviewed. Patient with improved volume status now. Pt non oliguric. Discussed with the patient and given unexplained worsening in renal function pt would benefit from renal biopsy during this admission. Recommend to consult IR for renal biopsy, patient currently not on any anticoagulation, last dose of DAPT therapy was on 10/17 AM. Monitor labs and urine output. Avoid NSAIDs, ACEI/ARBS, RCA and nephrotoxins. Dose medications as per eGFR.

## 2022-10-19 NOTE — PROGRESS NOTE ADULT - SUBJECTIVE AND OBJECTIVE BOX
PROGRESS NOTE:   Authoted by Dr. Brayan Nunes DO  Pager [phone]     Patient is a 47y old  Male who presents with a chief complaint of diplopia, NSTEMI (19 Oct 2022 07:31)      SUBJECTIVE / OVERNIGHT EVENTS: No events overnight. Patient dizziness and headaches are improving. No chest pain, SOB, abdominal pain, n/v/d/c.   ADDITIONAL REVIEW OF SYSTEMS:    MEDICATIONS  (STANDING):  amitriptyline 10 milliGRAM(s) Oral at bedtime  amLODIPine   Tablet 10 milliGRAM(s) Oral daily  atorvastatin 40 milliGRAM(s) Oral at bedtime  budesonide  80 MICROgram(s)/formoterol 4.5 MICROgram(s) Inhaler 2 Puff(s) Inhalation two times a day  carvedilol 25 milliGRAM(s) Oral every 12 hours  chlorhexidine 2% Cloths 1 Application(s) Topical <User Schedule>  dextrose 5%. 1000 milliLiter(s) (100 mL/Hr) IV Continuous <Continuous>  dextrose 5%. 1000 milliLiter(s) (50 mL/Hr) IV Continuous <Continuous>  dextrose 50% Injectable 25 Gram(s) IV Push once  dextrose 50% Injectable 12.5 Gram(s) IV Push once  dextrose 50% Injectable 25 Gram(s) IV Push once  dextrose 50% Injectable 12.5 Gram(s) IV Push once  fenofibrate Tablet 145 milliGRAM(s) Oral daily  glucagon  Injectable 1 milliGRAM(s) IntraMuscular once  heparin   Injectable 5000 Unit(s) SubCutaneous every 8 hours  influenza   Vaccine 0.5 milliLiter(s) IntraMuscular once  insulin glargine Injectable (LANTUS) 20 Unit(s) SubCutaneous at bedtime  insulin lispro (ADMELOG) corrective regimen sliding scale   SubCutaneous three times a day before meals  insulin lispro (ADMELOG) corrective regimen sliding scale   SubCutaneous at bedtime  insulin lispro Injectable (ADMELOG) 2 Unit(s) SubCutaneous three times a day before meals  lisinopril 2.5 milliGRAM(s) Oral daily  pantoprazole    Tablet 40 milliGRAM(s) Oral before breakfast    MEDICATIONS  (PRN):  acetaminophen     Tablet .. 650 milliGRAM(s) Oral every 6 hours PRN Temp greater or equal to 38C (100.4F), Mild Pain (1 - 3)  ALBUTerol    90 MICROgram(s) HFA Inhaler 2 Puff(s) Inhalation every 6 hours PRN Shortness of Breath and/or Wheezing  Biotene Dry Mouth Oral Rinse 15 milliLiter(s) Swish and Spit three times a day PRN Mouth Care  dextrose Oral Gel 15 Gram(s) Oral once PRN Blood Glucose LESS THAN 70 milliGRAM(s)/deciliter  simethicone 80 milliGRAM(s) Chew daily PRN Gas  sodium chloride 0.65% Nasal 1 Spray(s) Both Nostrils every 1 hour PRN Nasal Congestion      CAPILLARY BLOOD GLUCOSE      POCT Blood Glucose.: 150 mg/dL (19 Oct 2022 16:37)  POCT Blood Glucose.: 160 mg/dL (19 Oct 2022 11:36)  POCT Blood Glucose.: 157 mg/dL (19 Oct 2022 07:21)  POCT Blood Glucose.: 287 mg/dL (18 Oct 2022 21:07)    I&O's Summary    18 Oct 2022 07:01  -  19 Oct 2022 07:00  --------------------------------------------------------  IN: 800 mL / OUT: 0 mL / NET: 800 mL    19 Oct 2022 07:01  -  19 Oct 2022 21:02  --------------------------------------------------------  IN: 840 mL / OUT: 0 mL / NET: 840 mL        PHYSICAL EXAM:  Vital Signs Last 24 Hrs  T(C): 36.7 (19 Oct 2022 20:16), Max: 36.8 (19 Oct 2022 11:00)  T(F): 98 (19 Oct 2022 20:16), Max: 98.2 (19 Oct 2022 11:00)  HR: 71 (19 Oct 2022 20:16) (71 - 71)  BP: 139/75 (19 Oct 2022 20:16) (124/63 - 139/75)  BP(mean): --  RR: 18 (19 Oct 2022 20:16) (17 - 18)  SpO2: 99% (19 Oct 2022 20:16) (99% - 100%)    Parameters below as of 19 Oct 2022 20:16  Patient On (Oxygen Delivery Method): room air        CONSTITUTIONAL: NAD, well-developed  Neuro: Lacteral retus palso on right eye present but improving. Otherwise no focal neurologic deficits.   RESPIRATORY: Normal respiratory effort; lungs are clear to auscultation bilaterally  CARDIOVASCULAR: Regular rate and rhythm, normal S1 and S2, no murmur/rub/gallop; No lower extremity edema; Peripheral pulses are 2+ bilaterally  ABDOMEN: Nontender to palpation, normoactive bowel sounds, no rebound/guarding; No hepatosplenomegaly  MUSCLOSKELETAL: no clubbing or cyanosis of digits; no joint swelling or tenderness to palpation  PSYCH: A+O to person, place, and time; affect appropriate    LABS:                        10.9   5.46  )-----------( 217      ( 19 Oct 2022 10:29 )             33.0     10-19    142  |  107  |  38<H>  ----------------------------<  96  4.6   |  26  |  3.06<H>    Ca    9.1      19 Oct 2022 10:29  Phos  4.1     10-19  Mg     2.0     10-19    TPro  6.5  /  Alb  3.6  /  TBili  0.2  /  DBili  x   /  AST  17  /  ALT  13  /  AlkPhos  91  10-18                RADIOLOGY & ADDITIONAL TESTS:  Results Reviewed:   Imaging Personally Reviewed:  Electrocardiogram Personally Reviewed:    COORDINATION OF CARE:  Care Discussed with Consultants/Other Providers [Y/N]: Nephrology re: Renal Bx. CM re: Disposition.  Prior or Outpatient Records Reviewed [Y/N]:

## 2022-10-19 NOTE — PROGRESS NOTE ADULT - ASSESSMENT
47M smoker w/ hx of vertigo, diverticulitis s/p LAR, ETOH abuse (sober x5 years), DM (patient states has been on Insulin x10 years was never on oral agents), CKD IV - baseline creatinine 2.4, HTN, AMBER, who presented to [facility] on 10/2 w/ /133, dizziness, diplopia, headache and chest tightness, admitted w/ c/f NSTEMI and chronic lacunar infarcts/microvascular changes.

## 2022-10-19 NOTE — PROGRESS NOTE ADULT - ATTENDING COMMENTS
Note written by myself.   Dispo: Accepted to acute rehab awaiting authorizaiton. If occurs prior to Monday, d/c with outpatient bx. If here will have bx on Monday and then discharge.

## 2022-10-19 NOTE — PROGRESS NOTE ADULT - SUBJECTIVE AND OBJECTIVE BOX
[facility] DIVISION OF KIDNEY DISEASES AND HYPERTENSION -- FOLLOW UP NOTE  --------------------------------------------------------------------------------    47-year-old Male w/ PMHx of EtOH abuse( now sober X5 years), DM, CKD IV (baseline creatinine 2.0-2.4 in July), HTN who presented to OSH with dizziness, diplopia, CP. Trop elevated with EKG changes. Patient transferred to [facility] on 10/4/22 for NSTEMI requiring LHC. Underwent LHC on 10/13/22. Nephrology team following for BILLY on CKD.    Patient was seen and examined this morning. He reported a mild headache. He denied any shortness of breath at rest but continues to have some on exertion. He denied any chest pain. No acute issues noted overnight.       PAST HISTORY  --------------------------------------------------------------------------------  No significant changes to PMH, PSH, FHx, SHx, unless otherwise noted    ALLERGIES & MEDICATIONS  --------------------------------------------------------------------------------  Allergies    No Known Allergies    Intolerances      Standing Inpatient Medications  amitriptyline 10 milliGRAM(s) Oral at bedtime  amLODIPine   Tablet 10 milliGRAM(s) Oral daily  atorvastatin 40 milliGRAM(s) Oral at bedtime  budesonide  80 MICROgram(s)/formoterol 4.5 MICROgram(s) Inhaler 2 Puff(s) Inhalation two times a day  carvedilol 25 milliGRAM(s) Oral every 12 hours  chlorhexidine 2% Cloths 1 Application(s) Topical <User Schedule>  dextrose 5%. 1000 milliLiter(s) IV Continuous <Continuous>  dextrose 5%. 1000 milliLiter(s) IV Continuous <Continuous>  dextrose 50% Injectable 25 Gram(s) IV Push once  dextrose 50% Injectable 12.5 Gram(s) IV Push once  dextrose 50% Injectable 25 Gram(s) IV Push once  dextrose 50% Injectable 12.5 Gram(s) IV Push once  fenofibrate Tablet 145 milliGRAM(s) Oral daily  glucagon  Injectable 1 milliGRAM(s) IntraMuscular once  heparin   Injectable 5000 Unit(s) SubCutaneous every 8 hours  influenza   Vaccine 0.5 milliLiter(s) IntraMuscular once  insulin glargine Injectable (LANTUS) 20 Unit(s) SubCutaneous at bedtime  insulin lispro (ADMELOG) corrective regimen sliding scale   SubCutaneous three times a day before meals  insulin lispro (ADMELOG) corrective regimen sliding scale   SubCutaneous at bedtime  insulin lispro Injectable (ADMELOG) 2 Unit(s) SubCutaneous three times a day before meals  pantoprazole    Tablet 40 milliGRAM(s) Oral before breakfast    PRN Inpatient Medications  acetaminophen     Tablet .. 650 milliGRAM(s) Oral every 6 hours PRN  ALBUTerol    90 MICROgram(s) HFA Inhaler 2 Puff(s) Inhalation every 6 hours PRN  Biotene Dry Mouth Oral Rinse 15 milliLiter(s) Swish and Spit three times a day PRN  dextrose Oral Gel 15 Gram(s) Oral once PRN  sodium chloride 0.65% Nasal 1 Spray(s) Both Nostrils every 1 hour PRN      REVIEW OF SYSTEMS    All other systems were reviewed and are negative, except as noted.    VITALS/PHYSICAL EXAM  --------------------------------------------------------------------------------  T(C): 36.6 (10-19-22 @ 05:10), Max: 36.8 (10-18-22 @ 12:20)  HR: 71 (10-19-22 @ 05:10) (71 - 81)  BP: 135/72 (10-19-22 @ 05:10) (129/71 - 160/85)  RR: 18 (10-19-22 @ 05:10) (18 - 18)  SpO2: 99% (10-19-22 @ 05:10) (99% - 100%)  Wt(kg): --        10-18-22 @ 07:01  -  10-19-22 @ 07:00  --------------------------------------------------------  IN: 800 mL / OUT: 0 mL / NET: 800 mL        Physical Exam:  	Gen: NAD  	HEENT: MMM, decreased vision in L eye  	Pulm: CTA B/L  	CV: S1S2  	Abd: Soft, +BS   	Ext: trace LE edema L>R  	Neuro: Awake  	Skin: Warm and dry      LABS/STUDIES  --------------------------------------------------------------------------------              10.3   5.84  >-----------<  206      [10-18-22 @ 06:24]              31.7     142  |  109  |  35  ----------------------------<  165      [10-18-22 @ 06:25]  4.4   |  23  |  2.72        Ca     8.9     [10-18-22 @ 06:25]      Mg     2.0     [10-18-22 @ 06:25]      Phos  3.7     [10-18-22 @ 06:25]    TPro  6.5  /  Alb  3.6  /  TBili  0.2  /  DBili  x   /  AST  17  /  ALT  13  /  AlkPhos  91  [10-18-22 @ 06:25]          Creatinine Trend:  SCr 2.72 [10-18 @ 06:25]  SCr 2.67 [10-17 @ 07:37]  SCr 3.04 [10-16 @ 06:34]  SCr 2.78 [10-15 @ 07:15]  SCr 2.98 [10-14 @ 06:25]    Urinalysis - [10-05-22 @ 00:49]      Color Colorless / Appearance Clear / SG 1.008 / pH 6.0      Gluc 500 mg/dL / Ketone Negative  / Bili Negative / Urobili Negative       Blood Trace / Protein 100 mg/dL / Leuk Est Negative / Nitrite Negative      RBC 2 / WBC 1 / Hyaline  / Gran  / Sq Epi  / Non Sq Epi 0 / Bacteria Negative    Urine Creatinine 88      [10-17-22 @ 19:49]  Urine Protein 488      [10-17-22 @ 19:49]    HbA1c 14.4      [06-25-19 @ 06:08]  Lipid: chol 276, , HDL 44, LDL --      [05-15-22 @ 16:49]    HBsAg Nonreact      [10-09-22 @ 06:26]  HCV 0.09, Nonreact      [10-09-22 @ 06:26]    PLA2R: LEÓN <1.8, IFA --      [10-09-22 @ 07:49]  Free Light Chains: kappa 9.26, lambda 4.63, ratio = 2.00      [10-10 @ 11:58]  Immunofixation Serum:   No Monoclonal Band Identified    Reference Range: None Detected      [10-12-22 @ 05:43]  SPEP Interpretation: Normal Electrophoresis Pattern      [10-12-22 @ 05:43]

## 2022-10-19 NOTE — PROGRESS NOTE ADULT - PROBLEM SELECTOR PLAN 2
Cr improving, now 2.67. Increase likely 2/2 contrast during cath 10/14. Huntley/Lambda 9.26/4.63. Nephrology working up nephrotic range proteinuria, appreciate recs:  - recommending IR consult for renal biopsy, would need to optimize BPs prior  - hold ACE/ARB  - hold off on further IVF for now nephrotic range proteinuria, unclear .  -discussed with nephrology, will start low dose ACE-i and monitor 24 hours.   -if still here next week will plan for renal bx on Monday. If accepted to rehab will arrange for outpatient biopsy to be performed.  -trend creatinine  -await renin activation level.   -can have rest of secondary HTN workup as outpatient.   -avoid nephrotoxic agent.s

## 2022-10-19 NOTE — CONSULT NOTE ADULT - SUBJECTIVE AND OBJECTIVE BOX
Interventional Radiology    Evaluate for Procedure: Renal Biopsy    HPI: 47 yeah old female with a PMH of smoker w/ hx of vertigo, diverticulitis s/p LAR, ETOH abuse (sober x5 years), DM (patient states has been on Insulin x10 years was never on oral agents), CKD IV - baseline creatinine 2.4, HTN, AMBER, who presented to [facility] on 10/2 w/ /133, dizziness, diplopia, headache and chest tightness, admitted w/ c/f NSTEMI and chronic lacunar infarcts/microvascular changes. IR consulted for Renal Biopsy for proteinuria and CKD.    Allergies: NKDA    Medications (Abx/Cardiac/Anticoagulation/Blood Products)  amLODIPine   Tablet: 10 milliGRAM(s) Oral (10-19 @ 05:49)  carvedilol: 25 milliGRAM(s) Oral (10-19 @ 05:50)  heparin   Injectable: 5000 Unit(s) SubCutaneous (10-19 @ 05:50)    Data:  T(C): 36.8  HR: 71  BP: 124/63  RR: 17  SpO2: 100%    -WBC 5.46 / HgB 10.9 / Hct 33.0 / Plt 217  -Na 142 / Cl 107 / BUN 38 / Glucose 96  -K 4.6 / CO2 26 / Cr 3.06  -ALT -- / Alk Phos -- / T.Bili --  -INR -- / PTT 33.0      Assessment/Plan: 47 yeah old female with a PMH of smoker w/ hx of vertigo, diverticulitis s/p LAR, ETOH abuse (sober x5 years), DM (patient states has been on Insulin x10 years was never on oral agents), CKD IV - baseline creatinine 2.4, HTN, AMBER, who presented to [facility] on 10/2 w/ /133, dizziness, diplopia, headache and chest tightness, admitted w/ c/f NSTEMI and chronic lacunar infarcts/microvascular changes. IR consulted for Renal Biopsy for proteinuria and CKD.    - ASA held only since 10/18/22, ASA will need to be held for 5 days total due to high bleeding risk associated with renal biopsy.  - Will plan to perform procedure as outpatient on 10/24/22  - Patient will need to be NPO after midnight on 10/18/22  - Patient scheduled for Monday 10/24/22  - Case discussed with Dr. Walls

## 2022-10-19 NOTE — PROGRESS NOTE ADULT - PROBLEM SELECTOR PLAN 2
Pt. with BP  improved now with SBP between 120-130 over the past 24 hours. Will need to optimize BP if we are to move forward with pursuing renal biopsy. Monitor BP.    If any questions, please feel free to contact me     Kirk Ortega  Nephrology Fellow  [facility] Pager: [phone].

## 2022-10-19 NOTE — PROGRESS NOTE ADULT - PROBLEM SELECTOR PLAN 5
#Impaired executive function  MRI w/ contrast showing multifocal lacunar infarcts and chronic microvascular ischemic changes, consistent with MRI completed @ [facility]; MRI findings do not correlate well with clinical findings. CTA head/neck negative for acute stroke.  - etiology for ischemic changes unclear, TTE bubble study w/ no evidence of PFO, consider hx of smoking, NSTEMI  - discussed MRI findings w/ neuro consult team, no acute intervention or further workup indicated, patient should follow-up outpatient  - discussed w/ speech pathologist who noted deficits in memory, word finding, overall executive function w/ multiple compensatory mechanisms, and that patient's family hx is notable for early onset dementia (diagnosed @ ~50 and  @ 63)  - PT eval recommending CANDY, rehab consult recommending acute rehab, will discuss w/ case management  - patient cleared for full AC per neuro  - aspirin as above #Impaired executive function  MRI w/ contrast showing multifocal lacunar infarcts and chronic microvascular ischemic changes, consistent with MRI completed @ [facility]; MRI findings do not correlate well with clinical findings. CTA head/neck negative for acute stroke.  -likely in setting from HTN  -outpatient neurology follow up.

## 2022-10-19 NOTE — PROGRESS NOTE ADULT - ATTENDING COMMENTS
I have seen this patient with the fellow and agree with their assessment and plan. I was physically present for significant portions of the evaluation and management (E/M) service provided.  I agree with the above history, physical, and plan which I have reviewed and edited where appropriate.    Renal function overall stable although he has had progressive decline in his kidney function since earlier this year,   C, Cardiac echo and ct reviewed. Non obstructive disease noted on coronaries.  Renal US showing 13cm kidneys and spot urine TP/CR was elevated at 3.4 and 5 gms.  . Earlier this year it was up to 11 gms. Hep B and C negative. serum Kappa/lambda elevated at 2.   Further work for nephrotic range proteinuria was done - Anti PLA2R Ab neg, serum JOSE no bands seen, HBsAb negative and Hep C core Ab negative. Likely this is diabetic nephropathy given A1c is 9.9m and eye findings of diabetic retinopathy but amyloid in ddx as well if this is also non ischemic heart disease.  Schedule for kidney bx ( last dose of ASA 10/17) likely monday      HTN: improved  Macario not elevated. Please check plasma renin activity and not direct renin     F/U  metanephrines     Rest per Dr. Jordan Durbin MD  O: [phone]  Contact me on teams I have seen this patient with the fellow and agree with their assessment and plan. I was physically present for significant portions of the evaluation and management (E/M) service provided.  I agree with the above history, physical, and plan which I have reviewed and edited where appropriate.    Renal function overall stable although he has had progressive decline in his kidney function since earlier this year,   C, Cardiac echo and ct reviewed. Non obstructive disease noted on coronaries.  Renal US showing 13cm kidneys and spot urine TP/CR was elevated at 3.4 and 5 gms.  . Earlier this year it was up to 11 gms. Hep B and C negative. serum Kappa/lambda elevated at 2.   Further work for nephrotic range proteinuria was done - Anti PLA2R Ab neg, serum JOSE no bands seen, HBsAb negative and Hep C core Ab negative. Likely this is diabetic nephropathy given A1c is 9.9m and eye findings of diabetic retinopathy but amyloid in ddx as well if this is also non ischemic heart disease.  Schedule for kidney bx ( last dose of ASA 10/17) likely monday      HTN: improved  Macario not elevated. Please check plasma renin activity and not direct renin     F/U  metanephrines   Can start low dose Lisinopril   F/U renal doppler    Rest per Dr. Jordan Durbin MD  O: [phone]  Contact me on teams

## 2022-10-20 ENCOUNTER — TRANSCRIPTION ENCOUNTER (OUTPATIENT)
Age: 47
End: 2022-10-20

## 2022-10-20 ENCOUNTER — INPATIENT (INPATIENT)
Facility: HOSPITAL | Age: 47
LOS: 13 days | Discharge: HOME HEALTH | DRG: 949 | End: 2022-11-03
Attending: PHYSICAL MEDICINE & REHABILITATION | Admitting: PHYSICAL MEDICINE & REHABILITATION
Payer: MEDICARE

## 2022-10-20 VITALS
DIASTOLIC BLOOD PRESSURE: 75 MMHG | OXYGEN SATURATION: 99 % | SYSTOLIC BLOOD PRESSURE: 143 MMHG | TEMPERATURE: 98 F | RESPIRATION RATE: 16 BRPM | WEIGHT: 188.94 LBS | HEART RATE: 75 BPM | HEIGHT: 67 IN

## 2022-10-20 VITALS
OXYGEN SATURATION: 100 % | SYSTOLIC BLOOD PRESSURE: 127 MMHG | DIASTOLIC BLOOD PRESSURE: 69 MMHG | HEART RATE: 73 BPM | RESPIRATION RATE: 18 BRPM | TEMPERATURE: 98 F

## 2022-10-20 DIAGNOSIS — Z98.890 OTHER SPECIFIED POSTPROCEDURAL STATES: Chronic | ICD-10-CM

## 2022-10-20 DIAGNOSIS — R53.81 OTHER MALAISE: ICD-10-CM

## 2022-10-20 LAB
% GAMMA, URINE: 6.7 % — SIGNIFICANT CHANGE UP
ALBUMIN 24H MFR UR ELPH: 73.1 % — SIGNIFICANT CHANGE UP
ALPHA1 GLOB 24H MFR UR ELPH: 8.1 % — SIGNIFICANT CHANGE UP
ALPHA2 GLOB 24H MFR UR ELPH: 4.8 % — SIGNIFICANT CHANGE UP
ANION GAP SERPL CALC-SCNC: 11 MMOL/L — SIGNIFICANT CHANGE UP (ref 5–17)
B-GLOBULIN 24H MFR UR ELPH: 7.3 % — SIGNIFICANT CHANGE UP
BUN SERPL-MCNC: 46 MG/DL — HIGH (ref 7–23)
CALCIUM SERPL-MCNC: 8.8 MG/DL — SIGNIFICANT CHANGE UP (ref 8.4–10.5)
CHLORIDE SERPL-SCNC: 106 MMOL/L — SIGNIFICANT CHANGE UP (ref 96–108)
CO2 SERPL-SCNC: 23 MMOL/L — SIGNIFICANT CHANGE UP (ref 22–31)
COLLECT DURATION TIME UR: 24 HR — SIGNIFICANT CHANGE UP
CREAT SERPL-MCNC: 3.19 MG/DL — HIGH (ref 0.5–1.3)
EGFR: 23 ML/MIN/1.73M2 — LOW
GLUCOSE BLDC GLUCOMTR-MCNC: 146 MG/DL — HIGH (ref 70–99)
GLUCOSE BLDC GLUCOMTR-MCNC: 165 MG/DL — HIGH (ref 70–99)
GLUCOSE BLDC GLUCOMTR-MCNC: 175 MG/DL — HIGH (ref 70–99)
GLUCOSE BLDC GLUCOMTR-MCNC: 203 MG/DL — HIGH (ref 70–99)
GLUCOSE SERPL-MCNC: 181 MG/DL — HIGH (ref 70–99)
HCT VFR BLD CALC: 32.8 % — LOW (ref 39–50)
HGB BLD-MCNC: 10.7 G/DL — LOW (ref 13–17)
INTERPRETATION 24H UR IFE-IMP: SIGNIFICANT CHANGE UP
INTERPRETATION 24H UR IFE-IMP: SIGNIFICANT CHANGE UP
M PROTEIN 24H UR ELPH-MRATE: 0 MG/24HR — SIGNIFICANT CHANGE UP (ref 0–0)
M PROTEIN 24H UR ELPH-MRATE: 0 MG/DL — SIGNIFICANT CHANGE UP
MAGNESIUM SERPL-MCNC: 1.9 MG/DL — SIGNIFICANT CHANGE UP (ref 1.6–2.6)
MCHC RBC-ENTMCNC: 30.7 PG — SIGNIFICANT CHANGE UP (ref 27–34)
MCHC RBC-ENTMCNC: 32.6 GM/DL — SIGNIFICANT CHANGE UP (ref 32–36)
MCV RBC AUTO: 94.3 FL — SIGNIFICANT CHANGE UP (ref 80–100)
NRBC # BLD: 0 /100 WBCS — SIGNIFICANT CHANGE UP (ref 0–0)
PHOSPHATE SERPL-MCNC: 3.5 MG/DL — SIGNIFICANT CHANGE UP (ref 2.5–4.5)
PLATELET # BLD AUTO: 205 K/UL — SIGNIFICANT CHANGE UP (ref 150–400)
POTASSIUM SERPL-MCNC: 4.4 MMOL/L — SIGNIFICANT CHANGE UP (ref 3.5–5.3)
POTASSIUM SERPL-SCNC: 4.4 MMOL/L — SIGNIFICANT CHANGE UP (ref 3.5–5.3)
PROT ?TM UR-MCNC: 247 MG/DL — HIGH (ref 0–12)
PROT PATTERN 24H UR ELPH-IMP: SIGNIFICANT CHANGE UP
PROTEIN QUANT CALC, URINE: 6669 MG/24 H — HIGH (ref 50–100)
RBC # BLD: 3.48 M/UL — LOW (ref 4.2–5.8)
RBC # FLD: 12.3 % — SIGNIFICANT CHANGE UP (ref 10.3–14.5)
SARS-COV-2 RNA SPEC QL NAA+PROBE: SIGNIFICANT CHANGE UP
SODIUM SERPL-SCNC: 140 MMOL/L — SIGNIFICANT CHANGE UP (ref 135–145)
TOTAL VOLUME - 24 HOUR: 2700 ML — SIGNIFICANT CHANGE UP
URINE CREATININE CALCULATION: 1.5 G/24 H — SIGNIFICANT CHANGE UP (ref 1–2)
WBC # BLD: 6.51 K/UL — SIGNIFICANT CHANGE UP (ref 3.8–10.5)
WBC # FLD AUTO: 6.51 K/UL — SIGNIFICANT CHANGE UP (ref 3.8–10.5)

## 2022-10-20 PROCEDURE — 99233 SBSQ HOSP IP/OBS HIGH 50: CPT | Mod: GC

## 2022-10-20 PROCEDURE — 99239 HOSP IP/OBS DSCHRG MGMT >30: CPT | Mod: GC

## 2022-10-20 RX ORDER — CARVEDILOL PHOSPHATE 80 MG/1
25 CAPSULE, EXTENDED RELEASE ORAL EVERY 12 HOURS
Refills: 0 | Status: DISCONTINUED | OUTPATIENT
Start: 2022-10-21 | End: 2022-10-27

## 2022-10-20 RX ORDER — ALBUTEROL 90 UG/1
2 AEROSOL, METERED ORAL EVERY 6 HOURS
Refills: 0 | Status: DISCONTINUED | OUTPATIENT
Start: 2022-10-20 | End: 2022-11-03

## 2022-10-20 RX ORDER — INSULIN GLARGINE 100 [IU]/ML
20 INJECTION, SOLUTION SUBCUTANEOUS AT BEDTIME
Refills: 0 | Status: DISCONTINUED | OUTPATIENT
Start: 2022-10-20 | End: 2022-10-30

## 2022-10-20 RX ORDER — AMLODIPINE BESYLATE 2.5 MG/1
10 TABLET ORAL DAILY
Refills: 0 | Status: DISCONTINUED | OUTPATIENT
Start: 2022-10-21 | End: 2022-10-27

## 2022-10-20 RX ORDER — GLUCAGON INJECTION, SOLUTION 0.5 MG/.1ML
1 INJECTION, SOLUTION SUBCUTANEOUS ONCE
Refills: 0 | Status: DISCONTINUED | OUTPATIENT
Start: 2022-10-20 | End: 2022-11-03

## 2022-10-20 RX ORDER — BUDESONIDE AND FORMOTEROL FUMARATE DIHYDRATE 160; 4.5 UG/1; UG/1
2 AEROSOL RESPIRATORY (INHALATION)
Refills: 0 | Status: DISCONTINUED | OUTPATIENT
Start: 2022-10-20 | End: 2022-11-03

## 2022-10-20 RX ORDER — INSULIN LISPRO 100/ML
2 VIAL (ML) SUBCUTANEOUS
Refills: 0 | Status: DISCONTINUED | OUTPATIENT
Start: 2022-10-20 | End: 2022-10-30

## 2022-10-20 RX ORDER — HEPARIN SODIUM 5000 [USP'U]/ML
5000 INJECTION INTRAVENOUS; SUBCUTANEOUS EVERY 8 HOURS
Refills: 0 | Status: DISCONTINUED | OUTPATIENT
Start: 2022-10-20 | End: 2022-10-21

## 2022-10-20 RX ORDER — FENOFIBRATE,MICRONIZED 130 MG
145 CAPSULE ORAL DAILY
Refills: 0 | Status: DISCONTINUED | OUTPATIENT
Start: 2022-10-21 | End: 2022-10-27

## 2022-10-20 RX ORDER — SIMETHICONE 80 MG/1
80 TABLET, CHEWABLE ORAL DAILY
Refills: 0 | Status: DISCONTINUED | OUTPATIENT
Start: 2022-10-20 | End: 2022-11-03

## 2022-10-20 RX ORDER — MAGNESIUM SULFATE 500 MG/ML
2 VIAL (ML) INJECTION ONCE
Refills: 0 | Status: COMPLETED | OUTPATIENT
Start: 2022-10-20 | End: 2022-10-20

## 2022-10-20 RX ORDER — PETROLATUM,WHITE
1 JELLY (GRAM) TOPICAL
Refills: 0 | Status: DISCONTINUED | OUTPATIENT
Start: 2022-10-20 | End: 2022-11-03

## 2022-10-20 RX ORDER — ATORVASTATIN CALCIUM 80 MG/1
40 TABLET, FILM COATED ORAL AT BEDTIME
Refills: 0 | Status: DISCONTINUED | OUTPATIENT
Start: 2022-10-20 | End: 2022-11-03

## 2022-10-20 RX ORDER — ACETAMINOPHEN 500 MG
650 TABLET ORAL EVERY 6 HOURS
Refills: 0 | Status: DISCONTINUED | OUTPATIENT
Start: 2022-10-20 | End: 2022-11-03

## 2022-10-20 RX ORDER — DEXTROSE 50 % IN WATER 50 %
25 SYRINGE (ML) INTRAVENOUS ONCE
Refills: 0 | Status: DISCONTINUED | OUTPATIENT
Start: 2022-10-20 | End: 2022-11-03

## 2022-10-20 RX ORDER — INSULIN LISPRO 100/ML
VIAL (ML) SUBCUTANEOUS AT BEDTIME
Refills: 0 | Status: DISCONTINUED | OUTPATIENT
Start: 2022-10-20 | End: 2022-11-03

## 2022-10-20 RX ORDER — DEXTROSE 50 % IN WATER 50 %
15 SYRINGE (ML) INTRAVENOUS ONCE
Refills: 0 | Status: DISCONTINUED | OUTPATIENT
Start: 2022-10-20 | End: 2022-11-03

## 2022-10-20 RX ORDER — INSULIN LISPRO 100/ML
VIAL (ML) SUBCUTANEOUS
Refills: 0 | Status: DISCONTINUED | OUTPATIENT
Start: 2022-10-20 | End: 2022-11-03

## 2022-10-20 RX ORDER — PANTOPRAZOLE SODIUM 20 MG/1
1 TABLET, DELAYED RELEASE ORAL
Qty: 30 | Refills: 0
Start: 2022-10-20 | End: 2022-11-18

## 2022-10-20 RX ORDER — INSULIN LISPRO 100/ML
2 VIAL (ML) SUBCUTANEOUS
Qty: 0 | Refills: 0 | DISCHARGE
Start: 2022-10-20

## 2022-10-20 RX ORDER — SALIVA SUBSTITUTE COMB NO.11 351 MG
15 POWDER IN PACKET (EA) MUCOUS MEMBRANE THREE TIMES A DAY
Refills: 0 | Status: DISCONTINUED | OUTPATIENT
Start: 2022-10-20 | End: 2022-11-03

## 2022-10-20 RX ORDER — INSULIN DEGLUDEC 100 U/ML
45 INJECTION, SOLUTION SUBCUTANEOUS
Qty: 0 | Refills: 0 | DISCHARGE

## 2022-10-20 RX ORDER — LISINOPRIL 2.5 MG/1
2.5 TABLET ORAL DAILY
Refills: 0 | Status: DISCONTINUED | OUTPATIENT
Start: 2022-10-21 | End: 2022-10-27

## 2022-10-20 RX ORDER — MECLIZINE HCL 12.5 MG
1 TABLET ORAL
Qty: 0 | Refills: 0 | DISCHARGE

## 2022-10-20 RX ORDER — PANTOPRAZOLE SODIUM 20 MG/1
40 TABLET, DELAYED RELEASE ORAL
Refills: 0 | Status: DISCONTINUED | OUTPATIENT
Start: 2022-10-21 | End: 2022-11-03

## 2022-10-20 RX ORDER — DEXTROSE 50 % IN WATER 50 %
12.5 SYRINGE (ML) INTRAVENOUS ONCE
Refills: 0 | Status: DISCONTINUED | OUTPATIENT
Start: 2022-10-20 | End: 2022-11-03

## 2022-10-20 RX ORDER — FAMOTIDINE 10 MG/ML
1 INJECTION INTRAVENOUS
Qty: 0 | Refills: 0 | DISCHARGE

## 2022-10-20 RX ORDER — SODIUM CHLORIDE 9 MG/ML
1000 INJECTION, SOLUTION INTRAVENOUS
Refills: 0 | Status: DISCONTINUED | OUTPATIENT
Start: 2022-10-20 | End: 2022-11-03

## 2022-10-20 RX ORDER — SODIUM CHLORIDE 0.65 %
1 AEROSOL, SPRAY (ML) NASAL
Refills: 0 | Status: DISCONTINUED | OUTPATIENT
Start: 2022-10-20 | End: 2022-11-03

## 2022-10-20 RX ORDER — AMITRIPTYLINE HCL 25 MG
10 TABLET ORAL AT BEDTIME
Refills: 0 | Status: DISCONTINUED | OUTPATIENT
Start: 2022-10-20 | End: 2022-11-03

## 2022-10-20 RX ORDER — INSULIN GLARGINE 100 [IU]/ML
20 INJECTION, SOLUTION SUBCUTANEOUS
Qty: 0 | Refills: 0 | DISCHARGE
Start: 2022-10-20

## 2022-10-20 RX ADMIN — HEPARIN SODIUM 5000 UNIT(S): 5000 INJECTION INTRAVENOUS; SUBCUTANEOUS at 15:21

## 2022-10-20 RX ADMIN — Medication 2 UNIT(S): at 11:55

## 2022-10-20 RX ADMIN — Medication 1: at 16:41

## 2022-10-20 RX ADMIN — Medication 2 UNIT(S): at 08:06

## 2022-10-20 RX ADMIN — Medication 2 UNIT(S): at 16:41

## 2022-10-20 RX ADMIN — CARVEDILOL PHOSPHATE 25 MILLIGRAM(S): 80 CAPSULE, EXTENDED RELEASE ORAL at 05:52

## 2022-10-20 RX ADMIN — BUDESONIDE AND FORMOTEROL FUMARATE DIHYDRATE 2 PUFF(S): 160; 4.5 AEROSOL RESPIRATORY (INHALATION) at 05:53

## 2022-10-20 RX ADMIN — Medication 25 GRAM(S): at 11:55

## 2022-10-20 RX ADMIN — HEPARIN SODIUM 5000 UNIT(S): 5000 INJECTION INTRAVENOUS; SUBCUTANEOUS at 22:28

## 2022-10-20 RX ADMIN — Medication 10 MILLIGRAM(S): at 22:28

## 2022-10-20 RX ADMIN — PANTOPRAZOLE SODIUM 40 MILLIGRAM(S): 20 TABLET, DELAYED RELEASE ORAL at 05:52

## 2022-10-20 RX ADMIN — INSULIN GLARGINE 20 UNIT(S): 100 INJECTION, SOLUTION SUBCUTANEOUS at 22:34

## 2022-10-20 RX ADMIN — AMLODIPINE BESYLATE 10 MILLIGRAM(S): 2.5 TABLET ORAL at 05:52

## 2022-10-20 RX ADMIN — HEPARIN SODIUM 5000 UNIT(S): 5000 INJECTION INTRAVENOUS; SUBCUTANEOUS at 05:51

## 2022-10-20 RX ADMIN — ATORVASTATIN CALCIUM 40 MILLIGRAM(S): 80 TABLET, FILM COATED ORAL at 22:28

## 2022-10-20 RX ADMIN — LISINOPRIL 2.5 MILLIGRAM(S): 2.5 TABLET ORAL at 05:53

## 2022-10-20 RX ADMIN — Medication 145 MILLIGRAM(S): at 11:56

## 2022-10-20 RX ADMIN — CARVEDILOL PHOSPHATE 25 MILLIGRAM(S): 80 CAPSULE, EXTENDED RELEASE ORAL at 16:43

## 2022-10-20 RX ADMIN — CHLORHEXIDINE GLUCONATE 1 APPLICATION(S): 213 SOLUTION TOPICAL at 05:52

## 2022-10-20 RX ADMIN — BUDESONIDE AND FORMOTEROL FUMARATE DIHYDRATE 2 PUFF(S): 160; 4.5 AEROSOL RESPIRATORY (INHALATION) at 16:42

## 2022-10-20 RX ADMIN — Medication 2: at 08:05

## 2022-10-20 NOTE — PROGRESS NOTE ADULT - PROBLEM SELECTOR PLAN 6
Home regimen 40U long-acting, 5U short-acting pre-meal.  - patient moderately hypoglycemia 10/16 AM on 28U lantus; wnl to ~220s @ premeals  - decrease lantus to 20U, 4U pre-meal short acting was d/mesfin 10/14, will restart at 2U and titrate as needed Home regimen 40U long-acting, 5U short-acting pre-meal.  - patient moderately hypoglycemic 10/16 AM on 28U lantus; wnl to ~220s @ premeals  - decrease lantus to 20U, 4U pre-meal short acting was d/mesfin 10/14, will restart at 2U and titrate as needed

## 2022-10-20 NOTE — PROGRESS NOTE ADULT - PROBLEM SELECTOR PLAN 1
Patient with BILLY on CKD stage 4 likely in the setting of CRS. Scr~1.7 in May, 2022. Scr was elevated at  2-2.4 in July. Now patient presented to [facility] for LHC. Scr on admission elevated at 2.7mg/dL most recently, pending AM labs Patient likely has baseline CKD from long standing DM however with rapid increase of SCr over the past few months. Patient received neuro imaging with contrast on 10/4 and CT angio cardiac 10/13 with pre/post hydration.     Renal US showing bilateral echogenicity suggestive of CKD but no hydronephrosis/ no stones. Spot urine TP/CR was elevated at 5.5g most recently. Hep B and C negative. serum Kappa/lambda elevated at 2.   Further work for nephrotic range proteinuria was done - Anti PLA2R Ab neg, serum JOSE no bands seen, HBsAb negative and Hep C core Ab negative. Labs reviewed. Patient with improved volume status now. Pt non oliguric. Discussed with the patient and given unexplained worsening in renal function pt would benefit from renal biopsy, which is planend for 10/24 as outpatient. Patient planned for discharge today to rehab, no renal contraindication. Patient is to continue with lisinopril 2.5mg daily and start on torsemide 20mg daily. Please repeat labs at rehab in one week. Monitor labs and urine output. Avoid NSAIDs, ACEI/ARBS, RCA and nephrotoxins. Dose medications as per eGFR.

## 2022-10-20 NOTE — H&P ADULT - NSHPLABSRESULTS_GEN_ALL_CORE
RECENT LABS/IMAGING                        10.7   6.51  )-----------( 205      ( 20 Oct 2022 06:18 )             32.8     10-20    140  |  106  |  46<H>  ----------------------------<  181<H>  4.4   |  23  |  3.19<H>    Ca    8.8      20 Oct 2022 06:11  Phos  3.5     10-20  Mg     1.9     10-20      MR Head w/ IV Cont (10.09.22 @ 17:04)     IMPRESSION:  There is no mass, intracranial hemorrhage, or acute infarction. Chronic multifocal lacunar infarcts and chronic microvascular ischemic changes as detailed above.      US Kidney and Bladder (10.08.22 @ 12:39)     IMPRESSION:  No renal mass, hydronephrosis or calculi. Increased renal parenchymal echogenicity suggesting medical renal disease.    CT Angio Neck w/ IV Cont (10.04.22 @ 22:15)     IMPRESSION:  CT brain: Stable chronic small infarcts. No acute intracranial hemorrhage, mass effect, midline shift.  CTA neck and brain: No vessel occlusion or significant stenosis.

## 2022-10-20 NOTE — PROGRESS NOTE ADULT - SUBJECTIVE AND OBJECTIVE BOX
[facility] DIVISION OF KIDNEY DISEASES AND HYPERTENSION -- FOLLOW UP NOTE  --------------------------------------------------------------------------------    Patient was seen and examined this morning. He reports feeling well but reports a little worsened swelling in his legs. He deneid any chest pain, shortness of breath, nausea or vomiting.         PAST HISTORY  --------------------------------------------------------------------------------  No significant changes to PMH, PSH, FHx, SHx, unless otherwise noted    ALLERGIES & MEDICATIONS  --------------------------------------------------------------------------------  Allergies    No Known Allergies    Intolerances      Standing Inpatient Medications  amitriptyline 10 milliGRAM(s) Oral at bedtime  amLODIPine   Tablet 10 milliGRAM(s) Oral daily  atorvastatin 40 milliGRAM(s) Oral at bedtime  budesonide  80 MICROgram(s)/formoterol 4.5 MICROgram(s) Inhaler 2 Puff(s) Inhalation two times a day  carvedilol 25 milliGRAM(s) Oral every 12 hours  chlorhexidine 2% Cloths 1 Application(s) Topical <User Schedule>  dextrose 5%. 1000 milliLiter(s) IV Continuous <Continuous>  dextrose 5%. 1000 milliLiter(s) IV Continuous <Continuous>  dextrose 50% Injectable 25 Gram(s) IV Push once  dextrose 50% Injectable 12.5 Gram(s) IV Push once  dextrose 50% Injectable 25 Gram(s) IV Push once  dextrose 50% Injectable 12.5 Gram(s) IV Push once  fenofibrate Tablet 145 milliGRAM(s) Oral daily  glucagon  Injectable 1 milliGRAM(s) IntraMuscular once  heparin   Injectable 5000 Unit(s) SubCutaneous every 8 hours  influenza   Vaccine 0.5 milliLiter(s) IntraMuscular once  insulin glargine Injectable (LANTUS) 20 Unit(s) SubCutaneous at bedtime  insulin lispro (ADMELOG) corrective regimen sliding scale   SubCutaneous three times a day before meals  insulin lispro (ADMELOG) corrective regimen sliding scale   SubCutaneous at bedtime  insulin lispro Injectable (ADMELOG) 2 Unit(s) SubCutaneous three times a day before meals  lisinopril 2.5 milliGRAM(s) Oral daily  pantoprazole    Tablet 40 milliGRAM(s) Oral before breakfast    PRN Inpatient Medications  acetaminophen     Tablet .. 650 milliGRAM(s) Oral every 6 hours PRN  ALBUTerol    90 MICROgram(s) HFA Inhaler 2 Puff(s) Inhalation every 6 hours PRN  Biotene Dry Mouth Oral Rinse 15 milliLiter(s) Swish and Spit three times a day PRN  dextrose Oral Gel 15 Gram(s) Oral once PRN  simethicone 80 milliGRAM(s) Chew daily PRN  sodium chloride 0.65% Nasal 1 Spray(s) Both Nostrils every 1 hour PRN      REVIEW OF SYSTEMS    All other systems were reviewed and are negative, except as noted.    VITALS/PHYSICAL EXAM  --------------------------------------------------------------------------------  T(C): 37 (10-20-22 @ 11:13), Max: 37 (10-20-22 @ 11:13)  HR: 75 (10-20-22 @ 11:13) (70 - 75)  BP: 135/73 (10-20-22 @ 11:13) (135/73 - 156/76)  RR: 18 (10-20-22 @ 11:13) (18 - 18)  SpO2: 100% (10-20-22 @ 11:13) (98% - 100%)  Wt(kg): --        10-19-22 @ 07:01  -  10-20-22 @ 07:00  --------------------------------------------------------  IN: 840 mL / OUT: 0 mL / NET: 840 mL    10-20-22 @ 07:01  -  10-20-22 @ 13:48  --------------------------------------------------------  IN: 520 mL / OUT: 0 mL / NET: 520 mL        Physical Exam:  	Gen: NAD  	HEENT: MMM, decreased vision in L eye  	Pulm: CTA B/L  	CV: S1S2  	Abd: Soft, +BS   	Ext: trace LE edema L>R  	Neuro: Awake  	Skin: Warm and dry    LABS/STUDIES  --------------------------------------------------------------------------------              10.7   6.51  >-----------<  205      [10-20-22 @ 06:18]              32.8     140  |  106  |  46  ----------------------------<  181      [10-20-22 @ 06:11]  4.4   |  23  |  3.19        Ca     8.8     [10-20-22 @ 06:11]      Mg     1.9     [10-20-22 @ 06:11]      Phos  3.5     [10-20-22 @ 06:11]            Creatinine Trend:  SCr 3.19 [10-20 @ 06:11]  SCr 3.06 [10-19 @ 10:29]  SCr 2.72 [10-18 @ 06:25]  SCr 2.67 [10-17 @ 07:37]  SCr 3.04 [10-16 @ 06:34]    Urinalysis - [10-05-22 @ 00:49]      Color Colorless / Appearance Clear / SG 1.008 / pH 6.0      Gluc 500 mg/dL / Ketone Negative  / Bili Negative / Urobili Negative       Blood Trace / Protein 100 mg/dL / Leuk Est Negative / Nitrite Negative      RBC 2 / WBC 1 / Hyaline  / Gran  / Sq Epi  / Non Sq Epi 0 / Bacteria Negative    Urine Creatinine 88      [10-17-22 @ 19:49]  Urine Protein 488      [10-17-22 @ 19:49]    HbA1c 14.4      [06-25-19 @ 06:08]  Lipid: chol 276, , HDL 44, LDL --      [05-15-22 @ 16:49]    HBsAg Nonreact      [10-09-22 @ 06:26]  HCV 0.09, Nonreact      [10-09-22 @ 06:26]    PLA2R: LEÓN <1.8, IFA --      [10-09-22 @ 07:49]  Free Light Chains: kappa 9.26, lambda 4.63, ratio = 2.00      [10-10 @ 11:58]  Immunofixation Serum:   No Monoclonal Band Identified    Reference Range: None Detected      [10-12-22 @ 05:43]  SPEP Interpretation: Normal Electrophoresis Pattern      [10-12-22 @ 05:43]  Immunofixation Urine:   Reference Range: None Detected      [10-13-22 @ 00:21]  UPEP Interpretation: Mild Selective (Glomerular) Proteinuria      [10-13-22 @ 00:21]

## 2022-10-20 NOTE — H&P ADULT - ATTENDING COMMENTS
Rehab Attending- Patient seen and examined by me - Case discussed, above note reviewed by me with modifications made    This is a 46 YO male with PMHx of CKD IV, vertigo, diverticulitis s/p LAR, cigarette use, ETOH abuse now sober x5 years, IDDM, HTN, AMBER, who presented to [facility] on 10/2 c/o dizziness, b/l diplopia, headache and chest tightness found to have /133 and elevated troponin concerning for NSTEMI. CTH negative for acute pathology. MRI Brain showed L parasagittal infarcts. Per, neurology and opthalmology CN VI palsy. Patient  was transferred to [facility] for a possible LHC. LHC showed nonobstructive CAD without elevated filling pressures. Hospital course significant for BILLY, right frontal headache and popping sensation, determined to have components hypertensive and diabetic retinopathy for which outpatient followup and possible trial of Fresnel prisms was recommended. Patient now with gait Instability, ADL impairments and Functional impairments.    Doing well  Moved bowels, voiding  + double vision, numbness feet  no nausea, no vomiting  no SOB, no Chest pain  no headaches    Vital Signs Last 24 Hrs  T(C): 36.6 (21 Oct 2022 09:00), Max: 36.9 (20 Oct 2022 20:43)  T(F): 97.9 (21 Oct 2022 09:00), Max: 98.5 (20 Oct 2022 20:43)  HR: 70 (21 Oct 2022 17:12) (70 - 75)  BP: 137/76 (21 Oct 2022 17:12) (127/69 - 160/85)  BP(mean): --  RR: 16 (21 Oct 2022 09:00) (16 - 18)  SpO2: 100% (21 Oct 2022 09:00) (99% - 100%)    Parameters below as of 21 Oct 2022 09:00  Patient On (Oxygen Delivery Method): room air    physical exam as above  2+ edema BLEs  Right 6 N palsy- R eye with blurry vision  FROM with MS 5/5  sens diminished glove stocking distribution BLES    IMP Rehab MultiCVA- good candidate for intensive rehab - will tolerate three hours rehab daily  Diabetic neuropathy- amytryptiline, Rehab  BILLY on CKD ? Nephrotic syndrome- Follow BMP- for renal biopsy on 10/24  CAD /HTN- Cont amlodipine, coreg  DM2- Insulin, SSI- diabetic educator  Right 6th N palsy- alt patch- OPD Ophtho

## 2022-10-20 NOTE — DISCHARGE NOTE NURSING/CASE MANAGEMENT/SOCIAL WORK - NSDCFUADDAPPT_GEN_ALL_CORE_FT
APPTS ARE READY TO BE MADE: [ x] YES    Best Family or Patient Contact (if needed):    Additional Information about above appointments (if needed):    1: ophthalmology appointment   2: nephrology appointment  3:     Other comments or requests:           Interventional radiology Follow up:    - Will perform renal Biopsy on Monday 10/24/22 @ 0800 as outpatient in Interventional Radiology    - JAKE byrne @ [facility] on Saturday 10/22/22 @ 1674

## 2022-10-20 NOTE — H&P ADULT - NSHPPHYSICALEXAM_GEN_ALL_CORE
Constitutional - NAD, Comfortable  HEENT - NCAT, EOMI  Neck - Supple, No limited ROM  Chest - good chest expansion, good respiratory effort, CTAB   Cardio - warm and well perfused, RRR, no murmur  Abdomen -  Soft, NTND  Extremities - No peripheral edema, No calf tenderness   Neurologic Exam:                    Cognitive -             Orientation: Awake, Alert, AAO to self, "rehab", date            Attention:  Unable to spell WORLD forward or backwards, recall 2/3 objects without cuing, 3/3 with cues. Serial 7s x1            Memory: Recent/ remote memory impaired            Thought: process, content appropriate     Speech - Fluent, Comprehensible, No dysarthria, No aphasia +Occasional word-finding difficulties     Cranial Nerves - No facial asymmetry, Tongue midline, Shoulder shrug intact +R lateral gaze palsy     Motor -                      LEFT    UE - ShAB 5/5, EF 5/5, EE 5/5, WE 5/5,  WNL                    RIGHT UE - ShAB 5/5, EF 5/5, EE 5/5, WE 5/5,  WNL                    LEFT    LE - HF 4+/5, KE 5/5, DF 5/5, PF 5/5                    RIGHT LE - HF 4+/5, KE 5/5, DF 5/5, PF 5/5        Sensory - Diminished to LT bilateral distal lower extremities. Proprioception diminished at bilateral great toe     Reflexes - DTR intact     Coordination - FTN / HTS intact. No marked dysmetria within visual field     OculoVestibular -  No nystagmus  Psychiatric - Mood stable, Affect WNL  Skin on admission: +healed ulcer R lateral foot +blanchable erythematous callous R plantar 1MTP  +Dry excoriated skin bilateral upper extremities +Dry skin bilateral feet +Healed midline abdominal surgical incision  +Scattered abrasions of varying stages of healing including forehead (healed, hypopigmented) and arms  +Onychomycosis to nails of bilateral feet

## 2022-10-20 NOTE — PATIENT PROFILE ADULT - VISION (WITH CORRECTIVE LENSES IF THE PATIENT USUALLY WEARS THEM):
double vision/ diplopia/Partially impaired: cannot see medication labels or newsprint, but can see obstacles in path, and the surrounding layout; can count fingers at arm's length

## 2022-10-20 NOTE — DISCHARGE NOTE NURSING/CASE MANAGEMENT/SOCIAL WORK - PATIENT PORTAL LINK FT
You can access the FollowMyHealth Patient Portal offered by [facility] by registering at the following website: [email or web address]. By joining [employer]’s FollowMyHealth portal, you will also be able to view your health information using other applications (apps) compatible with our system.

## 2022-10-20 NOTE — PATIENT PROFILE ADULT - FALL HARM RISK - HARM RISK INTERVENTIONS

## 2022-10-20 NOTE — DISCHARGE NOTE NURSING/CASE MANAGEMENT/SOCIAL WORK - NSDCPEWEB_GEN_ALL_CORE
[facility] for Tobacco Control website --- [email or web address] website --- [email or web address]

## 2022-10-20 NOTE — PROGRESS NOTE ADULT - SUBJECTIVE AND OBJECTIVE BOX
22-Mar-2017 Internal Medicine   Jesus Hagen | PGY-1    OVERNIGHT EVENTS: No acute overnight events.    SUBJECTIVE:     MEDICATIONS  (STANDING):  amitriptyline 10 milliGRAM(s) Oral at bedtime  amLODIPine   Tablet 10 milliGRAM(s) Oral daily  atorvastatin 40 milliGRAM(s) Oral at bedtime  budesonide  80 MICROgram(s)/formoterol 4.5 MICROgram(s) Inhaler 2 Puff(s) Inhalation two times a day  carvedilol 25 milliGRAM(s) Oral every 12 hours  chlorhexidine 2% Cloths 1 Application(s) Topical <User Schedule>  dextrose 5%. 1000 milliLiter(s) (100 mL/Hr) IV Continuous <Continuous>  dextrose 5%. 1000 milliLiter(s) (50 mL/Hr) IV Continuous <Continuous>  dextrose 50% Injectable 25 Gram(s) IV Push once  dextrose 50% Injectable 12.5 Gram(s) IV Push once  dextrose 50% Injectable 25 Gram(s) IV Push once  dextrose 50% Injectable 12.5 Gram(s) IV Push once  fenofibrate Tablet 145 milliGRAM(s) Oral daily  glucagon  Injectable 1 milliGRAM(s) IntraMuscular once  heparin   Injectable 5000 Unit(s) SubCutaneous every 8 hours  influenza   Vaccine 0.5 milliLiter(s) IntraMuscular once  insulin glargine Injectable (LANTUS) 20 Unit(s) SubCutaneous at bedtime  insulin lispro (ADMELOG) corrective regimen sliding scale   SubCutaneous three times a day before meals  insulin lispro (ADMELOG) corrective regimen sliding scale   SubCutaneous at bedtime  insulin lispro Injectable (ADMELOG) 2 Unit(s) SubCutaneous three times a day before meals  lisinopril 2.5 milliGRAM(s) Oral daily  pantoprazole    Tablet 40 milliGRAM(s) Oral before breakfast    MEDICATIONS  (PRN):  acetaminophen     Tablet .. 650 milliGRAM(s) Oral every 6 hours PRN Temp greater or equal to 38C (100.4F), Mild Pain (1 - 3)  ALBUTerol    90 MICROgram(s) HFA Inhaler 2 Puff(s) Inhalation every 6 hours PRN Shortness of Breath and/or Wheezing  Biotene Dry Mouth Oral Rinse 15 milliLiter(s) Swish and Spit three times a day PRN Mouth Care  dextrose Oral Gel 15 Gram(s) Oral once PRN Blood Glucose LESS THAN 70 milliGRAM(s)/deciliter  simethicone 80 milliGRAM(s) Chew daily PRN Gas  sodium chloride 0.65% Nasal 1 Spray(s) Both Nostrils every 1 hour PRN Nasal Congestion    VITALS:  T(F): 98 (10-20-22 @ 05:50), Max: 98.2 (10-19-22 @ 11:00)  HR: 70 (10-20-22 @ 05:50) (70 - 71)  BP: 156/76 (10-20-22 @ 05:50) (124/63 - 156/76)  BP(mean): --  RR: 18 (10-20-22 @ 05:50) (17 - 18)  SpO2: 98% (10-20-22 @ 05:50) (98% - 100%)    PHYSICAL EXAM:   GENERAL: NAD, lying in bed comfortably  HEAD: Atraumatic, normocephalic  EYES: EOMI, conjunctiva and sclera clear, no nystagmus noted  ENT: Moist mucous membranes  CHEST/LUNG: Clear to auscultation bilaterally; no rales, rhonchi, wheezing, or rubs; unlabored respirations  HEART: Regular rate and rhythm; no murmurs, rubs, or gallops, normal S1/S2  ABDOMEN: Normal bowel sounds; soft, nontender, nondistended  EXTREMITIES: No clubbing, cyanosis, or edema  MSK: No gross deformities noted   NEURO: No focal deficits   SKIN: No rashes or lesions  PSYCH: Normal mood, affect     LABS:                      10.7   6.51  )-----------( 205      ( 20 Oct 2022 06:18 )             32.8     10-20  140  |  106  |  46<H>  ----------------------------<  181<H>  4.4   |  23  |  3.19<H>    Ca    8.8      20 Oct 2022 06:11  Phos  3.5     10-20  Mg     1.9     10-20    Creatinine Trend: 3.19<--, 3.06<--, 2.72<--, 2.67<--, 3.04<--, 2.78<--    I&O's Summary  19 Oct 2022 07:01  -  20 Oct 2022 07:00  --------------------------------------------------------  IN: 840 mL / OUT: 0 mL / NET: 840 mL       Internal Medicine   Jesus Hagen | PGY-1    OVERNIGHT EVENTS: No acute overnight events.    SUBJECTIVE: Feeling well. Eye sx, dizziness stable. Denies fever, chest pain, shortness of breath.    MEDICATIONS  (STANDING):  amitriptyline 10 milliGRAM(s) Oral at bedtime  amLODIPine   Tablet 10 milliGRAM(s) Oral daily  atorvastatin 40 milliGRAM(s) Oral at bedtime  budesonide  80 MICROgram(s)/formoterol 4.5 MICROgram(s) Inhaler 2 Puff(s) Inhalation two times a day  carvedilol 25 milliGRAM(s) Oral every 12 hours  chlorhexidine 2% Cloths 1 Application(s) Topical <User Schedule>  dextrose 5%. 1000 milliLiter(s) (100 mL/Hr) IV Continuous <Continuous>  dextrose 5%. 1000 milliLiter(s) (50 mL/Hr) IV Continuous <Continuous>  dextrose 50% Injectable 25 Gram(s) IV Push once  dextrose 50% Injectable 12.5 Gram(s) IV Push once  dextrose 50% Injectable 25 Gram(s) IV Push once  dextrose 50% Injectable 12.5 Gram(s) IV Push once  fenofibrate Tablet 145 milliGRAM(s) Oral daily  glucagon  Injectable 1 milliGRAM(s) IntraMuscular once  heparin   Injectable 5000 Unit(s) SubCutaneous every 8 hours  influenza   Vaccine 0.5 milliLiter(s) IntraMuscular once  insulin glargine Injectable (LANTUS) 20 Unit(s) SubCutaneous at bedtime  insulin lispro (ADMELOG) corrective regimen sliding scale   SubCutaneous three times a day before meals  insulin lispro (ADMELOG) corrective regimen sliding scale   SubCutaneous at bedtime  insulin lispro Injectable (ADMELOG) 2 Unit(s) SubCutaneous three times a day before meals  lisinopril 2.5 milliGRAM(s) Oral daily  pantoprazole    Tablet 40 milliGRAM(s) Oral before breakfast    MEDICATIONS  (PRN):  acetaminophen     Tablet .. 650 milliGRAM(s) Oral every 6 hours PRN Temp greater or equal to 38C (100.4F), Mild Pain (1 - 3)  ALBUTerol    90 MICROgram(s) HFA Inhaler 2 Puff(s) Inhalation every 6 hours PRN Shortness of Breath and/or Wheezing  Biotene Dry Mouth Oral Rinse 15 milliLiter(s) Swish and Spit three times a day PRN Mouth Care  dextrose Oral Gel 15 Gram(s) Oral once PRN Blood Glucose LESS THAN 70 milliGRAM(s)/deciliter  simethicone 80 milliGRAM(s) Chew daily PRN Gas  sodium chloride 0.65% Nasal 1 Spray(s) Both Nostrils every 1 hour PRN Nasal Congestion    VITALS:  T(F): 98 (10-20-22 @ 05:50), Max: 98.2 (10-19-22 @ 11:00)  HR: 70 (10-20-22 @ 05:50) (70 - 71)  BP: 156/76 (10-20-22 @ 05:50) (124/63 - 156/76)  BP(mean): --  RR: 18 (10-20-22 @ 05:50) (17 - 18)  SpO2: 98% (10-20-22 @ 05:50) (98% - 100%)    PHYSICAL EXAM:   GENERAL: NAD, sitting comfortably in chair, alert and conversational  HEAD: Atraumatic, normocephalic  EYES: Lateral rectus palsy stable (abduction to 10-15deg), pupils reactive, conjunctiva and sclera clear, no nystagmus noted  ENT: Moist mucous membranes  CHEST/LUNG: Clear to auscultation bilaterally; no rales, rhonchi, wheezing, or rubs; unlabored respirations  HEART: Regular rate and rhythm; no murmurs, rubs, or gallops, normal S1/S2  ABDOMEN: Normal bowel sounds; soft, nontender, nondistended  EXTREMITIES: No clubbing, cyanosis, or edema  MSK: No gross deformities noted   NEURO: Gait and standing w/ moderate instability, moderately improved when R eye covered. Otherwise grossly nonfocal.  SKIN: No rashes or lesions  PSYCH: Normal mood, affect    LABS:                      10.7   6.51  )-----------( 205      ( 20 Oct 2022 06:18 )             32.8     10-20  140  |  106  |  46<H>  ----------------------------<  181<H>  4.4   |  23  |  3.19<H>    Ca    8.8      20 Oct 2022 06:11  Phos  3.5     10-20  Mg     1.9     10-20    Creatinine Trend: 3.19<--, 3.06<--, 2.72<--, 2.67<--, 3.04<--, 2.78<--    I&O's Summary  19 Oct 2022 07:01  -  20 Oct 2022 07:00  --------------------------------------------------------  IN: 840 mL / OUT: 0 mL / NET: 840 mL       23-Mar-2017 24-Mar-2017

## 2022-10-20 NOTE — PROGRESS NOTE ADULT - PROBLEM SELECTOR PLAN 7
DVT ppx: subQ heparin  Diet: DASH/TLC  Dispo: acute rehab  Full code DVT ppx: subQ heparin  Diet: DASH/TLC  Dispo: d/c to acute rehab today  Full code

## 2022-10-20 NOTE — PROGRESS NOTE ADULT - PROBLEM SELECTOR PLAN 4
Transfer from [facility] for cardiac cath given c/f NSTEMI. Coronary CT showed moderate stenosis of prox RCA and LAD. Appreciate cardiology workup and recs - cath completed 10/13, showed nonobstructive CAD with non-elevated filling pressures.  - continue amlodipine, coreg, atorvastatin, fenofibrate, (holding ACE/ARB for BILLY)  - continue aspirin, stop brilinta  - consider restart home hctz; will hold off given billy  - EKG and troponins if chest pain Transfer from [facility] for cardiac cath given c/f NSTEMI. Coronary CT showed moderate stenosis of prox RCA and LAD. Appreciate cardiology workup and recs - cath completed 10/13, showed nonobstructive CAD with non-elevated filling pressures.  - continue amlodipine, coreg, atorvastatin, fenofibrate, lisinopril  - continue aspirin, stop brilinta  - consider restart home hctz; will hold off given lucy  - EKG and troponins if chest pain

## 2022-10-20 NOTE — PROGRESS NOTE ADULT - ATTENDING COMMENTS
AGree with above    #BILLY on CKD IV  -nephrotic range proteinuria,.  -noted slow rise, apprecaite nephrology who is ok with discharge to acute rehab with close outpatient follow up and kidney biopsy ([facility]   -start torsemide and continue low dose ACE-i per renal recs.   -discussed with nephrology, both will help arrange outpatient follow up.    #diplopia  -improving though still present with partial resolution of lateral rectus palsy.  -acute rehab.    #non-obstructive CAD  -continue statin, beta blocker    #HTN  -continue current regimen for disharge    patient is stable for discharge to acute rehab once authorization approved with [facility] nephrology, neurology and ophthalmology follow up  d/c planning 33 minutes.

## 2022-10-20 NOTE — H&P ADULT - HISTORY OF PRESENT ILLNESS
This is a 48 YO male with PMHx of CKD IV, vertigo, diverticulitis s/p LAR, cigarette use, ETOH abuse now sober x5 years, IDDM, HTN, AMBER, who presented to [facility] on 10/2 c/o dizziness, b/l diplopia, headache and chest tightness found to have /133 and elevated troponins concerning for NSTEMI. EKG w/ ST-T abnormalities without acute ST segment changes. At OSH BPs stabilized with IV hydralazine, CT head without acute pathology, MRI brain showed chronic L parasagittal infarcts. 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 Patient was recommended outpatient followup for neurological/ophthalmological concerns (specifically for OCT nerve/RNFL, Fresnel prisms) and was transferred to [facility] for a possible LHC.    On admission to [facility] coronary CT showed moderate stenosis of the proximal LAD and RCA. Cath initially deferred as patient was pending nephrology clearance for BILLY. TTE largely wnl, bubble study negative. MRI brain with contrast showed chronic multifocal lacunar infarcts and chronic microvascular ischemic changes, corroborating MRI read from [facility]. Patient was evaluated by ophthalmology team for new onset R frontal headache and popping sensation, determined to have components hypertensive and diabetic retinopathy for which outpatient followup and possible trial of Fresnel prisms was recommended. Cluster headaches suspected given pattern of headache (R frontal, several days at a time, lacrimation). Patient was also evaluated by PT/OT who recommended CANDY. Speech pathology eval was notable for cognitive linguistic deficits with concern for early onset dementia given a family hx (mother diagnosed @44yo). After improvement of BILLY, LHC was completed showing nonobstructive CAD without elevated filling pressures. Lateral rectus palsy was noted to improve slightly towards end of admission with ability to abduct to ~10-15 degrees. Low-dose lisinopril was initiated for nephrotic-range proteinuria.  Patient was monitored for resolution of BILLY and is now hemodynamically stable   Patient was evaluated by PM&R and therapy for functional deficits, gait/ADL impairments and acute rehabilitation was recommended. Patient was medically optimized for discharge to [facility] IRU on 10/20/22.

## 2022-10-20 NOTE — PROGRESS NOTE ADULT - PROBLEM SELECTOR PLAN 2
Pt. with BP improved now. Will need to optimize BP if we are to move forward with pursuing renal biopsy. Monitor BP.    If any questions, please feel free to contact me     Kirk Ortega  Nephrology Fellow  [facility] Pager: [phone].

## 2022-10-20 NOTE — PROGRESS NOTE ADULT - PROBLEM SELECTOR PLAN 5
#Impaired executive function  MRI w/ contrast showing multifocal lacunar infarcts and chronic microvascular ischemic changes, consistent with MRI completed @ [facility]; MRI findings do not correlate well with clinical findings. CTA head/neck negative for acute stroke.  - etiology for ischemic changes unclear, TTE bubble study w/ no evidence of PFO, consider hx of smoking, NSTEMI  - discussed MRI findings w/ neuro consult team, no acute intervention or further workup indicated, patient should follow-up outpatient  - discussed w/ speech pathologist who noted deficits in memory, word finding, overall executive function w/ multiple compensatory mechanisms, and that patient's family hx is notable for early onset dementia (diagnosed @ ~50 and  @ 63)  - PT eval recommending CANDY, rehab consult recommending acute rehab, will discuss w/ case management  - patient cleared for full AC per neuro  - aspirin as above #Impaired executive function  MRI w/ contrast showing multifocal lacunar infarcts and chronic microvascular ischemic changes, do not correlate well with clinical findings. Etiology unclear, no evidence of PFO on TTE. No acute intervention per neuro team. Speech pathology eval w/ concern for memory/word-finding/executive-function deficits. Family hx notable for early onset dementia in mother (diagnosed @45,  @65).  - patient cleared for full AC per neuro  - aspirin as above  - outpatient f/u

## 2022-10-20 NOTE — PROGRESS NOTE ADULT - REASON FOR ADMISSION
NSTEMI
NSTEMI
NSTEMI, HTN, CVA
NSTEMI
Chest pain, elevated cardiac biomarkers
NSTEMI
NSTEMI, elevated BP, CVA
NSTEMI
NSTEMI
Chest pain
NSTEMI
NSTEMI
diplopia, NSTEMI
NSTEMI
NSTEMI
diplopia, NSTEMI
diplopia, NSTEMI
NSTEMI
diplopia, NSTEMI
NSTEMI

## 2022-10-20 NOTE — PROGRESS NOTE ADULT - ATTENDING COMMENTS
I have seen this patient with the fellow and agree with their assessment and plan. I was physically present for significant portions of the evaluation and management (E/M) service provided.  I agree with the above history, physical, and plan which I have reviewed and edited where appropriate.    Renal function overall stable although he has had progressive decline in his kidney function since earlier this year,   C, Cardiac echo and ct reviewed. Non obstructive disease noted on coronaries.  Renal US showing 13cm kidneys and spot urine TP/CR was elevated at 3.4 and 5 gms.  . Earlier this year it was up to 11 gms. Hep B and C negative. serum Kappa/lambda elevated at 2.   Further work for nephrotic range proteinuria was done - Anti PLA2R Ab neg, serum JOSE no bands seen, HBsAb negative and Hep C core Ab negative. Likely this is diabetic nephropathy given A1c is 9.9m and eye findings of diabetic retinopathy but amyloid in ddx as well if this is also non ischemic heart disease.  Schedule for kidney bx ( last dose of ASA 10/17) likely monday   Serum creatinine is slightly higher. Monitor  Also, with edema. Can give torsemide      HTN: improved  Macario not elevated. Please check plasma renin activity and not direct renin     F/U  metanephrines   Maintain on  low dose Lisinopril   F/U renal doppler    Patient will need renal follow up with us once dc. Can call  for follow up appointment  Rest per Dr. Jordan Durbin MD  O: [phone]  Contact me on teams

## 2022-10-20 NOTE — PROGRESS NOTE ADULT - ATTENDING SUPERVISION STATEMENT
Fellow
Resident

## 2022-10-20 NOTE — PROGRESS NOTE ADULT - PROVIDER SPECIALTY LIST ADULT
Cardiology
Internal Medicine
Internal Medicine
Cardiology
Cardiology
Nephrology
Internal Medicine
Nephrology
Internal Medicine
Nephrology
Internal Medicine
Nephrology
Internal Medicine

## 2022-10-20 NOTE — PROGRESS NOTE ADULT - PROBLEM SELECTOR PLAN 1
#Headache/Dizziness  Pt had diplopia since 09/30, initially started as decreased visual acuity. Evaluated by opthalmology and neurology at [facility], symptoms consistent w/ lateral rectus palsy, MRI without clear source of symptoms. Consider vasculopathic etiology 2/2 severe HTN and/or DM. Patient additionally has sx of persistent headache and dizziness/unsteadiness which do not improve with eye patch, endorses R frontal headache which persists for several days w/ lacrimation, has occurred in the past lasting several days at a time. Suspect cluster headache. Patient has recently had SBPs in 160s which would be unlikely to cause end organ sx, likely 2/2 to discomfort or incidental change. Also consider migraine etiology, could consider ppx as below.  - sx improved today however still w/ headache, will trial 100% O2 (12L/min for 15min) latrice if headache worsens to see if improvement and consider verapamil as ppx  - neurology team @ [facility] recommended considering topiramate/celexa/verapamil for headache ppx  - triptans contraindicated as patient w/ cardiac hx (triptans cause vasoconstriction)  - will continue tylenol PRN for headache, avoid NSAIDS given patient's BILLY  - patient evaluated by ophthalmology at [facility], recommended outpatient f/u w/ retina specialist Dr. Jose Angel Arvizu for OCT nerve/RNFL for evaluation for diabetic and hypertensive retinopathy; if diplopia/palsy persists should continue outpatient followup for Fresnel prisms; curbsided ophtho team who agree with recommendations  - appreciate ophtho eval, noted moderate non-proliferative diabetic and hypertensive retinopathy, recommending outpatient follow-up #Headache/Dizziness  Pt w/ diplopia 2/2 lateral rectus palsy, unclear source of symptoms though complicated by evidence of vasculopathic process. Pt with now-improved R frontal headache + lacrimation suspicious for cluster headache. Also consider migraine etiology.  - will not start verapamil ppx as cluster headache diagnosis not confirmed, recommend outpatient followup  - appreciate ophtho eval, noted moderate non-proliferative diabetic and hypertensive retinopathy, followup outpt

## 2022-10-20 NOTE — PROGRESS NOTE ADULT - ASSESSMENT
47M smoker w/ hx of vertigo, diverticulitis s/p LAR, ETOH abuse (sober x5 years), DM (patient states has been on Insulin x10 years was never on oral agents), CKD IV - baseline creatinine 2.4, HTN, AMBER, who presented to [facility] on 10/2 w/ /133, dizziness, diplopia, headache and chest tightness, admitted w/ c/f NSTEMI and chronic lacunar infarcts/microvascular changes.

## 2022-10-20 NOTE — PROGRESS NOTE ADULT - PROBLEM SELECTOR PLAN 2
Cr improving, now 2.67. Increase likely 2/2 contrast during cath 10/14. [place]/Lambda 9.26/4.63. Nephrology working up nephrotic range proteinuria, appreciate recs:  - recommending IR consult for renal biopsy, would need to optimize BPs prior  - hold ACE/ARB  - hold off on further IVF for now # Nephrotic-range proteinuria  Cr bumped but stable, now 3.19. Labs notable for nephrotic range proteinuria, Kappa/Lambda 9.26/4.63, nephrology planning further workup with outpatient biopsy scheduled for 10/24 which rehab will arrange transport for.  - now on low-dose lisinopril

## 2022-10-20 NOTE — DISCHARGE NOTE NURSING/CASE MANAGEMENT/SOCIAL WORK - NSDCPEEMAIL_GEN_ALL_CORE
[facility] email [email or web address]

## 2022-10-20 NOTE — H&P ADULT - NSHPSOCIALHISTORY_GEN_ALL_CORE
Smoking - Current smoker  EtOH -  ETOH abuse now sober x5 years  Drugs -     Marital status: Single     Patient lives in an apartment alone there are 10 steps to enter.  PTA: Independent in ADLs and ambulation     CURRENT FUNCTIONAL STATUS  Date: 10/18  Bed Mobility:   Transfers: Min A, 1 person  Gait: Min A, 1 person, 75ft with RW

## 2022-10-20 NOTE — H&P ADULT - ASSESSMENT
ASSESSMENT/PLAN  This is a 46 YO male with PMHx of CKD IV, vertigo, diverticulitis s/p LAR, cigarette use, ETOH abuse now sober x5 years, IDDM, HTN, AMBER, who presented to [facility] on 10/2 c/o dizziness, b/l diplopia, headache and chest tightness found to have /133 and elevated troponin concerning for NSTEMI. CTH negative for acute pathology. MRI Brain showed L parasagittal infarcts. Per, neurology and opthalmology CN VI palsy. Patient  was transferred to [facility] for a possible LHC. LHC showed nonobstructive CAD without elevated filling pressures. Hospital course significant for BILLY, right frontal headache and popping sensation, determined to have components hypertensive and diabetic retinopathy for which outpatient followup and possible trial of Fresnel prisms was recommended. Patient now with gait Instability, ADL impairments and Functional impairments.    #Debility  - 2/2 NSTEMI  - LHC on 10/13  - Start Comprehensive Rehab Program: PT/OT/ST, 3hours daily and 5 days weekly  - PT: Focused on improving strength, endurance, coordination, balance, functional mobility, and transfers  - OT: Focused on improving strength, fine motor skills, coordination, posture and ADLs.    - ST: to diagnose and treat deficits in swallowing, cognition and communication.     #CVA  - MRI brain showed chronic multifocal lacunar infarcts    #CAD  - LHC showed nonobstructive CAD without elevated filling pressures    #HTN  - Fenofibrate 145mg daily  - Lisinopril 2.5mg daily  - Carvedilol 25mg BID    #HLD  - Lipitor 40mg daily    #Diplopia  - OP f/u with neuro-opthalmology  - consistent with lateral rectus palsy  - outpatient f/u with retina specialist Dr. Jose Angel Arvizu for OCT nerve/RNFL for evaluation for diabetic and hypertensive retinopathy; if diplopia/palsy persists should continue outpatient followup for Fresnel prisms    #Cluster Headache  - Amitriptyline  - Monitor    #AMBER  - c/w inhalers: Albuterol, Symbicort  - c/w nasal spray    #DM II  - ISS and FS  - Admelog and Lantus  - A1c 9.9 on 10/5    #Pain management  - Tylenol PRN    #DVT ppx  - Heparin, SCD, TEDs    #GI ppx  - Protonix 40mg    #Bowel Regimen  - Senna    #Bladder management  - BS on admission, and q 8 hours (SC if > 400)  - Monitor UO    #FEN   - Diet: DASH    #Skin:  - Skin on admission: ***  - Pressure injury/Skin: Turn Q2hrs while in bed, OOB to Chair, PT/OT     #Dysphagia    - SLP: evaluation and treatment    #Mood/Cognition:  - Neuropsychology consult PRN    #Sleep:   - Maintain quiet hours and low stim environment.  - Melatonin PRN to maximize participation in therapy during the day.     #Precaution  - Fall, Aspiration, cardiac    #GOC  CODE STATUS: FULL CODE    Outpatient Follow-up (Specialty/Name of physician):    Debbie Fofana  [address] [place] [address]  Phone: [phone]  Fax: [phone]  Scheduled Appointment: 11/07/2022 11:45 AM    [facility] Kidney/Hypertension Specialist  Nephrology  [address] [facility], [address]nd [place] [address]  Phone: [phone]  Follow Up Time: 2 weeks    [facility] Ophthalmology  Ophthalmology  [address] [place] [address], [address]  [place] [address]  Phone: [phone]  Follow Up Time: 2 weeks    [facility] Pulmonology and Sleep Medicine  Pulmonology  [address] [facility], [address]  [place] [address]  Phone: [phone]  Follow Up Time: 1 month    [facility] Specialty Clin[facility]  Neurology  [address] [facility] [address] [place] [address]  Phone: [phone]  Follow Up Time: 1 month    [facility]  Cardiology  [address] [place] [address]  Phone: [phone]  Follow Up Time: 2 weeks    MEDICAL PROGNOSIS: GOOD            REHAB POTENTIAL: GOOD             ESTIMATED DISPOSITION: HOME WITH HOME CARE            ELOS: 10-14 Days   EXPECTED THERAPY:     P.T. 1hr/day       O.T. 1hr/day      S.L.P. 1hr/day     P&O Unnecessary     EXP FREQUENCY: 5 days per 7 day period     PRESCREEN COMPARISON:   I have reviewed the prescreen information and I have found no relevant changes between the preadmission screening and my post admission evaluation     RATIONALE FOR INPATIENT ADMISSION - Patient demonstrates the following: (check all that apply)  [X] Medically appropriate for rehabilitation admission  [X] Has attainable rehab goals with an appropriate initial discharge plan  [X] Has rehabilitation potential (expected to make a significant improvement within a reasonable period of time)   [X] Requires close medical management by a rehab physician, rehab nursing care, Hospitalist and comprehensive interdisciplinary team (including PT, OT, & or SLP, Prosthetics and Orthotics)   ASSESSMENT/PLAN  This is a 48 YO male with PMHx of CKD IV, vertigo, diverticulitis s/p LAR, cigarette use, ETOH abuse now sober x5 years, IDDM, HTN, AMBER, who presented to [facility] on 10/2 c/o dizziness, b/l diplopia, headache and chest tightness found to have /133 and elevated troponin concerning for NSTEMI. CTH negative for acute pathology. MRI Brain showed L parasagittal infarcts. Per, neurology and opthalmology CN VI palsy. Patient  was transferred to [facility] for a possible LHC. LHC showed nonobstructive CAD without elevated filling pressures. Hospital course significant for BILLY, right frontal headache and popping sensation, determined to have components hypertensive and diabetic retinopathy for which outpatient followup and possible trial of Fresnel prisms was recommended. Patient now with gait Instability, ADL impairments and Functional impairments.    # Functional Deficits s/p CVA  - MRI with L parasagittal infarcts, multiple chronic lacunar infarcts  - Continue statin. ASA on hold for renal bx   - Start Comprehensive Rehab Program: PT/OT/ST, 3hours daily and 5 days weekly  - PT: Focused on improving strength, endurance, coordination, balance, functional mobility, and transfers  - OT: Focused on improving strength, fine motor skills, coordination, posture and ADLs.    - ST: to diagnose and treat deficits in swallowing, cognition and communication.     #NTEMI #CAD  - LHC showed nonobstructive CAD without elevated filling pressures  - Continue statin, fenofibrate    #BILLY  -Monitor BUN/Cr  -Avoid nephrotoxic agents, renally dose meds  -Continue lisinopril, torsemide  -Workup ongoing, renal consult routinely  -Patient scheduled for OP renal biopsy with IR Monday 10/24 per IR note 10/19    #HTN  - Lisinopril 2.5mg daily  - Carvedilol 25mg BID    #HLD  - Lipitor 40mg daily  - Fenofibrate 145mg daily    #Diplopia  - OP f/u with neuro-opthalmology  - consistent with lateral rectus palsy  - outpatient f/u with retina specialist Dr. Jose Angel Arvizu for OCT nerve/RNFL for evaluation for diabetic and hypertensive retinopathy; if diplopia/palsy persists should continue outpatient followup for Fresnel prisms    #Cluster Headache  - Amitriptyline 10mg QHS    #AMBER  - c/w inhalers: Albuterol, Symbicort  - c/w nasal spray    #DM II  - ISS and FS  - Admelog and Lantus  - A1c 9.9 on 10/5    #Pain management  - Tylenol PRN    #DVT ppx  - Heparin    #GI ppx  - Protonix 40mg    #Bowel Regimen  - Senna    #Bladder management  - BS on admission, and q 8 hours (SC if > 400)  - Monitor UO    #FEN   - Diet: DASH    #Skin:  - Skin on admission: ***  - Pressure injury/Skin: Turn Q2hrs while in bed, OOB to Chair, PT/OT     #Dysphagia    - SLP: evaluation and treatment    #Mood/Cognition:  - Neuropsychology consult PRN    #Sleep:   - Maintain quiet hours and low stim environment.  - Melatonin PRN to maximize participation in therapy during the day.     #Precaution  - Fall, Aspiration, cardiac    #GOC  CODE STATUS: FULL CODE    Outpatient Follow-up (Specialty/Name of physician):    Debbie Fofana  [address] [place] [address]  Phone: [phone]  Fax: [phone]  Scheduled Appointment: 11/07/2022 11:45 AM    [facility] Kidney/Hypertension Specialist  Nephrology  [address] [facility], [address]nd [place] [address]  Phone: [phone]  Follow Up Time: 2 weeks    [facility] Ophthalmology  Ophthalmology  [address] [place] [address], [address]  [place] [address]  Phone: [phone]  Follow Up Time: 2 weeks    [facility] Pulmonology and Sleep Medicine  Pulmonology  [address] [facility], [address]  [place] [address]  Phone: [phone]  Follow Up Time: 1 month    [facility] Specialty Clinics  Neurology  [address] [facility] [address] [place] [address]  Phone: [phone]  Follow Up Time: 1 month    [facility]  Cardiology  [address] [place] [address]  Phone: [phone]  Follow Up Time: 2 weeks    MEDICAL PROGNOSIS: GOOD            REHAB POTENTIAL: GOOD             ESTIMATED DISPOSITION: HOME WITH HOME CARE            ELOS: 10-14 Days   EXPECTED THERAPY:     P.T. 1hr/day       O.T. 1hr/day      S.L.P. 1hr/day     P&O Unnecessary     EXP FREQUENCY: 5 days per 7 day period     PRESCREEN COMPARISON:   I have reviewed the prescreen information and I have found no relevant changes between the preadmission screening and my post admission evaluation     RATIONALE FOR INPATIENT ADMISSION - Patient demonstrates the following: (check all that apply)  [X] Medically appropriate for rehabilitation admission  [X] Has attainable rehab goals with an appropriate initial discharge plan  [X] Has rehabilitation potential (expected to make a significant improvement within a reasonable period of time)   [X] Requires close medical management by a rehab physician, rehab nursing care, Hospitalist and comprehensive interdisciplinary team (including PT, OT, & or SLP, Prosthetics and Orthotics)   ASSESSMENT/PLAN  This is a 46 YO male with PMHx of CKD IV, vertigo, diverticulitis s/p LAR, cigarette use, ETOH abuse now sober x5 years, IDDM, HTN, AMBER, who presented to [facility] on 10/2 c/o dizziness, b/l diplopia, headache and chest tightness found to have /133 and elevated troponin concerning for NSTEMI. CTH negative for acute pathology. MRI Brain showed L parasagittal infarcts. Per, neurology and opthalmology CN VI palsy. Patient  was transferred to [facility] for a possible LHC. LHC showed nonobstructive CAD without elevated filling pressures. Hospital course significant for BILLY, right frontal headache and popping sensation, determined to have components hypertensive and diabetic retinopathy for which outpatient followup and possible trial of Fresnel prisms was recommended. Patient now with gait Instability, ADL impairments and Functional impairments.    # Functional Deficits s/p CVA  - MRI with L parasagittal infarcts, multiple chronic lacunar infarcts  - Continue statin. ASA on hold for renal bx   - Start Comprehensive Rehab Program: PT/OT/ST, 3hours daily and 5 days weekly  - PT: Focused on improving strength, endurance, coordination, balance, functional mobility, and transfers  - OT: Focused on improving strength, fine motor skills, coordination, posture and ADLs.    - ST: to diagnose and treat deficits in swallowing, cognition and communication.     #NTEMI #CAD  - LHC showed nonobstructive CAD without elevated filling pressures  - Continue statin, fenofibrate    #BILLY  -Monitor BUN/Cr  -Avoid nephrotoxic agents, renally dose meds  -Continue lisinopril, torsemide  -Workup ongoing, renal consult routinely  -Patient scheduled for OP renal biopsy with IR Monday 10/24 per IR note 10/19    #HTN  - Lisinopril 2.5mg daily  - Carvedilol 25mg BID    #HLD  - Lipitor 40mg daily  - Fenofibrate 145mg daily    #Diplopia  - OP f/u with neuro-opthalmology  - consistent with lateral rectus palsy  - outpatient f/u with retina specialist Dr. Jose Angel Arvizu for OCT nerve/RNFL for evaluation for diabetic and hypertensive retinopathy; if diplopia/palsy persists should continue outpatient followup for Fresnel prisms    #Cluster Headache  - Amitriptyline 10mg QHS    #AMBER  - c/w inhalers: Albuterol, Symbicort  - c/w nasal spray    #DM II  - ISS and FS  - Admelog and Lantus  - A1c 9.9 on 10/5    #Pain management  - Tylenol PRN    #DVT ppx  - Heparin    #GI ppx  - Protonix 40mg    #Bowel Regimen  - Senna    #Bladder management  - BS on admission, and q 8 hours (SC if > 400)  - Monitor UO    #FEN   - Diet: DASH    #Onychomycosis  -Consider podiatry consult    #Skin:  - Skin on admission: Aquaphor to dry skin BID  - Pressure injury/Skin: Turn Q2hrs while in bed, OOB to Chair, PT/OT     #Dysphagia    - SLP: evaluation and treatment    #Mood/Cognition:  - Neuropsychology consult PRN    #Sleep:   - Maintain quiet hours and low stim environment.  - Melatonin PRN to maximize participation in therapy during the day.     #Precaution  - Fall, Aspiration, cardiac    #GOC  CODE STATUS: FULL CODE    Outpatient Follow-up (Specialty/Name of physician):    Debbie Fofana  [address] [place] [address]  Phone: [phone]  Fax: [phone]  Scheduled Appointment: 11/07/2022 11:45 AM    [facility] Kidney/Hypertension Specialist  Nephrology  [address] [facility], [address]nd [place] [address]  Phone: [phone]  Follow Up Time: 2 weeks    [facility] Ophthalmology  Ophthalmology  [address] [place] [address] [address]  [place] [address]  Phone: [phone]  Follow Up Time: 2 weeks    [facility] Pulmonology and Sleep Medicine  Pulmonology  [address] [facility], [address]  [place] [address]  Phone: [phone]  Follow Up Time: 1 month    [facility] Specialty Clinics  Neurology  [address] [facility] [address] [place] [address]  Phone: [phone]  Follow Up Time: 1 month    [facility]  Cardiology  [address] [place] [address]  Phone: [phone]  Follow Up Time: 2 weeks    MEDICAL PROGNOSIS: GOOD            REHAB POTENTIAL: GOOD             ESTIMATED DISPOSITION: HOME WITH HOME CARE            ELOS: 10-14 Days   EXPECTED THERAPY:     P.T. 1hr/day       O.T. 1hr/day      S.L.P. 1hr/day     P&O Unnecessary     EXP FREQUENCY: 5 days per 7 day period     PRESCREEN COMPARISON:   I have reviewed the prescreen information and I have found no relevant changes between the preadmission screening and my post admission evaluation     RATIONALE FOR INPATIENT ADMISSION - Patient demonstrates the following: (check all that apply)  [X] Medically appropriate for rehabilitation admission  [X] Has attainable rehab goals with an appropriate initial discharge plan  [X] Has rehabilitation potential (expected to make a significant improvement within a reasonable period of time)   [X] Requires close medical management by a rehab physician, rehab nursing care, Hospitalist and comprehensive interdisciplinary team (including PT, OT, & or SLP, Prosthetics and Orthotics)

## 2022-10-21 LAB
GLUCOSE BLDC GLUCOMTR-MCNC: 120 MG/DL — HIGH (ref 70–99)
GLUCOSE BLDC GLUCOMTR-MCNC: 133 MG/DL — HIGH (ref 70–99)
GLUCOSE BLDC GLUCOMTR-MCNC: 136 MG/DL — HIGH (ref 70–99)
GLUCOSE BLDC GLUCOMTR-MCNC: 206 MG/DL — HIGH (ref 70–99)

## 2022-10-21 PROCEDURE — 99406 BEHAV CHNG SMOKING 3-10 MIN: CPT

## 2022-10-21 PROCEDURE — 99223 1ST HOSP IP/OBS HIGH 75: CPT | Mod: GC

## 2022-10-21 PROCEDURE — 99223 1ST HOSP IP/OBS HIGH 75: CPT

## 2022-10-21 RX ORDER — HEPARIN SODIUM 5000 [USP'U]/ML
5000 INJECTION INTRAVENOUS; SUBCUTANEOUS EVERY 8 HOURS
Refills: 0 | Status: COMPLETED | OUTPATIENT
Start: 2022-10-22 | End: 2022-10-23

## 2022-10-21 RX ADMIN — Medication 2 UNIT(S): at 17:13

## 2022-10-21 RX ADMIN — Medication 2 UNIT(S): at 07:59

## 2022-10-21 RX ADMIN — AMLODIPINE BESYLATE 10 MILLIGRAM(S): 2.5 TABLET ORAL at 05:43

## 2022-10-21 RX ADMIN — PANTOPRAZOLE SODIUM 40 MILLIGRAM(S): 20 TABLET, DELAYED RELEASE ORAL at 05:43

## 2022-10-21 RX ADMIN — INSULIN GLARGINE 20 UNIT(S): 100 INJECTION, SOLUTION SUBCUTANEOUS at 21:49

## 2022-10-21 RX ADMIN — Medication 1 APPLICATION(S): at 19:33

## 2022-10-21 RX ADMIN — CARVEDILOL PHOSPHATE 25 MILLIGRAM(S): 80 CAPSULE, EXTENDED RELEASE ORAL at 17:13

## 2022-10-21 RX ADMIN — Medication 2 UNIT(S): at 11:24

## 2022-10-21 RX ADMIN — Medication 10 MILLIGRAM(S): at 21:49

## 2022-10-21 RX ADMIN — Medication 15 MILLILITER(S): at 05:48

## 2022-10-21 RX ADMIN — BUDESONIDE AND FORMOTEROL FUMARATE DIHYDRATE 2 PUFF(S): 160; 4.5 AEROSOL RESPIRATORY (INHALATION) at 09:43

## 2022-10-21 RX ADMIN — HEPARIN SODIUM 5000 UNIT(S): 5000 INJECTION INTRAVENOUS; SUBCUTANEOUS at 05:43

## 2022-10-21 RX ADMIN — Medication 1 APPLICATION(S): at 05:43

## 2022-10-21 RX ADMIN — CARVEDILOL PHOSPHATE 25 MILLIGRAM(S): 80 CAPSULE, EXTENDED RELEASE ORAL at 05:43

## 2022-10-21 RX ADMIN — Medication 4: at 11:24

## 2022-10-21 RX ADMIN — BUDESONIDE AND FORMOTEROL FUMARATE DIHYDRATE 2 PUFF(S): 160; 4.5 AEROSOL RESPIRATORY (INHALATION) at 21:36

## 2022-10-21 RX ADMIN — HEPARIN SODIUM 5000 UNIT(S): 5000 INJECTION INTRAVENOUS; SUBCUTANEOUS at 21:50

## 2022-10-21 RX ADMIN — HEPARIN SODIUM 5000 UNIT(S): 5000 INJECTION INTRAVENOUS; SUBCUTANEOUS at 15:51

## 2022-10-21 RX ADMIN — Medication 650 MILLIGRAM(S): at 22:40

## 2022-10-21 RX ADMIN — Medication 145 MILLIGRAM(S): at 11:24

## 2022-10-21 RX ADMIN — LISINOPRIL 2.5 MILLIGRAM(S): 2.5 TABLET ORAL at 05:43

## 2022-10-21 RX ADMIN — ATORVASTATIN CALCIUM 40 MILLIGRAM(S): 80 TABLET, FILM COATED ORAL at 21:49

## 2022-10-21 RX ADMIN — Medication 650 MILLIGRAM(S): at 21:49

## 2022-10-21 NOTE — DIETITIAN INITIAL EVALUATION ADULT - ORAL INTAKE PTA/DIET HISTORY
Patient Does Not Follow Diet @Home But Tries to Limit Sugar Intake Recently  Consumes 6 Small Meals a Day  Usually Cooks For Self/Orders Takeout/Delivery Regularly  Doesn't Take Vitamin/Supplements @Home  Patient Does Not Follow Diet @Home But Tries to Limit Sugar Intake Recently  Consumes 6 Small Meals a Day  Usually Orders Takeout/Delivery  Does Take Vitamin/Supplements @Home (MVI)

## 2022-10-21 NOTE — DIETITIAN INITIAL EVALUATION ADULT - OTHER INFO
Initial Nutrition Assessment   47yr Old Male   Denies Food Allergy/Intolerance  Tolerates Diet Well - No Chewing/Swallowing Complications (Per Patient)  Stage 1 Pressure Ulcer - No Location Noted (as Per Nursing Flow Sheets)  No Edema Noted (as Per Nursing Flow Sheets)  No Recent Nausea/Vomiting/Diarrhea/Constipation (as Per Patient)

## 2022-10-21 NOTE — DIETITIAN INITIAL EVALUATION ADULT - NS FNS DIET ORDER
Diet, DASH/TLC:   Sodium & Cholesterol Restricted (10-20-22 @ 16:40)   on DASH-TLC Diet w/ Thin Liquids (IDDSI Level 0)   Recommend Change Diet to Consistent Carbohydrate DASH-TLC Diet   Nutrition Education Provided on Consistent Carbohydrate DASH-TLC Diet   Recommend Add MVI
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
## 2022-10-21 NOTE — DIETITIAN INITIAL EVALUATION ADULT - PERTINENT LABORATORY DATA
10-20    140  |  106  |  46<H>  ----------------------------<  181<H>  4.4   |  23  |  3.19<H>    Ca    8.8      20 Oct 2022 06:11  Phos  3.5     10-20  Mg     1.9     10-20    POCT Blood Glucose.: 136 mg/dL (10-21-22 @ 07:51)  A1C with Estimated Average Glucose Result: 9.9 % (10-05-22 @ 00:49)  A1C with Estimated Average Glucose Result: 10.9 % (05-15-22 @ 11:00)  A1C with Estimated Average Glucose Result: 11.2 % (03-22-22 @ 18:56)

## 2022-10-21 NOTE — DIETITIAN INITIAL EVALUATION ADULT - PERTINENT MEDS FT
MEDICATIONS  (STANDING):  amitriptyline 10 milliGRAM(s) Oral at bedtime  amLODIPine   Tablet 10 milliGRAM(s) Oral daily  AQUAPHOR (petrolatum Ointment) 1 Application(s) Topical two times a day  atorvastatin 40 milliGRAM(s) Oral at bedtime  budesonide  80 MICROgram(s)/formoterol 4.5 MICROgram(s) Inhaler 2 Puff(s) Inhalation two times a day  carvedilol 25 milliGRAM(s) Oral every 12 hours  dextrose 5%. 1000 milliLiter(s) (50 mL/Hr) IV Continuous <Continuous>  dextrose 5%. 1000 milliLiter(s) (100 mL/Hr) IV Continuous <Continuous>  dextrose 50% Injectable 25 Gram(s) IV Push once  dextrose 50% Injectable 12.5 Gram(s) IV Push once  dextrose 50% Injectable 25 Gram(s) IV Push once  fenofibrate Tablet 145 milliGRAM(s) Oral daily  glucagon  Injectable 1 milliGRAM(s) IntraMuscular once  heparin   Injectable 5000 Unit(s) SubCutaneous every 8 hours  insulin glargine Injectable (LANTUS) 20 Unit(s) SubCutaneous at bedtime  insulin lispro (ADMELOG) corrective regimen sliding scale   SubCutaneous three times a day before meals  insulin lispro (ADMELOG) corrective regimen sliding scale   SubCutaneous at bedtime  insulin lispro Injectable (ADMELOG) 2 Unit(s) SubCutaneous before breakfast  insulin lispro Injectable (ADMELOG) 2 Unit(s) SubCutaneous before lunch  insulin lispro Injectable (ADMELOG) 2 Unit(s) SubCutaneous before dinner  lisinopril 2.5 milliGRAM(s) Oral daily  pantoprazole    Tablet 40 milliGRAM(s) Oral before breakfast    MEDICATIONS  (PRN):  acetaminophen     Tablet .. 650 milliGRAM(s) Oral every 6 hours PRN Temp greater or equal to 38C (100.4F), Mild Pain (1 - 3)  ALBUTerol    90 MICROgram(s) HFA Inhaler 2 Puff(s) Inhalation every 6 hours PRN Shortness of Breath and/or Wheezing  Biotene Dry Mouth Oral Rinse 15 milliLiter(s) Swish and Spit three times a day PRN Mouth Care  dextrose Oral Gel 15 Gram(s) Oral once PRN Blood Glucose LESS THAN 70 milliGRAM(s)/deciliter  simethicone 80 milliGRAM(s) Chew daily PRN Gas  sodium chloride 0.65% Nasal 1 Spray(s) Both Nostrils every 1 hour PRN Nasal Congestion

## 2022-10-21 NOTE — DIETITIAN INITIAL EVALUATION ADULT - NSFNSPHYEXAMSKINFT_GEN_A_CORE
Pressure Injury 1: none, Stage I  Pressure Injury 2: none, none  Pressure Injury 3: none, none  Pressure Injury 4: none, none  Pressure Injury 5: none, none  Pressure Injury 6: none, none  Pressure Injury 7: none, none  Pressure Injury 8: none, none  Pressure Injury 9: none, none  Pressure Injury 10: none, none  Pressure Injury 11: none, none Pressure Injury 1: none, Stage I

## 2022-10-21 NOTE — DIETITIAN INITIAL EVALUATION ADULT - EDUCATION DIETARY MODIFICATIONS
Nutrition Education Provided on Consistent Carbohydrate DASH-TLC Diet/(2) meets goals/outcomes/verbalization

## 2022-10-21 NOTE — CONSULT NOTE ADULT - SUBJECTIVE AND OBJECTIVE BOX
Patient is a 47y old  Male who presents with a chief complaint of Debility (20 Oct 2022 15:31)    HPI:  This is a 48 YO male with PMHx of CKD IV, vertigo, diverticulitis s/p LAR, cigarette use, ETOH abuse now sober x5 years, IDDM, HTN, AMBER, who presented to [facility] on 10/2 c/o dizziness, b/l diplopia, headache and chest tightness found to have /133 and elevated troponins concerning for NSTEMI. EKG w/ ST-T abnormalities without acute ST segment changes. At OSH BPs stabilized with IV hydralazine, CT head without acute pathology, MRI brain showed chronic L parasagittal infarcts. 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 Patient was recommended outpatient followup for neurological/ophthalmological concerns (specifically for OCT nerve/RNFL, Fresnel prisms) and was transferred to [facility] for a possible LHC.    On admission to [facility] coronary CT showed moderate stenosis of the proximal LAD and RCA. Cath initially deferred as patient was pending nephrology clearance for BILLY. TTE largely wnl, bubble study negative. MRI brain with contrast showed chronic multifocal lacunar infarcts and chronic microvascular ischemic changes, corroborating MRI read from [facility]. Patient was evaluated by ophthalmology team for new onset R frontal headache and popping sensation, determined to have components hypertensive and diabetic retinopathy for which outpatient followup and possible trial of Fresnel prisms was recommended. Cluster headaches suspected given pattern of headache (R frontal, several days at a time, lacrimation). Patient was also evaluated by PT/OT who recommended CANDY. Speech pathology eval was notable for cognitive linguistic deficits with concern for early onset dementia given a family hx (mother diagnosed @46yo). After improvement of BILLY, LHC was completed showing nonobstructive CAD without elevated filling pressures. Lateral rectus palsy was noted to improve slightly towards end of admission with ability to abduct to ~10-15 degrees. Low-dose lisinopril was initiated for nephrotic-range proteinuria.  Patient was monitored for resolution of BILLY and is now hemodynamically stable   Patient was evaluated by PM&R and therapy for functional deficits, gait/ADL impairments and acute rehabilitation was recommended. Patient was medically optimized for discharge to [facility] IRU on 10/20/22.   (20 Oct 2022 15:31)    PAST MEDICAL & SURGICAL HISTORY:  Diabetes      Asthma      Diverticulitis  surgery 16yrs ago      High cholesterol      Dyslipidemia      Hypertension      H/O vertigo      Diabetic ulcer of right foot      Broken toe  left pinky toe      Vertigo      HTN (hypertension)      Diabetes      History of surgery  colon resection        SOCIAL HISTORY:  FAMILY HISTORY:    ALLERGIES:  No Known Allergies    MEDICATIONS  (STANDING):  amitriptyline 10 milliGRAM(s) Oral at bedtime  amLODIPine   Tablet 10 milliGRAM(s) Oral daily  AQUAPHOR (petrolatum Ointment) 1 Application(s) Topical two times a day  atorvastatin 40 milliGRAM(s) Oral at bedtime  budesonide  80 MICROgram(s)/formoterol 4.5 MICROgram(s) Inhaler 2 Puff(s) Inhalation two times a day  carvedilol 25 milliGRAM(s) Oral every 12 hours  dextrose 5%. 1000 milliLiter(s) (50 mL/Hr) IV Continuous <Continuous>  dextrose 5%. 1000 milliLiter(s) (100 mL/Hr) IV Continuous <Continuous>  dextrose 50% Injectable 25 Gram(s) IV Push once  dextrose 50% Injectable 12.5 Gram(s) IV Push once  dextrose 50% Injectable 25 Gram(s) IV Push once  fenofibrate Tablet 145 milliGRAM(s) Oral daily  glucagon  Injectable 1 milliGRAM(s) IntraMuscular once  heparin   Injectable 5000 Unit(s) SubCutaneous every 8 hours  insulin glargine Injectable (LANTUS) 20 Unit(s) SubCutaneous at bedtime  insulin lispro (ADMELOG) corrective regimen sliding scale   SubCutaneous three times a day before meals  insulin lispro (ADMELOG) corrective regimen sliding scale   SubCutaneous at bedtime  insulin lispro Injectable (ADMELOG) 2 Unit(s) SubCutaneous before breakfast  insulin lispro Injectable (ADMELOG) 2 Unit(s) SubCutaneous before lunch  insulin lispro Injectable (ADMELOG) 2 Unit(s) SubCutaneous before dinner  lisinopril 2.5 milliGRAM(s) Oral daily  pantoprazole    Tablet 40 milliGRAM(s) Oral before breakfast    MEDICATIONS  (PRN):  acetaminophen     Tablet .. 650 milliGRAM(s) Oral every 6 hours PRN Temp greater or equal to 38C (100.4F), Mild Pain (1 - 3)  ALBUTerol    90 MICROgram(s) HFA Inhaler 2 Puff(s) Inhalation every 6 hours PRN Shortness of Breath and/or Wheezing  Biotene Dry Mouth Oral Rinse 15 milliLiter(s) Swish and Spit three times a day PRN Mouth Care  dextrose Oral Gel 15 Gram(s) Oral once PRN Blood Glucose LESS THAN 70 milliGRAM(s)/deciliter  simethicone 80 milliGRAM(s) Chew daily PRN Gas  sodium chloride 0.65% Nasal 1 Spray(s) Both Nostrils every 1 hour PRN Nasal Congestion    Review of Systems: Refer to HPI for pertinent positives and negatives. All other ROS reviewed and negative except as otherwise stated above.    Vital Signs Last 24 Hrs  T(F): 98.5 (20 Oct 2022 20:43), Max: 98.6 (20 Oct 2022 11:13)  HR: 72 (21 Oct 2022 05:40) (72 - 75)  BP: 160/85 (21 Oct 2022 05:40) (127/69 - 160/85)  RR: 16 (20 Oct 2022 20:43) (16 - 18)  SpO2: 99% (20 Oct 2022 20:43) (99% - 100%)  I&O's Summary    PHYSICAL EXAM:  GENERAL: NAD, well-groomed, well-developed  HEAD:  Atraumatic, Normocephalic  EYES: EOMI, PERRL, conjunctiva and sclera clear  ENMT: Moist mucous membranes, Good dentition  NECK: Supple, No JVD  CHEST/LUNG: Clear to auscultation bilaterally, non-labored breathing, good air entry  HEART: RRR; S1/S2, No murmur  ABDOMEN: Soft, Nontender, Nondistended; Bowel sounds present  VASCULAR: Normal pulses, Normal capillary refill  EXTREMITIES: No cyanosis, No edema  LYMPH: No lymphadenopathy noted  SKIN: Warm, Intact  PSYCH: Normal mood and affect  NERVOUS SYSTEM:  A/O x3, Good concentration; CN 2-12 intact, No focal deficits, moves all extremities    LABS:                        10.7   6.51  )-----------( 205      ( 20 Oct 2022 06:18 )             32.8     10-20    140  |  106  |  46  ----------------------------<  181  4.4   |  23  |  3.19    Ca    8.8      20 Oct 2022 06:11  Phos  3.5     10-20  Mg     1.9     10-20          POCT Blood Glucose.: 165 mg/dL (20 Oct 2022 22:32)  POCT Blood Glucose.: 175 mg/dL (20 Oct 2022 16:37)  POCT Blood Glucose.: 146 mg/dL (20 Oct 2022 11:44)  POCT Blood Glucose.: 203 mg/dL (20 Oct 2022 07:49)          COVID-19 PCR: NotDetec (10-20-22 @ 14:41)  COVID-19 PCR: NotDetec (10-17-22 @ 07:40)  COVID-19 PCR: NotDetec (10-13-22 @ 15:08)  COVID-19 PCR: NotDetec (10-11-22 @ 06:03)  COVID-19 PCR: NotDetec (10-04-22 @ 16:14)    RADIOLOGY & ADDITIONAL TESTS:    Care Discussed with Consultants/Other Providers: Patient is a 47y old  Male who presents with a chief complaint of Debility (20 Oct 2022 15:31)    HPI:  This is a 46 YO male with PMHx of CKD IV, vertigo, diverticulitis s/p LAR, cigarette use, ETOH abuse now sober x5 years, IDDM, HTN, AMBER, who presented to [facility] on 10/2 c/o dizziness, b/l diplopia, headache and chest tightness found to have /133 and elevated troponins concerning for NSTEMI. EKG w/ ST-T abnormalities without acute ST segment changes. At OSH BPs stabilized with IV hydralazine, CT head without acute pathology, MRI brain showed chronic L parasagittal infarcts. 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 Patient was recommended outpatient followup for neurological/ophthalmological concerns (specifically for OCT nerve/RNFL, Fresnel prisms) and was transferred to [facility] for a possible LHC.    On admission to [facility] coronary CT showed moderate stenosis of the proximal LAD and RCA. Cath initially deferred as patient was pending nephrology clearance for BILLY. TTE largely wnl, bubble study negative. MRI brain with contrast showed chronic multifocal lacunar infarcts and chronic microvascular ischemic changes, corroborating MRI read from [facility]. Patient was evaluated by ophthalmology team for new onset R frontal headache and popping sensation, determined to have components hypertensive and diabetic retinopathy for which outpatient followup and possible trial of Fresnel prisms was recommended. Cluster headaches suspected given pattern of headache (R frontal, several days at a time, lacrimation). Patient was also evaluated by PT/OT who recommended CANDY. Speech pathology eval was notable for cognitive linguistic deficits with concern for early onset dementia given a family hx (mother diagnosed @44yo). After improvement of BILLY, LHC was completed showing nonobstructive CAD without elevated filling pressures. Lateral rectus palsy was noted to improve slightly towards end of admission with ability to abduct to ~10-15 degrees. Low-dose lisinopril was initiated for nephrotic-range proteinuria.  Patient was monitored for resolution of BILLY and is now hemodynamically stable   Patient was evaluated by PM&R and therapy for functional deficits, gait/ADL impairments and acute rehabilitation was recommended. Patient was medically optimized for discharge to [facility] IRU on 10/20/22.   (20 Oct 2022 15:31)    PAST MEDICAL & SURGICAL HISTORY:  Diabetes      Asthma      Diverticulitis  surgery 16yrs ago      High cholesterol      Dyslipidemia      Hypertension      H/O vertigo      Diabetic ulcer of right foot      Broken toe  left pinky toe      Vertigo      HTN (hypertension)      Diabetes      History of surgery  colon resection        SOCIAL HISTORY: hx of EtOH abuse, quit 5 years ago. Current tobacco use.   FAMILY HISTORY: HTN and alzheimer's    ALLERGIES:  No Known Allergies    MEDICATIONS  (STANDING):  amitriptyline 10 milliGRAM(s) Oral at bedtime  amLODIPine   Tablet 10 milliGRAM(s) Oral daily  AQUAPHOR (petrolatum Ointment) 1 Application(s) Topical two times a day  atorvastatin 40 milliGRAM(s) Oral at bedtime  budesonide  80 MICROgram(s)/formoterol 4.5 MICROgram(s) Inhaler 2 Puff(s) Inhalation two times a day  carvedilol 25 milliGRAM(s) Oral every 12 hours  dextrose 5%. 1000 milliLiter(s) (50 mL/Hr) IV Continuous <Continuous>  dextrose 5%. 1000 milliLiter(s) (100 mL/Hr) IV Continuous <Continuous>  dextrose 50% Injectable 25 Gram(s) IV Push once  dextrose 50% Injectable 12.5 Gram(s) IV Push once  dextrose 50% Injectable 25 Gram(s) IV Push once  fenofibrate Tablet 145 milliGRAM(s) Oral daily  glucagon  Injectable 1 milliGRAM(s) IntraMuscular once  heparin   Injectable 5000 Unit(s) SubCutaneous every 8 hours  insulin glargine Injectable (LANTUS) 20 Unit(s) SubCutaneous at bedtime  insulin lispro (ADMELOG) corrective regimen sliding scale   SubCutaneous three times a day before meals  insulin lispro (ADMELOG) corrective regimen sliding scale   SubCutaneous at bedtime  insulin lispro Injectable (ADMELOG) 2 Unit(s) SubCutaneous before breakfast  insulin lispro Injectable (ADMELOG) 2 Unit(s) SubCutaneous before lunch  insulin lispro Injectable (ADMELOG) 2 Unit(s) SubCutaneous before dinner  lisinopril 2.5 milliGRAM(s) Oral daily  pantoprazole    Tablet 40 milliGRAM(s) Oral before breakfast    MEDICATIONS  (PRN):  acetaminophen     Tablet .. 650 milliGRAM(s) Oral every 6 hours PRN Temp greater or equal to 38C (100.4F), Mild Pain (1 - 3)  ALBUTerol    90 MICROgram(s) HFA Inhaler 2 Puff(s) Inhalation every 6 hours PRN Shortness of Breath and/or Wheezing  Biotene Dry Mouth Oral Rinse 15 milliLiter(s) Swish and Spit three times a day PRN Mouth Care  dextrose Oral Gel 15 Gram(s) Oral once PRN Blood Glucose LESS THAN 70 milliGRAM(s)/deciliter  simethicone 80 milliGRAM(s) Chew daily PRN Gas  sodium chloride 0.65% Nasal 1 Spray(s) Both Nostrils every 1 hour PRN Nasal Congestion    Review of Systems: Refer to HPI for pertinent positives and negatives. All other ROS reviewed and negative except as otherwise stated above.    Vital Signs Last 24 Hrs  T(F): 98.5 (20 Oct 2022 20:43), Max: 98.6 (20 Oct 2022 11:13)  HR: 72 (21 Oct 2022 05:40) (72 - 75)  BP: 160/85 (21 Oct 2022 05:40) (127/69 - 160/85)  RR: 16 (20 Oct 2022 20:43) (16 - 18)  SpO2: 99% (20 Oct 2022 20:43) (99% - 100%)  I&O's Summary    PHYSICAL EXAM:  GENERAL: NAD, well-groomed, well-developed  HEAD:  Atraumatic, Normocephalic  EYES: EOMI, PERRL, conjunctiva and sclera clear  ENMT: Moist mucous membranes, Good dentition  NECK: Supple, No JVD  CHEST/LUNG: Clear to auscultation bilaterally, non-labored breathing, good air entry  HEART: RRR; S1/S2, No murmur  ABDOMEN: Soft, Nontender, Nondistended; Bowel sounds present  VASCULAR: Normal pulses, Normal capillary refill  EXTREMITIES: No cyanosis, No edema  LYMPH: No lymphadenopathy noted  SKIN: Warm, Intact  PSYCH: Normal mood and affect  NERVOUS SYSTEM:  A/O x3, Good concentration; CN 2-12 intact, No focal deficits, moves all extremities    LABS:                        10.7   6.51  )-----------( 205      ( 20 Oct 2022 06:18 )             32.8     10-20    140  |  106  |  46  ----------------------------<  181  4.4   |  23  |  3.19    Ca    8.8      20 Oct 2022 06:11  Phos  3.5     10-20  Mg     1.9     10-20          POCT Blood Glucose.: 165 mg/dL (20 Oct 2022 22:32)  POCT Blood Glucose.: 175 mg/dL (20 Oct 2022 16:37)  POCT Blood Glucose.: 146 mg/dL (20 Oct 2022 11:44)  POCT Blood Glucose.: 203 mg/dL (20 Oct 2022 07:49)          COVID-19 PCR: NotDetec (10-20-22 @ 14:41)  COVID-19 PCR: NotDetec (10-17-22 @ 07:40)  COVID-19 PCR: NotDetec (10-13-22 @ 15:08)  COVID-19 PCR: NotDetec (10-11-22 @ 06:03)  COVID-19 PCR: NotDetec (10-04-22 @ 16:14)    RADIOLOGY & ADDITIONAL TESTS:  < from: US Kidney and Bladder (10.08.22 @ 12:39) >  IMPRESSION:  No renal mass, hydronephrosis or calculi. Increased renal parenchymal   echogenicity suggesting medical renal disease.  < end of copied text >      < from: CT Angio Heart and Coronaries w/ IV Cont (10.06.22 @ 14:39) >    IMPRESSION:    1.  Predominantly noncalcified plaque in the proximal RCA causes moderate   luminal narrowing. Calcified and noncalcified plaque in the proximal LAD   causes moderate luminal narrowing. Calcified plaque in the distal LAD   causes mild luminal narrowing. Noncalcified plaque in the mid/distal   circumflex causes minimal luminal narrowing.    2.  The calculated Agatston score is 136.8.      Stenosis Reference Ranges:  Minimal <25%  Mild 25-49%  Moderate 50-69%  Severe 70-99%  Occluded >99%    < end of copied text >    < from: CT Angio Neck w/ IV Cont (10.04.22 @ 22:15) >    CT brain: Stable chronic small infarcts. No acute intracranial   hemorrhage, mass effect, midline shift.    CTA neck and brain: No vessel occlusion or significant stenosis.    < end of copied text >    < from: MR Head w/ IV Cont (10.09.22 @ 17:04) >  IMPRESSION:    There is no mass, intracranial hemorrhage, or acute infarction. Chronic   multifocal lacunar infarcts and chronic microvascular ischemic changes as   detailed above.    < end of copied text >      Care Discussed with Consultants/Other Providers: Patient is a 47y old  Male who presents with a chief complaint of Debility (20 Oct 2022 15:31)    HPI:  This is a 46 YO male with PMHx of CKD IV, vertigo, diverticulitis s/p LAR, cigarette use, ETOH abuse now sober x5 years, IDDM, HTN, AMBER, who presented to [facility] on 10/2 c/o dizziness, b/l diplopia, headache and chest tightness found to have /133 and elevated troponins concerning for NSTEMI. EKG w/ ST-T abnormalities without acute ST segment changes. At OSH BPs stabilized with IV hydralazine, CT head without acute pathology, MRI brain showed chronic L parasagittal infarcts. 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 Patient was recommended outpatient followup for neurological/ophthalmological concerns (specifically for OCT nerve/RNFL, Fresnel prisms) and was transferred to [facility] for a possible LHC.    On admission to [facility] coronary CT showed moderate stenosis of the proximal LAD and RCA. Cath initially deferred as patient was pending nephrology clearance for BILLY. TTE largely wnl, bubble study negative. MRI brain with contrast showed chronic multifocal lacunar infarcts and chronic microvascular ischemic changes, corroborating MRI read from [facility]. Patient was evaluated by ophthalmology team for new onset R frontal headache and popping sensation, determined to have components hypertensive and diabetic retinopathy for which outpatient followup and possible trial of Fresnel prisms was recommended. Cluster headaches suspected given pattern of headache (R frontal, several days at a time, lacrimation). Patient was also evaluated by PT/OT who recommended CANDY. Speech pathology eval was notable for cognitive linguistic deficits with concern for early onset dementia given a family hx (mother diagnosed @46yo). After improvement of BILLY, LHC was completed showing nonobstructive CAD without elevated filling pressures. Lateral rectus palsy was noted to improve slightly towards end of admission with ability to abduct to ~10-15 degrees. Low-dose lisinopril was initiated for nephrotic-range proteinuria.  Patient was monitored for resolution of BILLY and is now hemodynamically stable   Patient was evaluated by PM&R and therapy for functional deficits, gait/ADL impairments and acute rehabilitation was recommended. Patient was medically optimized for discharge to [facility] IRU on 10/20/22.   (20 Oct 2022 15:31)    Today, he denies fevers and chills. He feels unsteady. He has dizziness. He has no nausea or vomiting. no shortness of breath.     PAST MEDICAL & SURGICAL HISTORY:  Diabetes      Asthma      Diverticulitis  surgery 16yrs ago      High cholesterol      Dyslipidemia      Hypertension      H/O vertigo      Diabetic ulcer of right foot      Broken toe  left pinky toe      Vertigo      HTN (hypertension)      Diabetes      History of surgery  colon resection        SOCIAL HISTORY: hx of EtOH abuse, quit 5 years ago. Current tobacco use.   FAMILY HISTORY: HTN and alzheimer's    ALLERGIES:  No Known Allergies    MEDICATIONS  (STANDING):  amitriptyline 10 milliGRAM(s) Oral at bedtime  amLODIPine   Tablet 10 milliGRAM(s) Oral daily  AQUAPHOR (petrolatum Ointment) 1 Application(s) Topical two times a day  atorvastatin 40 milliGRAM(s) Oral at bedtime  budesonide  80 MICROgram(s)/formoterol 4.5 MICROgram(s) Inhaler 2 Puff(s) Inhalation two times a day  carvedilol 25 milliGRAM(s) Oral every 12 hours  dextrose 5%. 1000 milliLiter(s) (50 mL/Hr) IV Continuous <Continuous>  dextrose 5%. 1000 milliLiter(s) (100 mL/Hr) IV Continuous <Continuous>  dextrose 50% Injectable 25 Gram(s) IV Push once  dextrose 50% Injectable 12.5 Gram(s) IV Push once  dextrose 50% Injectable 25 Gram(s) IV Push once  fenofibrate Tablet 145 milliGRAM(s) Oral daily  glucagon  Injectable 1 milliGRAM(s) IntraMuscular once  heparin   Injectable 5000 Unit(s) SubCutaneous every 8 hours  insulin glargine Injectable (LANTUS) 20 Unit(s) SubCutaneous at bedtime  insulin lispro (ADMELOG) corrective regimen sliding scale   SubCutaneous three times a day before meals  insulin lispro (ADMELOG) corrective regimen sliding scale   SubCutaneous at bedtime  insulin lispro Injectable (ADMELOG) 2 Unit(s) SubCutaneous before breakfast  insulin lispro Injectable (ADMELOG) 2 Unit(s) SubCutaneous before lunch  insulin lispro Injectable (ADMELOG) 2 Unit(s) SubCutaneous before dinner  lisinopril 2.5 milliGRAM(s) Oral daily  pantoprazole    Tablet 40 milliGRAM(s) Oral before breakfast    MEDICATIONS  (PRN):  acetaminophen     Tablet .. 650 milliGRAM(s) Oral every 6 hours PRN Temp greater or equal to 38C (100.4F), Mild Pain (1 - 3)  ALBUTerol    90 MICROgram(s) HFA Inhaler 2 Puff(s) Inhalation every 6 hours PRN Shortness of Breath and/or Wheezing  Biotene Dry Mouth Oral Rinse 15 milliLiter(s) Swish and Spit three times a day PRN Mouth Care  dextrose Oral Gel 15 Gram(s) Oral once PRN Blood Glucose LESS THAN 70 milliGRAM(s)/deciliter  simethicone 80 milliGRAM(s) Chew daily PRN Gas  sodium chloride 0.65% Nasal 1 Spray(s) Both Nostrils every 1 hour PRN Nasal Congestion    Review of Systems: Refer to HPI for pertinent positives and negatives. All other ROS reviewed and negative except as otherwise stated above.    Vital Signs Last 24 Hrs  T(F): 98.5 (20 Oct 2022 20:43), Max: 98.6 (20 Oct 2022 11:13)  HR: 72 (21 Oct 2022 05:40) (72 - 75)  BP: 160/85 (21 Oct 2022 05:40) (127/69 - 160/85)  RR: 16 (20 Oct 2022 20:43) (16 - 18)  SpO2: 99% (20 Oct 2022 20:43) (99% - 100%)  I&O's Summary    PHYSICAL EXAM:  GENERAL: NAD, well-groomed, well-developed  HEAD:  Atraumatic, Normocephalic  EYES: EOMI, PERRL, conjunctiva and sclera clear  ENMT: Moist mucous membranes, Good dentition  NECK: Supple, No JVD  CHEST/LUNG: Clear to auscultation bilaterally, non-labored breathing, good air entry  HEART: RRR; S1/S2, No murmur  ABDOMEN: Soft, Nontender, Nondistended; Bowel sounds present  VASCULAR: Normal pulses, Normal capillary refill  EXTREMITIES: No cyanosis, No edema  LYMPH: No lymphadenopathy noted  SKIN: Warm, Intact  PSYCH: Normal mood and affect  NERVOUS SYSTEM:  A/O to person and place, Good concentration; CN 2-12 intact, No focal deficits, moves all extremities    LABS:                        10.7   6.51  )-----------( 205      ( 20 Oct 2022 06:18 )             32.8     10-20    140  |  106  |  46  ----------------------------<  181  4.4   |  23  |  3.19    Ca    8.8      20 Oct 2022 06:11  Phos  3.5     10-20  Mg     1.9     10-20          POCT Blood Glucose.: 165 mg/dL (20 Oct 2022 22:32)  POCT Blood Glucose.: 175 mg/dL (20 Oct 2022 16:37)  POCT Blood Glucose.: 146 mg/dL (20 Oct 2022 11:44)  POCT Blood Glucose.: 203 mg/dL (20 Oct 2022 07:49)          COVID-19 PCR: NotDetec (10-20-22 @ 14:41)  COVID-19 PCR: NotDetec (10-17-22 @ 07:40)  COVID-19 PCR: NotDetec (10-13-22 @ 15:08)  COVID-19 PCR: NotDetec (10-11-22 @ 06:03)  COVID-19 PCR: NotDetec (10-04-22 @ 16:14)    RADIOLOGY & ADDITIONAL TESTS:  < from: US Kidney and Bladder (10.08.22 @ 12:39) >  IMPRESSION:  No renal mass, hydronephrosis or calculi. Increased renal parenchymal   echogenicity suggesting medical renal disease.  < end of copied text >      < from: CT Angio Heart and Coronaries w/ IV Cont (10.06.22 @ 14:39) >    IMPRESSION:    1.  Predominantly noncalcified plaque in the proximal RCA causes moderate   luminal narrowing. Calcified and noncalcified plaque in the proximal LAD   causes moderate luminal narrowing. Calcified plaque in the distal LAD   causes mild luminal narrowing. Noncalcified plaque in the mid/distal   circumflex causes minimal luminal narrowing.    2.  The calculated Agatston score is 136.8.      Stenosis Reference Ranges:  Minimal <25%  Mild 25-49%  Moderate 50-69%  Severe 70-99%  Occluded >99%    < end of copied text >    < from: CT Angio Neck w/ IV Cont (10.04.22 @ 22:15) >    CT brain: Stable chronic small infarcts. No acute intracranial   hemorrhage, mass effect, midline shift.    CTA neck and brain: No vessel occlusion or significant stenosis.    < end of copied text >    < from: MR Head w/ IV Cont (10.09.22 @ 17:04) >  IMPRESSION:    There is no mass, intracranial hemorrhage, or acute infarction. Chronic   multifocal lacunar infarcts and chronic microvascular ischemic changes as   detailed above.    < end of copied text >    < from: Transthoracic Echocardiogram (10.08.22 @ 14:31) >  Conclusions: EF59%  1. Normal mitral valve. No mitral regurgitation seen.  2. Normal trileaflet aortic valve. No aortic valve  regurgitation seen.  3. Mildly dilated left atrium.  LA volume index = 36 cc/m2.  4. Normal left ventricular systolic function. No segmental  wall motion abnormalities.  5. Normal diastolic function  6. Normal right ventricular size and function.    < end of copied text >      Care Discussed with Consultants/Other Providers: DR. Ledbetter Patient is a 47y old  Male who presents with a chief complaint of Debility (20 Oct 2022 15:31)    HPI:  This is a 46 YO male with PMHx of CKD IV, vertigo, diverticulitis s/p LAR, cigarette use, ETOH abuse now sober x5 years, IDDM, HTN, AMBER, who presented to [facility] on 10/2 c/o dizziness, b/l diplopia, headache and chest tightness found to have /133 and elevated troponins concerning for NSTEMI. EKG w/ ST-T abnormalities without acute ST segment changes. At OSH BPs stabilized with IV hydralazine, CT head without acute pathology, MRI brain showed chronic L parasagittal infarcts. 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 Patient was recommended outpatient followup for neurological/ophthalmological concerns (specifically for OCT nerve/RNFL, Fresnel prisms) and was transferred to [facility] for a possible LHC.    On admission to [facility] coronary CT showed moderate stenosis of the proximal LAD and RCA. Cath initially deferred as patient was pending nephrology clearance for BILLY. TTE largely wnl, bubble study negative. MRI brain with contrast showed chronic multifocal lacunar infarcts and chronic microvascular ischemic changes, corroborating MRI read from [facility]. Patient was evaluated by ophthalmology team for new onset R frontal headache and popping sensation, determined to have components hypertensive and diabetic retinopathy for which outpatient followup and possible trial of Fresnel prisms was recommended. Cluster headaches suspected given pattern of headache (R frontal, several days at a time, lacrimation). Patient was also evaluated by PT/OT who recommended CANDY. Speech pathology eval was notable for cognitive linguistic deficits with concern for early onset dementia given a family hx (mother diagnosed @46yo). After improvement of BILLY, LHC was completed showing nonobstructive CAD without elevated filling pressures. Lateral rectus palsy was noted to improve slightly towards end of admission with ability to abduct to ~10-15 degrees. Low-dose lisinopril was initiated for nephrotic-range proteinuria.  Patient was monitored for resolution of BILLY and is now hemodynamically stable   Patient was evaluated by PM&R and therapy for functional deficits, gait/ADL impairments and acute rehabilitation was recommended. Patient was medically optimized for discharge to [facility] IRU on 10/20/22.   (20 Oct 2022 15:31)    Today, he denies fevers and chills. He feels unsteady. He has dizziness. He has no nausea or vomiting. no shortness of breath.     PAST MEDICAL & SURGICAL HISTORY:  Diabetes      Asthma      Diverticulitis  surgery 16yrs ago      High cholesterol      Dyslipidemia      Hypertension      H/O vertigo      Diabetic ulcer of right foot      Broken toe  left pinky toe      Vertigo      HTN (hypertension)      Diabetes      History of surgery  colon resection        SOCIAL HISTORY: hx of EtOH abuse, quit 5 years ago. Current tobacco use.   FAMILY HISTORY: HTN and alzheimer's    ALLERGIES:  No Known Allergies    MEDICATIONS  (STANDING):  amitriptyline 10 milliGRAM(s) Oral at bedtime  amLODIPine   Tablet 10 milliGRAM(s) Oral daily  AQUAPHOR (petrolatum Ointment) 1 Application(s) Topical two times a day  atorvastatin 40 milliGRAM(s) Oral at bedtime  budesonide  80 MICROgram(s)/formoterol 4.5 MICROgram(s) Inhaler 2 Puff(s) Inhalation two times a day  carvedilol 25 milliGRAM(s) Oral every 12 hours  dextrose 5%. 1000 milliLiter(s) (50 mL/Hr) IV Continuous <Continuous>  dextrose 5%. 1000 milliLiter(s) (100 mL/Hr) IV Continuous <Continuous>  dextrose 50% Injectable 25 Gram(s) IV Push once  dextrose 50% Injectable 12.5 Gram(s) IV Push once  dextrose 50% Injectable 25 Gram(s) IV Push once  fenofibrate Tablet 145 milliGRAM(s) Oral daily  glucagon  Injectable 1 milliGRAM(s) IntraMuscular once  heparin   Injectable 5000 Unit(s) SubCutaneous every 8 hours  insulin glargine Injectable (LANTUS) 20 Unit(s) SubCutaneous at bedtime  insulin lispro (ADMELOG) corrective regimen sliding scale   SubCutaneous three times a day before meals  insulin lispro (ADMELOG) corrective regimen sliding scale   SubCutaneous at bedtime  insulin lispro Injectable (ADMELOG) 2 Unit(s) SubCutaneous before breakfast  insulin lispro Injectable (ADMELOG) 2 Unit(s) SubCutaneous before lunch  insulin lispro Injectable (ADMELOG) 2 Unit(s) SubCutaneous before dinner  lisinopril 2.5 milliGRAM(s) Oral daily  pantoprazole    Tablet 40 milliGRAM(s) Oral before breakfast    MEDICATIONS  (PRN):  acetaminophen     Tablet .. 650 milliGRAM(s) Oral every 6 hours PRN Temp greater or equal to 38C (100.4F), Mild Pain (1 - 3)  ALBUTerol    90 MICROgram(s) HFA Inhaler 2 Puff(s) Inhalation every 6 hours PRN Shortness of Breath and/or Wheezing  Biotene Dry Mouth Oral Rinse 15 milliLiter(s) Swish and Spit three times a day PRN Mouth Care  dextrose Oral Gel 15 Gram(s) Oral once PRN Blood Glucose LESS THAN 70 milliGRAM(s)/deciliter  simethicone 80 milliGRAM(s) Chew daily PRN Gas  sodium chloride 0.65% Nasal 1 Spray(s) Both Nostrils every 1 hour PRN Nasal Congestion    Review of Systems: Refer to HPI for pertinent positives and negatives. All other ROS reviewed and negative except as otherwise stated above.    Vital Signs Last 24 Hrs  T(F): 98.5 (20 Oct 2022 20:43), Max: 98.6 (20 Oct 2022 11:13)  HR: 72 (21 Oct 2022 05:40) (72 - 75)  BP: 160/85 (21 Oct 2022 05:40) (127/69 - 160/85)  RR: 16 (20 Oct 2022 20:43) (16 - 18)  SpO2: 99% (20 Oct 2022 20:43) (99% - 100%)  I&O's Summary    PHYSICAL EXAM:  GENERAL: NAD, well-groomed, well-developed  HEAD:  Atraumatic, Normocephalic  EYES: EOMI, PERRL, conjunctiva and sclera clear  ENMT: Moist mucous membranes, Good dentition  NECK: Supple, No JVD  CHEST/LUNG: Clear to auscultation bilaterally, non-labored breathing, good air entry  HEART: RRR; S1/S2, No murmur  ABDOMEN: Soft, Nontender, Nondistended; Bowel sounds present  VASCULAR: Normal pulses, Normal capillary refill  EXTREMITIES: No cyanosis, No edema  LYMPH: No lymphadenopathy noted  SKIN: Warm, Intact  PSYCH: Normal mood and affect  NERVOUS SYSTEM:  A/O to person and place, Good concentration; CN 2-12 grossly intact, No focal deficits, moves all extremities    LABS:                        10.7   6.51  )-----------( 205      ( 20 Oct 2022 06:18 )             32.8     10-20    140  |  106  |  46  ----------------------------<  181  4.4   |  23  |  3.19    Ca    8.8      20 Oct 2022 06:11  Phos  3.5     10-20  Mg     1.9     10-20          POCT Blood Glucose.: 165 mg/dL (20 Oct 2022 22:32)  POCT Blood Glucose.: 175 mg/dL (20 Oct 2022 16:37)  POCT Blood Glucose.: 146 mg/dL (20 Oct 2022 11:44)  POCT Blood Glucose.: 203 mg/dL (20 Oct 2022 07:49)          COVID-19 PCR: NotDetec (10-20-22 @ 14:41)  COVID-19 PCR: NotDetec (10-17-22 @ 07:40)  COVID-19 PCR: NotDetec (10-13-22 @ 15:08)  COVID-19 PCR: NotDetec (10-11-22 @ 06:03)  COVID-19 PCR: NotDetec (10-04-22 @ 16:14)    RADIOLOGY & ADDITIONAL TESTS:  < from: US Kidney and Bladder (10.08.22 @ 12:39) >  IMPRESSION:  No renal mass, hydronephrosis or calculi. Increased renal parenchymal   echogenicity suggesting medical renal disease.  < end of copied text >      < from: CT Angio Heart and Coronaries w/ IV Cont (10.06.22 @ 14:39) >    IMPRESSION:    1.  Predominantly noncalcified plaque in the proximal RCA causes moderate   luminal narrowing. Calcified and noncalcified plaque in the proximal LAD   causes moderate luminal narrowing. Calcified plaque in the distal LAD   causes mild luminal narrowing. Noncalcified plaque in the mid/distal   circumflex causes minimal luminal narrowing.    2.  The calculated Agatston score is 136.8.      Stenosis Reference Ranges:  Minimal <25%  Mild 25-49%  Moderate 50-69%  Severe 70-99%  Occluded >99%    < end of copied text >    < from: CT Angio Neck w/ IV Cont (10.04.22 @ 22:15) >    CT brain: Stable chronic small infarcts. No acute intracranial   hemorrhage, mass effect, midline shift.    CTA neck and brain: No vessel occlusion or significant stenosis.    < end of copied text >    < from: MR Head w/ IV Cont (10.09.22 @ 17:04) >  IMPRESSION:    There is no mass, intracranial hemorrhage, or acute infarction. Chronic   multifocal lacunar infarcts and chronic microvascular ischemic changes as   detailed above.    < end of copied text >    < from: Transthoracic Echocardiogram (10.08.22 @ 14:31) >  Conclusions: EF59%  1. Normal mitral valve. No mitral regurgitation seen.  2. Normal trileaflet aortic valve. No aortic valve  regurgitation seen.  3. Mildly dilated left atrium.  LA volume index = 36 cc/m2.  4. Normal left ventricular systolic function. No segmental  wall motion abnormalities.  5. Normal diastolic function  6. Normal right ventricular size and function.    < end of copied text >      Care Discussed with Consultants/Other Providers: DR. Ledbetter

## 2022-10-21 NOTE — DIETITIAN INITIAL EVALUATION ADULT - ADD RECOMMEND
1) Monitor Weights, Intake, Tolerance, Skin, POCT & Labwork  2) Multivitamin Daily  3) Recommend Change Diet to Consistent Carbohydrate DASH-TLC Diet   4) Nutrition Education Provided on Consistent Carbohydrate DASH-TLC Diet   5) Continue Nutrition Plan of Care

## 2022-10-22 LAB
ALBUMIN SERPL ELPH-MCNC: 2.9 G/DL — LOW (ref 3.3–5)
ALP SERPL-CCNC: 82 U/L — SIGNIFICANT CHANGE UP (ref 40–120)
ALT FLD-CCNC: 24 U/L — SIGNIFICANT CHANGE UP (ref 10–45)
ANION GAP SERPL CALC-SCNC: 5 MMOL/L — SIGNIFICANT CHANGE UP (ref 5–17)
AST SERPL-CCNC: 25 U/L — SIGNIFICANT CHANGE UP (ref 10–40)
BASOPHILS # BLD AUTO: 0.03 K/UL — SIGNIFICANT CHANGE UP (ref 0–0.2)
BASOPHILS NFR BLD AUTO: 0.5 % — SIGNIFICANT CHANGE UP (ref 0–2)
BILIRUB SERPL-MCNC: 0.2 MG/DL — SIGNIFICANT CHANGE UP (ref 0.2–1.2)
BUN SERPL-MCNC: 53 MG/DL — HIGH (ref 7–23)
CALCIUM SERPL-MCNC: 8.8 MG/DL — SIGNIFICANT CHANGE UP (ref 8.4–10.5)
CHLORIDE SERPL-SCNC: 107 MMOL/L — SIGNIFICANT CHANGE UP (ref 96–108)
CO2 SERPL-SCNC: 29 MMOL/L — SIGNIFICANT CHANGE UP (ref 22–31)
CREAT SERPL-MCNC: 3.07 MG/DL — HIGH (ref 0.5–1.3)
EGFR: 24 ML/MIN/1.73M2 — LOW
EOSINOPHIL # BLD AUTO: 0.24 K/UL — SIGNIFICANT CHANGE UP (ref 0–0.5)
EOSINOPHIL NFR BLD AUTO: 3.9 % — SIGNIFICANT CHANGE UP (ref 0–6)
GLUCOSE BLDC GLUCOMTR-MCNC: 141 MG/DL — HIGH (ref 70–99)
GLUCOSE BLDC GLUCOMTR-MCNC: 174 MG/DL — HIGH (ref 70–99)
GLUCOSE BLDC GLUCOMTR-MCNC: 177 MG/DL — HIGH (ref 70–99)
GLUCOSE BLDC GLUCOMTR-MCNC: 249 MG/DL — HIGH (ref 70–99)
GLUCOSE SERPL-MCNC: 145 MG/DL — HIGH (ref 70–99)
HCT VFR BLD CALC: 32.3 % — LOW (ref 39–50)
HGB BLD-MCNC: 10.6 G/DL — LOW (ref 13–17)
IMM GRANULOCYTES NFR BLD AUTO: 0.5 % — SIGNIFICANT CHANGE UP (ref 0–0.9)
LYMPHOCYTES # BLD AUTO: 1.94 K/UL — SIGNIFICANT CHANGE UP (ref 1–3.3)
LYMPHOCYTES # BLD AUTO: 31.7 % — SIGNIFICANT CHANGE UP (ref 13–44)
MCHC RBC-ENTMCNC: 31.2 PG — SIGNIFICANT CHANGE UP (ref 27–34)
MCHC RBC-ENTMCNC: 32.8 GM/DL — SIGNIFICANT CHANGE UP (ref 32–36)
MCV RBC AUTO: 95 FL — SIGNIFICANT CHANGE UP (ref 80–100)
MONOCYTES # BLD AUTO: 0.62 K/UL — SIGNIFICANT CHANGE UP (ref 0–0.9)
MONOCYTES NFR BLD AUTO: 10.1 % — SIGNIFICANT CHANGE UP (ref 2–14)
NEUTROPHILS # BLD AUTO: 3.26 K/UL — SIGNIFICANT CHANGE UP (ref 1.8–7.4)
NEUTROPHILS NFR BLD AUTO: 53.3 % — SIGNIFICANT CHANGE UP (ref 43–77)
NRBC # BLD: 0 /100 WBCS — SIGNIFICANT CHANGE UP (ref 0–0)
PLATELET # BLD AUTO: 201 K/UL — SIGNIFICANT CHANGE UP (ref 150–400)
POTASSIUM SERPL-MCNC: 4.8 MMOL/L — SIGNIFICANT CHANGE UP (ref 3.5–5.3)
POTASSIUM SERPL-SCNC: 4.8 MMOL/L — SIGNIFICANT CHANGE UP (ref 3.5–5.3)
PROT SERPL-MCNC: 6.4 G/DL — SIGNIFICANT CHANGE UP (ref 6–8.3)
RBC # BLD: 3.4 M/UL — LOW (ref 4.2–5.8)
RBC # FLD: 12.3 % — SIGNIFICANT CHANGE UP (ref 10.3–14.5)
SODIUM SERPL-SCNC: 141 MMOL/L — SIGNIFICANT CHANGE UP (ref 135–145)
WBC # BLD: 6.12 K/UL — SIGNIFICANT CHANGE UP (ref 3.8–10.5)
WBC # FLD AUTO: 6.12 K/UL — SIGNIFICANT CHANGE UP (ref 3.8–10.5)

## 2022-10-22 PROCEDURE — 99232 SBSQ HOSP IP/OBS MODERATE 35: CPT | Mod: GC

## 2022-10-22 PROCEDURE — 99232 SBSQ HOSP IP/OBS MODERATE 35: CPT

## 2022-10-22 RX ADMIN — Medication 2 UNIT(S): at 12:28

## 2022-10-22 RX ADMIN — Medication 2: at 17:50

## 2022-10-22 RX ADMIN — BUDESONIDE AND FORMOTEROL FUMARATE DIHYDRATE 2 PUFF(S): 160; 4.5 AEROSOL RESPIRATORY (INHALATION) at 09:05

## 2022-10-22 RX ADMIN — HEPARIN SODIUM 5000 UNIT(S): 5000 INJECTION INTRAVENOUS; SUBCUTANEOUS at 05:59

## 2022-10-22 RX ADMIN — PANTOPRAZOLE SODIUM 40 MILLIGRAM(S): 20 TABLET, DELAYED RELEASE ORAL at 05:58

## 2022-10-22 RX ADMIN — Medication 4: at 12:29

## 2022-10-22 RX ADMIN — LISINOPRIL 2.5 MILLIGRAM(S): 2.5 TABLET ORAL at 05:58

## 2022-10-22 RX ADMIN — Medication 10 MILLIGRAM(S): at 21:09

## 2022-10-22 RX ADMIN — Medication 145 MILLIGRAM(S): at 12:28

## 2022-10-22 RX ADMIN — CARVEDILOL PHOSPHATE 25 MILLIGRAM(S): 80 CAPSULE, EXTENDED RELEASE ORAL at 18:54

## 2022-10-22 RX ADMIN — Medication 1 APPLICATION(S): at 18:55

## 2022-10-22 RX ADMIN — Medication 1 APPLICATION(S): at 05:59

## 2022-10-22 RX ADMIN — Medication 2 UNIT(S): at 17:49

## 2022-10-22 RX ADMIN — HEPARIN SODIUM 5000 UNIT(S): 5000 INJECTION INTRAVENOUS; SUBCUTANEOUS at 17:46

## 2022-10-22 RX ADMIN — INSULIN GLARGINE 20 UNIT(S): 100 INJECTION, SOLUTION SUBCUTANEOUS at 21:10

## 2022-10-22 RX ADMIN — Medication 2 UNIT(S): at 08:41

## 2022-10-22 RX ADMIN — ATORVASTATIN CALCIUM 40 MILLIGRAM(S): 80 TABLET, FILM COATED ORAL at 21:09

## 2022-10-22 RX ADMIN — AMLODIPINE BESYLATE 10 MILLIGRAM(S): 2.5 TABLET ORAL at 05:58

## 2022-10-22 RX ADMIN — CARVEDILOL PHOSPHATE 25 MILLIGRAM(S): 80 CAPSULE, EXTENDED RELEASE ORAL at 05:58

## 2022-10-22 RX ADMIN — HEPARIN SODIUM 5000 UNIT(S): 5000 INJECTION INTRAVENOUS; SUBCUTANEOUS at 21:10

## 2022-10-22 NOTE — PROGRESS NOTE ADULT - SUBJECTIVE AND OBJECTIVE BOX
Hospitalist: Wilian Pollard DO    CHIEF COMPLAINT: Patient is a 47y old  male who presents with a chief complaint of Other malaise     (21 Oct 2022 09:52)      SUBJECTIVE / OVERNIGHT EVENTS: Patient seen and examined. No acute events overnight. Pain well controlled and patient without any complaints.    MEDICATIONS  (STANDING):  amitriptyline 10 milliGRAM(s) Oral at bedtime  amLODIPine   Tablet 10 milliGRAM(s) Oral daily  AQUAPHOR (petrolatum Ointment) 1 Application(s) Topical two times a day  atorvastatin 40 milliGRAM(s) Oral at bedtime  budesonide  80 MICROgram(s)/formoterol 4.5 MICROgram(s) Inhaler 2 Puff(s) Inhalation two times a day  carvedilol 25 milliGRAM(s) Oral every 12 hours  dextrose 5%. 1000 milliLiter(s) (50 mL/Hr) IV Continuous <Continuous>  dextrose 5%. 1000 milliLiter(s) (100 mL/Hr) IV Continuous <Continuous>  dextrose 50% Injectable 25 Gram(s) IV Push once  dextrose 50% Injectable 12.5 Gram(s) IV Push once  dextrose 50% Injectable 25 Gram(s) IV Push once  fenofibrate Tablet 145 milliGRAM(s) Oral daily  glucagon  Injectable 1 milliGRAM(s) IntraMuscular once  heparin   Injectable 5000 Unit(s) SubCutaneous every 8 hours  insulin glargine Injectable (LANTUS) 20 Unit(s) SubCutaneous at bedtime  insulin lispro (ADMELOG) corrective regimen sliding scale   SubCutaneous three times a day before meals  insulin lispro (ADMELOG) corrective regimen sliding scale   SubCutaneous at bedtime  insulin lispro Injectable (ADMELOG) 2 Unit(s) SubCutaneous before breakfast  insulin lispro Injectable (ADMELOG) 2 Unit(s) SubCutaneous before lunch  insulin lispro Injectable (ADMELOG) 2 Unit(s) SubCutaneous before dinner  lisinopril 2.5 milliGRAM(s) Oral daily  pantoprazole    Tablet 40 milliGRAM(s) Oral before breakfast    MEDICATIONS  (PRN):  acetaminophen     Tablet .. 650 milliGRAM(s) Oral every 6 hours PRN Temp greater or equal to 38C (100.4F), Mild Pain (1 - 3)  ALBUTerol    90 MICROgram(s) HFA Inhaler 2 Puff(s) Inhalation every 6 hours PRN Shortness of Breath and/or Wheezing  Biotene Dry Mouth Oral Rinse 15 milliLiter(s) Swish and Spit three times a day PRN Mouth Care  dextrose Oral Gel 15 Gram(s) Oral once PRN Blood Glucose LESS THAN 70 milliGRAM(s)/deciliter  simethicone 80 milliGRAM(s) Chew daily PRN Gas  sodium chloride 0.65% Nasal 1 Spray(s) Both Nostrils every 1 hour PRN Nasal Congestion      VITALS:  T(F): 98.4 (10-22-22 @ 08:00), Max: 98.4 (10-22-22 @ 08:00)  HR: 73 (10-22-22 @ 09:15) (68 - 74)  BP: 131/88 (10-22-22 @ 08:00) (129/73 - 144/77)  RR: 16 (10-22-22 @ 08:00) (16 - 16)  SpO2: 99% (10-22-22 @ 09:15)      REVIEW OF SYSTEMS:  For ROV please refer back to H&P     PHYSICAL EXAM:  GENERAL: NAD, well-groomed, well-developed  HEAD:  Atraumatic, Normocephalic  EYES: EOMI, PERRL, conjunctiva and sclera clear  ENMT: Moist mucous membranes, Good dentition  NECK: Supple, No JVD  CHEST/LUNG: Clear to auscultation bilaterally, non-labored breathing, good air entry  HEART: RRR; S1/S2, No murmur  ABDOMEN: Soft, Nontender, Nondistended; Bowel sounds present  VASCULAR: Normal pulses, Normal capillary refill  EXTREMITIES: No cyanosis, No edema  LYMPH: No lymphadenopathy noted  SKIN: Warm, Intact  PSYCH: Normal mood and affect  NERVOUS SYSTEM:  A/O to person and place, Good concentration; CN 2-12 grossly intact, No focal deficits, moves all extremities      LABS:              10.6                 141  | 29   | 53           6.12  >-----------< 201     ------------------------< 145                   32.3                 4.8  | 107  | 3.07                                         Ca 8.8   Mg x     Ph x           TPro  6.4  /  Alb  2.9      TBili  0.2  /  DBili  x         AST  25  /  ALT  24            AlkPhos  82         CAPILLARY BLOOD GLUCOSE      POCT Blood Glucose.: 249 mg/dL (22 Oct 2022 12:24)  POCT Blood Glucose.: 141 mg/dL (22 Oct 2022 08:11)  POCT Blood Glucose.: 133 mg/dL (21 Oct 2022 21:44)  POCT Blood Glucose.: 120 mg/dL (21 Oct 2022 17:10)        MICROBIOLOGY:          RADIOLOGY & ADDITIONAL TESTS:    Imaging Personally Reviewed:    [X] Consultant(s) Notes Reviewed:  [X] Care Discussed with Consultants/Other Providers:

## 2022-10-22 NOTE — PROGRESS NOTE ADULT - SUBJECTIVE AND OBJECTIVE BOX
chief complaint- headaches, double vision, poor balance    This is a 48 YO male with PMHx of CKD IV, vertigo, diverticulitis s/p LAR, cigarette use, ETOH abuse now sober x5 years, IDDM, HTN, AMBER, who presented to [facility] on 10/2 c/o dizziness, b/l diplopia, headache and chest tightness found to have /133 and elevated troponins concerning for NSTEMI. EKG w/ ST-T abnormalities without acute ST segment changes. At OSH BPs stabilized with IV hydralazine, CT head without acute pathology, MRI brain showed chronic L parasagittal infarcts. 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 Patient was recommended outpatient followup for neurological/ophthalmological concerns (specifically for OCT nerve/RNFL, Fresnel prisms) and was transferred to [facility] for a possible LHC.    On admission to [facility] coronary CT showed moderate stenosis of the proximal LAD and RCA. Cath initially deferred as patient was pending nephrology clearance for BILLY. TTE largely wnl, bubble study negative. MRI brain with contrast showed chronic multifocal lacunar infarcts and chronic microvascular ischemic changes, corroborating MRI read from [facility]. Patient was evaluated by ophthalmology team for new onset R frontal headache and popping sensation, determined to have components hypertensive and diabetic retinopathy for which outpatient followup and possible trial of Fresnel prisms was recommended. Cluster headaches suspected given pattern of headache (R frontal, several days at a time, lacrimation). Patient was also evaluated by PT/OT who recommended CANDY. Speech pathology eval was notable for cognitive linguistic deficits with concern for early onset dementia given a family hx (mother diagnosed @44yo). After improvement of BILLY, LHC was completed showing nonobstructive CAD without elevated filling pressures. Lateral rectus palsy was noted to improve slightly towards end of admission with ability to abduct to ~10-15 degrees. Low-dose lisinopril was initiated for nephrotic-range proteinuria.  Patient was monitored for resolution of BILLY and is now hemodynamically stable   Patient was evaluated by PM&R and therapy for functional deficits, gait/ADL impairments and acute rehabilitation was recommended. Patient was medically optimized for discharge to [facility] IRU on 10/20/22.    ROS/subjective  seen at bedside   cluster headache- behind Right eye last night- now relieved- took tylenol  has patch for eye at bedside- to alternate patch during day  moved bowels, voiding   still with double vision, occ dizziness  no nausea, no vomiting  no SOB, no chest pain    MEDICATIONS  (STANDING):  amitriptyline 10 milliGRAM(s) Oral at bedtime  amLODIPine   Tablet 10 milliGRAM(s) Oral daily  AQUAPHOR (petrolatum Ointment) 1 Application(s) Topical two times a day  atorvastatin 40 milliGRAM(s) Oral at bedtime  budesonide  80 MICROgram(s)/formoterol 4.5 MICROgram(s) Inhaler 2 Puff(s) Inhalation two times a day  carvedilol 25 milliGRAM(s) Oral every 12 hours  dextrose 5%. 1000 milliLiter(s) (50 mL/Hr) IV Continuous <Continuous>  dextrose 5%. 1000 milliLiter(s) (100 mL/Hr) IV Continuous <Continuous>  dextrose 50% Injectable 25 Gram(s) IV Push once  dextrose 50% Injectable 12.5 Gram(s) IV Push once  dextrose 50% Injectable 25 Gram(s) IV Push once  fenofibrate Tablet 145 milliGRAM(s) Oral daily  glucagon  Injectable 1 milliGRAM(s) IntraMuscular once  heparin   Injectable 5000 Unit(s) SubCutaneous every 8 hours  insulin glargine Injectable (LANTUS) 20 Unit(s) SubCutaneous at bedtime  insulin lispro (ADMELOG) corrective regimen sliding scale   SubCutaneous three times a day before meals  insulin lispro (ADMELOG) corrective regimen sliding scale   SubCutaneous at bedtime  insulin lispro Injectable (ADMELOG) 2 Unit(s) SubCutaneous before breakfast  insulin lispro Injectable (ADMELOG) 2 Unit(s) SubCutaneous before lunch  insulin lispro Injectable (ADMELOG) 2 Unit(s) SubCutaneous before dinner  lisinopril 2.5 milliGRAM(s) Oral daily  pantoprazole    Tablet 40 milliGRAM(s) Oral before breakfast    MEDICATIONS  (PRN):  acetaminophen     Tablet .. 650 milliGRAM(s) Oral every 6 hours PRN Temp greater or equal to 38C (100.4F), Mild Pain (1 - 3)  ALBUTerol    90 MICROgram(s) HFA Inhaler 2 Puff(s) Inhalation every 6 hours PRN Shortness of Breath and/or Wheezing  Biotene Dry Mouth Oral Rinse 15 milliLiter(s) Swish and Spit three times a day PRN Mouth Care  dextrose Oral Gel 15 Gram(s) Oral once PRN Blood Glucose LESS THAN 70 milliGRAM(s)/deciliter  simethicone 80 milliGRAM(s) Chew daily PRN Gas  sodium chloride 0.65% Nasal 1 Spray(s) Both Nostrils every 1 hour PRN Nasal Congestion                            10.6   6.12  )-----------( 201      ( 22 Oct 2022 07:00 )             32.3     10-22    141  |  107  |  53<H>  ----------------------------<  145<H>  4.8   |  29  |  3.07<H>    Ca    8.8      22 Oct 2022 07:00    TPro  6.4  /  Alb  2.9<L>  /  TBili  0.2  /  DBili  x   /  AST  25  /  ALT  24  /  AlkPhos  82  10-22        CAPILLARY BLOOD GLUCOSE      POCT Blood Glucose.: 249 mg/dL (22 Oct 2022 12:24)  POCT Blood Glucose.: 141 mg/dL (22 Oct 2022 08:11)  POCT Blood Glucose.: 133 mg/dL (21 Oct 2022 21:44)  POCT Blood Glucose.: 120 mg/dL (21 Oct 2022 17:10)      Vital Signs Last 24 Hrs  T(C): 36.9 (22 Oct 2022 08:00), Max: 36.9 (22 Oct 2022 08:00)  T(F): 98.4 (22 Oct 2022 08:00), Max: 98.4 (22 Oct 2022 08:00)  HR: 73 (22 Oct 2022 09:15) (68 - 74)  BP: 131/88 (22 Oct 2022 08:00) (129/73 - 144/77)  BP(mean): --  RR: 16 (22 Oct 2022 08:00) (16 - 16)  SpO2: 99% (22 Oct 2022 09:15) (97% - 99%)    Parameters below as of 22 Oct 2022 09:15  Patient On (Oxygen Delivery Method): room air    Constitutional - NAD, Comfortable  HEENT - R 6th palsy  Chest - good chest expansion, good respiratory effort, CTAB   Cardio - warm and well perfused, RRR, no murmur  Abdomen -  Soft, NTND  Extremities 2 + edema LES  Neurologic Exam:                    Cognitive -             Orientation: Awake, Alert, AAO to self, "rehab", date        Speech - Fluent, Comprehensible, No dysarthria, No aphasia      Cranial Nerves - No facial asymmetry, Tongue midline, Shoulder shrug intact +R lateral gaze palsy     Motor -                      LEFT    UE - ShAB 5/5, EF 5/5, EE 5/5, WE 5/5,  WNL                    RIGHT UE - ShAB 5/5, EF 5/5, EE 5/5, WE 5/5,  WNL                    LEFT    LE - HF 4+/5, KE 5/5, DF 5/5, PF 5/5                    RIGHT LE - HF 4+/5, KE 5/5, DF 5/5, PF 5/5        Sensory - Diminished to LT glove stocking distribution     Reflexes - DTR intact  Psychiatric - Mood stable, Affect WNL      REhab eval in progress

## 2022-10-22 NOTE — PROGRESS NOTE ADULT - ASSESSMENT
46 YO male with PMHx of CKD IV, vertigo, diverticulitis s/p LAR, cigarette use, ETOH abuse now sober x5 years, IDDM, HTN, AMBER, who presented to [facility] on 10/2 c/o dizziness, b/l diplopia, headache and chest tightness found to have /133 and elevated troponin concerning for NSTEMI. CTH negative for acute pathology. MRI Brain showed L parasagittal infarcts. Per, neurology and opthalmology CN VI palsy. Patient  was transferred to [facility] for a possible LHC. LHC showed nonobstructive CAD without elevated filling pressures. Hospital course significant for BILLY, right frontal headache and popping sensation, determined to have components hypertensive and diabetic retinopathy for which outpatient followup and possible trial of Fresnel prisms was recommended.    Admitted to [facility] on 10/20/2022.    # Debility  # Hx of chronic Lacunar infarts/microvascular changes  # lateral rectus palsy  - MRI with L parasagittal infarcts, multiple chronic lacunar infarcts  - PT/OT/ST per Rehab  - restart ASA when appropriate- on hold for renal bx  - control BP    # NSTEMI  # CAD  # HLD  - Cath: 10/13 w/ non obstructive CAD  - restart aspirin after biopsy...  - cont atorvastatin and fenofibrate    # HTN, presented w/ hypertensive emergency  - Renal initiated secondary work up at OSH: dania not elevated, f/u plasma renin activity, metanephrines  - I ordered renal doppler to assess for renal artery stenosis.  - cont lisinopril and coreg    # DM2, insulin dependent  - [id number] 9.9% (10/5)  - pt currently on 20u glargine qhs, 2u admelog w/ meals, moderate ISS  - recommend diabetic educator consult    # CKD st. IV likely due to diabetic nephropathy  # Nephrotic range proteinuria  baseline Cr 2.4  - OP IR biopsy planned for 10/24. Hold heparin on Sunday night. NPO Sunday night.  - per renal at [facility], now on torsemide and low dose ace-i    # normocytic anemia  likely due to anemia of chronic disease.  - f/u b12, folate, and iron studies, ordered for Monday.    #Diplopia, thought to be secondary to R. CN6 lateral rectus palsy  - Pt evaluated by Ophtho at OSH. Found to have moderate non-proliferative diabetic retinopathy and Hypertensive retinopathy   - OP Neuro and ophthalmology    # Depression  - Amitriptyline 10mg QHS    # tobacco abuse disorder  Patient has reduced smoking from 1.5 ppd to 1 pack every 2 weeks  The patient is an active smoker. The patient was advised to quit. The patient was informed of risks associated w/ smoking. The options for assistance with smoking cessation were reviewed with the patient. The smoking cessation educational materials order set was ordered. The patient declined smoking cessation medications.     dvt ppx: heparin, hold on sunday night.

## 2022-10-22 NOTE — PROGRESS NOTE ADULT - ASSESSMENT
This is a 46 YO male with PMHx of CKD IV, vertigo, diverticulitis s/p LAR, cigarette use, ETOH abuse now sober x5 years, IDDM, HTN, AMBER, who presented to [facility] on 10/2 c/o dizziness, b/l diplopia, headache and chest tightness found to have /133 and elevated troponin concerning for NSTEMI. CTH negative for acute pathology. MRI Brain showed L parasagittal infarcts. Per, neurology and opthalmology CN VI palsy. Patient  was transferred to [facility] for a possible LHC. LHC showed nonobstructive CAD without elevated filling pressures. Hospital course significant for BILLY, right frontal headache and popping sensation, determined to have components hypertensive and diabetic retinopathy for which outpatient followup and possible trial of Fresnel prisms was recommended. Patient now with gait Instability, ADL impairments and Functional impairments.    # Functional Deficits s/p CVA  - MRI with L parasagittal infarcts, multiple chronic lacunar infarcts  - Continue statin. ASA on hold for renal bx   -  Comprehensive Rehab Program: PT/OT/ST, 3hours daily and 5 days weekly  - PT: Focused on improving strength, endurance, coordination, balance, functional mobility, and transfers  - OT: Focused on improving strength, fine motor skills, coordination, posture and ADLs.    - ST: to diagnose and treat deficits in swallowing, cognition and communication.     #NTEMI #CAD  - LHC showed nonobstructive CAD without elevated filling pressures  - Continue statin, fenofibrate    #BILLY  -Monitor BUN/Cr  -Avoid nephrotoxic agents, renally dose meds  -Continue lisinopril, torsemide  -Workup ongoing, renal consult routinely  -Patient scheduled for OP renal biopsy with IR Monday 10/24 per IR note 10/19    #HTN  - Lisinopril 2.5mg daily  - Carvedilol 25mg BID    #HLD  - Lipitor 40mg daily  - Fenofibrate 145mg daily    #Diplopia  - OP f/u with neuro-opthalmology  - consistent with lateral rectus palsy  - outpatient f/u with retina specialist Dr. Jose Angel Arvizu for OCT nerve/RNFL for evaluation for diabetic and hypertensive retinopathy; if diplopia/palsy persists should continue outpatient followup for Fresnel prisms  Alternate patch eyes during the day    #Cluster Headache  - Amitriptyline 10mg QHS    #AMBER  - c/w inhalers: Albuterol, Symbicort  - c/w nasal spray    #DM II  - ISS and FS  - Admelog and Lantus  - A1c 9.9 on 10/5    #Pain management  - Tylenol PRN    #DVT ppx  - Heparin    #GI ppx  - Protonix 40mg    #Bowel Regimen  - Senna    #Bladder management  - BS on admission, and q 8 hours (SC if > 400)  - Monitor UO    #FEN   - Diet: DASH    #Onychomycosis  -Consider podiatry consult    #Skin:  - Skin on admission: Aquaphor to dry skin BID- latrice both feet  - Pressure injury/Skin: Turn Q2hrs while in bed, OOB to Chair, PT/OT     #Dysphagia    - SLP: evaluation and treatment    #Mood/Cognition:  - Neuropsychology consult PRN    #Sleep:   - Maintain quiet hours and low stim environment.  - Melatonin PRN to maximize participation in therapy during the day.     #Precaution  - Fall, Aspiration, cardiac

## 2022-10-23 LAB
GLUCOSE BLDC GLUCOMTR-MCNC: 127 MG/DL — HIGH (ref 70–99)
GLUCOSE BLDC GLUCOMTR-MCNC: 158 MG/DL — HIGH (ref 70–99)
GLUCOSE BLDC GLUCOMTR-MCNC: 192 MG/DL — HIGH (ref 70–99)
GLUCOSE BLDC GLUCOMTR-MCNC: 212 MG/DL — HIGH (ref 70–99)
SARS-COV-2 RNA SPEC QL NAA+PROBE: SIGNIFICANT CHANGE UP

## 2022-10-23 PROCEDURE — 99232 SBSQ HOSP IP/OBS MODERATE 35: CPT | Mod: GC

## 2022-10-23 RX ORDER — INSULIN GLARGINE 100 [IU]/ML
10 INJECTION, SOLUTION SUBCUTANEOUS ONCE
Refills: 0 | Status: COMPLETED | OUTPATIENT
Start: 2022-10-23 | End: 2022-10-23

## 2022-10-23 RX ADMIN — BUDESONIDE AND FORMOTEROL FUMARATE DIHYDRATE 2 PUFF(S): 160; 4.5 AEROSOL RESPIRATORY (INHALATION) at 09:18

## 2022-10-23 RX ADMIN — HEPARIN SODIUM 5000 UNIT(S): 5000 INJECTION INTRAVENOUS; SUBCUTANEOUS at 05:24

## 2022-10-23 RX ADMIN — AMLODIPINE BESYLATE 10 MILLIGRAM(S): 2.5 TABLET ORAL at 05:23

## 2022-10-23 RX ADMIN — CARVEDILOL PHOSPHATE 25 MILLIGRAM(S): 80 CAPSULE, EXTENDED RELEASE ORAL at 17:24

## 2022-10-23 RX ADMIN — PANTOPRAZOLE SODIUM 40 MILLIGRAM(S): 20 TABLET, DELAYED RELEASE ORAL at 05:23

## 2022-10-23 RX ADMIN — ATORVASTATIN CALCIUM 40 MILLIGRAM(S): 80 TABLET, FILM COATED ORAL at 22:10

## 2022-10-23 RX ADMIN — Medication 2: at 11:21

## 2022-10-23 RX ADMIN — LISINOPRIL 2.5 MILLIGRAM(S): 2.5 TABLET ORAL at 05:23

## 2022-10-23 RX ADMIN — HEPARIN SODIUM 5000 UNIT(S): 5000 INJECTION INTRAVENOUS; SUBCUTANEOUS at 13:02

## 2022-10-23 RX ADMIN — Medication 1 APPLICATION(S): at 17:25

## 2022-10-23 RX ADMIN — Medication 1 TABLET(S): at 13:02

## 2022-10-23 RX ADMIN — HEPARIN SODIUM 5000 UNIT(S): 5000 INJECTION INTRAVENOUS; SUBCUTANEOUS at 22:10

## 2022-10-23 RX ADMIN — Medication 10 MILLIGRAM(S): at 22:10

## 2022-10-23 RX ADMIN — Medication 145 MILLIGRAM(S): at 11:19

## 2022-10-23 RX ADMIN — BUDESONIDE AND FORMOTEROL FUMARATE DIHYDRATE 2 PUFF(S): 160; 4.5 AEROSOL RESPIRATORY (INHALATION) at 20:10

## 2022-10-23 RX ADMIN — INSULIN GLARGINE 10 UNIT(S): 100 INJECTION, SOLUTION SUBCUTANEOUS at 22:11

## 2022-10-23 RX ADMIN — Medication 1 APPLICATION(S): at 05:24

## 2022-10-23 RX ADMIN — CARVEDILOL PHOSPHATE 25 MILLIGRAM(S): 80 CAPSULE, EXTENDED RELEASE ORAL at 05:24

## 2022-10-23 RX ADMIN — Medication 2 UNIT(S): at 11:22

## 2022-10-23 RX ADMIN — Medication 2 UNIT(S): at 17:24

## 2022-10-23 NOTE — PROGRESS NOTE ADULT - ASSESSMENT
This is a 48 YO male with PMHx of CKD IV, vertigo, diverticulitis s/p LAR, cigarette use, ETOH abuse now sober x5 years, IDDM, HTN, AMBER, who presented to [facility] on 10/2 c/o dizziness, b/l diplopia, headache and chest tightness found to have /133 and elevated troponin concerning for NSTEMI. CTH negative for acute pathology. MRI Brain showed L parasagittal infarcts. Per, neurology and opthalmology CN VI palsy. Patient  was transferred to [facility] for a possible LHC. LHC showed nonobstructive CAD without elevated filling pressures. Hospital course significant for BILLY, right frontal headache and popping sensation, determined to have components hypertensive and diabetic retinopathy for which outpatient followup and possible trial of Fresnel prisms was recommended. Patient now with gait Instability, ADL impairments and Functional impairments.    # Functional Deficits s/p CVA  - MRI with L parasagittal infarcts, multiple chronic lacunar infarcts  - Continue statin. ASA on hold for renal bx   -  Comprehensive Rehab Program: PT/OT/ST, 3hours daily and 5 days weekly  - PT: Focused on improving strength, endurance, coordination, balance, functional mobility, and transfers  - OT: Focused on improving strength, fine motor skills, coordination, posture and ADLs.    - ST: to diagnose and treat deficits in swallowing, cognition and communication.     #NTEMI #CAD  - LHC showed nonobstructive CAD without elevated filling pressures  - Continue statin, fenofibrate    #BILLY  -Monitor BUN/Cr  -Avoid nephrotoxic agents, renally dose meds  -Continue lisinopril, torsemide  -Workup ongoing, renal consult routinely  -Patient scheduled for OP renal biopsy with IR Monday 10/24 per IR note 10/19    #HTN  - Lisinopril 2.5mg daily  - Carvedilol 25mg BID    #HLD  - Lipitor 40mg daily  - Fenofibrate 145mg daily    #Diplopia  - OP f/u with neuro-opthalmology  - consistent with lateral rectus palsy  - outpatient f/u with retina specialist Dr. Jose Angel Arvizu for OCT nerve/RNFL for evaluation for diabetic and hypertensive retinopathy; if diplopia/palsy persists should continue outpatient followup for Fresnel prisms  Alternate patch eyes during the day    #Cluster Headache  - Amitriptyline 10mg QHS    #AMBER  - c/w inhalers: Albuterol, Symbicort  - c/w nasal spray    #DM II  - ISS and FS  - Admelog and Lantus  - A1c 9.9 on 10/5    #Pain management  - Tylenol PRN    #DVT ppx  - Heparin    #GI ppx  - Protonix 40mg    #Bowel Regimen  - Senna    #Bladder management  - BS on admission, and q 8 hours (SC if > 400)  - Monitor UO    #FEN   - Diet: DASH    #Onychomycosis  -Consider podiatry consult    #Skin:  - Skin on admission: Aquaphor to dry skin BID- latrice both feet  - Pressure injury/Skin: Turn Q2hrs while in bed, OOB to Chair, PT/OT     #Dysphagia    - SLP: evaluation and treatment    #Mood/Cognition:  - Neuropsychology consult PRN    #Sleep:   - Maintain quiet hours and low stim environment.  - Melatonin PRN to maximize participation in therapy during the day.     #Precaution  - Fall, Aspiration, cardiac

## 2022-10-23 NOTE — PROGRESS NOTE ADULT - SUBJECTIVE AND OBJECTIVE BOX
chief complaint- headaches, double vision, poor balance    This is a 46 YO male with PMHx of CKD IV, vertigo, diverticulitis s/p LAR, cigarette use, ETOH abuse now sober x5 years, IDDM, HTN, AMBER, who presented to [facility] on 10/2 c/o dizziness, b/l diplopia, headache and chest tightness found to have /133 and elevated troponins concerning for NSTEMI. EKG w/ ST-T abnormalities without acute ST segment changes. At OSH BPs stabilized with IV hydralazine, CT head without acute pathology, MRI brain showed chronic L parasagittal infarcts. 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 Patient was recommended outpatient followup for neurological/ophthalmological concerns (specifically for OCT nerve/RNFL, Fresnel prisms) and was transferred to [facility] for a possible LHC.    On admission to [facility] coronary CT showed moderate stenosis of the proximal LAD and RCA. Cath initially deferred as patient was pending nephrology clearance for BILLY. TTE largely wnl, bubble study negative. MRI brain with contrast showed chronic multifocal lacunar infarcts and chronic microvascular ischemic changes, corroborating MRI read from [facility]. Patient was evaluated by ophthalmology team for new onset R frontal headache and popping sensation, determined to have components hypertensive and diabetic retinopathy for which outpatient followup and possible trial of Fresnel prisms was recommended. Cluster headaches suspected given pattern of headache (R frontal, several days at a time, lacrimation). Patient was also evaluated by PT/OT who recommended CANDY. Speech pathology eval was notable for cognitive linguistic deficits with concern for early onset dementia given a family hx (mother diagnosed @46yo). After improvement of BILLY, LHC was completed showing nonobstructive CAD without elevated filling pressures. Lateral rectus palsy was noted to improve slightly towards end of admission with ability to abduct to ~10-15 degrees. Low-dose lisinopril was initiated for nephrotic-range proteinuria.  Patient was monitored for resolution of BILLY and is now hemodynamically stable   Patient was evaluated by PM&R and therapy for functional deficits, gait/ADL impairments and acute rehabilitation was recommended. Patient was medically optimized for discharge to [facility] IRU on 10/20/22.    ROS/subjective  seen at bedside   no significant headaches overnightl  using eye patch  moved bowels, voiding  occ dizziness persists  no nausea, no vomiting  no SOB, no chest pain    MEDICATIONS  (STANDING):  amitriptyline 10 milliGRAM(s) Oral at bedtime  amLODIPine   Tablet 10 milliGRAM(s) Oral daily  AQUAPHOR (petrolatum Ointment) 1 Application(s) Topical two times a day  atorvastatin 40 milliGRAM(s) Oral at bedtime  budesonide  80 MICROgram(s)/formoterol 4.5 MICROgram(s) Inhaler 2 Puff(s) Inhalation two times a day  carvedilol 25 milliGRAM(s) Oral every 12 hours  dextrose 5%. 1000 milliLiter(s) (50 mL/Hr) IV Continuous <Continuous>  dextrose 5%. 1000 milliLiter(s) (100 mL/Hr) IV Continuous <Continuous>  dextrose 50% Injectable 25 Gram(s) IV Push once  dextrose 50% Injectable 12.5 Gram(s) IV Push once  dextrose 50% Injectable 25 Gram(s) IV Push once  fenofibrate Tablet 145 milliGRAM(s) Oral daily  glucagon  Injectable 1 milliGRAM(s) IntraMuscular once  heparin   Injectable 5000 Unit(s) SubCutaneous every 8 hours  insulin glargine Injectable (LANTUS) 20 Unit(s) SubCutaneous at bedtime  insulin lispro (ADMELOG) corrective regimen sliding scale   SubCutaneous three times a day before meals  insulin lispro (ADMELOG) corrective regimen sliding scale   SubCutaneous at bedtime  insulin lispro Injectable (ADMELOG) 2 Unit(s) SubCutaneous before breakfast  insulin lispro Injectable (ADMELOG) 2 Unit(s) SubCutaneous before lunch  insulin lispro Injectable (ADMELOG) 2 Unit(s) SubCutaneous before dinner  lisinopril 2.5 milliGRAM(s) Oral daily  pantoprazole    Tablet 40 milliGRAM(s) Oral before breakfast    MEDICATIONS  (PRN):  acetaminophen     Tablet .. 650 milliGRAM(s) Oral every 6 hours PRN Temp greater or equal to 38C (100.4F), Mild Pain (1 - 3)  ALBUTerol    90 MICROgram(s) HFA Inhaler 2 Puff(s) Inhalation every 6 hours PRN Shortness of Breath and/or Wheezing  Biotene Dry Mouth Oral Rinse 15 milliLiter(s) Swish and Spit three times a day PRN Mouth Care  dextrose Oral Gel 15 Gram(s) Oral once PRN Blood Glucose LESS THAN 70 milliGRAM(s)/deciliter  simethicone 80 milliGRAM(s) Chew daily PRN Gas  sodium chloride 0.65% Nasal 1 Spray(s) Both Nostrils every 1 hour PRN Nasal Congestion                            10.6   6.12  )-----------( 201      ( 22 Oct 2022 07:00 )             32.3     10-22    141  |  107  |  53<H>  ----------------------------<  145<H>  4.8   |  29  |  3.07<H>    Ca    8.8      22 Oct 2022 07:00    TPro  6.4  /  Alb  2.9<L>  /  TBili  0.2  /  DBili  x   /  AST  25  /  ALT  24  /  AlkPhos  82  10-22        CAPILLARY BLOOD GLUCOSE      POCT Blood Glucose.: 192 mg/dL (23 Oct 2022 11:16)  POCT Blood Glucose.: 158 mg/dL (23 Oct 2022 07:48)  POCT Blood Glucose.: 174 mg/dL (22 Oct 2022 21:03)  POCT Blood Glucose.: 177 mg/dL (22 Oct 2022 17:42)        Vital Signs Last 24 Hrs  T(C): 36.4 (23 Oct 2022 08:01), Max: 36.9 (22 Oct 2022 21:08)  T(F): 97.5 (23 Oct 2022 08:01), Max: 98.5 (22 Oct 2022 21:08)  HR: 69 (23 Oct 2022 08:01) (69 - 74)  BP: 127/74 (23 Oct 2022 08:01) (126/70 - 150/79)  BP(mean): --  RR: 15 (23 Oct 2022 08:01) (15 - 16)  SpO2: 99% (23 Oct 2022 08:01) (99% - 99%)    Parameters below as of 23 Oct 2022 08:01  Patient On (Oxygen Delivery Method): room air          Constitutional - NAD, Comfortable eye patch on  HEENT - R 6th palsy  Chest - good chest expansion, good respiratory effort, CTAB   Cardio - warm and well perfused, RRR, no murmur  Abdomen -  Soft, NTND  Extremities 2 + edema LES  Neurologic Exam:                    Cognitive -             Orientation: Awake, Alert, AAOx3        Speech - Fluent, Comprehensible, No dysarthria, No aphasia      Cranial Nerves - No facial asymmetry, Tongue midline, Shoulder shrug intact +R lateral gaze palsy     Motor -                      LEFT    UE - ShAB 5/5, EF 5/5, EE 5/5, WE 5/5,  WNL                    RIGHT UE - ShAB 5/5, EF 5/5, EE 5/5, WE 5/5,  WNL                    LEFT    LE - HF 4+/5, KE 5/5, DF 5/5, PF 5/5                    RIGHT LE - HF 4+/5, KE 5/5, DF 5/5, PF 5/5        Sensory - Diminished to LT glove stocking distribution     Reflexes - DTR intact  Psychiatric - Mood stable, Affect WNL      REhab eval in progress

## 2022-10-23 NOTE — CHART NOTE - NSCHARTNOTEFT_GEN_A_CORE
[facility] Plan of Care    REHABILITATION DIAGNOSIS:  Multi CVA          COMORBIDITIES/COMPLICATING CONDITIONS IMPACTING REHABILITATION:  HEALTH ISSUES - PROBLEM Dx:  diabetic neuropathy  right 6th nerve palsy  migraine headaches      PAST MEDICAL & SURGICAL HISTORY:  Diabetes      Asthma      Diverticulitis  surgery 16yrs ago      High cholesterol      Dyslipidemia      Hypertension      H/O vertigo      Diabetic ulcer of right foot      Broken toe  left pinky toe      Vertigo      HTN (hypertension)      Diabetes      History of surgery  colon resection          Based upon consideration of the patient's impairments, functional status, complicating conditions and any other contributing factors and after information garnered from the assessments of all therapy disciplines involved in treating the patient and other pertinent clinicians:    INTERDISCIPLINARY REHABILITATION INTERVENTIONS:    [ X  ] Transfer Training  [ X  ] Bed Mobility  [ X  ] Therapeutic Exercise  [ X ] Balance/Coordination Exercises  [ X ] Locomotion retraining  [ X  ] Stairs  [  X ] Functional Transfer Training  [   ] Bowel/Bladder program  [   ] Pain Management  [   ] Skin/Wound Care  [ x  ] Visual/Perceptual Training  [   ] Therapeutic Recreation Activities  [   ] Neuromuscular Re-education  [ X  ] Activities of Daily Living  [   ] Speech Exercise  [   ] Swallowing Exercises  [   ] Vital Stim  [   ] Dietary Supplements  [   ] Calorie Count  [   ] Cognitive Exercises  [   ] Congnitive/Linguistic Treatment  [   ] Behavior Program  [   ] Neuropsych Therapy  [ X  ] Patient/Family Counseling  [ X ] Family Training  [ X  ] Community Re-entry  [   ] Orthotic Evaluation  [   ] Prosthetic Eval/Training    MEDICAL PROGNOSIS:  good    REHAB POTENTIAL:  good  EXPECTED DAILY THERAPY:         PT:1hr       OT:1hr       ST:1hr       P&O:    EXPECTED INTENSITY OF PROGRAM:  3 hrs / Day    EXPECTED FREQUENCY OF PROGRAM: 5 Days/ Week    ESTIMATED LOS:  [  ] 5-7 Days  [  ] 7-10 Days  [  ] 10- 14 Days  [x  ] 14- 18 Days  [  ] 18- 21 Days    ESTIMATED DISPOSITION:  [  ] Home   [  ] Home with Outpatient Therapies  [  x] Home with Home Therapies  [  ] Assisted Living  [  ] Nursing Home  [  ] Long Term Acute Care    INTERDISCIPLINARY FUNCTIONAL OUTCOMES/GOALS:         Gait/Mobility:6       Transfers:6       ADLs:6       Functional Transfers:       Medication Management:7       Communication:NA       Cognitive:7       Dysphagia:NA       Bladder7       Bowel:7     Functional Independent Measures:   7 = Independent  6 = Modified Independent  5 = Supervision  4 = Minimal Assist/ Contact Guard  3 = Moderate Assistance  2 = Maximum Assistance  1 = Total Assistance  0 = Unable to assess

## 2022-10-24 LAB
ALBUMIN SERPL ELPH-MCNC: 3.1 G/DL — LOW (ref 3.3–5)
ALP SERPL-CCNC: 92 U/L — SIGNIFICANT CHANGE UP (ref 40–120)
ALT FLD-CCNC: 24 U/L — SIGNIFICANT CHANGE UP (ref 10–45)
ANION GAP SERPL CALC-SCNC: 6 MMOL/L — SIGNIFICANT CHANGE UP (ref 5–17)
AST SERPL-CCNC: 26 U/L — SIGNIFICANT CHANGE UP (ref 10–40)
BILIRUB SERPL-MCNC: 0.2 MG/DL — SIGNIFICANT CHANGE UP (ref 0.2–1.2)
BUN SERPL-MCNC: 58 MG/DL — HIGH (ref 7–23)
CALCIUM SERPL-MCNC: 8.9 MG/DL — SIGNIFICANT CHANGE UP (ref 8.4–10.5)
CHLORIDE SERPL-SCNC: 105 MMOL/L — SIGNIFICANT CHANGE UP (ref 96–108)
CO2 SERPL-SCNC: 28 MMOL/L — SIGNIFICANT CHANGE UP (ref 22–31)
CREAT SERPL-MCNC: 3.17 MG/DL — HIGH (ref 0.5–1.3)
CREATININE, RNDM UR: 86.5 MG/DL — SIGNIFICANT CHANGE UP
EGFR: 23 ML/MIN/1.73M2 — LOW
FERRITIN SERPL-MCNC: 375 NG/ML — SIGNIFICANT CHANGE UP (ref 30–400)
FOLATE SERPL-MCNC: 10.2 NG/ML — SIGNIFICANT CHANGE UP
GLUCOSE BLDC GLUCOMTR-MCNC: 158 MG/DL — HIGH (ref 70–99)
GLUCOSE BLDC GLUCOMTR-MCNC: 164 MG/DL — HIGH (ref 70–99)
GLUCOSE BLDC GLUCOMTR-MCNC: 217 MG/DL — HIGH (ref 70–99)
GLUCOSE BLDC GLUCOMTR-MCNC: 285 MG/DL — HIGH (ref 70–99)
GLUCOSE SERPL-MCNC: 182 MG/DL — HIGH (ref 70–99)
HCT VFR BLD CALC: 31.9 % — LOW (ref 39–50)
HGB BLD-MCNC: 11 G/DL — LOW (ref 13–17)
IRON SATN MFR SERPL: 22 % — SIGNIFICANT CHANGE UP (ref 16–55)
IRON SATN MFR SERPL: 78 UG/DL — SIGNIFICANT CHANGE UP (ref 45–165)
MCHC RBC-ENTMCNC: 31.8 PG — SIGNIFICANT CHANGE UP (ref 27–34)
MCHC RBC-ENTMCNC: 34.5 GM/DL — SIGNIFICANT CHANGE UP (ref 32–36)
MCV RBC AUTO: 92.2 FL — SIGNIFICANT CHANGE UP (ref 80–100)
METANEPHS 24H UR-MCNC: 0.4 — SIGNIFICANT CHANGE UP (ref 0–1)
METANEPHS 24H UR-MCNC: 36 UG/L — SIGNIFICANT CHANGE UP
NORMETANEPHRINE.: 226 UG/L — SIGNIFICANT CHANGE UP
NRBC # BLD: 0 /100 WBCS — SIGNIFICANT CHANGE UP (ref 0–0)
PLATELET # BLD AUTO: 202 K/UL — SIGNIFICANT CHANGE UP (ref 150–400)
POTASSIUM SERPL-MCNC: 4.5 MMOL/L — SIGNIFICANT CHANGE UP (ref 3.5–5.3)
POTASSIUM SERPL-SCNC: 4.5 MMOL/L — SIGNIFICANT CHANGE UP (ref 3.5–5.3)
PROT SERPL-MCNC: 6.9 G/DL — SIGNIFICANT CHANGE UP (ref 6–8.3)
RBC # BLD: 3.46 M/UL — LOW (ref 4.2–5.8)
RBC # FLD: 12 % — SIGNIFICANT CHANGE UP (ref 10.3–14.5)
SARS-COV-2 RNA SPEC QL NAA+PROBE: SIGNIFICANT CHANGE UP
SODIUM SERPL-SCNC: 139 MMOL/L — SIGNIFICANT CHANGE UP (ref 135–145)
TIBC SERPL-MCNC: 354 UG/DL — SIGNIFICANT CHANGE UP (ref 220–430)
UIBC SERPL-MCNC: 276 UG/DL — SIGNIFICANT CHANGE UP (ref 110–370)
VIT B12 SERPL-MCNC: 454 PG/ML — SIGNIFICANT CHANGE UP (ref 232–1245)
WBC # BLD: 5.39 K/UL — SIGNIFICANT CHANGE UP (ref 3.8–10.5)
WBC # FLD AUTO: 5.39 K/UL — SIGNIFICANT CHANGE UP (ref 3.8–10.5)

## 2022-10-24 PROCEDURE — 99232 SBSQ HOSP IP/OBS MODERATE 35: CPT | Mod: GC

## 2022-10-24 PROCEDURE — 99232 SBSQ HOSP IP/OBS MODERATE 35: CPT

## 2022-10-24 RX ORDER — HEPARIN SODIUM 5000 [USP'U]/ML
5000 INJECTION INTRAVENOUS; SUBCUTANEOUS EVERY 8 HOURS
Refills: 0 | Status: COMPLETED | OUTPATIENT
Start: 2022-10-24 | End: 2022-10-24

## 2022-10-24 RX ORDER — POLYETHYLENE GLYCOL 3350 17 G/17G
17 POWDER, FOR SOLUTION ORAL ONCE
Refills: 0 | Status: COMPLETED | OUTPATIENT
Start: 2022-10-24 | End: 2022-10-24

## 2022-10-24 RX ORDER — SENNA PLUS 8.6 MG/1
2 TABLET ORAL AT BEDTIME
Refills: 0 | Status: DISCONTINUED | OUTPATIENT
Start: 2022-10-24 | End: 2022-11-03

## 2022-10-24 RX ORDER — INSULIN GLARGINE 100 [IU]/ML
10 INJECTION, SOLUTION SUBCUTANEOUS AT BEDTIME
Refills: 0 | Status: COMPLETED | OUTPATIENT
Start: 2022-10-24 | End: 2022-10-24

## 2022-10-24 RX ADMIN — PANTOPRAZOLE SODIUM 40 MILLIGRAM(S): 20 TABLET, DELAYED RELEASE ORAL at 05:31

## 2022-10-24 RX ADMIN — LISINOPRIL 2.5 MILLIGRAM(S): 2.5 TABLET ORAL at 05:31

## 2022-10-24 RX ADMIN — Medication 2: at 21:40

## 2022-10-24 RX ADMIN — Medication 2: at 11:26

## 2022-10-24 RX ADMIN — Medication 10 MILLIGRAM(S): at 21:38

## 2022-10-24 RX ADMIN — Medication 1 APPLICATION(S): at 16:38

## 2022-10-24 RX ADMIN — Medication 2 UNIT(S): at 11:27

## 2022-10-24 RX ADMIN — ATORVASTATIN CALCIUM 40 MILLIGRAM(S): 80 TABLET, FILM COATED ORAL at 21:39

## 2022-10-24 RX ADMIN — HEPARIN SODIUM 5000 UNIT(S): 5000 INJECTION INTRAVENOUS; SUBCUTANEOUS at 12:20

## 2022-10-24 RX ADMIN — HEPARIN SODIUM 5000 UNIT(S): 5000 INJECTION INTRAVENOUS; SUBCUTANEOUS at 21:38

## 2022-10-24 RX ADMIN — CARVEDILOL PHOSPHATE 25 MILLIGRAM(S): 80 CAPSULE, EXTENDED RELEASE ORAL at 05:30

## 2022-10-24 RX ADMIN — BUDESONIDE AND FORMOTEROL FUMARATE DIHYDRATE 2 PUFF(S): 160; 4.5 AEROSOL RESPIRATORY (INHALATION) at 08:32

## 2022-10-24 RX ADMIN — Medication 2 UNIT(S): at 16:42

## 2022-10-24 RX ADMIN — Medication 4: at 16:42

## 2022-10-24 RX ADMIN — AMLODIPINE BESYLATE 10 MILLIGRAM(S): 2.5 TABLET ORAL at 05:30

## 2022-10-24 RX ADMIN — CARVEDILOL PHOSPHATE 25 MILLIGRAM(S): 80 CAPSULE, EXTENDED RELEASE ORAL at 17:47

## 2022-10-24 RX ADMIN — Medication 1 APPLICATION(S): at 05:30

## 2022-10-24 RX ADMIN — Medication 1 TABLET(S): at 11:25

## 2022-10-24 RX ADMIN — POLYETHYLENE GLYCOL 3350 17 GRAM(S): 17 POWDER, FOR SOLUTION ORAL at 14:28

## 2022-10-24 RX ADMIN — INSULIN GLARGINE 10 UNIT(S): 100 INJECTION, SOLUTION SUBCUTANEOUS at 21:39

## 2022-10-24 RX ADMIN — SENNA PLUS 2 TABLET(S): 8.6 TABLET ORAL at 21:39

## 2022-10-24 RX ADMIN — Medication 145 MILLIGRAM(S): at 11:25

## 2022-10-24 NOTE — PROGRESS NOTE ADULT - SUBJECTIVE AND OBJECTIVE BOX
chief complaint- headaches, double vision, poor balance    This is a 48 YO male with PMHx of CKD IV, vertigo, diverticulitis s/p LAR, cigarette use, ETOH abuse now sober x5 years, IDDM, HTN, AMBER, who presented to [facility] on 10/2 c/o dizziness, b/l diplopia, headache and chest tightness found to have /133 and elevated troponins concerning for NSTEMI. EKG w/ ST-T abnormalities without acute ST segment changes. At OSH BPs stabilized with IV hydralazine, CT head without acute pathology, MRI brain showed chronic L parasagittal infarcts. 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 Patient was recommended outpatient followup for neurological/ophthalmological concerns (specifically for OCT nerve/RNFL, Fresnel prisms) and was transferred to [facility] for a possible LHC.    On admission to [facility] coronary CT showed moderate stenosis of the proximal LAD and RCA. Cath initially deferred as patient was pending nephrology clearance for BILLY. TTE largely wnl, bubble study negative. MRI brain with contrast showed chronic multifocal lacunar infarcts and chronic microvascular ischemic changes, corroborating MRI read from [facility]. Patient was evaluated by ophthalmology team for new onset R frontal headache and popping sensation, determined to have components hypertensive and diabetic retinopathy for which outpatient followup and possible trial of Fresnel prisms was recommended. Cluster headaches suspected given pattern of headache (R frontal, several days at a time, lacrimation). Patient was also evaluated by PT/OT who recommended CANDY. Speech pathology eval was notable for cognitive linguistic deficits with concern for early onset dementia given a family hx (mother diagnosed @46yo). After improvement of BILLY, LHC was completed showing nonobstructive CAD without elevated filling pressures. Lateral rectus palsy was noted to improve slightly towards end of admission with ability to abduct to ~10-15 degrees. Low-dose lisinopril was initiated for nephrotic-range proteinuria.  Patient was monitored for resolution of BILLY and is now hemodynamically stable   Patient was evaluated by PM&R and therapy for functional deficits, gait/ADL impairments and acute rehabilitation was recommended. Patient was medically optimized for discharge to [facility] IRU on 10/20/22.    ROS/subjective  seen at bedside   no significant headaches   last BM 2 days ago- wants meds  renal biopsy cancelled for today- scheduled for 930 AM 10/25  using eye patch  voiding well  occ dizziness persists  no nausea, no vomiting  no SOB, no chest pain    MEDICATIONS  (STANDING):  amitriptyline 10 milliGRAM(s) Oral at bedtime  amLODIPine   Tablet 10 milliGRAM(s) Oral daily  AQUAPHOR (petrolatum Ointment) 1 Application(s) Topical two times a day  atorvastatin 40 milliGRAM(s) Oral at bedtime  budesonide  80 MICROgram(s)/formoterol 4.5 MICROgram(s) Inhaler 2 Puff(s) Inhalation two times a day  carvedilol 25 milliGRAM(s) Oral every 12 hours  dextrose 5%. 1000 milliLiter(s) (50 mL/Hr) IV Continuous <Continuous>  dextrose 5%. 1000 milliLiter(s) (100 mL/Hr) IV Continuous <Continuous>  dextrose 50% Injectable 25 Gram(s) IV Push once  dextrose 50% Injectable 12.5 Gram(s) IV Push once  dextrose 50% Injectable 25 Gram(s) IV Push once  fenofibrate Tablet 145 milliGRAM(s) Oral daily  glucagon  Injectable 1 milliGRAM(s) IntraMuscular once  heparin   Injectable 5000 Unit(s) SubCutaneous every 8 hours  insulin glargine Injectable (LANTUS) 20 Unit(s) SubCutaneous at bedtime  insulin lispro (ADMELOG) corrective regimen sliding scale   SubCutaneous three times a day before meals  insulin lispro (ADMELOG) corrective regimen sliding scale   SubCutaneous at bedtime  insulin lispro Injectable (ADMELOG) 2 Unit(s) SubCutaneous before breakfast  insulin lispro Injectable (ADMELOG) 2 Unit(s) SubCutaneous before lunch  insulin lispro Injectable (ADMELOG) 2 Unit(s) SubCutaneous before dinner  lisinopril 2.5 milliGRAM(s) Oral daily  multivitamin 1 Tablet(s) Oral daily  pantoprazole    Tablet 40 milliGRAM(s) Oral before breakfast    MEDICATIONS  (PRN):  acetaminophen     Tablet .. 650 milliGRAM(s) Oral every 6 hours PRN Temp greater or equal to 38C (100.4F), Mild Pain (1 - 3)  ALBUTerol    90 MICROgram(s) HFA Inhaler 2 Puff(s) Inhalation every 6 hours PRN Shortness of Breath and/or Wheezing  Biotene Dry Mouth Oral Rinse 15 milliLiter(s) Swish and Spit three times a day PRN Mouth Care  dextrose Oral Gel 15 Gram(s) Oral once PRN Blood Glucose LESS THAN 70 milliGRAM(s)/deciliter  simethicone 80 milliGRAM(s) Chew daily PRN Gas  sodium chloride 0.65% Nasal 1 Spray(s) Both Nostrils every 1 hour PRN Nasal Congestion                            11.0   5.39  )-----------( 202      ( 24 Oct 2022 06:25 )             31.9     10-24    139  |  105  |  58<H>  ----------------------------<  182<H>  4.5   |  28  |  3.17<H>    Ca    8.9      24 Oct 2022 06:25    TPro  6.9  /  Alb  3.1<L>  /  TBili  0.2  /  DBili  x   /  AST  26  /  ALT  24  /  AlkPhos  92  10-24        CAPILLARY BLOOD GLUCOSE      POCT Blood Glucose.: 158 mg/dL (24 Oct 2022 11:23)  POCT Blood Glucose.: 164 mg/dL (24 Oct 2022 08:02)  POCT Blood Glucose.: 212 mg/dL (23 Oct 2022 22:08)  POCT Blood Glucose.: 127 mg/dL (23 Oct 2022 16:48)      Vital Signs Last 24 Hrs  T(C): 36.2 (24 Oct 2022 08:19), Max: 36.6 (24 Oct 2022 05:33)  T(F): 97.2 (24 Oct 2022 08:19), Max: 97.8 (24 Oct 2022 05:33)  HR: 70 (24 Oct 2022 08:55) (70 - 82)  BP: 126/79 (24 Oct 2022 08:19) (126/79 - 145/70)  BP(mean): --  RR: 15 (24 Oct 2022 08:19) (15 - 18)  SpO2: 98% (24 Oct 2022 08:55) (98% - 99%)    Parameters below as of 24 Oct 2022 08:55  Patient On (Oxygen Delivery Method): room air      Constitutional - NAD, Comfortable eye patch on  HEENT - R 6th palsy persists  Chest - good chest expansion, good respiratory effort, CTAB   Cardio - warm and well perfused, RRR, no murmur  Abdomen -  Soft, NTND  Extremities 2 + edema LES  Neurologic Exam:                    Cognitive -             Orientation: Awake, Alert, AAOx3        Speech - Fluent, Comprehensible, No dysarthria, No aphasia      Cranial Nerves - No facial asymmetry, Tongue midline, Shoulder shrug intact +R lateral gaze palsy     Motor -                      LEFT    UE - ShAB 5/5, EF 5/5, EE 5/5, WE 5/5,  WNL                    RIGHT UE - ShAB 5/5, EF 5/5, EE 5/5, WE 5/5,  WNL                    LEFT    LE - HF 4+/5, KE 5/5, DF 5/5, PF 5/5                    RIGHT LE - HF 4+/5, KE 5/5, DF 5/5, PF 5/5        Sensory - Diminished to LT glove stocking distribution     Reflexes - DTR intact  Psychiatric - Mood stable, Affect WNL      REhab eval in progress

## 2022-10-24 NOTE — PROGRESS NOTE ADULT - ASSESSMENT
This is a 48 YO male with PMHx of CKD IV, vertigo, diverticulitis s/p LAR, cigarette use, ETOH abuse now sober x5 years, IDDM, HTN, AMBER, who presented to [facility] on 10/2 c/o dizziness, b/l diplopia, headache and chest tightness found to have /133 and elevated troponin concerning for NSTEMI. CTH negative for acute pathology. MRI Brain showed L parasagittal infarcts. Per, neurology and opthalmology CN VI palsy. Patient  was transferred to [facility] for a possible LHC. LHC showed nonobstructive CAD without elevated filling pressures. Hospital course significant for BILLY, right frontal headache and popping sensation, determined to have components hypertensive and diabetic retinopathy for which outpatient followup and possible trial of Fresnel prisms was recommended. Patient now with gait Instability, ADL impairments and Functional impairments.    # Functional Deficits s/p CVA  - MRI with L parasagittal infarcts, multiple chronic lacunar infarcts  - Continue statin. ASA on hold for renal bx   -  Comprehensive Rehab Program: PT/OT/ST, 3hours daily and 5 days weekly  - PT: Focused on improving strength, endurance, coordination, balance, functional mobility, and transfers  - OT: Focused on improving strength, fine motor skills, coordination, posture and ADLs.    - ST: to diagnose and treat deficits in swallowing, cognition and communication.     #NTEMI #CAD  - LHC showed nonobstructive CAD without elevated filling pressures  - Continue statin, fenofibrate    #BILLY  -Monitor BUN/Cr  -Avoid nephrotoxic agents, renally dose meds  -Continue lisinopril, torsemide  -Workup ongoing, renal consult routinely  -Patient scheduled for OP renal biopsy now in AM 10/25    #HTN  - Lisinopril 2.5mg daily  - Carvedilol 25mg BID    #HLD  - Lipitor 40mg daily  - Fenofibrate 145mg daily    #Diplopia  - OP f/u with neuro-opthalmology  - consistent with lateral rectus palsy  - outpatient f/u with retina specialist Dr. Jose Angel Arvizu for OCT nerve/RNFL for evaluation for diabetic and hypertensive retinopathy; if diplopia/palsy persists should continue outpatient followup for Fresnel prisms  Alternate patch eyes during the day    #Cluster Headache  - Amitriptyline 10mg QHS    #AMBER  - c/w inhalers: Albuterol, Symbicort  - c/w nasal spray    #DM II  - ISS and FS  - Admelog and Lantus  - A1c 9.9 on 10/5    #Pain management  - Tylenol PRN    #DVT ppx  - Heparin    #GI ppx  - Protonix 40mg    #Bowel Regimen  - Senna    #Bladder management  - BS on admission, and q 8 hours (SC if > 400)  - Monitor UO    #FEN   - Diet: DASH    #Onychomycosis  -Consider podiatry consult    #Skin:  - Skin on admission: Aquaphor to dry skin BID- latrice both feet  - Pressure injury/Skin: Turn Q2hrs while in bed, OOB to Chair, PT/OT     #Dysphagia    - SLP: evaluation and treatment    #Mood/Cognition:  - Neuropsychology consult PRN    #Sleep:   - Maintain quiet hours and low stim environment.  - Melatonin PRN to maximize participation in therapy during the day.     #Precaution  - Fall, Aspiration, cardiac

## 2022-10-24 NOTE — PROGRESS NOTE ADULT - ASSESSMENT
48 YO male with PMHx of CKD IV, vertigo, diverticulitis s/p LAR, cigarette use, ETOH abuse now sober x5 years, IDDM, HTN, AMBER, who presented to [facility] on 10/2 c/o dizziness, b/l diplopia, headache and chest tightness found to have /133 and elevated troponin concerning for NSTEMI. CTH negative for acute pathology. MRI Brain showed L parasagittal infarcts. Per, neurology and opthalmology CN VI palsy. Patient  was transferred to [facility] for a possible LHC. LHC showed nonobstructive CAD without elevated filling pressures. Hospital course significant for BILLY, right frontal headache and popping sensation, determined to have components hypertensive and diabetic retinopathy for which outpatient followup and possible trial of Fresnel prisms was recommended.    Admitted to [facility] on 10/20/2022.    # Debility  # Hx of chronic Lacunar infarts/microvascular changes  # lateral rectus palsy  - MRI with L parasagittal infarcts, multiple chronic lacunar infarcts  - PT/OT/ST per Rehab  - restart ASA when appropriate- on hold for renal bx  - control BP    # NSTEMI  # CAD  # HLD  - Cath: 10/13 w/ non obstructive CAD  - restart aspirin after biopsy   - cont atorvastatin and fenofibrate    # HTN, presented w/ hypertensive emergency  - Renal initiated secondary work up at OSH: dania not elevated, f/u plasma renin activity, metanephrines  - cont lisinopril and coreg    # DM2, insulin dependent  - [id number] 9.9% (10/5)  - pt currently on 20u glargine qhs, 2u admelog w/ meals, moderate ISS  - recommend diabetic educator consult    # CKD st. IV likely due to diabetic nephropathy  # Nephrotic range proteinuria  baseline Cr 2.4  - OP IR biopsy today  - per renal at [facility], now on torsemide and low dose ace-i    # normocytic anemia  likely due to anemia of chronic disease.  - f/u b12, folate, and iron studies, ordered for Monday.    #Diplopia, thought to be secondary to R. CN6 lateral rectus palsy  - Pt evaluated by Ophtho at OSH. Found to have moderate non-proliferative diabetic retinopathy and Hypertensive retinopathy   - OP Neuro and ophthalmology    # Depression  - Amitriptyline 10mg QHS    # tobacco abuse disorder  Patient has reduced smoking from 1.5 ppd to 1 pack every 2 weeks  The patient is an active smoker. The patient was advised to quit. Declined nicotine patches/gums.     dvt ppx: heparin, currently on hold for bx

## 2022-10-25 ENCOUNTER — RESULT REVIEW (OUTPATIENT)
Age: 47
End: 2022-10-25

## 2022-10-25 ENCOUNTER — APPOINTMENT (OUTPATIENT)
Dept: ULTRASOUND IMAGING | Facility: HOSPITAL | Age: 47
End: 2022-10-25

## 2022-10-25 ENCOUNTER — OUTPATIENT (OUTPATIENT)
Dept: OUTPATIENT SERVICES | Facility: HOSPITAL | Age: 47
LOS: 1 days | End: 2022-10-25
Payer: MEDICARE

## 2022-10-25 DIAGNOSIS — Z00.00 ENCOUNTER FOR GENERAL ADULT MEDICAL EXAMINATION WITHOUT ABNORMAL FINDINGS: ICD-10-CM

## 2022-10-25 DIAGNOSIS — Z98.890 OTHER SPECIFIED POSTPROCEDURAL STATES: Chronic | ICD-10-CM

## 2022-10-25 LAB
GLUCOSE BLDC GLUCOMTR-MCNC: 189 MG/DL — HIGH (ref 70–99)
GLUCOSE BLDC GLUCOMTR-MCNC: 193 MG/DL — HIGH (ref 70–99)
GLUCOSE BLDC GLUCOMTR-MCNC: 287 MG/DL — HIGH (ref 70–99)

## 2022-10-25 PROCEDURE — 88350 IMFLUOR EA ADDL 1ANTB STN PX: CPT

## 2022-10-25 PROCEDURE — 50200 RENAL BIOPSY PERQ: CPT | Mod: LT

## 2022-10-25 PROCEDURE — 88313 SPECIAL STAINS GROUP 2: CPT | Mod: 26

## 2022-10-25 PROCEDURE — 76942 ECHO GUIDE FOR BIOPSY: CPT | Mod: 26

## 2022-10-25 PROCEDURE — 99232 SBSQ HOSP IP/OBS MODERATE 35: CPT | Mod: GC

## 2022-10-25 PROCEDURE — 88346 IMFLUOR 1ST 1ANTB STAIN PX: CPT

## 2022-10-25 PROCEDURE — 99232 SBSQ HOSP IP/OBS MODERATE 35: CPT

## 2022-10-25 PROCEDURE — 88350 IMFLUOR EA ADDL 1ANTB STN PX: CPT | Mod: 26

## 2022-10-25 PROCEDURE — 88313 SPECIAL STAINS GROUP 2: CPT

## 2022-10-25 PROCEDURE — 88305 TISSUE EXAM BY PATHOLOGIST: CPT | Mod: 26

## 2022-10-25 PROCEDURE — 88346 IMFLUOR 1ST 1ANTB STAIN PX: CPT | Mod: 26

## 2022-10-25 PROCEDURE — 88305 TISSUE EXAM BY PATHOLOGIST: CPT

## 2022-10-25 PROCEDURE — 50200 RENAL BIOPSY PERQ: CPT

## 2022-10-25 PROCEDURE — 88348 ELECTRON MICROSCOPY DX: CPT | Mod: 26

## 2022-10-25 PROCEDURE — 76942 ECHO GUIDE FOR BIOPSY: CPT

## 2022-10-25 PROCEDURE — 88348 ELECTRON MICROSCOPY DX: CPT

## 2022-10-25 RX ORDER — HEPARIN SODIUM 5000 [USP'U]/ML
5000 INJECTION INTRAVENOUS; SUBCUTANEOUS EVERY 8 HOURS
Refills: 0 | Status: DISCONTINUED | OUTPATIENT
Start: 2022-10-25 | End: 2022-11-03

## 2022-10-25 RX ADMIN — Medication 2 UNIT(S): at 17:24

## 2022-10-25 RX ADMIN — INSULIN GLARGINE 20 UNIT(S): 100 INJECTION, SOLUTION SUBCUTANEOUS at 21:55

## 2022-10-25 RX ADMIN — PANTOPRAZOLE SODIUM 40 MILLIGRAM(S): 20 TABLET, DELAYED RELEASE ORAL at 05:11

## 2022-10-25 RX ADMIN — Medication 1 APPLICATION(S): at 05:13

## 2022-10-25 RX ADMIN — SIMETHICONE 80 MILLIGRAM(S): 80 TABLET, CHEWABLE ORAL at 05:12

## 2022-10-25 RX ADMIN — AMLODIPINE BESYLATE 10 MILLIGRAM(S): 2.5 TABLET ORAL at 05:11

## 2022-10-25 RX ADMIN — LISINOPRIL 2.5 MILLIGRAM(S): 2.5 TABLET ORAL at 05:11

## 2022-10-25 RX ADMIN — SENNA PLUS 2 TABLET(S): 8.6 TABLET ORAL at 21:50

## 2022-10-25 RX ADMIN — BUDESONIDE AND FORMOTEROL FUMARATE DIHYDRATE 2 PUFF(S): 160; 4.5 AEROSOL RESPIRATORY (INHALATION) at 08:23

## 2022-10-25 RX ADMIN — Medication 6: at 17:23

## 2022-10-25 RX ADMIN — Medication 1 APPLICATION(S): at 17:25

## 2022-10-25 RX ADMIN — Medication 10 MILLIGRAM(S): at 21:50

## 2022-10-25 RX ADMIN — ATORVASTATIN CALCIUM 40 MILLIGRAM(S): 80 TABLET, FILM COATED ORAL at 21:50

## 2022-10-25 RX ADMIN — CARVEDILOL PHOSPHATE 25 MILLIGRAM(S): 80 CAPSULE, EXTENDED RELEASE ORAL at 05:11

## 2022-10-25 RX ADMIN — CARVEDILOL PHOSPHATE 25 MILLIGRAM(S): 80 CAPSULE, EXTENDED RELEASE ORAL at 17:25

## 2022-10-25 NOTE — PROGRESS NOTE ADULT - ASSESSMENT
This is a 48 YO male with PMHx of CKD IV, vertigo, diverticulitis s/p LAR, cigarette use, ETOH abuse now sober x5 years, IDDM, HTN, AMBER, who presented to [facility] on 10/2 c/o dizziness, b/l diplopia, headache and chest tightness found to have /133 and elevated troponin concerning for NSTEMI. CTH negative for acute pathology. MRI Brain showed L parasagittal infarcts. Per, neurology and opthalmology CN VI palsy. Patient  was transferred to [facility] for a possible LHC. LHC showed nonobstructive CAD without elevated filling pressures. Hospital course significant for BILLY, right frontal headache and popping sensation, determined to have components hypertensive and diabetic retinopathy for which outpatient followup and possible trial of Fresnel prisms was recommended. Patient now with gait Instability, ADL impairments and Functional impairments.    # Functional Deficits s/p CVA  - MRI with L parasagittal infarcts, multiple chronic lacunar infarcts  - Continue statin. ASA on hold for renal bx   -  Comprehensive Rehab Program: PT/OT/ST, 3hours daily and 5 days weekly  - PT: Focused on improving strength, endurance, coordination, balance, functional mobility, and transfers  - OT: Focused on improving strength, fine motor skills, coordination, posture and ADLs.    - ST: to diagnose and treat deficits in swallowing, cognition and communication.     #NTEMI #CAD  - LHC showed nonobstructive CAD without elevated filling pressures  - Continue statin, fenofibrate    #BILLY  -Monitor BUN/Cr  -Avoid nephrotoxic agents, renally dose meds  -Continue lisinopril, torsemide  -Workup ongoing, renal consult routinely  - renal biopsy done- await results    #HTN  - Lisinopril 2.5mg daily  - Carvedilol 25mg BID    #HLD  - Lipitor 40mg daily  - Fenofibrate 145mg daily    #Diplopia  - OP f/u with neuro-opthalmology  - consistent with lateral rectus palsy  - outpatient f/u with retina specialist Dr. Jose Angel Arvizu for OCT nerve/RNFL for evaluation for diabetic and hypertensive retinopathy; if diplopia/palsy persists should continue outpatient followup for Fresnel prisms  Alternate patch eyes during the day    #Cluster Headache  - Amitriptyline 10mg QHS    #AMBER  - c/w inhalers: Albuterol, Symbicort  - c/w nasal spray    #DM II  - ISS and FS  - Admelog and Lantus  - A1c 9.9 on 10/5  Diabetic nursing for training    #Pain management  - Tylenol PRN    #DVT ppx  - Heparin    #GI ppx  - Protonix 40mg    #Bowel Regimen  - Senna  moved bowels today    #Bladder management  - BS on admission, and q 8 hours (SC if > 400)  - Monitor UO  PVRs low    #FEN   - Diet: DASH    #Onychomycosis  -Consider podiatry consult    #Skin:  - Skin on admission: Aquaphor to dry skin BID- latrice both feet  - Pressure injury/Skin: Turn Q2hrs while in bed, OOB to Chair, PT/OT     #Dysphagia    - SLP: evaluation and treatment    #Mood/Cognition:  - Neuropsychology consult PRN    #Sleep:   - Maintain quiet hours and low stim environment.  - Melatonin PRN to maximize participation in therapy during the day.     #Precaution  - Fall, Aspiration, cardiac

## 2022-10-25 NOTE — PROGRESS NOTE ADULT - SUBJECTIVE AND OBJECTIVE BOX
chief complaint- headaches, double vision, poor balance    This is a 46 YO male with PMHx of CKD IV, vertigo, diverticulitis s/p LAR, cigarette use, ETOH abuse now sober x5 years, IDDM, HTN, AMBER, who presented to [facility] on 10/2 c/o dizziness, b/l diplopia, headache and chest tightness found to have /133 and elevated troponins concerning for NSTEMI. EKG w/ ST-T abnormalities without acute ST segment changes. At OSH BPs stabilized with IV hydralazine, CT head without acute pathology, MRI brain showed chronic L parasagittal infarcts. 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 Patient was recommended outpatient followup for neurological/ophthalmological concerns (specifically for OCT nerve/RNFL, Fresnel prisms) and was transferred to [facility] for a possible LHC.    On admission to [facility] coronary CT showed moderate stenosis of the proximal LAD and RCA. Cath initially deferred as patient was pending nephrology clearance for BILLY. TTE largely wnl, bubble study negative. MRI brain with contrast showed chronic multifocal lacunar infarcts and chronic microvascular ischemic changes, corroborating MRI read from [facility]. Patient was evaluated by ophthalmology team for new onset R frontal headache and popping sensation, determined to have components hypertensive and diabetic retinopathy for which outpatient followup and possible trial of Fresnel prisms was recommended. Cluster headaches suspected given pattern of headache (R frontal, several days at a time, lacrimation). Patient was also evaluated by PT/OT who recommended CANDY. Speech pathology eval was notable for cognitive linguistic deficits with concern for early onset dementia given a family hx (mother diagnosed @46yo). After improvement of BILLY, LHC was completed showing nonobstructive CAD without elevated filling pressures. Lateral rectus palsy was noted to improve slightly towards end of admission with ability to abduct to ~10-15 degrees. Low-dose lisinopril was initiated for nephrotic-range proteinuria.  Patient was monitored for resolution of BILLY and is now hemodynamically stable   Patient was evaluated by PM&R and therapy for functional deficits, gait/ADL impairments and acute rehabilitation was recommended. Patient was medically optimized for discharge to [facility]U on 10/20/22.    ROS/subjective  seen at bedside   returned from renal biopsy- went well  mild pain over biopsy site  moved bowels this AM  using eye patch  voiding well  occ dizziness persists  no nausea, no vomiting  no SOB, no chest pain            MEDICATIONS  (STANDING):  amitriptyline 10 milliGRAM(s) Oral at bedtime  amLODIPine   Tablet 10 milliGRAM(s) Oral daily  AQUAPHOR (petrolatum Ointment) 1 Application(s) Topical two times a day  atorvastatin 40 milliGRAM(s) Oral at bedtime  budesonide  80 MICROgram(s)/formoterol 4.5 MICROgram(s) Inhaler 2 Puff(s) Inhalation two times a day  carvedilol 25 milliGRAM(s) Oral every 12 hours  dextrose 5%. 1000 milliLiter(s) (50 mL/Hr) IV Continuous <Continuous>  dextrose 5%. 1000 milliLiter(s) (100 mL/Hr) IV Continuous <Continuous>  dextrose 50% Injectable 25 Gram(s) IV Push once  dextrose 50% Injectable 12.5 Gram(s) IV Push once  dextrose 50% Injectable 25 Gram(s) IV Push once  fenofibrate Tablet 145 milliGRAM(s) Oral daily  glucagon  Injectable 1 milliGRAM(s) IntraMuscular once  heparin   Injectable 5000 Unit(s) SubCutaneous every 8 hours  insulin glargine Injectable (LANTUS) 20 Unit(s) SubCutaneous at bedtime  insulin lispro (ADMELOG) corrective regimen sliding scale   SubCutaneous three times a day before meals  insulin lispro (ADMELOG) corrective regimen sliding scale   SubCutaneous at bedtime  insulin lispro Injectable (ADMELOG) 2 Unit(s) SubCutaneous before breakfast  insulin lispro Injectable (ADMELOG) 2 Unit(s) SubCutaneous before lunch  insulin lispro Injectable (ADMELOG) 2 Unit(s) SubCutaneous before dinner  lisinopril 2.5 milliGRAM(s) Oral daily  multivitamin 1 Tablet(s) Oral daily  pantoprazole    Tablet 40 milliGRAM(s) Oral before breakfast  senna 2 Tablet(s) Oral at bedtime    MEDICATIONS  (PRN):  acetaminophen     Tablet .. 650 milliGRAM(s) Oral every 6 hours PRN Temp greater or equal to 38C (100.4F), Mild Pain (1 - 3)  ALBUTerol    90 MICROgram(s) HFA Inhaler 2 Puff(s) Inhalation every 6 hours PRN Shortness of Breath and/or Wheezing  Biotene Dry Mouth Oral Rinse 15 milliLiter(s) Swish and Spit three times a day PRN Mouth Care  dextrose Oral Gel 15 Gram(s) Oral once PRN Blood Glucose LESS THAN 70 milliGRAM(s)/deciliter  simethicone 80 milliGRAM(s) Chew daily PRN Gas  sodium chloride 0.65% Nasal 1 Spray(s) Both Nostrils every 1 hour PRN Nasal Congestion                            11.0   5.39  )-----------( 202      ( 24 Oct 2022 06:25 )             31.9     10-24    139  |  105  |  58<H>  ----------------------------<  182<H>  4.5   |  28  |  3.17<H>    Ca    8.9      24 Oct 2022 06:25    TPro  6.9  /  Alb  3.1<L>  /  TBili  0.2  /  DBili  x   /  AST  26  /  ALT  24  /  AlkPhos  92  10-24        CAPILLARY BLOOD GLUCOSE      POCT Blood Glucose.: 287 mg/dL (25 Oct 2022 17:11)  POCT Blood Glucose.: 193 mg/dL (25 Oct 2022 07:45)  POCT Blood Glucose.: 285 mg/dL (24 Oct 2022 21:36)      Vital Signs Last 24 Hrs  T(C): 36.5 (25 Oct 2022 07:30), Max: 36.8 (24 Oct 2022 21:32)  T(F): 97.7 (25 Oct 2022 07:30), Max: 98.3 (24 Oct 2022 21:32)  HR: 81 (25 Oct 2022 17:26) (68 - 81)  BP: 131/71 (25 Oct 2022 17:26) (117/72 - 147/82)  BP(mean): --  RR: 15 (25 Oct 2022 17:26) (15 - 16)  SpO2: 99% (25 Oct 2022 17:26) (96% - 100%)    Parameters below as of 25 Oct 2022 17:26  Patient On (Oxygen Delivery Method): room air          Constitutional - NAD, Comfortable eye patch on  HEENT - R 6th palsy persists  Chest - good chest expansion, good respiratory effort, CTAB   Cardio - warm and well perfused, RRR, no murmur  Abdomen -  Soft, NTND  Extremities 2 + edema LES  Neurologic Exam:                    Cognitive -             Orientation: Awake, Alert, AAOx3        Speech - Fluent, Comprehensible, No dysarthria, No aphasia      Cranial Nerves - No facial asymmetry, Tongue midline, Shoulder shrug intact +R lateral gaze palsy     Motor -                      LEFT    UE - ShAB 5/5, EF 5/5, EE 5/5, WE 5/5,  WNL                    RIGHT UE - ShAB 5/5, EF 5/5, EE 5/5, WE 5/5,  WNL                    LEFT    LE - HF 4+/5, KE 5/5, DF 5/5, PF 5/5                    RIGHT LE - HF 4+/5, KE 5/5, DF 5/5, PF 5/5        Sensory - Diminished to LT glove stocking distribution     Reflexes - DTR intact  Psychiatric - Mood stable, Affect WNL      IDT Meeting 10/25/22:  EDOD: 11/3    Barriers: visual acuity, proprioception and cog deficits, fall risk    SW:   Lives in basement apt with 5 SCOTTIE with 1 HR, and 6 steps down  Brother is emergency contact, unclear if able to assist as cares for father    OT:  CG to SV for all ADLs and transfers  Goals are mod I for all except SV for bathing and tub/shower transfers    PT:  Transfers with CG  Ambulating 100ft with RW and SV    SLP:  Reg/thins  Mild expressive and receptive aphasia, language and word finding deficits  Ataxic dysarthria  History of poor medical management  Safety concerns.

## 2022-10-25 NOTE — PROGRESS NOTE ADULT - SUBJECTIVE AND OBJECTIVE BOX
Patient is a 47y old  Male who presents with a chief complaint of Multiple CVA (24 Oct 2022 12:06)    Patient seen and examined at bedside. No acute overnight events. Frustrated by cancelled test yesterday. Denies pain/discomfort.     ALLERGIES:  No Known Allergies    MEDICATIONS  (STANDING):  amitriptyline 10 milliGRAM(s) Oral at bedtime  amLODIPine   Tablet 10 milliGRAM(s) Oral daily  AQUAPHOR (petrolatum Ointment) 1 Application(s) Topical two times a day  atorvastatin 40 milliGRAM(s) Oral at bedtime  budesonide  80 MICROgram(s)/formoterol 4.5 MICROgram(s) Inhaler 2 Puff(s) Inhalation two times a day  carvedilol 25 milliGRAM(s) Oral every 12 hours  dextrose 5%. 1000 milliLiter(s) (50 mL/Hr) IV Continuous <Continuous>  dextrose 5%. 1000 milliLiter(s) (100 mL/Hr) IV Continuous <Continuous>  dextrose 50% Injectable 25 Gram(s) IV Push once  dextrose 50% Injectable 12.5 Gram(s) IV Push once  dextrose 50% Injectable 25 Gram(s) IV Push once  fenofibrate Tablet 145 milliGRAM(s) Oral daily  glucagon  Injectable 1 milliGRAM(s) IntraMuscular once  insulin glargine Injectable (LANTUS) 20 Unit(s) SubCutaneous at bedtime  insulin lispro (ADMELOG) corrective regimen sliding scale   SubCutaneous three times a day before meals  insulin lispro (ADMELOG) corrective regimen sliding scale   SubCutaneous at bedtime  insulin lispro Injectable (ADMELOG) 2 Unit(s) SubCutaneous before breakfast  insulin lispro Injectable (ADMELOG) 2 Unit(s) SubCutaneous before lunch  insulin lispro Injectable (ADMELOG) 2 Unit(s) SubCutaneous before dinner  lisinopril 2.5 milliGRAM(s) Oral daily  multivitamin 1 Tablet(s) Oral daily  pantoprazole    Tablet 40 milliGRAM(s) Oral before breakfast  senna 2 Tablet(s) Oral at bedtime    MEDICATIONS  (PRN):  acetaminophen     Tablet .. 650 milliGRAM(s) Oral every 6 hours PRN Temp greater or equal to 38C (100.4F), Mild Pain (1 - 3)  ALBUTerol    90 MICROgram(s) HFA Inhaler 2 Puff(s) Inhalation every 6 hours PRN Shortness of Breath and/or Wheezing  Biotene Dry Mouth Oral Rinse 15 milliLiter(s) Swish and Spit three times a day PRN Mouth Care  dextrose Oral Gel 15 Gram(s) Oral once PRN Blood Glucose LESS THAN 70 milliGRAM(s)/deciliter  simethicone 80 milliGRAM(s) Chew daily PRN Gas  sodium chloride 0.65% Nasal 1 Spray(s) Both Nostrils every 1 hour PRN Nasal Congestion    Vital Signs Last 24 Hrs  T(F): 97.7 (25 Oct 2022 07:07), Max: 98.3 (24 Oct 2022 21:32)  HR: 69 (25 Oct 2022 08:23) (69 - 71)  BP: 147/76 (25 Oct 2022 07:07) (117/72 - 147/76)  RR: 16 (24 Oct 2022 21:32) (16 - 16)  SpO2: 99% (25 Oct 2022 08:23) (96% - 100%)  I&O's Summary    BMI (kg/m2): 28.2 (10-25-22 @ 07:07)    PHYSICAL EXAM:  General: NAD, A/O x 3  ENT: MMM, no tonsilar exudate +left eye patch  Neck: Supple, No JVD  Lungs: Clear to auscultation bilaterally, no wheezes. Good air entry bilaterally   Cardio: RRR, S1/S2, No murmurs  Abdomen: Soft, Nontender, Nondistended; Bowel sounds present  Extremities: No calf tenderness, No pitting edema    LABS:                        11.0   5.39  )-----------( 202      ( 24 Oct 2022 06:25 )             31.9       10-24    139  |  105  |  58  ----------------------------<  182  4.5   |  28  |  3.17    Ca    8.9      24 Oct 2022 06:25    TPro  6.9  /  Alb  3.1  /  TBili  0.2  /  DBili  x   /  AST  26  /  ALT  24  /  AlkPhos  92  10-24     POCT Blood Glucose.: 193 mg/dL (25 Oct 2022 07:45)  POCT Blood Glucose.: 285 mg/dL (24 Oct 2022 21:36)  POCT Blood Glucose.: 217 mg/dL (24 Oct 2022 16:36)  POCT Blood Glucose.: 158 mg/dL (24 Oct 2022 11:23)    COVID-19 PCR: NotDetec (10-24-22 @ 14:05)  COVID-19 PCR: NotDetec (10-23-22 @ 06:20)  COVID-19 PCR: NotDetec (10-20-22 @ 14:41)  COVID-19 PCR: NotDetec (10-17-22 @ 07:40)  COVID-19 PCR: NotDetec (10-13-22 @ 15:08)    RADIOLOGY & ADDITIONAL TESTS:     Care Discussed with Consultants/Other Providers:

## 2022-10-25 NOTE — CHART NOTE - NSCHARTNOTEFT_GEN_A_CORE
IDT Meeting 10/25/22:  EDOD: 11/3    Barriers: visual acuity, proprioception and cog deficits, fall risk    SW:   Lives in basement apt with 5 SCOTTIE with 1 HR, and 6 steps down  Brother is emergency contact, unclear if able to assist as cares for father    OT:  CG to SV for all ADLs and transfers  Goals are mod I for all except SV for bathing and tub/shower transfers    PT:  Transfers with CG  Ambulating 100ft with RW and SV    SLP:  Reg/thins  Mild expressive and receptive aphasia, language and word finding deficits  Ataxic dysarthria  History of poor medical management  Safety concerns

## 2022-10-25 NOTE — PROGRESS NOTE ADULT - ASSESSMENT
48 YO male with PMHx of CKD IV, vertigo, diverticulitis s/p LAR, cigarette use, ETOH abuse now sober x5 years, IDDM, HTN, AMBER, who presented to [facility] on 10/2 c/o dizziness, b/l diplopia, headache and chest tightness found to have /133 and elevated troponin concerning for NSTEMI. CTH negative for acute pathology. MRI Brain showed L parasagittal infarcts. Per, neurology and opthalmology CN VI palsy. Patient  was transferred to [facility] for a possible LHC. LHC showed nonobstructive CAD without elevated filling pressures. Hospital course significant for BILLY, right frontal headache and popping sensation, determined to have components hypertensive and diabetic retinopathy for which outpatient followup and possible trial of Fresnel prisms was recommended.    Admitted to [facility] on 10/20/2022.    # Debility  # Hx of chronic Lacunar infarts/microvascular changes  # lateral rectus palsy  - MRI with L parasagittal infarcts, multiple chronic lacunar infarcts  - PT/OT/ST per Rehab  - restart ASA when appropriate- on hold for renal bx  - control BP    # NSTEMI  # CAD  # HLD  - Cath: 10/13 w/ non obstructive CAD  - restart aspirin after biopsy   - cont atorvastatin and fenofibrate    # HTN, presented w/ hypertensive emergency  - Renal initiated secondary work up at OSH: dania not elevated, f/u plasma renin activity, metanephrines  - cont lisinopril and coreg    # DM2, insulin dependent  - [id number] 9.9% (10/5)  - pt currently on 20u glargine qhs, 2u admelog w/ meals, moderate ISS  - recommend diabetic educator consult    # CKD st. IV likely due to diabetic nephropathy  # Nephrotic range proteinuria  baseline Cr 2.4  - OP IR biopsy today  - per renal at [facility], now on torsemide and low dose ace-i    # normocytic anemia  likely due to anemia of chronic disease.  - f/u b12, folate, and iron studies, ordered for Monday.    #Diplopia, thought to be secondary to R. CN6 lateral rectus palsy  - Pt evaluated by Ophtho at OSH. Found to have moderate non-proliferative diabetic retinopathy and Hypertensive retinopathy   - OP Neuro and ophthalmology    # Depression  - Amitriptyline 10mg QHS    # tobacco abuse disorder  Patient has reduced smoking from 1.5 ppd to 1 pack every 2 weeks  The patient is an active smoker. The patient was advised to quit. Declined nicotine patches/gums.     dvt ppx: heparin, currently on hold for bx

## 2022-10-26 LAB
ALBUMIN SERPL ELPH-MCNC: 3.1 G/DL — LOW (ref 3.3–5)
ALP SERPL-CCNC: 87 U/L — SIGNIFICANT CHANGE UP (ref 40–120)
ALT FLD-CCNC: 25 U/L — SIGNIFICANT CHANGE UP (ref 10–45)
ANION GAP SERPL CALC-SCNC: 5 MMOL/L — SIGNIFICANT CHANGE UP (ref 5–17)
AST SERPL-CCNC: 25 U/L — SIGNIFICANT CHANGE UP (ref 10–40)
BILIRUB SERPL-MCNC: 0.2 MG/DL — SIGNIFICANT CHANGE UP (ref 0.2–1.2)
BUN SERPL-MCNC: 63 MG/DL — HIGH (ref 7–23)
CALCIUM SERPL-MCNC: 9 MG/DL — SIGNIFICANT CHANGE UP (ref 8.4–10.5)
CHLORIDE SERPL-SCNC: 106 MMOL/L — SIGNIFICANT CHANGE UP (ref 96–108)
CO2 SERPL-SCNC: 29 MMOL/L — SIGNIFICANT CHANGE UP (ref 22–31)
CREAT SERPL-MCNC: 3.31 MG/DL — HIGH (ref 0.5–1.3)
EGFR: 22 ML/MIN/1.73M2 — LOW
GLUCOSE BLDC GLUCOMTR-MCNC: 128 MG/DL — HIGH (ref 70–99)
GLUCOSE BLDC GLUCOMTR-MCNC: 160 MG/DL — HIGH (ref 70–99)
GLUCOSE BLDC GLUCOMTR-MCNC: 164 MG/DL — HIGH (ref 70–99)
GLUCOSE BLDC GLUCOMTR-MCNC: 217 MG/DL — HIGH (ref 70–99)
GLUCOSE SERPL-MCNC: 155 MG/DL — HIGH (ref 70–99)
HCT VFR BLD CALC: 31.5 % — LOW (ref 39–50)
HGB BLD-MCNC: 10.4 G/DL — LOW (ref 13–17)
MCHC RBC-ENTMCNC: 31.2 PG — SIGNIFICANT CHANGE UP (ref 27–34)
MCHC RBC-ENTMCNC: 33 GM/DL — SIGNIFICANT CHANGE UP (ref 32–36)
MCV RBC AUTO: 94.6 FL — SIGNIFICANT CHANGE UP (ref 80–100)
NRBC # BLD: 0 /100 WBCS — SIGNIFICANT CHANGE UP (ref 0–0)
PLATELET # BLD AUTO: 193 K/UL — SIGNIFICANT CHANGE UP (ref 150–400)
POTASSIUM SERPL-MCNC: 4.3 MMOL/L — SIGNIFICANT CHANGE UP (ref 3.5–5.3)
POTASSIUM SERPL-SCNC: 4.3 MMOL/L — SIGNIFICANT CHANGE UP (ref 3.5–5.3)
PROT SERPL-MCNC: 6.7 G/DL — SIGNIFICANT CHANGE UP (ref 6–8.3)
RBC # BLD: 3.33 M/UL — LOW (ref 4.2–5.8)
RBC # FLD: 12.3 % — SIGNIFICANT CHANGE UP (ref 10.3–14.5)
RENIN PLAS-CCNC: 0.66 NG/ML/HR — SIGNIFICANT CHANGE UP (ref 0.17–5.38)
SODIUM SERPL-SCNC: 140 MMOL/L — SIGNIFICANT CHANGE UP (ref 135–145)
WBC # BLD: 7.92 K/UL — SIGNIFICANT CHANGE UP (ref 3.8–10.5)
WBC # FLD AUTO: 7.92 K/UL — SIGNIFICANT CHANGE UP (ref 3.8–10.5)

## 2022-10-26 PROCEDURE — 99232 SBSQ HOSP IP/OBS MODERATE 35: CPT | Mod: GC

## 2022-10-26 PROCEDURE — 99232 SBSQ HOSP IP/OBS MODERATE 35: CPT

## 2022-10-26 RX ORDER — PREGABALIN 225 MG/1
1000 CAPSULE ORAL DAILY
Refills: 0 | Status: DISCONTINUED | OUTPATIENT
Start: 2022-10-26 | End: 2022-11-03

## 2022-10-26 RX ORDER — ASPIRIN/CALCIUM CARB/MAGNESIUM 324 MG
81 TABLET ORAL DAILY
Refills: 0 | Status: DISCONTINUED | OUTPATIENT
Start: 2022-10-27 | End: 2022-11-03

## 2022-10-26 RX ADMIN — CARVEDILOL PHOSPHATE 25 MILLIGRAM(S): 80 CAPSULE, EXTENDED RELEASE ORAL at 06:03

## 2022-10-26 RX ADMIN — Medication 2 UNIT(S): at 12:23

## 2022-10-26 RX ADMIN — AMLODIPINE BESYLATE 10 MILLIGRAM(S): 2.5 TABLET ORAL at 06:03

## 2022-10-26 RX ADMIN — Medication 2 UNIT(S): at 08:21

## 2022-10-26 RX ADMIN — SENNA PLUS 2 TABLET(S): 8.6 TABLET ORAL at 22:02

## 2022-10-26 RX ADMIN — Medication 1 APPLICATION(S): at 06:08

## 2022-10-26 RX ADMIN — Medication 1 APPLICATION(S): at 17:29

## 2022-10-26 RX ADMIN — PREGABALIN 1000 MICROGRAM(S): 225 CAPSULE ORAL at 13:02

## 2022-10-26 RX ADMIN — Medication 145 MILLIGRAM(S): at 13:12

## 2022-10-26 RX ADMIN — LISINOPRIL 2.5 MILLIGRAM(S): 2.5 TABLET ORAL at 06:03

## 2022-10-26 RX ADMIN — BUDESONIDE AND FORMOTEROL FUMARATE DIHYDRATE 2 PUFF(S): 160; 4.5 AEROSOL RESPIRATORY (INHALATION) at 08:10

## 2022-10-26 RX ADMIN — BUDESONIDE AND FORMOTEROL FUMARATE DIHYDRATE 2 PUFF(S): 160; 4.5 AEROSOL RESPIRATORY (INHALATION) at 22:16

## 2022-10-26 RX ADMIN — HEPARIN SODIUM 5000 UNIT(S): 5000 INJECTION INTRAVENOUS; SUBCUTANEOUS at 22:02

## 2022-10-26 RX ADMIN — HEPARIN SODIUM 5000 UNIT(S): 5000 INJECTION INTRAVENOUS; SUBCUTANEOUS at 13:02

## 2022-10-26 RX ADMIN — PANTOPRAZOLE SODIUM 40 MILLIGRAM(S): 20 TABLET, DELAYED RELEASE ORAL at 06:03

## 2022-10-26 RX ADMIN — INSULIN GLARGINE 20 UNIT(S): 100 INJECTION, SOLUTION SUBCUTANEOUS at 22:02

## 2022-10-26 RX ADMIN — ATORVASTATIN CALCIUM 40 MILLIGRAM(S): 80 TABLET, FILM COATED ORAL at 22:02

## 2022-10-26 RX ADMIN — CARVEDILOL PHOSPHATE 25 MILLIGRAM(S): 80 CAPSULE, EXTENDED RELEASE ORAL at 17:29

## 2022-10-26 RX ADMIN — Medication 2: at 17:27

## 2022-10-26 RX ADMIN — Medication 10 MILLIGRAM(S): at 22:02

## 2022-10-26 RX ADMIN — Medication 2 UNIT(S): at 17:28

## 2022-10-26 RX ADMIN — Medication 1 TABLET(S): at 12:29

## 2022-10-26 RX ADMIN — Medication 2: at 08:21

## 2022-10-26 NOTE — PROGRESS NOTE ADULT - ASSESSMENT
This is a 48 YO male with PMHx of CKD IV, vertigo, diverticulitis s/p LAR, cigarette use, ETOH abuse now sober x5 years, IDDM, HTN, AMBER, who presented to [facility] on 10/2 c/o dizziness, b/l diplopia, headache and chest tightness found to have /133 and elevated troponin concerning for NSTEMI. CTH negative for acute pathology. MRI Brain showed L parasagittal infarcts. Per, neurology and opthalmology CN VI palsy. Patient  was transferred to [facility] for a possible LHC. LHC showed nonobstructive CAD without elevated filling pressures. Hospital course significant for BILLY, right frontal headache and popping sensation, determined to have components hypertensive and diabetic retinopathy for which outpatient followup and possible trial of Fresnel prisms was recommended. Patient now with gait Instability, ADL impairments and Functional impairments.    # Functional Deficits s/p CVA  - MRI with L parasagittal infarcts, multiple chronic lacunar infarcts  - Continue statin. ASA on hold for renal bx   -  Comprehensive Rehab Program: PT/OT/ST, 3hours daily and 5 days weekly  - PT: Focused on improving strength, endurance, coordination, balance, functional mobility, and transfers  - OT: Focused on improving strength, fine motor skills, coordination, posture and ADLs.    - ST: to diagnose and treat deficits in swallowing, cognition and communication.     #NTEMI #CAD  - LHC showed nonobstructive CAD without elevated filling pressures  - Continue statin, fenofibrate    #BILLY on CKD st. IV likely due to diabetic nephropathy  # Nephrotic range proteinuria  - baseline Cr 2.4  - BUN/Cr 63/3.31 10/26  - IR biopsy completed 10/25- await results  - per renal at [facility], now on torsemide and low dose ace-i  -Monitor BUN/Cr  -Renally dose meds  -Workup ongoing, renal consult routinely      #HTN, presented with hypertensive emergency at [facility]  - Renal initiated secondary work up at OSH: dania not elevated, f/u plasma renin activity, metanephrines  - Lisinopril 2.5mg daily  - Carvedilol 25mg BID  - BP controlled    #HLD  - Lipitor 40mg daily  - Fenofibrate 145mg daily    #Diplopia  - OP f/u with neuro-opthalmology  - consistent with lateral rectus palsy  - outpatient f/u with retina specialist Dr. Jose Angel Arvizu for OCT nerve/RNFL for evaluation for diabetic and hypertensive retinopathy; if diplopia/palsy persists should continue outpatient followup for Fresnel prisms  Alternate patch eyes during the day    #Cluster Headache  - Amitriptyline 10mg QHS    #AMBER  - c/w inhalers: Albuterol, Symbicort  - c/w nasal spray    #DM II  - ISS and FS  - Admelog and Lantus  - A1c 9.9 on 10/5  Diabetic nursing for training- recs appreciated  - see discharge recommendations in 10/26 diabetic educator note    #Pain management  - Tylenol PRN    #DVT ppx  - Heparin    #GI ppx  - Protonix 40mg    #Bowel Regimen  - Senna  moved bowels today    #Bladder management  - BS on admission, and q 8 hours (SC if > 400)  - Monitor UO  PVRs low    #FEN   - Diet: DASH    #Onychomycosis  -Consider podiatry consult    #Skin:  - Skin on admission: Aquaphor to dry skin BID- latrice both feet  - Pressure injury/Skin: Turn Q2hrs while in bed, OOB to Chair, PT/OT     #Dysphagia    - SLP: evaluation and treatment    #Mood/Cognition:  - Neuropsychology consult PRN    #Sleep:   - Maintain quiet hours and low stim environment.  - Melatonin PRN to maximize participation in therapy during the day.     #Precaution  - Fall, Aspiration, cardiac

## 2022-10-26 NOTE — PROGRESS NOTE ADULT - ATTENDING COMMENTS
Rehab Attending- Patient seen and examined by me - Case discussed, above note reviewed by me with modifications made    Doing well  can resume miniheparin  await outcome of renal biopsy  to continue intensive rehab program

## 2022-10-26 NOTE — PROGRESS NOTE ADULT - SUBJECTIVE AND OBJECTIVE BOX
chief complaint- headaches, double vision, poor balance    This is a 48 YO male with PMHx of CKD IV, vertigo, diverticulitis s/p LAR, cigarette use, ETOH abuse now sober x5 years, IDDM, HTN, AMBER, who presented to [facility] on 10/2 c/o dizziness, b/l diplopia, headache and chest tightness found to have /133 and elevated troponins concerning for NSTEMI. EKG w/ ST-T abnormalities without acute ST segment changes. At OSH BPs stabilized with IV hydralazine, CT head without acute pathology, MRI brain showed chronic L parasagittal infarcts. 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 Patient was recommended outpatient followup for neurological/ophthalmological concerns (specifically for OCT nerve/RNFL, Fresnel prisms) and was transferred to [facility] for a possible LHC.    On admission to [facility] coronary CT showed moderate stenosis of the proximal LAD and RCA. Cath initially deferred as patient was pending nephrology clearance for BILLY. TTE largely wnl, bubble study negative. MRI brain with contrast showed chronic multifocal lacunar infarcts and chronic microvascular ischemic changes, corroborating MRI read from [facility]. Patient was evaluated by ophthalmology team for new onset R frontal headache and popping sensation, determined to have components hypertensive and diabetic retinopathy for which outpatient followup and possible trial of Fresnel prisms was recommended. Cluster headaches suspected given pattern of headache (R frontal, several days at a time, lacrimation). Patient was also evaluated by PT/OT who recommended CANDY. Speech pathology eval was notable for cognitive linguistic deficits with concern for early onset dementia given a family hx (mother diagnosed @44yo). After improvement of BILLY, LHC was completed showing nonobstructive CAD without elevated filling pressures. Lateral rectus palsy was noted to improve slightly towards end of admission with ability to abduct to ~10-15 degrees. Low-dose lisinopril was initiated for nephrotic-range proteinuria.  Patient was monitored for resolution of BILLY and is now hemodynamically stable   Patient was evaluated by PM&R and therapy for functional deficits, gait/ADL impairments and acute rehabilitation was recommended. Patient was medically optimized for discharge to [facility] IRU on 10/20/22.    Today's Subjective:  Patient seen and assessed.    ROS:  occ dizziness persists  no nausea, no vomiting  no SOB, no chest pain        MEDICATIONS  (STANDING):  amitriptyline 10 milliGRAM(s) Oral at bedtime  amLODIPine   Tablet 10 milliGRAM(s) Oral daily  AQUAPHOR (petrolatum Ointment) 1 Application(s) Topical two times a day  atorvastatin 40 milliGRAM(s) Oral at bedtime  budesonide  80 MICROgram(s)/formoterol 4.5 MICROgram(s) Inhaler 2 Puff(s) Inhalation two times a day  carvedilol 25 milliGRAM(s) Oral every 12 hours  cyanocobalamin 1000 MICROGram(s) Oral daily  dextrose 5%. 1000 milliLiter(s) (100 mL/Hr) IV Continuous <Continuous>  dextrose 5%. 1000 milliLiter(s) (50 mL/Hr) IV Continuous <Continuous>  dextrose 50% Injectable 25 Gram(s) IV Push once  dextrose 50% Injectable 12.5 Gram(s) IV Push once  dextrose 50% Injectable 25 Gram(s) IV Push once  fenofibrate Tablet 145 milliGRAM(s) Oral daily  glucagon  Injectable 1 milliGRAM(s) IntraMuscular once  heparin   Injectable 5000 Unit(s) SubCutaneous every 8 hours  insulin glargine Injectable (LANTUS) 20 Unit(s) SubCutaneous at bedtime  insulin lispro (ADMELOG) corrective regimen sliding scale   SubCutaneous three times a day before meals  insulin lispro (ADMELOG) corrective regimen sliding scale   SubCutaneous at bedtime  insulin lispro Injectable (ADMELOG) 2 Unit(s) SubCutaneous before breakfast  insulin lispro Injectable (ADMELOG) 2 Unit(s) SubCutaneous before lunch  insulin lispro Injectable (ADMELOG) 2 Unit(s) SubCutaneous before dinner  lisinopril 2.5 milliGRAM(s) Oral daily  multivitamin 1 Tablet(s) Oral daily  pantoprazole    Tablet 40 milliGRAM(s) Oral before breakfast  senna 2 Tablet(s) Oral at bedtime    MEDICATIONS  (PRN):  acetaminophen     Tablet .. 650 milliGRAM(s) Oral every 6 hours PRN Temp greater or equal to 38C (100.4F), Mild Pain (1 - 3)  ALBUTerol    90 MICROgram(s) HFA Inhaler 2 Puff(s) Inhalation every 6 hours PRN Shortness of Breath and/or Wheezing  Biotene Dry Mouth Oral Rinse 15 milliLiter(s) Swish and Spit three times a day PRN Mouth Care  dextrose Oral Gel 15 Gram(s) Oral once PRN Blood Glucose LESS THAN 70 milliGRAM(s)/deciliter  simethicone 80 milliGRAM(s) Chew daily PRN Gas  sodium chloride 0.65% Nasal 1 Spray(s) Both Nostrils every 1 hour PRN Nasal Congestion      RECENT LABS     10-26    140  |  106  |  63<H>  ----------------------------<  155<H>  4.3   |  29  |  3.31<H>    10-24    139  |  105  |  58<H>  ----------------------------<  182<H>  4.5   |  28  |  3.17<H>    Ca    9.0      26 Oct 2022 06:12  Ca    8.9      24 Oct 2022 06:25    TPro  6.7  /  Alb  3.1<L>  /  TBili  0.2  /  DBili  x   /  AST  25  /  ALT  25  /  AlkPhos  87  10-26  TPro  6.9  /  Alb  3.1<L>  /  TBili  0.2  /  DBili  x   /  AST  26  /  ALT  24  /  AlkPhos  92  10-24                    10.4   7.92  )-----------( 193      ( 26 Oct 2022 06:12 )             31.5                         11.0   5.39  )-----------( 202      ( 24 Oct 2022 06:25 )             31.9     CAPILLARY BLOOD GLUCOSE      POCT Blood Glucose.: 128 mg/dL (26 Oct 2022 12:12)  POCT Blood Glucose.: 160 mg/dL (26 Oct 2022 07:25)  POCT Blood Glucose.: 189 mg/dL (25 Oct 2022 21:54)  POCT Blood Glucose.: 287 mg/dL (25 Oct 2022 17:11)    Vital Signs Last 24 Hrs  T(C): 36.7 (26 Oct 2022 08:39), Max: 36.7 (26 Oct 2022 08:39)  T(F): 98.1 (26 Oct 2022 08:39), Max: 98.1 (26 Oct 2022 08:39)  HR: 69 (26 Oct 2022 08:39) (69 - 81)  BP: 117/73 (26 Oct 2022 08:39) (117/73 - 132/70)  RR: 16 (26 Oct 2022 08:39) (15 - 18)  SpO2: 100% (26 Oct 2022 08:39) (98% - 100%)    Parameters below as of 26 Oct 2022 08:39  Patient On (Oxygen Delivery Method): room air        PHYSICAL EXAM    Constitutional - NAD, Comfortable eye patch on  HEENT - R 6th palsy persists  Chest - good chest expansion, good respiratory effort, CTAB   Cardio - warm and well perfused, RRR, no murmur  Abdomen -  Soft, NTND  Extremities 2 + edema LES  Neurologic Exam:                    Cognitive -             Orientation: Awake, Alert, AAOx3        Speech - Fluent, Comprehensible, No dysarthria, No aphasia      Cranial Nerves - No facial asymmetry, Tongue midline, Shoulder shrug intact +R lateral gaze palsy     Motor -                      LEFT    UE - ShAB 5/5, EF 5/5, EE 5/5, WE 5/5,  WNL                    RIGHT UE - ShAB 5/5, EF 5/5, EE 5/5, WE 5/5,  WNL                    LEFT    LE - HF 4+/5, KE 5/5, DF 5/5, PF 5/5                    RIGHT LE - HF 4+/5, KE 5/5, DF 5/5, PF 5/5        Sensory - Diminished to LT glove stocking distribution     Reflexes - DTR intact  Psychiatric - Mood stable, Affect WNL      IDT Meeting 10/25/22:  EDOD: 11/3    Barriers: visual acuity, proprioception and cog deficits, fall risk    SW:   Lives in basement apt with 5 SCOTTIE with 1 HR, and 6 steps down  Brother is emergency contact, unclear if able to assist as cares for father    OT:  CG to SV for all ADLs and transfers  Goals are mod I for all except SV for bathing and tub/shower transfers    PT:  Transfers with CG  Ambulating 100ft with RW and SV    SLP:  Reg/thins  Mild expressive and receptive aphasia, language and word finding deficits  Ataxic dysarthria  History of poor medical management  Safety concerns.       chief complaint- headaches, double vision, poor balance    This is a 46 YO male with PMHx of CKD IV, vertigo, diverticulitis s/p LAR, cigarette use, ETOH abuse now sober x5 years, IDDM, HTN, AMBER, who presented to [facility] on 10/2 c/o dizziness, b/l diplopia, headache and chest tightness found to have /133 and elevated troponins concerning for NSTEMI. EKG w/ ST-T abnormalities without acute ST segment changes. At OSH BPs stabilized with IV hydralazine, CT head without acute pathology, MRI brain showed chronic L parasagittal infarcts. 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 Patient was recommended outpatient followup for neurological/ophthalmological concerns (specifically for OCT nerve/RNFL, Fresnel prisms) and was transferred to [facility] for a possible LHC.    On admission to [facility] coronary CT showed moderate stenosis of the proximal LAD and RCA. Cath initially deferred as patient was pending nephrology clearance for BILLY. TTE largely wnl, bubble study negative. MRI brain with contrast showed chronic multifocal lacunar infarcts and chronic microvascular ischemic changes, corroborating MRI read from [facility]. Patient was evaluated by ophthalmology team for new onset R frontal headache and popping sensation, determined to have components hypertensive and diabetic retinopathy for which outpatient followup and possible trial of Fresnel prisms was recommended. Cluster headaches suspected given pattern of headache (R frontal, several days at a time, lacrimation). Patient was also evaluated by PT/OT who recommended CANDY. Speech pathology eval was notable for cognitive linguistic deficits with concern for early onset dementia given a family hx (mother diagnosed @46yo). After improvement of BILLY, LHC was completed showing nonobstructive CAD without elevated filling pressures. Lateral rectus palsy was noted to improve slightly towards end of admission with ability to abduct to ~10-15 degrees. Low-dose lisinopril was initiated for nephrotic-range proteinuria.  Patient was monitored for resolution of BILLY and is now hemodynamically stable   Patient was evaluated by PM&R and therapy for functional deficits, gait/ADL impairments and acute rehabilitation was recommended. Patient was medically optimized for discharge to [facility] IRU on 10/20/22.    Today's Subjective:  Patient seen and assessed.    ROS:  occ dizziness persists  no nausea, no vomiting  no SOB, no chest pain  BM yesterday        MEDICATIONS  (STANDING):  amitriptyline 10 milliGRAM(s) Oral at bedtime  amLODIPine   Tablet 10 milliGRAM(s) Oral daily  AQUAPHOR (petrolatum Ointment) 1 Application(s) Topical two times a day  atorvastatin 40 milliGRAM(s) Oral at bedtime  budesonide  80 MICROgram(s)/formoterol 4.5 MICROgram(s) Inhaler 2 Puff(s) Inhalation two times a day  carvedilol 25 milliGRAM(s) Oral every 12 hours  cyanocobalamin 1000 MICROGram(s) Oral daily  dextrose 5%. 1000 milliLiter(s) (100 mL/Hr) IV Continuous <Continuous>  dextrose 5%. 1000 milliLiter(s) (50 mL/Hr) IV Continuous <Continuous>  dextrose 50% Injectable 25 Gram(s) IV Push once  dextrose 50% Injectable 12.5 Gram(s) IV Push once  dextrose 50% Injectable 25 Gram(s) IV Push once  fenofibrate Tablet 145 milliGRAM(s) Oral daily  glucagon  Injectable 1 milliGRAM(s) IntraMuscular once  heparin   Injectable 5000 Unit(s) SubCutaneous every 8 hours  insulin glargine Injectable (LANTUS) 20 Unit(s) SubCutaneous at bedtime  insulin lispro (ADMELOG) corrective regimen sliding scale   SubCutaneous three times a day before meals  insulin lispro (ADMELOG) corrective regimen sliding scale   SubCutaneous at bedtime  insulin lispro Injectable (ADMELOG) 2 Unit(s) SubCutaneous before breakfast  insulin lispro Injectable (ADMELOG) 2 Unit(s) SubCutaneous before lunch  insulin lispro Injectable (ADMELOG) 2 Unit(s) SubCutaneous before dinner  lisinopril 2.5 milliGRAM(s) Oral daily  multivitamin 1 Tablet(s) Oral daily  pantoprazole    Tablet 40 milliGRAM(s) Oral before breakfast  senna 2 Tablet(s) Oral at bedtime    MEDICATIONS  (PRN):  acetaminophen     Tablet .. 650 milliGRAM(s) Oral every 6 hours PRN Temp greater or equal to 38C (100.4F), Mild Pain (1 - 3)  ALBUTerol    90 MICROgram(s) HFA Inhaler 2 Puff(s) Inhalation every 6 hours PRN Shortness of Breath and/or Wheezing  Biotene Dry Mouth Oral Rinse 15 milliLiter(s) Swish and Spit three times a day PRN Mouth Care  dextrose Oral Gel 15 Gram(s) Oral once PRN Blood Glucose LESS THAN 70 milliGRAM(s)/deciliter  simethicone 80 milliGRAM(s) Chew daily PRN Gas  sodium chloride 0.65% Nasal 1 Spray(s) Both Nostrils every 1 hour PRN Nasal Congestion      RECENT LABS     10-26    140  |  106  |  63<H>  ----------------------------<  155<H>  4.3   |  29  |  3.31<H>    10-24    139  |  105  |  58<H>  ----------------------------<  182<H>  4.5   |  28  |  3.17<H>    Ca    9.0      26 Oct 2022 06:12  Ca    8.9      24 Oct 2022 06:25    TPro  6.7  /  Alb  3.1<L>  /  TBili  0.2  /  DBili  x   /  AST  25  /  ALT  25  /  AlkPhos  87  10-26  TPro  6.9  /  Alb  3.1<L>  /  TBili  0.2  /  DBili  x   /  AST  26  /  ALT  24  /  AlkPhos  92  10-24                    10.4   7.92  )-----------( 193      ( 26 Oct 2022 06:12 )             31.5                         11.0   5.39  )-----------( 202      ( 24 Oct 2022 06:25 )             31.9     CAPILLARY BLOOD GLUCOSE      POCT Blood Glucose.: 128 mg/dL (26 Oct 2022 12:12)  POCT Blood Glucose.: 160 mg/dL (26 Oct 2022 07:25)  POCT Blood Glucose.: 189 mg/dL (25 Oct 2022 21:54)  POCT Blood Glucose.: 287 mg/dL (25 Oct 2022 17:11)    Vital Signs Last 24 Hrs  T(C): 36.7 (26 Oct 2022 08:39), Max: 36.7 (26 Oct 2022 08:39)  T(F): 98.1 (26 Oct 2022 08:39), Max: 98.1 (26 Oct 2022 08:39)  HR: 69 (26 Oct 2022 08:39) (69 - 81)  BP: 117/73 (26 Oct 2022 08:39) (117/73 - 132/70)  RR: 16 (26 Oct 2022 08:39) (15 - 18)  SpO2: 100% (26 Oct 2022 08:39) (98% - 100%)    Parameters below as of 26 Oct 2022 08:39  Patient On (Oxygen Delivery Method): room air        PHYSICAL EXAM    Constitutional - NAD, Comfortable eye patch on  HEENT - R 6th palsy persists  Chest - good chest expansion, good respiratory effort, CTAB   Cardio - warm and well perfused, RRR, no murmur  Abdomen -  Soft, NTND  Extremities 2 + edema LES  Neurologic Exam:                    Cognitive -             Orientation: Awake, Alert, AAOx3        Speech - Fluent, Comprehensible, No dysarthria, No aphasia      Cranial Nerves - No facial asymmetry, Tongue midline, Shoulder shrug intact +R lateral gaze palsy     Motor -                      LEFT    UE - ShAB 5/5, EF 5/5, EE 5/5, WE 5/5,  WNL                    RIGHT UE - ShAB 5/5, EF 5/5, EE 5/5, WE 5/5,  WNL                    LEFT    LE - HF 4+/5, KE 5/5, DF 5/5, PF 5/5                    RIGHT LE - HF 4+/5, KE 5/5, DF 5/5, PF 5/5        Sensory - Diminished to LT glove stocking distribution     Reflexes - DTR intact  Psychiatric - Mood stable, Affect WNL      IDT Meeting 10/25/22:  EDOD: 11/3    Barriers: visual acuity, proprioception and cog deficits, fall risk    SW:   Lives in basement apt with 5 SCOTTIE with 1 HR, and 6 steps down  Brother is emergency contact, unclear if able to assist as cares for father    OT:  CG to SV for all ADLs and transfers  Goals are mod I for all except SV for bathing and tub/shower transfers    PT:  Transfers with CG  Ambulating 100ft with RW and SV    SLP:  Reg/thins  Mild expressive and receptive aphasia, language and word finding deficits  Ataxic dysarthria  History of poor medical management  Safety concerns.

## 2022-10-26 NOTE — PROGRESS NOTE ADULT - ASSESSMENT
46 YO male with PMHx of CKD IV, vertigo, diverticulitis s/p LAR, cigarette use, ETOH abuse now sober x5 years, IDDM, HTN, AMBER, who presented to [facility] on 10/2 c/o dizziness, b/l diplopia, headache and chest tightness found to have /133 and elevated troponin concerning for NSTEMI. CTH negative for acute pathology. MRI Brain showed L parasagittal infarcts. Per, neurology and opthalmology CN VI palsy. Patient  was transferred to [facility] for a possible LHC. LHC showed nonobstructive CAD without elevated filling pressures. Hospital course significant for BILLY, right frontal headache and popping sensation, determined to have components hypertensive and diabetic retinopathy for which outpatient followup and possible trial of Fresnel prisms was recommended.    # Debility  # Hx of chronic Lacunar infarts/microvascular changes  # lateral rectus palsy  - MRI with L parasagittal infarcts, multiple chronic lacunar infarcts  - PT/OT/ST per Rehab  - restart ASA when appropriate- on hold for renal bx  - control BP    # NSTEMI  # CAD  # HLD  - Cath: 10/13 w/ non obstructive CAD  - restart aspirin after biopsy   - cont atorvastatin and fenofibrate    # HTN, presented w/ hypertensive emergency  - Renal initiated secondary work up at OSH: dania not elevated, f/u plasma renin activity, metanephrines  - cont lisinopril and coreg    # DM2, insulin dependent  - [id number] 9.9% (10/5)  - pt currently on 20u glargine qhs, 2u admelog w/ meals, moderate ISS  - recommend diabetic educator consult    # CKD st. IV likely due to diabetic nephropathy  # Nephrotic range proteinuria  baseline Cr 2.4  - OP IR biopsy today  - per renal at [facility], now on torsemide and low dose ace-i    # normocytic anemia  likely due to anemia of chronic disease.  - f/u b12, folate, and iron studies, ordered for Monday.    #Diplopia, thought to be secondary to R. CN6 lateral rectus palsy  - Pt evaluated by Ophtho at OSH. Found to have moderate non-proliferative diabetic retinopathy and Hypertensive retinopathy   - OP Neuro and ophthalmology    # Depression  - Amitriptyline 10mg QHS    # tobacco abuse disorder  Patient has reduced smoking from 1.5 ppd to 1 pack every 2 weeks  The patient is an active smoker. The patient was advised to quit. Declined nicotine patches/gums.     dvt ppx: heparin, currently on hold for bx 48 YO male with PMHx of CKD IV, vertigo, diverticulitis s/p LAR, cigarette use, ETOH abuse now sober x5 years, IDDM, HTN, AMBER, who presented to [facility] on 10/2 c/o dizziness, b/l diplopia, headache and chest tightness found to have /133 and elevated troponin concerning for NSTEMI. CTH negative for acute pathology. MRI Brain showed L parasagittal infarcts. Per, neurology and opthalmology CN VI palsy. Patient  was transferred to [facility] for a possible LHC. LHC showed nonobstructive CAD without elevated filling pressures. Hospital course significant for BILLY, right frontal headache and popping sensation, determined to have components hypertensive and diabetic retinopathy for which outpatient followup and possible trial of Fresnel prisms was recommended.    # Debility  # Hx of chronic Lacunar infarts/microvascular changes  # lateral rectus palsy  - MRI with L parasagittal infarcts, multiple chronic lacunar infarcts  - PT/OT/ST per Rehab  - restart ASA when appropriate- on hold for renal bx  - control BP    # NSTEMI  # CAD  # HLD  - Cath: 10/13 w/ non obstructive CAD  - restart aspirin after biopsy   - continue atorvastatin and fenofibrate    # HTN, presented w/ hypertensive emergency  - Renal initiated secondary work up at OSH: dania not elevated, f/u plasma renin activity, metanephrines  - cont lisinopril and coreg    # DM2, insulin dependent  - [id number] 9.9% (10/5)  - pt currently on 20u glargine qhs, 2u admelog w/ meals, moderate ISS    # CKD st. IV likely due to diabetic nephropathy  # Nephrotic range proteinuria  baseline Cr 2.4  - IR biopsy completed  - per renal at [facility], now on torsemide and low dose ace-i    # normocytic anemia likely due to anemia of chronic disease.  - b12 low so supplements started    #Diplopia, thought to be secondary to R. CN6 lateral rectus palsy  - Pt evaluated by Ophtho at OSH. Found to have moderate non-proliferative diabetic retinopathy and Hypertensive retinopathy   - OP Neuro and ophthalmology    # Depression  - Continue Amitriptyline    # tobacco abuse disorder  Patient has reduced smoking from 1.5 ppd to 1 pack every 2 weeks  The patient is an active smoker. The patient was advised to quit. Declined nicotine patches/gums.     dvt ppx: heparin restarted 48 YO male with PMHx of CKD IV, vertigo, diverticulitis s/p LAR, cigarette use, ETOH abuse now sober x5 years, IDDM, HTN, AMBER, who presented to [facility] on 10/2 c/o dizziness, b/l diplopia, headache and chest tightness found to have /133 and elevated troponin concerning for NSTEMI. CTH negative for acute pathology. MRI Brain showed L parasagittal infarcts. Per, neurology and opthalmology CN VI palsy. Patient  was transferred to [facility] for a possible LHC. LHC showed nonobstructive CAD without elevated filling pressures. Hospital course significant for BILLY, right frontal headache and popping sensation, determined to have components hypertensive and diabetic retinopathy for which outpatient followup and possible trial of Fresnel prisms was recommended.    # Debility  # Hx of chronic Lacunar infarts/microvascular changes  # lateral rectus palsy  - MRI with L parasagittal infarcts, multiple chronic lacunar infarcts  - PT/OT/ST per Rehab  - restart ASA tomorrow. Held since the 17th for renal bx.  - control BP    # NSTEMI  # CAD  # HLD  - Cath: 10/13 w/ non obstructive CAD  - restarting aspirin   - continue atorvastatin and fenofibrate    # HTN, presented w/ hypertensive emergency  - Renal initiated secondary work up at OSH: dania not elevated, f/u plasma renin activity, metanephrines  - cont lisinopril and coreg    # DM2, insulin dependent  - [id number] 9.9% (10/5)  - pt currently on 20u glargine qhs, 2u admelog w/ meals, moderate ISS    # CKD st. IV likely due to diabetic nephropathy  # Nephrotic range proteinuria  baseline Cr 2.4  - IR biopsy completed  - per renal at [facility], now on torsemide and low dose ace-i  - getting renal consult    # normocytic anemia likely due to anemia of chronic disease.  - b12 low so supplements started    #Diplopia, thought to be secondary to R. CN6 lateral rectus palsy  - Pt evaluated by Ophtho at OSH. Found to have moderate non-proliferative diabetic retinopathy and Hypertensive retinopathy   - OP Neuro and ophthalmology    # Depression  - Continue Amitriptyline    # tobacco abuse disorder  Patient has reduced smoking from 1.5 ppd to 1 pack every 2 weeks  The patient is an active smoker. The patient was advised to quit. Declined nicotine patches/gums.     dvt ppx: heparin restarted

## 2022-10-27 LAB
ALBUMIN SERPL ELPH-MCNC: 3.1 G/DL — LOW (ref 3.3–5)
ALP SERPL-CCNC: 86 U/L — SIGNIFICANT CHANGE UP (ref 40–120)
ALT FLD-CCNC: 26 U/L — SIGNIFICANT CHANGE UP (ref 10–45)
ANION GAP SERPL CALC-SCNC: 8 MMOL/L — SIGNIFICANT CHANGE UP (ref 5–17)
AST SERPL-CCNC: 19 U/L — SIGNIFICANT CHANGE UP (ref 10–40)
BILIRUB SERPL-MCNC: 0.2 MG/DL — SIGNIFICANT CHANGE UP (ref 0.2–1.2)
BUN SERPL-MCNC: 73 MG/DL — HIGH (ref 7–23)
CALCIUM SERPL-MCNC: 8.9 MG/DL — SIGNIFICANT CHANGE UP (ref 8.4–10.5)
CHLORIDE SERPL-SCNC: 108 MMOL/L — SIGNIFICANT CHANGE UP (ref 96–108)
CO2 SERPL-SCNC: 27 MMOL/L — SIGNIFICANT CHANGE UP (ref 22–31)
CREAT SERPL-MCNC: 3.64 MG/DL — HIGH (ref 0.5–1.3)
EGFR: 20 ML/MIN/1.73M2 — LOW
GLUCOSE BLDC GLUCOMTR-MCNC: 122 MG/DL — HIGH (ref 70–99)
GLUCOSE BLDC GLUCOMTR-MCNC: 175 MG/DL — HIGH (ref 70–99)
GLUCOSE BLDC GLUCOMTR-MCNC: 194 MG/DL — HIGH (ref 70–99)
GLUCOSE BLDC GLUCOMTR-MCNC: 194 MG/DL — HIGH (ref 70–99)
GLUCOSE SERPL-MCNC: 193 MG/DL — HIGH (ref 70–99)
HCT VFR BLD CALC: 31.2 % — LOW (ref 39–50)
HGB BLD-MCNC: 10.6 G/DL — LOW (ref 13–17)
MCHC RBC-ENTMCNC: 31.9 PG — SIGNIFICANT CHANGE UP (ref 27–34)
MCHC RBC-ENTMCNC: 34 GM/DL — SIGNIFICANT CHANGE UP (ref 32–36)
MCV RBC AUTO: 94 FL — SIGNIFICANT CHANGE UP (ref 80–100)
NRBC # BLD: 0 /100 WBCS — SIGNIFICANT CHANGE UP (ref 0–0)
PLATELET # BLD AUTO: 192 K/UL — SIGNIFICANT CHANGE UP (ref 150–400)
POTASSIUM SERPL-MCNC: 4.8 MMOL/L — SIGNIFICANT CHANGE UP (ref 3.5–5.3)
POTASSIUM SERPL-SCNC: 4.8 MMOL/L — SIGNIFICANT CHANGE UP (ref 3.5–5.3)
PROT SERPL-MCNC: 6.7 G/DL — SIGNIFICANT CHANGE UP (ref 6–8.3)
RBC # BLD: 3.32 M/UL — LOW (ref 4.2–5.8)
RBC # FLD: 12 % — SIGNIFICANT CHANGE UP (ref 10.3–14.5)
SODIUM SERPL-SCNC: 143 MMOL/L — SIGNIFICANT CHANGE UP (ref 135–145)
SURGICAL PATHOLOGY STUDY: SIGNIFICANT CHANGE UP
WBC # BLD: 7.17 K/UL — SIGNIFICANT CHANGE UP (ref 3.8–10.5)
WBC # FLD AUTO: 7.17 K/UL — SIGNIFICANT CHANGE UP (ref 3.8–10.5)

## 2022-10-27 PROCEDURE — 99232 SBSQ HOSP IP/OBS MODERATE 35: CPT

## 2022-10-27 PROCEDURE — 99232 SBSQ HOSP IP/OBS MODERATE 35: CPT | Mod: GC

## 2022-10-27 RX ORDER — LISINOPRIL 2.5 MG/1
2.5 TABLET ORAL
Refills: 0 | Status: DISCONTINUED | OUTPATIENT
Start: 2022-10-28 | End: 2022-10-27

## 2022-10-27 RX ORDER — AMLODIPINE BESYLATE 2.5 MG/1
10 TABLET ORAL DAILY
Refills: 0 | Status: DISCONTINUED | OUTPATIENT
Start: 2022-10-28 | End: 2022-10-27

## 2022-10-27 RX ORDER — CARVEDILOL PHOSPHATE 80 MG/1
12.5 CAPSULE, EXTENDED RELEASE ORAL EVERY 12 HOURS
Refills: 0 | Status: DISCONTINUED | OUTPATIENT
Start: 2022-10-27 | End: 2022-10-31

## 2022-10-27 RX ADMIN — CARVEDILOL PHOSPHATE 25 MILLIGRAM(S): 80 CAPSULE, EXTENDED RELEASE ORAL at 06:28

## 2022-10-27 RX ADMIN — BUDESONIDE AND FORMOTEROL FUMARATE DIHYDRATE 2 PUFF(S): 160; 4.5 AEROSOL RESPIRATORY (INHALATION) at 08:06

## 2022-10-27 RX ADMIN — Medication 2: at 08:00

## 2022-10-27 RX ADMIN — Medication 2: at 17:27

## 2022-10-27 RX ADMIN — Medication 650 MILLIGRAM(S): at 06:34

## 2022-10-27 RX ADMIN — ATORVASTATIN CALCIUM 40 MILLIGRAM(S): 80 TABLET, FILM COATED ORAL at 21:34

## 2022-10-27 RX ADMIN — HEPARIN SODIUM 5000 UNIT(S): 5000 INJECTION INTRAVENOUS; SUBCUTANEOUS at 21:35

## 2022-10-27 RX ADMIN — AMLODIPINE BESYLATE 10 MILLIGRAM(S): 2.5 TABLET ORAL at 06:29

## 2022-10-27 RX ADMIN — INSULIN GLARGINE 20 UNIT(S): 100 INJECTION, SOLUTION SUBCUTANEOUS at 21:35

## 2022-10-27 RX ADMIN — Medication 650 MILLIGRAM(S): at 07:00

## 2022-10-27 RX ADMIN — CARVEDILOL PHOSPHATE 12.5 MILLIGRAM(S): 80 CAPSULE, EXTENDED RELEASE ORAL at 17:35

## 2022-10-27 RX ADMIN — Medication 10 MILLIGRAM(S): at 21:34

## 2022-10-27 RX ADMIN — BUDESONIDE AND FORMOTEROL FUMARATE DIHYDRATE 2 PUFF(S): 160; 4.5 AEROSOL RESPIRATORY (INHALATION) at 22:15

## 2022-10-27 RX ADMIN — HEPARIN SODIUM 5000 UNIT(S): 5000 INJECTION INTRAVENOUS; SUBCUTANEOUS at 17:00

## 2022-10-27 RX ADMIN — PANTOPRAZOLE SODIUM 40 MILLIGRAM(S): 20 TABLET, DELAYED RELEASE ORAL at 06:29

## 2022-10-27 RX ADMIN — Medication 145 MILLIGRAM(S): at 17:00

## 2022-10-27 RX ADMIN — Medication 1 APPLICATION(S): at 06:29

## 2022-10-27 RX ADMIN — LISINOPRIL 2.5 MILLIGRAM(S): 2.5 TABLET ORAL at 06:29

## 2022-10-27 RX ADMIN — Medication 2 UNIT(S): at 08:00

## 2022-10-27 RX ADMIN — HEPARIN SODIUM 5000 UNIT(S): 5000 INJECTION INTRAVENOUS; SUBCUTANEOUS at 06:28

## 2022-10-27 RX ADMIN — Medication 81 MILLIGRAM(S): at 11:54

## 2022-10-27 RX ADMIN — SIMETHICONE 80 MILLIGRAM(S): 80 TABLET, CHEWABLE ORAL at 21:34

## 2022-10-27 RX ADMIN — Medication 2 UNIT(S): at 17:27

## 2022-10-27 NOTE — PROGRESS NOTE ADULT - ASSESSMENT
48 YO male with PMHx of CKD IV, vertigo, diverticulitis s/p LAR, cigarette use, ETOH abuse now sober x5 years, IDDM, HTN, AMBER, who presented to [facility] on 10/2 c/o dizziness, b/l diplopia, headache and chest tightness found to have /133 and elevated troponin concerning for NSTEMI. CTH negative for acute pathology. MRI Brain showed L parasagittal infarcts. Per, neurology and opthalmology CN VI palsy. Patient  was transferred to [facility] for a possible LHC. LHC showed nonobstructive CAD without elevated filling pressures. Hospital course significant for BILLY, right frontal headache and popping sensation, determined to have components hypertensive and diabetic retinopathy for which outpatient followup and possible trial of Fresnel prisms was recommended.    # Debility  # Hx of chronic Lacunar infarts/microvascular changes  # lateral rectus palsy  - MRI with L parasagittal infarcts, multiple chronic lacunar infarcts  - PT/OT/ST per Rehab  - ASA restarted. Held since the 18th for renal bx.  - control BP    # NSTEMI  # CAD  # HLD  - Cath: 10/13 w/ non obstructive CAD  - Continue ASA, atorvastatin and fenofibrate    # HTN, presented w/ hypertensive emergency  - Renal initiated secondary work up at OSH: dania not elevated, f/u plasma renin activity, metanephrines  - continue lisinopril and coreg    # DM2, insulin dependent  - [id number] 9.9% (10/5)  - pt currently on 20u glargine qhs, 2u admelog w/ meals, moderate ISS    # CKD st. IV likely due to diabetic nephropathy  # Nephrotic range proteinuria  baseline Cr 2.4  - IR biopsy completed  - per renal at [facility], now on torsemide and low dose ace-i  - Renal consulted for follow up    # normocytic anemia likely due to anemia of chronic disease.  - Continue b12 supplements    #Diplopia, thought to be secondary to R. CN6 lateral rectus palsy  - Pt evaluated by Ophtho at OSH. Found to have moderate non-proliferative diabetic retinopathy and Hypertensive retinopathy   - OP Neuro and ophthalmology    # Depression  - Continue Amitriptyline    # tobacco abuse disorder  - Patient has reduced smoking from 1.5 ppd to 1 pack every 2 weeks  - The patient is an active smoker. The patient was advised to quit. Declined nicotine patches/gums.     dvt ppx: Continue heparin

## 2022-10-27 NOTE — CONSULT NOTE ADULT - SUBJECTIVE AND OBJECTIVE BOX
NEPHROLOGY CONSULTATION    CHIEF COMPLAINT: CN VI palsy    HPI:  Pt is a 46 yo m with PMH of CKD IV, vertigo, diverticulitis s/p LAR, cigarette use, ETOH abuse now sober x 5 years, IDDM, HTN, AMBER, who presented to [facility] on 10/2 c/o dizziness, b/l diplopia, headache and chest tightness found to have /133 and elevated troponins concerning for NSTEMI. EKG w/ ST-T abnormalities without acute ST segment changes. At OSH BPs stabilized with IV hydralazine, CT head without acute pathology, MRI brain showed chronic L parasagittal infarcts. 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 Patient was recommended outpatient followup for neurological/ophthalmological concerns (specifically for OCT nerve/RNFL, Fresnel prisms) and was transferred to [facility] for a possible LHC. On admission to [facility] coronary CT showed moderate stenosis of the proximal LAD and RCA. Cath initially deferred as patient was pending nephrology clearance for BILLY. TTE largely wnl, bubble study negative. MRI brain with contrast showed chronic multifocal lacunar infarcts and chronic microvascular ischemic changes, corroborating MRI read from [facility]. Patient was evaluated by ophthalmology team for new onset R frontal headache and popping sensation, determined to have components hypertensive and diabetic retinopathy for which outpatient followup and possible trial of Fresnel prisms was recommended. Cluster headaches suspected given pattern of headache (R frontal, several days at a time, lacrimation). After improvement of BILLY, LHC was completed showing nonobstructive CAD without elevated filling pressures. Low-dose lisinopril was initiated for nephrotic-range proteinuria. Adm to AR at [facility] 10/20/22.    ROS:  as above    Allergies:  No Known Allergies    PAST MEDICAL & SURGICAL HISTORY:  Diabetes  Asthma  Diverticulitis  surgery 16yrs ago  High cholesterol  Dyslipidemia  Hypertension  H/O vertigo  Diabetic ulcer of right foot  Broken toe  left pinky toe  Vertigo  History of surgery  colon resection    SOCIAL HISTORY:  hx ETOH    FAMILY HISTORY:  Family history of hypertension (Father)    MEDICATIONS  (STANDING):  amitriptyline 10 milliGRAM(s) Oral at bedtime  amLODIPine   Tablet 10 milliGRAM(s) Oral daily  AQUAPHOR (petrolatum Ointment) 1 Application(s) Topical two times a day  aspirin enteric coated 81 milliGRAM(s) Oral daily  atorvastatin 40 milliGRAM(s) Oral at bedtime  budesonide  80 MICROgram(s)/formoterol 4.5 MICROgram(s) Inhaler 2 Puff(s) Inhalation two times a day  carvedilol 25 milliGRAM(s) Oral every 12 hours  cyanocobalamin 1000 MICROGram(s) Oral daily  dextrose 5%. 1000 milliLiter(s) (50 mL/Hr) IV Continuous <Continuous>  dextrose 5%. 1000 milliLiter(s) (100 mL/Hr) IV Continuous <Continuous>  dextrose 50% Injectable 25 Gram(s) IV Push once  dextrose 50% Injectable 12.5 Gram(s) IV Push once  dextrose 50% Injectable 25 Gram(s) IV Push once  fenofibrate Tablet 145 milliGRAM(s) Oral daily  glucagon  Injectable 1 milliGRAM(s) IntraMuscular once  heparin   Injectable 5000 Unit(s) SubCutaneous every 8 hours  insulin glargine Injectable (LANTUS) 20 Unit(s) SubCutaneous at bedtime  insulin lispro (ADMELOG) corrective regimen sliding scale   SubCutaneous three times a day before meals  insulin lispro (ADMELOG) corrective regimen sliding scale   SubCutaneous at bedtime  insulin lispro Injectable (ADMELOG) 2 Unit(s) SubCutaneous before breakfast  insulin lispro Injectable (ADMELOG) 2 Unit(s) SubCutaneous before lunch  insulin lispro Injectable (ADMELOG) 2 Unit(s) SubCutaneous before dinner  lisinopril 2.5 milliGRAM(s) Oral daily  multivitamin 1 Tablet(s) Oral daily  pantoprazole    Tablet 40 milliGRAM(s) Oral before breakfast  senna 2 Tablet(s) Oral at bedtime    Vital Signs Last 24 Hrs  T(C): 36.8 (10-27-22 @ 08:00), Max: 36.8 (10-26-22 @ 20:05)  T(F): 98.3 (10-27-22 @ 08:00), Max: 98.3 (10-26-22 @ 20:05)  HR: 71 (10-27-22 @ 09:16) (68 - 73)  BP: 95/66 (10-27-22 @ 09:16) (95/66 - 154/78)  BP(mean): 3 (10-26-22 @ 20:05) (3 - 3)  RR: 16 (10-27-22 @ 08:00) (15 - 16)  SpO2: 100% (10-27-22 @ 09:16) (97% - 100%)    LABS:                        10.6   7.17  )-----------( 192      ( 27 Oct 2022 05:45 )             31.2     10-27    143  |  108  |  73<H>  ----------------------------<  193<H>  4.8   |  27  |  3.64<H>    Ca    8.9      27 Oct 2022 05:45    TPro  6.7  /  Alb  3.1<L>  /  TBili  0.2  /  DBili  x   /  AST  19  /  ALT  26  /  AlkPhos  86  10-27    LIVER FUNCTIONS - ( 27 Oct 2022 05:45 )  Alb: 3.1 g/dL / Pro: 6.7 g/dL / ALK PHOS: 86 U/L / ALT: 26 U/L / AST: 19 U/L / GGT: x           A/P:    full consult to follow    [phone]     NEPHROLOGY CONSULTATION    CHIEF COMPLAINT: CN VI palsy    HPI:  Pt is a 48 yo m with PMH of CKD IV, vertigo, diverticulitis s/p LAR, cigarette use, ETOH abuse now sober x 5 years, IDDM, HTN, AMBER, who presented to [facility] on 10/2 c/o dizziness, b/l diplopia, headache and chest tightness found to have /133 and elevated troponins concerning for NSTEMI. EKG w/ ST-T abnormalities without acute ST segment changes. At OSH BPs stabilized with IV hydralazine, CT head without acute pathology, MRI brain showed chronic L parasagittal infarcts. 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 Patient was recommended outpatient followup for neurological/ophthalmological concerns (specifically for OCT nerve/RNFL, Fresnel prisms) and was transferred to [facility] for a possible LHC. On admission to [facility] coronary CT showed moderate stenosis of the proximal LAD and RCA. Cath initially deferred as patient was pending nephrology clearance for BILLY. TTE largely wnl, bubble study negative. MRI brain with contrast showed chronic multifocal lacunar infarcts and chronic microvascular ischemic changes, corroborating MRI read from [facility]. Patient was evaluated by ophthalmology team for new onset R frontal headache and popping sensation, determined to have components hypertensive and diabetic retinopathy for which outpatient followup and possible trial of Fresnel prisms was recommended. Cluster headaches suspected given pattern of headache (R frontal, several days at a time, lacrimation). After improvement of BILLY, LHC was completed showing nonobstructive CAD without elevated filling pressures. Low-dose lisinopril was initiated for nephrotic-range proteinuria. Adm to AR at [facility] 10/20/22. Awake, alert, no CP, SOB, N/V/D/C/F/C.    ROS:  as above    Allergies:  No Known Allergies    PAST MEDICAL & SURGICAL HISTORY:  Diabetes  Asthma  Diverticulitis  surgery 16yrs ago  High cholesterol  Dyslipidemia  Hypertension  H/O vertigo  Diabetic ulcer of right foot  Broken toe  left pinky toe  Vertigo  History of surgery  colon resection    SOCIAL HISTORY:  hx ETOH    FAMILY HISTORY:  Family history of hypertension (Father)    MEDICATIONS  (STANDING):  amitriptyline 10 milliGRAM(s) Oral at bedtime  amLODIPine   Tablet 10 milliGRAM(s) Oral daily  AQUAPHOR (petrolatum Ointment) 1 Application(s) Topical two times a day  aspirin enteric coated 81 milliGRAM(s) Oral daily  atorvastatin 40 milliGRAM(s) Oral at bedtime  budesonide  80 MICROgram(s)/formoterol 4.5 MICROgram(s) Inhaler 2 Puff(s) Inhalation two times a day  carvedilol 25 milliGRAM(s) Oral every 12 hours  cyanocobalamin 1000 MICROGram(s) Oral daily  dextrose 5%. 1000 milliLiter(s) (50 mL/Hr) IV Continuous <Continuous>  dextrose 5%. 1000 milliLiter(s) (100 mL/Hr) IV Continuous <Continuous>  dextrose 50% Injectable 25 Gram(s) IV Push once  dextrose 50% Injectable 12.5 Gram(s) IV Push once  dextrose 50% Injectable 25 Gram(s) IV Push once  fenofibrate Tablet 145 milliGRAM(s) Oral daily  glucagon  Injectable 1 milliGRAM(s) IntraMuscular once  heparin   Injectable 5000 Unit(s) SubCutaneous every 8 hours  insulin glargine Injectable (LANTUS) 20 Unit(s) SubCutaneous at bedtime  insulin lispro (ADMELOG) corrective regimen sliding scale   SubCutaneous three times a day before meals  insulin lispro (ADMELOG) corrective regimen sliding scale   SubCutaneous at bedtime  insulin lispro Injectable (ADMELOG) 2 Unit(s) SubCutaneous before breakfast  insulin lispro Injectable (ADMELOG) 2 Unit(s) SubCutaneous before lunch  insulin lispro Injectable (ADMELOG) 2 Unit(s) SubCutaneous before dinner  lisinopril 2.5 milliGRAM(s) Oral daily  multivitamin 1 Tablet(s) Oral daily  pantoprazole    Tablet 40 milliGRAM(s) Oral before breakfast  senna 2 Tablet(s) Oral at bedtime    Vital Signs Last 24 Hrs  T(C): 36.8 (10-27-22 @ 08:00), Max: 36.8 (10-26-22 @ 20:05)  T(F): 98.3 (10-27-22 @ 08:00), Max: 98.3 (10-26-22 @ 20:05)  HR: 71 (10-27-22 @ 09:16) (68 - 73)  BP: 95/66 (10-27-22 @ 09:16) (95/66 - 154/78)  BP(mean): 3 (10-26-22 @ 20:05) (3 - 3)  RR: 16 (10-27-22 @ 08:00) (15 - 16)  SpO2: 100% (10-27-22 @ 09:16) (97% - 100%)    s1s2  b/l air entry  soft, ND  no edema    LABS:                        10.6   7.17  )-----------( 192      ( 27 Oct 2022 05:45 )             31.2     10-27    143  |  108  |  73<H>  ----------------------------<  193<H>  4.8   |  27  |  3.64<H>    Ca    8.9      27 Oct 2022 05:45    TPro  6.7  /  Alb  3.1<L>  /  TBili  0.2  /  DBili  x   /  AST  19  /  ALT  26  /  AlkPhos  86  10-27    LIVER FUNCTIONS - ( 27 Oct 2022 05:45 )  Alb: 3.1 g/dL / Pro: 6.7 g/dL / ALK PHOS: 86 U/L / ALT: 26 U/L / AST: 19 U/L / GGT: x           A/P:    full consult to follow    [phone]     NEPHROLOGY CONSULTATION    CHIEF COMPLAINT: CN VI palsy    HPI:  Pt is a 46 yo m with PMH of CKD 3, vertigo, diverticulitis s/p LAR, cigarette use, ETOH abuse now sober x 5 years, IDDM, HTN, AMBER, who presented to [facility] on 10/2 c/o dizziness, b/l diplopia, headache and chest tightness found to have /133 and elevated troponins concerning for NSTEMI. EKG w/ ST-T abnormalities without acute ST segment changes. At OSH BPs stabilized with IV hydralazine, CT head without acute pathology, MRI brain showed chronic L parasagittal infarcts. 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 Patient was recommended outpatient followup for neurological/ophthalmological concerns (specifically for OCT nerve/RNFL, Fresnel prisms) and was transferred to [facility] for a possible LHC. On admission to [facility] coronary CT showed moderate stenosis of the proximal LAD and RCA. Cath initially deferred as patient was pending nephrology clearance for BILLY. TTE largely wnl, bubble study negative. MRI brain with contrast showed chronic multifocal lacunar infarcts and chronic microvascular ischemic changes, corroborating MRI read from [facility]. Patient was evaluated by ophthalmology team for new onset R frontal headache and popping sensation, determined to have components hypertensive and diabetic retinopathy for which outpatient followup and possible trial of Fresnel prisms was recommended. Cluster headaches suspected given pattern of headache (R frontal, several days at a time, lacrimation). After improvement of BILLY, LHC was completed showing nonobstructive CAD without elevated filling pressures. Low-dose lisinopril was initiated for nephrotic-range proteinuria. Adm to AR at [facility] 10/20/22. Awake, alert, no CP, SOB, N/V/D/C/F/C.    ROS:  as above    Allergies:  No Known Allergies    PAST MEDICAL & SURGICAL HISTORY:  Diabetes  Asthma  Diverticulitis  surgery 16yrs ago  High cholesterol  Dyslipidemia  Hypertension  H/O vertigo  Diabetic ulcer of right foot  Broken toe  left pinky toe  Vertigo  History of surgery  colon resection    SOCIAL HISTORY:  hx ETOH    FAMILY HISTORY:  Family history of hypertension (Father)    MEDICATIONS  (STANDING):  amitriptyline 10 milliGRAM(s) Oral at bedtime  amLODIPine   Tablet 10 milliGRAM(s) Oral daily  AQUAPHOR (petrolatum Ointment) 1 Application(s) Topical two times a day  aspirin enteric coated 81 milliGRAM(s) Oral daily  atorvastatin 40 milliGRAM(s) Oral at bedtime  budesonide  80 MICROgram(s)/formoterol 4.5 MICROgram(s) Inhaler 2 Puff(s) Inhalation two times a day  carvedilol 25 milliGRAM(s) Oral every 12 hours  cyanocobalamin 1000 MICROGram(s) Oral daily  dextrose 5%. 1000 milliLiter(s) (50 mL/Hr) IV Continuous <Continuous>  dextrose 5%. 1000 milliLiter(s) (100 mL/Hr) IV Continuous <Continuous>  dextrose 50% Injectable 25 Gram(s) IV Push once  dextrose 50% Injectable 12.5 Gram(s) IV Push once  dextrose 50% Injectable 25 Gram(s) IV Push once  fenofibrate Tablet 145 milliGRAM(s) Oral daily  glucagon  Injectable 1 milliGRAM(s) IntraMuscular once  heparin   Injectable 5000 Unit(s) SubCutaneous every 8 hours  insulin glargine Injectable (LANTUS) 20 Unit(s) SubCutaneous at bedtime  insulin lispro (ADMELOG) corrective regimen sliding scale   SubCutaneous three times a day before meals  insulin lispro (ADMELOG) corrective regimen sliding scale   SubCutaneous at bedtime  insulin lispro Injectable (ADMELOG) 2 Unit(s) SubCutaneous before breakfast  insulin lispro Injectable (ADMELOG) 2 Unit(s) SubCutaneous before lunch  insulin lispro Injectable (ADMELOG) 2 Unit(s) SubCutaneous before dinner  lisinopril 2.5 milliGRAM(s) Oral daily  multivitamin 1 Tablet(s) Oral daily  pantoprazole    Tablet 40 milliGRAM(s) Oral before breakfast  senna 2 Tablet(s) Oral at bedtime    Vital Signs Last 24 Hrs  T(C): 36.8 (10-27-22 @ 08:00), Max: 36.8 (10-26-22 @ 20:05)  T(F): 98.3 (10-27-22 @ 08:00), Max: 98.3 (10-26-22 @ 20:05)  HR: 71 (10-27-22 @ 09:16) (68 - 73)  BP: 95/66 (10-27-22 @ 09:16) (95/66 - 154/78)  BP(mean): 3 (10-26-22 @ 20:05) (3 - 3)  RR: 16 (10-27-22 @ 08:00) (15 - 16)  SpO2: 100% (10-27-22 @ 09:16) (97% - 100%)    s1s2  b/l air entry  soft, ND  no edema    LABS:                        10.6   7.17  )-----------( 192      ( 27 Oct 2022 05:45 )             31.2     10-27    143  |  108  |  73<H>  ----------------------------<  193<H>  4.8   |  27  |  3.64<H>    Ca    8.9      27 Oct 2022 05:45    TPro  6.7  /  Alb  3.1<L>  /  TBili  0.2  /  DBili  x   /  AST  19  /  ALT  26  /  AlkPhos  86  10-27    LIVER FUNCTIONS - ( 27 Oct 2022 05:45 )  Alb: 3.1 g/dL / Pro: 6.7 g/dL / ALK PHOS: 86 U/L / ALT: 26 U/L / AST: 19 U/L / GGT: x           A/P:    MMP as per HPI  Progressive renal disease (Cr 1.3 - 5/26/21, Cr 1.7 - 3/22/22, Cr 2.2 - 10/6/22)  S/p CT w/IV dye 10/4 and 10/6  Contrast BILLY/CKD 3 w/Cr still going up   S/p kidney biopsy 10/25/22 c/w advanced DM nephropathy w/advanced chronicity  Given rising Cr, will d/c ACE  Avoid nephrotoxins  Avoid hypotension  Will f/u CBC, BMP  Rehab    [phone]

## 2022-10-27 NOTE — PROGRESS NOTE ADULT - ASSESSMENT
This is a 48 YO male with PMHx of CKD IV, vertigo, diverticulitis s/p LAR, cigarette use, ETOH abuse now sober x5 years, IDDM, HTN, AMBER, who presented to [facility] on 10/2 c/o dizziness, b/l diplopia, headache and chest tightness found to have /133 and elevated troponin concerning for NSTEMI. CTH negative for acute pathology. MRI Brain showed L parasagittal infarcts. Per, neurology and opthalmology CN VI palsy. Patient  was transferred to [facility] for a possible LHC. LHC showed nonobstructive CAD without elevated filling pressures. Hospital course significant for BILLY, right frontal headache and popping sensation, determined to have components hypertensive and diabetic retinopathy for which outpatient followup and possible trial of Fresnel prisms was recommended. Patient now with gait Instability, ADL impairments and Functional impairments.    # Functional Deficits s/p CVA  - MRI with L parasagittal infarcts, multiple chronic lacunar infarcts  - Continue statin. ASA on hold for renal bx   -  Comprehensive Rehab Program: PT/OT/ST, 3hours daily and 5 days weekly  - PT: Focused on improving strength, endurance, coordination, balance, functional mobility, and transfers  - OT: Focused on improving strength, fine motor skills, coordination, posture and ADLs.    - ST: to diagnose and treat deficits in swallowing, cognition and communication.     #NTEMI #CAD  - LHC showed nonobstructive CAD without elevated filling pressures  - Continue statin, fenofibrate    #BILLY on CKD st. IV likely due to diabetic nephropathy  # Nephrotic range proteinuria  - baseline Cr 2.4  - BUN/Cr 63/3.31 10/26> 73/3.6 10/27  - IR biopsy completed 10/25- await results  - per renal at [facility], now on torsemide and low dose ace-i  -Monitor BUN/Cr  -Renally dose meds  -Workup ongoing, renal consult routinely      #HTN, presented with hypertensive emergency at [facility]  - Renal initiated secondary work up at OSH: dania not elevated, f/u plasma renin activity, metanephrines  - Lisinopril 2.5mg daily  - Carvedilol 25mg BID  - BP low - timing of meds adjusted    #HLD  - Lipitor 40mg daily  - Fenofibrate 145mg daily    #Diplopia  - OP f/u with neuro-opthalmology  - consistent with lateral rectus palsy  - outpatient f/u with retina specialist Dr. Jose Angel Arvizu for OCT nerve/RNFL for evaluation for diabetic and hypertensive retinopathy; if diplopia/palsy persists should continue outpatient followup for Fresnel prisms  Alternate patch eyes during the day    #Cluster Headache  - Amitriptyline 10mg QHS    #AMBER  - c/w inhalers: Albuterol, Symbicort  - c/w nasal spray    #DM II  - ISS and FS  - Admelog and Lantus  - A1c 9.9 on 10/5  Diabetic nursing for training- recs appreciated  - see discharge recommendations in 10/26 diabetic educator note    #Pain management  - Tylenol PRN    #DVT ppx  - Heparin    #GI ppx  - Protonix 40mg    #Bowel Regimen  - Senna  moved bowels today    #Bladder management  - BS on admission, and q 8 hours (SC if > 400)  - Monitor UO  PVRs low    #FEN   - Diet: DASH    #Onychomycosis  -Consider podiatry consult    #Skin:  - Skin on admission: Aquaphor to dry skin BID- latrice both feet  - Pressure injury/Skin: Turn Q2hrs while in bed, OOB to Chair, PT/OT     #Dysphagia    - SLP: evaluation and treatment    #Mood/Cognition:  - Neuropsychology consult PRN    #Sleep:   - Maintain quiet hours and low stim environment.  - Melatonin PRN to maximize participation in therapy during the day.     #Precaution  - Fall, Aspiration, cardiac

## 2022-10-27 NOTE — CHART NOTE - NSCHARTNOTEFT_GEN_A_CORE
Nutrition Follow Up Note  Hospital Course   (Per Electronic Medical Record)    Patient Speaks Albanian, RD is Fluent in Albanian     Source:  Patient [X]  Medical Record [X]      Diet:   Consistent Carbohydrate DASH-TLC Diet w/ Thin Liquids (IDDSI Level 0)  Tolerates Diet Consistency Well  No Chewing/Swallowing Difficulties  No Recent Nausea, Vomiting, Diarrhea or Constipation (as Per Patient)  Consumes % of Meals (as Per Documentation) - Good PO Intake/Appetite   Nutrition Education Provided on Consistent Carbohydrate Diet  Obtained Food Preferences from Patient    Enteral/Parenteral Nutrition: Not Applicable    Current Weight: 179.8lb on 10/25  Obtain New Weight to Confirm  Obtain Weights Weekly     Pertinent Medications: MEDICATIONS  (STANDING):  amitriptyline 10 milliGRAM(s) Oral at bedtime  amLODIPine   Tablet 10 milliGRAM(s) Oral daily  AQUAPHOR (petrolatum Ointment) 1 Application(s) Topical two times a day  aspirin enteric coated 81 milliGRAM(s) Oral daily  atorvastatin 40 milliGRAM(s) Oral at bedtime  budesonide  80 MICROgram(s)/formoterol 4.5 MICROgram(s) Inhaler 2 Puff(s) Inhalation two times a day  carvedilol 25 milliGRAM(s) Oral every 12 hours  cyanocobalamin 1000 MICROGram(s) Oral daily  dextrose 5%. 1000 milliLiter(s) (50 mL/Hr) IV Continuous <Continuous>  dextrose 5%. 1000 milliLiter(s) (100 mL/Hr) IV Continuous <Continuous>  dextrose 50% Injectable 25 Gram(s) IV Push once  dextrose 50% Injectable 12.5 Gram(s) IV Push once  dextrose 50% Injectable 25 Gram(s) IV Push once  fenofibrate Tablet 145 milliGRAM(s) Oral daily  glucagon  Injectable 1 milliGRAM(s) IntraMuscular once  heparin   Injectable 5000 Unit(s) SubCutaneous every 8 hours  insulin glargine Injectable (LANTUS) 20 Unit(s) SubCutaneous at bedtime  insulin lispro (ADMELOG) corrective regimen sliding scale   SubCutaneous three times a day before meals  insulin lispro (ADMELOG) corrective regimen sliding scale   SubCutaneous at bedtime  insulin lispro Injectable (ADMELOG) 2 Unit(s) SubCutaneous before breakfast  insulin lispro Injectable (ADMELOG) 2 Unit(s) SubCutaneous before lunch  insulin lispro Injectable (ADMELOG) 2 Unit(s) SubCutaneous before dinner  lisinopril 2.5 milliGRAM(s) Oral daily  multivitamin 1 Tablet(s) Oral daily  pantoprazole    Tablet 40 milliGRAM(s) Oral before breakfast  senna 2 Tablet(s) Oral at bedtime    MEDICATIONS  (PRN):  acetaminophen     Tablet .. 650 milliGRAM(s) Oral every 6 hours PRN Temp greater or equal to 38C (100.4F), Mild Pain (1 - 3)  ALBUTerol    90 MICROgram(s) HFA Inhaler 2 Puff(s) Inhalation every 6 hours PRN Shortness of Breath and/or Wheezing  Biotene Dry Mouth Oral Rinse 15 milliLiter(s) Swish and Spit three times a day PRN Mouth Care  dextrose Oral Gel 15 Gram(s) Oral once PRN Blood Glucose LESS THAN 70 milliGRAM(s)/deciliter  simethicone 80 milliGRAM(s) Chew daily PRN Gas  sodium chloride 0.65% Nasal 1 Spray(s) Both Nostrils every 1 hour PRN Nasal Congestion    Pertinent Labs:  10-27 Na143 mmol/L Glu 193 mg/dL<H> K+ 4.8 mmol/L Cr  3.64 mg/dL<H> BUN 73 mg/dL<H> 10-27 Alb 3.1 g/dL<L>    POCT (over Last 3 Days) - Ranging from 128-287    Skin: No Pressure Ulcers - Previous Wound Healed    Edema: None Noted (as Per Documentation)     Last Bowel Movement: on 10/26    Estimated Needs:   [X] No Change Since Previous Assessment    Previous Nutrition Diagnosis:   Altered Nutrition Related Labwork     Nutrition Diagnosis is [X] Ongoing - Nutrition Education Provided on Consistent Carbohydrate Diet    New Nutrition Diagnosis: [X] Not Applicable    Interventions:   1. Nutrition Education Provided on Consistent Carbohydrate Diet  2. Recommend Continue Nutrition Plan of Care     Monitoring & Evaluation:   [X] Weights   [X] PO Intake   [X] Skin Integrity   [X] Follow Up (Per Protocol)  [X] Tolerance to Diet Prescription   [X] Other: Labs & POCT    Registered Dietitian/Nutritionist Remains Available.  Thomas Gonzalez RDN    Phone# [phone]

## 2022-10-27 NOTE — PROGRESS NOTE ADULT - SUBJECTIVE AND OBJECTIVE BOX
Patient is a 47y old  Male who presents with a chief complaint of Multiple CVA (26 Oct 2022 14:26)      Patient seen and examined at bedside.  No overnight events  Didn't sleep well    ALLERGIES:  No Known Allergies    MEDICATIONS  (STANDING):  amitriptyline 10 milliGRAM(s) Oral at bedtime  amLODIPine   Tablet 10 milliGRAM(s) Oral daily  AQUAPHOR (petrolatum Ointment) 1 Application(s) Topical two times a day  aspirin enteric coated 81 milliGRAM(s) Oral daily  atorvastatin 40 milliGRAM(s) Oral at bedtime  budesonide  80 MICROgram(s)/formoterol 4.5 MICROgram(s) Inhaler 2 Puff(s) Inhalation two times a day  carvedilol 25 milliGRAM(s) Oral every 12 hours  cyanocobalamin 1000 MICROGram(s) Oral daily  dextrose 5%. 1000 milliLiter(s) (50 mL/Hr) IV Continuous <Continuous>  dextrose 5%. 1000 milliLiter(s) (100 mL/Hr) IV Continuous <Continuous>  dextrose 50% Injectable 25 Gram(s) IV Push once  dextrose 50% Injectable 12.5 Gram(s) IV Push once  dextrose 50% Injectable 25 Gram(s) IV Push once  fenofibrate Tablet 145 milliGRAM(s) Oral daily  glucagon  Injectable 1 milliGRAM(s) IntraMuscular once  heparin   Injectable 5000 Unit(s) SubCutaneous every 8 hours  insulin glargine Injectable (LANTUS) 20 Unit(s) SubCutaneous at bedtime  insulin lispro (ADMELOG) corrective regimen sliding scale   SubCutaneous three times a day before meals  insulin lispro (ADMELOG) corrective regimen sliding scale   SubCutaneous at bedtime  insulin lispro Injectable (ADMELOG) 2 Unit(s) SubCutaneous before breakfast  insulin lispro Injectable (ADMELOG) 2 Unit(s) SubCutaneous before lunch  insulin lispro Injectable (ADMELOG) 2 Unit(s) SubCutaneous before dinner  lisinopril 2.5 milliGRAM(s) Oral daily  multivitamin 1 Tablet(s) Oral daily  pantoprazole    Tablet 40 milliGRAM(s) Oral before breakfast  senna 2 Tablet(s) Oral at bedtime    MEDICATIONS  (PRN):  acetaminophen     Tablet .. 650 milliGRAM(s) Oral every 6 hours PRN Temp greater or equal to 38C (100.4F), Mild Pain (1 - 3)  ALBUTerol    90 MICROgram(s) HFA Inhaler 2 Puff(s) Inhalation every 6 hours PRN Shortness of Breath and/or Wheezing  Biotene Dry Mouth Oral Rinse 15 milliLiter(s) Swish and Spit three times a day PRN Mouth Care  dextrose Oral Gel 15 Gram(s) Oral once PRN Blood Glucose LESS THAN 70 milliGRAM(s)/deciliter  simethicone 80 milliGRAM(s) Chew daily PRN Gas  sodium chloride 0.65% Nasal 1 Spray(s) Both Nostrils every 1 hour PRN Nasal Congestion    Vital Signs Last 24 Hrs  T(F): 98.3 (26 Oct 2022 20:05), Max: 98.3 (26 Oct 2022 20:05)  HR: 71 (27 Oct 2022 09:16) (68 - 73)  BP: 95/66 (27 Oct 2022 09:16) (95/66 - 154/78)  RR: 15 (26 Oct 2022 20:05) (15 - 15)  SpO2: 100% (27 Oct 2022 09:16) (98% - 100%)  I&O's Summary    BMI (kg/m2): 28.2 (10-25-22 @ 07:07)    PHYSICAL EXAM:  GENERAL: NAD  HENT:  Atraumatic, Normocephalic; No tonsillar erythema, exudates, or enlargement; Moist mucous membranes;   EYES: PERRLA, conjunctiva and sclera clear, no lid-lag  NECK: Supple, No JVD, Normal thyroid  CHEST/LUNG: Clear to percussion bilaterally; No rales, rhonchi, wheezing, or rubs; normal respiratory effort, no intercostal retractions  HEART: Regular rate and rhythm; No murmurs, rubs, or gallops; No pitting edema  ABDOMEN: Soft, Nontender, Nondistended; Bowel sounds present; No HSM  MUSCULOSKELETAL/EXTREMITIES:  2+ Peripheral Pulses, No clubbing or digital cyanosis  PSYCH: Appropriate affect, Alert & Awake; Good judgement    LABS:                        10.6   7.17  )-----------( 192      ( 27 Oct 2022 05:45 )             31.2       10-27    143  |  108  |  73  ----------------------------<  193  4.8   |  27  |  3.64    Ca    8.9      27 Oct 2022 05:45    TPro  6.7  /  Alb  3.1  /  TBili  0.2  /  DBili  x   /  AST  19  /  ALT  26  /  AlkPhos  86  10-27     POCT Blood Glucose.: 175 mg/dL (27 Oct 2022 07:58)  POCT Blood Glucose.: 217 mg/dL (26 Oct 2022 21:59)  POCT Blood Glucose.: 164 mg/dL (26 Oct 2022 17:04)  POCT Blood Glucose.: 128 mg/dL (26 Oct 2022 12:12)    COVID-19 PCR: NotDetec (10-24-22 @ 14:05)  COVID-19 PCR: NotDetec (10-23-22 @ 06:20)  COVID-19 PCR: NotDetec (10-20-22 @ 14:41)  COVID-19 PCR: NotDetec (10-17-22 @ 07:40)  COVID-19 PCR: NotDetec (10-13-22 @ 15:08)      Care Discussed with Rehab Attending and Other Providers

## 2022-10-27 NOTE — PROGRESS NOTE ADULT - SUBJECTIVE AND OBJECTIVE BOX
chief complaint- headaches, double vision, poor balance    This is a 46 YO male with PMHx of CKD IV, vertigo, diverticulitis s/p LAR, cigarette use, ETOH abuse now sober x5 years, IDDM, HTN, AMBER, who presented to [facility] on 10/2 c/o dizziness, b/l diplopia, headache and chest tightness found to have /133 and elevated troponins concerning for NSTEMI. EKG w/ ST-T abnormalities without acute ST segment changes. At OSH BPs stabilized with IV hydralazine, CT head without acute pathology, MRI brain showed chronic L parasagittal infarcts. 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 Patient was recommended outpatient followup for neurological/ophthalmological concerns (specifically for OCT nerve/RNFL, Fresnel prisms) and was transferred to [facility] for a possible LHC.    On admission to [facility] coronary CT showed moderate stenosis of the proximal LAD and RCA. Cath initially deferred as patient was pending nephrology clearance for BILLY. TTE largely wnl, bubble study negative. MRI brain with contrast showed chronic multifocal lacunar infarcts and chronic microvascular ischemic changes, corroborating MRI read from [facility]. Patient was evaluated by ophthalmology team for new onset R frontal headache and popping sensation, determined to have components hypertensive and diabetic retinopathy for which outpatient followup and possible trial of Fresnel prisms was recommended. Cluster headaches suspected given pattern of headache (R frontal, several days at a time, lacrimation). Patient was also evaluated by PT/OT who recommended CANDY. Speech pathology eval was notable for cognitive linguistic deficits with concern for early onset dementia given a family hx (mother diagnosed @44yo). After improvement of BILLY, LHC was completed showing nonobstructive CAD without elevated filling pressures. Lateral rectus palsy was noted to improve slightly towards end of admission with ability to abduct to ~10-15 degrees. Low-dose lisinopril was initiated for nephrotic-range proteinuria.  Patient was monitored for resolution of BILLY and is now hemodynamically stable   Patient was evaluated by PM&R and therapy for functional deficits, gait/ADL impairments and acute rehabilitation was recommended. Patient was medically optimized for discharge to [facility]U on 10/20/22.    Today's Subjective:  Patient seen and assessed.    ROS:  dizzy today- orthostatic in OT  received all BP meds this AM with syst BP 8AM 114  no nausea, no vomiting  no SOB, no chest pain  BM yesterday        MEDICATIONS  (STANDING):  amitriptyline 10 milliGRAM(s) Oral at bedtime  amLODIPine   Tablet 10 milliGRAM(s) Oral daily  AQUAPHOR (petrolatum Ointment) 1 Application(s) Topical two times a day  aspirin enteric coated 81 milliGRAM(s) Oral daily  atorvastatin 40 milliGRAM(s) Oral at bedtime  budesonide  80 MICROgram(s)/formoterol 4.5 MICROgram(s) Inhaler 2 Puff(s) Inhalation two times a day  carvedilol 25 milliGRAM(s) Oral every 12 hours  cyanocobalamin 1000 MICROGram(s) Oral daily  dextrose 5%. 1000 milliLiter(s) (50 mL/Hr) IV Continuous <Continuous>  dextrose 5%. 1000 milliLiter(s) (100 mL/Hr) IV Continuous <Continuous>  dextrose 50% Injectable 25 Gram(s) IV Push once  dextrose 50% Injectable 12.5 Gram(s) IV Push once  dextrose 50% Injectable 25 Gram(s) IV Push once  fenofibrate Tablet 145 milliGRAM(s) Oral daily  glucagon  Injectable 1 milliGRAM(s) IntraMuscular once  heparin   Injectable 5000 Unit(s) SubCutaneous every 8 hours  insulin glargine Injectable (LANTUS) 20 Unit(s) SubCutaneous at bedtime  insulin lispro (ADMELOG) corrective regimen sliding scale   SubCutaneous three times a day before meals  insulin lispro (ADMELOG) corrective regimen sliding scale   SubCutaneous at bedtime  insulin lispro Injectable (ADMELOG) 2 Unit(s) SubCutaneous before breakfast  insulin lispro Injectable (ADMELOG) 2 Unit(s) SubCutaneous before lunch  insulin lispro Injectable (ADMELOG) 2 Unit(s) SubCutaneous before dinner  lisinopril 2.5 milliGRAM(s) Oral <User Schedule>  multivitamin 1 Tablet(s) Oral daily  pantoprazole    Tablet 40 milliGRAM(s) Oral before breakfast  senna 2 Tablet(s) Oral at bedtime    MEDICATIONS  (PRN):  acetaminophen     Tablet .. 650 milliGRAM(s) Oral every 6 hours PRN Temp greater or equal to 38C (100.4F), Mild Pain (1 - 3)  ALBUTerol    90 MICROgram(s) HFA Inhaler 2 Puff(s) Inhalation every 6 hours PRN Shortness of Breath and/or Wheezing  Biotene Dry Mouth Oral Rinse 15 milliLiter(s) Swish and Spit three times a day PRN Mouth Care  dextrose Oral Gel 15 Gram(s) Oral once PRN Blood Glucose LESS THAN 70 milliGRAM(s)/deciliter  simethicone 80 milliGRAM(s) Chew daily PRN Gas  sodium chloride 0.65% Nasal 1 Spray(s) Both Nostrils every 1 hour PRN Nasal Congestion                            10.6   7.17  )-----------( 192      ( 27 Oct 2022 05:45 )             31.2     10-27    143  |  108  |  73<H>  ----------------------------<  193<H>  4.8   |  27  |  3.64<H>    Ca    8.9      27 Oct 2022 05:45    TPro  6.7  /  Alb  3.1<L>  /  TBili  0.2  /  DBili  x   /  AST  19  /  ALT  26  /  AlkPhos  86  10-27        CAPILLARY BLOOD GLUCOSE      POCT Blood Glucose.: 194 mg/dL (27 Oct 2022 11:47)  POCT Blood Glucose.: 175 mg/dL (27 Oct 2022 07:58)  POCT Blood Glucose.: 217 mg/dL (26 Oct 2022 21:59)  POCT Blood Glucose.: 164 mg/dL (26 Oct 2022 17:04)      Vital Signs Last 24 Hrs  T(C): 36.8 (27 Oct 2022 08:00), Max: 36.8 (26 Oct 2022 20:05)  T(F): 98.3 (27 Oct 2022 08:00), Max: 98.3 (26 Oct 2022 20:05)  HR: 71 (27 Oct 2022 09:16) (68 - 73)  BP: 95/66 (27 Oct 2022 09:16) (95/66 - 154/78)  BP(mean): 3 (26 Oct 2022 20:05) (3 - 3)  RR: 16 (27 Oct 2022 08:00) (15 - 16)  SpO2: 100% (27 Oct 2022 09:16) (97% - 100%)    Parameters below as of 27 Oct 2022 09:16  Patient On (Oxygen Delivery Method): room air          PHYSICAL EXAM    Constitutional - NAD, Comfortable eye patch on  HEENT - R 6th palsy persists  Chest - good chest expansion, good respiratory effort, CTAB   Cardio - warm and well perfused, RRR, no murmur  Abdomen -  Soft, NTND  Extremities 2 + edema LES  Neurologic Exam:                    Cognitive -             Orientation: Awake, Alert, AAOx3        Speech - Fluent, Comprehensible, No dysarthria, No aphasia      Cranial Nerves - No facial asymmetry, Tongue midline, Shoulder shrug intact +R lateral gaze palsy     Motor -                      LEFT    UE - ShAB 5/5, EF 5/5, EE 5/5, WE 5/5,  WNL                    RIGHT UE - ShAB 5/5, EF 5/5, EE 5/5, WE 5/5,  WNL                    LEFT    LE - HF 4+/5, KE 5/5, DF 5/5, PF 5/5                    RIGHT LE - HF 4+/5, KE 5/5, DF 5/5, PF 5/5        Sensory - Diminished to LT glove stocking distribution     Reflexes - DTR intact  Psychiatric - Mood stable, Affect WNL      IDT Meeting 10/25/22:  EDOD: 11/3    Barriers: visual acuity, proprioception and cog deficits, fall risk    SW:   Lives in basement apt with 5 SCOTTIE with 1 HR, and 6 steps down  Brother is emergency contact, unclear if able to assist as cares for father    OT:  CG to SV for all ADLs and transfers  Goals are mod I for all except SV for bathing and tub/shower transfers    PT:  Transfers with CG  Ambulating 100ft with RW and SV    SLP:  Reg/thins  Mild expressive and receptive aphasia, language and word finding deficits  Ataxic dysarthria  History of poor medical management  Safety concerns.

## 2022-10-28 LAB
ANION GAP SERPL CALC-SCNC: 7 MMOL/L — SIGNIFICANT CHANGE UP (ref 5–17)
BUN SERPL-MCNC: 73 MG/DL — HIGH (ref 7–23)
CALCIUM SERPL-MCNC: 9.3 MG/DL — SIGNIFICANT CHANGE UP (ref 8.4–10.5)
CHLORIDE SERPL-SCNC: 107 MMOL/L — SIGNIFICANT CHANGE UP (ref 96–108)
CO2 SERPL-SCNC: 29 MMOL/L — SIGNIFICANT CHANGE UP (ref 22–31)
CREAT SERPL-MCNC: 3.2 MG/DL — HIGH (ref 0.5–1.3)
EGFR: 23 ML/MIN/1.73M2 — LOW
GLUCOSE BLDC GLUCOMTR-MCNC: 148 MG/DL — HIGH (ref 70–99)
GLUCOSE BLDC GLUCOMTR-MCNC: 170 MG/DL — HIGH (ref 70–99)
GLUCOSE BLDC GLUCOMTR-MCNC: 190 MG/DL — HIGH (ref 70–99)
GLUCOSE BLDC GLUCOMTR-MCNC: 192 MG/DL — HIGH (ref 70–99)
GLUCOSE SERPL-MCNC: 148 MG/DL — HIGH (ref 70–99)
POTASSIUM SERPL-MCNC: 4.5 MMOL/L — SIGNIFICANT CHANGE UP (ref 3.5–5.3)
POTASSIUM SERPL-SCNC: 4.5 MMOL/L — SIGNIFICANT CHANGE UP (ref 3.5–5.3)
SARS-COV-2 RNA SPEC QL NAA+PROBE: SIGNIFICANT CHANGE UP
SODIUM SERPL-SCNC: 143 MMOL/L — SIGNIFICANT CHANGE UP (ref 135–145)

## 2022-10-28 PROCEDURE — 99232 SBSQ HOSP IP/OBS MODERATE 35: CPT

## 2022-10-28 RX ADMIN — BUDESONIDE AND FORMOTEROL FUMARATE DIHYDRATE 2 PUFF(S): 160; 4.5 AEROSOL RESPIRATORY (INHALATION) at 20:06

## 2022-10-28 RX ADMIN — HEPARIN SODIUM 5000 UNIT(S): 5000 INJECTION INTRAVENOUS; SUBCUTANEOUS at 05:54

## 2022-10-28 RX ADMIN — HEPARIN SODIUM 5000 UNIT(S): 5000 INJECTION INTRAVENOUS; SUBCUTANEOUS at 21:19

## 2022-10-28 RX ADMIN — SENNA PLUS 2 TABLET(S): 8.6 TABLET ORAL at 21:20

## 2022-10-28 RX ADMIN — Medication 2: at 17:48

## 2022-10-28 RX ADMIN — Medication 81 MILLIGRAM(S): at 11:21

## 2022-10-28 RX ADMIN — HEPARIN SODIUM 5000 UNIT(S): 5000 INJECTION INTRAVENOUS; SUBCUTANEOUS at 14:34

## 2022-10-28 RX ADMIN — Medication 10 MILLIGRAM(S): at 21:19

## 2022-10-28 RX ADMIN — Medication 2 UNIT(S): at 08:13

## 2022-10-28 RX ADMIN — Medication 2 UNIT(S): at 12:06

## 2022-10-28 RX ADMIN — ATORVASTATIN CALCIUM 40 MILLIGRAM(S): 80 TABLET, FILM COATED ORAL at 21:19

## 2022-10-28 RX ADMIN — PREGABALIN 1000 MICROGRAM(S): 225 CAPSULE ORAL at 11:21

## 2022-10-28 RX ADMIN — CARVEDILOL PHOSPHATE 12.5 MILLIGRAM(S): 80 CAPSULE, EXTENDED RELEASE ORAL at 17:47

## 2022-10-28 RX ADMIN — Medication 1 TABLET(S): at 11:21

## 2022-10-28 RX ADMIN — INSULIN GLARGINE 20 UNIT(S): 100 INJECTION, SOLUTION SUBCUTANEOUS at 21:33

## 2022-10-28 RX ADMIN — CARVEDILOL PHOSPHATE 12.5 MILLIGRAM(S): 80 CAPSULE, EXTENDED RELEASE ORAL at 05:52

## 2022-10-28 RX ADMIN — Medication 2 UNIT(S): at 17:48

## 2022-10-28 RX ADMIN — Medication 1 APPLICATION(S): at 05:52

## 2022-10-28 RX ADMIN — Medication 2: at 08:13

## 2022-10-28 RX ADMIN — PANTOPRAZOLE SODIUM 40 MILLIGRAM(S): 20 TABLET, DELAYED RELEASE ORAL at 05:54

## 2022-10-28 NOTE — PROGRESS NOTE ADULT - ASSESSMENT
This is a 48 YO male with PMHx of CKD IV, vertigo, diverticulitis s/p LAR, cigarette use, ETOH abuse now sober x5 years, IDDM, HTN, AMBER, who presented to [facility] on 10/2 c/o dizziness, b/l diplopia, headache and chest tightness found to have /133 and elevated troponin concerning for NSTEMI. CTH negative for acute pathology. MRI Brain showed L parasagittal infarcts. Per, neurology and opthalmology CN VI palsy. Patient  was transferred to [facility] for a possible LHC. LHC showed nonobstructive CAD without elevated filling pressures. Hospital course significant for BILLY, right frontal headache and popping sensation, determined to have components hypertensive and diabetic retinopathy for which outpatient followup and possible trial of Fresnel prisms was recommended. Patient now with gait Instability, ADL impairments and Functional impairments.    # Functional Deficits s/p CVA  - MRI with L parasagittal infarcts, multiple chronic lacunar infarcts  - Continue statin. ASA resumed, s/p renal bx  -  Comprehensive Rehab Program: PT/OT/ST, 3hours daily and 5 days weekly  - PT: Focused on improving strength, endurance, coordination, balance, functional mobility, and transfers  - OT: Focused on improving strength, fine motor skills, coordination, posture and ADLs.    - ST: to diagnose and treat deficits in swallowing, cognition and communication.     #NTEMI #CAD  - LHC showed nonobstructive CAD without elevated filling pressures  - Continue statin, aspirin    #BILLY on CKD IV/ diabetic nephropathy  # Nephrotic range proteinuria  - BUN/Cr elevated-off ACE per renal  - IR biopsy completed 10/25- await results  -Monitor BUN/Cr  -Renally dose meds  -Workup ongoing      #HTN/ hypertensive emergency  - Renal initiated secondary work up at OSH: dania not elevated, f/u plasma renin activity, metanephrines  - Lisinopril 2.5mg daily-dc'd by renal for BILLY/CKD  - Carvedilol 25mg BID  - BP low - timing of meds adjusted    #HLD  - Lipitor 40mg daily  - Fenofibrate 145mg daily    #Diplopia  - OP f/u with neuro-opthalmology  - consistent with lateral rectus palsy  - outpatient f/u with retina specialist Dr. Jose Angel Arvizu for OCT nerve/RNFL for evaluation for diabetic and hypertensive retinopathy; if diplopia/palsy persists should continue outpatient followup for Fresnel prisms  Alternate patch eyes during the day    #Cluster Headache  - Amitriptyline 10mg QHS    #AMBER  - c/w inhalers: Albuterol, Symbicort  - c/w nasal spray    #DM II  - ISS and FS  - Admelog and Lantus  - A1c 9.9 on 10/5  Diabetic nursing for training- recs appreciated  - see discharge recommendations in 10/26 diabetic educator note    #Pain management  - Tylenol PRN    #DVT ppx  - Heparin    #GI ppx  - Protonix 40mg    #Bowel Regimen  - Senna  moved bowels    #Bladder management  - BS on admission, and q 8 hours (SC if > 400)  - Monitor UO  PVRs low    #FEN   - Diet: DASH    #Onychomycosis  -Consider podiatry consult    #Skin:  - Skin on admission: Aquaphor  - Pressure injury/Skin: Turn Q2hrs while in bed, OOB to Chair, PT/OT     #Dysphagia    - SLP: evaluation and treatment    #Mood/Cognition:  - Neuropsychology consult PRN    #Sleep:   - Maintain quiet hours and low stim environment.  - Melatonin PRN to maximize participation in therapy during the day.     #Precaution  - Fall, Aspiration, cardiac

## 2022-10-28 NOTE — PROGRESS NOTE ADULT - SUBJECTIVE AND OBJECTIVE BOX
HISTORY OF PRESENT ILLNESS  This is a 46 YO male with PMHx of CKD IV, vertigo, diverticulitis s/p LAR, cigarette use, ETOH abuse now sober x5 years, IDDM, HTN, AMBER, who presented to [facility] on 10/2 c/o dizziness, b/l diplopia, headache and chest tightness found to have /133 and elevated troponins concerning for NSTEMI. EKG w/ ST-T abnormalities without acute ST segment changes. At OSH BPs stabilized with IV hydralazine, CT head without acute pathology, MRI brain showed chronic L parasagittal infarcts. 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 Patient was recommended outpatient followup for neurological/ophthalmological concerns (specifically for OCT nerve/RNFL, Fresnel prisms) and was transferred to [facility] for a possible LHC.    On admission to [facility] coronary CT showed moderate stenosis of the proximal LAD and RCA. Cath initially deferred as patient was pending nephrology clearance for BILLY. TTE largely wnl, bubble study negative. MRI brain with contrast showed chronic multifocal lacunar infarcts and chronic microvascular ischemic changes, corroborating MRI read from [facility]. Patient was evaluated by ophthalmology team for new onset R frontal headache and popping sensation, determined to have components hypertensive and diabetic retinopathy for which outpatient followup and possible trial of Fresnel prisms was recommended. Cluster headaches suspected given pattern of headache (R frontal, several days at a time, lacrimation). Patient was also evaluated by PT/OT who recommended CANDY. Speech pathology eval was notable for cognitive linguistic deficits with concern for early onset dementia given a family hx (mother diagnosed @46yo). After improvement of BILLY, LHC was completed showing nonobstructive CAD without elevated filling pressures. Lateral rectus palsy was noted to improve slightly towards end of admission with ability to abduct to ~10-15 degrees. Low-dose lisinopril was initiated for nephrotic-range proteinuria.  Patient was monitored for resolution of BILLY and is now hemodynamically stable   Patient was evaluated by PM&R and therapy for functional deficits, gait/ADL impairments and acute rehabilitation was recommended. Patient was medically optimized for discharge to [facility] IRU on 10/20/22.      PAST MEDICAL & SURGICAL HISTORY:  Diabetes      Asthma      Diverticulitis  surgery 16yrs ago      High cholesterol      Dyslipidemia      Hypertension      H/O vertigo      Diabetic ulcer of right foot      Broken toe  left pinky toe      Vertigo      HTN (hypertension)      Diabetes      History of surgery  colon resection    VITALS  Vital Signs Last 24 Hrs  T(C): 36.6 (27 Oct 2022 20:35), Max: 36.6 (27 Oct 2022 20:35)  T(F): 97.9 (27 Oct 2022 20:35), Max: 97.9 (27 Oct 2022 20:35)  HR: 82 (28 Oct 2022 05:50) (70 - 82)  BP: 147/84 (28 Oct 2022 05:50) (147/84 - 154/82)  BP(mean): --  RR: 16 (27 Oct 2022 20:35) (16 - 16)  SpO2: 99% (27 Oct 2022 20:35) (99% - 99%)    Parameters below as of 27 Oct 2022 20:35  Patient On (Oxygen Delivery Method): room air      RECENT LABS                        10.6   7.17  )-----------( 192      ( 27 Oct 2022 05:45 )             31.2     10-28    143  |  107  |  73<H>  ----------------------------<  148<H>  4.5   |  29  |  3.20<H>    Ca    9.3      28 Oct 2022 06:34    TPro  6.7  /  Alb  3.1<L>  /  TBili  0.2  /  DBili  x   /  AST  19  /  ALT  26  /  AlkPhos  86  10-27      LIVER FUNCTIONS - ( 27 Oct 2022 05:45 )  Alb: 3.1 g/dL / Pro: 6.7 g/dL / ALK PHOS: 86 U/L / ALT: 26 U/L / AST: 19 U/L / GGT: x             CURRENT MEDICATIONS  MEDICATIONS  (STANDING):  amitriptyline 10 milliGRAM(s) Oral at bedtime  AQUAPHOR (petrolatum Ointment) 1 Application(s) Topical two times a day  aspirin enteric coated 81 milliGRAM(s) Oral daily  atorvastatin 40 milliGRAM(s) Oral at bedtime  budesonide  80 MICROgram(s)/formoterol 4.5 MICROgram(s) Inhaler 2 Puff(s) Inhalation two times a day  carvedilol 12.5 milliGRAM(s) Oral every 12 hours  cyanocobalamin 1000 MICROGram(s) Oral daily  dextrose 5%. 1000 milliLiter(s) (50 mL/Hr) IV Continuous <Continuous>  dextrose 5%. 1000 milliLiter(s) (100 mL/Hr) IV Continuous <Continuous>  dextrose 50% Injectable 25 Gram(s) IV Push once  dextrose 50% Injectable 12.5 Gram(s) IV Push once  dextrose 50% Injectable 25 Gram(s) IV Push once  glucagon  Injectable 1 milliGRAM(s) IntraMuscular once  heparin   Injectable 5000 Unit(s) SubCutaneous every 8 hours  insulin glargine Injectable (LANTUS) 20 Unit(s) SubCutaneous at bedtime  insulin lispro (ADMELOG) corrective regimen sliding scale   SubCutaneous three times a day before meals  insulin lispro (ADMELOG) corrective regimen sliding scale   SubCutaneous at bedtime  insulin lispro Injectable (ADMELOG) 2 Unit(s) SubCutaneous before breakfast  insulin lispro Injectable (ADMELOG) 2 Unit(s) SubCutaneous before lunch  insulin lispro Injectable (ADMELOG) 2 Unit(s) SubCutaneous before dinner  multivitamin 1 Tablet(s) Oral daily  pantoprazole    Tablet 40 milliGRAM(s) Oral before breakfast  senna 2 Tablet(s) Oral at bedtime    MEDICATIONS  (PRN):  acetaminophen     Tablet .. 650 milliGRAM(s) Oral every 6 hours PRN Temp greater or equal to 38C (100.4F), Mild Pain (1 - 3)  ALBUTerol    90 MICROgram(s) HFA Inhaler 2 Puff(s) Inhalation every 6 hours PRN Shortness of Breath and/or Wheezing  Biotene Dry Mouth Oral Rinse 15 milliLiter(s) Swish and Spit three times a day PRN Mouth Care  dextrose Oral Gel 15 Gram(s) Oral once PRN Blood Glucose LESS THAN 70 milliGRAM(s)/deciliter  simethicone 80 milliGRAM(s) Chew daily PRN Gas  sodium chloride 0.65% Nasal 1 Spray(s) Both Nostrils every 1 hour PRN Nasal Congestion        Continue comprehensive acute rehab program consisting of 3hrs/day of OT/PT and SLP. HISTORY OF PRESENT ILLNESS  This is a 46 YO male with PMHx of CKD IV, vertigo, diverticulitis s/p LAR, cigarette use, ETOH abuse now sober x5 years, IDDM, HTN, AMBER, who presented to [facility] on 10/2 c/o dizziness, b/l diplopia, headache and chest tightness found to have /133 and elevated troponins concerning for NSTEMI. EKG w/ ST-T abnormalities without acute ST segment changes. At OSH BPs stabilized with IV hydralazine, CT head without acute pathology, MRI brain showed chronic L parasagittal infarcts. 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 Patient was recommended outpatient followup for neurological/ophthalmological concerns (specifically for OCT nerve/RNFL, Fresnel prisms) and was transferred to [facility] for a possible LHC.    On admission to [facility] coronary CT showed moderate stenosis of the proximal LAD and RCA. Cath initially deferred as patient was pending nephrology clearance for BILLY. TTE largely wnl, bubble study negative. MRI brain with contrast showed chronic multifocal lacunar infarcts and chronic microvascular ischemic changes, corroborating MRI read from [facility]. Patient was evaluated by ophthalmology team for new onset R frontal headache and popping sensation, determined to have components hypertensive and diabetic retinopathy for which outpatient followup and possible trial of Fresnel prisms was recommended. Cluster headaches suspected given pattern of headache (R frontal, several days at a time, lacrimation). Patient was also evaluated by PT/OT who recommended CANDY. Speech pathology eval was notable for cognitive linguistic deficits with concern for early onset dementia given a family hx (mother diagnosed @44yo). After improvement of BILLY, LHC was completed showing nonobstructive CAD without elevated filling pressures. Lateral rectus palsy was noted to improve slightly towards end of admission with ability to abduct to ~10-15 degrees. Low-dose lisinopril was initiated for nephrotic-range proteinuria.  Patient was monitored for resolution of BILLY and is now hemodynamically stable   Patient was evaluated by PM&R and therapy for functional deficits, gait/ADL impairments and acute rehabilitation was recommended. Patient was medically optimized for discharge to [facility] IRU on 10/20/22.      PAST MEDICAL & SURGICAL HISTORY:  Diabetes      Asthma      Diverticulitis  surgery 16yrs ago      High cholesterol      Dyslipidemia      Hypertension      H/O vertigo      Diabetic ulcer of right foot      Broken toe  left pinky toe      Vertigo      HTN (hypertension)      Diabetes      History of surgery  colon resection    VITALS  Vital Signs Last 24 Hrs  T(C): 36.6 (27 Oct 2022 20:35), Max: 36.6 (27 Oct 2022 20:35)  T(F): 97.9 (27 Oct 2022 20:35), Max: 97.9 (27 Oct 2022 20:35)  HR: 82 (28 Oct 2022 05:50) (70 - 82)  BP: 147/84 (28 Oct 2022 05:50) (147/84 - 154/82)  BP(mean): --  RR: 16 (27 Oct 2022 20:35) (16 - 16)  SpO2: 99% (27 Oct 2022 20:35) (99% - 99%)    Parameters below as of 27 Oct 2022 20:35  Patient On (Oxygen Delivery Method): room air      RECENT LABS                        10.6   7.17  )-----------( 192      ( 27 Oct 2022 05:45 )             31.2     10-28    143  |  107  |  73<H>  ----------------------------<  148<H>  4.5   |  29  |  3.20<H>    Ca    9.3      28 Oct 2022 06:34    TPro  6.7  /  Alb  3.1<L>  /  TBili  0.2  /  DBili  x   /  AST  19  /  ALT  26  /  AlkPhos  86  10-27      LIVER FUNCTIONS - ( 27 Oct 2022 05:45 )  Alb: 3.1 g/dL / Pro: 6.7 g/dL / ALK PHOS: 86 U/L / ALT: 26 U/L / AST: 19 U/L / GGT: x             PHYSICAL EXAM    Constitutional - NAD, Comfortable eye patch on  HEENT - R 6th palsy persists  Chest - good chest expansion, good respiratory effort, CTAB   Cardio - warm and well perfused, RRR, no murmur  Abdomen -  Soft, NTND  Extremities 2 + edema LES  Neurologic Exam:                    Cognitive -             Orientation: Awake, Alert, AAOx3        Speech - Fluent, Comprehensible, No dysarthria, No aphasia      Cranial Nerves - No facial asymmetry, Tongue midline, Shoulder shrug intact +R lateral gaze palsy     Motor -                      LEFT    UE - ShAB 5/5, EF 5/5, EE 5/5, WE 5/5,  WNL                    RIGHT UE - ShAB 5/5, EF 5/5, EE 5/5, WE 5/5,  WNL                    LEFT    LE - HF 4+/5, KE 5/5, DF 5/5, PF 5/5                    RIGHT LE - HF 4+/5, KE 5/5, DF 5/5, PF 5/5        Sensory - Diminished to LT glove stocking distribution     Reflexes - DTR intact  Psychiatric - Mood stable, Affect WNL        CURRENT MEDICATIONS  MEDICATIONS  (STANDING):  amitriptyline 10 milliGRAM(s) Oral at bedtime  AQUAPHOR (petrolatum Ointment) 1 Application(s) Topical two times a day  aspirin enteric coated 81 milliGRAM(s) Oral daily  atorvastatin 40 milliGRAM(s) Oral at bedtime  budesonide  80 MICROgram(s)/formoterol 4.5 MICROgram(s) Inhaler 2 Puff(s) Inhalation two times a day  carvedilol 12.5 milliGRAM(s) Oral every 12 hours  cyanocobalamin 1000 MICROGram(s) Oral daily  dextrose 5%. 1000 milliLiter(s) (50 mL/Hr) IV Continuous <Continuous>  dextrose 5%. 1000 milliLiter(s) (100 mL/Hr) IV Continuous <Continuous>  dextrose 50% Injectable 25 Gram(s) IV Push once  dextrose 50% Injectable 12.5 Gram(s) IV Push once  dextrose 50% Injectable 25 Gram(s) IV Push once  glucagon  Injectable 1 milliGRAM(s) IntraMuscular once  heparin   Injectable 5000 Unit(s) SubCutaneous every 8 hours  insulin glargine Injectable (LANTUS) 20 Unit(s) SubCutaneous at bedtime  insulin lispro (ADMELOG) corrective regimen sliding scale   SubCutaneous three times a day before meals  insulin lispro (ADMELOG) corrective regimen sliding scale   SubCutaneous at bedtime  insulin lispro Injectable (ADMELOG) 2 Unit(s) SubCutaneous before breakfast  insulin lispro Injectable (ADMELOG) 2 Unit(s) SubCutaneous before lunch  insulin lispro Injectable (ADMELOG) 2 Unit(s) SubCutaneous before dinner  multivitamin 1 Tablet(s) Oral daily  pantoprazole    Tablet 40 milliGRAM(s) Oral before breakfast  senna 2 Tablet(s) Oral at bedtime    MEDICATIONS  (PRN):  acetaminophen     Tablet .. 650 milliGRAM(s) Oral every 6 hours PRN Temp greater or equal to 38C (100.4F), Mild Pain (1 - 3)  ALBUTerol    90 MICROgram(s) HFA Inhaler 2 Puff(s) Inhalation every 6 hours PRN Shortness of Breath and/or Wheezing  Biotene Dry Mouth Oral Rinse 15 milliLiter(s) Swish and Spit three times a day PRN Mouth Care  dextrose Oral Gel 15 Gram(s) Oral once PRN Blood Glucose LESS THAN 70 milliGRAM(s)/deciliter  simethicone 80 milliGRAM(s) Chew daily PRN Gas  sodium chloride 0.65% Nasal 1 Spray(s) Both Nostrils every 1 hour PRN Nasal Congestion        Continue comprehensive acute rehab program consisting of 3hrs/day of OT/PT and SLP.

## 2022-10-28 NOTE — PROGRESS NOTE ADULT - ASSESSMENT
48 YO male with PMHx of CKD IV, vertigo, diverticulitis s/p LAR, cigarette use, ETOH abuse now sober x5 years, IDDM, HTN, AMBER, who presented to [facility] on 10/2 c/o dizziness, b/l diplopia, headache and chest tightness found to have /133 and elevated troponin concerning for NSTEMI. CTH negative for acute pathology. MRI Brain showed L parasagittal infarcts. Per, neurology and opthalmology CN VI palsy. Patient  was transferred to [facility] for a possible LHC. LHC showed nonobstructive CAD without elevated filling pressures. Hospital course significant for BILLY, right frontal headache and popping sensation, determined to have components hypertensive and diabetic retinopathy for which outpatient followup and possible trial of Fresnel prisms was recommended.    # Debility  # Hx of chronic Lacunar infarts/microvascular changes  # lateral rectus palsy  - MRI with L parasagittal infarcts, multiple chronic lacunar infarcts  - PT/OT/ST per Rehab  - ASA restarted. Held since the 18th for renal bx.  - control BP    # NSTEMI  # CAD  # HLD  - Cath: 10/13 w/ non obstructive CAD  - Continue ASA, atorvastatin and fenofibrate    # HTN, presented w/ hypertensive emergency  - Renal initiated secondary work up at OSH: dania not elevated, f/u plasma renin activity, metanephrines  - continue lisinopril and coreg    # DM2, insulin dependent  - [id number] 9.9% (10/5)  - pt currently on 20u glargine qhs, 2u admelog w/ meals, moderate ISS    # CKD st. IV likely due to diabetic nephropathy  # Nephrotic range proteinuria  - Baseline Cr 2.4  - IR biopsy completed  - per renal at [facility], now on torsemide and low dose ace-i  - Renal on board    # Normocytic anemia likely due to anemia of chronic disease.  - Continue b12 supplements    # Diplopia, thought to be secondary to R. CN6 lateral rectus palsy  - Pt evaluated by Ophtho at OSH. Found to have moderate non-proliferative diabetic retinopathy and Hypertensive retinopathy   - OP Neuro and ophthalmology    # Depression  - Continue Amitriptyline    # Tobacco abuse disorder  - Patient has reduced smoking from 1.5 ppd to 1 pack every 2 weeks  - The patient is an active smoker. The patient was advised to quit. Declined nicotine patches/gums.     DVT ppx: Continue heparin

## 2022-10-28 NOTE — PROGRESS NOTE ADULT - SUBJECTIVE AND OBJECTIVE BOX
Patient is a 47y old  Male who presents with a chief complaint of Multiple CVA (27 Oct 2022 14:39)      Patient seen and examined at bedside.  No overnight events  No complaints this morning    1 - 3 elements + 1 ROS    ALLERGIES:  No Known Allergies    MEDICATIONS  (STANDING):  amitriptyline 10 milliGRAM(s) Oral at bedtime  AQUAPHOR (petrolatum Ointment) 1 Application(s) Topical two times a day  aspirin enteric coated 81 milliGRAM(s) Oral daily  atorvastatin 40 milliGRAM(s) Oral at bedtime  budesonide  80 MICROgram(s)/formoterol 4.5 MICROgram(s) Inhaler 2 Puff(s) Inhalation two times a day  carvedilol 12.5 milliGRAM(s) Oral every 12 hours  cyanocobalamin 1000 MICROGram(s) Oral daily  dextrose 5%. 1000 milliLiter(s) (50 mL/Hr) IV Continuous <Continuous>  dextrose 5%. 1000 milliLiter(s) (100 mL/Hr) IV Continuous <Continuous>  dextrose 50% Injectable 25 Gram(s) IV Push once  dextrose 50% Injectable 12.5 Gram(s) IV Push once  dextrose 50% Injectable 25 Gram(s) IV Push once  glucagon  Injectable 1 milliGRAM(s) IntraMuscular once  heparin   Injectable 5000 Unit(s) SubCutaneous every 8 hours  insulin glargine Injectable (LANTUS) 20 Unit(s) SubCutaneous at bedtime  insulin lispro (ADMELOG) corrective regimen sliding scale   SubCutaneous three times a day before meals  insulin lispro (ADMELOG) corrective regimen sliding scale   SubCutaneous at bedtime  insulin lispro Injectable (ADMELOG) 2 Unit(s) SubCutaneous before breakfast  insulin lispro Injectable (ADMELOG) 2 Unit(s) SubCutaneous before lunch  insulin lispro Injectable (ADMELOG) 2 Unit(s) SubCutaneous before dinner  multivitamin 1 Tablet(s) Oral daily  pantoprazole    Tablet 40 milliGRAM(s) Oral before breakfast  senna 2 Tablet(s) Oral at bedtime    MEDICATIONS  (PRN):  acetaminophen     Tablet .. 650 milliGRAM(s) Oral every 6 hours PRN Temp greater or equal to 38C (100.4F), Mild Pain (1 - 3)  ALBUTerol    90 MICROgram(s) HFA Inhaler 2 Puff(s) Inhalation every 6 hours PRN Shortness of Breath and/or Wheezing  Biotene Dry Mouth Oral Rinse 15 milliLiter(s) Swish and Spit three times a day PRN Mouth Care  dextrose Oral Gel 15 Gram(s) Oral once PRN Blood Glucose LESS THAN 70 milliGRAM(s)/deciliter  simethicone 80 milliGRAM(s) Chew daily PRN Gas  sodium chloride 0.65% Nasal 1 Spray(s) Both Nostrils every 1 hour PRN Nasal Congestion    Vital Signs Last 24 Hrs  T(F): 97.9 (27 Oct 2022 20:35), Max: 97.9 (27 Oct 2022 20:35)  HR: 82 (28 Oct 2022 05:50) (70 - 82)  BP: 147/84 (28 Oct 2022 05:50) (147/84 - 154/82)  RR: 16 (27 Oct 2022 20:35) (16 - 16)  SpO2: 99% (27 Oct 2022 20:35) (99% - 99%)  I&O's Summary    BMI (kg/m2): 28.2 (10-25-22 @ 07:07)    PHYSICAL EXAM:  GENERAL: NAD  HENT:  Atraumatic, Normocephalic; No tonsillar erythema, exudates, or enlargement; Moist mucous membranes;   EYES: EOMI, PERRLA, conjunctiva and sclera clear, no lid-lag  NECK: Supple, No JVD, Normal thyroid  CHEST/LUNG: Clear to percussion bilaterally; No rales, rhonchi, wheezing, or rubs; normal respiratory effort, no intercostal retractions  HEART: Regular rate and rhythm; No murmurs, rubs, or gallops; No pitting edema  ABDOMEN: Soft, Nontender, Nondistended; Bowel sounds present; No HSM  MUSCULOSKELETAL/EXTREMITIES:  2+ Peripheral Pulses, No clubbing or digital cyanosis  PSYCH: Appropriate affect, Alert & Awake; Good judgement    LABS:                        10.6   7.17  )-----------( 192      ( 27 Oct 2022 05:45 )             31.2       10-28    143  |  107  |  73  ----------------------------<  148  4.5   |  29  |  3.20    Ca    9.3      28 Oct 2022 06:34    TPro  6.7  /  Alb  3.1  /  TBili  0.2  /  DBili  x   /  AST  19  /  ALT  26  /  AlkPhos  86  10-27     POCT Blood Glucose.: 170 mg/dL (28 Oct 2022 08:04)  POCT Blood Glucose.: 122 mg/dL (27 Oct 2022 20:38)  POCT Blood Glucose.: 194 mg/dL (27 Oct 2022 17:06)  POCT Blood Glucose.: 194 mg/dL (27 Oct 2022 11:47)    COVID-19 PCR: NotDetec (10-24-22 @ 14:05)  COVID-19 PCR: NotDetec (10-23-22 @ 06:20)  COVID-19 PCR: NotDetec (10-20-22 @ 14:41)  COVID-19 PCR: NotDetec (10-17-22 @ 07:40)  COVID-19 PCR: NotDetec (10-13-22 @ 15:08)      Care Discussed with Rehab Attending and Other Providers

## 2022-10-28 NOTE — PROGRESS NOTE ADULT - SUBJECTIVE AND OBJECTIVE BOX
No distress    Vital Signs Last 24 Hrs  T(C): 36.4 (10-28-22 @ 08:00), Max: 36.6 (10-27-22 @ 20:35)  T(F): 97.6 (10-28-22 @ 08:00), Max: 97.9 (10-27-22 @ 20:35)  HR: 75 (10-28-22 @ 08:00) (70 - 82)  BP: 139/76 (10-28-22 @ 08:00) (139/76 - 154/82)  RR: 16 (10-28-22 @ 08:00) (16 - 16)  SpO2: 97% (10-28-22 @ 08:00) (97% - 99%)    s1s2  b/l air entry  soft, ND  no edema                          10.6   7.17  )-----------( 192      ( 27 Oct 2022 05:45 )             31.2     28 Oct 2022 06:34    143    |  107    |  73     ----------------------------<  148    4.5     |  29     |  3.20     Ca    9.3        28 Oct 2022 06:34    TPro  6.7    /  Alb  3.1    /  TBili  0.2    /  DBili  x      /  AST  19     /  ALT  26     /  AlkPhos  86     27 Oct 2022 05:45    LIVER FUNCTIONS - ( 27 Oct 2022 05:45 )  Alb: 3.1 g/dL / Pro: 6.7 g/dL / ALK PHOS: 86 U/L / ALT: 26 U/L / AST: 19 U/L / GGT: x           acetaminophen     Tablet .. 650 milliGRAM(s) Oral every 6 hours PRN  ALBUTerol    90 MICROgram(s) HFA Inhaler 2 Puff(s) Inhalation every 6 hours PRN  amitriptyline 10 milliGRAM(s) Oral at bedtime  AQUAPHOR (petrolatum Ointment) 1 Application(s) Topical two times a day  aspirin enteric coated 81 milliGRAM(s) Oral daily  atorvastatin 40 milliGRAM(s) Oral at bedtime  Biotene Dry Mouth Oral Rinse 15 milliLiter(s) Swish and Spit three times a day PRN  budesonide  80 MICROgram(s)/formoterol 4.5 MICROgram(s) Inhaler 2 Puff(s) Inhalation two times a day  carvedilol 12.5 milliGRAM(s) Oral every 12 hours  cyanocobalamin 1000 MICROGram(s) Oral daily  dextrose 5%. 1000 milliLiter(s) IV Continuous <Continuous>  dextrose 5%. 1000 milliLiter(s) IV Continuous <Continuous>  dextrose 50% Injectable 25 Gram(s) IV Push once  dextrose 50% Injectable 12.5 Gram(s) IV Push once  dextrose 50% Injectable 25 Gram(s) IV Push once  dextrose Oral Gel 15 Gram(s) Oral once PRN  glucagon  Injectable 1 milliGRAM(s) IntraMuscular once  heparin   Injectable 5000 Unit(s) SubCutaneous every 8 hours  insulin glargine Injectable (LANTUS) 20 Unit(s) SubCutaneous at bedtime  insulin lispro (ADMELOG) corrective regimen sliding scale   SubCutaneous three times a day before meals  insulin lispro (ADMELOG) corrective regimen sliding scale   SubCutaneous at bedtime  insulin lispro Injectable (ADMELOG) 2 Unit(s) SubCutaneous before breakfast  insulin lispro Injectable (ADMELOG) 2 Unit(s) SubCutaneous before lunch  insulin lispro Injectable (ADMELOG) 2 Unit(s) SubCutaneous before dinner  multivitamin 1 Tablet(s) Oral daily  pantoprazole    Tablet 40 milliGRAM(s) Oral before breakfast  senna 2 Tablet(s) Oral at bedtime  simethicone 80 milliGRAM(s) Chew daily PRN  sodium chloride 0.65% Nasal 1 Spray(s) Both Nostrils every 1 hour PRN        A/P:    MMP, s/p complicated course at OSH as per HPI  Progressive renal disease (Cr 1.3 - 5/26/21, Cr 1.7 - 3/22/22, Cr 2.2 - 10/6/22)  S/p CT w/IV dye 10/4 and 10/6  Contrast BILLY/CKD 3   S/p kidney biopsy 10/25/22 c/w advanced DM nephropathy w/advanced chronicity  Cr is better today  Avoid nephrotoxins  F/u CBC, BMP  Rehab    [phone]

## 2022-10-29 LAB
ANION GAP SERPL CALC-SCNC: 6 MMOL/L — SIGNIFICANT CHANGE UP (ref 5–17)
BUN SERPL-MCNC: 80 MG/DL — HIGH (ref 7–23)
CALCIUM SERPL-MCNC: 9 MG/DL — SIGNIFICANT CHANGE UP (ref 8.4–10.5)
CHLORIDE SERPL-SCNC: 109 MMOL/L — HIGH (ref 96–108)
CO2 SERPL-SCNC: 25 MMOL/L — SIGNIFICANT CHANGE UP (ref 22–31)
CREAT SERPL-MCNC: 3.22 MG/DL — HIGH (ref 0.5–1.3)
EGFR: 23 ML/MIN/1.73M2 — LOW
GLUCOSE BLDC GLUCOMTR-MCNC: 132 MG/DL — HIGH (ref 70–99)
GLUCOSE BLDC GLUCOMTR-MCNC: 132 MG/DL — HIGH (ref 70–99)
GLUCOSE BLDC GLUCOMTR-MCNC: 221 MG/DL — HIGH (ref 70–99)
GLUCOSE BLDC GLUCOMTR-MCNC: 72 MG/DL — SIGNIFICANT CHANGE UP (ref 70–99)
GLUCOSE BLDC GLUCOMTR-MCNC: 78 MG/DL — SIGNIFICANT CHANGE UP (ref 70–99)
GLUCOSE BLDC GLUCOMTR-MCNC: 89 MG/DL — SIGNIFICANT CHANGE UP (ref 70–99)
GLUCOSE SERPL-MCNC: 179 MG/DL — HIGH (ref 70–99)
POTASSIUM SERPL-MCNC: 5.1 MMOL/L — SIGNIFICANT CHANGE UP (ref 3.5–5.3)
POTASSIUM SERPL-SCNC: 5.1 MMOL/L — SIGNIFICANT CHANGE UP (ref 3.5–5.3)
SODIUM SERPL-SCNC: 140 MMOL/L — SIGNIFICANT CHANGE UP (ref 135–145)

## 2022-10-29 PROCEDURE — 99232 SBSQ HOSP IP/OBS MODERATE 35: CPT

## 2022-10-29 RX ORDER — LACTULOSE 10 G/15ML
10 SOLUTION ORAL ONCE
Refills: 0 | Status: COMPLETED | OUTPATIENT
Start: 2022-10-29 | End: 2022-10-29

## 2022-10-29 RX ORDER — AMLODIPINE BESYLATE 2.5 MG/1
10 TABLET ORAL DAILY
Refills: 0 | Status: DISCONTINUED | OUTPATIENT
Start: 2022-10-30 | End: 2022-11-03

## 2022-10-29 RX ADMIN — HEPARIN SODIUM 5000 UNIT(S): 5000 INJECTION INTRAVENOUS; SUBCUTANEOUS at 21:16

## 2022-10-29 RX ADMIN — Medication 1 APPLICATION(S): at 05:19

## 2022-10-29 RX ADMIN — BUDESONIDE AND FORMOTEROL FUMARATE DIHYDRATE 2 PUFF(S): 160; 4.5 AEROSOL RESPIRATORY (INHALATION) at 08:27

## 2022-10-29 RX ADMIN — CARVEDILOL PHOSPHATE 12.5 MILLIGRAM(S): 80 CAPSULE, EXTENDED RELEASE ORAL at 05:19

## 2022-10-29 RX ADMIN — INSULIN GLARGINE 20 UNIT(S): 100 INJECTION, SOLUTION SUBCUTANEOUS at 21:58

## 2022-10-29 RX ADMIN — PREGABALIN 1000 MICROGRAM(S): 225 CAPSULE ORAL at 12:15

## 2022-10-29 RX ADMIN — Medication 1 APPLICATION(S): at 17:57

## 2022-10-29 RX ADMIN — LACTULOSE 10 GRAM(S): 10 SOLUTION ORAL at 20:47

## 2022-10-29 RX ADMIN — PANTOPRAZOLE SODIUM 40 MILLIGRAM(S): 20 TABLET, DELAYED RELEASE ORAL at 05:19

## 2022-10-29 RX ADMIN — CARVEDILOL PHOSPHATE 12.5 MILLIGRAM(S): 80 CAPSULE, EXTENDED RELEASE ORAL at 17:57

## 2022-10-29 RX ADMIN — Medication 1 TABLET(S): at 12:16

## 2022-10-29 RX ADMIN — ATORVASTATIN CALCIUM 40 MILLIGRAM(S): 80 TABLET, FILM COATED ORAL at 21:18

## 2022-10-29 RX ADMIN — HEPARIN SODIUM 5000 UNIT(S): 5000 INJECTION INTRAVENOUS; SUBCUTANEOUS at 05:20

## 2022-10-29 RX ADMIN — Medication 2 UNIT(S): at 17:55

## 2022-10-29 RX ADMIN — Medication 10 MILLIGRAM(S): at 21:18

## 2022-10-29 RX ADMIN — BUDESONIDE AND FORMOTEROL FUMARATE DIHYDRATE 2 PUFF(S): 160; 4.5 AEROSOL RESPIRATORY (INHALATION) at 20:37

## 2022-10-29 RX ADMIN — Medication 4: at 17:55

## 2022-10-29 RX ADMIN — HEPARIN SODIUM 5000 UNIT(S): 5000 INJECTION INTRAVENOUS; SUBCUTANEOUS at 13:36

## 2022-10-29 RX ADMIN — Medication 2 UNIT(S): at 08:44

## 2022-10-29 RX ADMIN — Medication 81 MILLIGRAM(S): at 12:15

## 2022-10-29 NOTE — PROGRESS NOTE ADULT - SUBJECTIVE AND OBJECTIVE BOX
Cc: Gait dysfunction    HPI: Patient with no new medical issues today.  He reports he had a bowel movement this morning.  Pain controlled, no chest pain, no N/V, no Fevers/Chills. No other new ROS  Has been tolerating rehabilitation program.    acetaminophen     Tablet .. 650 milliGRAM(s) Oral every 6 hours PRN  ALBUTerol    90 MICROgram(s) HFA Inhaler 2 Puff(s) Inhalation every 6 hours PRN  amitriptyline 10 milliGRAM(s) Oral at bedtime  AQUAPHOR (petrolatum Ointment) 1 Application(s) Topical two times a day  aspirin enteric coated 81 milliGRAM(s) Oral daily  atorvastatin 40 milliGRAM(s) Oral at bedtime  Biotene Dry Mouth Oral Rinse 15 milliLiter(s) Swish and Spit three times a day PRN  budesonide  80 MICROgram(s)/formoterol 4.5 MICROgram(s) Inhaler 2 Puff(s) Inhalation two times a day  carvedilol 12.5 milliGRAM(s) Oral every 12 hours  cyanocobalamin 1000 MICROGram(s) Oral daily  dextrose 5%. 1000 milliLiter(s) IV Continuous <Continuous>  dextrose 5%. 1000 milliLiter(s) IV Continuous <Continuous>  dextrose 50% Injectable 25 Gram(s) IV Push once  dextrose 50% Injectable 12.5 Gram(s) IV Push once  dextrose 50% Injectable 25 Gram(s) IV Push once  dextrose Oral Gel 15 Gram(s) Oral once PRN  glucagon  Injectable 1 milliGRAM(s) IntraMuscular once  heparin   Injectable 5000 Unit(s) SubCutaneous every 8 hours  insulin glargine Injectable (LANTUS) 20 Unit(s) SubCutaneous at bedtime  insulin lispro (ADMELOG) corrective regimen sliding scale   SubCutaneous three times a day before meals  insulin lispro (ADMELOG) corrective regimen sliding scale   SubCutaneous at bedtime  insulin lispro Injectable (ADMELOG) 2 Unit(s) SubCutaneous before breakfast  insulin lispro Injectable (ADMELOG) 2 Unit(s) SubCutaneous before lunch  insulin lispro Injectable (ADMELOG) 2 Unit(s) SubCutaneous before dinner  multivitamin 1 Tablet(s) Oral daily  pantoprazole    Tablet 40 milliGRAM(s) Oral before breakfast  senna 2 Tablet(s) Oral at bedtime  simethicone 80 milliGRAM(s) Chew daily PRN  sodium chloride 0.65% Nasal 1 Spray(s) Both Nostrils every 1 hour PRN      T(C): 36.8 (10-28-22 @ 19:54), Max: 36.8 (10-28-22 @ 19:54)  HR: 95 (10-29-22 @ 08:27) (82 - 95)  BP: 139/71 (10-29-22 @ 05:18) (139/71 - 171/85)  RR: 16 (10-28-22 @ 19:54) (16 - 16)  SpO2: 99% (10-29-22 @ 08:27) (99% - 99%)    In NAD  HEENT- EOMI  Heart- no cyanosis  Lungs- no respiratory distress   Abd- + BS, NT  Ext- No calf pain  Neuro- Exam unchanged      10-29    140  |  109<H>  |  80<H>  ----------------------------<  179<H>  5.1   |  25  |  3.22<H>    Ca    9.0      29 Oct 2022 06:00          Imp: Patient with diagnosis of CVA admitted for comprehensive acute rehabilitation.    Plan:  - Continue PT/OT/SLP  - DVT prophylaxis - Heparin   - Skin- Turn q2h, check skin daily  - Continue current medications; patient medically stable.   -Active issues- Creatinine stable, nephrology following, appreciate recommendations   - Patient is stable to continue current rehabilitation program.

## 2022-10-29 NOTE — PROGRESS NOTE ADULT - ASSESSMENT
48 YO male with PMHx of CKD IV, vertigo, diverticulitis s/p LAR, cigarette use, ETOH abuse now sober x5 years, IDDM, HTN, AMBER, who presented to [facility] on 10/2 c/o dizziness, b/l diplopia, headache and chest tightness found to have /133 and elevated troponin concerning for NSTEMI. CTH negative for acute pathology. MRI Brain showed L parasagittal infarcts. Per, neurology and opthalmology CN VI palsy. Patient  was transferred to [facility] for a possible LHC. LHC showed nonobstructive CAD without elevated filling pressures. Hospital course significant for BILLY, right frontal headache and popping sensation, determined to have components hypertensive and diabetic retinopathy for which outpatient followup and possible trial of Fresnel prisms was recommended.    # Debility  # Hx of chronic Lacunar infarts/microvascular changes  # lateral rectus palsy  - MRI with L parasagittal infarcts, multiple chronic lacunar infarcts  - PT/OT/ST per Rehab  - ASA restarted. Held since the 18th for renal bx.  - control BP, BP goal <140/90    # NSTEMI  # CAD  # HLD  - Cath: 10/13 w/ non obstructive CAD  - Continue ASA, atorvastatin and fenofibrate    # HTN, presented w/ hypertensive emergency  - Renal initiated secondary work up at OSH: dania not elevated, f/u plasma renin activity, metanephrines  - continue Coreg  - BP elevated, add amlodipine 10mg    # DM2, insulin dependent  - [id number] 9.9% (10/5)  - pt currently on 20u glargine qhs, 2u admelog w/ meals, moderate ISS    # CKD stage IV likely due to diabetic nephropathy  # Nephrotic range proteinuria  - Baseline Cr 2.4  - IR biopsy completed  - Renal on board    # Normocytic anemia likely due to anemia of chronic disease.  - Continue b12 supplements    # Diplopia, thought to be secondary to R. CN6 lateral rectus palsy  - Pt evaluated by Ophtho at OSH. Found to have moderate non-proliferative diabetic retinopathy and Hypertensive retinopathy   - OP Neuro and ophthalmology    # Depression  - Continue Amitriptyline    # Tobacco abuse disorder  - Patient has reduced smoking from 1.5 ppd to 1 pack every 2 weeks  - The patient is an active smoker. The patient was advised to quit. Declined nicotine patches/gums.     DVT ppx: Continue heparin

## 2022-10-30 LAB
GLUCOSE BLDC GLUCOMTR-MCNC: 119 MG/DL — HIGH (ref 70–99)
GLUCOSE BLDC GLUCOMTR-MCNC: 156 MG/DL — HIGH (ref 70–99)
GLUCOSE BLDC GLUCOMTR-MCNC: 159 MG/DL — HIGH (ref 70–99)
GLUCOSE BLDC GLUCOMTR-MCNC: 209 MG/DL — HIGH (ref 70–99)

## 2022-10-30 PROCEDURE — 99232 SBSQ HOSP IP/OBS MODERATE 35: CPT

## 2022-10-30 RX ORDER — INSULIN GLARGINE 100 [IU]/ML
15 INJECTION, SOLUTION SUBCUTANEOUS AT BEDTIME
Refills: 0 | Status: DISCONTINUED | OUTPATIENT
Start: 2022-10-30 | End: 2022-11-02

## 2022-10-30 RX ADMIN — HEPARIN SODIUM 5000 UNIT(S): 5000 INJECTION INTRAVENOUS; SUBCUTANEOUS at 05:54

## 2022-10-30 RX ADMIN — Medication 1 APPLICATION(S): at 19:25

## 2022-10-30 RX ADMIN — Medication 2 UNIT(S): at 08:16

## 2022-10-30 RX ADMIN — CARVEDILOL PHOSPHATE 12.5 MILLIGRAM(S): 80 CAPSULE, EXTENDED RELEASE ORAL at 17:18

## 2022-10-30 RX ADMIN — SIMETHICONE 80 MILLIGRAM(S): 80 TABLET, CHEWABLE ORAL at 21:06

## 2022-10-30 RX ADMIN — Medication 81 MILLIGRAM(S): at 12:29

## 2022-10-30 RX ADMIN — INSULIN GLARGINE 15 UNIT(S): 100 INJECTION, SOLUTION SUBCUTANEOUS at 21:03

## 2022-10-30 RX ADMIN — ATORVASTATIN CALCIUM 40 MILLIGRAM(S): 80 TABLET, FILM COATED ORAL at 21:02

## 2022-10-30 RX ADMIN — PANTOPRAZOLE SODIUM 40 MILLIGRAM(S): 20 TABLET, DELAYED RELEASE ORAL at 05:54

## 2022-10-30 RX ADMIN — HEPARIN SODIUM 5000 UNIT(S): 5000 INJECTION INTRAVENOUS; SUBCUTANEOUS at 13:29

## 2022-10-30 RX ADMIN — BUDESONIDE AND FORMOTEROL FUMARATE DIHYDRATE 2 PUFF(S): 160; 4.5 AEROSOL RESPIRATORY (INHALATION) at 08:14

## 2022-10-30 RX ADMIN — Medication 1 TABLET(S): at 12:30

## 2022-10-30 RX ADMIN — Medication 1 APPLICATION(S): at 05:55

## 2022-10-30 RX ADMIN — HEPARIN SODIUM 5000 UNIT(S): 5000 INJECTION INTRAVENOUS; SUBCUTANEOUS at 21:03

## 2022-10-30 RX ADMIN — Medication 2: at 17:17

## 2022-10-30 RX ADMIN — CARVEDILOL PHOSPHATE 12.5 MILLIGRAM(S): 80 CAPSULE, EXTENDED RELEASE ORAL at 05:54

## 2022-10-30 RX ADMIN — PREGABALIN 1000 MICROGRAM(S): 225 CAPSULE ORAL at 12:30

## 2022-10-30 RX ADMIN — AMLODIPINE BESYLATE 10 MILLIGRAM(S): 2.5 TABLET ORAL at 05:54

## 2022-10-30 RX ADMIN — BUDESONIDE AND FORMOTEROL FUMARATE DIHYDRATE 2 PUFF(S): 160; 4.5 AEROSOL RESPIRATORY (INHALATION) at 21:26

## 2022-10-30 RX ADMIN — Medication 10 MILLIGRAM(S): at 21:02

## 2022-10-30 NOTE — PROVIDER CONTACT NOTE (OTHER) - ACTION/TREATMENT ORDERED:
MD Aware  Ok to give lantus 20u
Dr. Anderson evaluated the patient at the bedside, advised to give scheduled amitriptyline and PRN Tylenol

## 2022-10-30 NOTE — PROGRESS NOTE ADULT - ASSESSMENT
48 YO male with PMHx of CKD IV, vertigo, diverticulitis s/p LAR, cigarette use, ETOH abuse now sober x5 years, IDDM, HTN, AMBER, who presented to [facility] on 10/2 c/o dizziness, b/l diplopia, headache and chest tightness found to have /133 and elevated troponin concerning for NSTEMI. CTH negative for acute pathology. MRI Brain showed L parasagittal infarcts. Per, neurology and opthalmology CN VI palsy. Patient  was transferred to [facility] for a possible LHC. LHC showed nonobstructive CAD without elevated filling pressures. Hospital course significant for BILLY, right frontal headache and popping sensation, determined to have components hypertensive and diabetic retinopathy for which outpatient followup and possible trial of Fresnel prisms was recommended.    # Debility  # Hx of chronic Lacunar infarts/microvascular changes  # lateral rectus palsy  - MRI with L parasagittal infarcts, multiple chronic lacunar infarcts  - PT/OT/ST per Rehab  - ASA restarted. Held since the 18th for renal bx.  - lipitor 40mg  - control BP, BP goal <140/90    # NSTEMI  # CAD  # HLD  - Cath: 10/13 w/ non obstructive CAD  - Continue ASA, Coreg, atorvastatin and fenofibrate    # HTN, presented w/ hypertensive emergency  - Renal initiated secondary work up at OSH: dania not elevated, f/u plasma renin activity, metanephrines  - continue Coreg  - amlodipine 10mg  - if SBP persistently>150, will increase coreg dose     # DM2, insulin dependent  - [id number] 9.9% (10/5)  - pt currently on 20u glargine qhs, 2u admelog w/ meals, moderate ISS    # CKD stage IV likely due to diabetic nephropathy  # Nephrotic range proteinuria  - Cr 1.3 5/26/21, Cr 1.7 3/22/22, Cr 2.2 10/6/22  - renal biopsy c/w advanced diabetic nephropathy   - Renal on board  - avoid nephrotoxins     # Normocytic anemia likely due to anemia of chronic disease.  - Continue b12 supplements    # Diplopia, thought to be secondary to R. CN6 lateral rectus palsy  - Pt evaluated by Ophtho at OSH. Found to have moderate non-proliferative diabetic retinopathy and Hypertensive retinopathy   - OP Neuro and ophthalmology    # Depression  - Continue Amitriptyline    # Tobacco abuse disorder  - Patient has reduced smoking from 1.5 ppd to 1 pack every 2 weeks  - The patient is an active smoker. The patient was advised to quit. Declined nicotine patches/gums.     DVT ppx: Continue heparin

## 2022-10-30 NOTE — PROVIDER CONTACT NOTE (OTHER) - ASSESSMENT
Patient complained pain on the R frontal head and R periorbital area and behind the R eye. Vs monitored, relayed to MD
Blood sugar 78 , gave apple juice retook in 15 minutes , blood sugar 89  MD aware , ok to give another juice  Blood sugar now 132

## 2022-10-30 NOTE — PROGRESS NOTE ADULT - SUBJECTIVE AND OBJECTIVE BOX
Patient is a 47y old  Male who presents with a chief complaint of Multiple CVA (30 Oct 2022 10:09)      Subjective and overnight events:  Patient seen and examined at bedside.    ALLERGIES:  No Known Allergies    MEDICATIONS  (STANDING):  amitriptyline 10 milliGRAM(s) Oral at bedtime  amLODIPine   Tablet 10 milliGRAM(s) Oral daily  AQUAPHOR (petrolatum Ointment) 1 Application(s) Topical two times a day  aspirin enteric coated 81 milliGRAM(s) Oral daily  atorvastatin 40 milliGRAM(s) Oral at bedtime  budesonide  80 MICROgram(s)/formoterol 4.5 MICROgram(s) Inhaler 2 Puff(s) Inhalation two times a day  carvedilol 12.5 milliGRAM(s) Oral every 12 hours  cyanocobalamin 1000 MICROGram(s) Oral daily  dextrose 5%. 1000 milliLiter(s) (100 mL/Hr) IV Continuous <Continuous>  dextrose 5%. 1000 milliLiter(s) (50 mL/Hr) IV Continuous <Continuous>  dextrose 50% Injectable 25 Gram(s) IV Push once  dextrose 50% Injectable 12.5 Gram(s) IV Push once  dextrose 50% Injectable 25 Gram(s) IV Push once  glucagon  Injectable 1 milliGRAM(s) IntraMuscular once  heparin   Injectable 5000 Unit(s) SubCutaneous every 8 hours  insulin glargine Injectable (LANTUS) 15 Unit(s) SubCutaneous at bedtime  insulin lispro (ADMELOG) corrective regimen sliding scale   SubCutaneous three times a day before meals  insulin lispro (ADMELOG) corrective regimen sliding scale   SubCutaneous at bedtime  insulin lispro Injectable (ADMELOG) 2 Unit(s) SubCutaneous before breakfast  insulin lispro Injectable (ADMELOG) 2 Unit(s) SubCutaneous before lunch  insulin lispro Injectable (ADMELOG) 2 Unit(s) SubCutaneous before dinner  multivitamin 1 Tablet(s) Oral daily  pantoprazole    Tablet 40 milliGRAM(s) Oral before breakfast  senna 2 Tablet(s) Oral at bedtime    MEDICATIONS  (PRN):  acetaminophen     Tablet .. 650 milliGRAM(s) Oral every 6 hours PRN Temp greater or equal to 38C (100.4F), Mild Pain (1 - 3)  ALBUTerol    90 MICROgram(s) HFA Inhaler 2 Puff(s) Inhalation every 6 hours PRN Shortness of Breath and/or Wheezing  Biotene Dry Mouth Oral Rinse 15 milliLiter(s) Swish and Spit three times a day PRN Mouth Care  dextrose Oral Gel 15 Gram(s) Oral once PRN Blood Glucose LESS THAN 70 milliGRAM(s)/deciliter  simethicone 80 milliGRAM(s) Chew daily PRN Gas  sodium chloride 0.65% Nasal 1 Spray(s) Both Nostrils every 1 hour PRN Nasal Congestion    Vital Signs Last 24 Hrs  T(F): 97.7 (29 Oct 2022 19:37), Max: 97.7 (29 Oct 2022 19:37)  HR: 90 (30 Oct 2022 08:14) (77 - 92)  BP: 156/79 (30 Oct 2022 05:51) (156/74 - 156/79)  RR: 17 (29 Oct 2022 19:37) (17 - 17)  SpO2: 99% (30 Oct 2022 08:14) (99% - 100%)  I&O's Summary    PHYSICAL EXAM:  General: NAD, A/O x 3  ENT: MMM  Neck: Supple, No JVD  Lungs: Clear to auscultation bilaterally  Cardio: RRR, S1/S2, No murmurs  Abdomen: Soft, Nontender, Nondistended; Bowel sounds present  Extremities: No calf tenderness, No pitting edema    LABS:    10-29    140  |  109  |  80  ----------------------------<  179  5.1   |  25  |  3.22    Ca    9.0      29 Oct 2022 06:0        POCT Blood Glucose.: 119 mg/dL (30 Oct 2022 11:28)  POCT Blood Glucose.: 159 mg/dL (30 Oct 2022 07:45)  POCT Blood Glucose.: 132 mg/dL (29 Oct 2022 21:52)  POCT Blood Glucose.: 89 mg/dL (29 Oct 2022 21:30)  POCT Blood Glucose.: 78 mg/dL (29 Oct 2022 21:13)  POCT Blood Glucose.: 221 mg/dL (29 Oct 2022 17:25)          COVID-19 PCR: NotDetec (10-28-22 @ 05:58)  COVID-19 PCR: NotDetec (10-24-22 @ 14:05)  COVID-19 PCR: NotDetec (10-23-22 @ 06:20)  COVID-19 PCR: NotDetec (10-20-22 @ 14:41)  COVID-19 PCR: NotDetec (10-17-22 @ 07:40)      RADIOLOGY & ADDITIONAL TESTS:    Care Discussed with Consultants/Other Providers:    Patient is a 47y old  Male who presents with a chief complaint of Multiple CVA (30 Oct 2022 10:09)      Subjective and overnight events:  Patient seen and examined at bedside. no acute event overnight. chronic dizziness per pt. no headache, fever, chills, sob, cp, abd pain.    ALLERGIES:  No Known Allergies    MEDICATIONS  (STANDING):  amitriptyline 10 milliGRAM(s) Oral at bedtime  amLODIPine   Tablet 10 milliGRAM(s) Oral daily  AQUAPHOR (petrolatum Ointment) 1 Application(s) Topical two times a day  aspirin enteric coated 81 milliGRAM(s) Oral daily  atorvastatin 40 milliGRAM(s) Oral at bedtime  budesonide  80 MICROgram(s)/formoterol 4.5 MICROgram(s) Inhaler 2 Puff(s) Inhalation two times a day  carvedilol 12.5 milliGRAM(s) Oral every 12 hours  cyanocobalamin 1000 MICROGram(s) Oral daily  dextrose 5%. 1000 milliLiter(s) (100 mL/Hr) IV Continuous <Continuous>  dextrose 5%. 1000 milliLiter(s) (50 mL/Hr) IV Continuous <Continuous>  dextrose 50% Injectable 25 Gram(s) IV Push once  dextrose 50% Injectable 12.5 Gram(s) IV Push once  dextrose 50% Injectable 25 Gram(s) IV Push once  glucagon  Injectable 1 milliGRAM(s) IntraMuscular once  heparin   Injectable 5000 Unit(s) SubCutaneous every 8 hours  insulin glargine Injectable (LANTUS) 15 Unit(s) SubCutaneous at bedtime  insulin lispro (ADMELOG) corrective regimen sliding scale   SubCutaneous three times a day before meals  insulin lispro (ADMELOG) corrective regimen sliding scale   SubCutaneous at bedtime  insulin lispro Injectable (ADMELOG) 2 Unit(s) SubCutaneous before breakfast  insulin lispro Injectable (ADMELOG) 2 Unit(s) SubCutaneous before lunch  insulin lispro Injectable (ADMELOG) 2 Unit(s) SubCutaneous before dinner  multivitamin 1 Tablet(s) Oral daily  pantoprazole    Tablet 40 milliGRAM(s) Oral before breakfast  senna 2 Tablet(s) Oral at bedtime    MEDICATIONS  (PRN):  acetaminophen     Tablet .. 650 milliGRAM(s) Oral every 6 hours PRN Temp greater or equal to 38C (100.4F), Mild Pain (1 - 3)  ALBUTerol    90 MICROgram(s) HFA Inhaler 2 Puff(s) Inhalation every 6 hours PRN Shortness of Breath and/or Wheezing  Biotene Dry Mouth Oral Rinse 15 milliLiter(s) Swish and Spit three times a day PRN Mouth Care  dextrose Oral Gel 15 Gram(s) Oral once PRN Blood Glucose LESS THAN 70 milliGRAM(s)/deciliter  simethicone 80 milliGRAM(s) Chew daily PRN Gas  sodium chloride 0.65% Nasal 1 Spray(s) Both Nostrils every 1 hour PRN Nasal Congestion    Vital Signs Last 24 Hrs  T(F): 97.7 (29 Oct 2022 19:37), Max: 97.7 (29 Oct 2022 19:37)  HR: 90 (30 Oct 2022 08:14) (77 - 92)  BP: 156/79 (30 Oct 2022 05:51) (156/74 - 156/79)  RR: 17 (29 Oct 2022 19:37) (17 - 17)  SpO2: 99% (30 Oct 2022 08:14) (99% - 100%)  I&O's Summary    PHYSICAL EXAM:  General: NAD, A/O x 3  ENT: MMM  Neck: Supple, No JVD  Lungs: Clear to auscultation bilaterally  Cardio: RRR, S1/S2, No murmurs  Abdomen: Soft, Nontender, Nondistended; Bowel sounds present  Extremities: No calf tenderness, No pitting edema    LABS:    10-29    140  |  109  |  80  ----------------------------<  179  5.1   |  25  |  3.22    Ca    9.0      29 Oct 2022 06:0        POCT Blood Glucose.: 119 mg/dL (30 Oct 2022 11:28)  POCT Blood Glucose.: 159 mg/dL (30 Oct 2022 07:45)  POCT Blood Glucose.: 132 mg/dL (29 Oct 2022 21:52)  POCT Blood Glucose.: 89 mg/dL (29 Oct 2022 21:30)  POCT Blood Glucose.: 78 mg/dL (29 Oct 2022 21:13)  POCT Blood Glucose.: 221 mg/dL (29 Oct 2022 17:25)          COVID-19 PCR: NotDetec (10-28-22 @ 05:58)  COVID-19 PCR: NotDetec (10-24-22 @ 14:05)  COVID-19 PCR: NotDetec (10-23-22 @ 06:20)  COVID-19 PCR: NotDetec (10-20-22 @ 14:41)  COVID-19 PCR: NotDetec (10-17-22 @ 07:40)      RADIOLOGY & ADDITIONAL TESTS:    Care Discussed with Consultants/Other Providers:

## 2022-10-30 NOTE — PROVIDER CONTACT NOTE (OTHER) - SITUATION
Patient complained pain on the R frontal head and R periorbital area and behind the R eye
Patient ate evening snack before Blood sugar   78 , gave apple juice retook in 15 minutes 89  Expressed concern with the lantus 20 units to MD

## 2022-10-30 NOTE — PROVIDER CONTACT NOTE (OTHER) - RECOMMENDATIONS
Dr. Anderson evaluated the patient at the bedside, advised to give scheduled amitriptyline and PRN Tylenol
MD Aware  Ok to give lantus 20u

## 2022-10-31 LAB
ALBUMIN SERPL ELPH-MCNC: 3.5 G/DL — SIGNIFICANT CHANGE UP (ref 3.3–5)
ALP SERPL-CCNC: 94 U/L — SIGNIFICANT CHANGE UP (ref 40–120)
ALT FLD-CCNC: 26 U/L — SIGNIFICANT CHANGE UP (ref 10–45)
ANION GAP SERPL CALC-SCNC: 12 MMOL/L — SIGNIFICANT CHANGE UP (ref 5–17)
AST SERPL-CCNC: 27 U/L — SIGNIFICANT CHANGE UP (ref 10–40)
BILIRUB SERPL-MCNC: 0.2 MG/DL — SIGNIFICANT CHANGE UP (ref 0.2–1.2)
BUN SERPL-MCNC: 85 MG/DL — HIGH (ref 7–23)
CALCIUM SERPL-MCNC: 9.6 MG/DL — SIGNIFICANT CHANGE UP (ref 8.4–10.5)
CHLORIDE SERPL-SCNC: 108 MMOL/L — SIGNIFICANT CHANGE UP (ref 96–108)
CO2 SERPL-SCNC: 23 MMOL/L — SIGNIFICANT CHANGE UP (ref 22–31)
CREAT SERPL-MCNC: 3.46 MG/DL — HIGH (ref 0.5–1.3)
EGFR: 21 ML/MIN/1.73M2 — LOW
GLUCOSE BLDC GLUCOMTR-MCNC: 178 MG/DL — HIGH (ref 70–99)
GLUCOSE BLDC GLUCOMTR-MCNC: 187 MG/DL — HIGH (ref 70–99)
GLUCOSE BLDC GLUCOMTR-MCNC: 212 MG/DL — HIGH (ref 70–99)
GLUCOSE BLDC GLUCOMTR-MCNC: 220 MG/DL — HIGH (ref 70–99)
GLUCOSE SERPL-MCNC: 194 MG/DL — HIGH (ref 70–99)
HCT VFR BLD CALC: 31.6 % — LOW (ref 39–50)
HGB BLD-MCNC: 10.5 G/DL — LOW (ref 13–17)
MCHC RBC-ENTMCNC: 31.3 PG — SIGNIFICANT CHANGE UP (ref 27–34)
MCHC RBC-ENTMCNC: 33.2 GM/DL — SIGNIFICANT CHANGE UP (ref 32–36)
MCV RBC AUTO: 94.3 FL — SIGNIFICANT CHANGE UP (ref 80–100)
NRBC # BLD: 0 /100 WBCS — SIGNIFICANT CHANGE UP (ref 0–0)
PLATELET # BLD AUTO: 210 K/UL — SIGNIFICANT CHANGE UP (ref 150–400)
POTASSIUM SERPL-MCNC: 4.9 MMOL/L — SIGNIFICANT CHANGE UP (ref 3.5–5.3)
POTASSIUM SERPL-SCNC: 4.9 MMOL/L — SIGNIFICANT CHANGE UP (ref 3.5–5.3)
PROT SERPL-MCNC: 7.5 G/DL — SIGNIFICANT CHANGE UP (ref 6–8.3)
RBC # BLD: 3.35 M/UL — LOW (ref 4.2–5.8)
RBC # FLD: 12.4 % — SIGNIFICANT CHANGE UP (ref 10.3–14.5)
SODIUM SERPL-SCNC: 143 MMOL/L — SIGNIFICANT CHANGE UP (ref 135–145)
WBC # BLD: 6.99 K/UL — SIGNIFICANT CHANGE UP (ref 3.8–10.5)
WBC # FLD AUTO: 6.99 K/UL — SIGNIFICANT CHANGE UP (ref 3.8–10.5)

## 2022-10-31 PROCEDURE — 99232 SBSQ HOSP IP/OBS MODERATE 35: CPT | Mod: GC

## 2022-10-31 PROCEDURE — 99232 SBSQ HOSP IP/OBS MODERATE 35: CPT

## 2022-10-31 RX ORDER — LACTULOSE 10 G/15ML
10 SOLUTION ORAL DAILY
Refills: 0 | Status: DISCONTINUED | OUTPATIENT
Start: 2022-10-31 | End: 2022-11-03

## 2022-10-31 RX ORDER — HYDRALAZINE HCL 50 MG
25 TABLET ORAL THREE TIMES A DAY
Refills: 0 | Status: DISCONTINUED | OUTPATIENT
Start: 2022-10-31 | End: 2022-11-03

## 2022-10-31 RX ORDER — CARVEDILOL PHOSPHATE 80 MG/1
25 CAPSULE, EXTENDED RELEASE ORAL EVERY 12 HOURS
Refills: 0 | Status: DISCONTINUED | OUTPATIENT
Start: 2022-10-31 | End: 2022-11-03

## 2022-10-31 RX ADMIN — INSULIN GLARGINE 15 UNIT(S): 100 INJECTION, SOLUTION SUBCUTANEOUS at 22:15

## 2022-10-31 RX ADMIN — HEPARIN SODIUM 5000 UNIT(S): 5000 INJECTION INTRAVENOUS; SUBCUTANEOUS at 22:17

## 2022-10-31 RX ADMIN — HEPARIN SODIUM 5000 UNIT(S): 5000 INJECTION INTRAVENOUS; SUBCUTANEOUS at 06:29

## 2022-10-31 RX ADMIN — CARVEDILOL PHOSPHATE 12.5 MILLIGRAM(S): 80 CAPSULE, EXTENDED RELEASE ORAL at 06:34

## 2022-10-31 RX ADMIN — Medication 1 APPLICATION(S): at 06:48

## 2022-10-31 RX ADMIN — Medication 1 TABLET(S): at 11:50

## 2022-10-31 RX ADMIN — HEPARIN SODIUM 5000 UNIT(S): 5000 INJECTION INTRAVENOUS; SUBCUTANEOUS at 14:30

## 2022-10-31 RX ADMIN — Medication 2: at 11:51

## 2022-10-31 RX ADMIN — PANTOPRAZOLE SODIUM 40 MILLIGRAM(S): 20 TABLET, DELAYED RELEASE ORAL at 06:30

## 2022-10-31 RX ADMIN — SIMETHICONE 80 MILLIGRAM(S): 80 TABLET, CHEWABLE ORAL at 22:18

## 2022-10-31 RX ADMIN — Medication 10 MILLIGRAM(S): at 22:13

## 2022-10-31 RX ADMIN — CARVEDILOL PHOSPHATE 25 MILLIGRAM(S): 80 CAPSULE, EXTENDED RELEASE ORAL at 17:38

## 2022-10-31 RX ADMIN — Medication 25 MILLIGRAM(S): at 22:19

## 2022-10-31 RX ADMIN — ATORVASTATIN CALCIUM 40 MILLIGRAM(S): 80 TABLET, FILM COATED ORAL at 22:18

## 2022-10-31 RX ADMIN — LACTULOSE 10 GRAM(S): 10 SOLUTION ORAL at 22:20

## 2022-10-31 RX ADMIN — BUDESONIDE AND FORMOTEROL FUMARATE DIHYDRATE 2 PUFF(S): 160; 4.5 AEROSOL RESPIRATORY (INHALATION) at 20:18

## 2022-10-31 RX ADMIN — Medication 1 APPLICATION(S): at 22:06

## 2022-10-31 RX ADMIN — PREGABALIN 1000 MICROGRAM(S): 225 CAPSULE ORAL at 11:50

## 2022-10-31 RX ADMIN — BUDESONIDE AND FORMOTEROL FUMARATE DIHYDRATE 2 PUFF(S): 160; 4.5 AEROSOL RESPIRATORY (INHALATION) at 08:37

## 2022-10-31 RX ADMIN — Medication 2: at 08:21

## 2022-10-31 RX ADMIN — Medication 4: at 17:38

## 2022-10-31 RX ADMIN — Medication 81 MILLIGRAM(S): at 11:50

## 2022-10-31 RX ADMIN — Medication 25 MILLIGRAM(S): at 14:30

## 2022-10-31 RX ADMIN — AMLODIPINE BESYLATE 10 MILLIGRAM(S): 2.5 TABLET ORAL at 06:34

## 2022-10-31 NOTE — PROGRESS NOTE ADULT - SUBJECTIVE AND OBJECTIVE BOX
Patient is a 47y old  Male who presents with a chief complaint of Multiple CVA (30 Oct 2022 12:48)      Subjective and overnight events:  Patient seen and examined at bedside. no complaints today. no fever, chills, sob, cp, abd pain. SBP high 170s this morning, pt asymptomatic.    ALLERGIES:  No Known Allergies    MEDICATIONS  (STANDING):  amitriptyline 10 milliGRAM(s) Oral at bedtime  amLODIPine   Tablet 10 milliGRAM(s) Oral daily  AQUAPHOR (petrolatum Ointment) 1 Application(s) Topical two times a day  aspirin enteric coated 81 milliGRAM(s) Oral daily  atorvastatin 40 milliGRAM(s) Oral at bedtime  budesonide  80 MICROgram(s)/formoterol 4.5 MICROgram(s) Inhaler 2 Puff(s) Inhalation two times a day  carvedilol 12.5 milliGRAM(s) Oral every 12 hours  cyanocobalamin 1000 MICROGram(s) Oral daily  dextrose 5%. 1000 milliLiter(s) (50 mL/Hr) IV Continuous <Continuous>  dextrose 5%. 1000 milliLiter(s) (100 mL/Hr) IV Continuous <Continuous>  dextrose 50% Injectable 25 Gram(s) IV Push once  dextrose 50% Injectable 12.5 Gram(s) IV Push once  dextrose 50% Injectable 25 Gram(s) IV Push once  glucagon  Injectable 1 milliGRAM(s) IntraMuscular once  heparin   Injectable 5000 Unit(s) SubCutaneous every 8 hours  hydrALAZINE 25 milliGRAM(s) Oral three times a day  insulin glargine Injectable (LANTUS) 15 Unit(s) SubCutaneous at bedtime  insulin lispro (ADMELOG) corrective regimen sliding scale   SubCutaneous three times a day before meals  insulin lispro (ADMELOG) corrective regimen sliding scale   SubCutaneous at bedtime  multivitamin 1 Tablet(s) Oral daily  pantoprazole    Tablet 40 milliGRAM(s) Oral before breakfast  senna 2 Tablet(s) Oral at bedtime    MEDICATIONS  (PRN):  acetaminophen     Tablet .. 650 milliGRAM(s) Oral every 6 hours PRN Temp greater or equal to 38C (100.4F), Mild Pain (1 - 3)  ALBUTerol    90 MICROgram(s) HFA Inhaler 2 Puff(s) Inhalation every 6 hours PRN Shortness of Breath and/or Wheezing  Biotene Dry Mouth Oral Rinse 15 milliLiter(s) Swish and Spit three times a day PRN Mouth Care  dextrose Oral Gel 15 Gram(s) Oral once PRN Blood Glucose LESS THAN 70 milliGRAM(s)/deciliter  simethicone 80 milliGRAM(s) Chew daily PRN Gas  sodium chloride 0.65% Nasal 1 Spray(s) Both Nostrils every 1 hour PRN Nasal Congestion    Vital Signs Last 24 Hrs  T(F): 98.9 (31 Oct 2022 07:30), Max: 98.9 (31 Oct 2022 07:30)  HR: 81 (31 Oct 2022 09:11) (77 - 84)  BP: 176/92 (31 Oct 2022 07:30) (142/76 - 176/92)  RR: 16 (31 Oct 2022 07:30) (16 - 16)  SpO2: 95% (31 Oct 2022 09:11) (95% - 99%)  I&O's Summary    PHYSICAL EXAM:  General: NAD, A/O x 3  ENT: MMM  Neck: Supple, No JVD  Lungs: Clear to auscultation bilaterally  Cardio: RRR, S1/S2, No murmurs  Abdomen: Soft, Nontender, Nondistended; Bowel sounds present  Extremities: No calf tenderness, 1+ pitting edema    LABS:                        10.5   6.99  )-----------( 210      ( 31 Oct 2022 07:32 )             31.6     10-29    140  |  109  |  80  ----------------------------<  179  5.1   |  25  |  3.22    Ca    9.0      29 Oct 2022 06:00        POCT Blood Glucose.: 187 mg/dL (31 Oct 2022 08:07)  POCT Blood Glucose.: 209 mg/dL (30 Oct 2022 21:01)  POCT Blood Glucose.: 156 mg/dL (30 Oct 2022 17:14)  POCT Blood Glucose.: 119 mg/dL (30 Oct 2022 11:28)          COVID-19 PCR: NotDetec (10-28-22 @ 05:58)  COVID-19 PCR: NotDetec (10-24-22 @ 14:05)  COVID-19 PCR: NotDetec (10-23-22 @ 06:20)  COVID-19 PCR: NotDetec (10-20-22 @ 14:41)  COVID-19 PCR: NotDetec (10-17-22 @ 07:40)      RADIOLOGY & ADDITIONAL TESTS:    Care Discussed with Consultants/Other Providers:

## 2022-10-31 NOTE — PROGRESS NOTE ADULT - ASSESSMENT
This is a 46 YO male with PMHx of CKD IV, vertigo, diverticulitis s/p LAR, cigarette use, ETOH abuse now sober x5 years, IDDM, HTN, AMBER, who presented to [facility] on 10/2 c/o dizziness, b/l diplopia, headache and chest tightness found to have /133 and elevated troponin concerning for NSTEMI. CTH negative for acute pathology. MRI Brain showed L parasagittal infarcts. Per, neurology and opthalmology CN VI palsy. Patient  was transferred to [facility] for a possible LHC. LHC showed nonobstructive CAD without elevated filling pressures. Hospital course significant for BILLY, right frontal headache and popping sensation, determined to have components hypertensive and diabetic retinopathy for which outpatient followup and possible trial of Fresnel prisms was recommended. Patient now with gait Instability, ADL impairments and Functional impairments.    # Functional Deficits s/p CVA  - MRI with L parasagittal infarcts, multiple chronic lacunar infarcts  - Continue statin. ASA on hold for renal bx   -  Comprehensive Rehab Program: PT/OT/ST, 3hours daily and 5 days weekly  - PT: Focused on improving strength, endurance, coordination, balance, functional mobility, and transfers  - OT: Focused on improving strength, fine motor skills, coordination, posture and ADLs.    - ST: to diagnose and treat deficits in swallowing, cognition and communication.     #NTEMI #CAD  - LHC showed nonobstructive CAD without elevated filling pressures  - Continue statin, fenofibrate    #BILLY on CKD st. IV likely due to diabetic nephropathy  # Nephrotic range proteinuria  - baseline Cr 2.4  - BUN/Cr 63/3.31 10/26> 73/3.6 10/27  Cr 3.4 today  - IR biopsy completed 10/25-glomerulonepfitis/ scleosisCW DM/HTN- renal to follow  - per renal at [facility], now on torsemide and low dose ace-i  -Monitor BUN/Cr  -Renally dose meds  -Workup ongoing, renal consult routinely      #HTN, presented with hypertensive emergency at [facility]  - Renal initiated secondary work up at OSH: dania not elevated, f/u plasma renin activity, metanephrines  - Lisinopril 2.5mg daily  - Carvedilol 25mg BID  - BP s high- meds resumed by nephrology    #HLD  - Lipitor 40mg daily  - Fenofibrate 145mg daily    #Diplopia  - OP f/u with neuro-opthalmology  - consistent with lateral rectus palsy  - outpatient f/u with retina specialist Dr. Jose Angel Arvizu for OCT nerve/RNFL for evaluation for diabetic and hypertensive retinopathy; if diplopia/palsy persists should continue outpatient followup for Fresnel prisms  Alternate patch eyes during the day    #Cluster Headache  - Amitriptyline 10mg QHS    #AMBER  - c/w inhalers: Albuterol, Symbicort  - c/w nasal spray    #DM II  - ISS and FS  - Admelog and Lantus  - A1c 9.9 on 10/5  Diabetic nursing for training- recs appreciated  - see discharge recommendations in 10/26 diabetic educator note    #Pain management  - Tylenol PRN    #DVT ppx  - Heparin    #GI ppx  - Protonix 40mg    #Bowel Regimen  - Senna  moved bowels today    #Bladder management  - BS on admission, and q 8 hours (SC if > 400)  - Monitor UO  PVRs low    #FEN   - Diet: DASH    #Onychomycosis  -Consider podiatry consult    #Skin:  - Skin on admission: Aquaphor to dry skin BID- latrice both feet  - Pressure injury/Skin: Turn Q2hrs while in bed, OOB to Chair, PT/OT     #Dysphagia    - SLP: evaluation and treatment    #Mood/Cognition:  - Neuropsychology consult PRN    #Sleep:   - Maintain quiet hours and low stim environment.  - Melatonin PRN to maximize participation in therapy during the day.     #Precaution  - Fall, Aspiration, cardiac

## 2022-10-31 NOTE — PROGRESS NOTE ADULT - ASSESSMENT
48 YO male with PMHx of CKD IV, vertigo, diverticulitis s/p LAR, cigarette use, ETOH abuse now sober x5 years, IDDM, HTN, AMBER, who presented to [facility] on 10/2 c/o dizziness, b/l diplopia, headache and chest tightness found to have /133 and elevated troponin concerning for NSTEMI. CTH negative for acute pathology. MRI Brain showed L parasagittal infarcts. Per, neurology and opthalmology CN VI palsy. Patient  was transferred to [facility] for a possible LHC. LHC showed nonobstructive CAD without elevated filling pressures. Hospital course significant for BILLY, right frontal headache and popping sensation, determined to have components hypertensive and diabetic retinopathy for which outpatient followup and possible trial of Fresnel prisms was recommended.    # Debility  # Hx of chronic Lacunar infarts/microvascular changes  # lateral rectus palsy  - MRI with L parasagittal infarcts, multiple chronic lacunar infarcts  - PT/OT/ST per Rehab  - ASA restarted. previously held for renal bx.  - lipitor 40mg  - control BP, BP goal <140/90    # NSTEMI  # CAD  # HLD  - Cath: 10/13 w/ non obstructive CAD  - Continue ASA, Coreg, atorvastatin and fenofibrate    # HTN, presented w/ hypertensive emergency  - Renal initiated secondary work up at OSH: dania not elevated, f/u plasma renin activity, metanephrines  - BP persistently elevated,  this morning  - add hydralazine 25mg q8  - continue Coreg  - amlodipine 10mg    # DM2, insulin dependent  - [id number] 9.9% (10/5)  - pt currently on 20u glargine qhs, 2u admelog w/ meals, moderate ISS    # CKD stage IV likely due to diabetic nephropathy  # Nephrotic range proteinuria  - Cr 1.3 5/26/21, Cr 1.7 3/22/22, Cr 2.2 10/6/22  - renal biopsy c/w advanced diabetic nephropathy   - Renal on board  - avoid nephrotoxins     # Normocytic anemia likely due to anemia of chronic disease.  - Continue b12 supplements    # Diplopia, thought to be secondary to R. CN6 lateral rectus palsy  - Pt evaluated by Ophtho at OSH. Found to have moderate non-proliferative diabetic retinopathy and Hypertensive retinopathy   - OP Neuro and ophthalmology    # Depression  - Continue Amitriptyline    # Tobacco abuse disorder  - Patient has reduced smoking from 1.5 ppd to 1 pack every 2 weeks  - The patient is an active smoker. The patient was advised to quit. Declined nicotine patches/gums.     DVT ppx: Continue heparin

## 2022-10-31 NOTE — PROGRESS NOTE ADULT - SUBJECTIVE AND OBJECTIVE BOX
No distress    Vital Signs Last 24 Hrs  T(C): 36.8 (10-31-22 @ 22:13), Max: 37.2 (10-31-22 @ 07:30)  T(F): 98.3 (10-31-22 @ 22:13), Max: 98.9 (10-31-22 @ 07:30)  HR: 73 (10-31-22 @ 22:13) (73 - 82)  BP: 145/79 (10-31-22 @ 22:13) (145/79 - 176/92)  RR: 16 (10-31-22 @ 22:13) (16 - 16)  SpO2: 98% (10-31-22 @ 22:13) (95% - 99%)    s1s2  b/l air entry  soft, ND  no edema                                   10.5   6.99  )-----------( 210      ( 31 Oct 2022 07:32 )             31.6     31 Oct 2022 07:32    143    |  108    |  85     ----------------------------<  194    4.9     |  23     |  3.46     Ca    9.6        31 Oct 2022 07:32    TPro  7.5    /  Alb  3.5    /  TBili  0.2    /  DBili  x      /  AST  27     /  ALT  26     /  AlkPhos  94     31 Oct 2022 07:32    LIVER FUNCTIONS - ( 31 Oct 2022 07:32 )  Alb: 3.5 g/dL / Pro: 7.5 g/dL / ALK PHOS: 94 U/L / ALT: 26 U/L / AST: 27 U/L / GGT: x           acetaminophen     Tablet .. 650 milliGRAM(s) Oral every 6 hours PRN  ALBUTerol    90 MICROgram(s) HFA Inhaler 2 Puff(s) Inhalation every 6 hours PRN  amitriptyline 10 milliGRAM(s) Oral at bedtime  amLODIPine   Tablet 10 milliGRAM(s) Oral daily  AQUAPHOR (petrolatum Ointment) 1 Application(s) Topical two times a day  aspirin enteric coated 81 milliGRAM(s) Oral daily  atorvastatin 40 milliGRAM(s) Oral at bedtime  Biotene Dry Mouth Oral Rinse 15 milliLiter(s) Swish and Spit three times a day PRN  budesonide  80 MICROgram(s)/formoterol 4.5 MICROgram(s) Inhaler 2 Puff(s) Inhalation two times a day  carvedilol 25 milliGRAM(s) Oral every 12 hours  cyanocobalamin 1000 MICROGram(s) Oral daily  dextrose 5%. 1000 milliLiter(s) IV Continuous <Continuous>  dextrose 5%. 1000 milliLiter(s) IV Continuous <Continuous>  dextrose 50% Injectable 25 Gram(s) IV Push once  dextrose 50% Injectable 12.5 Gram(s) IV Push once  dextrose 50% Injectable 25 Gram(s) IV Push once  dextrose Oral Gel 15 Gram(s) Oral once PRN  glucagon  Injectable 1 milliGRAM(s) IntraMuscular once  heparin   Injectable 5000 Unit(s) SubCutaneous every 8 hours  hydrALAZINE 25 milliGRAM(s) Oral three times a day  insulin glargine Injectable (LANTUS) 15 Unit(s) SubCutaneous at bedtime  insulin lispro (ADMELOG) corrective regimen sliding scale   SubCutaneous three times a day before meals  insulin lispro (ADMELOG) corrective regimen sliding scale   SubCutaneous at bedtime  lactulose Syrup 10 Gram(s) Oral daily PRN  multivitamin 1 Tablet(s) Oral daily  pantoprazole    Tablet 40 milliGRAM(s) Oral before breakfast  senna 2 Tablet(s) Oral at bedtime  simethicone 80 milliGRAM(s) Chew daily PRN  sodium chloride 0.65% Nasal 1 Spray(s) Both Nostrils every 1 hour PRN      A/P:    MMP, s/p complicated course at OSH as per HPI  Progressive renal disease (Cr 1.3 - 5/26/21, Cr 1.7 - 3/22/22, Cr 2.2 - 10/6/22)  S/p CT w/IV dye 10/4 and 10/6  Contrast BILLY/CKD 3  S/p kidney biopsy 10/25/22 c/w advanced DM nephropathy w/advanced chronicity  Avoid nephrotoxins  F/u CBC, BMP  Rehab    [phone]

## 2022-10-31 NOTE — PROGRESS NOTE ADULT - SUBJECTIVE AND OBJECTIVE BOX
chief complaint- headaches, double vision, poor balance    This is a 46 YO male with PMHx of CKD IV, vertigo, diverticulitis s/p LAR, cigarette use, ETOH abuse now sober x5 years, IDDM, HTN, AMBER, who presented to [facility] on 10/2 c/o dizziness, b/l diplopia, headache and chest tightness found to have /133 and elevated troponins concerning for NSTEMI. EKG w/ ST-T abnormalities without acute ST segment changes. At OSH BPs stabilized with IV hydralazine, CT head without acute pathology, MRI brain showed chronic L parasagittal infarcts. 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 Patient was recommended outpatient followup for neurological/ophthalmological concerns (specifically for OCT nerve/RNFL, Fresnel prisms) and was transferred to [facility] for a possible LHC.    On admission to [facility] coronary CT showed moderate stenosis of the proximal LAD and RCA. Cath initially deferred as patient was pending nephrology clearance for BILLY. TTE largely wnl, bubble study negative. MRI brain with contrast showed chronic multifocal lacunar infarcts and chronic microvascular ischemic changes, corroborating MRI read from [facility]. Patient was evaluated by ophthalmology team for new onset R frontal headache and popping sensation, determined to have components hypertensive and diabetic retinopathy for which outpatient followup and possible trial of Fresnel prisms was recommended. Cluster headaches suspected given pattern of headache (R frontal, several days at a time, lacrimation). Patient was also evaluated by PT/OT who recommended CANDY. Speech pathology eval was notable for cognitive linguistic deficits with concern for early onset dementia given a family hx (mother diagnosed @46yo). After improvement of BILLY, LHC was completed showing nonobstructive CAD without elevated filling pressures. Lateral rectus palsy was noted to improve slightly towards end of admission with ability to abduct to ~10-15 degrees. Low-dose lisinopril was initiated for nephrotic-range proteinuria.  Patient was monitored for resolution of BILLY and is now hemodynamically stable   Patient was evaluated by PM&R and therapy for functional deficits, gait/ADL impairments and acute rehabilitation was recommended. Patient was medically optimized for discharge to [facility] IRU on 10/20/22.    Today's Subjective:  Patient seen and assessed  in PT    ROS:  no further dizziness  BPS elevated- Meds restarted by renal  last BM friday with lactulose per patient- will reorder PRN  no nausea, no vomiting  no SOB, no chest pain  alternating eye patching          MEDICATIONS  (STANDING):  amitriptyline 10 milliGRAM(s) Oral at bedtime  amLODIPine   Tablet 10 milliGRAM(s) Oral daily  AQUAPHOR (petrolatum Ointment) 1 Application(s) Topical two times a day  aspirin enteric coated 81 milliGRAM(s) Oral daily  atorvastatin 40 milliGRAM(s) Oral at bedtime  budesonide  80 MICROgram(s)/formoterol 4.5 MICROgram(s) Inhaler 2 Puff(s) Inhalation two times a day  carvedilol 25 milliGRAM(s) Oral every 12 hours  cyanocobalamin 1000 MICROGram(s) Oral daily  dextrose 5%. 1000 milliLiter(s) (100 mL/Hr) IV Continuous <Continuous>  dextrose 5%. 1000 milliLiter(s) (50 mL/Hr) IV Continuous <Continuous>  dextrose 50% Injectable 25 Gram(s) IV Push once  dextrose 50% Injectable 12.5 Gram(s) IV Push once  dextrose 50% Injectable 25 Gram(s) IV Push once  glucagon  Injectable 1 milliGRAM(s) IntraMuscular once  heparin   Injectable 5000 Unit(s) SubCutaneous every 8 hours  hydrALAZINE 25 milliGRAM(s) Oral three times a day  insulin glargine Injectable (LANTUS) 15 Unit(s) SubCutaneous at bedtime  insulin lispro (ADMELOG) corrective regimen sliding scale   SubCutaneous three times a day before meals  insulin lispro (ADMELOG) corrective regimen sliding scale   SubCutaneous at bedtime  multivitamin 1 Tablet(s) Oral daily  pantoprazole    Tablet 40 milliGRAM(s) Oral before breakfast  senna 2 Tablet(s) Oral at bedtime    MEDICATIONS  (PRN):  acetaminophen     Tablet .. 650 milliGRAM(s) Oral every 6 hours PRN Temp greater or equal to 38C (100.4F), Mild Pain (1 - 3)  ALBUTerol    90 MICROgram(s) HFA Inhaler 2 Puff(s) Inhalation every 6 hours PRN Shortness of Breath and/or Wheezing  Biotene Dry Mouth Oral Rinse 15 milliLiter(s) Swish and Spit three times a day PRN Mouth Care  dextrose Oral Gel 15 Gram(s) Oral once PRN Blood Glucose LESS THAN 70 milliGRAM(s)/deciliter  lactulose Syrup 10 Gram(s) Oral daily PRN constipation  simethicone 80 milliGRAM(s) Chew daily PRN Gas  sodium chloride 0.65% Nasal 1 Spray(s) Both Nostrils every 1 hour PRN Nasal Congestion                            10.5   6.99  )-----------( 210      ( 31 Oct 2022 07:32 )             31.6     10-31    143  |  108  |  85<H>  ----------------------------<  194<H>  4.9   |  23  |  3.46<H>    Ca    9.6      31 Oct 2022 07:32    TPro  7.5  /  Alb  3.5  /  TBili  0.2  /  DBili  x   /  AST  27  /  ALT  26  /  AlkPhos  94  10-31        CAPILLARY BLOOD GLUCOSE      POCT Blood Glucose.: 178 mg/dL (31 Oct 2022 11:47)  POCT Blood Glucose.: 187 mg/dL (31 Oct 2022 08:07)  POCT Blood Glucose.: 209 mg/dL (30 Oct 2022 21:01)  POCT Blood Glucose.: 156 mg/dL (30 Oct 2022 17:14)      Vital Signs Last 24 Hrs  T(C): 37.2 (31 Oct 2022 07:30), Max: 37.2 (31 Oct 2022 07:30)  T(F): 98.9 (31 Oct 2022 07:30), Max: 98.9 (31 Oct 2022 07:30)  HR: 82 (31 Oct 2022 13:12) (75 - 84)  BP: 161/82 (31 Oct 2022 13:12) (142/76 - 176/92)  BP(mean): --  RR: 16 (31 Oct 2022 13:12) (16 - 16)  SpO2: 99% (31 Oct 2022 13:12) (95% - 99%)    Parameters below as of 31 Oct 2022 13:12  Patient On (Oxygen Delivery Method): room air          PHYSICAL EXAM    Constitutional - NAD, Comfortable eye patch on  HEENT - R 6th palsy persists  Chest - good chest expansion, good respiratory effort, CTAB   Cardio - warm and well perfused, RRR, no murmur  Abdomen -  Soft, NTND  Extremities 2 + edema LES  Neurologic Exam:                    Cognitive -             Orientation: Awake, Alert, AAOx3        Speech - Fluent, Comprehensible, No dysarthria, No aphasia      Cranial Nerves - No facial asymmetry, Tongue midline, Shoulder shrug intact +R lateral gaze palsy     Motor -                      LEFT    UE - ShAB 5/5, EF 5/5, EE 5/5, WE 5/5,  WNL                    RIGHT UE - ShAB 5/5, EF 5/5, EE 5/5, WE 5/5,  WNL                    LEFT    LE - HF 4+/5, KE 5/5, DF 5/5, PF 5/5                    RIGHT LE - HF 4+/5, KE 5/5, DF 5/5, PF 5/5        Sensory - Diminished to LT glove stocking distribution     Reflexes - DTR intact  Psychiatric - Mood stable, Affect WNL      IDT Meeting 10/25/22:  EDOD: 11/3    Barriers: visual acuity, proprioception and cog deficits, fall risk    SW:   Lives in basement apt with 5 SCOTTIE with 1 HR, and 6 steps down  Brother is emergency contact, unclear if able to assist as cares for father    OT:  CG to SV for all ADLs and transfers  Goals are mod I for all except SV for bathing and tub/shower transfers    PT:  Transfers with CG  Ambulating 100ft with RW and SV    SLP:  Reg/thins  Mild expressive and receptive aphasia, language and word finding deficits  Ataxic dysarthria  History of poor medical management  Safety concerns.

## 2022-11-01 ENCOUNTER — TRANSCRIPTION ENCOUNTER (OUTPATIENT)
Age: 47
End: 2022-11-01

## 2022-11-01 LAB
ANION GAP SERPL CALC-SCNC: 8 MMOL/L — SIGNIFICANT CHANGE UP (ref 5–17)
BUN SERPL-MCNC: 83 MG/DL — HIGH (ref 7–23)
CALCIUM SERPL-MCNC: 8.8 MG/DL — SIGNIFICANT CHANGE UP (ref 8.4–10.5)
CHLORIDE SERPL-SCNC: 108 MMOL/L — SIGNIFICANT CHANGE UP (ref 96–108)
CO2 SERPL-SCNC: 26 MMOL/L — SIGNIFICANT CHANGE UP (ref 22–31)
CREAT SERPL-MCNC: 3.46 MG/DL — HIGH (ref 0.5–1.3)
EGFR: 21 ML/MIN/1.73M2 — LOW
GLUCOSE BLDC GLUCOMTR-MCNC: 155 MG/DL — HIGH (ref 70–99)
GLUCOSE BLDC GLUCOMTR-MCNC: 171 MG/DL — HIGH (ref 70–99)
GLUCOSE BLDC GLUCOMTR-MCNC: 209 MG/DL — HIGH (ref 70–99)
GLUCOSE BLDC GLUCOMTR-MCNC: 246 MG/DL — HIGH (ref 70–99)
GLUCOSE SERPL-MCNC: 268 MG/DL — HIGH (ref 70–99)
POTASSIUM SERPL-MCNC: 4.9 MMOL/L — SIGNIFICANT CHANGE UP (ref 3.5–5.3)
POTASSIUM SERPL-SCNC: 4.9 MMOL/L — SIGNIFICANT CHANGE UP (ref 3.5–5.3)
SODIUM SERPL-SCNC: 142 MMOL/L — SIGNIFICANT CHANGE UP (ref 135–145)

## 2022-11-01 PROCEDURE — 99232 SBSQ HOSP IP/OBS MODERATE 35: CPT

## 2022-11-01 PROCEDURE — 99232 SBSQ HOSP IP/OBS MODERATE 35: CPT | Mod: GC

## 2022-11-01 RX ORDER — LANOLIN ALCOHOL/MO/W.PET/CERES
3 CREAM (GRAM) TOPICAL AT BEDTIME
Refills: 0 | Status: DISCONTINUED | OUTPATIENT
Start: 2022-11-01 | End: 2022-11-03

## 2022-11-01 RX ADMIN — Medication 1 TABLET(S): at 11:51

## 2022-11-01 RX ADMIN — Medication 81 MILLIGRAM(S): at 11:51

## 2022-11-01 RX ADMIN — Medication 4: at 08:03

## 2022-11-01 RX ADMIN — Medication 10 MILLIGRAM(S): at 22:26

## 2022-11-01 RX ADMIN — Medication 1 APPLICATION(S): at 17:25

## 2022-11-01 RX ADMIN — HEPARIN SODIUM 5000 UNIT(S): 5000 INJECTION INTRAVENOUS; SUBCUTANEOUS at 05:33

## 2022-11-01 RX ADMIN — AMLODIPINE BESYLATE 10 MILLIGRAM(S): 2.5 TABLET ORAL at 05:30

## 2022-11-01 RX ADMIN — Medication 650 MILLIGRAM(S): at 05:30

## 2022-11-01 RX ADMIN — CARVEDILOL PHOSPHATE 25 MILLIGRAM(S): 80 CAPSULE, EXTENDED RELEASE ORAL at 05:31

## 2022-11-01 RX ADMIN — BUDESONIDE AND FORMOTEROL FUMARATE DIHYDRATE 2 PUFF(S): 160; 4.5 AEROSOL RESPIRATORY (INHALATION) at 21:38

## 2022-11-01 RX ADMIN — Medication 3 MILLIGRAM(S): at 22:27

## 2022-11-01 RX ADMIN — BUDESONIDE AND FORMOTEROL FUMARATE DIHYDRATE 2 PUFF(S): 160; 4.5 AEROSOL RESPIRATORY (INHALATION) at 08:37

## 2022-11-01 RX ADMIN — INSULIN GLARGINE 15 UNIT(S): 100 INJECTION, SOLUTION SUBCUTANEOUS at 22:28

## 2022-11-01 RX ADMIN — Medication 15 MILLILITER(S): at 11:58

## 2022-11-01 RX ADMIN — Medication 25 MILLIGRAM(S): at 22:26

## 2022-11-01 RX ADMIN — Medication 2: at 12:03

## 2022-11-01 RX ADMIN — PREGABALIN 1000 MICROGRAM(S): 225 CAPSULE ORAL at 11:51

## 2022-11-01 RX ADMIN — ATORVASTATIN CALCIUM 40 MILLIGRAM(S): 80 TABLET, FILM COATED ORAL at 22:27

## 2022-11-01 RX ADMIN — PANTOPRAZOLE SODIUM 40 MILLIGRAM(S): 20 TABLET, DELAYED RELEASE ORAL at 05:36

## 2022-11-01 RX ADMIN — CARVEDILOL PHOSPHATE 25 MILLIGRAM(S): 80 CAPSULE, EXTENDED RELEASE ORAL at 17:22

## 2022-11-01 RX ADMIN — HEPARIN SODIUM 5000 UNIT(S): 5000 INJECTION INTRAVENOUS; SUBCUTANEOUS at 14:16

## 2022-11-01 RX ADMIN — Medication 25 MILLIGRAM(S): at 14:16

## 2022-11-01 RX ADMIN — Medication 1 APPLICATION(S): at 05:36

## 2022-11-01 RX ADMIN — Medication 25 MILLIGRAM(S): at 05:30

## 2022-11-01 RX ADMIN — HEPARIN SODIUM 5000 UNIT(S): 5000 INJECTION INTRAVENOUS; SUBCUTANEOUS at 22:26

## 2022-11-01 RX ADMIN — Medication 4: at 17:25

## 2022-11-01 NOTE — DISCHARGE NOTE PROVIDER - NSFOLLOWUPCLINICS_GEN_ALL_ED_FT
[facility]  Nephrology  [address] [place] [address]  Phone: [phone]  Fax:   Follow Up Time: 2 weeks    [facility] Ophthalmology  Ophthalmology  [address] [facility] [address] [facility] [address]  [place] [address]  Phone: [phone]  Fax:   Follow Up Time: 2 weeks    [facility] Pulmonolgy and Sleep Medicine  Pulmonology  [address], [address]  [place] [address]  Phone: [phone]  Fax:   Follow Up Time: 1 month    [facility]  Neurology  [address] [place] [address]  Phone: [phone]  Fax:   Follow Up Time: 1 month    [facility]  Cardiology  [address] [place] [address]  Phone: [phone]  Fax:   Follow Up Time: 2 weeks

## 2022-11-01 NOTE — DISCHARGE NOTE PROVIDER - PROVIDER TOKENS
FREE:[LAST:[[place]],FIRST:[Debbie MACIAS],PHONE:[phone]],FAX:[phone]],ADDRESS:[[address] [place] [address]],SCHEDULEDAPPT:[11/07/2022],SCHEDULEDAPPTTIME:[11:45 AM]],PROVIDER:[TOKEN:[[id number]],FOLLOWUP:[1 month]]

## 2022-11-01 NOTE — DISCHARGE NOTE PROVIDER - NSDCFUADDAPPT_GEN_ALL_CORE_FT
Please schedule an Ophthalmology appointment  with:  Dr. Jose Angel Arvizu  [facility] [facility] [address]  [place]  [phone] Please schedule an Ophthalmology appointment with retina specialist:  Dr. Jose Angel Arvizu  [address] [facility] [address]  [place]  [phone]    You will also need to follow up with an endocrinologist for diabetic management. Please see your PCP within 1 week of discharge for referral. Please schedule an Ophthalmology appointment with retina specialist:  Dr. Jose Angel Arvizu  [address] [facility] [address]  [place]  [phone]    You will also need to follow up with an endocrinologist for diabetic management. Please see your PCP within 1 week of discharge for referral.  Please see a nephrologist regarding your kidney disease

## 2022-11-01 NOTE — DISCHARGE NOTE PROVIDER - HOSPITAL COURSE
This is a 48 YO male with PMHx of CKD IV, vertigo, diverticulitis s/p LAR, cigarette use, ETOH abuse now sober x5 years, IDDM, HTN, AMBER, who presented to [facility] on 10/2 c/o dizziness, b/l diplopia, headache and chest tightness found to have /133 and elevated troponins concerning for NSTEMI. EKG w/ ST-T abnormalities without acute ST segment changes. At OSH BPs stabilized with IV hydralazine, CT head without acute pathology, MRI brain showed chronic L parasagittal infarcts. 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 Patient was recommended outpatient followup for neurological/ophthalmological concerns (specifically for OCT nerve/RNFL, Fresnel prisms) and was transferred to [facility] for a possible LHC.    On admission to [facility] coronary CT showed moderate stenosis of the proximal LAD and RCA. Cath initially deferred as patient was pending nephrology clearance for BILLY. TTE largely wnl, bubble study negative. MRI brain with contrast showed chronic multifocal lacunar infarcts and chronic microvascular ischemic changes, corroborating MRI read from [facility]. Patient was evaluated by ophthalmology team for new onset R frontal headache and popping sensation, determined to have components hypertensive and diabetic retinopathy for which outpatient followup and possible trial of Fresnel prisms was recommended. Cluster headaches suspected given pattern of headache (R frontal, several days at a time, lacrimation). Patient was also evaluated by PT/OT who recommended CANDY. Speech pathology eval was notable for cognitive linguistic deficits with concern for early onset dementia given a family hx (mother diagnosed @44yo). After improvement of BILLY, LHC was completed showing nonobstructive CAD without elevated filling pressures. Lateral rectus palsy was noted to improve slightly towards end of admission with ability to abduct to ~10-15 degrees. Low-dose lisinopril was initiated for nephrotic-range proteinuria.  Patient was monitored for resolution of BILLY and is now hemodynamically stable.   Patient was evaluated by PM&R and therapy for functional deficits, gait/ADL impairments and acute rehabilitation was recommended. Patient was medically optimized for discharge to [facility] IRU on 10/20/22.    Pt was stable upon rehab admission to  [facility]. Admitted with gait instabilty, ADL, and functional impairments.     Rehab Course significant for:    All other medical co-morbidities were stable.  Pt tolerated course of inpatient PT/OT/SLP rehab with significant functional improvements and met rehab goals prior to discharge. Pt was medically cleared for discharge to ___    This is a 48 YO male with PMHx of CKD IV, vertigo, diverticulitis s/p LAR, cigarette use, ETOH abuse now sober x5 years, IDDM, HTN, AMBER, who presented to [facility] on 10/2 c/o dizziness, b/l diplopia, headache and chest tightness found to have /133 and elevated troponins concerning for NSTEMI. EKG w/ ST-T abnormalities without acute ST segment changes. At OSH BPs stabilized with IV hydralazine, CT head without acute pathology, MRI brain showed chronic L parasagittal infarcts. 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 Patient was recommended outpatient followup for neurological/ophthalmological concerns (specifically for OCT nerve/RNFL, Fresnel prisms) and was transferred to [facility] for a possible LHC.    On admission to [facility] coronary CT showed moderate stenosis of the proximal LAD and RCA. Cath initially deferred as patient was pending nephrology clearance for BILLY. TTE largely wnl, bubble study negative. MRI brain with contrast showed chronic multifocal lacunar infarcts and chronic microvascular ischemic changes, corroborating MRI read from [facility]. Patient was evaluated by ophthalmology team for new onset R frontal headache and popping sensation, determined to have components hypertensive and diabetic retinopathy for which outpatient followup and possible trial of Fresnel prisms was recommended. Cluster headaches suspected given pattern of headache (R frontal, several days at a time, lacrimation). Patient was also evaluated by PT/OT who recommended CANDY. Speech pathology eval was notable for cognitive linguistic deficits with concern for early onset dementia given a family hx (mother diagnosed @44yo). After improvement of BILLY, LHC was completed showing nonobstructive CAD without elevated filling pressures. Lateral rectus palsy was noted to improve slightly towards end of admission with ability to abduct to ~10-15 degrees. Low-dose lisinopril was initiated for nephrotic-range proteinuria.  Patient was monitored for resolution of BILLY and is now hemodynamically stable.   Patient was evaluated by PM&R and therapy for functional deficits, gait/ADL impairments and acute rehabilitation was recommended. Patient was medically optimized for discharge to [facility] IRU on 10/20/22.    Pt was stable upon rehab admission to  [facility]. Admitted with gait instabilty, ADL, and functional impairments.     Rehab Course significant for:  - Constipation. Recommended to continue bowel regimen on discharge.  - BILLY on CKD IV due to diabetic nephropathy. BUN/Cr 83/3.46 on 11/2. IR biopsy completed 10/25 shows glomerulonepfitis/ sclerosis constistent with DM/HTN. Will need outpatient follow up with nephrology. Lisinopril and torsemide were discontinued.  - HTN. BP was managed with amlodipine 10mg daily and carvedilol 25mg Q12h, and hydralazine 25mg TID.  - Diplopia 2/2 lateral rectus palsy. Patient alternated eyes for eye patch. He will need OP f/u with neuro-opthalmology and retina specialist Dr. Jose Angel Arvizu for OCT nerve/RNFL for evaluation for diabetic and hypertensive retinopathy. Will likely need Fresnel prisms.  - Diabetes education. Patient was instructed on managing his diabetes with proper diet and with insulin.    All other medical co-morbidities were stable.  Pt tolerated course of inpatient PT/OT/SLP rehab with significant functional improvements and met rehab goals prior to discharge. Pt was medically cleared for discharge to home with home health care and home health aide.   This is a 46 YO male with PMHx of CKD IV, vertigo, diverticulitis s/p LAR, cigarette use, ETOH abuse now sober x5 years, IDDM, HTN, AMBER, who presented to [facility] on 10/2 c/o dizziness, b/l diplopia, headache and chest tightness found to have /133 and elevated troponins concerning for NSTEMI. EKG w/ ST-T abnormalities without acute ST segment changes. At OSH BPs stabilized with IV hydralazine, CT head without acute pathology, MRI brain showed chronic L parasagittal infarcts. 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 Patient was recommended outpatient followup for neurological/ophthalmological concerns (specifically for OCT nerve/RNFL, Fresnel prisms) and was transferred to [facility] for a possible LHC.    On admission to [facility] coronary CT showed moderate stenosis of the proximal LAD and RCA. Cath initially deferred as patient was pending nephrology clearance for BILLY. TTE largely wnl, bubble study negative. MRI brain with contrast showed chronic multifocal lacunar infarcts and chronic microvascular ischemic changes, corroborating MRI read from [facility]. Patient was evaluated by ophthalmology team for new onset R frontal headache and popping sensation, determined to have components hypertensive and diabetic retinopathy for which outpatient followup and possible trial of Fresnel prisms was recommended. Cluster headaches suspected given pattern of headache (R frontal, several days at a time, lacrimation). Patient was also evaluated by PT/OT who recommended CANDY. Speech pathology eval was notable for cognitive linguistic deficits with concern for early onset dementia given a family hx (mother diagnosed @46yo). After improvement of BILLY, LHC was completed showing nonobstructive CAD without elevated filling pressures. Lateral rectus palsy was noted to improve slightly towards end of admission with ability to abduct to ~10-15 degrees. Low-dose lisinopril was initiated for nephrotic-range proteinuria.  Patient was monitored for resolution of BILLY and is now hemodynamically stable.   Patient was evaluated by PM&R and therapy for functional deficits, gait/ADL impairments and acute rehabilitation was recommended. Patient was medically optimized for discharge to [facility] IRU on 10/20/22.    Pt was stable upon rehab admission to  [facility]. Admitted with gait instabilty, ADL, and functional impairments.     Rehab Course significant for:  - Constipation. Recommended to continue bowel regimen on discharge.  - BILLY on CKD IV due to diabetic nephropathy. BUN/Cr 83/3.46 on 11/2. IR biopsy completed 10/25 shows glomerulonephritis/ sclerosis consistent with DM/HTN.  Lisinopril and torsemide were discontinued. Will need outpatient follow up with nephrology.  - HTN. BP was managed with amlodipine 10mg daily and carvedilol 25mg Q12h, and hydralazine 25mg TID. Will need nephrology follow up.  - Diplopia 2/2 lateral rectus palsy. Patient alternated eyes for eye patch. He will need OP f/u with neuro-opthalmology and retina specialist Dr. Jose Angel Arvizu for OCT nerve/RNFL for evaluation for diabetic and hypertensive retinopathy. Will likely need Fresnel prisms.  - Diabetes education. Patient was instructed on managing his diabetes with proper diet and with insulin. Will need referral for endocrinology follow up as OP.  - Cluster headache. Managed with amitriptylene at bed time. Will need outpatient sleep study for sleep apnea.    All other medical co-morbidities were stable.  Pt tolerated course of inpatient PT/OT/SLP rehab with significant functional improvements and met rehab goals prior to discharge. Pt was medically cleared for discharge to home with home health care and home health aide.

## 2022-11-01 NOTE — CHART NOTE - NSCHARTNOTEFT_GEN_A_CORE
IDT 11/1 - lead by Dr. Ledbetter  lives alone. qualified for 11 hrs of HHA weekly.  Functional progress: SV with ADLs + transfers.  amb 150' RW, stairs 1 HR step 2 pattern.  Mild receptive language, mild cog impairment, overall nees SV.  Improved incite and knows he should have SV upon discharge  Barrier: vertigo d/t orthostasis - better now  Need: commode, shower chair and wants rollator for community ambulation  TDD: 11/3 home

## 2022-11-01 NOTE — DISCHARGE NOTE PROVIDER - NSDCMRMEDTOKEN_GEN_ALL_CORE_FT
Admelog 100 units/mL injectable solution: 2 unit(s) injectable 3 times a day (before meals)  Albuterol (Eqv-ProAir HFA) 90 mcg/inh inhalation aerosol: 2 puff(s) inhaled every 6 hours  amitriptyline 10 mg oral tablet: 1 tab(s) orally once a day (at bedtime)  home  atorvastatin 40 mg oral tablet: 1 tab(s) orally once a day (at bedtime)  home  carvedilol 25 mg oral tablet: 1 tab(s) orally every 12 hours  fenofibrate 145 mg oral tablet: 1 tab(s) orally once a day  home  insulin glargine 100 units/mL subcutaneous solution: 20 unit(s) subcutaneous once a day (at bedtime)  lisinopril 2.5 mg oral tablet: 1 tab(s) orally once a day  Norvasc 10 mg oral tablet: 1 tab(s) orally once a day  [facility]   Soaanz 20 mg oral tablet: 1 tab(s) orally once a day  Symbicort 80 mcg-4.5 mcg/inh inhalation aerosol: 2 puff(s) inhaled 2 times a day  home   acetaminophen 325 mg oral tablet: 2 tab(s) orally every 6 hours, As needed, Temp greater or equal to 38C (100.4F), Mild Pain (1 - 3)  Admelog 100 units/mL injectable solution: 2 unit(s) injectable 3 times a day (before meals)  Albuterol (Eqv-ProAir HFA) 90 mcg/inh inhalation aerosol: 2 puff(s) inhaled every 6 hours  amitriptyline 10 mg oral tablet: 1 tab(s) orally once a day (at bedtime)  amLODIPine 10 mg oral tablet: 1 tab(s) orally once a day  aspirin 81 mg oral delayed release tablet: 1 tab(s) orally once a day  atorvastatin 40 mg oral tablet: 1 tab(s) orally once a day (at bedtime)  home  carvedilol 25 mg oral tablet: 1 tab(s) orally every 12 hours  cyanocobalamin 1000 mcg oral tablet: 1 tab(s) orally once a day  fenofibrate 145 mg oral tablet: 1 tab(s) orally once a day  home  hydrALAZINE 25 mg oral tablet: 1 tab(s) orally 3 times a day  insulin glargine 100 units/mL subcutaneous solution: 20 unit(s) subcutaneous once a day (at bedtime)  Multiple Vitamins oral tablet: 1 tab(s) orally once a day  pantoprazole 40 mg oral delayed release tablet: 1 tab(s) orally once a day (before a meal)  senna leaf extract oral tablet: 2 tab(s) orally once a day (at bedtime), As Needed  Symbicort 80 mcg-4.5 mcg/inh inhalation aerosol: 2 puff(s) inhaled 2 times a day  home   acetaminophen 325 mg oral tablet: 2 tab(s) orally every 6 hours, As needed, Temp greater or equal to 38C (100.4F), Mild Pain (1 - 3)  Albuterol (Eqv-ProAir HFA) 90 mcg/inh inhalation aerosol: 2 puff(s) inhaled every 6 hours  alcohol swabs : Apply topically to affected area 4 times a day   amitriptyline 10 mg oral tablet: 1 tab(s) orally once a day (at bedtime)  amLODIPine 10 mg oral tablet: 1 tab(s) orally once a day  aspirin 81 mg oral delayed release tablet: 1 tab(s) orally once a day  atorvastatin 40 mg oral tablet: 1 tab(s) orally once a day (at bedtime)  carvedilol 25 mg oral tablet: 1 tab(s) orally every 12 hours  cyanocobalamin 1000 mcg oral tablet: 1 tab(s) orally once a day  fenofibrate 145 mg oral tablet: 1 tab(s) orally once a day  home  glucometer (per patient&#x27;s insurance): Test blood sugars four times a day. Dispense #1 glucometer.  hydrALAZINE 25 mg oral tablet: 1 tab(s) orally 3 times a day  insulin degludec 100 units/mL subcutaneous solution: For Correctional Scale give the following unit(s) subcutaneously 3 times a day prior to meals, based on blood glucose reading:  &lt;150 = 0 units  150-200 = 2 units  201-250 = 4 units  251-300 = 6 units  301-350 = 8 units  351-400 = 10 units  401 or greater = 12 units and call primary care physician or endocrinologist  Insulin Pen Needles, 4mm: 1 application subcutaneously 4 times a day. ** Use with insulin pen **   lancets: 1 application subcutaneously 4 times a day   Multiple Vitamins oral tablet: 1 tab(s) orally once a day  pantoprazole 40 mg oral delayed release tablet: 1 tab(s) orally once a day (before a meal)  senna leaf extract oral tablet: 2 tab(s) orally once a day (at bedtime), As Needed  Symbicort 80 mcg-4.5 mcg/inh inhalation aerosol: 2 puff(s) inhaled 2 times a day  home  test strips (per patient&#x27;s insurance): 1 application subcutaneously 4 times a day. ** Compatible with patient&#x27;s glucometer **  Tresiba FlexTouch 100 units/mL subcutaneous solution: 18 unit(s) subcutaneous once a day (at bedtime)    acetaminophen 325 mg oral tablet: 2 tab(s) orally every 6 hours, As needed, Temp greater or equal to 38C (100.4F), Mild Pain (1 - 3)  Albuterol (Eqv-ProAir HFA) 90 mcg/inh inhalation aerosol: 2 puff(s) inhaled every 6 hours  alcohol swabs : Apply topically to affected area 4 times a day   amitriptyline 10 mg oral tablet: 1 tab(s) orally once a day (at bedtime)  amLODIPine 10 mg oral tablet: 1 tab(s) orally once a day  aspirin 81 mg oral delayed release tablet: 1 tab(s) orally once a day  atorvastatin 40 mg oral tablet: 1 tab(s) orally once a day (at bedtime)  carvedilol 25 mg oral tablet: 1 tab(s) orally every 12 hours  cyanocobalamin 1000 mcg oral tablet: 1 tab(s) orally once a day  fenofibrate 145 mg oral tablet: 1 tab(s) orally once a day  home  glucometer (per patient&#x27;s insurance): Test blood sugars four times a day. Dispense #1 glucometer.  hydrALAZINE 25 mg oral tablet: 1 tab(s) orally 3 times a day  Insulin Pen Needles, 4mm: 1 application subcutaneously 4 times a day. ** Use with insulin pen **   lancets: 1 application subcutaneously 4 times a day   Multiple Vitamins oral tablet: 1 tab(s) orally once a day  pantoprazole 40 mg oral delayed release tablet: 1 tab(s) orally once a day (before a meal)  senna leaf extract oral tablet: 2 tab(s) orally once a day (at bedtime), As Needed  Symbicort 80 mcg-4.5 mcg/inh inhalation aerosol: 2 puff(s) inhaled 2 times a day  home  test strips (per patient&#x27;s insurance): 1 application subcutaneously 4 times a day. ** Compatible with patient&#x27;s glucometer **   acetaminophen 325 mg oral tablet: 2 tab(s) orally every 6 hours, As needed, Temp greater or equal to 38C (100.4F), Mild Pain (1 - 3)  Admelog 100 units/mL injectable solution: 1 unit(s) injectable 3 times a day (with meals), use sliding scale as prior.   Albuterol (Eqv-ProAir HFA) 90 mcg/inh inhalation aerosol: 2 puff(s) inhaled every 6 hours  alcohol swabs : Apply topically to affected area 4 times a day   amitriptyline 10 mg oral tablet: 1 tab(s) orally once a day (at bedtime)  amLODIPine 10 mg oral tablet: 1 tab(s) orally once a day  aspirin 81 mg oral delayed release tablet: 1 tab(s) orally once a day  atorvastatin 40 mg oral tablet: 1 tab(s) orally once a day (at bedtime)  carvedilol 25 mg oral tablet: 1 tab(s) orally every 12 hours  cyanocobalamin 1000 mcg oral tablet: 1 tab(s) orally once a day  fenofibrate 145 mg oral tablet: 1 tab(s) orally once a day  home  glucometer (per patient&#x27;s insurance): Test blood sugars four times a day. Dispense #1 glucometer.  hydrALAZINE 25 mg oral tablet: 1 tab(s) orally 3 times a day  Insulin Pen Needles, 4mm: 1 application subcutaneously 4 times a day. ** Use with insulin pen **   lancets: 1 application subcutaneously 4 times a day   Multiple Vitamins oral tablet: 1 tab(s) orally once a day  pantoprazole 40 mg oral delayed release tablet: 1 tab(s) orally once a day (before a meal)  senna leaf extract oral tablet: 2 tab(s) orally once a day (at bedtime), As Needed  Symbicort 80 mcg-4.5 mcg/inh inhalation aerosol: 2 puff(s) inhaled 2 times a day  home  test strips (per patient&#x27;s insurance): 1 application subcutaneously 4 times a day. ** Compatible with patient&#x27;s glucometer **  Tresiba FlexTouch 100 units/mL subcutaneous solution: 20 unit(s) subcutaneous once a day (at bedtime)

## 2022-11-01 NOTE — DISCHARGE NOTE PROVIDER - CARE PROVIDER_API CALL
Debbie Fofana  [address] [place] [address]  Phone: [phone]  Fax: [phone]  Scheduled Appointment: 11/07/2022 11:45 AM    Carlos Ledbetter)  Physical[facility]hab Medicine  [address] [place] [address]  Phone: [phone]  Fax: [phone]  Follow Up Time: 1 month

## 2022-11-01 NOTE — DISCHARGE NOTE PROVIDER - NSDCFUSCHEDAPPT_GEN_ALL_CORE_FT
Debbie Fofana  [facility]  [facility] [id number] [address]  Scheduled Appointment: 11/07/2022

## 2022-11-01 NOTE — DISCHARGE NOTE PROVIDER - NSDCCPCAREPLAN_GEN_ALL_CORE_FT
PRINCIPAL DISCHARGE DIAGNOSIS  Diagnosis: NSTEMI (non-ST elevation myocardial infarction)  Assessment and Plan of Treatment: You were diagnosed with an NSTEMI, a type of heart attack, and you were transferred to [facility] for further treatment. A special CT scan of your heart suggested that two key vessels supplying blood to your heart were narrowed, and thus a cardiac catheterization was recommended to address this finding. Our cardiology team performed the catheterization on 10/13/2022 and found coronary artery disease (CAD) that was non-obstructive and ultimately did not require further treatment. Please continue to take your heart medications as prescribed and set up a follow-up appointment with a cardiologist within a few weeks. You should return to the hospital and/or seek immediate medical attention if you develop chest pain, irregular heart beats, severe shortness of breath, or sudden loss of consciousness.      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 In other words, one specific nerve and muscle group is unable to pull your right eye outwards, causing double vision. You were evaluated by our opthalmologists (eye specialists) who diagnosed you with hypertensive and diabetic retinopathy. Based on their assessment, your vision/eye symptoms are most likely due to a combination of your history of diabetes and the extremely high blood pressures you had when you arrived at [facility]. Please follow up with an ophthalmologist/retinal specialist within a few weeks so that further evaluation of your eyes can be completed. Please return to the hospital immediately if you notice severe eye pain, sudden complete loss of vision in any eye, or a sensation of a curtain coming down over your vision.    Diagnosis: Lacunar stroke  Assessment and Plan of Treatment: Because of the symptoms you were having, we obtained an MRI of your brain to evaluate for possible neurological causes. We did not find any evidence of an acute concern (i.e. a stroke or brain bleed that happened within the past few days), however we did find multiple small, chronic infarctions (i.e. small strokes that happened a while ago). These may be contributing to your recent difficulty with speech and memory, though they don't quite explain your changes in vision. Please follow up with a neurologist (brain specialist) within a few weeks for further evaluation. Please return to the hospital immediately if you or someone else notices signs of an acute stroke, such as sudden loss of consciousness, facial drooping, limb weakness, or slurred speech.    Diagnosis: Severe hypertension  Assessment and Plan of Treatment: At [facility], your blood pressure was measured to be 260/133 an alarmingly high number. We do not know why your blood pressures were so high at the time, but they have been well controlled since you were admitted to the hospital. You were evaluated by our nephrologists, the kidney doctors, who are planning to perform a biopsy of your kidneys for further workup. You should follow-up with them after the biopsy for recommendations on next steps. We recommend that you routinely check your blood pressure at home, at least once every few days, to ensure that it does not worsen. If you notice consistent/repeated measurements that are above 140, please schedule an appointment with your primary care doctor so that adjustments to your medications can be made. If you notice consistent/repeated measurements that are above 180, please seek medical attention immediately as a blood pressure this high may cause damage to your organs, such as your eyes.    Diagnosis: At risk for headache  Assessment and Plan of Treatment: Your headaches are suspicious for cluster headaches, which can recur seasonally and in multiple days at a time causing pain around one eye. We recommend following up with your primary care doctor for monitoring of these symptoms, as medications can be considered for prevention of these headaches if they occur too frequently. You also noticed symptoms during the night that were suggestive of sleep apnea, which would require further workup outpatient. For this we recommend, following up with a sleep medicine doctor, which you can schedule at your convenience.    Diagnosis: Chronic kidney disease, unspecified CKD stage  Assessment and Plan of Treatment: Your have nephrotic range proteinuria, elevated kidney numbers with high blood pressure. You're are pending renal biopsy on Monday. Please follow up with nephrology and take your BP medications. Please take your lisinopril medication and torsemide 20mg daily  Please get a repeat BMP, basic metabolic panel in one week     PRINCIPAL DISCHARGE DIAGNOSIS  Diagnosis: CVA (cerebrovascular accident)  Assessment and Plan of Treatment: Take a daily aspirin and Lipitor at night to decrease your risk of recurrent stroke. Quitting smoking can also help lower your risk. Continue to manage your blood glucose and get follow up care with an endocrinologist. Please follow up with the neurologist in 2 weeks. You can follow with Dr. Glynn for ongoing rehab needs.      SECONDARY DISCHARGE DIAGNOSES  Diagnosis: Diplopia  Assessment and Plan of Treatment: Continue alternating eyes with the patch and follow up with Dr. Arvizu the retina specialist and the neuro-ophthalmologist whose contact information is provided in your discharge paperwork.    Diagnosis: Non-ST elevation MI (NSTEMI)  Assessment and Plan of Treatment: You had a heart catheterization on 10/13 that found two vessels that were not completely blocked and did not require further treatment. You have been prescribed medications to help with your heart function and prevent worsening of coronary arter disease. Please follow up with your primary care physician and cardiologist.    Diagnosis: Type II diabetes mellitus  Assessment and Plan of Treatment: You were prescribed long-acting and shorter acting insulin. Please take Tresiba at night and use the other insulin pen to give yourself sliding scale coverage before meals based on your blood glucose reading. It is important that you follow up with your PCP within 1 week and get a referral for endocrinology to help you with better managing your blood glucose levels, as you have already developed problems with your vision and renal function as a result of poor diabetic management.    Diagnosis: Current smoker  Assessment and Plan of Treatment: You are at an increased risk for recurrent stroke and coronary artery disease due to smoking. Smoking cessation is important to your overall health. You declined medication during your rehab stay to help you with this. Please follow up with your PCP if you desire medication to help with quitting tobacco use.    Diagnosis: Severe hypertension  Assessment and Plan of Treatment: Your blood pressure medications were changed while in rehab. Take daily measurements of your blood pressure and record them to show them to your PCP. Continue to take amlodipine once daily, hydralazine three times daily and carvedilol twice daily. Follow up with the nephrology clinic in 2 weeks. If you notice consistent/repeated measurements that are above 180, please seek medical attention immediately as a blood pressure this high may cause damage to your organs, such as your eyes.    Diagnosis: Cluster headache  Assessment and Plan of Treatment: Your headaches are suspicious for cluster headaches, which can recur seasonally and in multiple days at a time causing pain around one eye. We recommend following up with your primary care doctor for monitoring of these symptoms, as medications can be considered for prevention of these headaches if they occur too frequently. You also noticed symptoms during the night that were suggestive of sleep apnea, which would require further workup outpatient. For this we recommend, following up with a sleep medicine doctor, which you can schedule at your convenience. Continue to take amitriptylene at bedtime to help with headache pain.    Diagnosis: Stage 4 chronic kidney disease  Assessment and Plan of Treatment: You had a kidney biopsy which shows glomerulonephritis and sclerosis, and you had nephrotic range proteinuria, elevated kidney numbers (BUN and Creatinine) with high blood pressure. Please follow up with nephrology and take your BP medications. Please get a repeat BMP, basic metabolic panel in one week.     PRINCIPAL DISCHARGE DIAGNOSIS  Diagnosis: CVA (cerebrovascular accident)  Assessment and Plan of Treatment: Take a daily aspirin and Lipitor at night to decrease your risk of recurrent stroke. Quitting smoking can also help lower your risk. Continue to manage your blood glucose and get follow up care with an endocrinologist. Please follow up with the neurologist in 2 weeks. You can follow with Dr. Glynn for ongoing rehab needs.      SECONDARY DISCHARGE DIAGNOSES  Diagnosis: Diplopia  Assessment and Plan of Treatment: Continue alternating eyes with the patch and follow up with Dr. Arvizu the retina specialist and the neuro-ophthalmologist whose contact information is provided in your discharge paperwork.    Diagnosis: Non-ST elevation MI (NSTEMI)  Assessment and Plan of Treatment: You had a heart catheterization on 10/13 that found two vessels that were not completely blocked and did not require further treatment. You have been prescribed medications to help with your heart function and prevent worsening of coronary arter disease. Please follow up with your primary care physician and cardiologist.    Diagnosis: Type II diabetes mellitus  Assessment and Plan of Treatment: You were prescribed long-acting insulin, Tresiba,. Please take Tresiba at night and check your blood glucose in the morning and in the evening. Follow up with your PCP within 1 week and get a referral for endocrinology to help you with better managing your blood glucose levels, as you have already developed problems with your vision and renal function as a result of poor diabetic management.    Diagnosis: Current smoker  Assessment and Plan of Treatment: You are at an increased risk for recurrent stroke and coronary artery disease due to smoking. Smoking cessation is important to your overall health. You declined medication during your rehab stay to help you with this. Please follow up with your PCP if you desire medication to help with quitting tobacco use.    Diagnosis: Severe hypertension  Assessment and Plan of Treatment: Your blood pressure medications were changed while in rehab. Take daily measurements of your blood pressure and record them to show them to your PCP. Continue to take amlodipine once daily, hydralazine three times daily and carvedilol twice daily. Follow up with the nephrology clinic in 2 weeks. If you notice consistent/repeated measurements that are above 180, please seek medical attention immediately as a blood pressure this high may cause damage to your organs, such as your eyes.    Diagnosis: Cluster headache  Assessment and Plan of Treatment: Your headaches are suspicious for cluster headaches, which can recur seasonally and in multiple days at a time causing pain around one eye. We recommend following up with your primary care doctor for monitoring of these symptoms, as medications can be considered for prevention of these headaches if they occur too frequently. You also noticed symptoms during the night that were suggestive of sleep apnea, which would require further workup outpatient. For this we recommend, following up with a sleep medicine doctor, which you can schedule at your convenience. Continue to take amitriptylene at bedtime to help with headache pain.    Diagnosis: Stage 4 chronic kidney disease  Assessment and Plan of Treatment: You had a kidney biopsy which shows glomerulonephritis and sclerosis, and you had nephrotic range proteinuria, elevated kidney numbers (BUN and Creatinine) with high blood pressure. Please follow up with nephrology and take your BP medications. Please get a repeat BMP, basic metabolic panel in one week.     PRINCIPAL DISCHARGE DIAGNOSIS  Diagnosis: CVA (cerebrovascular accident)  Assessment and Plan of Treatment: Take a daily aspirin and Lipitor at night to decrease your risk of recurrent stroke. Quitting smoking can also help lower your risk. Continue to manage your blood glucose and get follow up care with an endocrinologist. Please follow up with the neurologist in 2 weeks. You can follow with Dr. Glynn for ongoing rehab needs.      SECONDARY DISCHARGE DIAGNOSES  Diagnosis: Diplopia  Assessment and Plan of Treatment: Continue alternating eyes with the patch and follow up with Dr. Arvizu the retina specialist and the neuro-ophthalmologist whose contact information is provided in your discharge paperwork.    Diagnosis: Non-ST elevation MI (NSTEMI)  Assessment and Plan of Treatment: You had a heart catheterization on 10/13 that found two vessels that were not completely blocked and did not require further treatment. You have been prescribed medications to help with your heart function and prevent worsening of coronary arter disease. Please follow up with your primary care physician and cardiologist.    Diagnosis: Type II diabetes mellitus  Assessment and Plan of Treatment: You were prescribed long-acting insulin, Tresiba,. Please take Tresiba at night and check your blood glucose in the morning and in the evening. Admelog as sliding scale,  or more use 2 units, 200 or more use 3 units, 250 or more use 4 units. Follow up with your PCP within 1 week. Follow up with your endocrinologist as prior, appintment on 11/11.    Diagnosis: Current smoker  Assessment and Plan of Treatment: You are at an increased risk for recurrent stroke and coronary artery disease due to smoking. Smoking cessation is important to your overall health. You declined medication during your rehab stay to help you with this. Please follow up with your PCP if you desire medication to help with quitting tobacco use.    Diagnosis: Severe hypertension  Assessment and Plan of Treatment: Your blood pressure medications were changed while in rehab. Take daily measurements of your blood pressure and record them to show them to your PCP. Continue to take amlodipine once daily, hydralazine three times daily and carvedilol twice daily. Follow up with the nephrology clinic in 2 weeks. If you notice consistent/repeated measurements that are above 180, please seek medical attention immediately as a blood pressure this high may cause damage to your organs, such as your eyes.    Diagnosis: Cluster headache  Assessment and Plan of Treatment: Your headaches are suspicious for cluster headaches, which can recur seasonally and in multiple days at a time causing pain around one eye. We recommend following up with your primary care doctor for monitoring of these symptoms, as medications can be considered for prevention of these headaches if they occur too frequently. You also noticed symptoms during the night that were suggestive of sleep apnea, which would require further workup outpatient. For this we recommend, following up with a sleep medicine doctor, which you can schedule at your convenience. Continue to take amitriptylene at bedtime to help with headache pain.    Diagnosis: Stage 4 chronic kidney disease  Assessment and Plan of Treatment: You had a kidney biopsy which shows glomerulonephritis and sclerosis, and you had nephrotic range proteinuria, elevated kidney numbers (BUN and Creatinine) with high blood pressure. Please follow up with nephrology and take your BP medications. Please get a repeat BMP, basic metabolic panel in one week.

## 2022-11-01 NOTE — PROGRESS NOTE ADULT - SUBJECTIVE AND OBJECTIVE BOX
No distress    Vital Signs Last 24 Hrs  T(C): 36.8 (11-01-22 @ 07:47), Max: 36.8 (10-31-22 @ 22:13)  T(F): 98.3 (11-01-22 @ 07:47), Max: 98.3 (10-31-22 @ 22:13)  HR: 76 (11-01-22 @ 17:18) (68 - 96)  BP: 155/83 (11-01-22 @ 17:18) (123/68 - 156/81)  RR: 16 (11-01-22 @ 07:47) (16 - 16)  SpO2: 96% (11-01-22 @ 08:05) (96% - 100%)    s1s2  b/l air entry  soft, ND  no edema                                            10.5   6.99  )-----------( 210      ( 31 Oct 2022 07:32 )             31.6     01 Nov 2022 06:00    142    |  108    |  83     ----------------------------<  268    4.9     |  26     |  3.46     Ca    8.8        01 Nov 2022 06:00    TPro  7.5    /  Alb  3.5    /  TBili  0.2    /  DBili  x      /  AST  27     /  ALT  26     /  AlkPhos  94     31 Oct 2022 07:32    LIVER FUNCTIONS - ( 31 Oct 2022 07:32 )  Alb: 3.5 g/dL / Pro: 7.5 g/dL / ALK PHOS: 94 U/L / ALT: 26 U/L / AST: 27 U/L / GGT: x           acetaminophen     Tablet .. 650 milliGRAM(s) Oral every 6 hours PRN  ALBUTerol    90 MICROgram(s) HFA Inhaler 2 Puff(s) Inhalation every 6 hours PRN  amitriptyline 10 milliGRAM(s) Oral at bedtime  amLODIPine   Tablet 10 milliGRAM(s) Oral daily  AQUAPHOR (petrolatum Ointment) 1 Application(s) Topical two times a day  aspirin enteric coated 81 milliGRAM(s) Oral daily  atorvastatin 40 milliGRAM(s) Oral at bedtime  Biotene Dry Mouth Oral Rinse 15 milliLiter(s) Swish and Spit three times a day PRN  budesonide  80 MICROgram(s)/formoterol 4.5 MICROgram(s) Inhaler 2 Puff(s) Inhalation two times a day  carvedilol 25 milliGRAM(s) Oral every 12 hours  cyanocobalamin 1000 MICROGram(s) Oral daily  dextrose 5%. 1000 milliLiter(s) IV Continuous <Continuous>  dextrose 5%. 1000 milliLiter(s) IV Continuous <Continuous>  dextrose 50% Injectable 12.5 Gram(s) IV Push once  dextrose 50% Injectable 25 Gram(s) IV Push once  dextrose 50% Injectable 25 Gram(s) IV Push once  dextrose Oral Gel 15 Gram(s) Oral once PRN  glucagon  Injectable 1 milliGRAM(s) IntraMuscular once  heparin   Injectable 5000 Unit(s) SubCutaneous every 8 hours  hydrALAZINE 25 milliGRAM(s) Oral three times a day  insulin glargine Injectable (LANTUS) 15 Unit(s) SubCutaneous at bedtime  insulin lispro (ADMELOG) corrective regimen sliding scale   SubCutaneous three times a day before meals  insulin lispro (ADMELOG) corrective regimen sliding scale   SubCutaneous at bedtime  lactulose Syrup 10 Gram(s) Oral daily PRN  melatonin 3 milliGRAM(s) Oral at bedtime PRN  multivitamin 1 Tablet(s) Oral daily  pantoprazole    Tablet 40 milliGRAM(s) Oral before breakfast  senna 2 Tablet(s) Oral at bedtime  simethicone 80 milliGRAM(s) Chew daily PRN  sodium chloride 0.65% Nasal 1 Spray(s) Both Nostrils every 1 hour PRN    A/P:    MMP, s/p complicated course at OSH as per HPI  Progressive renal disease (Cr 1.3 - 5/26/21, Cr 1.7 - 3/22/22, Cr 2.2 - 10/6/22)  S/p CT w/IV dye 10/4 and 10/6  Contrast BILLY/CKD 3 w/peak Cr 3.64 - 10/27/22  Cr appears to reach plateau, possibly new baseline  Kidney biopsy 10/25/22 c/w advanced DM nephropathy w/advanced chronicity  Avoid nephrotoxins  F/u CBC, BMP  Rehab    [phone]

## 2022-11-01 NOTE — PROGRESS NOTE ADULT - ASSESSMENT
This is a 46 YO male with PMHx of CKD IV, vertigo, diverticulitis s/p LAR, cigarette use, ETOH abuse now sober x5 years, IDDM, HTN, AMBER, who presented to [facility] on 10/2 c/o dizziness, b/l diplopia, headache and chest tightness found to have /133 and elevated troponin concerning for NSTEMI. CTH negative for acute pathology. MRI Brain showed L parasagittal infarcts. Per, neurology and opthalmology CN VI palsy. Patient  was transferred to [facility] for a possible LHC. LHC showed nonobstructive CAD without elevated filling pressures. Hospital course significant for BILLY, right frontal headache and popping sensation, determined to have components hypertensive and diabetic retinopathy for which outpatient followup and possible trial of Fresnel prisms was recommended. Patient now with gait Instability, ADL impairments and Functional impairments.    # Functional Deficits s/p CVA  - MRI with L parasagittal infarcts, multiple chronic lacunar infarcts  - Continue statin. ASA on hold for renal bx   -  Comprehensive Rehab Program: PT/OT/ST, 3hours daily and 5 days weekly  - PT: Focused on improving strength, endurance, coordination, balance, functional mobility, and transfers  - OT: Focused on improving strength, fine motor skills, coordination, posture and ADLs.    - ST: to diagnose and treat deficits in swallowing, cognition and communication.     #NTEMI #CAD  - LHC showed nonobstructive CAD without elevated filling pressures  - Continue statin, fenofibrate    #BILLY on CKD st. IV likely due to diabetic nephropathy  # Nephrotic range proteinuria  - baseline Cr 2.4  - BUN/Cr 63/3.31 10/26> 73/3.6 10/27  Cr 3.4 today  - IR biopsy completed 10/25-glomerulonepfitis/ scleosisCW DM/HTN- renal to follow  - per renal at [facility], now on torsemide and low dose ace-i  -Monitor BUN/Cr  -Renally dose meds  avoid nephrotoxins      #HTN, presented with hypertensive emergency at [facility]  - Renal initiated secondary work up at OSH: dania not elevated, f/u plasma renin activity, metanephrines  - Lisinopril 2.5mg daily  - Carvedilol 25mg BID  - BP s high- meds resumed by nephrology- better today    #HLD  - Lipitor 40mg daily  - Fenofibrate 145mg daily    #Diplopia  - OP f/u with neuro-opthalmology  - consistent with lateral rectus palsy  - outpatient f/u with retina specialist Dr. Jose Angel Isabel for OCT nerve/RNFL for evaluation for diabetic and hypertensive retinopathy; if diplopia/palsy persists should continue outpatient followup for Fresnel prisms  Alternate patch eyes during the day- FU Ophtho /retina Dr Isabel    #Cluster Headache  - Amitriptyline 10mg QHS    #AMBER  - c/w inhalers: Albuterol, Symbicort  - c/w nasal spray    #DM II  - ISS and FS  - Admelog and Lantus  - A1c 9.9 on 10/5  Diabetic nursing for training- recs appreciated  - see discharge recommendations in 10/26 diabetic educator note    #Pain management  - Tylenol PRN    #DVT ppx  - Heparin    #GI ppx  - Protonix 40mg    #Bowel Regimen  - Senna  moved bowels today    #Bladder management  - BS on admission, and q 8 hours (SC if > 400)  - Monitor UO  PVRs low    #FEN   - Diet: DASH    #Onychomycosis  -Consider podiatry consult    #Skin:  - Skin on admission: Aquaphor to dry skin BID- latrice both feet  - Pressure injury/Skin: Turn Q2hrs while in bed, OOB to Chair, PT/OT     #Dysphagia    - SLP: evaluation and treatment    #Mood/Cognition:  - Neuropsychology consult PRN    #Sleep:   - Maintain quiet hours and low stim environment.  - Melatonin PRN to maximize participation in therapy during the day.     #Precaution  - Fall, Aspiration, cardiac

## 2022-11-01 NOTE — PROGRESS NOTE ADULT - SUBJECTIVE AND OBJECTIVE BOX
chief complaint- headaches, double vision, poor balance    This is a 46 YO male with PMHx of CKD IV, vertigo, diverticulitis s/p LAR, cigarette use, ETOH abuse now sober x5 years, IDDM, HTN, AMBER, who presented to [facility] on 10/2 c/o dizziness, b/l diplopia, headache and chest tightness found to have /133 and elevated troponins concerning for NSTEMI. EKG w/ ST-T abnormalities without acute ST segment changes. At OSH BPs stabilized with IV hydralazine, CT head without acute pathology, MRI brain showed chronic L parasagittal infarcts. 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 Patient was recommended outpatient followup for neurological/ophthalmological concerns (specifically for OCT nerve/RNFL, Fresnel prisms) and was transferred to [facility] for a possible LHC.    On admission to [facility] coronary CT showed moderate stenosis of the proximal LAD and RCA. Cath initially deferred as patient was pending nephrology clearance for BILLY. TTE largely wnl, bubble study negative. MRI brain with contrast showed chronic multifocal lacunar infarcts and chronic microvascular ischemic changes, corroborating MRI read from [facility]. Patient was evaluated by ophthalmology team for new onset R frontal headache and popping sensation, determined to have components hypertensive and diabetic retinopathy for which outpatient followup and possible trial of Fresnel prisms was recommended. Cluster headaches suspected given pattern of headache (R frontal, several days at a time, lacrimation). Patient was also evaluated by PT/OT who recommended CANDY. Speech pathology eval was notable for cognitive linguistic deficits with concern for early onset dementia given a family hx (mother diagnosed @46yo). After improvement of BILLY, LHC was completed showing nonobstructive CAD without elevated filling pressures. Lateral rectus palsy was noted to improve slightly towards end of admission with ability to abduct to ~10-15 degrees. Low-dose lisinopril was initiated for nephrotic-range proteinuria.  Patient was monitored for resolution of BILLY and is now hemodynamically stable   Patient was evaluated by PM&R and therapy for functional deficits, gait/ADL impairments and acute rehabilitation was recommended. Patient was medically optimized for discharge to [facility] IRU on 10/20/22.    Today's Subjective:  Patient seen and assessed  in PT    ROS:  no further dizziness  BPS OK today  moved bowels with lactulose 11/1- claims it was small amount  encouraged to continue miralax and senna  no nausea, no vomiting  no SOB, no chest pain  alternating eye patching- balance better        PHYSICAL EXAM  MEDICATIONS  (STANDING):  amitriptyline 10 milliGRAM(s) Oral at bedtime  amLODIPine   Tablet 10 milliGRAM(s) Oral daily  AQUAPHOR (petrolatum Ointment) 1 Application(s) Topical two times a day  aspirin enteric coated 81 milliGRAM(s) Oral daily  atorvastatin 40 milliGRAM(s) Oral at bedtime  budesonide  80 MICROgram(s)/formoterol 4.5 MICROgram(s) Inhaler 2 Puff(s) Inhalation two times a day  carvedilol 25 milliGRAM(s) Oral every 12 hours  cyanocobalamin 1000 MICROGram(s) Oral daily  dextrose 5%. 1000 milliLiter(s) (50 mL/Hr) IV Continuous <Continuous>  dextrose 5%. 1000 milliLiter(s) (100 mL/Hr) IV Continuous <Continuous>  dextrose 50% Injectable 25 Gram(s) IV Push once  dextrose 50% Injectable 12.5 Gram(s) IV Push once  dextrose 50% Injectable 25 Gram(s) IV Push once  glucagon  Injectable 1 milliGRAM(s) IntraMuscular once  heparin   Injectable 5000 Unit(s) SubCutaneous every 8 hours  hydrALAZINE 25 milliGRAM(s) Oral three times a day  insulin glargine Injectable (LANTUS) 15 Unit(s) SubCutaneous at bedtime  insulin lispro (ADMELOG) corrective regimen sliding scale   SubCutaneous three times a day before meals  insulin lispro (ADMELOG) corrective regimen sliding scale   SubCutaneous at bedtime  multivitamin 1 Tablet(s) Oral daily  pantoprazole    Tablet 40 milliGRAM(s) Oral before breakfast  senna 2 Tablet(s) Oral at bedtime    MEDICATIONS  (PRN):  acetaminophen     Tablet .. 650 milliGRAM(s) Oral every 6 hours PRN Temp greater or equal to 38C (100.4F), Mild Pain (1 - 3)  ALBUTerol    90 MICROgram(s) HFA Inhaler 2 Puff(s) Inhalation every 6 hours PRN Shortness of Breath and/or Wheezing  Biotene Dry Mouth Oral Rinse 15 milliLiter(s) Swish and Spit three times a day PRN Mouth Care  dextrose Oral Gel 15 Gram(s) Oral once PRN Blood Glucose LESS THAN 70 milliGRAM(s)/deciliter  lactulose Syrup 10 Gram(s) Oral daily PRN constipation  melatonin 3 milliGRAM(s) Oral at bedtime PRN Sleep  simethicone 80 milliGRAM(s) Chew daily PRN Gas  sodium chloride 0.65% Nasal 1 Spray(s) Both Nostrils every 1 hour PRN Nasal Congestion                            10.5   6.99  )-----------( 210      ( 31 Oct 2022 07:32 )             31.6     11-01    142  |  108  |  83<H>  ----------------------------<  268<H>  4.9   |  26  |  3.46<H>    Ca    8.8      01 Nov 2022 06:00    TPro  7.5  /  Alb  3.5  /  TBili  0.2  /  DBili  x   /  AST  27  /  ALT  26  /  AlkPhos  94  10-31        CAPILLARY BLOOD GLUCOSE      POCT Blood Glucose.: 171 mg/dL (01 Nov 2022 11:55)  POCT Blood Glucose.: 246 mg/dL (01 Nov 2022 07:41)  POCT Blood Glucose.: 212 mg/dL (31 Oct 2022 22:08)  POCT Blood Glucose.: 220 mg/dL (31 Oct 2022 17:30)      Vital Signs Last 24 Hrs  T(C): 36.8 (01 Nov 2022 07:47), Max: 36.8 (31 Oct 2022 22:13)  T(F): 98.3 (01 Nov 2022 07:47), Max: 98.3 (31 Oct 2022 22:13)  HR: 76 (01 Nov 2022 14:19) (68 - 96)  BP: 156/81 (01 Nov 2022 14:19) (123/68 - 156/81)  BP(mean): --  RR: 16 (01 Nov 2022 07:47) (16 - 16)  SpO2: 96% (01 Nov 2022 08:05) (96% - 100%)    Parameters below as of 01 Nov 2022 08:05  Patient On (Oxygen Delivery Method): room air      Constitutional - NAD, Comfortable eye patch on  HEENT - R 6th palsy persists  Chest - good chest expansion, good respiratory effort, CTAB   Cardio - warm and well perfused, RRR, no murmur  Abdomen -  Soft, NTND  Extremities 2 + edema LES  Neurologic Exam:                    Cognitive -             Orientation: Awake, Alert, AAOx3        Speech - Fluent, Comprehensible, No dysarthria, No aphasia      Cranial Nerves - No facial asymmetry, Tongue midline, Shoulder shrug intact +R lateral gaze palsy     Motor -                      LEFT    UE - ShAB 5/5, EF 5/5, EE 5/5, WE 5/5,  WNL                    RIGHT UE - ShAB 5/5, EF 5/5, EE 5/5, WE 5/5,  WNL                    LEFT    LE - HF 4+/5, KE 5/5, DF 5/5, PF 5/5                    RIGHT LE - HF 4+/5, KE 5/5, DF 5/5, PF 5/5        Sensory - Diminished to LT glove stocking distribution     Reflexes - DTR intact  Psychiatric - Mood stable, Affect WNL        IDT 11/1 - lead by Dr. Ledbetter  lives alone. qualified for 11 hrs of HHA weekly.  Functional progress: SV with ADLs + transfers.  amb 150' RW, stairs 1 HR step 2 pattern.  Mild receptive language, mild cog impairment, overall nees SV.  Improved insight and knows he should have SV upon discharge  Barrier: vertigo d/t orthostasis - better now  Need: commode, shower chair and wants rollator for community ambulation  TDD: 11/3 home.

## 2022-11-02 LAB
GLUCOSE BLDC GLUCOMTR-MCNC: 157 MG/DL — HIGH (ref 70–99)
GLUCOSE BLDC GLUCOMTR-MCNC: 163 MG/DL — HIGH (ref 70–99)
GLUCOSE BLDC GLUCOMTR-MCNC: 171 MG/DL — HIGH (ref 70–99)
GLUCOSE BLDC GLUCOMTR-MCNC: 205 MG/DL — HIGH (ref 70–99)

## 2022-11-02 PROCEDURE — 99232 SBSQ HOSP IP/OBS MODERATE 35: CPT

## 2022-11-02 PROCEDURE — 99232 SBSQ HOSP IP/OBS MODERATE 35: CPT | Mod: GC

## 2022-11-02 RX ORDER — HYDRALAZINE HCL 50 MG
1 TABLET ORAL
Qty: 90 | Refills: 0
Start: 2022-11-02 | End: 2022-12-01

## 2022-11-02 RX ORDER — INSULIN GLARGINE 100 [IU]/ML
18 INJECTION, SOLUTION SUBCUTANEOUS AT BEDTIME
Refills: 0 | Status: DISCONTINUED | OUTPATIENT
Start: 2022-11-02 | End: 2022-11-03

## 2022-11-02 RX ORDER — CARVEDILOL PHOSPHATE 80 MG/1
1 CAPSULE, EXTENDED RELEASE ORAL
Qty: 60 | Refills: 0
Start: 2022-11-02 | End: 2022-12-01

## 2022-11-02 RX ORDER — CARVEDILOL PHOSPHATE 80 MG/1
1 CAPSULE, EXTENDED RELEASE ORAL
Qty: 0 | Refills: 0 | DISCHARGE
Start: 2022-11-02

## 2022-11-02 RX ORDER — PREGABALIN 225 MG/1
1 CAPSULE ORAL
Qty: 0 | Refills: 0 | DISCHARGE
Start: 2022-11-02

## 2022-11-02 RX ORDER — FENOFIBRATE,MICRONIZED 130 MG
1 CAPSULE ORAL
Qty: 30 | Refills: 0
Start: 2022-11-02 | End: 2022-12-01

## 2022-11-02 RX ORDER — AMITRIPTYLINE HCL 25 MG
1 TABLET ORAL
Qty: 0 | Refills: 0 | DISCHARGE
Start: 2022-11-02

## 2022-11-02 RX ORDER — ALBUTEROL 90 UG/1
2 AEROSOL, METERED ORAL
Qty: 0 | Refills: 0 | DISCHARGE

## 2022-11-02 RX ORDER — ATORVASTATIN CALCIUM 80 MG/1
1 TABLET, FILM COATED ORAL
Qty: 0 | Refills: 0 | DISCHARGE
Start: 2022-11-02

## 2022-11-02 RX ORDER — HYDRALAZINE HCL 50 MG
1 TABLET ORAL
Qty: 0 | Refills: 0 | DISCHARGE
Start: 2022-11-02

## 2022-11-02 RX ORDER — ISOPROPYL ALCOHOL, BENZOCAINE .7; .06 ML/ML; ML/ML
1 SWAB TOPICAL
Qty: 100 | Refills: 1
Start: 2022-11-02 | End: 2022-12-21

## 2022-11-02 RX ORDER — AMITRIPTYLINE HCL 25 MG
1 TABLET ORAL
Qty: 30 | Refills: 0
Start: 2022-11-02 | End: 2022-12-01

## 2022-11-02 RX ORDER — SENNA PLUS 8.6 MG/1
2 TABLET ORAL
Qty: 0 | Refills: 0 | DISCHARGE
Start: 2022-11-02

## 2022-11-02 RX ORDER — ACETAMINOPHEN 500 MG
2 TABLET ORAL
Qty: 0 | Refills: 0 | DISCHARGE
Start: 2022-11-02

## 2022-11-02 RX ORDER — ASPIRIN/CALCIUM CARB/MAGNESIUM 324 MG
1 TABLET ORAL
Qty: 0 | Refills: 0 | DISCHARGE
Start: 2022-11-02

## 2022-11-02 RX ORDER — AMITRIPTYLINE HCL 25 MG
1 TABLET ORAL
Qty: 0 | Refills: 0 | DISCHARGE

## 2022-11-02 RX ORDER — INSULIN DEGLUDEC 100 U/ML
1 INJECTION, SOLUTION SUBCUTANEOUS
Qty: 5 | Refills: 0
Start: 2022-11-02 | End: 2022-12-01

## 2022-11-02 RX ORDER — BUDESONIDE AND FORMOTEROL FUMARATE DIHYDRATE 160; 4.5 UG/1; UG/1
2 AEROSOL RESPIRATORY (INHALATION)
Qty: 1 | Refills: 0
Start: 2022-11-02 | End: 2022-12-01

## 2022-11-02 RX ORDER — FENOFIBRATE,MICRONIZED 130 MG
1 CAPSULE ORAL
Qty: 0 | Refills: 0 | DISCHARGE

## 2022-11-02 RX ORDER — ASPIRIN/CALCIUM CARB/MAGNESIUM 324 MG
1 TABLET ORAL
Qty: 30 | Refills: 0
Start: 2022-11-02 | End: 2022-12-01

## 2022-11-02 RX ORDER — AMLODIPINE BESYLATE 2.5 MG/1
1 TABLET ORAL
Qty: 0 | Refills: 0 | DISCHARGE

## 2022-11-02 RX ORDER — BUDESONIDE AND FORMOTEROL FUMARATE DIHYDRATE 160; 4.5 UG/1; UG/1
2 AEROSOL RESPIRATORY (INHALATION)
Qty: 0 | Refills: 0 | DISCHARGE

## 2022-11-02 RX ORDER — PANTOPRAZOLE SODIUM 20 MG/1
1 TABLET, DELAYED RELEASE ORAL
Qty: 0 | Refills: 0 | DISCHARGE
Start: 2022-11-02

## 2022-11-02 RX ORDER — ALBUTEROL 90 UG/1
2 AEROSOL, METERED ORAL
Qty: 1 | Refills: 0
Start: 2022-11-02 | End: 2022-12-01

## 2022-11-02 RX ORDER — INSULIN DEGLUDEC 100 U/ML
18 INJECTION, SOLUTION SUBCUTANEOUS
Qty: 6 | Refills: 0
Start: 2022-11-02 | End: 2022-12-01

## 2022-11-02 RX ORDER — ATORVASTATIN CALCIUM 80 MG/1
1 TABLET, FILM COATED ORAL
Qty: 30 | Refills: 0
Start: 2022-11-02 | End: 2022-12-01

## 2022-11-02 RX ORDER — AMLODIPINE BESYLATE 2.5 MG/1
1 TABLET ORAL
Qty: 0 | Refills: 0 | DISCHARGE
Start: 2022-11-02

## 2022-11-02 RX ORDER — PANTOPRAZOLE SODIUM 20 MG/1
1 TABLET, DELAYED RELEASE ORAL
Qty: 30 | Refills: 0
Start: 2022-11-02 | End: 2022-12-01

## 2022-11-02 RX ORDER — AMLODIPINE BESYLATE 2.5 MG/1
1 TABLET ORAL
Qty: 30 | Refills: 0
Start: 2022-11-02 | End: 2022-12-01

## 2022-11-02 RX ADMIN — Medication 10 MILLIGRAM(S): at 21:17

## 2022-11-02 RX ADMIN — CARVEDILOL PHOSPHATE 25 MILLIGRAM(S): 80 CAPSULE, EXTENDED RELEASE ORAL at 17:35

## 2022-11-02 RX ADMIN — BUDESONIDE AND FORMOTEROL FUMARATE DIHYDRATE 2 PUFF(S): 160; 4.5 AEROSOL RESPIRATORY (INHALATION) at 21:32

## 2022-11-02 RX ADMIN — Medication 2: at 08:05

## 2022-11-02 RX ADMIN — Medication 2: at 12:15

## 2022-11-02 RX ADMIN — CARVEDILOL PHOSPHATE 25 MILLIGRAM(S): 80 CAPSULE, EXTENDED RELEASE ORAL at 06:19

## 2022-11-02 RX ADMIN — AMLODIPINE BESYLATE 10 MILLIGRAM(S): 2.5 TABLET ORAL at 06:18

## 2022-11-02 RX ADMIN — PREGABALIN 1000 MICROGRAM(S): 225 CAPSULE ORAL at 11:36

## 2022-11-02 RX ADMIN — Medication 25 MILLIGRAM(S): at 06:19

## 2022-11-02 RX ADMIN — HEPARIN SODIUM 5000 UNIT(S): 5000 INJECTION INTRAVENOUS; SUBCUTANEOUS at 06:19

## 2022-11-02 RX ADMIN — PANTOPRAZOLE SODIUM 40 MILLIGRAM(S): 20 TABLET, DELAYED RELEASE ORAL at 06:19

## 2022-11-02 RX ADMIN — Medication 1 TABLET(S): at 11:36

## 2022-11-02 RX ADMIN — Medication 2: at 17:33

## 2022-11-02 RX ADMIN — Medication 81 MILLIGRAM(S): at 11:36

## 2022-11-02 RX ADMIN — ATORVASTATIN CALCIUM 40 MILLIGRAM(S): 80 TABLET, FILM COATED ORAL at 21:17

## 2022-11-02 RX ADMIN — Medication 25 MILLIGRAM(S): at 21:17

## 2022-11-02 RX ADMIN — HEPARIN SODIUM 5000 UNIT(S): 5000 INJECTION INTRAVENOUS; SUBCUTANEOUS at 15:03

## 2022-11-02 RX ADMIN — BUDESONIDE AND FORMOTEROL FUMARATE DIHYDRATE 2 PUFF(S): 160; 4.5 AEROSOL RESPIRATORY (INHALATION) at 08:20

## 2022-11-02 RX ADMIN — Medication 1 APPLICATION(S): at 06:18

## 2022-11-02 RX ADMIN — Medication 25 MILLIGRAM(S): at 15:03

## 2022-11-02 RX ADMIN — INSULIN GLARGINE 18 UNIT(S): 100 INJECTION, SOLUTION SUBCUTANEOUS at 21:17

## 2022-11-02 RX ADMIN — HEPARIN SODIUM 5000 UNIT(S): 5000 INJECTION INTRAVENOUS; SUBCUTANEOUS at 21:17

## 2022-11-02 RX ADMIN — Medication 1 APPLICATION(S): at 17:35

## 2022-11-02 NOTE — PROGRESS NOTE ADULT - SUBJECTIVE AND OBJECTIVE BOX
chief complaint- headaches, double vision, poor balance    This is a 48 YO male with PMHx of CKD IV, vertigo, diverticulitis s/p LAR, cigarette use, ETOH abuse now sober x5 years, IDDM, HTN, AMBER, who presented to [facility] on 10/2 c/o dizziness, b/l diplopia, headache and chest tightness found to have /133 and elevated troponins concerning for NSTEMI. EKG w/ ST-T abnormalities without acute ST segment changes. At OSH BPs stabilized with IV hydralazine, CT head without acute pathology, MRI brain showed chronic L parasagittal infarcts. 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 Patient was recommended outpatient followup for neurological/ophthalmological concerns (specifically for OCT nerve/RNFL, Fresnel prisms) and was transferred to [facility] for a possible LHC.    On admission to [facility] coronary CT showed moderate stenosis of the proximal LAD and RCA. Cath initially deferred as patient was pending nephrology clearance for BILLY. TTE largely wnl, bubble study negative. MRI brain with contrast showed chronic multifocal lacunar infarcts and chronic microvascular ischemic changes, corroborating MRI read from [facility]. Patient was evaluated by ophthalmology team for new onset R frontal headache and popping sensation, determined to have components hypertensive and diabetic retinopathy for which outpatient followup and possible trial of Fresnel prisms was recommended. Cluster headaches suspected given pattern of headache (R frontal, several days at a time, lacrimation). Patient was also evaluated by PT/OT who recommended CANDY. Speech pathology eval was notable for cognitive linguistic deficits with concern for early onset dementia given a family hx (mother diagnosed @46yo). After improvement of BILLY, LHC was completed showing nonobstructive CAD without elevated filling pressures. Lateral rectus palsy was noted to improve slightly towards end of admission with ability to abduct to ~10-15 degrees. Low-dose lisinopril was initiated for nephrotic-range proteinuria.  Patient was monitored for resolution of BILLY and is now hemodynamically stable   Patient was evaluated by PM&R and therapy for functional deficits, gait/ADL impairments and acute rehabilitation was recommended. Patient was medically optimized for discharge to [facility] IRU on 10/20/22.    Today's Subjective:  Seen walking around room with no device this morning  informs me that his friend from [place] to come to stay with him for one month  will have HHA via insurance- minimum 11 hours- can increase amt  friend can apply to be paid as HHA as per soc service    ROS:  no further dizziness  BPS OK today  moved bowels with lactulose 11/1- claims it was small amount  encouraged to continue miralax and senna  no nausea, no vomiting  no SOB, no chest pain  alternating eye patching- balance better        MEDICATIONS  (STANDING):  amitriptyline 10 milliGRAM(s) Oral at bedtime  amLODIPine   Tablet 10 milliGRAM(s) Oral daily  AQUAPHOR (petrolatum Ointment) 1 Application(s) Topical two times a day  aspirin enteric coated 81 milliGRAM(s) Oral daily  atorvastatin 40 milliGRAM(s) Oral at bedtime  budesonide  80 MICROgram(s)/formoterol 4.5 MICROgram(s) Inhaler 2 Puff(s) Inhalation two times a day  carvedilol 25 milliGRAM(s) Oral every 12 hours  cyanocobalamin 1000 MICROGram(s) Oral daily  dextrose 5%. 1000 milliLiter(s) (100 mL/Hr) IV Continuous <Continuous>  dextrose 5%. 1000 milliLiter(s) (50 mL/Hr) IV Continuous <Continuous>  dextrose 50% Injectable 25 Gram(s) IV Push once  dextrose 50% Injectable 12.5 Gram(s) IV Push once  dextrose 50% Injectable 25 Gram(s) IV Push once  glucagon  Injectable 1 milliGRAM(s) IntraMuscular once  heparin   Injectable 5000 Unit(s) SubCutaneous every 8 hours  hydrALAZINE 25 milliGRAM(s) Oral three times a day  insulin glargine Injectable (LANTUS) 18 Unit(s) SubCutaneous at bedtime  insulin lispro (ADMELOG) corrective regimen sliding scale   SubCutaneous three times a day before meals  insulin lispro (ADMELOG) corrective regimen sliding scale   SubCutaneous at bedtime  multivitamin 1 Tablet(s) Oral daily  pantoprazole    Tablet 40 milliGRAM(s) Oral before breakfast  senna 2 Tablet(s) Oral at bedtime    MEDICATIONS  (PRN):  acetaminophen     Tablet .. 650 milliGRAM(s) Oral every 6 hours PRN Temp greater or equal to 38C (100.4F), Mild Pain (1 - 3)  ALBUTerol    90 MICROgram(s) HFA Inhaler 2 Puff(s) Inhalation every 6 hours PRN Shortness of Breath and/or Wheezing  Biotene Dry Mouth Oral Rinse 15 milliLiter(s) Swish and Spit three times a day PRN Mouth Care  dextrose Oral Gel 15 Gram(s) Oral once PRN Blood Glucose LESS THAN 70 milliGRAM(s)/deciliter  lactulose Syrup 10 Gram(s) Oral daily PRN constipation  melatonin 3 milliGRAM(s) Oral at bedtime PRN Sleep  simethicone 80 milliGRAM(s) Chew daily PRN Gas  sodium chloride 0.65% Nasal 1 Spray(s) Both Nostrils every 1 hour PRN Nasal Congestion                              10.5   6.99  )-----------( 210      ( 31 Oct 2022 07:32 )             31.6     11-01    142  |  108  |  83<H>  ----------------------------<  268<H>  4.9   |  26  |  3.46<H>    Ca    8.8      01 Nov 2022 06:00    TPro  7.5  /  Alb  3.5  /  TBili  0.2  /  DBili  x   /  AST  27  /  ALT  26  /  AlkPhos  94  10-31        CAPILLARY BLOOD GLUCOSE      POCT Blood Glucose.: 171 mg/dL (02 Nov 2022 11:34)  POCT Blood Glucose.: 163 mg/dL (02 Nov 2022 07:43)  POCT Blood Glucose.: 155 mg/dL (01 Nov 2022 22:19)  POCT Blood Glucose.: 209 mg/dL (01 Nov 2022 17:20)      Vital Signs Last 24 Hrs  T(C): 36.3 (02 Nov 2022 07:44), Max: 36.6 (01 Nov 2022 20:38)  T(F): 97.4 (02 Nov 2022 07:44), Max: 97.8 (01 Nov 2022 20:38)  HR: 75 (02 Nov 2022 08:20) (71 - 81)  BP: 137/71 (02 Nov 2022 07:44) (137/71 - 158/78)  BP(mean): --  RR: 16 (02 Nov 2022 07:44) (16 - 16)  SpO2: 99% (02 Nov 2022 08:20) (98% - 99%)    Parameters below as of 02 Nov 2022 08:20  Patient On (Oxygen Delivery Method): room air        Constitutional - NAD, Comfortable eye patch on  HEENT - R 6th palsy persists- able to abduct beyond midline but diplopia persists  Chest - good chest expansion, good respiratory effort, CTAB   Cardio - warm and well perfused, RRR, no murmur  Abdomen -  Soft, NTND  Extremities 2 + edema LES  Neurologic Exam:                    Cognitive -             Orientation: Awake, Alert, AAOx3        Speech - Fluent, Comprehensible, No dysarthria, No aphasia      Cranial Nerves - No facial asymmetry, Tongue midline, Shoulder shrug intact +R lateral gaze palsy     Motor -                      LEFT    UE - ShAB 5/5, EF 5/5, EE 5/5, WE 5/5,  WNL                    RIGHT UE - ShAB 5/5, EF 5/5, EE 5/5, WE 5/5,  WNL                    LEFT    LE - HF 4+/5, KE 5/5, DF 5/5, PF 5/5                    RIGHT LE - HF 4+/5, KE 5/5, DF 5/5, PF 5/5        Sensory - Diminished to LT glove stocking distribution     Reflexes - DTR intact  Psychiatric - Mood stable, Affect WNL        IDT 11/1 - lead by Dr. Ledbetter  lives alone. qualified for 11 hrs of HHA weekly.  Functional progress: SV with ADLs + transfers.  amb 150' RW, stairs 1 HR step 2 pattern.  Mild receptive language, mild cog impairment, overall nees SV.  Improved insight and knows he should have SV upon discharge  Barrier: vertigo d/t orthostasis - better now  Need: commode, shower chair and wants rollator for community ambulation  TDD: 11/3 home.

## 2022-11-02 NOTE — PROGRESS NOTE ADULT - ASSESSMENT
This is a 46 YO male with PMHx of CKD IV, vertigo, diverticulitis s/p LAR, cigarette use, ETOH abuse now sober x5 years, IDDM, HTN, AMBER, who presented to [facility] on 10/2 c/o dizziness, b/l diplopia, headache and chest tightness found to have /133 and elevated troponin concerning for NSTEMI. CTH negative for acute pathology. MRI Brain showed L parasagittal infarcts. Per, neurology and opthalmology CN VI palsy. Patient  was transferred to [facility] for a possible LHC. LHC showed nonobstructive CAD without elevated filling pressures. Hospital course significant for BILLY, right frontal headache and popping sensation, determined to have components hypertensive and diabetic retinopathy for which outpatient followup and possible trial of Fresnel prisms was recommended. Patient now with gait Instability, ADL impairments and Functional impairments.    # Functional Deficits s/p CVA  - MRI with L parasagittal infarcts, multiple chronic lacunar infarcts  - Continue statin. ASA on hold for renal bx   -  Comprehensive Rehab Program: PT/OT/ST, 3hours daily and 5 days weekly  - PT: Focused on improving strength, endurance, coordination, balance, functional mobility, and transfers  - OT: Focused on improving strength, fine motor skills, coordination, posture and ADLs.    - ST: to diagnose and treat deficits in swallowing, cognition and communication.   ANTICIPATE DC IN AM    #NTEMI #CAD  - LHC showed nonobstructive CAD without elevated filling pressures  - Continue statin, fenofibrate    #BILLY on CKD st. IV likely due to diabetic nephropathy  # Nephrotic range proteinuria  - baseline Cr 2.4  - BUN/Cr 63/3.31 10/26> 73/3.6 10/27  Cr 3.4 today  - IR biopsy completed 10/25-glomerulonepfitis/ scleosisCW DM/HTN- renal to follow  - per renal at [facility], now on torsemide and low dose ace-i  -Monitor BUN/Cr  -Renally dose meds  avoid nephrotoxins      #HTN, presented with hypertensive emergency at [facility]  - Renal initiated secondary work up at OSH: dania not elevated, f/u plasma renin activity, metanephrines  - Lisinopril 2.5mg daily  - Carvedilol 25mg BID  - BP s better today    #HLD  - Lipitor 40mg daily  - Fenofibrate 145mg daily    #Diplopia  - OP f/u with neuro-opthalmology  - consistent with lateral rectus palsy  - outpatient f/u with retina specialist Dr. Jose Angel Isabel for OCT nerve/RNFL for evaluation for diabetic and hypertensive retinopathy; if diplopia/palsy persists should continue outpatient followup for Fresnel prisms  Alternate patch eyes during the day- FU Ophtho /retina Dr Arvizu    #Cluster Headache  - Amitriptyline 10mg QHS    #AMBER  - c/w inhalers: Albuterol, Symbicort  - c/w nasal spray    #DM II  - ISS and FS  - Admelog and Lantus  - A1c 9.9 on 10/5  Diabetic nursing for training- recs appreciated  - see discharge recommendations in 10/26 diabetic educator note    #Pain management  - Tylenol PRN    #DVT ppx  - Heparin    #GI ppx  - Protonix 40mg    #Bowel Regimen  - Senna  moved bowels today    #Bladder management  - BS on admission, and q 8 hours (SC if > 400)  - Monitor UO  PVRs low    #FEN   - Diet: DASH    #Onychomycosis  -Consider podiatry consult    #Skin:  - Skin on admission: Aquaphor to dry skin BID- latrice both feet  - Pressure injury/Skin: Turn Q2hrs while in bed, OOB to Chair, PT/OT     #Dysphagia    - SLP: evaluation and treatment    #Mood/Cognition:  - Neuropsychology consult PRN    #Sleep:   - Maintain quiet hours and low stim environment.  - Melatonin PRN to maximize participation in therapy during the day.     #Precaution  - Fall, Aspiration, cardiac

## 2022-11-02 NOTE — CHART NOTE - NSCHARTNOTEFT_GEN_A_CORE
Nutrition Follow Up Note  Hospital Course   (Per Electronic Medical Record)    Source:  Patient [X]  Medical Record [X]      Diet:   Consistent Carbohydrate DASH-TLC Diet w/ Thin Liquids (IDDSI Level 0)  Tolerates Diet Consistency Well  No Chewing/Swallowing Difficulties  No Recent Nausea, Vomiting, Diarrhea or Constipation (as Per Patient)  Consumes % of Meals (as Per Documentation) - States Good PO Intake/Appetite (Per Patient)   Education Provided on Blood Glucose Management & Proper Nutrition     Enteral/Parenteral Nutrition: Not Applicable    Current Weight: 179.8lb on 10/25  Obtain New Weight to Confirm  Obtain Weights Weekly     Pertinent Medications: MEDICATIONS  (STANDING):  amitriptyline 10 milliGRAM(s) Oral at bedtime  amLODIPine   Tablet 10 milliGRAM(s) Oral daily  AQUAPHOR (petrolatum Ointment) 1 Application(s) Topical two times a day  aspirin enteric coated 81 milliGRAM(s) Oral daily  atorvastatin 40 milliGRAM(s) Oral at bedtime  budesonide  80 MICROgram(s)/formoterol 4.5 MICROgram(s) Inhaler 2 Puff(s) Inhalation two times a day  carvedilol 25 milliGRAM(s) Oral every 12 hours  cyanocobalamin 1000 MICROGram(s) Oral daily  dextrose 5%. 1000 milliLiter(s) (50 mL/Hr) IV Continuous <Continuous>  dextrose 5%. 1000 milliLiter(s) (100 mL/Hr) IV Continuous <Continuous>  dextrose 50% Injectable 25 Gram(s) IV Push once  dextrose 50% Injectable 12.5 Gram(s) IV Push once  dextrose 50% Injectable 25 Gram(s) IV Push once  glucagon  Injectable 1 milliGRAM(s) IntraMuscular once  heparin   Injectable 5000 Unit(s) SubCutaneous every 8 hours  hydrALAZINE 25 milliGRAM(s) Oral three times a day  insulin glargine Injectable (LANTUS) 18 Unit(s) SubCutaneous at bedtime  insulin lispro (ADMELOG) corrective regimen sliding scale   SubCutaneous three times a day before meals  insulin lispro (ADMELOG) corrective regimen sliding scale   SubCutaneous at bedtime  multivitamin 1 Tablet(s) Oral daily  pantoprazole    Tablet 40 milliGRAM(s) Oral before breakfast  senna 2 Tablet(s) Oral at bedtime    MEDICATIONS  (PRN):  acetaminophen     Tablet .. 650 milliGRAM(s) Oral every 6 hours PRN Temp greater or equal to 38C (100.4F), Mild Pain (1 - 3)  ALBUTerol    90 MICROgram(s) HFA Inhaler 2 Puff(s) Inhalation every 6 hours PRN Shortness of Breath and/or Wheezing  Biotene Dry Mouth Oral Rinse 15 milliLiter(s) Swish and Spit three times a day PRN Mouth Care  dextrose Oral Gel 15 Gram(s) Oral once PRN Blood Glucose LESS THAN 70 milliGRAM(s)/deciliter  lactulose Syrup 10 Gram(s) Oral daily PRN constipation  melatonin 3 milliGRAM(s) Oral at bedtime PRN Sleep  simethicone 80 milliGRAM(s) Chew daily PRN Gas  sodium chloride 0.65% Nasal 1 Spray(s) Both Nostrils every 1 hour PRN Nasal Congestion    Pertinent Labs:  11-01 Na142 mmol/L Glu 268 mg/dL<H> K+ 4.9 mmol/L Cr  3.46 mg/dL<H> BUN 83 mg/dL<H> 10-31 Alb 3.5 g/dL    Skin: No Pressure Ulcers     Edema: +1 Edema Noted to Bilateral Lower Extremities   (as Per Documentation)     Last Bowel Movement: on 11/1    Estimated Needs:   [X] No Change Since Previous Assessment    Previous Nutrition Diagnosis:   Altered Nutrition Related Labwork     Nutrition Diagnosis is [X] Ongoing - Nutrition Education Provided     New Nutrition Diagnosis: [X] Not Applicable    Interventions:   1. Education Provided on Blood Glucose Management & Proper Nutrition   2. Recommend Continue Nutrition Plan of Care     Monitoring & Evaluation:   [X] Weights   [X] PO Intake   [X] Skin Integrity   [X] Follow Up (Per Protocol)  [X] Tolerance to Diet Prescription   [X] Other: Labs     Registered Dietitian/Nutritionist Remains Available.  Thomas Gonzalez RDN    Phone# [phone]

## 2022-11-02 NOTE — PROGRESS NOTE ADULT - SUBJECTIVE AND OBJECTIVE BOX
No distress    Vital Signs Last 24 Hrs  T(C): 36.3 (11-02-22 @ 07:44), Max: 36.6 (11-01-22 @ 20:38)  T(F): 97.4 (11-02-22 @ 07:44), Max: 97.8 (11-01-22 @ 20:38)  HR: 84 (11-02-22 @ 17:45) (71 - 84)  BP: 156/79 (11-02-22 @ 17:45) (137/71 - 164/85)  RR: 16 (11-02-22 @ 07:44) (16 - 16)  SpO2: 99% (11-02-22 @ 08:20) (98% - 99%)    s1s2  b/l air entry  soft, ND  no edema                               01 Nov 2022 06:00    142    |  108    |  83     ----------------------------<  268    4.9     |  26     |  3.46     Ca    8.8        01 Nov 2022 06:00    acetaminophen     Tablet .. 650 milliGRAM(s) Oral every 6 hours PRN  ALBUTerol    90 MICROgram(s) HFA Inhaler 2 Puff(s) Inhalation every 6 hours PRN  amitriptyline 10 milliGRAM(s) Oral at bedtime  amLODIPine   Tablet 10 milliGRAM(s) Oral daily  AQUAPHOR (petrolatum Ointment) 1 Application(s) Topical two times a day  aspirin enteric coated 81 milliGRAM(s) Oral daily  atorvastatin 40 milliGRAM(s) Oral at bedtime  Biotene Dry Mouth Oral Rinse 15 milliLiter(s) Swish and Spit three times a day PRN  budesonide  80 MICROgram(s)/formoterol 4.5 MICROgram(s) Inhaler 2 Puff(s) Inhalation two times a day  carvedilol 25 milliGRAM(s) Oral every 12 hours  cyanocobalamin 1000 MICROGram(s) Oral daily  dextrose 5%. 1000 milliLiter(s) IV Continuous <Continuous>  dextrose 5%. 1000 milliLiter(s) IV Continuous <Continuous>  dextrose 50% Injectable 25 Gram(s) IV Push once  dextrose 50% Injectable 12.5 Gram(s) IV Push once  dextrose 50% Injectable 25 Gram(s) IV Push once  dextrose Oral Gel 15 Gram(s) Oral once PRN  glucagon  Injectable 1 milliGRAM(s) IntraMuscular once  heparin   Injectable 5000 Unit(s) SubCutaneous every 8 hours  hydrALAZINE 25 milliGRAM(s) Oral three times a day  insulin glargine Injectable (LANTUS) 18 Unit(s) SubCutaneous at bedtime  insulin lispro (ADMELOG) corrective regimen sliding scale   SubCutaneous three times a day before meals  insulin lispro (ADMELOG) corrective regimen sliding scale   SubCutaneous at bedtime  lactulose Syrup 10 Gram(s) Oral daily PRN  melatonin 3 milliGRAM(s) Oral at bedtime PRN  multivitamin 1 Tablet(s) Oral daily  pantoprazole    Tablet 40 milliGRAM(s) Oral before breakfast  senna 2 Tablet(s) Oral at bedtime  simethicone 80 milliGRAM(s) Chew daily PRN  sodium chloride 0.65% Nasal 1 Spray(s) Both Nostrils every 1 hour PRN    A/P:    MMP, s/p complicated course at OSH as per HPI  Progressive renal disease (Cr 1.3 - 5/26/21, Cr 1.7 - 3/22/22, Cr 2.2 - 10/6/22)  S/p CT w/IV dye 10/4 and 10/6  Contrast BILLY/CKD 3 w/peak Cr 3.64 - 10/27/22  Cr appears to reach plateau, possibly new baseline  Kidney biopsy 10/25/22 c/w advanced DM nephropathy w/advanced chronicity  Avoid nephrotoxins  F/u CBC, BMP in am  Rehab    [phone]

## 2022-11-02 NOTE — PROGRESS NOTE ADULT - SUBJECTIVE AND OBJECTIVE BOX
Patient is a 47y old  Male who presents with a chief complaint of Multiple CVA (01 Nov 2022 20:23)      Subjective and overnight events:  Patient seen and examined at bedside. no complaints. no headache, dizziness, sob, cp, abd pain    ALLERGIES:  No Known Allergies    MEDICATIONS  (STANDING):  amitriptyline 10 milliGRAM(s) Oral at bedtime  amLODIPine   Tablet 10 milliGRAM(s) Oral daily  AQUAPHOR (petrolatum Ointment) 1 Application(s) Topical two times a day  aspirin enteric coated 81 milliGRAM(s) Oral daily  atorvastatin 40 milliGRAM(s) Oral at bedtime  budesonide  80 MICROgram(s)/formoterol 4.5 MICROgram(s) Inhaler 2 Puff(s) Inhalation two times a day  carvedilol 25 milliGRAM(s) Oral every 12 hours  cyanocobalamin 1000 MICROGram(s) Oral daily  dextrose 5%. 1000 milliLiter(s) (100 mL/Hr) IV Continuous <Continuous>  dextrose 5%. 1000 milliLiter(s) (50 mL/Hr) IV Continuous <Continuous>  dextrose 50% Injectable 25 Gram(s) IV Push once  dextrose 50% Injectable 12.5 Gram(s) IV Push once  dextrose 50% Injectable 25 Gram(s) IV Push once  glucagon  Injectable 1 milliGRAM(s) IntraMuscular once  heparin   Injectable 5000 Unit(s) SubCutaneous every 8 hours  hydrALAZINE 25 milliGRAM(s) Oral three times a day  insulin glargine Injectable (LANTUS) 15 Unit(s) SubCutaneous at bedtime  insulin lispro (ADMELOG) corrective regimen sliding scale   SubCutaneous three times a day before meals  insulin lispro (ADMELOG) corrective regimen sliding scale   SubCutaneous at bedtime  multivitamin 1 Tablet(s) Oral daily  pantoprazole    Tablet 40 milliGRAM(s) Oral before breakfast  senna 2 Tablet(s) Oral at bedtime    MEDICATIONS  (PRN):  acetaminophen     Tablet .. 650 milliGRAM(s) Oral every 6 hours PRN Temp greater or equal to 38C (100.4F), Mild Pain (1 - 3)  ALBUTerol    90 MICROgram(s) HFA Inhaler 2 Puff(s) Inhalation every 6 hours PRN Shortness of Breath and/or Wheezing  Biotene Dry Mouth Oral Rinse 15 milliLiter(s) Swish and Spit three times a day PRN Mouth Care  dextrose Oral Gel 15 Gram(s) Oral once PRN Blood Glucose LESS THAN 70 milliGRAM(s)/deciliter  lactulose Syrup 10 Gram(s) Oral daily PRN constipation  melatonin 3 milliGRAM(s) Oral at bedtime PRN Sleep  simethicone 80 milliGRAM(s) Chew daily PRN Gas  sodium chloride 0.65% Nasal 1 Spray(s) Both Nostrils every 1 hour PRN Nasal Congestion    Vital Signs Last 24 Hrs  T(F): 97.4 (02 Nov 2022 07:44), Max: 97.8 (01 Nov 2022 20:38)  HR: 75 (02 Nov 2022 08:20) (71 - 81)  BP: 137/71 (02 Nov 2022 07:44) (137/71 - 158/78)  RR: 16 (02 Nov 2022 07:44) (16 - 16)  SpO2: 99% (02 Nov 2022 08:20) (98% - 99%)  I&O's Summary    PHYSICAL EXAM:  General: NAD, A/O x 3  ENT: MMM  Neck: Supple, No JVD  Lungs: Clear to auscultation bilaterally  Cardio: RRR, S1/S2, No murmurs  Abdomen: Soft, Nontender, Nondistended; Bowel sounds present  Extremities: No calf tenderness, No pitting edema    LABS:                        10.5   6.99  )-----------( 210      ( 31 Oct 2022 07:32 )             31.6     11-01    142  |  108  |  83  ----------------------------<  268  4.9   |  26  |  3.46    Ca    8.8      01 Nov 2022 06:00    TPro  7.5  /  Alb  3.5  /  TBili  0.2  /  DBili  x   /  AST  27  /  ALT  26  /  AlkPhos  94  10-31            POCT Blood Glucose.: 163 mg/dL (02 Nov 2022 07:43)  POCT Blood Glucose.: 155 mg/dL (01 Nov 2022 22:19)  POCT Blood Glucose.: 209 mg/dL (01 Nov 2022 17:20)  POCT Blood Glucose.: 171 mg/dL (01 Nov 2022 11:55)          COVID-19 PCR: NotDetec (10-28-22 @ 05:58)  COVID-19 PCR: NotDetec (10-24-22 @ 14:05)  COVID-19 PCR: NotDetec (10-23-22 @ 06:20)  COVID-19 PCR: NotDetec (10-20-22 @ 14:41)  COVID-19 PCR: NotDetec (10-17-22 @ 07:40)      RADIOLOGY & ADDITIONAL TESTS:    Care Discussed with Consultants/Other Providers:

## 2022-11-02 NOTE — PROGRESS NOTE ADULT - ASSESSMENT
48 YO male with PMHx of CKD IV, vertigo, diverticulitis s/p LAR, cigarette use, ETOH abuse now sober x5 years, IDDM, HTN, AMBER, who presented to [facility] on 10/2 c/o dizziness, b/l diplopia, headache and chest tightness found to have /133 and elevated troponin concerning for NSTEMI. CTH negative for acute pathology. MRI Brain showed L parasagittal infarcts. Per, neurology and opthalmology CN VI palsy. Patient  was transferred to [facility] for a possible LHC. LHC showed nonobstructive CAD without elevated filling pressures. Hospital course significant for BILLY, right frontal headache and popping sensation, determined to have components hypertensive and diabetic retinopathy for which outpatient followup and possible trial of Fresnel prisms was recommended.    # Debility  # Hx of chronic Lacunar infarts/microvascular changes  # lateral rectus palsy  - MRI with L parasagittal infarcts, multiple chronic lacunar infarcts  - PT/OT/ST per Rehab  - ASA restarted. previously held for renal bx.  - lipitor 40mg  - control BP, BP goal <140/90    # NSTEMI  # CAD  # HLD  - Cath: 10/13 w/ non obstructive CAD  - Continue ASA, Coreg, atorvastatin and fenofibrate    # HTN, presented w/ hypertensive emergency  - Renal initiated secondary work up at OSH: dania not elevated, f/u plasma renin activity, metanephrines  - BP acceptable this morning  - added hydralazine 25mg q8  - continue Coreg  - amlodipine 10mg    # DM2, insulin dependent  - [id number] 9.9% (10/5)  - glargine 18unit qhs, moderate ISS    # CKD stage IV likely due to diabetic nephropathy  # Nephrotic range proteinuria  - Cr 1.3 5/26/21, Cr 1.7 3/22/22, Cr 2.2 10/6/22  - renal biopsy c/w advanced diabetic nephropathy   - Renal on board  - avoid nephrotoxins     # Normocytic anemia likely due to anemia of chronic disease.  - Continue b12 supplements    # Diplopia, thought to be secondary to R. CN6 lateral rectus palsy  - Pt evaluated by Ophtho at OSH. Found to have moderate non-proliferative diabetic retinopathy and Hypertensive retinopathy   - OP Neuro and ophthalmology    # Depression  - Continue Amitriptyline    # Tobacco abuse disorder  - Patient has reduced smoking from 1.5 ppd to 1 pack every 2 weeks  - The patient is an active smoker. The patient was advised to quit. Declined nicotine patches/gums.     DVT ppx: Continue heparin

## 2022-11-03 ENCOUNTER — TRANSCRIPTION ENCOUNTER (OUTPATIENT)
Age: 47
End: 2022-11-03

## 2022-11-03 VITALS
HEART RATE: 84 BPM | OXYGEN SATURATION: 99 % | TEMPERATURE: 98 F | DIASTOLIC BLOOD PRESSURE: 78 MMHG | SYSTOLIC BLOOD PRESSURE: 159 MMHG | RESPIRATION RATE: 15 BRPM

## 2022-11-03 LAB
ALBUMIN SERPL ELPH-MCNC: 3.1 G/DL — LOW (ref 3.3–5)
ALP SERPL-CCNC: 106 U/L — SIGNIFICANT CHANGE UP (ref 40–120)
ALT FLD-CCNC: 26 U/L — SIGNIFICANT CHANGE UP (ref 10–45)
ANION GAP SERPL CALC-SCNC: 8 MMOL/L — SIGNIFICANT CHANGE UP (ref 5–17)
AST SERPL-CCNC: 24 U/L — SIGNIFICANT CHANGE UP (ref 10–40)
BILIRUB SERPL-MCNC: 0.2 MG/DL — SIGNIFICANT CHANGE UP (ref 0.2–1.2)
BUN SERPL-MCNC: 75 MG/DL — HIGH (ref 7–23)
CALCIUM SERPL-MCNC: 8.9 MG/DL — SIGNIFICANT CHANGE UP (ref 8.4–10.5)
CHLORIDE SERPL-SCNC: 107 MMOL/L — SIGNIFICANT CHANGE UP (ref 96–108)
CO2 SERPL-SCNC: 25 MMOL/L — SIGNIFICANT CHANGE UP (ref 22–31)
CREAT SERPL-MCNC: 2.98 MG/DL — HIGH (ref 0.5–1.3)
EGFR: 25 ML/MIN/1.73M2 — LOW
GLUCOSE BLDC GLUCOMTR-MCNC: 178 MG/DL — HIGH (ref 70–99)
GLUCOSE BLDC GLUCOMTR-MCNC: 193 MG/DL — HIGH (ref 70–99)
GLUCOSE SERPL-MCNC: 181 MG/DL — HIGH (ref 70–99)
HCT VFR BLD CALC: 29.8 % — LOW (ref 39–50)
HGB BLD-MCNC: 9.8 G/DL — LOW (ref 13–17)
MCHC RBC-ENTMCNC: 31.1 PG — SIGNIFICANT CHANGE UP (ref 27–34)
MCHC RBC-ENTMCNC: 32.9 GM/DL — SIGNIFICANT CHANGE UP (ref 32–36)
MCV RBC AUTO: 94.6 FL — SIGNIFICANT CHANGE UP (ref 80–100)
NRBC # BLD: 0 /100 WBCS — SIGNIFICANT CHANGE UP (ref 0–0)
PLATELET # BLD AUTO: 222 K/UL — SIGNIFICANT CHANGE UP (ref 150–400)
POTASSIUM SERPL-MCNC: 4.1 MMOL/L — SIGNIFICANT CHANGE UP (ref 3.5–5.3)
POTASSIUM SERPL-SCNC: 4.1 MMOL/L — SIGNIFICANT CHANGE UP (ref 3.5–5.3)
PROT SERPL-MCNC: 6.8 G/DL — SIGNIFICANT CHANGE UP (ref 6–8.3)
RBC # BLD: 3.15 M/UL — LOW (ref 4.2–5.8)
RBC # FLD: 12.3 % — SIGNIFICANT CHANGE UP (ref 10.3–14.5)
SARS-COV-2 RNA SPEC QL NAA+PROBE: SIGNIFICANT CHANGE UP
SODIUM SERPL-SCNC: 140 MMOL/L — SIGNIFICANT CHANGE UP (ref 135–145)
WBC # BLD: 6.65 K/UL — SIGNIFICANT CHANGE UP (ref 3.8–10.5)
WBC # FLD AUTO: 6.65 K/UL — SIGNIFICANT CHANGE UP (ref 3.8–10.5)

## 2022-11-03 PROCEDURE — 85025 COMPLETE CBC W/AUTO DIFF WBC: CPT

## 2022-11-03 PROCEDURE — 82962 GLUCOSE BLOOD TEST: CPT

## 2022-11-03 PROCEDURE — 99232 SBSQ HOSP IP/OBS MODERATE 35: CPT

## 2022-11-03 PROCEDURE — 83540 ASSAY OF IRON: CPT

## 2022-11-03 PROCEDURE — 85027 COMPLETE CBC AUTOMATED: CPT

## 2022-11-03 PROCEDURE — 87635 SARS-COV-2 COVID-19 AMP PRB: CPT

## 2022-11-03 PROCEDURE — 97129 THER IVNTJ 1ST 15 MIN: CPT

## 2022-11-03 PROCEDURE — 94640 AIRWAY INHALATION TREATMENT: CPT

## 2022-11-03 PROCEDURE — 80053 COMPREHEN METABOLIC PANEL: CPT

## 2022-11-03 PROCEDURE — 82746 ASSAY OF FOLIC ACID SERUM: CPT

## 2022-11-03 PROCEDURE — 99239 HOSP IP/OBS DSCHRG MGMT >30: CPT

## 2022-11-03 PROCEDURE — 92507 TX SP LANG VOICE COMM INDIV: CPT

## 2022-11-03 PROCEDURE — 97112 NEUROMUSCULAR REEDUCATION: CPT

## 2022-11-03 PROCEDURE — 92610 EVALUATE SWALLOWING FUNCTION: CPT

## 2022-11-03 PROCEDURE — 97116 GAIT TRAINING THERAPY: CPT

## 2022-11-03 PROCEDURE — 97535 SELF CARE MNGMENT TRAINING: CPT

## 2022-11-03 PROCEDURE — 97163 PT EVAL HIGH COMPLEX 45 MIN: CPT

## 2022-11-03 PROCEDURE — 92523 SPEECH SOUND LANG COMPREHEN: CPT

## 2022-11-03 PROCEDURE — 97110 THERAPEUTIC EXERCISES: CPT

## 2022-11-03 PROCEDURE — 80048 BASIC METABOLIC PNL TOTAL CA: CPT

## 2022-11-03 PROCEDURE — U0003: CPT

## 2022-11-03 PROCEDURE — 83550 IRON BINDING TEST: CPT

## 2022-11-03 PROCEDURE — 82607 VITAMIN B-12: CPT

## 2022-11-03 PROCEDURE — 82728 ASSAY OF FERRITIN: CPT

## 2022-11-03 PROCEDURE — 97167 OT EVAL HIGH COMPLEX 60 MIN: CPT

## 2022-11-03 PROCEDURE — 36415 COLL VENOUS BLD VENIPUNCTURE: CPT

## 2022-11-03 PROCEDURE — U0005: CPT

## 2022-11-03 PROCEDURE — 97530 THERAPEUTIC ACTIVITIES: CPT

## 2022-11-03 RX ORDER — INSULIN DEGLUDEC 100 U/ML
20 INJECTION, SOLUTION SUBCUTANEOUS
Qty: 150 | Refills: 0
Start: 2022-11-03 | End: 2022-12-02

## 2022-11-03 RX ORDER — INSULIN DEGLUDEC 100 U/ML
20 INJECTION, SOLUTION SUBCUTANEOUS
Qty: 600 | Refills: 0
Start: 2022-11-03 | End: 2022-12-02

## 2022-11-03 RX ADMIN — HEPARIN SODIUM 5000 UNIT(S): 5000 INJECTION INTRAVENOUS; SUBCUTANEOUS at 06:24

## 2022-11-03 RX ADMIN — PANTOPRAZOLE SODIUM 40 MILLIGRAM(S): 20 TABLET, DELAYED RELEASE ORAL at 06:24

## 2022-11-03 RX ADMIN — BUDESONIDE AND FORMOTEROL FUMARATE DIHYDRATE 2 PUFF(S): 160; 4.5 AEROSOL RESPIRATORY (INHALATION) at 08:35

## 2022-11-03 RX ADMIN — AMLODIPINE BESYLATE 10 MILLIGRAM(S): 2.5 TABLET ORAL at 06:24

## 2022-11-03 RX ADMIN — Medication 2: at 07:47

## 2022-11-03 RX ADMIN — Medication 25 MILLIGRAM(S): at 06:24

## 2022-11-03 RX ADMIN — CARVEDILOL PHOSPHATE 25 MILLIGRAM(S): 80 CAPSULE, EXTENDED RELEASE ORAL at 06:24

## 2022-11-03 RX ADMIN — Medication 1 APPLICATION(S): at 06:40

## 2022-11-03 NOTE — ADVANCED PRACTICE NURSE CONSULT - ASSESSMENT
HPI (per chart) 48 YO male with PMHx of CKD IV, vertigo, diverticulitis s/p LAR, cigarette use, ETOH abuse now sober x5 years, IDDM, HTN, AMBER, who presented to [facility] on 10/2 c/o dizziness, b/l diplopia, headache and chest tightness found to have /133 and elevated troponin concerning for NSTEMI. CTH negative for acute pathology. MRI Brain showed L parasagittal infarcts. Per, neurology and ophthalmology CN VI palsy. Patient was transferred to [facility] for a possible LHC. LHC showed nonobstructive CAD without elevated filling pressures. Hospital course significant for BILLY, right frontal headache and popping sensation, determined to have components hypertensive and diabetic retinopathy for which outpatient follow up and possible trial of Fresnel prisms was recommended.  + Microvascular complications – retinopathy, nephropathy  + Macrovascular- CAD, CVA  HgbA1c- 9.9 (10/24/22)  eGFR- 23 (10/24/22)    POCT Blood Glucose.: 160 mg/dL (26 Oct 2022 07:25)  POCT Blood Glucose.: 189 mg/dL (25 Oct 2022 21:54)  POCT Blood Glucose.: 287 mg/dL (25 Oct 2022 17:11)    Current In-patient Diabetes Regimen  insulin glargine Injectable (LANTUS) 20 Unit(s) SubCutaneous at bedtime  insulin lispro (ADMELOG) corrective regimen sliding scale   SubCutaneous three times a day before meals  insulin lispro (ADMELOG) corrective regimen sliding scale   SubCutaneous at bedtime  insulin lispro Injectable (ADMELOG) 2 Unit(s) SubCutaneous before breakfast  insulin lispro Injectable (ADMELOG) 2 Unit(s) SubCutaneous before lunch  insulin lispro Injectable (ADMELOG) 2 Unit(s) SubCutaneous before dinner      Home Diabetes medication Regimen-  Tresiba 40 units daily  Admelog Scale  Under 150 – no insulin  150- 199- 2 units  200- 249 - 4 units  250- 299- 7 units  300- -349- 9 units  350 or higher- 12 units    PCP- Changing to [facility] Provider- does not know name     Endocrinology- none    Pt states he was dx with T2DM 17 years ago. Sates his HgbA1c was over 12 just 3 months ago and feels he has been improving in managing diabetes.  Pt demonstrate proper insulin pen use. Pt demonstrated proper BG monitoring use. Unable to state proper treatment for hyper or hypoglycemia or principles of consistent carb diet.   Educated pt on T2DM disease process, progression, management including medication and healthy eating, as well as consequences of persistent hyperglycemia. Educated on A1c goal- 7.0- 7.5. BG goals (100- 140), when to contact healthcare provider, when to check BG and keeping BG log- provided BG Log.   Educated pt on s/s of hyperglycemia and Tx and S/S of hypoglycemia and tx per 15/15 rule. Pt validated education via teach back.   Educated on benefits of follow up with Endocrinology-. Pt validated education via teach back.   Educated on benefits of Continuous Glucose Monitoring System to discuss with out pt provider.     Provided written resources of Leaving the hospital with Diabetes, A1c goal, BG Goals, Insulin pen instruction, Hyper and Hypoglycemia s/s and tx, and Preventing complications due to diabetes.  Educated pt on how to access diabetes education Videos. Pt validated education via show back.  
Discussed case with Dr. ТАТЬЯНА Welsh on 11/2/22 and agreed to discharge diabetes medication as listed below

## 2022-11-03 NOTE — PROGRESS NOTE ADULT - ASSESSMENT
This is a 48 YO male with PMHx of CKD IV, vertigo, diverticulitis s/p LAR, cigarette use, ETOH abuse now sober x5 years, IDDM, HTN, AMBER, who presented to [facility] on 10/2 c/o dizziness, b/l diplopia, headache and chest tightness found to have /133 and elevated troponin concerning for NSTEMI. CTH negative for acute pathology. MRI Brain showed L parasagittal infarcts. Per, neurology and opthalmology CN VI palsy. Patient  was transferred to [facility] for a possible LHC. LHC showed nonobstructive CAD without elevated filling pressures. Hospital course significant for BILLY, right frontal headache and popping sensation, determined to have components hypertensive and diabetic retinopathy for which outpatient followup and possible trial of Fresnel prisms was recommended. Patient now with gait Instability, ADL impairments and Functional impairments.    # Functional Deficits s/p CVA  - MRI with L parasagittal infarcts, multiple chronic lacunar infarcts  - Continue statin. ASA   -  Comprehensive Rehab Program: PT/OT/ST, 3hours daily and 5 days weekly-completed  -neurology outpt in 1 week, optho appointment made    #NTEMI #CAD  - LHC showed nonobstructive CAD without elevated filling pressures  - Continue statin, and ASA    #BILLY on CKD st. IV likely due to diabetic nephropathy  # Nephrotic range proteinuria  - IR biopsy completed 10/25-glomerulonepfitis/ scleosisCW DM/HTN- renal outpt  -Monitor BUN/Cr with PCP  -Renally dose meds/avoid nephrotoxins      #HTN  - Renal initiated secondary work up at OSH: danai not elevated, f/u plasma renin activity, metanephrines  - Norvasc, Hydralazine  - Carvedilol 25mg BID  -PCP cardio outpt, educated on daily BP check and purchase of BP cuff    #HLD  - Lipitor 40mg daily    #Diplopia  - OP f/u with neuro-opthalmology  - outpatient f/u with retina specialist Dr. Jose Angel Isabel for OCT nerve/RNFL for evaluation for diabetic and hypertensive retinopathy; if diplopia/palsy persists should continue outpatient followup for Fresnel prisms  Alternate patch eyes during the day- FU Ophtho /retina Dr Arvizu    #Cluster Headache  - Amitriptyline 10mg QHS    #AMBER  - c/w inhalers: Albuterol, Symbicort  - c/w nasal spray    #DM II  - ISS and FS  - Admelog and Tresiba at home to continue  - A1c 9.9 on 10/5 from prior 12.   Diabetic nursing for training- recs appreciated  - private Endocrine appt on 11/11. Has Tresiba/Admelog at home.     #Pain management  - Tylenol PRN

## 2022-11-03 NOTE — PROGRESS NOTE ADULT - SUBJECTIVE AND OBJECTIVE BOX
Patient is a 47y old  Male who presents with a chief complaint of Multiple CVA (02 Nov 2022 20:06)      Subjective and overnight events:  Patient seen and examined at bedside. no complaints. no fever, chills, headache, sob, cp, abd pain.     ALLERGIES:  No Known Allergies    MEDICATIONS  (STANDING):  amitriptyline 10 milliGRAM(s) Oral at bedtime  amLODIPine   Tablet 10 milliGRAM(s) Oral daily  AQUAPHOR (petrolatum Ointment) 1 Application(s) Topical two times a day  aspirin enteric coated 81 milliGRAM(s) Oral daily  atorvastatin 40 milliGRAM(s) Oral at bedtime  budesonide  80 MICROgram(s)/formoterol 4.5 MICROgram(s) Inhaler 2 Puff(s) Inhalation two times a day  carvedilol 25 milliGRAM(s) Oral every 12 hours  cyanocobalamin 1000 MICROGram(s) Oral daily  dextrose 5%. 1000 milliLiter(s) (100 mL/Hr) IV Continuous <Continuous>  dextrose 5%. 1000 milliLiter(s) (50 mL/Hr) IV Continuous <Continuous>  dextrose 50% Injectable 25 Gram(s) IV Push once  dextrose 50% Injectable 12.5 Gram(s) IV Push once  dextrose 50% Injectable 25 Gram(s) IV Push once  glucagon  Injectable 1 milliGRAM(s) IntraMuscular once  heparin   Injectable 5000 Unit(s) SubCutaneous every 8 hours  hydrALAZINE 25 milliGRAM(s) Oral three times a day  insulin glargine Injectable (LANTUS) 18 Unit(s) SubCutaneous at bedtime  insulin lispro (ADMELOG) corrective regimen sliding scale   SubCutaneous three times a day before meals  insulin lispro (ADMELOG) corrective regimen sliding scale   SubCutaneous at bedtime  multivitamin 1 Tablet(s) Oral daily  pantoprazole    Tablet 40 milliGRAM(s) Oral before breakfast  senna 2 Tablet(s) Oral at bedtime    MEDICATIONS  (PRN):  acetaminophen     Tablet .. 650 milliGRAM(s) Oral every 6 hours PRN Temp greater or equal to 38C (100.4F), Mild Pain (1 - 3)  ALBUTerol    90 MICROgram(s) HFA Inhaler 2 Puff(s) Inhalation every 6 hours PRN Shortness of Breath and/or Wheezing  Biotene Dry Mouth Oral Rinse 15 milliLiter(s) Swish and Spit three times a day PRN Mouth Care  dextrose Oral Gel 15 Gram(s) Oral once PRN Blood Glucose LESS THAN 70 milliGRAM(s)/deciliter  lactulose Syrup 10 Gram(s) Oral daily PRN constipation  melatonin 3 milliGRAM(s) Oral at bedtime PRN Sleep  simethicone 80 milliGRAM(s) Chew daily PRN Gas  sodium chloride 0.65% Nasal 1 Spray(s) Both Nostrils every 1 hour PRN Nasal Congestion    Vital Signs Last 24 Hrs  T(F): 98.3 (03 Nov 2022 08:46), Max: 98.3 (02 Nov 2022 20:49)  HR: 84 (03 Nov 2022 08:46) (73 - 84)  BP: 159/78 (03 Nov 2022 08:46) (155/77 - 164/85)  RR: 15 (03 Nov 2022 08:46) (15 - 15)  SpO2: 99% (03 Nov 2022 08:46) (97% - 99%)  I&O's Summary    PHYSICAL EXAM:  General: NAD, A/O x 3  ENT: MMM  Neck: Supple, No JVD  Lungs: Clear to auscultation bilaterally  Cardio: RRR, S1/S2, No murmurs  Abdomen: Soft, Nontender, Nondistended; Bowel sounds present  Extremities: No calf tenderness, No pitting edema    LABS:                        9.8    6.65  )-----------( 222      ( 03 Nov 2022 07:53 )             29.8     11-01    142  |  108  |  83  ----------------------------<  268  4.9   |  26  |  3.46    Ca    8.8      01 Nov 2022 06:00        POCT Blood Glucose.: 178 mg/dL (03 Nov 2022 07:44)  POCT Blood Glucose.: 205 mg/dL (02 Nov 2022 20:55)  POCT Blood Glucose.: 157 mg/dL (02 Nov 2022 17:31)  POCT Blood Glucose.: 171 mg/dL (02 Nov 2022 11:34)        COVID-19 PCR: NotDetec (10-28-22 @ 05:58)  COVID-19 PCR: NotDetec (10-24-22 @ 14:05)  COVID-19 PCR: NotDetec (10-23-22 @ 06:20)  COVID-19 PCR: NotDetec (10-20-22 @ 14:41)  COVID-19 PCR: NotDetec (10-17-22 @ 07:40)      RADIOLOGY & ADDITIONAL TESTS:    Care Discussed with Consultants/Other Providers: d/w rehab

## 2022-11-03 NOTE — PROGRESS NOTE ADULT - PROVIDER SPECIALTY LIST ADULT
Hospitalist
Nephrology
Nephrology
Physiatry
Hospitalist
Physiatry
Rehab Medicine
Hospitalist
Hospitalist
Nephrology
Physiatry
Rehab Medicine
Hospitalist
Physiatry
Physiatry

## 2022-11-03 NOTE — ADVANCED PRACTICE NURSE CONSULT - RECOMMEDATIONS
Discharge Recommendations:  Tresiba Flex pen (100 units/ml)- 20 units daily  Admelog Solostar Pen (100 units/ml) per sliding scale before meals:  < 150- 0 units  150-200- 2 units  201- 250- 4 units,   251-300 - 6 units,   301-350- 8 units,   351-400- 10 units,   401 or greater- 12 units and call provider    3.	Insulin pen needles 4 mm- guille- (on favorite list)  4.	Alcohol swabs- (on favorite list)  5.	BG meter per Insurance- (on favorite list)  6.	BG test strips per insurance ( on favorite list)  7.	Lancets- per insurance -(on favorite list)  8.	Follow up with PCP  9. Recommending follow up with Nephrology, Ophthalmology and  Endocrinology, 
BG Goal- 100- 180 mg/dL  C/W insulin glargine Injectable (LANTUS) 20 Unit(s) SubCutaneous at bedtime  C/W insulin lispro (ADMELOG) corrective regimen sliding scale   SubCutaneous three times a day before meals  C/W insulin lispro (ADMELOG) corrective regimen sliding scale   SubCutaneous at bedtime  C/W insulin lispro Injectable (ADMELOG) 2 Unit(s) SubCutaneous before breakfast  C/W insulin lispro Injectable (ADMELOG) 2 Unit(s) SubCutaneous before lunch  C/W insulin lispro Injectable (ADMELOG) 2 Unit(s) SubCutaneous before dinner        Discharge Recommendations:  Tresiba Flex pen (100 units/ml)- 20 units daily  Admelog Solostar Pen (100 units/ml) take 2 units before meals  Insulin pen needles 4 mm- guille- (on favorite list)  Alcohol swabs- (on favorite list)  BG meter per Insurance- (on favorite list)  BG test strips per insurance ( on favorite list)  Lancets- per insurance -(on favorite list)  8. Follow up with PCP  Recommending follow up with Nephrology, Ophthalmology and  Endocrinology,

## 2022-11-03 NOTE — PROGRESS NOTE ADULT - ASSESSMENT
46 YO male with PMHx of CKD IV, vertigo, diverticulitis s/p LAR, cigarette use, ETOH abuse now sober x5 years, IDDM, HTN, AMBER, who presented to [facility] on 10/2 c/o dizziness, b/l diplopia, headache and chest tightness found to have /133 and elevated troponin concerning for NSTEMI. CTH negative for acute pathology. MRI Brain showed L parasagittal infarcts. Per, neurology and opthalmology CN VI palsy. Patient  was transferred to [facility] for a possible LHC. LHC showed nonobstructive CAD without elevated filling pressures. Hospital course significant for BILLY, right frontal headache and popping sensation, determined to have components hypertensive and diabetic retinopathy for which outpatient followup and possible trial of Fresnel prisms was recommended.    # Debility  # Hx of chronic Lacunar infarts/microvascular changes  # lateral rectus palsy  - MRI with L parasagittal infarcts, multiple chronic lacunar infarcts  - PT/OT/ST per Rehab  - ASA restarted.   - lipitor 40mg  - control BP, BP goal <140/90    # NSTEMI  # CAD  # HLD  - Cath: 10/13 w/ non obstructive CAD  - Continue ASA, Coreg, atorvastatin and fenofibrate    # HTN, presented w/ hypertensive emergency  - Renal initiated secondary work up at OSH: dania not elevated, f/u plasma renin activity, metanephrines  - BP acceptable this morning  - added hydralazine 25mg q8  - continue Coreg  - amlodipine 10mg    # DM2, insulin dependent  - [id number] 9.9% (10/5)  - cont with glargine 18unit qhs, moderate ISS    # CKD stage IV likely due to diabetic nephropathy  # Nephrotic range proteinuria  - Cr 1.3 5/26/21, Cr 1.7 3/22/22, Cr 2.2 10/6/22  - renal biopsy c/w advanced diabetic nephropathy   - Renal on board  - avoid nephrotoxins     # Normocytic anemia likely due to anemia of chronic disease.  - Continue b12 supplements    # Diplopia, thought to be secondary to R. CN6 lateral rectus palsy  - Pt evaluated by Ophtho at OSH. Found to have moderate non-proliferative diabetic retinopathy and Hypertensive retinopathy   - OP Neuro and ophthalmology    # Depression  - Continue Amitriptyline    # Tobacco abuse disorder  - Patient has reduced smoking from 1.5 ppd to 1 pack every 2 weeks  - The patient is an active smoker. The patient was advised to quit. Declined nicotine patches/gums.     DVT ppx: Continue heparin     Dispo: cleared for home today

## 2022-11-03 NOTE — DISCHARGE NOTE NURSING/CASE MANAGEMENT/SOCIAL WORK - NSDCDMETYPESERV_GEN_ALL_CORE_FT
4/10/2019         RE: Eliseo Davis  [address] [facility] [address]        Dear Colleague,    Thank you for referring your patient, Eliseo Davis, to the [facility]. Please see a copy of my visit note below.    General Surgery Follow Up    Pt returns for follow up visit after EGD    HPI:  Eliseo returns to discuss EGD and pathology findings. He did have Olivera's esophagus on pathology. We discussed what this means and how to treat it. He was started on omeprazole just prior to his EGD and he states that this eliminated his symptoms. He denies any pain or dysphagia.    Review Of Systems    Skin: negative  Ears/Nose/Throat: negative  Respiratory: No shortness of breath, dyspnea on exertion, cough, or hemoptysis  Cardiovascular: negative  Gastrointestinal: negative  Genitourinary: negative  Musculoskeletal: negative  Neurologic: negative  Hematologic/Lymphatic/Immunologic: negative  Endocrine: negative      Past Medical History:   Diagnosis Date     CARDIOVASCULAR SCREENING; LDL GOAL LESS THAN 130 9/25/2013     Uncomplicated asthma        Past Surgical History:   Procedure Laterality Date     CORONARY ANGIOGRAPHY ADULT ORDER  06/22/2017     ESOPHAGOSCOPY, GASTROSCOPY, DUODENOSCOPY (EGD), COMBINED N/A 2/25/2019    Procedure: COMBINED ESOPHAGOSCOPY, GASTROSCOPY, DUODENOSCOPY (EGD), BIOPSY SINGLE OR MULTIPLE;  Surgeon: Emmanuel De La Fuente DO;  Location: [facility]       Social History     Socioeconomic History     Marital status: Single     Spouse name: Not on file     Number of children: Not on file     Years of education: Not on file     Highest education level: Not on file   Occupational History     Not on file   Social Needs     Financial resource strain: Not on file     Food insecurity:     Worry: Not on file     Inability: Not on file     Transportation needs:     Medical: Not on file     Non-medical: Not on file   Tobacco Use     Smoking status: Former Smoker     Packs/day: 1.00     Years: 35.00      Pack years: 35.00     Types: Cigarettes     Smokeless tobacco: Former User     Quit date: 1/5/2017   Substance and Sexual Activity     Alcohol use: No     Comment: 2 drinks yesterday     Drug use: Yes     Types: Marijuana     Comment: 2-3 tiomes a year     Sexual activity: Not Currently   Lifestyle     Physical activity:     Days per week: Not on file     Minutes per session: Not on file     Stress: Not on file   Relationships     Social connections:     Talks on phone: Not on file     Gets together: Not on file     Attends Temple service: Not on file     Active member of club or organization: Not on file     Attends meetings of clubs or organizations: Not on file     Relationship status: Not on file     Intimate partner violence:     Fear of current or ex partner: Not on file     Emotionally abused: Not on file     Physically abused: Not on file     Forced sexual activity: Not on file   Other Topics Concern     Parent/sibling w/ CABG, MI or angioplasty before 65F 55M? Not Asked   Social History Narrative     Not on file       Current Outpatient Medications   Medication Sig Dispense Refill     omeprazole (PRILOSEC) 40 MG DR capsule Take 1 capsule (40 mg) by mouth 2 times daily (before meals) 60 capsule 0     acetaminophen (TYLENOL) 500 MG tablet Take 2 tablets (1,000 mg) by mouth every 6 hours as needed for mild pain       albuterol (PROAIR HFA/PROVENTIL HFA/VENTOLIN HFA) 108 (90 BASE) MCG/ACT Inhaler Inhale 2 puffs into the lungs 4 times daily For 5 days, then use 2 puffs every 4 hours as needed (Patient taking differently: Inhale 1-2 puffs into the lungs every 4 hours as needed For 5 days, then use 2 puffs every 4 hours as needed) 1 Inhaler 0     aspirin EC 81 MG EC tablet Take 1 tablet (81 mg) by mouth daily 30 tablet 3     atorvastatin (LIPITOR) 40 MG tablet Take 1 tablet (40 mg) by mouth daily 30 tablet 0     budesonide-formoterol (SYMBICORT) 80-4.5 MCG/ACT Inhaler Inhale 2 puffs into the lungs 2 times  "daily       diphenhydrAMINE (BENADRYL) 50 MG capsule Take 1 capsule (50 mg) by mouth every 6 hours as needed for itching 56 capsule      ipratropium - albuterol 0.5 mg/2.5 mg/3 mL (DUONEB) 0.5-2.5 (3) MG/3ML neb solution Take 1 vial (3 mLs) by nebulization every 4 hours as needed for shortness of breath / dyspnea or wheezing 75 mL 0     lisinopril (PRINIVIL/ZESTRIL) 5 MG tablet Take 1 tablet (5 mg) by mouth daily 30 tablet 0       Medications and history reviewed    Physical exam:  Vitals: /88   Temp 98.9  F (37.2  C)   Ht 1.746 m (5' 8.75\")   Wt 116.6 kg (257 lb)   BMI 38.23 kg/m     BMI= Body mass index is 38.23 kg/m .    Constitutional: Healthy, alert, non-distressed   Head: Normo-cephalic, atraumatic, no lesions, masses or tenderness   Cardiovascular: RRR, no new murmurs, +S1, +S2 heart sounds, no clicks, rubs or gallops   Respiratory: CTAB, no rales, rhonchi or wheezing, equal chest rise, good respiratory effort   Gastrointestinal: Soft, non-tender, non distended, no rebound rigidity or guarding, no masses or hernias palpated   : Deferred  Musculoskeletal: Moves all extremities, normal  strength, no deformities noted   Skin: No suspicious lesions or rashes   Psychiatric: Mentation appears normal, affect appropriate   Hematologic/Lymphatic/Immunologic: Normal cervical and supraclavicular lymph nodes   Patient able to get up on table without difficulty.      Labs show:  FINAL DIAGNOSIS:   A. Duodenum, biopsy:   - Heterotopic gastric tissue, Brunner gland hyperplasia, chronic   inflammation and reactive epithelial changes.     B. Gastroesophageal junction, biopsy:   - Olivera's specialized mucosa   - No evidence of dysplasia or malignancy  - Stratified squamous epithelium with mild reactive changes, nonspecific.   - Chronic inflammation with reactive epithelial changes.    Assessment:     ICD-10-CM    1. Olivera's esophagus without dysplasia K22.70    2. Gastroesophageal reflux disease, esophagitis " presence not specified K21.9 omeprazole (PRILOSEC) 40 MG DR capsule     Plan: I recommended taking omeprazole indefinitely. He will need surveillance upper endoscopy in 3 years. I refilled his prescription for omeprazole today, but recommended he follow up with his PCP for subsequent orders. We did discuss nissen fundoplication with hiatal hernia repair also as a treatment option, but at this time with resolution of symptoms with medication alone, he does not want to consider surgery at this time.    Emmanuel De La Fuente, DO      Again, thank you for allowing me to participate in the care of your patient.        Sincerely,        Emmanuel De La Fuente, DO     Rollator. Note: For any issues with medical equipment, please contact [facility] at

## 2022-11-03 NOTE — DISCHARGE NOTE NURSING/CASE MANAGEMENT/SOCIAL WORK - PATIENT PORTAL LINK FT
You can access the FollowMyHealth Patient Portal offered by [facility] by registering at the following website: [email or web address]. By joining [facility]’s FollowMyHealth portal, you will also be able to view your health information using other applications (apps) compatible with our system.

## 2022-11-03 NOTE — PROGRESS NOTE ADULT - SUBJECTIVE AND OBJECTIVE BOX
No distress    Vital Signs Last 24 Hrs  T(C): 36.8 (11-03-22 @ 08:46), Max: 36.8 (11-02-22 @ 20:49)  T(F): 98.3 (11-03-22 @ 08:46), Max: 98.3 (11-02-22 @ 20:49)  HR: 84 (11-03-22 @ 08:46) (73 - 84)  BP: 159/78 (11-03-22 @ 08:46) (155/77 - 159/78)  RR: 15 (11-03-22 @ 08:46) (15 - 15)  SpO2: 99% (11-03-22 @ 08:46) (97% - 99%)    s1s2  b/l air entry  soft, ND  no edema                                                   9.8    6.65  )-----------( 222      ( 03 Nov 2022 07:53 )             29.8     03 Nov 2022 07:53    140    |  107    |  75     ----------------------------<  181    4.1     |  25     |  2.98     Ca    8.9        03 Nov 2022 07:53    TPro  6.8    /  Alb  3.1    /  TBili  0.2    /  DBili  x      /  AST  24     /  ALT  26     /  AlkPhos  106    03 Nov 2022 07:53    LIVER FUNCTIONS - ( 03 Nov 2022 07:53 )  Alb: 3.1 g/dL / Pro: 6.8 g/dL / ALK PHOS: 106 U/L / ALT: 26 U/L / AST: 24 U/L / GGT: x           acetaminophen     Tablet 650 milliGRAM(s) Oral every 6 hours PRN  ALBUTerol    90 MICROgram(s) HFA Inhaler 2 Puff(s) Inhalation every 6 hours PRN  amitriptyline 10 milliGRAM(s) Oral at bedtime  amLODIPine   Tablet 10 milliGRAM(s) Oral daily  AQUAPHOR (petrolatum Ointment) 1 Application(s) Topical two times a day  aspirin enteric coated 81 milliGRAM(s) Oral daily  atorvastatin 40 milliGRAM(s) Oral at bedtime  Biotene Dry Mouth Oral Rinse 15 milliLiter(s) Swish and Spit three times a day PRN  budesonide  80 MICROgram(s)/formoterol 4.5 MICROgram(s) Inhaler 2 Puff(s) Inhalation two times a day  carvedilol 25 milliGRAM(s) Oral every 12 hours  cyanocobalamin 1000 MICROGram(s) Oral daily  dextrose 5%. 1000 milliLiter(s) IV Continuous <Continuous>  dextrose 5%. 1000 milliLiter(s) IV Continuous <Continuous>  dextrose 50% Injectable 25 Gram(s) IV Push once  dextrose 50% Injectable 12.5 Gram(s) IV Push once  dextrose 50% Injectable 25 Gram(s) IV Push once  dextrose Oral Gel 15 Gram(s) Oral once PRN  glucagon  Injectable 1 milliGRAM(s) IntraMuscular once  heparin   Injectable 5000 Unit(s) SubCutaneous every 8 hours  hydrALAZINE 25 milliGRAM(s) Oral three times a day  insulin glargine Injectable (LANTUS) 18 Unit(s) SubCutaneous at bedtime  insulin lispro (ADMELOG) corrective regimen sliding scale   SubCutaneous at bedtime  insulin lispro (ADMELOG) corrective regimen sliding scale   SubCutaneous three times a day before meals  lactulose Syrup 10 Gram(s) Oral daily PRN  melatonin 3 milliGRAM(s) Oral at bedtime PRN  multivitamin 1 Tablet(s) Oral daily  pantoprazole    Tablet 40 milliGRAM(s) Oral before breakfast  senna 2 Tablet(s) Oral at bedtime  simethicone 80 milliGRAM(s) Chew daily PRN  sodium chloride 0.65% Nasal 1 Spray(s) Both Nostrils every 1 hour PRN    A/P:    MMP, s/p complicated course at OSH as per HPI  Progressive renal disease (Cr 1.3 - 5/26/21, Cr 1.7 - 3/22/22, Cr 2.2 - 10/6/22)  S/p CT w/IV dye 10/4 and 10/6  Contrast BILLY/CKD 3 w/peak Cr 3.64 - 10/27/22  Cr appears to be slowly stabilizing  Kidney biopsy 10/25/22 c/w advanced DM nephropathy w/advanced chronicity  Avoid nephrotoxins  No NSAID's  Renal f/u as op    [phone]

## 2022-11-03 NOTE — PROGRESS NOTE ADULT - SUBJECTIVE AND OBJECTIVE BOX
HISTORY OF PRESENT ILLNESS  This is a 46 YO male with PMHx of CKD IV, vertigo, diverticulitis s/p LAR, cigarette use, ETOH abuse now sober x5 years, IDDM, HTN, AMBER, who presented to [facility] on 10/2 c/o dizziness, b/l diplopia, headache and chest tightness found to have /133 and elevated troponins concerning for NSTEMI. EKG w/ ST-T abnormalities without acute ST segment changes. At OSH BPs stabilized with IV hydralazine, CT head without acute pathology, MRI brain showed chronic L parasagittal infarcts. 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 Patient was recommended outpatient followup for neurological/ophthalmological concerns (specifically for OCT nerve/RNFL, Fresnel prisms) and was transferred to [facility] for a possible LHC.    On admission to [facility] coronary CT showed moderate stenosis of the proximal LAD and RCA. Cath initially deferred as patient was pending nephrology clearance for BILLY. TTE largely wnl, bubble study negative. MRI brain with contrast showed chronic multifocal lacunar infarcts and chronic microvascular ischemic changes, corroborating MRI read from [facility]. Patient was evaluated by ophthalmology team for new onset R frontal headache and popping sensation, determined to have components hypertensive and diabetic retinopathy for which outpatient followup and possible trial of Fresnel prisms was recommended. Cluster headaches suspected given pattern of headache (R frontal, several days at a time, lacrimation). Patient was also evaluated by PT/OT who recommended CANDY. Speech pathology eval was notable for cognitive linguistic deficits with concern for early onset dementia given a family hx (mother diagnosed @46yo). After improvement of BILLY, LHC was completed showing nonobstructive CAD without elevated filling pressures. Lateral rectus palsy was noted to improve slightly towards end of admission with ability to abduct to ~10-15 degrees. Low-dose lisinopril was initiated for nephrotic-range proteinuria.  Patient was monitored for resolution of BILLY and is now hemodynamically stable   Patient was evaluated by PM&R and therapy for functional deficits, gait/ADL impairments and acute rehabilitation was recommended. Patient was medically optimized for discharge to [facility] IRU on 10/20/22.    Today's Subjective:  NAD, night uneventful, no new symptoms today. Dc plan discussed at length.  ROS:  no further dizziness  BPS OK today  moved bowels   no nausea, no vomiting  no SOB, no chest pain  alternating eye patching- balance better    PAST MEDICAL & SURGICAL HISTORY:  Diabetes      Asthma      Diverticulitis  surgery 16yrs ago      High cholesterol      Dyslipidemia      Hypertension      H/O vertigo      Diabetic ulcer of right foot      Broken toe  left pinky toe      Vertigo      HTN (hypertension)      Diabetes      History of surgery  colon resection    VITALS  Vital Signs Last 24 Hrs  T(C): 36.8 (03 Nov 2022 08:46), Max: 36.8 (02 Nov 2022 20:49)  T(F): 98.3 (03 Nov 2022 08:46), Max: 98.3 (02 Nov 2022 20:49)  HR: 84 (03 Nov 2022 08:46) (73 - 84)  BP: 159/78 (03 Nov 2022 08:46) (155/77 - 164/85)  BP(mean): --  RR: 15 (03 Nov 2022 08:46) (15 - 15)  SpO2: 99% (03 Nov 2022 08:46) (97% - 99%)    Parameters below as of 03 Nov 2022 08:46  Patient On (Oxygen Delivery Method): room air    RECENT LABS                        9.8    6.65  )-----------( 222      ( 03 Nov 2022 07:53 )             29.8     11-03    140  |  107  |  75<H>  ----------------------------<  181<H>  4.1   |  25  |  2.98<H>    Ca    8.9      03 Nov 2022 07:53    TPro  6.8  /  Alb  3.1<L>  /  TBili  0.2  /  DBili  x   /  AST  24  /  ALT  26  /  AlkPhos  106  11-03      LIVER FUNCTIONS - ( 03 Nov 2022 07:53 )  Alb: 3.1 g/dL / Pro: 6.8 g/dL / ALK PHOS: 106 U/L / ALT: 26 U/L / AST: 24 U/L / GGT: x             CURRENT MEDICATIONS  MEDICATIONS  (STANDING):  amitriptyline 10 milliGRAM(s) Oral at bedtime  amLODIPine   Tablet 10 milliGRAM(s) Oral daily  AQUAPHOR (petrolatum Ointment) 1 Application(s) Topical two times a day  aspirin enteric coated 81 milliGRAM(s) Oral daily  atorvastatin 40 milliGRAM(s) Oral at bedtime  budesonide  80 MICROgram(s)/formoterol 4.5 MICROgram(s) Inhaler 2 Puff(s) Inhalation two times a day  carvedilol 25 milliGRAM(s) Oral every 12 hours  cyanocobalamin 1000 MICROGram(s) Oral daily  dextrose 5%. 1000 milliLiter(s) (100 mL/Hr) IV Continuous <Continuous>  dextrose 5%. 1000 milliLiter(s) (50 mL/Hr) IV Continuous <Continuous>  dextrose 50% Injectable 25 Gram(s) IV Push once  dextrose 50% Injectable 12.5 Gram(s) IV Push once  dextrose 50% Injectable 25 Gram(s) IV Push once  glucagon  Injectable 1 milliGRAM(s) IntraMuscular once  heparin   Injectable 5000 Unit(s) SubCutaneous every 8 hours  hydrALAZINE 25 milliGRAM(s) Oral three times a day  insulin glargine Injectable (LANTUS) 18 Unit(s) SubCutaneous at bedtime  insulin lispro (ADMELOG) corrective regimen sliding scale   SubCutaneous three times a day before meals  insulin lispro (ADMELOG) corrective regimen sliding scale   SubCutaneous at bedtime  multivitamin 1 Tablet(s) Oral daily  pantoprazole    Tablet 40 milliGRAM(s) Oral before breakfast  senna 2 Tablet(s) Oral at bedtime    MEDICATIONS  (PRN):  acetaminophen     Tablet .. 650 milliGRAM(s) Oral every 6 hours PRN Temp greater or equal to 38C (100.4F), Mild Pain (1 - 3)  ALBUTerol    90 MICROgram(s) HFA Inhaler 2 Puff(s) Inhalation every 6 hours PRN Shortness of Breath and/or Wheezing  Biotene Dry Mouth Oral Rinse 15 milliLiter(s) Swish and Spit three times a day PRN Mouth Care  dextrose Oral Gel 15 Gram(s) Oral once PRN Blood Glucose LESS THAN 70 milliGRAM(s)/deciliter  lactulose Syrup 10 Gram(s) Oral daily PRN constipation  melatonin 3 milliGRAM(s) Oral at bedtime PRN Sleep  simethicone 80 milliGRAM(s) Chew daily PRN Gas  sodium chloride 0.65% Nasal 1 Spray(s) Both Nostrils every 1 hour PRN Nasal Congestion      Constitutional - NAD, Comfortable eye patch on  HEENT - R 6th palsy persists- able to abduct beyond midline but diplopia persists  Chest - good chest expansion, good respiratory effort, CTAB   Cardio - warm and well perfused, RRR, no murmur  Abdomen -  Soft, NTND  Extremities 2 + edema LES  Neurologic Exam:                    Cognitive -             Orientation: Awake, Alert, AAOx3        Speech - Fluent, Comprehensible, No dysarthria, No aphasia      Cranial Nerves - No facial asymmetry, Tongue midline, Shoulder shrug intact +R lateral gaze palsy     Motor -                      LEFT    UE - ShAB 5/5, EF 5/5, EE 5/5, WE 5/5,  WNL                    RIGHT UE - ShAB 5/5, EF 5/5, EE 5/5, WE 5/5,  WNL                    LEFT    LE - HF 4+/5, KE 5/5, DF 5/5, PF 5/5                    RIGHT LE - HF 4+/5, KE 5/5, DF 5/5, PF 5/5        Sensory - Diminished to LT glove stocking distribution     Reflexes - DTR intact  Psychiatric - Mood stable, Affect WNL        IDT 11/1 - lead by Dr. Ledbetter  lives alone. qualified for 11 hrs of HHA weekly.  Functional progress: SV with ADLs + transfers.  amb 150' RW, stairs 1 HR step 2 pattern.  Mild receptive language, mild cog impairment, overall nees SV.  Improved insight and knows he should have SV upon discharge  Barrier: vertigo d/t orthostasis - better now  Need: commode, shower chair and wants rollator for community ambulation  TDD: 11/3 home.      Completed comprehensive acute rehab program consisting of 3hrs/day of OT/PT and SLP.

## 2022-11-03 NOTE — DISCHARGE NOTE NURSING/CASE MANAGEMENT/SOCIAL WORK - NSDCFUADDAPPT_GEN_ALL_CORE_FT
Please schedule an Ophthalmology appointment with retina specialist:  Dr. Jose Angel Arvizu  [address]  [place]  [phone]    You will also need to follow up with an endocrinologist for diabetic management. Please see your PCP within 1 week of discharge for referral.  Please see a nephrologist regarding your kidney disease     Please schedule an Ophthalmology appointment with retina specialist:  Dr. Jose Angel Arvizu  [address]  [place]  [phone]    You will also need to follow up with an endocrinologist for diabetic management. Please see your PCP within 1 week of discharge for referral.  Please see a nephrologist regarding your kidney disease    Primary Care:  Debbie Fofana  [address] [place] [address]  Phone: [phone]  Fax: [phone]  Date: 11/7/2022  Time: 11:45 AM

## 2022-11-03 NOTE — DISCHARGE NOTE NURSING/CASE MANAGEMENT/SOCIAL WORK - NSSCTYPOFSERV_GEN_ALL_CORE
Home visit physical therapy, occupational therapy, speech therapy, skilled nursing, aide evaluation, . Note: If you do not hear from [facility] within 24-48 hours, please contact them at .

## 2022-11-07 ENCOUNTER — APPOINTMENT (OUTPATIENT)
Dept: INTERNAL MEDICINE | Facility: CLINIC | Age: 47
End: 2022-11-07

## 2022-11-07 VITALS
OXYGEN SATURATION: 100 % | HEART RATE: 77 BPM | BODY MASS INDEX: 28.04 KG/M2 | WEIGHT: 185 LBS | SYSTOLIC BLOOD PRESSURE: 157 MMHG | TEMPERATURE: 97.8 F | HEIGHT: 68 IN | DIASTOLIC BLOOD PRESSURE: 82 MMHG

## 2022-11-07 DIAGNOSIS — Z98.890 OTHER SPECIFIED POSTPROCEDURAL STATES: ICD-10-CM

## 2022-11-07 DIAGNOSIS — E10.3599 TYPE 1 DIABETES MELLITUS WITH PROLIFERATIVE DIABETIC RETINOPATHY WITHOUT MACULAR EDEMA, UNSPECIFIED EYE: ICD-10-CM

## 2022-11-07 PROCEDURE — 36415 COLL VENOUS BLD VENIPUNCTURE: CPT

## 2022-11-07 PROCEDURE — 99204 OFFICE O/P NEW MOD 45 MIN: CPT | Mod: 25

## 2022-11-07 RX ORDER — BUDESONIDE AND FORMOTEROL FUMARATE DIHYDRATE 80; 4.5 UG/1; UG/1
80-4.5 AEROSOL RESPIRATORY (INHALATION) TWICE DAILY
Qty: 1 | Refills: 3 | Status: ACTIVE | COMMUNITY
Start: 2022-11-02

## 2022-11-07 RX ORDER — ATORVASTATIN CALCIUM 40 MG/1
40 TABLET, FILM COATED ORAL
Qty: 1 | Refills: 1 | Status: ACTIVE | COMMUNITY
Start: 2022-11-07 | End: 1900-01-01

## 2022-11-07 RX ORDER — PEN NEEDLE, DIABETIC 29 G X1/2"
32G X 4 MM NEEDLE, DISPOSABLE MISCELLANEOUS
Qty: 100 | Refills: 0 | Status: ACTIVE | COMMUNITY
Start: 2022-08-02

## 2022-11-07 RX ORDER — PSYLLIUM HUSK 100 %
POWDER (GRAM) MISCELLANEOUS
Qty: 1 | Refills: 3 | Status: DISCONTINUED | COMMUNITY
Start: 2017-12-01 | End: 2022-11-07

## 2022-11-07 RX ORDER — ALBUTEROL SULFATE 90 UG/1
108 (90 BASE) INHALANT RESPIRATORY (INHALATION)
Qty: 1 | Refills: 1 | Status: ACTIVE | COMMUNITY
Start: 2022-11-07 | End: 1900-01-01

## 2022-11-07 RX ORDER — TIZANIDINE 2 MG/1
2 TABLET ORAL EVERY 8 HOURS
Qty: 42 | Refills: 2 | Status: DISCONTINUED | COMMUNITY
Start: 2018-04-11 | End: 2022-11-07

## 2022-11-07 RX ORDER — PEN NEEDLE, DIABETIC 29 G X1/2"
31G X 5 MM NEEDLE, DISPOSABLE MISCELLANEOUS
Qty: 100 | Refills: 2 | Status: DISCONTINUED | COMMUNITY
Start: 2019-03-14 | End: 2022-11-07

## 2022-11-07 RX ORDER — BLOOD-GLUCOSE METER
W/DEVICE KIT MISCELLANEOUS
Qty: 1 | Refills: 0 | Status: DISCONTINUED | COMMUNITY
Start: 2019-03-14 | End: 2022-11-07

## 2022-11-07 RX ORDER — POLYETHYLENE GLYCOL 3350 17 G/17G
17 POWDER, FOR SOLUTION ORAL DAILY
Qty: 510 | Refills: 3 | Status: DISCONTINUED | COMMUNITY
Start: 2020-02-21 | End: 2022-11-07

## 2022-11-07 RX ORDER — PANTOPRAZOLE 40 MG/1
40 TABLET, DELAYED RELEASE ORAL TWICE DAILY
Qty: 28 | Refills: 5 | Status: DISCONTINUED | COMMUNITY
Start: 2020-10-01 | End: 2022-11-07

## 2022-11-07 RX ORDER — METRONIDAZOLE 500 MG/1
500 TABLET ORAL 3 TIMES DAILY
Qty: 42 | Refills: 0 | Status: DISCONTINUED | COMMUNITY
Start: 2020-10-01 | End: 2022-11-07

## 2022-11-07 RX ORDER — SILVER SULFADIAZINE 10 MG/G
1 CREAM TOPICAL DAILY
Qty: 1 | Refills: 2 | Status: DISCONTINUED | COMMUNITY
Start: 2019-06-26 | End: 2022-11-07

## 2022-11-07 RX ORDER — BLOOD-GLUCOSE TRANSMITTER
EACH MISCELLANEOUS
Qty: 1 | Refills: 3 | Status: DISCONTINUED | COMMUNITY
Start: 2019-05-02 | End: 2022-11-07

## 2022-11-07 RX ORDER — POLYETHYLENE GLYCOL 3350, SODIUM SULFATE ANHYDROUS, SODIUM BICARBONATE, SODIUM CHLORIDE, POTASSIUM CHLORIDE 227.1; 21.5; 6.36; 5.53; .754 G/L; G/L; G/L; G/L; G/L
227.1 POWDER, FOR SOLUTION ORAL
Qty: 1 | Refills: 0 | Status: DISCONTINUED | COMMUNITY
Start: 2020-02-21 | End: 2022-11-07

## 2022-11-07 RX ORDER — METRONIDAZOLE 500 MG/1
500 TABLET ORAL
Refills: 0 | Status: DISCONTINUED | COMMUNITY
End: 2022-11-07

## 2022-11-07 RX ORDER — BLOOD-GLUCOSE METER
EACH MISCELLANEOUS
Qty: 1 | Refills: 0 | Status: ACTIVE | COMMUNITY
Start: 2022-11-02

## 2022-11-07 RX ORDER — IRBESARTAN 300 MG/1
300 TABLET ORAL
Qty: 30 | Refills: 2 | Status: DISCONTINUED | COMMUNITY
Start: 2018-01-03 | End: 2022-11-07

## 2022-11-07 RX ORDER — INSULIN PUMP CART,CONT INF,RF
CARTRIDGE (EA) SUBCUTANEOUS
Qty: 10 | Refills: 3 | Status: DISCONTINUED | COMMUNITY
Start: 2019-03-20 | End: 2022-11-07

## 2022-11-07 RX ORDER — INSULIN LISPRO 100 U/ML
100 INJECTION, SOLUTION SUBCUTANEOUS 3 TIMES DAILY
Qty: 1 | Refills: 5 | Status: ACTIVE | COMMUNITY
Start: 2019-03-14 | End: 1900-01-01

## 2022-11-07 RX ORDER — ATORVASTATIN CALCIUM 20 MG/1
20 TABLET, FILM COATED ORAL DAILY
Qty: 90 | Refills: 1 | Status: DISCONTINUED | COMMUNITY
Start: 2019-06-04 | End: 2022-11-07

## 2022-11-07 RX ORDER — MECLIZINE HYDROCHLORIDE 25 MG/1
25 TABLET ORAL
Qty: 25 | Refills: 0 | Status: ACTIVE | COMMUNITY
Start: 2022-05-13

## 2022-11-07 RX ORDER — BLOOD SUGAR DIAGNOSTIC
STRIP MISCELLANEOUS 3 TIMES DAILY
Qty: 300 | Refills: 2 | Status: DISCONTINUED | COMMUNITY
Start: 2019-03-14 | End: 2022-11-07

## 2022-11-07 RX ORDER — INSULIN DEGLUDEC INJECTION 100 U/ML
100 INJECTION, SOLUTION SUBCUTANEOUS
Qty: 30 | Refills: 0 | Status: ACTIVE | COMMUNITY
Start: 2022-07-14

## 2022-11-07 RX ORDER — SILDENAFIL 100 MG/1
100 TABLET, FILM COATED ORAL
Qty: 15 | Refills: 11 | Status: DISCONTINUED | COMMUNITY
Start: 2021-03-15 | End: 2022-11-07

## 2022-11-07 RX ORDER — METHOCARBAMOL 500 MG/1
500 TABLET, FILM COATED ORAL
Refills: 0 | Status: DISCONTINUED | COMMUNITY
End: 2022-11-07

## 2022-11-07 RX ORDER — BLOOD-GLUCOSE,RECEIVER,CONT
EACH MISCELLANEOUS
Qty: 1 | Refills: 2 | Status: DISCONTINUED | COMMUNITY
Start: 2019-05-02 | End: 2022-11-07

## 2022-11-07 RX ORDER — BLOOD-GLUCOSE SENSOR
EACH MISCELLANEOUS
Qty: 3 | Refills: 3 | Status: DISCONTINUED | COMMUNITY
Start: 2019-05-02 | End: 2022-11-07

## 2022-11-07 RX ORDER — TETRACYCLINE HYDROCHLORIDE 500 MG/1
500 CAPSULE ORAL
Refills: 0 | Status: DISCONTINUED | COMMUNITY
End: 2022-11-07

## 2022-11-07 RX ORDER — SILVER SULFADIAZINE 10 MG/G
1 CREAM TOPICAL DAILY
Qty: 1 | Refills: 1 | Status: DISCONTINUED | COMMUNITY
Start: 2019-02-06 | End: 2022-11-07

## 2022-11-07 RX ORDER — CLOTRIMAZOLE AND BETAMETHASONE DIPROPIONATE 10; .5 MG/G; MG/G
1-0.05 CREAM TOPICAL
Qty: 1 | Refills: 2 | Status: DISCONTINUED | COMMUNITY
Start: 2022-02-02 | End: 2022-11-07

## 2022-11-07 RX ORDER — OMEPRAZOLE 40 MG/1
40 CAPSULE, DELAYED RELEASE ORAL
Qty: 30 | Refills: 2 | Status: DISCONTINUED | COMMUNITY
Start: 2017-06-06 | End: 2022-11-07

## 2022-11-07 RX ORDER — 70%ISOPROPYL ALCOHOL 0.7 ML/ML
70 SWAB TOPICAL
Qty: 100 | Refills: 3 | Status: ACTIVE | COMMUNITY
Start: 2022-11-02 | End: 1900-01-01

## 2022-11-07 RX ORDER — MULTIVITAMIN WITH MINERALS
TABLET ORAL DAILY
Qty: 100 | Refills: 2 | Status: DISCONTINUED | COMMUNITY
Start: 2019-03-14 | End: 2022-11-07

## 2022-11-07 RX ORDER — BISMUTH SUBSALICYLATE 262 MG/1
262 TABLET, CHEWABLE ORAL 4 TIMES DAILY
Qty: 112 | Refills: 0 | Status: DISCONTINUED | COMMUNITY
Start: 2020-10-01 | End: 2022-11-07

## 2022-11-07 RX ORDER — CARVEDILOL 25 MG/1
25 TABLET, FILM COATED ORAL TWICE DAILY
Qty: 180 | Refills: 1 | Status: ACTIVE | COMMUNITY
Start: 2022-11-02

## 2022-11-07 RX ORDER — CHOLECALCIFEROL (VITAMIN D3) 1250 MCG
1.25 MG CAPSULE ORAL
Qty: 13 | Refills: 3 | Status: DISCONTINUED | COMMUNITY
Start: 2018-05-15 | End: 2022-11-07

## 2022-11-07 RX ORDER — INSULIN PUMP CONTROLLER, RF
EACH MISCELLANEOUS
Qty: 1 | Refills: 0 | Status: DISCONTINUED | COMMUNITY
Start: 2019-03-20 | End: 2022-11-07

## 2022-11-07 RX ORDER — TETRACYCLINE HYDROCHLORIDE 500 MG/1
500 CAPSULE ORAL
Qty: 56 | Refills: 0 | Status: DISCONTINUED | COMMUNITY
Start: 2020-10-01 | End: 2022-11-07

## 2022-11-07 RX ORDER — INSULIN DEGLUDEC INJECTION 200 U/ML
200 INJECTION, SOLUTION SUBCUTANEOUS DAILY
Qty: 1 | Refills: 5 | Status: DISCONTINUED | COMMUNITY
Start: 2018-01-02 | End: 2022-11-07

## 2022-11-07 RX ORDER — GABAPENTIN 600 MG/1
600 TABLET, COATED ORAL TWICE DAILY
Qty: 60 | Refills: 0 | Status: DISCONTINUED | COMMUNITY
End: 2022-11-07

## 2022-11-07 RX ORDER — ALBUTEROL SULFATE 90 UG/1
108 (90 BASE) AEROSOL, METERED RESPIRATORY (INHALATION) 3 TIMES DAILY
Qty: 1 | Refills: 3 | Status: DISCONTINUED | COMMUNITY
Start: 2017-02-07 | End: 2022-11-07

## 2022-11-07 RX ORDER — BLOOD PRESSURE TEST KIT-LARGE
KIT MISCELLANEOUS
Qty: 1 | Refills: 0 | Status: ACTIVE | COMMUNITY
Start: 2022-09-14

## 2022-11-08 PROBLEM — Z98.890 HISTORY OF LEFT HEART CATHETERIZATION: Status: ACTIVE | Noted: 2022-11-08

## 2022-11-08 PROBLEM — E10.3599 PROLIFERATIVE DIABETIC RETINOPATHY WITHOUT MACULAR EDEMA ASSOCIATED WITH TYPE 1 DIABETES MELLITUS: Status: ACTIVE | Noted: 2017-09-06

## 2022-11-08 PROCEDURE — U0005: CPT

## 2022-11-08 PROCEDURE — 93456 R HRT CORONARY ARTERY ANGIO: CPT

## 2022-11-08 PROCEDURE — C1769: CPT

## 2022-11-08 PROCEDURE — 97162 PT EVAL MOD COMPLEX 30 MIN: CPT

## 2022-11-08 PROCEDURE — U0003: CPT

## 2022-11-08 PROCEDURE — 84100 ASSAY OF PHOSPHORUS: CPT

## 2022-11-08 PROCEDURE — 82553 CREATINE MB FRACTION: CPT

## 2022-11-08 PROCEDURE — 84244 ASSAY OF RENIN: CPT

## 2022-11-08 PROCEDURE — 82570 ASSAY OF URINE CREATININE: CPT

## 2022-11-08 PROCEDURE — 84300 ASSAY OF URINE SODIUM: CPT

## 2022-11-08 PROCEDURE — 76770 US EXAM ABDO BACK WALL COMP: CPT

## 2022-11-08 PROCEDURE — C1887: CPT

## 2022-11-08 PROCEDURE — 80048 BASIC METABOLIC PNL TOTAL CA: CPT

## 2022-11-08 PROCEDURE — 97110 THERAPEUTIC EXERCISES: CPT

## 2022-11-08 PROCEDURE — 85730 THROMBOPLASTIN TIME PARTIAL: CPT

## 2022-11-08 PROCEDURE — 82088 ASSAY OF ALDOSTERONE: CPT

## 2022-11-08 PROCEDURE — 86803 HEPATITIS C AB TEST: CPT

## 2022-11-08 PROCEDURE — 83735 ASSAY OF MAGNESIUM: CPT

## 2022-11-08 PROCEDURE — 84165 PROTEIN E-PHORESIS SERUM: CPT

## 2022-11-08 PROCEDURE — 70496 CT ANGIOGRAPHY HEAD: CPT

## 2022-11-08 PROCEDURE — C1894: CPT

## 2022-11-08 PROCEDURE — 83516 IMMUNOASSAY NONANTIBODY: CPT

## 2022-11-08 PROCEDURE — 87340 HEPATITIS B SURFACE AG IA: CPT

## 2022-11-08 PROCEDURE — 82550 ASSAY OF CK (CPK): CPT

## 2022-11-08 PROCEDURE — 82803 BLOOD GASES ANY COMBINATION: CPT

## 2022-11-08 PROCEDURE — 36415 COLL VENOUS BLD VENIPUNCTURE: CPT

## 2022-11-08 PROCEDURE — 84540 ASSAY OF URINE/UREA-N: CPT

## 2022-11-08 PROCEDURE — 97535 SELF CARE MNGMENT TRAINING: CPT

## 2022-11-08 PROCEDURE — 84155 ASSAY OF PROTEIN SERUM: CPT

## 2022-11-08 PROCEDURE — 80053 COMPREHEN METABOLIC PANEL: CPT

## 2022-11-08 PROCEDURE — 86334 IMMUNOFIX E-PHORESIS SERUM: CPT

## 2022-11-08 PROCEDURE — 85027 COMPLETE CBC AUTOMATED: CPT

## 2022-11-08 PROCEDURE — 97129 THER IVNTJ 1ST 15 MIN: CPT

## 2022-11-08 PROCEDURE — 70498 CT ANGIOGRAPHY NECK: CPT

## 2022-11-08 PROCEDURE — 82962 GLUCOSE BLOOD TEST: CPT

## 2022-11-08 PROCEDURE — 75574 CT ANGIO HRT W/3D IMAGE: CPT

## 2022-11-08 PROCEDURE — 92507 TX SP LANG VOICE COMM INDIV: CPT

## 2022-11-08 PROCEDURE — 87641 MR-STAPH DNA AMP PROBE: CPT

## 2022-11-08 PROCEDURE — 83835 ASSAY OF METANEPHRINES: CPT

## 2022-11-08 PROCEDURE — 83521 IG LIGHT CHAINS FREE EACH: CPT

## 2022-11-08 PROCEDURE — 97116 GAIT TRAINING THERAPY: CPT

## 2022-11-08 PROCEDURE — 93308 TTE F-UP OR LMTD: CPT

## 2022-11-08 PROCEDURE — 86255 FLUORESCENT ANTIBODY SCREEN: CPT

## 2022-11-08 PROCEDURE — 84156 ASSAY OF PROTEIN URINE: CPT

## 2022-11-08 PROCEDURE — 81001 URINALYSIS AUTO W/SCOPE: CPT

## 2022-11-08 PROCEDURE — 83935 ASSAY OF URINE OSMOLALITY: CPT

## 2022-11-08 PROCEDURE — 97130 THER IVNTJ EA ADDL 15 MIN: CPT

## 2022-11-08 PROCEDURE — 85610 PROTHROMBIN TIME: CPT

## 2022-11-08 PROCEDURE — 71045 X-RAY EXAM CHEST 1 VIEW: CPT

## 2022-11-08 PROCEDURE — C1889: CPT

## 2022-11-08 PROCEDURE — 92523 SPEECH SOUND LANG COMPREHEN: CPT

## 2022-11-08 PROCEDURE — 83036 HEMOGLOBIN GLYCOSYLATED A1C: CPT

## 2022-11-08 PROCEDURE — 92610 EVALUATE SWALLOWING FUNCTION: CPT

## 2022-11-08 PROCEDURE — 84133 ASSAY OF URINE POTASSIUM: CPT

## 2022-11-08 PROCEDURE — 93306 TTE W/DOPPLER COMPLETE: CPT

## 2022-11-08 PROCEDURE — 87640 STAPH A DNA AMP PROBE: CPT

## 2022-11-08 PROCEDURE — 84166 PROTEIN E-PHORESIS/URINE/CSF: CPT

## 2022-11-08 PROCEDURE — 70552 MRI BRAIN STEM W/DYE: CPT

## 2022-11-08 PROCEDURE — 93321 DOPPLER ECHO F-UP/LMTD STD: CPT

## 2022-11-08 PROCEDURE — 94640 AIRWAY INHALATION TREATMENT: CPT

## 2022-11-08 PROCEDURE — 97166 OT EVAL MOD COMPLEX 45 MIN: CPT

## 2022-11-08 PROCEDURE — 93005 ELECTROCARDIOGRAM TRACING: CPT

## 2022-11-08 PROCEDURE — 82436 ASSAY OF URINE CHLORIDE: CPT

## 2022-11-08 PROCEDURE — 84484 ASSAY OF TROPONIN QUANT: CPT

## 2022-11-08 PROCEDURE — 97530 THERAPEUTIC ACTIVITIES: CPT

## 2022-11-09 ENCOUNTER — NON-APPOINTMENT (OUTPATIENT)
Age: 47
End: 2022-11-09

## 2022-11-09 NOTE — ASSESSMENT
[FreeTextEntry1] : The patient is a 47 year old M who presents to the office for hospital follow up\par Extensive hospital course as detailed above\par \par #Diabetic retinopathy \par - Follow up with opthalmology \par -diabetes control\par \par #BILLY on CKD \par - nephrology referral \par - repeat BMP\par - discussed avoidance of NSAIDs, nephrotoxins \par \par #CAD\par - c/w ASA and statin \par - follow up with cardiology\par \par #H/o CVA\par - follow up with neurology \par - c/w PT for deficits \par \par #HLD\par - c/w statin\par \par #HTN\par - pt currently on amlodipine, carvedilol, and hydral\par - ACE-I d/c'd 2/2 glomerulonephritis/sclerosis on renal biopsy\par \par #T2DM\par - A1c, microalbumin\par - pt currently on insulin as discharged form hospital. Pending lab results, will adjust regimen\par \par - Home health aid needed\par - Patient to RTO in 2 weeks

## 2022-11-09 NOTE — HISTORY OF PRESENT ILLNESS
[FreeTextEntry1] : Hospital follow up  [de-identified] : The patient is a 47 year old M who presents to the office for hospital follow up. \par **See discharge summary for detailed summary of hospitalizations**\par \par Summary: \par Patient presented to [facility] on 10/2 with c/o dizziness, diplopia, headache and chest tightness found to have BP of 260/133 and elevated troponin. EKG without acute ST changes. CT head negative. MRI brain with chronic infarcts. Ophthalmology symptoms / CN VI palsy. He was hen transferred to [facility] for LHC. \par In setting of BILLY on CKD, LHC delayed. TTE wnl, bubble study negative. MRI brain again demonstrated chronic lacunar infarcts. He was seen by ophthalmology and determined to have diabetic retinopathy. LHC showed nonobstructive CAD. PT evaluated by PT/OT and PM&R with recommendation for CANDY. HE was discharged to [place] [facility] on 10/20. \par \par During stay at IRU, IR biopsy of the kidney showed glomerulonephritis/sclerosis c/w DM/HTN, hence lisniopril and torsemide d/c'd.\par \par Currently: the patient was emotional during the encounter as he says he is adjusting to his new baseline. He says he has been compliant with medications but notes he was missing several of those listed on his discharge summary.

## 2022-11-09 NOTE — HEALTH RISK ASSESSMENT
[Poor] : ~his/her~ current health as poor [Fair] :  ~his/her~ mood as fair [Intercurrent ED visits] : went to ED [Current] : Current [Never (0 pts)] : Never (0 points) [No] : In the past 12 months have you used drugs other than those required for medical reasons? No [Any fall with injury in past year] : Patient reported fall with injury in the past year [3] : 2) Feeling down, depressed, or hopeless for nearly every day (3) [Patient declined Low Dose CT Scan] : Patient declined Low Dose CT Scan [Patient declined bone density test] : Patient declined bone density test [Patient declined colonoscopy] : Patient declined colonoscopy [Alone] : lives alone [Single] : single [Feels Safe at Home] : Feels safe at home [Fully functional (bathing, dressing, toileting, transferring, walking, feeding)] : Fully functional (bathing, dressing, toileting, transferring, walking, feeding) [Fully functional (using the telephone, shopping, preparing meals, housekeeping, doing laundry, using] : Fully functional and needs no help or supervision to perform IADLs (using the telephone, shopping, preparing meals, housekeeping, doing laundry, using transportation, managing medications and managing finances) [Reports changes in hearing] : Reports changes in hearing [Reports changes in vision] : Reports changes in vision [Smoke Detector] : smoke detector [Carbon Monoxide Detector] : carbon monoxide detector [Seat Belt] :  uses seat belt [FreeTextEntry1] : Mini strokes, dizziness, hypertension, glucose levels (Diabetic), kidney issues  [de-identified] : [facility] 11/2022 [de-identified] : Dr Ewing (Urology)  [Audit-CScore] : 0 [de-identified] : brief walking  [de-identified] : fair  [de-identified] : 6/2022 Dizziness  [[id number]] : 6 [Change in mental status noted] : No change in mental status noted [Reports changes in dental health] : Reports no changes in dental health [Sunscreen] : does not use sunscreen [de-identified] : issues with putting shoes on  [de-identified] : right eye, mini stroke

## 2022-11-09 NOTE — PHYSICAL EXAM
[No Acute Distress] : no acute distress [Normal Outer Ear/Nose] : the outer ears and nose were normal in appearance [Normal Oropharynx] : the oropharynx was normal [Normal] : normal rate, regular rhythm, normal S1 and S2 and no murmur heard [No Edema] : there was no peripheral edema [Non-distended] : non-distended [No Rash] : no rash [de-identified] : Gait impaired. USes cane

## 2022-11-09 NOTE — REVIEW OF SYSTEMS
[Vision Problems] : vision problems [Muscle Weakness] : muscle weakness [Dizziness] : dizziness [Unsteady Walk] : ataxia [Negative] : Genitourinary [Chest Pain] : no chest pain [Palpitations] : no palpitations [Claudication] : no  leg claudication [Lower Ext Edema] : no lower extremity edema [Orthopena] : no orthopnea [Paroxysmal Nocturnal Dyspnea] : no paroxysmal nocturnal dyspnea [Skin Rash] : no skin rash [Headache] : no headache [Fainting] : no fainting [Suicidal] : not suicidal [Insomnia] : no insomnia

## 2022-11-14 ENCOUNTER — APPOINTMENT (OUTPATIENT)
Dept: CARDIOLOGY | Facility: CLINIC | Age: 47
End: 2022-11-14

## 2022-11-14 ENCOUNTER — NON-APPOINTMENT (OUTPATIENT)
Age: 47
End: 2022-11-14

## 2022-11-14 VITALS
HEART RATE: 70 BPM | BODY MASS INDEX: 28.04 KG/M2 | HEIGHT: 68 IN | WEIGHT: 185 LBS | OXYGEN SATURATION: 97 % | TEMPERATURE: 97.9 F | SYSTOLIC BLOOD PRESSURE: 173 MMHG | DIASTOLIC BLOOD PRESSURE: 82 MMHG

## 2022-11-14 DIAGNOSIS — I73.9 PERIPHERAL VASCULAR DISEASE, UNSPECIFIED: ICD-10-CM

## 2022-11-14 PROCEDURE — 99205 OFFICE O/P NEW HI 60 MIN: CPT | Mod: 25

## 2022-11-14 PROCEDURE — 93000 ELECTROCARDIOGRAM COMPLETE: CPT

## 2022-11-14 NOTE — HISTORY OF PRESENT ILLNESS
[FreeTextEntry1] : Total visit time 45min.\par BRINDA MOYER 47 year M CRF  presents with HTN HLD and reports NYHA 2-3 dyspnea, no chest pain, occasional palpitations,pre- syncope, with vertigo but no claudication mild peripheral edema.   Tobacco Negative/Reformed. BMI  28. 6/19 NSR 76 BPM. Medications, amlodipine, atorvastatin fenofibrate ASA, coreg, hydralazine. Remote 2017 stress negative/negative ishcemia/infarct LVEF 59%. \par 47 M DM x 20y diverticulits, EToH abuse in past recently [id number]/22 [facility] [place] to [facility] 10/422  with marked /133 and troponin elevation without clear STT shift sent for Coronary CTA read as moderate disease followed by invasive cath at [facility] showing nonobstructive CAD and echo with no significant structural heart disease LVEF 59%. Transient BILLY Cr 2to 2.34 with CTA and coronary invasive cath. Transferred to [facility] 10/20/22 for rehab.  C/o ataxic gait, dementia with  impaired cognitive function, forgetful, urinary incontinence. PHx repeated cerebral trauma boxing highly suggestive of normal pressure hydrocephalus.  Past hx or repeat CNS trauma (boxing) with CT and MRI evidence of chronic microvscular damage. Neuro triad of gait instability, urinary incontinence and impaired cognition suggestive of primary neurologic issues. BP controlled. CAD excluded. Marked HTN consider pheo/conns. No documented structural or arrhythmic cardiac coronary pathology. Consider neurosurgical/neurologic consultation for possible decompressive procedure if diagnosis confirmed.  Continue hydralazine, amlodipine atorvastatin fenofibrate, ASA , coreg. Denies illicit drug use.

## 2022-11-16 ENCOUNTER — APPOINTMENT (OUTPATIENT)
Dept: PULMONOLOGY | Facility: CLINIC | Age: 47
End: 2022-11-16

## 2022-11-16 VITALS — DIASTOLIC BLOOD PRESSURE: 66 MMHG | OXYGEN SATURATION: 98 % | HEART RATE: 89 BPM | SYSTOLIC BLOOD PRESSURE: 117 MMHG

## 2022-11-16 DIAGNOSIS — J45.909 UNSPECIFIED ASTHMA, UNCOMPLICATED: ICD-10-CM

## 2022-11-16 DIAGNOSIS — R06.02 SHORTNESS OF BREATH: ICD-10-CM

## 2022-11-16 DIAGNOSIS — G47.19 OTHER HYPERSOMNIA: ICD-10-CM

## 2022-11-16 DIAGNOSIS — Z87.898 PERSONAL HISTORY OF OTHER SPECIFIED CONDITIONS: ICD-10-CM

## 2022-11-16 DIAGNOSIS — R06.83 SNORING: ICD-10-CM

## 2022-11-16 LAB
ALBUMIN SERPL ELPH-MCNC: 3.8 G/DL
ALP BLD-CCNC: 133 U/L
ALT SERPL-CCNC: 25 U/L
ANION GAP SERPL CALC-SCNC: 10 MMOL/L
APPEARANCE: CLEAR
AST SERPL-CCNC: 21 U/L
BACTERIA UR CULT: NORMAL
BACTERIA: NEGATIVE
BASOPHILS # BLD AUTO: 0.04 K/UL
BASOPHILS NFR BLD AUTO: 0.6 %
BILIRUB SERPL-MCNC: 0.2 MG/DL
BILIRUBIN URINE: NEGATIVE
BLOOD URINE: NEGATIVE
BUN SERPL-MCNC: 46 MG/DL
CALCIUM SERPL-MCNC: 9.1 MG/DL
CHLORIDE SERPL-SCNC: 105 MMOL/L
CHOLEST SERPL-MCNC: 172 MG/DL
CO2 SERPL-SCNC: 24 MMOL/L
COLOR: NORMAL
CREAT SERPL-MCNC: 2.34 MG/DL
CREAT SPEC-SCNC: 67 MG/DL
EGFR: 33 ML/MIN/1.73M2
EOSINOPHIL # BLD AUTO: 0.23 K/UL
EOSINOPHIL NFR BLD AUTO: 3.2 %
ESTIMATED AVERAGE GLUCOSE: 200 MG/DL
FOLATE SERPL-MCNC: 11.3 NG/ML
GLUCOSE QUALITATIVE U: ABNORMAL
GLUCOSE SERPL-MCNC: 168 MG/DL
HBA1C MFR BLD HPLC: 8.6 %
HCT VFR BLD CALC: 31.8 %
HDLC SERPL-MCNC: 46 MG/DL
HGB BLD-MCNC: 10.1 G/DL
HYALINE CASTS: 3 /LPF
IMM GRANULOCYTES NFR BLD AUTO: 0.3 %
KETONES URINE: NEGATIVE
LDLC SERPL CALC-MCNC: 95 MG/DL
LEUKOCYTE ESTERASE URINE: NEGATIVE
LYMPHOCYTES # BLD AUTO: 1.52 K/UL
LYMPHOCYTES NFR BLD AUTO: 21.4 %
MAN DIFF?: NORMAL
MCHC RBC-ENTMCNC: 31.5 PG
MCHC RBC-ENTMCNC: 31.8 GM/DL
MCV RBC AUTO: 99.1 FL
MICROALBUMIN 24H UR DL<=1MG/L-MCNC: 293.3 MG/DL
MICROALBUMIN/CREAT 24H UR-RTO: 4367 MG/G
MICROSCOPIC-UA: NORMAL
MONOCYTES # BLD AUTO: 0.61 K/UL
MONOCYTES NFR BLD AUTO: 8.6 %
NEUTROPHILS # BLD AUTO: 4.67 K/UL
NEUTROPHILS NFR BLD AUTO: 65.9 %
NITRITE URINE: NEGATIVE
NONHDLC SERPL-MCNC: 126 MG/DL
PH URINE: 6
PLATELET # BLD AUTO: 236 K/UL
POTASSIUM SERPL-SCNC: 4.8 MMOL/L
PROT SERPL-MCNC: 6.5 G/DL
PROTEIN URINE: ABNORMAL
RBC # BLD: 3.21 M/UL
RBC # FLD: 12.8 %
RED BLOOD CELLS URINE: 2 /HPF
SODIUM SERPL-SCNC: 139 MMOL/L
SPECIFIC GRAVITY URINE: 1.01
SQUAMOUS EPITHELIAL CELLS: 1 /HPF
TRIGL SERPL-MCNC: 155 MG/DL
TSH SERPL-ACNC: 1.29 UIU/ML
UROBILINOGEN URINE: NORMAL
VIT B12 SERPL-MCNC: 708 PG/ML
WBC # FLD AUTO: 7.09 K/UL
WHITE BLOOD CELLS URINE: 1 /HPF

## 2022-11-16 PROCEDURE — 71046 X-RAY EXAM CHEST 2 VIEWS: CPT

## 2022-11-16 PROCEDURE — 94010 BREATHING CAPACITY TEST: CPT

## 2022-11-16 PROCEDURE — 94729 DIFFUSING CAPACITY: CPT

## 2022-11-16 PROCEDURE — 99205 OFFICE O/P NEW HI 60 MIN: CPT | Mod: 25

## 2022-11-16 PROCEDURE — 94727 GAS DIL/WSHOT DETER LNG VOL: CPT

## 2022-11-16 RX ORDER — ASPIRIN 81 MG/1
81 TABLET, COATED ORAL
Qty: 30 | Refills: 0 | Status: ACTIVE | COMMUNITY
Start: 2022-08-26

## 2022-11-16 RX ORDER — SUCRALFATE 1 G/1
1 TABLET ORAL
Qty: 7 | Refills: 0 | Status: DISCONTINUED | COMMUNITY
Start: 2022-07-12

## 2022-11-16 RX ORDER — TRAMADOL HYDROCHLORIDE 50 MG/1
50 TABLET, COATED ORAL
Qty: 1 | Refills: 0 | Status: DISCONTINUED | COMMUNITY
Start: 2022-06-03

## 2022-11-16 RX ORDER — NIFEDIPINE 60 MG/1
60 TABLET, FILM COATED, EXTENDED RELEASE ORAL
Qty: 14 | Refills: 0 | Status: DISCONTINUED | COMMUNITY
Start: 2022-05-31

## 2022-11-16 RX ORDER — LOSARTAN POTASSIUM 50 MG/1
50 TABLET, FILM COATED ORAL
Qty: 30 | Refills: 0 | Status: DISCONTINUED | COMMUNITY
Start: 2022-05-13

## 2022-11-16 RX ORDER — CLINDAMYCIN HYDROCHLORIDE 300 MG/1
300 CAPSULE ORAL
Qty: 40 | Refills: 0 | Status: DISCONTINUED | COMMUNITY
Start: 2022-08-25

## 2022-11-16 RX ORDER — AMLODIPINE BESYLATE 10 MG/1
10 TABLET ORAL
Qty: 90 | Refills: 1 | Status: DISCONTINUED | COMMUNITY
Start: 2022-11-02 | End: 2022-11-16

## 2022-11-16 NOTE — REASON FOR VISIT
[Follow-Up - From Hospitalization] : a follow-up visit after a recent hospitalization [Asthma] : asthma [Shortness of Breath] : shortness of breath [TextBox_44] : S/p CVA  d/c rehab OTC 27 th

## 2022-11-16 NOTE — REVIEW OF SYSTEMS
[Negative] : Endocrine [TextBox_3] : As per history above [TextBox_30] : As per history above [TextBox_119] : As per history above

## 2022-11-16 NOTE — PHYSICAL EXAM
[No Acute Distress] : no acute distress [Normal Oropharynx] : normal oropharynx [Normal Appearance] : normal appearance [No Neck Mass] : no neck mass [Normal Rate/Rhythm] : normal rate/rhythm [Normal S1, S2] : normal s1, s2 [No Murmurs] : no murmurs [No Resp Distress] : no resp distress [No Abnormalities] : no abnormalities [Benign] : benign [Normal Gait] : normal gait [No Clubbing] : no clubbing [No Cyanosis] : no cyanosis [No Edema] : no edema [FROM] : FROM [Normal Color/ Pigmentation] : normal color/ pigmentation [No Focal Deficits] : no focal deficits [Oriented x3] : oriented x3 [Normal Affect] : normal affect [Normal to Percussion] : normal to percussion [TextBox_68] : few- 1 plus crackles left base.

## 2022-11-16 NOTE — HISTORY OF PRESENT ILLNESS
[Former] : former [Awakes Unrefreshed] : awakes unrefreshed [Awakes with Dry Mouth] : awakes with dry mouth [Awakes with Headache] : awakes with headache [Daytime Somnolence] : daytime somnolence [Fatigue] : fatigue [Snoring] : snoring [Witnessed Apneas] : witnessed apneas [TextBox_4] : BRINDA MOYER is a 47 year old  M referred for pulmonary evaluation for SOB\par \par S/p hospitalization for CVA secondary to HTN. \par Hosp. [facility]. \par Discharged to rehab. Sent home 2 weeks ago. \par \par PCP DR Fofana has appt soon\par this weekend stopped metoprolol due to leg swelling but also not taking diuretics. \par Did f/u with cardiology.\par \par Notes SUNSHINE 1 flight stairs. \par Present 2-3 years. Feels is progressive.\par No cough or wheeze. Feels some chest congestion\par \par Past pulmonary history. N\par Occupational Exposure. N\par Family history of pulmonary disease. Mother pneumonia\par Recent travel N\par Pets N\par \par Has appt neurology.  [TextBox_11] : 1-2 [TextBox_13] : 25 [YearQuit] : 2022 [Recent  Weight Gain] : no recent weight gain

## 2022-11-16 NOTE — DISCUSSION/SUMMARY
[FreeTextEntry1] : Rule out obstructive sleep apnea syndrome.\par Snoring and excessive daytime sleepiness.\par Dyspnea on exertion.  Etiology not absolutely clear.  Do not see evidence of highly significant underlying parenchymal lung or airways disease in this patient.  Is an ex-smoker but essentially normal lung function.\par Does have issues related to stroke and work of walking likely is increased.\par Seeing cardiology for cardiac issues.\par

## 2022-11-16 NOTE — PROCEDURE
[FreeTextEntry1] : [place] 19\par \par 11/16/2022\par Pulmonary function testing\par FEV1, FVC, and FEV1/FVC are within normal limits. TLC and subdivisions are normal. RV/TLC ratio is normal. Single breath diffusion capacity is normal. \par \par Records reviewed.\par Cardiology note reviewed and appreciated.

## 2022-11-17 NOTE — PROGRESS NOTE ADULT - PROBLEM SELECTOR PLAN 3
Cr now 2.98. Increase likely 2/2 contrast durin cath. [place]/Lambda 9.26/4.63.  - hold ACE/ARB  - nephro following, appreciate recs  - if Cr stable or improving tomorrow will be stable for discharge Patient endorsing symptoms consistent w/ apnea. Has been having these symptoms on and off for years thus low concern for acute worsening. Rhonchi observed on auscultation would suggest AMBER however given neurological concerns consider central sleep apnea. Will recommend outpatient followup with sleep clinic. Azithromycin Counseling:  I discussed with the patient the risks of azithromycin including but not limited to GI upset, allergic reaction, drug rash, diarrhea, and yeast infections.

## 2022-11-21 ENCOUNTER — APPOINTMENT (OUTPATIENT)
Dept: CARDIOLOGY | Facility: CLINIC | Age: 47
End: 2022-11-21

## 2022-11-21 VITALS
HEART RATE: 78 BPM | BODY MASS INDEX: 27.43 KG/M2 | OXYGEN SATURATION: 100 % | DIASTOLIC BLOOD PRESSURE: 84 MMHG | HEIGHT: 68 IN | TEMPERATURE: 97.8 F | SYSTOLIC BLOOD PRESSURE: 174 MMHG | WEIGHT: 181 LBS

## 2022-11-21 DIAGNOSIS — I10 ESSENTIAL (PRIMARY) HYPERTENSION: ICD-10-CM

## 2022-11-21 PROCEDURE — 99215 OFFICE O/P EST HI 40 MIN: CPT

## 2022-11-21 NOTE — HISTORY OF PRESENT ILLNESS
[FreeTextEntry1] : Total visit time 45min.\par BRINDA MOYER 47 year M CRF  presents with HTN HLD and reports NYHA 2-3 dyspnea, no chest pain, occasional palpitations,pre- syncope, with vertigo but no claudication mild peripheral edema.   Tobacco Negative/Reformed. BMI  28. 6/19 NSR 76 BPM. Medications, amlodipine, atorvastatin fenofibrate ASA, coreg, hydralazine. Remote 2017 stress negative/negative ischemia/infarct LVEF 59%. \par 47 M DM x 20y diverticulitis, EToH abuse in past recently admitted 10/2/22 [facility] transferred to [facility] 10/422  with marked /133 and troponin elevation without clear STT shift sent for Coronary CTA read as moderate disease followed by invasive cath at [facility] showing nonobstructive CAD and echo with no significant structural heart disease LVEF 59%. Transient BILLY Cr 2to 2.34 with CTA and coronary invasive cath. Transferred to [facility] 10/20/22 for rehab.  C/o ataxic gait, dementia with  impaired cognitive function, forgetful, urinary incontinence. PHx repeated cerebral trauma boxing. BP controlled.\par  CAD excluded. Marked HTN consider pheo/conns. No documented structural or arrhythmic cardiac coronary pathology.  Continue hydralazine, amlodipine atorvastatin fenofibrate, ASA , coreg. Denies illicit drug use. Metanephrine, renin, aldosterone levels requested and renal doppler RTC 6w. To be contacted by neurology manager for neuro referral placed last week 11/16/22.

## 2022-11-22 ENCOUNTER — APPOINTMENT (OUTPATIENT)
Dept: CARDIOLOGY | Facility: CLINIC | Age: 47
End: 2022-11-22

## 2022-11-22 LAB
ALBUMIN SERPL ELPH-MCNC: 3.9 G/DL
ALDOSTERONE SERUM: 5.3 NG/DL
ALP BLD-CCNC: 173 U/L
ALT SERPL-CCNC: 26 U/L
ANION GAP SERPL CALC-SCNC: 15 MMOL/L
AST SERPL-CCNC: 14 U/L
BILIRUB SERPL-MCNC: 0.2 MG/DL
BUN SERPL-MCNC: 38 MG/DL
CALCIUM SERPL-MCNC: 9.1 MG/DL
CHLORIDE SERPL-SCNC: 102 MMOL/L
CO2 SERPL-SCNC: 21 MMOL/L
CREAT SERPL-MCNC: 2.27 MG/DL
EGFR: 34 ML/MIN/1.73M2
GLUCOSE SERPL-MCNC: 319 MG/DL
POTASSIUM SERPL-SCNC: 5 MMOL/L
PROT SERPL-MCNC: 6.5 G/DL
SODIUM SERPL-SCNC: 138 MMOL/L

## 2022-11-28 ENCOUNTER — LABORATORY RESULT (OUTPATIENT)
Age: 47
End: 2022-11-28

## 2022-11-28 ENCOUNTER — APPOINTMENT (OUTPATIENT)
Dept: CARDIOLOGY | Facility: CLINIC | Age: 47
End: 2022-11-28
Payer: MEDICARE

## 2022-11-28 PROCEDURE — 36415 COLL VENOUS BLD VENIPUNCTURE: CPT

## 2022-11-29 ENCOUNTER — APPOINTMENT (OUTPATIENT)
Dept: INTERNAL MEDICINE | Facility: CLINIC | Age: 47
End: 2022-11-29

## 2022-11-29 VITALS
BODY MASS INDEX: 27.43 KG/M2 | HEIGHT: 68 IN | HEART RATE: 77 BPM | OXYGEN SATURATION: 97 % | SYSTOLIC BLOOD PRESSURE: 108 MMHG | WEIGHT: 181 LBS | TEMPERATURE: 97.6 F | DIASTOLIC BLOOD PRESSURE: 64 MMHG

## 2022-11-29 DIAGNOSIS — N18.9 CHRONIC KIDNEY DISEASE, UNSPECIFIED: ICD-10-CM

## 2022-11-29 DIAGNOSIS — Z00.00 ENCOUNTER FOR GENERAL ADULT MEDICAL EXAMINATION W/OUT ABNORMAL FINDINGS: ICD-10-CM

## 2022-11-29 DIAGNOSIS — R74.8 ABNORMAL LEVELS OF OTHER SERUM ENZYMES: ICD-10-CM

## 2022-11-29 DIAGNOSIS — R41.89 OTHER SYMPTOMS AND SIGNS INVOLVING COGNITIVE FUNCTIONS AND AWARENESS: ICD-10-CM

## 2022-11-29 LAB — RENIN ACTIVITY, PLASMA: 0.67 NG/ML/HR

## 2022-11-29 PROCEDURE — 99214 OFFICE O/P EST MOD 30 MIN: CPT

## 2022-11-29 NOTE — HEALTH RISK ASSESSMENT
[Former] : Former [No] : In the past 12 months have you used drugs other than those required for medical reasons? No [Two or more falls in past year] : Patient reported two or more falls in the past year [0] : 2) Feeling down, depressed, or hopeless: Not at all (0) [de-identified] : cardiologist dr mascorro [YearQuit] : 08/2022 [de-identified] : quit 6 months ago  [de-identified] : none [de-identified] : fair

## 2022-11-29 NOTE — REVIEW OF SYSTEMS
[Lower Ext Edema] : lower extremity edema [Heartburn] : heartburn [Dizziness] : dizziness [Confusion] : confusion [Unsteady Walk] : ataxia [Memory Loss] : memory loss [Negative] : Integumentary

## 2022-11-29 NOTE — HISTORY OF PRESENT ILLNESS
[FreeTextEntry1] : Paperwork for home health and chronic conditions  [de-identified] : The patient is a 47 year old M who presents to the office for follow up for chronic conditions. \par \par #Paperwork for home health aid \par - Patient currently has assistance from family friend\par - Paperwork completed and faxed to JOSIANE LANCASTER\par - Original copy returned to patient\par \par #Leg swelling \par Ongoing since discharge from hospital \par ECHO EF 59%, no structural heart disease \par Nonobstructive CAD\par Not taking diuretics--->CKD\par \par #CKD\par ??No follow up with nephrologist \par \par #Belching \par Pt endorses bad taste in his mouth \par Worse at night \par Dry throat \par Says he has had 2 cigarettes since discharge from the hospital \par Occasional burning sensation in midsternal region \par \par #Dizziness \par - Appt with neurologist on Dec 1st \par - Difficulty word finding \par - initial bladder incontinence, now resolved \par - Intermittent confusion and memory loss \par ?? normal pressure hydrocephalus

## 2022-11-29 NOTE — ASSESSMENT
[FreeTextEntry1] : \par See assessment above \par Provided scripts for necessary imaging and/or referrals.

## 2022-12-02 LAB
CREAT UR-MCNC: 104.8 MG/DL
METANEPHS 24H UR-MCNC: 34 UG/L
METANEPHS/CREAT UR: 0.2
NORMETANEPHRINE 24H UR-MCNC: 144 UG/L

## 2022-12-05 ENCOUNTER — APPOINTMENT (OUTPATIENT)
Dept: PULMONOLOGY | Facility: CLINIC | Age: 47
End: 2022-12-05

## 2022-12-20 ENCOUNTER — APPOINTMENT (OUTPATIENT)
Dept: GERIATRICS | Facility: CLINIC | Age: 47
End: 2022-12-20

## 2022-12-28 ENCOUNTER — APPOINTMENT (OUTPATIENT)
Dept: PHYSICAL MEDICINE AND REHAB | Facility: CLINIC | Age: 47
End: 2022-12-28

## 2023-01-17 ENCOUNTER — APPOINTMENT (OUTPATIENT)
Dept: CARDIOLOGY | Facility: CLINIC | Age: 48
End: 2023-01-17
Payer: MEDICARE

## 2023-01-17 VITALS
OXYGEN SATURATION: 100 % | HEART RATE: 79 BPM | TEMPERATURE: 97.7 F | HEIGHT: 68 IN | BODY MASS INDEX: 27.28 KG/M2 | SYSTOLIC BLOOD PRESSURE: 106 MMHG | WEIGHT: 180 LBS | DIASTOLIC BLOOD PRESSURE: 69 MMHG

## 2023-01-17 DIAGNOSIS — R03.0 ELEVATED BLOOD-PRESSURE READING, W/OUT DIAGNOSIS OF HYPERTENSION: ICD-10-CM

## 2023-01-17 PROCEDURE — 99215 OFFICE O/P EST HI 40 MIN: CPT

## 2023-01-17 RX ORDER — ASPIRIN 81 MG/1
81 TABLET, CHEWABLE ORAL
Qty: 30 | Refills: 3 | Status: DISCONTINUED | COMMUNITY
Start: 2022-05-13 | End: 2023-01-17

## 2023-01-17 RX ORDER — AMLODIPINE BESYLATE 10 MG/1
10 TABLET ORAL
Refills: 0 | Status: DISCONTINUED | COMMUNITY
End: 2023-01-17

## 2023-01-17 RX ORDER — METOPROLOL SUCCINATE 100 MG/1
100 TABLET, EXTENDED RELEASE ORAL
Qty: 14 | Refills: 0 | Status: DISCONTINUED | COMMUNITY
Start: 2022-05-31 | End: 2023-01-17

## 2023-01-17 RX ORDER — METOPROLOL SUCCINATE 50 MG/1
50 TABLET, EXTENDED RELEASE ORAL
Qty: 90 | Refills: 0 | Status: DISCONTINUED | COMMUNITY
Start: 2022-08-16 | End: 2023-01-17

## 2023-01-17 NOTE — HISTORY OF PRESENT ILLNESS
[FreeTextEntry1] : BRINDA MOYER 47 year M CRF  presents with HTN HLD. Total visit time 45min. \par 47 M DM x 20y diverticulitis, EToH abuse in past recently admitted 10/2/22 [facility] transferred to [facility] 10/4/22  with marked /133; Coronary CTA read as moderate disease; invasive cath at [facility] nonobstructive CAD; \par 10/22 Echo  LVEF 59%. Transient BILLY Cr 2to 2.34 with CTA andinvasive cath. Transferred to [facility] 10/20/22 for rehab.  C/o ataxic gait, impaired cognitive function,  urinary incontinence. PHx repeated cerebral trauma boxing. CAD excluded. Marked HTN. Metanephrine, renin, aldosterone levels all normal; and CT abdomen unremarkable (adrenals) normal no masses  Now off metoprolol, amlodipine, rosuvastatin,  stopping hydralazine for presyncope. Taking only atorvastatin, coreg, spironolactone. May hold spironolactone for symptomatic hypotension. Denies illicit drug use. . Renewed neuro referral.

## 2023-01-23 ENCOUNTER — APPOINTMENT (OUTPATIENT)
Dept: GERIATRICS | Facility: CLINIC | Age: 48
End: 2023-01-23

## 2023-04-13 NOTE — PROGRESS NOTE ADULT - SUBJECTIVE AND OBJECTIVE BOX
Cc: Gait dysfunction    HPI: Patient with no new medical issues today.  Pain controlled, no chest pain, no N/V, no Fevers/Chills. No other new ROS  Has been tolerating rehabilitation program.    acetaminophen     Tablet .. 650 milliGRAM(s) Oral every 6 hours PRN  ALBUTerol    90 MICROgram(s) HFA Inhaler 2 Puff(s) Inhalation every 6 hours PRN  amitriptyline 10 milliGRAM(s) Oral at bedtime  amLODIPine   Tablet 10 milliGRAM(s) Oral daily  AQUAPHOR (petrolatum Ointment) 1 Application(s) Topical two times a day  aspirin enteric coated 81 milliGRAM(s) Oral daily  atorvastatin 40 milliGRAM(s) Oral at bedtime  Biotene Dry Mouth Oral Rinse 15 milliLiter(s) Swish and Spit three times a day PRN  budesonide  80 MICROgram(s)/formoterol 4.5 MICROgram(s) Inhaler 2 Puff(s) Inhalation two times a day  carvedilol 12.5 milliGRAM(s) Oral every 12 hours  cyanocobalamin 1000 MICROGram(s) Oral daily  dextrose 5%. 1000 milliLiter(s) IV Continuous <Continuous>  dextrose 5%. 1000 milliLiter(s) IV Continuous <Continuous>  dextrose 50% Injectable 25 Gram(s) IV Push once  dextrose 50% Injectable 12.5 Gram(s) IV Push once  dextrose 50% Injectable 25 Gram(s) IV Push once  dextrose Oral Gel 15 Gram(s) Oral once PRN  glucagon  Injectable 1 milliGRAM(s) IntraMuscular once  heparin   Injectable 5000 Unit(s) SubCutaneous every 8 hours  insulin glargine Injectable (LANTUS) 20 Unit(s) SubCutaneous at bedtime  insulin lispro (ADMELOG) corrective regimen sliding scale   SubCutaneous three times a day before meals  insulin lispro (ADMELOG) corrective regimen sliding scale   SubCutaneous at bedtime  insulin lispro Injectable (ADMELOG) 2 Unit(s) SubCutaneous before breakfast  insulin lispro Injectable (ADMELOG) 2 Unit(s) SubCutaneous before lunch  insulin lispro Injectable (ADMELOG) 2 Unit(s) SubCutaneous before dinner  multivitamin 1 Tablet(s) Oral daily  pantoprazole    Tablet 40 milliGRAM(s) Oral before breakfast  senna 2 Tablet(s) Oral at bedtime  simethicone 80 milliGRAM(s) Chew daily PRN  sodium chloride 0.65% Nasal 1 Spray(s) Both Nostrils every 1 hour PRN      T(C): 36.5 (10-29-22 @ 19:37), Max: 36.5 (10-29-22 @ 19:37)  HR: 90 (10-30-22 @ 08:14) (77 - 92)  BP: 156/79 (10-30-22 @ 05:51) (156/74 - 156/79)  RR: 17 (10-29-22 @ 19:37) (17 - 17)  SpO2: 99% (10-30-22 @ 08:14) (99% - 100%)    In NAD  HEENT- EOMI  Heart- no cyanosis   Lungs- No respiratory distress  Abd- + BS, NT  Ext- No calf pain  Neuro- Exam unchanged      10-29    140  |  109<H>  |  80<H>  ----------------------------<  179<H>  5.1   |  25  |  3.22<H>    Ca    9.0      29 Oct 2022 06:00      Imp: Patient with diagnosis of CVA admitted for comprehensive acute rehabilitation.    Plan:  - Continue PT/OT/SLP  - DVT prophylaxis - Heparin   - Skin- Turn q2h, check skin daily  - Continue current medications; patient medically stable.   -Active issues- Continue senna for bowel program   - Patient is stable to continue current rehabilitation program.  [Cervical Pap Smear] : cervical Pap smear [Liquid Base] : liquid base [Tolerated Well] : the patient tolerated the procedure well [No Complications] : there were no complications

## 2023-04-17 ENCOUNTER — NON-APPOINTMENT (OUTPATIENT)
Age: 48
End: 2023-04-17

## 2023-04-20 ENCOUNTER — APPOINTMENT (OUTPATIENT)
Dept: INTERNAL MEDICINE | Facility: CLINIC | Age: 48
End: 2023-04-20
Payer: MEDICARE

## 2023-04-20 ENCOUNTER — APPOINTMENT (OUTPATIENT)
Dept: CARDIOLOGY | Facility: CLINIC | Age: 48
End: 2023-04-20
Payer: MEDICARE

## 2023-04-20 ENCOUNTER — NON-APPOINTMENT (OUTPATIENT)
Age: 48
End: 2023-04-20

## 2023-04-20 VITALS
WEIGHT: 180 LBS | HEIGHT: 68 IN | TEMPERATURE: 98.3 F | HEART RATE: 81 BPM | OXYGEN SATURATION: 100 % | BODY MASS INDEX: 27.28 KG/M2 | DIASTOLIC BLOOD PRESSURE: 95 MMHG | SYSTOLIC BLOOD PRESSURE: 154 MMHG

## 2023-04-20 VITALS
DIASTOLIC BLOOD PRESSURE: 95 MMHG | HEART RATE: 81 BPM | WEIGHT: 180 LBS | HEIGHT: 68 IN | SYSTOLIC BLOOD PRESSURE: 154 MMHG | OXYGEN SATURATION: 100 % | BODY MASS INDEX: 27.28 KG/M2 | TEMPERATURE: 98.3 F

## 2023-04-20 DIAGNOSIS — Z13.228 ENCOUNTER FOR SCREENING FOR OTHER METABOLIC DISORDERS: ICD-10-CM

## 2023-04-20 DIAGNOSIS — F10.10 ALCOHOL ABUSE, UNCOMPLICATED: ICD-10-CM

## 2023-04-20 DIAGNOSIS — R32 UNSPECIFIED URINARY INCONTINENCE: ICD-10-CM

## 2023-04-20 DIAGNOSIS — Z86.73 PERSONAL HISTORY OF TRANSIENT ISCHEMIC ATTACK (TIA), AND CEREBRAL INFARCTION W/OUT RESIDUAL DEFICITS: ICD-10-CM

## 2023-04-20 DIAGNOSIS — I25.10 ATHEROSCLEROTIC HEART DISEASE OF NATIVE CORONARY ARTERY W/OUT ANGINA PECTORIS: ICD-10-CM

## 2023-04-20 PROCEDURE — 93000 ELECTROCARDIOGRAM COMPLETE: CPT

## 2023-04-20 PROCEDURE — 99214 OFFICE O/P EST MOD 30 MIN: CPT | Mod: 25

## 2023-04-20 RX ORDER — OMEPRAZOLE 20 MG/1
20 CAPSULE, DELAYED RELEASE ORAL DAILY
Qty: 60 | Refills: 1 | Status: ACTIVE | COMMUNITY
Start: 2022-11-29

## 2023-04-20 RX ORDER — PANTOPRAZOLE 40 MG/1
40 TABLET, DELAYED RELEASE ORAL
Qty: 1 | Refills: 5 | Status: DISCONTINUED | COMMUNITY
Start: 2020-09-17 | End: 2023-04-20

## 2023-04-20 RX ORDER — FAMOTIDINE 20 MG/1
20 TABLET, FILM COATED ORAL
Qty: 30 | Refills: 0 | Status: DISCONTINUED | COMMUNITY
Start: 2022-07-14 | End: 2023-04-20

## 2023-04-20 RX ORDER — SPIRONOLACTONE 25 MG/1
25 TABLET ORAL
Qty: 90 | Refills: 1 | Status: ACTIVE | COMMUNITY
Start: 2023-04-20

## 2023-04-20 RX ORDER — AMITRIPTYLINE HYDROCHLORIDE 25 MG/1
25 TABLET, FILM COATED ORAL
Qty: 90 | Refills: 0 | Status: ACTIVE | COMMUNITY
Start: 2022-08-15

## 2023-04-20 RX ORDER — FENOFIBRATE 145 MG/1
145 TABLET, COATED ORAL
Qty: 30 | Refills: 0 | Status: DISCONTINUED | COMMUNITY
Start: 2021-09-21 | End: 2023-04-20

## 2023-04-20 RX ORDER — ERGOCALCIFEROL 1.25 MG/1
1.25 MG CAPSULE, LIQUID FILLED ORAL
Qty: 4 | Refills: 0 | Status: DISCONTINUED | COMMUNITY
Start: 2022-07-14 | End: 2023-04-20

## 2023-04-20 RX ORDER — HYDRALAZINE HYDROCHLORIDE 25 MG/1
25 TABLET ORAL 3 TIMES DAILY
Qty: 270 | Refills: 1 | Status: DISCONTINUED | COMMUNITY
Start: 2022-11-02 | End: 2023-04-20

## 2023-04-20 NOTE — HISTORY OF PRESENT ILLNESS
[FreeTextEntry1] : BRINDA MOYER 47 year M CRF  presents with HTN HLD. Total visit time 35min. \par 47 M DM x 20y diverticulitis, EToH abuse in past recently admitted 10/2/22 [facility] transferred to [facility] 10/4/22  with marked /133; Coronary CTA read as moderate disease; invasive cath at [facility] nonobstructive CAD; \par 10/22 Echo  LVEF 59%. Transient BILLY Cr 2 to 2.34 with CTA andinvasive cath. Transferred to [facility] 10/20/22 for rehab.  C/o ataxic gait, impaired cognitive function,  urinary incontinence. PHx repeated cerebral trauma boxing. CAD excluded. Marked HTN. Metanephrine, renin, aldosterone levels all normal; and CT abdomen unremarkable (adrenals) normal no masses  Now off metoprolol, amlodipine, rosuvastatin, Taking atorvastatin, coreg, spironolactone.  Denies illicit drug use. . Renewed neuro referral. 154/95 HR 81/min EKG NSR 77 BPM chronic Nonspec STT anomalies. [facility]/T[facility]  had ILR implanted. F/U Megan. RTC3m

## 2023-04-24 PROBLEM — R32 URINARY INCONTINENCE: Status: ACTIVE | Noted: 2023-04-20

## 2023-04-24 NOTE — ASSESSMENT
[FreeTextEntry1] : The patient is a 47 year old M here for hospital follow up. Repeat CVA vs TIA and ILR placed \par Will review hospital discharge summary and complete medication reconciliation and referrals as needed\par Necessary supplies ordered\par C/w PT/OT\par RTO in 3 months

## 2023-04-24 NOTE — PHYSICAL EXAM
[No Edema] : there was no peripheral edema [Normal] : soft, non-tender, non-distended, no masses palpated, no HSM and normal bowel sounds [Deep Tendon Reflexes (DTR)] : deep tendon reflexes were 2+ and symmetric [Speech Grossly Normal] : speech grossly normal [Normal Affect] : the affect was normal [Alert and Oriented x3] : oriented to person, place, and time [Normal Mood] : the mood was normal [de-identified] : Uses assitive device (cane) for ambulation

## 2023-04-24 NOTE — HISTORY OF PRESENT ILLNESS
[FreeTextEntry2] : The patient is a 47 year old M who presents to the office for hospital follow up \par Discharge papers note on person and awaiting faxed copy\par PAtient has memory loss and thus is a poor historian\par \par HPI: \par The patient says while on his way to get an MRI he started having chest burning, dizziness and palpitations. He was admitted to [facility] in [place] and said to have suffered a CVA.\par \par He is currently receiving PT/OT at home. He is accompanied today by his HHA. \par He endorses occasional residual weakness. No muscular spasms or contractures. No slurred speech. He does have resultant blurred vision.

## 2023-04-25 DIAGNOSIS — E11.40 TYPE 2 DIABETES MELLITUS WITH DIABETIC NEUROPATHY, UNSPECIFIED: ICD-10-CM

## 2023-04-25 DIAGNOSIS — E78.5 HYPERLIPIDEMIA, UNSPECIFIED: ICD-10-CM

## 2023-04-25 LAB
ALBUMIN SERPL ELPH-MCNC: 3.5 G/DL
ALP BLD-CCNC: 169 U/L
ALT SERPL-CCNC: 16 U/L
ANION GAP SERPL CALC-SCNC: 13 MMOL/L
AST SERPL-CCNC: 19 U/L
BASOPHILS # BLD AUTO: 0.06 K/UL
BASOPHILS NFR BLD AUTO: 0.8 %
BILIRUB SERPL-MCNC: 0.2 MG/DL
BUN SERPL-MCNC: 50 MG/DL
CALCIUM SERPL-MCNC: 8.6 MG/DL
CHLORIDE SERPL-SCNC: 99 MMOL/L
CHOLEST SERPL-MCNC: 291 MG/DL
CO2 SERPL-SCNC: 23 MMOL/L
CREAT SERPL-MCNC: 2.61 MG/DL
EGFR: 29 ML/MIN/1.73M2
EOSINOPHIL # BLD AUTO: 0.22 K/UL
EOSINOPHIL NFR BLD AUTO: 2.9 %
ESTIMATED AVERAGE GLUCOSE: 260 MG/DL
GLUCOSE SERPL-MCNC: 335 MG/DL
HBA1C MFR BLD HPLC: 10.7 %
HCT VFR BLD CALC: 35 %
HDLC SERPL-MCNC: 41 MG/DL
HGB BLD-MCNC: 11.4 G/DL
IMM GRANULOCYTES NFR BLD AUTO: 0.3 %
LDLC SERPL CALC-MCNC: NORMAL MG/DL
LYMPHOCYTES # BLD AUTO: 1.74 K/UL
LYMPHOCYTES NFR BLD AUTO: 22.8 %
MAN DIFF?: NORMAL
MCHC RBC-ENTMCNC: 31 PG
MCHC RBC-ENTMCNC: 32.6 GM/DL
MCV RBC AUTO: 95.1 FL
MONOCYTES # BLD AUTO: 0.57 K/UL
MONOCYTES NFR BLD AUTO: 7.5 %
NEUTROPHILS # BLD AUTO: 5.03 K/UL
NEUTROPHILS NFR BLD AUTO: 65.7 %
NONHDLC SERPL-MCNC: 250 MG/DL
PLATELET # BLD AUTO: 186 K/UL
POTASSIUM SERPL-SCNC: 4.8 MMOL/L
PROT SERPL-MCNC: 6.1 G/DL
RBC # BLD: 3.68 M/UL
RBC # FLD: 12.3 %
SODIUM SERPL-SCNC: 135 MMOL/L
TRIGL SERPL-MCNC: 434 MG/DL
WBC # FLD AUTO: 7.64 K/UL

## 2023-06-12 NOTE — ED ADULT TRIAGE NOTE - HEART RATE (BEATS/MIN)
2023       Karuna Camp DO  [address] [place] [address]  Via In Basket      Patient: Jeet Jenkins   YOB: 1973   Date of Visit: 2023       Dear Dr. Camp:    Thank you for referring Jeet Jenkins to me for evaluation. Below are my notes for this visit with him.    If you have questions, please do not hesitate to call me. I look forward to following your patient along with you.      Sincerely,        Sergey Rodriguez DO        CC: No Recipients  Sergey Rodriguez DO  2023  3:44 PM  Signed  Cardiology follow-up note  [facility] [address] [address]  Sergey Rodriguez DO     Referred by:   Karuna Camp DO  [address] [place] [address]     Primary care physician: Karuna Camp DO     Date of Service: 2023       Consultation for Jeet Jenkins  : 1973     Subjective:   Jeet Jenkins is a 49 year old male who  has a past medical history of Bipolar affective disorder, manic, in partial or remission (2010), Coronary artery calcification, HLD, HTN, Sternal deformity (2020), and Type 2 DM (2010).  And is here for follow-up following recent testing.    Last visit with me was 2022.  Recommended working on dietary changes.  Aerobic activity.   He has lost about 30 pounds since her last visit.  Denies any chest pain.  No shortness of breath.  Compliant with aspirin.  Compliant with statin.  Blood pressures controlled.  Focusing on dietary changes.  Weight loss.  Takes propranolol as needed.  Aspirin atorvastatin.  Medications confirmed.  Losartan 50 mg.  Also on Ozempic.  Otherwise has no cardiovascular complaints at this time.  We discussed lifestyle changes which are conducive with long-term heart health.    Review of Systems   Review of Systems   Constitutional: Negative.    HENT: Negative.    Eyes: Negative.    Respiratory: Negative.  Negative for chest tightness  and shortness of breath.    Cardiovascular: Negative for chest pain, palpitations and leg swelling.   Gastrointestinal: Negative.    Endocrine: Negative.    Genitourinary: Negative.    Musculoskeletal: Negative.    Skin: Negative.    Allergic/Immunologic: Negative.    Neurological: Negative.  Negative for dizziness, syncope and numbness.   Hematological: Negative.    Psychiatric/Behavioral: Negative.         Past Medical History:   Diagnosis Date   • Bipolar affective disorder, manic, in partial or remission 03/16/2010    Lithium 500 mg daily, Clonazepam, Lorazepam, Propranolol as needed. Diagnosed is 1990s. Follows with Dr. Seth Jarrell.    • Coronary artery calcification    • HLD    • HTN    • Sternal deformity 06/09/2020    XR performed 6/2020- no fxs or soft tissue deformity   • Type 2 DM 07/22/2010    Meds: Metformin 500 mg BID, Jardiance 25 mg, Atorvastatin 20 mg        Past Surgical History:   Procedure Laterality Date   • Cataract extraction, bilateral     • Cholecystectomy     • Tonsillectomy          Family History   Problem Relation Age of Onset   • Dementia/Alzheimers Mother    • Myocardial Infarction Father 70   • Cancer, Breast Sister    • Patient is unaware of any medical problems Brother    • Myocardial Infarction Paternal Grandfather    • Myocardial Infarction Paternal Uncle 40        Social History     Tobacco Use   • Smoking status: Never   • Smokeless tobacco: Never   Vaping Use   • Vaping Use: never used   Substance Use Topics   • Alcohol use: Yes     Comment: Socially   • Drug use: Never        ALLERGIES:   Allergen Reactions   • Bee Sting Other (See Comments)   • Lamotrigine HIVES   • Tegretol RASH     Current Outpatient Medications   Medication Sig   • semaglutide,0.25 or 0.5 mg/DOSE, (OZEMPIC) (1.34 mg/ml) 0.25 or 0.5 MG/DOSE injection Inject 0.25 mg into the skin every 7 days.   • triamcinolone (ARISTOCORT) 0.1 % ointment Apply 1 application. topically 2 times daily.   • atorvastatin  (LIPITOR) 40 MG tablet TAKE 1 TABLET DAILY   • losartan (COZAAR) 50 MG tablet TAKE 1 TABLET DAILY   • Jardiance 25 MG tablet TAKE 1 TABLET DAILY BEFORE BREAKFAST   • glucose monitoring kit (FREESTYLE) monitoring kit Use as directed.   • Continuous Blood Gluc Sensor (FreeStyle Robby 14 Day Sensor) Misc 1 each every 14 days.   • aspirin 81 MG EC tablet Take 1 tablet by mouth daily.   • lithium 300 MG tablet Take 3.5 tablets by mouth daily. Only 3 tablets at this time.   • propranolol (INDERAL) 10 MG tablet    • LORazepam (ATIVAN) 0.5 MG tablet    • clonazePAM (KLONOPIN) 0.5 MG tablet      No current facility-administered medications for this visit.        Objective:     Physical Exam:   Physical Exam  Constitutional:       Appearance: He is well-developed.   HENT:      Head: Normocephalic and atraumatic.   Eyes:      General: No scleral icterus.  Neck:      Vascular: No carotid bruit or JVD.   Cardiovascular:      Rate and Rhythm: Normal rate and regular rhythm.      Pulses: Intact distal pulses.      Heart sounds: Normal heart sounds. No murmur heard.     No friction rub. No gallop.   Pulmonary:      Effort: Pulmonary effort is normal. No respiratory distress.      Breath sounds: Normal breath sounds. No wheezing or rales.   Chest:      Chest wall: No tenderness.   Abdominal:      General: Bowel sounds are normal. There is no distension.      Palpations: Abdomen is soft.   Musculoskeletal:         General: No swelling.   Skin:     General: Skin is warm and dry.      Coloration: Skin is not pale.      Findings: No erythema or rash.   Neurological:      Mental Status: He is alert and oriented to person, place, and time.   Psychiatric:         Behavior: Behavior normal.         Thought Content: Thought content normal.         Judgment: Judgment normal.       Visit Vitals  /77 (BP Location: LUE - Left upper extremity, Patient Position: Sitting, Cuff Size: Regular)   Pulse 98   Ht 5' 6\" (1.676 m)   Wt 86.7 kg (191  lb 4 oz)   SpO2 98%   BMI 30.87 kg/m²     Wt Readings from Last 4 Encounters:   06/12/23 86.7 kg (191 lb 4 oz)   04/11/23 87.6 kg (193 lb 2 oz)   01/10/23 88.8 kg (195 lb 12.3 oz)   09/13/22 88.8 kg (195 lb 12.3 oz)        Labs:  Cholesterol (mg/dL)   Date Value   05/02/2022 123     HDL (mg/dL)   Date Value   05/02/2022 43     Cholesterol/ HDL Ratio (no units)   Date Value   05/02/2022 2.9     Triglycerides (mg/dL)   Date Value   05/02/2022 139     LDL (mg/dL)   Date Value   05/02/2022 52        Hemoglobin A1C (%)   Date Value   04/11/2023 6.6 (H)        WBC (K/mcL)   Date Value   04/11/2023 9.0     RBC (mil/mcL)   Date Value   04/11/2023 5.53     HCT (%)   Date Value   04/11/2023 48.2     HGB (g/dL)   Date Value   04/11/2023 16.6     PLT (K/mcL)   Date Value   04/11/2023 344        Sodium (mmol/L)   Date Value   04/11/2023 135     Potassium (mmol/L)   Date Value   04/11/2023 4.0     Chloride (mmol/L)   Date Value   04/11/2023 106     Glucose (mg/dL)   Date Value   04/11/2023 118 (H)     Calcium (mg/dL)   Date Value   04/11/2023 9.7     Carbon Dioxide (mmol/L)   Date Value   04/11/2023 21     BUN (mg/dL)   Date Value   04/11/2023 21 (H)     Creatinine (mg/dL)   Date Value   04/11/2023 0.76        GOT/AST (Units/L)   Date Value   04/11/2023 15     GPT/ALT (Units/L)   Date Value   04/11/2023 34     No results found for: GGTP  Alkaline Phosphatase (Units/L)   Date Value   04/11/2023 94     Bilirubin, Total (mg/dL)   Date Value   04/11/2023 0.5        Imaging:    EKG 5/12/2021, sinus rhythm, moderate intraventricular conduction delay, non-specific T waves.  Event monitor 25 days monitored, 5/12/2021, sinus rhythm, no tachyarrhythmias. Frequent PAC's, <1%, no PVC's.   TTE, 1/12/2022, normal LV cavity size and thickness, EF 55%.  Personally reviewed.  GLS -18.4%.  RVSP 23 mmHg.  Trivial TR. EF personally reviewed.   4/21/2022, calcium score, 162.  75th percentile.  LM 0, LCx 0, LAD 23, , PDA 0  EKG, 6/12/2023, NSR,  nonspecific intraventricular conduction delay.  Stable overall.     Impression/Report/Plan:  In summary, Mr. Jenkins is a 49 year old male with past medical history significant for  has a past medical history of Bipolar affective disorder, manic, in partial or remission (03/16/2010), Coronary artery calcification, HLD, HTN, Sternal deformity (06/09/2020), and Type 2 DM (07/22/2010).  And is here for follow-up today.    #1  Palpitations/premature atrial contractions: 3 episodes in the past. No red flags.  10 to 15 minutes.  Phone monitor showed A. fib.  But appears to be sinus rhythm.  25-day event monitor also shows sinus rhythm.  Minimal PACs.  -- Resolved.  #2  Hypertension: Losartan 50 mg.  As needed propranolol.controlled.   -- Controlled.  #3  Hyperlipidemia: Stable.  Lipitor 40.   -- Stable.  Recheck blood work.  #4  Type 2 diabetes: Jardiance 25 mg, Metformin.  Management per PCP.  Well-controlled compliant with therapy.  Improved overall.  #Family history of heart disease/coronary artery calcifications/calcium score less than 400: Patient has no chest pain.  No shortness of breath.  Good functional status.  Medical therapy advised.  Currently on aspirin.  Increase statin recently.  Blood pressures controlled.  Echo with no regional wall motion abnormality.  EF 55%.  GLS now normal.  Personally reviewed.  Regular exercise.  No symptoms.  Significant weight loss.    Diagnoses Today:   1. Mixed hyperlipidemia    2. Coronary artery disease involving native coronary artery of native heart without angina pectoris    3. Coronary artery calcification    4. Essential hypertension    5. Controlled type 2 diabetes mellitus without complication, without long-term current use of insulin (CMD)         Recommendations:  1.  Continue aspirin.  Statin.  Red flag symptoms discussed.  Check blood work.  Continue current blood pressure regimen.  2.  Follow-up with me in 1 year.  Or sooner depending on the results of test.  3.   Copy PCP.  Thank you for the consultation  2.  From my standpoint can proceed with wisdom tooth extraction.  This coming week.  Can hold aspirin if needed for 5 to 7 days.  Discussed this with the patient.  3.  Blood work in 1 week to check cholesterol.  If not at goal increase statin.  4.  See me in 1 year or sooner depending on the above.  Also incidentally noted recent pulmonary nodule.  To follow-up with PCP.  Which I discussed.    Orders Placed This Encounter   • Lipid Panel With Reflex       Sergey Rodriguez,      101

## 2023-06-26 ENCOUNTER — APPOINTMENT (OUTPATIENT)
Dept: UROLOGY | Facility: CLINIC | Age: 48
End: 2023-06-26
Payer: MEDICARE

## 2023-06-26 DIAGNOSIS — N47.1 PHIMOSIS: ICD-10-CM

## 2023-06-26 PROCEDURE — 99214 OFFICE O/P EST MOD 30 MIN: CPT

## 2023-06-26 NOTE — ASSESSMENT
[FreeTextEntry1] : This is a 48 year old male with history of Diabetes, Hypertension, and Hyperlipidemia. Patient has severe ED started on Sildenafil 100 mg last visit. Patient has had no response to medication. \par \par most recent A1c = 8 per patient \par states had stroke earlier in the year -- has been on baby aspirin\par \par Previous history include microscopic hematuria:\par Cysto 05/2021- Normal no lesions, stones, or signs of bleeding. \par CT done- No acute intra abdominal or pelvic pathology \par CT Urogram -- normal exam. independent interpretation-- images visualized by me - agree with radiologist interpretation \par Basic Metabolic Panel; - cr 1.5\par \par Patient presents to office today to discuss IPP placement. Patient does not want to try Injections due to discomfort with needles. \par \par Prolong discussion with patient regarding IPP. Patient made aware of what to expect with surgery, and patient was able to visualize implant via model. \par All questions and concerns answered. \par \par no penile curvature\par mild penile rash resolved with clotrimazole/betamethasone\par \par Nov 2022- U A- no blood\par \par patient wishes for more definitive treatment and wants to proceed with circumcision along with IPP

## 2023-06-26 NOTE — HISTORY OF PRESENT ILLNESS
[FreeTextEntry1] : This is a 48 year old male with history of Diabetes, Hypertension, and Hyperlipidemia. Patient has severe ED started on Sildenafil 100 mg last visit. Patient has had no response to medication. \par \par most recent A1c = 8 per patient \par states had stroke earlier in the year -- has been on baby aspirin\par \par Previous history include microscopic hematuria:\par Cysto 05/2021- Normal no lesions, stones, or signs of bleeding. \par CT done- No acute intra abdominal or pelvic pathology \par CT Urogram -- normal exam. independent interpretation-- images visualized by me - agree with radiologist interpretation \par Basic Metabolic Panel; - cr 1.5\par \par Patient presents to office today to discuss IPP placement. Patient does not want to try Injections due to discomfort with needles. \par \par Prolong discussion with patient regarding IPP. Patient made aware of what to expect with surgery, and patient was able to visualize implant via model. \par All questions and concerns answered. \par \par no penile curvature\par mild penile rash resolved with clotrimazole/betamethasone\par \par Nov 2022- U A- no blood\par \par patient wishes for more definitive treatment and wants to proceed with circumcision along with IPP\par

## 2023-07-17 ENCOUNTER — INPATIENT (INPATIENT)
Facility: HOSPITAL | Age: 48
LOS: 9 days | Discharge: HOME | DRG: 70 | End: 2023-07-27
Attending: INTERNAL MEDICINE | Admitting: HOSPITALIST
Payer: MEDICARE

## 2023-07-17 ENCOUNTER — OUTPATIENT (OUTPATIENT)
Dept: OUTPATIENT SERVICES | Facility: HOSPITAL | Age: 48
LOS: 1 days | End: 2023-07-17
Payer: MEDICARE

## 2023-07-17 VITALS
OXYGEN SATURATION: 99 % | SYSTOLIC BLOOD PRESSURE: 150 MMHG | DIASTOLIC BLOOD PRESSURE: 81 MMHG | RESPIRATION RATE: 16 BRPM | HEART RATE: 71 BPM | HEIGHT: 68 IN

## 2023-07-17 VITALS
OXYGEN SATURATION: 99 % | HEIGHT: 68 IN | WEIGHT: 181.66 LBS | SYSTOLIC BLOOD PRESSURE: 138 MMHG | DIASTOLIC BLOOD PRESSURE: 67 MMHG | RESPIRATION RATE: 16 BRPM | TEMPERATURE: 98 F | HEART RATE: 67 BPM

## 2023-07-17 DIAGNOSIS — Z01.818 ENCOUNTER FOR OTHER PREPROCEDURAL EXAMINATION: ICD-10-CM

## 2023-07-17 DIAGNOSIS — N52.9 MALE ERECTILE DYSFUNCTION, UNSPECIFIED: ICD-10-CM

## 2023-07-17 DIAGNOSIS — Z98.890 OTHER SPECIFIED POSTPROCEDURAL STATES: Chronic | ICD-10-CM

## 2023-07-17 DIAGNOSIS — I21.3 ST ELEVATION (STEMI) MYOCARDIAL INFARCTION OF UNSPECIFIED SITE: ICD-10-CM

## 2023-07-17 LAB
A1C WITH ESTIMATED AVERAGE GLUCOSE RESULT: 10.8 % — HIGH (ref 4–5.6)
ALBUMIN SERPL ELPH-MCNC: 3.6 G/DL — SIGNIFICANT CHANGE UP (ref 3.5–5.2)
ALBUMIN SERPL ELPH-MCNC: 3.6 G/DL — SIGNIFICANT CHANGE UP (ref 3.5–5.2)
ALP SERPL-CCNC: 165 U/L — HIGH (ref 30–115)
ALP SERPL-CCNC: 171 U/L — HIGH (ref 30–115)
ALT FLD-CCNC: 15 U/L — SIGNIFICANT CHANGE UP (ref 0–41)
ALT FLD-CCNC: 16 U/L — SIGNIFICANT CHANGE UP (ref 0–41)
ANION GAP SERPL CALC-SCNC: 13 MMOL/L — SIGNIFICANT CHANGE UP (ref 7–14)
ANION GAP SERPL CALC-SCNC: 8 MMOL/L — SIGNIFICANT CHANGE UP (ref 7–14)
APPEARANCE UR: CLEAR — SIGNIFICANT CHANGE UP
APTT BLD: 33.6 SEC — SIGNIFICANT CHANGE UP (ref 27–39.2)
APTT BLD: 35.4 SEC — SIGNIFICANT CHANGE UP (ref 27–39.2)
AST SERPL-CCNC: 15 U/L — SIGNIFICANT CHANGE UP (ref 0–41)
AST SERPL-CCNC: 16 U/L — SIGNIFICANT CHANGE UP (ref 0–41)
BACTERIA # UR AUTO: NEGATIVE — SIGNIFICANT CHANGE UP
BASOPHILS # BLD AUTO: 0.03 K/UL — SIGNIFICANT CHANGE UP (ref 0–0.2)
BASOPHILS # BLD AUTO: 0.04 K/UL — SIGNIFICANT CHANGE UP (ref 0–0.2)
BASOPHILS NFR BLD AUTO: 0.4 % — SIGNIFICANT CHANGE UP (ref 0–1)
BASOPHILS NFR BLD AUTO: 0.6 % — SIGNIFICANT CHANGE UP (ref 0–1)
BILIRUB SERPL-MCNC: <0.2 MG/DL — SIGNIFICANT CHANGE UP (ref 0.2–1.2)
BILIRUB SERPL-MCNC: <0.2 MG/DL — SIGNIFICANT CHANGE UP (ref 0.2–1.2)
BILIRUB UR-MCNC: NEGATIVE — SIGNIFICANT CHANGE UP
BUN SERPL-MCNC: 60 MG/DL — HIGH (ref 10–20)
BUN SERPL-MCNC: 61 MG/DL — CRITICAL HIGH (ref 10–20)
CALCIUM SERPL-MCNC: 8.4 MG/DL — SIGNIFICANT CHANGE UP (ref 8.4–10.5)
CALCIUM SERPL-MCNC: 8.6 MG/DL — SIGNIFICANT CHANGE UP (ref 8.4–10.5)
CHLORIDE SERPL-SCNC: 102 MMOL/L — SIGNIFICANT CHANGE UP (ref 98–110)
CHLORIDE SERPL-SCNC: 99 MMOL/L — SIGNIFICANT CHANGE UP (ref 98–110)
CO2 SERPL-SCNC: 21 MMOL/L — SIGNIFICANT CHANGE UP (ref 17–32)
CO2 SERPL-SCNC: 23 MMOL/L — SIGNIFICANT CHANGE UP (ref 17–32)
COLOR SPEC: SIGNIFICANT CHANGE UP
CREAT SERPL-MCNC: 3.7 MG/DL — HIGH (ref 0.7–1.5)
CREAT SERPL-MCNC: 3.9 MG/DL — HIGH (ref 0.7–1.5)
DIFF PNL FLD: ABNORMAL
EGFR: 18 ML/MIN/1.73M2 — LOW
EGFR: 19 ML/MIN/1.73M2 — LOW
EOSINOPHIL # BLD AUTO: 0.11 K/UL — SIGNIFICANT CHANGE UP (ref 0–0.7)
EOSINOPHIL # BLD AUTO: 0.14 K/UL — SIGNIFICANT CHANGE UP (ref 0–0.7)
EOSINOPHIL NFR BLD AUTO: 1.7 % — SIGNIFICANT CHANGE UP (ref 0–8)
EOSINOPHIL NFR BLD AUTO: 1.9 % — SIGNIFICANT CHANGE UP (ref 0–8)
EPI CELLS # UR: 2 /HPF — SIGNIFICANT CHANGE UP (ref 0–5)
ESTIMATED AVERAGE GLUCOSE: 263 MG/DL — HIGH (ref 68–114)
ETHANOL SERPL-MCNC: <10 MG/DL — SIGNIFICANT CHANGE UP
GLUCOSE BLDC GLUCOMTR-MCNC: 145 MG/DL — HIGH (ref 70–99)
GLUCOSE SERPL-MCNC: 274 MG/DL — HIGH (ref 70–99)
GLUCOSE SERPL-MCNC: 296 MG/DL — HIGH (ref 70–99)
GLUCOSE UR QL: ABNORMAL
HCT VFR BLD CALC: 33.8 % — LOW (ref 42–52)
HCT VFR BLD CALC: 35.1 % — LOW (ref 42–52)
HGB BLD-MCNC: 11.2 G/DL — LOW (ref 14–18)
HGB BLD-MCNC: 11.5 G/DL — LOW (ref 14–18)
HYALINE CASTS # UR AUTO: 5 /LPF — SIGNIFICANT CHANGE UP (ref 0–7)
IMM GRANULOCYTES NFR BLD AUTO: 0.2 % — SIGNIFICANT CHANGE UP (ref 0.1–0.3)
IMM GRANULOCYTES NFR BLD AUTO: 0.4 % — HIGH (ref 0.1–0.3)
INR BLD: 0.86 RATIO — SIGNIFICANT CHANGE UP (ref 0.65–1.3)
INR BLD: 0.86 RATIO — SIGNIFICANT CHANGE UP (ref 0.65–1.3)
KETONES UR-MCNC: NEGATIVE — SIGNIFICANT CHANGE UP
LEUKOCYTE ESTERASE UR-ACNC: NEGATIVE — SIGNIFICANT CHANGE UP
LYMPHOCYTES # BLD AUTO: 1.46 K/UL — SIGNIFICANT CHANGE UP (ref 1.2–3.4)
LYMPHOCYTES # BLD AUTO: 1.68 K/UL — SIGNIFICANT CHANGE UP (ref 1.2–3.4)
LYMPHOCYTES # BLD AUTO: 21.9 % — SIGNIFICANT CHANGE UP (ref 20.5–51.1)
LYMPHOCYTES # BLD AUTO: 22.6 % — SIGNIFICANT CHANGE UP (ref 20.5–51.1)
MCHC RBC-ENTMCNC: 30.4 PG — SIGNIFICANT CHANGE UP (ref 27–31)
MCHC RBC-ENTMCNC: 30.6 PG — SIGNIFICANT CHANGE UP (ref 27–31)
MCHC RBC-ENTMCNC: 32.8 G/DL — SIGNIFICANT CHANGE UP (ref 32–37)
MCHC RBC-ENTMCNC: 33.1 G/DL — SIGNIFICANT CHANGE UP (ref 32–37)
MCV RBC AUTO: 91.6 FL — SIGNIFICANT CHANGE UP (ref 80–94)
MCV RBC AUTO: 93.4 FL — SIGNIFICANT CHANGE UP (ref 80–94)
MONOCYTES # BLD AUTO: 0.56 K/UL — SIGNIFICANT CHANGE UP (ref 0.1–0.6)
MONOCYTES # BLD AUTO: 0.7 K/UL — HIGH (ref 0.1–0.6)
MONOCYTES NFR BLD AUTO: 8.4 % — SIGNIFICANT CHANGE UP (ref 1.7–9.3)
MONOCYTES NFR BLD AUTO: 9.4 % — HIGH (ref 1.7–9.3)
NEUTROPHILS # BLD AUTO: 4.48 K/UL — SIGNIFICANT CHANGE UP (ref 1.4–6.5)
NEUTROPHILS # BLD AUTO: 4.87 K/UL — SIGNIFICANT CHANGE UP (ref 1.4–6.5)
NEUTROPHILS NFR BLD AUTO: 65.3 % — SIGNIFICANT CHANGE UP (ref 42.2–75.2)
NEUTROPHILS NFR BLD AUTO: 67.2 % — SIGNIFICANT CHANGE UP (ref 42.2–75.2)
NITRITE UR-MCNC: NEGATIVE — SIGNIFICANT CHANGE UP
NRBC # BLD: 0 /100 WBCS — SIGNIFICANT CHANGE UP (ref 0–0)
NRBC # BLD: 0 /100 WBCS — SIGNIFICANT CHANGE UP (ref 0–0)
PH UR: 6.5 — SIGNIFICANT CHANGE UP (ref 5–8)
PLATELET # BLD AUTO: 221 K/UL — SIGNIFICANT CHANGE UP (ref 130–400)
PLATELET # BLD AUTO: 224 K/UL — SIGNIFICANT CHANGE UP (ref 130–400)
PMV BLD: 11.7 FL — HIGH (ref 7.4–10.4)
PMV BLD: 11.9 FL — HIGH (ref 7.4–10.4)
POTASSIUM SERPL-MCNC: 5.1 MMOL/L — HIGH (ref 3.5–5)
POTASSIUM SERPL-MCNC: 5.9 MMOL/L — HIGH (ref 3.5–5)
POTASSIUM SERPL-SCNC: 5.1 MMOL/L — HIGH (ref 3.5–5)
POTASSIUM SERPL-SCNC: 5.9 MMOL/L — HIGH (ref 3.5–5)
PROT SERPL-MCNC: 6.3 G/DL — SIGNIFICANT CHANGE UP (ref 6–8)
PROT SERPL-MCNC: 6.5 G/DL — SIGNIFICANT CHANGE UP (ref 6–8)
PROT UR-MCNC: ABNORMAL
PROTHROM AB SERPL-ACNC: 9.7 SEC — LOW (ref 9.95–12.87)
PROTHROM AB SERPL-ACNC: 9.7 SEC — LOW (ref 9.95–12.87)
RBC # BLD: 3.69 M/UL — LOW (ref 4.7–6.1)
RBC # BLD: 3.76 M/UL — LOW (ref 4.7–6.1)
RBC # FLD: 12 % — SIGNIFICANT CHANGE UP (ref 11.5–14.5)
RBC # FLD: 12.4 % — SIGNIFICANT CHANGE UP (ref 11.5–14.5)
RBC CASTS # UR COMP ASSIST: 3 /HPF — SIGNIFICANT CHANGE UP (ref 0–4)
SODIUM SERPL-SCNC: 133 MMOL/L — LOW (ref 135–146)
SODIUM SERPL-SCNC: 133 MMOL/L — LOW (ref 135–146)
SP GR SPEC: 1.01 — SIGNIFICANT CHANGE UP (ref 1.01–1.03)
TROPONIN T SERPL-MCNC: 0.09 NG/ML — CRITICAL HIGH
TROPONIN T SERPL-MCNC: 0.11 NG/ML — CRITICAL HIGH
UROBILINOGEN FLD QL: SIGNIFICANT CHANGE UP
WBC # BLD: 6.66 K/UL — SIGNIFICANT CHANGE UP (ref 4.8–10.8)
WBC # BLD: 7.45 K/UL — SIGNIFICANT CHANGE UP (ref 4.8–10.8)
WBC # FLD AUTO: 6.66 K/UL — SIGNIFICANT CHANGE UP (ref 4.8–10.8)
WBC # FLD AUTO: 7.45 K/UL — SIGNIFICANT CHANGE UP (ref 4.8–10.8)
WBC UR QL: 2 /HPF — SIGNIFICANT CHANGE UP (ref 0–5)

## 2023-07-17 PROCEDURE — 82164 ANGIOTENSIN I ENZYME TEST: CPT

## 2023-07-17 PROCEDURE — 86362 MOG-IGG1 ANTB CBA EACH: CPT

## 2023-07-17 PROCEDURE — 86789 WEST NILE VIRUS ANTIBODY: CPT

## 2023-07-17 PROCEDURE — 86618 LYME DISEASE ANTIBODY: CPT

## 2023-07-17 PROCEDURE — 85652 RBC SED RATE AUTOMATED: CPT

## 2023-07-17 PROCEDURE — 86140 C-REACTIVE PROTEIN: CPT

## 2023-07-17 PROCEDURE — 89051 BODY FLUID CELL COUNT: CPT

## 2023-07-17 PROCEDURE — 85610 PROTHROMBIN TIME: CPT

## 2023-07-17 PROCEDURE — 83873 ASSAY OF CSF PROTEIN: CPT

## 2023-07-17 PROCEDURE — 70553 MRI BRAIN STEM W/O & W/DYE: CPT

## 2023-07-17 PROCEDURE — 85027 COMPLETE CBC AUTOMATED: CPT

## 2023-07-17 PROCEDURE — 71045 X-RAY EXAM CHEST 1 VIEW: CPT

## 2023-07-17 PROCEDURE — 82042 OTHER SOURCE ALBUMIN QUAN EA: CPT

## 2023-07-17 PROCEDURE — 99291 CRITICAL CARE FIRST HOUR: CPT

## 2023-07-17 PROCEDURE — 70450 CT HEAD/BRAIN W/O DYE: CPT | Mod: 26,MA,XU

## 2023-07-17 PROCEDURE — 85025 COMPLETE CBC W/AUTO DIFF WBC: CPT

## 2023-07-17 PROCEDURE — 88108 CYTOPATH CONCENTRATE TECH: CPT

## 2023-07-17 PROCEDURE — 88185 FLOWCYTOMETRY/TC ADD-ON: CPT

## 2023-07-17 PROCEDURE — 82040 ASSAY OF SERUM ALBUMIN: CPT

## 2023-07-17 PROCEDURE — 80053 COMPREHEN METABOLIC PANEL: CPT

## 2023-07-17 PROCEDURE — 82945 GLUCOSE OTHER FLUID: CPT

## 2023-07-17 PROCEDURE — 87102 FUNGUS ISOLATION CULTURE: CPT

## 2023-07-17 PROCEDURE — 93290 INTERROG DEV EVAL ICPMS IP: CPT

## 2023-07-17 PROCEDURE — 86255 FLUORESCENT ANTIBODY SCREEN: CPT

## 2023-07-17 PROCEDURE — 87529 HSV DNA AMP PROBE: CPT

## 2023-07-17 PROCEDURE — 70543 MRI ORBT/FAC/NCK W/O &W/DYE: CPT

## 2023-07-17 PROCEDURE — 83916 OLIGOCLONAL BANDS: CPT

## 2023-07-17 PROCEDURE — 94760 N-INVAS EAR/PLS OXIMETRY 1: CPT

## 2023-07-17 PROCEDURE — 86788 WEST NILE VIRUS AB IGM: CPT

## 2023-07-17 PROCEDURE — 70496 CT ANGIOGRAPHY HEAD: CPT | Mod: 26,MA

## 2023-07-17 PROCEDURE — 87476 LYME DIS DNA AMP PROBE: CPT

## 2023-07-17 PROCEDURE — 87116 MYCOBACTERIA CULTURE: CPT

## 2023-07-17 PROCEDURE — 93010 ELECTROCARDIOGRAM REPORT: CPT

## 2023-07-17 PROCEDURE — 82607 VITAMIN B-12: CPT

## 2023-07-17 PROCEDURE — 86592 SYPHILIS TEST NON-TREP QUAL: CPT

## 2023-07-17 PROCEDURE — 84484 ASSAY OF TROPONIN QUANT: CPT

## 2023-07-17 PROCEDURE — 84157 ASSAY OF PROTEIN OTHER: CPT

## 2023-07-17 PROCEDURE — 86225 DNA ANTIBODY NATIVE: CPT

## 2023-07-17 PROCEDURE — 86617 LYME DISEASE ANTIBODY: CPT

## 2023-07-17 PROCEDURE — 83615 LACTATE (LD) (LDH) ENZYME: CPT

## 2023-07-17 PROCEDURE — 87086 URINE CULTURE/COLONY COUNT: CPT

## 2023-07-17 PROCEDURE — 93306 TTE W/DOPPLER COMPLETE: CPT

## 2023-07-17 PROCEDURE — 82962 GLUCOSE BLOOD TEST: CPT

## 2023-07-17 PROCEDURE — 74174 CTA ABD&PLVS W/CONTRAST: CPT | Mod: 26,MA

## 2023-07-17 PROCEDURE — 70498 CT ANGIOGRAPHY NECK: CPT | Mod: 26,MA

## 2023-07-17 PROCEDURE — 86703 HIV-1/HIV-2 1 RESULT ANTBDY: CPT

## 2023-07-17 PROCEDURE — 87483 CNS DNA AMP PROBE TYPE 12-25: CPT

## 2023-07-17 PROCEDURE — 84100 ASSAY OF PHOSPHORUS: CPT

## 2023-07-17 PROCEDURE — 86403 PARTICLE AGGLUT ANTBDY SCRN: CPT

## 2023-07-17 PROCEDURE — 82784 ASSAY IGA/IGD/IGG/IGM EACH: CPT

## 2023-07-17 PROCEDURE — 87799 DETECT AGENT NOS DNA QUANT: CPT

## 2023-07-17 PROCEDURE — 83036 HEMOGLOBIN GLYCOSYLATED A1C: CPT

## 2023-07-17 PROCEDURE — 93010 ELECTROCARDIOGRAM REPORT: CPT | Mod: 76,77

## 2023-07-17 PROCEDURE — 0042T: CPT | Mod: MA

## 2023-07-17 PROCEDURE — 87070 CULTURE OTHR SPECIMN AEROBIC: CPT

## 2023-07-17 PROCEDURE — 99214 OFFICE O/P EST MOD 30 MIN: CPT | Mod: 25

## 2023-07-17 PROCEDURE — 71275 CT ANGIOGRAPHY CHEST: CPT | Mod: 26,MA

## 2023-07-17 PROCEDURE — 70551 MRI BRAIN STEM W/O DYE: CPT

## 2023-07-17 PROCEDURE — 86038 ANTINUCLEAR ANTIBODIES: CPT

## 2023-07-17 PROCEDURE — 87385 HISTOPLASMA CAPSUL AG IA: CPT

## 2023-07-17 PROCEDURE — 86053 AQAPRN-4 ANTB FLO CYTMTRY EA: CPT

## 2023-07-17 PROCEDURE — 86341 ISLET CELL ANTIBODY: CPT

## 2023-07-17 PROCEDURE — 36415 COLL VENOUS BLD VENIPUNCTURE: CPT

## 2023-07-17 PROCEDURE — 88184 FLOWCYTOMETRY/ TC 1 MARKER: CPT

## 2023-07-17 PROCEDURE — 83735 ASSAY OF MAGNESIUM: CPT

## 2023-07-17 PROCEDURE — 87205 SMEAR GRAM STAIN: CPT

## 2023-07-17 PROCEDURE — 81001 URINALYSIS AUTO W/SCOPE: CPT

## 2023-07-17 PROCEDURE — 99222 1ST HOSP IP/OBS MODERATE 55: CPT | Mod: GC

## 2023-07-17 PROCEDURE — A9579: CPT

## 2023-07-17 PROCEDURE — 82947 ASSAY GLUCOSE BLOOD QUANT: CPT

## 2023-07-17 PROCEDURE — 93005 ELECTROCARDIOGRAM TRACING: CPT

## 2023-07-17 PROCEDURE — 85730 THROMBOPLASTIN TIME PARTIAL: CPT

## 2023-07-17 PROCEDURE — 83519 RIA NONANTIBODY: CPT

## 2023-07-17 RX ORDER — INSULIN LISPRO 100/ML
5 VIAL (ML) SUBCUTANEOUS
Refills: 0 | Status: DISCONTINUED | OUTPATIENT
Start: 2023-07-17 | End: 2023-07-27

## 2023-07-17 RX ORDER — POLYETHYLENE GLYCOL 3350 17 G/17G
17 POWDER, FOR SOLUTION ORAL
Refills: 0 | Status: DISCONTINUED | OUTPATIENT
Start: 2023-07-17 | End: 2023-07-27

## 2023-07-17 RX ORDER — SODIUM CHLORIDE 9 MG/ML
500 INJECTION INTRAMUSCULAR; INTRAVENOUS; SUBCUTANEOUS ONCE
Refills: 0 | Status: COMPLETED | OUTPATIENT
Start: 2023-07-17 | End: 2023-07-18

## 2023-07-17 RX ORDER — SODIUM CHLORIDE 9 MG/ML
1000 INJECTION, SOLUTION INTRAVENOUS
Refills: 0 | Status: DISCONTINUED | OUTPATIENT
Start: 2023-07-17 | End: 2023-07-27

## 2023-07-17 RX ORDER — ASPIRIN/CALCIUM CARB/MAGNESIUM 324 MG
1 TABLET ORAL
Refills: 0 | DISCHARGE

## 2023-07-17 RX ORDER — DEXTROSE 50 % IN WATER 50 %
15 SYRINGE (ML) INTRAVENOUS ONCE
Refills: 0 | Status: DISCONTINUED | OUTPATIENT
Start: 2023-07-17 | End: 2023-07-27

## 2023-07-17 RX ORDER — INSULIN GLARGINE 100 [IU]/ML
15 INJECTION, SOLUTION SUBCUTANEOUS AT BEDTIME
Refills: 0 | Status: DISCONTINUED | OUTPATIENT
Start: 2023-07-17 | End: 2023-07-21

## 2023-07-17 RX ORDER — INSULIN LISPRO 100/ML
1 VIAL (ML) SUBCUTANEOUS
Qty: 0 | Refills: 0 | DISCHARGE

## 2023-07-17 RX ORDER — ASPIRIN/CALCIUM CARB/MAGNESIUM 324 MG
81 TABLET ORAL DAILY
Refills: 0 | Status: DISCONTINUED | OUTPATIENT
Start: 2023-07-17 | End: 2023-07-27

## 2023-07-17 RX ORDER — INSULIN HUMAN 100 [IU]/ML
8 INJECTION, SOLUTION SUBCUTANEOUS ONCE
Refills: 0 | Status: COMPLETED | OUTPATIENT
Start: 2023-07-17 | End: 2023-07-17

## 2023-07-17 RX ORDER — GLUCAGON INJECTION, SOLUTION 0.5 MG/.1ML
1 INJECTION, SOLUTION SUBCUTANEOUS ONCE
Refills: 0 | Status: DISCONTINUED | OUTPATIENT
Start: 2023-07-17 | End: 2023-07-27

## 2023-07-17 RX ORDER — CARVEDILOL PHOSPHATE 80 MG/1
25 CAPSULE, EXTENDED RELEASE ORAL EVERY 12 HOURS
Refills: 0 | Status: DISCONTINUED | OUTPATIENT
Start: 2023-07-17 | End: 2023-07-27

## 2023-07-17 RX ORDER — CALCIUM GLUCONATE 100 MG/ML
1 VIAL (ML) INTRAVENOUS ONCE
Refills: 0 | Status: COMPLETED | OUTPATIENT
Start: 2023-07-17 | End: 2023-07-17

## 2023-07-17 RX ORDER — HEPARIN SODIUM 5000 [USP'U]/ML
5000 INJECTION INTRAVENOUS; SUBCUTANEOUS EVERY 8 HOURS
Refills: 0 | Status: DISCONTINUED | OUTPATIENT
Start: 2023-07-17 | End: 2023-07-27

## 2023-07-17 RX ORDER — MECLIZINE HCL 12.5 MG
25 TABLET ORAL
Refills: 0 | Status: DISCONTINUED | OUTPATIENT
Start: 2023-07-17 | End: 2023-07-27

## 2023-07-17 RX ORDER — DEXTROSE 50 % IN WATER 50 %
25 SYRINGE (ML) INTRAVENOUS ONCE
Refills: 0 | Status: DISCONTINUED | OUTPATIENT
Start: 2023-07-17 | End: 2023-07-27

## 2023-07-17 RX ORDER — ALBUTEROL 90 UG/1
2.5 AEROSOL, METERED ORAL ONCE
Refills: 0 | Status: COMPLETED | OUTPATIENT
Start: 2023-07-17 | End: 2023-07-17

## 2023-07-17 RX ORDER — INSULIN HUMAN 100 [IU]/ML
8 INJECTION, SOLUTION SUBCUTANEOUS ONCE
Refills: 0 | Status: DISCONTINUED | OUTPATIENT
Start: 2023-07-17 | End: 2023-07-17

## 2023-07-17 RX ORDER — AMLODIPINE BESYLATE 2.5 MG/1
10 TABLET ORAL DAILY
Refills: 0 | Status: DISCONTINUED | OUTPATIENT
Start: 2023-07-17 | End: 2023-07-27

## 2023-07-17 RX ORDER — ATORVASTATIN CALCIUM 80 MG/1
20 TABLET, FILM COATED ORAL AT BEDTIME
Refills: 0 | Status: DISCONTINUED | OUTPATIENT
Start: 2023-07-17 | End: 2023-07-27

## 2023-07-17 RX ORDER — HYDRALAZINE HCL 50 MG
25 TABLET ORAL THREE TIMES A DAY
Refills: 0 | Status: DISCONTINUED | OUTPATIENT
Start: 2023-07-17 | End: 2023-07-27

## 2023-07-17 RX ORDER — EMPAGLIFLOZIN 10 MG/1
1 TABLET, FILM COATED ORAL
Refills: 0 | DISCHARGE

## 2023-07-17 RX ORDER — SODIUM ZIRCONIUM CYCLOSILICATE 10 G/10G
5 POWDER, FOR SUSPENSION ORAL THREE TIMES A DAY
Refills: 0 | Status: COMPLETED | OUTPATIENT
Start: 2023-07-17 | End: 2023-07-18

## 2023-07-17 RX ORDER — DEXTROSE 50 % IN WATER 50 %
12.5 SYRINGE (ML) INTRAVENOUS ONCE
Refills: 0 | Status: DISCONTINUED | OUTPATIENT
Start: 2023-07-17 | End: 2023-07-27

## 2023-07-17 RX ORDER — SENNA PLUS 8.6 MG/1
2 TABLET ORAL AT BEDTIME
Refills: 0 | Status: DISCONTINUED | OUTPATIENT
Start: 2023-07-17 | End: 2023-07-27

## 2023-07-17 RX ADMIN — AMLODIPINE BESYLATE 10 MILLIGRAM(S): 2.5 TABLET ORAL at 22:56

## 2023-07-17 RX ADMIN — CARVEDILOL PHOSPHATE 25 MILLIGRAM(S): 80 CAPSULE, EXTENDED RELEASE ORAL at 22:57

## 2023-07-17 RX ADMIN — INSULIN HUMAN 8 UNIT(S): 100 INJECTION, SOLUTION SUBCUTANEOUS at 17:12

## 2023-07-17 RX ADMIN — Medication 25 MILLIGRAM(S): at 22:56

## 2023-07-17 RX ADMIN — Medication 100 GRAM(S): at 17:12

## 2023-07-17 RX ADMIN — ALBUTEROL 2.5 MILLIGRAM(S): 90 AEROSOL, METERED ORAL at 17:12

## 2023-07-17 NOTE — ED PROVIDER NOTE - PHYSICAL EXAMINATION
CONSTITUTIONAL: nontoxic appearing, in no acute distress  HEAD:  normocephalic, atraumatic  EYES:  no conjunctival injection, no eye discharge, tracking well  ENT:  tympanic membranes intact bilaterally, moist mucous membranes, no oropharyngeal ulcerations or lesions, no tonsillar swelling or erythema, no tonsillar exudates  NECK:  supple, no masses, no tender anterior/posterior cervical lymphadenopathy  CV:  regular rate and rhythm, cap refill < 2 seconds  RESP:  normal respiratory effort, lungs clear to auscultation bilaterally, no wheezes, no crackles, no retractions, no stridor  ABD:  soft, nontender, nondistended, no masses, no organomegaly  LYMPH:  no significant lymphadenopathy  MSK/NEURO:  normal movement, normal tone  NIH: 0  SKIN:  warm, dry, no rash

## 2023-07-17 NOTE — ED PROVIDER NOTE - PROGRESS NOTE DETAILS
STEMI code cancelled by cardiology; ischemic changes on EKG however due to neuro sxs and global ischemia; code cancelled stroke code cancelled by neuro as NIH is 0  rec f/u MRI ADDENDUM by Saloni Marie M.D.: I received sign-off from Dr. GILBERTO Esteves Jr. 48-year-old male with history of CVA, MI, sent in from presurgical testing with ECG concerning for MI, code STEMI called and canceled, troponin elevated, also reported having blurry vision and headache for the past few days and code stroke called, outpatient follow-up CT imaging and reconsult neurology and cardiology. CT imaging nonacute. Discussed with neurology and no acute intervention from their standpoint. Discussed with cardiology and would like to be medically admitted at this time, no acute intervention at this time. On my assessment, patient confirms left-sided chest pain tightness/shocks, left-sided cramping headache. Symptoms currently significantly improved. On exam, NAD, well appearing, sitting comfortably in bed, no WOB, RRR, lungs CTA B, AAO, CN's 3 through 12 intact, motor 5/5 and sensation in all extremities, 2+ symmetric radial pulses. No active symptoms at this time. Admitted to medicine for further cardiac work-up.

## 2023-07-17 NOTE — H&P PST ADULT - REASON FOR ADMISSION
Proceduralist: Maurizio Ewing  Procedure: Circumcision and Implantation of inflatable penile prosthesis  Procedure: 120 Minutes  PT STATES--THE EXTRA SKIN ON MY PENIS GETS INFECTED EASILY.   PT DENIES HAVING ANY PAIN. Proceduralist: Maurizio Ewing  Procedure: Circumcision and Implantation of inflatable penile prosthesis  Procedure: 120 Minutes  PT STATES--THE EXTRA SKIN ON MY PENIS GETS INFECTED EASILY.   PT IS A POOR HISTORIAN,   PT DENIES HAVING ANY PAIN DURING INITIAL EXAM.   PT STARTED TO C/O - OF CHEST PAINS, HEADACHE AND DIZZINESS - 20 MINUTES INTO PST VISIT.   EKG- READING ACUTE MI- 911 ACTIVATED.  REPORTS GIVEN TO EMS- PT TRANSFERRED . Proceduralist: Maurizio Ewing  Procedure: Circumcision and Implantation of inflatable penile prosthesis  Procedure: 120 Minutes  PT STATES--THE EXTRA SKIN ON MY PENIS GETS INFECTED EASILY.   PT IS A POOR HISTORIAN,   PT DENIES HAVING ANY PAIN DURING INITIAL EXAM.   PT STARTED TO C/O - OF CHEST PAINS, HEADACHE AND DIZZINESS - 20 MINUTES INTO PST VISIT.   EKG- READING ACUTE MI- 911 ACTIVATED.  REPORTS GIVEN TO EMS- PT TRANSFERRED TO E.D VIA AMBULANCE.

## 2023-07-17 NOTE — CONSULT NOTE ADULT - SUBJECTIVE AND OBJECTIVE BOX
Neurology Consult  48-year-old male with a past medical history of stroke, MI, presents with chest pain and outpatient presurgical testing EKG consistent with STEMI.  On evaluation in trauma bay patient states that he is having chest pain and was previously radiating to his back and radiating to the right side.  Further questioning also states he had a headache and blurry vision.  In addition to STEMI code being activated, stroke code activated.  On further questioning states that he has been having blurry vision intermittently for couple of days and had a headache since last night around 7 to 8 PM on the left side that has been constant since.  Denies any focal weakness, numbness or tingling or vomiting.  Still having chest pain and also states a couple of days of progressive dyspnea on exertion.  On exam NIH stroke scale 0.  CT head with hypodensities in the basal ganglia and right cerebellum consistent with age-indeterminate infarcts.  Given the chest pain going to the back it with neurological symptoms CTA dissection protocol done and with the CT head being abnormal patient sent back for CTA head and neck as well as perfusion.         PAST MEDICAL & SURGICAL HISTORY:  Asthma      Diverticulitis  surgery 16yrs ago      High cholesterol      Dyslipidemia      Hypertension      H/O vertigo      Diabetic ulcer of right foot      Broken toe  left pinky toe      Vertigo      HTN (hypertension)      Diabetes      AMBER on CPAP      History of TIAs      History of surgery  colon resection          FAMILY HISTORY:  Family history of hypertension (Father)        Social History: (-) x 3    Allergies    No Known Allergies    Intolerances        MEDICATIONS  (STANDING):  albuterol    0.083%. 2.5 milliGRAM(s) Nebulizer once  calcium gluconate IVPB 1 Gram(s) IV Intermittent once  insulin regular  human recombinant. 8 Unit(s) IV Push once    MEDICATIONS  (PRN):          Vital Signs Last 24 Hrs  T(C): 36.6 (17 Jul 2023 14:04), Max: 36.6 (17 Jul 2023 14:04)  T(F): 97.8 (17 Jul 2023 14:04), Max: 97.8 (17 Jul 2023 14:04)  HR: 71 (17 Jul 2023 15:25) (67 - 71)  BP: 150/81 (17 Jul 2023 15:25) (119/65 - 150/81)  BP(mean): 83 (17 Jul 2023 14:04) (83 - 83)  RR: 16 (17 Jul 2023 15:25) (16 - 16)  SpO2: 99% (17 Jul 2023 15:25) (99% - 99%)    Parameters below as of 17 Jul 2023 15:25  Patient On (Oxygen Delivery Method): room air        Examination:  General:  Appearance is consistent with chronologic age.  No abnormal facies.  Gross skin survey within normal limits.    Cognitive/Language:  The patient is oriented to person, place, time and date.  Recent and remote memory intact.  Fund of knowledge is intact and normal.  Language with normal repetition, comprehension and naming.  Nondysarthric.    Eyes: intact VA, VFF.  EOMI w/o nystagmus, skew or reported double vision.  PERRL.  No ptosis/weakness of eyelid closure.    Face:  Facial sensation normal V1 - 3, no facial asymmetry.    Ears/Nose/Throat:  Hearing grossly intact b/l.  Palate elevates midline.  Tongue and uvula midline.   Motor examination:   Normal tone, bulk and range of motion.  No tenderness, twitching, tremors or involuntary movements.  Formal Muscle Strength Testing: (MRC grade R/L) 5/5 UE; 5/5 LE.  No observable drift.  Reflexes:   2+ b/l pectoralis, biceps, triceps, brachioradialis, patella and Achilles.  Plantar response downgoing b/l.  Jaw jerk, Macario, clonus absent.  Sensory examination:   Intact to light touch and pinprick, pain, temperature and proprioception and vibration in all extremities.  Cerebellum:   FTN/HKS intact with normal AUBREE in all limbs.  No dysmetria or dysdiadokinesia.  Gait deferred      Labs:   CBC Full  -  ( 17 Jul 2023 15:45 )  WBC Count : 7.45 K/uL  RBC Count : 3.69 M/uL  Hemoglobin : 11.2 g/dL  Hematocrit : 33.8 %  Platelet Count - Automated : 221 K/uL  Mean Cell Volume : 91.6 fL  Mean Cell Hemoglobin : 30.4 pg  Mean Cell Hemoglobin Concentration : 33.1 g/dL  Auto Neutrophil # : 4.87 K/uL  Auto Lymphocyte # : 1.68 K/uL  Auto Monocyte # : 0.70 K/uL  Auto Eosinophil # : 0.14 K/uL  Auto Basophil # : 0.03 K/uL  Auto Neutrophil % : 65.3 %  Auto Lymphocyte % : 22.6 %  Auto Monocyte % : 9.4 %  Auto Eosinophil % : 1.9 %  Auto Basophil % : 0.4 %    07-17    133<L>  |  102  |  60<H>  ----------------------------<  274<H>  5.9<H>   |  23  |  3.7<H>    Ca    8.4      17 Jul 2023 15:45    TPro  6.3  /  Alb  3.6  /  TBili  <0.2  /  DBili  x   /  AST  15  /  ALT  15  /  AlkPhos  165<H>  07-17    LIVER FUNCTIONS - ( 17 Jul 2023 15:45 )  Alb: 3.6 g/dL / Pro: 6.3 g/dL / ALK PHOS: 165 U/L / ALT: 15 U/L / AST: 15 U/L / GGT: x           PT/INR - ( 17 Jul 2023 15:45 )   PT: 9.70 sec;   INR: 0.86 ratio         PTT - ( 17 Jul 2023 15:45 )  PTT:33.6 sec  Urinalysis Basic - ( 17 Jul 2023 15:45 )    Color: x / Appearance: x / SG: x / pH: x  Gluc: 274 mg/dL / Ketone: x  / Bili: x / Urobili: x   Blood: x / Protein: x / Nitrite: x   Leuk Esterase: x / RBC: x / WBC x   Sq Epi: x / Non Sq Epi: x / Bacteria: x          Neuroimaging[facility]  [facility]     07-17-23 @ 17:03

## 2023-07-17 NOTE — CONSULT NOTE ADULT - ASSESSMENT
48-year-old male with a past medical history of stroke, MI, presents with chest pain and outpatient presurgical testing EKG consistent with STEMI.  On evaluation patient states that he is having chest pain and was previously radiating to his back and radiating to the right side.  Further questioning also states he had a headache and blurry vision.  In addition to STEMI code being activated, stroke code activated.  On further questioning states that he has been having blurry vision intermittently for couple of days and had a headache since last night around 7 to 8 PM on the left side that has been constant since.  Denies any focal weakness, numbness or tingling or vomiting.  Still having chest pain and also states a couple of days of progressive dyspnea on exertion.  On exam NIH stroke scale 0.  CT head with hypodensities in the basal ganglia and right cerebellum consistent with age-indeterminate infarcts.  Given the chest pain going to the back it with neurological symptoms CTA dissection protocol done and with the CT head being abnormal patient sent back for CTA head and neck as well as perfusion.     Recommendations:  - CTH reviewed >> Hypodensities are noted of the right cerebellar hemisphere as well as the   bilateral basal ganglia , NIHSS 0  - CTA, CTP pending read  - Obtain MRI non contrast  - Elevated trops, with STEMI   - Rest of the work up as per cardio and medicine team  - Recall once MRI is performed    Case discussed with attending Dr. Chiqui Gomez MD  PGY2, Neurology

## 2023-07-17 NOTE — H&P PST ADULT - HISTORY OF PRESENT ILLNESS
PT PRESENTS TO PAST WITH NO SOB, CP, PALPITATIONS, DYSURIA, UTI OR URI AT PRESENT.   PT UNABLE TO WALK UP 2-3 FLIGHTS OF STEPS WITH NO SOB.   PT AMBULATES TO [facility] WITH A CANE. PT WALKING VERY SLOW.  EXERCISE TOLERANCE - LIMITED   AS PER THE PT, THIS IS HIS/HER COMPLETE MEDICAL AND SURGICAL HX, INCLUDING MEDICATIONS PRESCRIBED AND OVER THE COUNTER  pt denies any covid s/s, or tested positive in the past  pt advised self quarantine till day of procedure  denies travel outside the [place] in the past 30 days  Anesthesia Alert  NO--Difficult Airway  NO--History of neck surgery or radiation  NO--Limited ROM of neck  NO--History of Malignant hyperthermia  NO--Personal or family history of Pseudocholinesterase deficiency  NO--Prior Anesthesia Complication  NO--Latex Allergy  NO--Loose teeth  NO--History of Rheumatoid Arthritis  YES --AMBER  NO BLEEDING RISK  NO--Other_____   PT PRESENTS TO PAST WITH NO SOB, CP, PALPITATIONS, DYSURIA, UTI OR URI AT PRESENT.   PT UNABLE TO WALK UP 2-3 FLIGHTS OF STEPS WITH NO SOB.   PT AMBULATES TO [facility] WITH A CANE. PT WALKING VERY SLOW.  EXERCISE TOLERANCE - LIMITED   AS PER THE PT, THIS IS HIS/HER COMPLETE MEDICAL AND SURGICAL HX, INCLUDING MEDICATIONS PRESCRIBED AND OVER THE COUNTER  pt denies any covid s/s, or tested positive in the past  pt advised self quarantine till day of procedure  denies travel outside the [place] in the past 30 days  Anesthesia Alert  NO--Difficult Airway  NO--History of neck surgery or radiation  NO--Limited ROM of neck  NO--History of Malignant hyperthermia  NO--Personal or family history of Pseudocholinesterase deficiency  NO--Prior Anesthesia Complication  NO--Latex Allergy  NO--Loose teeth  NO--History of Rheumatoid Arthritis  YES --AMBER  NO BLEEDING RISK  PT DENIES HAVING ANY CHEST PAINS.  PT STATING - I AM BEGINNING TO FEEL DIZZY  AND I HAVE A HEADACHE EKG -READING  ACUTE MI-    PT PRESENTS TO PAST WITH NO SOB, CP, PALPITATIONS, DYSURIA, UTI OR URI AT PRESENT.   PT UNABLE TO WALK UP 2-3 FLIGHTS OF STEPS WITH NO SOB.   PT AMBULATES TO [facility] WITH A CANE. PT WALKING VERY SLOW.  EXERCISE TOLERANCE - LIMITED   AS PER THE PT, THIS IS HIS/HER COMPLETE MEDICAL AND SURGICAL HX, INCLUDING MEDICATIONS PRESCRIBED AND OVER THE COUNTER  pt denies any covid s/s, or tested positive in the past  pt advised self quarantine till day of procedure  denies travel outside the [place] in the past 30 days  Anesthesia Alert  NO--Difficult Airway  NO--History of neck surgery or radiation  NO--Limited ROM of neck  NO--History of Malignant hyperthermia  NO--Personal or family history of Pseudocholinesterase deficiency  NO--Prior Anesthesia Complication  NO--Latex Allergy  NO--Loose teeth  NO--History of Rheumatoid Arthritis  YES --AMBER  NO BLEEDING RISK  PT HAVING CHEST PAINS LASTING [place] 1-3 SEC--4 MINUTES APART.     AS PER PT. PT STATING - I AM BEGINNING TO FEEL DIZZY  AND I HAVE A HEADACHE EKG -READING  ACUTE MI-

## 2023-07-17 NOTE — ED ADULT NURSE NOTE - OBJECTIVE STATEMENT
pt c/o chest pain, STEMI code called in ED. also endorses right sided blurry vision x 1 hour. had same symptoms when he had CVA in past

## 2023-07-17 NOTE — ED ADULT NURSE REASSESSMENT NOTE - NS ED NURSE REASSESS COMMENT FT1
Pt was evaluated for STEMI- as per MD's STEMI cancelled and STROKE code called for pt due to severe headache with feeling generally "unwell" over the past two days w/ chest pain that began today. Pt has hx of previous strokes. Pt taken to CT with RN- on cardiac Monitor.

## 2023-07-17 NOTE — H&P ADULT - NSHPPHYSICALEXAM_GEN_ALL_CORE
PHYSICAL EXAM:  GENERAL: NAD  EYES: conjunctiva and sclera clear, tender right eye  ENMT: No tonsillar erythema, exudates, or enlargement; Moist mucous membranes  NECK: Supple, No JVD, Normal thyroid  HEART: Regular rate and rhythm; No murmurs, rubs, or gallops, Normal S1 S2   RESPIRATORY: Clear to auscultation bilaterally, resonant percussion note, no added sounds   ABDOMEN: Soft, Nontender, Nondistended; Bowel sounds present, vertical scar   NEUROLOGY: A&Ox3, grossly intact power and sensation. right horizontal vision induces the pain   EXTREMITIES:  2+ Peripheral Pulses, No clubbing, cyanosis, or edema  SKIN: warm, dry, normal color, no rash or abnormal lesions

## 2023-07-17 NOTE — ED PROVIDER NOTE - CLINICAL SUMMARY MEDICAL DECISION MAKING FREE TEXT BOX
Code STEMI, code stroke. No acute intervention. CTA negative. Admitted to medicine for cardiac work-up

## 2023-07-17 NOTE — ED PROVIDER NOTE - CARE PLAN
1 Principal Discharge DX:	STEMI (ST elevation myocardial infarction)  Secondary Diagnosis:	Blurry vision  Secondary Diagnosis:	CKD (chronic kidney disease)  Secondary Diagnosis:	BILLY (acute kidney injury)

## 2023-07-17 NOTE — H&P ADULT - NSHPLABSRESULTS_GEN_ALL_CORE
11.2   7.45  )-----------( 221      ( 2023 15:45 )             33.8           133<L>  |  102  |  60<H>  ----------------------------<  274<H>  5.9<H>   |  23  |  3.7<H>    Ca    8.4      2023 15:45    TPro  6.3  /  Alb  3.6  /  TBili  <0.2  /  DBili  x   /  AST  15  /  ALT  15  /  AlkPhos  165<H>                Urinalysis Basic - ( 2023 17:58 )    Color: Light Yellow / Appearance: Clear / S.015 / pH: x  Gluc: x / Ketone: Negative  / Bili: Negative / Urobili: <2 mg/dL   Blood: x / Protein: >600 mg/dL / Nitrite: Negative   Leuk Esterase: Negative / RBC: 3 /HPF / WBC 2 /HPF   Sq Epi: x / Non Sq Epi: x / Bacteria: Negative        PT/INR - ( 2023 15:45 )   PT: 9.70 sec;   INR: 0.86 ratio         PTT - ( 2023 15:45 )  PTT:33.6 sec    Lactate Trend      CARDIAC MARKERS ( 2023 15:45 )  x     / 0.11 ng/mL / x     / x     / x            CAPILLARY BLOOD GLUCOSE

## 2023-07-17 NOTE — H&P ADULT - ASSESSMENT
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   Admitted for further evaluation of right sided eye pain and chest pain to telemetry      # Chest pain - resolved - mostly angina like pain   # Hx of non obstructive CAD  # HTN  # HLD   - Code STEMI was called on presentation then cancelled  - On home aspirin, rosuvastatin 20, hydralazine 25X3, carvedilol 25X2, amlodipine 5X1, spironolactone 25X1  - Troponin 0.11 - stable   - last EKG showed no ischemic changes, the one before was suggestive of STEMI   - Continue home medications   - cardiology consult   - Trend troponin       # Right eye pain with blurring of vision possible inflammatory ( giant cell arteritis ) or diabetic ophthalmoplegia   # Hx of multiple CVA with minimal residual weakness   - code stroke called on admission, CT head perfusion and angiogram is negative for any strokes   - No signs of orbital infection, no redness or swelling, no fever. 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 - baseline around 3.2  # Hyperkalemia with mild hyponatremia  - S/P IV contrast today  - He is saying he feels dehydrated   - Cr 3.7, BUN 60  - K 5.9 given insulin and albuterol   - DC home spironolactone   - Start Lokelma for 1 day   - Follow up BMP, KFT  - Nephrology consult if worsening kidney function       # Hx of diverticulitis S/P LAR  # Constipation  - start senna and Miralax      # Uncontrolled DM  - A1c 10.9  - On home Jardiance 10 mg   - start Lantus 15 units with lispro 5 X3   - Follow up finger stick   - Outpatient endocrinology is needed       # GI prophylaxis: none   # DVT prophylaxis: heparin 5000X3  # Diet: regular DASH, carb consistent   # Full code  47 YO male with PMHx of CKD IV baseline around 3.2, vertigo, diverticulitis s/p LAR, cigarette use, ETOH abuse now sober x5 years, IDDM, HTN, AMBER, Hx of multiple CVA with residual weakness on right side.   Admitted for further evaluation of right sided eye pain and chest pain to telemetry      # Chest pain - resolved - mostly angina like pain   # Hx of non obstructive CAD  # HTN  # HLD   - Code STEMI was called on presentation then cancelled  - On home aspirin, rosuvastatin 20, hydralazine 25X3, carvedilol 25X2, amlodipine 5X1, spironolactone 25X1  - Troponin 0.11 - stable   - last EKG showed no ischemic changes, the one before was suggestive of STEMI   - Continue home medications   - cardiology consult   - Trend troponin       # Right eye pain with blurring of vision possible inflammatory ( giant cell arteritis ) or diabetic ophthalmoplegia   # Hx of multiple CVA with minimal residual weakness   - code stroke called on admission, CT head perfusion and angiogram is negative for any strokes   - No signs of orbital infection, no redness or swelling, no fever. 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 - baseline around 3.2  # Hyperkalemia with mild hyponatremia  - S/P IV contrast today  - He is saying he feels dehydrated   - Cr 3.7, BUN 60  - K 5.9 given insulin and albuterol   - DC home spironolactone   - Start Lokelma for 1 day   - Follow up BMP, KFT  - Nephrology consult if worsening kidney function       # Hx of diverticulitis S/P LAR  # Constipation  - start senna and Miralax      # Uncontrolled DM  - A1c 10.9  - On home Jardiance 10 mg   - start Lantus 15 units with lispro 5 X3   - Follow up finger stick   - Outpatient endocrinology is needed       # Hx of vertigo  - Continue home meclizine 25X2       # GI prophylaxis: none   # DVT prophylaxis: heparin 5000X3  # Diet: regular DASH, carb consistent   # Full code

## 2023-07-17 NOTE — H&P PST ADULT - NSICDXPASTMEDICALHX_GEN_ALL_CORE_FT
PAST MEDICAL HISTORY:  Asthma     Broken toe left pinky toe    Diabetes     Diabetic ulcer of right foot     Diverticulitis surgery 16yrs ago    Dyslipidemia     H/O vertigo     High cholesterol     History of TIAs     HTN (hypertension)     Hypertension     AMBER on CPAP     Vertigo

## 2023-07-17 NOTE — H&P ADULT - HISTORY OF PRESENT ILLNESS
47 YO male with PMHx of CKD IV baseline around 3.2, vertigo, diverticulitis s/p LAR, cigarette use, ETOH abuse now sober x5 years, IDDM, HTN, AMBER, Hx of multiple CVA with residual weakness on right side.     He was in the outpatient clinic for pre-test for circumcision, his EKG suggested STEMI so EMS was called and on the way to the hospital he developed chest pain of left side radiating to the back, characterized by tightness then burning sensation through the whole chest. This pain lasted for 1 hour and resolved, he received aspirin loading on the way here. Upon evaluation, he had no chest pain, no shortness of breath, no cough or palpitations, no lower limb edema. He has no fever, no chills. Pt follows at [facility] and underwent cardiac catheterization which showed non-obstructive CAD.    He is also complaining from right eye pain with double vision and blurring of vision that started 3 weeks ago, he was admitted to another hospital and they discharged him after doing workup for his eye and ophthalmology saw him and they said it is mostly related to the uncontrolled diabetes. His right eye pain has been constant for the past 3 weeks radiating to the front head causing severe headaches, it is associated with photosensitivity, no tearing, no redness. He has no weakness, no numbness.     In ED Vital Signs T(F): 97.7 HR: 71 BP: 155/98 RR: 18 SpO2: 98%     Stroke code was called in ED, CT head with perfusion showed no ischemia, no signs of stroke   STEMI code was called in ED, was cancelled after cardiology evaluation     Admitted to telemetry for further workup of right eye pain and chest pain

## 2023-07-17 NOTE — ED ADULT NURSE NOTE - CHIEF COMPLAINT QUOTE
Patient BIBA from Doctors office with R sided chest pain and severe headache that started today. ASA 324mg given.

## 2023-07-17 NOTE — H&P ADULT - ATTENDING COMMENTS
HPI:  47 YO male with PMHx of CKD IV baseline around 3.2, vertigo, diverticulitis s/p LAR, cigarette use, ETOH abuse now sober x5 years, IDDM, HTN, AMBER, Hx of multiple CVA with residual weakness on right side.     He was in the outpatient clinic for pre-test for circumcision, his EKG suggested STEMI so EMS was called and on the way to the hospital he developed chest pain of left side radiating to the back, characterized by tightness then burning sensation through the whole chest. This pain lasted for 1 hour and resolved, he received aspirin loading on the way here. Upon evaluation, he had no chest pain, no shortness of breath, no cough or palpitations, no lower limb edema. He has no fever, no chills. Pt follows at [facility] and underwent cardiac catheterization which showed non-obstructive CAD.    He is also complaining from right eye pain with double vision and blurring of vision that started 3 weeks ago, he was admitted to another hospital and they discharged him after doing workup for his eye and ophthalmology saw him and they said it is mostly related to the uncontrolled diabetes. His right eye pain has been constant for the past 3 weeks radiating to the front head causing severe headaches, it is associated with photosensitivity, no tearing, no redness. He has no weakness, no numbness.     In ED Vital Signs T(F): 97.7 HR: 71 BP: 155/98 RR: 18 SpO2: 98%     Stroke code was called in ED, CT head with perfusion showed no ischemia, no signs of stroke   STEMI code was called in ED, was cancelled after cardiology evaluation     Admitted to telemetry for further workup of right eye pain and chest pain        (17 Jul 2023 22:01)  REVIEW OF SYSTEMS: see cc/HPI   CONSTITUTIONAL: No weakness, fevers or chills  EYES/ENT: No visual changes;  No vertigo or throat pain   NECK: No pain or stiffness  RESPIRATORY: No cough, wheezing, hemoptysis; No shortness of breath  CARDIOVASCULAR: No chest pain or palpitations  GASTROINTESTINAL: No abdominal or epigastric pain. No nausea, vomiting, or hematemesis; No diarrhea or constipation. No melena or hematochezia.  GENITOURINARY: No dysuria, frequency or hematuria  NEUROLOGICAL: No numbness or weakness, (+) HA and (+) blurry vision   SKIN: No itching, rashes  PSYCHE:  ENDO:  MSK:    Physical Exam:   General: WN/WD NAD  Neurology: A&Ox3, nonfocal, follows commands,   Eyes: PERRLA/ EOMI  ENT/Neck: Neck supple, trachea midline, No JVD  Respiratory: CTA B/L, No wheezing, rales, rhonchi  CV: Normal rate regular rhythm, S1S2, no murmurs, rubs or gallops  Abdominal: Soft, NT, ND +BS,   Extremities: No edema, + peripheral pulses  Skin: No Rashes, Hematoma, Ecchymosis  Incisions:   Tubes: HPI:  49 YO male with PMHx of CKD IV baseline around 3.2, vertigo, diverticulitis s/p LAR, cigarette use, ETOH abuse now sober x5 years, IDDM, HTN, AMBER, Hx of multiple CVA with residual weakness on right side.     He was in the outpatient clinic for pre-test for circumcision, his EKG suggested STEMI so EMS was called and on the way to the hospital he developed chest pain of left side radiating to the back, characterized by tightness then burning sensation through the whole chest. This pain lasted for 1 hour and resolved, he received aspirin loading on the way here. Upon evaluation, he had no chest pain, no shortness of breath, no cough or palpitations, no lower limb edema. He has no fever, no chills. Pt follows at [facility] and underwent cardiac catheterization which showed non-obstructive CAD.    He is also complaining from right eye pain with double vision and blurring of vision that started 3 weeks ago, he was admitted to another hospital and they discharged him after doing workup for his eye and ophthalmology saw him and they said it is mostly related to the uncontrolled diabetes. His right eye pain has been constant for the past 3 weeks radiating to the front head causing severe headaches, it is associated with photosensitivity, no tearing, no redness. He has no weakness, no numbness.     In ED Vital Signs T(F): 97.7 HR: 71 BP: 155/98 RR: 18 SpO2: 98%     Stroke code was called in ED, CT head with perfusion showed no ischemia, no signs of stroke   STEMI code was called in ED, was cancelled after cardiology evaluation     Admitted to telemetry for further workup of right eye pain and chest pain        (17 Jul 2023 22:01)  REVIEW OF SYSTEMS: see cc/HPI   CONSTITUTIONAL: No weakness, fevers or chills  EYES/ENT: No visual changes;  No vertigo or throat pain   NECK: No pain or stiffness  RESPIRATORY: No cough, wheezing, hemoptysis; No shortness of breath  CARDIOVASCULAR: No chest pain or palpitations  GASTROINTESTINAL: No abdominal or epigastric pain. No nausea, vomiting, or hematemesis; No diarrhea or constipation. No melena or hematochezia.  GENITOURINARY: No dysuria, frequency or hematuria  NEUROLOGICAL: No numbness or weakness, (+) HA and (+) blurry vision   SKIN: No itching, rashes  PSYCHE: no mood d/o, psychosis   ENDO: (+) hyperglycemia, no thyroid d/o  MSK: no deformity / fracture     Physical Exam:   General: WN/WD NAD  Neurology: A&Ox3, nonfocal, follows commands, c/o unilateral R eye pain and frontal HA  Eyes: PERRLA/ EOMI  ENT/Neck: Neck supple, trachea midline, No JVD  Respiratory: CTA B/L, No wheezing, rales, rhonchi  CV: Normal rate regular rhythm, S1S2, no murmurs, rubs or gallops  Abdominal: Soft, NT, ND +BS,   Extremities: No edema, + peripheral pulses  Skin: No Rashes, Hematoma, Ecchymosis    A/p  Chest pain in patient w/ H/o non-obstructive CAD   HTN   Dyslipidemia   -admit to tele   -serial CE and EKG   -ASA/ Bblocker / statin / hydralazine /amlodipine   -Cardiology eval     Frontal HA / R eye pain w/ blurry vision and tearing r/o cluster HA vs. r/o glaucoma r/o migraine   H/o CVA - stroke code called on presentation   H/o Vertigo  -MRI brain/ orbits  -Ophthalmology   -agree w/ ESR /CRP   -PRN Meclizine     CKD IV w/ hyperkalemia - diabetic nephropathy   -IV fluids and Lokelma   -hold Neprotoxic agents / spironolactone   -serial SCr      DM type II - uncontrolled ( A1c 10.9)   hold oral agents   -lantus / lisrpo AC w/ F/s monitoring and ISS     Constipation   -senna and Miralax     DVT prophylaxis     PATIENT SEEN by ATTENDING 7/17/23 ( note revised 7/18)

## 2023-07-17 NOTE — H&P ADULT - NSHPREVIEWOFSYSTEMS_GEN_ALL_CORE
REVIEW OF SYSTEMS:    CONSTITUTIONAL: No weakness, fevers or chills  EYES/ENT: No vertigo or throat pain, headache, right eye pain with blurring of vision  NECK: stiffness with pain   RESPIRATORY: No cough, wheezing, hemoptysis; No shortness of breath  CARDIOVASCULAR: resolved chest pain, no palpitations, no lower limb edema  GASTROINTESTINAL: No abdominal or epigastric pain. No nausea, vomiting, or hematemesis; No diarrhea or constipation. No melena or hematochezia.  GENITOURINARY: No dysuria, frequency or hematuria  NEUROLOGICAL: No numbness or weakness  SKIN: No itching, rashes  MUSCULOSKELETAL: no arthralgia or arthritis

## 2023-07-17 NOTE — ED PROVIDER NOTE - ATTENDING CONTRIBUTION TO CARE
48-year-old male with a past medical history of stroke, MI, presents with chest pain and outpatient presurgical testing EKG consistent with STEMI.  On evaluation in trauma bay patient states that he is having chest pain and was previously radiating to his back and radiating to the right side.  Further questioning also states he had a headache and blurry vision.  In addition to STEMI code being activated, stroke code activated.  On further questioning states that he has been having blurry vision intermittently for couple of days and had a headache since last night around 7 to 8 PM on the left side that has been constant since.  Denies any focal weakness, numbness or tingling or vomiting.  Still having chest pain and also states a couple of days of progressive dyspnea on exertion.  On exam NIH stroke scale 0 for me, heart regular no murmurs, lungs clear bilaterally.  EKG consistent with ST elevation and reciprocal depressions.  Unchanged from prior.  Cardiology fellow Dr. Beatty at bedside.  CT head with hypodensities in the basal ganglia and right cerebellum consistent with age-indeterminate infarcts.  Given the chest pain going to the back it with neurological symptoms CTA dissection protocol done and with the CT head being abnormal patient sent back for CTA head and neck as well as perfusion.  Initially was canceled after discussion with neurology attending Dr. Florian given nonfocal exam and a couple of days of symptomatology as well as the fact that he mainly just had headache.  Troponin positive will rediscussed with cardiology.  We will treat hyperkalemia.    Attending Statement: I have personally provided the amount of critical care time documented below excluding time spent on separate procedures.     Critical Care Time Spent (min) Must be 30 or more minutes to qualify: 35.

## 2023-07-17 NOTE — ED ADULT TRIAGE NOTE - CHIEF COMPLAINT QUOTE
Patient BIBA from Doctors office with R sided chest pain and severe headache that started today. Patient BIBA from Doctors office with R sided chest pain and severe headache that started today. ASA 324mg given.

## 2023-07-17 NOTE — CHART NOTE - NSCHARTNOTEFT_GEN_A_CORE
STEMI code called in ED [place] to which I responded immediately.   EKG reviewed.   Pt seen and examined bedside. Reports he was at pre-surgical testing getting vitals done for a planned circumcission. His EKG showed anterior biphasic T waves so he was sent to the ED e endorses R-sided sharp pain radiating to the left side associated with headaches. As per EMS, pt had difficulty ambulating, and had a left facial droop as well.  Pt follows at [facility] and underwent cardiac catheterization which showed non-obstructive CAD.  Case discussed with Interventional cardiologist on call.  STEMI canceled. STEMI code called in ED [place] to which I responded immediately.   EKG reviewed.   Pt seen and examined bedside. Reports he was at pre-surgical testing getting vitals done for a planned circumcision. His EKG showed anterior biphasic T waves so he was sent to the ED e endorses R-sided sharp pain radiating to the left side associated with headaches. As per EMS, pt had difficulty ambulating, and had a left facial droop as well.  Pt follows at [facility] and underwent cardiac catheterization which showed non-obstructive CAD.  Case discussed with Interventional cardiologist on call.  STEMI canceled.

## 2023-07-17 NOTE — ED PROVIDER NOTE - OBJECTIVE STATEMENT
49 YO male with PMHx of CKD IV, vertigo, diverticulitis s/p LAR, cigarette use, ETOH abuse now sober x5 years, IDDM, HTN, AMBER, recent stroke w/ residual deficits on the R, and "mini MI" at that time c/o R sided chest pain x1hr PTA; in Ed pt complained of additional R sided blurry vision for which he states he gets intermittently. STROKE and STEMI code called on arrival. per chart review; pt recently cathed and found to have 20% occlusion, STEMI cancelled by cardiology. pt sent to Ct for scans.

## 2023-07-18 DIAGNOSIS — Z01.818 ENCOUNTER FOR OTHER PREPROCEDURAL EXAMINATION: ICD-10-CM

## 2023-07-18 DIAGNOSIS — N52.9 MALE ERECTILE DYSFUNCTION, UNSPECIFIED: ICD-10-CM

## 2023-07-18 LAB
ALBUMIN SERPL ELPH-MCNC: 3.1 G/DL — LOW (ref 3.5–5.2)
ALP SERPL-CCNC: 123 U/L — HIGH (ref 30–115)
ALT FLD-CCNC: 14 U/L — SIGNIFICANT CHANGE UP (ref 0–41)
ANION GAP SERPL CALC-SCNC: 8 MMOL/L — SIGNIFICANT CHANGE UP (ref 7–14)
AST SERPL-CCNC: 14 U/L — SIGNIFICANT CHANGE UP (ref 0–41)
BILIRUB SERPL-MCNC: <0.2 MG/DL — SIGNIFICANT CHANGE UP (ref 0.2–1.2)
BUN SERPL-MCNC: 55 MG/DL — HIGH (ref 10–20)
CALCIUM SERPL-MCNC: 8.3 MG/DL — LOW (ref 8.4–10.5)
CHLORIDE SERPL-SCNC: 103 MMOL/L — SIGNIFICANT CHANGE UP (ref 98–110)
CO2 SERPL-SCNC: 24 MMOL/L — SIGNIFICANT CHANGE UP (ref 17–32)
CREAT SERPL-MCNC: 3.6 MG/DL — HIGH (ref 0.7–1.5)
CRP SERPL-MCNC: <3 MG/L — SIGNIFICANT CHANGE UP
CRP SERPL-MCNC: <3 MG/L — SIGNIFICANT CHANGE UP
CULTURE RESULTS: NO GROWTH — SIGNIFICANT CHANGE UP
EGFR: 20 ML/MIN/1.73M2 — LOW
ERYTHROCYTE [SEDIMENTATION RATE] IN BLOOD: 66 MM/HR — HIGH (ref 0–10)
ERYTHROCYTE [SEDIMENTATION RATE] IN BLOOD: 75 MM/HR — HIGH (ref 0–10)
GLUCOSE BLDC GLUCOMTR-MCNC: 146 MG/DL — HIGH (ref 70–99)
GLUCOSE BLDC GLUCOMTR-MCNC: 265 MG/DL — HIGH (ref 70–99)
GLUCOSE BLDC GLUCOMTR-MCNC: 314 MG/DL — HIGH (ref 70–99)
GLUCOSE SERPL-MCNC: 178 MG/DL — HIGH (ref 70–99)
HCT VFR BLD CALC: 32.1 % — LOW (ref 42–52)
HGB BLD-MCNC: 10.8 G/DL — LOW (ref 14–18)
MCHC RBC-ENTMCNC: 31 PG — SIGNIFICANT CHANGE UP (ref 27–31)
MCHC RBC-ENTMCNC: 33.6 G/DL — SIGNIFICANT CHANGE UP (ref 32–37)
MCV RBC AUTO: 92.2 FL — SIGNIFICANT CHANGE UP (ref 80–94)
NRBC # BLD: 0 /100 WBCS — SIGNIFICANT CHANGE UP (ref 0–0)
PLATELET # BLD AUTO: 198 K/UL — SIGNIFICANT CHANGE UP (ref 130–400)
PMV BLD: 11.8 FL — HIGH (ref 7.4–10.4)
POTASSIUM SERPL-MCNC: 4.5 MMOL/L — SIGNIFICANT CHANGE UP (ref 3.5–5)
POTASSIUM SERPL-SCNC: 4.5 MMOL/L — SIGNIFICANT CHANGE UP (ref 3.5–5)
PROT SERPL-MCNC: 5.5 G/DL — LOW (ref 6–8)
RBC # BLD: 3.48 M/UL — LOW (ref 4.7–6.1)
RBC # FLD: 12.5 % — SIGNIFICANT CHANGE UP (ref 11.5–14.5)
SODIUM SERPL-SCNC: 135 MMOL/L — SIGNIFICANT CHANGE UP (ref 135–146)
SPECIMEN SOURCE: SIGNIFICANT CHANGE UP
TROPONIN T SERPL-MCNC: 0.1 NG/ML — CRITICAL HIGH
WBC # BLD: 7.24 K/UL — SIGNIFICANT CHANGE UP (ref 4.8–10.8)
WBC # FLD AUTO: 7.24 K/UL — SIGNIFICANT CHANGE UP (ref 4.8–10.8)

## 2023-07-18 PROCEDURE — 70551 MRI BRAIN STEM W/O DYE: CPT | Mod: 26

## 2023-07-18 PROCEDURE — 99233 SBSQ HOSP IP/OBS HIGH 50: CPT

## 2023-07-18 PROCEDURE — 99222 1ST HOSP IP/OBS MODERATE 55: CPT

## 2023-07-18 RX ORDER — ACETAMINOPHEN 500 MG
975 TABLET ORAL ONCE
Refills: 0 | Status: COMPLETED | OUTPATIENT
Start: 2023-07-18 | End: 2023-07-18

## 2023-07-18 RX ORDER — ACETAMINOPHEN 500 MG
650 TABLET ORAL EVERY 6 HOURS
Refills: 0 | Status: DISCONTINUED | OUTPATIENT
Start: 2023-07-18 | End: 2023-07-27

## 2023-07-18 RX ADMIN — ATORVASTATIN CALCIUM 20 MILLIGRAM(S): 80 TABLET, FILM COATED ORAL at 21:55

## 2023-07-18 RX ADMIN — Medication 81 MILLIGRAM(S): at 11:31

## 2023-07-18 RX ADMIN — HEPARIN SODIUM 5000 UNIT(S): 5000 INJECTION INTRAVENOUS; SUBCUTANEOUS at 05:39

## 2023-07-18 RX ADMIN — AMLODIPINE BESYLATE 10 MILLIGRAM(S): 2.5 TABLET ORAL at 05:38

## 2023-07-18 RX ADMIN — SODIUM ZIRCONIUM CYCLOSILICATE 5 GRAM(S): 10 POWDER, FOR SUSPENSION ORAL at 14:40

## 2023-07-18 RX ADMIN — INSULIN GLARGINE 15 UNIT(S): 100 INJECTION, SOLUTION SUBCUTANEOUS at 00:02

## 2023-07-18 RX ADMIN — Medication 25 MILLIGRAM(S): at 05:39

## 2023-07-18 RX ADMIN — Medication 5 UNIT(S): at 11:31

## 2023-07-18 RX ADMIN — POLYETHYLENE GLYCOL 3350 17 GRAM(S): 17 POWDER, FOR SOLUTION ORAL at 00:04

## 2023-07-18 RX ADMIN — HEPARIN SODIUM 5000 UNIT(S): 5000 INJECTION INTRAVENOUS; SUBCUTANEOUS at 14:41

## 2023-07-18 RX ADMIN — HEPARIN SODIUM 5000 UNIT(S): 5000 INJECTION INTRAVENOUS; SUBCUTANEOUS at 21:56

## 2023-07-18 RX ADMIN — SODIUM ZIRCONIUM CYCLOSILICATE 5 GRAM(S): 10 POWDER, FOR SUSPENSION ORAL at 05:38

## 2023-07-18 RX ADMIN — Medication 25 MILLIGRAM(S): at 21:58

## 2023-07-18 RX ADMIN — POLYETHYLENE GLYCOL 3350 17 GRAM(S): 17 POWDER, FOR SOLUTION ORAL at 05:38

## 2023-07-18 RX ADMIN — SODIUM ZIRCONIUM CYCLOSILICATE 5 GRAM(S): 10 POWDER, FOR SUSPENSION ORAL at 22:01

## 2023-07-18 RX ADMIN — SODIUM CHLORIDE 500 MILLILITER(S): 9 INJECTION INTRAMUSCULAR; INTRAVENOUS; SUBCUTANEOUS at 00:32

## 2023-07-18 RX ADMIN — SENNA PLUS 2 TABLET(S): 8.6 TABLET ORAL at 00:02

## 2023-07-18 RX ADMIN — Medication 975 MILLIGRAM(S): at 00:32

## 2023-07-18 RX ADMIN — CARVEDILOL PHOSPHATE 25 MILLIGRAM(S): 80 CAPSULE, EXTENDED RELEASE ORAL at 05:39

## 2023-07-18 RX ADMIN — Medication 25 MILLIGRAM(S): at 17:48

## 2023-07-18 RX ADMIN — Medication 5 UNIT(S): at 17:48

## 2023-07-18 RX ADMIN — SENNA PLUS 2 TABLET(S): 8.6 TABLET ORAL at 22:01

## 2023-07-18 RX ADMIN — Medication 25 MILLIGRAM(S): at 14:41

## 2023-07-18 RX ADMIN — INSULIN GLARGINE 15 UNIT(S): 100 INJECTION, SOLUTION SUBCUTANEOUS at 22:03

## 2023-07-18 RX ADMIN — CARVEDILOL PHOSPHATE 25 MILLIGRAM(S): 80 CAPSULE, EXTENDED RELEASE ORAL at 17:48

## 2023-07-18 RX ADMIN — Medication 5 UNIT(S): at 07:27

## 2023-07-18 NOTE — CONSULT NOTE ADULT - SUBJECTIVE AND OBJECTIVE BOX
HPI:  47 YO male with PMHx of CKD IV baseline around 3.2, vertigo, diverticulitis s/p LAR, cigarette use, ETOH abuse now sober x5 years, IDDM, HTN, AMBER, Hx of multiple CVA with residual weakness on right side.     He was in the outpatient clinic for pre-test for circumcision, his EKG suggested STEMI so EMS was called and on the way to the hospital he developed chest pain of left side radiating to the back, characterized by tightness then burning sensation through the whole chest. This pain lasted for 1 hour and resolved, he received aspirin loading on the way here. Upon evaluation, he had no chest pain, no shortness of breath, no cough or palpitations, no lower limb edema. He has no fever, no chills. Pt follows at [facility] and underwent cardiac catheterization which showed non-obstructive CAD.    He is also complaining from right eye pain with double vision and blurring of vision that started 3 weeks ago, he was admitted to another hospital and they discharged him after doing workup for his eye and ophthalmology saw him and they said it is mostly related to the uncontrolled diabetes. His right eye pain has been constant for the past 3 weeks radiating to the front head causing severe headaches, it is associated with photosensitivity, no tearing, no redness. He has no weakness, no numbness.     In ED Vital Signs T(F): 97.7 HR: 71 BP: 155/98 RR: 18 SpO2: 98%     Stroke code was called in ED, CT head with perfusion showed no ischemia, no signs of stroke   STEMI code was called in ED, was cancelled after cardiology evaluation     Admitted to telemetry for further workup of right eye pain and chest pain        (2023 22:01)        PAST MEDICAL & SURGICAL HISTORY  Hypertension    Dyslipidemia    High cholesterol    Diverticulitis  surgery 16yrs ago    Asthma    H/O vertigo    Diabetic ulcer of right foot    Broken toe  left pinky toe    Vertigo    HTN (hypertension)    Diabetes    AMBER on CPAP    History of TIAs    History of surgery  colon resection        FAMILY HISTORY:  FAMILY HISTORY:  Family history of hypertension (Father)        SOCIAL HISTORY:  Social History:  occasional alcohol and cigarette smoking ( stopped for 5 years then started again )  No drug use (2023 22:01)      ALLERGIES:  No Known Allergies      MEDICATIONS:  amLODIPine   Tablet 10 milliGRAM(s) Oral daily  aspirin enteric coated 81 milliGRAM(s) Oral daily  atorvastatin 20 milliGRAM(s) Oral at bedtime  carvedilol 25 milliGRAM(s) Oral every 12 hours  dextrose 5%. 1000 milliLiter(s) (50 mL/Hr) IV Continuous <Continuous>  dextrose 5%. 1000 milliLiter(s) (100 mL/Hr) IV Continuous <Continuous>  dextrose 50% Injectable 12.5 Gram(s) IV Push once  dextrose 50% Injectable 25 Gram(s) IV Push once  dextrose 50% Injectable 25 Gram(s) IV Push once  glucagon  Injectable 1 milliGRAM(s) IntraMuscular once  heparin   Injectable 5000 Unit(s) SubCutaneous every 8 hours  hydrALAZINE 25 milliGRAM(s) Oral three times a day  insulin glargine Injectable (LANTUS) 15 Unit(s) SubCutaneous at bedtime  insulin lispro Injectable (ADMELOG) 5 Unit(s) SubCutaneous three times a day before meals  meclizine 25 milliGRAM(s) Oral two times a day  polyethylene glycol 3350 17 Gram(s) Oral two times a day  senna 2 Tablet(s) Oral at bedtime  sodium zirconium cyclosilicate 5 Gram(s) Oral three times a day    PRN:  dextrose Oral Gel 15 Gram(s) Oral once PRN      HOME MEDICATIONS:  Home Medications:  ergocalciferol 1.25 mg (50,000 intl units) oral tablet: orally once a week (2023 22:27)  Jardiance 10 mg oral tablet: 1 orally once a week (2023 22:27)  meclizine 25 mg oral tablet: 1 orally 2 times a day (2023 22:27)  rosuvastatin 20 mg oral capsule: 1 orally once a day (at bedtime) (2023 22:27)  spironolactone 25 mg oral tablet: 1 orally once a day (2023 22:27)      VITALS:   T(F): 97.5 ( @ 04:52), Max: 97.8 ( @ 14:04)  HR: 68 ( @ 04:52) (67 - 72)  BP: 157/75 ( @ 04:52) (119/65 - 201/94)  BP(mean): 83 ( @ 14:04) (83 - 83)  RR: 18 ( @ 04:52) (16 - 18)  SpO2: 100% ( @ 04:52) (98% - 100%)    I&O's Summary    2023 07:01  -  2023 06:36  --------------------------------------------------------  IN: 0 mL / OUT: 800 mL / NET: -800 mL        REVIEW OF SYSTEMS:  CONSTITUTIONAL: No weakness, fevers or chills  HEENT: No visual changes, neck/ear pain  RESPIRATORY: No cough, sob  CARDIOVASCULAR: See HPI  GASTROINTESTINAL: No abdominal pain. No nausea, vomiting, diarrhea   GENITOURINARY: No dysuria, frequency or hematuria  NEUROLOGICAL: see HPI  SKIN: No new rashes    PHYSICAL EXAM:  General: Not in distress.  Non-toxic appearing.   HEENT: Blurry vision  Cardio: regular, S1, S2   Pulm: B/L BS.    Abdomen: Soft, non-tender, non-distended. Normoactive bowel sounds  Extremities: No edema b/l le  Neuro: A&O x3. Blurry vision Rt sided weakness    LABS:                        11.2   7.45  )-----------( 221      ( 2023 15:45 )             33.8         133<L>  |  102  |  60<H>  ----------------------------<  274<H>  5.9<H>   |  23  |  3.7<H>    Ca    8.4      2023 15:45    TPro  6.3  /  Alb  3.6  /  TBili  <0.2  /  DBili  x   /  AST  15  /  ALT  15  /  AlkPhos  165<H>      PT/INR - ( 2023 15:45 )   PT: 9.70 sec;   INR: 0.86 ratio         PTT - ( 2023 15:45 )  PTT:33.6 sec  Sedimentation Rate, Erythrocyte: 75 mm/Hr *H* (23 @ 01:06)  Troponin T, Serum: 0.09 ng/mL *HH* (23 @ 22:00)  Troponin T, Serum: 0.11 ng/mL *HH* (23 @ 15:45)    CARDIAC MARKERS ( 2023 22:00 )  x     / 0.09 ng/mL / x     / x     / x      CARDIAC MARKERS ( 2023 15:45 )  x     / 0.11 ng/mL / x     / x     / x              RADIOLOGY:  -CXR: N/A  -TTE: < from: Transthoracic Echocardiogram (10.08.22 @ 14:31) >  Conclusions:  1. Normal mitral valve. No mitral regurgitation seen.  2. Normal trileaflet aortic valve. No aortic valve  regurgitation seen.  3. Mildly dilated left atrium.  LA volume index = 36 cc/m2.  4. Normal left ventricular systolic function. No segmental  wall motion abnormalities.  5. Normal diastolic function  6. Normal right ventricular size and function.    < end of copied text >    -CCTA: N/A  -STRESS TEST: < from: NM Nuclear Stress Pharmacologic Multiple (19 @ 10:57) >  Impression:   1. NORMAL ADENOSINE / REST MYOCARDIAL PERFUSION SPECT TOMOGRAPHY, WITH NO   EVIDENCE FOR ISCHEMIA DURING ADENOSINE INFUSION.   2. NORMAL RESTING LEFT VENTRICULAR WALL MOTION AND WALL THICKENING.  3. LEFT VENTRICULAR EJECTION FRACTION OF  65 % WHICH IS WITHIN RANGE OF   NORMAL.     < end of copied text >    -CATHETERIZATION: < from: Cardiac Catheterization (10.13.22 @ 09:19) >  Coronary Angiography   The coronary circulation is right dominant.      LM   Left main artery: Angiography shows mild atherosclerosis.      LAD   Proximal left anterior descending: There is a 20 % stenosis.      Patient: BRINDA MOYER       MRN: [id number]  Study Date: 10/13/2022   09:19 AM      Page 1 of 4          CX   Circumflex: Angiography shows mild atherosclerosis.    RCA   Right coronary artery: Angiography shows mild atherosclerosis.      < end of copied text >    -OTHER:  EC Lead ECG:   Ventricular Rate 71 BPM    Atrial Rate 71 BPM    P-R Interval 166 ms    QRS Duration 88 ms    Q-T Interval 372 ms    QTC Calculation(Bazett) 404 ms    P Axis 19 degrees    R Axis 58 degrees    T Axis 106 degrees    Diagnosis Line Normal sinus rhythm  Nonspecific ST abnormality  Abnormal ECG    Confirmed by Leoncio Rios ([id number]) on 2023 6:39:50 PM ( @ 17:57)      TELEMETRY EVENTS: N/A

## 2023-07-18 NOTE — PATIENT PROFILE ADULT - FALL HARM RISK - HARM RISK INTERVENTIONS

## 2023-07-18 NOTE — PATIENT PROFILE ADULT - FUNCTIONAL ASSESSMENT - BASIC MOBILITY 6.
3-calculated by average/Not able to assess (calculate score using [facility] averaging method)

## 2023-07-18 NOTE — CONSULT NOTE ADULT - ASSESSMENT
IMPRESSION  Atypical chest pain - right sided reproducible  Chronic elevation of troponins in the setting of BILLY on CKD  Non obstructive CAD cath 10/22   Headache blurry vision s/p code stroke  HTN, HLD, DM  Hx of recurrent CVA s/p ILR  Diverticulitis     RECOMMENDATIONS  check serial EKGs and Celia  check TTE  EP eval for Interrogation of ILR  c/w aspirin, Coreg 25 BID and lipitor 80 mg qhs  c/w Amlodipine 5mg qd and Hydralazine  w/up of headache/blurry vision per neuro  no further cardiac w/up needed if troponins trending down and TTE unremarkable   recall prn

## 2023-07-18 NOTE — PROGRESS NOTE ADULT - SUBJECTIVE AND OBJECTIVE BOX
Chart reviewed, patient seen this morning and discussed with resident on rounds    stemi excluded, workup for headache/vision issues in progress    MEDICATIONS:  MEDICATIONS  (STANDING):  amLODIPine   Tablet 10 milliGRAM(s) Oral daily  aspirin enteric coated 81 milliGRAM(s) Oral daily  atorvastatin 20 milliGRAM(s) Oral at bedtime  carvedilol 25 milliGRAM(s) Oral every 12 hours  dextrose 5%. 1000 milliLiter(s) (50 mL/Hr) IV Continuous <Continuous>  dextrose 5%. 1000 milliLiter(s) (100 mL/Hr) IV Continuous <Continuous>  dextrose 50% Injectable 12.5 Gram(s) IV Push once  dextrose 50% Injectable 25 Gram(s) IV Push once  dextrose 50% Injectable 25 Gram(s) IV Push once  glucagon  Injectable 1 milliGRAM(s) IntraMuscular once  heparin   Injectable 5000 Unit(s) SubCutaneous every 8 hours  hydrALAZINE 25 milliGRAM(s) Oral three times a day  insulin glargine Injectable (LANTUS) 15 Unit(s) SubCutaneous at bedtime  insulin lispro Injectable (ADMELOG) 5 Unit(s) SubCutaneous three times a day before meals  meclizine 25 milliGRAM(s) Oral two times a day  polyethylene glycol 3350 17 Gram(s) Oral two times a day  senna 2 Tablet(s) Oral at bedtime  sodium zirconium cyclosilicate 5 Gram(s) Oral three times a day    MEDICATIONS  (PRN):  acetaminophen     Tablet .. 650 milliGRAM(s) Oral every 6 hours PRN Moderate Pain (4 - 6)  dextrose Oral Gel 15 Gram(s) Oral once PRN Blood Glucose LESS THAN 70 milliGRAM(s)/deciliter      Allergy: No Known Allergies      PAST MEDICAL & SURGICAL HISTORY:  Asthma  Diverticulitis  High cholesterol  Dyslipidemia  Hypertension  H/O vertigo  Diabetic ulcer of right foot  Broken toe  left pinky toe  Vertigo  HTN (hypertension)  Diabetes  AMBER on CPAP  History of TIAs      History of surgery  colon resection      VITALS:  T(F): 97.6 (07-18-23 @ 08:10), Max: 97.7 (07-17-23 @ 18:40)  HR: 67 (07-18-23 @ 08:10)  BP: 141/62 (07-18-23 @ 08:10) (141/62 - 201/94)  RR: 18 (07-18-23 @ 08:10)  SpO2: 100% (07-18-23 @ 08:10)    TESTS & MEASUREMENTS:  Height (cm): 172.7 (07-17-23 @ 15:25), 172.7 (07-17-23 @ 14:04)  Weight (kg): 82.4 (07-17-23 @ 14:04)  BMI (kg/m2): 27.6 (07-17-23 @ 15:25), 27.6 (07-17-23 @ 14:04)    07-17-23 @ 07:01  -  07-18-23 @ 07:00  --------------------------------------------------------  IN: 0 mL / OUT: 800 mL / NET: -800 mL    resting comfortably                            10.8   7.24  )-----------( 198      ( 18 Jul 2023 08:28 )             32.1     PT/INR - ( 17 Jul 2023 15:45 )   PT: 9.70 sec;   INR: 0.86 ratio         PTT - ( 17 Jul 2023 15:45 )  PTT:33.6 sec  07-18    135  |  103  |  55<H>  ----------------------------<  178<H>  4.5   |  24  |  3.6<H>    Ca    8.3<L>      18 Jul 2023 08:28    TPro  5.5<L>  /  Alb  3.1<L>  /  TBili  <0.2  /  DBili  x   /  AST  14  /  ALT  14  /  AlkPhos  123<H>  07-18    LIVER FUNCTIONS - ( 18 Jul 2023 08:28 )  Alb: 3.1 g/dL / Pro: 5.5 g/dL / ALK PHOS: 123 U/L / ALT: 14 U/L / AST: 14 U/L / GGT: x           CARDIAC MARKERS ( 18 Jul 2023 08:28 )  x     / 0.10 ng/mL / x     / x     / x      CARDIAC MARKERS ( 17 Jul 2023 22:00 )  x     / 0.09 ng/mL / x     / x     / x      CARDIAC MARKERS ( 17 Jul 2023 15:45 )  x     / 0.11 ng/mL / x     / x     / x            Urinalysis Basic - ( 18 Jul 2023 08:28 )    Color: x / Appearance: x / SG: x / pH: x  Gluc: 178 mg/dL / Ketone: x  / Bili: x / Urobili: x   Blood: x / Protein: x / Nitrite: x   Leuk Esterase: x / RBC: x / WBC x   Sq Epi: x / Non Sq Epi: x / Bacteria: x    A/p    Frontal HA / R eye pain w/ blurry vision and tearing r/o cluster HA vs. r/o glaucoma r/o migraine   H/o CVA - stroke code called on presentation   H/o Vertigo  -MRI brain/ orbits  -Ophthalmology   -ESR /CRP   -PRN Meclizine     Chest pain in patient w/ H/o non-obstructive CAD   HTN   Dyslipidemia   -d/c tele  -ASA/ Bblocker / statin / hydralazine /amlodipine     CKD IV w/ hyperkalemia - diabetic nephropathy   -IV fluids and Lokelma   -hold Neprotoxic agents / spironolactone   -serial SCr      DM type II - uncontrolled ( A1c 10.9)   hold oral agents   -lantus / lisrpo AC w/ F/s monitoring and ISS     Constipation   -senna and Miralax     DVT prophylaxis

## 2023-07-19 LAB
ALBUMIN SERPL ELPH-MCNC: 3 G/DL — LOW (ref 3.5–5.2)
ALP SERPL-CCNC: 137 U/L — HIGH (ref 30–115)
ALT FLD-CCNC: 13 U/L — SIGNIFICANT CHANGE UP (ref 0–41)
ANION GAP SERPL CALC-SCNC: 11 MMOL/L — SIGNIFICANT CHANGE UP (ref 7–14)
AST SERPL-CCNC: 12 U/L — SIGNIFICANT CHANGE UP (ref 0–41)
BASOPHILS # BLD AUTO: 0.05 K/UL — SIGNIFICANT CHANGE UP (ref 0–0.2)
BASOPHILS NFR BLD AUTO: 0.6 % — SIGNIFICANT CHANGE UP (ref 0–1)
BILIRUB SERPL-MCNC: <0.2 MG/DL — SIGNIFICANT CHANGE UP (ref 0.2–1.2)
BUN SERPL-MCNC: 68 MG/DL — CRITICAL HIGH (ref 10–20)
CALCIUM SERPL-MCNC: 8.1 MG/DL — LOW (ref 8.4–10.5)
CHLORIDE SERPL-SCNC: 103 MMOL/L — SIGNIFICANT CHANGE UP (ref 98–110)
CO2 SERPL-SCNC: 21 MMOL/L — SIGNIFICANT CHANGE UP (ref 17–32)
CREAT SERPL-MCNC: 5.1 MG/DL — CRITICAL HIGH (ref 0.7–1.5)
EGFR: 13 ML/MIN/1.73M2 — LOW
EOSINOPHIL # BLD AUTO: 0.21 K/UL — SIGNIFICANT CHANGE UP (ref 0–0.7)
EOSINOPHIL NFR BLD AUTO: 2.7 % — SIGNIFICANT CHANGE UP (ref 0–8)
GLUCOSE BLDC GLUCOMTR-MCNC: 175 MG/DL — HIGH (ref 70–99)
GLUCOSE BLDC GLUCOMTR-MCNC: 181 MG/DL — HIGH (ref 70–99)
GLUCOSE BLDC GLUCOMTR-MCNC: 281 MG/DL — HIGH (ref 70–99)
GLUCOSE BLDC GLUCOMTR-MCNC: 390 MG/DL — HIGH (ref 70–99)
GLUCOSE SERPL-MCNC: 213 MG/DL — HIGH (ref 70–99)
HCT VFR BLD CALC: 31.1 % — LOW (ref 42–52)
HGB BLD-MCNC: 10.8 G/DL — LOW (ref 14–18)
IMM GRANULOCYTES NFR BLD AUTO: 0.3 % — SIGNIFICANT CHANGE UP (ref 0.1–0.3)
LYMPHOCYTES # BLD AUTO: 1.96 K/UL — SIGNIFICANT CHANGE UP (ref 1.2–3.4)
LYMPHOCYTES # BLD AUTO: 25.2 % — SIGNIFICANT CHANGE UP (ref 20.5–51.1)
MAGNESIUM SERPL-MCNC: 2.2 MG/DL — SIGNIFICANT CHANGE UP (ref 1.8–2.4)
MCHC RBC-ENTMCNC: 31.4 PG — HIGH (ref 27–31)
MCHC RBC-ENTMCNC: 34.7 G/DL — SIGNIFICANT CHANGE UP (ref 32–37)
MCV RBC AUTO: 90.4 FL — SIGNIFICANT CHANGE UP (ref 80–94)
MONOCYTES # BLD AUTO: 0.64 K/UL — HIGH (ref 0.1–0.6)
MONOCYTES NFR BLD AUTO: 8.2 % — SIGNIFICANT CHANGE UP (ref 1.7–9.3)
NEUTROPHILS # BLD AUTO: 4.89 K/UL — SIGNIFICANT CHANGE UP (ref 1.4–6.5)
NEUTROPHILS NFR BLD AUTO: 63 % — SIGNIFICANT CHANGE UP (ref 42.2–75.2)
NRBC # BLD: 0 /100 WBCS — SIGNIFICANT CHANGE UP (ref 0–0)
PHOSPHATE SERPL-MCNC: 6.9 MG/DL — HIGH (ref 2.1–4.9)
PLATELET # BLD AUTO: 203 K/UL — SIGNIFICANT CHANGE UP (ref 130–400)
PMV BLD: 11.8 FL — HIGH (ref 7.4–10.4)
POTASSIUM SERPL-MCNC: 4.6 MMOL/L — SIGNIFICANT CHANGE UP (ref 3.5–5)
POTASSIUM SERPL-SCNC: 4.6 MMOL/L — SIGNIFICANT CHANGE UP (ref 3.5–5)
PROT SERPL-MCNC: 5.5 G/DL — LOW (ref 6–8)
RBC # BLD: 3.44 M/UL — LOW (ref 4.7–6.1)
RBC # FLD: 12.2 % — SIGNIFICANT CHANGE UP (ref 11.5–14.5)
SODIUM SERPL-SCNC: 135 MMOL/L — SIGNIFICANT CHANGE UP (ref 135–146)
WBC # BLD: 7.77 K/UL — SIGNIFICANT CHANGE UP (ref 4.8–10.8)
WBC # FLD AUTO: 7.77 K/UL — SIGNIFICANT CHANGE UP (ref 4.8–10.8)

## 2023-07-19 PROCEDURE — 92134 CPTRZ OPH DX IMG PST SGM RTA: CPT | Mod: 26

## 2023-07-19 PROCEDURE — 99233 SBSQ HOSP IP/OBS HIGH 50: CPT | Mod: 25

## 2023-07-19 PROCEDURE — 99222 1ST HOSP IP/OBS MODERATE 55: CPT

## 2023-07-19 PROCEDURE — 99406 BEHAV CHNG SMOKING 3-10 MIN: CPT

## 2023-07-19 RX ORDER — CALCIUM CARBONATE 500(1250)
1 TABLET ORAL ONCE
Refills: 0 | Status: COMPLETED | OUTPATIENT
Start: 2023-07-19 | End: 2023-07-19

## 2023-07-19 RX ORDER — SODIUM CHLORIDE 9 MG/ML
1000 INJECTION, SOLUTION INTRAVENOUS
Refills: 0 | Status: DISCONTINUED | OUTPATIENT
Start: 2023-07-19 | End: 2023-07-20

## 2023-07-19 RX ADMIN — SODIUM CHLORIDE 75 MILLILITER(S): 9 INJECTION, SOLUTION INTRAVENOUS at 09:31

## 2023-07-19 RX ADMIN — Medication 25 MILLIGRAM(S): at 05:05

## 2023-07-19 RX ADMIN — AMLODIPINE BESYLATE 10 MILLIGRAM(S): 2.5 TABLET ORAL at 05:04

## 2023-07-19 RX ADMIN — INSULIN GLARGINE 15 UNIT(S): 100 INJECTION, SOLUTION SUBCUTANEOUS at 21:34

## 2023-07-19 RX ADMIN — Medication 25 MILLIGRAM(S): at 21:35

## 2023-07-19 RX ADMIN — CARVEDILOL PHOSPHATE 25 MILLIGRAM(S): 80 CAPSULE, EXTENDED RELEASE ORAL at 17:41

## 2023-07-19 RX ADMIN — ATORVASTATIN CALCIUM 20 MILLIGRAM(S): 80 TABLET, FILM COATED ORAL at 21:36

## 2023-07-19 RX ADMIN — Medication 81 MILLIGRAM(S): at 12:01

## 2023-07-19 RX ADMIN — HEPARIN SODIUM 5000 UNIT(S): 5000 INJECTION INTRAVENOUS; SUBCUTANEOUS at 05:03

## 2023-07-19 RX ADMIN — CARVEDILOL PHOSPHATE 25 MILLIGRAM(S): 80 CAPSULE, EXTENDED RELEASE ORAL at 05:05

## 2023-07-19 RX ADMIN — Medication 5 UNIT(S): at 11:58

## 2023-07-19 RX ADMIN — Medication 5 UNIT(S): at 08:01

## 2023-07-19 RX ADMIN — Medication 25 MILLIGRAM(S): at 17:41

## 2023-07-19 RX ADMIN — HEPARIN SODIUM 5000 UNIT(S): 5000 INJECTION INTRAVENOUS; SUBCUTANEOUS at 21:35

## 2023-07-19 RX ADMIN — Medication 25 MILLIGRAM(S): at 15:09

## 2023-07-19 RX ADMIN — HEPARIN SODIUM 5000 UNIT(S): 5000 INJECTION INTRAVENOUS; SUBCUTANEOUS at 15:09

## 2023-07-19 RX ADMIN — Medication 1 TABLET(S): at 23:23

## 2023-07-19 NOTE — CHART NOTE - NSCHARTNOTEFT_GEN_A_CORE
MRI reviewed. Concern for new 1cm lesion within the right frontal love periventricular white matter demonstrating rim-like restricted diffusion which may be significant for neoplasm/infectious process. Would recommend MRI with gallium, however, need nephrology clearance due to kidney function. Communicated recommendation to primary team.

## 2023-07-19 NOTE — CONSULT NOTE ADULT - ASSESSMENT
ASSESSMENT  1. Right Cranial Nerve 6 Palsy   2. Type 2 Diabetes Mellitus with Severe NPDR OU   - (+)mac edema OS   3.  ASSESSMENT  1. Right Cranial Nerve 6 Palsy   - likely contributing to eye pain   - likely 2/2 ischemic complications of diabetes   2. Type 2 Diabetes Mellitus with Severe NPDR OU   - (+)mac edema OS   3. Right sided headache   4. Eye pain OD  - likely association with CN6 palsy and headache  - IOP normotensive, no signs of glaucoma or angle closure  - Very low suspicion for GCA at this time: no jaw claudication, temporal/scalp tenderness, ONH WNL, CRP WNL, no fever, no weight loss, no malaise   5. Photophobia OD  - no signs of intraocular inflammation or corneal compromise  - may be associated with h/o stroke/neurological components   6. h/o Stroke  - no overt visual field defects on confrontation fields  - plan for baseline HVF testing at out patient follow up      RECOMMENDATIONS  - Continue care per primary team and neurology recommendations   - Pt to follow up at [facility] eye clinic (or eye care provider of pts choice) within 4 weeks of discharge. Discussed benefit of retinal specialist given level of retinopathy seen during exam today   - Use sunglasses to limit light prn   - Start artifical tears 2x/day OU        ASSESSMENT  1. Right Cranial Nerve 6 Palsy   - likely contributing to eye pain   - likely 2/2 ischemic complications of diabetes   2. Type 2 Diabetes Mellitus with Severe NPDR OU   - (+)mac edema OS   3. Right sided headache   4. Eye pain OD  - likely secondary to ischemic CN6 palsy and headache  - IOP normotensive, no signs of glaucoma or angle closure  - Very low suspicion for GCA at this time: no jaw claudication, temporal/scalp tenderness, ONH WNL, CRP WNL, no fever, no weight loss, no malaise   5. Photophobia OD  - no signs of intraocular inflammation or corneal compromise  - may be associated with h/o stroke/neurological components   6. h/o Stroke  - no overt visual field defects on confrontation fields  - plan for baseline HVF testing at out patient follow up      RECOMMENDATIONS  - Continue care per primary team and neurology recommendations   - Pt to follow up at [facility] eye clinic (or eye care provider of pts choice) within 4 weeks of discharge. Discussed benefit of retinal specialist given level of retinopathy seen during exam today   - Use sunglasses to limit light prn   - Start artifical tears 2x/day OU

## 2023-07-19 NOTE — PROGRESS NOTE ADULT - ASSESSMENT
47 YO male with PMHx of CKD IV baseline around 3.2, vertigo, diverticulitis s/p LAR, cigarette use, ETOH abuse now sober x5 years, IDDM, HTN, AMBER, Hx of multiple CVA with residual weakness on right side.   He was in the outpatient clinic for pre-test for circumcision, his EKG suggested STEMI so EMS was called and on the way to the hospital he developed chest pain of left side radiating to the back, characterized by tightness then burning sensation through the whole chest. This pain lasted for 1 hour and resolved, he received aspirin loading on the way here. Pt follows at [facility] and underwent cardiac catheterization which showed non-obstructive CAD.  He is also complaining from right eye pain with double vision and blurring of vision that started 3 weeks ago, he was admitted to another hospital and they discharged him after doing workup for his eye and ophthalmology saw him and they said it is mostly related to the uncontrolled diabetes. His right eye pain has been constant for the past 3 weeks radiating to the front head causing severe headaches, it is associated with photosensitivity, no tearing, no redness. He has no weakness, no numbness.     # Atypical  Chest pain  probably musculoskeletal - resolved   # Elevated troponin sec to ckd  # H/o non obstructive CAD  # Hypertension  -Troponin T, Serum: 0.10: Critical value: ng/mL (07.18.23 @ 08:28)   - 12 Lead ECG (07.17.23 @ 17:57) >Normal sinus rhythm  - c/w ASA, coreg, statin  - c/w norvasc, hydralazine  - evaluated by cardiology  -  EP eval for Interrogation of ILR,  - F/u 2 D echo    # Right eye pain with blurring of vision   # H/o multiple CVA   - MR Head No Cont (07.18.23 @ 22:59) > New 1 cm lesion within the right frontal lobe periventricular white matter demonstrating rim-like restricted diffusion.  New 8 mm lesion within the anterior right temporal lobe. Additional new small lesion within the right cerebellar hemisphere, the configuration suggests a chronic infarct.Otherwise stable patchy white matter disease and scattered chronic   lacunar infarcts.  - CT PERFUSION: No evidence of perfusion abnormality.  - CTA HEAD: Mild stenosis of the proximal right V4 segment of the vertebral artery.  - CTA NECK: No evidence of carotid or vertebral artery stenosis.  - Sedimentation Rate, Erythrocyte: 66 mm/Hr (07.18.23 @ 11:15)  - C-Reactive Protein, Serum: <3.0 mg/L (07.18.23 @ 11:15)  - neuro eval: MR brain with contrast to better characterize the lesions  - F/u  HIV, WES, dsDNA, Angiotensin converting enzyme, NMO, MOG, Vitamin B12, Lyme  - pt was evaluated by rheumatology  - Ophthal eval:  Right Cranial Nerve 6 Palsy - likely contributing to eye pain - likely 2/2 ischemic complications of diabetes , Type 2 Diabetes Mellitus with Severe NPDR OU ,  Very low suspicion for GCA at this time: no jaw claudication, temporal/scalp tenderness.  - plan for baseline HVF testing at out patient follow up  - Pt to follow up at [facility] eye clinic (or eye care provider of pts choice) within 4 weeks of discharge. Discussed benefit of retinal specialist given level of retinopathy seen during exam today   - Use sunglasses to limit light prn   - Start artifical tears 2x/day OU     # Acute kidney injury on  CKD stage 4, probably sec to BRENDEN  # Hyperkalemia - resolved  - S/P  insulin and albuterol, lokelma  -dced  spironolactone   - start IV fluids  - nephrology eval  - monitor BMP    # DM type2   - A1C with Estimated Average Glucose Result: 10.8 (07.17.23 @ 15:45)  - c/w lantus, lispro    # H/o  vertigo  - c/w  meclizine     # Nicotine abuse  - pt counseled    # DVT prophylaxis    # Full code    # Pending: nephrology eval, monitor BMP, MRI brain with contrast, 2 D echo  # Discussed plan of care with patient  # Disposition: home when stable        47 YO male with PMHx of CKD IV baseline around 3.2, vertigo, diverticulitis s/p LAR, cigarette use, ETOH abuse now sober x5 years, IDDM, HTN, AMBER, Hx of multiple CVA with residual weakness on right side.   He was in the outpatient clinic for pre-test for circumcision, his EKG suggested STEMI so EMS was called and on the way to the hospital he developed chest pain of left side radiating to the back, characterized by tightness then burning sensation through the whole chest. This pain lasted for 1 hour and resolved, he received aspirin loading on the way here. Pt follows at [facility] and underwent cardiac catheterization which showed non-obstructive CAD.  He is also complaining from right eye pain with double vision and blurring of vision that started 3 weeks ago, he was admitted to another hospital and they discharged him after doing workup for his eye and ophthalmology saw him and they said it is mostly related to the uncontrolled diabetes. His right eye pain has been constant for the past 3 weeks radiating to the front head causing severe headaches, it is associated with photosensitivity, no tearing, no redness. He has no weakness, no numbness.     # Atypical  Chest pain  probably musculoskeletal - resolved   # Elevated troponin sec to ckd  # H/o non obstructive CAD  # Hypertension  -Troponin T, Serum: 0.10: Critical value: ng/mL (07.18.23 @ 08:28)   - 12 Lead ECG (07.17.23 @ 17:57) >Normal sinus rhythm  - c/w ASA, coreg, statin  - c/w norvasc, hydralazine  - evaluated by cardiology  -  EP eval for Interrogation of ILR,  - F/u 2 D echo    # Right eye pain with blurring of vision sec to diabetic retinopathy  # Right 6th nerve palsy  # H/o multiple CVA   - MR Head No Cont (07.18.23 @ 22:59) > New 1 cm lesion within the right frontal lobe periventricular white matter demonstrating rim-like restricted diffusion.  New 8 mm lesion within the anterior right temporal lobe. Additional new small lesion within the right cerebellar hemisphere, the configuration suggests a chronic infarct.Otherwise stable patchy white matter disease and scattered chronic   lacunar infarcts.  - CT PERFUSION: No evidence of perfusion abnormality.  - CTA HEAD: Mild stenosis of the proximal right V4 segment of the vertebral artery.  - CTA NECK: No evidence of carotid or vertebral artery stenosis.  - Sedimentation Rate, Erythrocyte: 66 mm/Hr (07.18.23 @ 11:15)  - C-Reactive Protein, Serum: <3.0 mg/L (07.18.23 @ 11:15)  - neuro eval: MR brain with contrast to better characterize the lesions  - F/u  HIV, WES, dsDNA, Angiotensin converting enzyme, NMO, MOG, Vitamin B12, Lyme  - pt was evaluated by rheumatology  - Ophthal eval:  Right Cranial Nerve 6 Palsy - likely contributing to eye pain - likely 2/2 ischemic complications of diabetes , Type 2 Diabetes Mellitus with Severe NPDR OU ,  Very low suspicion for GCA at this time: no jaw claudication, temporal/scalp tenderness.  - plan for baseline HVF testing at out patient follow up  - Pt to follow up at [facility] eye clinic (or eye care provider of pts choice) within 4 weeks of discharge. Discussed benefit of retinal specialist given level of retinopathy seen during exam today   - Use sunglasses to limit light prn   - Start artifical tears 2x/day OU     # Acute kidney injury on  CKD stage 4, probably sec to BRENDEN  # Hyperkalemia - resolved  - S/P  insulin and albuterol, lokelma  -dced  spironolactone   - start IV fluids  - nephrology eval  - monitor BMP    # DM type2   - A1C with Estimated Average Glucose Result: 10.8 (07.17.23 @ 15:45)  - c/w lantus, lispro    # H/o  vertigo  - c/w  meclizine     # Nicotine abuse  - pt counseled    # DVT prophylaxis    # Full code    # Pending: nephrology eval, monitor BMP, MRI brain with contrast, 2 D echo  # Discussed plan of care with patient  # Disposition: home when stable

## 2023-07-19 NOTE — CONSULT NOTE ADULT - SUBJECTIVE AND OBJECTIVE BOX
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     He was in the outpatient clinic for pre-test for circumcision, his EKG suggested STEMI so EMS was called and on the way to the hospital he developed chest pain of left side radiating to the back, characterized by tightness then burning sensation through the whole chest. This pain lasted for 1 hour and resolved, he received aspirin loading on the way here. Upon evaluation, he had no chest pain, no shortness of breath, no cough or palpitations, no lower limb edema. He has no fever, no chills. Pt follows at [facility] and underwent cardiac catheterization which showed non-obstructive CAD.    He is also complaining from right eye pain with double vision and blurring of vision that started 3 weeks ago, he was admitted to another hospital and they discharged him after doing workup for his eye and ophthalmology saw him and they said it is mostly related to the uncontrolled diabetes. His right eye pain has been constant for the past 3 weeks radiating to the front head causing severe headaches, it is associated with photosensitivity, no tearing, no redness. He has no weakness, no numbness.     In ED Vital Signs T(F): 97.7 HR: 71 BP: 155/98 RR: 18 SpO2: 98%     Stroke code was called in ED, CT head with perfusion showed no ischemia, no signs of stroke   STEMI code was called in ED, was cancelled after cardiology evaluation     Admitted to telemetry for further workup of right eye pain and chest pain                                             ----------------------------------------------------------  OBJECTIVE  PAST MEDICAL & SURGICAL HISTORY  Asthma  Diverticulitissurgery 16yrs ago  High cholesterol  Dyslipidemia  Hypertension  H/O vertigo  Diabetic ulcer of right foot  Broken toe left pinky toe  Vertigo  HTN (hypertension)  Diabetes  AMBER on CPAP  History of TIAs  History of surgery colon resection                                            -----------------------------------------------------------  ALLERGIES:  No Known Allergies                                          ------------------------------------------------------------    HOME MEDICATIONS  Home Medications:  ergocalciferol 1.25 mg (50,000 intl units) oral tablet: orally once a week (17 Jul 2023 22:27)  Jardiance 10 mg oral tablet: 1 orally once a week (17 Jul 2023 22:27)  meclizine 25 mg oral tablet: 1 orally 2 times a day (17 Jul 2023 22:27)  rosuvastatin 20 mg oral capsule: 1 orally once a day (at bedtime) (17 Jul 2023 22:27)  spironolactone 25 mg oral tablet: 1 orally once a day (17 Jul 2023 22:27)                           MEDICATIONS:  STANDING MEDICATIONS  amLODIPine   Tablet 10 milliGRAM(s) Oral daily  aspirin enteric coated 81 milliGRAM(s) Oral daily  atorvastatin 20 milliGRAM(s) Oral at bedtime  carvedilol 25 milliGRAM(s) Oral every 12 hours  dextrose 5%. 1000 milliLiter(s) IV Continuous <Continuous>  dextrose 5%. 1000 milliLiter(s) IV Continuous <Continuous>  dextrose 50% Injectable 12.5 Gram(s) IV Push once  dextrose 50% Injectable 25 Gram(s) IV Push once  dextrose 50% Injectable 25 Gram(s) IV Push once  glucagon  Injectable 1 milliGRAM(s) IntraMuscular once  heparin   Injectable 5000 Unit(s) SubCutaneous every 8 hours  hydrALAZINE 25 milliGRAM(s) Oral three times a day  insulin glargine Injectable (LANTUS) 15 Unit(s) SubCutaneous at bedtime  insulin lispro Injectable (ADMELOG) 5 Unit(s) SubCutaneous three times a day before meals  meclizine 25 milliGRAM(s) Oral two times a day  polyethylene glycol 3350 17 Gram(s) Oral two times a day  senna 2 Tablet(s) Oral at bedtime  sodium chloride 0.45%. 1000 milliLiter(s) IV Continuous <Continuous>    PRN MEDICATIONS  acetaminophen     Tablet .. 650 milliGRAM(s) Oral every 6 hours PRN  dextrose Oral Gel 15 Gram(s) Oral once PRN                                            ------------------------------------------------------------  VITAL SIGNS: Last 24 Hours  T(C): 35.8 (19 Jul 2023 07:49), Max: 37 (18 Jul 2023 15:30)  T(F): 96.5 (19 Jul 2023 07:49), Max: 98.6 (18 Jul 2023 15:30)  HR: 74 (19 Jul 2023 07:49) (70 - 85)  BP: 140/72 (19 Jul 2023 07:49) (110/52 - 164/84)  BP(mean): --  RR: 18 (19 Jul 2023 07:49) (18 - 19)  SpO2: 99% (19 Jul 2023 07:49) (99% - 100%)                                             --------------------------------------------------------------  LABS:                        10.8   7.77  )-----------( 203      ( 19 Jul 2023 06:25 )             31.1     07-19    135  |  103  |  68<HH>  ----------------------------<  213<H>  4.6   |  21  |  5.1<HH>    Ca    8.1<L>      19 Jul 2023 06:25  Phos  6.9     07-19  Mg     2.2     07-19    TPro  5.5<L>  /  Alb  3.0<L>  /  TBili  <0.2  /  DBili  x   /  AST  12  /  ALT  13  /  AlkPhos  137<H>  07-19    PT/INR - ( 17 Jul 2023 15:45 )   PT: 9.70 sec;   INR: 0.86 ratio         PTT - ( 17 Jul 2023 15:45 )  PTT:33.6 sec  Urinalysis Basic - ( 19 Jul 2023 06:25 )    Color: x / Appearance: x / SG: x / pH: x  Gluc: 213 mg/dL / Ketone: x  / Bili: x / Urobili: x   Blood: x / Protein: x / Nitrite: x   Leuk Esterase: x / RBC: x / WBC x   Sq Epi: x / Non Sq Epi: x / Bacteria: x      Culture - Urine (collected 17 Jul 2023 17:58)  Source: Clean Catch Clean Catch (Midstream)  Final Report (18 Jul 2023 23:34):    No growth    CARDIAC MARKERS ( 18 Jul 2023 08:28 )  x     / 0.10 ng/mL / x     / x     / x      CARDIAC MARKERS ( 17 Jul 2023 22:00 )  x     / 0.09 ng/mL / x     / x     / x      CARDIAC MARKERS ( 17 Jul 2023 15:45 )  x     / 0.11 ng/mL / x     / x     / x                                                  -------------------------------------------------------------  PHYSICAL EXAM:  GENERAL: NAD  EYES: conjunctiva and sclera clear, tender right eye  ENMT: No tonsillar erythema, exudates, or enlargement; Moist mucous membranes  NECK: Supple, No JVD, Normal thyroid  HEART: Regular rate and rhythm; No murmurs, rubs, or gallops, Normal S1 S2   RESPIRATORY: Clear to auscultation bilaterally, resonant percussion note, no added sounds   ABDOMEN: Soft, Nontender, Nondistended; Bowel sounds present, vertical scar   NEUROLOGY: A&Ox3, grossly intact power and sensation. right horizontal vision induces the pain   EXTREMITIES:  2+ Peripheral Pulses, No clubbing, cyanosis, or edema  SKIN: warm, dry, normal color, no rash or abnormal lesions                                   ASSESSMENT & PLAN  # Frontal HA / R eye pain w/ blurry vision and tearing r/o cluster HA vs. r/o glaucoma r/o migraine   # H/o CVA - stroke code called on presentation   # H/o Vertigo  -MRI brain/ orbits noted   -Ophthalmology consult advised   -ESR 66 / CRP < 3   -PRN Meclizine   -Low suspicion for TAA   Hospital Day: 2d    Pt is currently admitted with the primary diagnosis of Headache    SUBJECTIVE  Hospital Course: 49 YO male with PMHx of CKD IV baseline around 3.2, vertigo, diverticulitis s/p LAR, cigarette use, ETOH abuse now sober x5 years, IDDM, HTN, AMBER, Hx of multiple CVA with residual weakness on right side.     He was in the outpatient clinic for pre-test for circumcision, his EKG suggested STEMI so EMS was called and on the way to the hospital he developed chest pain of left side radiating to the back, characterized by tightness then burning sensation through the whole chest. This pain lasted for 1 hour and resolved, he received aspirin loading on the way here. Upon evaluation, he had no chest pain, no shortness of breath, no cough or palpitations, no lower limb edema. He has no fever, no chills. Pt follows at [facility] and underwent cardiac catheterization which showed non-obstructive CAD.    He is also complaining from right eye pain with double vision and blurring of vision that started 3 weeks ago, he was admitted to another hospital and they discharged him after doing workup for his eye and ophthalmology saw him and they said it is mostly related to the uncontrolled diabetes. His right eye pain has been constant for the past 3 weeks radiating to the front head causing severe headaches, it is associated with photosensitivity, no tearing, no redness. He has no weakness, no numbness.     In ED Vital Signs T(F): 97.7 HR: 71 BP: 155/98 RR: 18 SpO2: 98%     Stroke code was called in ED, CT head with perfusion showed no ischemia, no signs of stroke   STEMI code was called in ED, was cancelled after cardiology evaluation     Admitted to telemetry for further workup of right eye pain and chest pain                                             ----------------------------------------------------------  OBJECTIVE  PAST MEDICAL & SURGICAL HISTORY  Asthma  Diverticulitissurgery 16yrs ago  High cholesterol  Dyslipidemia  Hypertension  H/O vertigo  Diabetic ulcer of right foot  Broken toe left pinky toe  Vertigo  HTN (hypertension)  Diabetes  AMBER on CPAP  History of TIAs  History of surgery colon resection                                            -----------------------------------------------------------  ALLERGIES:  No Known Allergies                                          ------------------------------------------------------------    HOME MEDICATIONS  Home Medications:  ergocalciferol 1.25 mg (50,000 intl units) oral tablet: orally once a week (17 Jul 2023 22:27)  Jardiance 10 mg oral tablet: 1 orally once a week (17 Jul 2023 22:27)  meclizine 25 mg oral tablet: 1 orally 2 times a day (17 Jul 2023 22:27)  rosuvastatin 20 mg oral capsule: 1 orally once a day (at bedtime) (17 Jul 2023 22:27)  spironolactone 25 mg oral tablet: 1 orally once a day (17 Jul 2023 22:27)                           MEDICATIONS:  STANDING MEDICATIONS  amLODIPine   Tablet 10 milliGRAM(s) Oral daily  aspirin enteric coated 81 milliGRAM(s) Oral daily  atorvastatin 20 milliGRAM(s) Oral at bedtime  carvedilol 25 milliGRAM(s) Oral every 12 hours  dextrose 5%. 1000 milliLiter(s) IV Continuous <Continuous>  dextrose 5%. 1000 milliLiter(s) IV Continuous <Continuous>  dextrose 50% Injectable 12.5 Gram(s) IV Push once  dextrose 50% Injectable 25 Gram(s) IV Push once  dextrose 50% Injectable 25 Gram(s) IV Push once  glucagon  Injectable 1 milliGRAM(s) IntraMuscular once  heparin   Injectable 5000 Unit(s) SubCutaneous every 8 hours  hydrALAZINE 25 milliGRAM(s) Oral three times a day  insulin glargine Injectable (LANTUS) 15 Unit(s) SubCutaneous at bedtime  insulin lispro Injectable (ADMELOG) 5 Unit(s) SubCutaneous three times a day before meals  meclizine 25 milliGRAM(s) Oral two times a day  polyethylene glycol 3350 17 Gram(s) Oral two times a day  senna 2 Tablet(s) Oral at bedtime  sodium chloride 0.45%. 1000 milliLiter(s) IV Continuous <Continuous>    PRN MEDICATIONS  acetaminophen     Tablet .. 650 milliGRAM(s) Oral every 6 hours PRN  dextrose Oral Gel 15 Gram(s) Oral once PRN                                            ------------------------------------------------------------  VITAL SIGNS: Last 24 Hours  T(C): 35.8 (19 Jul 2023 07:49), Max: 37 (18 Jul 2023 15:30)  T(F): 96.5 (19 Jul 2023 07:49), Max: 98.6 (18 Jul 2023 15:30)  HR: 74 (19 Jul 2023 07:49) (70 - 85)  BP: 140/72 (19 Jul 2023 07:49) (110/52 - 164/84)  BP(mean): --  RR: 18 (19 Jul 2023 07:49) (18 - 19)  SpO2: 99% (19 Jul 2023 07:49) (99% - 100%)                                             --------------------------------------------------------------  LABS:                        10.8   7.77  )-----------( 203      ( 19 Jul 2023 06:25 )             31.1     07-19    135  |  103  |  68<HH>  ----------------------------<  213<H>  4.6   |  21  |  5.1<HH>    Ca    8.1<L>      19 Jul 2023 06:25  Phos  6.9     07-19  Mg     2.2     07-19    TPro  5.5<L>  /  Alb  3.0<L>  /  TBili  <0.2  /  DBili  x   /  AST  12  /  ALT  13  /  AlkPhos  137<H>  07-19    PT/INR - ( 17 Jul 2023 15:45 )   PT: 9.70 sec;   INR: 0.86 ratio         PTT - ( 17 Jul 2023 15:45 )  PTT:33.6 sec  Urinalysis Basic - ( 19 Jul 2023 06:25 )    Color: x / Appearance: x / SG: x / pH: x  Gluc: 213 mg/dL / Ketone: x  / Bili: x / Urobili: x   Blood: x / Protein: x / Nitrite: x   Leuk Esterase: x / RBC: x / WBC x   Sq Epi: x / Non Sq Epi: x / Bacteria: x      Culture - Urine (collected 17 Jul 2023 17:58)  Source: Clean Catch Clean Catch (Midstream)  Final Report (18 Jul 2023 23:34):    No growth    CARDIAC MARKERS ( 18 Jul 2023 08:28 )  x     / 0.10 ng/mL / x     / x     / x      CARDIAC MARKERS ( 17 Jul 2023 22:00 )  x     / 0.09 ng/mL / x     / x     / x      CARDIAC MARKERS ( 17 Jul 2023 15:45 )  x     / 0.11 ng/mL / x     / x     / x                                                  -------------------------------------------------------------  PHYSICAL EXAM:  GENERAL: NAD  EYES: conjunctiva and sclera clear, tender right eye  ENMT: No tonsillar erythema, exudates, or enlargement; Moist mucous membranes  NECK: Supple, No JVD, Normal thyroid  HEART: Regular rate and rhythm; No murmurs, rubs, or gallops, Normal S1 S2   RESPIRATORY: Clear to auscultation bilaterally, resonant percussion note, no added sounds   ABDOMEN: Soft, Nontender, Nondistended; Bowel sounds present, vertical scar   NEUROLOGY: A&Ox3, grossly intact power and sensation. right horizontal vision induces the pain   EXTREMITIES:  2+ Peripheral Pulses, No clubbing, cyanosis, or edema  SKIN: warm, dry, normal color, no rash or abnormal lesions

## 2023-07-19 NOTE — PROGRESS NOTE ADULT - SUBJECTIVE AND OBJECTIVE BOX
Patient is a 48y old  Male who presents with a chief complaint of possible stroke and STEMI (19 Jul 2023 15:55)    Patient was seen and examined.  c/o headache, right eye pain , blurring of vision  Denies any other complaints.  All systems reviewed positive history as mentioned above.    PAST MEDICAL & SURGICAL HISTORY:  Asthma  Diverticulitis, surgery 14yrs age  Dyslipidemia  Hypertension  H/O vertigo  Diabetic ulcer of right foot  Broken toe, left pinky toe  Diabetes  AMBER on CPAP  History of TIAs    History of surgery  colon resection    Allergies   No Known Allergies      MEDICATIONS  (STANDING):  amLODIPine   Tablet 10 milliGRAM(s) Oral daily  aspirin enteric coated 81 milliGRAM(s) Oral daily  atorvastatin 20 milliGRAM(s) Oral at bedtime  carvedilol 25 milliGRAM(s) Oral every 12 hours  glucagon  Injectable 1 milliGRAM(s) IntraMuscular once  heparin   Injectable 5000 Unit(s) SubCutaneous every 8 hours  hydrALAZINE 25 milliGRAM(s) Oral three times a day  insulin glargine Injectable (LANTUS) 15 Unit(s) SubCutaneous at bedtime  insulin lispro Injectable (ADMELOG) 5 Unit(s) SubCutaneous three times a day before meals  meclizine 25 milliGRAM(s) Oral two times a day  polyethylene glycol 3350 17 Gram(s) Oral two times a day  senna 2 Tablet(s) Oral at bedtime  sodium chloride 0.45%. 1000 milliLiter(s) (75 mL/Hr) IV Continuous <Continuous>    MEDICATIONS  (PRN):  acetaminophen     Tablet .. 650 milliGRAM(s) Oral every 6 hours PRN Moderate Pain (4 - 6)  dextrose Oral Gel 15 Gram(s) Oral once PRN Blood Glucose LESS THAN 70 milliGRAM(s)/deciliter    Vital Signs Last 24 Hrs  T(C): 35.8  T(F): 96.5  HR: 74  BP: 140/72  BP(mean): --  RR: 18  SpO2: 99%    O/E:  Awake, alert, not in distress.  HEENT: atraumatic,   Chest: clear.  CVS: SIS2 +, no murmur.  P/A: Soft, BS+  CNS: awake, alert,   Ext: no edema feet.  Skin: no rash, no ulcers.  All systems reviewed positive findings as above.          POCT Blood Glucose.: 175 mg/dL (19 Jul 2023 11:05)  POCT Blood Glucose.: 181 mg/dL (19 Jul 2023 07:44)  POCT Blood Glucose.: 390 mg/dL (18 Jul 2023 21:46)                          10.8<L>  7.77  )-----------( 203      ( 19 Jul 2023 06:25 )             31.1<L>                        10.8<L>  7.24  )-----------( 198      ( 18 Jul 2023 08:28 )             32.1<L>    07-19    135  |  103  |  68<HH>  ----------------------------<  213<H>  4.6   |  21  |  5.1<HH>  07-18    135  |  103  |  55<H>  ----------------------------<  178<H>  4.5   |  24  |  3.6<H>    Ca    8.1<L>      19 Jul 2023 06:25  Ca    8.3<L>      18 Jul 2023 08:28  Phos  6.9     07-19  Mg     2.2     07-19    TPro  5.5<L>  /  Alb  3.0<L>  /  TBili  <0.2  /  DBili  x   /  AST  12  /  ALT  13  /  AlkPhos  137<H>  07-19  TPro  5.5<L>  /  Alb  3.1<L>  /  TBili  <0.2  /  DBili  x   /  AST  14  /  ALT  14  /  AlkPhos  123<H>  07-18      CARDIAC MARKERS ( 18 Jul 2023 08:28 )  x     / 0.10 ng/mL<HH> / x     / x     / x      CARDIAC MARKERS ( 17 Jul 2023 22:00 )  x     / 0.09 ng/mL<HH> / x     / x     / x            Urinalysis Basic - ( 19 Jul 2023 06:25 )    Color: x / Appearance: x / SG: x / pH: x  Gluc: 213 mg/dL / Ketone: x  / Bili: x / Urobili: x   Blood: x / Protein: x / Nitrite: x   Leuk Esterase: x / RBC: x / WBC x   Sq Epi: x / Non Sq Epi: x / Bacteria: x        Culture - Urine (collected 17 Jul 2023 17:58)  Source: Clean Catch Clean Catch (Midstream)  Final Report (18 Jul 2023 23:34):    No growth

## 2023-07-19 NOTE — CHART NOTE - NSCHARTNOTEFT_GEN_A_CORE
Patient initially seen for stroke code called for headache and blurry vision.  Blurry vision had been present for a few days and was admitted for ST elevations on EKG.  MRI brain w/o PROSPER was requested to rule out acute ischemia which was negative.  There were however 3 new lesions in the brain compared with an MRI form 2022.  Lesions are suspicious for inflammatory, demyelinating or neoplastic processes and would need further workup.  Would suggest obtaining nephrology evaluation to determine risk of gadolinium which would be required ot clarify lesions in the brain.  In retrospective review of CTA H+N the right temporal lesion likely enhances with IV contrast the other lesions are not clear on CTA.    Plan  1. Nephrology evaluation for risk of gadolinium with MRI brain w/PROSPER  2. Would send HIV, WES, dsDNA, Angiotensin converting enzyme from serum  3. Signed out case to General Neurology Attending Dr. Desai and team to follow            < from: MR Head No Cont (07.18.23 @ 22:59) >      IMPRESSION:  In comparison to the patient's previous brain MRI dated 10/8/2022:    1.  New 1 cm lesion within the right frontal lobe periventricular white   matter demonstrating rim-like restricted diffusion. Diagnostic   considerations include infarct, demyelination, neoplasm or   infectious/inflammatory lesion.    2. New 8 mm lesion within the anterior right temporal lobe. Diagnostic   considerations include demyelination, neoplasm or infectious/inflammatory   lesion.    3.  Recommend a postcontrast brain MRI for further evaluation of the   above lesions.    4.Additional new small lesion within the right cerebellar hemisphere,   the configuration suggests a chronic infarct.    5.  Otherwise stable patchy white matter disease and scattered chronic   lacunar infarcts.    --- End of Report ---    < end of copied text >

## 2023-07-19 NOTE — PROGRESS NOTE ADULT - ATTENDING COMMENTS
49 yo M w/ uncontrolled DM, vascular risks with prior CVAs currently p/w acute progressive OD pain with movement w/ ? diplopia currently without dysconjugate gaze but reproducible pain R orbit with multiple enhancing subcortical/cortical lesions possible infectious vs inflammatory vs neoplastic etiology.  Recommendations as above.

## 2023-07-19 NOTE — PROGRESS NOTE ADULT - SUBJECTIVE AND OBJECTIVE BOX
BRINDA MOYER 48y Male  MRN#: [id number]     Hospital Day: 2d    Pt is currently admitted with the primary diagnosis of  ST elevation myocardial infarction (STEMI)        SUBJECTIVE     Overnight events  None    Subjective complaints  Pt was evaluated this am. Patient reports continued R eye pain that has been ongoing x3 weeks. Denies chest pain.                                           ----------------------------------------------------------  OBJECTIVE  PAST MEDICAL & SURGICAL HISTORY  Asthma    Diverticulitis  surgery 16yrs ago    High cholesterol    Dyslipidemia    Hypertension    H/O vertigo    Diabetic ulcer of right foot    Broken toe  left pinky toe    Vertigo    HTN (hypertension)    Diabetes    AMBER on CPAP    History of TIAs    History of surgery  colon resection                                              -----------------------------------------------------------  ALLERGIES:  No Known Allergies                                            ------------------------------------------------------------    HOME MEDICATIONS  Home Medications:  ergocalciferol 1.25 mg (50,000 intl units) oral tablet: orally once a week (17 Jul 2023 22:27)  Jardiance 10 mg oral tablet: 1 orally once a week (17 Jul 2023 22:27)  meclizine 25 mg oral tablet: 1 orally 2 times a day (17 Jul 2023 22:27)  rosuvastatin 20 mg oral capsule: 1 orally once a day (at bedtime) (17 Jul 2023 22:27)  spironolactone 25 mg oral tablet: 1 orally once a day (17 Jul 2023 22:27)                           MEDICATIONS:  STANDING MEDICATIONS  amLODIPine   Tablet 10 milliGRAM(s) Oral daily  aspirin enteric coated 81 milliGRAM(s) Oral daily  atorvastatin 20 milliGRAM(s) Oral at bedtime  carvedilol 25 milliGRAM(s) Oral every 12 hours  dextrose 5%. 1000 milliLiter(s) IV Continuous <Continuous>  dextrose 5%. 1000 milliLiter(s) IV Continuous <Continuous>  dextrose 50% Injectable 12.5 Gram(s) IV Push once  dextrose 50% Injectable 25 Gram(s) IV Push once  dextrose 50% Injectable 25 Gram(s) IV Push once  glucagon  Injectable 1 milliGRAM(s) IntraMuscular once  heparin   Injectable 5000 Unit(s) SubCutaneous every 8 hours  hydrALAZINE 25 milliGRAM(s) Oral three times a day  insulin glargine Injectable (LANTUS) 15 Unit(s) SubCutaneous at bedtime  insulin lispro Injectable (ADMELOG) 5 Unit(s) SubCutaneous three times a day before meals  meclizine 25 milliGRAM(s) Oral two times a day  polyethylene glycol 3350 17 Gram(s) Oral two times a day  senna 2 Tablet(s) Oral at bedtime  sodium chloride 0.45%. 1000 milliLiter(s) IV Continuous <Continuous>    PRN MEDICATIONS  acetaminophen     Tablet .. 650 milliGRAM(s) Oral every 6 hours PRN  dextrose Oral Gel 15 Gram(s) Oral once PRN                                            ------------------------------------------------------------  VITAL SIGNS: Last 24 Hours  T(C): 35.8 (19 Jul 2023 07:49), Max: 37 (18 Jul 2023 15:30)  T(F): 96.5 (19 Jul 2023 07:49), Max: 98.6 (18 Jul 2023 15:30)  HR: 74 (19 Jul 2023 07:49) (70 - 85)  BP: 140/72 (19 Jul 2023 07:49) (110/52 - 164/84)  BP(mean): --  RR: 18 (19 Jul 2023 07:49) (18 - 19)  SpO2: 99% (19 Jul 2023 07:49) (99% - 100%)                                             --------------------------------------------------------------  LABS:                        10.8   7.77  )-----------( 203      ( 19 Jul 2023 06:25 )             31.1     07-19    135  |  103  |  68<HH>  ----------------------------<  213<H>  4.6   |  21  |  5.1<HH>    Ca    8.1<L>      19 Jul 2023 06:25  Phos  6.9     07-19  Mg     2.2     07-19    TPro  5.5<L>  /  Alb  3.0<L>  /  TBili  <0.2  /  DBili  x   /  AST  12  /  ALT  13  /  AlkPhos  137<H>  07-19    PT/INR - ( 17 Jul 2023 15:45 )   PT: 9.70 sec;   INR: 0.86 ratio         PTT - ( 17 Jul 2023 15:45 )  PTT:33.6 sec  Urinalysis Basic - ( 19 Jul 2023 06:25 )    Color: x / Appearance: x / SG: x / pH: x  Gluc: 213 mg/dL / Ketone: x  / Bili: x / Urobili: x   Blood: x / Protein: x / Nitrite: x   Leuk Esterase: x / RBC: x / WBC x   Sq Epi: x / Non Sq Epi: x / Bacteria: x              Culture - Urine (collected 17 Jul 2023 17:58)  Source: Clean Catch Clean Catch (Midstream)  Final Report (18 Jul 2023 23:34):    No growth        CARDIAC MARKERS ( 18 Jul 2023 08:28 )  x     / 0.10 ng/mL / x     / x     / x      CARDIAC MARKERS ( 17 Jul 2023 22:00 )  x     / 0.09 ng/mL / x     / x     / x      CARDIAC MARKERS ( 17 Jul 2023 15:45 )  x     / 0.11 ng/mL / x     / x     / x                                                  -------------------------------------------------------------  RADIOLOGY:  Brain MRI  IMPRESSION:  In comparison to the patient's previous brain MRI dated 10/8/2022:    1.  New 1 cm lesion within the right frontal lobe periventricular white   matter demonstrating rim-like restricted diffusion. Diagnostic   considerations include infarct, demyelination, neoplasm or   infectious/inflammatory lesion.    2.  New 8 mm lesion within the anterior right temporal lobe. Diagnostic   considerations include demyelination, neoplasm or infectious/inflammatory   lesion.    3.  Recommend a postcontrast brain MRI for further evaluation of the   above lesions.    4.  Additional new small lesion within the right cerebellar hemisphere,   the configuration suggests a chronic infarct.    5.  Otherwise stable patchy white matter disease and scattered chronic   lacunar infarcts.                                          --------------------------------------------------------------    PHYSICAL EXAM:  GENERAL: Minimal acute distress, lying in bed comfortably  HEAD:  Atraumatic, Normocephalic  EYES: EOMI, conjunctiva and sclera clear. R eye associated with TTP.   ENT: Moist mucous membranes  NECK: Supple, No JVD  CHEST/LUNG: Clear to auscultation bilaterally; No rales, rhonchi, wheezing, or rubs. Unlabored respirations  HEART: regular rate and rhythm; No murmurs, rubs, or gallops  ABDOMEN: Bowel sounds present; Soft, Nontender, Nondistended.    EXTREMITIES: Warm. No clubbing, cyanosis, or edema  NERVOUS SYSTEM:  Alert & Oriented X3. No focal deficits   SKIN: No rashes or lesions                                           --------------------------------------------------------------

## 2023-07-19 NOTE — PROGRESS NOTE ADULT - SUBJECTIVE AND OBJECTIVE BOX
Neurology Progress Note    INTERVAL HPI/OVERNIGHT EVENTS:  Patient seen and examined. Patient reports    MEDICATIONS  (STANDING):  amLODIPine   Tablet 10 milliGRAM(s) Oral daily  aspirin enteric coated 81 milliGRAM(s) Oral daily  atorvastatin 20 milliGRAM(s) Oral at bedtime  carvedilol 25 milliGRAM(s) Oral every 12 hours  dextrose 5%. 1000 milliLiter(s) (50 mL/Hr) IV Continuous <Continuous>  dextrose 5%. 1000 milliLiter(s) (100 mL/Hr) IV Continuous <Continuous>  dextrose 50% Injectable 12.5 Gram(s) IV Push once  dextrose 50% Injectable 25 Gram(s) IV Push once  dextrose 50% Injectable 25 Gram(s) IV Push once  glucagon  Injectable 1 milliGRAM(s) IntraMuscular once  heparin   Injectable 5000 Unit(s) SubCutaneous every 8 hours  hydrALAZINE 25 milliGRAM(s) Oral three times a day  insulin glargine Injectable (LANTUS) 15 Unit(s) SubCutaneous at bedtime  insulin lispro Injectable (ADMELOG) 5 Unit(s) SubCutaneous three times a day before meals  meclizine 25 milliGRAM(s) Oral two times a day  polyethylene glycol 3350 17 Gram(s) Oral two times a day  senna 2 Tablet(s) Oral at bedtime  sodium chloride 0.45%. 1000 milliLiter(s) (75 mL/Hr) IV Continuous <Continuous>    MEDICATIONS  (PRN):  acetaminophen     Tablet .. 650 milliGRAM(s) Oral every 6 hours PRN Moderate Pain (4 - 6)  dextrose Oral Gel 15 Gram(s) Oral once PRN Blood Glucose LESS THAN 70 milliGRAM(s)/deciliter      Allergies  No Known Allergies    Vital Signs Last 24 Hrs  T(C): 35.8 (19 Jul 2023 07:49), Max: 37 (18 Jul 2023 15:30)  T(F): 96.5 (19 Jul 2023 07:49), Max: 98.6 (18 Jul 2023 15:30)  HR: 74 (19 Jul 2023 07:49) (70 - 85)  BP: 140/72 (19 Jul 2023 07:49) (110/52 - 164/84)  BP(mean): --  RR: 18 (19 Jul 2023 07:49) (18 - 19)  SpO2: 99% (19 Jul 2023 07:49) (99% - 100%)    Parameters below as of 19 Jul 2023 07:49  Patient On (Oxygen Delivery Method): room air    Neurologic:  -Mental status: Awake, alert, oriented to person, place, and time. Speech is fluent with intact naming, repetition, and comprehension, no dysarthria. Recent and remote memory intact. Follows commands. Attention/concentration intact. Fund of knowledge appropriate.  -Cranial nerves:   II: Visual fields are full to confrontation.  III, IV, VI: Extraocular movements are intact without nystagmus. Pupils equally round and reactive to light  V:  Facial sensation V1-V3 equal and intact   VII: Face is symmetric with normal eye closure and smile  VIII: Hearing is bilaterally intact to finger rub  IX, X: Uvula is midline and soft palate rises symmetrically  XI: Head turning and shoulder shrug are intact.  XII: Tongue protrudes midline  Motor: Normal bulk and tone. No pronator drift. Strength bilateral upper extremity 5/5, bilateral lower extremities 5/5.  Rapid alternating movements intact and symmetric  Sensation: Intact to light touch bilaterally. No neglect or extinction on double simultaneous testing.  Coordination: No dysmetria on finger-to-nose and heel-to-shin bilaterally  Reflexes: Downgoing toes bilaterally   Gait: Narrow gait and steady    LABS:                        10.8   7.77  )-----------( 203      ( 19 Jul 2023 06:25 )             31.1     07-19    135  |  103  |  68<HH>  ----------------------------<  213<H>  4.6   |  21  |  5.1<HH>    Ca    8.1<L>      19 Jul 2023 06:25  Phos  6.9     07-19  Mg     2.2     07-19    TPro  5.5<L>  /  Alb  3.0<L>  /  TBili  <0.2  /  DBili  x   /  AST  12  /  ALT  13  /  AlkPhos  137<H>  07-19    PT/INR - ( 17 Jul 2023 15:45 )   PT: 9.70 sec;   INR: 0.86 ratio         PTT - ( 17 Jul 2023 15:45 )  PTT:33.6 sec    RADIOLOGY & ADDITIONAL TESTS:  MRI brain without contrast (18.07.2023)  In comparison to the patient's previous brain MRI dated 10/8/2022:  1.  New 1 cm lesion within the right frontal lobe periventricular white matter demonstrating rim-like restricted diffusion. Diagnostic considerations include infarct, demyelination, neoplasm or infectious/inflammatory lesion.  2. New 8 mm lesion within the anterior right temporal lobe. Diagnostic considerations include demyelination, neoplasm or infectious/inflammatory lesion.  3.  Recommend a postcontrast brain MRI for further evaluation of the above lesions.  4.Additional new small lesion within the right cerebellar hemisphere, the configuration suggests a chronic infarct.  5.  Otherwise stable patchy white matter disease and scattered chronic lacunar infarcts.    Assessment:             Neurology Progress Note    INTERVAL HPI/ OVERNIGHT EVENTS:  Patient seen and examined. Patient reports diplopia with R eye pain that started 3 weeks ago that was associated with hypertensive urgency/ emergency for which he was admitted to another hospital for BP management and was discharged with instructions to follow up with outpatient ophthalmologist. The patient visited a general ophthalmologist last week and as per patient story his ophthalmology exam was significant for retinal disease for which he was asked to follow up with retina specialist. His diplopia became less intense over time and is now more of blurry vision mainly appreciated with left eye closed and he has persistent R eye pain.    MEDICATIONS  (STANDING):  amLODIPine Tablet 10 milliGRAM(s) Oral daily  aspirin enteric coated 81 milliGRAM(s) Oral daily  atorvastatin 20 milliGRAM(s) Oral at bedtime  carvedilol 25 milliGRAM(s) Oral every 12 hours  dextrose 5%. 1000 milliLiter(s) (50 mL/Hr) IV Continuous <Continuous>  dextrose 5%. 1000 milliLiter(s) (100 mL/Hr) IV Continuous <Continuous>  dextrose 50% Injectable 12.5 Gram(s) IV Push once  dextrose 50% Injectable 25 Gram(s) IV Push once  dextrose 50% Injectable 25 Gram(s) IV Push once  glucagon  Injectable 1 milliGRAM(s) IntraMuscular once  heparin   Injectable 5000 Unit(s) SubCutaneous every 8 hours  hydrALAZINE 25 milliGRAM(s) Oral three times a day  insulin glargine Injectable (LANTUS) 15 Unit(s) SubCutaneous at bedtime  insulin lispro Injectable (ADMELOG) 5 Unit(s) SubCutaneous three times a day before meals  meclizine 25 milliGRAM(s) Oral two times a day  polyethylene glycol 3350 17 Gram(s) Oral two times a day  senna 2 Tablet(s) Oral at bedtime  sodium chloride 0.45%. 1000 milliLiter(s) (75 mL/Hr) IV Continuous <Continuous>    MEDICATIONS  (PRN):  acetaminophen     Tablet .. 650 milliGRAM(s) Oral every 6 hours PRN Moderate Pain (4 - 6)  dextrose Oral Gel 15 Gram(s) Oral once PRN Blood Glucose LESS THAN 70 milliGRAM(s)/deciliter    Allergies  No Known Allergies    Vital Signs Last 24 Hrs  T(C): 35.8 (19 Jul 2023 07:49), Max: 37 (18 Jul 2023 15:30)  T(F): 96.5 (19 Jul 2023 07:49), Max: 98.6 (18 Jul 2023 15:30)  HR: 74 (19 Jul 2023 07:49) (70 - 85)  BP: 140/72 (19 Jul 2023 07:49) (110/52 - 164/84)  BP(mean): --  RR: 18 (19 Jul 2023 07:49) (18 - 19)  SpO2: 99% (19 Jul 2023 07:49) (99% - 100%)    Parameters below as of 19 Jul 2023 07:49  Patient On (Oxygen Delivery Method): room air    Neurologic: Neuro exam not completed as patient was transported to the ophthalmology department   -Mental status: Awake, alert. Speech is fluent with intact naming, repetition, and comprehension, no dysarthria. Follows commands.  -Cranial nerves:   II: Visual fields are full to confrontation.  III, IV, VI: Extraocular movements are intact without nystagmus. Pupils equally round and reactive to light  VII: Face is symmetric with normal eye closure and smile    Motor: Normal bulk and tone. No pronator drift. Strength bilateral upper extremity 5/5, bilateral lower extremities 5/5.  Reflexes: diffuse hyporeflexia in both Upper and Lower extremities    LABS:                        10.8   7.77  )-----------( 203      ( 19 Jul 2023 06:25 )             31.1     07-19    135  |  103  |  68<HH>  ----------------------------<  213<H>  4.6   |  21  |  5.1<HH>    Ca    8.1<L>      19 Jul 2023 06:25  Phos  6.9     07-19  Mg     2.2     07-19  TPro  5.5<L>  /  Alb  3.0<L>  /  TBili  <0.2  /  DBili  x   /  AST  12  /  ALT  13  /  AlkPhos  137<H>  07-19  PT/INR - ( 17 Jul 2023 15:45 )   PT: 9.70 sec;   INR: 0.86 ratio         PTT - ( 17 Jul 2023 15:45 )  PTT:33.6 sec    RADIOLOGY & ADDITIONAL TESTS:  MRI brain without contrast (18.07.2023)  In comparison to the patient's previous brain MRI dated 10/8/2022:  1.  New 1 cm lesion within the right frontal lobe periventricular white matter demonstrating rim-like restricted diffusion. Diagnostic considerations include infarct, demyelination, neoplasm or infectious/inflammatory lesion.  2. New 8 mm lesion within the anterior right temporal lobe. Diagnostic considerations include demyelination, neoplasm or infectious/inflammatory lesion.  3. Recommend a postcontrast brain MRI for further evaluation of the above lesions.  4. Additional new small lesion within the right cerebellar hemisphere, the configuration suggests a chronic infarct.  5.  Otherwise stable patchy white matter disease and scattered chronic lacunar infarcts.    Assessment:  Patient is a 48 year old male with PMHx of CKD (stage IV), IDDM, HTN, AMBER, Hx of multiple CVA (last one in November 2022) admitted on 7/17 for further evaluation of headache, right sided eye pain, blurry vision and chest pain to telemetry. Patient was initially seen for stroke code. CTA/ CTP done showed CT PERFUSION: No evidence of perfusion abnormality/ CTA HEAD: Mild stenosis of the proximal right V4 segment of the vertebral artery/ CTA NECK: No evidence of carotid or vertebral artery stenosis. MRI brain w/o PROSPER was requested to rule out acute ischemia which was negative. There were however 3 new lesions in the brain compared with an MRI from 2022.  Lesions are suspicious for inflammatory, demyelinating or neoplastic processes and would need further workup. Suggest MRI brain with contrast for better characterization.  #New lesions in the brain:  - Nephrology evaluation for contrast administration in patient with BILLY on top of CKD  - Send HIV to investigate for infectious cause of brain lesions in context of immunosuppression  - WES, dsDNA, Angiotensin converting enzyme to investigate for autoimmune causes of ophthalmoplegia    #Blurry vision with R sided eye pain:  - Comprehensive physical exam need to be completed to evaluate for monocular vs binocular diplopia  - Ophthalmology exam is appreciated  - Will follow up         Neurology Progress Note    INTERVAL HPI/ OVERNIGHT EVENTS:  Patient seen and examined. Patient reports diplopia with R eye pain that started 3 weeks ago that was associated with hypertensive urgency/ emergency for which he was admitted to another hospital for BP management and was discharged with instructions to follow up with outpatient ophthalmologist. The patient visited a general ophthalmologist last week and as per patient story his ophthalmology exam was significant for retinal disease for which he was asked to follow up with retina specialist. His diplopia became less intense over time and is now more of blurry vision mainly appreciated with left eye closed and he has persistent R eye pain.    MEDICATIONS  (STANDING):  amLODIPine Tablet 10 milliGRAM(s) Oral daily  aspirin enteric coated 81 milliGRAM(s) Oral daily  atorvastatin 20 milliGRAM(s) Oral at bedtime  carvedilol 25 milliGRAM(s) Oral every 12 hours  dextrose 5%. 1000 milliLiter(s) (50 mL/Hr) IV Continuous <Continuous>  dextrose 5%. 1000 milliLiter(s) (100 mL/Hr) IV Continuous <Continuous>  dextrose 50% Injectable 12.5 Gram(s) IV Push once  dextrose 50% Injectable 25 Gram(s) IV Push once  dextrose 50% Injectable 25 Gram(s) IV Push once  glucagon  Injectable 1 milliGRAM(s) IntraMuscular once  heparin   Injectable 5000 Unit(s) SubCutaneous every 8 hours  hydrALAZINE 25 milliGRAM(s) Oral three times a day  insulin glargine Injectable (LANTUS) 15 Unit(s) SubCutaneous at bedtime  insulin lispro Injectable (ADMELOG) 5 Unit(s) SubCutaneous three times a day before meals  meclizine 25 milliGRAM(s) Oral two times a day  polyethylene glycol 3350 17 Gram(s) Oral two times a day  senna 2 Tablet(s) Oral at bedtime  sodium chloride 0.45%. 1000 milliLiter(s) (75 mL/Hr) IV Continuous <Continuous>    MEDICATIONS  (PRN):  acetaminophen     Tablet .. 650 milliGRAM(s) Oral every 6 hours PRN Moderate Pain (4 - 6)  dextrose Oral Gel 15 Gram(s) Oral once PRN Blood Glucose LESS THAN 70 milliGRAM(s)/deciliter    Allergies  No Known Allergies    Vital Signs Last 24 Hrs  T(C): 35.8 (19 Jul 2023 07:49), Max: 37 (18 Jul 2023 15:30)  T(F): 96.5 (19 Jul 2023 07:49), Max: 98.6 (18 Jul 2023 15:30)  HR: 74 (19 Jul 2023 07:49) (70 - 85)  BP: 140/72 (19 Jul 2023 07:49) (110/52 - 164/84)  BP(mean): --  RR: 18 (19 Jul 2023 07:49) (18 - 19)  SpO2: 99% (19 Jul 2023 07:49) (99% - 100%)    Parameters below as of 19 Jul 2023 07:49  Patient On (Oxygen Delivery Method): room air    Neurologic: Neuro exam not completed as patient was transported to the ophthalmology department   -Mental status: Awake, alert. Speech is fluent with intact naming, repetition, and comprehension, no dysarthria. Follows commands.  -Cranial nerves:   II: Visual fields are full to confrontation.  III, IV, VI: Extraocular movements are intact without nystagmus. Pupils equally round and reactive to light  VII: Face is symmetric with normal eye closure and smile    Motor: Normal bulk and tone. No pronator drift. Strength bilateral upper extremity 5/5, bilateral lower extremities 5/5.  Reflexes: diffuse hyporeflexia in both Upper and Lower extremities    LABS:                        10.8   7.77  )-----------( 203      ( 19 Jul 2023 06:25 )             31.1     07-19    135  |  103  |  68<HH>  ----------------------------<  213<H>  4.6   |  21  |  5.1<HH>    Ca    8.1<L>      19 Jul 2023 06:25  Phos  6.9     07-19  Mg     2.2     07-19  TPro  5.5<L>  /  Alb  3.0<L>  /  TBili  <0.2  /  DBili  x   /  AST  12  /  ALT  13  /  AlkPhos  137<H>  07-19  PT/INR - ( 17 Jul 2023 15:45 )   PT: 9.70 sec;   INR: 0.86 ratio         PTT - ( 17 Jul 2023 15:45 )  PTT:33.6 sec    RADIOLOGY & ADDITIONAL TESTS:  MRI brain without contrast (18.07.2023)  In comparison to the patient's previous brain MRI dated 10/8/2022:  1.  New 1 cm lesion within the right frontal lobe periventricular white matter demonstrating rim-like restricted diffusion. Diagnostic considerations include infarct, demyelination, neoplasm or infectious/inflammatory lesion.  2. New 8 mm lesion within the anterior right temporal lobe. Diagnostic considerations include demyelination, neoplasm or infectious/inflammatory lesion.  3. Recommend a postcontrast brain MRI for further evaluation of the above lesions.  4. Additional new small lesion within the right cerebellar hemisphere, the configuration suggests a chronic infarct.  5.  Otherwise stable patchy white matter disease and scattered chronic lacunar infarcts.    Assessment:  Patient is a 48 year old male with PMHx of CKD (stage IV), IDDM, HTN, AMBER, Hx of multiple strokes (last one in November 2022) admitted on 7/17 for further evaluation of headache, right sided eye pain, blurry vision and chest pain to telemetry. Patient was initially seen for stroke code. CTA/ CTP with No evidence of perfusion abnormality/ CTA HEAD: Mild stenosis of the proximal right V4 segment of the vertebral artery. MRI brain w/o PROSPER showed 3 new lesions in the brain compared with an MRI from 2022. Exam consistent with monocular diplopia in both eyes. No APD, no DORCAS, with grossly preserved visual fields on confrontation. Hyporeflexia in all extremities, absent in feet and ankle. No UMN signs noted. Suspect current monocular eye pain to be related to retinal etiology, however unclear etiology of brain lesion. Inflammatory vs infectious vs demyelinating etiology.     Recommendation:  - MR brain with contrast to better characterize the lesions. Will need Nephrology evaluation for contrast administration in patient with BILLY on top of CKD  - Check HIV, WES, dsDNA, Angiotensin converting enzyme, NMO, MOG, Vitamin B12, Lyme  - Might need LP with CSF studies after MRI  - Ophthalmology evaluation  - Rheumatology evaluation    This is an incomplete note         Neurology Progress Note    INTERVAL HPI/ OVERNIGHT EVENTS:  Patient seen and examined. Patient reports diplopia with R eye pain that started 3 weeks ago that was associated with hypertensive urgency/ emergency for which he was admitted to another hospital for BP management and was discharged with instructions to follow up with outpatient ophthalmologist. The patient visited a general ophthalmologist last week and as per patient story his ophthalmology exam was significant for retinal disease for which he was asked to follow up with retina specialist. His diplopia became less intense over time and is now more of blurry vision mainly appreciated with left eye closed and he has persistent R eye pain.    MEDICATIONS  (STANDING):  amLODIPine Tablet 10 milliGRAM(s) Oral daily  aspirin enteric coated 81 milliGRAM(s) Oral daily  atorvastatin 20 milliGRAM(s) Oral at bedtime  carvedilol 25 milliGRAM(s) Oral every 12 hours  dextrose 5%. 1000 milliLiter(s) (50 mL/Hr) IV Continuous <Continuous>  dextrose 5%. 1000 milliLiter(s) (100 mL/Hr) IV Continuous <Continuous>  dextrose 50% Injectable 12.5 Gram(s) IV Push once  dextrose 50% Injectable 25 Gram(s) IV Push once  dextrose 50% Injectable 25 Gram(s) IV Push once  glucagon  Injectable 1 milliGRAM(s) IntraMuscular once  heparin   Injectable 5000 Unit(s) SubCutaneous every 8 hours  hydrALAZINE 25 milliGRAM(s) Oral three times a day  insulin glargine Injectable (LANTUS) 15 Unit(s) SubCutaneous at bedtime  insulin lispro Injectable (ADMELOG) 5 Unit(s) SubCutaneous three times a day before meals  meclizine 25 milliGRAM(s) Oral two times a day  polyethylene glycol 3350 17 Gram(s) Oral two times a day  senna 2 Tablet(s) Oral at bedtime  sodium chloride 0.45%. 1000 milliLiter(s) (75 mL/Hr) IV Continuous <Continuous>    MEDICATIONS  (PRN):  acetaminophen     Tablet .. 650 milliGRAM(s) Oral every 6 hours PRN Moderate Pain (4 - 6)  dextrose Oral Gel 15 Gram(s) Oral once PRN Blood Glucose LESS THAN 70 milliGRAM(s)/deciliter    Allergies  No Known Allergies    Vital Signs Last 24 Hrs  T(C): 35.8 (19 Jul 2023 07:49), Max: 37 (18 Jul 2023 15:30)  T(F): 96.5 (19 Jul 2023 07:49), Max: 98.6 (18 Jul 2023 15:30)  HR: 74 (19 Jul 2023 07:49) (70 - 85)  BP: 140/72 (19 Jul 2023 07:49) (110/52 - 164/84)  BP(mean): --  RR: 18 (19 Jul 2023 07:49) (18 - 19)  SpO2: 99% (19 Jul 2023 07:49) (99% - 100%)    Parameters below as of 19 Jul 2023 07:49  Patient On (Oxygen Delivery Method): room air    Neurologic: Neuro exam not completed as patient was transported to the ophthalmology department   -Mental status: Awake, alert. Speech is fluent with intact naming, repetition, and comprehension, no dysarthria. Follows commands.  -Cranial nerves:   II: Visual fields are full to confrontation. No APD  III, IV, VI: No double vision on primary gaze. Reports double vision when looking to the left. Double vision persists when closing one of his eyes. Normal horizontal gaze when trying to look to the left with both eyes. Unable to assess gaze when trying to look to the right as the patient keeps closing his eyes. No DORCAS.  VII: Face is symmetric with normal eye closure and smile  Motor: Normal bulk and tone. No pronator drift. Strength bilateral upper extremity 5/5, bilateral lower extremities 5/5.  Reflexes: diffuse hyporeflexia in both Upper and Lower extremities    LABS:                        10.8   7.77  )-----------( 203      ( 19 Jul 2023 06:25 )             31.1     07-19    135  |  103  |  68<HH>  ----------------------------<  213<H>  4.6   |  21  |  5.1<HH>    Ca    8.1<L>      19 Jul 2023 06:25  Phos  6.9     07-19  Mg     2.2     07-19  TPro  5.5<L>  /  Alb  3.0<L>  /  TBili  <0.2  /  DBili  x   /  AST  12  /  ALT  13  /  AlkPhos  137<H>  07-19  PT/INR - ( 17 Jul 2023 15:45 )   PT: 9.70 sec;   INR: 0.86 ratio         PTT - ( 17 Jul 2023 15:45 )  PTT:33.6 sec    RADIOLOGY & ADDITIONAL TESTS:  MRI brain without contrast (18.07.2023)  In comparison to the patient's previous brain MRI dated 10/8/2022:  1.  New 1 cm lesion within the right frontal lobe periventricular white matter demonstrating rim-like restricted diffusion. Diagnostic considerations include infarct, demyelination, neoplasm or infectious/inflammatory lesion.  2. New 8 mm lesion within the anterior right temporal lobe. Diagnostic considerations include demyelination, neoplasm or infectious/inflammatory lesion.  3. Recommend a postcontrast brain MRI for further evaluation of the above lesions.  4. Additional new small lesion within the right cerebellar hemisphere, the configuration suggests a chronic infarct.  5.  Otherwise stable patchy white matter disease and scattered chronic lacunar infarcts.    Assessment:  Patient is a 48 year old male with PMHx of CKD (stage IV), IDDM, HTN, AMBER, Hx of multiple strokes (last one in November 2022) admitted on 7/17 for further evaluation of headache, right sided eye pain, blurry vision and chest pain to telemetry. Patient was initially seen for stroke code. CTA/ CTP with No evidence of perfusion abnormality/ CTA HEAD: Mild stenosis of the proximal right V4 segment of the vertebral artery. MRI brain w/o PROSPER showed 3 new lesions in the brain compared with an MRI from 2022. Exam consistent with monocular diplopia in both eyes. Unable to assess right horizontal gaze as the patient closes eyes due to pain. No APD, no DORCAS, with grossly preserved visual fields on confrontation. Hyporeflexia in all extremities, absent in feet and ankle. No UMN signs noted. Need to rule out primary eye disease, given suspicion for monocular diplopia on exam. Unclear etiology of brain lesion. Inflammatory vs infectious vs demyelinating etiology.     Recommendation:  - MR brain with contrast to better characterize the lesions. Will need Nephrology evaluation for contrast administration in patient with BILLY on top of CKD  - Check HIV, WES, dsDNA, Angiotensin converting enzyme, NMO, MOG, Vitamin B12, Lyme  - Might need LP with CSF studies after MRI  - Ophthalmology evaluation  - Rheumatology evaluation    This is an incomplete note         Neurology Progress Note    INTERVAL HPI/ OVERNIGHT EVENTS:  Patient seen and examined. Patient reports diplopia with R eye pain that started 3 weeks ago that was associated with hypertensive urgency/ emergency for which he was admitted to another hospital for BP management and was discharged with instructions to follow up with outpatient ophthalmologist. The patient visited a general ophthalmologist last week and as per patient story his ophthalmology exam was significant for retinal disease for which he was asked to follow up with retina specialist. His diplopia became less intense over time and is now more of blurry vision mainly appreciated with left eye closed and he has persistent R eye pain.    MEDICATIONS  (STANDING):  amLODIPine Tablet 10 milliGRAM(s) Oral daily  aspirin enteric coated 81 milliGRAM(s) Oral daily  atorvastatin 20 milliGRAM(s) Oral at bedtime  carvedilol 25 milliGRAM(s) Oral every 12 hours  dextrose 5%. 1000 milliLiter(s) (50 mL/Hr) IV Continuous <Continuous>  dextrose 5%. 1000 milliLiter(s) (100 mL/Hr) IV Continuous <Continuous>  dextrose 50% Injectable 12.5 Gram(s) IV Push once  dextrose 50% Injectable 25 Gram(s) IV Push once  dextrose 50% Injectable 25 Gram(s) IV Push once  glucagon  Injectable 1 milliGRAM(s) IntraMuscular once  heparin   Injectable 5000 Unit(s) SubCutaneous every 8 hours  hydrALAZINE 25 milliGRAM(s) Oral three times a day  insulin glargine Injectable (LANTUS) 15 Unit(s) SubCutaneous at bedtime  insulin lispro Injectable (ADMELOG) 5 Unit(s) SubCutaneous three times a day before meals  meclizine 25 milliGRAM(s) Oral two times a day  polyethylene glycol 3350 17 Gram(s) Oral two times a day  senna 2 Tablet(s) Oral at bedtime  sodium chloride 0.45%. 1000 milliLiter(s) (75 mL/Hr) IV Continuous <Continuous>    MEDICATIONS  (PRN):  acetaminophen     Tablet .. 650 milliGRAM(s) Oral every 6 hours PRN Moderate Pain (4 - 6)  dextrose Oral Gel 15 Gram(s) Oral once PRN Blood Glucose LESS THAN 70 milliGRAM(s)/deciliter    Allergies  No Known Allergies    Vital Signs Last 24 Hrs  T(C): 35.8 (19 Jul 2023 07:49), Max: 37 (18 Jul 2023 15:30)  T(F): 96.5 (19 Jul 2023 07:49), Max: 98.6 (18 Jul 2023 15:30)  HR: 74 (19 Jul 2023 07:49) (70 - 85)  BP: 140/72 (19 Jul 2023 07:49) (110/52 - 164/84)  BP(mean): --  RR: 18 (19 Jul 2023 07:49) (18 - 19)  SpO2: 99% (19 Jul 2023 07:49) (99% - 100%)    Parameters below as of 19 Jul 2023 07:49  Patient On (Oxygen Delivery Method): room air    Neurologic: Neuro exam not completed as patient was transported to the ophthalmology department   -Mental status: Awake, alert. Speech is fluent with intact naming, repetition, and comprehension, no dysarthria. Follows commands.  -Cranial nerves:   II: Visual fields are full to confrontation. No APD  III, IV, VI: No double vision on primary gaze. Reports double vision when looking to the left. Double vision persists when closing one of his eyes. Normal horizontal gaze when trying to look to the left with both eyes. Unable to assess gaze when trying to look to the right as the patient keeps closing his eyes. No DORCAS.  VII: Face is symmetric with normal eye closure and smile  Motor: Normal bulk and tone. No pronator drift. Strength bilateral upper extremity 5/5, bilateral lower extremities 5/5.  Reflexes: diffuse hyporeflexia in both Upper and Lower extremities    LABS:                        10.8   7.77  )-----------( 203      ( 19 Jul 2023 06:25 )             31.1     07-19    135  |  103  |  68<HH>  ----------------------------<  213<H>  4.6   |  21  |  5.1<HH>    Ca    8.1<L>      19 Jul 2023 06:25  Phos  6.9     07-19  Mg     2.2     07-19  TPro  5.5<L>  /  Alb  3.0<L>  /  TBili  <0.2  /  DBili  x   /  AST  12  /  ALT  13  /  AlkPhos  137<H>  07-19  PT/INR - ( 17 Jul 2023 15:45 )   PT: 9.70 sec;   INR: 0.86 ratio         PTT - ( 17 Jul 2023 15:45 )  PTT:33.6 sec    RADIOLOGY & ADDITIONAL TESTS:  MRI brain without contrast (18.07.2023)  In comparison to the patient's previous brain MRI dated 10/8/2022:  1.  New 1 cm lesion within the right frontal lobe periventricular white matter demonstrating rim-like restricted diffusion. Diagnostic considerations include infarct, demyelination, neoplasm or infectious/inflammatory lesion.  2. New 8 mm lesion within the anterior right temporal lobe. Diagnostic considerations include demyelination, neoplasm or infectious/inflammatory lesion.  3. Recommend a postcontrast brain MRI for further evaluation of the above lesions.  4. Additional new small lesion within the right cerebellar hemisphere, the configuration suggests a chronic infarct.  5.  Otherwise stable patchy white matter disease and scattered chronic lacunar infarcts.    Assessment:  Patient is a 48 year old male with PMHx of CKD (stage IV), IDDM, HTN, AMBER, Hx of multiple strokes (last one in November 2022) admitted on 7/17 for further evaluation of headache, right sided eye pain, blurry vision and chest pain to telemetry. Patient was initially seen for stroke code. CTA/ CTP with No evidence of perfusion abnormality/ CTA HEAD: Mild stenosis of the proximal right V4 segment of the vertebral artery. MRI brain w/o PROSPER showed 3 new lesions in the brain compared with an MRI from 2022. Exam consistent with monocular diplopia in both eyes. Unable to assess right horizontal gaze as the patient closes eyes due to pain, but no dysconjugate gaze. No APD, no DORCAS, with grossly preserved visual fields on confrontation. Hyporeflexia in all extremities, absent in feet and ankle. No UMN signs noted. Unclear etiology of brain lesion. Inflammatory vs infectious vs demyelinating etiology.     Recommendation:  - MR orbit w/wo contrast  - Check HIV, WES, dsDNA, Angiotensin converting enzyme, NMO, MOG, Vitamin B12, Lyme, QuantiFeron   - Might need LP with CSF studies after MRI. Patient reports having LP done at [facility]. Recommend to get records  - Patient reports Family hx of early Dementia at age of 50 (mother), check NOTCH3 rule out CADASIL given recurrent strokes and FH of dementia  - Ophthalmology evaluation  - Rheumatology evaluation    This is an incomplete note         Neurology Progress Note    INTERVAL HPI/ OVERNIGHT EVENTS:  Patient seen and examined. Patient reports diplopia with R eye pain that started 3 weeks ago that was associated with hypertensive urgency/ emergency for which he was admitted to another hospital for BP management and was discharged with instructions to follow up with outpatient ophthalmologist. The patient visited a general ophthalmologist last week and as per patient story his ophthalmology exam was significant for retinal disease for which he was asked to follow up with retina specialist. His diplopia became less intense over time and is now more of blurry vision mainly appreciated with left eye closed and he has persistent R eye pain.    MEDICATIONS  (STANDING):  amLODIPine Tablet 10 milliGRAM(s) Oral daily  aspirin enteric coated 81 milliGRAM(s) Oral daily  atorvastatin 20 milliGRAM(s) Oral at bedtime  carvedilol 25 milliGRAM(s) Oral every 12 hours  dextrose 5%. 1000 milliLiter(s) (50 mL/Hr) IV Continuous <Continuous>  dextrose 5%. 1000 milliLiter(s) (100 mL/Hr) IV Continuous <Continuous>  dextrose 50% Injectable 12.5 Gram(s) IV Push once  dextrose 50% Injectable 25 Gram(s) IV Push once  dextrose 50% Injectable 25 Gram(s) IV Push once  glucagon  Injectable 1 milliGRAM(s) IntraMuscular once  heparin   Injectable 5000 Unit(s) SubCutaneous every 8 hours  hydrALAZINE 25 milliGRAM(s) Oral three times a day  insulin glargine Injectable (LANTUS) 15 Unit(s) SubCutaneous at bedtime  insulin lispro Injectable (ADMELOG) 5 Unit(s) SubCutaneous three times a day before meals  meclizine 25 milliGRAM(s) Oral two times a day  polyethylene glycol 3350 17 Gram(s) Oral two times a day  senna 2 Tablet(s) Oral at bedtime  sodium chloride 0.45%. 1000 milliLiter(s) (75 mL/Hr) IV Continuous <Continuous>    MEDICATIONS  (PRN):  acetaminophen     Tablet .. 650 milliGRAM(s) Oral every 6 hours PRN Moderate Pain (4 - 6)  dextrose Oral Gel 15 Gram(s) Oral once PRN Blood Glucose LESS THAN 70 milliGRAM(s)/deciliter    Allergies  No Known Allergies    Vital Signs Last 24 Hrs  T(C): 35.8 (19 Jul 2023 07:49), Max: 37 (18 Jul 2023 15:30)  T(F): 96.5 (19 Jul 2023 07:49), Max: 98.6 (18 Jul 2023 15:30)  HR: 74 (19 Jul 2023 07:49) (70 - 85)  BP: 140/72 (19 Jul 2023 07:49) (110/52 - 164/84)  BP(mean): --  RR: 18 (19 Jul 2023 07:49) (18 - 19)  SpO2: 99% (19 Jul 2023 07:49) (99% - 100%)    Parameters below as of 19 Jul 2023 07:49  Patient On (Oxygen Delivery Method): room air    Neurologic: Neuro exam not completed as patient was transported to the ophthalmology department   -Mental status: Awake, alert. Speech is fluent with intact naming, repetition, and comprehension, no dysarthria. Follows commands.  -Cranial nerves:   II: Visual fields are full to confrontation. No APD  III, IV, VI: No double vision on primary gaze. Reports double vision when looking to the left. Double vision persists when closing one of his eyes. Normal horizontal gaze when trying to look to the left with both eyes. Unable to assess gaze when trying to look to the right as the patient keeps closing his eyes. No DORCAS.  VII: Face is symmetric with normal eye closure and smile  Motor: Normal bulk and tone. No pronator drift. Strength bilateral upper extremity 5/5, bilateral lower extremities 5/5.  Reflexes: diffuse hyporeflexia in both Upper and Lower extremities    LABS:                        10.8   7.77  )-----------( 203      ( 19 Jul 2023 06:25 )             31.1     07-19    135  |  103  |  68<HH>  ----------------------------<  213<H>  4.6   |  21  |  5.1<HH>    Ca    8.1<L>      19 Jul 2023 06:25  Phos  6.9     07-19  Mg     2.2     07-19  TPro  5.5<L>  /  Alb  3.0<L>  /  TBili  <0.2  /  DBili  x   /  AST  12  /  ALT  13  /  AlkPhos  137<H>  07-19  PT/INR - ( 17 Jul 2023 15:45 )   PT: 9.70 sec;   INR: 0.86 ratio         PTT - ( 17 Jul 2023 15:45 )  PTT:33.6 sec    RADIOLOGY & ADDITIONAL TESTS:  MRI brain without contrast (18.07.2023)  In comparison to the patient's previous brain MRI dated 10/8/2022:  1.  New 1 cm lesion within the right frontal lobe periventricular white matter demonstrating rim-like restricted diffusion. Diagnostic considerations include infarct, demyelination, neoplasm or infectious/inflammatory lesion.  2. New 8 mm lesion within the anterior right temporal lobe. Diagnostic considerations include demyelination, neoplasm or infectious/inflammatory lesion.  3. Recommend a postcontrast brain MRI for further evaluation of the above lesions.  4. Additional new small lesion within the right cerebellar hemisphere, the configuration suggests a chronic infarct.  5.  Otherwise stable patchy white matter disease and scattered chronic lacunar infarcts.    Assessment:  Patient is a 48 year old male with PMHx of CKD (stage IV), IDDM, HTN, AMBER, Hx of multiple strokes (last one in November 2022) admitted on 7/17 for further evaluation of headache, right sided eye pain, blurry vision and chest pain to telemetry. Patient was initially seen for stroke code. CTA/ CTP with No evidence of perfusion abnormality/ CTA HEAD: Mild stenosis of the proximal right V4 segment of the vertebral artery. MRI brain w/o PROSPER showed 3 new lesions in the brain compared with an MRI from 2022. Exam consistent with monocular diplopia in both eyes. Unable to assess right horizontal gaze as the patient closes eyes due to pain, but no dysconjugate gaze. No APD, no DORCAS, with grossly preserved visual fields on confrontation. Hyporeflexia in all extremities, absent in feet and ankle. No UMN signs noted. Unclear etiology of brain lesion. Inflammatory vs infectious vs demyelinating etiology.     Recommendation:  - MR orbit w/wo contrast  - Check HIV, WES, dsDNA, Angiotensin converting enzyme, NMO, MOG, Vitamin B12, Lyme, QuantiFeron   - Might need LP with CSF studies after MRI. Patient reports having LP done at [facility]. Recommend to get records  - Patient reports Family hx of early Dementia at age of 50 (mother), check NOTCH3 rule out CADASIL given recurrent strokes and FH of dementia  - Ophthalmology evaluation  - Rheumatology evaluation    Discussed with neuro attending         Neurology Progress Note    INTERVAL HPI/ OVERNIGHT EVENTS:  Patient seen and examined. Patient reports diplopia with R eye pain that started 3 weeks ago that was associated with hypertensive urgency/ emergency for which he was admitted to another hospital for BP management and was discharged with instructions to follow up with outpatient ophthalmologist. The patient visited a general ophthalmologist last week and as per patient story his ophthalmology exam was significant for retinal disease for which he was asked to follow up with retina specialist. His diplopia became less intense over time and is now more of blurry vision mainly appreciated with left eye closed and he has persistent R eye pain.    MEDICATIONS  (STANDING):  amLODIPine Tablet 10 milliGRAM(s) Oral daily  aspirin enteric coated 81 milliGRAM(s) Oral daily  atorvastatin 20 milliGRAM(s) Oral at bedtime  carvedilol 25 milliGRAM(s) Oral every 12 hours  dextrose 5%. 1000 milliLiter(s) (50 mL/Hr) IV Continuous <Continuous>  dextrose 5%. 1000 milliLiter(s) (100 mL/Hr) IV Continuous <Continuous>  dextrose 50% Injectable 12.5 Gram(s) IV Push once  dextrose 50% Injectable 25 Gram(s) IV Push once  dextrose 50% Injectable 25 Gram(s) IV Push once  glucagon  Injectable 1 milliGRAM(s) IntraMuscular once  heparin   Injectable 5000 Unit(s) SubCutaneous every 8 hours  hydrALAZINE 25 milliGRAM(s) Oral three times a day  insulin glargine Injectable (LANTUS) 15 Unit(s) SubCutaneous at bedtime  insulin lispro Injectable (ADMELOG) 5 Unit(s) SubCutaneous three times a day before meals  meclizine 25 milliGRAM(s) Oral two times a day  polyethylene glycol 3350 17 Gram(s) Oral two times a day  senna 2 Tablet(s) Oral at bedtime  sodium chloride 0.45%. 1000 milliLiter(s) (75 mL/Hr) IV Continuous <Continuous>    MEDICATIONS  (PRN):  acetaminophen     Tablet .. 650 milliGRAM(s) Oral every 6 hours PRN Moderate Pain (4 - 6)  dextrose Oral Gel 15 Gram(s) Oral once PRN Blood Glucose LESS THAN 70 milliGRAM(s)/deciliter    Allergies  No Known Allergies    Vital Signs Last 24 Hrs  T(C): 35.8 (19 Jul 2023 07:49), Max: 37 (18 Jul 2023 15:30)  T(F): 96.5 (19 Jul 2023 07:49), Max: 98.6 (18 Jul 2023 15:30)  HR: 74 (19 Jul 2023 07:49) (70 - 85)  BP: 140/72 (19 Jul 2023 07:49) (110/52 - 164/84)  BP(mean): --  RR: 18 (19 Jul 2023 07:49) (18 - 19)  SpO2: 99% (19 Jul 2023 07:49) (99% - 100%)    Parameters below as of 19 Jul 2023 07:49  Patient On (Oxygen Delivery Method): room air    Neurologic: Neuro exam not completed as patient was transported to the ophthalmology department   -Mental status: Awake, alert. Speech is fluent with intact naming, repetition, and comprehension, no dysarthria. Follows commands.  -Cranial nerves:   II: Visual fields are full to confrontation. No APD  III, IV, VI: No double vision on primary gaze. Reports double vision when looking to the left. Double vision persists when closing one of his eyes. Normal horizontal gaze when trying to look to the left with both eyes. Unable to assess gaze when trying to look to the right as the patient keeps closing his eyes. No DORCAS.  (+) TTP R globe with TTP R temple.  pain with R eye movement in the R orbital plane a/w squinting.    VII: Face is symmetric with normal eye closure and smile  Motor: Normal bulk and tone. No pronator drift. Strength bilateral upper extremity 5/5, bilateral lower extremities 5/5.  Reflexes: diffuse hyporeflexia in both Upper and Lower extremities    LABS:                        10.8   7.77  )-----------( 203      ( 19 Jul 2023 06:25 )             31.1     07-19    135  |  103  |  68<HH>  ----------------------------<  213<H>  4.6   |  21  |  5.1<HH>    Ca    8.1<L>      19 Jul 2023 06:25  Phos  6.9     07-19  Mg     2.2     07-19  TPro  5.5<L>  /  Alb  3.0<L>  /  TBili  <0.2  /  DBili  x   /  AST  12  /  ALT  13  /  AlkPhos  137<H>  07-19  PT/INR - ( 17 Jul 2023 15:45 )   PT: 9.70 sec;   INR: 0.86 ratio         PTT - ( 17 Jul 2023 15:45 )  PTT:33.6 sec    RADIOLOGY & ADDITIONAL TESTS:  MRI brain without contrast (18.07.2023)  In comparison to the patient's previous brain MRI dated 10/8/2022:  1.  New 1 cm lesion within the right frontal lobe periventricular white matter demonstrating rim-like restricted diffusion. Diagnostic considerations include infarct, demyelination, neoplasm or infectious/inflammatory lesion.  2. New 8 mm lesion within the anterior right temporal lobe. Diagnostic considerations include demyelination, neoplasm or infectious/inflammatory lesion.  3. Recommend a postcontrast brain MRI for further evaluation of the above lesions.  4. Additional new small lesion within the right cerebellar hemisphere, the configuration suggests a chronic infarct.  5.  Otherwise stable patchy white matter disease and scattered chronic lacunar infarcts.

## 2023-07-19 NOTE — CONSULT NOTE ADULT - ASSESSMENT
# Frontal HA / R eye pain w/ blurry vision and tearing r/o cluster HA vs. r/o glaucoma r/o migraine   # H/o CVA - stroke code called on presentation   # H/o Vertigo  -MRI brain/ orbits noted   -Ophthalmology recs appreciated   -ESR 66 / CRP < 3   -PRN Meclizine   -Low suspicion for TAA

## 2023-07-19 NOTE — PROGRESS NOTE ADULT - ASSESSMENT
Assessment:  Patient is a 48 year old male with PMHx of CKD (stage IV), IDDM, HTN, AMBER, Hx of multiple strokes (last one in November 2022) admitted on 7/17 for further evaluation of headache, right sided eye pain, blurry vision and chest pain to telemetry. Patient was initially seen for stroke code. CTA/ CTP with No evidence of perfusion abnormality/ CTA HEAD: Mild stenosis of the proximal right V4 segment of the vertebral artery. MRI brain w/o PROSPER showed 3 new lesions in the brain compared with an MRI from 2022. Exam consistent with monocular diplopia in both eyes. Unable to assess right horizontal gaze as the patient closes eyes due to pain, but no dysconjugate gaze. No APD, no DORCAS, with grossly preserved visual fields on confrontation. Hyporeflexia in all extremities, absent in feet and ankle. No UMN signs noted. Unclear etiology of brain lesion. Inflammatory vs infectious vs demyelinating etiology.     Recommendation:  - MR orbit w/wo contrast  - Check HIV, WES, dsDNA, Angiotensin converting enzyme, NMO, MOG, Vitamin B12, Lyme, QuantiFeron   - Might need LP with CSF studies after MRI. Patient reports having LP done at [facility]. Recommend to get records  - Patient reports Family hx of early Dementia at age of 50 (mother), check NOTCH3 rule out CADASIL given recurrent strokes and FH of dementia  - Ophthalmology evaluation  - Rheumatology evaluation    Discussed with neuro attending

## 2023-07-19 NOTE — CONSULT NOTE ADULT - SUBJECTIVE AND OBJECTIVE BOX
ALLERGIES: No Known Allergies      HEALTH ISSUES------------------------------------------------------  ST elevation myocardial infarction (STEMI)    Family history of hypertension (Father)    Handoff    MEWS Score    Diabetes    Asthma    Diverticulitis    High cholesterol    Dyslipidemia    Hypertension    H/O vertigo    Diabetic ulcer of right foot    Broken toe    Vertigo    HTN (hypertension)    Diabetes    AMBER on CPAP    History of TIAs    Vertigo    HTN (hypertension)    Diabetes    STEMI (ST elevation myocardial infarction)    History of surgery    No significant past surgical history    No significant past surgical history    POSS STEMI    90+    Blurry vision    CKD (chronic kidney disease)    BILLY (acute kidney injury)    SysAdmin_VisitLink          PRESCRIPTIONS-----------------------------------------------------  amLODIPine 10 mg oral tablet: 1 tab(s) orally once a day  aspirin 81 mg oral delayed release tablet: 1 tab(s) orally once a day  carvedilol 25 mg oral tablet: 1 tab(s) orally every 12 hours  ergocalciferol 1.25 mg (50,000 intl units) oral tablet: orally once a week  hydrALAZINE 25 mg oral tablet: 1 tab(s) orally 3 times a day  Jardiance 10 mg oral tablet: 1 orally once a week  meclizine 25 mg oral tablet: 1 orally 2 times a day  rosuvastatin 20 mg oral capsule: 1 orally once a day (at bedtime)  spironolactone 25 mg oral tablet: 1 orally once a day          VISIT-------------------------------------------------------------------        T(C): 35.8 (07-19-23 @ 07:49), Max: 36.9 (07-18-23 @ 20:01)  HR: 74 (07-19-23 @ 07:49) (70 - 85)  BP: 140/72 (07-19-23 @ 07:49) (110/52 - 164/84)  RR: 18 (07-19-23 @ 07:49) (18 - 18)  SpO2: 99% (07-19-23 @ 07:49) (99% - 100%)        EYE EXAM-----------------------------------------------------------    Chief Complaint: 48 year old male with MHx of T2DM (25 years), HTN, CKD, and BILLY admitted following elevated BP during some out patient medical testing Mon 7/17/23. Ophthalmology consult requested due to pt c/o eye pain of the right eye ongoing intermittently x 3 weeks. Pt was [facility] [place] [facility], 1 day into hospital visit pt noted the start of eye pain. During that hospital stay pt was told he had eye pain/ocular changes from diabetes and told to follow up with OMD as out patient. Pt was seen by OMD as out pt and was told there were ophthalmic changes from diabetes, however no treatment was initiated.     Today, pt reports 8/10 eye pain of the right eye. Pt reports eye pain is worse in lateral gaze. Pt endorses intemittent diplopia, cannot specify if noted only in right gaze. Pt has been avoiding lateral gaze due to pain. Pt also endorses photosensitivity OD and tearing. Pt denies jaw claudication, scalp tenderness, and temporal tenderness Pt is non-tender on temporal palpation. Pt denies transient visual obscuration Pt denies vision changes within the last 3 weeks.      PATI: Fri, 7/14/23 with ophthalmologist. Pt was told there was "blood behind the eye" from diabetes. Pt was provided glasses prescription, but has not yet ordered glasses.   - [facility] [place], [facility] ago. Pt underwent laser treatment for one of the eyes at that time. Pt reports he was lost to follow up, unsure why.   - endorses history of ophthalmic laser and injections, no other ocular surgeries/injuries/trauma   - h/o glasses wear for distance      Entrance Testing  VAsc: OD 20/250, PH 20/100           OS 20/250, PH 20/80  Pupils: PERRL OU, (-)APD OD, OS   EOMs: full OS; -1 abduction with pain OD  CF: full OD, OS    IOP taken via GAT @4:05pm  OD 12 mmHg  OS 13 mmHg    Anterior Segment, assessed via hand-held slit lamp / 20D+transilluminator   Adnexa:  Lids:  Conjunctiva:  Cornea:  Angles: 3/3 OU  AC:  Iris:    Dilated with 1gtt tropicamide 1% OU and phenylephrine 2.5% OU @4:07pm    Posterior Segment, assessed via BIO+20D  Lens:   Vitreous:  Optic Nerve:  Macula:  Vessels:  Periphery:          ALLERGIES: No Known Allergies      HEALTH ISSUES------------------------------------------------------  ST elevation myocardial infarction (STEMI)    Family history of hypertension (Father)    Handoff    MEWS Score    Diabetes    Asthma    Diverticulitis    High cholesterol    Dyslipidemia    Hypertension    H/O vertigo    Diabetic ulcer of right foot    Broken toe    Vertigo    HTN (hypertension)    Diabetes    AMBER on CPAP    History of TIAs    Vertigo    HTN (hypertension)    Diabetes    STEMI (ST elevation myocardial infarction)    History of surgery    No significant past surgical history    No significant past surgical history    POSS STEMI    90+    Blurry vision    CKD (chronic kidney disease)    BILLY (acute kidney injury)    SysAdmin_VisitLink          PRESCRIPTIONS-----------------------------------------------------  amLODIPine 10 mg oral tablet: 1 tab(s) orally once a day  aspirin 81 mg oral delayed release tablet: 1 tab(s) orally once a day  carvedilol 25 mg oral tablet: 1 tab(s) orally every 12 hours  ergocalciferol 1.25 mg (50,000 intl units) oral tablet: orally once a week  hydrALAZINE 25 mg oral tablet: 1 tab(s) orally 3 times a day  Jardiance 10 mg oral tablet: 1 orally once a week  meclizine 25 mg oral tablet: 1 orally 2 times a day  rosuvastatin 20 mg oral capsule: 1 orally once a day (at bedtime)  spironolactone 25 mg oral tablet: 1 orally once a day          VISIT-------------------------------------------------------------------        T(C): 35.8 (07-19-23 @ 07:49), Max: 36.9 (07-18-23 @ 20:01)  HR: 74 (07-19-23 @ 07:49) (70 - 85)  BP: 140/72 (07-19-23 @ 07:49) (110/52 - 164/84)  RR: 18 (07-19-23 @ 07:49) (18 - 18)  SpO2: 99% (07-19-23 @ 07:49) (99% - 100%)        EYE EXAM-----------------------------------------------------------    Chief Complaint: 48 year old male with MHx of T2DM (25 years), HTN, CKD, and BILLY admitted following elevated BP during out patient medical testing Mon 7/17/23. Ophthalmology consult requested due to pt c/o eye pain of the right eye ongoing intermittently x 3 weeks. Pt was admitted to [facility] in [place] for headache, 1 day into hospital visit pt noted the start of eye pain. During that hospital stay pt was told he had eye pain/ocular changes from diabetes and told to follow up with OMD as out patient. Pt was seen by OMD as out pt and was told there were ophthalmic changes from diabetes, however no treatment was initiated.     Today, pt reports 8/10 eye pain of the right eye. Pt reports eye pain is worse in lateral gaze. Pt endorses intermittent diplopia, cannot specify if noted only in right gaze. Pt has been avoiding lateral gaze due to pain. Pt also endorses photosensitivity OD and tearing. Pt denies jaw claudication, scalp tenderness, and temporal tenderness Pt is non-tender on temporal palpation. Pt denies transient visual obscuration Pt denies vision changes within the last 3 weeks.      PATI: Fri, 7/14/23 with ophthalmologist. Pt was told there was "blood behind the eye" from diabetes. Pt was provided glasses prescription, but has not yet ordered glasses.   - [facility] [place], 1 year ago. Pt underwent laser treatment for one of the eyes at that time. Pt reports he was lost to follow up, unsure why.   - endorses history of ophthalmic laser and injections, no other ocular surgeries/injuries/trauma   - h/o glasses wear for distance      Entrance Testing  VAsc: OD 20/250, PH 20/100           OS 20/250, PH 20/80  Pupils: PERRL OU, (-)APD OD, OS   EOMs: full OS; -1 abduction with pain OD  CF: full OD, OS    IOP taken via GAT @4:05pm  OD 12 mmHg  OS 13 mmHg    Anterior Segment, assessed via hand-held slit lamp / 20D+transilluminator   Adnexa:  Lids:  Conjunctiva:  Cornea:  Angles: 3/3 OU  AC:  Iris:    Dilated with 1gtt tropicamide 1% OU and phenylephrine 2.5% OU @4:07pm    Posterior Segment, assessed via BIO+20D  Lens:   Vitreous:  Optic Nerve:  Macula:  Vessels:  Periphery:          ALLERGIES: No Known Allergies      HEALTH ISSUES------------------------------------------------------  ST elevation myocardial infarction (STEMI)    Family history of hypertension (Father)    Handoff    MEWS Score    Diabetes    Asthma    Diverticulitis    High cholesterol    Dyslipidemia    Hypertension    H/O vertigo    Diabetic ulcer of right foot    Broken toe    Vertigo    HTN (hypertension)    Diabetes    AMBER on CPAP    History of TIAs    Vertigo    HTN (hypertension)    Diabetes    STEMI (ST elevation myocardial infarction)    History of surgery    No significant past surgical history    No significant past surgical history    POSS STEMI    90+    Blurry vision    CKD (chronic kidney disease)    BILLY (acute kidney injury)    SysAdmin_VisitLink          PRESCRIPTIONS-----------------------------------------------------  amLODIPine 10 mg oral tablet: 1 tab(s) orally once a day  aspirin 81 mg oral delayed release tablet: 1 tab(s) orally once a day  carvedilol 25 mg oral tablet: 1 tab(s) orally every 12 hours  ergocalciferol 1.25 mg (50,000 intl units) oral tablet: orally once a week  hydrALAZINE 25 mg oral tablet: 1 tab(s) orally 3 times a day  Jardiance 10 mg oral tablet: 1 orally once a week  meclizine 25 mg oral tablet: 1 orally 2 times a day  rosuvastatin 20 mg oral capsule: 1 orally once a day (at bedtime)  spironolactone 25 mg oral tablet: 1 orally once a day          VISIT-------------------------------------------------------------------        T(C): 35.8 (07-19-23 @ 07:49), Max: 36.9 (07-18-23 @ 20:01)  HR: 74 (07-19-23 @ 07:49) (70 - 85)  BP: 140/72 (07-19-23 @ 07:49) (110/52 - 164/84)  RR: 18 (07-19-23 @ 07:49) (18 - 18)  SpO2: 99% (07-19-23 @ 07:49) (99% - 100%)        EYE EXAM-----------------------------------------------------------    Chief Complaint: 48 year old male with MHx of T2DM (25 years), HTN, CKD, and BILLY admitted following elevated BP during out patient medical testing Mon 7/17/23. Ophthalmology consult requested due to pt c/o eye pain of the right eye ongoing intermittently x 3 weeks. Pt was admitted to [facility] in [place] for headache, 1 day into hospital visit pt noted the start of eye pain. During that hospital stay pt was told he had eye pain/ocular changes from diabetes and told to follow up with OMD as out patient. Pt was seen by OMD as out pt and was told there were ophthalmic changes from diabetes, however no treatment was initiated.     Today, pt reports 8/10 eye pain of the right eye. Pt reports eye pain is worse in lateral gaze. Pt endorses intermittent diplopia, cannot specify if noted only in right gaze. Pt has been avoiding lateral gaze due to pain. Pt also endorses photosensitivity OD and tearing. Pt denies jaw claudication, scalp tenderness, and temporal tenderness Pt is non-tender on temporal palpation. Pt denies transient visual obscuration Pt denies vision changes within the last 3 weeks.      PATI: Fri, 7/14/23 with ophthalmologist. Pt was told there was "blood behind the eye" from diabetes. Pt was provided glasses prescription, but has not yet ordered glasses.   - [facility] [place], [facility] ago. Pt underwent laser treatment for one of the eyes at that time. Pt reports he was lost to follow up, unsure why.   - endorses history of ophthalmic laser and injections, no other ocular surgeries/injuries/trauma   - h/o glasses wear for distance      Entrance Testing  VAsc: OD 20/250, PH 20/100           OS 20/250, PH 20/80  Pupils: PERRL OU, (-)APD OD, OS   EOMs: full OS; -1 abduction with pain OD  CF: full OD, OS    IOP taken via GAT @4:05pm  OD 12 mmHg  OS 13 mmHg    Anterior Segment, assessed via slit lamp  Adnexa/Lids: WNL OU  Conjunctiva: cl OU  Cornea: cl OU  Angles: 3/3 OU  AC: deep & quiet, (-)cells/flare OU   Iris: WNL OU, (-)NVI     Dilated with 1gtt tropicamide 1% OU and phenylephrine 2.5% OU @4:07pm    Posterior Segment, assessed via BIO+20D  Lens: 1+ NS, tr cortical OU  Vitreous: cl OU   Optic Nerve: 0.10 OD, OS; distinct margins, well perfused OU   Macula: (+)MAs/dot hemorrhages OU  Vessels: crossing changes OU; scattered dot/blot hemorrhages and CWS all 4 quadrants OU   Periphery: flat & intact 360 OU          ALLERGIES: No Known Allergies      HEALTH ISSUES------------------------------------------------------  ST elevation myocardial infarction (STEMI)    Family history of hypertension (Father)    Handoff    MEWS Score    Diabetes    Asthma    Diverticulitis    High cholesterol    Dyslipidemia    Hypertension    H/O vertigo    Diabetic ulcer of right foot    Broken toe    Vertigo    HTN (hypertension)    Diabetes    AMBER on CPAP    History of TIAs    Vertigo    HTN (hypertension)    Diabetes    STEMI (ST elevation myocardial infarction)    History of surgery    No significant past surgical history    No significant past surgical history    POSS STEMI    90+    Blurry vision    CKD (chronic kidney disease)    BILLY (acute kidney injury)    SysAdmin_VisitLink          PRESCRIPTIONS-----------------------------------------------------  amLODIPine 10 mg oral tablet: 1 tab(s) orally once a day  aspirin 81 mg oral delayed release tablet: 1 tab(s) orally once a day  carvedilol 25 mg oral tablet: 1 tab(s) orally every 12 hours  ergocalciferol 1.25 mg (50,000 intl units) oral tablet: orally once a week  hydrALAZINE 25 mg oral tablet: 1 tab(s) orally 3 times a day  Jardiance 10 mg oral tablet: 1 orally once a week  meclizine 25 mg oral tablet: 1 orally 2 times a day  rosuvastatin 20 mg oral capsule: 1 orally once a day (at bedtime)  spironolactone 25 mg oral tablet: 1 orally once a day          VISIT-------------------------------------------------------------------        T(C): 35.8 (07-19-23 @ 07:49), Max: 36.9 (07-18-23 @ 20:01)  HR: 74 (07-19-23 @ 07:49) (70 - 85)  BP: 140/72 (07-19-23 @ 07:49) (110/52 - 164/84)  RR: 18 (07-19-23 @ 07:49) (18 - 18)  SpO2: 99% (07-19-23 @ 07:49) (99% - 100%)        EYE EXAM-----------------------------------------------------------    Chief Complaint: 48 year old male with MHx of T2DM (25 years), HTN, CKD, and BILLY admitted following elevated BP during out patient medical testing Mon 7/17/23. Ophthalmology consult requested due to pt c/o eye pain of the right eye ongoing intermittently x 3 weeks. Pt was admitted to [facility] in [place] 3 weeks ago initially for headache, 1 day into hospital visit pt noted the start of eye pain. During that hospital stay pt was told he had eye pain/ocular changes from diabetes and told to follow up with OMD as out patient. Pt was seen by OMD as out pt and was told there were ophthalmic changes from diabetes, no treatment was initiated.     Today, pt reports 8/10 eye pain of the right eye. Pt reports eye pain is worse in lateral gaze. Pt endorses intermittent diplopia, cannot specify if noted only in right gaze. Pt has been avoiding lateral gaze due to pain. Pt also endorses photosensitivity OD and tearing. Pt denies jaw claudication, scalp tenderness, and temporal tenderness Pt is non-tender on temporal palpation. Pt denies transient visual obscuration. Pt denies vision changes within the last 3 weeks.    OHx:  PATI: Fri, 7/14/23 with ophthalmologist. Pt was told there was "blood behind the eye" from diabetes. Pt was provided glasses prescription, but has not yet ordered glasses.   - [facility] [place], 1-2 year ago. Pt underwent laser treatment for one of the eyes at that time. Pt reports he was lost to follow up, unsure why.   - endorses history of ophthalmic laser and injections, no other ocular surgeries/injuries/trauma   - h/o glasses wear for distance      Entrance Testing  VAsc: OD 20/250, PH 20/100           OS 20/250, PH 20/80  Pupils: PERRL OU, (-)APD OD, OS   EOMs: full OS; -1 abduction with pain OD  CF: full OD, OS    IOP taken via GAT @4:05pm  OD 12 mmHg  OS 13 mmHg    Anterior Segment, assessed via slit lamp  Adnexa/Lids: WNL OU  Conjunctiva: cl OU  Cornea: cl OU, (-)staining   Angles: 3/3 OU  AC: deep & quiet, (-)cells/flare OU   Iris: WNL OU, (-)NVI     Dilated with 1gtt tropicamide 1% OU and phenylephrine 2.5% OU @4:07pm    Posterior Segment, assessed via BIO+20D  Lens: 1+ NS, tr cortical OU  Vitreous: cl OU   Optic Nerve: 0.10 OD, OS; distinct margins, well perfused OU   Macula: (+)MAs/dot hemorrhages OU  Vessels: crossing changes OU; scattered dot/blot hemorrhages and CWS all 4 quadrants > standard photo 2a OU   Periphery: flat & intact 360 OU       Optic Nerve OCT  OD: 84um, no evidence of ONH edema   OS: 81um, no evidence of ONH edema    Macular OCT  OD: irregular foveal contour with inner retinal disorganization likely c/w previous macular edema, focal RPE dropout/atrophy T to fovea possibly c/w h/o laser  OS: irregular foveal contour with intraretinal fluid

## 2023-07-19 NOTE — PROGRESS NOTE ADULT - ASSESSMENT
Patient is a 48 year old male with PMHx of CKD IV baseline around 3.2, vertigo, diverticulitis s/p LAR, cigarette use, ETOH abuse now sober x5 years, IDDM, HTN, AMBER, Hx of multiple CVA with residual weakness on right side. Admitted on 7/17 for further evaluation of right sided eye pain and chest pain to telemetry.    # Chest pain - resolved - mostly angina like pain   # Hx of non obstructive CAD  # HTN  # HLD   - Code STEMI was called on presentation then cancelled  - On home aspirin, rosuvastatin 20, hydralazine 25X3, carvedilol 25X2, amlodipine 5X1, spironolactone 25X1  - Troponin 0.11 - stable   - last EKG showed no ischemic changes, the one before was suggestive of STEMI   - Continue home medications   - cardiology consult   - Trend troponin   - Cardio Consult 7/18- RECOMMENDATIONS: check serial EKGs and Celia, EP eval for Interrogation of ILR, c/w aspirin, Coreg 25 BID and lipitor 80 mg qhs, c/w Amlodipine 5mg qd and Hydralazine, w/up of headache/blurry vision per neuro, no further cardiac w/up needed if troponins trending down and TTE unremarkable, recall prn  - 7/18 trop 0.10    # Right eye pain with blurring of vision possible inflammatory ( giant cell arteritis ) or diabetic ophthalmoplegia   # Hx of multiple CVA with minimal residual weakness   - code stroke called on admission 7/17, CT head perfusion and angiogram is negative for any strokes   - No signs of orbital infection, no redness or swelling, no fever. Right eye tenderness on palpation   - Patient reports R eye pain associated with tearing and photosensitivity x3 weeks, for which he was recently admitted at a different hospital, where full ophthalmology workup was done and he was discharged and told to follow up outpatient for ophtho. Patient was told his blurry vision was likely due to uncontrolled DM  - ESR 75 --> 66  - CRP <3.0  - 7/19 Ophthalmology consult placed   - Neurology consult 7/18: - CTH reviewed >> Hypodensities are noted of the right cerebellar hemisphere as well as the bilateral basal ganglia , NIHSS 0. CTA, CTP pending read. Obtain MRI non contrast. Elevated trops, with STEMI. Rest of the work up as per cardio and medicine team. Recall once MRI is performed    CT PERFUSION: No evidence of perfusion abnormality.  CTA HEAD: Mild stenosis of the proximal right V4 segment of the vertebral artery.  CTA NECK: No evidence of carotid or vertebral artery stenosis.  CT Angio Abd/Pelvis: No aortic intramural hematoma, dissection or aneurysm. Patent aorto mesenteric vessels. Mildly dilated main pulmonary artery measuring 3.3 cm, may be seen with pulmonary arterial hypertension.  Brain MRI: IMPRESSION: In comparison to the patient's previous brain MRI dated 10/8/2022: 1.  New 1 cm lesion within the right frontal lobe periventricular white matter demonstrating rim-like restricted diffusion. Diagnostic considerations include infarct, demyelination, neoplasm or infectious/inflammatory lesion. 2.  New 8 mm lesion within the anterior right temporal lobe. Diagnostic considerations include demyelination, neoplasm or infectious/inflammatory lesion. 3.  Recommend a postcontrast brain MRI for further evaluation of the above lesions. 4.  Additional new small lesion within the right cerebellar hemisphere, the configuration suggests a chronic infarct. 5.  Otherwise stable patchy white matter disease and scattered chronic lacunar infarcts.    # CKD stage IV due to diabetic nephropathy - baseline around 3.2  # Hyperkalemia with mild hyponatremia - resolved  - S/P IV contrast 7/17, patient stated he feels dehydrated   - Cr Baseline Cr 3.2, CKD IV  - 7/17- K 5.9 given insulin and albuterol, Lokelma x1  - DC home spironolactone   - 7/19 Alk Phos 137 (down from 171), AST 12, ALT 13  - 7/19 K+ 4.6  - 7/19 Cr 5.1, Nephrology consult placed for worsened Cr      #Chest Pain - resolved  #Hx of Non obstructive CAD  #HTN  #HLD  - Cardio Consult 7/18- no further cardiac w/up needed if troponins trending down and TTE unremarkable, recall prn  - c/w amlodipine, carvedilol, hydralazine, statin, aspirin  - 7/19 EP consult placed for ILR interrogation (as recommended by cardio)    # Hx of diverticulitis S/P LAR  # Constipation  - start senna and Miralax      # Uncontrolled DM  - A1c 10.9  - On home Jardiance 10 mg   - start Lantus 15 units with lispro 5 X3   - Follow up finger stick   - Outpatient endocrinology is needed       # Hx of vertigo  - Continue home meclizine 25X2       # GI prophylaxis: none   # DVT prophylaxis: heparin 5000X3  # Diet: regular DASH, carb consistent   # Full code

## 2023-07-20 ENCOUNTER — APPOINTMENT (OUTPATIENT)
Dept: CARDIOLOGY | Facility: CLINIC | Age: 48
End: 2023-07-20

## 2023-07-20 LAB
ALBUMIN SERPL ELPH-MCNC: 3 G/DL — LOW (ref 3.5–5.2)
ALP SERPL-CCNC: 129 U/L — HIGH (ref 30–115)
ALT FLD-CCNC: 11 U/L — SIGNIFICANT CHANGE UP (ref 0–41)
ANION GAP SERPL CALC-SCNC: 13 MMOL/L — SIGNIFICANT CHANGE UP (ref 7–14)
AST SERPL-CCNC: 12 U/L — SIGNIFICANT CHANGE UP (ref 0–41)
BASOPHILS # BLD AUTO: 0.03 K/UL — SIGNIFICANT CHANGE UP (ref 0–0.2)
BASOPHILS NFR BLD AUTO: 0.4 % — SIGNIFICANT CHANGE UP (ref 0–1)
BILIRUB SERPL-MCNC: <0.2 MG/DL — SIGNIFICANT CHANGE UP (ref 0.2–1.2)
BUN SERPL-MCNC: 74 MG/DL — CRITICAL HIGH (ref 10–20)
CALCIUM SERPL-MCNC: 8 MG/DL — LOW (ref 8.4–10.4)
CHLORIDE SERPL-SCNC: 103 MMOL/L — SIGNIFICANT CHANGE UP (ref 98–110)
CO2 SERPL-SCNC: 19 MMOL/L — SIGNIFICANT CHANGE UP (ref 17–32)
CREAT SERPL-MCNC: 6.3 MG/DL — CRITICAL HIGH (ref 0.7–1.5)
EGFR: 10 ML/MIN/1.73M2 — LOW
EOSINOPHIL # BLD AUTO: 0.24 K/UL — SIGNIFICANT CHANGE UP (ref 0–0.7)
EOSINOPHIL NFR BLD AUTO: 3.2 % — SIGNIFICANT CHANGE UP (ref 0–8)
GLUCOSE BLDC GLUCOMTR-MCNC: 167 MG/DL — HIGH (ref 70–99)
GLUCOSE BLDC GLUCOMTR-MCNC: 181 MG/DL — HIGH (ref 70–99)
GLUCOSE BLDC GLUCOMTR-MCNC: 205 MG/DL — HIGH (ref 70–99)
GLUCOSE BLDC GLUCOMTR-MCNC: 274 MG/DL — HIGH (ref 70–99)
GLUCOSE SERPL-MCNC: 177 MG/DL — HIGH (ref 70–99)
HCT VFR BLD CALC: 29.5 % — LOW (ref 42–52)
HGB BLD-MCNC: 9.9 G/DL — LOW (ref 14–18)
HIV 1 & 2 AB SERPL IA.RAPID: SIGNIFICANT CHANGE UP
IMM GRANULOCYTES NFR BLD AUTO: 0.4 % — HIGH (ref 0.1–0.3)
LYMPHOCYTES # BLD AUTO: 2.08 K/UL — SIGNIFICANT CHANGE UP (ref 1.2–3.4)
LYMPHOCYTES # BLD AUTO: 27.5 % — SIGNIFICANT CHANGE UP (ref 20.5–51.1)
MAGNESIUM SERPL-MCNC: 2.2 MG/DL — SIGNIFICANT CHANGE UP (ref 1.8–2.4)
MCHC RBC-ENTMCNC: 30.4 PG — SIGNIFICANT CHANGE UP (ref 27–31)
MCHC RBC-ENTMCNC: 33.6 G/DL — SIGNIFICANT CHANGE UP (ref 32–37)
MCV RBC AUTO: 90.5 FL — SIGNIFICANT CHANGE UP (ref 80–94)
MONOCYTES # BLD AUTO: 0.71 K/UL — HIGH (ref 0.1–0.6)
MONOCYTES NFR BLD AUTO: 9.4 % — HIGH (ref 1.7–9.3)
NEUTROPHILS # BLD AUTO: 4.47 K/UL — SIGNIFICANT CHANGE UP (ref 1.4–6.5)
NEUTROPHILS NFR BLD AUTO: 59.1 % — SIGNIFICANT CHANGE UP (ref 42.2–75.2)
NRBC # BLD: 0 /100 WBCS — SIGNIFICANT CHANGE UP (ref 0–0)
PLATELET # BLD AUTO: 188 K/UL — SIGNIFICANT CHANGE UP (ref 130–400)
PMV BLD: 12.1 FL — HIGH (ref 7.4–10.4)
POTASSIUM SERPL-MCNC: 4.3 MMOL/L — SIGNIFICANT CHANGE UP (ref 3.5–5)
POTASSIUM SERPL-SCNC: 4.3 MMOL/L — SIGNIFICANT CHANGE UP (ref 3.5–5)
PROT SERPL-MCNC: 5.4 G/DL — LOW (ref 6–8)
RBC # BLD: 3.26 M/UL — LOW (ref 4.7–6.1)
RBC # FLD: 12 % — SIGNIFICANT CHANGE UP (ref 11.5–14.5)
SODIUM SERPL-SCNC: 135 MMOL/L — SIGNIFICANT CHANGE UP (ref 135–146)
WBC # BLD: 7.56 K/UL — SIGNIFICANT CHANGE UP (ref 4.8–10.8)
WBC # FLD AUTO: 7.56 K/UL — SIGNIFICANT CHANGE UP (ref 4.8–10.8)

## 2023-07-20 PROCEDURE — 99233 SBSQ HOSP IP/OBS HIGH 50: CPT

## 2023-07-20 PROCEDURE — 70553 MRI BRAIN STEM W/O & W/DYE: CPT | Mod: 26

## 2023-07-20 PROCEDURE — 93291 INTERROG DEV EVAL SCRMS IP: CPT | Mod: 26

## 2023-07-20 RX ORDER — SODIUM BICARBONATE 1 MEQ/ML
650 SYRINGE (ML) INTRAVENOUS THREE TIMES A DAY
Refills: 0 | Status: DISCONTINUED | OUTPATIENT
Start: 2023-07-20 | End: 2023-07-25

## 2023-07-20 RX ADMIN — Medication 650 MILLIGRAM(S): at 09:57

## 2023-07-20 RX ADMIN — INSULIN GLARGINE 15 UNIT(S): 100 INJECTION, SOLUTION SUBCUTANEOUS at 22:13

## 2023-07-20 RX ADMIN — Medication 650 MILLIGRAM(S): at 08:57

## 2023-07-20 RX ADMIN — HEPARIN SODIUM 5000 UNIT(S): 5000 INJECTION INTRAVENOUS; SUBCUTANEOUS at 22:13

## 2023-07-20 RX ADMIN — ATORVASTATIN CALCIUM 20 MILLIGRAM(S): 80 TABLET, FILM COATED ORAL at 22:12

## 2023-07-20 RX ADMIN — Medication 650 MILLIGRAM(S): at 01:58

## 2023-07-20 RX ADMIN — HEPARIN SODIUM 5000 UNIT(S): 5000 INJECTION INTRAVENOUS; SUBCUTANEOUS at 16:08

## 2023-07-20 RX ADMIN — Medication 81 MILLIGRAM(S): at 11:44

## 2023-07-20 RX ADMIN — Medication 25 MILLIGRAM(S): at 22:12

## 2023-07-20 RX ADMIN — Medication 5 UNIT(S): at 08:29

## 2023-07-20 RX ADMIN — AMLODIPINE BESYLATE 10 MILLIGRAM(S): 2.5 TABLET ORAL at 05:49

## 2023-07-20 RX ADMIN — Medication 650 MILLIGRAM(S): at 22:12

## 2023-07-20 RX ADMIN — Medication 25 MILLIGRAM(S): at 16:07

## 2023-07-20 RX ADMIN — Medication 1 DROP(S): at 20:17

## 2023-07-20 RX ADMIN — Medication 25 MILLIGRAM(S): at 05:48

## 2023-07-20 RX ADMIN — Medication 5 UNIT(S): at 18:03

## 2023-07-20 RX ADMIN — CARVEDILOL PHOSPHATE 25 MILLIGRAM(S): 80 CAPSULE, EXTENDED RELEASE ORAL at 05:48

## 2023-07-20 RX ADMIN — SENNA PLUS 2 TABLET(S): 8.6 TABLET ORAL at 22:12

## 2023-07-20 RX ADMIN — HEPARIN SODIUM 5000 UNIT(S): 5000 INJECTION INTRAVENOUS; SUBCUTANEOUS at 05:48

## 2023-07-20 RX ADMIN — CARVEDILOL PHOSPHATE 25 MILLIGRAM(S): 80 CAPSULE, EXTENDED RELEASE ORAL at 17:59

## 2023-07-20 NOTE — PROGRESS NOTE ADULT - SUBJECTIVE AND OBJECTIVE BOX
Patient is a 48y old  Male who presents with a chief complaint of possible stroke and STEMI (19 Jul 2023 15:55)    Patient was seen and examined.  no new complaints  no new events    PAST MEDICAL & SURGICAL HISTORY:  Asthma  Diverticulitis, surgery 14yrs age  Dyslipidemia  Hypertension  H/O vertigo  Diabetic ulcer of right foot  Broken toe, left pinky toe  Diabetes  AMBER on CPAP  History of TIAs    History of surgery  colon resection    Allergies   No Known Allergies    MEDICATIONS  (STANDING):  amLODIPine   Tablet 10 milliGRAM(s) Oral daily  artificial tears (preservative free) Ophthalmic Solution 1 Drop(s) Right EYE two times a day  aspirin enteric coated 81 milliGRAM(s) Oral daily  atorvastatin 20 milliGRAM(s) Oral at bedtime  carvedilol 25 milliGRAM(s) Oral every 12 hours  heparin   Injectable 5000 Unit(s) SubCutaneous every 8 hours  hydrALAZINE 25 milliGRAM(s) Oral three times a day  insulin glargine Injectable (LANTUS) 15 Unit(s) SubCutaneous at bedtime  insulin lispro Injectable (ADMELOG) 5 Unit(s) SubCutaneous three times a day before meals  meclizine 25 milliGRAM(s) Oral two times a day  polyethylene glycol 3350 17 Gram(s) Oral two times a day  senna 2 Tablet(s) Oral at bedtime    MEDICATIONS  (PRN):  acetaminophen     Tablet .. 650 milliGRAM(s) Oral every 6 hours PRN Moderate Pain (4 - 6)  dextrose Oral Gel 15 Gram(s) Oral once PRN Blood Glucose LESS THAN 70 milliGRAM(s)/deciliter    T(C): 36 (07-20-23 @ 07:42), Max: 36.4 (07-20-23 @ 00:06)  HR: 72 (07-20-23 @ 07:42) (72 - 73)  BP: 147/74 (07-20-23 @ 07:42) (140/67 - 147/74)  RR: 18 (07-20-23 @ 07:42) (18 - 18)  SpO2: 99% (07-20-23 @ 07:42) (99% - 99%)      O/E:  Awake, alert, not in distress.  HEENT: atraumatic,   Chest: clear.  CVS: SIS2 +, no murmur.  P/A: Soft, BS+  CNS: awake, alert,   Ext: no edema feet.  Skin: no rash, no ulcers.  All systems reviewed positive findings as above.                          9.9<L>  7.56  )-----------( 188      ( 20 Jul 2023 06:28 )             29.5<L>                        10.8<L>  7.77  )-----------( 203      ( 19 Jul 2023 06:25 )             31.1<L>  07-20    135  |  103  |  74<HH>  ----------------------------<  177<H>  4.3   |  19  |  6.3<HH>  07-19    135  |  103  |  68<HH>  ----------------------------<  213<H>  4.6   |  21  |  5.1<HH>    Ca    8.0<L>      20 Jul 2023 06:28  Ca    8.1<L>      19 Jul 2023 06:25  Phos  6.9     07-19  Mg     2.2     07-20    TPro  5.4<L>  /  Alb  3.0<L>  /  TBili  <0.2  /  DBili  x   /  AST  12  /  ALT  11  /  AlkPhos  129<H>  07-20  TPro  5.5<L>  /  Alb  3.0<L>  /  TBili  <0.2  /  DBili  x   /  AST  12  /  ALT  13  /  AlkPhos  137<H>  07-19

## 2023-07-20 NOTE — PROGRESS NOTE ADULT - SUBJECTIVE AND OBJECTIVE BOX
BRINDA MOYER 48y Male  MRN#: [id number]     Hospital Day: 3d    Pt is currently admitted with the primary diagnosis of  ST elevation myocardial infarction (STEMI)        SUBJECTIVE     Overnight events  None    Subjective complaints  Pt was evaluated this am. Patient denied any active complaints and per patient his symptoms are improving                                            ----------------------------------------------------------  OBJECTIVE  PAST MEDICAL & SURGICAL HISTORY  Asthma    Diverticulitis  surgery 14yrs ago    High cholesterol    Dyslipidemia    Hypertension    H/O vertigo    Diabetic ulcer of right foot    Broken toe  left pinky toe    Vertigo    HTN (hypertension)    Diabetes    AMBER on CPAP    History of TIAs    History of surgery  colon resection                                              -----------------------------------------------------------  ALLERGIES:  No Known Allergies                                            ------------------------------------------------------------    HOME MEDICATIONS  Home Medications:  ergocalciferol 1.25 mg (50,000 intl units) oral tablet: orally once a week (17 Jul 2023 22:27)  Jardiance 10 mg oral tablet: 1 orally once a week (17 Jul 2023 22:27)  meclizine 25 mg oral tablet: 1 orally 2 times a day (17 Jul 2023 22:27)  rosuvastatin 20 mg oral capsule: 1 orally once a day (at bedtime) (17 Jul 2023 22:27)  spironolactone 25 mg oral tablet: 1 orally once a day (17 Jul 2023 22:27)                           MEDICATIONS:  STANDING MEDICATIONS  amLODIPine   Tablet 10 milliGRAM(s) Oral daily  aspirin enteric coated 81 milliGRAM(s) Oral daily  atorvastatin 20 milliGRAM(s) Oral at bedtime  carvedilol 25 milliGRAM(s) Oral every 12 hours  dextrose 5%. 1000 milliLiter(s) IV Continuous <Continuous>  dextrose 5%. 1000 milliLiter(s) IV Continuous <Continuous>  dextrose 50% Injectable 25 Gram(s) IV Push once  dextrose 50% Injectable 25 Gram(s) IV Push once  dextrose 50% Injectable 12.5 Gram(s) IV Push once  glucagon  Injectable 1 milliGRAM(s) IntraMuscular once  heparin   Injectable 5000 Unit(s) SubCutaneous every 8 hours  hydrALAZINE 25 milliGRAM(s) Oral three times a day  insulin glargine Injectable (LANTUS) 15 Unit(s) SubCutaneous at bedtime  insulin lispro Injectable (ADMELOG) 5 Unit(s) SubCutaneous three times a day before meals  meclizine 25 milliGRAM(s) Oral two times a day  polyethylene glycol 3350 17 Gram(s) Oral two times a day  senna 2 Tablet(s) Oral at bedtime  sodium chloride 0.45%. 1000 milliLiter(s) IV Continuous <Continuous>    PRN MEDICATIONS  acetaminophen     Tablet .. 650 milliGRAM(s) Oral every 6 hours PRN  dextrose Oral Gel 15 Gram(s) Oral once PRN                                            ------------------------------------------------------------  VITAL SIGNS: Last 24 Hours  T(C): 36 (20 Jul 2023 07:42), Max: 36.4 (20 Jul 2023 00:06)  T(F): 96.8 (20 Jul 2023 07:42), Max: 97.5 (20 Jul 2023 00:06)  HR: 72 (20 Jul 2023 07:42) (72 - 73)  BP: 147/74 (20 Jul 2023 07:42) (140/67 - 147/74)  BP(mean): --  RR: 18 (20 Jul 2023 07:42) (18 - 18)  SpO2: 99% (20 Jul 2023 07:42) (99% - 99%)      07-19-23 @ 07:01  -  07-20-23 @ 07:00  --------------------------------------------------------  IN: 0 mL / OUT: 600 mL / NET: -600 mL                                             --------------------------------------------------------------  LABS:                        9.9    7.56  )-----------( 188      ( 20 Jul 2023 06:28 )             29.5     07-20    135  |  103  |  74<HH>  ----------------------------<  177<H>  4.3   |  19  |  6.3<HH>    Ca    8.0<L>      20 Jul 2023 06:28  Phos  6.9     07-19  Mg     2.2     07-20    TPro  5.4<L>  /  Alb  3.0<L>  /  TBili  <0.2  /  DBili  x   /  AST  12  /  ALT  11  /  AlkPhos  129<H>  07-20      Urinalysis Basic - ( 20 Jul 2023 06:28 )    Color: x / Appearance: x / SG: x / pH: x  Gluc: 177 mg/dL / Ketone: x  / Bili: x / Urobili: x   Blood: x / Protein: x / Nitrite: x   Leuk Esterase: x / RBC: x / WBC x   Sq Epi: x / Non Sq Epi: x / Bacteria: x              Culture - Urine (collected 17 Jul 2023 17:58)  Source: Clean Catch Clean Catch (Midstream)  Final Report (18 Jul 2023 23:34):    No growth                                                    -------------------------------------------------------------  RADIOLOGY:                                            --------------------------------------------------------------    PHYSICAL EXAM:  GENERAL: Minimal acute distress, lying in bed comfortably  HEAD:  Atraumatic, Normocephalic  EYES: EOMI, conjunctiva and sclera clear. R eye associated with TTP but decreased from yesterday.   ENT: Moist mucous membranes  NECK: Supple, No JVD  CHEST/LUNG: Clear to auscultation bilaterally; No rales, rhonchi, wheezing, or rubs. Unlabored respirations  HEART: regular rate and rhythm; No murmurs, rubs, or gallops  ABDOMEN: Bowel sounds present; Soft, Nontender, Nondistended.    EXTREMITIES: Warm. No clubbing, cyanosis, or edema  NERVOUS SYSTEM:  Alert & Oriented X3. No focal deficits   SKIN: No rashes or lesions                                             --------------------------------------------------------------

## 2023-07-20 NOTE — CHART NOTE - NSCHARTNOTEFT_GEN_A_CORE
Device: [employer] loop recorder    Indication: Poss CVA  Interrogation complete.     Battery: Good  Underlying Rhythm:  SR 71 bpm  R wave: 1.24 mV    Events: None    Recommendations: Routine f/u as outpatient with EPS    EPS [id number]

## 2023-07-20 NOTE — PROGRESS NOTE ADULT - ASSESSMENT
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   He was in the outpatient clinic for pre-test for circumcision, his EKG suggested STEMI so EMS was called and on the way to the hospital he developed chest pain of left side radiating to the back, characterized by tightness then burning sensation through the whole chest. This pain lasted for 1 hour and resolved, he received aspirin loading on the way here. Pt follows at [facility] and underwent cardiac catheterization which showed non-obstructive CAD.  He is also complaining from right eye pain with double vision and blurring of vision that started 3 weeks ago, he was admitted to another hospital and they discharged him after doing workup for his eye and ophthalmology saw him and they said it is mostly related to the uncontrolled diabetes. His right eye pain has been constant for the past 3 weeks radiating to the front head causing severe headaches, it is associated with photosensitivity, no tearing, no redness. He has no weakness, no numbness.     # Atypical  Chest pain  probably musculoskeletal - resolved   # Elevated troponin sec to ckd  # H/o non obstructive CAD  # Hypertension  -Troponin T, Serum: 0.10: Critical value: ng/mL (07.18.23 @ 08:28)   - 12 Lead ECG (07.17.23 @ 17:57) >Normal sinus rhythm  - c/w ASA, coreg, statin  - c/w norvasc, hydralazine  - evaluated by cardiology  -  ILR interrogated - no events  - F/u 2 D echo    # Right eye pain with blurring of vision sec to diabetic retinopathy  # Right 6th nerve palsy  # H/o multiple CVA   - MR Head No Cont (07.18.23 @ 22:59) > New 1 cm lesion within the right frontal lobe periventricular white matter demonstrating rim-like restricted diffusion.  New 8 mm lesion within the anterior right temporal lobe. Additional new small lesion within the right cerebellar hemisphere, the configuration suggests a chronic infarct.Otherwise stable patchy white matter disease and scattered chronic   lacunar infarcts.  - CT PERFUSION: No evidence of perfusion abnormality.  - CTA HEAD: Mild stenosis of the proximal right V4 segment of the vertebral artery.  - CTA NECK: No evidence of carotid or vertebral artery stenosis.  - Sedimentation Rate, Erythrocyte: 66 mm/Hr (07.18.23 @ 11:15)  - C-Reactive Protein, Serum: <3.0 mg/L (07.18.23 @ 11:15)  - neuro eval: MR brain with contrast to better characterize the lesions  - F/u  HIV, WES, dsDNA, Angiotensin converting enzyme, NMO, MOG, Vitamin B12, Lyme  - pt was evaluated by rheumatology  - Ophthal eval:  Right Cranial Nerve 6 Palsy - likely contributing to eye pain - likely 2/2 ischemic complications of diabetes , Type 2 Diabetes Mellitus with Severe NPDR OU ,  Very low suspicion for GCA at this time: no jaw claudication, temporal/scalp tenderness.  - plan for baseline HVF testing at out patient follow up  - Pt to follow up at [facility] eye clinic (or eye care provider of pts choice) within 4 weeks of discharge. Discussed benefit of retinal specialist given level of retinopathy seen during exam today   - Use sunglasses to limit light prn   - c/w  artifical tears 2x/day OU   -F/u MRI  Brain with contrast    # Acute kidney injury on  CKD stage 4, probably sec to BRENDEN, worsening creatinine  # Hyperkalemia - resolved  # Metabolic acidosis  - S/P  insulin and albuterol, lokelma  -dced  spironolactone   - dc  IV fluids  - Start on sodium bocarb  -evaluated by  nephrology   - monitor BMP    # DM type2   - A1C with Estimated Average Glucose Result: 10.8 (07.17.23 @ 15:45)  - c/w lantus, lispro    # H/o  vertigo  - c/w  meclizine     # Nicotine abuse  - pt counseled    # DVT prophylaxis    # Full code    # Pending: monitor BMP, MRI brain with contrast, 2 D echo  # Discussed plan of care with patient  # Disposition: home when stable

## 2023-07-20 NOTE — CONSULT NOTE ADULT - SUBJECTIVE AND OBJECTIVE BOX
NEPHROLOGY CONSULTATION NOTE    47 YO male with PMHx of CKD IV baseline around 3.2, vertigo, diverticulitis s/p LAR, cigarette use, ETOH abuse now sober x5 years, IDDM, HTN, AMBER, Hx of multiple CVA with residual weakness on right side presents for chest pain along with right eye pain with photosensitivity. MR brain showed new lesion in his right frontal lobe with rim-like restricting diffusion.   patient seen at bedside, still complaining of pain in his eye along with headache.  VS within acceptable range.    PAST MEDICAL & SURGICAL HISTORY:  Asthma      Diverticulitis  surgery 14yrs ago      High cholesterol      Dyslipidemia      Hypertension      H/O vertigo      Diabetic ulcer of right foot      Broken toe  left pinky toe      Vertigo      HTN (hypertension)      Diabetes      AMBER on CPAP      History of TIAs      History of surgery  colon resection        Allergies:  No Known Allergies    Home Medications Reviewed  Hospital Medications:   MEDICATIONS  (STANDING):  amLODIPine   Tablet 10 milliGRAM(s) Oral daily  aspirin enteric coated 81 milliGRAM(s) Oral daily  atorvastatin 20 milliGRAM(s) Oral at bedtime  carvedilol 25 milliGRAM(s) Oral every 12 hours  dextrose 5%. 1000 milliLiter(s) (50 mL/Hr) IV Continuous <Continuous>  dextrose 5%. 1000 milliLiter(s) (100 mL/Hr) IV Continuous <Continuous>  dextrose 50% Injectable 25 Gram(s) IV Push once  dextrose 50% Injectable 25 Gram(s) IV Push once  dextrose 50% Injectable 12.5 Gram(s) IV Push once  glucagon  Injectable 1 milliGRAM(s) IntraMuscular once  heparin   Injectable 5000 Unit(s) SubCutaneous every 8 hours  hydrALAZINE 25 milliGRAM(s) Oral three times a day  insulin glargine Injectable (LANTUS) 15 Unit(s) SubCutaneous at bedtime  insulin lispro Injectable (ADMELOG) 5 Unit(s) SubCutaneous three times a day before meals  meclizine 25 milliGRAM(s) Oral two times a day  polyethylene glycol 3350 17 Gram(s) Oral two times a day  senna 2 Tablet(s) Oral at bedtime  sodium chloride 0.45%. 1000 milliLiter(s) (75 mL/Hr) IV Continuous <Continuous>    SOCIAL HISTORY:  Denies ETOH,Smoking,   FAMILY HISTORY:  Family history of hypertension (Father)        REVIEW OF SYSTEMS:  CONSTITUTIONAL: No weakness, fevers or chills  EYES/ENT: No visual changes;  No vertigo or throat pain   NECK: No pain or stiffness  RESPIRATORY: No cough, wheezing, hemoptysis; No shortness of breath  CARDIOVASCULAR: No chest pain or palpitations.  GASTROINTESTINAL: No abdominal or epigastric pain. No nausea, vomiting, or hematemesis; No diarrhea or constipation. No melena or hematochezia.  GENITOURINARY: No dysuria, frequency, foamy urine, urinary urgency, incontinence or hematuria  NEUROLOGICAL: right eye pain, photosensitivity, headache  SKIN: No itching, burning, rashes, or lesions   VASCULAR: No bilateral lower extremity edema.   All other review of systems is negative unless indicated above.    VITALS:  Vital Signs Last 24 Hrs  T(C): 36 (20 Jul 2023 07:42), Max: 36.4 (20 Jul 2023 00:06)  T(F): 96.8 (20 Jul 2023 07:42), Max: 97.5 (20 Jul 2023 00:06)  HR: 72 (20 Jul 2023 07:42) (72 - 73)  BP: 147/74 (20 Jul 2023 07:42) (140/67 - 147/74)  BP(mean): --  RR: 18 (20 Jul 2023 07:42) (18 - 18)  SpO2: 99% (20 Jul 2023 07:42) (99% - 99%)    Parameters below as of 20 Jul 2023 07:42  Patient On (Oxygen Delivery Method): room air        07-19 @ 07:01  -  07-20 @ 07:00  --------------------------------------------------------  IN: 0 mL / OUT: 600 mL / NET: -600 mL        PHYSICAL EXAM:  Constitutional: NAD  HEENT: anicteric sclera, oropharynx clear, MMM  Neck: No JVD  Respiratory: CTAB, no wheezes, rales or rhonchi  Cardiovascular: S1, S2, RRR  Gastrointestinal: BS+, soft, NT/ND  Extremities: No cyanosis or clubbing. No peripheral edema  Neurological: A/O x 3, no focal deficits  Psychiatric: Normal mood, normal affect  : No CVA tenderness. No faith.   Skin: No rashes    LABS:  07-20    135  |  103  |  74<HH>  ----------------------------<  177<H>  4.3   |  19  |  6.3<HH>    Ca    8.0<L>      20 Jul 2023 06:28  Phos  6.9     07-19  Mg     2.2     07-20    TPro  5.4<L>  /  Alb  3.0<L>  /  TBili  <0.2  /  DBili      /  AST  12  /  ALT  11  /  AlkPhos  129<H>  07-20    Creatinine Trend: 6.3 <--, 5.1 <--, 3.6 <--, 3.7 <--, 3.9 <--                        9.9    7.56  )-----------( 188      ( 20 Jul 2023 06:28 )             29.5     Urine Studies:  Urinalysis Basic - ( 20 Jul 2023 06:28 )    Color:  / Appearance:  / SG:  / pH:   Gluc: 177 mg/dL / Ketone:   / Bili:  / Urobili:    Blood:  / Protein:  / Nitrite:    Leuk Esterase:  / RBC:  / WBC    Sq Epi:  / Non Sq Epi:  / Bacteria:

## 2023-07-20 NOTE — PROGRESS NOTE ADULT - ASSESSMENT
**this note is still in progress**    Patient is a 48 year old male with PMHx of CKD IV baseline around 3.2, vertigo, diverticulitis s/p LAR, cigarette use, ETOH abuse now sober x5 years, IDDM, HTN, AMBER, Hx of multiple CVA with residual weakness on right side. Admitted on 7/17 for further evaluation of right sided eye pain and chest pain to telemetry.    # Chest pain - resolved - mostly angina like pain   # Hx of non obstructive CAD  # HTN  # HLD   - Code STEMI was called on presentation then cancelled  - On home aspirin, rosuvastatin 20, hydralazine 25X3, carvedilol 25X2, amlodipine 5X1, spironolactone 25X1  - Troponin 0.11 - stable   - last EKG showed no ischemic changes, the one before was suggestive of STEMI   - Continue home medications   - cardiology consult   - Trend troponin   - Cardio Consult 7/18- RECOMMENDATIONS: check serial EKGs and Celia, EP eval for Interrogation of ILR, c/w aspirin, Coreg 25 BID and lipitor 80 mg qhs, c/w Amlodipine 5mg qd and Hydralazine, w/up of headache/blurry vision per neuro, no further cardiac w/up needed if troponins trending down and TTE unremarkable, recall prn  - 7/18 trop 0.10    # Right eye pain with blurring of vision 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 Right eye tenderness on palpation   - Patient reports R eye pain associated with tearing and photosensitivity x3 weeks, for which he was recently admitted at a different hospital, where full ophthalmology workup was done and he was discharged and told to follow up outpatient for ophtho. Patient was told his blurry vision was likely due to uncontrolled DM  - ESR 75 --> 66  - CRP <3.0  - Neurology consult 7/18 - CTH reviewed >> Hypodensities are noted of the right cerebellar hemisphere as well as the bilateral basal ganglia , NIHSS 0. CTA, CTP pending read. Obtain MRI non contrast. Elevated trops, with STEMI. Rest of the work up as per cardio and medicine team. Recall once MRI is performed  - Neuro followup 7/19 after reviewing MRI: - MR brain with contrast to better characterize the lesions. Will need Nephrology evaluation for contrast administration in patient with BILLY on top of CKD (contacted nephro)  - Check HIV, WES, dsDNA, Angiotensin converting enzyme, NMO, MOG, Vitamin B12, Lyme  - Might need LP with CSF studies after MRI  - 7/19 Rheumatology consult- MRI brain/ orbits noted, Ophthalmology recs appreciated, ESR 66 / CRP < 3, PRN Meclizine, Low suspicion for TAA  - 7/19 Ophtho consult- R CN 6 Palsy - likely contributing to eye pain, likely 2/2 ischemic complications of diabetes. Continue care per primary team and neurology recommendations. Pt to follow up at [facility] (or eye care provider of pts choice) within 4 weeks of discharge. Discussed benefit of retinal specialist given level of retinopathy seen during exam today. Use sunglasses to limit light prn. 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 - baseline around 3.2  # Hyperkalemia with mild hyponatremia - resolved  - S/P IV contrast 7/17, patient stated he feels dehydrated   - Cr Baseline Cr 3.2, CKD IV  - 7/17- K 5.9 given insulin and albuterol, Lokelma x1  - DC home spironolactone   - 7/19 Alk Phos 137 (down from 171), AST 12, ALT 13  - 7/19 K+ 4.6  - 7/19 Cr 5.1, Nephrology consult placed for worsened Cr      #Chest Pain - resolved  #Hx of Non obstructive CAD  #HTN  #HLD  - Cardio Consult 7/18- no further cardiac w/up needed if troponins trending down and TTE unremarkable, recall prn  - c/w amlodipine, carvedilol, hydralazine, statin, aspirin  - 7/19 EP consult placed for ILR interrogation (as recommended by cardio)    # Hx of diverticulitis S/P LAR  # Constipation  - start senna and Miralax      # Uncontrolled DM  - A1c 10.9  - On home Jardiance 10 mg   - start Lantus 15 units with lispro 5 X3   - Follow up finger stick   - Outpatient endocrinology is needed       # Hx of vertigo  - Continue home meclizine 25X2       # GI prophylaxis: none   # DVT prophylaxis: heparin 5000X3  # Diet: regular DASH, carb consistent   # Full code    Patient is a 48 year old male with PMHx of CKD IV baseline around 3.2, vertigo, diverticulitis s/p LAR, cigarette use, ETOH abuse now sober x5 years, IDDM, HTN, AMBER, Hx of multiple CVA with residual weakness on right side. Admitted on 7/17 for further evaluation of right sided eye pain and chest pain to telemetry.    # Chest pain - resolved - mostly angina like pain   # Hx of non obstructive CAD  # HTN  # HLD   - Code STEMI was called on presentation then cancelled  - On home aspirin, rosuvastatin 20, hydralazine 25X3, carvedilol 25X2, amlodipine 5X1, spironolactone 25X1  - Troponin 0.11 - stable   - last EKG showed no ischemic changes, the one before was suggestive of STEMI   - Continue home medications   - cardiology consult   - Trend troponin   - Cardio Consult 7/18- RECOMMENDATIONS: check serial EKGs and Celia, EP eval for Interrogation of ILR, c/w aspirin, Coreg 25 BID and lipitor 80 mg qhs, c/w Amlodipine 5mg qd and Hydralazine, w/up of headache/blurry vision per neuro, no further cardiac w/up needed if troponins trending down and TTE unremarkable, recall prn  - 7/18 trop 0.10    # Right eye pain with blurring of vision 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 Right eye tenderness on palpation   - Patient reports R eye pain associated with tearing and photosensitivity x3 weeks, for which he was recently admitted at a different hospital, where full ophthalmology workup was done and he was discharged and told to follow up outpatient for ophtho. Patient was told his blurry vision was likely due to uncontrolled DM  - ESR 75 --> 66  - CRP <3.0  - Neurology consult 7/18 - CTH reviewed >> Hypodensities are noted of the right cerebellar hemisphere as well as the bilateral basal ganglia , NIHSS 0. CTA, CTP pending read. Obtain MRI non contrast. Elevated trops, with STEMI. Rest of the work up as per cardio and medicine team. Recall once MRI is performed  - Neuro followup 7/19 after reviewing MRI: - MR brain with contrast to better characterize the lesions. Will need Nephrology evaluation for contrast administration in patient with BILLY on top of CKD (contacted nephro)  - Check HIV, WES, dsDNA, Angiotensin converting enzyme, NMO, MOG, Vitamin B12, Lyme  - Might need LP with CSF studies after MRI  - 7/19 Rheumatology consult- MRI brain/ orbits noted, Ophthalmology recs appreciated, ESR 66 / CRP < 3, PRN Meclizine, Low suspicion for TAA  - 7/19 Ophtho consult- R CN 6 Palsy - likely contributing to eye pain, likely 2/2 ischemic complications of diabetes. Continue care per primary team and neurology recommendations. Pt to follow up at [facility] eye clinic (or eye care provider of pts choice) within 4 weeks of discharge. Discussed benefit of retinal specialist given level of retinopathy seen during exam today. Use sunglasses to limit light prn. 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 - baseline around 3.2  # Hyperkalemia with mild hyponatremia - resolved  - S/P IV contrast 7/17, patient stated he feels dehydrated   - Cr Baseline Cr 3.2, CKD IV  - 7/17- K 5.9 given insulin and albuterol, Lokelma x1  - DC home spironolactone   - 7/19 Alk Phos 137 (down from 171), AST 12, ALT 13  - 7/19 K+ 4.6  - 7/19 Cr 5.1, Nephrology consult placed for worsened Cr  - 7/20 Nephro consult- BILLY on CKD 4/ Right frontal lobe lesion/ HTN/ DM, BILLY likely BRENDEN, non oliguric, electrolytes reviewed, not in volume overload, start po sodium bicarb 650 mg q 8 hrs, plan for MRI with contrast for better evaluation of brain lesion, given low GFR patient at risk for NSF, however Harris type 2 is used here at the hospital, no data documenting associated NSF incidents, if no     #Chest Pain - resolved  #Hx of Non obstructive CAD  #HTN  #HLD  - Cardio Consult 7/18- no further cardiac w/up needed if troponins trending down and TTE unremarkable, recall prn  - c/w amlodipine, carvedilol, hydralazine, statin, aspirin  - 7/19 EP consult placed for ILR interrogation (as recommended by cardio)    # Hx of diverticulitis S/P LAR  # Constipation  - start senna and Miralax      # Uncontrolled DM  - A1c 10.9  - On home Jardiance 10 mg   - start Lantus 15 units with lispro 5 X3   - Follow up finger stick   - Outpatient endocrinology is needed       # Hx of vertigo  - Continue home meclizine 25X2       # GI prophylaxis: none   # DVT prophylaxis: heparin 5000X3  # Diet: regular DASH, carb consistent   # Full code    Patient is a 48 year old male with PMHx of CKD IV baseline around 3.2, vertigo, diverticulitis s/p LAR, cigarette use, ETOH abuse now sober x5 years, IDDM, HTN, AMBER, Hx of multiple CVA with residual weakness on right side. Admitted on 7/17 for further evaluation of right sided eye pain and chest pain to telemetry.    # Chest pain - resolved - mostly angina like pain   # Hx of non obstructive CAD  # HTN  # HLD   - Code STEMI was called on presentation then cancelled  - On home aspirin, rosuvastatin 20, hydralazine 25X3, carvedilol 25X2, amlodipine 5X1, spironolactone 25X1  - Troponin 0.11 - stable   - last EKG showed no ischemic changes, the one before was suggestive of STEMI   - Continue home medications   - cardiology consult   - Trend troponin   - Cardio Consult 7/18- RECOMMENDATIONS: check serial EKGs and Celia, EP eval for Interrogation of ILR, c/w aspirin, Coreg 25 BID and lipitor 80 mg qhs, c/w Amlodipine 5mg qd and Hydralazine, w/up of headache/blurry vision per neuro, no further cardiac w/up needed if troponins trending down and TTE unremarkable, recall prn  - 7/18 trop 0.10    # Right eye pain with blurring of vision 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 Right eye tenderness on palpation   - Patient reports R eye pain associated with tearing and photosensitivity x3 weeks, for which he was recently admitted at a different hospital, where full ophthalmology workup was done and he was discharged and told to follow up outpatient for ophtho. Patient was told his blurry vision was likely due to uncontrolled DM  - ESR 75 --> 66  - CRP <3.0  - Neurology consult 7/18 - CTH reviewed >> Hypodensities are noted of the right cerebellar hemisphere as well as the bilateral basal ganglia , NIHSS 0. CTA, CTP pending read. Obtain MRI non contrast. Elevated trops, with STEMI. Rest of the work up as per cardio and medicine team. Recall once MRI is performed  - Neuro followup 7/19 after reviewing MRI: - MR brain with contrast to better characterize the lesions. Will need Nephrology evaluation for contrast administration in patient with BILLY on top of CKD (contacted nephro)  - Check HIV, WES, dsDNA, Angiotensin converting enzyme, NMO, MOG, Vitamin B12, Lyme  - Might need LP with CSF studies after MRI Brain  - 7/19 Rheumatology consult- MRI brain/ orbits noted, Ophthalmology recs appreciated, ESR 66 / CRP < 3, PRN Meclizine, Low suspicion for TAA  - 7/19 Ophtho consult- R CN 6 Palsy - likely contributing to eye pain, likely 2/2 ischemic complications of diabetes. Continue care per primary team and neurology recommendations. Pt to follow up at [facility] clinic (or eye care provider of pts choice) within 4 weeks of discharge. Discussed benefit of retinal specialist given level of retinopathy seen during exam today. Use sunglasses to limit light prn. 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 - baseline around 3.2  # Hyperkalemia with mild hyponatremia - resolved  - S/P IV contrast 7/17, patient stated he feels dehydrated   - Cr Baseline Cr 3.2, CKD IV  - 7/17- K 5.9 given insulin and albuterol, Lokelma x1  - DC home spironolactone   - 7/19 Alk Phos 137 (down from 171), AST 12, ALT 13  - 7/19 K+ 4.6  - 7/19 Cr 5.1, Nephrology consult placed for worsened Cr  - 7/20 Nephro consult- BILLY on CKD 4/ Right frontal lobe lesion/ HTN/ DM, BILLY likely BRENDEN, non oliguric, electrolytes reviewed, not in volume overload, start po sodium bicarb 650 mg q 8 hrs, plan for MRI with contrast for better evaluation of brain lesion, given low GFR patient at risk for NSF, however Harris type 2 is used here at the hospital, no data documenting associated NSF incidents, if no     #Chest Pain - resolved  #Hx of Non obstructive CAD  #HTN  #HLD  - Cardio Consult 7/18- no further cardiac w/up needed if troponins trending down and TTE unremarkable, recall prn  - c/w amlodipine, carvedilol, hydralazine, statin, aspirin  - 7/19 EP consult placed for ILR interrogation (as recommended by cardio)    # Hx of diverticulitis S/P LAR  # Constipation  - start senna and Miralax      # Uncontrolled DM  - A1c 10.9  - On home Jardiance 10 mg   - start Lantus 15 units with lispro 5 X3   - Follow up finger stick   - Outpatient endocrinology is needed       # Hx of vertigo  - Continue home meclizine 25X2       # GI prophylaxis: none   # DVT prophylaxis: heparin 5000X3  # Diet: regular DASH, carb consistent   # Full code

## 2023-07-20 NOTE — CONSULT NOTE ADULT - ASSESSMENT
47 YO male with PMHx of CKD IV baseline around 3.2, vertigo, diverticulitis s/p LAR, cigarette use, ETOH abuse now sober x5 years, IDDM, HTN, AMBER, Hx of multiple CVA with residual weakness on right side presents for chest pain along with right eye pain with photosensitivity. MR brain showed new lesion in his right frontal lobe with rim-like restricting diffusion. Nephro eval for BILLY on CKD along with risk stratification for MRI with contrast study.    Assessment and plan  BILLY on CKD 4/ Right frontal lobe lesion/ HTN/ DM  BILLY likely BRENDEN  non oliguric  electrolytes reviewed, not in volume overload  start po sodium bicarb 650 mg q 8 hrs  plan for MRI with contrast for better evaluation of brain lesion  given low GFR patient at risk for NSF  however Harris type 2 is used here at the hospital, no data documenting associated NSF incidents  if no other diagnostic studies available proceed with MRI  risks and benefits explained to the patient in details, he is agreeable  no prophylactic measures pre study  strict i/os  no need for IVF if patient tolerating adequate po intake  no acute indications for RRT as of now  will follow

## 2023-07-21 LAB
ACE SERPL-CCNC: 38 U/L — SIGNIFICANT CHANGE UP (ref 14–82)
ALBUMIN SERPL ELPH-MCNC: 3.1 G/DL — LOW (ref 3.5–5.2)
ALP SERPL-CCNC: 129 U/L — HIGH (ref 30–115)
ALT FLD-CCNC: 12 U/L — SIGNIFICANT CHANGE UP (ref 0–41)
ANA TITR SER: NEGATIVE — SIGNIFICANT CHANGE UP
ANION GAP SERPL CALC-SCNC: 11 MMOL/L — SIGNIFICANT CHANGE UP (ref 7–14)
AST SERPL-CCNC: 14 U/L — SIGNIFICANT CHANGE UP (ref 0–41)
B BURGDOR C6 AB SER-ACNC: NEGATIVE — SIGNIFICANT CHANGE UP
B BURGDOR IGG+IGM SER-ACNC: 0.33 INDEX — SIGNIFICANT CHANGE UP (ref 0.01–0.89)
BASOPHILS # BLD AUTO: 0.04 K/UL — SIGNIFICANT CHANGE UP (ref 0–0.2)
BASOPHILS NFR BLD AUTO: 0.6 % — SIGNIFICANT CHANGE UP (ref 0–1)
BILIRUB SERPL-MCNC: <0.2 MG/DL — SIGNIFICANT CHANGE UP (ref 0.2–1.2)
BUN SERPL-MCNC: 80 MG/DL — CRITICAL HIGH (ref 10–20)
CALCIUM SERPL-MCNC: 8 MG/DL — LOW (ref 8.4–10.4)
CHLORIDE SERPL-SCNC: 106 MMOL/L — SIGNIFICANT CHANGE UP (ref 98–110)
CO2 SERPL-SCNC: 21 MMOL/L — SIGNIFICANT CHANGE UP (ref 17–32)
CREAT SERPL-MCNC: 6.5 MG/DL — CRITICAL HIGH (ref 0.7–1.5)
DSDNA AB SER-ACNC: 19 IU/ML — SIGNIFICANT CHANGE UP
EGFR: 10 ML/MIN/1.73M2 — LOW
EOSINOPHIL # BLD AUTO: 0.27 K/UL — SIGNIFICANT CHANGE UP (ref 0–0.7)
EOSINOPHIL NFR BLD AUTO: 4.3 % — SIGNIFICANT CHANGE UP (ref 0–8)
GLUCOSE BLDC GLUCOMTR-MCNC: 121 MG/DL — HIGH (ref 70–99)
GLUCOSE BLDC GLUCOMTR-MCNC: 146 MG/DL — HIGH (ref 70–99)
GLUCOSE BLDC GLUCOMTR-MCNC: 168 MG/DL — HIGH (ref 70–99)
GLUCOSE BLDC GLUCOMTR-MCNC: 176 MG/DL — HIGH (ref 70–99)
GLUCOSE BLDC GLUCOMTR-MCNC: 226 MG/DL — HIGH (ref 70–99)
GLUCOSE SERPL-MCNC: 192 MG/DL — HIGH (ref 70–99)
HCT VFR BLD CALC: 29.2 % — LOW (ref 42–52)
HGB BLD-MCNC: 9.8 G/DL — LOW (ref 14–18)
IMM GRANULOCYTES NFR BLD AUTO: 0.2 % — SIGNIFICANT CHANGE UP (ref 0.1–0.3)
LYMPHOCYTES # BLD AUTO: 1.83 K/UL — SIGNIFICANT CHANGE UP (ref 1.2–3.4)
LYMPHOCYTES # BLD AUTO: 29.1 % — SIGNIFICANT CHANGE UP (ref 20.5–51.1)
MAGNESIUM SERPL-MCNC: 2.3 MG/DL — SIGNIFICANT CHANGE UP (ref 1.8–2.4)
MCHC RBC-ENTMCNC: 30.4 PG — SIGNIFICANT CHANGE UP (ref 27–31)
MCHC RBC-ENTMCNC: 33.6 G/DL — SIGNIFICANT CHANGE UP (ref 32–37)
MCV RBC AUTO: 90.7 FL — SIGNIFICANT CHANGE UP (ref 80–94)
MONOCYTES # BLD AUTO: 0.67 K/UL — HIGH (ref 0.1–0.6)
MONOCYTES NFR BLD AUTO: 10.7 % — HIGH (ref 1.7–9.3)
NEUTROPHILS # BLD AUTO: 3.47 K/UL — SIGNIFICANT CHANGE UP (ref 1.4–6.5)
NEUTROPHILS NFR BLD AUTO: 55.1 % — SIGNIFICANT CHANGE UP (ref 42.2–75.2)
NRBC # BLD: 0 /100 WBCS — SIGNIFICANT CHANGE UP (ref 0–0)
PLATELET # BLD AUTO: 180 K/UL — SIGNIFICANT CHANGE UP (ref 130–400)
PMV BLD: 12.1 FL — HIGH (ref 7.4–10.4)
POTASSIUM SERPL-MCNC: 4.9 MMOL/L — SIGNIFICANT CHANGE UP (ref 3.5–5)
POTASSIUM SERPL-SCNC: 4.9 MMOL/L — SIGNIFICANT CHANGE UP (ref 3.5–5)
PROT SERPL-MCNC: 5.4 G/DL — LOW (ref 6–8)
RBC # BLD: 3.22 M/UL — LOW (ref 4.7–6.1)
RBC # FLD: 12.2 % — SIGNIFICANT CHANGE UP (ref 11.5–14.5)
SODIUM SERPL-SCNC: 138 MMOL/L — SIGNIFICANT CHANGE UP (ref 135–146)
VIT B12 SERPL-MCNC: 486 PG/ML — SIGNIFICANT CHANGE UP (ref 232–1245)
WBC # BLD: 6.29 K/UL — SIGNIFICANT CHANGE UP (ref 4.8–10.8)
WBC # FLD AUTO: 6.29 K/UL — SIGNIFICANT CHANGE UP (ref 4.8–10.8)

## 2023-07-21 PROCEDURE — 71045 X-RAY EXAM CHEST 1 VIEW: CPT | Mod: 26

## 2023-07-21 PROCEDURE — 99233 SBSQ HOSP IP/OBS HIGH 50: CPT

## 2023-07-21 PROCEDURE — 93306 TTE W/DOPPLER COMPLETE: CPT | Mod: 26

## 2023-07-21 PROCEDURE — 99221 1ST HOSP IP/OBS SF/LOW 40: CPT

## 2023-07-21 RX ORDER — INSULIN LISPRO 100/ML
VIAL (ML) SUBCUTANEOUS
Refills: 0 | Status: DISCONTINUED | OUTPATIENT
Start: 2023-07-21 | End: 2023-07-27

## 2023-07-21 RX ORDER — PANTOPRAZOLE SODIUM 20 MG/1
40 TABLET, DELAYED RELEASE ORAL
Refills: 0 | Status: DISCONTINUED | OUTPATIENT
Start: 2023-07-21 | End: 2023-07-27

## 2023-07-21 RX ORDER — INSULIN GLARGINE 100 [IU]/ML
18 INJECTION, SOLUTION SUBCUTANEOUS AT BEDTIME
Refills: 0 | Status: DISCONTINUED | OUTPATIENT
Start: 2023-07-21 | End: 2023-07-22

## 2023-07-21 RX ORDER — FUROSEMIDE 40 MG
80 TABLET ORAL ONCE
Refills: 0 | Status: COMPLETED | OUTPATIENT
Start: 2023-07-21 | End: 2023-07-21

## 2023-07-21 RX ADMIN — Medication 25 MILLIGRAM(S): at 06:19

## 2023-07-21 RX ADMIN — HEPARIN SODIUM 5000 UNIT(S): 5000 INJECTION INTRAVENOUS; SUBCUTANEOUS at 14:01

## 2023-07-21 RX ADMIN — Medication 5 UNIT(S): at 08:05

## 2023-07-21 RX ADMIN — Medication 25 MILLIGRAM(S): at 14:01

## 2023-07-21 RX ADMIN — ATORVASTATIN CALCIUM 20 MILLIGRAM(S): 80 TABLET, FILM COATED ORAL at 21:13

## 2023-07-21 RX ADMIN — Medication 25 MILLIGRAM(S): at 21:13

## 2023-07-21 RX ADMIN — Medication 650 MILLIGRAM(S): at 06:24

## 2023-07-21 RX ADMIN — INSULIN GLARGINE 18 UNIT(S): 100 INJECTION, SOLUTION SUBCUTANEOUS at 21:12

## 2023-07-21 RX ADMIN — Medication 30 MILLILITER(S): at 03:53

## 2023-07-21 RX ADMIN — HEPARIN SODIUM 5000 UNIT(S): 5000 INJECTION INTRAVENOUS; SUBCUTANEOUS at 21:13

## 2023-07-21 RX ADMIN — Medication 1 DROP(S): at 06:20

## 2023-07-21 RX ADMIN — Medication 650 MILLIGRAM(S): at 21:13

## 2023-07-21 RX ADMIN — Medication 80 MILLIGRAM(S): at 15:55

## 2023-07-21 RX ADMIN — AMLODIPINE BESYLATE 10 MILLIGRAM(S): 2.5 TABLET ORAL at 06:20

## 2023-07-21 RX ADMIN — Medication 5 UNIT(S): at 11:44

## 2023-07-21 RX ADMIN — Medication 1 DROP(S): at 17:06

## 2023-07-21 RX ADMIN — Medication 5 UNIT(S): at 17:07

## 2023-07-21 RX ADMIN — Medication 650 MILLIGRAM(S): at 06:20

## 2023-07-21 RX ADMIN — Medication 81 MILLIGRAM(S): at 11:44

## 2023-07-21 RX ADMIN — HEPARIN SODIUM 5000 UNIT(S): 5000 INJECTION INTRAVENOUS; SUBCUTANEOUS at 06:19

## 2023-07-21 RX ADMIN — CARVEDILOL PHOSPHATE 25 MILLIGRAM(S): 80 CAPSULE, EXTENDED RELEASE ORAL at 06:20

## 2023-07-21 RX ADMIN — Medication 650 MILLIGRAM(S): at 03:52

## 2023-07-21 RX ADMIN — Medication 650 MILLIGRAM(S): at 14:02

## 2023-07-21 RX ADMIN — Medication 25 MILLIGRAM(S): at 06:20

## 2023-07-21 RX ADMIN — CARVEDILOL PHOSPHATE 25 MILLIGRAM(S): 80 CAPSULE, EXTENDED RELEASE ORAL at 17:06

## 2023-07-21 NOTE — PROGRESS NOTE ADULT - SUBJECTIVE AND OBJECTIVE BOX
BRINDA MOYER 48y Male  MRN#: [id number]     Hospital Day: 4d    Pt is currently admitted with the primary diagnosis of  ST elevation myocardial infarction (STEMI)        SUBJECTIVE     Overnight events  None    Subjective complaints  Pt was evaluated this am. Patient reports continued improvement in his R eye pain and headache. He is able to tolerate light without his sunglasses.                                            ----------------------------------------------------------  OBJECTIVE  PAST MEDICAL & SURGICAL HISTORY  Asthma    Diverticulitis  surgery 16yrs ago    High cholesterol    Dyslipidemia    Hypertension    H/O vertigo    Diabetic ulcer of right foot    Broken toe  left pinky toe    Vertigo    HTN (hypertension)    Diabetes    AMBER on CPAP    History of TIAs    History of surgery  colon resection                                              -----------------------------------------------------------  ALLERGIES:  No Known Allergies                                            ------------------------------------------------------------    HOME MEDICATIONS  Home Medications:  ergocalciferol 1.25 mg (50,000 intl units) oral tablet: orally once a week (17 Jul 2023 22:27)  Jardiance 10 mg oral tablet: 1 orally once a week (17 Jul 2023 22:27)  meclizine 25 mg oral tablet: 1 orally 2 times a day (17 Jul 2023 22:27)  rosuvastatin 20 mg oral capsule: 1 orally once a day (at bedtime) (17 Jul 2023 22:27)  spironolactone 25 mg oral tablet: 1 orally once a day (17 Jul 2023 22:27)                           MEDICATIONS:  STANDING MEDICATIONS  amLODIPine   Tablet 10 milliGRAM(s) Oral daily  artificial tears (preservative free) Ophthalmic Solution 1 Drop(s) Right EYE two times a day  aspirin enteric coated 81 milliGRAM(s) Oral daily  atorvastatin 20 milliGRAM(s) Oral at bedtime  carvedilol 25 milliGRAM(s) Oral every 12 hours  dextrose 5%. 1000 milliLiter(s) IV Continuous <Continuous>  dextrose 5%. 1000 milliLiter(s) IV Continuous <Continuous>  dextrose 50% Injectable 12.5 Gram(s) IV Push once  dextrose 50% Injectable 25 Gram(s) IV Push once  dextrose 50% Injectable 25 Gram(s) IV Push once  glucagon  Injectable 1 milliGRAM(s) IntraMuscular once  heparin   Injectable 5000 Unit(s) SubCutaneous every 8 hours  hydrALAZINE 25 milliGRAM(s) Oral three times a day  insulin glargine Injectable (LANTUS) 15 Unit(s) SubCutaneous at bedtime  insulin lispro Injectable (ADMELOG) 5 Unit(s) SubCutaneous three times a day before meals  meclizine 25 milliGRAM(s) Oral two times a day  polyethylene glycol 3350 17 Gram(s) Oral two times a day  senna 2 Tablet(s) Oral at bedtime  sodium bicarbonate 650 milliGRAM(s) Oral three times a day    PRN MEDICATIONS  acetaminophen     Tablet .. 650 milliGRAM(s) Oral every 6 hours PRN  dextrose Oral Gel 15 Gram(s) Oral once PRN                                            ------------------------------------------------------------  VITAL SIGNS: Last 24 Hours  T(C): 35.7 (21 Jul 2023 08:03), Max: 36.4 (20 Jul 2023 22:08)  T(F): 96.3 (21 Jul 2023 08:03), Max: 97.6 (20 Jul 2023 22:08)  HR: 69 (21 Jul 2023 08:03) (69 - 74)  BP: 127/63 (21 Jul 2023 08:03) (127/63 - 155/82)  BP(mean): --  RR: 18 (21 Jul 2023 08:03) (16 - 18)  SpO2: 98% (21 Jul 2023 08:03) (98% - 98%)                                             --------------------------------------------------------------  LABS:                        9.8    6.29  )-----------( 180      ( 21 Jul 2023 06:07 )             29.2     07-21    138  |  106  |  80<HH>  ----------------------------<  192<H>  4.9   |  21  |  6.5<HH>    Ca    8.0<L>      21 Jul 2023 06:07  Mg     2.3     07-21    TPro  5.4<L>  /  Alb  3.1<L>  /  TBili  <0.2  /  DBili  x   /  AST  14  /  ALT  12  /  AlkPhos  129<H>  07-21      Urinalysis Basic - ( 21 Jul 2023 06:07 )    Color: x / Appearance: x / SG: x / pH: x  Gluc: 192 mg/dL / Ketone: x  / Bili: x / Urobili: x   Blood: x / Protein: x / Nitrite: x   Leuk Esterase: x / RBC: x / WBC x   Sq Epi: x / Non Sq Epi: x / Bacteria: x                                                            -------------------------------------------------------------  RADIOLOGY:                                            --------------------------------------------------------------    PHYSICAL EXAM:  GENERAL: Minimal acute distress, lying in bed comfortably  HEAD:  Atraumatic, Normocephalic  EYES: EOMI, conjunctiva and sclera clear. R eye associated with mild TTP, continues to decrease from yesterday. No tearing noted from R eye. Patient tolerating light without sunglasses.   ENT: Moist mucous membranes  NECK: Supple, No JVD  CHEST/LUNG: Clear to auscultation bilaterally; No rales, rhonchi, wheezing, or rubs. Unlabored respirations  HEART: regular rate and rhythm; No murmurs, rubs, or gallops  ABDOMEN: Bowel sounds present; Soft, Nontender, Nondistended.    EXTREMITIES: Warm. No clubbing, cyanosis, or edema  NERVOUS SYSTEM:  Alert & Oriented X3. No focal deficits   SKIN: No rashes or lesions                                             --------------------------------------------------------------

## 2023-07-21 NOTE — CONSULT NOTE ADULT - ATTENDING COMMENTS
Patient seen and examined and agree with above except as noted.  Patients history, notes ,labs, imaging, vitals and meds reviewed personally.  Poorly controlled vascular risk factors   Exam notable for tremors in arms and legs  NIHSS 0  mrankin 1    Plan as above
Has underlying CKD   Cr > 3 since 10/22'  Never saw Nephrologist  Now here w/ headaches eye pain rt sided weakness  BILLY on CKD likely BRENDEN  no indicationf for HD  plan for MRI noted if can avoid Harris would be preffered though with newer agents risk somewhat mitigated     will follow    (Note: If proceed w/ Harris will not dialyze just for removal of Gadolinium (with Group 2 agents)  see for Eg Uptodate: " We do not perform hemodialysis among these patients. The rationale is that the risk of placing central venous access outweighs the risk of NSF in such patients" )
left first toe superficial wound on the dorsum of IPJ. stable. no infection. does not probe to bone  patients main c/c was b/l leg swelling  recommend compression stockings 15-20mmHg  Med management as per primary
48 year old male admitted for chest pain, right eye pain and headache. Rheumatology is asked to see patient to rule out giant cell arteritis. Patient reports right eye became blurry with double vision and constant pain associated with front, bilateral temporal and posterior headache 3 weeks ago. Symptoms are associated with sensitivity to light. Denies any weakness or numbness. While in pre surgical testing he had hypertension and concern for a STEMI so he was admitted at [facility] where code stroke was called CT brain showed age indeterminant infarcts in R cerebellar hemisphere and b/l basal ganglia, MRI brain showed new R fronatl lobe periventricular white matter lesion and anterior R temporal lobe lesions along with chronic infarcts in R cerebellar hemisphere, patchy white matter disease and scattered chronic lacunar infarcts. He was seen at [facility] in [place] and ophthalmology who td him he has bleeding in the back of eye and referred him to another specialist but he never went. He denies fevers, jaw pain, tongue pain, shoulder/neck pain, hip pains.  His labs showed ESR 75-->66and normal CRP. Overall low suspicion for temporal arteritis. Follow up ophthalmology evaluation for temporal arteritis and neurology work up as he has hypertension, diabetes and CVA that can be causing retinopathy and headaches
Atypical chest pain - right sided reproducible  Chronic elevation of troponins in the setting of BILLY on CKD  Non obstructive CAD cath 10/22   Headache blurry vision s/p code stroke  HTN, HLD, DM  Hx of recurrent CVA s/p ILR    mx as per fellows recom

## 2023-07-21 NOTE — PROGRESS NOTE ADULT - ASSESSMENT
Patient is a 48 year old male with PMHx of CKD IV baseline around 3.2, vertigo, diverticulitis s/p LAR, cigarette use, ETOH abuse now sober x5 years, IDDM, HTN, AMBER, Hx of multiple CVA with residual weakness on right side. Admitted on 7/17 for further evaluation of right sided eye pain and chest pain to telemetry.    # Chest pain - resolved - mostly angina like pain   # Hx of non obstructive CAD  # HTN  # HLD   - Code STEMI was called on presentation then cancelled  - On home aspirin, rosuvastatin 20, hydralazine 25X3, carvedilol 25X2, amlodipine 5X1, spironolactone 25X1  - Troponin 0.11 - stable   - last EKG showed no ischemic changes, the one before was suggestive of STEMI   - Continue home medications   - cardiology consult   - Trend troponin   - Cardio Consult 7/18- RECOMMENDATIONS: check serial EKGs and Celia, EP eval for Interrogation of ILR, c/w aspirin, Coreg 25 BID and lipitor 80 mg qhs, c/w Amlodipine 5mg qd and Hydralazine, w/up of headache/blurry vision per neuro, no further cardiac w/up needed if troponins trending down and TTE unremarkable, recall prn  - 7/18 trop 0.10    # Right eye pain with blurring of vision 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 Right eye tenderness on palpation   - Patient reports R eye pain associated with tearing and photosensitivity x3 weeks, for which he was recently admitted at a different hospital, where full ophthalmology workup was done and he was discharged and told to follow up outpatient for ophtho. Patient was told his blurry vision was likely due to uncontrolled DM  - ESR 75 --> 66  - CRP <3.0  - Neurology consult 7/18 - CTH reviewed >> Hypodensities are noted of the right cerebellar hemisphere as well as the bilateral basal ganglia , NIHSS 0. CTA, CTP pending read. Obtain MRI non contrast. Elevated trops, with STEMI. Rest of the work up as per cardio and medicine team. Recall once MRI is performed  - Neuro followup 7/19 after reviewing MRI: - MR brain with contrast to better characterize the lesions. Will need Nephrology evaluation for contrast administration in patient with BILLY on top of CKD (contacted nephro)  - Check HIV, WES, dsDNA, Angiotensin converting enzyme, NMO, MOG, Vitamin B12, Lyme  - Might need LP with CSF studies after MRI Brain  - 7/19 Rheumatology consult- MRI brain/ orbits noted, Ophthalmology recs appreciated, ESR 66 / CRP < 3, PRN Meclizine, Low suspicion for TAA  - 7/19 Ophtho consult- R CN 6 Palsy - likely contributing to eye pain, likely 2/2 ischemic complications of diabetes. Continue care per primary team and neurology recommendations. Pt to follow up at [facility] eye clinic (or eye care provider of pts choice) within 4 weeks of discharge. Discussed benefit of retinal specialist given level of retinopathy seen during exam today. Use sunglasses to limit light prn. 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 - baseline around 3.2  # Hyperkalemia with mild hyponatremia - resolved  - S/P IV contrast 7/17, patient stated he feels dehydrated   - Cr Baseline Cr 3.2, CKD IV  - 7/17- K 5.9 given insulin and albuterol, Lokelma x1  - DC home spironolactone   - 7/19 Alk Phos 137 (down from 171), AST 12, ALT 13  - 7/19 K+ 4.6  - 7/19 Cr 5.1, Nephrology consult placed for worsened Cr  - 7/20 Nephro consult- BILLY on CKD 4/ Right frontal lobe lesion/ HTN/ DM, BILLY likely BRENDEN, non oliguric, electrolytes reviewed, not in volume overload, start po sodium bicarb 650 mg q 8 hrs, plan for MRI with contrast for better evaluation of brain lesion, given low GFR patient at risk for NSF, however Harris type 2 is used here at the hospital, no data documenting associated NSF incidents, if no   - 7/20- MR Brain w/wo contrast performed, awaiting official read  - 7/21- Patient reports improvement in R eye pain, able to tolerate light without sunglasses. Cr now 6.5.     #Chest Pain - resolved  #Hx of Non obstructive CAD  #HTN  #HLD  - Cardio Consult 7/18- no further cardiac w/up needed if troponins trending down and TTE unremarkable, recall prn  - c/w amlodipine, carvedilol, hydralazine, statin, aspirin  - 7/19 EP consult placed for ILR interrogation (as recommended by cardio)    # Hx of diverticulitis S/P LAR  # Constipation  - start senna and Miralax      # Uncontrolled DM  - A1c 10.9  - On home Jardiance 10 mg   - start Lantus 15 units with lispro 5 X3   - Follow up finger stick   - Outpatient endocrinology is needed       # Hx of vertigo  - Continue home meclizine 25X2       # GI prophylaxis: none   # DVT prophylaxis: heparin 5000X3  # Diet: regular DASH, carb consistent   # Full code    Patient is a 48 year old male with PMHx of CKD IV baseline around 3.2, vertigo, diverticulitis s/p LAR, cigarette use, ETOH abuse now sober x5 years, IDDM, HTN, AMBER, Hx of multiple CVA with residual weakness on right side. Admitted on 7/17 for further evaluation of right sided eye pain and chest pain to telemetry.    # Chest pain - resolved - mostly angina like pain   # Hx of non obstructive CAD  # HTN  # HLD   - Code STEMI was called on presentation then cancelled  - On home aspirin, rosuvastatin 20, hydralazine 25X3, carvedilol 25X2, amlodipine 5X1, spironolactone 25X1  - Troponin 0.11 - stable   - last EKG showed no ischemic changes, the one before was suggestive of STEMI   - Continue home medications   - cardiology consult   - Trend troponin   - Cardio Consult 7/18- RECOMMENDATIONS: check serial EKGs and Celia, EP eval for Interrogation of ILR, c/w aspirin, Coreg 25 BID and lipitor 80 mg qhs, c/w Amlodipine 5mg qd and Hydralazine, w/up of headache/blurry vision per neuro, no further cardiac w/up needed if troponins trending down and TTE unremarkable, recall prn  - 7/18 trop 0.10    # Right eye pain with blurring of vision 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 Right eye tenderness on palpation   - Patient reports R eye pain associated with tearing and photosensitivity x3 weeks, for which he was recently admitted at a different hospital, where full ophthalmology workup was done and he was discharged and told to follow up outpatient for ophtho. Patient was told his blurry vision was likely due to uncontrolled DM  - ESR 75 --> 66  - CRP <3.0  - Neurology consult 7/18 - CTH reviewed >> Hypodensities are noted of the right cerebellar hemisphere as well as the bilateral basal ganglia , NIHSS 0. CTA, CTP pending read. Obtain MRI non contrast. Elevated trops, with STEMI. Rest of the work up as per cardio and medicine team. Recall once MRI is performed  - Neuro followup 7/19 after reviewing MRI: - MR brain with contrast to better characterize the lesions. Will need Nephrology evaluation for contrast administration in patient with BILLY on top of CKD (contacted nephro)  - Check HIV, WES, dsDNA, Angiotensin converting enzyme, NMO, MOG, Vitamin B12, Lyme  - Might need LP with CSF studies after MRI Brain  - 7/19 Rheumatology consult- MRI brain/ orbits noted, Ophthalmology recs appreciated, ESR 66 / CRP < 3, PRN Meclizine, Low suspicion for TAA  - 7/19 Ophtho consult- R CN 6 Palsy - likely contributing to eye pain, likely 2/2 ischemic complications of diabetes. Continue care per primary team and neurology recommendations. Pt to follow up at [facility] eye clinic (or eye care provider of pts choice) within 4 weeks of discharge. Discussed benefit of retinal specialist given level of retinopathy seen during exam today. Use sunglasses to limit light prn. 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 - baseline around 3.2  # Hyperkalemia with mild hyponatremia - resolved  - S/P IV contrast 7/17, patient stated he feels dehydrated   - Cr Baseline Cr 3.2, CKD IV  - 7/17- K 5.9 given insulin and albuterol, Lokelma x1  - DC home spironolactone   - 7/19 Alk Phos 137 (down from 171), AST 12, ALT 13  - 7/19 K+ 4.6  - 7/19 Cr 5.1, Nephrology consult placed for worsened Cr  - 7/20 Nephro consult- BILLY on CKD 4/ Right frontal lobe lesion/ HTN/ DM, BILLY likely BRENDEN, non oliguric, electrolytes reviewed, not in volume overload, start po sodium bicarb 650 mg q 8 hrs, plan for MRI with contrast for better evaluation of brain lesion, given low GFR patient at risk for NSF, however Harris type 2 is used here at the hospital, no data documenting associated NSF incidents, if no   - 7/20- MR Brain w/wo contrast performed, awaiting official read  - 7/21- Patient reports improvement in R eye pain, able to tolerate light without sunglasses. Cr now 6.5.     #Chest Pain - resolved  #Hx of Non obstructive CAD  #HTN  #HLD  - Cardio Consult 7/18- no further cardiac w/up needed if troponins trending down and TTE unremarkable, recall prn  - c/w amlodipine, carvedilol, hydralazine, statin, aspirin  - 7/19 EP consult placed for ILR interrogation (as recommended by cardio)    # Hx of diverticulitis S/P LAR  # Constipation  - start senna and Miralax      # Uncontrolled DM  - A1c 10.9  - On home Jardiance 10 mg   - started patient on Lantus 15 units with lispro 5 X3   - 7/21- Lantus increased to 18, started on sliding scale  - Chronic wound on L big toe noted, podiatry consult placed  - Outpatient endocrinology is needed       # Hx of vertigo  - Continue home meclizine 25X2       # GI prophylaxis: protonix  # DVT prophylaxis: heparin 5000X3  # Diet: regular DASH, carb consistent   # Full code    Patient is a 48 year old male with PMHx of CKD IV baseline around 3.2, vertigo, diverticulitis s/p LAR, cigarette use, ETOH abuse now sober x5 years, IDDM, HTN, AMBER, Hx of multiple CVA with residual weakness on right side. Admitted on 7/17 for further evaluation of right sided eye pain and chest pain to telemetry.    # Chest pain - resolved - mostly angina like pain   # Hx of non obstructive CAD  # HTN  # HLD   - Code STEMI was called on presentation then cancelled  - On home aspirin, rosuvastatin 20, hydralazine 25X3, carvedilol 25X2, amlodipine 5X1, spironolactone 25X1  - Troponin 0.11 - stable   - last EKG showed no ischemic changes, the one before was suggestive of STEMI   - Continue home medications   - cardiology consult   - Trend troponin   - Cardio Consult 7/18- RECOMMENDATIONS: check serial EKGs and Celia, EP eval for Interrogation of ILR, c/w aspirin, Coreg 25 BID and lipitor 80 mg qhs, c/w Amlodipine 5mg qd and Hydralazine, w/up of headache/blurry vision per neuro, no further cardiac w/up needed if troponins trending down and TTE unremarkable, recall prn  - 7/18 trop 0.10    # Right eye pain with blurring of vision 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 Right eye tenderness on palpation   - Patient reports R eye pain associated with tearing and photosensitivity x3 weeks, for which he was recently admitted at a different hospital, where full ophthalmology workup was done and he was discharged and told to follow up outpatient for ophtho. Patient was told his blurry vision was likely due to uncontrolled DM  - ESR 75 --> 66  - CRP <3.0  - Neurology consult 7/18 - CTH reviewed >> Hypodensities are noted of the right cerebellar hemisphere as well as the bilateral basal ganglia , NIHSS 0. CTA, CTP pending read. Obtain MRI non contrast. Elevated trops, with STEMI. Rest of the work up as per cardio and medicine team. Recall once MRI is performed  - Neuro followup 7/19 after reviewing MRI: - MR brain with contrast to better characterize the lesions. Will need Nephrology evaluation for contrast administration in patient with BILLY on top of CKD (contacted nephro)  - Check HIV, WES, dsDNA, Angiotensin converting enzyme, NMO, MOG, Vitamin B12, Lyme  - Might need LP with CSF studies after MRI Brain  - 7/19 Rheumatology consult- MRI brain/ orbits noted, Ophthalmology recs appreciated, ESR 66 / CRP < 3, PRN Meclizine, Low suspicion for TAA  - 7/19 Ophtho consult- R CN 6 Palsy - likely contributing to eye pain, likely 2/2 ischemic complications of diabetes. Continue care per primary team and neurology recommendations. Pt to follow up at [facility] eye clinic (or eye care provider of pts choice) within 4 weeks of discharge. Discussed benefit of retinal specialist given level of retinopathy seen during exam today. Use sunglasses to limit light prn. 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 - baseline around 3.2  # Hyperkalemia with mild hyponatremia - resolved  - S/P IV contrast 7/17, patient stated he feels dehydrated   - Cr Baseline Cr 3.2, CKD IV  - 7/17- K 5.9 given insulin and albuterol, Lokelma x1  - DC home spironolactone   - 7/19 Alk Phos 137 (down from 171), AST 12, ALT 13  - 7/19 K+ 4.6  - 7/19 Cr 5.1, Nephrology consult placed for worsened Cr  - 7/20 Nephro consult- BILLY on CKD 4/ Right frontal lobe lesion/ HTN/ DM, BILLY likely BRENDEN, non oliguric, electrolytes reviewed, not in volume overload, start po sodium bicarb 650 mg q 8 hrs, plan for MRI with contrast for better evaluation of brain lesion, given low GFR patient at risk for NSF, however Harris type 2 is used here at the hospital, no data documenting associated NSF incidents, if no   - 7/20- MR Brain w/wo contrast performed, awaiting official read  - 7/21- Patient reports improvement in R eye pain, able to tolerate light without sunglasses. Cr now 6.5.   - 7/21- MR Brain w/wo contrast:   1.  The 1 cm lesion within the right frontal lobe periventricular white   matter demonstrating rim-like restricted diffusion demonstrates no   contrast enhancement.    2.  The 8 mm lesion within the anterior right temporal lobe demonstrates   central enhancement.    3.  The 1 cm linear lesion within the right cerebellar hemisphere also   demonstrates central enhancement.    4.  Diagnostic considerations for the above lesions include infarcts or   demyelination (of varying ages) as well as infectious/inflammatory and   neoplastic etiologies. Consider CSF sampling as well as follow up imaging.    5.  Otherwise stable patchy white matter disease and scattered chronic   lacunar infarcts.      #Chest Pain - resolved  #Hx of Non obstructive CAD  #HTN  #HLD  - Cardio Consult 7/18- no further cardiac w/up needed if troponins trending down and TTE unremarkable, recall prn  - c/w amlodipine, carvedilol, hydralazine, statin, aspirin  - 7/19 EP consult placed for ILR interrogation (as recommended by cardio)    # Hx of diverticulitis S/P LAR  # Constipation  - start senna and Miralax      # Uncontrolled DM  - A1c 10.9  - On home Jardiance 10 mg   - started patient on Lantus 15 units with lispro 5 X3   - 7/21- Lantus increased to 18, started on sliding scale  - Chronic wound on L big toe noted, podiatry consult placed  - Outpatient endocrinology is needed       # Hx of vertigo  - Continue home meclizine 25X2       # GI prophylaxis: protonix  # DVT prophylaxis: heparin 5000X3  # Diet: regular DASH, carb consistent   # Full code

## 2023-07-21 NOTE — PROVIDER CONTACT NOTE (OTHER) - ASSESSMENT
Patient displaying no labored breathing, denies chest pain, and denied back pain. Patient vitals stable, pulse ox 100% on RA.

## 2023-07-21 NOTE — CONSULT NOTE ADULT - CONSULT REQUESTED DATE/TIME
18-Jul-2023 06:36
21-Jul-2023 17:46
20-Jul-2023 09:00
17-Jul-2023 16:57
19-Jul-2023 13:20
19-Jul-2023 09:19

## 2023-07-21 NOTE — PROGRESS NOTE ADULT - SUBJECTIVE AND OBJECTIVE BOX
Nephrology progress note    Patient was seen and examined, events over the last 24 h noted .  CR slightly up today    Allergies:  No Known Allergies    Hospital Medications:   MEDICATIONS  (STANDING):  amLODIPine   Tablet 10 milliGRAM(s) Oral daily  artificial tears (preservative free) Ophthalmic Solution 1 Drop(s) Right EYE two times a day  aspirin enteric coated 81 milliGRAM(s) Oral daily  atorvastatin 20 milliGRAM(s) Oral at bedtime  carvedilol 25 milliGRAM(s) Oral every 12 hours  dextrose 5%. 1000 milliLiter(s) (100 mL/Hr) IV Continuous <Continuous>  dextrose 5%. 1000 milliLiter(s) (50 mL/Hr) IV Continuous <Continuous>  dextrose 50% Injectable 12.5 Gram(s) IV Push once  dextrose 50% Injectable 25 Gram(s) IV Push once  dextrose 50% Injectable 25 Gram(s) IV Push once  glucagon  Injectable 1 milliGRAM(s) IntraMuscular once  heparin   Injectable 5000 Unit(s) SubCutaneous every 8 hours  hydrALAZINE 25 milliGRAM(s) Oral three times a day  insulin glargine Injectable (LANTUS) 18 Unit(s) SubCutaneous at bedtime  insulin lispro (ADMELOG) corrective regimen sliding scale   SubCutaneous three times a day before meals  insulin lispro Injectable (ADMELOG) 5 Unit(s) SubCutaneous three times a day before meals  meclizine 25 milliGRAM(s) Oral two times a day  polyethylene glycol 3350 17 Gram(s) Oral two times a day  senna 2 Tablet(s) Oral at bedtime  sodium bicarbonate 650 milliGRAM(s) Oral three times a day        VITALS:  T(F): 96.3 (07-21-23 @ 08:03), Max: 97.6 (07-20-23 @ 22:08)  HR: 69 (07-21-23 @ 08:03)  BP: 127/63 (07-21-23 @ 08:03)  RR: 18 (07-21-23 @ 08:03)  SpO2: 98% (07-21-23 @ 08:03)  Wt(kg): --    07-19 @ 07:01  -  07-20 @ 07:00  --------------------------------------------------------  IN: 0 mL / OUT: 600 mL / NET: -600 mL          PHYSICAL EXAM:  Constitutional: NAD  HEENT: anicteric sclera, oropharynx clear, MMM  Neck: No JVD  Respiratory: CTAB, no wheezes, rales or rhonchi  Cardiovascular: S1, S2, RRR  Gastrointestinal: BS+, soft, NT/ND  Extremities: No cyanosis or clubbing. No peripheral edema  :  No faith.   Skin: No rashes    LABS:  07-21    138  |  106  |  80<HH>  ----------------------------<  192<H>  4.9   |  21  |  6.5<HH>    Ca    8.0<L>      21 Jul 2023 06:07  Mg     2.3     07-21    TPro  5.4<L>  /  Alb  3.1<L>  /  TBili  <0.2  /  DBili      /  AST  14  /  ALT  12  /  AlkPhos  129<H>  07-21                          9.8    6.29  )-----------( 180      ( 21 Jul 2023 06:07 )             29.2       Urine Studies:  Urinalysis Basic - ( 21 Jul 2023 06:07 )    Color:  / Appearance:  / SG:  / pH:   Gluc: 192 mg/dL / Ketone:   / Bili:  / Urobili:    Blood:  / Protein:  / Nitrite:    Leuk Esterase:  / RBC:  / WBC    Sq Epi:  / Non Sq Epi:  / Bacteria:         RADIOLOGY & ADDITIONAL STUDIES:

## 2023-07-21 NOTE — PROGRESS NOTE ADULT - ASSESSMENT
49 YO male with PMHx of CKD IV baseline around 3.2, vertigo, diverticulitis s/p LAR, cigarette use, ETOH abuse now sober x5 years, IDDM, HTN, AMBER, Hx of multiple CVA with residual weakness on right side presents for chest pain along with right eye pain with photosensitivity. MR brain showed new lesion in his right frontal lobe with rim-like restricting diffusion.       Assessment and plan  BILLY on CKD 4/ Right frontal lobe lesion/ HTN/ DM, timing consistent w/ BRENDEN  Repeat UA (no hematuria on UA 6/17  pre BILLY)   Check Urine pot/cr ratio has proteinuria on  previous UA   Cehck SPEP UPEP serum free light chains though could be from diabetes   non oliguric  cont  po sodium bicarb 650 mg q 8 hrs  plan for MRI with contrast noted for better evaluation of brain lesion is at at risk for NSF though as mentioend however Harris type 2 is less of a concern  no acute indications for RRT as of now  will follow

## 2023-07-21 NOTE — CONSULT NOTE ADULT - REASON FOR ADMISSION
possible stroke and STEMI

## 2023-07-21 NOTE — CONSULT NOTE ADULT - SUBJECTIVE AND OBJECTIVE BOX
Podiatry Consult Note    Subjective:  BRINDA MOYER  Seen Bedside 48y Male  .   Patient is a 48y old  Male who presents with a chief complaint of possible stroke and STEMI (21 Jul 2023 15:46)    HPI:  49 YO male with PMHx of CKD IV baseline around 3.2, vertigo, diverticulitis s/p LAR, cigarette use, ETOH abuse now sober x5 years, IDDM, HTN, AMBER, Hx of multiple CVA with residual weakness on right side.     He was in the outpatient clinic for pre-test for circumcision, his EKG suggested STEMI so EMS was called and on the way to the hospital he developed chest pain of left side radiating to the back, characterized by tightness then burning sensation through the whole chest. This pain lasted for 1 hour and resolved, he received aspirin loading on the way here. Upon evaluation, he had no chest pain, no shortness of breath, no cough or palpitations, no lower limb edema. He has no fever, no chills. Pt follows at [facility] and underwent cardiac catheterization which showed non-obstructive CAD.    He is also complaining from right eye pain with double vision and blurring of vision that started 3 weeks ago, he was admitted to another hospital and they discharged him after doing workup for his eye and ophthalmology saw him and they said it is mostly related to the uncontrolled diabetes. His right eye pain has been constant for the past 3 weeks radiating to the front head causing severe headaches, it is associated with photosensitivity, no tearing, no redness. He has no weakness, no numbness.     In ED Vital Signs T(F): 97.7 HR: 71 BP: 155/98 RR: 18 SpO2: 98%     Stroke code was called in ED, CT head with perfusion showed no ischemia, no signs of stroke   STEMI code was called in ED, was cancelled after cardiology evaluation     Admitted to telemetry for further workup of right eye pain and chest pain        (17 Jul 2023 22:01)      Past Medical History and Surgical History  PAST MEDICAL & SURGICAL HISTORY:  Asthma      Diverticulitis  surgery 16yrs ago      High cholesterol      Dyslipidemia      Hypertension      H/O vertigo      Diabetic ulcer of right foot      Broken toe  left pinky toe      Vertigo      HTN (hypertension)      Diabetes      AMBER on CPAP      History of TIAs      History of surgery  colon resection           Review of Systems:  [X] Ten point review of systems is otherwise negative except as noted     Objective:  Vital Signs Last 24 Hrs  T(C): 35.7 (21 Jul 2023 08:03), Max: 36.4 (20 Jul 2023 22:08)  T(F): 96.3 (21 Jul 2023 08:03), Max: 97.6 (20 Jul 2023 22:08)  HR: 69 (21 Jul 2023 13:58) (69 - 74)  BP: 141/68 (21 Jul 2023 13:58) (127/63 - 155/82)  BP(mean): --  RR: 20 (21 Jul 2023 10:40) (16 - 20)  SpO2: 99% (21 Jul 2023 13:58) (98% - 100%)    Parameters below as of 21 Jul 2023 13:58  Patient On (Oxygen Delivery Method): room air                            9.8    6.29  )-----------( 180      ( 21 Jul 2023 06:07 )             29.2                 07-21    138  |  106  |  80<HH>  ----------------------------<  192<H>  4.9   |  21  |  6.5<HH>    Ca    8.0<L>      21 Jul 2023 06:07  Mg     2.3     07-21    TPro  5.4<L>  /  Alb  3.1<L>  /  TBili  <0.2  /  DBili  x   /  AST  14  /  ALT  12  /  AlkPhos  129<H>  07-21        Physical Exam - Lower Extremity Focused:   Derm: Moderate swelling across the feet bilaterally, left worse than right with increased edema to the left hallux. HPK lesion noted to the left IPJ  Vascular: DP and PT Pulses Diminished; Foot is Warm to Warm to the touch; Capillary Refill Time < 3 Seconds;    Neuro: Protective Sensation Diminished / Moderately Neuropathic   MSK: NO Pain On Palpation at Wound Site     Assessment:  Left foot edema    Plan:  Chart reviewed and Patient evaluated. All Questions and Concerns Addressed and Answered  Weight Bearing Status; WBAT   Patient was seen today and examined. Patient had stated that since the morning the swelling has gone down considerably and he is in no pain. He attributes the acute swelling to possible alteration of medication since he was admitted. He denies any N/C/F/V and states he is in no pain. There is no further intervention required from podiatry at this time.   Discussed Plan w/ Dr Castillo    Podiatry

## 2023-07-21 NOTE — PROGRESS NOTE ADULT - ASSESSMENT
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   He was in the outpatient clinic for pre-test for circumcision, his EKG suggested STEMI so EMS was called and on the way to the hospital he developed chest pain of left side radiating to the back, characterized by tightness then burning sensation through the whole chest. This pain lasted for 1 hour and resolved, he received aspirin loading on the way here. Pt follows at [facility] and underwent cardiac catheterization which showed non-obstructive CAD.  He is also complaining from right eye pain with double vision and blurring of vision that started 3 weeks ago, he was admitted to another hospital and they discharged him after doing workup for his eye and ophthalmology saw him and they said it is mostly related to the uncontrolled diabetes. His right eye pain has been constant for the past 3 weeks radiating to the front head causing severe headaches, it is associated with photosensitivity, no tearing, no redness. He has no weakness, no numbness.     # Acute diastolic  CHF  # Atypical  chest pain  probably musculoskeletal - resolved   # Elevated troponin sec to ckd  # H/o non obstructive CAD  # Hypertension  -Troponin T, Serum: 0.10: Critical value: ng/mL (07.18.23 @ 08:28)   - 12 Lead ECG (07.17.23 @ 17:57) >Normal sinus rhythm  - c/w ASA, coreg, statin  - c/w norvasc, hydralazine  - evaluated by cardiology  -  ILR interrogated - no events  -  TTE Echo Complete w/o Contrast w/ Doppler (07.21.23 @ 10:59) >V Ejection Fraction by Lakhani's Method with a biplane EF of 67 %. Normal global left ventricular systolic function.mild pulmonary hypertension.  - xray chest  - IV lasix 80mg  x1  - monitor I/o , restrict fluids    # Right eye pain with blurring of vision sec to diabetic retinopathy  # Right 6th nerve palsy  # H/o multiple CVA   - MR Head No Cont (07.18.23 @ 22:59) > New 1 cm lesion within the right frontal lobe periventricular white matter demonstrating rim-like restricted diffusion.  New 8 mm lesion within the anterior right temporal lobe. Additional new small lesion within the right cerebellar hemisphere, the configuration suggests a chronic infarct.Otherwise stable patchy white matter disease and scattered chronic   lacunar infarcts.  - CT PERFUSION: No evidence of perfusion abnormality.  - CTA HEAD: Mild stenosis of the proximal right V4 segment of the vertebral artery.  - CTA NECK: No evidence of carotid or vertebral artery stenosis.  - Sedimentation Rate, Erythrocyte: 66 mm/Hr (07.18.23 @ 11:15)  - C-Reactive Protein, Serum: <3.0 mg/L (07.18.23 @ 11:15)  - neuro eval: MR brain with contrast to better characterize the lesions  - F/u  HIV, WES, dsDNA, Angiotensin converting enzyme, NMO, MOG, Vitamin B12, Lyme  - pt was evaluated by rheumatology  - Ophthal eval:  Right Cranial Nerve 6 Palsy - likely contributing to eye pain - likely 2/2 ischemic complications of diabetes , Type 2 Diabetes Mellitus with Severe NPDR OU ,  Very low suspicion for GCA at this time: no jaw claudication, temporal/scalp tenderness.  - plan for baseline HVF testing at out patient follow up  - Pt to follow up at [facility] eye clinic (or eye care provider of pts choice) within 4 weeks of discharge. Discussed benefit of retinal specialist given level of retinopathy seen during exam today   - Use sunglasses to limit light prn   - c/w  artifical tears 2x/day OU   - MR Head w/wo IV Cont (07.20.23 @ 19:05) >The 1 cm lesion within the right frontal lobe periventricular white matter demonstrating rim-like restricted diffusion demonstrates no  contrast enhancement. The 8 mm lesion within the anterior right temporal lobe demonstrates central enhancement.  The 1 cm linear lesion within the right cerebellar hemisphere also demonstrates central enhancement.  Diagnostic considerations for the above lesions include infarcts or demyelination (of varying ages) as well as infectious/inflammatory and  neoplastic etiologies. Consider CSF sampling as well as follow up imaging.Otherwise stable patchy white matter disease and scattered chronic  lacunar infarcts.  - neuro F/u     # Acute kidney injury on  CKD stage 4, probably sec to BRENDEN, worsening creatinine  # Hyperkalemia - resolved  # Metabolic acidosis  - S/P  insulin and albuterol, lokelma  - dced  spironolactone   - c/w sodium bicarb  - evaluated by  nephrology   - monitor BMP    # DM type2 with hyperglycemia  - A1C with Estimated Average Glucose Result: 10.8 (07.17.23 @ 15:45)  - increase  lantus, lispro    # H/o  vertigo  - c/w  meclizine     # Nicotine abuse  - pt counseled    # DVT prophylaxis    # Full code    # Pending: monitor BMP,  neuro f/u  # Discussed plan of care with patient  # Disposition: home when stable

## 2023-07-21 NOTE — CONSULT NOTE ADULT - CONSULT REASON
stroke code
swollen foot Left
Rule Out Temporal Artery Arteritis
BILLY on CKD
troponins
Right eye pain with blurring of vision

## 2023-07-21 NOTE — PROGRESS NOTE ADULT - SUBJECTIVE AND OBJECTIVE BOX
Patient is a 48y old  Male who presents with a chief complaint of possible stroke and STEMI (19 Jul 2023 15:55)    Patient was seen and examined.  C/o sob, orthopnea  Right eye pain improving    PAST MEDICAL & SURGICAL HISTORY:  Asthma  Diverticulitis, surgery 14yrs age  Dyslipidemia  Hypertension  H/O vertigo  Diabetic ulcer of right foot  Broken toe, left pinky toe  Diabetes  AMBER on CPAP  History of TIAs    History of surgery  colon resection    Allergies   No Known Allergies    MEDICATIONS  (STANDING):  amLODIPine   Tablet 10 milliGRAM(s) Oral daily  artificial tears (preservative free) Ophthalmic Solution 1 Drop(s) Right EYE two times a day  aspirin enteric coated 81 milliGRAM(s) Oral daily  atorvastatin 20 milliGRAM(s) Oral at bedtime  carvedilol 25 milliGRAM(s) Oral every 12 hours  furosemide   Injectable 80 milliGRAM(s) IV Push once  glucagon  Injectable 1 milliGRAM(s) IntraMuscular once  heparin   Injectable 5000 Unit(s) SubCutaneous every 8 hours  hydrALAZINE 25 milliGRAM(s) Oral three times a day  insulin glargine Injectable (LANTUS) 18 Unit(s) SubCutaneous at bedtime  insulin lispro (ADMELOG) corrective regimen sliding scale   SubCutaneous three times a day before meals  insulin lispro Injectable (ADMELOG) 5 Unit(s) SubCutaneous three times a day before meals  meclizine 25 milliGRAM(s) Oral two times a day  pantoprazole    Tablet 40 milliGRAM(s) Oral before breakfast  polyethylene glycol 3350 17 Gram(s) Oral two times a day  senna 2 Tablet(s) Oral at bedtime  sodium bicarbonate 650 milliGRAM(s) Oral three times a day    MEDICATIONS  (PRN):  acetaminophen     Tablet .. 650 milliGRAM(s) Oral every 6 hours PRN Moderate Pain (4 - 6)  dextrose Oral Gel 15 Gram(s) Oral once PRN Blood Glucose LESS THAN 70 milliGRAM(s)/deciliter    T(C): 35.7 (07-21-23 @ 08:03), Max: 36.4 (07-20-23 @ 22:08)  HR: 69 (07-21-23 @ 13:58) (69 - 74)  BP: 141/68 (07-21-23 @ 13:58) (127/63 - 155/82)  RR: 20 (07-21-23 @ 10:40) (16 - 20)  SpO2: 99% (07-21-23 @ 13:58) (98% - 100%)    O/E:  Awake, alert, not in distress.  HEENT: atraumatic,   Chest: decreased breath sounds at bases  CVS: SIS2 +, no murmur.  P/A: Soft, BS+  CNS: awake, alert,   Ext:  lower ext edema 1+  Skin: no rash, no ulcers.  All systems reviewed positive findings as above.                                       9.8<L>  6.29  )-----------( 180      ( 21 Jul 2023 06:07 )             29.2<L>                        9.9<L>  7.56  )-----------( 188      ( 20 Jul 2023 06:28 )             29.5<L>  07-21    138  |  106  |  80<HH>  ----------------------------<  192<H>  4.9   |  21  |  6.5<HH>  07-20    135  |  103  |  74<HH>  ----------------------------<  177<H>  4.3   |  19  |  6.3<HH>    Ca    8.0<L>      21 Jul 2023 06:07  Ca    8.0<L>      20 Jul 2023 06:28  Mg     2.3     07-21    TPro  5.4<L>  /  Alb  3.1<L>  /  TBili  <0.2  /  DBili  x   /  AST  14  /  ALT  12  /  AlkPhos  129<H>  07-21  TPro  5.4<L>  /  Alb  3.0<L>  /  TBili  <0.2  /  DBili  x   /  AST  12  /  ALT  11  /  AlkPhos  129<H>  07-20

## 2023-07-22 LAB
ALBUMIN SERPL ELPH-MCNC: 3.3 G/DL — LOW (ref 3.5–5.2)
ALP SERPL-CCNC: 125 U/L — HIGH (ref 30–115)
ALT FLD-CCNC: 13 U/L — SIGNIFICANT CHANGE UP (ref 0–41)
ANION GAP SERPL CALC-SCNC: 12 MMOL/L — SIGNIFICANT CHANGE UP (ref 7–14)
AST SERPL-CCNC: 16 U/L — SIGNIFICANT CHANGE UP (ref 0–41)
BASOPHILS # BLD AUTO: 0.03 K/UL — SIGNIFICANT CHANGE UP (ref 0–0.2)
BASOPHILS NFR BLD AUTO: 0.5 % — SIGNIFICANT CHANGE UP (ref 0–1)
BILIRUB SERPL-MCNC: <0.2 MG/DL — SIGNIFICANT CHANGE UP (ref 0.2–1.2)
BUN SERPL-MCNC: 64 MG/DL — CRITICAL HIGH (ref 10–20)
CALCIUM SERPL-MCNC: 8.3 MG/DL — LOW (ref 8.4–10.5)
CHLORIDE SERPL-SCNC: 105 MMOL/L — SIGNIFICANT CHANGE UP (ref 98–110)
CO2 SERPL-SCNC: 22 MMOL/L — SIGNIFICANT CHANGE UP (ref 17–32)
CREAT SERPL-MCNC: 5.4 MG/DL — CRITICAL HIGH (ref 0.7–1.5)
EGFR: 12 ML/MIN/1.73M2 — LOW
EOSINOPHIL # BLD AUTO: 0.26 K/UL — SIGNIFICANT CHANGE UP (ref 0–0.7)
EOSINOPHIL NFR BLD AUTO: 4.3 % — SIGNIFICANT CHANGE UP (ref 0–8)
GLUCOSE BLDC GLUCOMTR-MCNC: 151 MG/DL — HIGH (ref 70–99)
GLUCOSE BLDC GLUCOMTR-MCNC: 246 MG/DL — HIGH (ref 70–99)
GLUCOSE BLDC GLUCOMTR-MCNC: 260 MG/DL — HIGH (ref 70–99)
GLUCOSE BLDC GLUCOMTR-MCNC: 319 MG/DL — HIGH (ref 70–99)
GLUCOSE SERPL-MCNC: 223 MG/DL — HIGH (ref 70–99)
HCT VFR BLD CALC: 29.9 % — LOW (ref 42–52)
HGB BLD-MCNC: 10.2 G/DL — LOW (ref 14–18)
IMM GRANULOCYTES NFR BLD AUTO: 0.3 % — SIGNIFICANT CHANGE UP (ref 0.1–0.3)
LYMPHOCYTES # BLD AUTO: 1.72 K/UL — SIGNIFICANT CHANGE UP (ref 1.2–3.4)
LYMPHOCYTES # BLD AUTO: 28.8 % — SIGNIFICANT CHANGE UP (ref 20.5–51.1)
MAGNESIUM SERPL-MCNC: 2.2 MG/DL — SIGNIFICANT CHANGE UP (ref 1.8–2.4)
MCHC RBC-ENTMCNC: 30.9 PG — SIGNIFICANT CHANGE UP (ref 27–31)
MCHC RBC-ENTMCNC: 34.1 G/DL — SIGNIFICANT CHANGE UP (ref 32–37)
MCV RBC AUTO: 90.6 FL — SIGNIFICANT CHANGE UP (ref 80–94)
MONOCYTES # BLD AUTO: 0.65 K/UL — HIGH (ref 0.1–0.6)
MONOCYTES NFR BLD AUTO: 10.9 % — HIGH (ref 1.7–9.3)
NEUTROPHILS # BLD AUTO: 3.3 K/UL — SIGNIFICANT CHANGE UP (ref 1.4–6.5)
NEUTROPHILS NFR BLD AUTO: 55.2 % — SIGNIFICANT CHANGE UP (ref 42.2–75.2)
NRBC # BLD: 0 /100 WBCS — SIGNIFICANT CHANGE UP (ref 0–0)
PLATELET # BLD AUTO: 202 K/UL — SIGNIFICANT CHANGE UP (ref 130–400)
PMV BLD: 11.8 FL — HIGH (ref 7.4–10.4)
POTASSIUM SERPL-MCNC: 4.7 MMOL/L — SIGNIFICANT CHANGE UP (ref 3.5–5)
POTASSIUM SERPL-SCNC: 4.7 MMOL/L — SIGNIFICANT CHANGE UP (ref 3.5–5)
PROT SERPL-MCNC: 5.7 G/DL — LOW (ref 6–8)
RBC # BLD: 3.3 M/UL — LOW (ref 4.7–6.1)
RBC # FLD: 12.4 % — SIGNIFICANT CHANGE UP (ref 11.5–14.5)
SODIUM SERPL-SCNC: 139 MMOL/L — SIGNIFICANT CHANGE UP (ref 135–146)
WBC # BLD: 5.98 K/UL — SIGNIFICANT CHANGE UP (ref 4.8–10.8)
WBC # FLD AUTO: 5.98 K/UL — SIGNIFICANT CHANGE UP (ref 4.8–10.8)

## 2023-07-22 PROCEDURE — 99233 SBSQ HOSP IP/OBS HIGH 50: CPT

## 2023-07-22 RX ORDER — INSULIN GLARGINE 100 [IU]/ML
21 INJECTION, SOLUTION SUBCUTANEOUS AT BEDTIME
Refills: 0 | Status: DISCONTINUED | OUTPATIENT
Start: 2023-07-22 | End: 2023-07-23

## 2023-07-22 RX ADMIN — ATORVASTATIN CALCIUM 20 MILLIGRAM(S): 80 TABLET, FILM COATED ORAL at 21:19

## 2023-07-22 RX ADMIN — Medication 25 MILLIGRAM(S): at 17:10

## 2023-07-22 RX ADMIN — CARVEDILOL PHOSPHATE 25 MILLIGRAM(S): 80 CAPSULE, EXTENDED RELEASE ORAL at 05:07

## 2023-07-22 RX ADMIN — Medication 650 MILLIGRAM(S): at 05:07

## 2023-07-22 RX ADMIN — Medication 650 MILLIGRAM(S): at 13:28

## 2023-07-22 RX ADMIN — Medication 1 DROP(S): at 17:07

## 2023-07-22 RX ADMIN — Medication 3: at 11:36

## 2023-07-22 RX ADMIN — AMLODIPINE BESYLATE 10 MILLIGRAM(S): 2.5 TABLET ORAL at 05:07

## 2023-07-22 RX ADMIN — POLYETHYLENE GLYCOL 3350 17 GRAM(S): 17 POWDER, FOR SOLUTION ORAL at 17:11

## 2023-07-22 RX ADMIN — Medication 650 MILLIGRAM(S): at 21:17

## 2023-07-22 RX ADMIN — Medication 4: at 08:38

## 2023-07-22 RX ADMIN — Medication 5 UNIT(S): at 17:04

## 2023-07-22 RX ADMIN — HEPARIN SODIUM 5000 UNIT(S): 5000 INJECTION INTRAVENOUS; SUBCUTANEOUS at 13:29

## 2023-07-22 RX ADMIN — Medication 81 MILLIGRAM(S): at 11:33

## 2023-07-22 RX ADMIN — PANTOPRAZOLE SODIUM 40 MILLIGRAM(S): 20 TABLET, DELAYED RELEASE ORAL at 05:21

## 2023-07-22 RX ADMIN — Medication 25 MILLIGRAM(S): at 21:17

## 2023-07-22 RX ADMIN — Medication 1: at 17:05

## 2023-07-22 RX ADMIN — CARVEDILOL PHOSPHATE 25 MILLIGRAM(S): 80 CAPSULE, EXTENDED RELEASE ORAL at 17:07

## 2023-07-22 RX ADMIN — Medication 25 MILLIGRAM(S): at 13:29

## 2023-07-22 RX ADMIN — INSULIN GLARGINE 21 UNIT(S): 100 INJECTION, SOLUTION SUBCUTANEOUS at 21:17

## 2023-07-22 RX ADMIN — Medication 1 DROP(S): at 05:06

## 2023-07-22 RX ADMIN — HEPARIN SODIUM 5000 UNIT(S): 5000 INJECTION INTRAVENOUS; SUBCUTANEOUS at 21:47

## 2023-07-22 RX ADMIN — HEPARIN SODIUM 5000 UNIT(S): 5000 INJECTION INTRAVENOUS; SUBCUTANEOUS at 05:06

## 2023-07-22 RX ADMIN — Medication 5 UNIT(S): at 08:37

## 2023-07-22 RX ADMIN — Medication 25 MILLIGRAM(S): at 05:07

## 2023-07-22 RX ADMIN — Medication 5 UNIT(S): at 11:36

## 2023-07-22 NOTE — PROGRESS NOTE ADULT - ASSESSMENT
49 YO male with PMHx of CKD IV baseline around 3.2, vertigo, diverticulitis s/p LAR, cigarette use, ETOH abuse now sober x5 years, IDDM, HTN, AMBER, Hx of multiple CVA with residual weakness on right side presents for chest pain along with right eye pain with photosensitivity. MR brain showed new lesion in his right frontal lobe with rim-like restricting diffusion.       Assessment and plan  BILLY on CKD 4/ Right frontal lobe lesion/ HTN/ DM, timing consistent w/ BRENDEN  Get Urine pot/cr ratio has proteinuria on  previous UA and  SPEP UPEP serum free light chains though could be from diabetes   Primary team wanted and MRI with contrast on the brain and has been informed that it can be performed if emergent as patient has a risk of worsening of BILLY P  Pedal edema getting better   Nephrology will follow    49 YO male with PMHx of CKD IV baseline around 3.2, vertigo, diverticulitis s/p LAR, cigarette use, ETOH abuse now sober x5 years, IDDM, HTN, AMBER, Hx of multiple CVA with residual weakness on right side presents for chest pain along with right eye pain with photosensitivity. MR brain showed new lesion in his right frontal lobe with rim-like restricting diffusion.       Assessment and plan  BILLY on CKD 4/ Right frontal lobe lesion/ HTN/ DM, timing consistent w/ BRENDEN  Get Urine prot/cr ratio has proteinuria on  previous UA and  SPEP UPEP serum free light chains though could be from diabetes   Primary team wanted and MRI with contrast on the brain and has been informed that it can be performed if emergent as patient has a risk of worsening of BILLY P  Pedal edema getting better   Nephrology will follow

## 2023-07-22 NOTE — PROGRESS NOTE ADULT - SUBJECTIVE AND OBJECTIVE BOX
NEPHROLOGY CONSULTATION NOTE    Patient is a 48y Male whom presented to the hospital with     PAST MEDICAL & SURGICAL HISTORY:  Asthma      Diverticulitis  surgery 16yrs ago      High cholesterol      Dyslipidemia      Hypertension      H/O vertigo      Diabetic ulcer of right foot      Broken toe  left pinky toe      Vertigo      HTN (hypertension)      Diabetes      AMBER on CPAP      History of TIAs      History of surgery  colon resection        Allergies:  No Known Allergies    Home Medications Reviewed  Hospital Medications:   MEDICATIONS  (STANDING):  amLODIPine   Tablet 10 milliGRAM(s) Oral daily  artificial tears (preservative free) Ophthalmic Solution 1 Drop(s) Right EYE two times a day  aspirin enteric coated 81 milliGRAM(s) Oral daily  atorvastatin 20 milliGRAM(s) Oral at bedtime  carvedilol 25 milliGRAM(s) Oral every 12 hours  dextrose 5%. 1000 milliLiter(s) (50 mL/Hr) IV Continuous <Continuous>  dextrose 5%. 1000 milliLiter(s) (100 mL/Hr) IV Continuous <Continuous>  dextrose 50% Injectable 25 Gram(s) IV Push once  dextrose 50% Injectable 12.5 Gram(s) IV Push once  dextrose 50% Injectable 25 Gram(s) IV Push once  glucagon  Injectable 1 milliGRAM(s) IntraMuscular once  heparin   Injectable 5000 Unit(s) SubCutaneous every 8 hours  hydrALAZINE 25 milliGRAM(s) Oral three times a day  insulin glargine Injectable (LANTUS) 21 Unit(s) SubCutaneous at bedtime  insulin lispro (ADMELOG) corrective regimen sliding scale   SubCutaneous three times a day before meals  insulin lispro Injectable (ADMELOG) 5 Unit(s) SubCutaneous three times a day before meals  meclizine 25 milliGRAM(s) Oral two times a day  pantoprazole    Tablet 40 milliGRAM(s) Oral before breakfast  polyethylene glycol 3350 17 Gram(s) Oral two times a day  senna 2 Tablet(s) Oral at bedtime  sodium bicarbonate 650 milliGRAM(s) Oral three times a day    SOCIAL HISTORY:  Denies ETOH,Smoking,   FAMILY HISTORY:  Family history of hypertension (Father)        REVIEW OF SYSTEMS:    EYES/ENT: No visual changes;  Vision issue   NECK: No pain or stiffness  RESPIRATORY: No cough, wheezing, hemoptysis; No shortness of breath  CARDIOVASCULAR: No chest pain or palpitations.  GASTROINTESTINAL: No abdominal or epigastric pain. No nausea, vomiting, or hematemesis; No diarrhea or constipation. No melena or hematochezia.  GENITOURINARY: No dysuria, frequency, foamy urine, urinary urgency, incontinence or hematuria  NEUROLOGICAL: No numbness or weakness  SKIN: No itching, burning, rashes, or lesions   VASCULAR: No bilateral lower extremity edema.       VITALS:  Vital Signs Last 24 Hrs  T(C): 35.7 (22 Jul 2023 07:00), Max: 36.3 (22 Jul 2023 04:55)  T(F): 96.3 (22 Jul 2023 07:00), Max: 97.4 (22 Jul 2023 04:55)  HR: 73 (22 Jul 2023 07:00) (69 - 78)  BP: 124/58 (22 Jul 2023 07:00) (124/58 - 156/71)  BP(mean): --  RR: 18 (22 Jul 2023 07:00) (18 - 18)  SpO2: 100% (22 Jul 2023 04:55) (99% - 100%)    Parameters below as of 22 Jul 2023 00:03  Patient On (Oxygen Delivery Method): room air        07-21 @ 07:01 - 07-22 @ 07:00  --------------------------------------------------------  IN: 1500 mL / OUT: 3580 mL / NET: -2080 mL    07-22 @ 07:01  -  07-22 @ 11:11  --------------------------------------------------------  IN: 0 mL / OUT: 450 mL / NET: -450 mL        PHYSICAL EXAM:  Constitutional: NAD  Respiratory: CTAB, no wheezes, rales or rhonchi  Cardiovascular: S1, S2, RRR  Gastrointestinal: BS+, soft, NT/ND  Extremities: pedal edema grade 1 bilateral  Neurological: A/O x 3, no focal deficits  Psychiatric: Normal mood, normal affect  : No CVA tenderness. No faith.   Skin: No rashes    LABS:  07-22    139  |  105  |  64<HH>  ----------------------------<  223<H>  4.7   |  22  |  5.4<HH>    Ca    8.3<L>      22 Jul 2023 06:57  Mg     2.2     07-22    TPro  5.7<L>  /  Alb  3.3<L>  /  TBili  <0.2  /  DBili      /  AST  16  /  ALT  13  /  AlkPhos  125<H>  07-22    Creatinine Trend: 5.4 <--, 6.5 <--, 6.3 <--, 5.1 <--, 3.6 <--, 3.7 <--, 3.9 <--                        10.2   5.98  )-----------( 202      ( 22 Jul 2023 06:57 )             29.9     Urine Studies:  Urinalysis Basic - ( 22 Jul 2023 06:57 )    Color:  / Appearance:  / SG:  / pH:   Gluc: 223 mg/dL / Ketone:   / Bili:  / Urobili:    Blood:  / Protein:  / Nitrite:    Leuk Esterase:  / RBC:  / WBC    Sq Epi:  / Non Sq Epi:  / Bacteria:                        NEPHROLOGY progress  NOTE    Patient is a 48y Male whom presented to the hospital with     PAST MEDICAL & SURGICAL HISTORY:  Asthma  Diverticulitis  surgery 16yrs ago  High cholesterol  Dyslipidemia  Hypertension  H/O vertigo  Diabetic ulcer of right foot  Broken toe  left pinky toe  Vertigo  HTN (hypertension)      Diabetes      AMBER on CPAP      History of TIAs      History of surgery  colon resection        Allergies:  No Known Allergies    Home Medications Reviewed  Hospital Medications:   MEDICATIONS  (STANDING):  amLODIPine   Tablet 10 milliGRAM(s) Oral daily  artificial tears (preservative free) Ophthalmic Solution 1 Drop(s) Right EYE two times a day  aspirin enteric coated 81 milliGRAM(s) Oral daily  atorvastatin 20 milliGRAM(s) Oral at bedtime  carvedilol 25 milliGRAM(s) Oral every 12 hours  dextrose 5%. 1000 milliLiter(s) (50 mL/Hr) IV Continuous <Continuous>  dextrose 5%. 1000 milliLiter(s) (100 mL/Hr) IV Continuous <Continuous>  dextrose 50% Injectable 25 Gram(s) IV Push once  dextrose 50% Injectable 12.5 Gram(s) IV Push once  dextrose 50% Injectable 25 Gram(s) IV Push once  glucagon  Injectable 1 milliGRAM(s) IntraMuscular once  heparin   Injectable 5000 Unit(s) SubCutaneous every 8 hours  hydrALAZINE 25 milliGRAM(s) Oral three times a day  insulin glargine Injectable (LANTUS) 21 Unit(s) SubCutaneous at bedtime  insulin lispro (ADMELOG) corrective regimen sliding scale   SubCutaneous three times a day before meals  insulin lispro Injectable (ADMELOG) 5 Unit(s) SubCutaneous three times a day before meals  meclizine 25 milliGRAM(s) Oral two times a day  pantoprazole    Tablet 40 milliGRAM(s) Oral before breakfast  polyethylene glycol 3350 17 Gram(s) Oral two times a day  senna 2 Tablet(s) Oral at bedtime  sodium bicarbonate 650 milliGRAM(s) Oral three times a day    SOCIAL HISTORY:  Denies ETOH,Smoking,   FAMILY HISTORY:  Family history of hypertension (Father)        REVIEW OF SYSTEMS:    EYES/ENT: No visual changes;  Vision issue   NECK: No pain or stiffness  RESPIRATORY: No cough, wheezing, hemoptysis; No shortness of breath  CARDIOVASCULAR: No chest pain or palpitations.  GASTROINTESTINAL: No abdominal or epigastric pain. No nausea, vomiting, or hematemesis; No diarrhea or constipation. No melena or hematochezia.  GENITOURINARY: No dysuria, frequency, foamy urine, urinary urgency, incontinence or hematuria  NEUROLOGICAL: No numbness or weakness  SKIN: No itching, burning, rashes, or lesions   VASCULAR: No bilateral lower extremity edema.       VITALS:  Vital Signs Last 24 Hrs  T(C): 35.7 (22 Jul 2023 07:00), Max: 36.3 (22 Jul 2023 04:55)  T(F): 96.3 (22 Jul 2023 07:00), Max: 97.4 (22 Jul 2023 04:55)  HR: 73 (22 Jul 2023 07:00) (69 - 78)  BP: 124/58 (22 Jul 2023 07:00) (124/58 - 156/71)  BP(mean): --  RR: 18 (22 Jul 2023 07:00) (18 - 18)  SpO2: 100% (22 Jul 2023 04:55) (99% - 100%)    Parameters below as of 22 Jul 2023 00:03  Patient On (Oxygen Delivery Method): room air        07-21 @ 07:01 - 07-22 @ 07:00  --------------------------------------------------------  IN: 1500 mL / OUT: 3580 mL / NET: -2080 mL    07-22 @ 07:01  -  07-22 @ 11:11  --------------------------------------------------------  IN: 0 mL / OUT: 450 mL / NET: -450 mL        PHYSICAL EXAM:  Constitutional: NAD  Respiratory: CTAB, no wheezes, rales or rhonchi  Cardiovascular: S1, S2, RRR  Gastrointestinal: BS+, soft, NT/ND  Extremities: pedal edema grade 1 bilateral  Neurological: A/O x 3, no focal deficits  Psychiatric: Normal mood, normal affect  : No CVA tenderness. No faith.   Skin: No rashes    LABS:  07-22    139  |  105  |  64<HH>  ----------------------------<  223<H>  4.7   |  22  |  5.4<HH>    Ca    8.3<L>      22 Jul 2023 06:57  Mg     2.2     07-22    TPro  5.7<L>  /  Alb  3.3<L>  /  TBili  <0.2  /  DBili      /  AST  16  /  ALT  13  /  AlkPhos  125<H>  07-22    Creatinine Trend: 5.4 <--, 6.5 <--, 6.3 <--, 5.1 <--, 3.6 <--, 3.7 <--, 3.9 <--                        10.2   5.98  )-----------( 202      ( 22 Jul 2023 06:57 )             29.9     Urine Studies:  Urinalysis Basic - ( 22 Jul 2023 06:57 )    Color:  / Appearance:  / SG:  / pH:   Gluc: 223 mg/dL / Ketone:   / Bili:  / Urobili:    Blood:  / Protein:  / Nitrite:    Leuk Esterase:  / RBC:  / WBC    Sq Epi:  / Non Sq Epi:  / Bacteria:

## 2023-07-22 NOTE — PROGRESS NOTE ADULT - ASSESSMENT
47 YO male with PMHx of CKD IV baseline around 3.2, vertigo, diverticulitis s/p LAR, cigarette use, ETOH abuse now sober x5 years, IDDM, HTN, AMBER, Hx of multiple CVA with residual weakness on right side.   He was in the outpatient clinic for pre-test for circumcision, his EKG suggested STEMI so EMS was called and on the way to the hospital he developed chest pain of left side radiating to the back, characterized by tightness then burning sensation through the whole chest. This pain lasted for 1 hour and resolved, he received aspirin loading on the way here. Pt follows at [facility] and underwent cardiac catheterization which showed non-obstructive CAD.  He is also complaining from right eye pain with double vision and blurring of vision that started 3 weeks ago, he was admitted to another hospital and they discharged him after doing workup for his eye and ophthalmology saw him and they said it is mostly related to the uncontrolled diabetes. His right eye pain has been constant for the past 3 weeks radiating to the front head causing severe headaches, it is associated with photosensitivity, no tearing, no redness. He has no weakness, no numbness.     # Acute diastolic  CHF-resolved  # Atypical  chest pain  probably musculoskeletal - resolved   # Elevated troponin sec to ckd  # H/o non obstructive CAD  # Hypertension  -Troponin T, Serum: 0.10: Critical value: ng/mL (07.18.23 @ 08:28)   - 12 Lead ECG (07.17.23 @ 17:57) >Normal sinus rhythm  - c/w ASA, coreg, statin  - c/w norvasc, hydralazine  - evaluated by cardiology  -  ILR interrogated - no events  -  TTE Echo Complete w/o Contrast w/ Doppler (07.21.23 @ 10:59) >V Ejection Fraction by Lakhani's Method with a biplane EF of 67 %. Normal global left ventricular systolic function.mild pulmonary hypertension.  -  Xray Chest 1 View- PORTABLE-Urgent (Xray Chest 1 View- PORTABLE-Urgent .) (07.21.23 @ 15:28) >Cardiomegaly with CHF. Slightly worsened.  - IV lasix 80mg  x1 on 7/21  - monitor I/o , restrict fluids    # Right eye pain with blurring of vision sec to diabetic retinopathy  # Right 6th nerve palsy  # H/o multiple CVA   - MR Head No Cont (07.18.23 @ 22:59) > New 1 cm lesion within the right frontal lobe periventricular white matter demonstrating rim-like restricted diffusion.  New 8 mm lesion within the anterior right temporal lobe. Additional new small lesion within the right cerebellar hemisphere, the configuration suggests a chronic infarct.Otherwise stable patchy white matter disease and scattered chronic   lacunar infarcts.  - CT PERFUSION: No evidence of perfusion abnormality.  - CTA HEAD: Mild stenosis of the proximal right V4 segment of the vertebral artery.  - CTA NECK: No evidence of carotid or vertebral artery stenosis.  - Sedimentation Rate, Erythrocyte: 66 mm/Hr (07.18.23 @ 11:15)  - C-Reactive Protein, Serum: <3.0 mg/L (07.18.23 @ 11:15)  - neuro eval: MR brain with contrast to better characterize the lesions  - F/u  HIV, WES, dsDNA, Angiotensin converting enzyme, NMO, MOG, Vitamin B12, Lyme  - pt was evaluated by rheumatology  - Ophthal eval:  Right Cranial Nerve 6 Palsy - likely contributing to eye pain - likely 2/2 ischemic complications of diabetes , Type 2 Diabetes Mellitus with Severe NPDR OU ,  Very low suspicion for GCA at this time: no jaw claudication, temporal/scalp tenderness.  - plan for baseline HVF testing at out patient follow up  - Pt to follow up at [facility] eye clinic (or eye care provider of pts choice) within 4 weeks of discharge. Discussed benefit of retinal specialist given level of retinopathy seen during exam today   - Use sunglasses to limit light prn   - c/w  artifical tears 2x/day OU   - MR Head w/wo IV Cont (07.20.23 @ 19:05) >The 1 cm lesion within the right frontal lobe periventricular white matter demonstrating rim-like restricted diffusion demonstrates no  contrast enhancement. The 8 mm lesion within the anterior right temporal lobe demonstrates central enhancement.  The 1 cm linear lesion within the right cerebellar hemisphere also demonstrates central enhancement.  Diagnostic considerations for the above lesions include infarcts or demyelination (of varying ages) as well as infectious/inflammatory and  neoplastic etiologies. Consider CSF sampling as well as follow up imaging.Otherwise stable patchy white matter disease and scattered chronic  lacunar infarcts.  - neuro F/u MR orbit w/wo contrastMight need LP with CSF studies after MRI. Patient reports having LP done at [facility]. Recommend to get records    # Acute kidney injury on  CKD stage 4, probably sec to BRENDEN,  creatinine downtrending  # Hyperkalemia - resolved  # Metabolic acidosis- resolving  - S/P  insulin and albuterol, lokelma  - dced  spironolactone   - c/w sodium bicarb  - evaluated by  nephrology   - monitor BMP, monitor UO    # DM type2 with hyperglycemia  - A1C with Estimated Average Glucose Result: 10.8 (07.17.23 @ 15:45)  - increase  lantus, lispro    # H/o  vertigo  - c/w  meclizine     # Nicotine abuse  - pt counseled    # DVT prophylaxis    # Full code    # Pending: monitor BMP, MR orbit w/wo contrast. Might need LP with CSF studies after MRI. Patient reports having LP done at [facility]- obtain records  # Discussed plan of care with patient  # Disposition: home when stable

## 2023-07-22 NOTE — PROGRESS NOTE ADULT - SUBJECTIVE AND OBJECTIVE BOX
Patient is a 48y old  Male who presents with a chief complaint of possible stroke and STEMI (19 Jul 2023 15:55)    Patient was seen and examined.  Right eye pain improving  Denies sob today    PAST MEDICAL & SURGICAL HISTORY:  Asthma  Diverticulitis, surgery 14yrs age  Dyslipidemia  Hypertension  H/O vertigo  Diabetic ulcer of right foot  Broken toe, left pinky toe  Diabetes  AMBER on CPAP  History of TIAs    History of surgery  colon resection    Allergies   No Known Allergies    MEDICATIONS  (STANDING):  amLODIPine   Tablet 10 milliGRAM(s) Oral daily  artificial tears (preservative free) Ophthalmic Solution 1 Drop(s) Right EYE two times a day  aspirin enteric coated 81 milliGRAM(s) Oral daily  atorvastatin 20 milliGRAM(s) Oral at bedtime  carvedilol 25 milliGRAM(s) Oral every 12 hours  heparin   Injectable 5000 Unit(s) SubCutaneous every 8 hours  hydrALAZINE 25 milliGRAM(s) Oral three times a day  insulin glargine Injectable (LANTUS) 21 Unit(s) SubCutaneous at bedtime  insulin lispro (ADMELOG) corrective regimen sliding scale   SubCutaneous three times a day before meals  insulin lispro Injectable (ADMELOG) 5 Unit(s) SubCutaneous three times a day before meals  meclizine 25 milliGRAM(s) Oral two times a day  pantoprazole    Tablet 40 milliGRAM(s) Oral before breakfast  polyethylene glycol 3350 17 Gram(s) Oral two times a day  senna 2 Tablet(s) Oral at bedtime  sodium bicarbonate 650 milliGRAM(s) Oral three times a day    MEDICATIONS  (PRN):  acetaminophen     Tablet .. 650 milliGRAM(s) Oral every 6 hours PRN Moderate Pain (4 - 6)  dextrose Oral Gel 15 Gram(s) Oral once PRN Blood Glucose LESS THAN 70 milliGRAM(s)/deciliter    T(C): 36.2 (07-22-23 @ 15:30), Max: 36.3 (07-22-23 @ 04:55)  HR: 73 (07-22-23 @ 15:30) (71 - 78)  BP: 144/68 (07-22-23 @ 15:30) (124/58 - 156/71)  RR: 18 (07-22-23 @ 15:30) (18 - 18)  SpO2: 99% (07-22-23 @ 15:30) (99% - 100%)    O/E:  Awake, alert, not in distress.  HEENT: atraumatic,   Chest: decreased breath sounds at bases  CVS: SIS2 +, no murmur.  P/A: Soft, BS+  CNS: awake, alert,   Ext:  lower ext edema 1+  Skin: no rash, no ulcers.  All systems reviewed positive findings as above.                          10.2<L>  5.98  )-----------( 202      ( 22 Jul 2023 06:57 )             29.9<L>                        9.8<L>  6.29  )-----------( 180      ( 21 Jul 2023 06:07 )             29.2<L>  07-22    139  |  105  |  64<HH>  ----------------------------<  223<H>  4.7   |  22  |  5.4<HH>  07-21    138  |  106  |  80<HH>  ----------------------------<  192<H>  4.9   |  21  |  6.5<HH>    Ca    8.3<L>      22 Jul 2023 06:57  Ca    8.0<L>      21 Jul 2023 06:07  Mg     2.2     07-22    TPro  5.7<L>  /  Alb  3.3<L>  /  TBili  <0.2  /  DBili  x   /  AST  16  /  ALT  13  /  AlkPhos  125<H>  07-22  TPro  5.4<L>  /  Alb  3.1<L>  /  TBili  <0.2  /  DBili  x   /  AST  14  /  ALT  12  /  AlkPhos  129<H>  07-21

## 2023-07-22 NOTE — PROGRESS NOTE ADULT - ATTENDING COMMENTS
BILLY on CKD 4/ Right frontal lobe lesion/ HTN/ DM, timing consistent w/ BRENDEN  responding to diuretics   follow UOP   can proceed with MRI if urgently needed   follow BMP UOP  no need for RRT now   will follow

## 2023-07-22 NOTE — PROGRESS NOTE ADULT - SUBJECTIVE AND OBJECTIVE BOX
BRINDA MOYER 48y Male  MRN#: [id number]     Hospital Day: 5d    Pt is currently admitted with the primary diagnosis of  ST elevation myocardial infarction (STEMI)        SUBJECTIVE     Overnight events  None    Subjective complaints  Pt was evaluated this am. Patient reports continued improvement in R eye pain.                                             ----------------------------------------------------------  OBJECTIVE  PAST MEDICAL & SURGICAL HISTORY  Asthma    Diverticulitis  surgery 14yrs ago    High cholesterol    Dyslipidemia    Hypertension    H/O vertigo    Diabetic ulcer of right foot    Broken toe  left pinky toe    Vertigo    HTN (hypertension)    Diabetes    AMBER on CPAP    History of TIAs    History of surgery  colon resection                                              -----------------------------------------------------------  ALLERGIES:  No Known Allergies                                            ------------------------------------------------------------    HOME MEDICATIONS  Home Medications:  ergocalciferol 1.25 mg (50,000 intl units) oral tablet: orally once a week (17 Jul 2023 22:27)  Jardiance 10 mg oral tablet: 1 orally once a week (17 Jul 2023 22:27)  meclizine 25 mg oral tablet: 1 orally 2 times a day (17 Jul 2023 22:27)  rosuvastatin 20 mg oral capsule: 1 orally once a day (at bedtime) (17 Jul 2023 22:27)  spironolactone 25 mg oral tablet: 1 orally once a day (17 Jul 2023 22:27)                           MEDICATIONS:  STANDING MEDICATIONS  amLODIPine   Tablet 10 milliGRAM(s) Oral daily  artificial tears (preservative free) Ophthalmic Solution 1 Drop(s) Right EYE two times a day  aspirin enteric coated 81 milliGRAM(s) Oral daily  atorvastatin 20 milliGRAM(s) Oral at bedtime  carvedilol 25 milliGRAM(s) Oral every 12 hours  dextrose 5%. 1000 milliLiter(s) IV Continuous <Continuous>  dextrose 5%. 1000 milliLiter(s) IV Continuous <Continuous>  dextrose 50% Injectable 25 Gram(s) IV Push once  dextrose 50% Injectable 12.5 Gram(s) IV Push once  dextrose 50% Injectable 25 Gram(s) IV Push once  glucagon  Injectable 1 milliGRAM(s) IntraMuscular once  heparin   Injectable 5000 Unit(s) SubCutaneous every 8 hours  hydrALAZINE 25 milliGRAM(s) Oral three times a day  insulin glargine Injectable (LANTUS) 18 Unit(s) SubCutaneous at bedtime  insulin lispro (ADMELOG) corrective regimen sliding scale   SubCutaneous three times a day before meals  insulin lispro Injectable (ADMELOG) 5 Unit(s) SubCutaneous three times a day before meals  meclizine 25 milliGRAM(s) Oral two times a day  pantoprazole    Tablet 40 milliGRAM(s) Oral before breakfast  polyethylene glycol 3350 17 Gram(s) Oral two times a day  senna 2 Tablet(s) Oral at bedtime  sodium bicarbonate 650 milliGRAM(s) Oral three times a day    PRN MEDICATIONS  acetaminophen     Tablet .. 650 milliGRAM(s) Oral every 6 hours PRN  dextrose Oral Gel 15 Gram(s) Oral once PRN                                            ------------------------------------------------------------  VITAL SIGNS: Last 24 Hours  T(C): 36.3 (22 Jul 2023 04:55), Max: 36.3 (22 Jul 2023 04:55)  T(F): 97.4 (22 Jul 2023 04:55), Max: 97.4 (22 Jul 2023 04:55)  HR: 78 (22 Jul 2023 04:55) (69 - 78)  BP: 147/69 (22 Jul 2023 04:55) (127/63 - 156/71)  BP(mean): --  RR: 18 (22 Jul 2023 04:55) (18 - 20)  SpO2: 100% (22 Jul 2023 04:55) (98% - 100%)      07-21-23 @ 07:01  -  07-22-23 @ 07:00  --------------------------------------------------------  IN: 1500 mL / OUT: 3580 mL / NET: -2080 mL                                             --------------------------------------------------------------  LABS:                        9.8    6.29  )-----------( 180      ( 21 Jul 2023 06:07 )             29.2     07-21    138  |  106  |  80<HH>  ----------------------------<  192<H>  4.9   |  21  |  6.5<HH>    Ca    8.0<L>      21 Jul 2023 06:07  Mg     2.3     07-21    TPro  5.4<L>  /  Alb  3.1<L>  /  TBili  <0.2  /  DBili  x   /  AST  14  /  ALT  12  /  AlkPhos  129<H>  07-21      Urinalysis Basic - ( 21 Jul 2023 06:07 )    Color: x / Appearance: x / SG: x / pH: x  Gluc: 192 mg/dL / Ketone: x  / Bili: x / Urobili: x   Blood: x / Protein: x / Nitrite: x   Leuk Esterase: x / RBC: x / WBC x   Sq Epi: x / Non Sq Epi: x / Bacteria: x                                                            -------------------------------------------------------------  RADIOLOGY:                                            --------------------------------------------------------------    PHYSICAL EXAM:  GENERAL: Minimal acute distress, lying in bed comfortably  HEAD:  Atraumatic, Normocephalic  EYES: EOMI, conjunctiva and sclera clear. R eye associated with mild TTP, continues to decrease from yesterday. No tearing noted from R eye. Patient tolerating light without sunglasses.   ENT: Moist mucous membranes  NECK: Supple, No JVD  CHEST/LUNG: Clear to auscultation bilaterally; No rales, rhonchi, wheezing, or rubs. Unlabored respirations  HEART: regular rate and rhythm; No murmurs, rubs, or gallops  ABDOMEN: Bowel sounds present; Soft, Nontender, Nondistended.    EXTREMITIES: Warm. LE edema 1+ b/l, improved from yesterday afternoon  NERVOUS SYSTEM:  Alert & Oriented X3. No focal deficits   SKIN: No rashes or lesions                                           --------------------------------------------------------------

## 2023-07-22 NOTE — PROGRESS NOTE ADULT - ASSESSMENT
Patient is a 48 year old male with PMHx of CKD IV baseline around 3.2, vertigo, diverticulitis s/p LAR, cigarette use, ETOH abuse now sober x5 years, IDDM, HTN, AMBER, Hx of multiple CVA with residual weakness on right side. Admitted on 7/17 for further evaluation of right sided eye pain and chest pain to telemetry.    # Chest pain - resolved - mostly angina like pain   # Hx of non obstructive CAD  # HTN  # HLD   - Code STEMI was called on presentation then cancelled  - On home aspirin, rosuvastatin 20, hydralazine 25X3, carvedilol 25X2, amlodipine 5X1, spironolactone 25X1  - Troponin 0.11 - stable   - last EKG showed no ischemic changes, the one before was suggestive of STEMI   - Continue home medications   - cardiology consult   - Trend troponin   - Cardio Consult 7/18- RECOMMENDATIONS: check serial EKGs and Celia, EP eval for Interrogation of ILR, c/w aspirin, Coreg 25 BID and lipitor 80 mg qhs, c/w Amlodipine 5mg qd and Hydralazine, w/up of headache/blurry vision per neuro, no further cardiac w/up needed if troponins trending down and TTE unremarkable, recall prn  - 7/18 trop 0.10  - 7/21- Patient developed LE swelling and SOB. CXR showed cardiomegaly with CHF, slightly worsened. 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 Right eye tenderness on palpation   - Patient reports R eye pain associated with tearing and photosensitivity x3 weeks, for which he was recently admitted at a different hospital, where full ophthalmology workup was done and he was discharged and told to follow up outpatient for ophtho. Patient was told his blurry vision was likely due to uncontrolled DM  - ESR 75 --> 66  - CRP <3.0  - Neurology consult 7/18 - CTH reviewed >> Hypodensities are noted of the right cerebellar hemisphere as well as the bilateral basal ganglia , NIHSS 0. CTA, CTP pending read. Obtain MRI non contrast. Elevated trops, with STEMI. Rest of the work up as per cardio and medicine team. Recall once MRI is performed  - Neuro followup 7/19 after reviewing MRI: - MR brain with contrast to better characterize the lesions. Will need Nephrology evaluation for contrast administration in patient with BILLY on top of CKD (contacted nephro)  - Check HIV, WES, dsDNA, Angiotensin converting enzyme, NMO, MOG, Vitamin B12, Lyme  - Might need LP with CSF studies after MRI Brain  - 7/19 Rheumatology consult- MRI brain/ orbits noted, Ophthalmology recs appreciated, ESR 66 / CRP < 3, PRN Meclizine, Low suspicion for TAA  - 7/19 Ophtho consult- R CN 6 Palsy - likely contributing to eye pain, likely 2/2 ischemic complications of diabetes. Continue care per primary team and neurology recommendations. Pt to follow up at [facility] eye clinic (or eye care provider of pts choice) within 4 weeks of discharge. Discussed benefit of retinal specialist given level of retinopathy seen during exam today. Use sunglasses to limit light prn. Start artifical tears 2x/day OU       # CKD stage IV due to diabetic nephropathy - baseline around 3.2  # Hyperkalemia with mild hyponatremia - resolved  - S/P IV contrast 7/17, patient stated he feels dehydrated   - Cr Baseline Cr 3.2, CKD IV  - 7/17- K 5.9 given insulin and albuterol, Lokelma x1  - DC home spironolactone   - 7/19 Alk Phos 137 (down from 171), AST 12, ALT 13  - 7/19 K+ 4.6  - 7/19 Cr 5.1, Nephrology consult placed for worsened Cr  - 7/20 Nephro consult- BILLY on CKD 4/ Right frontal lobe lesion/ HTN/ DM, BILLY likely BRENDEN, non oliguric, electrolytes reviewed, not in volume overload, start po sodium bicarb 650 mg q 8 hrs, plan for MRI with contrast for better evaluation of brain lesion, given low GFR patient at risk for NSF, however Harris type 2 is used here at the hospital, no data documenting associated NSF incidents, if no   - 7/20- MR Brain w/wo contrast performed, awaiting official read  - 7/21- Patient reports improvement in R eye pain, able to tolerate light without sunglasses. Cr now 6.5.   - 7/21- MR Brain w/wo contrast:   1.  The 1 cm lesion within the right frontal lobe periventricular white   matter demonstrating rim-like restricted diffusion demonstrates no   contrast enhancement.    2.  The 8 mm lesion within the anterior right temporal lobe demonstrates   central enhancement.    3.  The 1 cm linear lesion within the right cerebellar hemisphere also   demonstrates central enhancement.    4.  Diagnostic considerations for the above lesions include infarcts or   demyelination (of varying ages) as well as infectious/inflammatory and   neoplastic etiologies. Consider CSF sampling as well as follow up imaging.    5.  Otherwise stable patchy white matter disease and scattered chronic   lacunar infarcts.  - 7/21- Neuro reviewed MR Brain w/wo contrast, requesting MR Orbit w/wo contrast. Will consult nephorlogy for their clearance      #Chest Pain - resolved  #Hx of Non obstructive CAD  #HTN  #HLD  - Cardio Consult 7/18- no further cardiac w/up needed if troponins trending down and TTE unremarkable, recall prn  - c/w amlodipine, carvedilol, hydralazine, statin, aspirin  - 7/19 EP consult placed for ILR interrogation (as recommended by cardio)  - 7/20- ILR interrogated by EP, no events    # Hx of diverticulitis S/P LAR  # Constipation  - start senna and Miralax      # Uncontrolled DM  - A1c 10.9  - On home Jardiance 10 mg   - started patient on Lantus 15 units with lispro 5 X3   - 7/21- Lantus increased to 18, started on sliding scale  - Chronic wound on L big toe noted, podiatry consult placed  - Outpatient endocrinology is needed       # Hx of vertigo  - Continue home meclizine 25X2       # GI prophylaxis: protonix  # DVT prophylaxis: heparin 5000X3  # Diet: regular DASH, carb consistent   # Full code  Patient is a 48 year old male with PMHx of CKD IV baseline around 3.2, vertigo, diverticulitis s/p LAR, cigarette use, ETOH abuse now sober x5 years, IDDM, HTN, AMBER, Hx of multiple CVA with residual weakness on right side. Admitted on 7/17 for further evaluation of right sided eye pain and chest pain to telemetry.    # Chest pain - resolved - mostly angina like pain   # Hx of non obstructive CAD  # HTN  # HLD   - Code STEMI was called on presentation then cancelled  - On home aspirin, rosuvastatin 20, hydralazine 25X3, carvedilol 25X2, amlodipine 5X1, spironolactone 25X1  - Troponin 0.11 - stable   - last EKG showed no ischemic changes, the one before was suggestive of STEMI   - Continue home medications   - cardiology consult   - Trend troponin   - Cardio Consult 7/18- RECOMMENDATIONS: check serial EKGs and Celia, EP eval for Interrogation of ILR, c/w aspirin, Coreg 25 BID and lipitor 80 mg qhs, c/w Amlodipine 5mg qd and Hydralazine, w/up of headache/blurry vision per neuro, no further cardiac w/up needed if troponins trending down and TTE unremarkable, recall prn  - 7/18 trop 0.10  - 7/21- Patient developed LE swelling and SOB. CXR showed cardiomegaly with CHF, slightly worsened. 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 Right eye tenderness on palpation   - Patient reports R eye pain associated with tearing and photosensitivity x3 weeks, for which he was recently admitted at a different hospital, where full ophthalmology workup was done and he was discharged and told to follow up outpatient for ophtho. Patient was told his blurry vision was likely due to uncontrolled DM  - ESR 75 --> 66  - CRP <3.0  - Neurology consult 7/18 - CTH reviewed >> Hypodensities are noted of the right cerebellar hemisphere as well as the bilateral basal ganglia , NIHSS 0. CTA, CTP pending read. Obtain MRI non contrast. Elevated trops, with STEMI. Rest of the work up as per cardio and medicine team. Recall once MRI is performed  - Neuro followup 7/19 after reviewing MRI: - MR brain with contrast to better characterize the lesions. Will need Nephrology evaluation for contrast administration in patient with BILLY on top of CKD (contacted nephro)  - Check HIV, WES, dsDNA, Angiotensin converting enzyme, NMO, MOG, Vitamin B12, Lyme  - Might need LP with CSF studies after MRI Brain  - 7/19 Rheumatology consult- MRI brain/ orbits noted, Ophthalmology recs appreciated, ESR 66 / CRP < 3, PRN Meclizine, Low suspicion for TAA  - 7/19 Ophtho consult- R CN 6 Palsy - likely contributing to eye pain, likely 2/2 ischemic complications of diabetes. Continue care per primary team and neurology recommendations. Pt to follow up at [facility] eye clinic (or eye care provider of pts choice) within 4 weeks of discharge. Discussed benefit of retinal specialist given level of retinopathy seen during exam today. Use sunglasses to limit light prn. Start artifical tears 2x/day OU       # CKD stage IV due to diabetic nephropathy - baseline around 3.2  # Hyperkalemia with mild hyponatremia - resolved  - S/P IV contrast 7/17, patient stated he feels dehydrated   - Cr Baseline Cr 3.2, CKD IV  - 7/17- K 5.9 given insulin and albuterol, Lokelma x1  - DC home spironolactone   - 7/19 Alk Phos 137 (down from 171), AST 12, ALT 13  - 7/19 K+ 4.6  - 7/19 Cr 5.1, Nephrology consult placed for worsened Cr  - 7/20 Nephro consult- BILLY on CKD 4/ Right frontal lobe lesion/ HTN/ DM, BILLY likely BRENDEN, non oliguric, electrolytes reviewed, not in volume overload, start po sodium bicarb 650 mg q 8 hrs, plan for MRI with contrast for better evaluation of brain lesion, given low GFR patient at risk for NSF, however Harris type 2 is used here at the hospital, no data documenting associated NSF incidents, if no   - 7/20- MR Brain w/wo contrast performed, awaiting official read  - 7/21- Patient reports improvement in R eye pain, able to tolerate light without sunglasses. Cr now 6.5.   - 7/21- MR Brain w/wo contrast:   1.  The 1 cm lesion within the right frontal lobe periventricular white   matter demonstrating rim-like restricted diffusion demonstrates no   contrast enhancement.    2.  The 8 mm lesion within the anterior right temporal lobe demonstrates   central enhancement.    3.  The 1 cm linear lesion within the right cerebellar hemisphere also   demonstrates central enhancement.    4.  Diagnostic considerations for the above lesions include infarcts or   demyelination (of varying ages) as well as infectious/inflammatory and   neoplastic etiologies. Consider CSF sampling as well as follow up imaging.    5.  Otherwise stable patchy white matter disease and scattered chronic   lacunar infarcts.  - 7/21- Neuro reviewed MR Brain w/wo contrast, requesting MR Orbit w/wo contrast. Will consult nephorlogy for their clearance  - 7/22- Cr 5.4 today, down from 6.5. Nephrology said that they would prefer for the patient to go 48 hours without contrast. MR Brain with contrast was done 7/20 around 19:00. Will order MR Orbit w/wo contrast for 7/23 AM to ensure renal safety.      #Chest Pain - resolved  #Hx of Non obstructive CAD  #HTN  #HLD  - Cardio Consult 7/18- no further cardiac w/up needed if troponins trending down and TTE unremarkable, recall prn  - c/w amlodipine, carvedilol, hydralazine, statin, aspirin  - 7/19 EP consult placed for ILR interrogation (as recommended by cardio)  - 7/20- ILR interrogated by EP, no events    # Hx of diverticulitis S/P LAR  # Constipation  - start senna and Miralax      # Uncontrolled DM  - A1c 10.9  - On home Jardiance 10 mg   - started patient on Lantus 15 units with lispro 5 X3   - 7/21- Lantus increased to 18, started on sliding scale  - 7/21- Lantus increased to 21  - Outpatient endocrinology is needed       # Hx of vertigo  - Continue home meclizine 25X2       # GI prophylaxis: protonix  # DVT prophylaxis: heparin 5000X3  # Diet: regular DASH, carb consistent   # Full code

## 2023-07-23 LAB
ALBUMIN SERPL ELPH-MCNC: 3.2 G/DL — LOW (ref 3.5–5.2)
ALP SERPL-CCNC: 127 U/L — HIGH (ref 30–115)
ALT FLD-CCNC: 13 U/L — SIGNIFICANT CHANGE UP (ref 0–41)
ANION GAP SERPL CALC-SCNC: 9 MMOL/L — SIGNIFICANT CHANGE UP (ref 7–14)
AST SERPL-CCNC: 15 U/L — SIGNIFICANT CHANGE UP (ref 0–41)
BASOPHILS # BLD AUTO: 0.03 K/UL — SIGNIFICANT CHANGE UP (ref 0–0.2)
BASOPHILS NFR BLD AUTO: 0.6 % — SIGNIFICANT CHANGE UP (ref 0–1)
BILIRUB SERPL-MCNC: <0.2 MG/DL — SIGNIFICANT CHANGE UP (ref 0.2–1.2)
BUN SERPL-MCNC: 59 MG/DL — HIGH (ref 10–20)
CALCIUM SERPL-MCNC: 8 MG/DL — LOW (ref 8.4–10.5)
CHLORIDE SERPL-SCNC: 106 MMOL/L — SIGNIFICANT CHANGE UP (ref 98–110)
CO2 SERPL-SCNC: 23 MMOL/L — SIGNIFICANT CHANGE UP (ref 17–32)
CREAT SERPL-MCNC: 4.3 MG/DL — CRITICAL HIGH (ref 0.7–1.5)
EGFR: 16 ML/MIN/1.73M2 — LOW
EOSINOPHIL # BLD AUTO: 0.22 K/UL — SIGNIFICANT CHANGE UP (ref 0–0.7)
EOSINOPHIL NFR BLD AUTO: 4.1 % — SIGNIFICANT CHANGE UP (ref 0–8)
GLUCOSE BLDC GLUCOMTR-MCNC: 111 MG/DL — HIGH (ref 70–99)
GLUCOSE BLDC GLUCOMTR-MCNC: 127 MG/DL — HIGH (ref 70–99)
GLUCOSE BLDC GLUCOMTR-MCNC: 233 MG/DL — HIGH (ref 70–99)
GLUCOSE BLDC GLUCOMTR-MCNC: 234 MG/DL — HIGH (ref 70–99)
GLUCOSE SERPL-MCNC: 230 MG/DL — HIGH (ref 70–99)
HCT VFR BLD CALC: 27.9 % — LOW (ref 42–52)
HGB BLD-MCNC: 9.4 G/DL — LOW (ref 14–18)
IMM GRANULOCYTES NFR BLD AUTO: 0.4 % — HIGH (ref 0.1–0.3)
LYMPHOCYTES # BLD AUTO: 1.68 K/UL — SIGNIFICANT CHANGE UP (ref 1.2–3.4)
LYMPHOCYTES # BLD AUTO: 31.1 % — SIGNIFICANT CHANGE UP (ref 20.5–51.1)
MAGNESIUM SERPL-MCNC: 2 MG/DL — SIGNIFICANT CHANGE UP (ref 1.8–2.4)
MCHC RBC-ENTMCNC: 30.9 PG — SIGNIFICANT CHANGE UP (ref 27–31)
MCHC RBC-ENTMCNC: 33.7 G/DL — SIGNIFICANT CHANGE UP (ref 32–37)
MCV RBC AUTO: 91.8 FL — SIGNIFICANT CHANGE UP (ref 80–94)
MONOCYTES # BLD AUTO: 0.64 K/UL — HIGH (ref 0.1–0.6)
MONOCYTES NFR BLD AUTO: 11.8 % — HIGH (ref 1.7–9.3)
NEUTROPHILS # BLD AUTO: 2.82 K/UL — SIGNIFICANT CHANGE UP (ref 1.4–6.5)
NEUTROPHILS NFR BLD AUTO: 52 % — SIGNIFICANT CHANGE UP (ref 42.2–75.2)
NRBC # BLD: 0 /100 WBCS — SIGNIFICANT CHANGE UP (ref 0–0)
PLATELET # BLD AUTO: 190 K/UL — SIGNIFICANT CHANGE UP (ref 130–400)
PMV BLD: 11.8 FL — HIGH (ref 7.4–10.4)
POTASSIUM SERPL-MCNC: 4.6 MMOL/L — SIGNIFICANT CHANGE UP (ref 3.5–5)
POTASSIUM SERPL-SCNC: 4.6 MMOL/L — SIGNIFICANT CHANGE UP (ref 3.5–5)
PROT SERPL-MCNC: 5.2 G/DL — LOW (ref 6–8)
RBC # BLD: 3.04 M/UL — LOW (ref 4.7–6.1)
RBC # FLD: 12.4 % — SIGNIFICANT CHANGE UP (ref 11.5–14.5)
SODIUM SERPL-SCNC: 138 MMOL/L — SIGNIFICANT CHANGE UP (ref 135–146)
WBC # BLD: 5.41 K/UL — SIGNIFICANT CHANGE UP (ref 4.8–10.8)
WBC # FLD AUTO: 5.41 K/UL — SIGNIFICANT CHANGE UP (ref 4.8–10.8)

## 2023-07-23 PROCEDURE — 99232 SBSQ HOSP IP/OBS MODERATE 35: CPT

## 2023-07-23 PROCEDURE — 70543 MRI ORBT/FAC/NCK W/O &W/DYE: CPT | Mod: 26

## 2023-07-23 RX ORDER — INSULIN GLARGINE 100 [IU]/ML
25 INJECTION, SOLUTION SUBCUTANEOUS AT BEDTIME
Refills: 0 | Status: DISCONTINUED | OUTPATIENT
Start: 2023-07-23 | End: 2023-07-27

## 2023-07-23 RX ADMIN — Medication 5 UNIT(S): at 12:03

## 2023-07-23 RX ADMIN — ATORVASTATIN CALCIUM 20 MILLIGRAM(S): 80 TABLET, FILM COATED ORAL at 21:08

## 2023-07-23 RX ADMIN — POLYETHYLENE GLYCOL 3350 17 GRAM(S): 17 POWDER, FOR SOLUTION ORAL at 05:19

## 2023-07-23 RX ADMIN — Medication 650 MILLIGRAM(S): at 02:51

## 2023-07-23 RX ADMIN — Medication 25 MILLIGRAM(S): at 05:17

## 2023-07-23 RX ADMIN — PANTOPRAZOLE SODIUM 40 MILLIGRAM(S): 20 TABLET, DELAYED RELEASE ORAL at 05:16

## 2023-07-23 RX ADMIN — Medication 5 UNIT(S): at 17:11

## 2023-07-23 RX ADMIN — CARVEDILOL PHOSPHATE 25 MILLIGRAM(S): 80 CAPSULE, EXTENDED RELEASE ORAL at 17:11

## 2023-07-23 RX ADMIN — Medication 650 MILLIGRAM(S): at 05:17

## 2023-07-23 RX ADMIN — Medication 1 DROP(S): at 05:19

## 2023-07-23 RX ADMIN — Medication 5 UNIT(S): at 08:14

## 2023-07-23 RX ADMIN — AMLODIPINE BESYLATE 10 MILLIGRAM(S): 2.5 TABLET ORAL at 05:17

## 2023-07-23 RX ADMIN — INSULIN GLARGINE 25 UNIT(S): 100 INJECTION, SOLUTION SUBCUTANEOUS at 21:08

## 2023-07-23 RX ADMIN — Medication 81 MILLIGRAM(S): at 11:45

## 2023-07-23 RX ADMIN — HEPARIN SODIUM 5000 UNIT(S): 5000 INJECTION INTRAVENOUS; SUBCUTANEOUS at 14:08

## 2023-07-23 RX ADMIN — Medication 650 MILLIGRAM(S): at 03:51

## 2023-07-23 RX ADMIN — Medication 2: at 08:14

## 2023-07-23 RX ADMIN — Medication 1 DROP(S): at 17:54

## 2023-07-23 RX ADMIN — Medication 25 MILLIGRAM(S): at 21:08

## 2023-07-23 RX ADMIN — HEPARIN SODIUM 5000 UNIT(S): 5000 INJECTION INTRAVENOUS; SUBCUTANEOUS at 21:09

## 2023-07-23 RX ADMIN — CARVEDILOL PHOSPHATE 25 MILLIGRAM(S): 80 CAPSULE, EXTENDED RELEASE ORAL at 05:17

## 2023-07-23 RX ADMIN — Medication 25 MILLIGRAM(S): at 14:07

## 2023-07-23 RX ADMIN — HEPARIN SODIUM 5000 UNIT(S): 5000 INJECTION INTRAVENOUS; SUBCUTANEOUS at 05:19

## 2023-07-23 RX ADMIN — Medication 650 MILLIGRAM(S): at 14:07

## 2023-07-23 RX ADMIN — Medication 650 MILLIGRAM(S): at 21:08

## 2023-07-23 NOTE — PROGRESS NOTE ADULT - SUBJECTIVE AND OBJECTIVE BOX
Nephrology progress note    Patient was seen and examined, events over the last 24 h noted .    Allergies:  No Known Allergies    Hospital Medications:   MEDICATIONS  (STANDING):  amLODIPine   Tablet 10 milliGRAM(s) Oral daily  artificial tears (preservative free) Ophthalmic Solution 1 Drop(s) Right EYE two times a day  aspirin enteric coated 81 milliGRAM(s) Oral daily  atorvastatin 20 milliGRAM(s) Oral at bedtime  carvedilol 25 milliGRAM(s) Oral every 12 hours  dextrose 5%. 1000 milliLiter(s) (100 mL/Hr) IV Continuous <Continuous>  dextrose 5%. 1000 milliLiter(s) (50 mL/Hr) IV Continuous <Continuous>  dextrose 50% Injectable 25 Gram(s) IV Push once  dextrose 50% Injectable 12.5 Gram(s) IV Push once  dextrose 50% Injectable 25 Gram(s) IV Push once  glucagon  Injectable 1 milliGRAM(s) IntraMuscular once  heparin   Injectable 5000 Unit(s) SubCutaneous every 8 hours  hydrALAZINE 25 milliGRAM(s) Oral three times a day  insulin glargine Injectable (LANTUS) 21 Unit(s) SubCutaneous at bedtime  insulin lispro (ADMELOG) corrective regimen sliding scale   SubCutaneous three times a day before meals  insulin lispro Injectable (ADMELOG) 5 Unit(s) SubCutaneous three times a day before meals  meclizine 25 milliGRAM(s) Oral two times a day  pantoprazole    Tablet 40 milliGRAM(s) Oral before breakfast  polyethylene glycol 3350 17 Gram(s) Oral two times a day  senna 2 Tablet(s) Oral at bedtime  sodium bicarbonate 650 milliGRAM(s) Oral three times a day        VITALS:  T(F): 96.2 (07-23-23 @ 00:05), Max: 97.6 (07-22-23 @ 21:00)  HR: 76 (07-23-23 @ 00:05)  BP: 140/69 (07-23-23 @ 00:05)  RR: 18 (07-23-23 @ 00:05)  SpO2: 99% (07-23-23 @ 00:05)  Wt(kg): --    07-21 @ 07:01  -  07-22 @ 07:00  --------------------------------------------------------  IN: 1500 mL / OUT: 3580 mL / NET: -2080 mL    07-22 @ 07:01  -  07-23 @ 07:00  --------------------------------------------------------  IN: 800 mL / OUT: 1950 mL / NET: -1150 mL          PHYSICAL EXAM:  Constitutional: NAD  HEENT: anicteric sclera, oropharynx clear, MMM  Neck: No JVD  Respiratory: CTAB, no wheezes, rales or rhonchi  Cardiovascular: S1, S2, RRR  Gastrointestinal: BS+, soft, NT/ND  Extremities: No cyanosis or clubbing. No peripheral edema  :  No faith.   Skin: No rashes    LABS:  07-23    138  |  106  |  59<H>  ----------------------------<  230<H>  4.6   |  23  |  4.3<HH>    Ca    8.0<L>      23 Jul 2023 06:56  Mg     2.0     07-23    TPro  5.2<L>  /  Alb  3.2<L>  /  TBili  <0.2  /  DBili      /  AST  15  /  ALT  13  /  AlkPhos  127<H>  07-23                          9.4    5.41  )-----------( 190      ( 23 Jul 2023 06:56 )             27.9       Urine Studies:  Urinalysis Basic - ( 23 Jul 2023 06:56 )    Color:  / Appearance:  / SG:  / pH:   Gluc: 230 mg/dL / Ketone:   / Bili:  / Urobili:    Blood:  / Protein:  / Nitrite:    Leuk Esterase:  / RBC:  / WBC    Sq Epi:  / Non Sq Epi:  / Bacteria:         RADIOLOGY & ADDITIONAL STUDIES:

## 2023-07-23 NOTE — PROGRESS NOTE ADULT - ASSESSMENT
49 YO male with PMHx of CKD IV baseline around 3.2, vertigo, diverticulitis s/p LAR, cigarette use, ETOH abuse now sober x5 years, IDDM, HTN, AMBER, Hx of multiple CVA with residual weakness on right side presents for chest pain along with right eye pain with photosensitivity. MR brain showed new lesion in his right frontal lobe with rim-like restricting diffusion.       Assessment and plan      BILLY on CKD 4/ Right frontal lobe lesion/ HTN/ DM, timing consistent w/ BRENDEN  responding to diuretics   follow UOP   can proceed with MRI if urgently needed   follow BMP UOP  no need for RRT now, cr improving    will follow .

## 2023-07-23 NOTE — PROGRESS NOTE ADULT - SUBJECTIVE AND OBJECTIVE BOX
Patient is a 48y old  Male who presents with a chief complaint of possible stroke and STEMI (19 Jul 2023 15:55)    Patient was seen and examined.  Right eye pain improving  no new complaints    PAST MEDICAL & SURGICAL HISTORY:  Asthma  Diverticulitis, surgery 14yrs age  Dyslipidemia  Hypertension  H/O vertigo  Diabetic ulcer of right foot  Broken toe, left pinky toe  Diabetes  AMBER on CPAP  History of TIAs    History of surgery  colon resection    Allergies   No Known Allergies    MEDICATIONS  (STANDING):  amLODIPine   Tablet 10 milliGRAM(s) Oral daily  artificial tears (preservative free) Ophthalmic Solution 1 Drop(s) Right EYE two times a day  aspirin enteric coated 81 milliGRAM(s) Oral daily  atorvastatin 20 milliGRAM(s) Oral at bedtime  carvedilol 25 milliGRAM(s) Oral every 12 hours  glucagon  Injectable 1 milliGRAM(s) IntraMuscular once  heparin   Injectable 5000 Unit(s) SubCutaneous every 8 hours  hydrALAZINE 25 milliGRAM(s) Oral three times a day  insulin glargine Injectable (LANTUS) 21 Unit(s) SubCutaneous at bedtime  insulin lispro (ADMELOG) corrective regimen sliding scale   SubCutaneous three times a day before meals  insulin lispro Injectable (ADMELOG) 5 Unit(s) SubCutaneous three times a day before meals  meclizine 25 milliGRAM(s) Oral two times a day  pantoprazole    Tablet 40 milliGRAM(s) Oral before breakfast  polyethylene glycol 3350 17 Gram(s) Oral two times a day  senna 2 Tablet(s) Oral at bedtime  sodium bicarbonate 650 milliGRAM(s) Oral three times a day    MEDICATIONS  (PRN):  acetaminophen     Tablet .. 650 milliGRAM(s) Oral every 6 hours PRN Moderate Pain (4 - 6)  dextrose Oral Gel 15 Gram(s) Oral once PRN Blood Glucose LESS THAN 70 milliGRAM(s)/deciliter    T(C): 35.7 (07-23-23 @ 00:05), Max: 36.4 (07-22-23 @ 21:00)  HR: 76 (07-23-23 @ 00:05) (70 - 76)  BP: 140/69 (07-23-23 @ 00:05) (134/76 - 144/68)  RR: 18 (07-23-23 @ 00:05) (18 - 18)  SpO2: 99% (07-23-23 @ 00:05) (98% - 99%)    O/E:  Awake, alert, not in distress.  HEENT: atraumatic   Chest: decreased breath sounds at bases  CVS: SIS2 +, no murmur.  P/A: Soft, BS+  CNS: awake, alert,   Ext: no edema feet  Skin: no rash, no ulcers.  All systems reviewed positive findings as above.                           9.4<L>  5.41  )-----------( 190      ( 23 Jul 2023 06:56 )             27.9<L>                        10.2<L>  5.98  )-----------( 202      ( 22 Jul 2023 06:57 )             29.9<L>  07-23    138  |  106  |  59<H>  ----------------------------<  230<H>  4.6   |  23  |  4.3<HH>  07-22    139  |  105  |  64<HH>  ----------------------------<  223<H>  4.7   |  22  |  5.4<HH>    Ca    8.0<L>      23 Jul 2023 06:56  Ca    8.3<L>      22 Jul 2023 06:57  Mg     2.0     07-23    TPro  5.2<L>  /  Alb  3.2<L>  /  TBili  <0.2  /  DBili  x   /  AST  15  /  ALT  13  /  AlkPhos  127<H>  07-23  TPro  5.7<L>  /  Alb  3.3<L>  /  TBili  <0.2  /  DBili  x   /  AST  16  /  ALT  13  /  AlkPhos  125<H>  07-22

## 2023-07-23 NOTE — PROGRESS NOTE ADULT - ASSESSMENT
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   He was in the outpatient clinic for pre-test for circumcision, his EKG suggested STEMI so EMS was called and on the way to the hospital he developed chest pain of left side radiating to the back, characterized by tightness then burning sensation through the whole chest. This pain lasted for 1 hour and resolved, he received aspirin loading on the way here. Pt follows at [facility] and underwent cardiac catheterization which showed non-obstructive CAD.  He is also complaining from right eye pain with double vision and blurring of vision that started 3 weeks ago, he was admitted to another hospital and they discharged him after doing workup for his eye and ophthalmology saw him and they said it is mostly related to the uncontrolled diabetes. His right eye pain has been constant for the past 3 weeks radiating to the front head causing severe headaches, it is associated with photosensitivity, no tearing, no redness. He has no weakness, no numbness.     # Acute diastolic  CHF-resolved  # Atypical  chest pain  probably musculoskeletal - resolved   # Elevated troponin sec to ckd  # H/o non obstructive CAD  # Hypertension  -Troponin T, Serum: 0.10: Critical value: ng/mL (07.18.23 @ 08:28)   - 12 Lead ECG (07.17.23 @ 17:57) >Normal sinus rhythm  - c/w ASA, coreg, statin  - c/w norvasc, hydralazine  - evaluated by cardiology  -  ILR interrogated - no events  -  TTE Echo Complete w/o Contrast w/ Doppler (07.21.23 @ 10:59) >V Ejection Fraction by Lakhani's Method with a biplane EF of 67 %. Normal global left ventricular systolic function.mild pulmonary hypertension.  -  Xray Chest 1 View- PORTABLE-Urgent (Xray Chest 1 View- PORTABLE-Urgent .) (07.21.23 @ 15:28) >Cardiomegaly with CHF. Slightly worsened.  - IV lasix 80mg  x1 on 7/21  - monitor I/o , restrict fluids    # Right eye pain with blurring of vision sec to diabetic retinopathy  # Right 6th nerve palsy  # H/o multiple CVA   - MR Head No Cont (07.18.23 @ 22:59) > New 1 cm lesion within the right frontal lobe periventricular white matter demonstrating rim-like restricted diffusion.  New 8 mm lesion within the anterior right temporal lobe. Additional new small lesion within the right cerebellar hemisphere, the configuration suggests a chronic infarct.Otherwise stable patchy white matter disease and scattered chronic   lacunar infarcts.  - CT PERFUSION: No evidence of perfusion abnormality.  - CTA HEAD: Mild stenosis of the proximal right V4 segment of the vertebral artery.  - CTA NECK: No evidence of carotid or vertebral artery stenosis.  - Sedimentation Rate, Erythrocyte: 66 mm/Hr (07.18.23 @ 11:15)  - C-Reactive Protein, Serum: <3.0 mg/L (07.18.23 @ 11:15)  - neuro eval: MR brain with contrast to better characterize the lesions  - F/u  HIV, WES, dsDNA, Angiotensin converting enzyme, NMO, MOG, Vitamin B12, Lyme  - pt was evaluated by rheumatology  - Ophthal eval:  Right Cranial Nerve 6 Palsy - likely contributing to eye pain - likely 2/2 ischemic complications of diabetes , Type 2 Diabetes Mellitus with Severe NPDR OU ,  Very low suspicion for GCA at this time: no jaw claudication, temporal/scalp tenderness.  - plan for baseline HVF testing at out patient follow up  - Pt to follow up at [facility] eye clinic (or eye care provider of pts choice) within 4 weeks of discharge. Discussed benefit of retinal specialist given level of retinopathy seen during exam today   - Use sunglasses to limit light prn   - c/w  artifical tears 2x/day OU   - MR Head w/wo IV Cont (07.20.23 @ 19:05) >The 1 cm lesion within the right frontal lobe periventricular white matter demonstrating rim-like restricted diffusion demonstrates no  contrast enhancement. The 8 mm lesion within the anterior right temporal lobe demonstrates central enhancement.  The 1 cm linear lesion within the right cerebellar hemisphere also demonstrates central enhancement.  Diagnostic considerations for the above lesions include infarcts or demyelination (of varying ages) as well as infectious/inflammatory and  neoplastic etiologies. Consider CSF sampling as well as follow up imaging.Otherwise stable patchy white matter disease and scattered chronic  lacunar infarcts.  - neuro F/u MR orbit w/wo contrastMight need LP with CSF studies after MRI. Patient reports having LP done at [facility]. Recommend to get records    # Acute kidney injury on  CKD stage 4, probably sec to BRENDEN,  creatinine downtrending  # Hyperkalemia - resolved  # Metabolic acidosis- resolving  - S/P  insulin and albuterol, lokelma  - dced  spironolactone   - c/w sodium bicarb  - evaluated by  nephrology   - monitor BMP, monitor UO    # DM type2 with hyperglycemia  - A1C with Estimated Average Glucose Result: 10.8 (07.17.23 @ 15:45)  - increase  lantus, c/w  lispro    # H/o  vertigo  - c/w  meclizine     # Nicotine abuse  - pt counseled    # DVT prophylaxis    # Full code    # Pending: monitor BMP, MR orbit w/wo contrast. Might need LP with CSF studies after MRI. Patient reports having LP done at [facility]- obtain records  # Discussed plan of care with patient  # Disposition: home when stable

## 2023-07-24 PROBLEM — Z86.73 PERSONAL HISTORY OF TRANSIENT ISCHEMIC ATTACK (TIA), AND CEREBRAL INFARCTION WITHOUT RESIDUAL DEFICITS: Chronic | Status: ACTIVE | Noted: 2023-07-17

## 2023-07-24 PROBLEM — G47.33 OBSTRUCTIVE SLEEP APNEA (ADULT) (PEDIATRIC): Chronic | Status: ACTIVE | Noted: 2023-07-17

## 2023-07-24 LAB
ALBUMIN SERPL ELPH-MCNC: 3.6 G/DL — SIGNIFICANT CHANGE UP (ref 3.5–5.2)
ALP SERPL-CCNC: 136 U/L — HIGH (ref 30–115)
ALT FLD-CCNC: 20 U/L — SIGNIFICANT CHANGE UP (ref 0–41)
ANION GAP SERPL CALC-SCNC: 10 MMOL/L — SIGNIFICANT CHANGE UP (ref 7–14)
AST SERPL-CCNC: 20 U/L — SIGNIFICANT CHANGE UP (ref 0–41)
BASOPHILS # BLD AUTO: 0.04 K/UL — SIGNIFICANT CHANGE UP (ref 0–0.2)
BASOPHILS NFR BLD AUTO: 0.6 % — SIGNIFICANT CHANGE UP (ref 0–1)
BILIRUB SERPL-MCNC: <0.2 MG/DL — SIGNIFICANT CHANGE UP (ref 0.2–1.2)
BUN SERPL-MCNC: 53 MG/DL — HIGH (ref 10–20)
CALCIUM SERPL-MCNC: 8.8 MG/DL — SIGNIFICANT CHANGE UP (ref 8.4–10.5)
CHLORIDE SERPL-SCNC: 106 MMOL/L — SIGNIFICANT CHANGE UP (ref 98–110)
CO2 SERPL-SCNC: 24 MMOL/L — SIGNIFICANT CHANGE UP (ref 17–32)
CREAT SERPL-MCNC: 3.8 MG/DL — HIGH (ref 0.7–1.5)
EGFR: 18 ML/MIN/1.73M2 — LOW
EOSINOPHIL # BLD AUTO: 0.29 K/UL — SIGNIFICANT CHANGE UP (ref 0–0.7)
EOSINOPHIL NFR BLD AUTO: 4.2 % — SIGNIFICANT CHANGE UP (ref 0–8)
GLUCOSE BLDC GLUCOMTR-MCNC: 160 MG/DL — HIGH (ref 70–99)
GLUCOSE BLDC GLUCOMTR-MCNC: 189 MG/DL — HIGH (ref 70–99)
GLUCOSE BLDC GLUCOMTR-MCNC: 196 MG/DL — HIGH (ref 70–99)
GLUCOSE BLDC GLUCOMTR-MCNC: 209 MG/DL — HIGH (ref 70–99)
GLUCOSE SERPL-MCNC: 191 MG/DL — HIGH (ref 70–99)
HCT VFR BLD CALC: 31.3 % — LOW (ref 42–52)
HGB BLD-MCNC: 10.7 G/DL — LOW (ref 14–18)
IMM GRANULOCYTES NFR BLD AUTO: 0.4 % — HIGH (ref 0.1–0.3)
LYMPHOCYTES # BLD AUTO: 2.26 K/UL — SIGNIFICANT CHANGE UP (ref 1.2–3.4)
LYMPHOCYTES # BLD AUTO: 32.9 % — SIGNIFICANT CHANGE UP (ref 20.5–51.1)
MAGNESIUM SERPL-MCNC: 2 MG/DL — SIGNIFICANT CHANGE UP (ref 1.8–2.4)
MCHC RBC-ENTMCNC: 31.7 PG — HIGH (ref 27–31)
MCHC RBC-ENTMCNC: 34.2 G/DL — SIGNIFICANT CHANGE UP (ref 32–37)
MCV RBC AUTO: 92.6 FL — SIGNIFICANT CHANGE UP (ref 80–94)
MONOCYTES # BLD AUTO: 0.69 K/UL — HIGH (ref 0.1–0.6)
MONOCYTES NFR BLD AUTO: 10.1 % — HIGH (ref 1.7–9.3)
NEUTROPHILS # BLD AUTO: 3.55 K/UL — SIGNIFICANT CHANGE UP (ref 1.4–6.5)
NEUTROPHILS NFR BLD AUTO: 51.8 % — SIGNIFICANT CHANGE UP (ref 42.2–75.2)
NRBC # BLD: 0 /100 WBCS — SIGNIFICANT CHANGE UP (ref 0–0)
PLATELET # BLD AUTO: 209 K/UL — SIGNIFICANT CHANGE UP (ref 130–400)
PMV BLD: 11.8 FL — HIGH (ref 7.4–10.4)
POTASSIUM SERPL-MCNC: 4.8 MMOL/L — SIGNIFICANT CHANGE UP (ref 3.5–5)
POTASSIUM SERPL-SCNC: 4.8 MMOL/L — SIGNIFICANT CHANGE UP (ref 3.5–5)
PROT SERPL-MCNC: 6.1 G/DL — SIGNIFICANT CHANGE UP (ref 6–8)
RBC # BLD: 3.38 M/UL — LOW (ref 4.7–6.1)
RBC # FLD: 12.6 % — SIGNIFICANT CHANGE UP (ref 11.5–14.5)
SODIUM SERPL-SCNC: 140 MMOL/L — SIGNIFICANT CHANGE UP (ref 135–146)
WBC # BLD: 6.86 K/UL — SIGNIFICANT CHANGE UP (ref 4.8–10.8)
WBC # FLD AUTO: 6.86 K/UL — SIGNIFICANT CHANGE UP (ref 4.8–10.8)

## 2023-07-24 PROCEDURE — 99232 SBSQ HOSP IP/OBS MODERATE 35: CPT

## 2023-07-24 RX ADMIN — Medication 650 MILLIGRAM(S): at 14:10

## 2023-07-24 RX ADMIN — Medication 5 UNIT(S): at 08:04

## 2023-07-24 RX ADMIN — PANTOPRAZOLE SODIUM 40 MILLIGRAM(S): 20 TABLET, DELAYED RELEASE ORAL at 05:05

## 2023-07-24 RX ADMIN — CARVEDILOL PHOSPHATE 25 MILLIGRAM(S): 80 CAPSULE, EXTENDED RELEASE ORAL at 05:04

## 2023-07-24 RX ADMIN — Medication 650 MILLIGRAM(S): at 21:01

## 2023-07-24 RX ADMIN — Medication 1 DROP(S): at 17:11

## 2023-07-24 RX ADMIN — Medication 25 MILLIGRAM(S): at 21:01

## 2023-07-24 RX ADMIN — Medication 650 MILLIGRAM(S): at 03:01

## 2023-07-24 RX ADMIN — AMLODIPINE BESYLATE 10 MILLIGRAM(S): 2.5 TABLET ORAL at 05:04

## 2023-07-24 RX ADMIN — Medication 81 MILLIGRAM(S): at 12:49

## 2023-07-24 RX ADMIN — Medication 650 MILLIGRAM(S): at 04:01

## 2023-07-24 RX ADMIN — HEPARIN SODIUM 5000 UNIT(S): 5000 INJECTION INTRAVENOUS; SUBCUTANEOUS at 14:10

## 2023-07-24 RX ADMIN — Medication 25 MILLIGRAM(S): at 14:10

## 2023-07-24 RX ADMIN — Medication 25 MILLIGRAM(S): at 05:04

## 2023-07-24 RX ADMIN — Medication 1 DROP(S): at 05:04

## 2023-07-24 RX ADMIN — Medication 1: at 08:04

## 2023-07-24 RX ADMIN — Medication 2: at 17:10

## 2023-07-24 RX ADMIN — Medication 1: at 11:24

## 2023-07-24 RX ADMIN — ATORVASTATIN CALCIUM 20 MILLIGRAM(S): 80 TABLET, FILM COATED ORAL at 21:01

## 2023-07-24 RX ADMIN — HEPARIN SODIUM 5000 UNIT(S): 5000 INJECTION INTRAVENOUS; SUBCUTANEOUS at 21:01

## 2023-07-24 RX ADMIN — CARVEDILOL PHOSPHATE 25 MILLIGRAM(S): 80 CAPSULE, EXTENDED RELEASE ORAL at 17:11

## 2023-07-24 RX ADMIN — Medication 5 UNIT(S): at 11:24

## 2023-07-24 RX ADMIN — INSULIN GLARGINE 25 UNIT(S): 100 INJECTION, SOLUTION SUBCUTANEOUS at 21:01

## 2023-07-24 RX ADMIN — Medication 5 UNIT(S): at 17:10

## 2023-07-24 RX ADMIN — HEPARIN SODIUM 5000 UNIT(S): 5000 INJECTION INTRAVENOUS; SUBCUTANEOUS at 05:05

## 2023-07-24 RX ADMIN — Medication 650 MILLIGRAM(S): at 05:17

## 2023-07-24 NOTE — PROGRESS NOTE ADULT - ASSESSMENT
47 YO male presented  in the outpatient clinic for pre-test for circumcision, his EKG suggested STEMI so EMS was called and on the way to the hospital he developed chest pain of left side radiating to the back, characterized by tightness then burning sensation through the whole chest. This pain lasted for 1 hour and resolved, he received aspirin loading on the way here. Pt follows at [facility] and underwent cardiac catheterization which showed non-obstructive CAD.  He is also complaining from right eye pain with double vision and blurring of vision that started 3 weeks ago, he was admitted to another hospital and they discharged him after doing workup for his eye and ophthalmology saw him and they said it is mostly related to the uncontrolled diabetes. His right eye pain has been constant for the past 3 weeks radiating to the front head causing severe headaches, it is associated with photosensitivity, no tearing, no redness. He has no weakness, no numbness.       PMHx :  Hx of multiple CVA with residual weakness on right side.   CKD IV baseline around 3.2  vertigo   diverticulitis s/p LAR  cigarette use, ETOH abuse now sober x5 years  IDDM  HTN  AMBER    # Acute diastolic  CHF-resolved  # Atypical  chest pain  probably musculoskeletal - resolved   # Elevated troponin sec to ckd  # H/o non obstructive CAD  # Hypertension  -Troponin T, Serum: 0.11: Critical value: ng/mL (07.18.23 @ 08:28)   - 12 Lead ECG (07.17.23 @ 17:57) >Normal sinus rhythm  Plan:  - c/w ASA, coreg, statin  - c/w norvasc, hydralazine  - evaluated by cardiology  -  ILR interrogated - no events  -  TTE Echo Complete w/o Contrast w/ Doppler (07.21.23 @ 10:59)  EF of 67 %. .mild pulmonary hypertension.  -  Xray Chest 1 View- PORTABLE-Urgent (Xray Chest 1 View- PORTABLE-Urgent .) (07.21.23 @ 15:28) >Cardiomegaly with CHF. Slightly worsened.  - IV lasix 80mg  x1 on 7/21  - monitor I/o , restrict fluids    # Right eye pain with blurring of vision sec to diabetic retinopathy  # Right 6th nerve palsy  # H/o multiple CVA   - MR Head No Cont (07.18.23 @ 22:59) > New 1 cm lesion within the right frontal lobe periventricular white matter demonstrating rim-like restricted diffusion.  New 8 mm lesion within the anterior right temporal lobe. Additional new small lesion within the right cerebellar hemisphere, the configuration suggests a chronic infarct.Otherwise stable patchy white matter disease and scattered chronic   lacunar infarcts.  - CT PERFUSION: No evidence of perfusion abnormality.  - CTA HEAD: Mild stenosis of the proximal right V4 segment of the vertebral artery.  - CTA NECK: No evidence of carotid or vertebral artery stenosis.  - Sedimentation Rate, Erythrocyte: 66 mm/Hr (07.18.23 @ 11:15)  - C-Reactive Protein, Serum: <3.0 mg/L (07.18.23 @ 11:15)  - neuro eval: MR brain with contrast to better characterize the lesions  - F/u  HIV, WES, dsDNA and Lyme   all Negative   -Angiotensin converting enzyme, NMO, MOG, Vitamin B12 followed.  - pt was evaluated by rheumatology  - Ophthal eval:  Right Cranial Nerve 6 Palsy - likely contributing to eye pain   - likely 2/2 ischemic complications of diabetes ,  Very low suspicion for GCA at this time: no jaw claudication, temporal/scalp tenderness.  - plan for baseline HVF testing at out patient follow up  - Pt to follow up at [facility] eye clinic (or eye care provider of pts choice) within 4 weeks of discharge. Discussed benefit of retinal specialist given level of retinopathy seen during exam today   - Use sunglasses to limit light prn   - c/w  artifical tears 2x/day OU   - MR Head w/wo IV Cont (07.20.23 @ 19:05)   - neuro F/u MR orbit w/wo contrast came back normal on 7/23 .  - Patient reports having LP done at [facility]. Recommend to get records.    # Acute kidney injury on  CKD stage 4, probably sec to BRENDEN,  creatinine downtrending  # Hyperkalemia - resolved  # Metabolic acidosis- resolving  - S/P  insulin and albuterol, lokelma  - c/w sodium bicarb  - evaluated by  nephrology   - monitor BMP, monitor UO    # DM type2 with hyperglycemia  - A1C with Estimated Average Glucose Result: 10.8 (07.17.23 @ 15:45)  - increase  lantus, c/w  lispro    # H/o  vertigo  - c/w  meclizine     # Nicotine abuse  - pt counseled      MISC:  # DVT prophylaxis: heparin 5000 IU Subcutaneous every 8 hours .  #Diet:Regular  # Full code    # Pending: monitor BMP,  LP done at [facility]- obtain records  # Discussed plan of care with patient  # Disposition: home when stable        47 YO male presented  in the outpatient clinic for pre-test for circumcision, his EKG suggested STEMI so EMS was called and on the way to the hospital he developed chest pain of left side radiating to the back, characterized by tightness then burning sensation through the whole chest. This pain lasted for 1 hour and resolved, he received aspirin loading on the way here. Pt follows at [facility] and underwent cardiac catheterization which showed non-obstructive CAD.  He is also complaining from right eye pain with double vision and blurring of vision that started 3 weeks ago, he was admitted to another hospital and they discharged him after doing workup for his eye and ophthalmology saw him and they said it is mostly related to the uncontrolled diabetes. His right eye pain has been constant for the past 3 weeks radiating to the front head causing severe headaches, it is associated with photosensitivity, no tearing, no redness. He has no weakness, no numbness.       PMHx :  Hx of multiple CVA with residual weakness on right side.   CKD IV baseline around 3.2  vertigo   diverticulitis s/p LAR  cigarette use, ETOH abuse now sober x5 years  IDDM  HTN  AMEBR    # Acute diastolic  CHF-resolved  # Atypical  chest pain  probably musculoskeletal - resolved   # Elevated troponin sec to ckd  # H/o non obstructive CAD  # Hypertension  -Troponin T, Serum: 0.11: Critical value: ng/mL (07.18.23 @ 08:28)   - 12 Lead ECG (07.17.23 @ 17:57) >Normal sinus rhythm  Plan:  - c/w ASA, coreg, statin  - c/w norvasc, hydralazine  - evaluated by cardiology  -  ILR interrogated - no events  -  TTE Echo Complete w/o Contrast w/ Doppler (07.21.23 @ 10:59)  EF of 67 %. .mild pulmonary hypertension.  -  Xray Chest 1 View- PORTABLE-Urgent (Xray Chest 1 View- PORTABLE-Urgent .) (07.21.23 @ 15:28) >Cardiomegaly with CHF. Slightly worsened.  - IV lasix 80mg  x1 on 7/21  - monitor I/o , restrict fluids    # Right eye pain with blurring of vision sec to diabetic retinopathy  # Right 6th nerve palsy  # H/o multiple CVA   - MR Head No Cont (07.18.23 @ 22:59) > New 1 cm lesion within the right frontal lobe periventricular white matter demonstrating rim-like restricted diffusion.  New 8 mm lesion within the anterior right temporal lobe. Additional new small lesion within the right cerebellar hemisphere, the configuration suggests a chronic infarct.Otherwise stable patchy white matter disease and scattered chronic   lacunar infarcts.  - CT PERFUSION: No evidence of perfusion abnormality.  - CTA HEAD: Mild stenosis of the proximal right V4 segment of the vertebral artery.  - CTA NECK: No evidence of carotid or vertebral artery stenosis.  - Sedimentation Rate, Erythrocyte: 66 mm/Hr (07.18.23 @ 11:15)  - C-Reactive Protein, Serum: <3.0 mg/L (07.18.23 @ 11:15)  - neuro eval: MR brain with contrast to better characterize the lesions  - F/u  HIV, WES, dsDNA and Lyme   all Negative   -Angiotensin converting enzyme, NMO, MOG, Vitamin B12 followed.  - pt was evaluated by rheumatology  - Ophthal eval:  Right Cranial Nerve 6 Palsy - likely contributing to eye pain   - likely 2/2 ischemic complications of diabetes ,  Very low suspicion for GCA at this time: no jaw claudication, temporal/scalp tenderness.  - plan for baseline HVF testing at out patient follow up  - Pt to follow up at [facility] eye clinic (or eye care provider of pts choice) within 4 weeks of discharge. Discussed benefit of retinal specialist given level of retinopathy seen during exam today   - Use sunglasses to limit light prn   - c/w  artifical tears 2x/day OU   - MR Head w/wo IV Cont (07.20.23 @ 19:05)   - neuro F/u MR orbit w/wo contrast came back normal on 7/23 .  - Patient reports having LP done at [facility]. Recommend to get records.    # Acute kidney injury on  CKD stage 4, probably sec to BRENDEN,  creatinine downtrending  # Hyperkalemia - resolved  # Metabolic acidosis- resolving  - S/P  insulin and albuterol, lokelma  - c/w sodium bicarb  - evaluated by  nephrology   - monitor BMP, monitor UO    # DM type2 with hyperglycemia  - A1C with Estimated Average Glucose Result: 10.8 (07.17.23 @ 15:45)  - increase  lantus, c/w  lispro    # H/o  vertigo  - c/w  meclizine     # Nicotine abuse  - pt counseled      MISC:  # DVT prophylaxis: heparin 5000 IU Subcutaneous every 8 hours .  #Diet:Regular  # Full code    # Pending: sent FAX for request of  LP report  done at [facility] @ fax number    # Discussed plan of care with patient  # Disposition: home when stable        49 YO male presented  in the outpatient clinic for pre-test for circumcision where  his EKG suggested STEMI so EMS was called and on the way to the hospital he developed chest pain of left side radiating to the back, characterized by tightness then burning sensation through the whole chest. This pain lasted for 1 hour and resolved, he received aspirin loading on the way here.   patient  [facility]  catherization showed no obstruction   He is also complaining from constant  right eye pain with double vision and blurring of vision that started 3 weeks ago,related to uncontrolled DM.     PMHx :  Hx of multiple CVA with residual weakness on right side.   CKD IV baseline around 3.2  diverticulitis s/p LAR  cigarette use, ETOH abuse now sober x5 years  IDDM  HTN  AMBER  Vertigo     # Acute diastolic  CHF-resolved  -Atypical  chest pain  probably musculoskeletal  - Elevated troponin 2/2 to ckd  # H/o non obstructive CAD  # Hypertension  -Troponin T, Serum: 0.11on 07.18.23 @ 08:28  - 12 Lead ECG 07.17.23 @ 17:57 isNormal  -  ILR interrogated - no events  -  TTE Echo Complete w/o Contrast w/ Doppler (07.21.23 @ 10:59)  EF of 67 %. .mild pulmonary hypertension.  -  Xray Chest 1 View- PORTABLE-Urgent (Xray Chest 1 View- PORTABLE-Urgent .) (07.21.23 @ 15:28) >Cardiomegaly with CHF. Slightly worsened. IV lasix    was given on 7/21 .  Plan:  - c/w ASA, coreg, statin  - c/w norvasc, hydralazine  - cardiology on board   -monitor for fluid overload.    # Right eye pain with blurring of vision 2/2 to diabetic retinopathy  # Right 6th nerve palsy  # H/o multiple CVA   - MR Head No Cont (07.18.23 @ 22:59) > New 1 cm lesion within the right frontal lobe periventricular white matter demonstrating rim-like restricted diffusion.  New 8 mm lesion within the anterior right temporal lobe. Additional new small lesion within the right cerebellar hemisphere, the configuration suggests a chronic infarct.Otherwise stable patchy white matter disease and scattered chronic   lacunar infarcts.  - CT PERFUSION: No evidence of perfusion abnormality.  - CTA HEAD: Mild stenosis of the proximal right V4 segment of the vertebral artery.  - CTA NECK: No evidence of carotid or vertebral artery stenosis.  - Sedimentation Rate, Erythrocyte: 66 mm/Hr (07.18.23 @ 11:15)  - C-Reactive Protein, Serum: <3.0 mg/L (07.18.23 @ 11:15)  -  HIV, WES, dsDNA and Lyme   all Negative   -Angiotensin converting enzyme, NMO, MOG, Vitamin B12 followed.  - Ophthalmology on board  - HVF testing at out patient follow up  - follow up at [facility] eye clinic (or eye care provider of pts choice) within 4 weeks of discharge.   - MR Head w/wo IV Cont (07.20.23 @ 19:05)   - neuro F/u MR orbit w/wo contrast came back normal on 7/23 .  - Patient reports having LP done at [facility]. Faxed [facility] the request for LP report on 7/24.    # Acute kidney injury on  CKD stage 4,  # Hyperkalemia - resolved  - nephrology on Board   -monitor Urine output.  -Repeat  BMP    # DM type2 with hyperglycemia  - HbA1c is 10.8  - increased  lantus, c/w  lispro    # H/o  vertigo  - On  meclizine           MISC:  # DVT prophylaxis: heparin 5000 IU Subcutaneous every 8 hours .  #Diet:Regular  # Full code  # Disposition: home when stable      # Pending: sent FAX for request of  LP report  done at [facility] @ fax number

## 2023-07-24 NOTE — PROGRESS NOTE ADULT - SUBJECTIVE AND OBJECTIVE BOX
NEUROLOGY CONSULT PROGRESS NOTE    INTERVAL HISTORY:    Patient endorses R eye pain has reduced from a 10/10 on admission to 7/10 at present, worse when laying down at night (sleeps on L side), still painful with rightwards abduction.      REVIEW OF SYSTEMS:  As per HPI, otherwise negative for Constitutional, Eyes, Ears/Nose/Mouth/Throat, Neck, Cardiovascular, Respiratory, Gastrointestinal, Genitourinary, Skin, Endocrine, Musculoskeletal, Psychiatric, and Hematologic/Lymphatic.    MEDICATIONS:  acetaminophen     Tablet .. 650 milliGRAM(s) Oral every 6 hours PRN  amLODIPine   Tablet 10 milliGRAM(s) Oral daily  artificial tears (preservative free) Ophthalmic Solution 1 Drop(s) Right EYE two times a day  aspirin enteric coated 81 milliGRAM(s) Oral daily  atorvastatin 20 milliGRAM(s) Oral at bedtime  carvedilol 25 milliGRAM(s) Oral every 12 hours  dextrose 5%. 1000 milliLiter(s) IV Continuous <Continuous>  dextrose 5%. 1000 milliLiter(s) IV Continuous <Continuous>  dextrose 50% Injectable 25 Gram(s) IV Push once  dextrose 50% Injectable 12.5 Gram(s) IV Push once  dextrose 50% Injectable 25 Gram(s) IV Push once  dextrose Oral Gel 15 Gram(s) Oral once PRN  glucagon  Injectable 1 milliGRAM(s) IntraMuscular once  heparin   Injectable 5000 Unit(s) SubCutaneous every 8 hours  hydrALAZINE 25 milliGRAM(s) Oral three times a day  insulin glargine Injectable (LANTUS) 25 Unit(s) SubCutaneous at bedtime  insulin lispro (ADMELOG) corrective regimen sliding scale   SubCutaneous three times a day before meals  insulin lispro Injectable (ADMELOG) 5 Unit(s) SubCutaneous three times a day before meals  meclizine 25 milliGRAM(s) Oral two times a day  pantoprazole    Tablet 40 milliGRAM(s) Oral before breakfast  polyethylene glycol 3350 17 Gram(s) Oral two times a day  senna 2 Tablet(s) Oral at bedtime  sodium bicarbonate 650 milliGRAM(s) Oral three times a day    VITAL SIGNS:  Vital Signs Last 24 Hrs  T(C): 36.1 (24 Jul 2023 08:20), Max: 36.4 (23 Jul 2023 21:00)  T(F): 97 (24 Jul 2023 08:20), Max: 97.6 (23 Jul 2023 21:00)  HR: 78 (24 Jul 2023 14:00) (72 - 78)  BP: 144/70 (24 Jul 2023 14:00) (120/60 - 156/80)  BP(mean): --  RR: 18 (24 Jul 2023 08:20) (18 - 18)  SpO2: 96% (24 Jul 2023 08:20) (96% - 97%)    Parameters below as of 24 Jul 2023 08:20  Patient On (Oxygen Delivery Method): room air        PHYSICAL EXAMINATION:  Constitutional: WDWN; NAD  Eyes: anicteric sclera  Cardiovascular: +S1/S2, RRR; no M/R/G  Neurologic:  - Mental Status:  AAOx3; speech is fluent with intact naming, repetition, and comprehension  - Cranial Nerves II-XII:    II:  PERRLA; visual fields are full to confrontation  III, IV, VI:  abduction to the R limited by pain, no nystagmus  V:  facial sensation is intact in the V1-V3 distribution bilaterally.  VII:  face is symmetric with normal eye closure and smile  VIII:  hearing is intact to finger rub  IX, X:  uvula is midline and soft palate rises symmetrically  XI:  head turning and shoulder shrug are intact bilaterally  XII:  tongue protrudes in the midline  - Motor:  strength is 5/5 throughout; normal muscle bulk and tone throughout; no pronator drift  - Reflexes:  2+ and symmetric at the biceps, triceps, brachioradialis, knees, and ankles;  plantar reflexes downgoing bilaterally  - Sensory:  intact to light touch, pin prick, vibration, and joint-position sense throughout  - Coordination:  finger-nose-finger and heel-knee-shin intact without dysmetria; rapid alternating hand movements intact  - Gait:  normal steps, base, arm swing, and turning; heel and toe walking are normal; tandem gait is normal; Romberg testing is negative    LABS:                          10.7   6.86  )-----------( 209      ( 24 Jul 2023 07:34 )             31.3     07-24    140  |  106  |  53<H>  ----------------------------<  191<H>  4.8   |  24  |  3.8<H>    Ca    8.8      24 Jul 2023 07:34  Mg     2.0     07-24    TPro  6.1  /  Alb  3.6  /  TBili  <0.2  /  DBili  x   /  AST  20  /  ALT  20  /  AlkPhos  136<H>  07-24      Urinalysis Basic - ( 24 Jul 2023 07:34 )    Color: x / Appearance: x / SG: x / pH: x  Gluc: 191 mg/dL / Ketone: x  / Bili: x / Urobili: x   Blood: x / Protein: x / Nitrite: x   Leuk Esterase: x / RBC: x / WBC x   Sq Epi: x / Non Sq Epi: x / Bacteria: x        RADIOLOGY & ADDITIONAL STUDIES:      IMPRESSION & RECOMMENDATIONS:   NEUROLOGY CONSULT PROGRESS NOTE    INTERVAL HISTORY:    Patient endorses R eye pain has reduced from a 10/10 on admission to 7/10 at present, worse when laying down at night (sleeps on L side), still painful with rightwards abduction.      REVIEW OF SYSTEMS:  As per HPI, otherwise negative for Constitutional, Eyes, Ears/Nose/Mouth/Throat, Neck, Cardiovascular, Respiratory, Gastrointestinal, Genitourinary, Skin, Endocrine, Musculoskeletal, Psychiatric, and Hematologic/Lymphatic.    MEDICATIONS:  acetaminophen     Tablet .. 650 milliGRAM(s) Oral every 6 hours PRN  amLODIPine   Tablet 10 milliGRAM(s) Oral daily  artificial tears (preservative free) Ophthalmic Solution 1 Drop(s) Right EYE two times a day  aspirin enteric coated 81 milliGRAM(s) Oral daily  atorvastatin 20 milliGRAM(s) Oral at bedtime  carvedilol 25 milliGRAM(s) Oral every 12 hours  dextrose 5%. 1000 milliLiter(s) IV Continuous <Continuous>  dextrose 5%. 1000 milliLiter(s) IV Continuous <Continuous>  dextrose 50% Injectable 25 Gram(s) IV Push once  dextrose 50% Injectable 12.5 Gram(s) IV Push once  dextrose 50% Injectable 25 Gram(s) IV Push once  dextrose Oral Gel 15 Gram(s) Oral once PRN  glucagon  Injectable 1 milliGRAM(s) IntraMuscular once  heparin   Injectable 5000 Unit(s) SubCutaneous every 8 hours  hydrALAZINE 25 milliGRAM(s) Oral three times a day  insulin glargine Injectable (LANTUS) 25 Unit(s) SubCutaneous at bedtime  insulin lispro (ADMELOG) corrective regimen sliding scale   SubCutaneous three times a day before meals  insulin lispro Injectable (ADMELOG) 5 Unit(s) SubCutaneous three times a day before meals  meclizine 25 milliGRAM(s) Oral two times a day  pantoprazole    Tablet 40 milliGRAM(s) Oral before breakfast  polyethylene glycol 3350 17 Gram(s) Oral two times a day  senna 2 Tablet(s) Oral at bedtime  sodium bicarbonate 650 milliGRAM(s) Oral three times a day    VITAL SIGNS:  Vital Signs Last 24 Hrs  T(C): 36.1 (24 Jul 2023 08:20), Max: 36.4 (23 Jul 2023 21:00)  T(F): 97 (24 Jul 2023 08:20), Max: 97.6 (23 Jul 2023 21:00)  HR: 78 (24 Jul 2023 14:00) (72 - 78)  BP: 144/70 (24 Jul 2023 14:00) (120/60 - 156/80)  BP(mean): --  RR: 18 (24 Jul 2023 08:20) (18 - 18)  SpO2: 96% (24 Jul 2023 08:20) (96% - 97%)    Parameters below as of 24 Jul 2023 08:20  Patient On (Oxygen Delivery Method): room air        PHYSICAL EXAMINATION:  Constitutional: WDWN; NAD  Eyes: anicteric sclera  Cardiovascular: +S1/S2, RRR; no M/R/G  Neurologic:  - Mental Status:  AAOx3; speech is fluent with intact naming, repetition, and comprehension  - Cranial Nerves II-XII:    II:  PERRLA; visual fields are full to confrontation  III, IV, VI:  abduction to the R limited by pain, no nystagmus  V:  facial sensation is intact in the V1-V3 distribution bilaterally.  VII:  face is symmetric with normal eye closure and smile  VIII:  hearing is intact to finger rub  IX, X:  uvula is midline and soft palate rises symmetrically  XI:  head turning and shoulder shrug are intact bilaterally  XII:  tongue protrudes in the midline  - Motor:  strength is 5/5 throughout; normal muscle bulk and tone throughout; no pronator drift  - Reflexes: 2+ R biceps, 1+ elsewhere and symmetric at the biceps, triceps, brachioradialis, knees, and ankles;  plantar reflexes downgoing bilaterally  - Sensory:  reduced vibration sense b/l feet, reduced sharp sensation below ankle  - Coordination:  finger-nose-finger and heel-knee-shin intact without dysmetria; rapid alternating hand movements intact  - Gait:  normal steps, base, arm swing, and turning; heel and toe walking are normal; tandem gait is normal; Romberg testing is negative    LABS:                          10.7   6.86  )-----------( 209      ( 24 Jul 2023 07:34 )             31.3     07-24    140  |  106  |  53<H>  ----------------------------<  191<H>  4.8   |  24  |  3.8<H>    Ca    8.8      24 Jul 2023 07:34  Mg     2.0     07-24    TPro  6.1  /  Alb  3.6  /  TBili  <0.2  /  DBili  x   /  AST  20  /  ALT  20  /  AlkPhos  136<H>  07-24      Urinalysis Basic - ( 24 Jul 2023 07:34 )    Color: x / Appearance: x / SG: x / pH: x  Gluc: 191 mg/dL / Ketone: x  / Bili: x / Urobili: x   Blood: x / Protein: x / Nitrite: x   Leuk Esterase: x / RBC: x / WBC x   Sq Epi: x / Non Sq Epi: x / Bacteria: x        RADIOLOGY & ADDITIONAL STUDIES:      IMPRESSION & RECOMMENDATIONS:

## 2023-07-24 NOTE — PROGRESS NOTE ADULT - SUBJECTIVE AND OBJECTIVE BOX
Nephrology progress note    Patient is seen and examined, events over the last 24 h noted .  Pt is feeling better  Allergies:  No Known Allergies    Hospital Medications:   MEDICATIONS  (STANDING):  amLODIPine   Tablet 10 milliGRAM(s) Oral daily  artificial tears (preservative free) Ophthalmic Solution 1 Drop(s) Right EYE two times a day  aspirin enteric coated 81 milliGRAM(s) Oral daily  atorvastatin 20 milliGRAM(s) Oral at bedtime  carvedilol 25 milliGRAM(s) Oral every 12 hours  dextrose 5%. 1000 milliLiter(s) (100 mL/Hr) IV Continuous <Continuous>  dextrose 5%. 1000 milliLiter(s) (50 mL/Hr) IV Continuous <Continuous>  dextrose 50% Injectable 25 Gram(s) IV Push once  dextrose 50% Injectable 12.5 Gram(s) IV Push once  dextrose 50% Injectable 25 Gram(s) IV Push once  glucagon  Injectable 1 milliGRAM(s) IntraMuscular once  heparin   Injectable 5000 Unit(s) SubCutaneous every 8 hours  hydrALAZINE 25 milliGRAM(s) Oral three times a day  insulin glargine Injectable (LANTUS) 25 Unit(s) SubCutaneous at bedtime  insulin lispro (ADMELOG) corrective regimen sliding scale   SubCutaneous three times a day before meals  insulin lispro Injectable (ADMELOG) 5 Unit(s) SubCutaneous three times a day before meals  meclizine 25 milliGRAM(s) Oral two times a day  pantoprazole    Tablet 40 milliGRAM(s) Oral before breakfast  polyethylene glycol 3350 17 Gram(s) Oral two times a day  senna 2 Tablet(s) Oral at bedtime  sodium bicarbonate 650 milliGRAM(s) Oral three times a day        VITALS:  T(F): 97.6 (07-24-23 @ 16:00), Max: 97.6 (07-23-23 @ 21:00)  HR: 82 (07-24-23 @ 17:20)  BP: 156/85 (07-24-23 @ 17:20)  RR: 18 (07-24-23 @ 16:00)  SpO2: 96% (07-24-23 @ 08:20)  Wt(kg): --    07-22 @ 07:01  -  07-23 @ 07:00  --------------------------------------------------------  IN: 800 mL / OUT: 1950 mL / NET: -1150 mL    07-23 @ 07:01 - 07-24 @ 07:00  --------------------------------------------------------  IN: 900 mL / OUT: 1600 mL / NET: -700 mL    07-24 @ 07:01 - 07-24 @ 18:17  --------------------------------------------------------  IN: 240 mL / OUT: 700 mL / NET: -460 mL          PHYSICAL EXAM:  Constitutional: NAD  HEENT: anicteric sclera, oropharynx clear, MMM  Neck: No JVD  Respiratory: CTAB, no wheezes, rales or rhonchi  Cardiovascular: S1, S2, RRR  Gastrointestinal: BS+, soft, NT/ND  Extremities: No cyanosis or clubbing. No peripheral edema  Neurological: A/O x 3  : No CVA tenderness. No faith.   Skin: No rashes  Vascular Access:    LABS:  07-24    140  |  106  |  53<H>  ----------------------------<  191<H>  4.8   |  24  |  3.8<H>  Creatinine Trend: 3.8<--, 4.3<--, 5.4<--, 6.5<--, 6.3<--, 5.1<--  Ca    8.8      24 Jul 2023 07:34  Mg     2.0     07-24    TPro  6.1  /  Alb  3.6  /  TBili  <0.2  /  DBili      /  AST  20  /  ALT  20  /  AlkPhos  136<H>  07-24                          10.7   6.86  )-----------( 209      ( 24 Jul 2023 07:34 )             31.3       Urine Studies:  Urinalysis Basic - ( 24 Jul 2023 07:34 )    Color:  / Appearance:  / SG:  / pH:   Gluc: 191 mg/dL / Ketone:   / Bili:  / Urobili:    Blood:  / Protein:  / Nitrite:    Leuk Esterase:  / RBC:  / WBC    Sq Epi:  / Non Sq Epi:  / Bacteria:         RADIOLOGY & ADDITIONAL STUDIES:  < from: MR Orbits w/wo IV Cont (07.23.23 @ 11:05) >  Motion limited study demonstrating no gross orbital abnormality.    < end of copied text >

## 2023-07-24 NOTE — PROGRESS NOTE ADULT - SUBJECTIVE AND OBJECTIVE BOX
Patient is a 48y old  Male who presents with a chief complaint of possible stroke and STEMI (19 Jul 2023 15:55)    Patient was seen and examined.  Right eye pain improving  no new complaints    PAST MEDICAL & SURGICAL HISTORY:  Asthma  Diverticulitis, surgery 14yrs age  Dyslipidemia  Hypertension  H/O vertigo  Diabetic ulcer of right foot  Broken toe, left pinky toe  Diabetes  AMBER on CPAP  History of TIAs    History of surgery  colon resection    Allergies   No Known Allergies    MEDICATIONS  (STANDING):  amLODIPine   Tablet 10 milliGRAM(s) Oral daily  artificial tears (preservative free) Ophthalmic Solution 1 Drop(s) Right EYE two times a day  aspirin enteric coated 81 milliGRAM(s) Oral daily  atorvastatin 20 milliGRAM(s) Oral at bedtime  carvedilol 25 milliGRAM(s) Oral every 12 hours  heparin   Injectable 5000 Unit(s) SubCutaneous every 8 hours  hydrALAZINE 25 milliGRAM(s) Oral three times a day  insulin glargine Injectable (LANTUS) 25 Unit(s) SubCutaneous at bedtime  insulin lispro (ADMELOG) corrective regimen sliding scale   SubCutaneous three times a day before meals  insulin lispro Injectable (ADMELOG) 5 Unit(s) SubCutaneous three times a day before meals  meclizine 25 milliGRAM(s) Oral two times a day  pantoprazole    Tablet 40 milliGRAM(s) Oral before breakfast  polyethylene glycol 3350 17 Gram(s) Oral two times a day  senna 2 Tablet(s) Oral at bedtime  sodium bicarbonate 650 milliGRAM(s) Oral three times a day    MEDICATIONS  (PRN):  acetaminophen     Tablet .. 650 milliGRAM(s) Oral every 6 hours PRN Moderate Pain (4 - 6)  dextrose Oral Gel 15 Gram(s) Oral once PRN Blood Glucose LESS THAN 70 milliGRAM(s)/deciliter    T(C): 36.1 (07-24-23 @ 08:20), Max: 36.4 (07-23-23 @ 21:00)  HR: 75 (07-24-23 @ 08:20) (70 - 78)  BP: 156/80 (07-24-23 @ 08:20) (108/59 - 156/80)  RR: 18 (07-24-23 @ 08:20) (18 - 18)  SpO2: 96% (07-24-23 @ 08:20) (96% - 97%)    O/E:  Awake, alert, not in distress.  HEENT: atraumatic   Chest: decreased breath sounds at bases  CVS: SIS2 +, no murmur.  P/A: Soft, BS+  CNS: awake, alert,   Ext: no edema feet  Skin: no rash, no ulcers.  All systems reviewed positive findings as above.                             10.7<L>  6.86  )-----------( 209      ( 24 Jul 2023 07:34 )             31.3<L>                        9.4<L>  5.41  )-----------( 190      ( 23 Jul 2023 06:56 )             27.9<L>    07-24    140  |  106  |  53<H>  ----------------------------<  191<H>  4.8   |  24  |  3.8<H>  07-23    138  |  106  |  59<H>  ----------------------------<  230<H>  4.6   |  23  |  4.3<HH>    Ca    8.8      24 Jul 2023 07:34  Ca    8.0<L>      23 Jul 2023 06:56  Mg     2.0     07-24    TPro  6.1  /  Alb  3.6  /  TBili  <0.2  /  DBili  x   /  AST  20  /  ALT  20  /  AlkPhos  136<H>  07-24  TPro  5.2<L>  /  Alb  3.2<L>  /  TBili  <0.2  /  DBili  x   /  AST  15  /  ALT  13  /  AlkPhos  127<H>  07-23

## 2023-07-24 NOTE — PROGRESS NOTE ADULT - SUBJECTIVE AND OBJECTIVE BOX
BRINDA MOYER 48y Male  MRN#: [id number]     Hospital Day: 7d    Pt is currently admitted with the primary diagnosis of  ST elevation myocardial infarction (STEMI)        SUBJECTIVE     Overnight events  None    Subjective complaints  Pt was evaluated this am. Patient denied any active complaints and per patient his symptoms are improving                                            ----------------------------------------------------------  OBJECTIVE  PAST MEDICAL & SURGICAL HISTORY  Asthma    Diverticulitis  surgery 14yrs ago    High cholesterol    Dyslipidemia    Hypertension    H/O vertigo    Diabetic ulcer of right foot    Broken toe  left pinky toe    Vertigo    HTN (hypertension)    Diabetes    AMBER on CPAP    History of TIAs    History of surgery  colon resection                                              -----------------------------------------------------------  ALLERGIES:  No Known Allergies                                            ------------------------------------------------------------    HOME MEDICATIONS  Home Medications:  ergocalciferol 1.25 mg (50,000 intl units) oral tablet: orally once a week (17 Jul 2023 22:27)  Jardiance 10 mg oral tablet: 1 orally once a week (17 Jul 2023 22:27)  meclizine 25 mg oral tablet: 1 orally 2 times a day (17 Jul 2023 22:27)  rosuvastatin 20 mg oral capsule: 1 orally once a day (at bedtime) (17 Jul 2023 22:27)  spironolactone 25 mg oral tablet: 1 orally once a day (17 Jul 2023 22:27)                           MEDICATIONS:  STANDING MEDICATIONS  amLODIPine   Tablet 10 milliGRAM(s) Oral daily  artificial tears (preservative free) Ophthalmic Solution 1 Drop(s) Right EYE two times a day  aspirin enteric coated 81 milliGRAM(s) Oral daily  atorvastatin 20 milliGRAM(s) Oral at bedtime  carvedilol 25 milliGRAM(s) Oral every 12 hours  dextrose 5%. 1000 milliLiter(s) IV Continuous <Continuous>  dextrose 5%. 1000 milliLiter(s) IV Continuous <Continuous>  dextrose 50% Injectable 25 Gram(s) IV Push once  dextrose 50% Injectable 12.5 Gram(s) IV Push once  dextrose 50% Injectable 25 Gram(s) IV Push once  glucagon  Injectable 1 milliGRAM(s) IntraMuscular once  heparin   Injectable 5000 Unit(s) SubCutaneous every 8 hours  hydrALAZINE 25 milliGRAM(s) Oral three times a day  insulin glargine Injectable (LANTUS) 25 Unit(s) SubCutaneous at bedtime  insulin lispro (ADMELOG) corrective regimen sliding scale   SubCutaneous three times a day before meals  insulin lispro Injectable (ADMELOG) 5 Unit(s) SubCutaneous three times a day before meals  meclizine 25 milliGRAM(s) Oral two times a day  pantoprazole    Tablet 40 milliGRAM(s) Oral before breakfast  polyethylene glycol 3350 17 Gram(s) Oral two times a day  senna 2 Tablet(s) Oral at bedtime  sodium bicarbonate 650 milliGRAM(s) Oral three times a day    PRN MEDICATIONS  acetaminophen     Tablet .. 650 milliGRAM(s) Oral every 6 hours PRN  dextrose Oral Gel 15 Gram(s) Oral once PRN                                            ------------------------------------------------------------  VITAL SIGNS: Last 24 Hours  T(C): 36.1 (24 Jul 2023 08:20), Max: 36.4 (23 Jul 2023 21:00)  T(F): 97 (24 Jul 2023 08:20), Max: 97.6 (23 Jul 2023 21:00)  HR: 75 (24 Jul 2023 08:20) (70 - 78)  BP: 156/80 (24 Jul 2023 08:20) (108/59 - 156/80)  BP(mean): 79 (23 Jul 2023 14:09) (79 - 79)  RR: 18 (24 Jul 2023 08:20) (18 - 18)  SpO2: 96% (24 Jul 2023 08:20) (96% - 97%)      07-23-23 @ 07:01  -  07-24-23 @ 07:00  --------------------------------------------------------  IN: 900 mL / OUT: 1600 mL / NET: -700 mL    07-24-23 @ 07:01  -  07-24-23 @ 10:35  --------------------------------------------------------  IN: 240 mL / OUT: 300 mL / NET: -60 mL                                             --------------------------------------------------------------  LABS:                        10.7   6.86  )-----------( 209      ( 24 Jul 2023 07:34 )             31.3     07-24    140  |  106  |  53<H>  ----------------------------<  191<H>  4.8   |  24  |  3.8<H>    Ca    8.8      24 Jul 2023 07:34  Mg     2.0     07-24    TPro  6.1  /  Alb  3.6  /  TBili  <0.2  /  DBili  x   /  AST  20  /  ALT  20  /  AlkPhos  136<H>  07-24      Urinalysis Basic - ( 24 Jul 2023 07:34 )    Color: x / Appearance: x / SG: x / pH: x  Gluc: 191 mg/dL / Ketone: x  / Bili: x / Urobili: x   Blood: x / Protein: x / Nitrite: x   Leuk Esterase: x / RBC: x / WBC x   Sq Epi: x / Non Sq Epi: x / Bacteria: x                -    PHYSICAL EXAM:  GENERAL: NAD, lying in bed comfortably  EYES: EOMI, conjunctiva and sclera clear  ENT: Moist mucous membranes  NECK: Supple, No JVD  CHEST/LUNG: Clear to auscultation bilaterally; No rales, rhonchi, wheezing, or rubs. Unlabored respirations  HEART: regular rate and rhythm; No murmurs, rubs, or gallops  ABDOMEN: Bowel sounds present; Soft, Nontender, Nondistended.    EXTREMITIES: Warm. No clubbing, cyanosis, or edema  NERVOUS SYSTEM:  Alert & Oriented X3. No focal deficits                                              --------------------------------------------------------------

## 2023-07-24 NOTE — PROGRESS NOTE ADULT - ASSESSMENT
47 YO male with PMHx of CKD IV baseline around 3.2, vertigo, diverticulitis s/p LAR, cigarette use, ETOH abuse now sober x5 years, IDDM, HTN, AMBER, Hx of multiple CVA with residual weakness on right side.   He was in the outpatient clinic for pre-test for circumcision, his EKG suggested STEMI so EMS was called and on the way to the hospital he developed chest pain of left side radiating to the back, characterized by tightness then burning sensation through the whole chest. This pain lasted for 1 hour and resolved, he received aspirin loading on the way here. Pt follows at [facility] and underwent cardiac catheterization which showed non-obstructive CAD.  He is also complaining from right eye pain with double vision and blurring of vision that started 3 weeks ago, he was admitted to another hospital and they discharged him after doing workup for his eye and ophthalmology saw him and they said it is mostly related to the uncontrolled diabetes. His right eye pain has been constant for the past 3 weeks radiating to the front head causing severe headaches, it is associated with photosensitivity, no tearing, no redness. He has no weakness, no numbness.     # Acute diastolic  CHF-resolved  # Atypical  chest pain  probably musculoskeletal - resolved   # Elevated troponin sec to ckd  # H/o non obstructive CAD  # Hypertension  -Troponin T, Serum: 0.10: Critical value: ng/mL (07.18.23 @ 08:28)   - 12 Lead ECG (07.17.23 @ 17:57) >Normal sinus rhythm  - c/w ASA, coreg, statin  - c/w norvasc, hydralazine  - evaluated by cardiology  -  ILR interrogated - no events  -  TTE Echo Complete w/o Contrast w/ Doppler (07.21.23 @ 10:59) >V Ejection Fraction by Lakhani's Method with a biplane EF of 67 %. Normal global left ventricular systolic function.mild pulmonary hypertension.  -  Xray Chest 1 View- PORTABLE-Urgent (Xray Chest 1 View- PORTABLE-Urgent .) (07.21.23 @ 15:28) >Cardiomegaly with CHF. Slightly worsened.  - IV lasix 80mg  x1 on 7/21  - monitor I/o , restrict fluids    # Right eye pain with blurring of vision sec to diabetic retinopathy  # Right 6th nerve palsy  # H/o multiple CVA   - MR Head No Cont (07.18.23 @ 22:59) > New 1 cm lesion within the right frontal lobe periventricular white matter demonstrating rim-like restricted diffusion.  New 8 mm lesion within the anterior right temporal lobe. Additional new small lesion within the right cerebellar hemisphere, the configuration suggests a chronic infarct.Otherwise stable patchy white matter disease and scattered chronic   lacunar infarcts.  - CT PERFUSION: No evidence of perfusion abnormality.  - CTA HEAD: Mild stenosis of the proximal right V4 segment of the vertebral artery.  - CTA NECK: No evidence of carotid or vertebral artery stenosis.  - Sedimentation Rate, Erythrocyte: 66 mm/Hr (07.18.23 @ 11:15)  - C-Reactive Protein, Serum: <3.0 mg/L (07.18.23 @ 11:15)  - neuro eval: MR brain with contrast to better characterize the lesions  - F/u  HIV, WES, dsDNA, Angiotensin converting enzyme, NMO, MOG, Vitamin B12, Lyme  - pt was evaluated by rheumatology  - Ophthal eval:  Right Cranial Nerve 6 Palsy - likely contributing to eye pain - likely 2/2 ischemic complications of diabetes , Type 2 Diabetes Mellitus with Severe NPDR OU ,  Very low suspicion for GCA at this time: no jaw claudication, temporal/scalp tenderness.  - plan for baseline HVF testing at out patient follow up  - Pt to follow up at [facility] eye clinic (or eye care provider of pts choice) within 4 weeks of discharge. Discussed benefit of retinal specialist given level of retinopathy seen during exam today   - Use sunglasses to limit light prn   - c/w  artifical tears 2x/day OU   - MR Head w/wo IV Cont (07.20.23 @ 19:05) >The 1 cm lesion within the right frontal lobe periventricular white matter demonstrating rim-like restricted diffusion demonstrates no  contrast enhancement. The 8 mm lesion within the anterior right temporal lobe demonstrates central enhancement.  The 1 cm linear lesion within the right cerebellar hemisphere also demonstrates central enhancement.  Diagnostic considerations for the above lesions include infarcts or demyelination (of varying ages) as well as infectious/inflammatory and  neoplastic etiologies. Consider CSF sampling as well as follow up imaging.Otherwise stable patchy white matter disease and scattered chronic  lacunar infarcts.  - neuro F/u MR orbit w/wo contrastMight need LP with CSF studies after MRI. Patient reports having LP done at [facility]. Recommend to get records  -  MR Orbits w/wo IV Cont (07.23.23 @ 11:05) >Motion limited study demonstrating no gross orbital abnormality.  - neuro F/u     # Acute kidney injury on  CKD stage 4, probably sec to BRENDEN,  creatinine downtrending  # Hyperkalemia - resolved  # Metabolic acidosis- resolving  - S/P  insulin and albuterol, lokelma  - dced  spironolactone   - dc sodium bicarb  - evaluated by  nephrology   - monitor BMP, monitor UO    # DM type2   - A1C with Estimated Average Glucose Result: 10.8 (07.17.23 @ 15:45)  - c/w   lantus, c/w  lispro    # H/o  vertigo  - c/w  meclizine     # Nicotine abuse  - pt counseled    # DVT prophylaxis    # Full code    # Pending: monitor BMP,. Might need LP with CSF studies after MRI. Patient reports having LP done at [facility]- obtain records, neuro F/u  # Discussed plan of care with patient  # Disposition: home when stable

## 2023-07-24 NOTE — PROGRESS NOTE ADULT - ATTENDING COMMENTS
Seen and examined the patient. Has still pain in the right gaze. Better than last week. Recommend to repeat LP given the presence of enhancing lesions on MRI brain to assess for infectious and inflammatory process.

## 2023-07-24 NOTE — PROGRESS NOTE ADULT - ASSESSMENT
Patient is a 48 year old male with PMHx of CKD (stage IV), IDDM, HTN, AMBER, Hx of multiple strokes (last one in November 2022) admitted on 7/17 for further evaluation of headache, right sided eye pain, blurry vision and chest pain to telemetry. Patient was initially seen for stroke code. CTA/ CTP with No evidence of perfusion abnormality/ CTA HEAD: Mild stenosis of the proximal right V4 segment of the vertebral artery. MRI brain w/o PROSPER showed 3 new lesions in the brain compared with an MRI from 2022. Rapid HIV, WES, dsDNA, Angiotensin converting enzyme, Vitamin B12, Lyme unremarkable. Hyporeflexia in all extremities, absent in feet and ankle. No UMN signs noted. Unclear etiology of brain lesion. Inflammatory vs infectious vs demyelinating etiology.     MR Orbits was motion limited but demonstrated no gross orbital abnormality. Exam consistent with monocular diplopia in both eyes. Unable to assess right horizontal gaze as the patient closes eyes due to pain, but no dysconjugate gaze. No APD, no DORCAS, with grossly preserved visual fields on confrontation.     Recommendation:  - NMO, MOG pending, order QuantiFeron   - Patient reports Family hx of early Dementia at age of 50 (mother), check NOTCH3 rule out CADASIL given recurrent strokes and FH of dementia  - Ophthalmology pending  - Rheumatology pending  - Patient reports having LP done at [facility]. Recommend to get records  - get LP to evaluate infectious vs inflammatory causes of lesions    Discussed with neuro attending   Patient is a 48 year old male with PMHx of CKD (stage IV), IDDM, HTN, AMBER, Hx of multiple strokes (last one in November 2022) admitted on 7/17 for further evaluation of headache, right sided eye pain, blurry vision and chest pain to telemetry. Patient was initially seen for stroke code. CTA/ CTP with No evidence of perfusion abnormality/ CTA HEAD: Mild stenosis of the proximal right V4 segment of the vertebral artery. MRI brain w/o PROSPER showed 3 new lesions in the brain compared with an MRI from 2022. Rapid HIV, WES, dsDNA, Angiotensin converting enzyme, Vitamin B12, Lyme unremarkable. Hyporeflexia in all extremities, absent in feet and ankle. No UMN signs noted. Unclear etiology of brain lesion. Inflammatory vs infectious vs demyelinating etiology.     MR Orbits was motion limited but demonstrated no gross orbital abnormality. Exam consistent with monocular diplopia in both eyes. Unable to assess right horizontal gaze as the patient closes eyes due to pain, but no dysconjugate gaze. No APD, no DORCAS, with grossly preserved visual fields on confrontation.     Recommendation:  - NMO, MOG pending, order QuantiFeron   - Patient reports Family hx of early Dementia at age of 50 (mother), check NOTCH3 rule out CADASIL given recurrent strokes and FH of dementia  - Ophthalmology pending  - Rheumatology pending  - Patient reports having LP done at [facility], please get records  - get LP to evaluate infectious vs inflammatory causes of lesions: full standard, infectious, autoimmune/inflammatory, neoplastic workup    Discussed with neuro attending Dr. De La Rosa   Patient is a 48 year old male with PMHx of CKD (stage IV), IDDM, HTN, AMBER, Hx of multiple strokes (last one in November 2022) admitted on 7/17 for further evaluation of headache, right sided eye pain, blurry vision and chest pain to telemetry. Patient was initially seen for stroke code. CTA/ CTP with No evidence of perfusion abnormality/ CTA HEAD: Mild stenosis of the proximal right V4 segment of the vertebral artery. MRI brain w/o PROSPER showed 3 new lesions in the brain compared with an MRI from 2022. Rapid HIV, WES, dsDNA, Angiotensin converting enzyme, Vitamin B12, Lyme unremarkable. Hyporeflexia in all extremities, absent in feet and ankle. No UMN signs noted. Unclear etiology of brain lesion. Inflammatory vs infectious vs demyelinating etiology.     MR Orbits was motion limited but demonstrated no gross orbital abnormality. Exam consistent with monocular diplopia in both eyes. Unable to assess right horizontal gaze as the patient closes eyes due to pain, but no dysconjugate gaze. No APD, no DORCAS, with grossly preserved visual fields on confrontation.     Recommendation:  - NMO, MOG pending, order QuantiFeron   - Patient reports Family hx of early Dementia at age of 50 (mother), check NOTCH3 rule out CADASIL given recurrent strokes and FH of dementia  - Ophthalmology pending  - Rheumatology pending  - Patient reports having LP done at [facility], please get records  - repeat LP to evaluate infectious vs inflammatory causes of lesions: full standard (cell count, glucose CSF, blood glucose, glucose, protein CSF, LDH CSF), infectious (Culture CSF with Gram stain, CSF PCR Panel, Gram Stain, Lyme Abs, Borrelia DNA), autoimmune/inflammatory (ACE CSF & Serum, CSF IGG Index, Encephalopathy Panel CSF & Serum, MOG, Myelin Basic Protein, Oligoclonal bands), neoplastic workup (flow cytometry, cytopathology, paraneoplastic CSF & serum) also Toxoplasma gondii CSF    Discussed with neuro attending Dr. De La Rosa

## 2023-07-24 NOTE — PROGRESS NOTE ADULT - ASSESSMENT
47 YO male with PMHx of CKD IV baseline around 3.2, vertigo, diverticulitis s/p LAR, cigarette use, ETOH abuse now sober x5 years, IDDM, HTN, AMBER, Hx of multiple CVA with residual weakness on right side presents for chest pain along with right eye pain with photosensitivity. MR brain showed new lesion in his right frontal lobe with rim-like restricting diffusion      BILLY on CKD 4/ Right frontal lobe lesion/ HTN/ DM, timing consistent w/ BRENDEN  responding to diuretics   follow UOP   s/p MRI Orbits with IC (7/23) no gross abnormalitiy (motion rtifact)    follow BMP UOP  no need for RRT now, cr improving    will follow .

## 2023-07-25 LAB
ALBUMIN SERPL ELPH-MCNC: 3.4 G/DL — LOW (ref 3.5–5.2)
ALP SERPL-CCNC: 144 U/L — HIGH (ref 30–115)
ALT FLD-CCNC: 25 U/L — SIGNIFICANT CHANGE UP (ref 0–41)
ANION GAP SERPL CALC-SCNC: 11 MMOL/L — SIGNIFICANT CHANGE UP (ref 7–14)
AST SERPL-CCNC: 27 U/L — SIGNIFICANT CHANGE UP (ref 0–41)
BASOPHILS # BLD AUTO: 0.03 K/UL — SIGNIFICANT CHANGE UP (ref 0–0.2)
BASOPHILS NFR BLD AUTO: 0.5 % — SIGNIFICANT CHANGE UP (ref 0–1)
BILIRUB SERPL-MCNC: <0.2 MG/DL — SIGNIFICANT CHANGE UP (ref 0.2–1.2)
BUN SERPL-MCNC: 52 MG/DL — HIGH (ref 10–20)
CALCIUM SERPL-MCNC: 8.4 MG/DL — SIGNIFICANT CHANGE UP (ref 8.4–10.5)
CHLORIDE SERPL-SCNC: 106 MMOL/L — SIGNIFICANT CHANGE UP (ref 98–110)
CO2 SERPL-SCNC: 23 MMOL/L — SIGNIFICANT CHANGE UP (ref 17–32)
CREAT SERPL-MCNC: 3.5 MG/DL — HIGH (ref 0.7–1.5)
EGFR: 20 ML/MIN/1.73M2 — LOW
EOSINOPHIL # BLD AUTO: 0.29 K/UL — SIGNIFICANT CHANGE UP (ref 0–0.7)
EOSINOPHIL NFR BLD AUTO: 4.5 % — SIGNIFICANT CHANGE UP (ref 0–8)
GLUCOSE BLDC GLUCOMTR-MCNC: 176 MG/DL — HIGH (ref 70–99)
GLUCOSE BLDC GLUCOMTR-MCNC: 189 MG/DL — HIGH (ref 70–99)
GLUCOSE BLDC GLUCOMTR-MCNC: 247 MG/DL — HIGH (ref 70–99)
GLUCOSE BLDC GLUCOMTR-MCNC: 263 MG/DL — HIGH (ref 70–99)
GLUCOSE SERPL-MCNC: 172 MG/DL — HIGH (ref 70–99)
HCT VFR BLD CALC: 30 % — LOW (ref 42–52)
HGB BLD-MCNC: 10 G/DL — LOW (ref 14–18)
IMM GRANULOCYTES NFR BLD AUTO: 0.3 % — SIGNIFICANT CHANGE UP (ref 0.1–0.3)
LYMPHOCYTES # BLD AUTO: 2.06 K/UL — SIGNIFICANT CHANGE UP (ref 1.2–3.4)
LYMPHOCYTES # BLD AUTO: 31.6 % — SIGNIFICANT CHANGE UP (ref 20.5–51.1)
MAGNESIUM SERPL-MCNC: 1.9 MG/DL — SIGNIFICANT CHANGE UP (ref 1.8–2.4)
MCHC RBC-ENTMCNC: 30.9 PG — SIGNIFICANT CHANGE UP (ref 27–31)
MCHC RBC-ENTMCNC: 33.3 G/DL — SIGNIFICANT CHANGE UP (ref 32–37)
MCV RBC AUTO: 92.6 FL — SIGNIFICANT CHANGE UP (ref 80–94)
MOG AB SER QL CBA IFA: NEGATIVE — SIGNIFICANT CHANGE UP
MONOCYTES # BLD AUTO: 0.64 K/UL — HIGH (ref 0.1–0.6)
MONOCYTES NFR BLD AUTO: 9.8 % — HIGH (ref 1.7–9.3)
NEUTROPHILS # BLD AUTO: 3.47 K/UL — SIGNIFICANT CHANGE UP (ref 1.4–6.5)
NEUTROPHILS NFR BLD AUTO: 53.3 % — SIGNIFICANT CHANGE UP (ref 42.2–75.2)
NRBC # BLD: 0 /100 WBCS — SIGNIFICANT CHANGE UP (ref 0–0)
PLATELET # BLD AUTO: 199 K/UL — SIGNIFICANT CHANGE UP (ref 130–400)
PMV BLD: 11.5 FL — HIGH (ref 7.4–10.4)
POTASSIUM SERPL-MCNC: 4.5 MMOL/L — SIGNIFICANT CHANGE UP (ref 3.5–5)
POTASSIUM SERPL-SCNC: 4.5 MMOL/L — SIGNIFICANT CHANGE UP (ref 3.5–5)
PROT SERPL-MCNC: 5.7 G/DL — LOW (ref 6–8)
RBC # BLD: 3.24 M/UL — LOW (ref 4.7–6.1)
RBC # FLD: 12.4 % — SIGNIFICANT CHANGE UP (ref 11.5–14.5)
SODIUM SERPL-SCNC: 140 MMOL/L — SIGNIFICANT CHANGE UP (ref 135–146)
WBC # BLD: 6.51 K/UL — SIGNIFICANT CHANGE UP (ref 4.8–10.8)
WBC # FLD AUTO: 6.51 K/UL — SIGNIFICANT CHANGE UP (ref 4.8–10.8)

## 2023-07-25 PROCEDURE — 71045 X-RAY EXAM CHEST 1 VIEW: CPT | Mod: 26

## 2023-07-25 PROCEDURE — 99232 SBSQ HOSP IP/OBS MODERATE 35: CPT

## 2023-07-25 PROCEDURE — 99233 SBSQ HOSP IP/OBS HIGH 50: CPT

## 2023-07-25 RX ADMIN — Medication 5 UNIT(S): at 12:17

## 2023-07-25 RX ADMIN — Medication 25 MILLIGRAM(S): at 05:11

## 2023-07-25 RX ADMIN — HEPARIN SODIUM 5000 UNIT(S): 5000 INJECTION INTRAVENOUS; SUBCUTANEOUS at 21:09

## 2023-07-25 RX ADMIN — Medication 5 UNIT(S): at 17:07

## 2023-07-25 RX ADMIN — HEPARIN SODIUM 5000 UNIT(S): 5000 INJECTION INTRAVENOUS; SUBCUTANEOUS at 05:12

## 2023-07-25 RX ADMIN — Medication 1 DROP(S): at 05:12

## 2023-07-25 RX ADMIN — Medication 25 MILLIGRAM(S): at 17:09

## 2023-07-25 RX ADMIN — Medication 650 MILLIGRAM(S): at 05:11

## 2023-07-25 RX ADMIN — INSULIN GLARGINE 25 UNIT(S): 100 INJECTION, SOLUTION SUBCUTANEOUS at 21:08

## 2023-07-25 RX ADMIN — Medication 650 MILLIGRAM(S): at 04:05

## 2023-07-25 RX ADMIN — HEPARIN SODIUM 5000 UNIT(S): 5000 INJECTION INTRAVENOUS; SUBCUTANEOUS at 14:51

## 2023-07-25 RX ADMIN — Medication 5 UNIT(S): at 08:16

## 2023-07-25 RX ADMIN — Medication 25 MILLIGRAM(S): at 14:51

## 2023-07-25 RX ADMIN — CARVEDILOL PHOSPHATE 25 MILLIGRAM(S): 80 CAPSULE, EXTENDED RELEASE ORAL at 05:12

## 2023-07-25 RX ADMIN — Medication 1: at 12:17

## 2023-07-25 RX ADMIN — Medication 1 DROP(S): at 17:08

## 2023-07-25 RX ADMIN — PANTOPRAZOLE SODIUM 40 MILLIGRAM(S): 20 TABLET, DELAYED RELEASE ORAL at 05:11

## 2023-07-25 RX ADMIN — AMLODIPINE BESYLATE 10 MILLIGRAM(S): 2.5 TABLET ORAL at 05:12

## 2023-07-25 RX ADMIN — CARVEDILOL PHOSPHATE 25 MILLIGRAM(S): 80 CAPSULE, EXTENDED RELEASE ORAL at 17:08

## 2023-07-25 RX ADMIN — Medication 1: at 08:16

## 2023-07-25 RX ADMIN — POLYETHYLENE GLYCOL 3350 17 GRAM(S): 17 POWDER, FOR SOLUTION ORAL at 17:09

## 2023-07-25 RX ADMIN — Medication 650 MILLIGRAM(S): at 05:05

## 2023-07-25 RX ADMIN — Medication 25 MILLIGRAM(S): at 21:12

## 2023-07-25 RX ADMIN — Medication 81 MILLIGRAM(S): at 12:18

## 2023-07-25 RX ADMIN — ATORVASTATIN CALCIUM 20 MILLIGRAM(S): 80 TABLET, FILM COATED ORAL at 21:09

## 2023-07-25 RX ADMIN — Medication 2: at 17:07

## 2023-07-25 NOTE — PROGRESS NOTE ADULT - SUBJECTIVE AND OBJECTIVE BOX
Patient is a 48y old  Male who presents with a chief complaint of possible stroke and STEMI (19 Jul 2023 15:55)    Patient was seen and examined.  Right eye pain improving  no new complaints    PAST MEDICAL & SURGICAL HISTORY:  Asthma  Diverticulitis, surgery 14yrs age  Dyslipidemia  Hypertension  H/O vertigo  Diabetic ulcer of right foot  Broken toe, left pinky toe  Diabetes  AMBER on CPAP  History of TIAs    History of surgery  colon resection    Allergies   No Known Allergies    MEDICATIONS  (STANDING):  amLODIPine   Tablet 10 milliGRAM(s) Oral daily  artificial tears (preservative free) Ophthalmic Solution 1 Drop(s) Right EYE two times a day  aspirin enteric coated 81 milliGRAM(s) Oral daily  atorvastatin 20 milliGRAM(s) Oral at bedtime  bisacodyl 5 milliGRAM(s) Oral at bedtime  carvedilol 25 milliGRAM(s) Oral every 12 hours  glucagon  Injectable 1 milliGRAM(s) IntraMuscular once  heparin   Injectable 5000 Unit(s) SubCutaneous every 8 hours  hydrALAZINE 25 milliGRAM(s) Oral three times a day  insulin glargine Injectable (LANTUS) 25 Unit(s) SubCutaneous at bedtime  insulin lispro (ADMELOG) corrective regimen sliding scale   SubCutaneous three times a day before meals  insulin lispro Injectable (ADMELOG) 5 Unit(s) SubCutaneous three times a day before meals  meclizine 25 milliGRAM(s) Oral two times a day  pantoprazole    Tablet 40 milliGRAM(s) Oral before breakfast  polyethylene glycol 3350 17 Gram(s) Oral two times a day  senna 2 Tablet(s) Oral at bedtime    MEDICATIONS  (PRN):  acetaminophen     Tablet .. 650 milliGRAM(s) Oral every 6 hours PRN Moderate Pain (4 - 6)  dextrose Oral Gel 15 Gram(s) Oral once PRN Blood Glucose LESS THAN 70 milliGRAM(s)/deciliter    T(C): 36.2 (07-25-23 @ 08:00), Max: 36.4 (07-24-23 @ 16:00)  HR: 74 (07-25-23 @ 08:00) (72 - 82)  BP: 161/75 (07-25-23 @ 08:00) (134/67 - 176/84)  RR: 18 (07-25-23 @ 08:00) (18 - 18)  SpO2: 97% (07-25-23 @ 08:00) (97% - 97%)    O/E:  Awake, alert, not in distress.  HEENT: atraumatic   Chest: decreased breath sounds at bases  CVS: SIS2 +, no murmur.  P/A: Soft, BS+  CNS: awake, alert,   Ext: no edema feet  Skin: no rash, no ulcers.  All systems reviewed positive findings as above.                                         10.0<L>  6.51  )-----------( 199      ( 25 Jul 2023 06:37 )             30.0<L>                        10.7<L>  6.86  )-----------( 209      ( 24 Jul 2023 07:34 )             31.3<L>  07-25    140  |  106  |  52<H>  ----------------------------<  172<H>  4.5   |  23  |  3.5<H>  07-24    140  |  106  |  53<H>  ----------------------------<  191<H>  4.8   |  24  |  3.8<H>    Ca    8.4      25 Jul 2023 06:37  Ca    8.8      24 Jul 2023 07:34  Mg     1.9     07-25    TPro  5.7<L>  /  Alb  3.4<L>  /  TBili  <0.2  /  DBili  x   /  AST  27  /  ALT  25  /  AlkPhos  144<H>  07-25  TPro  6.1  /  Alb  3.6  /  TBili  <0.2  /  DBili  x   /  AST  20  /  ALT  20  /  AlkPhos  136<H>  07-24

## 2023-07-25 NOTE — PROGRESS NOTE ADULT - ASSESSMENT
47 YO male with PMHx of CKD IV baseline around 3.2, vertigo, diverticulitis s/p LAR, cigarette use, ETOH abuse now sober x5 years, IDDM, HTN, AMBER, Hx of multiple CVA with residual weakness on right side presents for chest pain along with right eye pain with photosensitivity. MR brain showed new lesion in his right frontal lobe with rim-like restricting diffusion    BILLY on CKD 4/ Right frontal lobe lesion/ HTN/ DM, timing consistent w/ RBENDEN  creat is trending down - creat in Nov 2022 was around 3.0   today it is 3.5  follow UOP   CTAP with IC 7/17 no hydro  s/p MRI Orbits with IC (7/23) no gross abnormality (motion artifact)    follow BMP UOP    will follow .

## 2023-07-25 NOTE — PROGRESS NOTE ADULT - TIME BILLING
Direct patient care. Discussed on rounds with Housestaff
Direct patient care. Discussed on rounds with housestaff
Direct patient care, Discussed on rounds with Housestaff.
Direct patient care. Discussed with Housestaff

## 2023-07-25 NOTE — PROGRESS NOTE ADULT - ASSESSMENT
This is a 48 year old male who presented to the ED after he was found to have STEMI on EKG outpatient (was scheduled for circumcision), developed left sided chest pain radiating to the back with chest tightness and burning sensation through the whole chest, which resolved in 1 hour, received loading dose in transit. Patient received catheterization which showed non-obstructive CAD. Patient also complains of constant right eye pain with double vision and blurring since 3 weeks ago; MRI orbits demonstrating no gross orbital abnormality; ophthalmology evaluation stated that this is likely 2/2 diabetic retinopathy; recommends follow up outpatient. Patient is currently admitted for work up for finding in MRI head, which showed findings concerning for lesions due to infarcts or demyelination (of varying ages) as well as infectious/inflammatory and  neoplastic etiologies.     PMHx :  Hx of multiple CVA with residual weakness on right side.   CKD IV baseline around 3.2  diverticulitis s/p LAR  cigarette use, ETOH abuse now sober x5 years  IDDM  HTN  AMBER  Vertigo     # Acute diastolic  CHF-resolved  -Atypical  chest pain  probably musculoskeletal  - Elevated troponin 2/2 to ckd  # H/o non obstructive CAD  # Hypertension  -Troponin T, Serum: 0.11on 07.18.23 @ 08:28  - 12 Lead ECG 07.17.23 @ 17:57 isNormal  -  ILR interrogated - no events  -  TTE Echo Complete w/o Contrast w/ Doppler (07.21.23 @ 10:59)  EF of 67 %. .mild pulmonary hypertension.  -  Xray Chest 1 View- PORTABLE-Urgent (Xray Chest 1 View- PORTABLE-Urgent .) (07.21.23 @ 15:28) >Cardiomegaly with CHF. Slightly worsened. IV lasix    was given on 7/21 .  Plan:  - c/w ASA, coreg, statin  - c/w norvasc, hydralazine  - cardiology on board   -monitor for fluid overload.    # Right eye pain with blurring of vision 2/2 to diabetic retinopathy  # Right 6th nerve palsy  # H/o multiple CVA   - MR Head No Cont (07.18.23 @ 22:59) > New 1 cm lesion within the right frontal lobe periventricular white matter demonstrating rim-like restricted diffusion.  New 8 mm lesion within the anterior right temporal lobe. Additional new small lesion within the right cerebellar hemisphere, the configuration suggests a chronic infarct.Otherwise stable patchy white matter disease and scattered chronic   lacunar infarcts.  - CT PERFUSION: No evidence of perfusion abnormality.  - CTA HEAD: Mild stenosis of the proximal right V4 segment of the vertebral artery.  - CTA NECK: No evidence of carotid or vertebral artery stenosis.  - Sedimentation Rate, Erythrocyte: 66 mm/Hr (07.18.23 @ 11:15)  - C-Reactive Protein, Serum: <3.0 mg/L (07.18.23 @ 11:15)  -  HIV, WES, dsDNA and Lyme   all Negative   -Angiotensin converting enzyme, NMO, MOG, Vitamin B12 followed.  - Ophthalmology on board  - HVF testing at out patient follow up  - follow up at [facility] eye clinic (or eye care provider of pts choice) within 4 weeks of discharge.   - MR Head w/wo IV Cont (07.20.23 @ 19:05)   - neuro F/u MR orbit w/wo contrast came back normal on 7/23 .  - Patient reports having LP done at [facility]. Faxed [facility] the request for LP report on 7/24.    # Acute kidney injury on  CKD stage 4,  # Hyperkalemia - resolved  - nephrology on Board   -monitor Urine output.  -Repeat  BMP    # DM type2 with hyperglycemia  - HbA1c is 10.8  - increased  lantus, c/w  lispro    # H/o  vertigo  - On  meclizine           MISC:  # DVT prophylaxis: heparin 5000 IU Subcutaneous every 8 hours .  #Diet:Regular  # Full code  # Disposition: home when stable      # Pending: sent FAX for request of  LP report  done at [facility] @ fax number              This is a 48 year old male who presented to the ED after he was found to have STEMI on EKG outpatient (was scheduled for circumcision), developed left sided chest pain radiating to the back with chest tightness and burning sensation through the whole chest, which resolved in 1 hour, received loading dose in transit. Patient received catheterization which showed non-obstructive CAD. Patient also complains of constant right eye pain with double vision and blurring since 3 weeks ago; MRI orbits demonstrating no gross orbital abnormality; ophthalmology evaluation stated that this is likely 2/2 diabetic retinopathy; recommends follow up outpatient. Patient is currently admitted for work up for finding in MRI head, which showed findings concerning for lesions due to infarcts or demyelination (of varying ages) as well as infectious/inflammatory and  neoplastic etiologies.     Hx of multiple CVA with residual weakness on right side.   CKD IV baseline around 3.2  diverticulitis s/p LAR  cigarette use, ETOH abuse now sober x5 years  IDDM  HTN  AMBER  Vertigo     # Acute diastolic  CHF-resolved  -Atypical  chest pain  probably musculoskeletal  - Elevated troponin 2/2 to ckd  # H/o non obstructive CAD  # Hypertension  -Troponin T, Serum: 0.11on 07.18.23 @ 08:28  - 12 Lead ECG 07.17.23 @ 17:57 isNormal  -  ILR interrogated - no events  -  TTE Echo Complete w/o Contrast w/ Doppler (07.21.23 @ 10:59)  EF of 67 %. .mild pulmonary hypertension.  -  Xray Chest 1 View- PORTABLE-Urgent (Xray Chest 1 View- PORTABLE-Urgent .) (07.21.23 @ 15:28) >Cardiomegaly with CHF. Slightly worsened. IV lasix    was given on 7/21 .  Plan:  - c/w ASA, coreg, statin  - c/w norvasc, hydralazine  - cardiology on board   -monitor for fluid overload.    # Right eye pain with blurring of vision 2/2 to diabetic retinopathy  # Right 6th nerve palsy  # H/o multiple CVA   - MR Head No Cont (07.18.23 @ 22:59) > New 1 cm lesion within the right frontal lobe periventricular white matter demonstrating rim-like restricted diffusion.  New 8 mm lesion within the anterior right temporal lobe. Additional new small lesion within the right cerebellar hemisphere, the configuration suggests a chronic infarct.Otherwise stable patchy white matter disease and scattered chronic   lacunar infarcts.  - CT PERFUSION: No evidence of perfusion abnormality.  - CTA HEAD: Mild stenosis of the proximal right V4 segment of the vertebral artery.  - CTA NECK: No evidence of carotid or vertebral artery stenosis.  - Sedimentation Rate, Erythrocyte: 66 mm/Hr (07.18.23 @ 11:15)  - C-Reactive Protein, Serum: <3.0 mg/L (07.18.23 @ 11:15)  -  HIV, WES, dsDNA and Lyme   all Negative   -Angiotensin converting enzyme, NMO, MOG, Vitamin B12 followed.  - Ophthalmology on board  - HVF testing at out patient follow up  - follow up at [facility] eye clinic (or eye care provider of pts choice) within 4 weeks of discharge.   - MR Head w/wo IV Cont (07.20.23 @ 19:05)   - neuro F/u MR orbit w/wo contrast came back normal on 7/23 .  - Patient reports having LP done at [facility]. Faxed [facility] the request for LP report on 7/24.    # Acute kidney injury on  CKD stage 4,  # Hyperkalemia - resolved  - nephrology on Board   -monitor Urine output.  -Repeat  BMP    # DM type2 with hyperglycemia  - HbA1c is 10.8  - increased  lantus, c/w  lispro    # H/o  vertigo  - On  meclizine           MISC:  # DVT prophylaxis: heparin 5000 IU Subcutaneous every 8 hours .  #Diet:Regular  # Full code  # Disposition: home when stable      # Pending: sent FAX for request of  LP report  done at [facility] @ fax number              This is a 48 year old male who presented to the ED after he was found to have STEMI on EKG outpatient (was scheduled for circumcision), developed left sided chest pain radiating to the back with chest tightness and burning sensation through the whole chest, which resolved in 1 hour, received loading dose in transit. Patient received catheterization which showed non-obstructive CAD. Patient also complains of constant right eye pain with double vision and blurring since 3 weeks ago; MRI orbits demonstrating no gross orbital abnormality; ophthalmology evaluation stated that this is likely 2/2 diabetic retinopathy; recommends follow up outpatient. Admitted on 7/17 for further evaluation of right sided eye pain and chest pain to telemetry. Patient is currently admitted for work up for finding in MRI head, which showed findings concerning for lesions due to infarcts or demyelination (of varying ages) as well as infectious/inflammatory and  neoplastic etiologies.     # Acute diastolic  CHF-resolved  # H/o non obstructive CAD  # Hypertension    - Elevated troponin 2/2 to ckd  -Troponin T, Serum: 0.11on 07.18.23 @ 08:28  - 12 Lead ECG 07.17.23 @ 17:57 is Normal  -  ILR interrogated - no events  -  TTE Echo Complete w/o Contrast w/ Doppler (07.21.23 @ 10:59)  EF of 67 %. .mild pulmonary hypertension.  -  Xray Chest 1 View- PORTABLE-Urgent (Xray Chest 1 View- PORTABLE-Urgent .) (07.21.23 @ 15:28) >Cardiomegaly with CHF. Slightly worsened. IV lasix    was given on 7/21 ;  -Atypical  chest pain probably musculoskeletal    Cardiology consulted  check serial EKGs and Celia - elevated troponin 2/2 ckd, troponin 0.11 on 7/18, EKG 7/17/23 normal   check TTE - mild pulmonary hypertensions   EP eval for Interrogation of ILR - ILR interrogated - no events  c/w aspirin, Coreg 25 BID and lipitor 80 mg qhs  c/w Amlodipine 5mg qd and Hydralazine  w/up of headache/blurry vision per neuro  no further cardiac w/up needed if troponins trending down and TTE unremarkable   recall prn    Plan:  - c/w ASA, coreg, statin  - c/w norvasc, hydralazine  -monitor for fluid overload. -  7/25/23 Repeat Cxry pending      # Right eye pain with blurring of vision 2/2 to diabetic retinopathy  # Right 6th nerve palsy  # H/o multiple CVA   Imaging:   - MR Head No Cont (07.18.23 @ 22:59) > New 1 cm lesion within the right frontal lobe periventricular white matter demonstrating rim-like restricted diffusion.  New 8 mm lesion within the anterior right temporal lobe. Additional new small lesion within the right cerebellar hemisphere, the configuration suggests a chronic infarct.Otherwise stable patchy white matter disease and scattered chronic   lacunar infarcts.  - CT PERFUSION: No evidence of perfusion abnormality.  - CTA HEAD: Mild stenosis of the proximal right V4 segment of the vertebral artery.  - CTA NECK: No evidence of carotid or vertebral artery stenosis.  - MR Head w/wo IV Cont (07.20.23 @ 19:05)    MR orbit w/wo contrast came back normal on 7/23 .    Pertinent lab results:   - Sedimentation Rate, Erythrocyte: 66 mm/Hr (07.18.23 @ 11:15)  - C-Reactive Protein, Serum: <3.0 mg/L (07.18.23 @ 11:15)  -  HIV, WES, dsDNA and Lyme   all Negative ; vitamin B12, ACE wnl   - NMO, MOG, pending      7/24/23 Neurology consult   HIV, WES, dsDNA, ACE, vitamin B12, Lyme unremarkable  - NMO, MOG pending, order QuantiFeron   - Patient reports Family hx of early Dementia at age of 48 (mother), check NOTCH3 rule out CADASIL given recurrent strokes and FH of dementia  - Patient reports having LP done at [facility], please get records  - get LP to evaluate infectious vs inflammatory causes of lesions: full standard, infectious, autoimmune/inflammatory, neoplastic workup    7/19/23 Ophthalmology consult  - Pt to follow up at [facility] eye clinic (or eye care provider of pts choice) within 4 weeks of discharge. Discussed benefit of retinal specialist given level of retinopathy seen during exam today   - Use sunglasses to limit light prn   - Start artifical tears 2x/day OU     7/19/23 Rheumatology consult  -MRI brain/ orbits noted   -Ophthalmology recs appreciated   -ESR 66 / CRP < 3   -PRN Meclizine   -Low suspicion for TAA    # Acute kidney injury on  CKD stage 4,  # Hyperkalemia - resolved    Nephro consult 7/24/23  BILLY on CKD 4/ Right frontal lobe lesion/ HTN/ DM, timing consistent w/ BRENDEN  responding to diuretics   follow UOP   s/p MRI Orbits with IC (7/23) no gross abnormalitiy (motion rtifact)    follow BMP UOP  no need for RRT now, cr improving    -monitor Urine output.  -Repeat  BMP    # DM type2 with hyperglycemia  - HbA1c is 10.8  - increased  lantus, c/w  lispro    # H/o  vertigo  - On  meclizine       MISC:  # DVT prophylaxis: heparin 5000 IU Subcutaneous every 8 hours .  #Diet :Regular DASH  # Full code  # Disposition: home when stable      # Pending: sent FAX for request of  LP report  done at [facility] @ fax number , emailed the HPI[employer] form on 7/25/23, still pending

## 2023-07-25 NOTE — PROGRESS NOTE ADULT - ASSESSMENT
47 YO male with PMHx of CKD IV baseline around 3.2, vertigo, diverticulitis s/p LAR, cigarette use, ETOH abuse now sober x5 years, IDDM, HTN, AMBER, Hx of multiple CVA with residual weakness on right side.   He was in the outpatient clinic for pre-test for circumcision, his EKG suggested STEMI so EMS was called and on the way to the hospital he developed chest pain of left side radiating to the back, characterized by tightness then burning sensation through the whole chest. This pain lasted for 1 hour and resolved, he received aspirin loading on the way here. Pt follows at [facility] and underwent cardiac catheterization which showed non-obstructive CAD.  He is also complaining from right eye pain with double vision and blurring of vision that started 3 weeks ago, he was admitted to another hospital and they discharged him after doing workup for his eye and ophthalmology saw him and they said it is mostly related to the uncontrolled diabetes. His right eye pain has been constant for the past 3 weeks radiating to the front head causing severe headaches, it is associated with photosensitivity, no tearing, no redness. He has no weakness, no numbness.     # Acute diastolic  CHF-resolved  # Atypical  chest pain  probably musculoskeletal - resolved   # Elevated troponin sec to ckd  # H/o non obstructive CAD  # Hypertension  -Troponin T, Serum: 0.10: Critical value: ng/mL (07.18.23 @ 08:28)   - 12 Lead ECG (07.17.23 @ 17:57) >Normal sinus rhythm  - c/w ASA, coreg, statin  - c/w norvasc, hydralazine  - evaluated by cardiology  -  ILR interrogated - no events  -  TTE Echo Complete w/o Contrast w/ Doppler (07.21.23 @ 10:59) >V Ejection Fraction by Lakhani's Method with a biplane EF of 67 %. Normal global left ventricular systolic function.mild pulmonary hypertension.  -  Xray Chest 1 View- PORTABLE-Urgent (Xray Chest 1 View- PORTABLE-Urgent .) (07.21.23 @ 15:28) >Cardiomegaly with CHF. Slightly worsened.  - IV lasix 80mg  x1 on 7/21  - monitor I/o , restrict fluids  - F/u xray chest  - monitor BP    # Right eye pain with blurring of vision sec to diabetic retinopathy  # Right 6th nerve palsy  # H/o multiple CVA   - MR Head No Cont (07.18.23 @ 22:59) > New 1 cm lesion within the right frontal lobe periventricular white matter demonstrating rim-like restricted diffusion.  New 8 mm lesion within the anterior right temporal lobe. Additional new small lesion within the right cerebellar hemisphere, the configuration suggests a chronic infarct.Otherwise stable patchy white matter disease and scattered chronic   lacunar infarcts.  - CT PERFUSION: No evidence of perfusion abnormality.  - CTA HEAD: Mild stenosis of the proximal right V4 segment of the vertebral artery.  - CTA NECK: No evidence of carotid or vertebral artery stenosis.  - Sedimentation Rate, Erythrocyte: 66 mm/Hr (07.18.23 @ 11:15)  - C-Reactive Protein, Serum: <3.0 mg/L (07.18.23 @ 11:15)  - neuro eval: MR brain with contrast to better characterize the lesions  - F/u  HIV, WES, dsDNA, Angiotensin converting enzyme, NMO, MOG, Vitamin B12, Lyme  - pt was evaluated by rheumatology  - Ophthal eval:  Right Cranial Nerve 6 Palsy - likely contributing to eye pain - likely 2/2 ischemic complications of diabetes , Type 2 Diabetes Mellitus with Severe NPDR OU ,  Very low suspicion for GCA at this time: no jaw claudication, temporal/scalp tenderness.  - plan for baseline HVF testing at out patient follow up  - Pt to follow up at [facility] eye clinic (or eye care provider of pts choice) within 4 weeks of discharge. Discussed benefit of retinal specialist given level of retinopathy seen during exam today   - Use sunglasses to limit light prn   - c/w  artifical tears 2x/day OU   - MR Head w/wo IV Cont (07.20.23 @ 19:05) >The 1 cm lesion within the right frontal lobe periventricular white matter demonstrating rim-like restricted diffusion demonstrates no  contrast enhancement. The 8 mm lesion within the anterior right temporal lobe demonstrates central enhancement.  The 1 cm linear lesion within the right cerebellar hemisphere also demonstrates central enhancement.  Diagnostic considerations for the above lesions include infarcts or demyelination (of varying ages) as well as infectious/inflammatory and  neoplastic etiologies. Consider CSF sampling as well as follow up imaging.Otherwise stable patchy white matter disease and scattered chronic  lacunar infarcts.  - neuro F/u MR orbit w/wo contrastMight need LP with CSF studies after MRI. Patient reports having LP done at [facility]. Recommend to get records  -  MR Orbits w/wo IV Cont (07.23.23 @ 11:05) >Motion limited study demonstrating no gross orbital abnormality.  - neuro F/u Recommend to repeat LP given the presence of enhancing lesions on MRI brain to assess for infectious and inflammatory process.       # Acute kidney injury on  CKD stage 4, probably sec to BRENDEN,  creatinine downtrending  # Hyperkalemia - resolved  # Metabolic acidosis- resolving  - S/P  insulin and albuterol, lokelma  - evaluated by  nephrology   - monitor BMP, monitor UO    # DM type2   - A1C with Estimated Average Glucose Result: 10.8 (07.17.23 @ 15:45)  - c/w   lantus, c/w  lispro    # H/o  vertigo  - c/w  meclizine     # Nicotine abuse  - pt counseled    # DVT prophylaxis    # Full code    # Pending: monitor BMP, F/u xray chest,   Patient reports having LP done at [facility]- obtain records,  repeat spinal tap  # Discussed plan of care with patient  # Disposition: home when stable

## 2023-07-25 NOTE — PROGRESS NOTE ADULT - SUBJECTIVE AND OBJECTIVE BOX
BRINDA MOYER 48y Male  MRN#: [id number]     Hospital Day: 8d    Pt is currently admitted with the primary diagnosis of  ST elevation myocardial infarction (STEMI)        SUBJECTIVE     Overnight events  None    Subjective complaints  Pt was evaluated this am. Patient denied any active complaints and per patient his symptoms are improving.  Patient complains of chronic pain in his LLQ. Patient states he has history of constipation, diverticulitis s/p surgery at age of 14, and colon resection.   Patient also complains of chronic swelling in his lower extremities after starting his antihypertensives.                                           ----------------------------------------------------------  OBJECTIVE  PAST MEDICAL & SURGICAL HISTORY  Asthma    Diverticulitis  surgery 16yrs ago    High cholesterol    Dyslipidemia    Hypertension    H/O vertigo    Diabetic ulcer of right foot    Broken toe  left pinky toe    Vertigo    HTN (hypertension)    Diabetes    AMBER on CPAP    History of TIAs    History of surgery  colon resection                                              -----------------------------------------------------------  ALLERGIES:  No Known Allergies                                            ------------------------------------------------------------    HOME MEDICATIONS  Home Medications:  ergocalciferol 1.25 mg (50,000 intl units) oral tablet: orally once a week (17 Jul 2023 22:27)  Jardiance 10 mg oral tablet: 1 orally once a week (17 Jul 2023 22:27)  meclizine 25 mg oral tablet: 1 orally 2 times a day (17 Jul 2023 22:27)  rosuvastatin 20 mg oral capsule: 1 orally once a day (at bedtime) (17 Jul 2023 22:27)  spironolactone 25 mg oral tablet: 1 orally once a day (17 Jul 2023 22:27)                           MEDICATIONS:  STANDING MEDICATIONS  amLODIPine   Tablet 10 milliGRAM(s) Oral daily  artificial tears (preservative free) Ophthalmic Solution 1 Drop(s) Right EYE two times a day  aspirin enteric coated 81 milliGRAM(s) Oral daily  atorvastatin 20 milliGRAM(s) Oral at bedtime  carvedilol 25 milliGRAM(s) Oral every 12 hours  dextrose 5%. 1000 milliLiter(s) IV Continuous <Continuous>  dextrose 5%. 1000 milliLiter(s) IV Continuous <Continuous>  dextrose 50% Injectable 25 Gram(s) IV Push once  dextrose 50% Injectable 12.5 Gram(s) IV Push once  dextrose 50% Injectable 25 Gram(s) IV Push once  glucagon  Injectable 1 milliGRAM(s) IntraMuscular once  heparin   Injectable 5000 Unit(s) SubCutaneous every 8 hours  hydrALAZINE 25 milliGRAM(s) Oral three times a day  insulin glargine Injectable (LANTUS) 25 Unit(s) SubCutaneous at bedtime  insulin lispro (ADMELOG) corrective regimen sliding scale   SubCutaneous three times a day before meals  insulin lispro Injectable (ADMELOG) 5 Unit(s) SubCutaneous three times a day before meals  meclizine 25 milliGRAM(s) Oral two times a day  pantoprazole    Tablet 40 milliGRAM(s) Oral before breakfast  polyethylene glycol 3350 17 Gram(s) Oral two times a day  senna 2 Tablet(s) Oral at bedtime  sodium bicarbonate 650 milliGRAM(s) Oral three times a day    PRN MEDICATIONS  acetaminophen     Tablet .. 650 milliGRAM(s) Oral every 6 hours PRN  dextrose Oral Gel 15 Gram(s) Oral once PRN                                            ------------------------------------------------------------  VITAL SIGNS: Last 24 Hours  Vital Signs Last 24 Hrs  T(C): 36.2 (25 Jul 2023 08:00), Max: 36.4 (24 Jul 2023 16:00)  T(F): 97.1 (25 Jul 2023 08:00), Max: 97.6 (24 Jul 2023 16:00)  HR: 74 (25 Jul 2023 08:00) (72 - 82)  BP: 161/75 (25 Jul 2023 08:00) (134/67 - 176/84)  BP(mean): --  RR: 18 (25 Jul 2023 08:00) (18 - 18)  SpO2: 97% (25 Jul 2023 08:00) (97% - 97%)    Parameters below as of 25 Jul 2023 08:00  Patient On (Oxygen Delivery Method): room air                                               --------------------------------------------------------------  LABS:                                   10.0   6.51  )-----------( 199      ( 25 Jul 2023 06:37 )             30.0       07-25    140  |  106  |  52<H>  ----------------------------<  172<H>  4.5   |  23  |  3.5<H>    Ca    8.4      25 Jul 2023 06:37  Mg     1.9     07-25    TPro  5.7<L>  /  Alb  3.4<L>  /  TBili  <0.2  /  DBili  x   /  AST  27  /  ALT  25  /  AlkPhos  144<H>  07-25            PHYSICAL EXAM:  GENERAL: NAD, lying in bed comfortably  EYES: EOMI, conjunctiva and sclera clear  ENT: Moist mucous membranes  NECK: Supple, No JVD  CHEST/LUNG: Clear to auscultation bilaterally; No rales, rhonchi, wheezing, or rubs. Unlabored respirations  HEART: regular rate and rhythm; No murmurs, rubs, or gallops  ABDOMEN: Soft, Nondistended, mild tenderness to palpation in LLQ with size 5cm mass appreciated.   EXTREMITIES: pitting edema in lower extremities   NERVOUS SYSTEM:  Alert & Oriented X3. No focal deficits                                              --------------------------------------------------------------

## 2023-07-25 NOTE — PROGRESS NOTE ADULT - SUBJECTIVE AND OBJECTIVE BOX
Nephrology progress note    Patient is seen and examined, events over the last 24 h noted .  Pt c/o LE swelling    Allergies:  No Known Allergies    Hospital Medications:   MEDICATIONS  (STANDING):  amLODIPine   Tablet 10 milliGRAM(s) Oral daily  artificial tears (preservative free) Ophthalmic Solution 1 Drop(s) Right EYE two times a day  aspirin enteric coated 81 milliGRAM(s) Oral daily  atorvastatin 20 milliGRAM(s) Oral at bedtime  bisacodyl 5 milliGRAM(s) Oral at bedtime  carvedilol 25 milliGRAM(s) Oral every 12 hours  dextrose 5%. 1000 milliLiter(s) (50 mL/Hr) IV Continuous <Continuous>  dextrose 5%. 1000 milliLiter(s) (100 mL/Hr) IV Continuous <Continuous>  dextrose 50% Injectable 25 Gram(s) IV Push once  dextrose 50% Injectable 12.5 Gram(s) IV Push once  dextrose 50% Injectable 25 Gram(s) IV Push once  glucagon  Injectable 1 milliGRAM(s) IntraMuscular once  heparin   Injectable 5000 Unit(s) SubCutaneous every 8 hours  hydrALAZINE 25 milliGRAM(s) Oral three times a day  insulin glargine Injectable (LANTUS) 25 Unit(s) SubCutaneous at bedtime  insulin lispro (ADMELOG) corrective regimen sliding scale   SubCutaneous three times a day before meals  insulin lispro Injectable (ADMELOG) 5 Unit(s) SubCutaneous three times a day before meals  meclizine 25 milliGRAM(s) Oral two times a day  pantoprazole    Tablet 40 milliGRAM(s) Oral before breakfast  polyethylene glycol 3350 17 Gram(s) Oral two times a day  senna 2 Tablet(s) Oral at bedtime        VITALS:  T(F): 97.2 (07-25-23 @ 17:00), Max: 97.2 (07-25-23 @ 17:00)  HR: 80 (07-25-23 @ 17:00)  BP: 160/77 (07-25-23 @ 17:00)  RR: 16 (07-25-23 @ 17:00)  SpO2: 97% (07-25-23 @ 08:00)  Wt(kg): --    07-23 @ 07:01  -  07-24 @ 07:00  --------------------------------------------------------  IN: 900 mL / OUT: 1600 mL / NET: -700 mL    07-24 @ 07:01  -  07-25 @ 07:00  --------------------------------------------------------  IN: 240 mL / OUT: 700 mL / NET: -460 mL          PHYSICAL EXAM:  Constitutional: NAD  HEENT: anicteric sclera  Neck: No JVD  Respiratory: CTA  Cardiovascular: S1, S2, RRR  Gastrointestinal: BS+, soft, NT/ND  Extremities: 1+ peripheral edema  Neurological: A/O x 3  : No CVA tenderness. No faith.   Skin: No rashes  Vascular Access:    LABS:  07-25    140  |  106  |  52<H>  ----------------------------<  172<H>  4.5   |  23  |  3.5<H>    Ca    8.4      25 Jul 2023 06:37  Mg     1.9     07-25    TPro  5.7<L>  /  Alb  3.4<L>  /  TBili  <0.2  /  DBili      /  AST  27  /  ALT  25  /  AlkPhos  144<H>  07-25                          10.0   6.51  )-----------( 199      ( 25 Jul 2023 06:37 )             30.0       Urine Studies:  Urinalysis Basic - ( 25 Jul 2023 06:37 )    Color:  / Appearance:  / SG:  / pH:   Gluc: 172 mg/dL / Ketone:   / Bili:  / Urobili:    Blood:  / Protein:  / Nitrite:    Leuk Esterase:  / RBC:  / WBC    Sq Epi:  / Non Sq Epi:  / Bacteria:         RADIOLOGY & ADDITIONAL STUDIES:

## 2023-07-26 ENCOUNTER — RESULT REVIEW (OUTPATIENT)
Age: 48
End: 2023-07-26

## 2023-07-26 LAB
ALBUMIN SERPL ELPH-MCNC: 3.6 G/DL — SIGNIFICANT CHANGE UP (ref 3.5–5.2)
ALP SERPL-CCNC: 143 U/L — HIGH (ref 30–115)
ALT FLD-CCNC: 31 U/L — SIGNIFICANT CHANGE UP (ref 0–41)
ANION GAP SERPL CALC-SCNC: 11 MMOL/L — SIGNIFICANT CHANGE UP (ref 7–14)
APPEARANCE CSF: CLEAR — SIGNIFICANT CHANGE UP
APPEARANCE SPUN FLD: COLORLESS — SIGNIFICANT CHANGE UP
AST SERPL-CCNC: 29 U/L — SIGNIFICANT CHANGE UP (ref 0–41)
BASOPHILS # BLD AUTO: 0.05 K/UL — SIGNIFICANT CHANGE UP (ref 0–0.2)
BASOPHILS NFR BLD AUTO: 0.8 % — SIGNIFICANT CHANGE UP (ref 0–1)
BILIRUB SERPL-MCNC: <0.2 MG/DL — SIGNIFICANT CHANGE UP (ref 0.2–1.2)
BUN SERPL-MCNC: 50 MG/DL — HIGH (ref 10–20)
CALCIUM SERPL-MCNC: 8.8 MG/DL — SIGNIFICANT CHANGE UP (ref 8.4–10.5)
CHLORIDE SERPL-SCNC: 108 MMOL/L — SIGNIFICANT CHANGE UP (ref 98–110)
CO2 SERPL-SCNC: 22 MMOL/L — SIGNIFICANT CHANGE UP (ref 17–32)
COLOR CSF: SIGNIFICANT CHANGE UP
CREAT SERPL-MCNC: 3.3 MG/DL — HIGH (ref 0.7–1.5)
EGFR: 22 ML/MIN/1.73M2 — LOW
EOSINOPHIL # BLD AUTO: 0.29 K/UL — SIGNIFICANT CHANGE UP (ref 0–0.7)
EOSINOPHIL NFR BLD AUTO: 4.4 % — SIGNIFICANT CHANGE UP (ref 0–8)
GLUCOSE BLDC GLUCOMTR-MCNC: 158 MG/DL — HIGH (ref 70–99)
GLUCOSE BLDC GLUCOMTR-MCNC: 187 MG/DL — HIGH (ref 70–99)
GLUCOSE BLDC GLUCOMTR-MCNC: 189 MG/DL — HIGH (ref 70–99)
GLUCOSE BLDC GLUCOMTR-MCNC: 86 MG/DL — SIGNIFICANT CHANGE UP (ref 70–99)
GLUCOSE CSF-MCNC: 97 MG/DL — HIGH (ref 45–75)
GLUCOSE SERPL-MCNC: 103 MG/DL — HIGH (ref 70–99)
GLUCOSE SERPL-MCNC: 167 MG/DL — HIGH (ref 70–99)
GRAM STN FLD: SIGNIFICANT CHANGE UP
GRAM STN FLD: SIGNIFICANT CHANGE UP
HCT VFR BLD CALC: 30.1 % — LOW (ref 42–52)
HGB BLD-MCNC: 10.1 G/DL — LOW (ref 14–18)
IMM GRANULOCYTES NFR BLD AUTO: 0.5 % — HIGH (ref 0.1–0.3)
LYMPHOCYTES # BLD AUTO: 1.69 K/UL — SIGNIFICANT CHANGE UP (ref 1.2–3.4)
LYMPHOCYTES # BLD AUTO: 25.9 % — SIGNIFICANT CHANGE UP (ref 20.5–51.1)
MAGNESIUM SERPL-MCNC: 1.9 MG/DL — SIGNIFICANT CHANGE UP (ref 1.8–2.4)
MCHC RBC-ENTMCNC: 31.4 PG — HIGH (ref 27–31)
MCHC RBC-ENTMCNC: 33.6 G/DL — SIGNIFICANT CHANGE UP (ref 32–37)
MCV RBC AUTO: 93.5 FL — SIGNIFICANT CHANGE UP (ref 80–94)
MONOCYTES # BLD AUTO: 0.54 K/UL — SIGNIFICANT CHANGE UP (ref 0.1–0.6)
MONOCYTES NFR BLD AUTO: 8.3 % — SIGNIFICANT CHANGE UP (ref 1.7–9.3)
NEUTROPHILS # BLD AUTO: 3.92 K/UL — SIGNIFICANT CHANGE UP (ref 1.4–6.5)
NEUTROPHILS # CSF: SIGNIFICANT CHANGE UP % (ref 0–6)
NEUTROPHILS NFR BLD AUTO: 60.1 % — SIGNIFICANT CHANGE UP (ref 42.2–75.2)
NRBC # BLD: 0 /100 WBCS — SIGNIFICANT CHANGE UP (ref 0–0)
NRBC NFR CSF: 3 /UL — SIGNIFICANT CHANGE UP (ref 0–5)
PLATELET # BLD AUTO: 208 K/UL — SIGNIFICANT CHANGE UP (ref 130–400)
PMV BLD: 11.6 FL — HIGH (ref 7.4–10.4)
POTASSIUM SERPL-MCNC: 4.8 MMOL/L — SIGNIFICANT CHANGE UP (ref 3.5–5)
POTASSIUM SERPL-SCNC: 4.8 MMOL/L — SIGNIFICANT CHANGE UP (ref 3.5–5)
PROT CSF-MCNC: 42 MG/DL — SIGNIFICANT CHANGE UP (ref 15–45)
PROT SERPL-MCNC: 6 G/DL — SIGNIFICANT CHANGE UP (ref 6–8)
RBC # BLD: 3.22 M/UL — LOW (ref 4.7–6.1)
RBC # CSF: 1 /UL — SIGNIFICANT CHANGE UP (ref 0–0)
RBC # FLD: 12.5 % — SIGNIFICANT CHANGE UP (ref 11.5–14.5)
SODIUM SERPL-SCNC: 141 MMOL/L — SIGNIFICANT CHANGE UP (ref 135–146)
SPECIMEN SOURCE: SIGNIFICANT CHANGE UP
TUBE TYPE: SIGNIFICANT CHANGE UP
WBC # BLD: 6.52 K/UL — SIGNIFICANT CHANGE UP (ref 4.8–10.8)
WBC # FLD AUTO: 6.52 K/UL — SIGNIFICANT CHANGE UP (ref 4.8–10.8)

## 2023-07-26 PROCEDURE — 62270 DX LMBR SPI PNXR: CPT

## 2023-07-26 PROCEDURE — 99233 SBSQ HOSP IP/OBS HIGH 50: CPT | Mod: 25

## 2023-07-26 PROCEDURE — 88189 FLOWCYTOMETRY/READ 16 & >: CPT

## 2023-07-26 PROCEDURE — 88108 CYTOPATH CONCENTRATE TECH: CPT | Mod: 26

## 2023-07-26 RX ADMIN — Medication 1: at 16:49

## 2023-07-26 RX ADMIN — HEPARIN SODIUM 5000 UNIT(S): 5000 INJECTION INTRAVENOUS; SUBCUTANEOUS at 05:16

## 2023-07-26 RX ADMIN — Medication 81 MILLIGRAM(S): at 11:19

## 2023-07-26 RX ADMIN — Medication 25 MILLIGRAM(S): at 13:09

## 2023-07-26 RX ADMIN — Medication 25 MILLIGRAM(S): at 17:01

## 2023-07-26 RX ADMIN — PANTOPRAZOLE SODIUM 40 MILLIGRAM(S): 20 TABLET, DELAYED RELEASE ORAL at 05:16

## 2023-07-26 RX ADMIN — HEPARIN SODIUM 5000 UNIT(S): 5000 INJECTION INTRAVENOUS; SUBCUTANEOUS at 13:09

## 2023-07-26 RX ADMIN — Medication 1 DROP(S): at 17:03

## 2023-07-26 RX ADMIN — Medication 5 UNIT(S): at 16:49

## 2023-07-26 RX ADMIN — HEPARIN SODIUM 5000 UNIT(S): 5000 INJECTION INTRAVENOUS; SUBCUTANEOUS at 21:19

## 2023-07-26 RX ADMIN — CARVEDILOL PHOSPHATE 25 MILLIGRAM(S): 80 CAPSULE, EXTENDED RELEASE ORAL at 17:01

## 2023-07-26 RX ADMIN — Medication 5 UNIT(S): at 07:54

## 2023-07-26 RX ADMIN — CARVEDILOL PHOSPHATE 25 MILLIGRAM(S): 80 CAPSULE, EXTENDED RELEASE ORAL at 05:17

## 2023-07-26 RX ADMIN — Medication 1: at 07:55

## 2023-07-26 RX ADMIN — Medication 1 DROP(S): at 05:16

## 2023-07-26 RX ADMIN — Medication 25 MILLIGRAM(S): at 05:16

## 2023-07-26 RX ADMIN — POLYETHYLENE GLYCOL 3350 17 GRAM(S): 17 POWDER, FOR SOLUTION ORAL at 17:03

## 2023-07-26 RX ADMIN — ATORVASTATIN CALCIUM 20 MILLIGRAM(S): 80 TABLET, FILM COATED ORAL at 21:20

## 2023-07-26 RX ADMIN — AMLODIPINE BESYLATE 10 MILLIGRAM(S): 2.5 TABLET ORAL at 05:17

## 2023-07-26 RX ADMIN — INSULIN GLARGINE 25 UNIT(S): 100 INJECTION, SOLUTION SUBCUTANEOUS at 21:19

## 2023-07-26 RX ADMIN — Medication 25 MILLIGRAM(S): at 21:20

## 2023-07-26 NOTE — PROGRESS NOTE ADULT - ASSESSMENT
This is a 48 year old male who presented to the ED after he was found to have STEMI on EKG outpatient (was scheduled for circumcision), developed left sided chest pain radiating to the back with chest tightness and burning sensation through the whole chest, which resolved in 1 hour, received loading dose in transit. Patient received catheterization which showed non-obstructive CAD. Patient also complains of constant right eye pain with double vision and blurring since 3 weeks ago; MRI orbits demonstrating no gross orbital abnormality; ophthalmology evaluation stated that this is likely 2/2 diabetic retinopathy; recommends follow up outpatient. Admitted on 7/17 for further evaluation of right sided eye pain and chest pain to telemetry. Patient is currently admitted for work up for finding in MRI head, which showed findings concerning for lesions due to infarcts or demyelination (of varying ages) as well as infectious/inflammatory and  neoplastic etiologies.     # Acute diastolic  CHF-resolved  # H/o non obstructive CAD  # Hypertension    - Elevated troponin 2/2 to ckd  -Troponin T, Serum: 0.11on 07.18.23 @ 08:28  - 12 Lead ECG 07.17.23 @ 17:57 is Normal  -  ILR interrogated - no events  -  TTE Echo Complete w/o Contrast w/ Doppler (07.21.23 @ 10:59)  EF of 67 %. .mild pulmonary hypertension.  -  Xray Chest 1 View- PORTABLE-Urgent (Xray Chest 1 View- PORTABLE-Urgent .) (07.21.23 @ 15:28) >Cardiomegaly with CHF. Slightly worsened. IV lasix    was given on 7/21 ;  -Atypical  chest pain probably musculoskeletal  - 7/25/23 Repeat Cxry shows mild CHF, unchanged  Cardiology consulted  check serial EKGs and Celia - elevated troponin 2/2 ckd, troponin 0.11 on 7/18, EKG 7/17/23 normal   check TTE - mild pulmonary hypertensions   EP eval for Interrogation of ILR - ILR interrogated - no events  c/w aspirin, Coreg 25 BID and lipitor 80 mg qhs  c/w Amlodipine 5mg qd and Hydralazine  w/up of headache/blurry vision per neuro  no further cardiac w/up needed if troponins trending down and TTE unremarkable   recall prn    Plan:  - c/w ASA, coreg, statin  - c/w norvasc, hydralazine  -monitor for fluid overload.     # Right eye pain with blurring of vision 2/2 to diabetic retinopathy  # Right 6th nerve palsy  # H/o multiple CVA   Imaging:   - MR Head No Cont (07.18.23 @ 22:59) > New 1 cm lesion within the right frontal lobe periventricular white matter demonstrating rim-like restricted diffusion.  New 8 mm lesion within the anterior right temporal lobe. Additional new small lesion within the right cerebellar hemisphere, the configuration suggests a chronic infarct.Otherwise stable patchy white matter disease and scattered chronic   lacunar infarcts.  - CT PERFUSION: No evidence of perfusion abnormality.  - CTA HEAD: Mild stenosis of the proximal right V4 segment of the vertebral artery.  - CTA NECK: No evidence of carotid or vertebral artery stenosis.  - MR Head w/wo IV Cont (07.20.23 @ 19:05)    MR orbit w/wo contrast came back normal on 7/23 .    Pertinent lab results:   - Sedimentation Rate, Erythrocyte: 66 mm/Hr (07.18.23 @ 11:15)  - C-Reactive Protein, Serum: <3.0 mg/L (07.18.23 @ 11:15)  -  HIV, WES, dsDNA and Lyme   all Negative ; vitamin B12, ACE wnl   - NMO, MOG, pending      7/24/23 Neurology consult   - HIV, WES, dsDNA, ACE, vitamin B12, Lyme unremarkable  - NMO, MOG pending, order QuantiFeron   - Patient reports Family hx of early Dementia at age of 48 (mother), check NOTCH3 rule out CADASIL given recurrent strokes and FH of dementia  - Patient reports having LP done at [facility], please get records  - get LP to evaluate infectious vs inflammatory causes of lesions: full standard, infectious, autoimmune/inflammatory, neoplastic workup    7/19/23 Ophthalmology consult  - Pt to follow up at [facility] eye clinic (or eye care provider of pts choice) within 4 weeks of discharge. Discussed benefit of retinal specialist given level of retinopathy seen during exam today   - Use sunglasses to limit light prn   - Start artifical tears 2x/day OU     7/19/23 Rheumatology consult  -PRN Meclizine   -Low suspicion for TAA    # Acute kidney injury on  CKD stage 4,  # Hyperkalemia - resolved    Nephro consult 7/24/23  BILLY on CKD 4/ Right frontal lobe lesion/ HTN/ DM, timing consistent w/ BRENDEN  responding to diuretics   follow UOP   s/p MRI Orbits with IC (7/23) no gross abnormalitiy (motion rtifact)    follow BMP UOP  no need for RRT now, cr improving    -monitor Urine output.  -Repeat  BMP    # DM type2 with hyperglycemia  - HbA1c is 10.8  - increased  lantus, c/w  lispro    # H/o  vertigo  - On  meclizine       MISC:  # DVT prophylaxis: heparin 5000 IU Subcutaneous every 8 hours .  #Diet :Regular DASH  # Full code  # Disposition: home when stable      # Pending: sent FAX for request of  LP report  done at [facility] @ fax number , emailed the HPIAA form on 7/25/23, still pending             This is a 48 year old male who presented to the ED after he was found to have STEMI on EKG outpatient (was scheduled for circumcision), developed left sided chest pain radiating to the back with chest tightness and burning sensation through the whole chest, which resolved in 1 hour, received loading dose in transit. Patient received catheterization which showed non-obstructive CAD. Patient also complains of constant right eye pain with double vision and blurring since 3 weeks ago; MRI orbits demonstrating no gross orbital abnormality; ophthalmology evaluation stated that this is likely 2/2 diabetic retinopathy; recommends follow up outpatient. Admitted on 7/17 for further evaluation of right sided eye pain and chest pain to telemetry. Patient is currently admitted for work up for finding in MRI head, which showed findings concerning for lesions due to infarcts or demyelination (of varying ages) as well as infectious/inflammatory and  neoplastic etiologies.     # Acute diastolic  CHF-resolved  # H/o non obstructive CAD  # Hypertension    - Elevated troponin 2/2 to ckd  -Troponin T, Serum: 0.11on 07.18.23 @ 08:28  - 12 Lead ECG 07.17.23 @ 17:57 is Normal  -  ILR interrogated - no events  -  TTE Echo Complete w/o Contrast w/ Doppler (07.21.23 @ 10:59)  EF of 67 %. .mild pulmonary hypertension.  -  Xray Chest 1 View- PORTABLE-Urgent (Xray Chest 1 View- PORTABLE-Urgent .) (07.21.23 @ 15:28) >Cardiomegaly with CHF. Slightly worsened. IV lasix    was given on 7/21 ;  -Atypical  chest pain probably musculoskeletal  - 7/25/23 Repeat Cxry shows mild CHF, unchanged  Cardiology consulted  check serial EKGs and Celia - elevated troponin 2/2 ckd, troponin 0.11 on 7/18, EKG 7/17/23 normal   check TTE - mild pulmonary hypertensions   EP eval for Interrogation of ILR - ILR interrogated - no events  c/w aspirin, Coreg 25 BID and lipitor 80 mg qhs  c/w Amlodipine 5mg qd and Hydralazine  w/up of headache/blurry vision per neuro  no further cardiac w/up needed if troponins trending down and TTE unremarkable   recall prn    Plan:  - c/w ASA, coreg, statin  - c/w norvasc, hydralazine  -monitor for fluid overload.     # Right eye pain with blurring of vision 2/2 to diabetic retinopathy  # Right 6th nerve palsy  # H/o multiple CVA   Imaging:   - MR Head No Cont (07.18.23 @ 22:59) > New 1 cm lesion within the right frontal lobe periventricular white matter demonstrating rim-like restricted diffusion.  New 8 mm lesion within the anterior right temporal lobe. Additional new small lesion within the right cerebellar hemisphere, the configuration suggests a chronic infarct.Otherwise stable patchy white matter disease and scattered chronic   lacunar infarcts.  - CT PERFUSION: No evidence of perfusion abnormality.  - CTA HEAD: Mild stenosis of the proximal right V4 segment of the vertebral artery.  - CTA NECK: No evidence of carotid or vertebral artery stenosis.  - MR Head w/wo IV Cont (07.20.23 @ 19:05)   -  MR orbit w/wo contrast came back normal on 7/23 .    Pertinent lab results:   - Sedimentation Rate, Erythrocyte: 66 mm/Hr (07.18.23 @ 11:15)  - C-Reactive Protein, Serum: <3.0 mg/L (07.18.23 @ 11:15)  -  HIV, WES, dsDNA and Lyme   all Negative ; vitamin B12, ACE wnl   - NMO, MOG, pending      7/24/23 Neurology consult   - HIV, WES, dsDNA, ACE, vitamin B12, Lyme unremarkable  - NMO, MOG pending, order QuantiFeron   - Patient reports Family hx of early Dementia at age of 48 (mother), check NOTCH3 rule out CADASIL given recurrent strokes and FH of dementia  - Patient reports having LP done at [facility], please get records  - get LP to evaluate infectious vs inflammatory causes of lesions: full standard, infectious, autoimmune/inflammatory, neoplastic workup  - LP 7/26/23 7/19/23 Ophthalmology consult  - Pt to follow up at [facility] eye clinic (or eye care provider of pts choice) within 4 weeks of discharge. Discussed benefit of retinal specialist given level of retinopathy seen during exam today   - Use sunglasses to limit light prn   - Start artifical tears 2x/day OU     7/19/23 Rheumatology consult  -PRN Meclizine   -Low suspicion for TAA    # Acute kidney injury on  CKD stage 4,  # Hyperkalemia - resolved    Nephro consult 7/24/23  BILLY on CKD 4/ Right frontal lobe lesion/ HTN/ DM, timing consistent w/ BRENDEN  responding to diuretics   follow UOP   s/p MRI Orbits with IC (7/23) no gross abnormalitiy (motion rtifact)    follow BMP UOP  no need for RRT now, cr improving    -monitor Urine output.  -Repeat  BMP    # DM type2 with hyperglycemia  - HbA1c is 10.8  - increased  lantus, c/w  lispro    # H/o  vertigo  - On  meclizine       MISC:  # DVT prophylaxis: heparin 5000 IU Subcutaneous every 8 hours .  #Diet :Regular DASH  # Full code  # Disposition: home when stable      # Pending: LP result

## 2023-07-26 NOTE — PROGRESS NOTE ADULT - ASSESSMENT
47 YO male with PMHx of CKD IV baseline around 3.2, vertigo, diverticulitis s/p LAR, cigarette use, ETOH abuse now sober x5 years, IDDM, HTN, AMBER, Hx of multiple CVA with residual weakness on right side presents for chest pain along with right eye pain with photosensitivity. MR brain showed new lesion in his right frontal lobe with rim-like restricting diffusion    BILLY on CKD 4/ Right frontal lobe lesion/ HTN/ DM, timing consistent w/ BRENDEN  creat is trending down - creat in Nov 2022 was around 3.0   today it is 3.3  follow UOP   CTAP with IC 7/17 no hydro  s/p MRI Orbits with IC (7/23) no gross abnormality (motion artifact)  , LP today  follow BMP UOP

## 2023-07-26 NOTE — PROGRESS NOTE ADULT - ASSESSMENT
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   He was in the outpatient clinic for pre-test for circumcision, his EKG suggested STEMI so EMS was called and on the way to the hospital he developed chest pain of left side radiating to the back, characterized by tightness then burning sensation through the whole chest. This pain lasted for 1 hour and resolved, he received aspirin loading on the way here. Pt follows at [facility] and underwent cardiac catheterization which showed non-obstructive CAD.  He is also complaining from right eye pain with double vision and blurring of vision that started 3 weeks ago, he was admitted to another hospital and they discharged him after doing workup for his eye and ophthalmology saw him and they said it is mostly related to the uncontrolled diabetes. His right eye pain has been constant for the past 3 weeks radiating to the front head causing severe headaches, it is associated with photosensitivity, no tearing, no redness. He has no weakness, no numbness.     # Acute diastolic  CHF  resolved at chronic stable state     # Atypical  chest pain  probably musculoskeletal  Elevated troponin sec to ckd  H/o non obstructive CAD    #DM   POCT Blood Glucose.: 86 mg/dL (26 Jul 2023 11:20)  POCT Blood Glucose.: 158 mg/dL (26 Jul 2023 07:42)  POCT Blood Glucose.: 263 mg/dL (25 Jul 2023 20:39)  POCT Blood Glucose.: 247 mg/dL (25 Jul 2023 16:29)  controlled      # Hypertension  BP: 141/70 (26 Jul 2023 08:37) (119/57 - 160/77)  controlled     # Right eye pain with blurring of vision sec to diabetic retinopathy    # Right 6th nerve palsy    # H/o multiple CVA   - MR Head w/wo IV Cont (07.20.23 @ 19:05) >The 1 cm lesion within the right frontal lobe periventricular white matter demonstrating rim-like restricted diffusion demonstrates no  contrast enhancement. The 8 mm lesion within the anterior right temporal lobe demonstrates central enhancement.  The 1 cm linear lesion within the right cerebellar hemisphere also demonstrates central enhancement.  Diagnostic considerations for the above lesions include infarcts or demyelination (of varying ages) as well as infectious/inflammatory and  neoplastic etiologies. Consider CSF sampling as well as follow up imaging.Otherwise stable patchy white matter disease and scattered chronic  lacunar infarcts.  LP today     # Acute kidney injury on  CKD stage 4, probably sec to BRENDEN,  creatinine downtrending Creatinine Trend: 3.3<--, 3.5<--, 3.8<--, 4.3<--, 5.4<--, 6.5<--    # Hyperkalemia - resolved    # Metabolic acidosis- resolving    # DM type2   POCT Blood Glucose.: 86 mg/dL (26 Jul 2023 11:20)  POCT Blood Glucose.: 158 mg/dL (26 Jul 2023 07:42)  POCT Blood Glucose.: 263 mg/dL (25 Jul 2023 20:39)  POCT Blood Glucose.: 247 mg/dL (25 Jul 2023 16:29)  controlled     # H/o  vertigo  - c/w  meclizine     #tobaco abuse counseling spent 10 additional minutes counseling patient, this counseling includes dangers of continuing to smoke including but not limited to death , worsening of chronic conditions ; advised resources available to help in quitting time spent 10 minutes     # DVT prophylaxis    # Full code    PROGRESS NOTE HANDOFF    Pending: LP today , neuro follow up     Family discussion: patient verbalized understanding and agreeable to plan of care     Disposition: Home

## 2023-07-26 NOTE — PROGRESS NOTE ADULT - SUBJECTIVE AND OBJECTIVE BOX
Nephrology progress note    Patient was seen and examined, events over the last 24 h noted .    Allergies:  No Known Allergies    Hospital Medications:   MEDICATIONS  (STANDING):  amLODIPine   Tablet 10 milliGRAM(s) Oral daily  artificial tears (preservative free) Ophthalmic Solution 1 Drop(s) Right EYE two times a day  aspirin enteric coated 81 milliGRAM(s) Oral daily  atorvastatin 20 milliGRAM(s) Oral at bedtime  bisacodyl 5 milliGRAM(s) Oral at bedtime  carvedilol 25 milliGRAM(s) Oral every 12 hours  dextrose 5%. 1000 milliLiter(s) (50 mL/Hr) IV Continuous <Continuous>  dextrose 5%. 1000 milliLiter(s) (100 mL/Hr) IV Continuous <Continuous>  dextrose 50% Injectable 12.5 Gram(s) IV Push once  dextrose 50% Injectable 25 Gram(s) IV Push once  dextrose 50% Injectable 25 Gram(s) IV Push once  glucagon  Injectable 1 milliGRAM(s) IntraMuscular once  heparin   Injectable 5000 Unit(s) SubCutaneous every 8 hours  hydrALAZINE 25 milliGRAM(s) Oral three times a day  insulin glargine Injectable (LANTUS) 25 Unit(s) SubCutaneous at bedtime  insulin lispro (ADMELOG) corrective regimen sliding scale   SubCutaneous three times a day before meals  insulin lispro Injectable (ADMELOG) 5 Unit(s) SubCutaneous three times a day before meals  meclizine 25 milliGRAM(s) Oral two times a day  pantoprazole    Tablet 40 milliGRAM(s) Oral before breakfast  polyethylene glycol 3350 17 Gram(s) Oral two times a day  senna 2 Tablet(s) Oral at bedtime        VITALS:  T(F): 97.7 (07-26-23 @ 08:37), Max: 97.7 (07-26-23 @ 08:37)  HR: 67 (07-26-23 @ 08:37)  BP: 141/70 (07-26-23 @ 08:37)  RR: 18 (07-26-23 @ 08:37)  SpO2: --  Wt(kg): --    07-24 @ 07:01  -  07-25 @ 07:00  --------------------------------------------------------  IN: 240 mL / OUT: 700 mL / NET: -460 mL          PHYSICAL EXAM:  Constitutional: NAD  HEENT: anicteric sclera, oropharynx clear, MMM  Neck: No JVD  Respiratory: CTAB, no wheezes, rales or rhonchi  Cardiovascular: S1, S2, RRR  Gastrointestinal: BS+, soft, NT/ND  Extremities: No cyanosis or clubbing. No peripheral edema  :  No faith.   Skin: No rashes    LABS:  07-26    141  |  108  |  50<H>  ----------------------------<  167<H>  4.8   |  22  |  3.3<H>    Ca    8.8      26 Jul 2023 05:44  Mg     1.9     07-26    TPro  6.0  /  Alb  3.6  /  TBili  <0.2  /  DBili      /  AST  29  /  ALT  31  /  AlkPhos  143<H>  07-26                          10.1   6.52  )-----------( 208      ( 26 Jul 2023 05:44 )             30.1       Urine Studies:  Urinalysis Basic - ( 26 Jul 2023 05:44 )    Color:  / Appearance:  / SG:  / pH:   Gluc: 167 mg/dL / Ketone:   / Bili:  / Urobili:    Blood:  / Protein:  / Nitrite:    Leuk Esterase:  / RBC:  / WBC    Sq Epi:  / Non Sq Epi:  / Bacteria:         RADIOLOGY & ADDITIONAL STUDIES:

## 2023-07-26 NOTE — PROGRESS NOTE ADULT - SUBJECTIVE AND OBJECTIVE BOX
COMFORT BRINDA  48y  [facility] 4B 007 A      Patient is a 48y old  Male who presents with a chief complaint of possible stroke and STEMI (26 Jul 2023 08:10)      My note supersedes the resident's note      INTERVAL HPI/OVERNIGHT EVENTS:    no acute events overngiht     REVIEW OF SYSTEMS:  CONSTITUTIONAL: No fever, weight loss, or fatigue  EYES: No eye pain, visual disturbances, or discharge  ENMT:  No difficulty hearing, tinnitus, vertigo; No sinus or throat pain  NECK: No pain or stiffness  BREASTS: No pain, masses, or nipple discharge  RESPIRATORY: No cough, wheezing, chills or hemoptysis; No shortness of breath  CARDIOVASCULAR: No chest pain, palpitations, dizziness, or leg swelling  GASTROINTESTINAL: No abdominal or epigastric pain. No nausea, vomiting, or hematemesis; No diarrhea or constipation. No melena or hematochezia.  GENITOURINARY: No dysuria, frequency, hematuria, or incontinence  NEUROLOGICAL: No headaches, memory loss, loss of strength, numbness, or tremors  SKIN: No itching, burning, rashes, or lesions   LYMPH NODES: No enlarged glands  ENDOCRINE: No heat or cold intolerance; No hair loss  MUSCULOSKELETAL: No joint pain or swelling; No muscle, back, or extremity pain  PSYCHIATRIC: No depression, anxiety, mood swings, or difficulty sleeping  HEME/LYMPH: No easy bruising, or bleeding gums  ALLERY AND IMMUNOLOGIC: No hives or eczema  FAMILY HISTORY:  Family history of hypertension (Father)      T(C): 36.5 (07-26-23 @ 08:37), Max: 36.5 (07-26-23 @ 08:37)  HR: 67 (07-26-23 @ 08:37) (67 - 80)  BP: 141/70 (07-26-23 @ 08:37) (119/57 - 160/77)  RR: 18 (07-26-23 @ 08:37) (16 - 18)  SpO2: --  Wt(kg): --Vital Signs Last 24 Hrs  T(C): 36.5 (26 Jul 2023 08:37), Max: 36.5 (26 Jul 2023 08:37)  T(F): 97.7 (26 Jul 2023 08:37), Max: 97.7 (26 Jul 2023 08:37)  HR: 67 (26 Jul 2023 08:37) (67 - 80)  BP: 141/70 (26 Jul 2023 08:37) (119/57 - 160/77)  BP(mean): --  RR: 18 (26 Jul 2023 08:37) (16 - 18)  SpO2: --        PHYSICAL EXAM:  GENERAL: NAD,   HEAD:  Atraumatic, Normocephalic  EYES: EOMI, PERRLA, conjunctiva and sclera clear  ENMT: No tonsillar erythema, exudates, or enlargement; Moist mucous membranes, Good dentition, No lesions  NECK: Supple, No JVD, Normal thyroid  NERVOUS SYSTEM:  Alert & Oriented X3, Good concentration; Motor Strength 5/5 B/L upper and lower extremities; DTRs 2+ intact and symmetric  PULM: Clear to auscultation bilaterally  CARDIAC: Regular rate and rhythm; No murmurs, rubs, or gallops  GI: Soft, Nontender, Nondistended; Bowel sounds present  EXTREMITIES:  2+ Peripheral Pulses, No clubbing, cyanosis, or edema  LYMPH: No lymphadenopathy noted  SKIN: No rashes or lesions    Consultant(s) Notes Reviewed:  [x ] YES  [ ] NO  Care Discussed with Consultants/Other Providers [ x] YES  [ ] NO    LABS:                            10.1   6.52  )-----------( 208      ( 26 Jul 2023 05:44 )             30.1   07-26    141  |  108  |  50<H>  ----------------------------<  167<H>  4.8   |  22  |  3.3<H>    Ca    8.8      26 Jul 2023 05:44  Mg     1.9     07-26    TPro  6.0  /  Alb  3.6  /  TBili  <0.2  /  DBili  x   /  AST  29  /  ALT  31  /  AlkPhos  143<H>  07-26            acetaminophen     Tablet .. 650 milliGRAM(s) Oral every 6 hours PRN  amLODIPine   Tablet 10 milliGRAM(s) Oral daily  artificial tears (preservative free) Ophthalmic Solution 1 Drop(s) Right EYE two times a day  aspirin enteric coated 81 milliGRAM(s) Oral daily  atorvastatin 20 milliGRAM(s) Oral at bedtime  bisacodyl 5 milliGRAM(s) Oral at bedtime  carvedilol 25 milliGRAM(s) Oral every 12 hours  dextrose 5%. 1000 milliLiter(s) IV Continuous <Continuous>  dextrose 5%. 1000 milliLiter(s) IV Continuous <Continuous>  dextrose 50% Injectable 12.5 Gram(s) IV Push once  dextrose 50% Injectable 25 Gram(s) IV Push once  dextrose 50% Injectable 25 Gram(s) IV Push once  dextrose Oral Gel 15 Gram(s) Oral once PRN  glucagon  Injectable 1 milliGRAM(s) IntraMuscular once  heparin   Injectable 5000 Unit(s) SubCutaneous every 8 hours  hydrALAZINE 25 milliGRAM(s) Oral three times a day  insulin glargine Injectable (LANTUS) 25 Unit(s) SubCutaneous at bedtime  insulin lispro (ADMELOG) corrective regimen sliding scale   SubCutaneous three times a day before meals  insulin lispro Injectable (ADMELOG) 5 Unit(s) SubCutaneous three times a day before meals  meclizine 25 milliGRAM(s) Oral two times a day  pantoprazole    Tablet 40 milliGRAM(s) Oral before breakfast  polyethylene glycol 3350 17 Gram(s) Oral two times a day  senna 2 Tablet(s) Oral at bedtime      HEALTH ISSUES - PROBLEM Dx:          Case Discussed with House Staff   55 minutes spent on total encounter; more than 50% of the visit was spent counseling and/or coordinating care by the attending physician.    Spectra [phone]

## 2023-07-26 NOTE — PROGRESS NOTE ADULT - SUBJECTIVE AND OBJECTIVE BOX
BRINDA MOYER 48y Male  MRN#: [id number]     Hospital Day: 9d    Pt is currently admitted with the primary diagnosis of  ST elevation myocardial infarction (STEMI)        SUBJECTIVE     Overnight events  None    Subjective complaints  Pt was evaluated this am. Patient denied any active complaints and per patient his symptoms are improving.                                              ----------------------------------------------------------  OBJECTIVE  PAST MEDICAL & SURGICAL HISTORY  Asthma    Diverticulitis  surgery 16yrs ago    High cholesterol    Dyslipidemia    Hypertension    H/O vertigo    Diabetic ulcer of right foot    Broken toe  left pinky toe    Vertigo    HTN (hypertension)    Diabetes    AMBER on CPAP    History of TIAs    History of surgery  colon resection                                              -----------------------------------------------------------  ALLERGIES:  No Known Allergies                                            ------------------------------------------------------------    HOME MEDICATIONS  Home Medications:  ergocalciferol 1.25 mg (50,000 intl units) oral tablet: orally once a week (17 Jul 2023 22:27)  Jardiance 10 mg oral tablet: 1 orally once a week (17 Jul 2023 22:27)  meclizine 25 mg oral tablet: 1 orally 2 times a day (17 Jul 2023 22:27)  rosuvastatin 20 mg oral capsule: 1 orally once a day (at bedtime) (17 Jul 2023 22:27)  spironolactone 25 mg oral tablet: 1 orally once a day (17 Jul 2023 22:27)                           MEDICATIONS:  STANDING MEDICATIONS  amLODIPine   Tablet 10 milliGRAM(s) Oral daily  artificial tears (preservative free) Ophthalmic Solution 1 Drop(s) Right EYE two times a day  aspirin enteric coated 81 milliGRAM(s) Oral daily  atorvastatin 20 milliGRAM(s) Oral at bedtime  carvedilol 25 milliGRAM(s) Oral every 12 hours  dextrose 5%. 1000 milliLiter(s) IV Continuous <Continuous>  dextrose 5%. 1000 milliLiter(s) IV Continuous <Continuous>  dextrose 50% Injectable 25 Gram(s) IV Push once  dextrose 50% Injectable 12.5 Gram(s) IV Push once  dextrose 50% Injectable 25 Gram(s) IV Push once  glucagon  Injectable 1 milliGRAM(s) IntraMuscular once  heparin   Injectable 5000 Unit(s) SubCutaneous every 8 hours  hydrALAZINE 25 milliGRAM(s) Oral three times a day  insulin glargine Injectable (LANTUS) 25 Unit(s) SubCutaneous at bedtime  insulin lispro (ADMELOG) corrective regimen sliding scale   SubCutaneous three times a day before meals  insulin lispro Injectable (ADMELOG) 5 Unit(s) SubCutaneous three times a day before meals  meclizine 25 milliGRAM(s) Oral two times a day  pantoprazole    Tablet 40 milliGRAM(s) Oral before breakfast  polyethylene glycol 3350 17 Gram(s) Oral two times a day  senna 2 Tablet(s) Oral at bedtime  sodium bicarbonate 650 milliGRAM(s) Oral three times a day    PRN MEDICATIONS  acetaminophen     Tablet .. 650 milliGRAM(s) Oral every 6 hours PRN  dextrose Oral Gel 15 Gram(s) Oral once PRN                                            ------------------------------------------------------------  VITAL SIGNS: Last 24 Hours  Vital Signs Last 24 Hrs  T(C): 35.6 (26 Jul 2023 00:00), Max: 36.2 (25 Jul 2023 17:00)  T(F): 96 (26 Jul 2023 00:00), Max: 97.2 (25 Jul 2023 17:00)  HR: 77 (26 Jul 2023 04:49) (71 - 80)  BP: 149/69 (26 Jul 2023 04:49) (119/57 - 160/77)  BP(mean): --  RR: 18 (26 Jul 2023 00:00) (16 - 18)  SpO2: --                                               --------------------------------------------------------------  LABS:                                   10.1   6.52  )-----------( 208      ( 26 Jul 2023 05:44 )             30.1       07-26    141  |  108  |  50<H>  ----------------------------<  167<H>  4.8   |  22  |  3.3<H>    Ca    8.8      26 Jul 2023 05:44  Mg     1.9     07-26    TPro  6.0  /  Alb  3.6  /  TBili  <0.2  /  DBili  x   /  AST  29  /  ALT  31  /  AlkPhos  143<H>  07-26          PHYSICAL EXAM:  GENERAL: NAD, sitting on bed, eating breakfast  EYES: EOMI, conjunctiva and sclera clear  ENT: Moist mucous membranes  CHEST/LUNG:  Unlabored respirations  NERVOUS SYSTEM:  Alert & Oriented X3. No focal deficits                                              --------------------------------------------------------------                 BRINDA MOYER 48y Male  MRN#: [id number]     Hospital Day: 9d    Pt is currently admitted with the primary diagnosis of  ST elevation myocardial infarction (STEMI)        SUBJECTIVE     Overnight events  None    Subjective complaints  Pt was evaluated this am. Patient denied any active complaints and per patient his symptoms are improving.                                              ----------------------------------------------------------  OBJECTIVE  PAST MEDICAL & SURGICAL HISTORY  Asthma    Diverticulitis  surgery 16yrs ago    High cholesterol    Dyslipidemia    Hypertension    H/O vertigo    Diabetic ulcer of right foot    Broken toe  left pinky toe    Vertigo    HTN (hypertension)    Diabetes    AMBER on CPAP    History of TIAs    History of surgery  colon resection                                              -----------------------------------------------------------  ALLERGIES:  No Known Allergies                                            ------------------------------------------------------------    HOME MEDICATIONS  Home Medications:  ergocalciferol 1.25 mg (50,000 intl units) oral tablet: orally once a week (17 Jul 2023 22:27)  Jardiance 10 mg oral tablet: 1 orally once a week (17 Jul 2023 22:27)  meclizine 25 mg oral tablet: 1 orally 2 times a day (17 Jul 2023 22:27)  rosuvastatin 20 mg oral capsule: 1 orally once a day (at bedtime) (17 Jul 2023 22:27)  spironolactone 25 mg oral tablet: 1 orally once a day (17 Jul 2023 22:27)                           MEDICATIONS:  MEDICATIONS  (STANDING):  amLODIPine   Tablet 10 milliGRAM(s) Oral daily  artificial tears (preservative free) Ophthalmic Solution 1 Drop(s) Right EYE two times a day  aspirin enteric coated 81 milliGRAM(s) Oral daily  atorvastatin 20 milliGRAM(s) Oral at bedtime  bisacodyl 5 milliGRAM(s) Oral at bedtime  carvedilol 25 milliGRAM(s) Oral every 12 hours  dextrose 5%. 1000 milliLiter(s) (100 mL/Hr) IV Continuous <Continuous>  dextrose 5%. 1000 milliLiter(s) (50 mL/Hr) IV Continuous <Continuous>  dextrose 50% Injectable 12.5 Gram(s) IV Push once  dextrose 50% Injectable 25 Gram(s) IV Push once  dextrose 50% Injectable 25 Gram(s) IV Push once  glucagon  Injectable 1 milliGRAM(s) IntraMuscular once  heparin   Injectable 5000 Unit(s) SubCutaneous every 8 hours  hydrALAZINE 25 milliGRAM(s) Oral three times a day  insulin glargine Injectable (LANTUS) 25 Unit(s) SubCutaneous at bedtime  insulin lispro (ADMELOG) corrective regimen sliding scale   SubCutaneous three times a day before meals  insulin lispro Injectable (ADMELOG) 5 Unit(s) SubCutaneous three times a day before meals  meclizine 25 milliGRAM(s) Oral two times a day  pantoprazole    Tablet 40 milliGRAM(s) Oral before breakfast  polyethylene glycol 3350 17 Gram(s) Oral two times a day  senna 2 Tablet(s) Oral at bedtime    MEDICATIONS  (PRN):  acetaminophen     Tablet .. 650 milliGRAM(s) Oral every 6 hours PRN Moderate Pain (4 - 6)  dextrose Oral Gel 15 Gram(s) Oral once PRN Blood Glucose LESS THAN 70 milliGRAM(s)/deciliter                                                ------------------------------------------------------------  VITAL SIGNS: Last 24 Hours  Vital Signs Last 24 Hrs  T(C): 35.6 (26 Jul 2023 00:00), Max: 36.2 (25 Jul 2023 17:00)  T(F): 96 (26 Jul 2023 00:00), Max: 97.2 (25 Jul 2023 17:00)  HR: 77 (26 Jul 2023 04:49) (71 - 80)  BP: 149/69 (26 Jul 2023 04:49) (119/57 - 160/77)  BP(mean): --  RR: 18 (26 Jul 2023 00:00) (16 - 18)  SpO2: --                                               --------------------------------------------------------------  LABS:                                   10.1   6.52  )-----------( 208      ( 26 Jul 2023 05:44 )             30.1       07-26    141  |  108  |  50<H>  ----------------------------<  167<H>  4.8   |  22  |  3.3<H>    Ca    8.8      26 Jul 2023 05:44  Mg     1.9     07-26    TPro  6.0  /  Alb  3.6  /  TBili  <0.2  /  DBili  x   /  AST  29  /  ALT  31  /  AlkPhos  143<H>  07-26    CSF result from [facility]   (It is in his physical chart)  7/3/23  CSF Yeast none seen  CSF EOS 0  CSF baso 0  CSF comment 0  CSF glu 102 H  CSF TP 64 H  CSF WBC 0  CSF FEDERICO colorless  CSF FEDERICO (spun) colorless  CSF x ant clear  CSF RBC 0   CSF Neut 0  CSF lymph 0  CSF mono 0                PHYSICAL EXAM:  GENERAL: NAD, sitting on bed, eating breakfast  EYES: EOMI, conjunctiva and sclera clear  ENT: Moist mucous membranes  CHEST/LUNG:  Unlabored respirations  NERVOUS SYSTEM:  Alert & Oriented X3. No focal deficits                                              --------------------------------------------------------------

## 2023-07-26 NOTE — PROCEDURE NOTE - NSPROCDETAILS_GEN_ALL_CORE
location identified, draped/prepped, sterile technique used, needle inserted/introduced/procedure aborted/area cleaned in sterile fashion

## 2023-07-27 ENCOUNTER — TRANSCRIPTION ENCOUNTER (OUTPATIENT)
Age: 48
End: 2023-07-27

## 2023-07-27 VITALS — SYSTOLIC BLOOD PRESSURE: 139 MMHG | HEART RATE: 84 BPM | DIASTOLIC BLOOD PRESSURE: 98 MMHG

## 2023-07-27 LAB
ALBUMIN CSF-MCNC: 29.4 MG/DL — HIGH (ref 14–25)
ALBUMIN SERPL ELPH-MCNC: 2361 MG/DL — LOW (ref 3500–5200)
ALBUMIN SERPL ELPH-MCNC: 3.1 G/DL — LOW (ref 3.5–5.2)
ALP SERPL-CCNC: 124 U/L — HIGH (ref 30–115)
ALT FLD-CCNC: 24 U/L — SIGNIFICANT CHANGE UP (ref 0–41)
ANION GAP SERPL CALC-SCNC: 9 MMOL/L — SIGNIFICANT CHANGE UP (ref 7–14)
AQP4 H2O CHANNEL AB SERPL IA-ACNC: NEGATIVE — SIGNIFICANT CHANGE UP
AST SERPL-CCNC: 20 U/L — SIGNIFICANT CHANGE UP (ref 0–41)
BASOPHILS # BLD AUTO: 0.03 K/UL — SIGNIFICANT CHANGE UP (ref 0–0.2)
BASOPHILS NFR BLD AUTO: 0.4 % — SIGNIFICANT CHANGE UP (ref 0–1)
BILIRUB SERPL-MCNC: <0.2 MG/DL — SIGNIFICANT CHANGE UP (ref 0.2–1.2)
BUN SERPL-MCNC: 49 MG/DL — HIGH (ref 10–20)
CALCIUM SERPL-MCNC: 8.3 MG/DL — LOW (ref 8.4–10.5)
CHLORIDE SERPL-SCNC: 108 MMOL/L — SIGNIFICANT CHANGE UP (ref 98–110)
CO2 SERPL-SCNC: 22 MMOL/L — SIGNIFICANT CHANGE UP (ref 17–32)
CREAT SERPL-MCNC: 3.3 MG/DL — HIGH (ref 0.7–1.5)
CRYPTOC AG CSF-ACNC: NEGATIVE — SIGNIFICANT CHANGE UP
CSF PCR RESULT: SIGNIFICANT CHANGE UP
EGFR: 22 ML/MIN/1.73M2 — LOW
EOSINOPHIL # BLD AUTO: 0.34 K/UL — SIGNIFICANT CHANGE UP (ref 0–0.7)
EOSINOPHIL NFR BLD AUTO: 5 % — SIGNIFICANT CHANGE UP (ref 0–8)
GLUCOSE BLDC GLUCOMTR-MCNC: 114 MG/DL — HIGH (ref 70–99)
GLUCOSE BLDC GLUCOMTR-MCNC: 218 MG/DL — HIGH (ref 70–99)
GLUCOSE SERPL-MCNC: 201 MG/DL — HIGH (ref 70–99)
HCT VFR BLD CALC: 27.9 % — LOW (ref 42–52)
HGB BLD-MCNC: 9.1 G/DL — LOW (ref 14–18)
IGG CSF-MCNC: 4.8 MG/DL — HIGH
IGG FLD-MCNC: 764 MG/DL — SIGNIFICANT CHANGE UP (ref 610–1660)
IGG SYNTH RATE SER+CSF CALC-MRATE: 0.3 MG/DAY — SIGNIFICANT CHANGE UP
IGG/ALB CLEAR SER+CSF-RTO: 0.5 — SIGNIFICANT CHANGE UP
IGG/ALB CSF: 0.16 RATIO — SIGNIFICANT CHANGE UP
IGG/ALB SER: 0.32 RATIO — SIGNIFICANT CHANGE UP
IMM GRANULOCYTES NFR BLD AUTO: 0.4 % — HIGH (ref 0.1–0.3)
LABORATORY COMMENT REPORT: SIGNIFICANT CHANGE UP
LDH CSF L TO P-CCNC: 25 U/L — SIGNIFICANT CHANGE UP
LDH FLD-CCNC: 25 U/L — SIGNIFICANT CHANGE UP
LYMPHOCYTES # BLD AUTO: 1.94 K/UL — SIGNIFICANT CHANGE UP (ref 1.2–3.4)
LYMPHOCYTES # BLD AUTO: 28.5 % — SIGNIFICANT CHANGE UP (ref 20.5–51.1)
MAGNESIUM SERPL-MCNC: 1.8 MG/DL — SIGNIFICANT CHANGE UP (ref 1.8–2.4)
MCHC RBC-ENTMCNC: 30.4 PG — SIGNIFICANT CHANGE UP (ref 27–31)
MCHC RBC-ENTMCNC: 32.6 G/DL — SIGNIFICANT CHANGE UP (ref 32–37)
MCV RBC AUTO: 93.3 FL — SIGNIFICANT CHANGE UP (ref 80–94)
MONOCYTES # BLD AUTO: 0.75 K/UL — HIGH (ref 0.1–0.6)
MONOCYTES NFR BLD AUTO: 11 % — HIGH (ref 1.7–9.3)
NEUTROPHILS # BLD AUTO: 3.71 K/UL — SIGNIFICANT CHANGE UP (ref 1.4–6.5)
NEUTROPHILS NFR BLD AUTO: 54.7 % — SIGNIFICANT CHANGE UP (ref 42.2–75.2)
NIGHT BLUE STAIN TISS: SIGNIFICANT CHANGE UP
NRBC # BLD: 0 /100 WBCS — SIGNIFICANT CHANGE UP (ref 0–0)
PLATELET # BLD AUTO: 192 K/UL — SIGNIFICANT CHANGE UP (ref 130–400)
PMV BLD: 11.8 FL — HIGH (ref 7.4–10.4)
POTASSIUM SERPL-MCNC: 4.5 MMOL/L — SIGNIFICANT CHANGE UP (ref 3.5–5)
POTASSIUM SERPL-SCNC: 4.5 MMOL/L — SIGNIFICANT CHANGE UP (ref 3.5–5)
PROT SERPL-MCNC: 5.5 G/DL — LOW (ref 6–8)
RBC # BLD: 2.99 M/UL — LOW (ref 4.7–6.1)
RBC # FLD: 12.5 % — SIGNIFICANT CHANGE UP (ref 11.5–14.5)
SODIUM SERPL-SCNC: 139 MMOL/L — SIGNIFICANT CHANGE UP (ref 135–146)
SOURCE HSV 1/2: SIGNIFICANT CHANGE UP
SPECIMEN SOURCE: SIGNIFICANT CHANGE UP
TM INTERPRETATION: SIGNIFICANT CHANGE UP
WBC # BLD: 6.8 K/UL — SIGNIFICANT CHANGE UP (ref 4.8–10.8)
WBC # FLD AUTO: 6.8 K/UL — SIGNIFICANT CHANGE UP (ref 4.8–10.8)

## 2023-07-27 PROCEDURE — 99232 SBSQ HOSP IP/OBS MODERATE 35: CPT

## 2023-07-27 PROCEDURE — 99233 SBSQ HOSP IP/OBS HIGH 50: CPT

## 2023-07-27 RX ORDER — SENNA PLUS 8.6 MG/1
2 TABLET ORAL
Qty: 0 | Refills: 0 | DISCHARGE
Start: 2023-07-27

## 2023-07-27 RX ORDER — LISINOPRIL 2.5 MG/1
10 TABLET ORAL DAILY
Refills: 0 | Status: DISCONTINUED | OUTPATIENT
Start: 2023-07-27 | End: 2023-07-27

## 2023-07-27 RX ORDER — LISINOPRIL 2.5 MG/1
1 TABLET ORAL
Qty: 30 | Refills: 0
Start: 2023-07-27 | End: 2023-08-25

## 2023-07-27 RX ADMIN — Medication 81 MILLIGRAM(S): at 12:10

## 2023-07-27 RX ADMIN — HEPARIN SODIUM 5000 UNIT(S): 5000 INJECTION INTRAVENOUS; SUBCUTANEOUS at 13:24

## 2023-07-27 RX ADMIN — Medication 1 DROP(S): at 05:26

## 2023-07-27 RX ADMIN — Medication 2: at 08:31

## 2023-07-27 RX ADMIN — AMLODIPINE BESYLATE 10 MILLIGRAM(S): 2.5 TABLET ORAL at 05:27

## 2023-07-27 RX ADMIN — HEPARIN SODIUM 5000 UNIT(S): 5000 INJECTION INTRAVENOUS; SUBCUTANEOUS at 05:27

## 2023-07-27 RX ADMIN — Medication 25 MILLIGRAM(S): at 05:26

## 2023-07-27 RX ADMIN — LISINOPRIL 10 MILLIGRAM(S): 2.5 TABLET ORAL at 12:10

## 2023-07-27 RX ADMIN — PANTOPRAZOLE SODIUM 40 MILLIGRAM(S): 20 TABLET, DELAYED RELEASE ORAL at 05:26

## 2023-07-27 RX ADMIN — Medication 5 UNIT(S): at 12:10

## 2023-07-27 RX ADMIN — Medication 5 UNIT(S): at 08:31

## 2023-07-27 RX ADMIN — CARVEDILOL PHOSPHATE 25 MILLIGRAM(S): 80 CAPSULE, EXTENDED RELEASE ORAL at 05:26

## 2023-07-27 RX ADMIN — Medication 25 MILLIGRAM(S): at 13:24

## 2023-07-27 NOTE — PROGRESS NOTE ADULT - REASON FOR ADMISSION
possible stroke and STEMI

## 2023-07-27 NOTE — PROGRESS NOTE ADULT - SUBJECTIVE AND OBJECTIVE BOX
COMFORT BRINDA  48y  [facility] 4B 007 A      Patient is a 48y old  Male who presents with a chief complaint of possible stroke and STEMI (27 Jul 2023 10:54)      My note supersedes the resident's note      INTERVAL HPI/OVERNIGHT EVENTS:    no acute events overnight     REVIEW OF SYSTEMS:  CONSTITUTIONAL: No fever, weight loss, or fatigue  EYES: No eye pain, visual disturbances, or discharge  ENMT:  No difficulty hearing, tinnitus, vertigo; No sinus or throat pain  NECK: No pain or stiffness  BREASTS: No pain, masses, or nipple discharge  RESPIRATORY: No cough, wheezing, chills or hemoptysis; No shortness of breath  CARDIOVASCULAR: No chest pain, palpitations, dizziness, or leg swelling  GASTROINTESTINAL: No abdominal or epigastric pain. No nausea, vomiting, or hematemesis; No diarrhea or constipation. No melena or hematochezia.  GENITOURINARY: No dysuria, frequency, hematuria, or incontinence  NEUROLOGICAL: No headaches, memory loss, loss of strength, numbness, or tremors  SKIN: No itching, burning, rashes, or lesions   LYMPH NODES: No enlarged glands  ENDOCRINE: No heat or cold intolerance; No hair loss  MUSCULOSKELETAL: No joint pain or swelling; No muscle, back, or extremity pain  PSYCHIATRIC: No depression, anxiety, mood swings, or difficulty sleeping  HEME/LYMPH: No easy bruising, or bleeding gums  ALLERY AND IMMUNOLOGIC: No hives or eczema  FAMILY HISTORY:  Family history of hypertension (Father)      T(C): 36.7 (07-27-23 @ 08:40), Max: 36.7 (07-27-23 @ 08:40)  HR: 75 (07-27-23 @ 08:40) (71 - 76)  BP: 163/74 (07-27-23 @ 08:40) (135/67 - 163/74)  RR: 18 (07-27-23 @ 08:40) (16 - 18)  SpO2: --  Wt(kg): --Vital Signs Last 24 Hrs  T(C): 36.7 (27 Jul 2023 08:40), Max: 36.7 (27 Jul 2023 08:40)  T(F): 98 (27 Jul 2023 08:40), Max: 98 (27 Jul 2023 08:40)  HR: 75 (27 Jul 2023 08:40) (71 - 76)  BP: 163/74 (27 Jul 2023 08:40) (135/67 - 163/74)  BP(mean): 107 (27 Jul 2023 05:20) (107 - 108)  RR: 18 (27 Jul 2023 08:40) (16 - 18)  SpO2: --        PHYSICAL EXAM:  GENERAL: NAD,   HEAD:  Atraumatic, Normocephalic  EYES: EOMI, PERRLA, conjunctiva and sclera clear  ENMT: No tonsillar erythema, exudates, or enlargement; Moist mucous membranes, Good dentition, No lesions  NECK: Supple, No JVD, Normal thyroid  NERVOUS SYSTEM:  Alert & Oriented X3, Good concentration; Motor Strength 5/5 B/L upper and lower extremities; DTRs 2+ intact and symmetric  PULM: Clear to auscultation bilaterally  CARDIAC: Regular rate and rhythm; No murmurs, rubs, or gallops  GI: Soft, Nontender, Nondistended; Bowel sounds present  EXTREMITIES:  2+ Peripheral Pulses, No clubbing, cyanosis, or edema  LYMPH: No lymphadenopathy noted  SKIN: No rashes or lesions    Consultant(s) Notes Reviewed:  [x ] YES  [ ] NO  Care Discussed with Consultants/Other Providers [ x] YES  [ ] NO    LABS:                            9.1    6.80  )-----------( 192      ( 27 Jul 2023 07:24 )             27.9   07-27    139  |  108  |  49<H>  ----------------------------<  201<H>  4.5   |  22  |  3.3<H>    Ca    8.3<L>      27 Jul 2023 07:24  Mg     1.8     07-27    TPro  5.5<L>  /  Alb  3.1<L>  /  TBili  <0.2  /  DBili  x   /  AST  20  /  ALT  24  /  AlkPhos  124<H>  07-27            Culture - Fungal, CSF (collected 26 Jul 2023 11:05)  Source: .CSF CSF  Preliminary Report (27 Jul 2023 10:02):    Testing in progress    Culture - CSF with Gram Stain (collected 26 Jul 2023 11:00)  Source: .CSF CSF  Gram Stain (26 Jul 2023 22:38):    No polymorphonuclear cells seen    No organisms seen    by cytocentrifuge    Culture - Acid Fast - CSF (collected 26 Jul 2023 11:00)  Source: .CSF CSF      acetaminophen     Tablet .. 650 milliGRAM(s) Oral every 6 hours PRN  artificial tears (preservative free) Ophthalmic Solution 1 Drop(s) Right EYE two times a day  aspirin enteric coated 81 milliGRAM(s) Oral daily  atorvastatin 20 milliGRAM(s) Oral at bedtime  bisacodyl 5 milliGRAM(s) Oral at bedtime  carvedilol 25 milliGRAM(s) Oral every 12 hours  dextrose 5%. 1000 milliLiter(s) IV Continuous <Continuous>  dextrose 5%. 1000 milliLiter(s) IV Continuous <Continuous>  dextrose 50% Injectable 25 Gram(s) IV Push once  dextrose 50% Injectable 25 Gram(s) IV Push once  dextrose 50% Injectable 12.5 Gram(s) IV Push once  dextrose Oral Gel 15 Gram(s) Oral once PRN  glucagon  Injectable 1 milliGRAM(s) IntraMuscular once  heparin   Injectable 5000 Unit(s) SubCutaneous every 8 hours  hydrALAZINE 25 milliGRAM(s) Oral three times a day  insulin glargine Injectable (LANTUS) 25 Unit(s) SubCutaneous at bedtime  insulin lispro (ADMELOG) corrective regimen sliding scale   SubCutaneous three times a day before meals  insulin lispro Injectable (ADMELOG) 5 Unit(s) SubCutaneous three times a day before meals  lisinopril 10 milliGRAM(s) Oral daily  meclizine 25 milliGRAM(s) Oral two times a day  pantoprazole    Tablet 40 milliGRAM(s) Oral before breakfast  polyethylene glycol 3350 17 Gram(s) Oral two times a day  senna 2 Tablet(s) Oral at bedtime      HEALTH ISSUES - PROBLEM Dx:          Case Discussed with House Staff   Spectra [phone]

## 2023-07-27 NOTE — PROGRESS NOTE ADULT - ASSESSMENT
47 YO male with PMHx of CKD IV baseline around 3.2, vertigo, diverticulitis s/p LAR, cigarette use, ETOH abuse now sober x5 years, IDDM, HTN, AMBER, Hx of multiple CVA with residual weakness on right side presents for chest pain along with right eye pain with photosensitivity. MR brain showed new lesion in his right frontal lobe with rim-like restricting diffusion    BILLY on CKD 4/ Right frontal lobe lesion/ HTN/ DM, timing consistent w/ BRENDEN  creat is trending down - creat in Nov 2022 was around 3.0   today it is 3.3  LE edema - start a small dose of Lasix 20 mg po qod  CTAP with IC 7/17 no hydro  renal f/u as OP in 1 week

## 2023-07-27 NOTE — PROGRESS NOTE ADULT - SUBJECTIVE AND OBJECTIVE BOX
Nephrology progress note    Patient is seen and examined, events over the last 24 h noted .  c/o LE edema  Allergies:  No Known Allergies    Hospital Medications:   MEDICATIONS  (STANDING):  artificial tears (preservative free) Ophthalmic Solution 1 Drop(s) Right EYE two times a day  aspirin enteric coated 81 milliGRAM(s) Oral daily  atorvastatin 20 milliGRAM(s) Oral at bedtime  bisacodyl 5 milliGRAM(s) Oral at bedtime  carvedilol 25 milliGRAM(s) Oral every 12 hours  dextrose 5%. 1000 milliLiter(s) (50 mL/Hr) IV Continuous <Continuous>  dextrose 5%. 1000 milliLiter(s) (100 mL/Hr) IV Continuous <Continuous>  dextrose 50% Injectable 12.5 Gram(s) IV Push once  dextrose 50% Injectable 25 Gram(s) IV Push once  dextrose 50% Injectable 25 Gram(s) IV Push once  glucagon  Injectable 1 milliGRAM(s) IntraMuscular once  heparin   Injectable 5000 Unit(s) SubCutaneous every 8 hours  hydrALAZINE 25 milliGRAM(s) Oral three times a day  insulin glargine Injectable (LANTUS) 25 Unit(s) SubCutaneous at bedtime  insulin lispro (ADMELOG) corrective regimen sliding scale   SubCutaneous three times a day before meals  insulin lispro Injectable (ADMELOG) 5 Unit(s) SubCutaneous three times a day before meals  lisinopril 10 milliGRAM(s) Oral daily  meclizine 25 milliGRAM(s) Oral two times a day  pantoprazole    Tablet 40 milliGRAM(s) Oral before breakfast  polyethylene glycol 3350 17 Gram(s) Oral two times a day  senna 2 Tablet(s) Oral at bedtime        VITALS:  T(F): 98 (07-27-23 @ 08:40), Max: 98 (07-27-23 @ 08:40)  HR: 84 (07-27-23 @ 13:22)  BP: 139/98 (07-27-23 @ 13:22)  RR: 18 (07-27-23 @ 08:40)  SpO2: --  Wt(kg): --    07-26 @ 07:01  -  07-27 @ 07:00  --------------------------------------------------------  IN: 300 mL / OUT: 0 mL / NET: 300 mL          PHYSICAL EXAM:  Constitutional: NAD  HEENT: anicteric sclera, oropharynx clear, MMM  Neck: No JVD  Respiratory: CTAB, no wheezes, rales or rhonchi  Cardiovascular: S1, S2, RRR  Gastrointestinal: BS+, soft, NT/ND  Extremities: 1+ peripheral edema  Neurological: A/O x 3, no focal deficits  : No CVA tenderness. No faith.   Skin: No rashes  Vascular Access:    LABS:  07-27    139  |  108  |  49<H>  ----------------------------<  201<H>  4.5   |  22  |  3.3<H>    Ca    8.3<L>      27 Jul 2023 07:24  Mg     1.8     07-27    TPro  5.5<L>  /  Alb  3.1<L>  /  TBili  <0.2  /  DBili      /  AST  20  /  ALT  24  /  AlkPhos  124<H>  07-27                          9.1    6.80  )-----------( 192      ( 27 Jul 2023 07:24 )             27.9       Urine Studies:  Urinalysis Basic - ( 27 Jul 2023 07:24 )    Color:  / Appearance:  / SG:  / pH:   Gluc: 201 mg/dL / Ketone:   / Bili:  / Urobili:    Blood:  / Protein:  / Nitrite:    Leuk Esterase:  / RBC:  / WBC    Sq Epi:  / Non Sq Epi:  / Bacteria:         RADIOLOGY & ADDITIONAL STUDIES:

## 2023-07-27 NOTE — PROGRESS NOTE ADULT - ASSESSMENT
47 YO male presented  in the outpatient clinic for pre-test for circumcision where  his EKG suggested STEMI so EMS was called and on the way to the hospital he developed chest pain of left side radiating to the back, characterized by tightness then burning sensation through the whole chest. This pain lasted for 1 hour and resolved, he received aspirin loading on the way here.   patient  [facility]  catherization showed no obstruction   He is also complaining from constant  right eye pain with double vision and blurring of vision that started 3 weeks ago,related to uncontrolled DM.     PMHx :  Hx of multiple CVA with residual weakness on right side.   CKD IV baseline around 3.2  diverticulitis s/p LAR  cigarette use, ETOH abuse now sober x5 years  IDDM  HTN  AMBER  Vertigo     # Acute diastolic  CHF-resolved  -Atypical  chest pain  probably musculoskeletal  - Elevated troponin 2/2 to ckd  # H/o non obstructive CAD  # Hypertension  -Troponin T, Serum: 0.11on 07.18.23 @ 08:28  - 12 Lead ECG 07.17.23 @ 17:57 isNormal  -  ILR interrogated - no events  -  TTE Echo Complete w/o Contrast w/ Doppler (07.21.23 @ 10:59)  EF of 67 %. .mild pulmonary hypertension.  -  Xray Chest 1 View- PORTABLE-Urgent (Xray Chest 1 View- PORTABLE-Urgent .) (07.21.23 @ 15:28) >Cardiomegaly with CHF. Slightly worsened. IV lasix    was given on 7/21 .  Plan:  - c/w ASA, coreg, statin  - c/w norvasc, hydralazine  - cardiology on board   -monitor for fluid overload.    # Right eye pain with blurring of vision 2/2 to diabetic retinopathy  # Right 6th nerve palsy  # H/o multiple CVA   - MR Head No Cont (07.18.23 @ 22:59) > New 1 cm lesion within the right frontal lobe periventricular white matter demonstrating rim-like restricted diffusion.  New 8 mm lesion within the anterior right temporal lobe. Additional new small lesion within the right cerebellar hemisphere, the configuration suggests a chronic infarct.Otherwise stable patchy white matter disease and scattered chronic   lacunar infarcts.  - CT PERFUSION: No evidence of perfusion abnormality.  - CTA HEAD: Mild stenosis of the proximal right V4 segment of the vertebral artery.  - CTA NECK: No evidence of carotid or vertebral artery stenosis.  - Sedimentation Rate, Erythrocyte: 66 mm/Hr (07.18.23 @ 11:15)  - C-Reactive Protein, Serum: <3.0 mg/L (07.18.23 @ 11:15)  -  HIV, WES, dsDNA and Lyme   all Negative   -Angiotensin converting enzyme, NMO, MOG, Vitamin B12 followed.  - Ophthalmology on board  - HVF testing at out patient follow up  - follow up at [facility] eye clinic (or eye care provider of pts choice) within 4 weeks of discharge.   - MR Head w/wo IV Cont (07.20.23 @ 19:05)   - neuro F/u MR orbit w/wo contrast came back normal on 7/23 .  - Patient reports having LP done at [facility]. Faxed [facility] the request for LP report on 7/24.  -Lp is done and results are here   -f/u with neurology     # Acute kidney injury on  CKD stage 4,  # Hyperkalemia - resolved  - nephrology on Board   -monitor Urine output.  -Repeat  BMP    # DM type2 with hyperglycemia  - HbA1c is 10.8  - increased  lantus, c/w  lispro    # H/o  vertigo  - On  meclizine           MISC:  # DVT prophylaxis: heparin 5000 IU Subcutaneous every 8 hours .  #Diet:Regular  # Full code  # Disposition: home when stable      # Handoff:   -f/u with Neurology to clear patient for discharge

## 2023-07-27 NOTE — PROGRESS NOTE ADULT - ATTENDING COMMENTS
- 48 year old male with PMHx of CKD (stage IV), IDDM, HTN, AMBER, Hx of multiple strokes (last one in November 2022) admitted on 7/17 for further evaluation of headache, right sided eye pain, blurry vision and chest pain to telemetry. MRI brain w/wo PROSPER showed 3 new enhancing lesions in the brain compared with an MRI from 2022. Exam consistent with monocular diplopia in both eyes. Inflammatory vs infectious vs demyelinating etiology. MR orbit w/wo contrast unremarkable. HIV, WES, dsDNA, Angiotensin converting enzyme, MOG, Vitamin B12, Lyme, QuantiFeron NMO normal. CSF normal (still some pending such as immunology, cytology and flow cytometry). Discussed with the patient. Recommended to be transferred to neurology service for MRI C and T spine and IV Solumedrol but he had emergency at home, Recommended him to come to ED as soon as possible for further treatment plan.

## 2023-07-27 NOTE — DISCHARGE NOTE PROVIDER - NSDCFUSCHEDAPPT_GEN_ALL_CORE_FT
Maurizio Ewing  [facility] PreAdmits  Scheduled Appointment: 08/01/2023    Maurizio Ewing  [facility]  UROLOGY [address] [place] [address]  Scheduled Appointment: 10/25/2023

## 2023-07-27 NOTE — PROGRESS NOTE ADULT - ASSESSMENT
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   He was in the outpatient clinic for pre-test for circumcision, his EKG suggested STEMI so EMS was called and on the way to the hospital he developed chest pain of left side radiating to the back, characterized by tightness then burning sensation through the whole chest. This pain lasted for 1 hour and resolved, he received aspirin loading on the way here. Pt follows at [facility] and underwent cardiac catheterization which showed non-obstructive CAD.  He is also complaining from right eye pain with double vision and blurring of vision that started 3 weeks ago, he was admitted to another hospital and they discharged him after doing workup for his eye and ophthalmology saw him and they said it is mostly related to the uncontrolled diabetes. His right eye pain has been constant for the past 3 weeks radiating to the front head causing severe headaches, it is associated with photosensitivity, no tearing, no redness. He has no weakness, no numbness.     # Acute diastolic  CHF  resolved at chronic stable state     # Atypical  chest pain  probably musculoskeletal  Elevated troponin sec to ckd  H/o non obstructive CAD    #DM   POCT Blood Glucose.: 114 mg/dL (27 Jul 2023 11:06)  POCT Blood Glucose.: 218 mg/dL (27 Jul 2023 08:28)  POCT Blood Glucose.: 189 mg/dL (26 Jul 2023 21:05)  POCT Blood Glucose.: 187 mg/dL (26 Jul 2023 16:45)  POCT Blood Glucose.: 86 mg/dL (26 Jul 2023 11:20)  controlled     # Hypertension  BP: 163/74 (27 Jul 2023 08:40) (135/67 - 163/74)  controlled     # Right eye pain with blurring of vision sec to diabetic retinopathy    # Right 6th nerve palsy    # H/o multiple CVA   - MR Head w/wo IV Cont (07.20.23 @ 19:05) >The 1 cm lesion within the right frontal lobe periventricular white matter demonstrating rim-like restricted diffusion demonstrates no  contrast enhancement. The 8 mm lesion within the anterior right temporal lobe demonstrates central enhancement.  The 1 cm linear lesion within the right cerebellar hemisphere also demonstrates central enhancement.  Diagnostic considerations for the above lesions include infarcts or demyelination (of varying ages) as well as infectious/inflammatory and  neoplastic etiologies. Consider CSF sampling as well as follow up imaging.Otherwise stable patchy white matter disease and scattered chronic  lacunar infarcts.  LP performed - thus far studies are negative     # Acute kidney injury on  CKD stage 4, probably sec to BRENDEN,  creatinine downtrending Creatinine Trend: 3.3<--, 3.5<--, 3.8<--, 4.3<--, 5.4<--, 6.5<--    # Metabolic acidosis- resolving    # DM type2   POCT Blood Glucose.: 114 mg/dL (27 Jul 2023 11:06)  POCT Blood Glucose.: 218 mg/dL (27 Jul 2023 08:28)  POCT Blood Glucose.: 189 mg/dL (26 Jul 2023 21:05)  POCT Blood Glucose.: 187 mg/dL (26 Jul 2023 16:45)  POCT Blood Glucose.: 86 mg/dL (26 Jul 2023 11:20)  controlled     # H/o  vertigo  - c/w  meclizine     # DVT prophylaxis    # Full code    PROGRESS NOTE HANDOFF    Pending:  neuro follow up  , but likely dc today     Family discussion: patient verbalized understanding and agreeable to plan of care     Disposition: Home

## 2023-07-27 NOTE — PROGRESS NOTE ADULT - ASSESSMENT
48 year old male with PMHx of CKD (stage IV), IDDM, HTN, AMBER, Hx of multiple strokes (last one in November 2022) admitted on 7/17 for further evaluation of headache, right sided eye pain, blurry vision and chest pain to telemetry. Patient was initially seen for stroke code. CTA/ CTP with No evidence of perfusion abnormality/ CTA HEAD: Mild stenosis of the proximal right V4 segment of the vertebral artery. MRI brain w/o PROSPER showed 3 new lesions in the brain compared with an MRI from 2022. Exam consistent with monocular diplopia in both eyes. Unable to assess right horizontal gaze as the patient closes eyes due to pain, but no dysconjugate gaze. No APD, no DORCAS, with grossly preserved visual fields on confrontation. Hyporeflexia in all extremities, absent in feet and ankle. No UMN signs noted. Unclear etiology of brain lesion. Inflammatory vs infectious vs demyelinating etiology. MR orbit w/wo contrast unremarkable. HIV, WES, dsDNA, Angiotensin converting enzyme, MOG, Vitamin B12, Lyme, QuantiFeron NMO normal. CSF normal (still some pending such as immunology, cytology and flow cytometry). Ophthalmology recommends use sunglasses to limit light prn and Start artifical tears 2x/day OU. Rheumatology thinks there is low suspicion for temporal arteritis.    Impression: Multiple white matter cerebral and cerebellar lesions of undetermined etiology (pending work up). R/o demyelinating/inflamatory/infiltrative process    Recommendation:  - Continue management by primary team  - Pending NOTCH3 rule out CADASIL given recurrent strokes and FH of dementia  - Will follow up at [facility] eye clinic (or eye care provider of pts choice) within 4 weeks of discharge  - Neurology team will arrange his programmed admission at [facility] for 7/30/23 under Neurology if patient finally discharged today for management and follow up workup    Case discussed with Neurology attending Dr. De La Rosa 48 year old male with PMHx of CKD (stage IV), IDDM, HTN, AMBER, Hx of multiple strokes (last one in November 2022) admitted on 7/17 for further evaluation of headache, right sided eye pain, blurry vision and chest pain to telemetry. Patient was initially seen for stroke code. CTA/ CTP with No evidence of perfusion abnormality/ CTA HEAD: Mild stenosis of the proximal right V4 segment of the vertebral artery. MRI brain w/o PROSPER showed 3 new lesions in the brain compared with an MRI from 2022. Exam consistent with monocular diplopia in both eyes. Unable to assess right horizontal gaze as the patient closes eyes due to pain, but no dysconjugate gaze. No APD, no DORCAS, with grossly preserved visual fields on confrontation. Hyporeflexia in all extremities, absent in feet and ankle. No UMN signs noted. Unclear etiology of brain lesion. Inflammatory vs infectious vs demyelinating etiology. MR orbit w/wo contrast unremarkable. HIV, WES, dsDNA, Angiotensin converting enzyme, MOG, Vitamin B12, Lyme, QuantiFeron NMO normal. CSF normal (still some pending such as immunology, cytology and flow cytometry). Ophthalmology recommends use sunglasses to limit light prn and Start artifical tears 2x/day OU. Rheumatology thinks there is low suspicion for temporal arteritis.    Impression: Multiple white matter cerebral and cerebellar lesions of undetermined etiology (pending work up). R/o demyelinating/inflamatory/infiltrative process    Recommendation:  - Continue management by primary team  - Would order MR cervical and thoracic spine with/without contrast and start IV steroids if finally not discharged (since diabetic, would do in a hospital setting)  - Pending NOTCH3 rule out CADASIL given recurrent strokes and FH of dementia  - Will follow up at [facility] clinic (or eye care provider of pts choice) within 4 weeks of discharge  - Neurology team will arrange his programmed admission at [facility] for 7/30/23 under Neurology if patient finally discharged today for management and follow up workup    Case discussed with Neurology attending Dr. De La Rosa 48 year old male with PMHx of CKD (stage IV), IDDM, HTN, AMBER, Hx of multiple strokes (last one in November 2022) admitted on 7/17 for further evaluation of headache, right sided eye pain, blurry vision and chest pain to telemetry. Patient was initially seen for stroke code. CTA/ CTP with No evidence of perfusion abnormality/ CTA HEAD: Mild stenosis of the proximal right V4 segment of the vertebral artery. MRI brain w/o PROSPER showed 3 new lesions in the brain compared with an MRI from 2022. Exam consistent with monocular diplopia in both eyes. Unable to assess right horizontal gaze as the patient closes eyes due to pain, but no dysconjugate gaze. No APD, no DORCAS, with grossly preserved visual fields on confrontation. Hyporeflexia in all extremities, absent in feet and ankle. No UMN signs noted. Unclear etiology of brain lesion. Inflammatory vs infectious vs demyelinating etiology. MR orbit w/wo contrast unremarkable. HIV, WES, dsDNA, Angiotensin converting enzyme, MOG, Vitamin B12, Lyme, QuantiFeron NMO normal. CSF normal (still some pending such as immunology, cytology and flow cytometry). Ophthalmology recommends use sunglasses to limit light prn and Start artifical tears 2x/day OU. Rheumatology thinks there is low suspicion for temporal arteritis.    Impression: Multiple white matter cerebral and cerebellar lesions of undetermined etiology (pending work up). R/o demyelinating/inflamatory/infiltrative process  Discuss to the patient to transfer him to neurology team to continue work up including MRI C and T spine w/wo and IV Solumedrol 1 gm for 5 days.   Patient would like to be discharged for personal emergencies and be readmitted     Recommendation:  - Pending NOTCH3 rule out CADASIL given recurrent strokes and FH of dementia  - Will follow up at [facility] eye clinic (or eye care provider of pts choice) within 4 weeks of discharge  - Recommended to return to ED to be admitted to neurology service to obtain MRI C and T spine and 5 days IV steroid    Case discussed with Neurology attending Dr. De La Rosa

## 2023-07-27 NOTE — PROGRESS NOTE ADULT - PROVIDER SPECIALTY LIST ADULT
Internal Medicine
Internal Medicine
Nephrology
Nephrology
Neurology
Neurology
Hospitalist
Hospitalist
Internal Medicine
Internal Medicine
Nephrology
Neurology
Hospitalist
Hospitalist
Internal Medicine
Nephrology
Neurology
Hospitalist
Nephrology
Hospitalist

## 2023-07-27 NOTE — DISCHARGE NOTE PROVIDER - CARE PROVIDER_API CALL
MARIBEL PEREZ, MICHAEL RODRIGUEZ  [address] [address]  Phone: [phone]  Fax: [phone]  Follow Up Time: 2 weeks

## 2023-07-27 NOTE — DISCHARGE NOTE PROVIDER - NSDCMRMEDTOKEN_GEN_ALL_CORE_FT
amLODIPine 10 mg oral tablet: 1 tab(s) orally once a day  aspirin 81 mg oral delayed release tablet: 1 tab(s) orally once a day  carvedilol 25 mg oral tablet: 1 tab(s) orally every 12 hours  ergocalciferol 1.25 mg (50,000 intl units) oral tablet: orally once a week  hydrALAZINE 25 mg oral tablet: 1 tab(s) orally 3 times a day  Jardiance 10 mg oral tablet: 1 orally once a week  meclizine 25 mg oral tablet: 1 orally 2 times a day  ocular lubricant ophthalmic solution: 1 drop(s) to each affected eye 2 times a day  rosuvastatin 20 mg oral capsule: 1 orally once a day (at bedtime)  senna leaf extract oral tablet: 2 tab(s) orally once a day (at bedtime)  spironolactone 25 mg oral tablet: 1 orally once a day   amLODIPine 10 mg oral tablet: 1 tab(s) orally once a day  aspirin 81 mg oral delayed release tablet: 1 tab(s) orally once a day  carvedilol 25 mg oral tablet: 1 tab(s) orally every 12 hours  ergocalciferol 1.25 mg (50,000 intl units) oral tablet: orally once a week  hydrALAZINE 25 mg oral tablet: 1 tab(s) orally 3 times a day  Jardiance 10 mg oral tablet: 1 orally once a week  lisinopril 10 mg oral tablet: 1 tab(s) orally once a day  meclizine 25 mg oral tablet: 1 orally 2 times a day  ocular lubricant ophthalmic solution: 1 drop(s) to each affected eye 2 times a day  rosuvastatin 20 mg oral capsule: 1 orally once a day (at bedtime)  senna leaf extract oral tablet: 2 tab(s) orally once a day (at bedtime)  spironolactone 25 mg oral tablet: 1 orally once a day

## 2023-07-27 NOTE — DISCHARGE NOTE PROVIDER - NSDCCPCAREPLAN_GEN_ALL_CORE_FT
PRINCIPAL DISCHARGE DIAGNOSIS  Diagnosis: STEMI (ST elevation myocardial infarction)  Assessment and Plan of Treatment: you have chest pain and ecg was positive for finding .so was admitted for cardiac workup i.e ECHO and cardiac catherization and found to have a cardiomegaly and MI.  we gave you meds and put you on fluid restriction and condition is being resolved now      SECONDARY DISCHARGE DIAGNOSES  Diagnosis: Blurry vision  Assessment and Plan of Treatment: you came to us  with eye pain and right sided weakness  CT scan,CTA ,MRI and CSF studies were done and neurology was consulted   please follow up with neurology for further treatment .    Diagnosis: CKD (chronic kidney disease)  Assessment and Plan of Treatment:     Diagnosis: BILLY (acute kidney injury)  Assessment and Plan of Treatment: you had an acute kidney inujru on you chronic kidney injur .Required treatment was done and its resolved now .     PRINCIPAL DISCHARGE DIAGNOSIS  Diagnosis: Blurry vision  Assessment and Plan of Treatment: you came to us  with eye pain and right sided weakness  CT scan,CTA ,MRI and CSF studies were done and neurology was consulted   please follow up with neurology for further treatment .      SECONDARY DISCHARGE DIAGNOSES  Diagnosis: CKD (chronic kidney disease)  Assessment and Plan of Treatment:     Diagnosis: BILLY (acute kidney injury)  Assessment and Plan of Treatment: you had an acute kidney inujru on you chronic kidney injur .Required treatment was done and its resolved now .    Diagnosis: Blurry vision  Assessment and Plan of Treatment: you came to us  with eye pain and right sided weakness  CT scan,CTA ,MRI and CSF studies were done and neurology was consulted   please follow up with neurology for further treatment .

## 2023-07-27 NOTE — PROGRESS NOTE ADULT - SUBJECTIVE AND OBJECTIVE BOX
BRINDA MOYER 48y Male  MRN#: [id number]     Hospital Day: 10d    Pt is currently admitted with the primary diagnosis of  ST elevation myocardial infarction (STEMI)        SUBJECTIVE     Overnight events  None    Subjective complaints  Pt was evaluated this am. Patient denied any active complaints and per patient his symptoms are improving                                            ----------------------------------------------------------  OBJECTIVE  PAST MEDICAL & SURGICAL HISTORY  Asthma    Diverticulitis  surgery 16yrs ago    High cholesterol    Dyslipidemia    Hypertension    H/O vertigo    Diabetic ulcer of right foot    Broken toe  left pinky toe    Vertigo    HTN (hypertension)    Diabetes    AMBER on CPAP    History of TIAs    History of surgery  colon resection                                              -----------------------------------------------------------  ALLERGIES:  No Known Allergies                                            ------------------------------------------------------------    HOME MEDICATIONS  Home Medications:  ergocalciferol 1.25 mg (50,000 intl units) oral tablet: orally once a week (17 Jul 2023 22:27)  Jardiance 10 mg oral tablet: 1 orally once a week (17 Jul 2023 22:27)  meclizine 25 mg oral tablet: 1 orally 2 times a day (17 Jul 2023 22:27)  rosuvastatin 20 mg oral capsule: 1 orally once a day (at bedtime) (17 Jul 2023 22:27)  spironolactone 25 mg oral tablet: 1 orally once a day (17 Jul 2023 22:27)                           MEDICATIONS:  STANDING MEDICATIONS  artificial tears (preservative free) Ophthalmic Solution 1 Drop(s) Right EYE two times a day  aspirin enteric coated 81 milliGRAM(s) Oral daily  atorvastatin 20 milliGRAM(s) Oral at bedtime  bisacodyl 5 milliGRAM(s) Oral at bedtime  carvedilol 25 milliGRAM(s) Oral every 12 hours  dextrose 5%. 1000 milliLiter(s) IV Continuous <Continuous>  dextrose 5%. 1000 milliLiter(s) IV Continuous <Continuous>  dextrose 50% Injectable 12.5 Gram(s) IV Push once  dextrose 50% Injectable 25 Gram(s) IV Push once  dextrose 50% Injectable 25 Gram(s) IV Push once  glucagon  Injectable 1 milliGRAM(s) IntraMuscular once  heparin   Injectable 5000 Unit(s) SubCutaneous every 8 hours  hydrALAZINE 25 milliGRAM(s) Oral three times a day  insulin glargine Injectable (LANTUS) 25 Unit(s) SubCutaneous at bedtime  insulin lispro (ADMELOG) corrective regimen sliding scale   SubCutaneous three times a day before meals  insulin lispro Injectable (ADMELOG) 5 Unit(s) SubCutaneous three times a day before meals  lisinopril 10 milliGRAM(s) Oral daily  meclizine 25 milliGRAM(s) Oral two times a day  pantoprazole    Tablet 40 milliGRAM(s) Oral before breakfast  polyethylene glycol 3350 17 Gram(s) Oral two times a day  senna 2 Tablet(s) Oral at bedtime    PRN MEDICATIONS  acetaminophen     Tablet .. 650 milliGRAM(s) Oral every 6 hours PRN  dextrose Oral Gel 15 Gram(s) Oral once PRN                                            ------------------------------------------------------------  VITAL SIGNS: Last 24 Hours  T(C): 36.7 (27 Jul 2023 08:40), Max: 36.7 (27 Jul 2023 08:40)  T(F): 98 (27 Jul 2023 08:40), Max: 98 (27 Jul 2023 08:40)  HR: 75 (27 Jul 2023 08:40) (71 - 76)  BP: 163/74 (27 Jul 2023 08:40) (135/67 - 163/74)  BP(mean): 107 (27 Jul 2023 05:20) (107 - 108)  RR: 18 (27 Jul 2023 08:40) (16 - 18)  SpO2: --      07-26-23 @ 07:01  -  07-27-23 @ 07:00  --------------------------------------------------------  IN: 300 mL / OUT: 0 mL / NET: 300 mL                                             --------------------------------------------------------------  LABS:                        9.1    6.80  )-----------( 192      ( 27 Jul 2023 07:24 )             27.9     07-27    139  |  108  |  49<H>  ----------------------------<  201<H>  4.5   |  22  |  3.3<H>    Ca    8.3<L>      27 Jul 2023 07:24  Mg     1.8     07-27    TPro  5.5<L>  /  Alb  3.1<L>  /  TBili  <0.2  /  DBili  x   /  AST  20  /  ALT  24  /  AlkPhos  124<H>  07-27      Urinalysis Basic - ( 27 Jul 2023 07:24 )    Color: x / Appearance: x / SG: x / pH: x  Gluc: 201 mg/dL / Ketone: x  / Bili: x / Urobili: x   Blood: x / Protein: x / Nitrite: x   Leuk Esterase: x / RBC: x / WBC x   Sq Epi: x / Non Sq Epi: x / Bacteria: x              Culture - Fungal, CSF (collected 26 Jul 2023 11:05)  Source: .CSF CSF  Preliminary Report (27 Jul 2023 10:02):    Testing in progress    Culture - CSF with Gram Stain (collected 26 Jul 2023 11:00)  Source: .CSF CSF  Gram Stain (26 Jul 2023 22:38):    No polymorphonuclear cells seen    No organisms seen    by cytocentrifuge    Culture - Acid Fast - CSF (collected 26 Jul 2023 11:00)  Source: .CSF CSF                                                    -------------------------------------------------------------  RADIOLOGY:                                            --------------------------------------------------------------

## 2023-07-27 NOTE — PROGRESS NOTE ADULT - SUBJECTIVE AND OBJECTIVE BOX
NEUROLOGY CONSULT PROGRESS NOTE    INTERVAL HISTORY: Patient still shows discomfort with his left eye. Patient is offered to start steroids IV therapy but patient refuses it during this admission since he needs to take care of unavoidable personal issues and wants to be discharged today. Patient still interested in being treated as soon as possible. He manifests he might be able to come back to the hospital for further evaluation and management on 7/30/23 if possible. Neurology team will arrange his programmed admission for that day under Neurology.     REVIEW OF SYSTEMS:  As per HPI, otherwise negative for Constitutional, Eyes, Ears/Nose/Mouth/Throat, Neck, Cardiovascular, Respiratory, Gastrointestinal, Genitourinary, Skin, Endocrine, Musculoskeletal, Psychiatric, and Hematologic/Lymphatic.    MEDICATIONS:  acetaminophen     Tablet .. 650 milliGRAM(s) Oral every 6 hours PRN  artificial tears (preservative free) Ophthalmic Solution 1 Drop(s) Right EYE two times a day  aspirin enteric coated 81 milliGRAM(s) Oral daily  atorvastatin 20 milliGRAM(s) Oral at bedtime  bisacodyl 5 milliGRAM(s) Oral at bedtime  carvedilol 25 milliGRAM(s) Oral every 12 hours  dextrose 5%. 1000 milliLiter(s) IV Continuous <Continuous>  dextrose 5%. 1000 milliLiter(s) IV Continuous <Continuous>  dextrose 50% Injectable 25 Gram(s) IV Push once  dextrose 50% Injectable 25 Gram(s) IV Push once  dextrose 50% Injectable 12.5 Gram(s) IV Push once  dextrose Oral Gel 15 Gram(s) Oral once PRN  glucagon  Injectable 1 milliGRAM(s) IntraMuscular once  heparin   Injectable 5000 Unit(s) SubCutaneous every 8 hours  hydrALAZINE 25 milliGRAM(s) Oral three times a day  insulin glargine Injectable (LANTUS) 25 Unit(s) SubCutaneous at bedtime  insulin lispro (ADMELOG) corrective regimen sliding scale   SubCutaneous three times a day before meals  insulin lispro Injectable (ADMELOG) 5 Unit(s) SubCutaneous three times a day before meals  lisinopril 10 milliGRAM(s) Oral daily  meclizine 25 milliGRAM(s) Oral two times a day  pantoprazole    Tablet 40 milliGRAM(s) Oral before breakfast  polyethylene glycol 3350 17 Gram(s) Oral two times a day  senna 2 Tablet(s) Oral at bedtime    VITAL SIGNS:  Vital Signs Last 24 Hrs  T(C): 36.7 (27 Jul 2023 08:40), Max: 36.7 (27 Jul 2023 08:40)  T(F): 98 (27 Jul 2023 08:40), Max: 98 (27 Jul 2023 08:40)  HR: 84 (27 Jul 2023 13:22) (71 - 84)  BP: 139/98 (27 Jul 2023 13:22) (139/98 - 163/74)  BP(mean): 107 (27 Jul 2023 05:20) (107 - 108)  RR: 18 (27 Jul 2023 08:40) (18 - 18)  SpO2: --    PHYSICAL EXAMINATION:  - Mental Status:  AAOx3; speech is fluent with intact naming, repetition, and comprehension  - Cranial Nerves II-XII:    II:  PERRLA; visual fields are full to confrontation  III, IV, VI:  abduction to the R less limited by pain, no nystagmus  V:  facial sensation is intact in the V1-V3 distribution bilaterally.  VII:  face is symmetric with normal eye closure and smile  VIII:  hearing is intact to finger rub  IX, X:  uvula is midline and soft palate rises symmetrically  XI:  head turning and shoulder shrug are intact bilaterally  XII:  tongue protrudes in the midline  - Motor:  strength is 5/5 throughout; normal muscle bulk and tone throughout; no pronator drift  - Reflexes: 2+ R biceps, 1+ elsewhere and symmetric at the biceps, triceps, brachioradialis, knees, and ankles;  plantar reflexes downgoing bilaterally  - Sensory:  reduced vibration sense b/l feet, reduced sharp sensation below ankle  - Coordination:  finger-nose-finger and heel-knee-shin intact without dysmetria; rapid alternating hand movements intact  - Gait:  normal steps, base, arm swing, and turning; heel and toe walking are normal; tandem gait is normal; Romberg testing is negative    LABS:                          9.1    6.80  )-----------( 192      ( 27 Jul 2023 07:24 )             27.9     07-27    139  |  108  |  49<H>  ----------------------------<  201<H>  4.5   |  22  |  3.3<H>    Ca    8.3<L>      27 Jul 2023 07:24  Mg     1.8     07-27    TPro  5.5<L>  /  Alb  3.1<L>  /  TBili  <0.2  /  DBili  x   /  AST  20  /  ALT  24  /  AlkPhos  124<H>  07-27      Urinalysis Basic - ( 27 Jul 2023 07:24 )    Color: x / Appearance: x / SG: x / pH: x  Gluc: 201 mg/dL / Ketone: x  / Bili: x / Urobili: x   Blood: x / Protein: x / Nitrite: x   Leuk Esterase: x / RBC: x / WBC x   Sq Epi: x / Non Sq Epi: x / Bacteria: x        RADIOLOGY & ADDITIONAL STUDIES:      IMPRESSION & RECOMMENDATIONS:

## 2023-07-27 NOTE — DISCHARGE NOTE PROVIDER - HOSPITAL COURSE
49 YO male presented  in the outpatient clinic for pre-test for circumcision where  his EKG suggested STEMI so EMS was called and on the way to the hospital he developed chest pain of left side radiating to the back, characterized by tightness then burning sensation through the whole chest. This pain lasted for 1 hour and resolved, he received aspirin loading on the way here.   patient  [facility]  catherization showed  no coronary obstruction   He is also complaining from constant  right eye pain with double vision and blurring of vision that started 3 weeks ago,related to uncontrolled DM.     PMHx :  Hx of multiple CVA with residual weakness on right side.   CKD IV baseline around 3.2  diverticulitis s/p LAR  cigarette use, ETOH abuse now sober x5 years  IDDM  HTN  AMBER  Vertigo     # Acute diastolic  CHF-resolved  -Atypical  chest pain  probably musculoskeletal  - Elevated troponin 2/2 to ckd  # H/o non obstructive CAD  # Hypertension  -Troponin T, Serum level was obtained   - 12 Lead ECG 07.17.23 @ 17:57  was Normal  -  TTE Echo Complete w/o Contrast w/ Doppler (07.21.23 @ 10:59)  EF of 67 %. .mild pulmonary hypertension.  -  Xray Chest 1 View- PORTABLE-Urgent (Xray Chest 1 View- PORTABLE-Urgent .) (07.21.23 @ 15:28) >Cardiomegaly with CHF. Slightly worsened. IV lasix    was given on 7/21 .  - was on ASA, coreg, statin ,norvasc, hydralazine  - cardiology was consulted   -monitored for fluid overload.    # Right eye pain with blurring of vision 2/2 to diabetic retinopathy  # Right 6th nerve palsy  # H/o multiple CVA   - MR Head No Cont (07.18.23 @ 22:59) > New 1 cm lesion within the right frontal lobe periventricular white matter demonstrating rim-like restricted diffusion.  New 8 mm lesion within the anterior right temporal lobe. Additional new small lesion within the right cerebellar hemisphere, the configuration suggests a chronic infarct.Otherwise stable patchy white matter disease and scattered chronic   lacunar infarcts.  - CT PERFUSION: No evidence of perfusion abnormality.  - CTA HEAD: Mild stenosis of the proximal right V4 segment of the vertebral artery.  - CTA NECK: No evidence of carotid or vertebral artery stenosis.  - Sedimentation Rate, Erythrocyte: 66 mm/Hr (07.18.23 @ 11:15)  - C-Reactive Protein, Serum: <3.0 mg/L (07.18.23 @ 11:15)  -  HIV, WES, dsDNA and Lyme   all Negative   -Angiotensin converting enzyme, NMO, MOG, Vitamin B12 followed.  - Ophthalmology on board  - HVF testing at out patient follow up  - follow up at [facility] (or eye care provider of pts choice) within 4 weeks of discharge.   - MR Head w/wo IV Cont (07.20.23 @ 19:05)   - neuro F/u MR orbit w/wo contrast came back normal on 7/23 .  - Patient reports having LP done at [facility]. Faxed [facility] the request for LP report on 7/24.  -Lp was done and CSf studies were performed   -Neurology was consulted     # Acute kidney injury on  CKD stage 4,  # Hyperkalemia - resolved  - nephrology was consulted   -monitored Urine output.      # DM type2 with hyperglycemia  - HbA1c is 10.8  -was on  lantus and ispro    # H/o  vertigo  - On  meclizine           MISC:  # DVT prophylaxis: heparin 5000 IU Subcutaneous every 8 hours .  #Diet:Regular  # Full code       47 YO male presented  in the outpatient clinic for pre-test for circumcision where  his EKG suggested STEMI so EMS was called and on the way to the hospital he developed chest pain of left side radiating to the back, characterized by tightness then burning sensation through the whole chest. This pain lasted for 1 hour and resolved, he received aspirin loading on the way here.   patient  [facility]  catherization showed  no coronary obstruction   He is also complaining from constant  right eye pain with double vision and blurring of vision that started 3 weeks ago,related to uncontrolled DM.     PMHx :  Hx of multiple CVA with residual weakness on right side.   CKD IV baseline around 3.2  diverticulitis s/p LAR  cigarette use, ETOH abuse now sober x5 years  IDDM  HTN  AMBER  Vertigo     # Acute diastolic  CHF-resolved  -Atypical  chest pain  probably musculoskeletal  - Elevated troponin 2/2 to ckd  # H/o non obstructive CAD  # Hypertension  -Troponin T, Serum level was obtained   - 12 Lead ECG 07.17.23 @ 17:57  was Normal  -  TTE Echo Complete w/o Contrast w/ Doppler (07.21.23 @ 10:59)  EF of 67 %. .mild pulmonary hypertension.  -  Xray Chest 1 View- PORTABLE-Urgent (Xray Chest 1 View- PORTABLE-Urgent .) (07.21.23 @ 15:28) >Cardiomegaly with CHF. Slightly worsened. IV lasix    was given on 7/21 .  - was on ASA, coreg, statin ,norvasc, hydralazine  - cardiology was consulted   -monitored for fluid overload.    # Right eye pain with blurring of vision 2/2 to diabetic retinopathy  # Right 6th nerve palsy  # H/o multiple CVA   - MR Head No Cont (07.18.23 @ 22:59) > New 1 cm lesion within the right frontal lobe periventricular white matter demonstrating rim-like restricted diffusion.  New 8 mm lesion within the anterior right temporal lobe. Additional new small lesion within the right cerebellar hemisphere, the configuration suggests a chronic infarct.Otherwise stable patchy white matter disease and scattered chronic   lacunar infarcts.  - CT PERFUSION: No evidence of perfusion abnormality.  - CTA HEAD: Mild stenosis of the proximal right V4 segment of the vertebral artery.  - CTA NECK: No evidence of carotid or vertebral artery stenosis.  - Sedimentation Rate, Erythrocyte: 66 mm/Hr (07.18.23 @ 11:15)  - C-Reactive Protein, Serum: <3.0 mg/L (07.18.23 @ 11:15)  -  HIV, WES, dsDNA and Lyme   all Negative   -Angiotensin converting enzyme, NMO, MOG, Vitamin B12 followed.  - Ophthalmology on board  - HVF testing at out patient follow up  - follow up at [facility] clinic (or eye care provider of pts choice) within 4 weeks of discharge.   - MR Head w/wo IV Cont (07.20.23 @ 19:05)   - neuro F/u MR orbit w/wo contrast came back normal on 7/23 .  - Patient reports having LP done at [facility]. Faxed [facility] the request for LP report on 7/24.  -Lp was done and CSf studies were performed   -Neurology was consulted, patient will need to take high dose IV steroids as outpatient, can be dc today     # Acute kidney injury on  CKD stage 4,  # Hyperkalemia - resolved  - nephrology was consulted   -monitored Urine output.      # DM type2 with hyperglycemia  - HbA1c is 10.8  -was on  lantus and ispro    # H/o  vertigo  - On  meclizine

## 2023-07-28 ENCOUNTER — NON-APPOINTMENT (OUTPATIENT)
Age: 48
End: 2023-07-28

## 2023-07-28 LAB
ACE SERPL-CCNC: 37 U/L — SIGNIFICANT CHANGE UP (ref 14–82)
EBV PCR: SIGNIFICANT CHANGE UP IU/ML
JCPYV DNA # CSF NAA+PROBE: SIGNIFICANT CHANGE UP COPIES/ML
NON-GYNECOLOGICAL CYTOLOGY STUDY: SIGNIFICANT CHANGE UP

## 2023-07-29 LAB
CULTURE RESULTS: NO GROWTH — SIGNIFICANT CHANGE UP
SPECIMEN SOURCE: SIGNIFICANT CHANGE UP
WNV IGG CSF IA-ACNC: POSITIVE
WNV IGM CSF IA-ACNC: NEGATIVE — SIGNIFICANT CHANGE UP

## 2023-07-31 LAB
B BURGDOR DNA SPEC QL NAA+PROBE: NEGATIVE — SIGNIFICANT CHANGE UP
INNER EAR 68KD AB FLD QL: <1.5 U/L — SIGNIFICANT CHANGE UP (ref 0–2.5)
VDRL CSF-TITR: SIGNIFICANT CHANGE UP

## 2023-08-01 ENCOUNTER — APPOINTMENT (OUTPATIENT)
Dept: UROLOGY | Facility: HOSPITAL | Age: 48
End: 2023-08-01

## 2023-08-01 LAB
AMPA-RECEPTOR ANTIBODY BY CBA, SERUM: NEGATIVE — SIGNIFICANT CHANGE UP
AMPHIPHYSIN AB TITR SER: NEGATIVE — SIGNIFICANT CHANGE UP
CASPR2-IGG CBA, SERUM: NEGATIVE — SIGNIFICANT CHANGE UP
CRMP-5-IGG WESTERN BLOT: NEGATIVE — SIGNIFICANT CHANGE UP
GABA-B-RECEPTOR ANTIBODY BY CBA, SERUM: NEGATIVE — SIGNIFICANT CHANGE UP
GAD65 AB SER-MCNC: 0 NMOL/L — SIGNIFICANT CHANGE UP
GLIAL NUC TYPE 1 AB TITR SER: NEGATIVE — SIGNIFICANT CHANGE UP
HU1 AB TITR SER: NEGATIVE — SIGNIFICANT CHANGE UP
HU2 AB TITR SER IF: NEGATIVE — SIGNIFICANT CHANGE UP
HU3 AB TITR SER: NEGATIVE — SIGNIFICANT CHANGE UP
IFA NOTES: SIGNIFICANT CHANGE UP
IMMUNOLOGIST REVIEW: SIGNIFICANT CHANGE UP
LGI1-IGG CBA, SERUM: NEGATIVE — SIGNIFICANT CHANGE UP
NMDA-RECEPTOR ANTIBODY BY CBA, SERUM: NEGATIVE — SIGNIFICANT CHANGE UP
PCA-1 AB TITR SER: NEGATIVE — SIGNIFICANT CHANGE UP
PCA-2 AB TITR SER: NEGATIVE — SIGNIFICANT CHANGE UP
PCA-TR AB TITR SER: NEGATIVE — SIGNIFICANT CHANGE UP

## 2023-08-03 DIAGNOSIS — I69.951 HEMIPLEGIA AND HEMIPARESIS FOLLOWING UNSPECIFIED CEREBROVASCULAR DISEASE AFFECTING RIGHT DOMINANT SIDE: ICD-10-CM

## 2023-08-03 DIAGNOSIS — E11.65 TYPE 2 DIABETES MELLITUS WITH HYPERGLYCEMIA: ICD-10-CM

## 2023-08-03 DIAGNOSIS — E11.22 TYPE 2 DIABETES MELLITUS WITH DIABETIC CHRONIC KIDNEY DISEASE: ICD-10-CM

## 2023-08-03 DIAGNOSIS — K59.00 CONSTIPATION, UNSPECIFIED: ICD-10-CM

## 2023-08-03 DIAGNOSIS — E87.5 HYPERKALEMIA: ICD-10-CM

## 2023-08-03 DIAGNOSIS — E11.319 TYPE 2 DIABETES MELLITUS WITH UNSPECIFIED DIABETIC RETINOPATHY WITHOUT MACULAR EDEMA: ICD-10-CM

## 2023-08-03 DIAGNOSIS — I13.0 HYPERTENSIVE HEART AND CHRONIC KIDNEY DISEASE WITH HEART FAILURE AND STAGE 1 THROUGH STAGE 4 CHRONIC KIDNEY DISEASE, OR UNSPECIFIED CHRONIC KIDNEY DISEASE: ICD-10-CM

## 2023-08-03 DIAGNOSIS — E11.21 TYPE 2 DIABETES MELLITUS WITH DIABETIC NEPHROPATHY: ICD-10-CM

## 2023-08-03 DIAGNOSIS — E87.20 ACIDOSIS, UNSPECIFIED: ICD-10-CM

## 2023-08-03 DIAGNOSIS — F17.210 NICOTINE DEPENDENCE, CIGARETTES, UNCOMPLICATED: ICD-10-CM

## 2023-08-03 DIAGNOSIS — I50.31 ACUTE DIASTOLIC (CONGESTIVE) HEART FAILURE: ICD-10-CM

## 2023-08-03 DIAGNOSIS — H53.2 DIPLOPIA: ICD-10-CM

## 2023-08-03 DIAGNOSIS — N18.4 CHRONIC KIDNEY DISEASE, STAGE 4 (SEVERE): ICD-10-CM

## 2023-08-03 DIAGNOSIS — Z99.89 DEPENDENCE ON OTHER ENABLING MACHINES AND DEVICES: ICD-10-CM

## 2023-08-03 DIAGNOSIS — I27.20 PULMONARY HYPERTENSION, UNSPECIFIED: ICD-10-CM

## 2023-08-03 DIAGNOSIS — G93.9 DISORDER OF BRAIN, UNSPECIFIED: ICD-10-CM

## 2023-08-03 DIAGNOSIS — R51.9 HEADACHE, UNSPECIFIED: ICD-10-CM

## 2023-08-03 DIAGNOSIS — N17.9 ACUTE KIDNEY FAILURE, UNSPECIFIED: ICD-10-CM

## 2023-08-03 DIAGNOSIS — I25.10 ATHEROSCLEROTIC HEART DISEASE OF NATIVE CORONARY ARTERY WITHOUT ANGINA PECTORIS: ICD-10-CM

## 2023-08-03 DIAGNOSIS — E78.5 HYPERLIPIDEMIA, UNSPECIFIED: ICD-10-CM

## 2023-08-03 DIAGNOSIS — G47.33 OBSTRUCTIVE SLEEP APNEA (ADULT) (PEDIATRIC): ICD-10-CM

## 2023-08-03 DIAGNOSIS — J45.909 UNSPECIFIED ASTHMA, UNCOMPLICATED: ICD-10-CM

## 2023-08-03 DIAGNOSIS — E87.1 HYPO-OSMOLALITY AND HYPONATREMIA: ICD-10-CM

## 2023-08-03 DIAGNOSIS — H49.21 SIXTH [ABDUCENT] NERVE PALSY, RIGHT EYE: ICD-10-CM

## 2023-08-03 LAB — MOG AB CSF QL CBA IFA: NEGATIVE — SIGNIFICANT CHANGE UP

## 2023-08-04 LAB — OLIGOCLONAL BANDS CSF ELPH-IMP: PRESENT

## 2023-08-06 NOTE — CHART NOTE - NSCHARTNOTEFT_GEN_A_CORE
[place] virus IgG from CSF is +ve, but neg for IgM, so not acute infection, spoke w neuro team who covered him (Dr. De La Rosa), no intervention to be offered for this abnormal result.

## 2023-08-09 LAB — H CAPSUL AG CSF IA-MCNC: SIGNIFICANT CHANGE UP

## 2023-08-23 NOTE — ED CDU PROVIDER INITIAL DAY NOTE - PRO INTERPRETER NEED 2
English
Quality 130: Documentation Of Current Medications In The Medical Record: Current Medications Documented
Detail Level: Detailed

## 2023-08-26 LAB
CULTURE RESULTS: SIGNIFICANT CHANGE UP
SPECIMEN SOURCE: SIGNIFICANT CHANGE UP

## 2023-09-11 ENCOUNTER — RX RENEWAL (OUTPATIENT)
Age: 48
End: 2023-09-11

## 2023-09-11 RX ORDER — EMPAGLIFLOZIN 10 MG/1
10 TABLET, FILM COATED ORAL DAILY
Qty: 90 | Refills: 1 | Status: ACTIVE | COMMUNITY
Start: 2023-04-25 | End: 1900-01-01

## 2023-09-11 RX ORDER — EZETIMIBE 10 MG/1
10 TABLET ORAL
Qty: 90 | Refills: 1 | Status: ACTIVE | COMMUNITY
Start: 2023-04-25 | End: 1900-01-01

## 2023-09-13 LAB
CULTURE RESULTS: SIGNIFICANT CHANGE UP
SPECIMEN SOURCE: SIGNIFICANT CHANGE UP

## 2023-09-20 NOTE — PATIENT PROFILE ADULT - NSTOBACCONEVERSMOKERY/N_GEN_A
Airway patent, Nasal mucosa clear. Mouth with normal mucosa. Throat has no vesicles, no oropharyngeal exudates and uvula is midline. pos tend to R frontal / temporal area with mild swelling. no crepitus.
Yes

## 2023-10-25 ENCOUNTER — APPOINTMENT (OUTPATIENT)
Dept: UROLOGY | Facility: CLINIC | Age: 48
End: 2023-10-25
Payer: MEDICARE

## 2023-10-25 DIAGNOSIS — N52.9 MALE ERECTILE DYSFUNCTION, UNSPECIFIED: ICD-10-CM

## 2023-10-25 PROCEDURE — 99214 OFFICE O/P EST MOD 30 MIN: CPT

## 2023-10-25 RX ORDER — CLOTRIMAZOLE AND BETAMETHASONE DIPROPIONATE 10; .5 MG/G; MG/G
1-0.05 CREAM TOPICAL
Qty: 1 | Refills: 3 | Status: ACTIVE | COMMUNITY
Start: 2023-06-26 | End: 1900-01-01

## 2023-12-05 ENCOUNTER — OUTPATIENT (OUTPATIENT)
Dept: OUTPATIENT SERVICES | Facility: HOSPITAL | Age: 48
LOS: 1 days | End: 2023-12-05
Payer: MEDICARE

## 2023-12-05 VITALS
HEART RATE: 64 BPM | OXYGEN SATURATION: 100 % | HEIGHT: 68 IN | DIASTOLIC BLOOD PRESSURE: 92 MMHG | SYSTOLIC BLOOD PRESSURE: 158 MMHG | WEIGHT: 186.07 LBS | TEMPERATURE: 98 F

## 2023-12-05 DIAGNOSIS — Z01.818 ENCOUNTER FOR OTHER PREPROCEDURAL EXAMINATION: ICD-10-CM

## 2023-12-05 DIAGNOSIS — N52.9 MALE ERECTILE DYSFUNCTION, UNSPECIFIED: ICD-10-CM

## 2023-12-05 DIAGNOSIS — Z98.890 OTHER SPECIFIED POSTPROCEDURAL STATES: Chronic | ICD-10-CM

## 2023-12-05 LAB
A1C WITH ESTIMATED AVERAGE GLUCOSE RESULT: 12.2 % — HIGH (ref 4–5.6)
A1C WITH ESTIMATED AVERAGE GLUCOSE RESULT: 12.2 % — HIGH (ref 4–5.6)
ALBUMIN SERPL ELPH-MCNC: 2.9 G/DL — LOW (ref 3.5–5.2)
ALBUMIN SERPL ELPH-MCNC: 2.9 G/DL — LOW (ref 3.5–5.2)
ALP SERPL-CCNC: 153 U/L — HIGH (ref 30–115)
ALP SERPL-CCNC: 153 U/L — HIGH (ref 30–115)
ALT FLD-CCNC: 18 U/L — SIGNIFICANT CHANGE UP (ref 0–41)
ALT FLD-CCNC: 18 U/L — SIGNIFICANT CHANGE UP (ref 0–41)
ANION GAP SERPL CALC-SCNC: 11 MMOL/L — SIGNIFICANT CHANGE UP (ref 7–14)
ANION GAP SERPL CALC-SCNC: 11 MMOL/L — SIGNIFICANT CHANGE UP (ref 7–14)
APPEARANCE UR: ABNORMAL
APPEARANCE UR: ABNORMAL
APTT BLD: 33.1 SEC — SIGNIFICANT CHANGE UP (ref 27–39.2)
APTT BLD: 33.1 SEC — SIGNIFICANT CHANGE UP (ref 27–39.2)
AST SERPL-CCNC: 21 U/L — SIGNIFICANT CHANGE UP (ref 0–41)
AST SERPL-CCNC: 21 U/L — SIGNIFICANT CHANGE UP (ref 0–41)
BACTERIA # UR AUTO: NEGATIVE /HPF — SIGNIFICANT CHANGE UP
BACTERIA # UR AUTO: NEGATIVE /HPF — SIGNIFICANT CHANGE UP
BASOPHILS # BLD AUTO: 0.03 K/UL — SIGNIFICANT CHANGE UP (ref 0–0.2)
BASOPHILS # BLD AUTO: 0.03 K/UL — SIGNIFICANT CHANGE UP (ref 0–0.2)
BASOPHILS NFR BLD AUTO: 0.4 % — SIGNIFICANT CHANGE UP (ref 0–1)
BASOPHILS NFR BLD AUTO: 0.4 % — SIGNIFICANT CHANGE UP (ref 0–1)
BILIRUB SERPL-MCNC: <0.2 MG/DL — SIGNIFICANT CHANGE UP (ref 0.2–1.2)
BILIRUB SERPL-MCNC: <0.2 MG/DL — SIGNIFICANT CHANGE UP (ref 0.2–1.2)
BILIRUB UR-MCNC: NEGATIVE — SIGNIFICANT CHANGE UP
BILIRUB UR-MCNC: NEGATIVE — SIGNIFICANT CHANGE UP
BUN SERPL-MCNC: 54 MG/DL — HIGH (ref 10–20)
BUN SERPL-MCNC: 54 MG/DL — HIGH (ref 10–20)
CALCIUM SERPL-MCNC: 8.5 MG/DL — SIGNIFICANT CHANGE UP (ref 8.4–10.5)
CALCIUM SERPL-MCNC: 8.5 MG/DL — SIGNIFICANT CHANGE UP (ref 8.4–10.5)
CAST: 16 /LPF — HIGH (ref 0–4)
CAST: 16 /LPF — HIGH (ref 0–4)
CHLORIDE SERPL-SCNC: 104 MMOL/L — SIGNIFICANT CHANGE UP (ref 98–110)
CHLORIDE SERPL-SCNC: 104 MMOL/L — SIGNIFICANT CHANGE UP (ref 98–110)
CO2 SERPL-SCNC: 23 MMOL/L — SIGNIFICANT CHANGE UP (ref 17–32)
CO2 SERPL-SCNC: 23 MMOL/L — SIGNIFICANT CHANGE UP (ref 17–32)
COLOR SPEC: YELLOW — SIGNIFICANT CHANGE UP
COLOR SPEC: YELLOW — SIGNIFICANT CHANGE UP
CREAT SERPL-MCNC: 3.9 MG/DL — HIGH (ref 0.7–1.5)
CREAT SERPL-MCNC: 3.9 MG/DL — HIGH (ref 0.7–1.5)
DIFF PNL FLD: ABNORMAL
DIFF PNL FLD: ABNORMAL
EGFR: 18 ML/MIN/1.73M2 — LOW
EGFR: 18 ML/MIN/1.73M2 — LOW
EOSINOPHIL # BLD AUTO: 0.21 K/UL — SIGNIFICANT CHANGE UP (ref 0–0.7)
EOSINOPHIL # BLD AUTO: 0.21 K/UL — SIGNIFICANT CHANGE UP (ref 0–0.7)
EOSINOPHIL NFR BLD AUTO: 2.8 % — SIGNIFICANT CHANGE UP (ref 0–8)
EOSINOPHIL NFR BLD AUTO: 2.8 % — SIGNIFICANT CHANGE UP (ref 0–8)
ESTIMATED AVERAGE GLUCOSE: 303 MG/DL — HIGH (ref 68–114)
ESTIMATED AVERAGE GLUCOSE: 303 MG/DL — HIGH (ref 68–114)
FINE GRAN CASTS #/AREA URNS AUTO: PRESENT
FINE GRAN CASTS #/AREA URNS AUTO: PRESENT
GLUCOSE SERPL-MCNC: 253 MG/DL — HIGH (ref 70–99)
GLUCOSE SERPL-MCNC: 253 MG/DL — HIGH (ref 70–99)
GLUCOSE UR QL: 500 MG/DL
GLUCOSE UR QL: 500 MG/DL
HCT VFR BLD CALC: 34.5 % — LOW (ref 42–52)
HCT VFR BLD CALC: 34.5 % — LOW (ref 42–52)
HGB BLD-MCNC: 11.4 G/DL — LOW (ref 14–18)
HGB BLD-MCNC: 11.4 G/DL — LOW (ref 14–18)
IMM GRANULOCYTES NFR BLD AUTO: 0.7 % — HIGH (ref 0.1–0.3)
IMM GRANULOCYTES NFR BLD AUTO: 0.7 % — HIGH (ref 0.1–0.3)
INR BLD: 0.86 RATIO — SIGNIFICANT CHANGE UP (ref 0.65–1.3)
INR BLD: 0.86 RATIO — SIGNIFICANT CHANGE UP (ref 0.65–1.3)
KETONES UR-MCNC: NEGATIVE MG/DL — SIGNIFICANT CHANGE UP
KETONES UR-MCNC: NEGATIVE MG/DL — SIGNIFICANT CHANGE UP
LEUKOCYTE ESTERASE UR-ACNC: NEGATIVE — SIGNIFICANT CHANGE UP
LEUKOCYTE ESTERASE UR-ACNC: NEGATIVE — SIGNIFICANT CHANGE UP
LYMPHOCYTES # BLD AUTO: 1.48 K/UL — SIGNIFICANT CHANGE UP (ref 1.2–3.4)
LYMPHOCYTES # BLD AUTO: 1.48 K/UL — SIGNIFICANT CHANGE UP (ref 1.2–3.4)
LYMPHOCYTES # BLD AUTO: 19.6 % — LOW (ref 20.5–51.1)
LYMPHOCYTES # BLD AUTO: 19.6 % — LOW (ref 20.5–51.1)
MCHC RBC-ENTMCNC: 30.4 PG — SIGNIFICANT CHANGE UP (ref 27–31)
MCHC RBC-ENTMCNC: 30.4 PG — SIGNIFICANT CHANGE UP (ref 27–31)
MCHC RBC-ENTMCNC: 33 G/DL — SIGNIFICANT CHANGE UP (ref 32–37)
MCHC RBC-ENTMCNC: 33 G/DL — SIGNIFICANT CHANGE UP (ref 32–37)
MCV RBC AUTO: 92 FL — SIGNIFICANT CHANGE UP (ref 80–94)
MCV RBC AUTO: 92 FL — SIGNIFICANT CHANGE UP (ref 80–94)
MONOCYTES # BLD AUTO: 0.65 K/UL — HIGH (ref 0.1–0.6)
MONOCYTES # BLD AUTO: 0.65 K/UL — HIGH (ref 0.1–0.6)
MONOCYTES NFR BLD AUTO: 8.6 % — SIGNIFICANT CHANGE UP (ref 1.7–9.3)
MONOCYTES NFR BLD AUTO: 8.6 % — SIGNIFICANT CHANGE UP (ref 1.7–9.3)
NEUTROPHILS # BLD AUTO: 5.15 K/UL — SIGNIFICANT CHANGE UP (ref 1.4–6.5)
NEUTROPHILS # BLD AUTO: 5.15 K/UL — SIGNIFICANT CHANGE UP (ref 1.4–6.5)
NEUTROPHILS NFR BLD AUTO: 67.9 % — SIGNIFICANT CHANGE UP (ref 42.2–75.2)
NEUTROPHILS NFR BLD AUTO: 67.9 % — SIGNIFICANT CHANGE UP (ref 42.2–75.2)
NITRITE UR-MCNC: NEGATIVE — SIGNIFICANT CHANGE UP
NITRITE UR-MCNC: NEGATIVE — SIGNIFICANT CHANGE UP
NRBC # BLD: 0 /100 WBCS — SIGNIFICANT CHANGE UP (ref 0–0)
NRBC # BLD: 0 /100 WBCS — SIGNIFICANT CHANGE UP (ref 0–0)
PH UR: 5.5 — SIGNIFICANT CHANGE UP (ref 5–8)
PH UR: 5.5 — SIGNIFICANT CHANGE UP (ref 5–8)
PLATELET # BLD AUTO: 195 K/UL — SIGNIFICANT CHANGE UP (ref 130–400)
PLATELET # BLD AUTO: 195 K/UL — SIGNIFICANT CHANGE UP (ref 130–400)
PMV BLD: 12.1 FL — HIGH (ref 7.4–10.4)
PMV BLD: 12.1 FL — HIGH (ref 7.4–10.4)
POTASSIUM SERPL-MCNC: 4.6 MMOL/L — SIGNIFICANT CHANGE UP (ref 3.5–5)
POTASSIUM SERPL-MCNC: 4.6 MMOL/L — SIGNIFICANT CHANGE UP (ref 3.5–5)
POTASSIUM SERPL-SCNC: 4.6 MMOL/L — SIGNIFICANT CHANGE UP (ref 3.5–5)
POTASSIUM SERPL-SCNC: 4.6 MMOL/L — SIGNIFICANT CHANGE UP (ref 3.5–5)
PROT SERPL-MCNC: 5.4 G/DL — LOW (ref 6–8)
PROT SERPL-MCNC: 5.4 G/DL — LOW (ref 6–8)
PROT UR-MCNC: >=1000 MG/DL
PROT UR-MCNC: >=1000 MG/DL
PROTHROM AB SERPL-ACNC: 9.8 SEC — LOW (ref 9.95–12.87)
PROTHROM AB SERPL-ACNC: 9.8 SEC — LOW (ref 9.95–12.87)
RBC # BLD: 3.75 M/UL — LOW (ref 4.7–6.1)
RBC # BLD: 3.75 M/UL — LOW (ref 4.7–6.1)
RBC # FLD: 12.2 % — SIGNIFICANT CHANGE UP (ref 11.5–14.5)
RBC # FLD: 12.2 % — SIGNIFICANT CHANGE UP (ref 11.5–14.5)
RBC CASTS # UR COMP ASSIST: 3 /HPF — SIGNIFICANT CHANGE UP (ref 0–4)
RBC CASTS # UR COMP ASSIST: 3 /HPF — SIGNIFICANT CHANGE UP (ref 0–4)
SODIUM SERPL-SCNC: 138 MMOL/L — SIGNIFICANT CHANGE UP (ref 135–146)
SODIUM SERPL-SCNC: 138 MMOL/L — SIGNIFICANT CHANGE UP (ref 135–146)
SP GR SPEC: 1.02 — SIGNIFICANT CHANGE UP (ref 1–1.03)
SP GR SPEC: 1.02 — SIGNIFICANT CHANGE UP (ref 1–1.03)
SQUAMOUS # UR AUTO: 2 /HPF — SIGNIFICANT CHANGE UP (ref 0–5)
SQUAMOUS # UR AUTO: 2 /HPF — SIGNIFICANT CHANGE UP (ref 0–5)
UROBILINOGEN FLD QL: 0.2 MG/DL — SIGNIFICANT CHANGE UP (ref 0.2–1)
UROBILINOGEN FLD QL: 0.2 MG/DL — SIGNIFICANT CHANGE UP (ref 0.2–1)
WBC # BLD: 7.57 K/UL — SIGNIFICANT CHANGE UP (ref 4.8–10.8)
WBC # BLD: 7.57 K/UL — SIGNIFICANT CHANGE UP (ref 4.8–10.8)
WBC # FLD AUTO: 7.57 K/UL — SIGNIFICANT CHANGE UP (ref 4.8–10.8)
WBC # FLD AUTO: 7.57 K/UL — SIGNIFICANT CHANGE UP (ref 4.8–10.8)
WBC UR QL: 1 /HPF — SIGNIFICANT CHANGE UP (ref 0–5)
WBC UR QL: 1 /HPF — SIGNIFICANT CHANGE UP (ref 0–5)

## 2023-12-05 PROCEDURE — 80053 COMPREHEN METABOLIC PANEL: CPT

## 2023-12-05 PROCEDURE — 83036 HEMOGLOBIN GLYCOSYLATED A1C: CPT

## 2023-12-05 PROCEDURE — 87086 URINE CULTURE/COLONY COUNT: CPT

## 2023-12-05 PROCEDURE — 99214 OFFICE O/P EST MOD 30 MIN: CPT | Mod: 25

## 2023-12-05 PROCEDURE — 81001 URINALYSIS AUTO W/SCOPE: CPT

## 2023-12-05 PROCEDURE — 85610 PROTHROMBIN TIME: CPT

## 2023-12-05 PROCEDURE — 36415 COLL VENOUS BLD VENIPUNCTURE: CPT

## 2023-12-05 PROCEDURE — 93010 ELECTROCARDIOGRAM REPORT: CPT

## 2023-12-05 PROCEDURE — 85730 THROMBOPLASTIN TIME PARTIAL: CPT

## 2023-12-05 PROCEDURE — 93005 ELECTROCARDIOGRAM TRACING: CPT

## 2023-12-05 PROCEDURE — 85025 COMPLETE CBC W/AUTO DIFF WBC: CPT

## 2023-12-05 RX ORDER — INSULIN ASPART 100 [IU]/ML
0 INJECTION, SOLUTION SUBCUTANEOUS
Refills: 0 | DISCHARGE

## 2023-12-05 RX ORDER — EMPAGLIFLOZIN 10 MG/1
1 TABLET, FILM COATED ORAL
Refills: 0 | DISCHARGE

## 2023-12-05 RX ORDER — MECLIZINE HCL 12.5 MG
1 TABLET ORAL
Refills: 0 | DISCHARGE

## 2023-12-05 RX ORDER — SPIRONOLACTONE 25 MG/1
1 TABLET, FILM COATED ORAL
Refills: 0 | DISCHARGE

## 2023-12-05 RX ORDER — INSULIN DEGLUDEC 100 U/ML
0 INJECTION, SOLUTION SUBCUTANEOUS
Refills: 0 | DISCHARGE

## 2023-12-05 RX ORDER — ERGOCALCIFEROL 1.25 MG/1
0 CAPSULE ORAL
Refills: 0 | DISCHARGE

## 2023-12-05 RX ORDER — ROSUVASTATIN CALCIUM 5 MG/1
1 TABLET ORAL
Refills: 0 | DISCHARGE

## 2023-12-05 NOTE — H&P PST ADULT - REASON FOR ADMISSION
Patient is a 48 year old  male presenting to PAST in preparation for  Implantation of inflatable penile prosthesis on 12/12/23  under General anesthesia by Dr. Ewing .

## 2023-12-05 NOTE — H&P PST ADULT - ADDITIONAL PE
EKG abnormal. Patient asymptomatic, denies any current chest pain, palpitations and SOB.  Patient instructed to follow up with cardio.  Instructed patient to seek immediate medical attention if he begins to feel chest pain and sob.

## 2023-12-05 NOTE — H&P PST ADULT - OTHER CARE PROVIDERS
CARDS: Dr. Simmons; LV 3 weeks ago; phone [phone] CARDS: Dr. Simmons; LV 3 weeks ago; phone [phone]

## 2023-12-05 NOTE — H&P PST ADULT - HISTORY OF PRESENT ILLNESS
Patient has severe ED no response to Sildenafil medication. Has not been able to have sex in 5 years. Reports wanting to get penile implant for the past 5 -6 years.    PATIENT CURRENTLY DENIES CHEST PAIN TODAY, SHORTNESS OF BREATH  PALPITATIONS,  DYSURIA, OR UPPER RESPIRATORY INFECTION IN PAST 2 WEEKS  PATIENT WITH OCCASIONAL CHEST PAIN, SEES CARDIOLOGY; LAST SEEN "COUPLE OF WEEKS AGO" PER PATIENT.    denies travel outside the [place] in the past 30 days  Patient denies any signs or symptoms of COVID 19 and denies contact with known positive individuals.  They have an appointment for repeat COVID testing pre-procedure and acknowledge its time and place.  They were instructed to quarantine pre-procedure, practice exposure control measures, continue to self-monitor and report any concerns to their proceduralist.  pt advised self quarantine till day of procedure    Anesthesia Alert  NO--Difficult Airway  NO--History of neck surgery or radiation  NO--Limited ROM of neck  NO--History of Malignant hyperthermia  NO--No personal or family history of Pseudocholinesterase deficiency.  NO--Prior Anesthesia Complication  NO--Latex Allergy  NO--Loose teeth  NO--History of Rheumatoid Arthritis  YES --Bleeding risk (on ASA)  YES--AMBER  on cpap  NO--Other_____      PT DENIES ANY RASHES, ABRASION, OR OPEN WOUNDS OR CUTS    AS PER THE PT, THIS IS HIS/HER COMPLETE MEDICAL AND SURGICAL HX, INCLUDING MEDICATIONS PRESCRIBED AND OVER THE COUNTER    Patient verbalized understanding of instructions and was given the opportunity to ask questions and have them answered.    pt denies any suicidal ideation or thoughts, pt states not a threat to self or others      Duke Activity Status Index (DASI) from [email or web address]  RESULT SUMMARY:  7.2 points  The higher the score (maximum 58.2), the higher the functional status.    3.63 METs        INPUTS:  Take care of self —> 0 = No  Walk indoors —> 1.75 = Yes  Walk 1&ndash;2 blocks on level ground —> 2.75 = Yes  Climb a flight of stairs or walk up a hill —> 0 = No  Run a short distance —> 0 = No  Do light work around the house —> 2.7 = Yes  Do moderate work around the house —> 0 = No  Do heavy work around the house —> 0 = No  Do yardwork —> 0 = No  Have sexual relations —> 0 = No  Participate in moderate recreational activities —> 0 = No  Participate in strenuous sports —> 0 = No      Revised Cardiac Risk Index for Pre-Operative Risk from [employer]   RESULT SUMMARY:  4 points  Class IV Risk    15.0 %  30-day risk of death, MI, or cardiac arrest    From [employer] 2017. These numbers are higher than those from the original study (Job 1999). See Evidence for details.      INPUTS:  Elevated-risk surgery —> 0 = No  History of ischemic heart disease —> 1 = Yes  History of congestive heart failure —> 0 = No  History of cerebrovascular disease —> 1 = Yes  Pre-operative treatment with insulin —> 1 = Yes  Pre-operative creatinine >2 mg/dL / 176.8 µmol/L —> 1 = Yes    Diabetes education given during PAST visit.     Patient has severe ED no response to Sildenafil medication. Has not been able to have sex in 5 years. Reports wanting to get penile implant for the past 5 -6 years.    PATIENT CURRENTLY DENIES CHEST PAIN TODAY, SHORTNESS OF BREATH  PALPITATIONS,  DYSURIA, OR UPPER RESPIRATORY INFECTION IN PAST 2 WEEKS  PATIENT WITH OCCASIONAL CHEST PAIN, SEES CARDIOLOGY; LAST SEEN "COUPLE OF WEEKS AGO" PER PATIENT.    denies travel outside the [place] in the past 30 days  Patient denies any signs or symptoms of COVID 19 and denies contact with known positive individuals.  They have an appointment for repeat COVID testing pre-procedure and acknowledge its time and place.  They were instructed to quarantine pre-procedure, practice exposure control measures, continue to self-monitor and report any concerns to their proceduralist.  pt advised self quarantine till day of procedure    Anesthesia Alert  NO--Difficult Airway  NO--History of neck surgery or radiation  NO--Limited ROM of neck  NO--History of Malignant hyperthermia  NO--No personal or family history of Pseudocholinesterase deficiency.  NO--Prior Anesthesia Complication  NO--Latex Allergy  NO--Loose teeth  NO--History of Rheumatoid Arthritis  YES --Bleeding risk (on ASA)  YES--AMBER  on cpap  NO--Other_____      PT DENIES ANY RASHES, ABRASION, OR OPEN WOUNDS OR CUTS    AS PER THE PT, THIS IS HIS/HER COMPLETE MEDICAL AND SURGICAL HX, INCLUDING MEDICATIONS PRESCRIBED AND OVER THE COUNTER    Patient verbalized understanding of instructions and was given the opportunity to ask questions and have them answered.    pt denies any suicidal ideation or thoughts, pt states not a threat to self or others      Duke Activity Status Index (DASI) from [employer]  RESULT SUMMARY:  7.2 points  The higher the score (maximum 58.2), the higher the functional status.    3.63 METs        INPUTS:  Take care of self —> 0 = No  Walk indoors —> 1.75 = Yes  Walk 1&ndash;2 blocks on level ground —> 2.75 = Yes  Climb a flight of stairs or walk up a hill —> 0 = No  Run a short distance —> 0 = No  Do light work around the house —> 2.7 = Yes  Do moderate work around the house —> 0 = No  Do heavy work around the house —> 0 = No  Do yardwork —> 0 = No  Have sexual relations —> 0 = No  Participate in moderate recreational activities —> 0 = No  Participate in strenuous sports —> 0 = No      Revised Cardiac Risk Index for Pre-Operative Risk from [employer]   RESULT SUMMARY:  4 points  Class IV Risk    15.0 %  30-day risk of death, MI, or cardiac arrest    From [employer]ppe 2017. These numbers are higher than those from the original study (Job 1999). See Evidence for details.      INPUTS:  Elevated-risk surgery —> 0 = No  History of ischemic heart disease —> 1 = Yes  History of congestive heart failure —> 0 = No  History of cerebrovascular disease —> 1 = Yes  Pre-operative treatment with insulin —> 1 = Yes  Pre-operative creatinine >2 mg/dL / 176.8 µmol/L —> 1 = Yes    Diabetes education given during PAST visit.

## 2023-12-06 DIAGNOSIS — Z01.818 ENCOUNTER FOR OTHER PREPROCEDURAL EXAMINATION: ICD-10-CM

## 2023-12-06 DIAGNOSIS — N52.9 MALE ERECTILE DYSFUNCTION, UNSPECIFIED: ICD-10-CM

## 2023-12-06 LAB
CULTURE RESULTS: SIGNIFICANT CHANGE UP
CULTURE RESULTS: SIGNIFICANT CHANGE UP
SPECIMEN SOURCE: SIGNIFICANT CHANGE UP
SPECIMEN SOURCE: SIGNIFICANT CHANGE UP

## 2023-12-12 ENCOUNTER — APPOINTMENT (OUTPATIENT)
Dept: UROLOGY | Facility: HOSPITAL | Age: 48
End: 2023-12-12

## 2024-02-05 NOTE — DISCHARGE NOTE PROVIDER - NSDCADMDATE_GEN_ALL_CORE_FT
Detail Level: Detailed Quality 137: Melanoma: Continuity Of Care - Recall System: Patient information entered into a recall system that includes: target date for the next exam specified AND a process to follow up with patients regarding missed or unscheduled appointments Quality 226: Preventive Care And Screening: Tobacco Use: Screening And Cessation Intervention: Patient screened for tobacco use and is an ex/non-smoker Quality 130: Documentation Of Current Medications In The Medical Record: Current Medications Documented Quality 431: Preventive Care And Screening: Unhealthy Alcohol Use - Screening: Patient not identified as an unhealthy alcohol user when screened for unhealthy alcohol use using a systematic screening method Quality 110: Preventive Care And Screening: Influenza Immunization: Influenza Immunization Administered during Influenza season 04-Oct-2022 15:21

## 2024-03-23 NOTE — ED ADULT TRIAGE NOTE - CCCP TRG CHIEF CMPLNT
Medication history reviewed with PT at bedside    [facility] is complete per PT reporting    Allergies reviewed.     Patient denies any outpatient antibiotics in the last 30 days.     Patient is not taking anticoagulants.    Preferred pharmacy for this visit - [facility] on [address] ([phone])                 abdominal pain/n/v/d

## 2024-04-19 NOTE — PROGRESS NOTE ADULT - ATTENDING COMMENTS
04/19/24 0806   Patient Observation   Pulse 70   Respirations 30   SpO2 94 %   Breath Sounds   Breath Sounds Bilateral Crackles    Right Upper Lobe Crackles   Right Middle Lobe Crackles   Right Lower Lobe Crackles   Left Upper Lobe Crackles   Left Lower Lobe Crackles   Vent Information   Ventilator Day(s) 6   Ventilator ID [id number]   Vent Mode (S)  CPAP/PS  (trialed on PS)   Ventilator Settings   FiO2  35 %   PEEP/CPAP (cmH2O) (S)  8   Pressure Support (cm H2O) (S)  10 cm H2O   Vent Patient Data (Readings)   Vt (Measured) 274 mL   Peak Inspiratory Pressure (cmH2O) 19 cmH2O   Rate Measured 34 br/min   Minute Volume (L/min) 9.3 Liters   Mean Airway Pressure (cmH2O) 13 cmH20   Plateau Pressure (cm H2O) 18 cm H2O   Driving Pressure 10   I:E Ratio 1:1.20   Backup Apnea On   Backup Rate 20 Breaths Per Minute   Backup Vt 450   Vent Alarm Settings   High Pressure (cmH2O) 50 cmH2O   Low Minute Volume (lpm) 5 L/min   High Minute Volume (lpm) 18 L/min   Low Exhaled Vt (ml) 300 mL   High Exhaled Vt (ml) 900 mL   RR Low (bpm) 20   RR High (bpm) 40 br/min   Apnea (secs) 20 secs   Additional Respiratoray Assessments   Humidification Source Heated wire   Humidification Temp 36.3   Circuit Condensation Drained   Ambu Bag With Mask At Bedside Yes   ETT    Placement Date/Time: 04/13/24 1356   Placed By: Licensed provider  Placement Verified By: Auscultation;Capnometry  Preoxygenation: Yes  Mask Ventilation: Ventilated by mask (1)  Airway Type: Cuffed  Airway Tube Size: 8 mm  Location: Oral  Secured At: ...   Secured At 25 cm   Measured From Lips   ETT Placement Center   Secured By Commercial tube sanz        47 M w/ PMHx of EtOH abuse now sober X5 years, DM, CKD IV (baseline creatinine 2.0-2.4 in July), HTN who presented to OSH with dizziness, diplopia, CP. Trop elevated with EKG changes. Patient [facility] to [facility] for NSTEMI requiring LHC. Creatinine elevated to 2.7 on presentation.  NO Chest pain, + double vision persists.  Acknowledges exertional dyspnea.    1.  ARF on CKD--w/u in progress.  Review of serologies largely -.  Concur with volume optimization.  Cr now back in high 2s, trend.  SAome acuity may be consequent to radiocontrast procedures.  Will need outpt monitoring and renal f/u in 3-4 weeks    2.  Hypertension--CCB titration systolic goal <140  3.  Nephrotic range proteinuria--guardado in progress and thus far unrevealing.  LIKELY DM RELATED.  Check renal venous dopplers.  Sono reveals chronicity. No hydronephrosis.       discussed with med team  Nilesh Scott MD  contact me on TEAMS

## 2024-07-22 ENCOUNTER — APPOINTMENT (OUTPATIENT)
Dept: NEPHROLOGY | Facility: CLINIC | Age: 49
End: 2024-07-22

## 2024-07-22 ENCOUNTER — APPOINTMENT (OUTPATIENT)
Dept: TRANSPLANT | Facility: CLINIC | Age: 49
End: 2024-07-22
Payer: COMMERCIAL

## 2024-07-22 VITALS
WEIGHT: 184 LBS | OXYGEN SATURATION: 97 % | SYSTOLIC BLOOD PRESSURE: 177 MMHG | HEIGHT: 68 IN | BODY MASS INDEX: 27.89 KG/M2 | HEART RATE: 90 BPM | RESPIRATION RATE: 16 BRPM | DIASTOLIC BLOOD PRESSURE: 98 MMHG | TEMPERATURE: 97.8 F

## 2024-07-22 PROCEDURE — 99203 OFFICE O/P NEW LOW 30 MIN: CPT

## 2024-07-22 NOTE — ASSESSMENT
[Not a candidate] : not a candidate. We should not proceed with our protocol for evaluation for kidney transplantation. [FreeTextEntry1] : not currently a candidate for kidney transplant -HbA1c 10 -open left big toe ulcer -current smoker  feedback given to patient who understands, will work on DM control, stop smoking and heal foot ulcer before reinitiating evaluation

## 2024-07-22 NOTE — HISTORY OF PRESENT ILLNESS
[Diabetes Mellitus] : Diabetes Mellitus [Diabetes] : diabetes [Retinopathy] : retinopathy [Lower Extremity Ulcers] : lower extremity ulcers [Insulin] : insulin treatment [Previous Kidney Transplant] : no previous kidney transplant [TextBox_16] : 03/24 [TextBox_20] : 25y ago [TextBox_28] : 25y ago [de-identified] : 50yo, ESRD  - HD TTS via permacath decreased urine output  PMHx TIA /  cva - has a LINQII monitor, has some memory loss, residual right sided weakness past hx includes CNS trauma (boxing) - CT/MRI shows chronic microvascular damage has vertigo - mobilizes with cane IHD with EKG suggesting STEMI - cath [facility] 10/22 - nonobstructive CAD; echo - no structuarl heart disease LVEF 59% AMBER DM - poorly controlled, H1C 10 last colonoscopy ~2y ago  PSHx diverticulitis s/p LAR peripheral neuropathy, right foot ulcer > healing  FHx HT, DM no cancers no kidney failure  SHx current smoker - ~1pk/week etoh - sober x5y lives with room mate

## 2024-07-25 NOTE — ED PROVIDER NOTE - PHYSICAL EXAMINATION
IMPROVED WITH MEDS BUT NOT AT GOAL.  WILL REASSESS AT NEXT VISIT    VITAL SIGNS: I have reviewed nursing notes and confirm.  CONSTITUTIONAL: Well-developed; well-nourished; in no acute distress.   SKIN: skin exam is warm and dry, no acute rash.    HEAD: Normocephalic; atraumatic.  EYES:  conjunctiva and sclera clear.  ENT: No nasal discharge; airway clear.  CARD: S1, S2 normal; no murmurs, gallops, or rubs. Regular rate and rhythm.   RESP: No wheezes, rales or rhonchi.  ABD: TTP LLQ, Normal bowel sounds; soft; non-distended  GENITAL: No TTP to testicles, cremasteric reflex intact, no penile discharge expressed  EXT: Normal ROM.  No clubbing, cyanosis or edema.   NEURO: Alert, oriented, grossly unremarkable

## 2024-08-07 ENCOUNTER — APPOINTMENT (OUTPATIENT)
Dept: CARDIOLOGY | Facility: CLINIC | Age: 49
End: 2024-08-07

## 2024-09-06 NOTE — PROGRESS NOTE ADULT - SUBJECTIVE AND OBJECTIVE BOX
Patient is a 47y old  Male who presents with a chief complaint of Multiple CVA (29 Oct 2022 11:46)      Subjective and overnight events:  Patient seen and examined at bedside. no complaints. no fever, chills, headache, dizziness, sob, cp, abd pain.    ALLERGIES:  No Known Allergies    MEDICATIONS  (STANDING):  amitriptyline 10 milliGRAM(s) Oral at bedtime  AQUAPHOR (petrolatum Ointment) 1 Application(s) Topical two times a day  aspirin enteric coated 81 milliGRAM(s) Oral daily  atorvastatin 40 milliGRAM(s) Oral at bedtime  budesonide  80 MICROgram(s)/formoterol 4.5 MICROgram(s) Inhaler 2 Puff(s) Inhalation two times a day  carvedilol 12.5 milliGRAM(s) Oral every 12 hours  cyanocobalamin 1000 MICROGram(s) Oral daily  dextrose 5%. 1000 milliLiter(s) (50 mL/Hr) IV Continuous <Continuous>  dextrose 5%. 1000 milliLiter(s) (100 mL/Hr) IV Continuous <Continuous>  dextrose 50% Injectable 25 Gram(s) IV Push once  dextrose 50% Injectable 12.5 Gram(s) IV Push once  dextrose 50% Injectable 25 Gram(s) IV Push once  glucagon  Injectable 1 milliGRAM(s) IntraMuscular once  heparin   Injectable 5000 Unit(s) SubCutaneous every 8 hours  insulin glargine Injectable (LANTUS) 20 Unit(s) SubCutaneous at bedtime  insulin lispro (ADMELOG) corrective regimen sliding scale   SubCutaneous three times a day before meals  insulin lispro (ADMELOG) corrective regimen sliding scale   SubCutaneous at bedtime  insulin lispro Injectable (ADMELOG) 2 Unit(s) SubCutaneous before breakfast  insulin lispro Injectable (ADMELOG) 2 Unit(s) SubCutaneous before lunch  insulin lispro Injectable (ADMELOG) 2 Unit(s) SubCutaneous before dinner  multivitamin 1 Tablet(s) Oral daily  pantoprazole    Tablet 40 milliGRAM(s) Oral before breakfast  senna 2 Tablet(s) Oral at bedtime    MEDICATIONS  (PRN):  acetaminophen     Tablet .. 650 milliGRAM(s) Oral every 6 hours PRN Temp greater or equal to 38C (100.4F), Mild Pain (1 - 3)  ALBUTerol    90 MICROgram(s) HFA Inhaler 2 Puff(s) Inhalation every 6 hours PRN Shortness of Breath and/or Wheezing  Biotene Dry Mouth Oral Rinse 15 milliLiter(s) Swish and Spit three times a day PRN Mouth Care  dextrose Oral Gel 15 Gram(s) Oral once PRN Blood Glucose LESS THAN 70 milliGRAM(s)/deciliter  simethicone 80 milliGRAM(s) Chew daily PRN Gas  sodium chloride 0.65% Nasal 1 Spray(s) Both Nostrils every 1 hour PRN Nasal Congestion    Vital Signs Last 24 Hrs  T(F): 98.2 (28 Oct 2022 19:54), Max: 98.2 (28 Oct 2022 19:54)  HR: 95 (29 Oct 2022 08:27) (82 - 95)  BP: 139/71 (29 Oct 2022 05:18) (139/71 - 171/85)  RR: 16 (28 Oct 2022 19:54) (16 - 16)  SpO2: 99% (29 Oct 2022 08:27) (99% - 99%)  I&O's Summary    PHYSICAL EXAM:  General: NAD, A/O x 3  ENT: MMM  Neck: Supple, No JVD  Lungs: Clear to auscultation bilaterally  Cardio: RRR, S1/S2, No murmurs  Abdomen: Soft, Nontender, Nondistended; Bowel sounds present  Extremities: No calf tenderness, No pitting edema    LABS:                        10.6   7.17  )-----------( 192      ( 27 Oct 2022 05:45 )             31.2     10-29    140  |  109  |  80  ----------------------------<  179  5.1   |  25  |  3.22    Ca    9.0      29 Oct 2022 06:00    TPro  6.7  /  Alb  3.1  /  TBili  0.2  /  DBili  x   /  AST  19  /  ALT  26  /  AlkPhos  86  10-27        POCT Blood Glucose.: 72 mg/dL (29 Oct 2022 11:04)  POCT Blood Glucose.: 132 mg/dL (29 Oct 2022 08:07)  POCT Blood Glucose.: 190 mg/dL (28 Oct 2022 21:26)  POCT Blood Glucose.: 192 mg/dL (28 Oct 2022 16:52)      COVID-19 PCR: NotDetec (10-28-22 @ 05:58)  COVID-19 PCR: NotDetec (10-24-22 @ 14:05)  COVID-19 PCR: NotDetec (10-23-22 @ 06:20)  COVID-19 PCR: NotDetec (10-20-22 @ 14:41)  COVID-19 PCR: NotDetec (10-17-22 @ 07:40)      RADIOLOGY & ADDITIONAL TESTS:    Care Discussed with Consultants/Other Providers:    4

## 2024-12-16 NOTE — PROGRESS NOTE ADULT - SUBJECTIVE AND OBJECTIVE BOX
[facility] Division of Kidney Diseases & Hypertension  FOLLOW UP NOTE  [phone]--------------------------------------------------------------------------------  Chief Complaint:Non-ST elevation myocardial infarction (NSTEMI)    47 M w/ PMHx of EtOH abuse now sober X5 years, DM, CKD IV (baseline creatinine 2.0-2.4 in July), HTN who presented to OSH with dizziness, diplopia, CP. Trop elevated with EKG changes. Patient transferred to [facility] for NSTEMI requiring LHC. Creatinine elevated to 2.7 on presentation.      24 hour events/subjective:  Patient underwent a CTA of the coronary arteries that showed mild-moderate disease. Still endorses SOB with dyspnea      PAST HISTORY  --------------------------------------------------------------------------------  No significant changes to PMH, PSH, FHx, SHx, unless otherwise noted    ALLERGIES & MEDICATIONS  --------------------------------------------------------------------------------  Allergies    No Known Allergies    Intolerances      Standing Inpatient Medications  amitriptyline 10 milliGRAM(s) Oral at bedtime  amLODIPine   Tablet 10 milliGRAM(s) Oral daily  aspirin enteric coated 81 milliGRAM(s) Oral daily  atorvastatin 40 milliGRAM(s) Oral at bedtime  budesonide  80 MICROgram(s)/formoterol 4.5 MICROgram(s) Inhaler 2 Puff(s) Inhalation two times a day  carvedilol 25 milliGRAM(s) Oral every 12 hours  dextrose 5%. 1000 milliLiter(s) IV Continuous <Continuous>  dextrose 5%. 1000 milliLiter(s) IV Continuous <Continuous>  dextrose 50% Injectable 25 Gram(s) IV Push once  dextrose 50% Injectable 12.5 Gram(s) IV Push once  dextrose 50% Injectable 25 Gram(s) IV Push once  fenofibrate Tablet 145 milliGRAM(s) Oral daily  glucagon  Injectable 1 milliGRAM(s) IntraMuscular once  influenza   Vaccine 0.5 milliLiter(s) IntraMuscular once  insulin glargine Injectable (LANTUS) 36 Unit(s) SubCutaneous at bedtime  insulin lispro (ADMELOG) corrective regimen sliding scale   SubCutaneous three times a day before meals  insulin lispro (ADMELOG) corrective regimen sliding scale   SubCutaneous at bedtime  insulin lispro Injectable (ADMELOG) 4 Unit(s) SubCutaneous three times a day before meals  pantoprazole    Tablet 40 milliGRAM(s) Oral before breakfast  sodium chloride 0.9%. 1000 milliLiter(s) IV Continuous <Continuous>  sodium chloride 0.9%. 1000 milliLiter(s) IV Continuous <Continuous>  ticagrelor 90 milliGRAM(s) Oral every 12 hours    PRN Inpatient Medications  ALBUTerol    90 MICROgram(s) HFA Inhaler 2 Puff(s) Inhalation every 6 hours PRN  Biotene Dry Mouth Oral Rinse 15 milliLiter(s) Swish and Spit three times a day PRN  dextrose Oral Gel 15 Gram(s) Oral once PRN      REVIEW OF SYSTEMS  --------------------------------------------------------------------------------  Gen: No fevers/chills  Skin: No rashes  Head/Eyes/Ears: Normal hearing,   Respiratory: No dyspnea, cough  CV: No chest pain  GI: No abdominal pain, diarrhea  : No dysuria, hematuria  MSK: No  edema  Heme: No easy bruising or bleeding  Psych: No significant depression    All other systems were reviewed and are negative, except as noted.    VITALS/PHYSICAL EXAM  --------------------------------------------------------------------------------  T(C): 36.8 (10-07-22 @ 04:18), Max: 36.8 (10-07-22 @ 04:18)  HR: 73 (10-07-22 @ 05:15) (65 - 75)  BP: 154/78 (10-07-22 @ 05:15) (126/74 - 154/78)  RR: 18 (10-07-22 @ 04:18) (18 - 19)  SpO2: 99% (10-07-22 @ 04:18) (97% - 100%)  Wt(kg): --        10-06-22 @ 07:01  -  10-07-22 @ 07:00  --------------------------------------------------------  IN: 297.5 mL / OUT: 1850 mL / NET: -1552.5 mL      Physical Exam:  	Gen: NAD  	HEENT: MMM  	Pulm: CTA B/L  	CV: S1S2  	Abd: Soft, +BS   	Ext: No LE edema B/L  	Neuro: Awake  	Skin: Warm and dry  	Vascular access: None      LABS/STUDIES  --------------------------------------------------------------------------------              11.2   6.79  >-----------<  186      [10-07-22 @ 05:58]              34.4     140  |  107  |  43  ----------------------------<  102      [10-07-22 @ 06:00]  4.5   |  23  |  2.94        Ca     8.9     [10-07-22 @ 06:00]      Mg     2.1     [10-07-22 @ 06:00]      Phos  4.2     [10-07-22 @ 06:00]        PTT: 33.0       [10-07-22 @ 05:58]      Creatinine Trend:  SCr 2.94 [10-07 @ 06:00]  SCr 2.20 [10-06 @ 04:38]  SCr 2.50 [10-05 @ 00:49]  SCr 2.73 [10-04 @ 16:14]    Urinalysis - [10-05-22 @ 00:49]      Color Colorless / Appearance Clear / SG 1.008 / pH 6.0      Gluc 500 mg/dL / Ketone Negative  / Bili Negative / Urobili Negative       Blood Trace / Protein 100 mg/dL / Leuk Est Negative / Nitrite Negative      RBC 2 / WBC 1 / Hyaline  / Gran  / Sq Epi  / Non Sq Epi 0 / Bacteria Negative    Urine Creatinine 41      [10-05-22 @ 00:51]  Urine Protein 140      [10-05-22 @ 00:51]  Urine Sodium 38      [10-05-22 @ 00:51]  Urine Urea Nitrogen 346      [10-05-22 @ 01:12]  Urine Potassium 13      [10-05-22 @ 00:51]         [Fatigue] : fatigue [Negative] : Allergic/Immunologic [[email or web address]] : rectal bleeding - resolved.

## 2025-03-27 NOTE — H&P PST ADULT - AIRWAY
Chart review complete update obtained from [facility] the patient is currently admitted to SNF for STR. This Admin Coordinator will continue to monitor via chart review.     
FROM neck, short neck/normal

## 2025-05-28 NOTE — ED ADULT TRIAGE NOTE - IDEAL BODY WEIGHT(KG)
Attending and PA/NP shared services statement (NON-critical care): Attending and PA/NP shared services statement (NON-critical care): 68
